# Patient Record
Sex: FEMALE | Race: WHITE | NOT HISPANIC OR LATINO | Employment: OTHER | URBAN - METROPOLITAN AREA
[De-identification: names, ages, dates, MRNs, and addresses within clinical notes are randomized per-mention and may not be internally consistent; named-entity substitution may affect disease eponyms.]

---

## 2017-01-03 ENCOUNTER — APPOINTMENT (OUTPATIENT)
Dept: PHYSICAL THERAPY | Age: 72
End: 2017-01-03
Payer: MEDICARE

## 2017-01-05 ENCOUNTER — APPOINTMENT (OUTPATIENT)
Dept: PHYSICAL THERAPY | Age: 72
End: 2017-01-05
Payer: MEDICARE

## 2017-01-06 ENCOUNTER — ALLSCRIPTS OFFICE VISIT (OUTPATIENT)
Dept: OTHER | Facility: OTHER | Age: 72
End: 2017-01-06

## 2017-01-06 DIAGNOSIS — M54.50 LOW BACK PAIN: ICD-10-CM

## 2017-01-09 ENCOUNTER — APPOINTMENT (OUTPATIENT)
Dept: PHYSICAL THERAPY | Age: 72
End: 2017-01-09
Payer: MEDICARE

## 2017-01-09 PROCEDURE — G8990 OTHER PT/OT CURRENT STATUS: HCPCS

## 2017-01-09 PROCEDURE — G8992 OTHER PT/OT  D/C STATUS: HCPCS

## 2017-01-09 PROCEDURE — 97164 PT RE-EVAL EST PLAN CARE: CPT

## 2017-01-09 PROCEDURE — G8991 OTHER PT/OT GOAL STATUS: HCPCS

## 2017-01-11 ENCOUNTER — GENERIC CONVERSION - ENCOUNTER (OUTPATIENT)
Dept: OTHER | Facility: OTHER | Age: 72
End: 2017-01-11

## 2017-03-21 ENCOUNTER — HOSPITAL ENCOUNTER (OUTPATIENT)
Dept: MRI IMAGING | Facility: HOSPITAL | Age: 72
Discharge: HOME | End: 2017-03-21
Attending: ANESTHESIOLOGY
Payer: MEDICARE

## 2017-03-21 DIAGNOSIS — M54.50 LOW BACK PAIN: ICD-10-CM

## 2017-03-21 PROCEDURE — 72148 MRI LUMBAR SPINE W/O DYE: CPT

## 2017-03-31 ENCOUNTER — GENERIC CONVERSION - ENCOUNTER (OUTPATIENT)
Dept: OTHER | Facility: OTHER | Age: 72
End: 2017-03-31

## 2017-03-31 DIAGNOSIS — D64.9 ANEMIA: ICD-10-CM

## 2017-03-31 DIAGNOSIS — E78.00 PURE HYPERCHOLESTEROLEMIA: ICD-10-CM

## 2017-03-31 DIAGNOSIS — M48.061 SPINAL STENOSIS OF LUMBAR REGION: ICD-10-CM

## 2017-03-31 DIAGNOSIS — I10 ESSENTIAL (PRIMARY) HYPERTENSION: ICD-10-CM

## 2017-04-10 ENCOUNTER — ALLSCRIPTS OFFICE VISIT (OUTPATIENT)
Dept: OTHER | Facility: OTHER | Age: 72
End: 2017-04-10

## 2017-06-16 ENCOUNTER — GENERIC CONVERSION - ENCOUNTER (OUTPATIENT)
Dept: OTHER | Facility: OTHER | Age: 72
End: 2017-06-16

## 2017-07-18 ENCOUNTER — ALLSCRIPTS OFFICE VISIT (OUTPATIENT)
Dept: OTHER | Facility: OTHER | Age: 72
End: 2017-07-18

## 2017-07-18 DIAGNOSIS — R00.1 BRADYCARDIA: ICD-10-CM

## 2017-07-18 DIAGNOSIS — I48.0 PAROXYSMAL ATRIAL FIBRILLATION (HCC): ICD-10-CM

## 2017-07-18 DIAGNOSIS — M51.36 OTHER INTERVERTEBRAL DISC DEGENERATION, LUMBAR REGION: ICD-10-CM

## 2017-08-07 ENCOUNTER — APPOINTMENT (OUTPATIENT)
Dept: PHYSICAL THERAPY | Age: 72
End: 2017-08-07
Payer: MEDICARE

## 2017-08-09 ENCOUNTER — APPOINTMENT (OUTPATIENT)
Dept: PHYSICAL THERAPY | Age: 72
End: 2017-08-09
Payer: MEDICARE

## 2017-08-09 PROCEDURE — G8991 OTHER PT/OT GOAL STATUS: HCPCS

## 2017-08-09 PROCEDURE — 97162 PT EVAL MOD COMPLEX 30 MIN: CPT

## 2017-08-09 PROCEDURE — G8990 OTHER PT/OT CURRENT STATUS: HCPCS

## 2017-08-10 ENCOUNTER — GENERIC CONVERSION - ENCOUNTER (OUTPATIENT)
Dept: OTHER | Facility: OTHER | Age: 72
End: 2017-08-10

## 2017-08-17 ENCOUNTER — APPOINTMENT (OUTPATIENT)
Dept: PHYSICAL THERAPY | Age: 72
End: 2017-08-17
Payer: MEDICARE

## 2017-08-17 PROCEDURE — 97140 MANUAL THERAPY 1/> REGIONS: CPT

## 2017-08-17 PROCEDURE — 97110 THERAPEUTIC EXERCISES: CPT

## 2017-08-22 ENCOUNTER — APPOINTMENT (OUTPATIENT)
Dept: PHYSICAL THERAPY | Age: 72
End: 2017-08-22
Payer: MEDICARE

## 2017-08-23 ENCOUNTER — APPOINTMENT (OUTPATIENT)
Dept: PHYSICAL THERAPY | Age: 72
End: 2017-08-23
Payer: MEDICARE

## 2017-08-23 PROCEDURE — 97110 THERAPEUTIC EXERCISES: CPT

## 2017-08-23 PROCEDURE — 97112 NEUROMUSCULAR REEDUCATION: CPT

## 2017-08-24 ENCOUNTER — APPOINTMENT (OUTPATIENT)
Dept: PHYSICAL THERAPY | Age: 72
End: 2017-08-24
Payer: MEDICARE

## 2017-08-25 ENCOUNTER — APPOINTMENT (OUTPATIENT)
Dept: PHYSICAL THERAPY | Age: 72
End: 2017-08-25
Payer: MEDICARE

## 2017-08-25 ENCOUNTER — APPOINTMENT (OUTPATIENT)
Dept: LAB | Age: 72
End: 2017-08-25
Payer: MEDICARE

## 2017-08-25 ENCOUNTER — TRANSCRIBE ORDERS (OUTPATIENT)
Dept: LAB | Age: 72
End: 2017-08-25

## 2017-08-25 DIAGNOSIS — I10 ESSENTIAL (PRIMARY) HYPERTENSION: ICD-10-CM

## 2017-08-25 DIAGNOSIS — E78.00 PURE HYPERCHOLESTEROLEMIA: ICD-10-CM

## 2017-08-25 DIAGNOSIS — D64.9 ANEMIA: ICD-10-CM

## 2017-08-25 DIAGNOSIS — R00.1 BRADYCARDIA: ICD-10-CM

## 2017-08-25 DIAGNOSIS — I48.0 PAROXYSMAL ATRIAL FIBRILLATION (HCC): ICD-10-CM

## 2017-08-25 LAB
ALBUMIN SERPL BCP-MCNC: 3.7 G/DL (ref 3.5–5)
ALP SERPL-CCNC: 70 U/L (ref 46–116)
ALT SERPL W P-5'-P-CCNC: 16 U/L (ref 12–78)
ANION GAP SERPL CALCULATED.3IONS-SCNC: 5 MMOL/L (ref 4–13)
AST SERPL W P-5'-P-CCNC: 15 U/L (ref 5–45)
BASOPHILS # BLD AUTO: 0.03 THOUSANDS/ΜL (ref 0–0.1)
BASOPHILS NFR BLD AUTO: 0 % (ref 0–1)
BILIRUB SERPL-MCNC: 0.5 MG/DL (ref 0.2–1)
BUN SERPL-MCNC: 25 MG/DL (ref 5–25)
CALCIUM SERPL-MCNC: 9.2 MG/DL (ref 8.3–10.1)
CHLORIDE SERPL-SCNC: 105 MMOL/L (ref 100–108)
CHOLEST SERPL-MCNC: 207 MG/DL (ref 50–200)
CO2 SERPL-SCNC: 29 MMOL/L (ref 21–32)
CREAT SERPL-MCNC: 0.87 MG/DL (ref 0.6–1.3)
EOSINOPHIL # BLD AUTO: 0.16 THOUSAND/ΜL (ref 0–0.61)
EOSINOPHIL NFR BLD AUTO: 2 % (ref 0–6)
ERYTHROCYTE [DISTWIDTH] IN BLOOD BY AUTOMATED COUNT: 13 % (ref 11.6–15.1)
GFR SERPL CREATININE-BSD FRML MDRD: 66 ML/MIN/1.73SQ M
GLUCOSE P FAST SERPL-MCNC: 100 MG/DL (ref 65–99)
HCT VFR BLD AUTO: 40.5 % (ref 34.8–46.1)
HDLC SERPL-MCNC: 59 MG/DL (ref 40–60)
HGB BLD-MCNC: 13.6 G/DL (ref 11.5–15.4)
INR PPP: 1.36 (ref 0.86–1.16)
LDLC SERPL CALC-MCNC: 129 MG/DL (ref 0–100)
LYMPHOCYTES # BLD AUTO: 2.13 THOUSANDS/ΜL (ref 0.6–4.47)
LYMPHOCYTES NFR BLD AUTO: 26 % (ref 14–44)
MCH RBC QN AUTO: 29.9 PG (ref 26.8–34.3)
MCHC RBC AUTO-ENTMCNC: 33.6 G/DL (ref 31.4–37.4)
MCV RBC AUTO: 89 FL (ref 82–98)
MONOCYTES # BLD AUTO: 0.67 THOUSAND/ΜL (ref 0.17–1.22)
MONOCYTES NFR BLD AUTO: 8 % (ref 4–12)
NEUTROPHILS # BLD AUTO: 5.07 THOUSANDS/ΜL (ref 1.85–7.62)
NEUTS SEG NFR BLD AUTO: 64 % (ref 43–75)
NRBC BLD AUTO-RTO: 0 /100 WBCS
PLATELET # BLD AUTO: 309 THOUSANDS/UL (ref 149–390)
PMV BLD AUTO: 9.4 FL (ref 8.9–12.7)
POTASSIUM SERPL-SCNC: 3.9 MMOL/L (ref 3.5–5.3)
PROT SERPL-MCNC: 7.4 G/DL (ref 6.4–8.2)
PROTHROMBIN TIME: 16.8 SECONDS (ref 12.1–14.4)
RBC # BLD AUTO: 4.55 MILLION/UL (ref 3.81–5.12)
SODIUM SERPL-SCNC: 139 MMOL/L (ref 136–145)
TRIGL SERPL-MCNC: 93 MG/DL
WBC # BLD AUTO: 8.11 THOUSAND/UL (ref 4.31–10.16)

## 2017-08-25 PROCEDURE — 97110 THERAPEUTIC EXERCISES: CPT

## 2017-08-25 PROCEDURE — 85610 PROTHROMBIN TIME: CPT

## 2017-08-25 PROCEDURE — 85025 COMPLETE CBC W/AUTO DIFF WBC: CPT

## 2017-08-25 PROCEDURE — 97112 NEUROMUSCULAR REEDUCATION: CPT

## 2017-08-25 PROCEDURE — 36415 COLL VENOUS BLD VENIPUNCTURE: CPT

## 2017-08-25 PROCEDURE — 80061 LIPID PANEL: CPT

## 2017-08-25 PROCEDURE — 80053 COMPREHEN METABOLIC PANEL: CPT

## 2017-08-26 ENCOUNTER — GENERIC CONVERSION - ENCOUNTER (OUTPATIENT)
Dept: OTHER | Facility: OTHER | Age: 72
End: 2017-08-26

## 2017-08-29 ENCOUNTER — APPOINTMENT (OUTPATIENT)
Dept: PHYSICAL THERAPY | Age: 72
End: 2017-08-29
Payer: MEDICARE

## 2017-08-30 ENCOUNTER — APPOINTMENT (OUTPATIENT)
Dept: PHYSICAL THERAPY | Age: 72
End: 2017-08-30
Payer: MEDICARE

## 2017-08-30 PROCEDURE — 97140 MANUAL THERAPY 1/> REGIONS: CPT

## 2017-08-30 PROCEDURE — 97112 NEUROMUSCULAR REEDUCATION: CPT

## 2017-08-30 PROCEDURE — 97110 THERAPEUTIC EXERCISES: CPT

## 2017-08-31 ENCOUNTER — ALLSCRIPTS OFFICE VISIT (OUTPATIENT)
Dept: OTHER | Facility: OTHER | Age: 72
End: 2017-08-31

## 2017-09-05 ENCOUNTER — APPOINTMENT (OUTPATIENT)
Dept: PHYSICAL THERAPY | Age: 72
End: 2017-09-05
Payer: MEDICARE

## 2017-09-05 PROCEDURE — 97110 THERAPEUTIC EXERCISES: CPT

## 2017-09-05 PROCEDURE — 97112 NEUROMUSCULAR REEDUCATION: CPT

## 2017-09-07 ENCOUNTER — APPOINTMENT (OUTPATIENT)
Dept: PHYSICAL THERAPY | Age: 72
End: 2017-09-07
Payer: MEDICARE

## 2017-09-07 PROCEDURE — 97110 THERAPEUTIC EXERCISES: CPT

## 2017-09-07 PROCEDURE — 97112 NEUROMUSCULAR REEDUCATION: CPT

## 2017-09-12 ENCOUNTER — APPOINTMENT (OUTPATIENT)
Dept: PHYSICAL THERAPY | Age: 72
End: 2017-09-12
Payer: MEDICARE

## 2017-09-12 PROCEDURE — 97112 NEUROMUSCULAR REEDUCATION: CPT

## 2017-09-12 PROCEDURE — 97110 THERAPEUTIC EXERCISES: CPT

## 2017-09-12 PROCEDURE — 97140 MANUAL THERAPY 1/> REGIONS: CPT

## 2017-09-14 ENCOUNTER — APPOINTMENT (OUTPATIENT)
Dept: PHYSICAL THERAPY | Age: 72
End: 2017-09-14
Payer: MEDICARE

## 2017-09-14 PROCEDURE — G8990 OTHER PT/OT CURRENT STATUS: HCPCS

## 2017-09-14 PROCEDURE — 97110 THERAPEUTIC EXERCISES: CPT

## 2017-09-14 PROCEDURE — G8991 OTHER PT/OT GOAL STATUS: HCPCS

## 2017-09-19 ENCOUNTER — APPOINTMENT (OUTPATIENT)
Dept: PHYSICAL THERAPY | Age: 72
End: 2017-09-19
Payer: MEDICARE

## 2017-09-21 ENCOUNTER — GENERIC CONVERSION - ENCOUNTER (OUTPATIENT)
Dept: OTHER | Facility: OTHER | Age: 72
End: 2017-09-21

## 2017-09-21 ENCOUNTER — APPOINTMENT (OUTPATIENT)
Dept: PHYSICAL THERAPY | Age: 72
End: 2017-09-21
Payer: MEDICARE

## 2017-09-22 ENCOUNTER — GENERIC CONVERSION - ENCOUNTER (OUTPATIENT)
Dept: OTHER | Facility: OTHER | Age: 72
End: 2017-09-22

## 2017-10-13 RX ORDER — VANCOMYCIN HYDROCHLORIDE 1 G/200ML
1000 INJECTION, SOLUTION INTRAVENOUS ONCE
Status: CANCELLED | OUTPATIENT
Start: 2017-10-13 | End: 2017-10-13

## 2017-10-15 ENCOUNTER — TELEPHONE (OUTPATIENT)
Dept: INPATIENT UNIT | Facility: HOSPITAL | Age: 72
End: 2017-10-15

## 2017-10-16 ENCOUNTER — APPOINTMENT (OUTPATIENT)
Dept: RADIOLOGY | Facility: HOSPITAL | Age: 72
End: 2017-10-16
Payer: MEDICARE

## 2017-10-16 ENCOUNTER — HOSPITAL ENCOUNTER (OUTPATIENT)
Dept: NON INVASIVE DIAGNOSTICS | Facility: HOSPITAL | Age: 72
Discharge: HOME | End: 2017-10-17
Attending: INTERNAL MEDICINE | Admitting: INTERNAL MEDICINE
Payer: MEDICARE

## 2017-10-16 ENCOUNTER — ANESTHESIA (OUTPATIENT)
Dept: SURGERY | Facility: HOSPITAL | Age: 72
End: 2017-10-16
Payer: MEDICARE

## 2017-10-16 ENCOUNTER — ANESTHESIA EVENT (OUTPATIENT)
Dept: SURGERY | Facility: HOSPITAL | Age: 72
End: 2017-10-16
Payer: MEDICARE

## 2017-10-16 DIAGNOSIS — R60.9 EDEMA: ICD-10-CM

## 2017-10-16 DIAGNOSIS — I49.5 TACHY-BRADY SYNDROME (HCC): ICD-10-CM

## 2017-10-16 DIAGNOSIS — R60.9 SWELLING: ICD-10-CM

## 2017-10-16 LAB
ANION GAP SERPL CALCULATED.3IONS-SCNC: 7 MMOL/L (ref 4–13)
ATRIAL RATE: 51 BPM
BASOPHILS # BLD AUTO: 0.03 THOUSANDS/ΜL (ref 0–0.1)
BASOPHILS NFR BLD AUTO: 0 % (ref 0–1)
BUN SERPL-MCNC: 26 MG/DL (ref 5–25)
CALCIUM SERPL-MCNC: 9.1 MG/DL (ref 8.3–10.1)
CHLORIDE SERPL-SCNC: 105 MMOL/L (ref 100–108)
CO2 SERPL-SCNC: 27 MMOL/L (ref 21–32)
CREAT SERPL-MCNC: 0.8 MG/DL (ref 0.6–1.3)
EOSINOPHIL # BLD AUTO: 0.2 THOUSAND/ΜL (ref 0–0.61)
EOSINOPHIL NFR BLD AUTO: 3 % (ref 0–6)
ERYTHROCYTE [DISTWIDTH] IN BLOOD BY AUTOMATED COUNT: 12.8 % (ref 11.6–15.1)
GFR SERPL CREATININE-BSD FRML MDRD: 73 ML/MIN/1.73SQ M
GLUCOSE P FAST SERPL-MCNC: 95 MG/DL (ref 65–99)
GLUCOSE SERPL-MCNC: 95 MG/DL (ref 65–140)
HCT VFR BLD AUTO: 43.2 % (ref 34.8–46.1)
HGB BLD-MCNC: 14.5 G/DL (ref 11.5–15.4)
LYMPHOCYTES # BLD AUTO: 1.97 THOUSANDS/ΜL (ref 0.6–4.47)
LYMPHOCYTES NFR BLD AUTO: 26 % (ref 14–44)
MAGNESIUM SERPL-MCNC: 2.6 MG/DL (ref 1.6–2.6)
MCH RBC QN AUTO: 30.3 PG (ref 26.8–34.3)
MCHC RBC AUTO-ENTMCNC: 33.6 G/DL (ref 31.4–37.4)
MCV RBC AUTO: 90 FL (ref 82–98)
MONOCYTES # BLD AUTO: 0.84 THOUSAND/ΜL (ref 0.17–1.22)
MONOCYTES NFR BLD AUTO: 11 % (ref 4–12)
NEUTROPHILS # BLD AUTO: 4.64 THOUSANDS/ΜL (ref 1.85–7.62)
NEUTS SEG NFR BLD AUTO: 60 % (ref 43–75)
NRBC BLD AUTO-RTO: 0 /100 WBCS
P AXIS: 85 DEGREES
PLATELET # BLD AUTO: 293 THOUSANDS/UL (ref 149–390)
PMV BLD AUTO: 9.3 FL (ref 8.9–12.7)
POTASSIUM SERPL-SCNC: 4 MMOL/L (ref 3.5–5.3)
PR INTERVAL: 190 MS
QRS AXIS: -23 DEGREES
QRSD INTERVAL: 114 MS
QT INTERVAL: 474 MS
QTC INTERVAL: 436 MS
RBC # BLD AUTO: 4.78 MILLION/UL (ref 3.81–5.12)
SODIUM SERPL-SCNC: 139 MMOL/L (ref 136–145)
T WAVE AXIS: 82 DEGREES
VENTRICULAR RATE: 51 BPM
WBC # BLD AUTO: 7.72 THOUSAND/UL (ref 4.31–10.16)

## 2017-10-16 PROCEDURE — 80048 BASIC METABOLIC PNL TOTAL CA: CPT | Performed by: PHYSICIAN ASSISTANT

## 2017-10-16 PROCEDURE — 93005 ELECTROCARDIOGRAM TRACING: CPT | Performed by: PHYSICIAN ASSISTANT

## 2017-10-16 PROCEDURE — C1898 LEAD, PMKR, OTHER THAN TRANS: HCPCS

## 2017-10-16 PROCEDURE — 71010 HB CHEST X-RAY 1 VIEW FRONTAL (PORTABLE): CPT

## 2017-10-16 PROCEDURE — 33208 INSRT HEART PM ATRIAL & VENT: CPT | Performed by: INTERNAL MEDICINE

## 2017-10-16 PROCEDURE — 83735 ASSAY OF MAGNESIUM: CPT | Performed by: PHYSICIAN ASSISTANT

## 2017-10-16 PROCEDURE — 85025 COMPLETE CBC W/AUTO DIFF WBC: CPT | Performed by: PHYSICIAN ASSISTANT

## 2017-10-16 PROCEDURE — C1785 PMKR, DUAL, RATE-RESP: HCPCS

## 2017-10-16 PROCEDURE — C1894 INTRO/SHEATH, NON-LASER: HCPCS | Performed by: INTERNAL MEDICINE

## 2017-10-16 RX ORDER — PROPOFOL 10 MG/ML
INJECTION, EMULSION INTRAVENOUS CONTINUOUS PRN
Status: DISCONTINUED | OUTPATIENT
Start: 2017-10-16 | End: 2017-10-16 | Stop reason: SURG

## 2017-10-16 RX ORDER — METOPROLOL TARTRATE 50 MG/1
50 TABLET, FILM COATED ORAL EVERY 12 HOURS SCHEDULED
COMMUNITY
End: 2018-02-15 | Stop reason: SDUPTHER

## 2017-10-16 RX ORDER — AMLODIPINE BESYLATE 10 MG/1
10 TABLET ORAL DAILY
COMMUNITY
End: 2018-02-15 | Stop reason: SDUPTHER

## 2017-10-16 RX ORDER — GENTAMICIN SULFATE 40 MG/ML
INJECTION, SOLUTION INTRAMUSCULAR; INTRAVENOUS CODE/TRAUMA/SEDATION MEDICATION
Status: COMPLETED | OUTPATIENT
Start: 2017-10-16 | End: 2017-10-16

## 2017-10-16 RX ORDER — ASCORBIC ACID 500 MG
1000 TABLET ORAL DAILY
Status: DISCONTINUED | OUTPATIENT
Start: 2017-10-16 | End: 2017-10-17 | Stop reason: HOSPADM

## 2017-10-16 RX ORDER — PROPOFOL 10 MG/ML
INJECTION, EMULSION INTRAVENOUS AS NEEDED
Status: DISCONTINUED | OUTPATIENT
Start: 2017-10-16 | End: 2017-10-16 | Stop reason: SURG

## 2017-10-16 RX ORDER — SODIUM CHLORIDE 9 MG/ML
INJECTION, SOLUTION INTRAVENOUS CONTINUOUS PRN
Status: DISCONTINUED | OUTPATIENT
Start: 2017-10-16 | End: 2017-10-16 | Stop reason: SURG

## 2017-10-16 RX ORDER — LIDOCAINE HYDROCHLORIDE 10 MG/ML
INJECTION, SOLUTION INFILTRATION; PERINEURAL AS NEEDED
Status: DISCONTINUED | OUTPATIENT
Start: 2017-10-16 | End: 2017-10-16 | Stop reason: SURG

## 2017-10-16 RX ORDER — ALPRAZOLAM 0.25 MG/1
0.25 TABLET ORAL
Status: DISCONTINUED | OUTPATIENT
Start: 2017-10-16 | End: 2017-10-17 | Stop reason: HOSPADM

## 2017-10-16 RX ORDER — AMLODIPINE BESYLATE 10 MG/1
10 TABLET ORAL DAILY
Status: DISCONTINUED | OUTPATIENT
Start: 2017-10-17 | End: 2017-10-17 | Stop reason: HOSPADM

## 2017-10-16 RX ORDER — ALBUTEROL SULFATE 90 UG/1
2 AEROSOL, METERED RESPIRATORY (INHALATION) EVERY 6 HOURS PRN
COMMUNITY
End: 2018-07-02 | Stop reason: SDUPTHER

## 2017-10-16 RX ORDER — ACETAMINOPHEN 325 MG/1
650 TABLET ORAL EVERY 4 HOURS PRN
Status: DISCONTINUED | OUTPATIENT
Start: 2017-10-16 | End: 2017-10-17 | Stop reason: HOSPADM

## 2017-10-16 RX ORDER — METOPROLOL TARTRATE 50 MG/1
50 TABLET, FILM COATED ORAL EVERY 12 HOURS SCHEDULED
Status: DISCONTINUED | OUTPATIENT
Start: 2017-10-16 | End: 2017-10-17 | Stop reason: HOSPADM

## 2017-10-16 RX ORDER — OXYCODONE HYDROCHLORIDE AND ACETAMINOPHEN 5; 325 MG/1; MG/1
1 TABLET ORAL EVERY 4 HOURS PRN
Status: DISCONTINUED | OUTPATIENT
Start: 2017-10-16 | End: 2017-10-17 | Stop reason: HOSPADM

## 2017-10-16 RX ORDER — LIDOCAINE HYDROCHLORIDE 10 MG/ML
INJECTION, SOLUTION INFILTRATION; PERINEURAL CODE/TRAUMA/SEDATION MEDICATION
Status: COMPLETED | OUTPATIENT
Start: 2017-10-16 | End: 2017-10-16

## 2017-10-16 RX ORDER — ALBUTEROL SULFATE 90 UG/1
2 AEROSOL, METERED RESPIRATORY (INHALATION) EVERY 6 HOURS PRN
Status: DISCONTINUED | OUTPATIENT
Start: 2017-10-16 | End: 2017-10-17 | Stop reason: HOSPADM

## 2017-10-16 RX ORDER — ALPRAZOLAM 0.25 MG/1
0.25 TABLET ORAL
COMMUNITY
End: 2018-02-15 | Stop reason: SDUPTHER

## 2017-10-16 RX ORDER — VANCOMYCIN HYDROCHLORIDE 1 G/200ML
1000 INJECTION, SOLUTION INTRAVENOUS ONCE
Status: COMPLETED | OUTPATIENT
Start: 2017-10-16 | End: 2017-10-16

## 2017-10-16 RX ORDER — MELATONIN
1000 DAILY
COMMUNITY
End: 2018-02-15 | Stop reason: SDUPTHER

## 2017-10-16 RX ADMIN — IOHEXOL 10 ML: 350 INJECTION, SOLUTION INTRAVENOUS at 13:45

## 2017-10-16 RX ADMIN — OXYCODONE HYDROCHLORIDE AND ACETAMINOPHEN 1 TABLET: 5; 325 TABLET ORAL at 23:31

## 2017-10-16 RX ADMIN — METOPROLOL TARTRATE 50 MG: 50 TABLET ORAL at 16:36

## 2017-10-16 RX ADMIN — SODIUM CHLORIDE: 0.9 INJECTION, SOLUTION INTRAVENOUS at 13:00

## 2017-10-16 RX ADMIN — PROPOFOL 50 MG: 10 INJECTION, EMULSION INTRAVENOUS at 13:21

## 2017-10-16 RX ADMIN — LIDOCAINE HYDROCHLORIDE 20 ML: 10 INJECTION, SOLUTION INFILTRATION; PERINEURAL at 13:46

## 2017-10-16 RX ADMIN — ACETAMINOPHEN 650 MG: 325 TABLET, FILM COATED ORAL at 21:08

## 2017-10-16 RX ADMIN — VANCOMYCIN HYDROCHLORIDE 1000 MG: 1 INJECTION, SOLUTION INTRAVENOUS at 12:55

## 2017-10-16 RX ADMIN — LIDOCAINE HYDROCHLORIDE 30 MG: 10 INJECTION, SOLUTION INFILTRATION; PERINEURAL at 13:21

## 2017-10-16 RX ADMIN — PROPOFOL 100 MCG/KG/MIN: 10 INJECTION, EMULSION INTRAVENOUS at 13:21

## 2017-10-16 RX ADMIN — GENTAMICIN SULFATE 80 MG: 40 INJECTION, SOLUTION INTRAMUSCULAR; INTRAVENOUS at 14:15

## 2017-10-16 RX ADMIN — METOPROLOL TARTRATE 50 MG: 50 TABLET ORAL at 21:08

## 2017-10-16 NOTE — ANESTHESIA PREPROCEDURE EVALUATION
Review of Systems/Medical History  Patient summary reviewed  Chart reviewed  History of anesthetic complications PONV    Cardiovascular  Hypertension , Dysrhythmias (PAF), ,   Comment: Tachy-smita syndrome,  Pulmonary  ,   Comment: Seasonal allergies     GI/Hepatic    GERD ,   Comment: Hx H  pylori     Negative  ROS        Endo/Other  Negative endo/other ROS      GYN  Negative gynecology ROS          Hematology  Negative hematology ROS      Musculoskeletal  Obesity (BMI 39) , Back pain (Fibromyalgia) , chronic back pain and lumbar pain, Osteoarthritis,        Neurology  Negative neurology ROS      Psychology   Negative psychology ROS            Physical Exam    Airway    Mallampati score: II  TM Distance: >3 FB       Dental   Comment: Poor lower dentition, upper dentures,     Cardiovascular  Rhythm: regular, Rate: normal,     Pulmonary  Breath sounds clear to auscultation,     Other Findings        Anesthesia Plan  ASA Score- 3       Anesthesia Type- IV sedation with anesthesia with ASA Monitors  Additional Monitors:   Airway Plan:           Induction- intravenous  Informed Consent- Anesthetic plan and risks discussed with patient  I personally reviewed this patient with the CRNA  Discussed and agreed on the Anesthesia Plan with the CRNA  Sophia Ma

## 2017-10-16 NOTE — DISCHARGE INSTRUCTIONS
Please refer to post pacemaker/ICD implantation discharge instructions and restrictions and your ICD booklet/temporary card  Keep incision dry for one week  Do not use lotions/powders/creams on incision  Remove outer bandage 48 hours after implantation  Leave underlying steri-strips in place, they will either fall off on their own or will be removed at 2 week follow up appointment  No overhead reaching/pushing/pulling/lifting greater than 5-10lbs with left arm for one month  Please call the office if you notice redness, swelling, bleeding, or drainage from incision or if you develop fevers  Pacemaker   WHAT YOU NEED TO KNOW:   A pacemaker is a small, battery-powered device that is implanted into your chest to help regulate your heart rate  DISCHARGE INSTRUCTIONS:   Medicines:   · Pain medicine: You may need medicine to take away or decrease pain  ¨ Learn how to take your medicine  Ask what medicine and how much you should take  Be sure you know how, when, and how often to take it  ¨ Do not wait until the pain is severe before you take your medicine  Tell caregivers if your pain does not decrease  ¨ Pain medicine can make you dizzy or sleepy  Prevent falls by calling someone when you get out of bed or if you need help  · Antibiotics: This medicine is given to fight or prevent an infection caused by bacteria  Always take your antibiotics exactly as ordered by your healthcare provider  Do not stop taking your medicine unless directed by your healthcare provider  Never save antibiotics or take leftover antibiotics that were given to you for another illness  · Take your medicine as directed  Contact your healthcare provider if you think your medicine is not helping or if you have side effects  Tell him or her if you are allergic to any medicine  Keep a list of the medicines, vitamins, and herbs you take  Include the amounts, and when and why you take them   Bring the list or the pill bottles to follow-up visits  Carry your medicine list with you in case of an emergency  Follow up with your cardiologist after your procedure: You may need a follow-up visit 7 to 10 days after you leave the hospital  Your cardiologist will check your wound and make sure that your pacemaker is working correctly  Follow the instructions to check your pacemaker: Your cardiologist or healthcare provider will check your pacemaker and the battery regularly, usually every 3 to 6 months  He may do this in his office  He may also use a computer to check your pacemaker over the telephone between visits  Pacemaker batteries usually last 5 to 8 years  The pacemaker unit will be replaced when the battery gets low  This is a simpler procedure than the original one to implant your pacemaker  Heart rate checks:   · You may need to use a heart monitor at home after your procedure  This device may be called an event monitor, Holter monitor, or mobile telemetry  Monitoring may be done for a period of time, such as a week, or at regular intervals until your heart rhythm is regular  · You may be taught how to check your pulse on your wrist or on your neck  This allows you to monitor how your pacemaker is working  A normal heart beats 50 to 70 times a minute when you are resting  Ask what your pulse should be (your pacemaker's set rate)  Wound care:  Keep your incisions clean and dry for 7 to 10 days after your procedure  Ask your healthcare provider how to care for your incisions and when you can shower or bathe  Activity:   · Arm movement and lifting:  Be careful using the arm on the side of your pacemaker  Do not move your arm for the first 24 hours after your procedure  Do not  lift your arm above your shoulder or lift more than 10 pounds for 6 weeks after your procedure  This helps the leads stay in place and helps your wound heal  Ask your healthcare provider when you can drive after your procedure       · Sports:  Ask your healthcare provider when it is okay to play tennis, golf, basketball, or any sport that requires you to lift your arms  Do not play full contact sports, such as football, that could damage your pacemaker  Ask your cardiologist or healthcare provider how much and what kinds of physical activity are safe for you  Living with a pacemaker:   · Tell all healthcare providers you have a pacemaker: This includes surgeons, radiologists, and medical technicians  You may want to wear a medical alert ID bracelet or necklace that states that you have a pacemaker  · Carry your pacemaker ID card: Make sure you receive a pacemaker ID card  Carry it with you at all times  It lists important information about your pacemaker  Show it to airport security if you travel  · Avoid electrical interference:  Avoid welding equipment, MRI machines, and other equipment with large magnets or electric fields  These things could interfere with how your pacemaker works  Use your cell phone on the ear opposite from your pacemaker  Do not carry your cell phone in your shirt pocket over your chest      · Do not touch the skin around your pacemaker: This can cause damage to the lead wires or move the pacemaker unit from where it should be  Contact your cardiologist or healthcare provider if:   · The area around your pacemaker is painful or tender after surgery  · The skin around your stitches is red, swollen, or has drainage  This may mean that you have an infection  · You have a fever  · You have chills, a cough, and feel weak or achy  These are also signs of infection  · Your feet or ankles are swollen  Seek care immediately if:   · Your bandage becomes soaked with blood  · Your stitches open up  · You feel your heart suddenly beating very slowly or quickly  · You become too weak or dizzy to stand, or you pass out  · Your arm or leg feels warm, tender, and painful  It may look swollen and red  You have chest pain that does not go away with rest or medicine  · You feel lightheaded, short of breath, and have chest pain  You cough up blood  © 2017 [employer] Information is for End User's use only and may not be sold, redistributed or otherwise used for commercial purposes  All illustrations and images included in CareNotes® are the copyrighted property of A D [employer] M [employer]  or Scotty Pendleton  The above information is an  only  It is not intended as medical advice for individual conditions or treatments  Talk to your doctor, nurse or pharmacist before following any medical regimen to see if it is safe and effective for you

## 2017-10-16 NOTE — ANESTHESIA POSTPROCEDURE EVALUATION
Post-Op Assessment Note      CV Status:  Stable    Mental Status:  Alert and awake    Hydration Status:  Euvolemic    PONV Controlled:  Controlled    Airway Patency:  Patent    Post Op Vitals Reviewed: Yes          Staff: CRNA       Comments: vss report rn          BP      Temp      Pulse    Resp      SpO2

## 2017-10-16 NOTE — H&P
Please see H&P below from 8/31/17  CC: here for ppm  HPI: She is doing the same  No changes    See update Med list in EPIC  Exam unchanges from previous'  Asssessment:  Tachy-smita  Symptomatic sick sinus syndrome  Plan  Dual chamber ppm      Consults        []Hide copied text  []Hover for attribution information  Assessment  1  Bradycardia (427 89) (R00 1)   2  Tachy-smita syndrome (427 81) (I49 5)     Plan  AF (paroxysmal atrial fibrillation), Health Maintenance, Fatigue    · EKG/ECG- POC; Status:Complete;   Done: 80SPW8693   Perform: In Office; Due:67Kqq8947; Last Updated Brigida Locke; 8/31/2017 2:13:55 PM;Ordered; For:AF (paroxysmal atrial fibrillation), Health Maintenance, Fatigue; Ordered By:Xavier Castellano;     Discussion/Summary     74 female  ) Tachy smita  HR 41bpm on EKG in July w  [facility], has sinus pauses, average HR 40s on holter 2015 when not on betablockers  She was put on metoprolol NOv 2016 after having post op afib post Knee replaement  Dr Garrett Malave added pradaxa last month  She is tired all the time  Normal EF, moderate Pulm HTN on TTE last year  syncope but she gets dizzy on occasion, she thought it was vertigo  ) HTN well controlled      ) Recommend dual chamber pacemaker for tachy smita  Risks include but not limited to bleeding, infection, device dislodgement, bleeding around the heart  WIll hold Pradaxa 2 days  metoprolol to 25mg bid until then  ) Get records of Afib from [facility]    Chief Complaint  Patient here today for Consult per Dr Salud Kennedy  Patient c/o occ Palpations, Fatigue and occ Headaches  Patient does not c/o Dizziness, Chest Pain, Swelling and SOB  EKG today  History of Present Illness  Cardiology HPI Free Text Note Form [facility]: 74 female  ) Tachy smita  HR 41bpm on EKG in July w  [facility], has sinus pauses, average HR 40s on holter 2015 when not on betablockers  She was put on metoprolol NOv 2016 after having post op afib post Knee replaement   Dr Garrett Malave added pradaxa last month  She is tired all the time  Normal EF, moderate Pulm HTN on TTE last year  syncope but she gets dizzy on occasion, she thought it was vertigo  ) HTN well controlled      point ROS per paper chart      Review of Systems     ROS reviewed  Active Problems  1  AF (paroxysmal atrial fibrillation) (427 31) (I48 0)   2  Anemia (285 9) (D64 9)   3  Bradycardia (427 89) (R00 1)   4  Chronic bilateral low back pain without sciatica (724 2,338 29) (M54 5,G89 29)   5  Chronic GERD (530 81) (K21 9)   6  Degenerative arthritis of knee, bilateral (715 96) (M17 0)   7  Degenerative lumbar spinal stenosis (724 02) (M48 06)   8  Dizziness (780 4) (R42)   9  Fatigue (780 79) (R53 83)   10  Fibromyalgia (729 1) (M79 7)   11  GERD (gastroesophageal reflux disease) (530 81) (K21 9)   12  Hypercholesterolemia (272 0) (E78 00)   13  Hypertension (401 9) (I10)   14  Low back pain (724 2) (M54 5)   15  Lumbar degenerative disc disease (722 52) (M51 36)   16  Mild carpal tunnel syndrome, right (354 0) (G56 01)   17   Overweight (278 02) (E66 3)     Past Medical History   · History of Abnormal ECG (794 31) (R94 31)   · History of Acute pharyngitis, unspecified etiology (462) (J02 9)   · History of Anxiety (300 00) (F41 9)   · History of Atrial premature complex (427 61) (I49 1)   · History of Cataract, bilateral (366 9) (H26 9)   · History of asthma (V12 69) (Z87 09)   · History of chest pain (V13 89) (H87 491)   · History of rheumatic fever (V12 09) (Z86 79)   · History of shortness of breath (V13 89) (I35 137)   · History of sick sinus syndrome (V12 59) (Z86 79)   · History of tuberculosis (V12 01) (Z86 11)   · History of urinary frequency (V13 09) (Z87 898)   · History of vertigo (V12 49) (Z87 898)   · History of Neck pain (723 1) (M54 2)   · History of Need for immunization against influenza (V04 81) (Z23)   · History of Need for vaccination with 13-polyvalent pneumococcal conjugate vaccine  (V03 82) (Z23)   · History of Palpitations (785 1) (R00 2)   · History of Premature ventricular contraction (427 69) (I49 3)   · History of Preoperative cardiovascular examination (V72 81) (Z01 810)   · History of Primary osteoarthritis of both knees (715 16) (M17 0)   · History of Urinary incontinence in female (788 30) (R32)   · History of UTI symptoms (788 99) (R39 9)     The active problems and past medical history were reviewed and updated today  Surgical History   · History of Appendectomy   · History of Hysterectomy   · History of Tonsillectomy   · History of Total Knee Replacement Right     The surgical history was reviewed and updated today  Family History  Mother    · Family history of Coronary Artery Disease (V17 49)   · Family history of Prior Myocardial Infarction  Father    · Family history of Prior Myocardial Infarction  Family History Reviewed: The family history was reviewed and updated today  Social History   · Being A Social Drinker   · Never A Smoker  The social history was reviewed and updated today  Current Meds   1  ALPRAZolam 0 25 MG Oral Tablet; TAKE 1 TABLET 3 TIMES DAILY AS NEEDED; Therapy: 46HZS4867 to (Evaluate:88Tyd6097); Last Rx:16Mar2017 Ordered   2  AmLODIPine Besylate 10 MG Oral Tablet; take 1 tablet by mouth daily as directed; Therapy: 57HTW6681 to (Evaluate:03Brj9978)  Requested for: 89ABT4253; Last   Rx:29Nov2016 Ordered   3  Ketorolac Tromethamine 0 4 % Ophthalmic Solution; INSTILL 1 DROP INTO LEFT EYE 4   TIMES DAILY Recorded   4  Latanoprost 0 005 % Ophthalmic Solution; Therapy: 22ZXT5709 to Recorded   5  Metoprolol Tartrate 50 MG Oral Tablet; TAKE  1  TABLET Every twelve hours; Therapy: 12YQA0468 to (QQTTZKLI:22EKN0333)  Requested for: 04CLZ6743; Last   Rx:26Jan2017 Ordered   6  Omeprazole 20 MG Oral Capsule Delayed Release; take 1 capsule daily; Therapy: 01LLS8313 to (Evaluate:15Nov2017) Recorded   7  Pradaxa 150 MG Oral Capsule;  Take 1 capsule twice daily; Last Rx:33Rfi4295 Ordered   8  Vitamin C 1000 MG Oral Tablet; TAKE 1 TABLET DAILY; Therapy: 12HIF4228 to Recorded   9  Vitamin D3 1000 UNIT Oral Tablet; take 1 tablet by mouth once daily; Therapy: 87PBY2903 to (Evaluate:79Aps0764) Recorded     The medication list was reviewed and updated today  Allergies  1  Cobalt   2  Penicillins   3  Sulfa Drugs   4  B-12 CAPS   5  Cortisone Acetate Powder   6  Lyrica CAPS  7  Adhesive Tape   8  Other     Vitals  Vital Signs   Heart Rate: 60, Apical  Systolic: 368, LUE, Sitting  Diastolic: 64, LUE, Sitting  Height: 5 ft 3 1 in  Weight: 232 lb 9 oz  BMI Calculated: 41 07  BSA Calculated: 2 06     Physical Exam     Constitutional - General appearance: No acute distress, well appearing and well nourished  Eyes - Conjunctiva and Sclera examination: Conjunctiva pink, sclera anicteric  Neck - Normal, no JVD   Pulmonary - Respiratory effort: No signs of respiratory distress  -Auscultation of lungs: Clear to auscultation  Cardiovascular - Auscultation of heart: Normal rate and rhythm, normal S1 and S2, no murmurs  -Pedal pulses: Normal, 2+ bilaterally  -Examination of extremities for edema and/or varicosities: Normal     Abdomen - Soft  Musculoskeletal - Gait and station: Normal gait  Skin - Skin: Normal without rashes  Skin is warm and well perfused  Neurologic - Speech normal  No focal deficits  Psychiatric - Orientation to person, place, and time: Normal       Results/Data  I personally reviewed the recording/images in the office today  My interpretation follows  Holter/Event Recorder: 2015 holter shows sinus bradycardia average in 40s  Low in 30s  Today Sinus rhytjm w PAC's sinus bradycardia 40-50bpm LAFB      Future Appointments     Date/Time Provider Specialty Site   09/27/2017 10:15 AM Eagle Villatoro MD Pain Management [facility]      End of Encounter Meds  1  Pradaxa 150 MG Oral Capsule; Take 1 capsule twice daily;  Last Rx:58Wff6286 Ordered  2  Omeprazole 20 MG Oral Capsule Delayed Release; take 1 capsule daily; Therapy: 68QHD1452 to (Evaluate:32Oax2967) Recorded  3  Vitamin C 1000 MG Oral Tablet; TAKE 1 TABLET DAILY; Therapy: 65CAC2275 to Recorded   4  Vitamin D3 1000 UNIT Oral Tablet; take 1 tablet by mouth once daily; Therapy: 52DAC2892 to (Evaluate:99Cbf8353) Recorded  5  AmLODIPine Besylate 10 MG Oral Tablet; take 1 tablet by mouth daily as directed; Therapy: 01UCG6288 to (Evaluate:03Jfj2614)  Requested for: 45VMS5003; Last   Rx:29Nov2016 Ordered   6  Metoprolol Tartrate 50 MG Oral Tablet; TAKE  1  TABLET Every twelve hours; Therapy: 39FOS1958 to (ZQKUVOOA:31PZO8905)  Requested for: 16LVB4342; Last   Rx:26Jan2017 Ordered  7  ALPRAZolam 0 25 MG Oral Tablet (Xanax); TAKE 1 TABLET 3 TIMES DAILY AS NEEDED; Therapy: 23NLJ8556 to (Evaluate:91Rty7017); Last Rx:16Mar2017 Ordered  8  Ketorolac Tromethamine 0 4 % Ophthalmic Solution; INSTILL 1 DROP INTO LEFT EYE 4   TIMES DAILY Recorded   9  Latanoprost 0 005 % Ophthalmic Solution;    Therapy: 88QNY9859 to Recorded     Signatures   Electronically signed by : JOSE ALFREDO Barrera ; Aug 31 2017  2:59PM EST                       (Author)            Electronically signed by Bethany Paris MD at 9/1/2017 12:54 AM

## 2017-10-17 ENCOUNTER — APPOINTMENT (OUTPATIENT)
Dept: RADIOLOGY | Facility: HOSPITAL | Age: 72
End: 2017-10-17
Attending: INTERNAL MEDICINE
Payer: MEDICARE

## 2017-10-17 ENCOUNTER — APPOINTMENT (OUTPATIENT)
Dept: NON INVASIVE DIAGNOSTICS | Facility: HOSPITAL | Age: 72
End: 2017-10-17
Payer: MEDICARE

## 2017-10-17 VITALS
RESPIRATION RATE: 18 BRPM | SYSTOLIC BLOOD PRESSURE: 172 MMHG | TEMPERATURE: 98.9 F | BODY MASS INDEX: 39.27 KG/M2 | HEIGHT: 64 IN | DIASTOLIC BLOOD PRESSURE: 72 MMHG | HEART RATE: 71 BPM | OXYGEN SATURATION: 97 % | WEIGHT: 230 LBS

## 2017-10-17 LAB
ANION GAP SERPL CALCULATED.3IONS-SCNC: 7 MMOL/L (ref 4–13)
BUN SERPL-MCNC: 15 MG/DL (ref 5–25)
CALCIUM SERPL-MCNC: 9.4 MG/DL (ref 8.3–10.1)
CHLORIDE SERPL-SCNC: 106 MMOL/L (ref 100–108)
CO2 SERPL-SCNC: 29 MMOL/L (ref 21–32)
CREAT SERPL-MCNC: 0.77 MG/DL (ref 0.6–1.3)
ERYTHROCYTE [DISTWIDTH] IN BLOOD BY AUTOMATED COUNT: 12.6 % (ref 11.6–15.1)
GFR SERPL CREATININE-BSD FRML MDRD: 76 ML/MIN/1.73SQ M
GLUCOSE SERPL-MCNC: 93 MG/DL (ref 65–140)
HCT VFR BLD AUTO: 42.4 % (ref 34.8–46.1)
HGB BLD-MCNC: 13.9 G/DL (ref 11.5–15.4)
MAGNESIUM SERPL-MCNC: 2.5 MG/DL (ref 1.6–2.6)
MCH RBC QN AUTO: 29.6 PG (ref 26.8–34.3)
MCHC RBC AUTO-ENTMCNC: 32.8 G/DL (ref 31.4–37.4)
MCV RBC AUTO: 90 FL (ref 82–98)
PLATELET # BLD AUTO: 254 THOUSANDS/UL (ref 149–390)
PMV BLD AUTO: 9.3 FL (ref 8.9–12.7)
POTASSIUM SERPL-SCNC: 4.5 MMOL/L (ref 3.5–5.3)
RBC # BLD AUTO: 4.7 MILLION/UL (ref 3.81–5.12)
SODIUM SERPL-SCNC: 142 MMOL/L (ref 136–145)
WBC # BLD AUTO: 8.7 THOUSAND/UL (ref 4.31–10.16)

## 2017-10-17 PROCEDURE — 93971 EXTREMITY STUDY: CPT

## 2017-10-17 PROCEDURE — 71020 HB CHEST X-RAY 2VW FRONTAL&LATL: CPT

## 2017-10-17 PROCEDURE — 83735 ASSAY OF MAGNESIUM: CPT | Performed by: PHYSICIAN ASSISTANT

## 2017-10-17 PROCEDURE — 80048 BASIC METABOLIC PNL TOTAL CA: CPT | Performed by: PHYSICIAN ASSISTANT

## 2017-10-17 PROCEDURE — 85027 COMPLETE CBC AUTOMATED: CPT | Performed by: PHYSICIAN ASSISTANT

## 2017-10-17 RX ADMIN — AMLODIPINE BESYLATE 10 MG: 10 TABLET ORAL at 09:12

## 2017-10-17 RX ADMIN — METOPROLOL TARTRATE 50 MG: 50 TABLET ORAL at 09:12

## 2017-10-17 RX ADMIN — OXYCODONE HYDROCHLORIDE AND ACETAMINOPHEN 1000 MG: 500 TABLET ORAL at 09:12

## 2017-10-17 NOTE — DISCHARGE SUMMARY
Patient with no acute issues overnight, denies chest pain, dyspnea, dizziness or lightheadedness  She admits to brief palpitations, however there were no episodes noted on telemetry  Incision is c/d/i without swelling, hematoma, redness, bleeding, drainage, or signs of infection  CXR shows appropriate device placement without pneumothorax  VSS, labs reviewed  Device interrogation shows appropriate device function, including lead sensing, threshold, and impedance  Physical exam shows patient in NAD, AAO x 3, RRR no murmurs, rubs, or gallops appreciated, lungs CTA b/l without wheezes, rhonchi or rales, LE with no significant clubbing, cyanosis, or edema b/l  She reports that over the past several months she has intermittent left lower extremity swelling, this does not occur in her right leg  She is concerned about this, and thus a lower extremity Doppler was ordered to rule out DVT  Initial reports show that this is negative, however official read is pending  Discharge instructions and restrictions reviewed in detail, follow up appointment arranged  Patient will continue on prior medical regimen, no changes were made  She was asked to hold her vitamin D for several weeks postprocedure  Patient is stable for discharge at this time with all questions answered

## 2017-10-17 NOTE — PLAN OF CARE
Problem: Potential for Falls  Goal: Patient will remain free of falls  INTERVENTIONS:  - Assess patient frequently for physical needs  -  Identify cognitive and physical deficits and behaviors that affect risk of falls    -  [place] fall precautions as indicated by assessment   - Educate patient/family on patient safety including physical limitations  - Instruct patient to call for assistance with activity based on assessment  - Modify environment to reduce risk of injury  - Consider OT/PT consult to assist with strengthening/mobility   Outcome: Progressing      Problem: PAIN - ADULT  Goal: Verbalizes/displays adequate comfort level or baseline comfort level  Interventions:  - Encourage patient to monitor pain and request assistance  - Assess pain using appropriate pain scale  - Administer analgesics based on type and severity of pain and evaluate response  - Implement non-pharmacological measures as appropriate and evaluate response  - Consider cultural and social influences on pain and pain management  - Notify physician/advanced practitioner if interventions unsuccessful or patient reports new pain  Outcome: Progressing      Problem: INFECTION - ADULT  Goal: Absence or prevention of progression during hospitalization  INTERVENTIONS:  - Assess and monitor for signs and symptoms of infection  - Monitor lab/diagnostic results  - Monitor all insertion sites, i e  indwelling lines, tubes, and drains  - Monitor endotracheal (as able) and nasal secretions for changes in amount and color  - [place] appropriate cooling/warming therapies per order  - Administer medications as ordered  - Instruct and encourage patient and family to use good hand hygiene technique  - Identify and instruct in appropriate isolation precautions for identified infection/condition  Outcome: Progressing      Problem: DISCHARGE PLANNING  Goal: Discharge to home or other facility with appropriate resources  INTERVENTIONS:  - Identify barriers to discharge w/patient and caregiver  - Arrange for needed discharge resources and transportation as appropriate  - Identify discharge learning needs (meds, wound care, etc )  - Arrange for interpretive services to assist at discharge as needed  - Refer to Case Management Department for coordinating discharge planning if the patient needs post-hospital services based on physician/advanced practitioner order or complex needs related to functional status, cognitive ability, or social support system  Outcome: Progressing      Problem: CARDIOVASCULAR - ADULT  Goal: Maintains optimal cardiac output and hemodynamic stability  INTERVENTIONS:  - Monitor I/O, vital signs and rhythm  - Monitor for S/S and trends of decreased cardiac output i e  bleeding, hypotension  - Administer and titrate ordered vasoactive medications to optimize hemodynamic stability  - Assess quality of pulses, skin color and temperature  - Assess for signs of decreased coronary artery perfusion - ex   Angina  - Instruct patient to report change in severity of symptoms  Outcome: Progressing

## 2017-10-17 NOTE — CASE MANAGEMENT
10/16/17 1444  Outpatient No Charge Bed Once     Transfer Service: Cardiology       Question Answer Comment   Admitting Physician [facility]    Bed request comments med surg tele            Cardiac Pacer Implant  Lower Limb Venous Duplex    /60   Pulse 66   Temp 97 9 °F (36 6 °C) (Oral)   Resp 18   Ht 5' 4" (1 626 m)   Wt 104 kg (230 lb)   SpO2 97%   BMI 39 48 kg/m²     Scheduled Meds:  amLODIPine 10 mg Oral Daily   vitamin C 1,000 mg Oral Daily   metoprolol tartrate 50 mg Oral Q12H [address]     Continuous Infusions:   PRN Meds:   acetaminophen    albuterol    ALPRAZolam    oxyCODONE-acetaminophen

## 2017-10-31 ENCOUNTER — ALLSCRIPTS OFFICE VISIT (OUTPATIENT)
Dept: OTHER | Facility: OTHER | Age: 72
End: 2017-10-31

## 2017-12-07 ENCOUNTER — GENERIC CONVERSION - ENCOUNTER (OUTPATIENT)
Dept: OTHER | Facility: OTHER | Age: 72
End: 2017-12-07

## 2017-12-17 ENCOUNTER — GENERIC CONVERSION - ENCOUNTER (OUTPATIENT)
Dept: OTHER | Facility: OTHER | Age: 72
End: 2017-12-17

## 2018-01-10 NOTE — RESULT NOTES
Verified Results  (1) PT WITH INR [id number] 11:13AM Marcellus Jackson Order Number: [id number]     Test Name Result Flag Reference   INR 1 36 H 0 86-1 16   PT 16 8 seconds H 12 1-14 4

## 2018-01-11 NOTE — MISCELLANEOUS
Message   Recorded as Task   Date: 03/31/2017 03:15 PM, Created By: Kaleigh Marvin   Task Name: Miscellaneous   Assigned To: [facility] clinical,Team   Regarding Patient: Riley Araya, Status: Active   Comment:    Kaleigh Marvin - 31 Mar 2017 3:15 PM     TASK CREATED  Patient called and does not want to get the injections  She would like to try physical therapy and a back brace if possible  Clay Adan - 31 Mar 2017 3:25 PM     TASK REPLIED TO: Previously Assigned To [facility] clinical,Team  script for PT in allscripts; no back brace as it weakens back muscles    f/u in 6 weeks   Dunia Fierro - 31 Mar 2017 3:55 PM     TASK EDITED  Pt advised of the same  Told pt I would mail her the PT RX along with listing of [facility] PT locations  Pt given 6 wk ov for 5/15 at 3pm w/ FQ  Active Problems    1  Abnormal ECG (794 31) (R94 31)   2  Acute pharyngitis, unspecified etiology (462) (J02 9)   3  Anemia (285 9) (D64 9)   4  Anxiety (300 00) (F41 9)   5  Asthma (493 90) (J45 909)   6  Chronic bilateral low back pain without sciatica (724 2,338 29) (M54 5,G89 29)   7  Chronic GERD (530 81) (K21 9)   8  Degenerative arthritis of knee, bilateral (715 96) (M17 0)   9  Degenerative lumbar spinal stenosis (724 02) (M48 06)   10  Dizziness (780 4) (R42)   11  Fatigue (780 79) (R53 83)   12  Fibromyalgia (729 1) (M79 7)   13  Hypercholesterolemia (272 0) (E78 00)   14  Hypertension (401 9) (I10)   15  Low back pain (724 2) (M54 5)   16  Lumbar degenerative disc disease (722 52) (M51 36)   17  Overweight (278 02) (E66 3)   18  UTI symptoms (788 99) (R39 9)    Current Meds   1  ALPRAZolam 0 25 MG Oral Tablet (Xanax); TAKE 1 TABLET 3 TIMES DAILY AS   NEEDED; Therapy: 08WMZ6322 to (Evaluate:00Cpp8308); Last Rx:16Mar2017 Ordered   2  AmLODIPine Besylate 10 MG Oral Tablet; take 1 tablet by mouth daily as directed;    Therapy: 60KKT0484 to (Evaluate:67Qek4151)  Requested for: 32MFQ5779; Last Rx:29Nov2016 Ordered   3  Benicar 20 MG Oral Tablet (Olmesartan Medoxomil); Take 1 tablet daily; Therapy: 36MED8890 to (Evaluate:04Jan2017)  Requested for: 98DEC6029; Last   Rx:08Jun2016 Ordered   4  Ciprofloxacin HCl - 500 MG Oral Tablet; Take 1 tablet twice daily; Therapy: 54OQQ3517 to (Evaluate:11Jan2017)  Requested for: 55QIL6193; Last   Rx:04Jan2017 Ordered   5  Colace 100 MG Oral Capsule; Take 1 capsule twice daily Recorded   6  Ketorolac Tromethamine 0 4 % Ophthalmic Solution; INSTILL 1 DROP INTO LEFT EYE   4 TIMES DAILY Recorded   7  Latanoprost 0 005 % Ophthalmic Solution; Therapy: 37EEO6603 to Recorded   8  Losartan Potassium 50 MG Oral Tablet; TAKE 1 TABLET DAILY; Therapy: (Recorded:17Jun2016) to Recorded   9  Meclizine HCl - 25 MG Oral Tablet; TAKE 1 TABLET Every 6 hours Recorded   10  Metoprolol Tartrate 50 MG Oral Tablet; TAKE  1  TABLET Every twelve hours; Therapy: 91WTN6844 to (SHGTHVYU:87NKM7429)  Requested for: 97RGZ0998; Last    Rx:26Jan2017 Ordered   11  TraMADol HCl - 50 MG Oral Tablet; TAKE 1 TABLET BY MOUTH FOUR TIMES DAILY AS    NEEDED FOR PAIN;    Therapy: 62GRB8132 to (Pamela Rush)  Requested for: 89MFV8899; Last    Rx:28Nov2016 Ordered   12  Zithromax Z-Paolo 250 MG Oral Tablet (Azithromycin); TAKE AS DIRECTED; Therapy: 98Yxb5568 to (Evaluate:22Faa0767)  Requested for: 68Xcw3791; Last    Rx:28Xcj4586 Ordered    Allergies    1  Cobalt   2  Penicillins   3  Sulfa Drugs   4  Cortisone Acetate Powder   5  Lyrica CAPS    6   Adhesive Tape    Signatures   Electronically signed by : Nu Stern, ; Mar 31 2017  3:55PM EST                       (Author)

## 2018-01-11 NOTE — MISCELLANEOUS
June 16, 2016      To whom it may concern,    Mrs Ashley Hussein is a patient under my medical care  She has advanced osteoarthritis of both knees and chronic degenerative disease of her spine that causes low back pain  She is unable to ambulate to her mail box to retrieve her mail  If her mail box can be placed next to her door it would solve this problem    Thank you for your consideration of this matter  Respectfully,        Reema SHARP  Electronically signed by:Juan SHARP    Jun 16 2016  7:21PM EST

## 2018-01-12 NOTE — MISCELLANEOUS
Message   Recorded as Task   Date: 09/21/2017 01:32 PM, Created By: Dorota Orellana   Task Name: Miscellaneous   Assigned To: Dorota Orellana   Regarding Patient: Nusrat Rubi, Status: Active   Comment:    Patti Yanez - 21 Sep 2017 1:32 PM     TASK CREATED  Caller: answering service, Other; Other  Hey Dr Jonathan Maldonado this patient called and cx her f/u on 9/27 with the answering service  She told them she will cb to r/s   can you take a look at the patients appt history and advise on if we should con't to schedule this patient    Thank you  Day Weller - 21 Sep 2017 3:32 PM     TASK REPLIED TO: Previously Assigned To Facundo YanezPatti - 22 Sep 2017 10:22 AM     TASK EDITED  Sorry do you mean yeah sure we can r/s? Patti Yanez - 22 Sep 2017 10:22 AM     TASK REASSIGNED: Previously Assigned To Minal Silverman - 22 Sep 2017 12:59 PM     TASK REPLIED TO: Previously Assigned To Jnoa Ron  yes        Active Problems    1  AF (paroxysmal atrial fibrillation) (427 31) (I48 0)   2  Anemia (285 9) (D64 9)   3  Bradycardia (427 89) (R00 1)   4  Chronic bilateral low back pain without sciatica (724 2,338 29) (M54 5,G89 29)   5  Chronic GERD (530 81) (K21 9)   6  Degenerative arthritis of knee, bilateral (715 96) (M17 0)   7  Degenerative lumbar spinal stenosis (724 02) (M48 06)   8  Dizziness (780 4) (R42)   9  Fatigue (780 79) (R53 83)   10  Fibromyalgia (729 1) (M79 7)   11  GERD (gastroesophageal reflux disease) (530 81) (K21 9)   12  Hypercholesterolemia (272 0) (E78 00)   13  Hypertension (401 9) (I10)   14  Low back pain (724 2) (M54 5)   15  Lumbar degenerative disc disease (722 52) (M51 36)   16  Mild carpal tunnel syndrome, right (354 0) (G56 01)   17  Overweight (278 02) (E66 3)   18  Tachy-smita syndrome (427 81) (I49 5)    Current Meds   1  ALPRAZolam 0 25 MG Oral Tablet (Xanax); TAKE 1 TABLET 3 TIMES DAILY AS   NEEDED;    Therapy: 20OIJ5404 to (Evaluate:06Bsv5065); Last Rx:16Mar2017 Ordered   2  AmLODIPine Besylate 10 MG Oral Tablet; take 1 tablet by mouth daily as directed; Therapy: 50GHR4070 to (Evaluate:45Lrm4602)  Requested for: 63EGO3641; Last   Rx:29Nov2016 Ordered   3  Ketorolac Tromethamine 0 4 % Ophthalmic Solution; INSTILL 1 DROP INTO LEFT EYE   4 TIMES DAILY Recorded   4  Latanoprost 0 005 % Ophthalmic Solution; Therapy: 58HBM8886 to Recorded   5  Metoprolol Tartrate 50 MG Oral Tablet; TAKE  1  TABLET Every twelve hours; Therapy: 03KYT6007 to (VEGTPLFI:68CXI5798)  Requested for: 89WGI5376; Last   Rx:26Jan2017 Ordered   6  Omeprazole 20 MG Oral Capsule Delayed Release; take 1 capsule daily; Therapy: 93OGS0484 to (Evaluate:15Nov2017) Recorded   7  Pradaxa 150 MG Oral Capsule; Take 1 capsule twice daily; Last Rx:09Oqn3727 Ordered   8  Vitamin C 1000 MG Oral Tablet; TAKE 1 TABLET DAILY; Therapy: 46MVH2511 to Recorded   9  Vitamin D3 1000 UNIT Oral Tablet; take 1 tablet by mouth once daily; Therapy: 74OSP6949 to (Evaluate:28Ufh0622) Recorded    Allergies    1  Cobalt   2  Penicillins   3  Sulfa Drugs   4  B-12 CAPS   5  Cortisone Acetate Powder   6  Lyrica CAPS    7  Adhesive Tape   8   Other    Signatures   Electronically signed by : Antonino Brody, ; Sep 22 2017  1:24PM EST                       (Author)

## 2018-01-13 VITALS
SYSTOLIC BLOOD PRESSURE: 150 MMHG | DIASTOLIC BLOOD PRESSURE: 60 MMHG | WEIGHT: 231 LBS | BODY MASS INDEX: 40.93 KG/M2 | HEIGHT: 63 IN | HEART RATE: 41 BPM

## 2018-01-13 VITALS
HEART RATE: 60 BPM | SYSTOLIC BLOOD PRESSURE: 126 MMHG | HEIGHT: 63 IN | BODY MASS INDEX: 41.21 KG/M2 | DIASTOLIC BLOOD PRESSURE: 64 MMHG | WEIGHT: 232.56 LBS

## 2018-01-14 VITALS
BODY MASS INDEX: 39.95 KG/M2 | HEIGHT: 63 IN | SYSTOLIC BLOOD PRESSURE: 134 MMHG | WEIGHT: 225.5 LBS | DIASTOLIC BLOOD PRESSURE: 72 MMHG | HEART RATE: 41 BPM | OXYGEN SATURATION: 98 % | TEMPERATURE: 97.1 F | RESPIRATION RATE: 16 BRPM

## 2018-01-15 VITALS
WEIGHT: 222 LBS | HEIGHT: 63 IN | BODY MASS INDEX: 39.34 KG/M2 | DIASTOLIC BLOOD PRESSURE: 68 MMHG | HEART RATE: 62 BPM | RESPIRATION RATE: 16 BRPM | SYSTOLIC BLOOD PRESSURE: 124 MMHG | TEMPERATURE: 97.6 F

## 2018-01-15 NOTE — MISCELLANEOUS
[address] [facility] called today to indicate that she is experiencing nocturnal leg cramps and starting her Benicar hydrochlorothiazide medication  We will medication to plain Benicar 20 mg daily  Plan  Hypertension    · Benicar HCT 20-12 5 MG Oral Tablet   · Benicar 20 MG Oral Tablet;  Take 1 tablet daily    Signatures   Electronically signed by : JOSE ALFREDO Stallworth ; Mar 18 2016 11:06AM EST                       (Author)

## 2018-01-17 NOTE — MISCELLANEOUS
Message   Recorded as Task   Date: 08/02/2017 10:35 AM, Created By: Stephenie Hodgkin   Task Name: Miscellaneous   Assigned To: Arnold Hernandez clinical,Team   Regarding Patient: Martin Nevarez, Status: In Progress   CommentJuan C Stover - 02 Aug 2017 10:35 AM     TASK CREATED  Pt called and stated she has an appt  on 8/28 as a follow up  Didn't start PT yet and needs a new script in order to start PT  Pt asks if she needs an MRI before getting her pacemaker at the end of August  Please call tomorrow at [phone]   Nimo  - 02 Aug 2017 1:35 PM     TASK EDITED  FQ to advise  thanks   Shawn Oro - 34 Aug 2017 3:53 PM     TASK REPLIED TO: Previously Assigned To Shawn Oro  PT ordered   she doesn't need a new MRI   she had one just in March   Vish Lowe - 03 Aug 2017 9:25 AM     TASK EDITED  [facility] on home # for pt to C/B, C/B # provided  Attempted to call cell #, line beeps a few times and then it disconnects  Dunia Fierro - 10 Aug 2017 9:03 AM     TASK EDITED  *FYI*  S/W pt and informed of the same  Pt said she had PT yest, they had gotten the order but the therapist found her HR was only 41 so they didn't do PT and they were going to contact her cardiologist, so she is only doing light exercises in her bed at home for now  Pt told new MRI not needed b/c she just had one in March  Pt asked to confirm when her next ov was, pt told 9/27 arriving at [facility] - 10 Aug 2017 9:06 AM     TASK REPLIED TO: Previously Assigned To Karli Whalen md - 10 Aug 2017 10:49 AM     TASK IN PROGRESS        Active Problems    1  AF (paroxysmal atrial fibrillation) (427 31) (I48 0)   2  Anemia (285 9) (D64 9)   3  Bradycardia (427 89) (R00 1)   4  Chronic bilateral low back pain without sciatica (724 2,338 29) (M54 5,G89 29)   5  Chronic GERD (530 81) (K21 9)   6  Degenerative arthritis of knee, bilateral (715 96) (M17 0)   7   Degenerative lumbar spinal stenosis (724 02) (M48 06)   8  Dizziness (780 4) (R42)   9  Fatigue (780 79) (R53 83)   10  Fibromyalgia (729 1) (M79 7)   11  GERD (gastroesophageal reflux disease) (530 81) (K21 9)   12  Hypercholesterolemia (272 0) (E78 00)   13  Hypertension (401 9) (I10)   14  Low back pain (724 2) (M54 5)   15  Lumbar degenerative disc disease (722 52) (M51 36)   16  Mild carpal tunnel syndrome, right (354 0) (G56 01)   17  Overweight (278 02) (E66 3)    Current Meds   1  ALPRAZolam 0 25 MG Oral Tablet (Xanax); TAKE 1 TABLET 3 TIMES DAILY AS   NEEDED; Therapy: 81HXF7725 to (Evaluate:98She1250); Last Rx:16Mar2017 Ordered   2  AmLODIPine Besylate 10 MG Oral Tablet; take 1 tablet by mouth daily as directed; Therapy: 46BPJ8723 to (Evaluate:81Pyd0173)  Requested for: 78RZP7799; Last   Rx:29Nov2016 Ordered   3  Ketorolac Tromethamine 0 4 % Ophthalmic Solution; INSTILL 1 DROP INTO LEFT EYE   4 TIMES DAILY Recorded   4  Latanoprost 0 005 % Ophthalmic Solution; Therapy: 35FED6551 to Recorded   5  Metoprolol Tartrate 50 MG Oral Tablet; TAKE  1  TABLET Every twelve hours; Therapy: 20LPP9037 to ([facility]39KIB4598)  Requested for: 02DEO5311; Last   Rx:26Jan2017 Ordered   6  Omeprazole 20 MG Oral Capsule Delayed Release; take 1 capsule daily; Therapy: 77XGA4644 to (Evaluate:81Jko8324) Recorded   7  Vitamin C 1000 MG Oral Tablet; TAKE 1 TABLET DAILY; Therapy: 39ABZ6023 to Recorded   8  Vitamin D3 1000 UNIT Oral Tablet; take 1 tablet by mouth once daily; Therapy: 01JUX0022 to (Evaluate:28Lwy7144) Recorded   9  Xarelto 20 MG Oral Tablet; Take 1 tablet daily; Therapy: 41EYU3252 to (Last Rx:76Xfy1716)  Requested for: 27SXK6955 Ordered    Allergies    1  Cobalt   2  Penicillins   3  Sulfa Drugs   4  B-12 CAPS   5  Cortisone Acetate Powder   6  Lyrica CAPS    7  Adhesive Tape   8  Other    Signatures   Electronically signed by :  Jolene Meléndez, ; Aug 10 2017 10:49AM EST                       (Author)

## 2018-01-17 NOTE — MISCELLANEOUS
Message   Recorded as Task   Date: 06/16/2017 01:24 PM, Created By: Jaylon Hennessy   Task Name: Miscellaneous   Assigned To: Jaylon Hennessy   Regarding Patient: Dionte Fuentes, Status: Active   CommentAlimelda Castellanos - 16 Jun 2017 1:24 PM     TASK CREATED  Patient called the answering service to cancel her appt  6/23/17 @ 3 PM with Dr Lane Neumann  The patient gave no reason for the cancellation  I called the patient and left a message on her voice mail to call the office to reschedule this appt  Active Problems    1  Anemia (285 9) (D64 9)   2  Chronic bilateral low back pain without sciatica (724 2,338 29) (M54 5,G89 29)   3  Chronic GERD (530 81) (K21 9)   4  Degenerative arthritis of knee, bilateral (715 96) (M17 0)   5  Degenerative lumbar spinal stenosis (724 02) (M48 06)   6  Dizziness (780 4) (R42)   7  Fatigue (780 79) (R53 83)   8  Fibromyalgia (729 1) (M79 7)   9  Hypercholesterolemia (272 0) (E78 00)   10  Hypertension (401 9) (I10)   11  Low back pain (724 2) (M54 5)   12  Lumbar degenerative disc disease (722 52) (M51 36)   13  Mild carpal tunnel syndrome, right (354 0) (G56 01)   14  Overweight (278 02) (E66 3)    Current Meds   1  ALPRAZolam 0 25 MG Oral Tablet (Xanax); TAKE 1 TABLET 3 TIMES DAILY AS   NEEDED; Therapy: 40KTO0053 to (Evaluate:26Ckx3644); Last Rx:16Mar2017 Ordered   2  AmLODIPine Besylate 10 MG Oral Tablet; take 1 tablet by mouth daily as directed; Therapy: 47ARI3528 to (Evaluate:17Nfs9905)  Requested for: 10MXG8038; Last   Rx:29Nov2016 Ordered   3  Benicar 20 MG Oral Tablet (Olmesartan Medoxomil); Take 1 tablet daily; Therapy: 26KJM5112 to (Evaluate:04Jan2017)  Requested for: 65UHO0286; Last   Rx:08Jun2016 Ordered   4  Colace 100 MG Oral Capsule; Take 1 capsule twice daily Recorded   5  Ketorolac Tromethamine 0 4 % Ophthalmic Solution; INSTILL 1 DROP INTO LEFT EYE   4 TIMES DAILY Recorded   6  Latanoprost 0 005 % Ophthalmic Solution; Therapy: 34TYZ8100 to Recorded   7  Meclizine HCl - 25 MG Oral Tablet; TAKE 1 TABLET Every 6 hours Recorded   8  Metoprolol Tartrate 50 MG Oral Tablet; TAKE  1  TABLET Every twelve hours; Therapy: 96UEZ1026 to (VWRQNRNH:55JJM8872)  Requested for: 58INU9979; Last   Rx:26Jan2017 Ordered   9  TraMADol HCl - 50 MG Oral Tablet; TAKE 1 TABLET BY MOUTH FOUR TIMES DAILY AS   NEEDED FOR PAIN;   Therapy: 22HNU9877 to (Evaluate:08Ktk4059)  Requested for: 93SFK2238; Last   Rx:28Nov2016 Ordered    Allergies    1  Cobalt   2  Penicillins   3  Sulfa Drugs   4  Cortisone Acetate Powder   5  Lyrica CAPS    6  Adhesive Tape    Signatures   Electronically signed by :  Momo Scott, ; Jun 16 2017  1:32PM EST                       (Author)

## 2018-01-18 NOTE — MISCELLANEOUS
Message   Recorded as Task   Date: 09/21/2017 01:32 PM, Created By: Nandini Sauceda   Task Name: Miscellaneous   Assigned To: Nandini Sauceda   Regarding Patient: Dick Schwartz, Status: Active   Comment:    Patti Yanez - 21 Sep 2017 1:32 PM     TASK CREATED  Caller: answering service, Other; Other  Hey Dr Marilee Bruce this patient called and cx her f/u on 9/27 with the answering service  She told them she will cb to r/s   can you take a look at the patients appt history and advise on if we should con't to schedule this patient    Thank you  Álvaro Vides - 21 Sep 2017 3:32 PM     TASK REPLIED TO: Previously Assigned To Patti Shea - 22 Sep 2017 10:22 AM     TASK EDITED  Sorry do you mean yeah sure we can r/s? Patti Yanez - 22 Sep 2017 10:22 AM     TASK REASSIGNED: Previously Assigned To Newton Murray - 22 Sep 2017 12:59 PM     TASK REPLIED TO: Previously Assigned To Jona Ron  yes        Active Problems    1  AF (paroxysmal atrial fibrillation) (427 31) (I48 0)   2  Anemia (285 9) (D64 9)   3  Bradycardia (427 89) (R00 1)   4  Chronic bilateral low back pain without sciatica (724 2,338 29) (M54 5,G89 29)   5  Chronic GERD (530 81) (K21 9)   6  Degenerative arthritis of knee, bilateral (715 96) (M17 0)   7  Degenerative lumbar spinal stenosis (724 02) (M48 06)   8  Dizziness (780 4) (R42)   9  Fatigue (780 79) (R53 83)   10  Fibromyalgia (729 1) (M79 7)   11  GERD (gastroesophageal reflux disease) (530 81) (K21 9)   12  Hypercholesterolemia (272 0) (E78 00)   13  Hypertension (401 9) (I10)   14  Low back pain (724 2) (M54 5)   15  Lumbar degenerative disc disease (722 52) (M51 36)   16  Mild carpal tunnel syndrome, right (354 0) (G56 01)   17  Overweight (278 02) (E66 3)   18  Tachy-smita syndrome (427 81) (I49 5)    Current Meds   1  ALPRAZolam 0 25 MG Oral Tablet (Xanax); TAKE 1 TABLET 3 TIMES DAILY AS   NEEDED;    Therapy: 64YVI7201 to (Evaluate:29Gyb8113); Last Rx:16Mar2017 Ordered   2  AmLODIPine Besylate 10 MG Oral Tablet; take 1 tablet by mouth daily as directed; Therapy: 44UFR5793 to (Evaluate:65Vwd1528)  Requested for: 53EIQ7118; Last   Rx:29Nov2016 Ordered   3  Ketorolac Tromethamine 0 4 % Ophthalmic Solution; INSTILL 1 DROP INTO LEFT EYE   4 TIMES DAILY Recorded   4  Latanoprost 0 005 % Ophthalmic Solution; Therapy: 26SZN9982 to Recorded   5  Metoprolol Tartrate 50 MG Oral Tablet; TAKE  1  TABLET Every twelve hours; Therapy: 37YBA8475 to (GKHWTVJB:51LAX0044)  Requested for: 56QVB5263; Last   Rx:26Jan2017 Ordered   6  Omeprazole 20 MG Oral Capsule Delayed Release; take 1 capsule daily; Therapy: 85KWZ0462 to (Evaluate:15Nov2017) Recorded   7  Pradaxa 150 MG Oral Capsule; Take 1 capsule twice daily; Last Rx:31Lgs0633 Ordered   8  Vitamin C 1000 MG Oral Tablet; TAKE 1 TABLET DAILY; Therapy: 04UVQ2334 to Recorded   9  Vitamin D3 1000 UNIT Oral Tablet; take 1 tablet by mouth once daily; Therapy: 29PLH1193 to (Evaluate:38Ozc2434) Recorded    Allergies    1  Cobalt   2  Penicillins   3  Sulfa Drugs   4  B-12 CAPS   5  Cortisone Acetate Powder   6  Lyrica CAPS    7  Adhesive Tape   8   Other    Signatures   Electronically signed by : Steven Slaughter, ; Sep 22 2017  1:24PM EST                       (Author)

## 2018-01-18 NOTE — RESULT NOTES
Message   Recorded as Task   Date: 01/06/2017 02:23 PM, Created By: System   Task Name: Financial Auth   Assigned To: Oneil Host   Regarding Patient: Jenise Shearer, Status: In Progress   Comment:    System - 06 Jan 2017 2:23 PM     MRI LUMBAR SPINE WO CONTRAST requires Financial Gerson Bass - 11 Jan 2017 12:01 PM     TASK EDITED  Insurances listed are [facility] primary with [employer] secondary  Left message for pt to call me back to confirm that she has M[employer] for her primary carrier - this will not need authorization for MRI  Await her call back     Margoth Greenberg - 11 Jan 2017 1:59 PM     TASK EDITED  M[employer] primary; no prior auth needed

## 2018-01-23 NOTE — MISCELLANEOUS
To whom it May concern,        This letter is in regard to a patient in my medical practice Luannesolomondada Wills (  1945 )  He has come to my attention that the patient is unable to ambulate from the front door of her  house to her mailbox due to her chronic low back pain  The cause of her low back pain is spinal canal stenosis this condition is not likely to improve but likely to worsen as the patient ages  I would request that the patient's male be delivered  to her front door by her   Thank you for your consideration of this matter  Sincerely,            Joslyn To MD  2017      Electronically signed by:Juan SHARP    Dec  7 2017  3:22PM EST

## 2018-02-14 RX ORDER — ALPRAZOLAM 0.25 MG/1
1 TABLET ORAL 3 TIMES DAILY PRN
COMMUNITY
Start: 2014-05-13 | End: 2018-06-26 | Stop reason: SDUPTHER

## 2018-02-14 RX ORDER — LATANOPROST 50 UG/ML
SOLUTION/ DROPS OPHTHALMIC
COMMUNITY
Start: 2014-11-03 | End: 2018-04-27

## 2018-02-14 RX ORDER — BIOTIN 1 MG
2 TABLET ORAL DAILY
COMMUNITY
Start: 2017-07-18 | End: 2020-01-10 | Stop reason: ALTCHOICE

## 2018-02-14 RX ORDER — MULTIVIT WITH MINERALS/LUTEIN
1 TABLET ORAL DAILY
COMMUNITY
Start: 2017-07-18 | End: 2021-12-07

## 2018-02-14 RX ORDER — DABIGATRAN ETEXILATE 150 MG/1
1 CAPSULE, COATED PELLETS ORAL 2 TIMES DAILY
COMMUNITY
End: 2018-04-27

## 2018-02-14 RX ORDER — KETOROLAC TROMETHAMINE 4 MG/ML
SOLUTION/ DROPS OPHTHALMIC 4 TIMES DAILY
COMMUNITY
End: 2018-04-27

## 2018-02-14 RX ORDER — CIPROFLOXACIN 500 MG/1
1 TABLET, FILM COATED ORAL 2 TIMES DAILY
COMMUNITY
Start: 2017-11-01 | End: 2018-02-14 | Stop reason: ALTCHOICE

## 2018-02-14 RX ORDER — AMLODIPINE BESYLATE 10 MG/1
1 TABLET ORAL DAILY
COMMUNITY
Start: 2016-11-07 | End: 2018-06-25 | Stop reason: SDUPTHER

## 2018-02-14 RX ORDER — METOPROLOL TARTRATE 50 MG/1
1 TABLET, FILM COATED ORAL EVERY 12 HOURS
COMMUNITY
Start: 2017-01-26 | End: 2018-04-19 | Stop reason: SDUPTHER

## 2018-02-14 RX ORDER — NICOTINE POLACRILEX 4 MG/1
1 GUM, CHEWING ORAL DAILY
COMMUNITY
Start: 2017-07-18 | End: 2018-04-27

## 2018-02-15 ENCOUNTER — OFFICE VISIT (OUTPATIENT)
Dept: CARDIOLOGY CLINIC | Facility: CLINIC | Age: 73
End: 2018-02-15
Payer: MEDICARE

## 2018-02-15 VITALS
DIASTOLIC BLOOD PRESSURE: 56 MMHG | WEIGHT: 246 LBS | SYSTOLIC BLOOD PRESSURE: 138 MMHG | HEART RATE: 62 BPM | BODY MASS INDEX: 42 KG/M2 | HEIGHT: 64 IN

## 2018-02-15 DIAGNOSIS — I48.0 PAROXYSMAL ATRIAL FIBRILLATION (HCC): Primary | ICD-10-CM

## 2018-02-15 DIAGNOSIS — I10 ESSENTIAL HYPERTENSION: ICD-10-CM

## 2018-02-15 DIAGNOSIS — T73.3XXD FATIGUE DUE TO EXCESSIVE EXERTION, SUBSEQUENT ENCOUNTER: ICD-10-CM

## 2018-02-15 DIAGNOSIS — I49.5 TACHY-BRADY SYNDROME (HCC): ICD-10-CM

## 2018-02-15 DIAGNOSIS — Z95.0 PACEMAKER: ICD-10-CM

## 2018-02-15 PROBLEM — T73.3XXA FATIGUE DUE TO EXCESSIVE EXERTION: Status: ACTIVE | Noted: 2018-02-15

## 2018-02-15 PROCEDURE — 99213 OFFICE O/P EST LOW 20 MIN: CPT | Performed by: INTERNAL MEDICINE

## 2018-02-15 NOTE — PROGRESS NOTES
Cardiology Follow Up    Matt Gomes  [birth year]  [id number]  [address] [facility]  [address] [place] [address] [address]    1  Paroxysmal atrial fibrillation (HCC)     2  Essential hypertension     3  Tachy-smita syndrome ([address] )     4  Fatigue due to excessive exertion, subsequent encounter     5  Pacemaker           Discussion/Summary: All of her assessed cardiac problems are stable  I explained to her that if her pacemaker checks show any AF, then she needs to be on [facility], [place], operated by [facility] for stroke prevention  I have reviewed her medications and made no changes  No cardiac testing is ordered  RTO 6 months  Interval History: She recently has a DDDR pacemaker placed and she does have more energy and less fatigue  She denies any dizziness  She get occasionally palpitations  Her BP is controlled  She stopped taking the Xarelto because of bruising  She has not had a pacemaker check done yet  Patient Active Problem List   Diagnosis    Cataract, left eye    Paroxysmal atrial fibrillation (HCC)    Tachy-smita syndrome ([address] )    Essential hypertension    Fatigue due to excessive exertion    Pacemaker     Past Medical History:   Diagnosis Date    Arthritis     knees    Cataract, left eye     Both eyes  Had surgery on left   Difficulty swallowing     Dizziness     Gastric reflux     Hypertension     Sore throat      Social History     Social History    Marital status:      Spouse name: N/A    Number of children: N/A    Years of education: N/A     Occupational History    Not on file       Social History Main Topics    Smoking status: Never Smoker    Smokeless tobacco: Never Used    Alcohol use No    Drug use: No    Sexual activity: No     Other Topics Concern    Not on file     Social History Narrative    No narrative on file      Family History   Problem Relation Age of Onset    Heart disease Father      Past Surgical History:   Procedure Laterality Date    HYSTERECTOMY      TX DILATE ESOPHAGUS N/A 4/6/2016    Procedure: DILATATION ESOPHAGEAL;  Surgeon: Michelle Haro MD;  Location: [facility]; Service: Gastroenterology    TX EGD TRANSORAL BIOPSY SINGLE/MULTIPLE N/A 4/6/2016    Procedure: ESOPHAGOGASTRODUODENOSCOPY (EGD); Surgeon: Michelle Haro MD;  Location: [facility]; Service: Gastroenterology     [address] CATARACT EXTRACAP,INSERT LENS Left 3/15/2016    Procedure: EXTRACTION EXTRACAPSULAR CATARACT PHACO INTRAOCULAR LENS (IOL); Surgeon: Ashely Narvaez MD;  [facility];  Service: Ophthalmology    TONSILLECTOMY         Current Outpatient Prescriptions:     ALPRAZolam (XANAX) 0 25 mg tablet, Take 1 tablet by mouth 3 (three) times a day as needed, Disp: , Rfl:     amLODIPine (NORVASC) 10 mg tablet, Take 1 tablet by mouth daily, Disp: , Rfl:     Ascorbic Acid (VITAMIN C) 1000 MG tablet, Take 1 tablet by mouth daily, Disp: , Rfl:     Cholecalciferol (VITAMIN D3) 1000 units CAPS, Take 1 tablet by mouth daily, Disp: , Rfl:     metoprolol tartrate (LOPRESSOR) 50 mg tablet, Take 1 tablet by mouth every 12 (twelve) hours, Disp: , Rfl:     albuterol (PROVENTIL HFA,VENTOLIN HFA) 90 mcg/act inhaler, Inhale 2 puffs every 6 (six) hours as needed for wheezing, Disp: , Rfl:     B Complex-C (B-COMPLEX WITH VITAMIN C) tablet, Take 1 tablet by mouth daily  , Disp: , Rfl:     dabigatran etexilate (PRADAXA) 150 mg capsu, Take 1 capsule by mouth 2 (two) times a day, Disp: , Rfl:     ketorolac (ACULAR) 0 4 % SOLN, Apply to eye 4 times daily, Disp: , Rfl:     latanoprost (XALATAN) 0 005 % ophthalmic solution, Apply to eye, Disp: , Rfl:     Omeprazole 20 MG TBEC, Take 1 capsule by mouth daily, Disp: , Rfl:   Allergies   Allergen Reactions    [place]      Unknown    Cortisone Acetate [Cortisone] Itching    Nickel      Skin discoloration    Penicillins Hives    Sulfa Antibiotics Itching       Labs:  No visits with results within 2 Month(s) from this visit     Latest known visit with results is:   Admission on 10/16/2017, Discharged on 10/17/2017   Component Date Value    Sodium 10/16/2017 139     Potassium 10/16/2017 4 0     Chloride 10/16/2017 105     CO2 10/16/2017 27     Anion Gap 10/16/2017 7     BUN 10/16/2017 26*    Creatinine 10/16/2017 0 80     Glucose 10/16/2017 95     Glucose, Fasting 10/16/2017 95     Calcium 10/16/2017 9 1     eGFR 10/16/2017 73     WBC 10/16/2017 7 72     RBC 10/16/2017 4 78     Hemoglobin 10/16/2017 14 5     Hematocrit 10/16/2017 43 2     MCV 10/16/2017 90     MCH 10/16/2017 30 3     MCHC 10/16/2017 33 6     RDW 10/16/2017 12 8     MPV 10/16/2017 9 3     Platelets 83/43/9260 293     nRBC 10/16/2017 0     Neutrophils Relative 10/16/2017 60     Lymphocytes Relative 10/16/2017 26     Monocytes Relative 10/16/2017 11     Eosinophils Relative 10/16/2017 3     Basophils Relative 10/16/2017 0     Neutrophils Absolute 10/16/2017 4 64     Lymphocytes Absolute 10/16/2017 1 97     Monocytes Absolute 10/16/2017 0 84     Eosinophils Absolute 10/16/2017 0 20     Basophils Absolute 10/16/2017 0 03     Magnesium 10/16/2017 2 6     Ventricular Rate 10/16/2017 51     Atrial Rate 10/16/2017 51     NC Interval 10/16/2017 190     QRSD Interval 10/16/2017 114     QT Interval 10/16/2017 474     QTC Interval 10/16/2017 436     P Axis 10/16/2017 85     QRS [place] 10/16/2017 -23     T Wave Axis 10/16/2017 82     WBC 10/17/2017 8 70     RBC 10/17/2017 4 70     Hemoglobin 10/17/2017 13 9     Hematocrit 10/17/2017 42 4     MCV 10/17/2017 90     MCH 10/17/2017 29 6     MCHC 10/17/2017 32 8     RDW 10/17/2017 12 6     Platelets 14/56/3515 254     MPV 10/17/2017 9 3     Sodium 10/17/2017 142     Potassium 10/17/2017 4 5     Chloride 10/17/2017 106     CO2 10/17/2017 29     Anion Gap 10/17/2017 7     BUN 10/17/2017 15     Creatinine 10/17/2017 0 77     Glucose 10/17/2017 93     Calcium 10/17/2017 9 4     eGFR 10/17/2017 76     Magnesium 10/17/2017 2 5      Imaging: No results found  Review of Systems:  Review of Systems   Constitutional: Negative for diaphoresis, fatigue, fever and unexpected weight change  HENT: Negative  Respiratory: Negative for cough, shortness of breath and wheezing  Cardiovascular: Positive for palpitations  Negative for chest pain and leg swelling  Gastrointestinal: Negative for abdominal pain, diarrhea and nausea  Musculoskeletal: Negative for gait problem and myalgias  Skin: Negative for rash  Neurological: Negative for dizziness and numbness  Psychiatric/Behavioral: Negative  Physical Exam:  Physical Exam   Constitutional: She is oriented to person, place, and time  She appears well-developed and well-nourished  HENT:   Head: Normocephalic and atraumatic  Eyes: Pupils are equal, round, and reactive to light  Neck: Normal range of motion  Neck supple  No JVD present  Cardiovascular: Regular rhythm, S1 normal, S2 normal and normal pulses  Pulses:       Carotid pulses are 2+ on the right side, and 2+ on the left side  Pulmonary/Chest: Effort normal and breath sounds normal  She has no wheezes  She has no rales  Abdominal: Soft  Bowel sounds are normal  There is no tenderness  Musculoskeletal: Normal range of motion  She exhibits no edema or tenderness  Neurological: She is alert and oriented to person, place, and time  She has normal reflexes  No cranial nerve deficit  Skin: Skin is warm  Psychiatric: She has a normal mood and affect

## 2018-03-07 NOTE — PROGRESS NOTES
"  Discussion/Summary  Normal device function      Results/Data  Cardiac Device In Clinic 31Oct2017 06:10PM Chet Corley     Test Name Result Flag Reference   MISCELLANEOUS COMMENT (Report)     WOUND CHECK: INCISION CLEAN AND DRY WITH EDGES APPROXIMATED; WOUND CARE AND RESTRICTIONS REVIEWED WITH PATIENT  DEVICE INTERROGATED IN THE [place] OFFICE: BATTERY VOLTAGE ADEQUATE  AP 95%  0 7%  ALL AVAILABLE LEAD PARAMETERS WITHIN NORMAL LIMITS  1 AT/AF, 7 HRS LONG  TREATED W/ATP  PT ON PRADAXA & METO TART  NO PROGRAMMING CHANGES MADE TO DEVICE PARAMETERS  NORMAL DEVICE FUNCTION  NC   Cardiac Electrophysiology Report      [id number]  PDF   DEVICE TYPE Pacemaker       Cardiac Electrophysiology Report [id number] 06:10PM Chet Corley     Test Name Result Flag Reference   Cardiac Electrophysiology Report      [id number]  pdf     Signatures   Electronically signed by : Krunal Bolden, ; Oct 31 2017  2:42PM EST                       (Author)    Electronically signed by : Merry Pedraza DO; Nov 5 2017  9:33PM EST                       (Author)    "

## 2018-03-20 ENCOUNTER — CLINICAL SUPPORT (OUTPATIENT)
Dept: CARDIOLOGY CLINIC | Facility: MEDICAL CENTER | Age: 73
End: 2018-03-20
Payer: MEDICARE

## 2018-03-20 DIAGNOSIS — Z95.0 PRESENCE OF PERMANENT CARDIAC PACEMAKER: ICD-10-CM

## 2018-03-20 DIAGNOSIS — I49.5 SSS (SICK SINUS SYNDROME) (HCC): Primary | ICD-10-CM

## 2018-03-20 PROCEDURE — 93280 PM DEVICE PROGR EVAL DUAL: CPT | Performed by: INTERNAL MEDICINE

## 2018-03-20 NOTE — PROGRESS NOTES
DC PACEMAKER INTERROGATED IN THE [place] OFFICE:  BATTERY VOLTAGE ADEQUATE (10 YR)   AP 90 6%  1 2%   ALL LEAD PARAMETERS WITHIN NORMAL LIMITS   37 AF EPISODES TREATED WITH 1,649 ATP SEQUENCES (2 EPISODES WERE PACE TERMINATED)   LONGEST EPISODE 19 HR   2 MONITORED AF EPISODES (0 9 HR/D, 3 7%) WITH LONGEST @ 33 SEC  Amita Last OTHER HIGH RATE EPISODES   NO PROGRAMMING CHANGES MADE TO DEVICE PARAMETERS   NORMAL DEVICE FUNCTION   RG       Current Outpatient Prescriptions:     albuterol (PROVENTIL HFA,VENTOLIN HFA) 90 mcg/act inhaler, Inhale 2 puffs every 6 (six) hours as needed for wheezing, Disp: , Rfl:     ALPRAZolam (XANAX) 0 25 mg tablet, Take 1 tablet by mouth 3 (three) times a day as needed, Disp: , Rfl:     amLODIPine (NORVASC) 10 mg tablet, Take 1 tablet by mouth daily, Disp: , Rfl:     Ascorbic Acid (VITAMIN C) 1000 MG tablet, Take 1 tablet by mouth daily, Disp: , Rfl:     B Complex-C (B-COMPLEX WITH VITAMIN C) tablet, Take 1 tablet by mouth daily  , Disp: , Rfl:     Cholecalciferol (VITAMIN D3) 1000 units CAPS, Take 1 tablet by mouth daily, Disp: , Rfl:     dabigatran etexilate (PRADAXA) 150 mg capsu, Take 1 capsule by mouth 2 (two) times a day, Disp: , Rfl:     ketorolac (ACULAR) 0 4 % SOLN, Apply to eye 4 times daily, Disp: , Rfl:     latanoprost (XALATAN) 0 005 % ophthalmic solution, Apply to eye, Disp: , Rfl:     metoprolol tartrate (LOPRESSOR) 50 mg tablet, Take 1 tablet by mouth every 12 (twelve) hours, Disp: , Rfl:     Omeprazole 20 MG TBEC, Take 1 capsule by mouth daily, Disp: , Rfl:

## 2018-03-28 ENCOUNTER — TELEPHONE (OUTPATIENT)
Dept: INTERNAL MEDICINE CLINIC | Facility: CLINIC | Age: 73
End: 2018-03-28

## 2018-04-19 ENCOUNTER — TELEPHONE (OUTPATIENT)
Dept: INTERNAL MEDICINE CLINIC | Facility: CLINIC | Age: 73
End: 2018-04-19

## 2018-04-19 DIAGNOSIS — I10 ESSENTIAL HYPERTENSION: Primary | ICD-10-CM

## 2018-04-19 NOTE — TELEPHONE ENCOUNTER
Patient is going to the dentist next week and she asked if you could give her an antibiotic for the appointment  She usually takes two tabs before she goes and 2-3 more after she comes back       Pharm: [facility] on [address]

## 2018-04-20 RX ORDER — METOPROLOL TARTRATE 50 MG/1
TABLET, FILM COATED ORAL
Qty: 180 TABLET | Refills: 1 | Status: SHIPPED | OUTPATIENT
Start: 2018-04-20 | End: 2018-08-03 | Stop reason: DRUGHIGH

## 2018-04-27 ENCOUNTER — OFFICE VISIT (OUTPATIENT)
Dept: PAIN MEDICINE | Facility: CLINIC | Age: 73
End: 2018-04-27
Payer: MEDICARE

## 2018-04-27 VITALS
HEIGHT: 65 IN | DIASTOLIC BLOOD PRESSURE: 72 MMHG | SYSTOLIC BLOOD PRESSURE: 138 MMHG | BODY MASS INDEX: 41.65 KG/M2 | TEMPERATURE: 98.8 F | WEIGHT: 250 LBS | HEART RATE: 70 BPM

## 2018-04-27 DIAGNOSIS — M51.26 LUMBAR DISC HERNIATION: ICD-10-CM

## 2018-04-27 DIAGNOSIS — M47.816 LUMBAR SPONDYLOSIS: ICD-10-CM

## 2018-04-27 DIAGNOSIS — M48.061 SPINAL STENOSIS OF LUMBAR REGION, UNSPECIFIED WHETHER NEUROGENIC CLAUDICATION PRESENT: Primary | ICD-10-CM

## 2018-04-27 PROBLEM — R00.1 BRADYCARDIA: Status: ACTIVE | Noted: 2017-07-18

## 2018-04-27 PROBLEM — R53.83 FATIGUE: Status: ACTIVE | Noted: 2018-02-15

## 2018-04-27 PROBLEM — G56.01 MILD CARPAL TUNNEL SYNDROME, RIGHT: Status: ACTIVE | Noted: 2017-04-10

## 2018-04-27 PROBLEM — K21.9 GERD (GASTROESOPHAGEAL REFLUX DISEASE): Status: ACTIVE | Noted: 2017-07-18

## 2018-04-27 PROBLEM — R39.9 UTI SYMPTOMS: Status: ACTIVE | Noted: 2017-01-04

## 2018-04-27 PROCEDURE — 99213 OFFICE O/P EST LOW 20 MIN: CPT | Performed by: NURSE PRACTITIONER

## 2018-05-03 ENCOUNTER — EVALUATION (OUTPATIENT)
Dept: PHYSICAL THERAPY | Age: 73
End: 2018-05-03
Payer: MEDICARE

## 2018-05-03 DIAGNOSIS — M47.816 LUMBAR SPONDYLOSIS: ICD-10-CM

## 2018-05-03 DIAGNOSIS — M51.26 DISPLACEMENT OF LUMBAR INTERVERTEBRAL DISC WITHOUT MYELOPATHY: ICD-10-CM

## 2018-05-03 DIAGNOSIS — M51.26 LUMBAR DISC HERNIATION: ICD-10-CM

## 2018-05-03 DIAGNOSIS — M48.061 SPINAL STENOSIS OF LUMBAR REGION, UNSPECIFIED WHETHER NEUROGENIC CLAUDICATION PRESENT: ICD-10-CM

## 2018-05-03 DIAGNOSIS — M48.061 SPINAL STENOSIS, LUMBAR REGION, WITHOUT NEUROGENIC CLAUDICATION: Primary | ICD-10-CM

## 2018-05-03 PROCEDURE — G8990 OTHER PT/OT CURRENT STATUS: HCPCS | Performed by: PHYSICAL THERAPIST

## 2018-05-03 PROCEDURE — G8991 OTHER PT/OT GOAL STATUS: HCPCS | Performed by: PHYSICAL THERAPIST

## 2018-05-03 PROCEDURE — 97163 PT EVAL HIGH COMPLEX 45 MIN: CPT | Performed by: PHYSICAL THERAPIST

## 2018-05-03 NOTE — PROGRESS NOTES
PT Evaluation     Today's date: 5/3/2018  Patient name: Laura Myers  :   MRN: [id number]  Referring provider: JERI Pradhan  Dx:   Encounter Diagnosis     ICD-10-CM    1  Spinal stenosis, lumbar region, without neurogenic claudication M48 061    2  Lumbar spondylosis M47 816 Ambulatory referral to Physical Therapy   3  Displacement of lumbar intervertebral disc without myelopathy M51 26    4  Spinal stenosis of lumbar region, unspecified whether neurogenic claudication present M48 061 Ambulatory referral to Physical Therapy   5  Lumbar disc herniation M51 26 Ambulatory referral to Physical Therapy                  Assessment  Functional limitations: Stairs (knee and back), standing more than 5min, uses RW due to LBP, Walking with RW 10min ( sitting is better and bending is "ok")  Assessment details: Laura Myers is a 74 y o  female present with:   Spinal stenosis, lumbar region, without neurogenic claudication  (primary encounter diagnosis)  Lumbar spondylosis  Displacement of lumbar intervertebral disc without myelopathy    Eric Diaz has the above listed impairments and will benefit from skilled PT to improve deficits to return to prior level of function  Understanding of Dx/Px/POC: good   Prognosis: good    Goals  STG: 3weeks   Independent with HEP Not met  Increase AROM by 50%Not met    LT weeks  Increase strength by 1 grade   Not met  Return to 75% of functional activity Not met      Plan  Patient would benefit from: skilled PT  Planned therapy interventions: therapeutic activities, therapeutic exercise, functional ROM exercises, flexibility, home exercise program, strengthening, patient education, neuromuscular re-education, motor coordination training, manual therapy and gait training  Other planned therapy interventions: balance training, endurance training, and gentle core training  Duration in visits: 12  Duration in weeks: 6  Treatment plan discussed with: patient  Plan details: Discussed evaluation with Pt  Pt tends to have a flexion bias and stiffness in B LE's  Pt 's goal is  to walk without a walker  Pt understands this will require strengthening and balance training  Plan to continue with evaluation at next visit due to HBP reading prior to additional testing to consdier 3MWT, TUG, 5XSTS, DGI , mCTSIB  Subjective Evaluation    History of Present Illness  Date of onset: 5/3/2017  Mechanism of injury: Pt reports that she was able to walk without her RW prior to this onset of LBP  Pt unable to obtain PT due to cardiac issues  Pt reports a pacemaker was implanted about 2018 by Dr Zoe Cid  Pt would like to continue with her PT for her LB  Discussed high BP reading taken during this evaluation  Pt said she usually takes her BP meds at about this time  Pt advised to go home and take the medication and return to PT next session for more gait and balance testing  Pain  Current pain ratin  At best pain ratin  At worst pain ratin  Relieving factors: rest    Patient Goals  Patient goals for therapy: independence with ADLs/IADLs  Patient goal: "walk without a walker"        Objective     Postural Observations  Seated posture: poor  Standing posture: poor    Additional Postural Observation Details  Pt  Sits slumped, stands forward flexed , and ambulates forward flexed with her RW for this  past year  Tenderness     Additional Tenderness Details  Central lumbar spine    Lumbar Screen  Lumbar range of motion within normal limits with the following exceptions:         Active Range of Motion     Lumbar   Flexion: 70 degrees   Extension: -20 degrees     Additional Active Range of Motion Details  Pt -20degrees ext in lumbar spine  (using goniometer)  SLR L 75 degrees (c/o pain popliteal area) R 80    Strength/Myotome Testing     Left Hip   Planes of Motion   Flexion: 4-    Right Hip   Planes of Motion   Flexion: 4-    Left Knee   Flexion: 5  Extension: 4+    Right Knee Flexion: 5  Extension: 4+    Left Ankle/Foot   Dorsiflexion: 5    Right Ankle/Foot   Dorsiflexion: 5    Additional Strength Details        Tests     Lumbar     Left   Negative crossed SLR and passive SLR  Right   Negative crossed SLR and passive SLR  Additional Tests Details  RMT in sitting -  no pain      Flowsheet Rows      Most Recent Value   PT/OT G-Codes   Current Score  41   Projected Score  52   FOTO information reviewed  Yes   Assessment Type  Evaluation   G code set  Other PT/OT Primary   Other PT Primary Current Status ()  CK   Other PT Primary Goal Status ()  CK          Precautions: Pacemaker; Paroxysmal A-fib; Tachy-smita syndrome; HTN; Anemia;  Dizziness; Fibromyalgia    Daily Treatment Diary     Manual                                                                                   Exercise Diary              Pball rollouts 1X10            HS stretch             Bridges             Abdom iso with PB             TrA with BFB             Bike                                                                                                                                                                                                       Modalities  5/3/18            BP Pre: 184/118   Post            HR (pacer) Pre: 71  Post            02 Pre: 96%  Post

## 2018-05-07 ENCOUNTER — OFFICE VISIT (OUTPATIENT)
Dept: PHYSICAL THERAPY | Age: 73
End: 2018-05-07
Payer: MEDICARE

## 2018-05-07 DIAGNOSIS — M51.26 DISPLACEMENT OF LUMBAR INTERVERTEBRAL DISC WITHOUT MYELOPATHY: ICD-10-CM

## 2018-05-07 DIAGNOSIS — M48.061 SPINAL STENOSIS, LUMBAR REGION, WITHOUT NEUROGENIC CLAUDICATION: Primary | ICD-10-CM

## 2018-05-07 DIAGNOSIS — M47.816 LUMBAR SPONDYLOSIS: ICD-10-CM

## 2018-05-07 DIAGNOSIS — M51.26 LUMBAR DISC HERNIATION: ICD-10-CM

## 2018-05-07 DIAGNOSIS — M48.061 SPINAL STENOSIS OF LUMBAR REGION, UNSPECIFIED WHETHER NEUROGENIC CLAUDICATION PRESENT: ICD-10-CM

## 2018-05-07 PROCEDURE — 97112 NEUROMUSCULAR REEDUCATION: CPT | Performed by: PHYSICAL THERAPIST

## 2018-05-07 PROCEDURE — 97110 THERAPEUTIC EXERCISES: CPT | Performed by: PHYSICAL THERAPIST

## 2018-05-07 NOTE — PROGRESS NOTES
PT Re-Evaluation     Today's date: 2018  Patient name: Leo Hutson  : [birth year]  MRN: [id number]  Referring provider: Robeson Farm, CRNP  Dx:   Encounter Diagnosis     ICD-10-CM    1  Spinal stenosis, lumbar region, without neurogenic claudication M48 061    2  Lumbar spondylosis M47 816    3  Displacement of lumbar intervertebral disc without myelopathy M51 26    4  Spinal stenosis of lumbar region, unspecified whether neurogenic claudication present M48 061    5  Lumbar disc herniation M51 26                   Assessment  Impairments: abnormal gait, activity intolerance, impaired balance, impaired physical strength and safety issue    Assessment details: Leo Hutson is a 74 y o  female present with:   Spinal stenosis, lumbar region, without neurogenic claudication  (primary encounter diagnosis)  Lumbar spondylosis  Displacement of lumbar intervertebral disc without myelopathy  Spinal stenosis of lumbar region, unspecified whether neurogenic claudication present  Lumbar disc herniation    Yetta Space has the above listed impairments and will benefit from skilled PT to improve deficits to return to prior level of function  Understanding of Dx/Px/POC: good   Prognosis: good    Goals  STG: 3weeks   Independent with HEP Not met  Increase 3MWT by 50%Not met    LT weeks  Decreased TUG and 5XSTS 50%   Not met  Return to 75% of functional activity Not met      Plan  Patient would benefit from: skilled PT  Planned therapy interventions: stretching, strengthening, therapeutic training, therapeutic exercise, therapeutic activities, flexibility, coordination, graded activity and home exercise program  Frequency: 2x week  Duration in visits: 12  Duration in weeks: 6  Plan details: Reviewed results of testing with patient  Pt is agreeable to continuing with strengthening for her abdominals, B LE's, endurance, and balance          Subjective Evaluation    History of Present Illness  Date of onset: 2017  Mechanism of injury:  Pt reports that she was able to walk without her RW prior to this onset of LBP  Pt unable to obtain PT due to cardiac issues  Pt reports a pacemaker was implanted about 2018 by Dr Alicia Lenz  Pt would like to continue with her PT for her LB and her balance  Pt's LB eval began on 5/3/18 and was ended early due to HBP  Pt has returned to PT to resume her testing and continue with PT  Pain  Current pain ratin  At best pain ratin  At worst pain ratin    Patient Goals  Patient goals for therapy: independence with ADLs/IADLs  Patient goal: Walk without her walker        Objective     Tests     Additional Tests Details  5XSTS - 35sec with B UE assist  TUG -  30sec with 9 sec to transfer stand to sit  3MWT - 212 ft with RW and SBGA ( BP monitered 128/79 pretest and 166/75 post test/ O2 98% pretest and 95% post test   FTEO: 30 sec sway no LOB  FTEC: 30sec sway no LOB  FTEO foam: 30 sec sway one LOB  FTEC foam: 30 sec say with constant LOB alternating sides  Precautions: Pacemaker; Paroxysmal A-fib; Tachy-smita syndrome; HTN; Anemia;  Dizziness; Fibromyalgia     Daily Treatment Diary      Manual                                                                                                                                                      Exercise Diary   5/3  5/7                   Pball rollouts 1X10                     HS stretch                       Bridges                       Abdom iso with PB                       TrA with BFB                       Bike                        LAQ    2X10                    Mini squats                                                                                                3MWT    212' CGX1                    Standing foam HR/TR                        Standing Foam hip abd/ ext                        Standing Foam FTEC                       Side stepping                         High knee stepping                                                                                                     Modalities  5/3/18  5/7                   BP Pre: 184/118   Post  Pre: 142/78  Post: 128/78                   HR (pacer) Pre: 71  Post  Pre: 62 Post 75                   02 Pre: 96%  Post  Pre 98% Post 97%                       Pt eval continued from last session   See above for details - 30min

## 2018-05-10 ENCOUNTER — OFFICE VISIT (OUTPATIENT)
Dept: PHYSICAL THERAPY | Age: 73
End: 2018-05-10
Payer: MEDICARE

## 2018-05-10 DIAGNOSIS — M51.26 DISPLACEMENT OF LUMBAR INTERVERTEBRAL DISC WITHOUT MYELOPATHY: ICD-10-CM

## 2018-05-10 DIAGNOSIS — M48.061 SPINAL STENOSIS, LUMBAR REGION, WITHOUT NEUROGENIC CLAUDICATION: Primary | ICD-10-CM

## 2018-05-10 DIAGNOSIS — M48.061 SPINAL STENOSIS OF LUMBAR REGION, UNSPECIFIED WHETHER NEUROGENIC CLAUDICATION PRESENT: ICD-10-CM

## 2018-05-10 DIAGNOSIS — M51.26 LUMBAR DISC HERNIATION: ICD-10-CM

## 2018-05-10 DIAGNOSIS — M47.816 LUMBAR SPONDYLOSIS: ICD-10-CM

## 2018-05-10 PROCEDURE — 97110 THERAPEUTIC EXERCISES: CPT | Performed by: PHYSICAL THERAPIST

## 2018-05-10 PROCEDURE — 97112 NEUROMUSCULAR REEDUCATION: CPT | Performed by: PHYSICAL THERAPIST

## 2018-05-10 PROCEDURE — 97116 GAIT TRAINING THERAPY: CPT | Performed by: PHYSICAL THERAPIST

## 2018-05-10 NOTE — PROGRESS NOTES
Daily Note     Today's date: 5/10/2018  Patient name: Stan Schwab  :   MRN: [id number]  Referring provider: JERI Arora  Dx:   Encounter Diagnosis     ICD-10-CM    1  Spinal stenosis, lumbar region, without neurogenic claudication M48 061    2  Lumbar spondylosis M47 816    3  Displacement of lumbar intervertebral disc without myelopathy M51 26    4  Spinal stenosis of lumbar region, unspecified whether neurogenic claudication present M48 061    5  Lumbar disc herniation M51 26                   Subjective: Pt feels the walker helps her back pain  Objective: See treatment diary below  Pt c/o R arm pain at the [id number] into 3min walk  Vitals recorded below  Pt given 5 min rest and BP increased to 158/84  All pressures documented were taken while pt was sitting  Assessment: Tolerated treatment fair  Patient would benefit from continued PT      Plan: Continue per plan of care  Continue to monitor cardiovascular signs and sx closely  Precautions: Pacemaker; Paroxysmal A-fib; Tachy-smita syndrome; HTN; Anemia;  Dizziness; Fibromyalgia     Daily Treatment Diary      Manual                                                                                                                                                      Exercise Diary   5/3  5/7  5/10                 Pball rollouts 1X10    2X10                 HS stretch      :10X10                 Bridges                       Abdom iso with PB                       TrA with BFB                       Bike                        LAQ    2X10  2X10                  Mini squats on foam     1X10                                                                                          3MWT    212' CGX1  RW CGX1  Hr 88  02 97%  /93 305ft                   Standing foam HR/TR      1x10                  Standing Foam hip abd/ ext      1X10                  Standing Foam FTEC      90sec Min AX1                 Side stepping       SPC at mirror CGX1 4X10ft                  High knee stepping                        Amb SPC      at mirror CGX1 4x10ft                                                                       Modalities  5/3/18  5/7  5/10                 BP Pre: 184/118   Post  Pre: 142/78  Post: 128/78  Pre: 161/71  Post:167/88                 HR (pacer) Pre: 71  Post  Pre: 62 Post 75  PRe: 71  Post: 77                 02 Pre: 96%  Post  Pre 98% Post 97% Pre: 98%  Post  97%

## 2018-05-14 ENCOUNTER — OFFICE VISIT (OUTPATIENT)
Dept: PHYSICAL THERAPY | Age: 73
End: 2018-05-14
Payer: MEDICARE

## 2018-05-14 DIAGNOSIS — M48.061 SPINAL STENOSIS OF LUMBAR REGION, UNSPECIFIED WHETHER NEUROGENIC CLAUDICATION PRESENT: ICD-10-CM

## 2018-05-14 DIAGNOSIS — M51.26 LUMBAR DISC HERNIATION: ICD-10-CM

## 2018-05-14 DIAGNOSIS — M47.816 LUMBAR SPONDYLOSIS: ICD-10-CM

## 2018-05-14 DIAGNOSIS — M51.26 DISPLACEMENT OF LUMBAR INTERVERTEBRAL DISC WITHOUT MYELOPATHY: ICD-10-CM

## 2018-05-14 DIAGNOSIS — M48.061 SPINAL STENOSIS, LUMBAR REGION, WITHOUT NEUROGENIC CLAUDICATION: Primary | ICD-10-CM

## 2018-05-14 PROCEDURE — 97140 MANUAL THERAPY 1/> REGIONS: CPT | Performed by: PHYSICAL THERAPIST

## 2018-05-14 PROCEDURE — 97530 THERAPEUTIC ACTIVITIES: CPT | Performed by: PHYSICAL THERAPIST

## 2018-05-14 NOTE — PROGRESS NOTES
Daily Note     Today's date: 2018  Patient name: Mariana Brown  :   MRN: [id number]  Referring provider: JERI Mehta  Dx:   Encounter Diagnosis     ICD-10-CM    1  Spinal stenosis, lumbar region, without neurogenic claudication M48 061    2  Lumbar spondylosis M47 816    3  Displacement of lumbar intervertebral disc without myelopathy M51 26    4  Spinal stenosis of lumbar region, unspecified whether neurogenic claudication present M48 061    5  Lumbar disc herniation M51 26                   Subjective: Pt reports increased soreness in her legs after PT  Objective: See treatment diary below  Pt BP was elevated at onset of PT  Attempted to take post BP but pt did not tolerate the pressure the BP cuff was rising to and no end recording in supine was obtained  Pt Te's performed in supine to elevated BP  TE's decreased due to high BP today  Assessment: Tolerated treatment fair  Inquired about recent weight gain  Pt reported she has gained 5 lbs in the past 3 weeks  Pt advised to call MD and discuss recent weight gain  Plan: Continue per plan of care  Continue to monitor cardiovascular signs and sx closely  Precautions: Pacemaker; Paroxysmal A-fib; Tachy-smita syndrome; HTN; Anemia;  Dizziness; Fibromyalgia     Daily Treatment Diary      Manual   18                      B HS stretch  6'                      B Piriformis stretch  6'                      B ITB stretch  6'                                                                           Exercise Diary   5/3  5/7  5/10  5/14               Pball rollouts 1X10    2X10                 HS stretch      :10X10                 Bridges                       Abdom iso with PB                       TrA with BFB                       Bike                        LAQ    2X10  2X10                  Mini squats on foam     1X10                                                                                          3MWT    212' CGX1  RW CGX1  Hr 88  02 97%  /93 305ft                   Standing foam HR/TR      1x10                  Standing Foam hip abd/ ext      1X10                  Standing Foam FTEC      90sec Min AX1                 Side stepping       SPC at mirror CGX1 4X10ft                  High knee stepping                        Amb SPC      at mirror CGX1 4x10ft                                                                       Modalities  5/3/18  5/7  5/10  5/14               BP Pre: 184/118   Post  Pre: 142/78  Post: 128/78  Pre: 161/71  Post:167/88  Pre: 185/80               HR (pacer) Pre: 71  Post  Pre: 62 Post 75  PRe: 71  Post: 77  Pre: 86 Post: 71               02 Pre: 96%  Post  Pre 98% Post 97% Pre: 98%  Post  97% Pre: 98%  Post: 98%           PT treated 1:1 from 4:20-5:00pm

## 2018-05-17 ENCOUNTER — OFFICE VISIT (OUTPATIENT)
Dept: PHYSICAL THERAPY | Age: 73
End: 2018-05-17
Payer: MEDICARE

## 2018-05-17 DIAGNOSIS — M51.26 LUMBAR DISC HERNIATION: ICD-10-CM

## 2018-05-17 DIAGNOSIS — M51.26 DISPLACEMENT OF LUMBAR INTERVERTEBRAL DISC WITHOUT MYELOPATHY: ICD-10-CM

## 2018-05-17 DIAGNOSIS — M48.061 SPINAL STENOSIS OF LUMBAR REGION, UNSPECIFIED WHETHER NEUROGENIC CLAUDICATION PRESENT: ICD-10-CM

## 2018-05-17 DIAGNOSIS — M48.061 SPINAL STENOSIS, LUMBAR REGION, WITHOUT NEUROGENIC CLAUDICATION: Primary | ICD-10-CM

## 2018-05-17 DIAGNOSIS — M47.816 LUMBAR SPONDYLOSIS: ICD-10-CM

## 2018-05-17 PROCEDURE — 97112 NEUROMUSCULAR REEDUCATION: CPT | Performed by: PHYSICAL THERAPIST

## 2018-05-17 PROCEDURE — 97110 THERAPEUTIC EXERCISES: CPT | Performed by: PHYSICAL THERAPIST

## 2018-05-17 NOTE — PROGRESS NOTES
Daily Note     Today's date: 2018  Patient name: Bin Smith  :   MRN: [id number]  Referring provider: JERI Minor  Dx:   Encounter Diagnosis     ICD-10-CM    1  Spinal stenosis, lumbar region, without neurogenic claudication M48 061    2  Lumbar spondylosis M47 816    3  Displacement of lumbar intervertebral disc without myelopathy M51 26    4  Spinal stenosis of lumbar region, unspecified whether neurogenic claudication present M48 061    5  Lumbar disc herniation M51 26                   Subjective: Pt reports increased pain up to 7/10  Pt feels this is due to the rain  Pt reports no pain in sitting and no pain in supine  All of her pain is felt in her LB during standing  Objective: See treatment diary below  Pt c/o R arm pain after 100ft of ambulation  BP recorded at  153/69   In sitting with a  HR 71 and  no SOB or diaphoresis  Assessment: Tolerated treatment fair  Feels most benefit from PPT  PT encouraged to perform this TE at home  Plan: Continue per plan of care  Continue to monitor cardiovascular signs and sx closely  Precautions: Pacemaker; Paroxysmal A-fib; Tachy-smita syndrome; HTN; Anemia;  Dizziness; Fibromyalgia     Daily Treatment Diary      Manual   18                      B HS stretch  6'                      B Piriformis stretch  6'                      B ITB stretch  6'                                                                           Exercise Diary   5/3  5/7  5/10  5/14  5/17             Pball rollouts 1X10    2X10   1X12             HS stretch      :10X10                 Bridges          glut squeezes 1X10:2             Abdom iso with PB                       TrA with BFB                       Bike                        LAQ    2X10  2X10    2X10              Mini squats on foam     1X10                  LTR         1X10X5:              PPT         1X10X2:                                      3MWT    212' CGX1  RW CGX1  Hr 88  02 97%  /93 305ft     RW SBG 100ft post 153/69              Standing foam HR/TR      1x10                  Standing Foam hip abd/ ext      1X10   1X10ea              Standing Foam FTEC      90sec Min AX1                 Side stepping       SPC at mirror CGX1 4X10ft   SBGX1 2X10ft              High knee stepping         1X10ea              Amb SPC      at mirror CGX1 4x10ft                                                                       Modalities  5/3/18  5/7  5/10  5/14  5/17             BP Pre: 184/118   Post  Pre: 142/78  Post: 128/78  Pre: 161/71  Post:167/88  Pre: 185/80  Pre: 120/80  Post: cuff NT             HR (pacer) Pre: 71  Post  Pre: 62 Post 75  PRe: 71  Post: 77  Pre: 86 Post: 71  Pre: 77 Post 73             02 Pre: 96%  Post  Pre 98% Post 97% Pre: 98%  Post  97% Pre: 98%  Post: 98% Pre:94% Post: 97%          PT treated 1:1 from 9:45-10:15am

## 2018-05-18 ENCOUNTER — APPOINTMENT (OUTPATIENT)
Dept: PHYSICAL THERAPY | Age: 73
End: 2018-05-18
Payer: MEDICARE

## 2018-05-21 ENCOUNTER — OFFICE VISIT (OUTPATIENT)
Dept: PHYSICAL THERAPY | Age: 73
End: 2018-05-21
Payer: MEDICARE

## 2018-05-21 DIAGNOSIS — M48.061 SPINAL STENOSIS OF LUMBAR REGION, UNSPECIFIED WHETHER NEUROGENIC CLAUDICATION PRESENT: ICD-10-CM

## 2018-05-21 DIAGNOSIS — M47.816 LUMBAR SPONDYLOSIS: Primary | ICD-10-CM

## 2018-05-21 DIAGNOSIS — M48.061 SPINAL STENOSIS, LUMBAR REGION, WITHOUT NEUROGENIC CLAUDICATION: ICD-10-CM

## 2018-05-21 DIAGNOSIS — M51.26 DISPLACEMENT OF LUMBAR INTERVERTEBRAL DISC WITHOUT MYELOPATHY: ICD-10-CM

## 2018-05-21 PROCEDURE — 97112 NEUROMUSCULAR REEDUCATION: CPT | Performed by: PHYSICAL THERAPIST

## 2018-05-21 PROCEDURE — 97110 THERAPEUTIC EXERCISES: CPT | Performed by: PHYSICAL THERAPIST

## 2018-05-21 PROCEDURE — 97140 MANUAL THERAPY 1/> REGIONS: CPT | Performed by: PHYSICAL THERAPIST

## 2018-05-21 NOTE — PROGRESS NOTES
Daily Note     Today's date: 2018  Patient name: Haris Sheriff  :   MRN: [id number]  Referring provider: JERI Contreras  Dx:   Encounter Diagnosis     ICD-10-CM    1  Lumbar spondylosis M47 816    2  Spinal stenosis, lumbar region, without neurogenic claudication M48 061    3  Displacement of lumbar intervertebral disc without myelopathy M51 26    4  Spinal stenosis of lumbar region, unspecified whether neurogenic claudication present M48 061                   Subjective: Pt reports increased pain getting out of car but no pain during PT  Objective: See treatment diary below  Progressed as per log  Assessment: Tolerated treatment     Plan: Continue per plan of care  Continue to monitor cardiovascular signs and sx closely  Precautions: Pacemaker; Paroxysmal A-fib; Tachy-smita syndrome; HTN; Anemia;  Dizziness; Fibromyalgia     Daily Treatment Diary      Manual   18                    B HS stretch  6' 5'                    B Piriformis stretch  6' 5'                    B ITB stretch  6'                                                                           Exercise Diary   5/3  5/7  5/10  5/14  5/17  5/21           Pball rollouts 1X10    2X10   1X12  2X10           HS stretch on stool      :10X10                Bridges          glut squeezes 1X10:2  1x10x5"           Abdom iso with PB            2x10X5"           TrA with BFB                       Bike                        LAQ    2X10  2X10    2X10  2X10            Mini squats on foam     1X10      nv            LTR         1X10X5:  1x10X5"            PPT         1X10X2:  1D18U5"                                    3MWT    212' CGX1  RW CGX1  Hr 88  02 97%  /93 305ft     RW SBG 100ft post 153/69              Standing foam HR/TR      1x10      1X10            Standing Foam hip abd/ ext      1X10   1X10ea  1x10            Standing Foam FTEC      90sec Min AX1                 Side stepping       SPC at mirror CGX1 4X10ft   SBGX1 2X10ft  4X10ft            High knee stepping         1X10ea  1x10            Amb SPC      at mirror CGX1 4x10ft                                                                       Modalities  5/3/18  5/7  5/10  5/14  5/17  5/21           BP Pre: 184/111   Post  Pre: 142/78  Post: 128/78  Pre: 161/71  Post:167/88  Pre: 185/80  Pre: 120/80  Post: cuff NT  Pre:148/83  Post:104/78           HR (pacer) Pre: 71  Post  Pre: 62 Post 75  PRe: 71  Post: 77  Pre: 86 Post: 71  Pre: 77 Post 73  Pre: 76 Post: 77           02 Pre: 96%  Post  Pre 98% Post 97% Pre: 98%  Post  97% Pre: 98%  Post: 98% Pre:94% Post: 97% Pre::97 97 Post         PT treated 1:1 from 2:00-2:40pm

## 2018-05-25 ENCOUNTER — OFFICE VISIT (OUTPATIENT)
Dept: PHYSICAL THERAPY | Age: 73
End: 2018-05-25
Payer: MEDICARE

## 2018-05-25 DIAGNOSIS — M48.061 SPINAL STENOSIS OF LUMBAR REGION, UNSPECIFIED WHETHER NEUROGENIC CLAUDICATION PRESENT: Primary | ICD-10-CM

## 2018-05-25 DIAGNOSIS — M47.816 LUMBAR SPONDYLOSIS: ICD-10-CM

## 2018-05-25 DIAGNOSIS — M48.061 SPINAL STENOSIS, LUMBAR REGION, WITHOUT NEUROGENIC CLAUDICATION: ICD-10-CM

## 2018-05-25 PROCEDURE — 97112 NEUROMUSCULAR REEDUCATION: CPT

## 2018-05-25 PROCEDURE — 97110 THERAPEUTIC EXERCISES: CPT

## 2018-05-25 PROCEDURE — 97140 MANUAL THERAPY 1/> REGIONS: CPT

## 2018-05-29 ENCOUNTER — OFFICE VISIT (OUTPATIENT)
Dept: PHYSICAL THERAPY | Age: 73
End: 2018-05-29
Payer: MEDICARE

## 2018-05-29 DIAGNOSIS — M51.26 LUMBAR DISC HERNIATION: ICD-10-CM

## 2018-05-29 DIAGNOSIS — M47.816 LUMBAR SPONDYLOSIS: ICD-10-CM

## 2018-05-29 DIAGNOSIS — M48.061 SPINAL STENOSIS, LUMBAR REGION, WITHOUT NEUROGENIC CLAUDICATION: Primary | ICD-10-CM

## 2018-05-29 DIAGNOSIS — M48.061 SPINAL STENOSIS OF LUMBAR REGION, UNSPECIFIED WHETHER NEUROGENIC CLAUDICATION PRESENT: ICD-10-CM

## 2018-05-29 DIAGNOSIS — M51.26 DISPLACEMENT OF LUMBAR INTERVERTEBRAL DISC WITHOUT MYELOPATHY: ICD-10-CM

## 2018-05-29 PROCEDURE — 97110 THERAPEUTIC EXERCISES: CPT | Performed by: PHYSICAL THERAPIST

## 2018-05-29 PROCEDURE — 97112 NEUROMUSCULAR REEDUCATION: CPT | Performed by: PHYSICAL THERAPIST

## 2018-05-29 PROCEDURE — 97140 MANUAL THERAPY 1/> REGIONS: CPT | Performed by: PHYSICAL THERAPIST

## 2018-05-29 NOTE — PROGRESS NOTES
Daily Note     Today's date: 2018  Patient name: Bin Smith  : [birth year]  MRN: [id number]  Referring provider: JERI Minor  Dx:   Encounter Diagnosis     ICD-10-CM    1  Spinal stenosis, lumbar region, without neurogenic claudication M48 061    2  Lumbar spondylosis M47 816    3  Spinal stenosis of lumbar region, unspecified whether neurogenic claudication present M48 061    4  Displacement of lumbar intervertebral disc without myelopathy M51 26    5  Lumbar disc herniation M51 26                   Subjective:   Pt feels LBP better today  Objective: See treatment diary below      Assessment:  Pt able to complete there ex program with frequent rests, vitals fairly stable throughout treatment  Plan: Continue per plan of care  Progress treatment as tolerated  Precautions: Pacemaker; Paroxysmal A-fib; Tachy-smita syndrome; HTN; Anemia;  Dizziness; Fibromyalgia     Daily Treatment Diary      Manual   18                B HS stretch  6' 5'  4 min  4min                B Piriformis stretch  6' 5'  4 min  R side only due to knee pain 2min                B ITB stretch  6'    2 min  4min                                                                     Exercise Diary   5/3  5/7  5/10  5/14  5/17  5/21  5/25/18  5/29       Pball rollouts 1X10    2X10   1X12  2X10  2x10  2X10       HS stretch on stool      :10X10                Bridges          glut squeezes 1X10:2  1x10x5"  1x10"  1X10       Abdom iso with PB            2x10X5"  2x10x5"  2X10X5"       TrA with BFB                       Bike                        LAQ    2X10  2X10    2X10  2X10  2x10  2X10        Mini squats on foam     1X10      nv            LTR         1X10X5:  1x10X5" 10x5"  10X5"        PPT         1X10X2:  3V68G7"  15x5"  15X5"                                3MWT    212' CGX1  RW CGX1  Hr 88  02 97%  /93 305ft     RW SBG 100ft post 153/69    RW CG of 1 100 ft  97% ;83;  144/78        Standing foam HR/TR      1x10      1X10  10x          Standing Foam hip abd/ ext      1X10   1X10ea  1x10  10xea  10Xea        Standing Foam FTEC      90sec Min AX1          90sec       Side stepping       SPC at mirror CGX1 4X10ft   SBGX1 2X10ft  4X10ft  4x10FT at bar  4X10ft        High knee stepping         1X10ea  1x10  10xea  10Xea        Amb SPC      at mirror CGX1 4x10ft          at mirror CGX1  4X10ft                                                             Modalities  5/3/18  5/7  5/10  5/14  5/17  5/21  5/25/18 5/29       BP Pre: 184/111   Post  Pre: 142/78  Post: 128/78  Pre: 161/71  Post:167/88  Pre: 185/80  Pre: 120/80  Post: cuff NT  Pre:148/83  Post:104/78  pre:  120/70  Post   Pre: 132/85  Post: Unable to obtain       HR (pacer) Pre: 71  Post  Pre: 62 Post 75  PRe: 71  Post: 77  Pre: 86 Post: 71  Pre: 77 Post 73  Pre: 76 Post: 77  pre:71  Post 77  Pre:74  Post 67       02 Pre: 96%  Post  Pre 98% Post 97% Pre: 98%  Post  97% Pre: 98%  Post: 98% Pre:94% Post: 97% Pre::97 97 Post Pre:96%  Post 97% Pre: 97 Post: 98     HP to finish       10 min

## 2018-06-01 ENCOUNTER — OFFICE VISIT (OUTPATIENT)
Dept: PHYSICAL THERAPY | Age: 73
End: 2018-06-01
Payer: MEDICARE

## 2018-06-01 DIAGNOSIS — M48.061 SPINAL STENOSIS, LUMBAR REGION, WITHOUT NEUROGENIC CLAUDICATION: Primary | ICD-10-CM

## 2018-06-01 DIAGNOSIS — M48.061 SPINAL STENOSIS OF LUMBAR REGION, UNSPECIFIED WHETHER NEUROGENIC CLAUDICATION PRESENT: ICD-10-CM

## 2018-06-01 DIAGNOSIS — M51.26 DISPLACEMENT OF LUMBAR INTERVERTEBRAL DISC WITHOUT MYELOPATHY: ICD-10-CM

## 2018-06-01 DIAGNOSIS — M47.816 LUMBAR SPONDYLOSIS: ICD-10-CM

## 2018-06-01 PROCEDURE — 97140 MANUAL THERAPY 1/> REGIONS: CPT

## 2018-06-01 PROCEDURE — 97112 NEUROMUSCULAR REEDUCATION: CPT

## 2018-06-01 PROCEDURE — 97110 THERAPEUTIC EXERCISES: CPT

## 2018-06-01 NOTE — PROGRESS NOTES
Daily Note     Today's date: 2018  Patient name: Laura Myers  :   MRN: [id number]  Referring provider: JERI Pradhan  Dx:   Encounter Diagnosis     ICD-10-CM    1  Spinal stenosis, lumbar region, without neurogenic claudication M48 061    2  Lumbar spondylosis M47 816    3  Spinal stenosis of lumbar region, unspecified whether neurogenic claudication present M48 061    4  Displacement of lumbar intervertebral disc without myelopathy M51 26                   Subjective:  Pt reports back pain limits her ambulation at times  Objective: See treatment diary below      Assessment:  Slight systolic elevation noted after there ex  Plan: Continue per plan of care  Precautions: Pacemaker; Paroxysmal A-fib; Tachy-smita syndrome; HTN; Anemia;  Dizziness; Fibromyalgia     Daily Treatment Diary      Manual   18              B HS stretch  6' 5'  4 min  4min  4 min              B Piriformis stretch  6' 5'  4 min  R side only due to knee pain 2min                B ITB stretch  6'    2 min  4min  4 min                                                                     Exercise Diary   5/3  5/7  5/10  5/14  5/17  5/21  5/25/18  5/29  6/1/18     Pball rollouts 1X10    2X10   1X12  2X10  2x10  2X10  2x10     HS stretch on stool      :10X10                Bridges          glut squeezes 1X10:2  1x10x5"  1x10"  1X10  10x     Abdom iso with PB            2x10X5"  2x10x5"  2X10X5"  20x5"     TrA with BFB                       Bike                        LAQ    2X10  2X10    2X10  2X10  2x10  2X10 2x10      Mini squats on foam     1X10      nv            LTR         1X10X5:  1x10X5" 10x5"  10X5" 10x5"      PPT         1X10X2:  1X10X5"  15x5"  15X5"  15x5"                              3MWT    212' CGX1  RW CGX1  Hr 88  02 97%  /93 305ft     RW SBG 100ft post 153/69    RW CG of 1 100 ft  97% ;83;  144/78   np      Standing foam HR/TR      1x10      1X10  10x   20x ea      Standing Foam hip abd/ ext      1X10   1X10ea  1x10  10xea  10Xea  2x10 ea      Standing Foam FTEC      90sec Min AX1          90sec  45 sec     Side stepping       SPC at mirror CGX1 4X10ft   SBGX1 2X10ft  4X10ft  4x10FT at bar  4X10ft  4x10ft      High knee stepping         1X10ea  1x10  10xea  10Xea  2x10      Amb SPC      at mirror CGX1 4x10ft          at mirror CGX1  4X10ft  6x10ft at mirror                                                           Modalities  5/3/18  5/7  5/10  5/14  5/17  5/21  5/25/18  5/29  6/1/18     BP Pre: 184/111   Post  Pre: 142/78  Post: 128/78  Pre: 161/71  Post:167/88  Pre: 185/80  Pre: 120/80  Post: cuff NT  Pre:148/83  Post:104/78  pre:  120/70  Post   Pre: 132/85  Post: Unable to obtain Pre:130/80  Post 150/80     HR (pacer) Pre: 71  Post  Pre: 62 Post 75  PRe: 71  Post: 77  Pre: 86 Post: 71  Pre: 77 Post 73  Pre: 76 Post: 77  pre:71  Post 77  Pre:74  Post 67  pre:73  Post 77     02 Pre: 96%  Post  Pre 98% Post 97% Pre: 98%  Post  97% Pre: 98%  Post: 98% Pre:94% Post: 97% Pre::97 97 Post Pre:96%  Post 97% Pre: 97 Post: 98 Pre:97%  Post 97%    HP to finish       10 min

## 2018-06-04 ENCOUNTER — OFFICE VISIT (OUTPATIENT)
Dept: PHYSICAL THERAPY | Age: 73
End: 2018-06-04
Payer: MEDICARE

## 2018-06-04 DIAGNOSIS — M48.061 SPINAL STENOSIS OF LUMBAR REGION, UNSPECIFIED WHETHER NEUROGENIC CLAUDICATION PRESENT: ICD-10-CM

## 2018-06-04 DIAGNOSIS — G89.29 CHRONIC BILATERAL LOW BACK PAIN WITHOUT SCIATICA: ICD-10-CM

## 2018-06-04 DIAGNOSIS — M48.061 SPINAL STENOSIS, LUMBAR REGION, WITHOUT NEUROGENIC CLAUDICATION: Primary | ICD-10-CM

## 2018-06-04 DIAGNOSIS — M54.50 CHRONIC BILATERAL LOW BACK PAIN WITHOUT SCIATICA: ICD-10-CM

## 2018-06-04 PROCEDURE — 97110 THERAPEUTIC EXERCISES: CPT | Performed by: PHYSICAL THERAPIST

## 2018-06-04 PROCEDURE — 97530 THERAPEUTIC ACTIVITIES: CPT | Performed by: PHYSICAL THERAPIST

## 2018-06-04 PROCEDURE — G8991 OTHER PT/OT GOAL STATUS: HCPCS | Performed by: PHYSICAL THERAPIST

## 2018-06-04 PROCEDURE — 97140 MANUAL THERAPY 1/> REGIONS: CPT | Performed by: PHYSICAL THERAPIST

## 2018-06-04 PROCEDURE — 97112 NEUROMUSCULAR REEDUCATION: CPT | Performed by: PHYSICAL THERAPIST

## 2018-06-04 PROCEDURE — G8990 OTHER PT/OT CURRENT STATUS: HCPCS | Performed by: PHYSICAL THERAPIST

## 2018-06-04 NOTE — PROGRESS NOTES
PT Evaluation / Re Evaluation    Today's date: 2018  Patient name: Hope Kitchen  :   MRN: [id number]  Referring provider: JERI Jacobo  Dx:   Encounter Diagnosis     ICD-10-CM    1  Spinal stenosis, lumbar region, without neurogenic claudication M48 061    2  Spinal stenosis of lumbar region, unspecified whether neurogenic claudication present M48 061                   Assessment  Functional limitations: Stairs (knee and back), standing more than 5min, uses RW due to LBP, Walking with RW 10min ( sitting is better and bending is "ok")  Assessment details: Hope Kitchen is a 74 y o  female present with:   Spinal stenosis, lumbar region, without neurogenic claudication  (primary encounter diagnosis)  Lumbar spondylosis  Displacement of lumbar intervertebral disc without myelopathy    On re-evaluation pt reports 25% improvement in stairs, and 5min increased tolerance for standing and walking  Pt objectively has improved low back flexion and extension AROM, improved strength, and improved balance  Pt would like to continue with PT focusing on her LB  Gilmar Alfaro has the above listed impairments and will benefit from skilled PT to improve deficits to return to prior level of function  Understanding of Dx/Px/POC: good   Prognosis: good    Goals  STG: 3weeks   Independent with HEP  met  Increase AROM by 50%Not met    LT weeks  Increase strength by 1 grade   Not met  Return to 75% of functional activity Partially met at 25%      Plan  Patient would benefit from: skilled PT  Planned therapy interventions: therapeutic activities, therapeutic exercise, functional ROM exercises, flexibility, home exercise program, strengthening, patient education, neuromuscular re-education, motor coordination training, manual therapy and gait training  Other planned therapy interventions: balance training, endurance training, and gentle core training  Duration in visits: 12  Duration in weeks: 6  Treatment plan discussed with: patient  Plan details: Pt requesting to continue with PT for LBP  Subjective Evaluation    History of Present Illness  Date of onset: 5/3/2017  Mechanism of injury: Pt reports that she was able to walk without her RW prior to this onset of LBP  Pt unable to obtain PT due to cardiac issues  Pt reports a pacemaker was implanted about 2018 by Dr Elmira Crowley  Pt would like to continue with her PT for her LB  Discussed high BP reading taken during this evaluation  Pt said she usually takes her BP meds at about this time  Pt advised to go home and take the medication and return to PT next session for more gait and balance testing  On Re-evaluation pt has had episodes of increased BP requiring adjustments to PT to stay within precautions  Pt reports 25% improvement in LBP  Pain  Current pain ratin  At best pain ratin  At worst pain ratin  Relieving factors: rest   On Re-evaluation:  Current pain Ratin  At Best pain ratin  At worst pain ratin      Patient Goals  Patient goals for therapy: independence with ADLs/IADLs  Patient goal: "walk without a walker"  On re-evaluation pt reports her goal is to stand 30min  Objective     Postural Observations  Seated posture: poor  Standing posture: poor    Additional Postural Observation Details  Pt  Sits slumped, stands forward flexed , and ambulates forward flexed with her RW for this  past year  Tenderness     Additional Tenderness Details  Central lumbar spine    Lumbar Screen  Lumbar range of motion within normal limits with the following exceptions:         Active Range of Motion     Lumbar     Re-Evaluation  Flexion: 70 degrees     80  Extension: -20 degrees   0      Strength/Myotome Testing     Left Hip                        On Re-Evaluation  Planes of Motion   Flexion: 4-  4-     Right Hip   Planes of Motion   Flexion: 4-  4    Left Knee   Flexion: 5  5  Extension: 4+  5    Right Knee   Flexion: 5  5  Extension: 4+  5    Left Ankle/Foot   Dorsiflexion: 5    Right Ankle/Foot   Dorsiflexion: 5    Additional Strength Details        Tests     Lumbar     Left   Negative crossed SLR and passive SLR  Right   Negative crossed SLR and passive SLR  Additional Tests Details  RMT in sitting -  no pain  Dec  But NB    Additional Tests Details  5XSTS - 35sec with B UE assist   On Re-eval 30sec without B UE assist  TUG -  30sec with 9 sec to transfer stand to sit on Re-eval 20 sec with 3 sec rise time  3MWT - 212 ft with RW and SBGA ( BP monitered 128/79 pretest and 166/75 post test/ O2 98% pretest and 95% post test not tested on Re-Evaluation due to decreased endurance  FTEO: 30 sec sway no LOB  On Re-eval  30sec  FTEC: 30sec sway no LOB O Re-eval 30sec  FTEO foam: 30 sec sway one LOB  On Re-Eval 30sec no LOB  FTEC foam: 30 sec say with constant LOB alternating sides  On Re-eval no LOB      Flowsheet Rows      Most Recent Value   PT/OT G-Codes   Current Score  51   Projected Score  52   FOTO information reviewed  Yes   Assessment Type  Re-evaluation   G code set  Other PT/OT Primary   Other PT Primary Current Status ()  CK   Other PT Primary Goal Status ()  CK                  Patient name: Iris Milner  :   MRN: [id number]  Referring provider: JERI Davenport  Dx:   Encounter Diagnosis     ICD-10-CM    1  Spinal stenosis, lumbar region, without neurogenic claudication M48 061    2  Spinal stenosis of lumbar region, unspecified whether neurogenic claudication present M48 061                   Subjective: Pt reports 25 improvement in LBP      Objective: See treatment diary below      Assessment: Tolerated treatment fair  Patient demonstrated fatigue post treatment      Plan: Continue per plan of care  Precautions: Pacemaker; Paroxysmal A-fib; Tachy-smita syndrome; HTN; Anemia;  Dizziness; Fibromyalgia     Daily Treatment Diary      Manual   18  6            B HS stretch  6' 5'  4 min  4min  4 min  4min            B Piriformis stretch  6' 5'  4 min  R side only due to knee pain 2min   2min            B ITB stretch  6'    2 min  4min  4 min    4min                                                                 Exercise Diary   5/3  5/7  5/10  5/14  5/17 5/21 5/25/18 5/29 6/1/18 6/4/18   Pball rollouts 1X10    2X10   1X12  2X10  2x10  2X10  2x10  2x10   HS stretch on stool      :10X10                Bridges          glut squeezes 1X10:2  1x10x5"  1x10"  1X10  10x  10X   Abdom iso with PB            2x10X5"  2x10x5"  2X10X5"  20x5"  20X5"   TrA with BFB                       Bike                        LAQ    2X10  2X10    2X10  2X10  2x10  2X10 2x10  2X10    Mini squats on foam  D/C   1X10      nv            LTR         1X10X5:  1x10X5" 10x5"  10X5" 10x5"  10X5"    PPT         1X10X2:  1X10X5"  15x5"  15X5"  15x5"  15X5"    Supine B Hip Abd with TB  NV                      3MWT  D/C  212' CGX1  RW CGX1  Hr 88  02 97%  /93 305ft     RW SBG 100ft post 153/69    RW CG of 1 100 ft  97% ;83;  144/78   np      Standing foam HR/TR  D/C    1x10      1X10  10x   20x ea  20Xea    Standing Foam hip abd/ ext      1X10   1X10ea  1x10  10xea  10Xea  2x10 ea  2x10-ea    Standing Foam FTEC  D/C    90sec Min AX1          90sec  45 sec  45sec   Side stepping       SPC at mirror CGX1 4X10ft   SBGX1 2X10ft  4X10ft  4x10FT at bar  4X10ft  4x10ft  4X10ft    High knee stepping  D/C       1X10ea  1x10  10xea  10Xea  2x10  2x10    Amb SPC  d/c    at mirror CGX1 4x10ft          at mirror CGX1  4X10ft  6x10ft at mirror  6X10ft                                                         Modalities  5/3/18  5/7  5/10  5/14 5/17 5/21 5/25/18 5/29 6/1/18 6/4/18   BP Pre: 184/111   Post  Pre: 142/78  Post: 128/78  Pre: 161/71  Post:167/88  Pre: 185/80  Pre: 120/80  Post: cuff NT  Pre:148/83  Post:104/78  pre:  120/70  Post   Pre: 132/85  Post: Unable to obtain Pre:130/80  Post 150/80  pdw660/80post   HR (pacer) Pre: 71  Post  Pre: 62 Post 75  PRe: 71  Post: 77  Pre: 86 Post: 71  Pre: 77 Post 73  Pre: 76 Post: 77  pre:71  Post 77  Pre:74  Post 67  pre:73  Post 77  pre 76   02 Pre: 96%  Post  Pre 98% Post 97% Pre: 98%  Post  97% Pre: 98%  Post: 98% Pre:94% Post: 97% Pre::97 97 Post Pre:96%  Post 97% Pre: 97 Post: 98 Pre:97%  Post 97% Pre 96%   HP to finish       10 min        Pt treated 12:00pm-1:15pm

## 2018-06-07 ENCOUNTER — OFFICE VISIT (OUTPATIENT)
Dept: PHYSICAL THERAPY | Age: 73
End: 2018-06-07
Payer: MEDICARE

## 2018-06-07 DIAGNOSIS — G89.29 CHRONIC BILATERAL LOW BACK PAIN WITHOUT SCIATICA: ICD-10-CM

## 2018-06-07 DIAGNOSIS — M48.061 SPINAL STENOSIS, LUMBAR REGION, WITHOUT NEUROGENIC CLAUDICATION: Primary | ICD-10-CM

## 2018-06-07 DIAGNOSIS — M54.50 CHRONIC BILATERAL LOW BACK PAIN WITHOUT SCIATICA: ICD-10-CM

## 2018-06-07 PROCEDURE — 97110 THERAPEUTIC EXERCISES: CPT | Performed by: PHYSICAL THERAPIST

## 2018-06-07 PROCEDURE — 97112 NEUROMUSCULAR REEDUCATION: CPT | Performed by: PHYSICAL THERAPIST

## 2018-06-07 NOTE — PROGRESS NOTES
Daily Note    Today's date: 2018  Patient name: Rocky Lanier  : [birth year]  MRN: [id number]  Referring provider: JERI Ash  Dx:   Encounter Diagnosis     ICD-10-CM    1  Spinal stenosis, lumbar region, without neurogenic claudication M48 061    2  Chronic bilateral low back pain without sciatica M54 5     G89 29                              Subjective: Pt reports increased pain prior to PT  Pt rated pain at 5/10 post treatment 3-410  Pt deferred stretching  Objective: See treatment diary below      Assessment: Tolerated treatment fair  Patient demonstrated fatigue post treatment      Plan: Continue per plan of care  Precautions: Pacemaker; Paroxysmal A-fib; Tachy-smita syndrome; HTN; Anemia; Dizziness; Fibromyalgia     Daily Treatment Diary      Manual   18          B HS stretch  6' 5'  4 min  4min  4 min  4min  def           B Piriformis stretch  6' 5'  4 min  R side only due to knee pain 2min   2min Def           B ITB stretch  6'    2 min  4min  4 min    4min  def                                                                 Exercise Diary     5/10  5/14  5/17  5/21  5/25/18  5/29  6/1/18  6/4/18   Pball rollouts 2X10    2X10   1X12  2X10  2x10  2X10  2x10  2x10   HS stretch on stool      :10X10                Bridges  2X10        glut squeezes 1X10:2  1x10x5"  1x10"  1X10  10x  10X   Abdom iso with PB  2X10          2x10X5"  2x10x5"  2X10X5"  20x5"  20X5"   TrA with BFB  :10X10                     Bike                        LAQ    2X10  2X10    2X10  2X10  2x10  2X10 2x10  2X10    Mini squats on foam  D/C   1X10      nv            LTR  2X10       1X10X5:  1x10X5" 10x5"  10X5" 10x5"  10X5"    TrA with Marches 2x10       1X10X2:  1X10X5"  15x5"  15X5"  15x5"  15X5"    Supine B Hip Abd with TB  2X10 RTB                      3MWT  D/C  212' CGX1  RW CGX1  Hr 88  02 97%  /93 305ft     RW SBG 100ft post 153/69    RW CG of 1 100 ft  97% ;83;  144/78   np      Standing foam HR/TR  D/C    1x10      1X10  10x   20x ea  20Xea    Standing Foam hip abd/ ext      1X10   1X10ea  1x10  10xea  10Xea  2x10 ea  2x10-ea    Standing Foam FTEC  D/C    90sec Min AX1          90sec  45 sec  45sec   Side stepping       SPC at mirror CGX1 4X10ft   SBGX1 2X10ft  4X10ft  4x10FT at bar  4X10ft  4x10ft  4X10ft    High knee stepping  D/C       1X10ea  1x10  10xea  10Xea  2x10  2x10    Amb SPC  d/c    at mirror CGX1 4x10ft          at mirror CGX1  4X10ft  6x10ft at mirror  6X10ft                                                         Modalities  6/7/18  5/7  5/10  5/14  5/17 5/21 5/25/18 5/29 6/1/18  6/4/18   BP Pre: 135/85   Post:155/78  Pre: 142/78  Post: 128/78  Pre: 161/71  Post:167/88  Pre: 185/80  Pre: 120/80  Post: cuff NT  Pre:148/83  Post:104/78  pre:  120/70  Post   Pre: 132/85  Post: Unable to obtain Pre:130/80  Post 150/80  ooa312/80post   HR (pacer) Pre: 73  Post:72  Pre: 62 Post 75  PRe: 71  Post: 77  Pre: 86 Post: 71  Pre: 77 Post 73  Pre: 76 Post: 77  pre:71  Post 77  Pre:74  Post 67  pre:73  Post 77  pre 76   02 Pre: 94%  Post:96%  Pre 98% Post 97% Pre: 98%  Post  97% Pre: 98%  Post: 98% Pre:94% Post: 97% Pre::97 97 Post Pre:96%  Post 97% Pre: 97 Post: 98 Pre:97%  Post 97% Pre 96%   HP to finish 10min      10 min        Pt treated 11:45-12:30pm

## 2018-06-11 ENCOUNTER — OFFICE VISIT (OUTPATIENT)
Dept: PHYSICAL THERAPY | Age: 73
End: 2018-06-11
Payer: MEDICARE

## 2018-06-11 DIAGNOSIS — M51.26 LUMBAR DISC HERNIATION: ICD-10-CM

## 2018-06-11 DIAGNOSIS — M51.26 DISPLACEMENT OF LUMBAR INTERVERTEBRAL DISC WITHOUT MYELOPATHY: ICD-10-CM

## 2018-06-11 DIAGNOSIS — M54.50 CHRONIC BILATERAL LOW BACK PAIN WITHOUT SCIATICA: ICD-10-CM

## 2018-06-11 DIAGNOSIS — M47.816 LUMBAR SPONDYLOSIS: ICD-10-CM

## 2018-06-11 DIAGNOSIS — G89.29 CHRONIC BILATERAL LOW BACK PAIN WITHOUT SCIATICA: ICD-10-CM

## 2018-06-11 DIAGNOSIS — M48.061 SPINAL STENOSIS, LUMBAR REGION, WITHOUT NEUROGENIC CLAUDICATION: Primary | ICD-10-CM

## 2018-06-11 PROCEDURE — 97530 THERAPEUTIC ACTIVITIES: CPT | Performed by: PHYSICAL THERAPIST

## 2018-06-11 PROCEDURE — 97110 THERAPEUTIC EXERCISES: CPT | Performed by: PHYSICAL THERAPIST

## 2018-06-11 NOTE — PROGRESS NOTES
Daily Note    Today's date: 2018  Patient name: Temo Peña  :   MRN: [id number]  Referring provider: JERI Lozada  Dx:   Encounter Diagnosis     ICD-10-CM    1  Spinal stenosis, lumbar region, without neurogenic claudication M48 061    2  Chronic bilateral low back pain without sciatica M54 5     G89 29    3  Lumbar disc herniation M51 26    4  Lumbar spondylosis M47 816    5  Displacement of lumbar intervertebral disc without myelopathy M51 26                              Subjective: Pt reports increased fatigue  Pt called her MD due to increased fatigue  Pt was given  an appointment next week Friday 6//15/18  Objective: See treatment diary below  Held on isometric exs due to SOB and slight elevation in HR  Assessment: Tolerated treatment fair  Patient demonstrated fatigue post treatment  After pt performed bridges HR was 100BPM irregularly Iregular  Pt given a rest and exs  Decreased and HR was within precautions throughout treatment  Plan: Continue per plan of care  Precautions: Pacemaker; Paroxysmal A-fib; Tachy-smita syndrome; HTN; Anemia; Dizziness; Fibromyalgia     Daily Treatment Diary      Manual   18          B HS stretch  6' 5'  4 min  4min  4 min  4min  def           B Piriformis stretch  6' 5'  4 min  R side only due to knee pain 2min   2min Def           B ITB stretch  6'    2 min  4min  4 min    4min  def                                                                 Exercise Diary   6/7  6/11  5/10  5/14  5/17  5/21  5/25/18  5/29  6/1/18  6/4/18   Pball rollouts 2X10  2x10  2X10   1X12  2X10  2x10  2X10  2x10  2x10   HS stretch on stool    :10X10  :10X10                Bridges  2X10  2X10      glut squeezes 1X10:2  1x10x5"  1x10"  1X10  10x  10X   Abdom iso with PB  2X10  held        2x10X5"  2x10x5"  2X10X5"  20x5"  20X5"   TrA with BFB  :10X10  PPT no hold 20X                   Bike                      LAQ     2X10    2X10  2X10  2x10  2X10 2x10  2X10    Mini squats on foam  D/C   1X10      nv            LTR  2X10  2X10     1X10X5:  1x10X5" 10x5"  10X5" 10x5"  10X5"    TrA with Marches 2x10 held     1X10X2:  1X10X5"  15x5"  15X5"  15x5"  15X5"    Supine B Hip Abd with TB  2X10 RTB  2X10 yTB                    3MWT  D/C  D/C  RW CGX1  Hr 88  02 97%  /93 305ft     RW SBG 100ft post 153/69    RW CG of 1 100 ft  97% ;83;  144/78   np      Standing foam HR/TR  D/C    1x10      1X10  10x   20x ea  20Xea    Standing Foam hip abd/ ext      1X10   1X10ea  1x10  10xea  10Xea  2x10 ea  2x10-ea    Standing Foam FTEC  D/C    90sec Min AX1          90sec  45 sec  45sec   Side stepping       SPC at mirror CGX1 4X10ft   SBGX1 2X10ft  4X10ft  4x10FT at bar  4X10ft  4x10ft  4X10ft    High knee stepping  D/C       1X10ea  1x10  10xea  10Xea  2x10  2x10    Amb SPC  d/c    at mirror CGX1 4x10ft          at mirror CGX1  4X10ft  6x10ft at mirror  6X10ft                                                         Modalities  6/7/18  6/11  5/10  5/14  5/17  5/21 5/25/18  5/29 6/1/18 6/4/18   BP Pre: 135/85   Post:155/78  Pre: 14/80  Post: 140/75  Pre: 161/71  Post:167/88  Pre: 185/80  Pre: 120/80  Post: cuff NT  Pre:148/83  Post:104/78  pre:  120/70  Post   Pre: 132/85  Post: Unable to obtain Pre:130/80  Post 150/80  nyl242/80post   HR (pacer) Pre: 73  Post:72  Pre:  Post 75  PRe: 71  Post: 77  Pre: 86 Post: 71  Pre: 77 Post 73  Pre: 76 Post: 77  pre:71  Post 77  Pre:74  Post 67  pre:73  Post 77  pre 76   02 Pre: 94%  Post:96%  Pre 98% Post 97% Pre: 98%  Post:76  97% Pre: 98%  Post: 98% Pre:94% Post: 97% Pre::97 97 Post Pre:96%  Post 97% Pre: 97 Post: 98 Pre:97%  Post 97% Pre 96%   HP to finish 10min      10 min        Pt treated 2:00-2:45pm

## 2018-06-13 ENCOUNTER — OFFICE VISIT (OUTPATIENT)
Dept: PHYSICAL THERAPY | Age: 73
End: 2018-06-13
Payer: MEDICARE

## 2018-06-13 DIAGNOSIS — M48.061 SPINAL STENOSIS, LUMBAR REGION, WITHOUT NEUROGENIC CLAUDICATION: Primary | ICD-10-CM

## 2018-06-13 DIAGNOSIS — M51.26 LUMBAR DISC HERNIATION: ICD-10-CM

## 2018-06-13 DIAGNOSIS — M51.26 DISPLACEMENT OF LUMBAR INTERVERTEBRAL DISC WITHOUT MYELOPATHY: ICD-10-CM

## 2018-06-13 DIAGNOSIS — M54.50 CHRONIC BILATERAL LOW BACK PAIN WITHOUT SCIATICA: ICD-10-CM

## 2018-06-13 DIAGNOSIS — G89.29 CHRONIC BILATERAL LOW BACK PAIN WITHOUT SCIATICA: ICD-10-CM

## 2018-06-13 DIAGNOSIS — M47.816 LUMBAR SPONDYLOSIS: ICD-10-CM

## 2018-06-13 PROCEDURE — 97140 MANUAL THERAPY 1/> REGIONS: CPT

## 2018-06-13 PROCEDURE — 97110 THERAPEUTIC EXERCISES: CPT

## 2018-06-13 NOTE — PROGRESS NOTES
Daily Note     Today's date: 2018  Patient name: Hope Kitchen  : [birth year]  MRN: [id number]  Referring provider: JERI Jacobo  Dx:   Encounter Diagnosis     ICD-10-CM    1  Spinal stenosis, lumbar region, without neurogenic claudication M48 061    2  Chronic bilateral low back pain without sciatica M54 5     G89 29    3  Lumbar disc herniation M51 26    4  Lumbar spondylosis M47 816    5  Displacement of lumbar intervertebral disc without myelopathy M51 26                   Subjective:  "Knees hurt when it rains"      Objective: See treatment diary below      Assessment:  Pt able to ephraim passive stretching today withou any increased sx    Plan: Continue per plan of care  Progress treatment as tolerated  Precautions: Pacemaker; Paroxysmal A-fib; Tachy-smita syndrome; HTN; Anemia; Dizziness; Fibromyalgia     Daily Treatment Diary      Manual   18  618      B HS stretch  6' 5'  4 min  4min  4 min  4min  def     3 min      B Piriformis stretch  6' 5'  4 min  R side only due to knee pain 2min   2min Def      2 min      B ITB stretch  6'    2 min  4min  4 min    4min  def     3 min                                                           Exercise Diary   18          Pball rollouts 2X10  2x10  2X10          HS stretch on stool    :10X10  :10X10          Bridges  2X10  2X10  2x10          Abdom iso with PB  2X10  held  2x10x5"          TrA with BFB  :10X10  PPT no hold 20X  PPT 2x10x5"          Bike                 LAQ     2X10           Mini squats on foam  D/C   1X10           LTR  2X10  2X10  20x5"           TrA with Marches 2x10 held             Supine B Hip Abd with TB  2X10 RTB  2X10 yTB  ytb 2x10                  3MWT  D/C  D/C  RW CGX1  Hr 88  02 97%  /93 305ft            Standing foam HR/TR  D/C    1x10           Standing Foam hip abd/ ext      1X10           Standing Foam FTEC  D/C    90sec Min AX1          Side stepping       SPC at mirror CGX1 8X10ft           High knee stepping  D/C               Amb SPC  d/c    at mirror CGX1 4x10ft                                                     Modalities  6/7/18 6/11 6/13/18          BP Pre: 135/85   Post:155/78  Pre: 14/80  Post: 140/75  Pre: 136/84  Post:132/80          HR (pacer) Pre: 73  Post:72  Pre:  Post 75  PRe: 78  Post: 76          02 Pre: 94%  Post:96%  Pre 98% Post 97% Pre: 96%  Post:96%          HP to finish 10min  def            1:1 treatment

## 2018-06-18 ENCOUNTER — OFFICE VISIT (OUTPATIENT)
Dept: PHYSICAL THERAPY | Age: 73
End: 2018-06-18
Payer: MEDICARE

## 2018-06-18 DIAGNOSIS — M48.061 SPINAL STENOSIS, LUMBAR REGION, WITHOUT NEUROGENIC CLAUDICATION: Primary | ICD-10-CM

## 2018-06-18 DIAGNOSIS — M47.816 LUMBAR SPONDYLOSIS: ICD-10-CM

## 2018-06-18 DIAGNOSIS — G89.29 CHRONIC BILATERAL LOW BACK PAIN WITHOUT SCIATICA: ICD-10-CM

## 2018-06-18 DIAGNOSIS — M51.26 LUMBAR DISC HERNIATION: ICD-10-CM

## 2018-06-18 DIAGNOSIS — M51.26 DISPLACEMENT OF LUMBAR INTERVERTEBRAL DISC WITHOUT MYELOPATHY: ICD-10-CM

## 2018-06-18 DIAGNOSIS — M54.50 CHRONIC BILATERAL LOW BACK PAIN WITHOUT SCIATICA: ICD-10-CM

## 2018-06-18 PROCEDURE — 97112 NEUROMUSCULAR REEDUCATION: CPT

## 2018-06-18 PROCEDURE — 97140 MANUAL THERAPY 1/> REGIONS: CPT

## 2018-06-18 PROCEDURE — 97110 THERAPEUTIC EXERCISES: CPT

## 2018-06-18 NOTE — PROGRESS NOTES
Daily Note     Today's date: 2018  Patient name: Lucas Turner  :   MRN: [id number]  Referring provider: JERI Shipman Sa  Dx:   Encounter Diagnosis     ICD-10-CM    1  Spinal stenosis, lumbar region, without neurogenic claudication M48 061    2  Chronic bilateral low back pain without sciatica M54 5     G89 29    3  Lumbar disc herniation M51 26    4  Lumbar spondylosis M47 816    5  Displacement of lumbar intervertebral disc without myelopathy M51 26        Start Time: 1200  Stop Time: 1255  Total time in clinic (min): 55 minutes    Subjective:  Pt without new reports      Objective: See treatment diary below      Assessment: Tolerated treatment well  Patient would benefit from continued PT      Plan: Continue per plan of care  Progress treatment as tolerated  Precautions: Pacemaker; Paroxysmal A-fib; Tachy-smita syndrome; HTN; Anemia; Dizziness; Fibromyalgia     Daily Treatment Diary      Manual   18    B HS stretch  6' 5'  4 min  4min  4 min  4min  def     3 min  3 min    B Piriformis stretch  6' 5'  4 min  R side only due to knee pain 2min   2min Def      2 min  2 min    B ITB stretch  6'    2 min  4min  4 min    4min  def     3 min  3 min                                                         Exercise Diary   18         Pball rollouts 2X10  2x10  2X10 20x         HS stretch on stool    :10X10  :10X10 10x10"         Bridges  2X10  2X10  2x10 2x10         Abdom iso with PB  2X10  held  2x10x5" 2x10x5"         TrA with BFB  :10X10  PPT no hold 20X  PPT 2x10x5" PPT 2x10x5"         Bike                 LAQ     2X10 2x10 ea          Mini squats on foam  D/C   1X10 10x          LTR  2X10  2X10  20x5" 20x5"          TrA with Marches 2x10 held             Supine B Hip Abd with TB  2X10 RTB  2X10 yTB  ytb 2x10  ytb 3x10                3MWT  D/C  D/C  RW CGX1  Hr 88  02 97%  /93 305ft  np  Standing foam HR/TR  D/C    1x10 2x10 ea          Standing Foam hip abd/ ext      1X10 2x10 no foam          Standing Foam FTEC  D/C    90sec Min AX1 1 min CG-Min A x1         Side stepping       SPC at mirror CGX1 8X10ft Pt defers due to back pain          High knee stepping  D/C               Amb SPC  d/c    at mirror CGX1 4x10ft  4x10ft                                                   Modalities  6/7/18 6/11 6/13/18 6/18/18         BP Pre: 135/85   Post:155/78  Pre: 14/80  Post: 140/75  Pre: 136/84  Post:132/80 Pre: 130/84         HR (pacer) Pre: 73  Post:72  Pre:  Post 75  PRe: 78  Post: 76 Pre: 76         02 Pre: 94%  Post:96%  Pre 98% Post 97% Pre: 96%  Post:96% Pre 96%         HP to finish 10min  def

## 2018-06-21 ENCOUNTER — TELEPHONE (OUTPATIENT)
Dept: NON INVASIVE DIAGNOSTICS | Facility: HOSPITAL | Age: 73
End: 2018-06-21

## 2018-06-21 ENCOUNTER — IN-CLINIC DEVICE VISIT (OUTPATIENT)
Dept: CARDIOLOGY CLINIC | Facility: CLINIC | Age: 73
End: 2018-06-21
Payer: MEDICARE

## 2018-06-21 ENCOUNTER — OFFICE VISIT (OUTPATIENT)
Dept: PHYSICAL THERAPY | Age: 73
End: 2018-06-21
Payer: MEDICARE

## 2018-06-21 DIAGNOSIS — I48.0 AF (PAROXYSMAL ATRIAL FIBRILLATION) (HCC): ICD-10-CM

## 2018-06-21 DIAGNOSIS — I49.5 SSS (SICK SINUS SYNDROME) (HCC): ICD-10-CM

## 2018-06-21 DIAGNOSIS — G89.29 CHRONIC BILATERAL LOW BACK PAIN WITHOUT SCIATICA: ICD-10-CM

## 2018-06-21 DIAGNOSIS — M54.50 CHRONIC BILATERAL LOW BACK PAIN WITHOUT SCIATICA: ICD-10-CM

## 2018-06-21 DIAGNOSIS — R00.1 BRADYCARDIA: ICD-10-CM

## 2018-06-21 DIAGNOSIS — M51.26 LUMBAR DISC HERNIATION: ICD-10-CM

## 2018-06-21 DIAGNOSIS — M48.061 SPINAL STENOSIS OF LUMBAR REGION, UNSPECIFIED WHETHER NEUROGENIC CLAUDICATION PRESENT: ICD-10-CM

## 2018-06-21 DIAGNOSIS — M48.061 SPINAL STENOSIS, LUMBAR REGION, WITHOUT NEUROGENIC CLAUDICATION: Primary | ICD-10-CM

## 2018-06-21 DIAGNOSIS — Z95.0 PACEMAKER: Primary | ICD-10-CM

## 2018-06-21 DIAGNOSIS — M47.816 LUMBAR SPONDYLOSIS: ICD-10-CM

## 2018-06-21 DIAGNOSIS — M51.26 DISPLACEMENT OF LUMBAR INTERVERTEBRAL DISC WITHOUT MYELOPATHY: ICD-10-CM

## 2018-06-21 PROCEDURE — G8992 OTHER PT/OT  D/C STATUS: HCPCS | Performed by: PHYSICAL THERAPIST

## 2018-06-21 PROCEDURE — G8991 OTHER PT/OT GOAL STATUS: HCPCS | Performed by: PHYSICAL THERAPIST

## 2018-06-21 PROCEDURE — 97110 THERAPEUTIC EXERCISES: CPT | Performed by: PHYSICAL MEDICINE & REHABILITATION

## 2018-06-21 PROCEDURE — 93294 REM INTERROG EVL PM/LDLS PM: CPT | Performed by: INTERNAL MEDICINE

## 2018-06-21 PROCEDURE — 93296 REM INTERROG EVL PM/IDS: CPT | Performed by: INTERNAL MEDICINE

## 2018-06-21 PROCEDURE — 97140 MANUAL THERAPY 1/> REGIONS: CPT | Performed by: PHYSICAL MEDICINE & REHABILITATION

## 2018-06-21 PROCEDURE — 97112 NEUROMUSCULAR REEDUCATION: CPT | Performed by: PHYSICAL MEDICINE & REHABILITATION

## 2018-06-21 NOTE — PROGRESS NOTES
Daily Note     Today's date: 2018  Patient name: Laura Myers  :   MRN: [id number]  Referring provider: JERI Pradhan  Dx:   Encounter Diagnosis     ICD-10-CM    1  Spinal stenosis, lumbar region, without neurogenic claudication M48 061    2  Chronic bilateral low back pain without sciatica M54 5     G89 29    3  Lumbar disc herniation M51 26    4  Lumbar spondylosis M47 816    5  Displacement of lumbar intervertebral disc without myelopathy M51 26    6  Spinal stenosis of lumbar region, unspecified whether neurogenic claudication present M48 061                   Subjective:  Patient presents with 6/10 back pain this session  Objective: See treatment diary below      Assessment: Tolerated treatment fair, objective fatigue with standing TE  Soft tissue restrictions present through B LEs  Patient would benefit from continued PT      Plan: Continue per plan of care  Progress treatment as tolerated  Precautions: Pacemaker; Paroxysmal A-fib; Tachy-smita syndrome; HTN; Anemia; Dizziness; Fibromyalgia     Daily Treatment Diary      Manual  18    B HS stretch LH 5'  4 min  4min  4 min  4min  def     3 min  3 min    B Piriformis stretch LH 5'  4 min  R side only due to knee pain 2min   2min Def      2 min  2 min    B ITB stretch  LH    2 min  4min  4 min    4min  def     3 min  3 min                                                         Exercise Diary   18        Pball rollouts 2X10  2x10  2X10 20x 20x        HS stretch on stool    :10X10  :10X10 10x10" 10x10"        Bridges  2X10  2X10  2x10 2x10 2x10        Abdom iso with PB  2X10  held  2x10x5" 2x10x5" 2x10, 5"        TrA with BFB  :10X10  PPT no hold 20X  PPT 2x10x5" PPT 2x10x5" PPT 2x10, 5"        Bike                 LAQ     2X10 2x10 ea 2x10 ea         Mini squats on foam  D/C   1X10 10x 10x         LTR  2X10  2X10  20x5" 20x5" 20x5"         TrA with Marches 2x10 held             Supine B Hip Abd with TB  2X10 RTB  2X10 yTB  ytb 2x10  ytb 3x10  ytb 3x10              3MWT  D/C  D/C  RW CGX1  Hr 88  02 97%  /93 305ft  np np         Standing foam HR/TR  D/C    1x10 2x10 ea 2x10 ea         Standing Foam hip abd/ ext      1X10 2x10 no foam np         Standing Foam FTEC  D/C    90sec Min AX1 1 min CG-Min A x1 1' CGA/  Nehal        Side stepping       SPC at mirror CGX1 8X10ft Pt defers due to back pain np         High knee stepping  D/C               Amb SPC  d/c    at mirror CGX1 4x10ft  4x10ft np                                                  Modalities  6/7/18  6/11 6/13/18 6/18/18 6/21/18        BP Pre: 135/85   Post:155/78  Pre: 14/80  Post: 140/75  Pre: 136/84  Post:132/80 Pre: 130/84 Pre: 138/84        HR (pacer) Pre: 73  Post:72  Pre:  Post 75  PRe: 78  Post: 76 Pre: 76 76 bpm        02 Pre: 94%  Post:96%  Pre 98% Post 97% Pre: 96%  Post:96% Pre 96% 96%        HP to finish 10min  def

## 2018-06-21 NOTE — TELEPHONE ENCOUNTER
Called patient regarding device interrogation showing frequent episodes of atrial fibrillation  Patient does have hx of HTN w/ a [id number] of 2 given her HTN and female sex  I left a message for her regarding this information with the call back number for the EP office at [facility]  Will patient's V rates on interrogations WNL  Will attempt to call patient tomorrow if I do not hear back from her today

## 2018-06-22 ENCOUNTER — OFFICE VISIT (OUTPATIENT)
Dept: PAIN MEDICINE | Facility: CLINIC | Age: 73
End: 2018-06-22
Payer: MEDICARE

## 2018-06-22 VITALS
HEART RATE: 78 BPM | RESPIRATION RATE: 20 BRPM | BODY MASS INDEX: 41.82 KG/M2 | DIASTOLIC BLOOD PRESSURE: 78 MMHG | SYSTOLIC BLOOD PRESSURE: 140 MMHG | TEMPERATURE: 98.2 F | WEIGHT: 251 LBS | HEIGHT: 65 IN

## 2018-06-22 DIAGNOSIS — G89.4 CHRONIC PAIN SYNDROME: Primary | ICD-10-CM

## 2018-06-22 DIAGNOSIS — M51.26 LUMBAR DISC HERNIATION: ICD-10-CM

## 2018-06-22 DIAGNOSIS — M47.816 LUMBAR SPONDYLOSIS: ICD-10-CM

## 2018-06-22 DIAGNOSIS — M48.061 SPINAL STENOSIS OF LUMBAR REGION, UNSPECIFIED WHETHER NEUROGENIC CLAUDICATION PRESENT: ICD-10-CM

## 2018-06-22 PROCEDURE — 99213 OFFICE O/P EST LOW 20 MIN: CPT | Performed by: NURSE PRACTITIONER

## 2018-06-22 NOTE — PROGRESS NOTES
Assessment:  1  Chronic pain syndrome    2  Spinal stenosis of lumbar region, unspecified whether neurogenic claudication present    3  Lumbar disc herniation    4  Lumbar spondylosis        Plan:  The patient is a 74 y o  female  with a history lumbar spinal stenosis, lumbar spondylosis, and lumbar disc herniations  The patient reports that she continues with ongoing low back pain that has worsened since last visit  The patient has attended physical therapy for the past 6 weeks, without relief of symptoms  I discussed with the patient about moving forward with a lumbar epidural steroid injection to help with her ongoing low back pain, which is most likely stemming from multiple levels of spinal stenosis within her lumbar spine  However, the patient reports that she is not interested in moving forward with any injections at this time, and she stated that she would like to proceed with surgical consultation  I discussed with the patient that neurosurgery may refer her back to our office for steroid  injections prior to considering lumbar spine surgery  The patient verbalized understanding  She was provided a referral to Neurosurgery for surgical evaluation per her request     I have also ordered the patient an updated MRI of her lumbar spine without contrast for further evaluation prior to seeing Neurosurgery  She was instructed that our office will call her with results of her lumbar MRI, once is completed  The patient verbalized understanding  I discussed with the patient multiple medication options to manage her ongoing low back pain  However, the patient reports that Advil, and Tylenol has been effective at relieving her pain, and she is not interested in any medications that cause dizziness and drowsiness due to she has a balance disorder  Therefore, the patient would not like to start any new medications at this time   I did discuss with the patient multiple non pharmaceutical interventions to manage her pain such as applying heat in 20 minutes increments, and using over-the-counter creams such as Biofreeze, and Lidocaine  The patient verbalized understanding  The patient will followup after seeing neurosurgery for surgical consultation, or sooner with the worsening of symptoms  History of Present Illness: The patient is a 74 y o  female  with a history lumbar spinal stenosis, lumbar spondylosis, and lumbar disc herniations  The patient was last seen in the office on 4/27/2018 where she was referred to physical therapy  The patient  presents for a follow up office visit in regards to chronic pain secondary to low back pain  The patient currently reports low back pain that is localized to her low back  She denies bilateral leg weakness  She reports ongoing urinary incontinence for last 2 years, and reports that she had underwent bladder lift surgery 20-30 years ago  She denies incontinence of stool  She describes her pain as dull aching intermittent pain that occurs mostly during the evening nighttime hours  The patient reports that her pain is symptoms have worsened since last visit  She contributes the worsening of her symptoms due to her back pain slowly worsening  She also contributes the worsening of her pain due to her bilateral knee pain which has worsened since her last office visit  Current pain medications includes:  Advil and Tylenol as needed  The patient reports that this regimen is providing 40% pain relief  The patient is reporting no side effects from this pain medication regimen  I have personally reviewed and/or updated the patient's past medical history, past surgical history, family history, social history, current medications, allergies, and vital signs today  Review of Systems:    Review of Systems   Respiratory: Positive for shortness of breath  Cardiovascular: Negative for chest pain     Gastrointestinal: Negative for constipation, diarrhea, nausea and vomiting  Musculoskeletal: Positive for joint swelling  Negative for arthralgias and myalgias  Skin: Negative for rash  Neurological: Positive for weakness  Negative for dizziness and seizures  All other systems reviewed and are negative  Past Medical History:   Diagnosis Date    Arthritis     knees    Cataract, left eye     Both eyes  Had surgery on left   Difficulty swallowing     Dizziness     Gastric reflux     Hypertension     Sore throat        Past Surgical History:   Procedure Laterality Date    HYSTERECTOMY      ID DILATE ESOPHAGUS N/A 4/6/2016    Procedure: DILATATION ESOPHAGEAL;  Surgeon: Larisa Dc MD;  Location: [facility]; Service: Gastroenterology    ID EGD TRANSORAL BIOPSY SINGLE/MULTIPLE N/A 4/6/2016    Procedure: ESOPHAGOGASTRODUODENOSCOPY (EGD); Surgeon: Larisa Dc MD;  Location: [facility]; Service: Gastroenterology     [facility] CATARACT EXTRACAP,INSERT LENS Left 3/15/2016    Procedure: EXTRACTION EXTRACAPSULAR CATARACT PHACO INTRAOCULAR LENS (IOL); Surgeon: Damian Villanueva MD;  Location:  MAIN OR;  Service: Ophthalmology    TONSILLECTOMY         Family History   Problem Relation Age of Onset    Heart disease Father        Social History     Occupational History    Not on file       Social History Main Topics    Smoking status: Never Smoker    Smokeless tobacco: Never Used    Alcohol use No    Drug use: No    Sexual activity: No         Current Outpatient Prescriptions:     albuterol (PROVENTIL HFA,VENTOLIN HFA) 90 mcg/act inhaler, Inhale 2 puffs every 6 (six) hours as needed for wheezing, Disp: , Rfl:     ALPRAZolam (XANAX) 0 25 mg tablet, Take 1 tablet by mouth 3 (three) times a day as needed, Disp: , Rfl:     amLODIPine (NORVASC) 10 mg tablet, Take 1 tablet by mouth daily, Disp: , Rfl:     Ascorbic Acid (VITAMIN C) 1000 MG tablet, Take 1 tablet by mouth daily, Disp: , Rfl:     B Complex-C (B-COMPLEX WITH VITAMIN C) tablet, Take 1 tablet by mouth daily  , Disp: , Rfl:     Cholecalciferol (VITAMIN D3) 1000 units CAPS, Take 1 tablet by mouth daily, Disp: , Rfl:     metoprolol tartrate (LOPRESSOR) 50 mg tablet, TAKE 1 TABLET BY MOUTH EVERY 12 HOURS, Disp: 180 tablet, Rfl: 1    Allergies   Allergen Reactions    Winston      Unknown    Nickel      Skin discoloration    Penicillins Hives    Sulfa Antibiotics Itching    Cortisone Acetate [Cortisone] Itching and Rash    Penicillin G Rash       Physical Exam:    /78   Pulse 78   Temp 98 2 °F (36 8 °C)   Resp 20   Ht 5' 5" (1 651 m)   Wt 114 kg (251 lb)   BMI 41 77 kg/m²     Constitutional:normal, well developed, well nourished, alert, in no distress and non-toxic and no overt pain behavior   and obese  Eyes:anicteric  HEENT:grossly intact  Neck:supple, symmetric, trachea midline and no masses   Pulmonary:even and unlabored  Cardiovascular:No edema or pitting edema present  Skin:Normal without rashes or lesions and well hydrated  Psychiatric:Mood and affect appropriate  Neurologic:Cranial Nerves II-XII grossly intact  Musculoskeletal:Ambulates with a walker     Lumbar Spine Exam    Appearance:  Normal lordosis  Palpation/Tenderness:  left lumbar paraspinal tenderness  right lumbar paraspinal tenderness  Sensory:  no sensory deficits noted  Range of Motion:  Flexion:  Minimally limited  without pain  Extension:  Minimally limited  without pain  Lateral Flexion - Left:  Minimally limited  without pain  Lateral Flexion - Right:  Minimally limited  without pain  Rotation - Left:  Minimally limited  without pain  Rotation - Right:  Minimally limited  without pain  Motor Strength:  Left hip flexion:  5/5  Right hip flexion:  5/5  Left knee extension:  5/5  Right knee extension:  5/5  Left foot dorsiflexion:  5/5  Left foot plantar flexion:  5/5  Right foot dorsiflexion:  5/5  Right foot plantar flexion:  5/5      Imaging  MRI lumbar spine with contrast    (Results Pending)   MRI LUMBAR SPINE WITHOUT CONTRAST     INDICATION:  Worsening low back pain  Unsteady gait      COMPARISON:  None      TECHNIQUE:  Sagittal T1, sagittal T2, sagittal inversion recovery, axial T1 and axial T2, coronal T2        IMAGE QUALITY:  Diagnostic     FINDINGS:     ALIGNMENT:  Smooth dextroscoliosis of the lumbar spine centered at L2  Slight anterior spondylolisthesis of L2 upon L3 and L4 upon L5, grade 1  There is no compression fracture  Endplate degenerative changes are noted throughout the lumbar endplates   as well as within the T12 inferior endplate  There are multiple Schmorl's nodes present extending into the endplates many of which demonstrate mild adjacent, localized marrow edema      MARROW SIGNAL:  Fatty type II endplate marrow degenerative change at L2-3  There is type I marrow edema within the L4-5 endplates  As described above there is mild edema adjacent to multiple Schmorl's nodes within the thoracic and lumbar endplates      DISTAL CORD AND CONUS:  Normal size and signal within the distal cord and conus  The conus ends at the L1 level      PARASPINAL SOFT TISSUES:  Paraspinal soft tissues are unremarkable      SACRUM:  Normal signal within the sacrum  No evidence of insufficiency or stress fracture      LOWER THORACIC DISC SPACES:  Minor lower thoracic degenerative change without disc herniation, canal stenosis or foraminal narrowing      LUMBAR DISC SPACES:          L1-L2:  Disc desiccation  Mild annular bulging diffusely  Facet degenerative change and ligamentum flavum thickening  Mild to moderate canal stenosis and bilateral foraminal narrowing      L2-L3:  Near obliteration of the disc space  Annular bulging  Endplate hypertrophic osteophyte formation most pronounced within the far lateral aspect of the endplates on the left  Moderate left greater than right facet arthropathy with ligamentum   flavum thickening    Moderate canal stenosis with distortion of the thecal sac, especially the left lateral aspect of the thecal sac  There is moderately severe left and mild right foraminal narrowing      L3-L4:  Mild diffuse annular bulging  Small broad-based left foraminal disc protrusion  Mild facet degenerative change and ligamentum flavum thickening  Mild to moderate canal stenosis  Moderate left and mild right foraminal narrowing      L4-L5:  Moderate diffuse annular bulging with small broad-based right foraminal and lateral disc protrusion  Moderate facet hypertrophic degenerative change and ligamentum flavum thickening  Moderate canal stenosis with distortion of the thecal sac  Mild left and moderate right foraminal narrowing      L5-S1:  Mild annular bulging  Moderate facet degenerative change  No disc herniation or canal stenosis  No foraminal narrowing      IMPRESSION:     Multilevel thoracolumbar spondylitic degenerative change with annular bulging, disc protrusions and endplate/facet hypertrophic degenerative change  Moderate canal stenosis most pronounced at L2-3, L3-4 and L4-5  Multilevel mild to moderate foraminal   narrowing most pronounced at L2-3 and L3-4 on the left as well as L4-5 on the right    Correlate for corresponding radiculopathy         Workstation performed: [id number]         Orders Placed This Encounter   Procedures    MRI lumbar spine with contrast    Ambulatory referral to Neurosurgery

## 2018-06-22 NOTE — PROGRESS NOTES
Results for orders placed or performed in visit on 06/21/18   Cardiac EP device report    Narrative    MDT DUAL CHAMBER PPM  CARELINK TRANSMISSION: BATTERY ADEQUATE (9 5 YRS)  AP 90%;  0 6%  ALL LEAD PARAMETERS WITHIN NORMAL LIMITS  16 AF EPISODES TREATED WITH ATP LASTING UP TO 12 HRS IN DURATION  6 7% PACED TERMINATED  26 AF MONITORED EPISODES  AF BURDEN 1 7%  SPOKE TO PT AND SHE STATES THAT SHE IS NOT TAKING XARELTO DUE TO BRUISING  TASKED LS  NORMAL DEVICE FUNCTION   PL/GV

## 2018-06-23 NOTE — TELEPHONE ENCOUNTER
Please call her and have her restart the Xarelto 20 mg daily and stop ASA  Then make OV to see me  Tell her that she is having AF and is at risk for a stroke  Do not overbook

## 2018-06-25 ENCOUNTER — TELEPHONE (OUTPATIENT)
Dept: CARDIOLOGY CLINIC | Facility: CLINIC | Age: 73
End: 2018-06-25

## 2018-06-25 DIAGNOSIS — I48.0 PAROXYSMAL ATRIAL FIBRILLATION (HCC): Primary | ICD-10-CM

## 2018-06-25 DIAGNOSIS — I10 ESSENTIAL HYPERTENSION: Primary | ICD-10-CM

## 2018-06-25 RX ORDER — AMLODIPINE BESYLATE 10 MG/1
TABLET ORAL
Qty: 90 TABLET | Refills: 1 | Status: SHIPPED | OUTPATIENT
Start: 2018-06-25 | End: 2019-01-03 | Stop reason: SDUPTHER

## 2018-06-25 NOTE — TELEPHONE ENCOUNTER
Per Dr Huong Silva : Please call her and have her restart the Xarelto 20 mg daily and stop ASA  Then make OV to see me  Tell her that she is having AF and is at risk for a stroke  Do not overbook

## 2018-06-25 NOTE — TELEPHONE ENCOUNTER
Appt 8/3/18  I spoke to [facility] about taking the Xarelto and Dr Vasiliy Ruiz sent it in to [facility]  It looks like Josie Slade, sent in Eliquis  I did ask the pharmacy not to fill the Eliquis and asked [facility] not to pick-up Eliquis  She said she has problems bleeding easily and will this affect her  I asked her to try the medication and call us if she does have a problem  Pharmacist Shweta called questioning allergies with Xarelto  Spoke to WOLFGANG Costa, he agreed to 15 mg Xarelto    Gave verbal

## 2018-06-26 ENCOUNTER — TRANSCRIBE ORDERS (OUTPATIENT)
Dept: ADMISSIONS | Facility: HOSPITAL | Age: 73
End: 2018-06-26

## 2018-06-26 DIAGNOSIS — F41.9 ANXIETY: Primary | ICD-10-CM

## 2018-06-26 RX ORDER — ALPRAZOLAM 0.25 MG/1
0.25 TABLET ORAL 3 TIMES DAILY PRN
Qty: 90 TABLET | Refills: 1 | Status: SHIPPED | OUTPATIENT
Start: 2018-06-26 | End: 2018-09-27 | Stop reason: SDUPTHER

## 2018-06-28 ENCOUNTER — TELEPHONE (OUTPATIENT)
Dept: INTERNAL MEDICINE CLINIC | Facility: CLINIC | Age: 73
End: 2018-06-28

## 2018-06-28 DIAGNOSIS — J02.9 PHARYNGITIS, UNSPECIFIED ETIOLOGY: Primary | ICD-10-CM

## 2018-06-28 RX ORDER — AZITHROMYCIN 250 MG/1
TABLET, FILM COATED ORAL
Qty: 6 TABLET | Refills: 0 | Status: SHIPPED | OUTPATIENT
Start: 2018-06-28 | End: 2021-03-10 | Stop reason: SDUPTHER

## 2018-06-28 NOTE — TELEPHONE ENCOUNTER
Patient has a strep throat unable to make it into the office for diagnosis would like an antibiotic prescribed will start her on a Zithromax 5 day pack to be taken as per the insert follow-up in the office necessary if failure to respond to this therapy

## 2018-06-28 NOTE — TELEPHONE ENCOUNTER
Patient has strep throat again and would like for you to call something in for her  She is having trouble swallowing and has white spots on her throat    She would like to know if you would please do this before 5pm today, thank you

## 2018-07-02 ENCOUNTER — OFFICE VISIT (OUTPATIENT)
Dept: INTERNAL MEDICINE CLINIC | Facility: CLINIC | Age: 73
End: 2018-07-02
Payer: MEDICARE

## 2018-07-02 VITALS
DIASTOLIC BLOOD PRESSURE: 86 MMHG | HEART RATE: 76 BPM | OXYGEN SATURATION: 96 % | WEIGHT: 233 LBS | SYSTOLIC BLOOD PRESSURE: 140 MMHG | BODY MASS INDEX: 38.82 KG/M2 | TEMPERATURE: 97.8 F | HEIGHT: 65 IN | RESPIRATION RATE: 22 BRPM

## 2018-07-02 DIAGNOSIS — J02.9 PHARYNGITIS, UNSPECIFIED ETIOLOGY: Primary | ICD-10-CM

## 2018-07-02 DIAGNOSIS — J45.20 MILD INTERMITTENT ASTHMA WITHOUT COMPLICATION: ICD-10-CM

## 2018-07-02 DIAGNOSIS — J02.9 ALLERGIC PHARYNGITIS: ICD-10-CM

## 2018-07-02 LAB — S PYO AG THROAT QL: NEGATIVE

## 2018-07-02 PROCEDURE — 87880 STREP A ASSAY W/OPTIC: CPT | Performed by: NURSE PRACTITIONER

## 2018-07-02 PROCEDURE — 99213 OFFICE O/P EST LOW 20 MIN: CPT | Performed by: NURSE PRACTITIONER

## 2018-07-02 RX ORDER — LORATADINE 10 MG/1
10 TABLET ORAL DAILY
Qty: 30 TABLET | Refills: 0 | Status: SHIPPED | OUTPATIENT
Start: 2018-07-02 | End: 2019-09-30 | Stop reason: ALTCHOICE

## 2018-07-02 RX ORDER — ALBUTEROL SULFATE 90 UG/1
2 AEROSOL, METERED RESPIRATORY (INHALATION) EVERY 6 HOURS PRN
Qty: 1 INHALER | Refills: 0 | Status: SHIPPED | OUTPATIENT
Start: 2018-07-02 | End: 2019-09-30 | Stop reason: ALTCHOICE

## 2018-07-02 NOTE — ASSESSMENT & PLAN NOTE
Rapid strep negative  Exam findings appear consistent with allergic etiology  Pt is prescribed loratadine QD and instructed to gargle with warm salt water  As she has an allergy to cortisone, I elected not to recommend flonase at present  I asked pt to call back if her symptoms do not improve with the above measures and we can advise with alternative treatment recommendations

## 2018-07-02 NOTE — PROGRESS NOTES
Assessment/Plan:    Allergic pharyngitis  Rapid strep negative  Exam findings appear consistent with allergic etiology  Pt is prescribed loratadine QD and instructed to gargle with warm salt water  As she has an allergy to cortisone, I elected not to recommend flonase at present  I asked pt to call back if her symptoms do not improve with the above measures and we can advise with alternative treatment recommendations  Diagnoses and all orders for this visit:    Pharyngitis, unspecified etiology  -     POCT rapid strepA    Mild intermittent asthma without complication  -     albuterol (PROVENTIL HFA,VENTOLIN HFA) 90 mcg/act inhaler; Inhale 2 puffs every 6 (six) hours as needed for wheezing    Allergic pharyngitis  -     loratadine (CLARITIN) 10 mg tablet; Take 1 tablet (10 mg total) by mouth daily          Subjective:      Patient ID: Jammie Wood is a 74 y o  female  Pt is a 74 y o  y/o female who is seen today for evaluation of a sore throat x 7-10 days  PMH includes rheumatic fever as a child, tonsillectomy as a young adult, and recent recurrences of strep pharyngitis, most recently in 2016  Pt does report fever, fatigue, headache, bilateral ear pain  She denies abdominal pain or nausea/vomiting, but she states her pills are getting caught in her throat  Her glands are tender  Sore Throat    Associated symptoms include congestion, coughing, ear pain and headaches  Pertinent negatives include no diarrhea, shortness of breath or vomiting  The following portions of the patient's history were reviewed and updated as appropriate: allergies, current medications, past family history, past medical history, past social history, past surgical history and problem list     Review of Systems   Constitutional: Positive for fatigue and fever  Negative for appetite change and chills  HENT: Positive for congestion, ear pain, postnasal drip and sore throat  Negative for sinus pressure  Respiratory: Positive for cough  Negative for chest tightness, shortness of breath and wheezing  Cardiovascular: Negative for chest pain, palpitations and leg swelling  Gastrointestinal: Negative for diarrhea, nausea and vomiting  Genitourinary: Negative for dysuria  Musculoskeletal: Positive for arthralgias (arthritis) and myalgias  Allergic/Immunologic: Positive for environmental allergies  Neurological: Positive for headaches  Negative for dizziness  Hematological: Positive for adenopathy  Psychiatric/Behavioral: Negative for sleep disturbance  Objective:      /86 (BP Location: Left arm, Patient Position: Sitting, Cuff Size: Large)   Pulse 76   Temp 97 8 °F (36 6 °C)   Resp 22   Ht 5' 5" (1 651 m)   Wt 106 kg (233 lb)   SpO2 96%   BMI 38 77 kg/m²          Physical Exam   Constitutional: She is oriented to person, place, and time  Vital signs are normal  She appears well-developed and well-nourished  She is cooperative  HENT:   Right Ear: Hearing, tympanic membrane, external ear and ear canal normal  Tympanic membrane is not bulging  No middle ear effusion  Left Ear: Hearing, tympanic membrane, external ear and ear canal normal  Tympanic membrane is not bulging  No middle ear effusion  Nose: Mucosal edema present  No rhinorrhea  Mouth/Throat: Mucous membranes are not dry  No oropharyngeal exudate, posterior oropharyngeal edema, posterior oropharyngeal erythema or tonsillar abscesses  Post nasal drip noted   Eyes: Conjunctivae are normal    Neck: No JVD present  Carotid bruit is not present  Cardiovascular: Normal rate, regular rhythm, normal heart sounds and intact distal pulses  No murmur heard  Pulmonary/Chest: Effort normal and breath sounds normal  No accessory muscle usage  No respiratory distress  She has no decreased breath sounds  She has no wheezes  She has no rhonchi  She has no rales  Abdominal: Soft  Normal appearance  There is no tenderness  Musculoskeletal: She exhibits no edema  Lymphadenopathy:        Head (right side): No submental, no submandibular, no tonsillar, no preauricular, no posterior auricular and no occipital adenopathy present  Head (left side): No submental, no submandibular, no tonsillar, no preauricular, no posterior auricular and no occipital adenopathy present  She has no cervical adenopathy  Neurological: She is alert and oriented to person, place, and time  Skin: Skin is warm, dry and intact  Psychiatric: She has a normal mood and affect  Her speech is normal and behavior is normal  Judgment and thought content normal  Cognition and memory are normal    Vitals reviewed

## 2018-07-05 ENCOUNTER — TELEPHONE (OUTPATIENT)
Dept: PAIN MEDICINE | Facility: MEDICAL CENTER | Age: 73
End: 2018-07-05

## 2018-07-05 NOTE — TELEPHONE ENCOUNTER
S/W patient patient states that she has increased pain in her lower back and spine and would like to be evaluated  Pt scheduled with [facility] on 7/6/18 for SOVS   Pt appreciative of call back

## 2018-07-05 NOTE — TELEPHONE ENCOUNTER
No she was only evaluated for lumbar spine so would have to come back in the office for re-eval of neck before ordering that MRI

## 2018-07-05 NOTE — TELEPHONE ENCOUNTER
Caller: patient  Call back number: [phone]    Patient is asking if the MRI can be changed to do the entire spine  She states she is having issues with her lower back and neck   Please advise

## 2018-07-06 ENCOUNTER — OFFICE VISIT (OUTPATIENT)
Dept: PAIN MEDICINE | Facility: CLINIC | Age: 73
End: 2018-07-06
Payer: MEDICARE

## 2018-07-06 VITALS
BODY MASS INDEX: 42.15 KG/M2 | DIASTOLIC BLOOD PRESSURE: 86 MMHG | HEART RATE: 86 BPM | RESPIRATION RATE: 20 BRPM | SYSTOLIC BLOOD PRESSURE: 142 MMHG | TEMPERATURE: 98.4 F | WEIGHT: 253 LBS | HEIGHT: 65 IN

## 2018-07-06 DIAGNOSIS — M54.12 CERVICAL RADICULOPATHY: ICD-10-CM

## 2018-07-06 DIAGNOSIS — M48.061 SPINAL STENOSIS OF LUMBAR REGION, UNSPECIFIED WHETHER NEUROGENIC CLAUDICATION PRESENT: ICD-10-CM

## 2018-07-06 DIAGNOSIS — M51.26 LUMBAR DISC HERNIATION: ICD-10-CM

## 2018-07-06 DIAGNOSIS — M47.816 LUMBAR SPONDYLOSIS: ICD-10-CM

## 2018-07-06 DIAGNOSIS — M54.2 NECK PAIN: Primary | ICD-10-CM

## 2018-07-06 PROCEDURE — 99214 OFFICE O/P EST MOD 30 MIN: CPT | Performed by: NURSE PRACTITIONER

## 2018-07-06 RX ORDER — RIVAROXABAN 15 MG/1
15 TABLET, FILM COATED ORAL DAILY
COMMUNITY
Start: 2018-06-25 | End: 2019-07-01 | Stop reason: SDUPTHER

## 2018-07-06 RX ORDER — CLINDAMYCIN HYDROCHLORIDE 300 MG/1
CAPSULE ORAL
COMMUNITY
Start: 2018-06-25 | End: 2018-11-13

## 2018-07-06 RX ORDER — VITAMIN E 268 MG
400 CAPSULE ORAL 2 TIMES DAILY
COMMUNITY
End: 2018-08-03 | Stop reason: ALTCHOICE

## 2018-07-06 NOTE — PROGRESS NOTES
Pt  C/o lower back pain and neck pain    Assessment:  1  Neck pain    2  Lumbar spondylosis    3  Lumbar disc herniation    4  Spinal stenosis of lumbar region, unspecified whether neurogenic claudication present    5  Cervical radiculopathy        Plan:  Christopher Munguia is a 74 y o  female with a history lumbar spinal stenosis, lumbar spondylosis, and lumbar disc herniations  The patient presents today pain that is multifactorial nature  Her exam and symptoms are consistent with cervical radiculopathy, which I suspect cervical spondylosis as the cause of her radicular symptoms  Therefore, I have ordered the patient a CT scan of her cervical spine for further evaluation  The patient was instructed that our office will call with results of this study once is completed  If Cervical CT does not show any clear reason for her bilateral arms symptoms, I may order bilateral upper extremity EMG is next office visit  The patient was ordered an MRI of her lumbar spine last office visit  However, the patient reports that she had a pacemaker inserted a couple months ago  Therefore at this time I have called central scheduling and requested that the MRI of her lumbar spine be canceled, and ordered the patient a CT scan of her lumbar spine without contrast  The patient was instructed that our office will call with results of her lumbar CT once is completed  The patient was provided a referral to Dr Kota Brennan for surgical consultation last office per her request   However, the patient will await the results of her cervical and lumbar CT scans prior to scheduling appointment with Dr Kota Brennan  The patient reports that she is currently not interested in moving forward with any additional epidural steroid injections or medications at this time  The patient will follow up after the completion of her cervical and lumbar CT scans, or sooner with worsening symptoms      My impressions and treatment recommendations were discussed in detail with the patient who verbalized understanding and had no further questions  Discharge instructions were provided  I personally saw and examined the patient and I agree with the above discussed plan of care  Orders Placed This Encounter   Procedures    CT lumbar spine without contrast     Standing Status:   Future     Standing Expiration Date:   7/6/2022     Scheduling Instructions: There is no prep for this study  Please bring your insurance cards, a form of photo ID and a list of your medications with you  Arrive 15 minutes prior to your appointment time to register  On the day of your test, please bring any prior CT or MRI studies of this area with you that were not performed at a [facility]  To schedule this appointment, please contact Central Scheduling at [phone]  Order Specific Question:   What is the patient's sedation requirement? Answer:   No Sedation    CT spine cervical wo contrast     Standing Status:   Future     Standing Expiration Date:   7/6/2022     Scheduling Instructions: There is no prep for this study  Please bring your insurance cards, a form of photo ID and a list of your medications with you  Arrive 15 minutes prior to your appointment time to register  On the day of your test, please bring any prior CT or MRI studies of this area with you that were not performed at a [facility]  To schedule this appointment, please contact Central Scheduling at [phone]  Order Specific Question:   What is the patient's sedation requirement?      Answer:   No Sedation     New Medications Ordered This Visit   Medications    XARELTO 15 MG tablet    clindamycin (CLEOCIN) 300 MG capsule    vitamin E, tocopherol, 400 units capsule     Sig: Take 400 Units by mouth 2 (two) times a day       History of Present Illness:  Tyler Flannery is a 74 y o  female with a history lumbar spinal stenosis, lumbar spondylosis, and lumbar disc herniations  The patient was last seen office on 6/22/2018 where the patient was provided a referral to Neurosurgery, per her request   The patient was also ordered an updated MRI of her lumbar spine  who presents for a follow up office visit in regards to Back Pain and Neck Pain  The patients current symptoms include neck pain and low back pain  The patient is here to be evaluated for her neck pain, which started 10 days ago she denies any predisposing injury or trauma  She reports left sided neck pain that makes a grinding noise when she moves her neck  She reports that her symptoms start in her neck and radiate into the upper aspect of her back  She reports mild left arm weakness,and sometimes needs to use her right arm to move her left arm  However, he weakness is intermittent in nature, and has been present for 2 years  She also reports intermittent bilateral hand numbness  She describes her pain as dull aching, pressure-like pain that is intermittent in nature that occurs mostly during the evening hours  She reports that her pain and symptoms have worsened since last visit  She currently rates her pain a 4/10 numeric pain scale  I have personally reviewed and/or updated the patient's past medical history, past surgical history, family history, social history, current medications, allergies, and vital signs today  Review of Systems   Respiratory: Negative for shortness of breath  Cardiovascular: Negative for chest pain  Gastrointestinal: Negative for constipation, diarrhea, nausea and vomiting  Musculoskeletal: Positive for joint swelling  Negative for arthralgias, gait problem and myalgias  Difficulty walking  Decreased ROM  Joint stiffness   Skin: Negative for rash  Neurological: Negative for dizziness, seizures and weakness  All other systems reviewed and are negative        Patient Active Problem List   Diagnosis    Cataract, left eye  AF (paroxysmal atrial fibrillation) (HCC)    Tachy-smita syndrome (HCC)    Hypertension    Fatigue    Pacemaker    Anemia    Bradycardia    Chronic bilateral low back pain without sciatica    Chronic GERD    Degenerative arthritis of knee, bilateral    Degenerative lumbar spinal stenosis    Dizziness    Fibromyalgia    GERD (gastroesophageal reflux disease)    Hypercholesterolemia    Low back pain    Lumbar degenerative disc disease    Mild carpal tunnel syndrome, right    Overweight    UTI symptoms    Allergic pharyngitis       Past Medical History:   Diagnosis Date    Arthritis     knees    Cataract, left eye     Both eyes  Had surgery on left   Difficulty swallowing     Dizziness     Gastric reflux     Hypertension     Sore throat        Past Surgical History:   Procedure Laterality Date    HYSTERECTOMY      CA DILATE ESOPHAGUS N/A 4/6/2016    Procedure: DILATATION ESOPHAGEAL;  Surgeon: Carmen Marin MD;  [facility]: [facility]; Service: Gastroenterology    CA EGD TRANSORAL BIOPSY SINGLE/MULTIPLE N/A 4/6/2016    Procedure: ESOPHAGOGASTRODUODENOSCOPY (EGD); Surgeon: Carmen Marin MD;  Location: [facility]; Service: Gastroenterology     [facility] CATARACT EXTRACAP,INSERT LENS Left 3/15/2016    Procedure: EXTRACTION EXTRACAPSULAR CATARACT PHACO INTRAOCULAR LENS (IOL); Surgeon: Rand Mccallum MD;  [facility];  Service: Ophthalmology    TONSILLECTOMY         Family History   Problem Relation Age of Onset    Heart disease Father        Social History     Occupational History    Not on file       Social History Main Topics    Smoking status: Never Smoker    Smokeless tobacco: Never Used    Alcohol use No    Drug use: No    Sexual activity: No       Current Outpatient Prescriptions on File Prior to Visit   Medication Sig    albuterol (PROVENTIL HFA,VENTOLIN HFA) 90 mcg/act inhaler Inhale 2 puffs every 6 (six) hours as needed for wheezing    ALPRAZolam (XANAX) 0 25 mg tablet Take 1 tablet (0 25 mg total) by mouth 3 (three) times a day as needed for anxiety    amLODIPine (NORVASC) 10 mg tablet TAKE 1 TABLET BY MOUTH DAILY AS DIRECTED    Ascorbic Acid (VITAMIN C) 1000 MG tablet Take 1 tablet by mouth daily    B Complex-C (B-COMPLEX WITH VITAMIN C) tablet Take 1 tablet by mouth daily   Cholecalciferol (VITAMIN D3) 1000 units CAPS Take 1 tablet by mouth daily    loratadine (CLARITIN) 10 mg tablet Take 1 tablet (10 mg total) by mouth daily    metoprolol tartrate (LOPRESSOR) 50 mg tablet TAKE 1 TABLET BY MOUTH EVERY 12 HOURS     No current facility-administered medications on file prior to visit  Allergies   Allergen Reactions    Z87 Folate [Folic Acid-Vit Z0-RCY Y89]     Slater      Unknown    Nickel      Skin discoloration    Penicillins Hives    Sulfa Antibiotics Itching    Cortisone Acetate [Cortisone] Itching and Rash    Penicillin G Rash       Physical Exam:    /86   Pulse 86   Temp 98 4 °F (36 9 °C)   Resp 20   Ht 5' 5" (1 651 m)   Wt 115 kg (253 lb)   BMI 42 10 kg/m²     Constitutional:normal, well developed, well nourished, alert, in no distress and non-toxic and no overt pain behavior   and obese  Eyes:anicteric  HEENT:grossly intact  Neck:supple, symmetric, trachea midline and no masses   Pulmonary:even and unlabored  Cardiovascular:No edema or pitting edema present  Skin:Normal without rashes or lesions and well hydrated  Psychiatric:Mood and affect appropriate  Neurologic:Cranial Nerves II-XII grossly intact  Musculoskeletal:Ambulates with walker    Cervical Spine Exam    Appearance:  Normal lordosis  Palpation/Tenderness:  left cervical paraspinal tenderness  right cervical paraspinal tenderness  Sensory:  no sensory deficits noted  Range of Motion:  Flexion:  Minimally limited  with pain  Extension:  Minimally limited  with pain  Lateral Flexion - Left:  Minimally limited  with pain  Lateral Flexion - Right:  Minimally limited  with pain  Rotation - Left:  Minimally limited  with pain  Rotation - Right:  Minimally limited  with pain  Motor Strength:  Left Arm Flexion  4/5  Left Arm Extension  4/5  Right Arm Flexion  5/5  Right Arm Extension  5/5  Left Wrist Flexion  5/5  Left Wrist Extension  5/5  Left Finger Abduction  5/5  Right Finger Abduction  5/5  Left    4/5  Right   5/5  Special Tests:  Left Spurlings:  negative  Right Spurlings  negative        Imaging  MRI LUMBAR SPINE WITHOUT CONTRAST     INDICATION:  Worsening low back pain  Unsteady gait      COMPARISON:  None      TECHNIQUE:  Sagittal T1, sagittal T2, sagittal inversion recovery, axial T1 and axial T2, coronal T2        IMAGE QUALITY:  Diagnostic     FINDINGS:     ALIGNMENT:  Smooth dextroscoliosis of the lumbar spine centered at L2  Slight anterior spondylolisthesis of L2 upon L3 and L4 upon L5, grade 1  There is no compression fracture  Endplate degenerative changes are noted throughout the lumbar endplates   as well as within the T12 inferior endplate  There are multiple Schmorl's nodes present extending into the endplates many of which demonstrate mild adjacent, localized marrow edema      MARROW SIGNAL:  Fatty type II endplate marrow degenerative change at L2-3  There is type I marrow edema within the L4-5 endplates  As described above there is mild edema adjacent to multiple Schmorl's nodes within the thoracic and lumbar endplates      DISTAL CORD AND CONUS:  Normal size and signal within the distal cord and conus  The conus ends at the L1 level      PARASPINAL SOFT TISSUES:  Paraspinal soft tissues are unremarkable      SACRUM:  Normal signal within the sacrum  No evidence of insufficiency or stress fracture      LOWER THORACIC DISC SPACES:  Minor lower thoracic degenerative change without disc herniation, canal stenosis or foraminal narrowing      LUMBAR DISC SPACES:          L1-L2:  Disc desiccation  Mild annular bulging diffusely    Facet degenerative change and ligamentum flavum thickening  Mild to moderate canal stenosis and bilateral foraminal narrowing      L2-L3:  Near obliteration of the disc space  Annular bulging  Endplate hypertrophic osteophyte formation most pronounced within the far lateral aspect of the endplates on the left  Moderate left greater than right facet arthropathy with ligamentum   flavum thickening  Moderate canal stenosis with distortion of the thecal sac, especially the left lateral aspect of the thecal sac  There is moderately severe left and mild right foraminal narrowing      L3-L4:  Mild diffuse annular bulging  Small broad-based left foraminal disc protrusion  Mild facet degenerative change and ligamentum flavum thickening  Mild to moderate canal stenosis  Moderate left and mild right foraminal narrowing      L4-L5:  Moderate diffuse annular bulging with small broad-based right foraminal and lateral disc protrusion  Moderate facet hypertrophic degenerative change and ligamentum flavum thickening  Moderate canal stenosis with distortion of the thecal sac  Mild left and moderate right foraminal narrowing      L5-S1:  Mild annular bulging  Moderate facet degenerative change  No disc herniation or canal stenosis  No foraminal narrowing      IMPRESSION:     Multilevel thoracolumbar spondylitic degenerative change with annular bulging, disc protrusions and endplate/facet hypertrophic degenerative change  Moderate canal stenosis most pronounced at L2-3, L3-4 and L4-5  Multilevel mild to moderate foraminal   narrowing most pronounced at L2-3 and L3-4 on the left as well as L4-5 on the right    Correlate for corresponding radiculopathy

## 2018-07-09 ENCOUNTER — TELEPHONE (OUTPATIENT)
Dept: RADIOLOGY | Facility: CLINIC | Age: 73
End: 2018-07-09

## 2018-07-09 NOTE — TELEPHONE ENCOUNTER
I S/W Concha Hashimoto in [place] Scheduling and cx'd MRI scheduled for 7/17/18 b/c pt has a pacemaker

## 2018-07-09 NOTE — TELEPHONE ENCOUNTER
Left vm on pt's home # for pt to c/b  C/B # and OH provided  **Pt needs to be informed that she has to call Central Scheduling to schedule the CT of cervical spine wo contrast and CT of lumbar spine wo contrast  Copies of both scripts were printed and can be mailed to the pt  **

## 2018-07-09 NOTE — TELEPHONE ENCOUNTER
----- Message from Eric Gamez sent at 7/6/2018  4:42 PM EDT -----  I called Central scheduling and talked to Ya  This patient has an MRI of her lumbar spine scheduled for 7/17/2018  However the patient has a pacemaker, and cannot undergo MRIs  Can you please make sure that this MRI is cancelled  I did place an order in epic for CT scans of the cervical and lumbar spine for this patient as well

## 2018-07-11 NOTE — TELEPHONE ENCOUNTER
2nd attempt to reach pt  Tried cell and got quick busy signal  I left vm on home # for pt to c/b  C/B # and [place] provided

## 2018-07-11 NOTE — TELEPHONE ENCOUNTER
Pt returned call and states that she already scheduled the CT scans for Saturday, 7/14  Confirmed that the appt is showing in EPIC  Pt can be reached at [phone] with any questions

## 2018-07-14 ENCOUNTER — HOSPITAL ENCOUNTER (OUTPATIENT)
Dept: CT IMAGING | Facility: HOSPITAL | Age: 73
Discharge: HOME | End: 2018-07-14
Payer: MEDICARE

## 2018-07-14 DIAGNOSIS — M47.816 LUMBAR SPONDYLOSIS: ICD-10-CM

## 2018-07-14 DIAGNOSIS — M54.2 NECK PAIN: ICD-10-CM

## 2018-07-14 DIAGNOSIS — M51.26 LUMBAR DISC HERNIATION: ICD-10-CM

## 2018-07-14 PROCEDURE — 72125 CT NECK SPINE W/O DYE: CPT

## 2018-07-14 PROCEDURE — 72131 CT LUMBAR SPINE W/O DYE: CPT

## 2018-07-17 ENCOUNTER — TELEPHONE (OUTPATIENT)
Dept: PAIN MEDICINE | Facility: CLINIC | Age: 73
End: 2018-07-17

## 2018-07-17 NOTE — TELEPHONE ENCOUNTER
----- Message from Eric Gamez sent at 7/17/2018 11:15 AM EDT -----  I called the patient with the results of her cervical and lumbar CT scans  I discuss with her about moving forward with a cervical epidural injection or lumbar epidural injection  However she stated that she would first like to discuss surgical options with Dr Geraldine Davis first  She reports that she will make an appointment with Dr Geraldine Davis this week  She was also encouraged to make a follow up appointment with her family doctor for small thyroid gland and right hypodense nodule on the right  She reports that she would also like a copy of the reports of her cervical and lumbar CT scans and wants to know if we can send a copy to her home  Can you please send a copy of the reports of her lumbar and cervical CT scans to her home

## 2018-07-20 ENCOUNTER — OFFICE VISIT (OUTPATIENT)
Dept: INTERNAL MEDICINE CLINIC | Facility: CLINIC | Age: 73
End: 2018-07-20
Payer: MEDICARE

## 2018-07-20 VITALS
SYSTOLIC BLOOD PRESSURE: 142 MMHG | BODY MASS INDEX: 42.78 KG/M2 | DIASTOLIC BLOOD PRESSURE: 78 MMHG | RESPIRATION RATE: 16 BRPM | WEIGHT: 256.8 LBS | HEART RATE: 77 BPM | OXYGEN SATURATION: 97 % | TEMPERATURE: 98.9 F | HEIGHT: 65 IN

## 2018-07-20 DIAGNOSIS — E04.1 THYROID NODULE: Primary | ICD-10-CM

## 2018-07-20 DIAGNOSIS — I48.0 AF (PAROXYSMAL ATRIAL FIBRILLATION) (HCC): ICD-10-CM

## 2018-07-20 DIAGNOSIS — J02.9 ALLERGIC PHARYNGITIS: ICD-10-CM

## 2018-07-20 DIAGNOSIS — I10 ESSENTIAL HYPERTENSION: ICD-10-CM

## 2018-07-20 DIAGNOSIS — L40.9 PSORIASIS: ICD-10-CM

## 2018-07-20 PROCEDURE — 99214 OFFICE O/P EST MOD 30 MIN: CPT | Performed by: INTERNAL MEDICINE

## 2018-07-20 RX ORDER — MOMETASONE FUROATE 1 MG/G
CREAM TOPICAL DAILY
Qty: 15 G | Refills: 1 | Status: SHIPPED | OUTPATIENT
Start: 2018-07-20 | End: 2019-09-30 | Stop reason: ALTCHOICE

## 2018-07-20 NOTE — ASSESSMENT & PLAN NOTE
Psoriasis of the left thigh recommend mometasone cream 0 1% applied daily expect 2-3 weeks for resolution of the rash

## 2018-07-20 NOTE — PROGRESS NOTES
Assessment/Plan:    Allergic pharyngitis  Allergic pharyngitis with postnasal drainage mucus appears to be clear the patient has no colored mucus in the nasal passages  Recommend normal saline gargle and spit twice a day use of antihistamine such as Claritin 10 mg daily  Thyroid nodule  Recent detection of small right side thyroid nodule on CT scan of the cervical spine  The nodule is not palpable on her examination today the patient has no prior history of thyroid disorder and no family history of thyroid disorder  I have ordered a ultrasound of the thyroid gland along with a free T4 and TSH value will review the results with the patient when they are completed  AF (paroxysmal atrial fibrillation) ([facility])  History of paroxysmal atrial fibrillation on beta-blocker therapy as well as anticoagulation for treatment for stroke risk reduction  The patient has a normal sinus rhythm on auscultation of the heart today  Hypertension  History of hypertension adequate control continue present medications  Psoriasis  Psoriasis of the left thigh recommend mometasone cream 0 1% applied daily expect 2-3 weeks for resolution of the rash  Diagnoses and all orders for this visit:    Thyroid nodule  -     US thyroid; Future  -     T4, free; Future  -     TSH, 3rd generation; Future    Psoriasis  -     mometasone (ELOCON) 0 1 % cream; Apply topically daily    Allergic pharyngitis    Essential hypertension    AF (paroxysmal atrial fibrillation) ([facility])        Subjective:      Patient ID: Hope Kitchen is a 74 y o  female  This 29-year-old female patient presents today for evaluation of a recently discovered right thyroid nodule  This was detected on a recent CT scan ordered by pain management physician for evaluation of cervical discomfort  The scan shows a small right side thyroid nodule  The patient has no history of thyroid disease and no family history of thyroid disease      She has been undergoing evaluation for chronic spinal pain both in the cervical as well as lumbar region  She will be seeing Neurosurgical services at [address] [place] [address] for evaluation of possible surgical intervention  Should surgery not be feasible to address her chronic pain and she will continue with pain management for epidural steroid injections  She will be moving into a senior living situation in the near future she is awaiting the availability of an apartment  She has on her left leg thigh region and area of dry irritated flaky skin which has associated pruritus with it  This has been present for some time  She also is experiencing persistent throat irritation she has chronic postnasal drainage  The following portions of the patient's history were reviewed and updated as appropriate: She  has a past medical history of Abnormal ECG; Anxiety; Arthritis; Asthma; Atrial premature complex; Cataract, bilateral; Cataract, left eye; Difficulty swallowing; Dizziness; Gastric reflux; Hypertension; Premature ventricular contraction; Primary osteoarthritis of both knees; Rheumatic fever; Sore throat; and Tuberculosis  She  has a past surgical history that includes Hysterectomy; Tonsillectomy; pr egd transoral biopsy single/multiple (N/A, 4/6/2016); pr dilate esophagus (N/A, 4/6/2016); pr remv cataract extracap,insert lens (Left, 3/15/2016); Appendectomy; and Replacement total knee (Right)  Her family history includes Coronary artery disease in her mother; Heart attack in her father and mother; Heart disease in her father  She  reports that she has never smoked  She has never used smokeless tobacco  She reports that she does not drink alcohol or use drugs       Review of Systems   HENT: Positive for sore throat  Musculoskeletal: Positive for arthralgias, back pain and gait problem  Skin: Positive for rash  All other systems reviewed and are negative          Objective:      /78   Pulse 77   Temp 98 9 °F (37 2 °C)   Resp 16  5' 5" (1 651 m)   Wt 116 kg (256 lb 12 8 oz)   SpO2 97%   BMI 42 73 kg/m²          Physical Exam   Constitutional: She is oriented to person, place, and time  Vital signs are normal  She appears well-developed and well-nourished  She is cooperative  No distress  HENT:   Right Ear: Hearing, tympanic membrane, external ear and ear canal normal    Left Ear: Hearing, tympanic membrane, external ear and ear canal normal    Nose: Nose normal  No mucosal edema  Mouth/Throat: Uvula is midline, oropharynx is clear and moist and mucous membranes are normal    No palpable thyroid nodules on examination no evidence of thyroid gland enlargement   Eyes: Conjunctivae and lids are normal  Pupils are equal, round, and reactive to light  Neck: No JVD present  Carotid bruit is not present  No thyromegaly present  Cardiovascular: Normal rate, regular rhythm, normal heart sounds and intact distal pulses  No murmur heard  Pulmonary/Chest: Effort normal and breath sounds normal  No respiratory distress  She has no wheezes  She has no rales  She exhibits no tenderness  Abdominal: Soft  Normal appearance and bowel sounds are normal    Musculoskeletal: Normal range of motion  She exhibits no edema  Lymphadenopathy:     She has no cervical adenopathy  Neurological: She is alert and oriented to person, place, and time  She has normal reflexes  Skin: Skin is warm, dry and intact  Rash noted  She is not diaphoretic  Psoriatic rash left thigh   Psychiatric: She has a normal mood and affect  Her speech is normal and behavior is normal  Judgment and thought content normal  Cognition and memory are normal    Vitals reviewed

## 2018-07-20 NOTE — ASSESSMENT & PLAN NOTE
Allergic pharyngitis with postnasal drainage mucus appears to be clear the patient has no colored mucus in the nasal passages  Recommend normal saline gargle and spit twice a day use of antihistamine such as Claritin 10 mg daily

## 2018-07-20 NOTE — ASSESSMENT & PLAN NOTE
Recent detection of small right side thyroid nodule on CT scan of the cervical spine  The nodule is not palpable on her examination today the patient has no prior history of thyroid disorder and no family history of thyroid disorder  I have ordered a ultrasound of the thyroid gland along with a free T4 and TSH value will review the results with the patient when they are completed

## 2018-07-20 NOTE — ASSESSMENT & PLAN NOTE
History of paroxysmal atrial fibrillation on beta-blocker therapy as well as anticoagulation for treatment for stroke risk reduction  The patient has a normal sinus rhythm on auscultation of the heart today

## 2018-07-26 ENCOUNTER — HOSPITAL ENCOUNTER (OUTPATIENT)
Dept: ULTRASOUND IMAGING | Facility: HOSPITAL | Age: 73
Discharge: HOME | End: 2018-07-26
Attending: INTERNAL MEDICINE
Payer: MEDICARE

## 2018-07-26 DIAGNOSIS — E04.1 THYROID NODULE: ICD-10-CM

## 2018-07-26 PROCEDURE — 76536 US EXAM OF HEAD AND NECK: CPT

## 2018-07-27 ENCOUNTER — APPOINTMENT (OUTPATIENT)
Dept: LAB | Age: 73
End: 2018-07-27
Payer: MEDICARE

## 2018-07-27 DIAGNOSIS — E04.1 THYROID NODULE: ICD-10-CM

## 2018-07-27 LAB
T4 FREE SERPL-MCNC: 1.07 NG/DL (ref 0.76–1.46)
TSH SERPL DL<=0.05 MIU/L-ACNC: 2.57 UIU/ML (ref 0.36–3.74)

## 2018-07-27 PROCEDURE — 84443 ASSAY THYROID STIM HORMONE: CPT

## 2018-07-27 PROCEDURE — 36415 COLL VENOUS BLD VENIPUNCTURE: CPT

## 2018-07-27 PROCEDURE — 84439 ASSAY OF FREE THYROXINE: CPT

## 2018-08-01 ENCOUNTER — TELEPHONE (OUTPATIENT)
Dept: PAIN MEDICINE | Facility: MEDICAL CENTER | Age: 73
End: 2018-08-01

## 2018-08-01 NOTE — TELEPHONE ENCOUNTER
Pt is calling stating that she had a CT scan and would like to see doctor Dana Rowley regarding the results and would like to know his recommendations on surgery  Ask pt if she would like to see NP since she has been seen by [facility] but pt is insisting to see the doctor  Dr Dana Rowley, you have an 15 min slot available 8/20 @ 10a, is it okay to schedule pt with you?

## 2018-08-03 ENCOUNTER — OFFICE VISIT (OUTPATIENT)
Dept: CARDIOLOGY CLINIC | Facility: CLINIC | Age: 73
End: 2018-08-03
Payer: MEDICARE

## 2018-08-03 VITALS
WEIGHT: 255.8 LBS | HEIGHT: 65 IN | DIASTOLIC BLOOD PRESSURE: 66 MMHG | SYSTOLIC BLOOD PRESSURE: 122 MMHG | HEART RATE: 75 BPM | BODY MASS INDEX: 42.62 KG/M2

## 2018-08-03 DIAGNOSIS — I48.0 AF (PAROXYSMAL ATRIAL FIBRILLATION) (HCC): Primary | ICD-10-CM

## 2018-08-03 DIAGNOSIS — I10 ESSENTIAL HYPERTENSION: ICD-10-CM

## 2018-08-03 DIAGNOSIS — Z95.0 PACEMAKER: ICD-10-CM

## 2018-08-03 DIAGNOSIS — I49.5 TACHY-BRADY SYNDROME (HCC): ICD-10-CM

## 2018-08-03 PROCEDURE — 93000 ELECTROCARDIOGRAM COMPLETE: CPT | Performed by: INTERNAL MEDICINE

## 2018-08-03 PROCEDURE — 99214 OFFICE O/P EST MOD 30 MIN: CPT | Performed by: INTERNAL MEDICINE

## 2018-08-03 RX ORDER — METOPROLOL TARTRATE 50 MG/1
50 TABLET, FILM COATED ORAL
Qty: 90 TABLET | Refills: 3 | Status: SHIPPED | OUTPATIENT
Start: 2018-08-03 | End: 2019-05-08 | Stop reason: SDUPTHER

## 2018-08-03 NOTE — PROGRESS NOTES
Cardiology Follow Up    Leo Harmonandrewmb  1945  [id number]  [address] [facility] [address] [facility]    1  AF (paroxysmal atrial fibrillation) (HCC)  POCT ECG    metoprolol tartrate (LOPRESSOR) 50 mg tablet   2  Essential hypertension     3  Tachy-smita syndrome ([address] )     4  Pacemaker           Discussion/Summary:  She will continue with Xarelto 15 mg daily  I will stop her Vit as she is having some bruising  I will also increase her Lopressor to 75 mg BID  No cardiac testing is ordered  RTO 9 months  Interval History: She is under increased stress  She denies CP or significant SOB  She is not very active  Her pacemaker is functioning normal but does show PAF up to several hours at a time  She is on Xarelto  She c/o occasional palpitations, mostly at night  Patient Active Problem List   Diagnosis    Cataract, left eye    AF (paroxysmal atrial fibrillation) (HCC)    Tachy-smita syndrome (HCC)    Hypertension    Fatigue    Pacemaker    Anemia    Bradycardia    Chronic bilateral low back pain without sciatica    Chronic GERD    Degenerative arthritis of knee, bilateral    Degenerative lumbar spinal stenosis    Dizziness    Fibromyalgia    GERD (gastroesophageal reflux disease)    Hypercholesterolemia    Low back pain    Lumbar degenerative disc disease    Mild carpal tunnel syndrome, right    Overweight    UTI symptoms    Allergic pharyngitis    Psoriasis    Thyroid nodule     Past Medical History:   Diagnosis Date    Abnormal ECG     Last Assessed 9/29/2016    Anxiety     Last Assessed 6/08/2016    Arthritis     knees    Asthma     Last Assessed 11/06/2013    Atrial premature complex     Cataract, bilateral     Last Assessed 7/14/2016    Cataract, left eye     Both eyes  Had surgery on left      Difficulty swallowing     Dizziness     Gastric reflux     Hypertension     Premature ventricular contraction     Primary osteoarthritis of both knees     Last Assessed 7/14/2016    Rheumatic fever     Sore throat     Tuberculosis      Social History     Social History    Marital status:      Spouse name: N/A    Number of children: N/A    Years of education: N/A     Occupational History    Not on file  Social History Main Topics    Smoking status: Never Smoker    Smokeless tobacco: Never Used    Alcohol use No      Comment: Per [employer]    Drug use: No    Sexual activity: No     Other Topics Concern    Not on file     Social History Narrative    No narrative on file      Family History   Problem Relation Age of Onset    Coronary artery disease Mother     Heart attack Mother         Prior    Heart disease Father     Heart attack Father         Prior     Past Surgical History:   Procedure Laterality Date    APPENDECTOMY      HYSTERECTOMY      PA DILATE ESOPHAGUS N/A 4/6/2016    Procedure: DILATATION ESOPHAGEAL;  Surgeon: Corey Jean Baptiste MD;  Location: [facility]; Service: Gastroenterology    PA EGD TRANSORAL BIOPSY SINGLE/MULTIPLE N/A 4/6/2016    Procedure: ESOPHAGOGASTRODUODENOSCOPY (EGD); Surgeon: Corey Jean Baptiste MD;  Location: [facility]; Service: Gastroenterology     [facility] CATARACT EXTRACAP,INSERT LENS Left 3/15/2016    Procedure: EXTRACTION EXTRACAPSULAR CATARACT PHACO INTRAOCULAR LENS (IOL);   Surgeon: Yessica Franks MD;  Location: [facility];  Service: Ophthalmology    REPLACEMENT TOTAL KNEE Right     TONSILLECTOMY         Current Outpatient Prescriptions:     ALPRAZolam (XANAX) 0 25 mg tablet, Take 1 tablet (0 25 mg total) by mouth 3 (three) times a day as needed for anxiety, Disp: 90 tablet, Rfl: 1    amLODIPine (NORVASC) 10 mg tablet, TAKE 1 TABLET BY MOUTH DAILY AS DIRECTED, Disp: 90 tablet, Rfl: 1    Ascorbic Acid (VITAMIN C) 1000 MG tablet, Take 1 tablet by mouth daily, Disp: , Rfl:     B Complex-C (B-COMPLEX WITH VITAMIN C) tablet, Take 1 tablet by mouth daily  , Disp: , Rfl:     Cholecalciferol (VITAMIN D3) 1000 units CAPS, Take 1 tablet by mouth daily, Disp: , Rfl:     clindamycin (CLEOCIN) 300 MG capsule, , Disp: , Rfl:     vitamin E, tocopherol, 400 units capsule, Take 400 Units by mouth 2 (two) times a day, Disp: , Rfl:     XARELTO 15 MG tablet, , Disp: , Rfl:     albuterol (PROVENTIL HFA,VENTOLIN HFA) 90 mcg/act inhaler, Inhale 2 puffs every 6 (six) hours as needed for wheezing, Disp: 1 Inhaler, Rfl: 0    loratadine (CLARITIN) 10 mg tablet, Take 1 tablet (10 mg total) by mouth daily, Disp: 30 tablet, Rfl: 0    metoprolol tartrate (LOPRESSOR) 50 mg tablet, Take 1 tablet (50 mg total) by mouth 3 (three) times a day, Disp: 90 tablet, Rfl: 3    mometasone (ELOCON) 0 1 % cream, Apply topically daily, Disp: 15 g, Rfl: 1  Allergies   Allergen Reactions    Y82 Folate [Folic Acid-Vit X5-JIB K18]     Cobalt      Unknown    Nickel      Skin discoloration    Penicillins Hives    Sulfa Antibiotics Itching    Cortisone Acetate [Cortisone] Itching and Rash    Penicillin G Rash     Vitals:    08/03/18 1434   BP: 122/66   BP Location: Right arm   Patient Position: Sitting   Cuff Size: Large   Pulse: 75   Weight: 116 kg (255 lb 12 8 oz)   Height: 5' 5" (1 651 m)     Weight (last 2 days)     Date/Time   Weight    08/03/18 1434  116 (255 8)             Blood pressure 122/66, pulse 75, height 5' 5" (1 651 m), weight 116 kg (255 lb 12 8 oz)  , Body mass index is 42 57 kg/m²  Labs:  Appointment on 07/27/2018   Component Date Value    Free T4 07/27/2018 1 07     TSH 3RD Ellamae Aguayo 07/27/2018 2 570    Office Visit on 07/02/2018   Component Date Value     RAPID STREP A 07/02/2018 Negative      Imaging: Ct Spine Cervical Wo Contrast    Result Date: 7/16/2018  Narrative: CT CERVICAL SPINE - WITHOUT CONTRAST INDICATION:   M54 2: Cervicalgia  COMPARISON: None   TECHNIQUE:  CT examination of the cervical spine was performed without intravenous contrast   Contiguous axial images were obtained  Sagittal and coronal reconstructions were performed  Radiation dose length product (DLP) for this visit:  528 mGy-cm   This examination, like all CT scans performed in the [facility], was performed utilizing techniques to minimize radiation dose exposure, including the use of iterative reconstruction and automated exposure control  IMAGE QUALITY:  Diagnostic  FINDINGS: ALIGNMENT:  Normal alignment of the cervical spine  No subluxation  VERTEBRAL BODIES:  Diffuse osteopenia  No fracture  No discrete lytic or blastic lesion  The facet joints at C2-3 are fused with partial fusion of the endplates  DEGENERATIVE CHANGES:  C3-4: Left facet hypertrophic degenerative change and subchondral cystic change, suggesting degenerative osteoarthritis  No disc herniation  No canal stenosis or discrete foraminal narrowing  C4-5 demonstrates loss of disc height with mild endplate degenerative change  No significant canal stenosis or foraminal narrowing  C5-6 demonstrates a broad-based central disc protrusion with mild canal stenosis but no foraminal narrowing  C6-7: Loss of disc height with mild endplate hypertrophic change and annular bulging  Mild canal stenosis without foraminal narrowing  PREVERTEBRAL AND PARASPINAL SOFT TISSUES:  Small thyroid gland, in particular on the left  Incidental note of a subcentimeter hypodense nodule on the right  THORACIC INLET:  Normal      Impression: Cervical spondylitic degenerative change most pronounced C4-5 through C6-7  Loss of disc height at C4-5 and C6-7 with mild endplate and uncinate joint structures change  At C5-6 there is a broad-based central disc protrusion with mild canal stenosis    Workstation performed: [id number]     Ct Lumbar Spine Without Contrast    Result Date: 7/16/2018  Narrative: CT LUMBAR SPINE INDICATION:   M47 816: Spondylosis without myelopathy or radiculopathy, lumbar region M51 26: Other intervertebral disc displacement, lumbar region  COMPARISON: MRI lumbar spine dated 3/21/2017 TECHNIQUE:  Contiguous axial images through the lumbar spine were obtained  Sagittal and coronal reconstructions were performed  Radiation dose length product (DLP) for this visit:  1187 mGy-cm   This examination, like all CT scans performed in the [facility], was performed utilizing techniques to minimize radiation dose exposure, including the use of iterative reconstruction and automated exposure control  IMAGE QUALITY:  Diagnostic  FINDINGS: ALIGNMENT:  Mild smooth dextroscoliosis of the upper lumbar spine and compensatory levoscoliosis of the lower lumbar spine  Grade 1 anterior spondylolisthesis L to 3, L3-4 and L4-5  No spondylolysis  VERTEBRAL BODIES:  Diffuse osteopenia of the bony structures  No fracture  No discrete lytic lesion other than endplate degenerative change and Schmorl's nodes mainly within the L4-5 endplates adjacent to the degenerated disc space  Similar finding noted within the T12 inferior endplate  DEGENERATIVE CHANGES: Lower Thoracic spine:  T12-L1 there is slight loss of disc height with moderate vacuum phenomenon  No discrete disc herniation, canal stenosis or foraminal narrowing  L1-2:  Vacuum phenomenon with moderate annular bulging  Slight facet arthropathy  Mild to moderate canal stenosis and mild foraminal narrowing  L2-3:  There is marked loss of disc height, especially to the left of midline  Annular bulging  Small broad-based left foraminal and lateral disc protrusion with associated endplate hypertrophic change  Mild canal stenosis  Mild left foraminal narrowing secondary to endplate osteophytes abutting the exiting  L3-4:  Vacuum phenomenon  Moderate broad-based left foraminal and lateral disc protrusion abutting the ventral aspect of the exiting nerve  Moderate left foraminal narrowing  Mild to moderate canal stenosis    No right foraminal narrowing or nerve impingement  L4-5:  Anterior spondylolisthesis  Loss of disc height with extensive diffuse annular bulging and broad-based right foraminal disc protrusion  Facet arthropathy with ligamentum flavum thickening  Moderate canal stenosis  There is no left foraminal narrowing  There is moderate right foraminal  L5-S1:  Normal disc height  No disc herniation  Moderate facet arthropathy without canal stenosis or foraminal narrowing  PARASPINAL SOFT TISSUES:  Mild vascular calcification of the aorta and iliac arteries  Impression: Mild scoliosis  Lumbar degenerative disc disease with annular bulging and broad-based disc herniations with endplate hypertrophic changes  Foraminal narrowing is more pronounced on the left at L2-3 and L3-4  Foraminal narrowing more pronounced on the right at L4-5  Mild to moderate canal stenosis as described above  Workstation performed: [id number]     Us Thyroid    Result Date: 7/27/2018  Narrative: THYROID ULTRASOUND INDICATION:    E04 1: Nontoxic single thyroid nodule  Incidentally found thyroid nodule on CT of the cervical spine on July 14, 2018  COMPARISON:  CT of the cervical spine without contrast on July 14, 2018  TECHNIQUE:   Ultrasound of the thyroid was performed with a high frequency linear transducer in transverse and sagittal planes including volumetric imaging sweeps as well as traditional still imaging technique  FINDINGS:  Normal homogeneous smooth echotexture  Right lobe:  4 2 x 1 8 x 1 1 cm  Left lobe:  3 2 x 0 9 x 1 0 cm  Isthmus:  0 3 cm  Nodule #1 Right mid gland thyroid nodule measuring 0 9 x 0 8 x 0 8 cm  Image number 24 COMPOSITION:  2 points, solid or almost completely solid   ECHOGENICITY:  1 point, hyperechoic or isoechoic  SHAPE:  0 points, wider-than-tall  MARGIN: 0 points, smooth  ECHOGENIC FOCI:  0 points, none or large comet-tail artifacts  TI-RADS Classification: TR 3 (3 points), Mildly suspicious  FNA if >2 5 cm  Follow if >1 5 cm  Nodule #2 Right upper pole thyroid nodule measuring 0 4 x 0 3 x 0 3 cm  Image number 32 COMPOSITION:  2 points, solid or almost completely solid   ECHOGENICITY:  2 points, hypoechoic  SHAPE:  0 points, wider-than-tall  MARGIN: 0 points, smooth ECHOGENIC FOCI:  0 points, none or large comet-tail artifacts  TI-RADS Classification: TR 4 (4-6 points), Moderately suspicious  FNA if > 1 5 cm  Follow if > 1cm  Impression: Two small thyroid nodules as described above  Neither nodule meets current ACR criteria for requiring biopsy or follow-up ultrasounds  Frandy Pierce PA-C, am acting as a scribe while in the presence of the attending physician  I, Dr Christin Loredo, personally reviewed and interpreted the study in this report as scribed in my presence  Reference: ACR Thyroid Imaging, Reporting and Data System (TI-RADS): White Paper of the [employer]  J AM Gloria Radiol 6737;[id number]  (additional recommendations based on American Thyroid Association 2015 guidelines ) Workstation performed: [id number]       Review of Systems:  Review of Systems   Constitutional: Negative for diaphoresis, fatigue, fever and unexpected weight change  HENT: Negative  Respiratory: Negative for cough, shortness of breath and wheezing  Cardiovascular: Positive for palpitations  Negative for chest pain and leg swelling  Gastrointestinal: Negative for abdominal pain, diarrhea and nausea  Musculoskeletal: Negative for gait problem and myalgias  Skin: Negative for rash  Neurological: Negative for dizziness and numbness  Psychiatric/Behavioral: Negative  Physical Exam:  Physical Exam   Constitutional: She is oriented to person, place, and time  She appears well-developed and well-nourished  HENT:   Head: Normocephalic and atraumatic  Eyes: Pupils are equal, round, and reactive to light  Neck: Normal range of motion  Neck supple  No JVD present     Cardiovascular: Regular rhythm, S1 normal, S2 normal and normal pulses  Pulses:       Carotid pulses are 2+ on the right side, and 2+ on the left side  Pulmonary/Chest: Effort normal and breath sounds normal  She has no wheezes  She has no rales  Abdominal: Soft  Bowel sounds are normal  There is no tenderness  Musculoskeletal: Normal range of motion  She exhibits no edema or tenderness  Neurological: She is alert and oriented to person, place, and time  She has normal reflexes  No cranial nerve deficit  Skin: Skin is warm  Psychiatric: She has a normal mood and affect

## 2018-08-20 ENCOUNTER — OFFICE VISIT (OUTPATIENT)
Dept: PAIN MEDICINE | Facility: CLINIC | Age: 73
End: 2018-08-20
Payer: MEDICARE

## 2018-08-20 VITALS
HEIGHT: 65 IN | RESPIRATION RATE: 16 BRPM | SYSTOLIC BLOOD PRESSURE: 126 MMHG | BODY MASS INDEX: 42.32 KG/M2 | WEIGHT: 254 LBS | DIASTOLIC BLOOD PRESSURE: 84 MMHG | HEART RATE: 80 BPM

## 2018-08-20 DIAGNOSIS — M17.12 PRIMARY OSTEOARTHRITIS OF LEFT KNEE: ICD-10-CM

## 2018-08-20 DIAGNOSIS — M48.061 DEGENERATIVE LUMBAR SPINAL STENOSIS: Primary | ICD-10-CM

## 2018-08-20 DIAGNOSIS — M54.16 LUMBAR RADICULOPATHY: ICD-10-CM

## 2018-08-20 PROCEDURE — 99213 OFFICE O/P EST LOW 20 MIN: CPT | Performed by: ANESTHESIOLOGY

## 2018-08-20 RX ORDER — GABAPENTIN 100 MG/1
CAPSULE ORAL
Qty: 60 CAPSULE | Refills: 0 | Status: SHIPPED | OUTPATIENT
Start: 2018-08-20 | End: 2019-09-30 | Stop reason: ALTCHOICE

## 2018-08-20 NOTE — PROGRESS NOTES
Assessment:  1  Degenerative lumbar spinal stenosis    2  Lumbar radiculopathy    3  Primary osteoarthritis of left knee        Plan:  The patient continues to experience lower back pain from her spinal stenosis that is leading to radicular symptoms  At this time, I will send her to physical therapy focusing on back and core strengthening which she will do 1 to 2 times a day for the next 6 weeks  In addition, I will send her to Dr Zee Baker for evaluation of her left knee osteoarthritis  My impressions and treatment recommendations were discussed in detail with the patient who verbalized understanding and had no further questions  Discharge instructions were provided  I personally saw and examined the patient and I agree with the above discussed plan of care  Orders Placed This Encounter   Procedures    Ambulatory referral to Physical Therapy     Standing Status:   Future     Standing Expiration Date:   2/20/2019     Referral Priority:   Routine     Referral Type:   Physical Therapy     Referral Reason:   Specialty Services Required     Requested Specialty:   Physical Therapy     Number of Visits Requested:   1     Expiration Date:   8/20/2019   Tyrel Winter Ambulatory referral to Orthopedic Surgery     Standing Status:   Future     Standing Expiration Date:   2/20/2019     Referral Priority:   Routine     Referral Type:   Consult - AMB     Referral Reason:   Specialty Services Required     Referred to Provider:   Loyda Corley DO     Requested Specialty:   Orthopedic Surgery     Number of Visits Requested:   1     Expiration Date:   8/20/2019     New Medications Ordered This Visit   Medications    gabapentin (NEURONTIN) 100 mg capsule     Sig: Take 1 tablet at bedtime  May increase to 2 tablets after 3 days     Dispense:  60 capsule     Refill:  0       History of Present Illness:  Pau Guzman is a 74 y o  female who presents for a follow up office visit in regards to Neck Pain and Back Pain    The patient was last seen in July at which time CT scan of the cervical and lumbar spine was ordered to further assess her ongoing neck and low back complaints  She returns with intermittent pain that is ongoing described to be dull-aching and throbbing in the neck and lower back         I have personally reviewed and/or updated the patient's past medical history, past surgical history, family history, social history, current medications, allergies, and vital signs today  Review of Systems   Respiratory: Positive for shortness of breath  Cardiovascular: Negative for chest pain  Gastrointestinal: Negative for constipation, diarrhea, nausea and vomiting  Musculoskeletal: Positive for gait problem  Negative for arthralgias, joint swelling and myalgias  Skin: Negative for rash  Neurological: Negative for dizziness, seizures and weakness  All other systems reviewed and are negative  Patient Active Problem List   Diagnosis    Cataract, left eye    AF (paroxysmal atrial fibrillation) (HCC)    Tachy-smita syndrome (HCC)    Hypertension    Fatigue    Pacemaker    Anemia    Bradycardia    Chronic bilateral low back pain without sciatica    Chronic GERD    Degenerative arthritis of knee, bilateral    Degenerative lumbar spinal stenosis    Dizziness    Fibromyalgia    GERD (gastroesophageal reflux disease)    Hypercholesterolemia    Low back pain    Lumbar degenerative disc disease    Mild carpal tunnel syndrome, right    Overweight    UTI symptoms    Allergic pharyngitis    Psoriasis    Thyroid nodule       Past Medical History:   Diagnosis Date    Abnormal ECG     Last Assessed 9/29/2016    Anxiety     Last Assessed 6/08/2016    Arthritis     knees    Asthma     Last Assessed 11/06/2013    Atrial premature complex     Cataract, bilateral     Last Assessed 7/14/2016    Cataract, left eye     Both eyes  Had surgery on left      Difficulty swallowing     Dizziness     Gastric reflux     Hypertension     Premature ventricular contraction     Primary osteoarthritis of both knees     Last Assessed 7/14/2016    Rheumatic fever     Sore throat     Tuberculosis        Past Surgical History:   Procedure Laterality Date    APPENDECTOMY      HYSTERECTOMY      NE DILATE ESOPHAGUS N/A 4/6/2016    Procedure: DILATATION ESOPHAGEAL;  Surgeon: aJsbir Kamara MD;  Location: [facility]; Service: Gastroenterology    NE EGD TRANSORAL BIOPSY SINGLE/MULTIPLE N/A 4/6/2016    Procedure: ESOPHAGOGASTRODUODENOSCOPY (EGD); Surgeon: Jasbir Kamara MD;  Location: [facility]; Service: Gastroenterology     [facility] CATARACT EXTRACAP,INSERT LENS Left 3/15/2016    Procedure: EXTRACTION EXTRACAPSULAR CATARACT PHACO INTRAOCULAR LENS (IOL); Surgeon: Nick Govea MD;  [facility];  Service: Ophthalmology    REPLACEMENT TOTAL KNEE Right     TONSILLECTOMY         Family History   Problem Relation Age of Onset    Coronary artery disease Mother     Heart attack Mother         Prior    Heart disease Father     Heart attack Father         Prior       Social History     Occupational History    Not on file  Social History Main Topics    Smoking status: Never Smoker    Smokeless tobacco: Never Used    Alcohol use No      Comment: Per [employer] Social    Drug use: No    Sexual activity: No       Current Outpatient Prescriptions on File Prior to Visit   Medication Sig    albuterol (PROVENTIL HFA,VENTOLIN HFA) 90 mcg/act inhaler Inhale 2 puffs every 6 (six) hours as needed for wheezing    ALPRAZolam (XANAX) 0 25 mg tablet Take 1 tablet (0 25 mg total) by mouth 3 (three) times a day as needed for anxiety    amLODIPine (NORVASC) 10 mg tablet TAKE 1 TABLET BY MOUTH DAILY AS DIRECTED    Ascorbic Acid (VITAMIN C) 1000 MG tablet Take 1 tablet by mouth daily    B Complex-C (B-COMPLEX WITH VITAMIN C) tablet Take 1 tablet by mouth daily      Cholecalciferol (VITAMIN D3) 1000 units CAPS Take 1 tablet by mouth daily    clindamycin (CLEOCIN) 300 MG capsule     metoprolol tartrate (LOPRESSOR) 50 mg tablet Take 1 tablet (50 mg total) by mouth 3 (three) times a day    mometasone (ELOCON) 0 1 % cream Apply topically daily    XARELTO 15 MG tablet     loratadine (CLARITIN) 10 mg tablet Take 1 tablet (10 mg total) by mouth daily (Patient not taking: Reported on 8/20/2018 )     No current facility-administered medications on file prior to visit  Allergies   Allergen Reactions    N97 Folate [Folic Acid-Vit D2-DZG U08]     Cobalt      Unknown    Nickel      Skin discoloration    Penicillins Hives    Sulfa Antibiotics Itching    Cortisone Acetate [Cortisone] Itching and Rash    Penicillin G Rash       Physical Exam:    /84   Pulse 80   Resp 16   Ht 5' 5" (1 651 m)   Wt 115 kg (254 lb)   BMI 42 27 kg/m²     Constitutional:normal, well developed, well nourished, alert, in no distress and non-toxic and no overt pain behavior  Eyes:anicteric  HEENT:grossly intact  Neck:supple, symmetric, trachea midline and no masses   Pulmonary:even and unlabored  Cardiovascular:No edema or pitting edema present  Skin:Normal without rashes or lesions and well hydrated  Psychiatric:Mood and affect appropriate  Neurologic:Cranial Nerves II-XII grossly intact  Musculoskeletal:normal     Lumbar Spine Exam: deferred  Cervical Spine Exam: defferred    Imaging  Cervical CT scan 7/14/18: C3-4: Left facet hypertrophic degenerative change and subchondral cystic change, suggesting degenerative osteoarthritis  No disc herniation  No canal stenosis or discrete foraminal narrowing      C4-5 demonstrates loss of disc height with mild endplate degenerative change  No significant canal stenosis or foraminal narrowing      C5-6 demonstrates a broad-based central disc protrusion with mild canal stenosis but no foraminal narrowing      C6-7: Loss of disc height with mild endplate hypertrophic change and annular bulging    Mild canal stenosis without foraminal narrowing      PREVERTEBRAL AND PARASPINAL SOFT TISSUES:  Small thyroid gland, in particular on the left  Incidental note of a subcentimeter hypodense nodule on the right      THORACIC INLET:  Normal      IMPRESSION:     Cervical spondylitic degenerative change most pronounced C4-5 through C6-7  Loss of disc height at C4-5 and C6-7 with mild endplate and uncinate joint structures change      At C5-6 there is a broad-based central disc protrusion with mild canal stenosis  CT LUMBAR SPINE (7/14/2018)     INDICATION:   M47 816: Spondylosis without myelopathy or radiculopathy, lumbar region  M51 26: Other intervertebral disc displacement, lumbar region      COMPARISON: MRI lumbar spine dated 3/21/2017     TECHNIQUE:  Contiguous axial images through the lumbar spine were obtained  Sagittal and coronal reconstructions were performed        Radiation dose length product (DLP) for this visit:  1187 mGy-cm   This examination, like all CT scans performed in the [facility], was performed utilizing techniques to minimize radiation dose exposure, including the use of iterative   reconstruction and automated exposure control        IMAGE QUALITY:  Diagnostic      FINDINGS:     ALIGNMENT:  Mild smooth dextroscoliosis of the upper lumbar spine and compensatory levoscoliosis of the lower lumbar spine  Grade 1 anterior spondylolisthesis L to 3, L3-4 and L4-5  No spondylolysis      VERTEBRAL BODIES:  Diffuse osteopenia of the bony structures  No fracture  No discrete lytic lesion other than endplate degenerative change and Schmorl's nodes mainly within the L4-5 endplates adjacent to the degenerated disc space  Similar finding   noted within the T12 inferior endplate      DEGENERATIVE CHANGES:     Lower Thoracic spine:  T12-L1 there is slight loss of disc height with moderate vacuum phenomenon    No discrete disc herniation, canal stenosis or foraminal narrowing      L1-2:  Vacuum phenomenon with moderate annular bulging  Slight facet arthropathy  Mild to moderate canal stenosis and mild foraminal narrowing      L2-3:  There is marked loss of disc height, especially to the left of midline  Annular bulging  Small broad-based left foraminal and lateral disc protrusion with associated endplate hypertrophic change  Mild canal stenosis  Mild left foraminal   narrowing secondary to endplate osteophytes abutting the exiting      L3-4:  Vacuum phenomenon  Moderate broad-based left foraminal and lateral disc protrusion abutting the ventral aspect of the exiting nerve  Moderate left foraminal narrowing  Mild to moderate canal stenosis  No right foraminal narrowing or nerve   impingement      L4-5:  Anterior spondylolisthesis  Loss of disc height with extensive diffuse annular bulging and broad-based right foraminal disc protrusion  Facet arthropathy with ligamentum flavum thickening  Moderate canal stenosis  There is no left foraminal   narrowing  There is moderate right foraminal      L5-S1:  Normal disc height  No disc herniation  Moderate facet arthropathy without canal stenosis or foraminal narrowing      PARASPINAL SOFT TISSUES:  Mild vascular calcification of the aorta and iliac arteries      IMPRESSION:     Mild scoliosis  Lumbar degenerative disc disease with annular bulging and broad-based disc herniations with endplate hypertrophic changes  Foraminal narrowing is more pronounced on the left at L2-3 and L3-4  Foraminal narrowing more pronounced on the   right at L4-5    Mild to moderate canal stenosis as described above

## 2018-08-24 ENCOUNTER — TELEPHONE (OUTPATIENT)
Dept: CARDIOLOGY CLINIC | Facility: CLINIC | Age: 73
End: 2018-08-24

## 2018-08-24 NOTE — TELEPHONE ENCOUNTER
Pt called stating she has edema in her Left foot, and only a little bit in her right foot  Not on a diuretic  Elizabethbinu Moses she has had the edema since pacemaker put in which was Oct, 2107  She was reading pacemaker information which said to call doctor if edema noticed, so she thought she should inform you  No weight change  Occasionally sob which she said is not new  Please advise    Pt cb: [phone]

## 2018-08-27 ENCOUNTER — EVALUATION (OUTPATIENT)
Dept: PHYSICAL THERAPY | Age: 73
End: 2018-08-27
Payer: MEDICARE

## 2018-08-27 DIAGNOSIS — M54.50 CHRONIC MIDLINE LOW BACK PAIN WITHOUT SCIATICA: ICD-10-CM

## 2018-08-27 DIAGNOSIS — M54.50 LUMBAR PAIN: Primary | ICD-10-CM

## 2018-08-27 DIAGNOSIS — G89.29 CHRONIC MIDLINE LOW BACK PAIN WITHOUT SCIATICA: ICD-10-CM

## 2018-08-27 PROCEDURE — 97110 THERAPEUTIC EXERCISES: CPT | Performed by: PHYSICAL THERAPIST

## 2018-08-27 PROCEDURE — G8991 OTHER PT/OT GOAL STATUS: HCPCS | Performed by: PHYSICAL THERAPIST

## 2018-08-27 PROCEDURE — G8990 OTHER PT/OT CURRENT STATUS: HCPCS | Performed by: PHYSICAL THERAPIST

## 2018-08-27 PROCEDURE — 97161 PT EVAL LOW COMPLEX 20 MIN: CPT | Performed by: PHYSICAL THERAPIST

## 2018-08-27 NOTE — PROGRESS NOTES
PT Evaluation     Today's date: 2018  Patient name: Stan Schwab  :   MRN: [id number]  Referring provider: Felicia Mcnamara MD  Dx:   Encounter Diagnosis     ICD-10-CM    1  Lumbar pain M54 5    2  Chronic midline low back pain without sciatica M54 5     G89 29        Start Time: 1245  Stop Time: 1345  Total time in clinic (min): 60 minutes    Assessment  Impairments: abnormal gait, abnormal or restricted ROM, abnormal movement, impaired balance, impaired physical strength and pain with function    Assessment details: Stan Schwab is a 74 y o  female who presents with signs and symptoms consistent of lumbar stenosis and DJD  Patient presents with pain, decreased strength and decreased ROM  Due to these impairments, Patient has difficulty performing a/iadls, recreational activities and engaging in social activities  Pt's has problems with functional transfers such performing sit to stand, bed mobility, and getting in and out of care  Pt decreased strength has lead to an inability to perform sit to stand without UE support  Patient would benefit from skilled physical therapy to address the impairments, improve their level of function, and to improve their overall quality of life  Understanding of Dx/Px/POC: fair   Prognosis: fair  Prognosis details: Pt has multiple bouts of chornic back pain    Goals  Short Term Goals: to be achieved by 4 weeks  1) Patient to be independent with basic HEP  2) Decrease pain to 3/10 at its worst   3) Increase lumbar spine ROM by 50% in all deficient planes  4) Increase LE strength by 1/2 MMT grade in all deficient planes  Long Term Goals: to be achieved by discharge  1) FOTO equal to or greater than 50   2) Patient to be independent with comprehensive HEP  3) Lumbar spine ROM WNL all planes to improve a/iadls  4) Increase LE strength to 5/5 MMT grade in all planes to improve a/iadls  5) Patient to report no sleep interruption secondary to pain    6) Increase standing to 10 min  Plan  Patient would benefit from: skilled physical therapy  Planned modality interventions: cryotherapy and TENS  Other planned modality interventions: moist heat   Planned therapy interventions: manual therapy, patient education, balance, flexibility, home exercise program, transfer training, therapeutic training, therapeutic exercise, strengthening, stretching and abdominal trunk stabilization  Frequency: Twice a week for 12 weeks  Treatment plan discussed with: patient  Plan details: Core strengthening and stabilization, manual techniques, AROM/PROM, transfer training, gait training, and modalities to manage symptoms  Subjective Evaluation    History of Present Illness  Date of onset: 2018  Mechanism of injury: Pt reports that she has a 2 month of low back pain  Pt reports pain in center of back and nothing radiating into legs  CT scan shows degenerative changes but no medical emergencies  Pt ambulates with rollator walker and reports difficulty with changes in position  Pt has had physical therapy in the past with positive results  Quality of life: good    Pain  Current pain ratin  At best pain ratin  At worst pain ratin  Quality: sharp  Relieving factors: relaxation  Aggravating factors: sitting, standing and stair climbing    Social Support  Stairs in house: yes   Lives in: [facility]      Diagnostic Tests  CT scan: normal  Treatments  Previous treatment: physical therapy  Patient Goals  Patient goals for therapy: increased strength, decreased pain, return to work and return to [employer] activities  Patient goal: Walking, standing up, changing positions        Objective     Special Questions  Negative for night pain, disturbed sleep, bladder dysfunction and saddle (S4) numbness    Tenderness     Additional Tenderness Details  Pt has pain over L5 and S1 spinous process       Neurological Testing     Sensation     Lumbar   Left   Intact: light touch    Right   Intact: light touch    Active Range of Motion     Additional Active Range of Motion Details  Flexion: 75%  Extension:25%  Lateral flexion R: 25%  Lateral flexion L: 25%    Strength/Myotome Testing     Left Hip   Planes of Motion   Flexion: 3+  Abduction: 3+  Adduction: 3+    Right Hip   Planes of Motion   Flexion: 3+  Abduction: 3+  Adduction: 3+    Left Knee   Extension: 4-    Right Knee   Extension: 4-    Left Ankle/Foot   Dorsiflexion: 4  Plantar flexion: 4    Right Ankle/Foot   Dorsiflexion: 4  Plantar flexion: 4    Tests       Thoracic   Negative slump  Lumbar   Negative slump  Left   Negative passive SLR  Right   Negative passive SLR  Left Pelvic Girdle/Sacrum   Negative: thigh thrust      Right Pelvic Girdle/Sacrum   Negative: thigh thrust      General Comments     Lumbar Comments  Pt has antalgic gait with decreased gait speed  Pt has difficulty transitioning from supine to standing         Flowsheet Rows      Most Recent Value   PT/OT G-Codes   Current Score  36   Projected Score  50   Assessment Type  Evaluation   G code set  Other PT/OT Primary   Other PT Primary Current Status ()  CL   Other PT Primary Goal Status ()  CK          Precautions: HTN, Afib, obesity, pacemaker     Daily Treatment Diary     Manual  8/27                                                                                 Exercise Diary  8/27            Bridges HEP            SLR  nv            LTR HEP            Forward trunk flexion  HEP            Clamshells             Reverse clamshells             Standing HT/TR             Mini squats             Standing hip flexion, abd                                                                                                                                                                Modalities

## 2018-08-28 ENCOUNTER — TELEPHONE (OUTPATIENT)
Dept: INTERNAL MEDICINE CLINIC | Facility: CLINIC | Age: 73
End: 2018-08-28

## 2018-08-28 DIAGNOSIS — R32 URINARY INCONTINENCE, UNSPECIFIED TYPE: Primary | ICD-10-CM

## 2018-08-28 NOTE — TELEPHONE ENCOUNTER
Called patient and told her about the referral to urology  She took Dr Tuan Aden number and will call herself for the appointment

## 2018-08-28 NOTE — TELEPHONE ENCOUNTER
Patient states she is having some incontinence issues and would like to know who you would recommend for a urologist

## 2018-08-29 ENCOUNTER — OFFICE VISIT (OUTPATIENT)
Dept: PHYSICAL THERAPY | Age: 73
End: 2018-08-29
Payer: MEDICARE

## 2018-08-29 DIAGNOSIS — M54.50 CHRONIC MIDLINE LOW BACK PAIN WITHOUT SCIATICA: ICD-10-CM

## 2018-08-29 DIAGNOSIS — M54.50 LUMBAR PAIN: Primary | ICD-10-CM

## 2018-08-29 DIAGNOSIS — G89.29 CHRONIC MIDLINE LOW BACK PAIN WITHOUT SCIATICA: ICD-10-CM

## 2018-08-29 PROCEDURE — 97112 NEUROMUSCULAR REEDUCATION: CPT | Performed by: PHYSICAL THERAPIST

## 2018-08-29 PROCEDURE — 97110 THERAPEUTIC EXERCISES: CPT | Performed by: PHYSICAL THERAPIST

## 2018-08-29 NOTE — PROGRESS NOTES
Daily Note     Today's date: 2018  Patient name: Zeynep Morales  :   MRN: [id number]  Referring provider: Ashley Castellanos MD  Dx:   Encounter Diagnosis     ICD-10-CM    1  Lumbar pain M54 5    2  Chronic midline low back pain without sciatica M54 5     G89 29        Start Time: 1100  Stop Time: 1145  Total time in clinic (min): 45 minutes    Subjective: Pt reports increased irritability this session      Objective: See treatment diary below      Assessment: Tolerated treatment well  Pt's POC was progressed to include more proximal strengthening and balance exercises to reduce risk of falling  Pt was able to perform exercises but required verbal cues to perform exercises without compensation  Pt's Patient demonstrated fatigue post treatment and would benefit from continued PT      Plan: Continue per plan of care       Precautions: HTN, Afib, obesity, pacemaker     Daily Treatment Diary     Manual                                                                                   Exercise Diary             Bridges HEP 2 x 5           SLR  nv 1 x 5b/l           LTR HEP 20 x 5"           Forward trunk flexion  HEP 10 x 5"           Clamshells  2 x15 ytb           Reverse clamshells  nv           Seated HT/TR  30x           Mini squats  nv           Standing hip flexion, abd  1 x10 b/l           Seated marches  2 x10           Biodex  LOS 3 x           Side steps   6 x                                                                                                                       Modalities

## 2018-08-31 ENCOUNTER — TELEPHONE (OUTPATIENT)
Dept: INTERNAL MEDICINE CLINIC | Facility: CLINIC | Age: 73
End: 2018-08-31

## 2018-08-31 DIAGNOSIS — Z13.88 HEAVY METAL POISON. SCREEN: Primary | ICD-10-CM

## 2018-08-31 NOTE — TELEPHONE ENCOUNTER
Pt asked for a blood test for arsenic of blood and urine  She noted that she has been having issues with urine and her daughter's dog is having life threatening issues with arsenic currently so they wanted make sure she gets tested too  Pt asked for the lab to be mailed to her address

## 2018-09-04 ENCOUNTER — TELEPHONE (OUTPATIENT)
Dept: PAIN MEDICINE | Facility: MEDICAL CENTER | Age: 73
End: 2018-09-04

## 2018-09-04 ENCOUNTER — OFFICE VISIT (OUTPATIENT)
Dept: PHYSICAL THERAPY | Age: 73
End: 2018-09-04
Payer: MEDICARE

## 2018-09-04 DIAGNOSIS — M54.50 LUMBAR PAIN: Primary | ICD-10-CM

## 2018-09-04 DIAGNOSIS — M54.50 CHRONIC MIDLINE LOW BACK PAIN WITHOUT SCIATICA: ICD-10-CM

## 2018-09-04 DIAGNOSIS — G89.29 CHRONIC MIDLINE LOW BACK PAIN WITHOUT SCIATICA: ICD-10-CM

## 2018-09-04 PROCEDURE — 97112 NEUROMUSCULAR REEDUCATION: CPT | Performed by: PHYSICAL THERAPIST

## 2018-09-04 PROCEDURE — 97110 THERAPEUTIC EXERCISES: CPT | Performed by: PHYSICAL THERAPIST

## 2018-09-04 NOTE — PROGRESS NOTES
Daily Note     Today's date: 2018  Patient name: Tania Butt  : [id number]  MRN: [id number]  Referring provider: Nagi Adams MD  Dx:   Encounter Diagnosis     ICD-10-CM    1  Lumbar pain M54 5    2  Chronic midline low back pain without sciatica M54 5     G89 29        Start Time: 1400  Stop Time: 1445  Total time in clinic (min): 45 minutes    Subjective: Pt reports improvements after resting the past three days  Objective: See treatment diary below      Assessment: Tolerated treatment well  Patient's POC was advanced to include more sets of therapeutic exercise to increase proximal muscle strength to increase tolerance to functional activity  Patient responded well but required increased recovery sessions to perform exercises without compensation  Patient demonstrated fatigue post treatment and would benefit from continued PT      Plan: Continue per plan of care        Precautions: HTN, Afib, obesity, pacemaker     Daily Treatment Diary     Manual                                                                                   Exercise Diary   9/4          Bridges HEP 2 x 5 2*5          SLR  nv 1 x 5b/l 2*5 b/l          LTR HEP 20 x 5" 20*5"          Forward trunk flexion  HEP 10 x 5" 10*5"          Clamshells  2 x15 ytb 2*15ytb          Adductor squeezes  nv 15*5"          Seated HT/TR  30x 30x          Mini squats  nv 15          Standing hip flexion, abd  1 x10 b/l 1*10b/l          Seated marches  2 x10 2*10 b/l          Biodex  LOS 3 x LOS 3x          Side steps   6 x 6 x                                                                                                                      Modalities                                                       45 min 1:1 with patient

## 2018-09-04 NOTE — TELEPHONE ENCOUNTER
Attempted to reach patient, LVMOM with c/b #, [place] provided  +++Follow up to see how the gabapentin is affecting her legs

## 2018-09-04 NOTE — TELEPHONE ENCOUNTER
FYI: patient called to inform Dr Virk Records that she stopped taking the Gabapentin  Stated she took it for about 4 days and had to stop because it was affecting her legs and she couldn't walk   Any questions c/b [phone]

## 2018-09-07 ENCOUNTER — APPOINTMENT (OUTPATIENT)
Dept: PHYSICAL THERAPY | Age: 73
End: 2018-09-07
Payer: MEDICARE

## 2018-09-10 ENCOUNTER — APPOINTMENT (OUTPATIENT)
Dept: PHYSICAL THERAPY | Age: 73
End: 2018-09-10
Payer: MEDICARE

## 2018-09-13 ENCOUNTER — OFFICE VISIT (OUTPATIENT)
Dept: PHYSICAL THERAPY | Age: 73
End: 2018-09-13
Payer: MEDICARE

## 2018-09-13 DIAGNOSIS — M54.50 CHRONIC MIDLINE LOW BACK PAIN WITHOUT SCIATICA: ICD-10-CM

## 2018-09-13 DIAGNOSIS — G89.29 CHRONIC MIDLINE LOW BACK PAIN WITHOUT SCIATICA: ICD-10-CM

## 2018-09-13 DIAGNOSIS — M54.50 LUMBAR PAIN: Primary | ICD-10-CM

## 2018-09-13 PROCEDURE — 97110 THERAPEUTIC EXERCISES: CPT | Performed by: PHYSICAL THERAPIST

## 2018-09-13 PROCEDURE — 97112 NEUROMUSCULAR REEDUCATION: CPT | Performed by: PHYSICAL THERAPIST

## 2018-09-13 NOTE — PROGRESS NOTES
Daily Note     Today's date: 2018  Patient name: Iris Milner  : [id number]  MRN: [id number]  Referring provider: Taniya Chowdhury MD  Dx:   Encounter Diagnosis     ICD-10-CM    1  Lumbar pain M54 5    2  Chronic midline low back pain without sciatica M54 5     G89 29        Start Time: 1400  Stop Time: 1445  Total time in clinic (min): 45 minutes    Subjective: Pt reports fatigue with community       Objective: See treatment diary below      Assessment: Tolerated treatment well  Pt's POC was progressed to include more reps of proximal strengthening interventions to Patient demonstrated fatigue post treatment      Plan: Continue per plan of care       Precautions: HTN, Afib, obesity, pacemaker     Daily Treatment Diary     Manual                                                                                   Exercise Diary           Bridges HEP 2 x 5 2*5 1*10         SLR  nv 1 x 5b/l 2*5 b/l 2*5b/l         LTR HEP 20 x 5" 20*5" 20*5"         Forward trunk flexion  HEP 10 x 5" 10*5" 10x5"         Clamshells  2 x15 ytb 2*15ytb 2 x20x3"         Adductor squeezes  nv 15*5" 10x10"         Seated HT/TR  30x 30x 30x         Mini squats  nv 15          Standing hip flexion, abd  1 x10 b/l 1*10b/l          Seated marches  2 x10 2*10 b/l 2 x10 b/l         Biodex  LOS 3 x LOS 3x          Side steps   6 x 6 x          LAQ    2 x10 b/l                                                                                                        Modalities                                                       45 min 1:1 with patient

## 2018-09-17 ENCOUNTER — OFFICE VISIT (OUTPATIENT)
Dept: UROLOGY | Facility: AMBULATORY SURGERY CENTER | Age: 73
End: 2018-09-17
Payer: MEDICARE

## 2018-09-17 VITALS — BODY MASS INDEX: 43.36 KG/M2 | HEIGHT: 64 IN | WEIGHT: 254 LBS

## 2018-09-17 DIAGNOSIS — R32 URINARY INCONTINENCE, UNSPECIFIED TYPE: ICD-10-CM

## 2018-09-17 DIAGNOSIS — R32 URINARY INCONTINENCE, UNSPECIFIED TYPE: Primary | ICD-10-CM

## 2018-09-17 LAB — POST-VOID RESIDUAL VOLUME, ML POC: 13 ML

## 2018-09-17 PROCEDURE — 51798 US URINE CAPACITY MEASURE: CPT | Performed by: UROLOGY

## 2018-09-17 PROCEDURE — 99214 OFFICE O/P EST MOD 30 MIN: CPT | Performed by: UROLOGY

## 2018-09-17 RX ORDER — OXYBUTYNIN CHLORIDE 10 MG/1
10 TABLET, EXTENDED RELEASE ORAL DAILY
Qty: 30 TABLET | Refills: 1 | Status: SHIPPED | OUTPATIENT
Start: 2018-09-17 | End: 2018-09-17 | Stop reason: SDUPTHER

## 2018-09-17 NOTE — PROGRESS NOTES
Assessment/Plan:    Urinary incontinence  Had a lengthy discussion with the patient regarding her treatment options for her urinary urgency  I will start her on oxybutynin  Side effects were discussed  She will follow up in 6 weeks to re-evaluate her symptoms on this new medication  We discussed that sometimes have to try more than 1 medication before we can get her symptoms under control  We also discussed tertiary treatment options in case medications do not work  Diagnoses and all orders for this visit:    Urinary incontinence, unspecified type  -     Ambulatory referral to Urology  -     POCT Measure PVR  -     oxybutynin (DITROPAN-XL) 10 MG 24 hr tablet; Take 1 tablet (10 mg total) by mouth daily          Total visit time was 60 minutes of which over 50% was spent on counseling  Subjective:     Patient ID: Haris Sheriff is a 74 y o  female    77-year-old female presents for evaluation of urinary incontinence  The patient states for the past couple months she is having worsening urgency and urge incontinence  She states that her bathroom is only 8-10 feet away from her bed but she leaks before she can get to the bathroom when she starts to get the urge to urinate  She is wearing at least 2 pads during the day and 3 at night  She is having nocturia 4-5 times nightly and daytime frequency  She denies any dysuria or gross hematuria  She has not tried any medications for this problem  She has no other complaints          The following portions of the patient's history were reviewed and updated as appropriate: allergies, current medications, past family history, past medical history, past social history, past surgical history and problem list     Review of Systems   Constitutional: Negative  HENT: Negative  Eyes: Negative  Respiratory: Negative  Cardiovascular: Negative  Gastrointestinal: Negative  Endocrine: Negative      Genitourinary:        As noted per HPI   Musculoskeletal: Negative  Skin: Negative  Allergic/Immunologic: Negative  Neurological: Negative  Hematological: Negative  Psychiatric/Behavioral: Negative  Urinary Incontinence Screening      Most Recent Value   Urinary Incontinence   Urinary Incontinence? Yes [Wears 1 pad during the day ]   Incomplete emptying? Yes   Urinary frequency? Yes   Urinary urgency? No   Urinary hesitancy? No   Dysuria (painful difficult urination)? No   Nocturia (waking up to use the bathroom)? Yes [3-4 times]   Straining (having to push to go)? No   Weak stream?  Yes   Intermittent stream?  No   Post void dribbling? No   Vaginal pressure? No   Vaginal dryness? No          Objective:    Physical Exam   Constitutional: She is oriented to person, place, and time  She appears well-developed and well-nourished  HENT:   Head: Normocephalic and atraumatic  Neck: Normal range of motion  Neck supple  Cardiovascular: Intact distal pulses  Pulmonary/Chest: Effort normal    Abdominal: Soft  Bowel sounds are normal  She exhibits no distension and no mass  There is no tenderness  There is no rebound and no guarding  Musculoskeletal: Normal range of motion  Neurological: She is alert and oriented to person, place, and time  Skin: Skin is warm and dry  Psychiatric: She has a normal mood and affect  Vitals reviewed          Results  No results found for: PSA  Lab Results   Component Value Date    CALCIUM 9 4 10/17/2017     10/17/2017    K 4 5 10/17/2017    CO2 29 10/17/2017     10/17/2017    BUN 15 10/17/2017    CREATININE 0 77 10/17/2017     Lab Results   Component Value Date    WBC 8 70 10/17/2017    HGB 13 9 10/17/2017    HCT 42 4 10/17/2017    MCV 90 10/17/2017     10/17/2017       Recent Results (from the past 1 hour(s))   POCT Measure PVR    Collection Time: 09/17/18  1:34 PM   Result Value Ref Range    POST-VOID RESIDUAL VOLUME, ML POC 13 mL   ]

## 2018-09-17 NOTE — ASSESSMENT & PLAN NOTE
Had a lengthy discussion with the patient regarding her treatment options for her urinary urgency  I will start her on oxybutynin  Side effects were discussed  She will follow up in 6 weeks to re-evaluate her symptoms on this new medication  We discussed that sometimes have to try more than 1 medication before we can get her symptoms under control  We also discussed tertiary treatment options in case medications do not work

## 2018-09-18 ENCOUNTER — APPOINTMENT (OUTPATIENT)
Dept: PHYSICAL THERAPY | Age: 73
End: 2018-09-18
Payer: MEDICARE

## 2018-09-18 NOTE — PROGRESS NOTES
Daily Note     Today's date: 2018  Patient name: Anny Crowley  :   MRN: [id number]  Referring provider: Kimberly Oviedo MD  Dx: No diagnosis found  Subjective: ***      Objective: See treatment diary below      Assessment: Tolerated treatment {Tolerated treatment :5722987761}   Patient {assessment:7720834788}      Plan: {PLAN:6140702484}      Precautions: HTN, Afib, obesity, pacemaker     Daily Treatment Diary     Manual                                                                                   Exercise Diary           Bridges HEP 2 x 5 2*5 1*10         SLR  nv 1 x 5b/l 2*5 b/l 2*5b/l         LTR HEP 20 x 5" 20*5" 20*5"         Forward trunk flexion  HEP 10 x 5" 10*5" 10x5"         Clamshells  2 x15 ytb 2*15ytb 2 x20x3"         Adductor squeezes  nv 15*5" 10x10"         Seated HT/TR  30x 30x 30x         Mini squats  nv 15          Standing hip flexion, abd  1 x10 b/l 1*10b/l          Seated marches  2 x10 2*10 b/l 2 x10 b/l         Biodex  LOS 3 x LOS 3x          Side steps   6 x 6 x          LAQ    2 x10 b/l                                                                                                        Modalities                                                       45 min 1:1 with patient

## 2018-09-19 ENCOUNTER — APPOINTMENT (OUTPATIENT)
Dept: LAB | Age: 73
End: 2018-09-19
Payer: MEDICARE

## 2018-09-19 ENCOUNTER — OFFICE VISIT (OUTPATIENT)
Dept: PHYSICAL THERAPY | Age: 73
End: 2018-09-19
Payer: MEDICARE

## 2018-09-19 ENCOUNTER — TRANSCRIBE ORDERS (OUTPATIENT)
Dept: LAB | Age: 73
End: 2018-09-19

## 2018-09-19 DIAGNOSIS — Z13.88 HEAVY METAL POISON. SCREEN: Primary | ICD-10-CM

## 2018-09-19 DIAGNOSIS — M54.50 CHRONIC MIDLINE LOW BACK PAIN WITHOUT SCIATICA: ICD-10-CM

## 2018-09-19 DIAGNOSIS — M54.50 LUMBAR PAIN: Primary | ICD-10-CM

## 2018-09-19 DIAGNOSIS — G89.29 CHRONIC MIDLINE LOW BACK PAIN WITHOUT SCIATICA: ICD-10-CM

## 2018-09-19 PROCEDURE — 83825 ASSAY OF MERCURY: CPT | Performed by: INTERNAL MEDICINE

## 2018-09-19 PROCEDURE — 97112 NEUROMUSCULAR REEDUCATION: CPT | Performed by: PHYSICAL THERAPIST

## 2018-09-19 PROCEDURE — 97110 THERAPEUTIC EXERCISES: CPT | Performed by: PHYSICAL THERAPIST

## 2018-09-19 PROCEDURE — 83655 ASSAY OF LEAD: CPT | Performed by: INTERNAL MEDICINE

## 2018-09-19 PROCEDURE — 82175 ASSAY OF ARSENIC: CPT | Performed by: INTERNAL MEDICINE

## 2018-09-19 PROCEDURE — 82570 ASSAY OF URINE CREATININE: CPT | Performed by: INTERNAL MEDICINE

## 2018-09-19 NOTE — PROGRESS NOTES
Daily Note     Today's date: 2018  Patient name: Patricia Martinez  :   MRN: [id number]  Referring provider: River Roman MD  Dx:   Encounter Diagnosis     ICD-10-CM    1  Lumbar pain M54 5    2  Chronic midline low back pain without sciatica M54 5     G89 29        Start Time: 1100  Stop Time: 1140  Total time in clinic (min): 40 minutes    Subjective: Pt reports improvements in LBP  Objective: See treatment diary below      Assessment: Tolerated treatment well  Pt's POC was progressed to include more reps of proximal muscle strengthening to improve functional mobility  Pt's improvements are demonstrated in FOTO score  Patient demonstrated fatigue post treatment and would benefit from continued PT      Plan: Continue per plan of care         Precautions: HTN, Afib, obesity, pacemaker     Daily Treatment Diary     Manual                                                                                   Exercise Diary   9*18        Bridges HEP 2 x 5 2*5 1*10 1*10        SLR  nv 1 x 5b/l 2*5 b/l 2*5b/l 2*5b/l        LTR HEP 20 x 5" 20*5" 20*5" 20*5"        Forward trunk flexion  HEP 10 x 5" 10*5" 10x5" 10*5"        Clamshells  2 x15 ytb 2*15ytb 2 x20x3" 2*20*3"        Adductor squeezes  nv 15*5" 10x10" 10x10"        Seated HT/TR  30x 30x 30x 30x        Mini squats  nv 15  20        Standing hip flexion, abd  1 x10 b/l 1*10b/l          Seated marches  2 x10 2*10 b/l 2 x10 b/l 2x10        Biodex  LOS 3 x LOS 3x LOS3x  Los x5        Side steps   6 x 6 x          LAQ    2 x10 b/l 2x10 b/l                                                                                                       Modalities                                                       45 min 1:1 with patient

## 2018-09-20 ENCOUNTER — REMOTE DEVICE CLINIC VISIT (OUTPATIENT)
Dept: CARDIOLOGY CLINIC | Facility: CLINIC | Age: 73
End: 2018-09-20
Payer: MEDICARE

## 2018-09-20 DIAGNOSIS — I48.0 PAROXYSMAL ATRIAL FIBRILLATION (HCC): Primary | ICD-10-CM

## 2018-09-20 DIAGNOSIS — I49.5 SSS (SICK SINUS SYNDROME) (HCC): ICD-10-CM

## 2018-09-20 DIAGNOSIS — Z95.0 PRESENCE OF CARDIAC PACEMAKER: ICD-10-CM

## 2018-09-20 PROCEDURE — 93294 REM INTERROG EVL PM/LDLS PM: CPT | Performed by: INTERNAL MEDICINE

## 2018-09-20 PROCEDURE — 93296 REM INTERROG EVL PM/IDS: CPT | Performed by: INTERNAL MEDICINE

## 2018-09-20 NOTE — PROGRESS NOTES
Results for orders placed or performed in visit on 09/20/18   Cardiac EP device report    Narrative    MDT DUAL CHAMBER PPM  CARELINK TRANSMISSION: BATTERY ADEQUATE (9 YRS)  AP: 89%  : 1 8%  ALL AVAILABLE LEAD PARAMETERS WITHIN NORMAL LIMITS  AF EPISODES TREATED WITH ATP  LASTING UP TO 15 HRS IN DURATION  20% PACED TERMINATED  11 AF MONITORED EPISODES  AF BURDEN 3 5%  NO OTHER SIGNIFICANT HIGH RATE EPISODES  PT TAKES METOPROLOL Ely Lopez  PACEMAKER FUNCTIONING APPROPRIATELY    [address] [place] [address]

## 2018-09-21 ENCOUNTER — OFFICE VISIT (OUTPATIENT)
Dept: PHYSICAL THERAPY | Age: 73
End: 2018-09-21
Payer: MEDICARE

## 2018-09-21 DIAGNOSIS — M54.50 LUMBAR PAIN: Primary | ICD-10-CM

## 2018-09-21 DIAGNOSIS — G89.29 CHRONIC MIDLINE LOW BACK PAIN WITHOUT SCIATICA: ICD-10-CM

## 2018-09-21 DIAGNOSIS — M54.50 CHRONIC MIDLINE LOW BACK PAIN WITHOUT SCIATICA: ICD-10-CM

## 2018-09-21 LAB
ARSENIC 24H UR-MCNC: NORMAL UG/L (ref 0–50)
CREAT UR-MCNC: 1 G/L (ref 0.3–3)
INORG ARSENIC UR-MCNC: NORMAL UG/L (ref 0–19)
LEAD 24H UR-MCNC: 1 UG/L (ref 0–49)
LEAD/CREAT UR: 1 UG/G CREAT (ref 0–49)
MERCURY 24H UR-MCNC: NORMAL UG/L (ref 0–19)

## 2018-09-21 PROCEDURE — 97110 THERAPEUTIC EXERCISES: CPT | Performed by: PHYSICAL THERAPIST

## 2018-09-21 PROCEDURE — 97112 NEUROMUSCULAR REEDUCATION: CPT | Performed by: PHYSICAL THERAPIST

## 2018-09-21 NOTE — PROGRESS NOTES
Daily Note     Today's date: 2018  Patient name: Herminio Villegas  :   MRN: [id number]  Referring provider: Francisco Ji MD  Dx:   Encounter Diagnosis     ICD-10-CM    1  Lumbar pain M54 5    2  Chronic midline low back pain without sciatica M54 5     G89 29        Start Time: 1145  Stop Time: 1230  Total time in clinic (min): 45 minutes    Subjective: Pt reports no new symptoms       Objective: See treatment diary below      Assessment: Tolerated treatment well  Patient's POC was adjusted to include more dynamic balance exercises to reduce risk of falls  Patient demonstrated fatigue post treatment and would benefit from continued PT      Plan: Continue per plan of care         Precautions: HTN, Afib, obesity, pacemaker     Daily Treatment Diary     Manual                                                                                   Exercise Diary   9 9*18        Bridges HEP 2 x 5 2*5 1*10 1*10 1*10       SLR  nv 1 x 5b/l 2*5 b/l 2*5b/l 2*5b/l 1*10 b/l       LTR HEP 20 x 5" 20*5" 20*5" 20*5" 10*5"       Forward trunk flexion  HEP 10 x 5" 10*5" 10x5" 10*5" 10*5"       Clamshells  2 x15 ytb 2*15ytb 2 x20x3" 2*20*3" 2*10*3"       Adductor squeezes  nv 15*5" 10x10" 10x10" 15*10"       Seated HT/TR  30x 30x 30x 30x 30x       Mini squats  nv 15  20 20x       Standing hip flexion, abd  1 x10 b/l 1*10b/l   1*10b/l       Seated marches  2 x10 2*10 b/l 2 x10 b/l 2x10 2*10       Biodex  LOS 3 x LOS 3x LOS3x  Los x5 LOSx3 + 3 maze       Side steps   6 x 6 x          LAQ    2 x10 b/l 2x10 b/l 2*10 b/l                                                                                                  1:1 with patient from [id number]    Modalities

## 2018-09-24 ENCOUNTER — OFFICE VISIT (OUTPATIENT)
Dept: PHYSICAL THERAPY | Age: 73
End: 2018-09-24
Payer: MEDICARE

## 2018-09-24 ENCOUNTER — APPOINTMENT (OUTPATIENT)
Dept: PHYSICAL THERAPY | Age: 73
End: 2018-09-24
Payer: MEDICARE

## 2018-09-24 DIAGNOSIS — M54.50 CHRONIC MIDLINE LOW BACK PAIN WITHOUT SCIATICA: ICD-10-CM

## 2018-09-24 DIAGNOSIS — G89.29 CHRONIC MIDLINE LOW BACK PAIN WITHOUT SCIATICA: ICD-10-CM

## 2018-09-24 DIAGNOSIS — M54.50 LUMBAR PAIN: Primary | ICD-10-CM

## 2018-09-24 PROCEDURE — 97110 THERAPEUTIC EXERCISES: CPT | Performed by: PHYSICAL THERAPIST

## 2018-09-24 PROCEDURE — G8991 OTHER PT/OT GOAL STATUS: HCPCS | Performed by: PHYSICAL THERAPIST

## 2018-09-24 PROCEDURE — G8990 OTHER PT/OT CURRENT STATUS: HCPCS | Performed by: PHYSICAL THERAPIST

## 2018-09-24 PROCEDURE — 97112 NEUROMUSCULAR REEDUCATION: CPT | Performed by: PHYSICAL THERAPIST

## 2018-09-24 NOTE — PROGRESS NOTES
Daily Note     Today's date: 2018  Patient name: Stan Schwab  : [birth year]  MRN: [id number]  Referring provider: Felicia Mcnamara MD  Dx:   Encounter Diagnosis     ICD-10-CM    1  Lumbar pain M54 5    2  Chronic midline low back pain without sciatica M54 5     G89 29                   Subjective: Pt reports no new symptoms      Objective: See treatment diary below      Assessment: Tolerated treatment well  Patient continues to have difficulty with community ambulation  Pt's POC was progressed to include more reps of proximal strengthening and gait training to increase tolerance to functional activity  Patient demonstrated fatigue post treatment and would benefit from continued PT      Plan: Continue per plan of care        Precautions: HTN, Afib, obesity, pacemaker     Daily exercise Log     Exercise Diary   9 9*18       Bridges HEP 2 x 5 2*5 1*10 1*10 1*10 3*5      SLR  nv 1 x 5b/l 2*5 b/l 2*5b/l 2*5b/l 1*10 b/l 1*10b/l      LTR HEP 20 x 5" 20*5" 20*5" 20*5" 10*5" 10*5"      Forward trunk flexion  HEP 10 x 5" 10*5" 10x5" 10*5" 10*5" 15*5"      Clamshells  2 x15 ytb 2*15ytb 2 x20x3" 2*20*3" 2*10*3" 2*10*5"      Adductor squeezes  nv 15*5" 10x10" 10x10" 15*10" 15*10"      Seated HT/TR  30x 30x 30x 30x 30x 30x      Mini squats  nv 15  20 20x 20x      Standing hip ext, abd  1 x10 b/l 1*10b/l   1*10b/l 1*15b/l      Seated marches  2 x10 2*10 b/l 2 x10 b/l 2x10 2*10 2*10      Biodex  LOS 3 x LOS 3x LOS3x  Los x5 LOSx3 + 3 maze LOS 3+3 maze      Side steps   6 x 6 x    6x      LAQ    2 x10 b/l 2x10 b/l 2*10 b/l 3*10 b/l      Gait training       4x One hand rail                                                                                    1:1 with patient from 200-240pm    Modalities

## 2018-09-25 RX ORDER — OXYBUTYNIN CHLORIDE 10 MG/1
TABLET, EXTENDED RELEASE ORAL
Qty: 90 TABLET | Refills: 3 | Status: SHIPPED | OUTPATIENT
Start: 2018-09-25 | End: 2018-11-14 | Stop reason: SDUPTHER

## 2018-09-27 ENCOUNTER — APPOINTMENT (OUTPATIENT)
Dept: PHYSICAL THERAPY | Age: 73
End: 2018-09-27
Payer: MEDICARE

## 2018-09-27 DIAGNOSIS — F41.9 ANXIETY: ICD-10-CM

## 2018-09-27 RX ORDER — ALPRAZOLAM 0.25 MG/1
TABLET ORAL
Qty: 90 TABLET | Refills: 1 | Status: SHIPPED | OUTPATIENT
Start: 2018-09-27 | End: 2019-01-17 | Stop reason: SDUPTHER

## 2018-09-28 ENCOUNTER — APPOINTMENT (OUTPATIENT)
Dept: PHYSICAL THERAPY | Age: 73
End: 2018-09-28
Payer: MEDICARE

## 2018-10-01 ENCOUNTER — APPOINTMENT (OUTPATIENT)
Dept: PHYSICAL THERAPY | Age: 73
End: 2018-10-01
Payer: MEDICARE

## 2018-10-02 ENCOUNTER — OFFICE VISIT (OUTPATIENT)
Dept: PHYSICAL THERAPY | Age: 73
End: 2018-10-02
Payer: MEDICARE

## 2018-10-02 DIAGNOSIS — G89.29 CHRONIC MIDLINE LOW BACK PAIN WITHOUT SCIATICA: ICD-10-CM

## 2018-10-02 DIAGNOSIS — M54.50 LUMBAR PAIN: Primary | ICD-10-CM

## 2018-10-02 DIAGNOSIS — M54.50 CHRONIC MIDLINE LOW BACK PAIN WITHOUT SCIATICA: ICD-10-CM

## 2018-10-02 PROCEDURE — 97110 THERAPEUTIC EXERCISES: CPT | Performed by: PHYSICAL THERAPIST

## 2018-10-02 PROCEDURE — 97112 NEUROMUSCULAR REEDUCATION: CPT | Performed by: PHYSICAL THERAPIST

## 2018-10-02 NOTE — PROGRESS NOTES
Daily Note     Today's date: 10/2/2018  Patient name: Jose J Lira  :   MRN: [id number]  Referring provider: Eriberto Hendricks MD  Dx:   Encounter Diagnosis     ICD-10-CM    1  Lumbar pain M54 5    2  Chronic midline low back pain without sciatica M54 5     G89 29        Start Time: 1025  Stop Time: 1105  Total time in clinic (min): 40 minutes    Subjective: Pt reports increased fatigue this session      Objective: See treatment diary below      Assessment: Tolerated treatment well  Pt's POC was advanced to include knee strengthening exercises to decrease patient's symptoms in LLE to increase tolerance to community ambulation  Patient demonstrated fatigue post treatment and would benefit from continued PT      Plan: Continue per plan of care       Precautions: HTN, Afib, obesity, pacemaker     Daily exercise Log     Exercise Diary   9*18  10     Bridges HEP 2 x 5 2*5 1*10 1*10 1*10 3*5 1*10 + 1*5     SLR  nv 1 x 5b/l 2*5 b/l 2*5b/l 2*5b/l 1*10 b/l 1*10b/l 1*10     LTR HEP 20 x 5" 20*5" 20*5" 20*5" 10*5" 10*5" 10*5"     Forward trunk flexion  HEP 10 x 5" 10*5" 10x5" 10*5" 10*5" 15*5" 15*5"     Clamshells  2 x15 ytb 2*15ytb 2 x20x3" 2*20*3" 2*10*3" 2*10*5" 2*10*5" gtb     Adductor squeezes  nv 15*5" 10x10" 10x10" 15*10" 15*10" 20*5"     Seated HT/TR  30x 30x 30x 30x 30x 30x 30x     Mini squats  nv 15  20 20x 20x 20x     Standing hip ext, abd  1 x10 b/l 1*10b/l   1*10b/l 1*15b/l 1*15 b/l     Seated marches  2 x10 2*10 b/l 2 x10 b/l 2x10 2*10 2*10 2*10     Biodex  LOS 3 x LOS 3x LOS3x  Los x5 LOSx3 + 3 maze LOS 3+3 maze LOS 3+3 maze     Side steps   6 x 6 x    6x      LAQ    2 x10 b/l 2x10 b/l 2*10 b/l 3*10 b/l 3*10 b/l     Gait training       4x One hand rail      Quad sets        20*5"                                                                          Modalities

## 2018-10-04 ENCOUNTER — OFFICE VISIT (OUTPATIENT)
Dept: PHYSICAL THERAPY | Age: 73
End: 2018-10-04
Payer: MEDICARE

## 2018-10-04 DIAGNOSIS — M54.50 CHRONIC MIDLINE LOW BACK PAIN WITHOUT SCIATICA: ICD-10-CM

## 2018-10-04 DIAGNOSIS — M54.50 LUMBAR PAIN: Primary | ICD-10-CM

## 2018-10-04 DIAGNOSIS — G89.29 CHRONIC MIDLINE LOW BACK PAIN WITHOUT SCIATICA: ICD-10-CM

## 2018-10-04 PROCEDURE — 97110 THERAPEUTIC EXERCISES: CPT

## 2018-10-04 PROCEDURE — 97112 NEUROMUSCULAR REEDUCATION: CPT

## 2018-10-04 NOTE — PROGRESS NOTES
Daily Note     Today's date: 10/4/2018  Patient name: Carline Castaneda  :   MRN: [id number]  Referring provider: Kinga King MD  Dx:   Encounter Diagnosis     ICD-10-CM    1  Lumbar pain M54 5    2  Chronic midline low back pain without sciatica M54 5     G89 29                   Subjective: pt reports relief fluctuates with weather and activity levels      Objective: See treatment diary below      Assessment: Tolerated treatment well  Patient demonstrated fatigue post treatment and would benefit from continued PT      Plan: Continue per plan of care  Progress treatment as tolerated        Precautions: HTN, Afib, obesity, pacemaker     Daily exercise Log     Exercise Diary   918 9/21 9/24 10/2 10/4/18    Bridges HEP 2 x 5 2*5 1*10 1*10 1*10 3*5 1*10 + 1*5 10x5"    SLR  nv 1 x 5b/l 2*5 b/l 2*5b/l 2*5b/l 1*10 b/l 1*10b/l 1*10 10x ea    LTR HEP 20 x 5" 20*5" 20*5" 20*5" 10*5" 10*5" 10*5" 10x5"    Forward trunk flexion  HEP 10 x 5" 10*5" 10x5" 10*5" 10*5" 15*5" 15*5" 15x5"    Clamshells  2 x15 ytb 2*15ytb 2 x20x3" 2*20*3" 2*10*3" 2*10*5" 2*10*5" gtb gtb 3x10x5"    Adductor squeezes  nv 15*5" 10x10" 10x10" 15*10" 15*10" 20*5" 30x5"    Seated HT/TR  30x 30x 30x 30x 30x 30x 30x 30x    Mini squats  nv 15  20 20x 20x 20x 2x10    Standing hip ext, abd  1 x10 b/l 1*10b/l   1*10b/l 1*15b/l 1*15 b/l 15x ea B    Seated marches  2 x10 2*10 b/l 2 x10 b/l 2x10 2*10 2*10 2*10 2x10B    Biodex  LOS 3 x LOS 3x LOS3x  Los x5 LOSx3 + 3 maze LOS 3+3 maze LOS 3+3 maze LOS/maze staticlevel 1/2 x3 ea    Side steps   6 x 6 x    6x      LAQ    2 x10 b/l 2x10 b/l 2*10 b/l 3*10 b/l 3*10 b/l 3x10 B    Gait training       4x One hand rail  4x with rail    Quad sets        20*5" 20x5" B                                                                         Modalities                                                           1:1 treatment [id number]

## 2018-10-08 ENCOUNTER — EVALUATION (OUTPATIENT)
Dept: PHYSICAL THERAPY | Age: 73
End: 2018-10-08
Payer: MEDICARE

## 2018-10-08 DIAGNOSIS — M54.50 CHRONIC MIDLINE LOW BACK PAIN WITHOUT SCIATICA: ICD-10-CM

## 2018-10-08 DIAGNOSIS — G89.29 CHRONIC MIDLINE LOW BACK PAIN WITHOUT SCIATICA: ICD-10-CM

## 2018-10-08 DIAGNOSIS — M54.50 LUMBAR PAIN: Primary | ICD-10-CM

## 2018-10-08 PROCEDURE — 97112 NEUROMUSCULAR REEDUCATION: CPT | Performed by: PHYSICAL THERAPIST

## 2018-10-08 PROCEDURE — 97110 THERAPEUTIC EXERCISES: CPT | Performed by: PHYSICAL THERAPIST

## 2018-10-08 PROCEDURE — G8991 OTHER PT/OT GOAL STATUS: HCPCS | Performed by: PHYSICAL THERAPIST

## 2018-10-08 PROCEDURE — G8990 OTHER PT/OT CURRENT STATUS: HCPCS | Performed by: PHYSICAL THERAPIST

## 2018-10-08 NOTE — PROGRESS NOTES
PT Re-Evaluation     Today's date: 10/8/2018  Patient name: Miguel Flores  :   MRN: [id number]  Referring provider: Asmita Goldstein MD  Dx:   Encounter Diagnosis     ICD-10-CM    1  Lumbar pain M54 5    2  Chronic midline low back pain without sciatica M54 5     G89 29        Start Time: 1430  Stop Time: 1520  Total time in clinic (min): 50 minutes    Assessment  Impairments: abnormal gait, abnormal or restricted ROM, abnormal movement, impaired balance, impaired physical strength and pain with function    Assessment details: Miguel Flores is a 74 y o  female who presents with signs and symptoms consistent of lumbar stenosis and DJD  Since initial evaluation patient presents with improvements in pain, strength and ROM  Due to these improvements, Patient has less difficulty performing a/iadls, recreational activities and engaging in social activities  Pt's has demonstrated improved ability to perfrom sit to stand transfers amd bed mobility  Pt still has difficulty with getting in and out of cars and ambulating community distances  Decreased strength has lead to an inability to perform sit to stand without UE support  Pt is at a higher risk for falls demonstrated by 5 STS and TUG (24 + 26 seconds respectively)  Patient would continue to benefit from skilled physical therapy to address the impairments, improve their level of function, and to improve their overall quality of life  Understanding of Dx/Px/POC: fair   Prognosis: fair  Prognosis details: Pt has multiple bouts of chornic back pain    Goals  Short Term Goals: to be achieved by 4 weeks  1) Patient to be independent with basic HEP  Partially met  2) Decrease pain to 3/10 at its worst  MET  3) Increase lumbar spine ROM by 50% in all deficient planes  Partially met  4) Increase LE strength by 1/2 MMT grade in all deficient planes  Partially met    Long Term Goals: to be achieved by discharge  1) FOTO equal to or greater than 50   Partially met  2) Patient to be independent with comprehensive HEP  Partially met  3) Lumbar spine ROM WNL all planes to improve a/iadls  Partially met  4) Increase LE strength to 5/5 MMT grade in all planes to improve a/iadls  Partially met  5) Patient to report no sleep interruption secondary to pain  Partially met  6) Increase standing to 10 min  MET    Plan  Patient would benefit from: skilled physical therapy  Planned modality interventions: cryotherapy and TENS  Other planned modality interventions: moist heat   Planned therapy interventions: manual therapy, patient education, balance, flexibility, home exercise program, transfer training, therapeutic training, therapeutic exercise, strengthening, stretching and abdominal trunk stabilization  Frequency: Twice a week for 12 weeks  Treatment plan discussed with: patient  Plan details: Core strengthening and stabilization, manual techniques, AROM/PROM, transfer training, gait training, and modalities to manage symptoms  Subjective Evaluation    History of Present Illness  Date of onset: 2018  Mechanism of injury: Pt reports that she has a 2 month of low back pain  Pt reports pain in center of back and nothing radiating into legs  CT scan shows degenerative changes but no medical emergencies  Pt ambulates with rollator walker and reports difficulty with changes in position  Pt has had physical therapy in the past with positive results  Pt reports feeling 25% better since IE  Pt reports improvements doing dishes and performing functional transfers  Pt still needs UE support to get in and out of chair which frustrates her     Quality of life: good    Pain  Current pain ratin  At best pain ratin  At worst pain ratin  Quality: sharp  Relieving factors: relaxation  Aggravating factors: sitting, standing and stair climbing    Social Support  Stairs in house: yes   [facility] in: [facility]      Diagnostic Tests  CT scan: normal  Treatments  Previous treatment: physical therapy  Patient Goals  Patient goals for therapy: increased strength, decreased pain, return to work and return to sport/leisure activities  Patient goal: Walking, standing up, changing positions        Objective     Special Questions  Negative for night pain, disturbed sleep, bladder dysfunction and saddle (S4) numbness    Tenderness     Additional Tenderness Details  Pt has pain over L5 and S1 spinous process  Neurological Testing     Sensation     Lumbar   Left   Intact: light touch    Right   Intact: light touch    Active Range of Motion     Additional Active Range of Motion Details  Flexion: 100%  Extension:50%  Lateral flexion R: 50%  Lateral flexion L: 50%    Strength/Myotome Testing     Left Hip   Planes of Motion   Flexion: 4-  Abduction: 4-  Adduction: 4-    Right Hip   Planes of Motion   Flexion: 4-  Abduction: 4-  Adduction: 4-    Left Knee   Flexion: 4  Extension: 4    Right Knee   Flexion: 4  Extension: 4    Left Ankle/Foot   Dorsiflexion: 4+  Plantar flexion: 4+    Right Ankle/Foot   Dorsiflexion: 4  Plantar flexion: 4    Tests       Thoracic   Negative slump  Lumbar   Negative slump  Left   Negative passive SLR  Right   Negative passive SLR  Left Pelvic Girdle/Sacrum   Negative: thigh thrust      Right Pelvic Girdle/Sacrum   Negative: thigh thrust      General Comments     Lumbar Comments  Pt has antalgic gait with decreased gait speed  Pt has difficulty transitioning from supine to standing          Precautions: HTN, Afib, obesity, pacemaker     Daily exercise Log     Exercise Diary  8/27 8/29 9/4 9/13 9*18 9/21 9/24 10/2 10/4/18 10/8/18   Bridges HEP 2 x 5 2*5 1*10 1*10 1*10 3*5 1*10 + 1*5 10x5"    SLR  nv 1 x 5b/l 2*5 b/l 2*5b/l 2*5b/l 1*10 b/l 1*10b/l 1*10 10x ea 3*5   LTR HEP 20 x 5" 20*5" 20*5" 20*5" 10*5" 10*5" 10*5" 10x5" 10*5"   Forward trunk flexion  HEP 10 x 5" 10*5" 10x5" 10*5" 10*5" 15*5" 15*5" 15x5" 15*5"   Clamshells  2 x15 ytb 2*15ytb 2 x20x3" 2*20*3" 2*10*3" 2*10*5" 2*10*5" gtb gtb 3x10x5" gtb 3*10*5"   Adductor squeezes  nv 15*5" 10x10" 10x10" 15*10" 15*10" 20*5" 30x5" 30*5"   Seated HT/TR  30x 30x 30x 30x 30x 30x 30x 30x 30x   Mini squats  nv 15  20 20x 20x 20x 2x10 2*10   Standing hip ext, abd  1 x10 b/l 1*10b/l   1*10b/l 1*15b/l 1*15 b/l 15x ea B    Seated marches  2 x10 2*10 b/l 2 x10 b/l 2x10 2*10 2*10 2*10 2x10B    Biodex  LOS 3 x LOS 3x LOS3x  Los x5 LOSx3 + 3 maze LOS 3+3 maze LOS 3+3 maze LOS/maze staticlevel 1/2 x3 ea LOS maze/ 2+2   Side steps   6 x 6 x    6x      LAQ    2 x10 b/l 2x10 b/l 2*10 b/l 3*10 b/l 3*10 b/l 3x10 B    Gait training       4x One hand rail  4x with rail    Quad sets        20*5" 20x5" B                                                                         Modalities

## 2018-10-12 ENCOUNTER — APPOINTMENT (OUTPATIENT)
Dept: PHYSICAL THERAPY | Age: 73
End: 2018-10-12
Payer: MEDICARE

## 2018-10-15 ENCOUNTER — OFFICE VISIT (OUTPATIENT)
Dept: PHYSICAL THERAPY | Age: 73
End: 2018-10-15
Payer: MEDICARE

## 2018-10-15 DIAGNOSIS — G89.29 CHRONIC MIDLINE LOW BACK PAIN WITHOUT SCIATICA: ICD-10-CM

## 2018-10-15 DIAGNOSIS — M54.50 LUMBAR PAIN: Primary | ICD-10-CM

## 2018-10-15 DIAGNOSIS — M54.50 CHRONIC MIDLINE LOW BACK PAIN WITHOUT SCIATICA: ICD-10-CM

## 2018-10-15 PROCEDURE — 97112 NEUROMUSCULAR REEDUCATION: CPT

## 2018-10-15 PROCEDURE — 97110 THERAPEUTIC EXERCISES: CPT

## 2018-10-15 NOTE — PROGRESS NOTES
Daily Note     Today's date: 10/15/2018  Patient name: Anny Crowley  :   MRN: [id number]  Referring provider: Kimberly Oviedo MD  Dx:   Encounter Diagnosis     ICD-10-CM    1  Lumbar pain M54 5    2  Chronic midline low back pain without sciatica M54 5     G89 29                   Subjective: pt reports feeling ok today,knees stiff,defers bike at this time    Objective: See treatment diary below      Assessment: Tolerated treatment well  Patient demonstrated fatigue post treatment      Plan: Continue per plan of care  Progress treatment as tolerated        Precautions: HTN, Afib, obesity, pacemaker     Daily exercise Log     Exercise Diary  10/15/18         10/8/18   Bridges 10x5"            SLR  nv         3*5   LTR 10x5"         10*5"   Forward trunk flexion  15x5"         15*5"   Clamshells          gtb 3*10*5"   Adductor squeezes 30x5"         30*5"   Seated HT/TR 30x         30x   Mini squats 2x10x3"         2*10   Standing hip ext, abd 10x ea            Seated marches 2x10            Biodex np         LOS maze/ 2+2   Side steps  6x at bar            LAQ 2x10x5"            Gait training             Quad sets 20x5"                                                                                 Modalities                                                       1:1 treatment 350pm-430pm VK

## 2018-10-17 ENCOUNTER — APPOINTMENT (OUTPATIENT)
Dept: PHYSICAL THERAPY | Age: 73
End: 2018-10-17
Payer: MEDICARE

## 2018-10-18 ENCOUNTER — APPOINTMENT (OUTPATIENT)
Dept: PHYSICAL THERAPY | Age: 73
End: 2018-10-18
Payer: MEDICARE

## 2018-10-22 ENCOUNTER — OFFICE VISIT (OUTPATIENT)
Dept: PHYSICAL THERAPY | Age: 73
End: 2018-10-22
Payer: MEDICARE

## 2018-10-22 DIAGNOSIS — G89.29 CHRONIC MIDLINE LOW BACK PAIN WITHOUT SCIATICA: ICD-10-CM

## 2018-10-22 DIAGNOSIS — M54.50 LUMBAR PAIN: Primary | ICD-10-CM

## 2018-10-22 DIAGNOSIS — M54.50 CHRONIC MIDLINE LOW BACK PAIN WITHOUT SCIATICA: ICD-10-CM

## 2018-10-22 PROCEDURE — 97110 THERAPEUTIC EXERCISES: CPT | Performed by: PHYSICAL THERAPIST

## 2018-10-22 PROCEDURE — 97112 NEUROMUSCULAR REEDUCATION: CPT | Performed by: PHYSICAL THERAPIST

## 2018-10-22 NOTE — PROGRESS NOTES
Daily Note     Today's date: 10/22/2018  Patient name: Vivek Brown  : [id number]  MRN: [id number]  Referring provider: Davion Allred MD  Dx:   Encounter Diagnosis     ICD-10-CM    1  Lumbar pain M54 5    2  Chronic midline low back pain without sciatica M54 5     G89 29        Start Time: 1530  Stop Time: 1615  Total time in clinic (min): 45 minutes    Subjective: Pt reports being tired after being sick for the       Objective: See treatment diary below      Assessment: Tolerated treatment well  Pt's POC was advanced to include more dynamic and static balance exercises to reduce risk of falls and increase tolerance to functional exercises  t  Patient demonstrated fatigue post treatment and would benefit from continued PT      Plan: Continue per plan of care       Precautions: HTN, Afib, obesity, pacemaker     Daily exercise Log     Exercise Diary  10/15/18 10/22        10/8/18   Bridges 10x5" 10*5"           SLR  nv 1*10        3*5   LTR 10x5" 10*5"        10*5"   Forward trunk flexion  15x5" 15*5"        15*5"   Clamshells  gtb 3*10*5"        gtb 3*10*5"   Adductor squeezes 30x5" 30*5"        30*5"   Seated HT/TR 30x 30*        30x   Mini squats 2x10x3" 2*10*3"        2*10   Standing hip ext, abd 10x ea 15 ea           Seated marches 2x10 2*10           Biodex np LOS/maze 3+2        LOS maze/ 2+2   Side steps  6x at bar 6* bar           LAQ 2x10x5" 2*10*5"           Gait training             Quad sets 20x5" 20*5"                                                                                Modalities

## 2018-10-24 ENCOUNTER — TELEPHONE (OUTPATIENT)
Dept: PAIN MEDICINE | Facility: MEDICAL CENTER | Age: 73
End: 2018-10-24

## 2018-10-24 NOTE — TELEPHONE ENCOUNTER
Pt called to see if Dr Dominique Lucero can order a back brace for her  Pt is currently in PT but states she cant really stand long without have back pain   Pt can be reached at [phone]

## 2018-10-25 ENCOUNTER — OFFICE VISIT (OUTPATIENT)
Dept: PHYSICAL THERAPY | Age: 73
End: 2018-10-25
Payer: MEDICARE

## 2018-10-25 DIAGNOSIS — M54.50 CHRONIC MIDLINE LOW BACK PAIN WITHOUT SCIATICA: ICD-10-CM

## 2018-10-25 DIAGNOSIS — G89.29 CHRONIC MIDLINE LOW BACK PAIN WITHOUT SCIATICA: ICD-10-CM

## 2018-10-25 DIAGNOSIS — M54.50 LUMBAR PAIN: Primary | ICD-10-CM

## 2018-10-25 PROCEDURE — 97112 NEUROMUSCULAR REEDUCATION: CPT | Performed by: PHYSICAL THERAPIST

## 2018-10-25 PROCEDURE — 97110 THERAPEUTIC EXERCISES: CPT | Performed by: PHYSICAL THERAPIST

## 2018-10-25 NOTE — PROGRESS NOTES
Daily Note     Today's date: 10/25/2018  Patient name: Vivek Brown  :   MRN: [id number]  Referring provider: Davion Allred MD  Dx:   Encounter Diagnosis     ICD-10-CM    1  Lumbar pain M54 5    2  Chronic midline low back pain without sciatica M54 5     G89 29        Start Time: 1115  Stop Time: 1200  Total time in clinic (min): 45 minutes    Subjective: Pt reports increased tolerance to functional activity  Objective: See treatment diary below      Assessment: Tolerated treatment well  Patient demonstrated fatigue post treatment and would benefit from continued PT      Plan: Continue per plan of care       Precautions: HTN, Afib, obesity, pacemaker     Daily exercise Log     Exercise Diary  10/15/18 10/22 10/25       10/8/18   Bridges 10x5" 10*5" 10*5"          SLR  nv 1*10 1*10       3*5   LTR 10x5" 10*5" 10*5"       10*5"   Forward trunk flexion  15x5" 15*5" 15*5"       15*5"   Clamshells  gtb 3*10*5"        gtb 3*10*5"   Adductor squeezes 30x5" 30*5" 30*5"       30*5"   Seated HT/TR 30x 30* 30*       30x   Mini squats 2x10x3" 2*10*3" 2*10*3"       2*10   Standing hip ext, abd 10x ea 15 ea           Seated marches 2x10 2*10 2*10          Biodex np LOS/maze 3+2 LOS/maze 3+3       LOS maze/ 2+2   Side steps  6x at bar 6* bar 6* bar          LAQ 2x10x5" 2*10*5" 2*10*5"          Gait training   80 feet           Quad sets 20x5" 20*5" 20*5"          Stair training   2*                                                                  Modalities                                                       1:1 with pt from :

## 2018-10-25 NOTE — TELEPHONE ENCOUNTER
She needs to come in for office visit  Medicare won't approve a back brace without an office visit documenting it's necessity   OK to schedule with emiliano

## 2018-10-26 ENCOUNTER — APPOINTMENT (OUTPATIENT)
Dept: PHYSICAL THERAPY | Age: 73
End: 2018-10-26
Payer: MEDICARE

## 2018-10-29 ENCOUNTER — OFFICE VISIT (OUTPATIENT)
Dept: PHYSICAL THERAPY | Age: 73
End: 2018-10-29
Payer: MEDICARE

## 2018-10-29 DIAGNOSIS — G89.29 CHRONIC MIDLINE LOW BACK PAIN WITHOUT SCIATICA: ICD-10-CM

## 2018-10-29 DIAGNOSIS — M54.50 CHRONIC MIDLINE LOW BACK PAIN WITHOUT SCIATICA: ICD-10-CM

## 2018-10-29 DIAGNOSIS — M54.50 LUMBAR PAIN: Primary | ICD-10-CM

## 2018-10-29 PROCEDURE — 97110 THERAPEUTIC EXERCISES: CPT

## 2018-10-29 PROCEDURE — 97112 NEUROMUSCULAR REEDUCATION: CPT

## 2018-10-29 NOTE — PROGRESS NOTES
Daily Note     Today's date: 10/29/2018  Patient name: Traci Dey  :   MRN: [id number]  Referring provider: Zaki Olivier MD  Dx:   Encounter Diagnosis     ICD-10-CM    1  Lumbar pain M54 5    2  Chronic midline low back pain without sciatica M54 5     G89 29                   Subjective:  Pt reports feeling tired today due to poor sleep due to pain, pt reports pacer set to 70    Objective: See treatment diary below  Vitals HR 80-90 /70 Sa O2 98% ( pre treatment)  HR 70 SaO2 98% (post treatment)      Assessment: Tolerated treatment fair  Patient demonstrated fatigue post treatment and would benefit from continued PT      Plan: Continue per plan of care  Progress treatment as tolerated          Precautions: HTN, Afib, obesity, pacemaker     Daily exercise Log     Exercise Diary  10/15/18 10/22 10/25 10/29         Bridges 10x5" 10*5" 10*5" 10x5"         SLR  nv 1*10 1*10 10x b/l         LTR 10x5" 10*5" 10*5" 10x5"         Forward trunk flexion  15x5" 15*5" 15*5" 15x5"         Clamshells  gtb 3*10*5"  gtb 3x10x5"         Adductor squeezes 30x5" 30*5" 30*5" 30x5"         Seated HT/TR 30x 30* 30* 30         Mini squats 2x10x3" 2*10*3" 2*10*3" 2x10x5"         Standing hip ext, abd 10x ea 15 ea  10x ea b/l         Seated marches 2x10 2*10 2*10 2x10         Biodex np LOS/maze 3+2 LOS/maze 3+3 np         Side steps  6x at bar 6* bar 6* bar 6x at bar         LAQ 2x10x5" 2*10*5" 2*10*5" 2x10x5" b/l         Gait training   80 feet           Quad sets 20x5" 20*5" 20*5" 20x5"         Stair training   2* np                                                                 Modalities                                                       1:1 treatment 3-315 -330 VK

## 2018-11-02 ENCOUNTER — APPOINTMENT (OUTPATIENT)
Dept: PHYSICAL THERAPY | Age: 73
End: 2018-11-02
Payer: MEDICARE

## 2018-11-05 ENCOUNTER — APPOINTMENT (OUTPATIENT)
Dept: PHYSICAL THERAPY | Age: 73
End: 2018-11-05
Payer: MEDICARE

## 2018-11-07 ENCOUNTER — OFFICE VISIT (OUTPATIENT)
Dept: PHYSICAL THERAPY | Age: 73
End: 2018-11-07
Payer: MEDICARE

## 2018-11-07 DIAGNOSIS — G89.29 CHRONIC MIDLINE LOW BACK PAIN WITHOUT SCIATICA: ICD-10-CM

## 2018-11-07 DIAGNOSIS — M54.50 LUMBAR PAIN: Primary | ICD-10-CM

## 2018-11-07 DIAGNOSIS — M54.50 CHRONIC MIDLINE LOW BACK PAIN WITHOUT SCIATICA: ICD-10-CM

## 2018-11-07 PROCEDURE — 97112 NEUROMUSCULAR REEDUCATION: CPT | Performed by: PHYSICAL THERAPIST

## 2018-11-07 PROCEDURE — 97110 THERAPEUTIC EXERCISES: CPT | Performed by: PHYSICAL THERAPIST

## 2018-11-07 NOTE — PROGRESS NOTES
Daily Note     Today's date: 2018  Patient name: Eleanor Sheffield  :   MRN: [id number]  Referring provider: Lewis Vora MD  Dx:   Encounter Diagnosis     ICD-10-CM    1  Lumbar pain M54 5    2  Chronic midline low back pain without sciatica M54 5     G89 29        Start Time: 1015  Stop Time: 1100  Total time in clinic (min): 45 minutes    Subjective: Pt reports improvements with symptoms      Objective: See treatment diary below      Assessment: Tolerated treatment well  Patient's POC was advanced to include streghtening in functional positions  Pt required UE support to perform interventions  Patient demonstrated fatigue post treatment and would benefit from continued PT      Plan: Continue per plan of care         Precautions: HTN, Afib, obesity, pacemaker     Daily exercise Log     Exercise Diary  10/15/18 10/22 10/25 10/29 11/7        Bridges 10x5" 10*5" 10*5" 10x5" 15*5"        SLR  nv 1*10 1*10 10x b/l 10* b/l        LTR 10x5" 10*5" 10*5" 10x5" 10*5"        Forward trunk flexion  15x5" 15*5" 15*5" 15x5" 10*10"        Clamshells  gtb 3*10*5"  gtb 3x10x5" gtb 3*10*5"        Adductor squeezes 30x5" 30*5" 30*5" 30x5" 30*5"        Seated HT/TR 30x 30* 30* 30         Mini squats 2x10x3" 2*10*3" 2*10*3" 2x10x5"         Standing hip ext, abd 10x ea 15 ea  10x ea b/l         Seated marches 2x10 2*10 2*10 2x10 2*10        Biodex np LOS/maze 3+2 LOS/maze 3+3 np LOS/maxe 3+3        Side steps  6x at bar 6* bar 6* bar 6x at bar 6* at bar        LAQ 2x10x5" 2*10*5" 2*10*5" 2x10x5" b/l 2*10        Gait training   80 feet           Quad sets 20x5" 20*5" 20*5" 20x5" 20*5"        Stair training   2* np nv        STS     2*5                                                   Modalities

## 2018-11-09 ENCOUNTER — OFFICE VISIT (OUTPATIENT)
Dept: PHYSICAL THERAPY | Age: 73
End: 2018-11-09
Payer: MEDICARE

## 2018-11-09 DIAGNOSIS — M54.50 LUMBAR PAIN: Primary | ICD-10-CM

## 2018-11-09 DIAGNOSIS — G89.29 CHRONIC MIDLINE LOW BACK PAIN WITHOUT SCIATICA: ICD-10-CM

## 2018-11-09 DIAGNOSIS — M54.50 CHRONIC MIDLINE LOW BACK PAIN WITHOUT SCIATICA: ICD-10-CM

## 2018-11-09 PROCEDURE — 97110 THERAPEUTIC EXERCISES: CPT

## 2018-11-09 PROCEDURE — 97112 NEUROMUSCULAR REEDUCATION: CPT

## 2018-11-09 NOTE — PROGRESS NOTES
Daily Note     Today's date: 2018  Patient name: Kizzie Severe  :   MRN: [id number]  Referring provider: Collin Mendoza MD  Dx:   Encounter Diagnosis     ICD-10-CM    1  Lumbar pain M54 5    2  Chronic midline low back pain without sciatica M54 5     G89 29                   Subjective:  Pt reports back pain today    Objective: See treatment diary below      Assessment: Tolerated treatment fair  Patient demonstrated fatigue post treatment and would benefit from continued PT   SaO2 97% HR 82    Plan: Continue per plan of care  Progress treatment as tolerated        Precautions: HTN, Afib, obesity, pacemaker     Daily exercise Log     Exercise Diary  10/15/18 10/22 10/25 10/29 11/7 11/9/18       Bridges 10x5" 10*5" 10*5" 10x5" 15*5" 15x5"       SLR  nv 1*10 1*10 10x b/l 10* b/l 2x10 B       LTR 10x5" 10*5" 10*5" 10x5" 10*5" 10x5"       Forward trunk flexion  15x5" 15*5" 15*5" 15x5" 10*10" 10x10"       Clamshells  gtb 3*10*5"  gtb 3x10x5" gtb 3*10*5" gtb 2x10x3"       Adductor squeezes 30x5" 30*5" 30*5" 30x5" 30*5" 15x5"       Seated HT/TR 30x 30* 30* 30  30x ea       Mini squats 2x10x3" 2*10*3" 2*10*3" 2x10x5"  10x       Standing hip ext, abd 10x ea 15 ea  10x ea b/l  10x ea b/l       Seated marches 2x10 2*10 2*10 2x10 2*10 2x10       Biodex np LOS/maze 3+2 LOS/maze 3+3 np LOS/maxe 3+3 LOS/Maze 3x ea        Side steps  6x at bar 6* bar 6* bar 6x at bar 6* at bar 6x at bar       LAQ 2x10x5" 2*10*5" 2*10*5" 2x10x5" b/l 2*10 2x10       Gait training   80 feet           Quad sets 20x5" 20*5" 20*5" 20x5" 20*5" 20x5"       Stair training   2* np nv np       STS     2*5 2x5                                                  Modalities

## 2018-11-12 ENCOUNTER — OFFICE VISIT (OUTPATIENT)
Dept: PHYSICAL THERAPY | Age: 73
End: 2018-11-12
Payer: MEDICARE

## 2018-11-12 DIAGNOSIS — M54.50 LUMBAR PAIN: Primary | ICD-10-CM

## 2018-11-12 DIAGNOSIS — G89.29 CHRONIC MIDLINE LOW BACK PAIN WITHOUT SCIATICA: ICD-10-CM

## 2018-11-12 DIAGNOSIS — M54.50 CHRONIC MIDLINE LOW BACK PAIN WITHOUT SCIATICA: ICD-10-CM

## 2018-11-12 PROCEDURE — 97112 NEUROMUSCULAR REEDUCATION: CPT

## 2018-11-12 PROCEDURE — 97110 THERAPEUTIC EXERCISES: CPT

## 2018-11-12 NOTE — PROGRESS NOTES
Daily Note     Today's date: 2018  Patient name: Jose J Lira  : 15[birth year]  MRN: [id number]  Referring provider: Eriberto Hendricks MD  Dx:   Encounter Diagnosis     ICD-10-CM    1  Lumbar pain M54 5    2  Chronic midline low back pain without sciatica M54 5     G89 29                   Subjective:  Pt reports seeing  tomorrow re: possible back brace fitting  Objective: See treatment diary below      Assessment: pt able to complete there ex with mod-max fatigue  Guarded mobility noted at times  Plan: Continue per plan of care  Progress treatment as tolerated          Precautions: HTN, Afib, obesity, pacemaker     Daily exercise Log     Exercise Diary  10/15/18 10/22 10/25 10/29 11/7 11/9/18 11/12/18      Bridges 10x5" 10*5" 10*5" 10x5" 15*5" 15x5" 10x5"      SLR  nv 1*10 1*10 10x b/l 10* b/l 2x10 B 2x10      LTR 10x5" 10*5" 10*5" 10x5" 10*5" 10x5" 10x5"      Forward trunk flexion  15x5" 15*5" 15*5" 15x5" 10*10" 10x10" 10x10"+      Clamshells  gtb 3*10*5"  gtb 3x10x5" gtb 3*10*5" gtb 2x10x3" gtb 2x10      Adductor squeezes 30x5" 30*5" 30*5" 30x5" 30*5" 15x5" 30x5"      Seated HT/TR 30x 30* 30* 30  30x ea 30xea      Mini squats 2x10x3" 2*10*3" 2*10*3" 2x10x5"  10x np      Standing hip ext, abd 10x ea 15 ea  10x ea b/l  10x ea b/l 10x ea b/l      Seated marches 2x10 2*10 2*10 2x10 2*10 2x10 2x10      Biodex np LOS/maze 3+2 LOS/maze 3+3 np LOS/maxe 3+3 LOS/Maze 3x ea  np      Side steps  6x at bar 6* bar 6* bar 6x at bar 6* at bar 6x at bar 6x at bar      LAQ 2x10x5" 2*10*5" 2*10*5" 2x10x5" b/l 2*10 2x10 2x10      Gait training   80 feet           Quad sets 20x5" 20*5" 20*5" 20x5" 20*5" 20x5" 20x5" b/l      Stair training   2* np nv np       STS     2*5 2x5 2x5                                                 Modalities

## 2018-11-13 ENCOUNTER — OFFICE VISIT (OUTPATIENT)
Dept: PAIN MEDICINE | Facility: CLINIC | Age: 73
End: 2018-11-13
Payer: MEDICARE

## 2018-11-13 ENCOUNTER — TELEPHONE (OUTPATIENT)
Dept: PAIN MEDICINE | Facility: CLINIC | Age: 73
End: 2018-11-13

## 2018-11-13 VITALS
HEIGHT: 64 IN | BODY MASS INDEX: 42.68 KG/M2 | WEIGHT: 250 LBS | DIASTOLIC BLOOD PRESSURE: 80 MMHG | SYSTOLIC BLOOD PRESSURE: 126 MMHG

## 2018-11-13 DIAGNOSIS — M48.061 SPINAL STENOSIS OF LUMBAR REGION, UNSPECIFIED WHETHER NEUROGENIC CLAUDICATION PRESENT: ICD-10-CM

## 2018-11-13 DIAGNOSIS — M54.16 LUMBAR RADICULOPATHY: ICD-10-CM

## 2018-11-13 DIAGNOSIS — M25.562 CHRONIC PAIN OF LEFT KNEE: ICD-10-CM

## 2018-11-13 DIAGNOSIS — G89.29 CHRONIC PAIN OF LEFT KNEE: ICD-10-CM

## 2018-11-13 DIAGNOSIS — M51.36 LUMBAR DEGENERATIVE DISC DISEASE: Primary | ICD-10-CM

## 2018-11-13 PROCEDURE — 99213 OFFICE O/P EST LOW 20 MIN: CPT | Performed by: NURSE PRACTITIONER

## 2018-11-13 NOTE — TELEPHONE ENCOUNTER
Can you please fax the order for lumbar back brace and demographics sheet to back brace company   I placed the order on your desk

## 2018-11-13 NOTE — PROGRESS NOTES
11/14/2018      Chief Complaint   Patient presents with    Urinary Incontinence     6 week f/u-  Oxybutynin start 9/17/18       Assessment and Plan    74 y o  female managed by Dr Real Dugan    1  Urinary incontinence  - continue oxybutynin ER 10mg   - FU 1 year       History of Present Illness  Cherie Gill is a 74 y o  female here for follow up evaluation of urinary urgency with incontinence  She was started on oxybutynin extended-release 10 mg and states that the medication is working very well  Her lower urinary tract symptoms are listed as below  She is currently very happy with her urinary pattern  She has no complaints today  Review of Systems   Constitutional: Negative for activity change, chills and fever  Gastrointestinal: Negative for abdominal distention and abdominal pain  Musculoskeletal: Negative for back pain and gait problem  Psychiatric/Behavioral: Negative for behavioral problems and confusion  Urinary Incontinence Screening      Most Recent Value   Urinary Incontinence   Urinary Incontinence? Yes ["somewhat"]   Incomplete emptying? Yes ["sometimes"]   Urinary frequency? No   Urinary urgency? Yes   Urinary hesitancy? Yes ["sometimes"]   Dysuria (painful difficult urination)? No   Nocturia (waking up to use the bathroom)? Yes [twice]   Straining (having to push to go)? No   Weak stream?  Yes   Intermittent stream?  No          Past Medical History  Past Medical History:   Diagnosis Date    Abnormal ECG     Last Assessed 9/29/2016    Anxiety     Last Assessed 6/08/2016    Arthritis     knees    Asthma     Last Assessed 11/06/2013    Atrial premature complex     Cataract, bilateral     Last Assessed 7/14/2016    Cataract, left eye     Both eyes  Had surgery on left      Difficulty swallowing     Dizziness     Gastric reflux     Hypertension     Premature ventricular contraction     Primary osteoarthritis of both knees     Last Assessed 7/14/2016    Rheumatic fever     Sore throat     Tuberculosis        Past Social History  Past Surgical History:   Procedure Laterality Date    APPENDECTOMY      HYSTERECTOMY      FL DILATE ESOPHAGUS N/A 4/6/2016    Procedure: DILATATION ESOPHAGEAL;  Surgeon: Karina Lazar MD;  Location: [facility]; Service: Gastroenterology    FL EGD TRANSORAL BIOPSY SINGLE/MULTIPLE N/A 4/6/2016    Procedure: ESOPHAGOGASTRODUODENOSCOPY (EGD); Surgeon: Karina Lazar MD;  Location: [facility]; Service: Gastroenterology     [facility] CATARACT EXTRACAP,INSERT LENS Left 3/15/2016    Procedure: EXTRACTION EXTRACAPSULAR CATARACT PHACO INTRAOCULAR LENS (IOL); Surgeon: Andrew Mcclain MD;  [facility]: [facility];  Service: Ophthalmology    REPLACEMENT TOTAL KNEE Right     TONSILLECTOMY       History   Smoking Status    Never Smoker   Smokeless Tobacco    Never Used       Past Family History  Family History   Problem Relation Age of Onset    Coronary artery disease Mother     Heart attack Mother         Prior    Heart disease Father     Heart attack Father         Prior       Past Social history  Social History     Social History    Marital status:      Spouse name: N/A    Number of children: N/A    Years of education: N/A     Occupational History    Not on file       Social History Main Topics    Smoking status: Never Smoker    Smokeless tobacco: Never Used    Alcohol use No      Comment: Per [employer]    Drug use: No    Sexual activity: No     Other Topics Concern    Not on file     Social History Narrative    No narrative on file       Current Medications  Current Outpatient Prescriptions   Medication Sig Dispense Refill    albuterol (PROVENTIL HFA,VENTOLIN HFA) 90 mcg/act inhaler Inhale 2 puffs every 6 (six) hours as needed for wheezing 1 Inhaler 0    ALPRAZolam (XANAX) 0 25 mg tablet TAKE 1 TABLET(0 25 MG) BY MOUTH THREE TIMES DAILY AS NEEDED FOR ANXIETY 90 tablet 1    amLODIPine (NORVASC) 10 mg tablet TAKE 1 TABLET BY MOUTH DAILY AS DIRECTED 90 tablet 1    Ascorbic Acid (VITAMIN C) 1000 MG tablet Take 1 tablet by mouth daily      B Complex-C (B-COMPLEX WITH VITAMIN C) tablet Take 1 tablet by mouth daily   Cholecalciferol (VITAMIN D3) 1000 units CAPS Take 1 tablet by mouth daily      gabapentin (NEURONTIN) 100 mg capsule Take 1 tablet at bedtime  May increase to 2 tablets after 3 days 60 capsule 0    loratadine (CLARITIN) 10 mg tablet Take 1 tablet (10 mg total) by mouth daily 30 tablet 0    metoprolol tartrate (LOPRESSOR) 50 mg tablet Take 1 tablet (50 mg total) by mouth 3 (three) times a day 90 tablet 3    oxybutynin (DITROPAN-XL) 10 MG 24 hr tablet Take 1 tablet (10 mg total) by mouth daily 90 tablet 3    XARELTO 15 MG tablet 15 mg daily        mometasone (ELOCON) 0 1 % cream Apply topically daily (Patient not taking: Reported on 11/14/2018 ) 15 g 1     No current facility-administered medications for this visit  Allergies  Allergies   Allergen Reactions    W49 Folate [Folic Acid-Vit P8-UTH M91]     [place]      Unknown    Nickel      Skin discoloration    Penicillins Hives    Sulfa Antibiotics Itching    Cortisone Acetate [Cortisone] Itching and Rash    Penicillin G Rash         The following portions of the patient's history were reviewed and updated as appropriate: allergies, current medications, past medical history, past social history, past surgical history and problem list       Vitals  Vitals:    11/14/18 0840   BP: 138/74   BP Location: Left arm   Patient Position: Sitting   Cuff Size: Large   Pulse: 66   Weight: 115 kg (253 lb)   Height: 5' 4" (1 626 m)       Physical Exam  Constitutional   General appearance: Patient is seated and in no acute distress, well appearing and well nourished  Head and Face   Head and face: Normal     Eyes   Conjunctiva and lids: No erythema, swelling or discharge      Ears, Nose, Mouth, and Throat   Hearing: Normal     Pulmonary   Respiratory effort: No increased work of breathing or signs of respiratory distress  Cardiovascular   Examination of extremities for edema and/or varicosities: Normal     Abdomen   Abdomen: Non-tender, no masses  Musculoskeletal   Gait and station: Normal     Skin   Skin and subcutaneous tissue: Warm, dry, and intact  No visible lesions or rashes  Psychiatric   Judgment and insight: Normal  Recent and remote memory:  Normal  Mood and affect: Normal      Results  No results found for this or any previous visit (from the past 1 hour(s))  ]  No results found for: PSA  Lab Results   Component Value Date    CALCIUM 9 4 10/17/2017    K 4 5 10/17/2017    CO2 29 10/17/2017     10/17/2017    BUN 15 10/17/2017    CREATININE 0 77 10/17/2017     Lab Results   Component Value Date    WBC 8 70 10/17/2017    HGB 13 9 10/17/2017    HCT 42 4 10/17/2017    MCV 90 10/17/2017     10/17/2017       Orders  No orders of the defined types were placed in this encounter

## 2018-11-13 NOTE — PROGRESS NOTES
Pt  complains of having back pain        Assessment:  1  Lumbar degenerative disc disease    2  Spinal stenosis of lumbar region, unspecified whether neurogenic claudication present    3  Lumbar radiculopathy    4  Chronic pain of left knee        Plan:  The patient is a 74 y o  female with a history of lumbar degenerative spinal stenosis, lumbar radiculopathy and primary arthritis of the left knee  The patient continues with ongoing low back pain, which is stable since last office visit  She reported that she has noticed increased strength after completing physical therapy  She reports 60% pain relief with the use of physical therapy  She reports that she not interested in any further interventions for her low back at this time  However, she is requesting a back brace to use while doing housework  Therefore, at this time I will sent in an order for a lumbar back brace that the patient can use while completing housework to help reduce pain by restricting mobility of the spine while being active  I instructed the patient that I do not want her using the back brace for long periods of time due to prolonged use of the back brace can cause her core to weaken over time  She verbalized understanding    The patient reported that she did not follow-up with Dr Racheal Cortes as ordered by Addi Saldana last office visit  She reports that her left knee pain is tolerable and she would not like to see orthopedics at this time  She was encouraged to continue with physical therapy and home exercise regimen as tolerated  She will follow up with our office on a as-needed basis, or sooner with worsening symptoms  History of Present Illness: The patient is a 74 y o  female with a history of lumbar degenerative spinal stenosis, lumbar radiculopathy and primary arthritis of the left knee  The patient was last seen in the office on 8/20/2018 where she was referred to physical therapy to focus on core strengthening exercises  She was also referred to Dr Pravin Lizarraga for evaluation of her left knee osteoarthritis  She  presents for a follow up office visit in regards to chronic pain secondary to low back pain  The patient currently reports low back pain that radiates up into her mid back  She describes her pain as sharp, throbbing, shooting, pins and needles pain that is intermittent nature  She reports that her pain is symptoms are worsened during the morning and nighttime hours  She reports that her pain is symptoms are unchanged since last office visit  She currently rates her pain 8/10 numeric pain scale  I have personally reviewed and/or updated the patient's past medical history, past surgical history, family history, social history, current medications, allergies, and vital signs today  Review of Systems:    Review of Systems   Respiratory: Negative for shortness of breath  Cardiovascular: Negative for chest pain  Gastrointestinal: Negative for constipation, diarrhea, nausea and vomiting  Musculoskeletal: Positive for gait problem (Difficulty walking, decreased range of motion)  Negative for arthralgias, joint swelling and myalgias  Joint stiffness   Skin: Negative for rash  Neurological: Negative for dizziness, seizures and weakness  All other systems reviewed and are negative  Past Medical History:   Diagnosis Date    Abnormal ECG     Last Assessed 9/29/2016    Anxiety     Last Assessed 6/08/2016    Arthritis     knees    Asthma     Last Assessed 11/06/2013    Atrial premature complex     Cataract, bilateral     Last Assessed 7/14/2016    Cataract, left eye     Both eyes  Had surgery on left      Difficulty swallowing     Dizziness     Gastric reflux     Hypertension     Premature ventricular contraction     Primary osteoarthritis of both knees     Last Assessed 7/14/2016    Rheumatic fever     Sore throat     Tuberculosis        Past Surgical History:   Procedure Laterality Date  APPENDECTOMY      HYSTERECTOMY      [place] DILATE ESOPHAGUS N/A 4/6/2016    Procedure: DILATATION ESOPHAGEAL;  Surgeon: Mariya White MD;  Location: [facility]; Service: Gastroenterology    [place] EGD TRANSORAL BIOPSY SINGLE/MULTIPLE N/A 4/6/2016    Procedure: ESOPHAGOGASTRODUODENOSCOPY (EGD); Surgeon: Mariya White MD;  Location: [facility]; Service: Gastroenterology     [address] CATARACT EXTRACAP,INSERT LENS Left 3/15/2016    Procedure: EXTRACTION EXTRACAPSULAR CATARACT PHACO INTRAOCULAR LENS (IOL); Surgeon: Carrol Sacks, MD;  [facility];  Service: Ophthalmology    REPLACEMENT TOTAL KNEE Right     TONSILLECTOMY         Family History   Problem Relation Age of Onset    Coronary artery disease Mother     Heart attack Mother         Prior    Heart disease Father     Heart attack Father         Prior       Social History     Occupational History    Not on file  Social History Main Topics    Smoking status: Never Smoker    Smokeless tobacco: Never Used    Alcohol use No      Comment: Per [employer] Social    Drug use: No    Sexual activity: No         Current Outpatient Prescriptions:     albuterol (PROVENTIL HFA,VENTOLIN HFA) 90 mcg/act inhaler, Inhale 2 puffs every 6 (six) hours as needed for wheezing, Disp: 1 Inhaler, Rfl: 0    ALPRAZolam (XANAX) 0 25 mg tablet, TAKE 1 TABLET(0 25 MG) BY MOUTH THREE TIMES DAILY AS NEEDED FOR ANXIETY, Disp: 90 tablet, Rfl: 1    amLODIPine (NORVASC) 10 mg tablet, TAKE 1 TABLET BY MOUTH DAILY AS DIRECTED, Disp: 90 tablet, Rfl: 1    Ascorbic Acid (VITAMIN C) 1000 MG tablet, Take 1 tablet by mouth daily, Disp: , Rfl:     B Complex-C (B-COMPLEX WITH VITAMIN C) tablet, Take 1 tablet by mouth daily  , Disp: , Rfl:     Cholecalciferol (VITAMIN D3) 1000 units CAPS, Take 1 tablet by mouth daily, Disp: , Rfl:     gabapentin (NEURONTIN) 100 mg capsule, Take 1 tablet at bedtime    May increase to 2 tablets after 3 days, Disp: 60 capsule, Rfl: 0    loratadine (CLARITIN) 10 mg tablet, Take 1 tablet (10 mg total) by mouth daily, Disp: 30 tablet, Rfl: 0    metoprolol tartrate (LOPRESSOR) 50 mg tablet, Take 1 tablet (50 mg total) by mouth 3 (three) times a day, Disp: 90 tablet, Rfl: 3    mometasone (ELOCON) 0 1 % cream, Apply topically daily, Disp: 15 g, Rfl: 1    oxybutynin (DITROPAN-XL) 10 MG 24 hr tablet, TAKE 1 TABLET(10 MG) BY MOUTH DAILY, Disp: 90 tablet, Rfl: 3    XARELTO 15 MG tablet, , Disp: , Rfl:     Allergies   Allergen Reactions    T14 Folate [Folic Acid-Vit M6-LDW Q84]     [place]      Unknown    Nickel      Skin discoloration    Penicillins Hives    Sulfa Antibiotics Itching    Cortisone Acetate [Cortisone] Itching and Rash    Penicillin G Rash       Physical Exam:    /80   Ht 5' 4" (1 626 m)   Wt 113 kg (250 lb)   BMI 42 91 kg/m²     Constitutional:normal, well developed, well nourished, alert, in no distress and non-toxic and no overt pain behavior   and obese  Eyes:anicteric  HEENT:grossly intact  Neck:supple, symmetric, trachea midline and no masses   Pulmonary:even and unlabored  Cardiovascular:No edema or pitting edema present  Skin:Normal without rashes or lesions and well hydrated  Psychiatric:Mood and affect appropriate  Neurologic:Cranial Nerves II-XII grossly intact  Musculoskeletal:Ambulates with a walker     Lumbar Spine Exam    Appearance:  Normal lordosis  Palpation/Tenderness:  left lumbar paraspinal tenderness  right lumbar paraspinal tenderness  Sensory:  no sensory deficits noted  Range of Motion:  Flexion:  Minimally limited  with pain  Extension:  Minimally limited  with pain  Lateral Flexion - Left:  Minimally limited  with pain  Lateral Flexion - Right:  Minimally limited  with pain  Rotation - Left:  Minimally limited  with pain  Rotation - Right:  Minimally limited  with pain  Motor Strength:  Left hip flexion:  5/5  Right hip flexion:  5/5  Left knee extension:  5/5  Right knee extension:  5/5  Left foot dorsiflexion:  5/5  Left foot plantar flexion:  5/5  Right foot dorsiflexion:  5/5  Right foot plantar flexion:  5/5      Imaging  No orders to display         No orders of the defined types were placed in this encounter

## 2018-11-14 ENCOUNTER — OFFICE VISIT (OUTPATIENT)
Dept: UROLOGY | Facility: AMBULATORY SURGERY CENTER | Age: 73
End: 2018-11-14
Payer: MEDICARE

## 2018-11-14 VITALS
HEART RATE: 66 BPM | HEIGHT: 64 IN | SYSTOLIC BLOOD PRESSURE: 138 MMHG | BODY MASS INDEX: 43.19 KG/M2 | WEIGHT: 253 LBS | DIASTOLIC BLOOD PRESSURE: 74 MMHG

## 2018-11-14 DIAGNOSIS — R32 URINARY INCONTINENCE, UNSPECIFIED TYPE: ICD-10-CM

## 2018-11-14 DIAGNOSIS — N39.41 URGE INCONTINENCE OF URINE: Primary | ICD-10-CM

## 2018-11-14 PROCEDURE — 99213 OFFICE O/P EST LOW 20 MIN: CPT | Performed by: PHYSICIAN ASSISTANT

## 2018-11-14 RX ORDER — OXYBUTYNIN CHLORIDE 10 MG/1
10 TABLET, EXTENDED RELEASE ORAL DAILY
Qty: 90 TABLET | Refills: 3 | Status: SHIPPED | OUTPATIENT
Start: 2018-11-14 | End: 2018-12-18 | Stop reason: SDUPTHER

## 2018-11-16 ENCOUNTER — APPOINTMENT (OUTPATIENT)
Dept: PHYSICAL THERAPY | Age: 73
End: 2018-11-16
Payer: MEDICARE

## 2018-11-19 ENCOUNTER — EVALUATION (OUTPATIENT)
Dept: PHYSICAL THERAPY | Age: 73
End: 2018-11-19
Payer: MEDICARE

## 2018-11-19 DIAGNOSIS — M54.50 LUMBAR PAIN: Primary | ICD-10-CM

## 2018-11-19 DIAGNOSIS — G89.29 CHRONIC MIDLINE LOW BACK PAIN WITHOUT SCIATICA: ICD-10-CM

## 2018-11-19 DIAGNOSIS — M54.50 CHRONIC MIDLINE LOW BACK PAIN WITHOUT SCIATICA: ICD-10-CM

## 2018-11-19 PROCEDURE — G8991 OTHER PT/OT GOAL STATUS: HCPCS | Performed by: PHYSICAL THERAPIST

## 2018-11-19 PROCEDURE — 97110 THERAPEUTIC EXERCISES: CPT | Performed by: PHYSICAL THERAPIST

## 2018-11-19 PROCEDURE — 97112 NEUROMUSCULAR REEDUCATION: CPT | Performed by: PHYSICAL THERAPIST

## 2018-11-19 PROCEDURE — G8990 OTHER PT/OT CURRENT STATUS: HCPCS | Performed by: PHYSICAL THERAPIST

## 2018-11-19 NOTE — PROGRESS NOTES
PT Re-Evaluation     Today's date: 2018  Patient name: Emy Correia  : [id number]  MRN: [id number]  Referring provider: Lelia Wood MD  Dx:   Encounter Diagnosis     ICD-10-CM    1  Lumbar pain M54 5    2  Chronic midline low back pain without sciatica M54 5     G89 29        Start Time: 1530  Stop Time: 1615  Total time in clinic (min): 45 minutes    Assessment  Impairments: abnormal gait, abnormal or restricted ROM, abnormal movement, impaired balance, impaired physical strength and pain with function    Assessment details: Emy Correia is a 74 y o  female who presents with signs and symptoms consistent of lumbar stenosis and DJD  Since initial evaluation patient presents with improvements in pain, strength and ROM  Due to these improvements, Patient has less difficulty performing a/iadls, recreational activities and engaging in social activities  Pt's has demonstrated improved bed mobility and balance during dynamic movements  Pt still has difficulty with getting in and out of cars and ambulating community distances  Decreased strength has lead to an inability to perform sit to stand without UE support  Pt has demonstrated improvements in  5 STS and TUG (23 + 22 seconds respectively)  Pt will be seen for 3 more visits and then be d/c with HEP  Understanding of Dx/Px/POC: fair   Prognosis: fair  Prognosis details: Pt has multiple bouts of chornic back pain    Goals  Short Term Goals: to be achieved by 4 weeks  1) Patient to be independent with basic HEP  Partially met  2) Decrease pain to 3/10 at its worst  MET  3) Increase lumbar spine ROM by 50% in all deficient planes  Partially met  4) Increase LE strength by 1/2 MMT grade in all deficient planes  Partially met    Long Term Goals: to be achieved by discharge  1) FOTO equal to or greater than 50  Partially met  2) Patient to be independent with comprehensive HEP  Partially met  3) Lumbar spine ROM WNL all planes to improve a/iadls   Partially met  4) Increase LE strength to 5/5 MMT grade in all planes to improve a/iadls  Partially met  5) Patient to report no sleep interruption secondary to pain  Partially met  6) Increase standing to 10 min  MET    Plan  Patient would benefit from: skilled physical therapy  Planned modality interventions: cryotherapy and TENS  Other planned modality interventions: moist heat   Planned therapy interventions: manual therapy, patient education, balance, flexibility, home exercise program, transfer training, therapeutic training, therapeutic exercise, strengthening, stretching and abdominal trunk stabilization  Frequency: Twice a week for three weeks  Treatment plan discussed with: patient        Subjective Evaluation    History of Present Illness  Date of onset: 2018  Mechanism of injury: Pt reports that she has a 2 month of low back pain  Pt reports pain in center of back and nothing radiating into legs  CT scan shows degenerative changes but no medical emergencies  Pt ambulates with rollator walker and reports difficulty with changes in position  Pt has had physical therapy in the past with positive results  Pt reports feeling 25% better since IE  Pt reports improvements doing dishes and performing functional transfers  Pt still needs UE support to get in and out of chair which frustrates her     Quality of life: good    Pain  Current pain ratin  At best pain ratin  At worst pain ratin  Quality: sharp  Relieving factors: relaxation  Aggravating factors: sitting, standing and stair climbing    Social Support  Stairs in house: yes   L[facility] in: [facility]      Diagnostic Tests  CT scan: normal  Treatments  Previous treatment: physical therapy  Patient Goals  Patient goals for therapy: increased strength, decreased pain, return to work and return to sport/leisure activities  Patient goal: Walking, standing up, changing positions        Objective     Special Questions  Negative for night pain, disturbed sleep, bladder dysfunction and saddle (S4) numbness    Tenderness     Additional Tenderness Details  Pt has pain over L5 and S1 spinous process  Neurological Testing     Sensation     Lumbar   Left   Intact: light touch    Right   Intact: light touch    Active Range of Motion     Additional Active Range of Motion Details  Flexion: 100%  Extension:50%  Lateral flexion R: 50%  Lateral flexion L: 50%    Strength/Myotome Testing     Left Hip   Planes of Motion   Flexion: 4  Abduction: 4  Adduction: 4    Right Hip   Planes of Motion   Flexion: 4  Abduction: 4  Adduction: 4    Left Knee   Flexion: 4  Extension: 4    Right Knee   Flexion: 4  Extension: 4    Left Ankle/Foot   Dorsiflexion: 4+  Plantar flexion: 4+    Right Ankle/Foot   Dorsiflexion: 4  Plantar flexion: 4    Tests       Thoracic   Negative slump  Lumbar   Negative slump  Left   Negative passive SLR  Right   Negative passive SLR  Left Pelvic Girdle/Sacrum   Negative: thigh thrust      Right Pelvic Girdle/Sacrum   Negative: thigh thrust      General Comments     Lumbar Comments  Pt has antalgic gait with decreased gait speed  Pt has decreased difficulty transitioning from supine to standing compared to last evaluation         Flowsheet Rows      Most Recent Value   PT/OT G-Codes   Current Score  42   Projected Score  50   Assessment Type  Re-evaluation   G code set  Other PT/OT Primary   Other PT Primary Current Status ()  CK   Other PT Primary Goal Status ()  CK       Precautions: HTN, Afib, obesity, pacemaker     Daily exercise Log     Exercise Diary  10/15/18 10/22 10/25 10/29 11/7 11/9/18 11/12/18 11/19     Bridges 10x5" 10*5" 10*5" 10x5" 15*5" 15x5" 10x5" 10*5"     SLR  nv 1*10 1*10 10x b/l 10* b/l 2x10 B 2x10 2*10     LTR 10x5" 10*5" 10*5" 10x5" 10*5" 10x5" 10x5"      Forward trunk flexion  15x5" 15*5" 15*5" 15x5" 10*10" 10x10" 10x10"+ 10*10"     Clamshells  gtb 3*10*5"  gtb 3x10x5" gtb 3*10*5" gtb 2x10x3" gtb 2x10 Adductor squeezes 30x5" 30*5" 30*5" 30x5" 30*5" 15x5" 30x5"      Seated HT/TR 30x 30* 30* 30  30x ea 30xea 30*     Mini squats 2x10x3" 2*10*3" 2*10*3" 2x10x5"  10x np      Standing hip ext, abd 10x ea 15 ea  10x ea b/l  10x ea b/l 10x ea b/l 10* ea b/l     Seated marches 2x10 2*10 2*10 2x10 2*10 2x10 2x10      Biodex np LOS/maze 3+2 LOS/maze 3+3 np LOS/maxe 3+3 LOS/Maze 3x ea  np LOS/Maze 3x ea      Side steps  6x at bar 6* bar 6* bar 6x at bar 6* at bar 6x at bar 6x at bar      LAQ 2x10x5" 2*10*5" 2*10*5" 2x10x5" b/l 2*10 2x10 2x10 2*10     Gait training   80 feet           Quad sets 20x5" 20*5" 20*5" 20x5" 20*5" 20x5" 20x5" b/l 20*5"     Stair training   2* np nv np       STS     2*5 2x5 2x5                                                 Modalities

## 2018-11-23 ENCOUNTER — OFFICE VISIT (OUTPATIENT)
Dept: PHYSICAL THERAPY | Age: 73
End: 2018-11-23
Payer: MEDICARE

## 2018-11-23 DIAGNOSIS — M54.50 CHRONIC MIDLINE LOW BACK PAIN WITHOUT SCIATICA: ICD-10-CM

## 2018-11-23 DIAGNOSIS — G89.29 CHRONIC MIDLINE LOW BACK PAIN WITHOUT SCIATICA: ICD-10-CM

## 2018-11-23 DIAGNOSIS — M54.50 LUMBAR PAIN: Primary | ICD-10-CM

## 2018-11-23 PROCEDURE — 97112 NEUROMUSCULAR REEDUCATION: CPT

## 2018-11-23 PROCEDURE — 97110 THERAPEUTIC EXERCISES: CPT

## 2018-11-23 NOTE — PROGRESS NOTES
Daily Note     Today's date: 2018  Patient name: Jessica Arias  : [birth year]  MRN: [id number]  Referring provider: Ana Harris MD  Dx:   Encounter Diagnosis     ICD-10-CM    1  Lumbar pain M54 5    2  Chronic midline low back pain without sciatica M54 5     G89 29                   Subjective: Patient reported having a bad day  Pt reported her R hand has been shaking out of no where while watching TV at night  Pt reported her MD CX her appointment  Pt educated to contact a MD and make an appointment as soon as possible  Objective: See treatment diary below      Assessment: Tolerated treatment well  Patient demonstrated fatigue post treatment, exhibited good technique with therapeutic exercises and would benefit from continued PT  Pt required multiple rest breaks within the treatment session  No increase in pain throughout the treatment   Plan: Continue per plan of care       Precautions: HTN, Afib, obesity, pacemaker     Daily exercise Log     Exercise Diary  10/15/18 10/22 10/25 10/29 11/7 11/9/18 11/12/18 11/19 11/23    Bridges 10x5" 10*5" 10*5" 10x5" 15*5" 15x5" 10x5" 10*5" 2x10 5" hold    SLR  nv 1*10 1*10 10x b/l 10* b/l 2x10 B 2x10 2*10 2x10     LTR 10x5" 10*5" 10*5" 10x5" 10*5" 10x5" 10x5"  5' x10    Forward trunk flexion  15x5" 15*5" 15*5" 15x5" 10*10" 10x10" 10x10"+ 10*10"     Clamshells  gtb 3*10*5"  gtb 3x10x5" gtb 3*10*5" gtb 2x10x3" gtb 2x10  GTB 2x10    Adductor squeezes 30x5" 30*5" 30*5" 30x5" 30*5" 15x5" 30x5"  5" x30    Seated HT/TR 30x 30* 30* 30  30x ea 30xea 30* 30x    Mini squats 2x10x3" 2*10*3" 2*10*3" 2x10x5"  10x np  10x    Standing hip ext, abd 10x ea 15 ea  10x ea b/l  10x ea b/l 10x ea b/l 10* ea b/l 2x10    Seated marches 2x10 2*10 2*10 2x10 2*10 2x10 2x10  2x10    Biodex np LOS/maze 3+2 LOS/maze 3+3 np LOS/maxe 3+3 LOS/Maze 3x ea  np LOS/Maze 3x ea  Maze 2 static 100% one L12 75%    Side steps  6x at bar 6* bar 6* bar 6x at bar 6* at bar 6x at bar 6x at bar  6x at bar      LAQ 2x10x5" 2*10*5" 2*10*5" 2x10x5" b/l 2*10 2x10 2x10 2*10 2x10    Gait training   80 feet           Quad sets 20x5" 20*5" 20*5" 20x5" 20*5" 20x5" 20x5" b/l 20*5"     Stair training   2* np nv np       STS     2*5 2x5 2x5  2x5                                               Modalities

## 2018-11-30 ENCOUNTER — APPOINTMENT (OUTPATIENT)
Dept: PHYSICAL THERAPY | Age: 73
End: 2018-11-30
Payer: MEDICARE

## 2018-12-18 ENCOUNTER — TELEPHONE (OUTPATIENT)
Dept: UROLOGY | Facility: MEDICAL CENTER | Age: 73
End: 2018-12-18

## 2018-12-18 DIAGNOSIS — R32 URINARY INCONTINENCE, UNSPECIFIED TYPE: ICD-10-CM

## 2018-12-18 RX ORDER — OXYBUTYNIN CHLORIDE 15 MG/1
15 TABLET, EXTENDED RELEASE ORAL DAILY
Qty: 30 TABLET | Refills: 11 | Status: SHIPPED | OUTPATIENT
Start: 2018-12-18 | End: 2020-01-10 | Stop reason: SDUPTHER

## 2018-12-18 NOTE — TELEPHONE ENCOUNTER
Spoke with patient and informed her new script was sent to [place] for increased dose  She will  tomorrow

## 2018-12-18 NOTE — TELEPHONE ENCOUNTER
Oxybutynin dosage increased to 15mg  Prescription sent to [facility]

## 2018-12-20 ENCOUNTER — REMOTE DEVICE CLINIC VISIT (OUTPATIENT)
Dept: CARDIOLOGY CLINIC | Facility: CLINIC | Age: 73
End: 2018-12-20
Payer: MEDICARE

## 2018-12-20 DIAGNOSIS — Z95.0 PRESENCE OF CARDIAC PACEMAKER: ICD-10-CM

## 2018-12-20 DIAGNOSIS — I49.5 SSS (SICK SINUS SYNDROME) (HCC): ICD-10-CM

## 2018-12-20 DIAGNOSIS — I48.0 PAROXYSMAL ATRIAL FIBRILLATION (HCC): Primary | ICD-10-CM

## 2018-12-20 PROCEDURE — 93294 REM INTERROG EVL PM/LDLS PM: CPT | Performed by: INTERNAL MEDICINE

## 2018-12-20 PROCEDURE — 93296 REM INTERROG EVL PM/IDS: CPT | Performed by: INTERNAL MEDICINE

## 2018-12-20 NOTE — PROGRESS NOTES
Results for orders placed or performed in visit on 12/20/18   Cardiac EP device report    Narrative    MDT DUAL CHAMBER PPM  CARELINK TRANSMISSION: BATTERY ADEQUATE (9 YRS)  AP: 88 2%  : 2 6%  ALL AVAILABLE LEAD PARAMETERS WITHIN NORMAL LIMITS  AF EPISODES TREATED WITH ATP  LASTING UP TO 24 HRS IN DURATION  5% PACED TERMINATED  AF BURDEN 3 2%  HX OF DAME  NO OTHER SIGNIFICANT HIGH RATE EPISODES  PT TAKES METOPROLOL [facility] [place]  PACEMAKER FUNCTIONING APPROPRIATELY    [address] [place] [address] Street

## 2019-01-03 DIAGNOSIS — I10 ESSENTIAL HYPERTENSION: ICD-10-CM

## 2019-01-03 RX ORDER — AMLODIPINE BESYLATE 10 MG/1
TABLET ORAL
Qty: 90 TABLET | Refills: 2 | Status: SHIPPED | OUTPATIENT
Start: 2019-01-03 | End: 2019-10-17 | Stop reason: SDUPTHER

## 2019-01-17 DIAGNOSIS — F41.9 ANXIETY: ICD-10-CM

## 2019-01-17 RX ORDER — ALPRAZOLAM 0.25 MG/1
TABLET ORAL
Qty: 90 TABLET | Refills: 0 | Status: SHIPPED | OUTPATIENT
Start: 2019-01-17 | End: 2019-03-05 | Stop reason: SDUPTHER

## 2019-01-28 ENCOUNTER — OFFICE VISIT (OUTPATIENT)
Dept: INTERNAL MEDICINE CLINIC | Facility: CLINIC | Age: 74
End: 2019-01-28
Payer: MEDICARE

## 2019-01-28 VITALS
BODY MASS INDEX: 43.36 KG/M2 | RESPIRATION RATE: 14 BRPM | WEIGHT: 254 LBS | HEART RATE: 79 BPM | OXYGEN SATURATION: 98 % | TEMPERATURE: 96.8 F | DIASTOLIC BLOOD PRESSURE: 78 MMHG | SYSTOLIC BLOOD PRESSURE: 140 MMHG | HEIGHT: 64 IN

## 2019-01-28 DIAGNOSIS — E66.3 OVERWEIGHT: ICD-10-CM

## 2019-01-28 DIAGNOSIS — M17.0 PRIMARY OSTEOARTHRITIS OF BOTH KNEES: ICD-10-CM

## 2019-01-28 DIAGNOSIS — I48.0 AF (PAROXYSMAL ATRIAL FIBRILLATION) (HCC): ICD-10-CM

## 2019-01-28 DIAGNOSIS — E78.5 HYPERLIPIDEMIA, UNSPECIFIED HYPERLIPIDEMIA TYPE: ICD-10-CM

## 2019-01-28 DIAGNOSIS — Z23 NEED FOR PROPHYLACTIC VACCINATION AGAINST STREPTOCOCCUS PNEUMONIAE (PNEUMOCOCCUS): ICD-10-CM

## 2019-01-28 DIAGNOSIS — I10 ESSENTIAL HYPERTENSION: ICD-10-CM

## 2019-01-28 DIAGNOSIS — Z12.11 COLON CANCER SCREENING: Primary | ICD-10-CM

## 2019-01-28 DIAGNOSIS — I48.0 PAROXYSMAL ATRIAL FIBRILLATION (HCC): ICD-10-CM

## 2019-01-28 DIAGNOSIS — Z23 ENCOUNTER FOR IMMUNIZATION: ICD-10-CM

## 2019-01-28 PROBLEM — R53.83 FATIGUE: Status: RESOLVED | Noted: 2018-02-15 | Resolved: 2019-01-28

## 2019-01-28 PROBLEM — R00.1 BRADYCARDIA: Status: RESOLVED | Noted: 2017-07-18 | Resolved: 2019-01-28

## 2019-01-28 PROCEDURE — G0009 ADMIN PNEUMOCOCCAL VACCINE: HCPCS | Performed by: INTERNAL MEDICINE

## 2019-01-28 PROCEDURE — 99214 OFFICE O/P EST MOD 30 MIN: CPT | Performed by: INTERNAL MEDICINE

## 2019-01-28 PROCEDURE — 90732 PPSV23 VACC 2 YRS+ SUBQ/IM: CPT | Performed by: INTERNAL MEDICINE

## 2019-01-28 RX ORDER — LISINOPRIL 5 MG/1
5 TABLET ORAL DAILY
Qty: 30 TABLET | Refills: 5 | Status: SHIPPED | OUTPATIENT
Start: 2019-01-28 | End: 2019-01-28 | Stop reason: SDUPTHER

## 2019-01-28 RX ORDER — LISINOPRIL 5 MG/1
TABLET ORAL
Qty: 90 TABLET | Refills: 1 | Status: SHIPPED | OUTPATIENT
Start: 2019-01-28 | End: 2019-02-27 | Stop reason: SDUPTHER

## 2019-01-28 NOTE — PROGRESS NOTES
Assessment/Plan:    AF (paroxysmal atrial fibrillation) (HCC)  History of paroxysmal atrial fibrillation  The patient presently is in a regular rhythm  She continues on Xarelto for stroke risk reduction under the direction of her cardiologist   She denies any chest pain palpitations or shortness of breath  Hypertension  Hypertension in this patient appears to be suboptimally controlled  Her blood pressure today is 140/78  We asked her to initiate lisinopril 5 mg daily in addition to her metoprolol and amlodipine  Will follow up with her in approximately 1 month to re-evaluate her blood pressure  Comprehensive metabolic profile and lipid profile have been requested  Degenerative arthritis of knee, bilateral  Degenerative arthritis of both knees  The patient walks with a walker no recent falls noted  Overweight  Chronic overweight condition BMI is 43 recommend continued efforts to reduce weight through calorie consumption reduction ability to exercise is limited due to her chronic back pain and arthritic knees  Diagnoses and all orders for this visit:    Colon cancer screening  -     Cancel: Ambulatory referral to Gastroenterology; Future    Encounter for immunization  -     Cancel: PREFERRED: influenza vaccine, [id number], high-dose, PF 0 5 mL, for patients 65 yr+ (FLUZONE HIGH-DOSE)    Essential hypertension  -     Comprehensive metabolic panel; Future  -     Discontinue: lisinopril (ZESTRIL) 5 mg tablet; Take 1 tablet (5 mg total) by mouth daily    Paroxysmal atrial fibrillation (HCC)  -     Comprehensive metabolic panel; Future    Hyperlipidemia, unspecified hyperlipidemia type  -     Lipid panel;  Future    Need for prophylactic vaccination against Streptococcus pneumoniae (pneumococcus)  -     PNEUMOCOCCAL POLYSACCHARIDE VACCINE 23-VALENT =>1YO SQ IM    AF (paroxysmal atrial fibrillation) (HCC)    Primary osteoarthritis of both knees    Overweight        Subjective:      Patient ID: Vivianacarmen Nesbitttimfrederick Gladys Hawkins is a 74 y o  female  This is a routine follow-up visit for this 13-year-old female patient with a history of atrial fibrillation hypertension overweight condition arthritis of the knees and chronic back pain  The following portions of the patient's history were reviewed and updated as appropriate:   She  has a past medical history of Abnormal ECG; Anxiety; Arthritis; Asthma; Atrial premature complex; Cataract, bilateral; Cataract, left eye; Difficulty swallowing; Dizziness; Gastric reflux; Hypertension; Premature ventricular contraction; Primary osteoarthritis of both knees; Rheumatic fever; Sore throat; and Tuberculosis  She   Patient Active Problem List    Diagnosis Date Noted    Urinary incontinence 09/17/2018    Psoriasis 07/20/2018    Thyroid nodule 07/20/2018    Allergic pharyngitis 07/02/2018    [facility]mal [facility]) 02/15/2018    Tachy-smita syndrome ([address] ) 02/15/2018    Hypertension 02/15/2018    Pacemaker 02/15/2018    GERD (gastroesophageal reflux disease) 07/18/2017    Mild carpal tunnel syndrome, right 04/10/2017    Degenerative lumbar spinal stenosis 03/31/2017    UTI symptoms 01/04/2017    Degenerative arthritis of knee, bilateral 09/29/2016    Low back pain 08/29/2016    Lumbar degenerative disc disease 08/29/2016    Chronic bilateral low back pain without sciatica 06/08/2016    Chronic GERD 05/06/2016    Overweight 02/12/2016    Fibromyalgia 11/06/2013    Hypercholesterolemia 11/06/2013     She  has a past surgical history that includes Hysterectomy; Tonsillectomy; pr egd transoral biopsy single/multiple (N/A, 4/6/2016); pr dilate esophagus (N/A, 4/6/2016); pr remv cataract extracap,insert lens (Left, 3/15/2016); Appendectomy; and Replacement total knee (Right)  Her family history includes Coronary artery disease in her mother; Heart attack in her father and mother; Heart disease in her father  She  reports that she has never smoked   She has never used smokeless tobacco  She reports that she does not drink alcohol or use drugs  Current Outpatient Prescriptions   Medication Sig Dispense Refill    albuterol (PROVENTIL HFA,VENTOLIN HFA) 90 mcg/act inhaler Inhale 2 puffs every 6 (six) hours as needed for wheezing 1 Inhaler 0    ALPRAZolam (XANAX) 0 25 mg tablet TAKE 1 TABLET(0 25 MG) BY MOUTH THREE TIMES DAILY AS NEEDED FOR ANXIETY 90 tablet 0    amLODIPine (NORVASC) 10 mg tablet TAKE 1 TABLET BY MOUTH DAILY AS DIRECTED 90 tablet 2    Ascorbic Acid (VITAMIN C) 1000 MG tablet Take 1 tablet by mouth daily      B Complex-C (B-COMPLEX WITH VITAMIN C) tablet Take 1 tablet by mouth daily   Cholecalciferol (VITAMIN D3) 1000 units CAPS Take 1 tablet by mouth daily      gabapentin (NEURONTIN) 100 mg capsule Take 1 tablet at bedtime  May increase to 2 tablets after 3 days 60 capsule 0    loratadine (CLARITIN) 10 mg tablet Take 1 tablet (10 mg total) by mouth daily 30 tablet 0    metoprolol tartrate (LOPRESSOR) 50 mg tablet Take 1 tablet (50 mg total) by mouth 3 (three) times a day 90 tablet 3    mometasone (ELOCON) 0 1 % cream Apply topically daily 15 g 1    oxybutynin (DITROPAN XL) 15 MG 24 hr tablet Take 1 tablet (15 mg total) by mouth daily 30 tablet 11    XARELTO 15 MG tablet 15 mg daily        lisinopril (ZESTRIL) 5 mg tablet TAKE 1 TABLET(5 MG) BY MOUTH DAILY 90 tablet 1     No current facility-administered medications for this visit       Review of Systems   Constitutional: Positive for fatigue  Musculoskeletal: Positive for arthralgias, gait problem and myalgias  All other systems reviewed and are negative  Objective:      /78   Pulse 79   Temp (!) 96 8 °F (36 °C)   Resp 14   Ht 5' 4" (1 626 m)   Wt 115 kg (254 lb)   SpO2 98%   BMI 43 60 kg/m²          Physical Exam   Constitutional: She is oriented to person, place, and time  Vital signs are normal  She appears well-developed and well-nourished   She is cooperative  HENT:   Head: Atraumatic  Right Ear: Hearing, tympanic membrane, external ear and ear canal normal    Left Ear: Hearing, tympanic membrane, external ear and ear canal normal    Nose: Nose normal  No mucosal edema  Mouth/Throat: Uvula is midline, oropharynx is clear and moist and mucous membranes are normal    Eyes: Pupils are equal, round, and reactive to light  Conjunctivae and lids are normal  Right eye exhibits no discharge  Left eye exhibits no discharge  Neck: No JVD present  Carotid bruit is not present  No thyromegaly present  Cardiovascular: Normal rate, regular rhythm, normal heart sounds and intact distal pulses  No murmur heard  Pulmonary/Chest: Effort normal and breath sounds normal  No respiratory distress  She has no wheezes  She has no rales  Abdominal: Soft  Normal appearance and bowel sounds are normal    Musculoskeletal: Normal range of motion  She exhibits no edema  Lymphadenopathy:     She has no cervical adenopathy  Neurological: She is alert and oriented to person, place, and time  She has normal reflexes  Skin: Skin is warm, dry and intact  No rash noted  No erythema  No pallor  Psychiatric: She has a normal mood and affect  Her speech is normal and behavior is normal  Judgment and thought content normal  Cognition and memory are normal    Vitals reviewed

## 2019-01-29 NOTE — ASSESSMENT & PLAN NOTE
History of paroxysmal atrial fibrillation  The patient presently is in a regular rhythm  She continues on Xarelto for stroke risk reduction under the direction of her cardiologist   She denies any chest pain palpitations or shortness of breath

## 2019-01-29 NOTE — ASSESSMENT & PLAN NOTE
Hypertension in this patient appears to be suboptimally controlled  Her blood pressure today is 140/78  We asked her to initiate lisinopril 5 mg daily in addition to her metoprolol and amlodipine  Will follow up with her in approximately 1 month to re-evaluate her blood pressure  Comprehensive metabolic profile and lipid profile have been requested

## 2019-01-29 NOTE — ASSESSMENT & PLAN NOTE
Chronic overweight condition BMI is 43 recommend continued efforts to reduce weight through calorie consumption reduction ability to exercise is limited due to her chronic back pain and arthritic knees

## 2019-02-27 ENCOUNTER — OFFICE VISIT (OUTPATIENT)
Dept: INTERNAL MEDICINE CLINIC | Facility: CLINIC | Age: 74
End: 2019-02-27
Payer: MEDICARE

## 2019-02-27 VITALS
SYSTOLIC BLOOD PRESSURE: 152 MMHG | HEART RATE: 85 BPM | OXYGEN SATURATION: 97 % | DIASTOLIC BLOOD PRESSURE: 82 MMHG | BODY MASS INDEX: 43.36 KG/M2 | HEIGHT: 64 IN | TEMPERATURE: 97.2 F | WEIGHT: 254 LBS

## 2019-02-27 DIAGNOSIS — R05.9 COUGH: ICD-10-CM

## 2019-02-27 DIAGNOSIS — G47.09 OTHER INSOMNIA: Primary | ICD-10-CM

## 2019-02-27 DIAGNOSIS — I10 ESSENTIAL HYPERTENSION: ICD-10-CM

## 2019-02-27 PROCEDURE — 99213 OFFICE O/P EST LOW 20 MIN: CPT | Performed by: INTERNAL MEDICINE

## 2019-02-27 RX ORDER — LISINOPRIL 10 MG/1
10 TABLET ORAL DAILY
Qty: 90 TABLET | Refills: 2 | Status: SHIPPED | OUTPATIENT
Start: 2019-02-27 | End: 2019-09-30

## 2019-02-27 NOTE — ASSESSMENT & PLAN NOTE
The patient notes that she has a persistent nonproductive cough  She does live in a home that has significant amount of mold and mildew which may be an irritant to her airways  On physical examination today we see no evidence of any infection process and her temperature is normal at 97 2  She will be moving out of her present residents to a different location that does not have mold and mildew issues  Will see if her symptoms improve after the move  The cough did start prior to the initiation of her lisinopril medication so it is unlikely that lisinopril is the cause of the cough

## 2019-02-27 NOTE — ASSESSMENT & PLAN NOTE
Insomnia symptoms with the patient falling asleep without much difficulty but then awakening and unable to return to sleep in the middle the night  She attributes this to an upcoming move and stress in her life  She indicates that she can't turn her mind off in the middle of the night  She does have alprazolam 0 25 mg and I have asked her to use the medication if she finds that she cannot return to sleep in the middle of the night  An alternative way to use the medication would be just to take it at bedtime to see if it helps her relax and sleep through the whole night

## 2019-02-27 NOTE — PROGRESS NOTES
Assessment/Plan:    Hypertension  Blood pressure remains elevated with a reading of 152/82 today  I have asked her to increase her lisinopril from 5 mg daily to 10 mg daily with a follow-up visit in 4 weeks to reassess her hypertension  Other insomnia  Insomnia symptoms with the patient falling asleep without much difficulty but then awakening and unable to return to sleep in the middle the night  She attributes this to an upcoming move and stress in her life  She indicates that she can't turn her mind off in the middle of the night  She does have alprazolam 0 25 mg and I have asked her to use the medication if she finds that she cannot return to sleep in the middle of the night  An alternative way to use the medication would be just to take it at bedtime to see if it helps her relax and sleep through the whole night  Cough  The patient notes that she has a persistent nonproductive cough  She does live in a home that has significant amount of mold and mildew which may be an irritant to her airways  On physical examination today we see no evidence of any infection process and her temperature is normal at 97 2  She will be moving out of her present residents to a different location that does not have mold and mildew issues  Will see if her symptoms improve after the move  The cough did start prior to the initiation of her lisinopril medication so it is unlikely that lisinopril is the cause of the cough  Diagnoses and all orders for this visit:    Other insomnia    Essential hypertension  -     lisinopril (ZESTRIL) 10 mg tablet; Take 1 tablet (10 mg total) by mouth daily        Subjective:      Patient ID: Carline Castaneda is a 74 y o  female  This 18-year-old female patient returns today for a follow-up visit pertaining to her uncontrolled hypertension  On her last visit with us 1 month ago she was noted to have an elevated systolic and diastolic blood pressure    She was started on 5 mg of lisinopril to complement her medications of metoprolol and amlodipine  She returns today with no symptoms attributed to hypertension nor any side effects of the lisinopril medication  She is experiencing difficulties with insomnia  She indicates that she will fall sleep wake up during the night and cannot return to sleep due to persistent thoughts and concerns and worries  She is planning a move in the near future  The following portions of the patient's history were reviewed and updated as appropriate:   She  has a past medical history of Abnormal ECG, Anxiety, Arthritis, Asthma, Atrial premature complex, Cataract, bilateral, Cataract, left eye, Difficulty swallowing, Dizziness, Gastric reflux, Hypertension, Premature ventricular contraction, Primary osteoarthritis of both knees, Rheumatic fever, Sore throat, and Tuberculosis  She   Patient Active Problem List    Diagnosis Date Noted    Other insomnia 02/27/2019    Cough 02/27/2019    Urinary incontinence 09/17/2018    Psoriasis 07/20/2018    Thyroid nodule 07/20/2018    Allergic pharyngitis 07/02/2018    [facility]) 02/15/2018    Tachy-smita syndrome ([address] ) 02/15/2018    Hypertension 02/15/2018    Pacemaker 02/15/2018    GERD (gastroesophageal reflux disease) 07/18/2017    Mild carpal tunnel syndrome, right 04/10/2017    Degenerative lumbar spinal stenosis 03/31/2017    UTI symptoms 01/04/2017    Degenerative arthritis of knee, bilateral 09/29/2016    Low back pain 08/29/2016    Lumbar degenerative disc disease 08/29/2016    Chronic bilateral low back pain without sciatica 06/08/2016    Chronic GERD 05/06/2016    Overweight 02/12/2016    Fibromyalgia 11/06/2013    Hypercholesterolemia 11/06/2013     She  has a past surgical history that includes Hysterectomy;  Tonsillectomy; pr egd transoral biopsy single/multiple (N/A, 4/6/2016); pr dilate esophagus (N/A, 4/6/2016); pr remv cataract extracap,insert lens (Left, 3/15/2016); Appendectomy; and Replacement total knee (Right)  Her family history includes Coronary artery disease in her mother; Heart attack in her father and mother; Heart disease in her father  She  reports that she has never smoked  She has never used smokeless tobacco  She reports that she does not drink alcohol or use drugs  Current Outpatient Medications   Medication Sig Dispense Refill    albuterol (PROVENTIL HFA,VENTOLIN HFA) 90 mcg/act inhaler Inhale 2 puffs every 6 (six) hours as needed for wheezing 1 Inhaler 0    ALPRAZolam (XANAX) 0 25 mg tablet TAKE 1 TABLET(0 25 MG) BY MOUTH THREE TIMES DAILY AS NEEDED FOR ANXIETY 90 tablet 0    amLODIPine (NORVASC) 10 mg tablet TAKE 1 TABLET BY MOUTH DAILY AS DIRECTED 90 tablet 2    Ascorbic Acid (VITAMIN C) 1000 MG tablet Take 1 tablet by mouth daily      B Complex-C (B-COMPLEX WITH VITAMIN C) tablet Take 1 tablet by mouth daily   Cholecalciferol (VITAMIN D3) 1000 units CAPS Take 1 tablet by mouth daily      gabapentin (NEURONTIN) 100 mg capsule Take 1 tablet at bedtime  May increase to 2 tablets after 3 days 60 capsule 0    lisinopril (ZESTRIL) 10 mg tablet Take 1 tablet (10 mg total) by mouth daily 90 tablet 2    loratadine (CLARITIN) 10 mg tablet Take 1 tablet (10 mg total) by mouth daily 30 tablet 0    metoprolol tartrate (LOPRESSOR) 50 mg tablet Take 1 tablet (50 mg total) by mouth 3 (three) times a day 90 tablet 3    mometasone (ELOCON) 0 1 % cream Apply topically daily 15 g 1    oxybutynin (DITROPAN XL) 15 MG 24 hr tablet Take 1 tablet (15 mg total) by mouth daily 30 tablet 11    XARELTO 15 MG tablet 15 mg daily         No current facility-administered medications for this visit       Review of Systems   Psychiatric/Behavioral: Positive for sleep disturbance  All other systems reviewed and are negative          Objective:      /82   Pulse 85   Temp (!) 97 2 °F (36 2 °C)   Ht 5' 4" (1 626 m)   Wt 115 kg (254 lb) SpO2 97%   BMI 43 60 kg/m²          Physical Exam   Constitutional: She is oriented to person, place, and time  Vital signs are normal  She appears well-developed and well-nourished  She is cooperative  HENT:   Right Ear: Hearing, tympanic membrane, external ear and ear canal normal    Left Ear: Hearing, tympanic membrane, external ear and ear canal normal    Nose: Nose normal  No mucosal edema  Mouth/Throat: Uvula is midline, oropharynx is clear and moist and mucous membranes are normal    Eyes: Pupils are equal, round, and reactive to light  Conjunctivae and lids are normal    Neck: No JVD present  Carotid bruit is not present  No thyromegaly present  Cardiovascular: Normal rate, regular rhythm, normal heart sounds and intact distal pulses  No murmur heard  Pulmonary/Chest: Effort normal and breath sounds normal  No stridor  No respiratory distress  She has no wheezes  She has no rales  She exhibits no tenderness  Abdominal: Soft  Normal appearance and bowel sounds are normal    Musculoskeletal: Normal range of motion  She exhibits no edema  Lymphadenopathy:     She has no cervical adenopathy  Neurological: She is alert and oriented to person, place, and time  She has normal reflexes  She displays normal reflexes  Skin: Skin is warm, dry and intact  Psychiatric: She has a normal mood and affect  Her speech is normal and behavior is normal  Judgment and thought content normal  Cognition and memory are normal    Vitals reviewed

## 2019-02-27 NOTE — ASSESSMENT & PLAN NOTE
Blood pressure remains elevated with a reading of 152/82 today  I have asked her to increase her lisinopril from 5 mg daily to 10 mg daily with a follow-up visit in 4 weeks to reassess her hypertension

## 2019-03-05 DIAGNOSIS — F41.9 ANXIETY: ICD-10-CM

## 2019-03-05 RX ORDER — ALPRAZOLAM 0.25 MG/1
TABLET ORAL
Qty: 90 TABLET | Refills: 0 | Status: SHIPPED | OUTPATIENT
Start: 2019-03-05 | End: 2019-05-08 | Stop reason: SDUPTHER

## 2019-03-27 ENCOUNTER — OFFICE VISIT (OUTPATIENT)
Dept: INTERNAL MEDICINE CLINIC | Facility: CLINIC | Age: 74
End: 2019-03-27
Payer: MEDICARE

## 2019-03-27 VITALS
WEIGHT: 253 LBS | HEIGHT: 64 IN | SYSTOLIC BLOOD PRESSURE: 126 MMHG | OXYGEN SATURATION: 97 % | TEMPERATURE: 97.6 F | HEART RATE: 67 BPM | BODY MASS INDEX: 43.19 KG/M2 | DIASTOLIC BLOOD PRESSURE: 70 MMHG

## 2019-03-27 DIAGNOSIS — R25.1 TREMOR: ICD-10-CM

## 2019-03-27 DIAGNOSIS — F41.9 ANXIETY: ICD-10-CM

## 2019-03-27 DIAGNOSIS — I10 ESSENTIAL HYPERTENSION: Primary | ICD-10-CM

## 2019-03-27 DIAGNOSIS — I48.0 AF (PAROXYSMAL ATRIAL FIBRILLATION) (HCC): ICD-10-CM

## 2019-03-27 PROCEDURE — 99214 OFFICE O/P EST MOD 30 MIN: CPT | Performed by: INTERNAL MEDICINE

## 2019-03-27 NOTE — ASSESSMENT & PLAN NOTE
History of hypertension improved control recommend continuation of lisinopril at 10 mg daily and metoprolol tartrate 50 mg 3 times a day as well as amlodipine at 10 mg daily  Follow-up assessment is requested in 4 months

## 2019-03-27 NOTE — PROGRESS NOTES
Assessment/Plan:    Hypertension  History of hypertension improved control recommend continuation of lisinopril at 10 mg daily and metoprolol tartrate 50 mg 3 times a day as well as amlodipine at 10 mg daily  Follow-up assessment is requested in 4 months  Tremor  Very mild tremor of the left hand noted on today's examination  Patient is unaware of any family history of tremor she does not have any other symptoms to suggest Parkinson's disease at this time  She is already on a beta-blocker therapy for management of her hypertension will continue to monitor this very mild tremor    Anxiety  Symptoms of anxiety secondary to stress related to her impending move  She will be moving into a senior citizen residential setting  She is normally rather private individual is not sure how she will feel in this community type setting  She does have and a prescription for alprazolam 0 25 she can take 1 tablet 3 times a day as needed for anxiety and stress  AF [facility]ox[facility]mal [facility]) [facility] )  Patient has a history of paroxysmal atrial fibrillation presently she is in an irregular rhythm with a good control of her heart rate  She continues on Xarelto 15 mg daily for stroke risk reduction  She has no chest pain or palpitations on today's visit  Diagnoses and all orders for this visit:    Essential hypertension    Anxiety        Subjective:      Patient ID: Edmond Degroot is a 74 y o  female  This is a follow-up visit for this 29-year-old female patient  On her last evaluation with us she was found to have an elevated blood pressure and we adjusted her medication with an increase in her lisinopril to 10 mg daily  She reports no side effects of this adjustment in her medication dose  Her blood pressure today appears to be much better with a reading of 126/70 and a pulse rate of 67 beats per minute  The patient does admit to being under increased levels of stress as she will be moving in several days  She denies any chest pains or palpitations  The following portions of the patient's history were reviewed and updated as appropriate:   She  has a past medical history of Abnormal ECG, Anxiety, Arthritis, Asthma, Atrial premature complex, Cataract, bilateral, Cataract, left eye, Difficulty swallowing, Dizziness, Gastric reflux, Hypertension, Premature ventricular contraction, Primary osteoarthritis of both knees, Rheumatic fever, Sore throat, and Tuberculosis  She   Patient Active Problem List    Diagnosis Date Noted    Anxiety 03/27/2019    Tremor 03/27/2019    Other insomnia 02/27/2019    Cough 02/27/2019    Urinary incontinence 09/17/2018    Psoriasis 07/20/2018    Thyroid nodule 07/20/2018    Allergic pharyngitis 07/02/2018    AF [facility]oxysmal atrial fibrillation) ([facility]) 02/15/2018    Tachy-smita syndrome ([address] ) 02/15/2018    Hypertension 02/15/2018    Pacemaker 02/15/2018    GERD (gastroesophageal reflux disease) 07/18/2017    Mild carpal tunnel syndrome, right 04/10/2017    Degenerative lumbar spinal stenosis 03/31/2017    UTI symptoms 01/04/2017    Degenerative arthritis of knee, bilateral 09/29/2016    Low back pain 08/29/2016    Lumbar degenerative disc disease 08/29/2016    Chronic bilateral low back pain without sciatica 06/08/2016    Chronic GERD 05/06/2016    Overweight 02/12/2016    Fibromyalgia 11/06/2013    Hypercholesterolemia 11/06/2013     She  has a past surgical history that includes Hysterectomy; Tonsillectomy; pr egd transoral biopsy single/multiple (N/A, 4/6/2016); pr dilate esophagus (N/A, 4/6/2016); pr remv cataract extracap,insert lens (Left, 3/15/2016); Appendectomy; and Replacement total knee (Right)  Her family history includes Coronary artery disease in her mother; Heart attack in her father and mother; Heart disease in her father  She  reports that she has never smoked   She has never used smokeless tobacco  She reports that she does not drink alcohol or use drugs   Current Outpatient Medications   Medication Sig Dispense Refill    albuterol (PROVENTIL HFA,VENTOLIN HFA) 90 mcg/act inhaler Inhale 2 puffs every 6 (six) hours as needed for wheezing 1 Inhaler 0    ALPRAZolam (XANAX) 0 25 mg tablet TAKE 1 TABLET(0 25 MG) BY MOUTH THREE TIMES DAILY AS NEEDED FOR ANXIETY 90 tablet 0    amLODIPine (NORVASC) 10 mg tablet TAKE 1 TABLET BY MOUTH DAILY AS DIRECTED 90 tablet 2    Ascorbic Acid (VITAMIN C) 1000 MG tablet Take 1 tablet by mouth daily      B Complex-C (B-COMPLEX WITH VITAMIN C) tablet Take 1 tablet by mouth daily   Cholecalciferol (VITAMIN D3) 1000 units CAPS Take 1 tablet by mouth daily      gabapentin (NEURONTIN) 100 mg capsule Take 1 tablet at bedtime  May increase to 2 tablets after 3 days 60 capsule 0    lisinopril (ZESTRIL) 10 mg tablet Take 1 tablet (10 mg total) by mouth daily 90 tablet 2    loratadine (CLARITIN) 10 mg tablet Take 1 tablet (10 mg total) by mouth daily 30 tablet 0    metoprolol tartrate (LOPRESSOR) 50 mg tablet Take 1 tablet (50 mg total) by mouth 3 (three) times a day 90 tablet 3    mometasone (ELOCON) 0 1 % cream Apply topically daily 15 g 1    oxybutynin (DITROPAN XL) 15 MG 24 hr tablet Take 1 tablet (15 mg total) by mouth daily 30 tablet 11    XARELTO 15 MG tablet 15 mg daily         No current facility-administered medications for this visit       Review of Systems   Musculoskeletal: Positive for arthralgias, back pain and myalgias  Neurological: Positive for weakness  Psychiatric/Behavioral: The patient is nervous/anxious  Objective:      /70 (BP Location: Left arm, Patient Position: Sitting, Cuff Size: Adult)   Pulse 67   Temp 97 6 °F (36 4 °C) (Tympanic)   Ht 5' 4" (1 626 m)   Wt 115 kg (253 lb)   SpO2 97%   BMI 43 43 kg/m²          Physical Exam   Constitutional: She is oriented to person, place, and time  Vital signs are normal  She appears well-developed and well-nourished   She is cooperative  HENT:   Right Ear: Hearing, tympanic membrane, external ear and ear canal normal    Left Ear: Hearing, tympanic membrane, external ear and ear canal normal    Nose: Nose normal  No mucosal edema  Mouth/Throat: Uvula is midline, oropharynx is clear and moist and mucous membranes are normal    Eyes: Pupils are equal, round, and reactive to light  Conjunctivae and lids are normal    Neck: No JVD present  Carotid bruit is not present  No thyromegaly present  Cardiovascular: Normal rate, normal heart sounds and intact distal pulses  No murmur heard  Irregularly irregular rhythm of the heart   Pulmonary/Chest: Effort normal and breath sounds normal  No respiratory distress  Abdominal: Soft  Normal appearance and bowel sounds are normal    Musculoskeletal: Normal range of motion  She exhibits no edema  Lymphadenopathy:     She has no cervical adenopathy  Neurological: She is alert and oriented to person, place, and time  She has normal reflexes  She displays normal reflexes  Skin: Skin is warm, dry and intact  Psychiatric: She has a normal mood and affect  Her speech is normal and behavior is normal  Judgment and thought content normal  Cognition and memory are normal    Vitals reviewed

## 2019-03-27 NOTE — ASSESSMENT & PLAN NOTE
Patient has a history of paroxysmal atrial fibrillation presently she is in an irregular rhythm with a good control of her heart rate  She continues on Xarelto 15 mg daily for stroke risk reduction  She has no chest pain or palpitations on today's visit

## 2019-03-27 NOTE — ASSESSMENT & PLAN NOTE
Very mild tremor of the left hand noted on today's examination  Patient is unaware of any family history of tremor she does not have any other symptoms to suggest Parkinson's disease at this time    She is already on a beta-blocker therapy for management of her hypertension will continue to monitor this very mild tremor

## 2019-03-27 NOTE — ASSESSMENT & PLAN NOTE
Symptoms of anxiety secondary to stress related to her impending move  She will be moving into a senior citizen residential setting  She is normally rather private individual is not sure how she will feel in this community type setting  She does have and a prescription for alprazolam 0 25 she can take 1 tablet 3 times a day as needed for anxiety and stress

## 2019-04-26 ENCOUNTER — TELEPHONE (OUTPATIENT)
Dept: INTERNAL MEDICINE CLINIC | Facility: CLINIC | Age: 74
End: 2019-04-26

## 2019-05-08 DIAGNOSIS — F41.9 ANXIETY: ICD-10-CM

## 2019-05-08 DIAGNOSIS — I48.0 AF (PAROXYSMAL ATRIAL FIBRILLATION) (HCC): ICD-10-CM

## 2019-05-08 RX ORDER — ALPRAZOLAM 0.25 MG/1
0.25 TABLET ORAL 3 TIMES DAILY PRN
Qty: 90 TABLET | Refills: 0 | Status: SHIPPED | OUTPATIENT
Start: 2019-05-08 | End: 2019-06-28 | Stop reason: SDUPTHER

## 2019-05-08 RX ORDER — METOPROLOL TARTRATE 50 MG/1
50 TABLET, FILM COATED ORAL
Qty: 90 TABLET | Refills: 3 | Status: SHIPPED | OUTPATIENT
Start: 2019-05-08 | End: 2019-11-06 | Stop reason: SDUPTHER

## 2019-06-28 DIAGNOSIS — F41.9 ANXIETY: ICD-10-CM

## 2019-06-28 RX ORDER — ALPRAZOLAM 0.25 MG/1
0.25 TABLET ORAL 3 TIMES DAILY PRN
Qty: 90 TABLET | Refills: 0 | Status: SHIPPED | OUTPATIENT
Start: 2019-06-28 | End: 2019-08-14 | Stop reason: SDUPTHER

## 2019-07-01 DIAGNOSIS — I48.91 ATRIAL FIBRILLATION, UNSPECIFIED TYPE (HCC): Primary | ICD-10-CM

## 2019-07-01 RX ORDER — RIVAROXABAN 15 MG/1
15 TABLET, FILM COATED ORAL DAILY
Qty: 90 TABLET | Refills: 3 | Status: SHIPPED | OUTPATIENT
Start: 2019-07-01 | End: 2020-06-15 | Stop reason: SDUPTHER

## 2019-07-09 ENCOUNTER — IN-CLINIC DEVICE VISIT (OUTPATIENT)
Dept: CARDIOLOGY CLINIC | Facility: CLINIC | Age: 74
End: 2019-07-09
Payer: MEDICARE

## 2019-07-09 DIAGNOSIS — Z95.0 PACEMAKER: ICD-10-CM

## 2019-07-09 DIAGNOSIS — I49.5 SSS (SICK SINUS SYNDROME) (HCC): Primary | ICD-10-CM

## 2019-07-09 DIAGNOSIS — I48.0 AF (PAROXYSMAL ATRIAL FIBRILLATION) (HCC): ICD-10-CM

## 2019-07-09 PROCEDURE — 93280 PM DEVICE PROGR EVAL DUAL: CPT | Performed by: INTERNAL MEDICINE

## 2019-07-09 NOTE — PROGRESS NOTES
Results for orders placed or performed in visit on 07/09/19   Cardiac EP device report    Narrative    MDT DUAL CHAMBER PPM  DEVICE INTERROGATED IN THE [place] OFFICE: BATTERY VOLTAGE ADEQUATE (8 5 yrs); AP - 88 5%  - 2%; ALL LEAD PARAMETERS WITHIN NORMAL LIMITS  ALL OTHER TESTING WITHIN NORMAL LIMITS  129  AF EPISODES TREATED WITH ATP  13 3% SUCCESSFULLY PACED TERMINATED  TOTAL AT/AF BURDEN 3 7%  HX: PAF & PT TAKES XARELTO, METOPROLOL TART  NO PROGRAMMING CHANGES MADE TO DEVICE PARAMETERS  NORMAL DEVICE FUNCTION     EB

## 2019-07-31 DIAGNOSIS — Z12.11 COLON CANCER SCREENING: Primary | ICD-10-CM

## 2019-08-02 ENCOUNTER — HOSPITAL ENCOUNTER (EMERGENCY)
Facility: HOSPITAL | Age: 74
Discharge: HOME | End: 2019-08-02
Attending: EMERGENCY MEDICINE | Admitting: EMERGENCY MEDICINE
Payer: MEDICARE

## 2019-08-02 ENCOUNTER — APPOINTMENT (EMERGENCY)
Dept: ULTRASOUND IMAGING | Facility: HOSPITAL | Age: 74
End: 2019-08-02
Payer: MEDICARE

## 2019-08-02 ENCOUNTER — APPOINTMENT (EMERGENCY)
Dept: RADIOLOGY | Facility: HOSPITAL | Age: 74
End: 2019-08-02
Payer: MEDICARE

## 2019-08-02 VITALS
OXYGEN SATURATION: 98 % | RESPIRATION RATE: 18 BRPM | DIASTOLIC BLOOD PRESSURE: 69 MMHG | SYSTOLIC BLOOD PRESSURE: 144 MMHG | BODY MASS INDEX: 42.8 KG/M2 | HEART RATE: 85 BPM | WEIGHT: 249.34 LBS | TEMPERATURE: 97.8 F

## 2019-08-02 DIAGNOSIS — M25.562 LEFT KNEE PAIN: Primary | ICD-10-CM

## 2019-08-02 DIAGNOSIS — M25.472 LEFT ANKLE SWELLING: ICD-10-CM

## 2019-08-02 PROCEDURE — 73590 X-RAY EXAM OF LOWER LEG: CPT

## 2019-08-02 PROCEDURE — 99284 EMERGENCY DEPT VISIT MOD MDM: CPT | Performed by: PHYSICIAN ASSISTANT

## 2019-08-02 PROCEDURE — 73610 X-RAY EXAM OF ANKLE: CPT

## 2019-08-02 PROCEDURE — 93971 EXTREMITY STUDY: CPT | Performed by: SURGERY

## 2019-08-02 PROCEDURE — 93971 EXTREMITY STUDY: CPT

## 2019-08-02 PROCEDURE — 99284 EMERGENCY DEPT VISIT MOD MDM: CPT

## 2019-08-02 RX ORDER — ACETAMINOPHEN 325 MG/1
975 TABLET ORAL ONCE
Status: COMPLETED | OUTPATIENT
Start: 2019-08-02 | End: 2019-08-02

## 2019-08-02 RX ORDER — ACETAMINOPHEN 325 MG/1
650 TABLET ORAL ONCE
Status: COMPLETED | OUTPATIENT
Start: 2019-08-02 | End: 2019-08-02

## 2019-08-02 RX ADMIN — ACETAMINOPHEN 975 MG: 325 TABLET ORAL at 10:51

## 2019-08-02 RX ADMIN — ACETAMINOPHEN 650 MG: 325 TABLET ORAL at 16:16

## 2019-08-02 NOTE — DISCHARGE INSTRUCTIONS

## 2019-08-02 NOTE — ED PROVIDER NOTES
History  Chief Complaint   Patient presents with    Knee Pain     pt c/o left posterior knee pain started suddenly around 2am last night  denies any recent falls/injury or activity that may have caused pain  Lorraine Yanez is a 74 y o  female with a past medical history of atrial fibrillation, asthma and anxiety who presents to the ED with complaints of severe anterior and posterior left knee pain x 1 day  Patient states she has occasional pain in her left knee but upon awakening today had sharp shooting pain to the point where she could no longer walk  Patient states she normally ambulates with a rolling walker  Patient states she has been noticing some swelling in her lower leg and ankle  Patient recalls that she previously did fracture her left foot  Denies fall, injury, numbness, tingling, weakness, recent immobilization, previous history of DVT or PE, chest pain, shortness of breath, fever, chills  No over-the-counter medications taken prior to arrival   Patient does have a history of a right knee replacement secondary to arthritis  History provided by:  Patient  Knee Pain   Location:  Knee  Time since incident:  1 day  Injury: no    Knee location:  L knee  Pain details:     Quality:  Throbbing    Severity:  Severe    Duration:  1 day  Worsened by:  Flexion  Associated symptoms: decreased ROM and swelling    Associated symptoms: no back pain, no fatigue, no fever, no itching, no muscle weakness, no neck pain, no numbness, no stiffness and no tingling        Prior to Admission Medications   Prescriptions Last Dose Informant Patient Reported? Taking? ALPRAZolam (XANAX) 0 25 mg tablet   No Yes   Sig: Take 1 tablet (0 25 mg total) by mouth 3 (three) times a day as needed for anxiety   Ascorbic Acid (VITAMIN C) 1000 MG tablet  Self Yes No   Sig: Take 1 tablet by mouth daily   B Complex-C (B-COMPLEX WITH VITAMIN C) tablet  Self Yes No   Sig: Take 1 tablet by mouth daily     Cholecalciferol (VITAMIN D3) 1000 units CAPS  Self Yes No   Sig: Take 1 tablet by mouth daily   XARELTO 15 MG tablet   No Yes   Sig: Take 1 tablet (15 mg total) by mouth daily   albuterol (PROVENTIL HFA,VENTOLIN HFA) 90 mcg/act inhaler  Self No No   Sig: Inhale 2 puffs every 6 (six) hours as needed for wheezing   amLODIPine (NORVASC) 10 mg tablet   No Yes   Sig: TAKE 1 TABLET BY MOUTH DAILY AS DIRECTED   gabapentin (NEURONTIN) 100 mg capsule  Self No No   Sig: Take 1 tablet at bedtime  May increase to 2 tablets after 3 days   lisinopril (ZESTRIL) 10 mg tablet   No Yes   Sig: Take 1 tablet (10 mg total) by mouth daily   Patient taking differently: Take 5 mg by mouth daily    loratadine (CLARITIN) 10 mg tablet  Self No No   Sig: Take 1 tablet (10 mg total) by mouth daily   metoprolol tartrate (LOPRESSOR) 50 mg tablet   No Yes   Sig: Take 1 tablet (50 mg total) by mouth 3 (three) times a day   mometasone (ELOCON) 0 1 % cream  Self No No   Sig: Apply topically daily   oxybutynin (DITROPAN XL) 15 MG 24 hr tablet   No Yes   Sig: Take 1 tablet (15 mg total) by mouth daily      Facility-Administered Medications: None       Past Medical History:   Diagnosis Date    Abnormal ECG     Last Assessed 9/29/2016    Anxiety     Last Assessed 6/08/2016    Arthritis     knees    Asthma     Last Assessed 11/06/2013    Atrial premature complex     Cataract, bilateral     Last Assessed 7/14/2016    Cataract, left eye     Both eyes  Had surgery on left   Difficulty swallowing     Dizziness     Gastric reflux     Hypertension     Premature ventricular contraction     Primary osteoarthritis of both knees     Last Assessed 7/14/2016    Rheumatic fever     Sore throat     Tuberculosis        Past Surgical History:   Procedure Laterality Date    APPENDECTOMY      CARDIAC PACEMAKER PLACEMENT      HYSTERECTOMY      IA DILATE ESOPHAGUS N/A 4/6/2016    Procedure: DILATATION ESOPHAGEAL;  Surgeon: Troy Buchanan MD;  [facility];   Service: Gastroenterology    IA EGD TRANSORAL BIOPSY SINGLE/MULTIPLE N/A 4/6/2016    Procedure: ESOPHAGOGASTRODUODENOSCOPY (EGD); Surgeon: Tarah Domingo MD;  Location: [facility]; Service: Gastroenterology     [address] CATARACT EXTRACAP,INSERT LENS Left 3/15/2016    Procedure: EXTRACTION EXTRACAPSULAR CATARACT PHACO INTRAOCULAR LENS (IOL); Surgeon: Fuad Agarwal MD;  [facility];  Service: Ophthalmology    REPLACEMENT TOTAL KNEE Right     REPLACEMENT TOTAL KNEE      right     TONSILLECTOMY         Family History   Problem Relation Age of Onset    Coronary artery disease Mother     Heart attack Mother         Prior    Heart disease Father     Heart attack Father         Prior     I have reviewed and agree with the history as documented  Social History     Tobacco Use    Smoking status: Never Smoker    Smokeless tobacco: Never Used   Substance Use Topics    Alcohol use: No     Comment: Per [employer]    Drug use: No        Review of Systems   Constitutional: Negative for appetite change, chills, fatigue, fever and unexpected weight change  HENT: Negative for congestion, drooling, ear pain, rhinorrhea, sore throat, trouble swallowing and voice change  Eyes: Negative for pain, discharge, redness and visual disturbance  Respiratory: Negative for cough, shortness of breath, wheezing and stridor  Cardiovascular: Negative for chest pain, palpitations and leg swelling  Gastrointestinal: Negative for abdominal pain, blood in stool, constipation, diarrhea, nausea and vomiting  Genitourinary: Negative for dysuria, flank pain, frequency, hematuria and urgency  Musculoskeletal: Positive for arthralgias  Negative for back pain, gait problem, joint swelling, neck pain, neck stiffness and stiffness  Skin: Negative for color change, itching and rash  Neurological: Negative for dizziness, seizures, light-headedness and headaches         Physical Exam  Physical Exam   Constitutional: She is oriented to person, place, and time  She appears well-developed and well-nourished  HENT:   Head: Normocephalic and atraumatic  Eyes: Pupils are equal, round, and reactive to light  Conjunctivae and EOM are normal    Cardiovascular: Normal rate, regular rhythm and intact distal pulses  Pulmonary/Chest: Effort normal and breath sounds normal    Musculoskeletal:        Left knee: She exhibits normal range of motion and no ecchymosis  Tenderness found  Lateral joint line tenderness noted  TTP of the posterior left knee  TTP of the lateral left knee  Limited flexion of the left knee due to pain  Full ROM with internal and external rotation  2= pitting edema of the dorsal aspect of the left foot and ankle  Mild TTP of the dorsal midfoot  Small areas of ecchymosis to the anterior left leg  Positive mohit's sign  TTP of the posterior calf  NVI  Neurological: She is alert and oriented to person, place, and time  She has normal strength and normal reflexes  No sensory deficit  GCS eye subscore is 4  GCS verbal subscore is 5  GCS motor subscore is 6  Skin: Skin is warm and dry  Capillary refill takes less than 2 seconds  Psychiatric: She has a normal mood and affect  Nursing note and vitals reviewed  Vital Signs  ED Triage Vitals [08/02/19 0932]   Temperature Pulse Respirations Blood Pressure SpO2   97 8 °F (36 6 °C) 85 18 144/69 98 %      Temp Source Heart Rate Source Patient Position - Orthostatic VS BP Location FiO2 (%)   Oral Monitor Sitting Right arm --      Pain Score       7           Vitals:    08/02/19 0932   BP: 144/69   Pulse: 85   Patient Position - Orthostatic VS: Sitting         Visual Acuity      ED Medications  Medications   acetaminophen (TYLENOL) tablet 975 mg (975 mg Oral Given 8/2/19 1051)       Diagnostic Studies  Results Reviewed     None                 XR tibia fibula 2 views LEFT   Final Result by Kaetrina Miranda MD (08/02 1114)      No acute osseous abnormality        Severe left knee tricompartmental osteoarthrosis  Mild left ankle soft tissue swelling  Workstation performed: [id number]         XR ankle 3+ views LEFT   Final Result by Gricelda Flores MD (08/02 1135)      Diffuse soft tissue swelling  No acute osseous abnormality evident  Workstation performed: [id number]         VAS lower limb venous duplex study, unilateral/limited    (Results Pending)              Procedures  Procedures       ED Course  ED Course as of Aug 02 1245   Fri Aug 02, 2019   1105 Informed by Julieta that [address] is negative for DVT  1129 Will attempt to ambulate patient  1223 Patient ambulated without difficulty with the walker  Will arrange for transportation home  26 Educated patient regarding diagnosis and management  Advised patient to follow up with PCP  Advised patient to RTER for persistent or worsening symptoms  MDM  Number of Diagnoses or Management Options  Left ankle swelling: new and does not require workup  Left knee pain: new and does not require workup  Diagnosis management comments: Differential diagnosis included but not limited to: Arthritis, fracture, dislocation, sprain, strain, contusion, ligament injury, tendon injury, meniscal injury    Venous duplex of the left lower extremity negative for DVT  XR Left Tibia Fibula significant for severe left knee tricompartmental osteoarthrosis  Mild left ankle soft tissue swelling  XR Left Ankle significant for Diffuse soft tissue swelling  No acute osseous abnormality evident  Patient tolerated ambulation with walker  Patient will be advised to follow up with outpatient orthopedics  I provided patient with strict RTER precautions  I advised patient follow-up with PCP in 24-48 hours  Patient verbalized understanding          Amount and/or Complexity of Data Reviewed  Tests in the radiology section of CPT®: ordered and reviewed  Review and summarize past medical records: yes    Risk of Complications, Morbidity, and/or Mortality  Presenting problems: moderate  Diagnostic procedures: moderate  Management options: moderate    Patient Progress  Patient progress: improved      Disposition  Final diagnoses:   Left knee pain   Left ankle swelling     Time reflects when diagnosis was documented in both MDM as applicable and the Disposition within this note     Time User Action Codes Description Comment    8/2/2019 12:25 PM Wandase Green Add [M25 562] Left knee pain     8/2/2019 12:25 PM Wandase Green Add [G66 452] Left ankle swelling       ED Disposition     ED Disposition Condition Date/Time Comment    Discharge Stable Fri Aug 2, 2019 12:25 PM Shin Maynard discharge to home/self care  Follow-up Information     Follow up With Specialties Details Why Contact Info Additional [address] Emergency Department Emergency Medicine Go to  If symptoms worsen [address]  [phone] [facility],  [address], [place], [place], [address]    Daly Blunt MD Internal Medicine Schedule an appointment as soon as possible for a visit   [address]  [address], [address] [facility]  [phone]       [facility] Orthopedic Surgery Schedule an appointment as soon as possible for a visit   [address] [facility] [address]  [address] [facility] [address], [facility] [address] [place], [place], [address]          Patient's Medications   Discharge Prescriptions    No medications on file     No discharge procedures on file      ED Provider  Electronically Signed by           Renay Spear PA-C  08/02/19 1629

## 2019-08-02 NOTE — ED NOTES
Patient out of bed with the use of walker, patient walked without any other assistance     Jaclyn Gonzalez  08/02/19 2823

## 2019-08-02 NOTE — ED NOTES
Patient is resting comfortably   US at [address] [place] [address], [address] [place] [address]  08/02/19 1108

## 2019-08-02 NOTE — ED NOTES
Meal tray ordered for patient  [facility] returned call  Will be able to  patient in about 15 minutes  Patient made aware, per patient will wait for her friend to come get her        Sharrie Nageotte, RN  08/02/19 0134

## 2019-08-12 ENCOUNTER — OFFICE VISIT (OUTPATIENT)
Dept: INTERNAL MEDICINE CLINIC | Facility: CLINIC | Age: 74
End: 2019-08-12
Payer: MEDICARE

## 2019-08-12 VITALS
OXYGEN SATURATION: 96 % | BODY MASS INDEX: 41.96 KG/M2 | TEMPERATURE: 98.3 F | HEART RATE: 98 BPM | DIASTOLIC BLOOD PRESSURE: 70 MMHG | RESPIRATION RATE: 16 BRPM | WEIGHT: 245.8 LBS | SYSTOLIC BLOOD PRESSURE: 138 MMHG | HEIGHT: 64 IN

## 2019-08-12 DIAGNOSIS — M54.50 CHRONIC MIDLINE LOW BACK PAIN WITHOUT SCIATICA: ICD-10-CM

## 2019-08-12 DIAGNOSIS — M48.061 DEGENERATIVE LUMBAR SPINAL STENOSIS: ICD-10-CM

## 2019-08-12 DIAGNOSIS — M81.0 AGE RELATED OSTEOPOROSIS, UNSPECIFIED PATHOLOGICAL FRACTURE PRESENCE: ICD-10-CM

## 2019-08-12 DIAGNOSIS — I48.0 AF (PAROXYSMAL ATRIAL FIBRILLATION) (HCC): ICD-10-CM

## 2019-08-12 DIAGNOSIS — G89.29 CHRONIC MIDLINE LOW BACK PAIN WITHOUT SCIATICA: ICD-10-CM

## 2019-08-12 DIAGNOSIS — F41.9 ANXIETY: ICD-10-CM

## 2019-08-12 DIAGNOSIS — E55.9 VITAMIN D DEFICIENCY: ICD-10-CM

## 2019-08-12 DIAGNOSIS — I10 ESSENTIAL HYPERTENSION: ICD-10-CM

## 2019-08-12 DIAGNOSIS — G89.29 CHRONIC PAIN OF LEFT KNEE: Primary | ICD-10-CM

## 2019-08-12 DIAGNOSIS — M25.562 CHRONIC PAIN OF LEFT KNEE: Primary | ICD-10-CM

## 2019-08-12 DIAGNOSIS — E78.00 HYPERCHOLESTEROLEMIA: ICD-10-CM

## 2019-08-12 PROBLEM — R05.9 COUGH: Status: RESOLVED | Noted: 2019-02-27 | Resolved: 2019-08-12

## 2019-08-12 PROBLEM — R39.9 UTI SYMPTOMS: Status: RESOLVED | Noted: 2017-01-04 | Resolved: 2019-08-12

## 2019-08-12 PROCEDURE — 99215 OFFICE O/P EST HI 40 MIN: CPT | Performed by: INTERNAL MEDICINE

## 2019-08-12 RX ORDER — PREDNISOLONE ACETATE 10 MG/ML
SUSPENSION/ DROPS OPHTHALMIC
COMMUNITY
End: 2019-09-30 | Stop reason: ALTCHOICE

## 2019-08-12 RX ORDER — OLMESARTAN MEDOXOMIL 20 MG/1
TABLET ORAL
COMMUNITY
End: 2020-01-10 | Stop reason: ALTCHOICE

## 2019-08-12 RX ORDER — TRAMADOL HYDROCHLORIDE 50 MG/1
TABLET ORAL
COMMUNITY
End: 2019-08-12 | Stop reason: SDUPTHER

## 2019-08-12 RX ORDER — OMEPRAZOLE 20 MG/1
CAPSULE, DELAYED RELEASE ORAL
COMMUNITY
End: 2019-09-30 | Stop reason: ALTCHOICE

## 2019-08-12 RX ORDER — OLMESARTAN MEDOXOMIL AND HYDROCHLOROTHIAZIDE 20/12.5 20; 12.5 MG/1; MG/1
TABLET ORAL
COMMUNITY
End: 2020-01-10 | Stop reason: ALTCHOICE

## 2019-08-12 RX ORDER — GATIFLOXACIN 5 MG/ML
SOLUTION/ DROPS OPHTHALMIC
COMMUNITY
End: 2019-09-30 | Stop reason: ALTCHOICE

## 2019-08-12 RX ORDER — NITROFURANTOIN 25; 75 MG/1; MG/1
CAPSULE ORAL
COMMUNITY
End: 2019-09-30 | Stop reason: ALTCHOICE

## 2019-08-12 RX ORDER — LOSARTAN POTASSIUM 50 MG/1
TABLET ORAL
COMMUNITY
End: 2019-09-30 | Stop reason: ALTCHOICE

## 2019-08-12 RX ORDER — OFLOXACIN 3 MG/ML
SOLUTION/ DROPS OPHTHALMIC
COMMUNITY
End: 2019-09-30 | Stop reason: ALTCHOICE

## 2019-08-12 RX ORDER — KETOROLAC TROMETHAMINE 5 MG/ML
SOLUTION OPHTHALMIC
COMMUNITY
End: 2019-09-30 | Stop reason: ALTCHOICE

## 2019-08-12 RX ORDER — KETOROLAC TROMETHAMINE 4 MG/ML
SOLUTION/ DROPS OPHTHALMIC
COMMUNITY
End: 2019-09-30 | Stop reason: ALTCHOICE

## 2019-08-12 RX ORDER — DOXYCYCLINE HYCLATE 100 MG/1
CAPSULE ORAL
COMMUNITY
Start: 2019-05-08 | End: 2019-09-30 | Stop reason: ALTCHOICE

## 2019-08-12 RX ORDER — DIFLUPREDNATE 0.5 MG/ML
EMULSION OPHTHALMIC
COMMUNITY
End: 2019-09-30 | Stop reason: ALTCHOICE

## 2019-08-12 RX ORDER — TRAMADOL HYDROCHLORIDE 50 MG/1
50 TABLET ORAL EVERY 8 HOURS PRN
Qty: 50 TABLET | Refills: 0 | Status: SHIPPED | OUTPATIENT
Start: 2019-08-12 | End: 2019-09-04 | Stop reason: SDUPTHER

## 2019-08-12 RX ORDER — LEVOFLOXACIN 250 MG/1
TABLET ORAL
COMMUNITY
End: 2019-09-30 | Stop reason: ALTCHOICE

## 2019-08-12 RX ORDER — MECLIZINE HYDROCHLORIDE 25 MG/1
TABLET ORAL
COMMUNITY
End: 2019-09-30 | Stop reason: ALTCHOICE

## 2019-08-12 NOTE — ASSESSMENT & PLAN NOTE
Chronic anxiety symptoms patient is presently on Xanax 0 25 mg p r n  Rosella Goldmann There is no indication of dependence or abuse of the medication recommend that she use this sparingly and only when she experiences significant anxiety symptoms

## 2019-08-12 NOTE — PROGRESS NOTES
Assessment/Plan:    Hypertension  The patient's hypertension was assessed during her visit today find the blood pressure to be in acceptable ranges specially in view of the fact that she is experiencing discomfort in both her low back and knee  She is encouraged to continue present therapy which includes amlodipine 10 mg daily, lisinopril 10 mg daily and metoprolol tartrate 50 mg 3 times a day  She has no apparent side effects of these medications  She denies any symptoms of uncontrolled hypertension or hypotension  AF (paroxysmal atrial fibrillation) (HCC)  History of paroxysmal atrial fibrillation is noted on today's examination she is noted to be in a regular rhythm  She denies any chest pains or palpitations  She continues on anticoagulation for stroke risk reduction her present doses Xarelto 15 mg daily  She has no abnormal bleeding symptoms from the Xarelto medication  Age related osteoporosis  Age-related osteoporosis we reviewed with the patient are recommendation for a DEXA scan to evaluate the density of her bones  We have also requested calcium and vitamin-D assay days  Will review the results of the studies when they are completed  Low back pain  Low back pain likely due to spinal stenosis and degenerative lumbar disease which have been previously documented  Refer her on to Orthopedics at [facility] for further evaluation and treatment of her back pain symptoms  Clearly her weight is a contributing factor to her pain  She ambulates with a rolling walker but should continue to strive to reduce her body weight  Hypercholesterolemia  History of hyperlipidemia was reviewed today the patient's most recent lipid profile was reviewed  She is due for updated studies and I provided her with a request for lipid profile  She is encouraged to continue to work on reducing body weight as well as maintain a low-cholesterol diet  Diet reviewed during today's visit      Degenerative lumbar spinal stenosis  Degenerative lumbar spinal stenosis with low back pain recommend follow-up with [facility] [address] for further evaluation of symptoms and treatment  Chronic pain of left knee  Chronic pain of the left knee reviewed with the patient today her x-rays which show advanced arthritis in the left knee  She will be seeing [address] later this week  I suspect most likely her weight is a major factor increasing her knee pain  She was given a prescription for tramadol for pain relief  In fact view of the fact that she is on Xarelto anticoagulation medication I would prefer not to give her any long-term nonsteroidal anti inflammatory medications  She may well need a total knee replacement in the future  Anxiety  Chronic anxiety symptoms patient is presently on Xanax 0 25 mg p r n  Thuy [place] There is no indication of dependence or abuse of the medication recommend that she use this sparingly and only when she experiences significant anxiety symptoms  Diagnoses and all orders for this visit:    Chronic pain of left knee  -     traMADol (ULTRAM) 50 mg tablet; Take 1 tablet (50 mg total) by mouth every 8 (eight) hours as needed for moderate pain    Age related osteoporosis, unspecified pathological fracture presence  -     DXA body comp analysis; Future    Essential hypertension  -     Comprehensive metabolic panel; Future    Hypercholesterolemia  -     Lipid panel; Future    Vitamin D deficiency  -     Vitamin D 25 hydroxy;  Future    Chronic midline low back pain without sciatica    Degenerative lumbar spinal stenosis    Anxiety    AF (paroxysmal atrial fibrillation) ([facility])    Other orders  -     Latanoprost 0 005 % EMUL; latanoprost 0 005 % eye drops  -     nitrofurantoin (MACROBID) 100 mg capsule; nitrofurantoin monohydrate/macrocrystals 100 mg capsule  -     omeprazole (PriLOSEC) 20 mg delayed release capsule; omeprazole 20 mg capsule,delayed release  -     Difluprednate (DUREZOL) 0 05 % EMUL; Durezol 0 05 % eye drops  -     doxycycline hyclate (VIBRAMYCIN) 100 mg capsule  -     gatifloxacin (ZYMAXID) 0 5 %; gatifloxacin 0 5 % eye drops  -     ketorolac (ACULAR) 0 4 % SOLN; ketorolac 0 4 % eye drops  -     ketorolac (ACULAR) 0 5 % ophthalmic solution; ketorolac 0 5 % eye drops  -     levofloxacin (LEVAQUIN) 250 mg tablet; levofloxacin 250 mg tablet  -     losartan (COZAAR) 50 mg tablet; losartan 50 mg tablet  -     meclizine (ANTIVERT) 25 mg tablet; meclizine 25 mg tablet  -     ofloxacin (OCUFLOX) 0 3 % ophthalmic solution; ofloxacin 0 3 % eye drops  -     olmesartan (BENICAR) 20 mg tablet; Benicar 20 mg tablet  -     olmesartan-hydrochlorothiazide (BENICAR HCT) 20-12 5 MG per tablet; Benicar HCT 20 mg-12 5 mg tablet  -     prednisoLONE acetate (PRED FORTE) 1 % ophthalmic suspension; prednisolone acetate 1 % eye drops,suspension  -     Discontinue: traMADol (ULTRAM) 50 mg tablet; tramadol 50 mg tablet        Subjective:      Patient ID: Tyler Flannery is a 74 y o  female  This 75-year-old female patient presents today for a routine visit  She has several concerns which we addressed during today's visit  Her 1st concern is approximately 3 months of left knee discomfort  She indicates that she has not able to obtain any relief for this pain from Tylenol  She did fall approximately 3-4 months ago which caused exacerbation of her pre-existing knee pain  We did review the x-rays of her knee which show advanced arthritic changes in the knee joint  She has an appointment coming up with Orthopedics at [facility] for further evaluation of this condition  In the meantime we did provide her with a limited prescription for tramadol to provide her with pain relief  She is cautioned to use the medication only when she absolutely needs it  Patient also has a history of chronic low back pain in the lumbar spine without radiation into the legs    She has documented history of lumbar stenosis as well as degenerative arthritis in her lumbar spine  She is also referred to [address] [place] [address] for further evaluation of this condition  The patient is due for a DEXA scan to evaluate for possible osteoporosis  She was provided with a request for this study today  The patient denies any cardiac symptoms such as chest pain palpitations or shortness of breath  The following portions of the patient's history were reviewed and updated as appropriate:   She  has a past medical history of Abnormal ECG, Anxiety, Arthritis, Asthma, Atrial premature complex, Cataract, bilateral, Cataract, left eye, Difficulty swallowing, Dizziness, Gastric reflux, Hypertension, Premature ventricular contraction, Primary osteoarthritis of both knees, Rheumatic fever, Sore throat, and Tuberculosis  She   Patient Active Problem List    Diagnosis Date Noted    Chronic pain of left knee 08/12/2019    Age related osteoporosis 08/12/2019    Anxiety 03/27/2019    Tremor 03/27/2019    Other insomnia 02/27/2019    Urinary incontinence 09/17/2018    Psoriasis 07/20/2018    Thyroid nodule 07/20/2018    Allergic pharyngitis 07/02/2018    AF (paroxysmal atrial fibrillation) ([facility] [address] ) 02/15/2018    Tachy-smita syndrome ([facility] [address] ) 02/15/2018    Hypertension 02/15/2018    Pacemaker 02/15/2018    GERD (gastroesophageal reflux disease) 07/18/2017    Mild carpal tunnel syndrome, right 04/10/2017    Degenerative lumbar spinal stenosis 03/31/2017    Degenerative arthritis of knee, bilateral 09/29/2016    Low back pain 08/29/2016    Lumbar degenerative disc disease 08/29/2016    Chronic bilateral low back pain without sciatica 06/08/2016    Chronic GERD 05/06/2016    Overweight 02/12/2016    Fibromyalgia 11/06/2013    Hypercholesterolemia 11/06/2013     She  has a past surgical history that includes Hysterectomy;  Tonsillectomy; pr egd transoral biopsy single/multiple (N/A, 4/6/2016); pr dilate esophagus (N/A, 4/6/2016); pr remv cataract extracap,insert lens (Left, 3/15/2016); Appendectomy; Replacement total knee (Right); Cardiac pacemaker placement; and Replacement total knee  Her family history includes Coronary artery disease in her mother; Heart attack in her father and mother; Heart disease in her father  She  reports that she has never smoked  She has never used smokeless tobacco  She reports that she does not drink alcohol or use drugs  Current Outpatient Medications   Medication Sig Dispense Refill    ALPRAZolam (XANAX) 0 25 mg tablet Take 1 tablet (0 25 mg total) by mouth 3 (three) times a day as needed for anxiety 90 tablet 0    amLODIPine (NORVASC) 10 mg tablet TAKE 1 TABLET BY MOUTH DAILY AS DIRECTED 90 tablet 2    Ascorbic Acid (VITAMIN C) 1000 MG tablet Take 1 tablet by mouth daily      B Complex-C (B-COMPLEX WITH VITAMIN C) tablet Take 1 tablet by mouth daily   Cholecalciferol (VITAMIN D3) 1000 units CAPS Take 1 tablet by mouth daily      doxycycline hyclate (VIBRAMYCIN) 100 mg capsule       lisinopril (ZESTRIL) 10 mg tablet Take 1 tablet (10 mg total) by mouth daily (Patient taking differently: Take 5 mg by mouth daily ) 90 tablet 2    metoprolol tartrate (LOPRESSOR) 50 mg tablet Take 1 tablet (50 mg total) by mouth 3 (three) times a day 90 tablet 3    oxybutynin (DITROPAN XL) 15 MG 24 hr tablet Take 1 tablet (15 mg total) by mouth daily 30 tablet 11    XARELTO 15 MG tablet Take 1 tablet (15 mg total) by mouth daily 90 tablet 3    albuterol (PROVENTIL HFA,VENTOLIN HFA) 90 mcg/act inhaler Inhale 2 puffs every 6 (six) hours as needed for wheezing (Patient not taking: Reported on 8/12/2019) 1 Inhaler 0    Difluprednate (DUREZOL) 0 05 % EMUL Durezol 0 05 % eye drops      gabapentin (NEURONTIN) 100 mg capsule Take 1 tablet at bedtime    May increase to 2 tablets after 3 days (Patient not taking: Reported on 8/12/2019) 60 capsule 0    gatifloxacin (ZYMAXID) 0 5 % gatifloxacin 0 5 % eye drops      ketorolac (ACULAR) 0 4 % SOLN ketorolac 0 4 % eye drops      ketorolac (ACULAR) 0 5 % ophthalmic solution ketorolac 0 5 % eye drops      Latanoprost 0 005 % EMUL latanoprost 0 005 % eye drops      levofloxacin (LEVAQUIN) 250 mg tablet levofloxacin 250 mg tablet      loratadine (CLARITIN) 10 mg tablet Take 1 tablet (10 mg total) by mouth daily (Patient not taking: Reported on 8/12/2019) 30 tablet 0    losartan (COZAAR) 50 mg tablet losartan 50 mg tablet      meclizine (ANTIVERT) 25 mg tablet meclizine 25 mg tablet      mometasone (ELOCON) 0 1 % cream Apply topically daily (Patient not taking: Reported on 8/12/2019) 15 g 1    nitrofurantoin (MACROBID) 100 mg capsule nitrofurantoin monohydrate/macrocrystals 100 mg capsule      ofloxacin (OCUFLOX) 0 3 % ophthalmic solution ofloxacin 0 3 % eye drops      olmesartan (BENICAR) 20 mg tablet Benicar 20 mg tablet      olmesartan-hydrochlorothiazide (BENICAR HCT) 20-12 5 MG per tablet Benicar HCT 20 mg-12 5 mg tablet      omeprazole (PriLOSEC) 20 mg delayed release capsule omeprazole 20 mg capsule,delayed release      prednisoLONE acetate (PRED FORTE) 1 % ophthalmic suspension prednisolone acetate 1 % eye drops,suspension      traMADol (ULTRAM) 50 mg tablet Take 1 tablet (50 mg total) by mouth every 8 (eight) hours as needed for moderate pain 50 tablet 0     No current facility-administered medications for this visit       Review of Systems   Musculoskeletal: Positive for arthralgias, back pain and gait problem  All other systems reviewed and are negative  Objective:      /70   Pulse 98   Temp 98 3 °F (36 8 °C)   Resp 16   Ht 5' 4" (1 626 m)   Wt 111 kg (245 lb 12 8 oz)   SpO2 96%   BMI 42 19 kg/m²          Physical Exam   Constitutional: She is oriented to person, place, and time  Vital signs are normal  She appears well-developed and well-nourished  She is cooperative     HENT:   Right Ear: Hearing, tympanic membrane, external ear and ear canal normal    Left Ear: Hearing, tympanic membrane, external ear and ear canal normal    Nose: Nose normal  No mucosal edema  Mouth/Throat: Uvula is midline, oropharynx is clear and moist and mucous membranes are normal    Eyes: Pupils are equal, round, and reactive to light  Conjunctivae and lids are normal    Neck: No JVD present  Carotid bruit is not present  No thyromegaly present  Cardiovascular: Normal rate, regular rhythm, normal heart sounds and intact distal pulses  No murmur heard  Pulmonary/Chest: Effort normal and breath sounds normal  No stridor  No respiratory distress  She has no wheezes  She has no rales  Abdominal: Soft  Normal appearance and bowel sounds are normal  She exhibits no distension  There is no tenderness  Musculoskeletal: Normal range of motion  She exhibits tenderness and deformity  She exhibits no edema  Tenderness over the lower lumbar spine region with bilateral lower back muscle spasm  Tenderness in swelling of the left knee joint   Lymphadenopathy:     She has no cervical adenopathy  Neurological: She is alert and oriented to person, place, and time  She has normal reflexes  She displays normal reflexes  No cranial nerve deficit  Coordination normal    Skin: Skin is warm, dry and intact  Psychiatric: She has a normal mood and affect  Her speech is normal and behavior is normal  Judgment and thought content normal  Cognition and memory are normal    Vitals reviewed  BMI Counseling: Body mass index is 42 19 kg/m²  Discussed the patient's BMI with her  The BMI is above average  BMI counseling and education was provided to the patient  Nutrition recommendations include reducing portion sizes, moderation in carbohydrate intake, increasing intake of lean protein, reducing intake of saturated fat and trans fat and reducing intake of cholesterol  Exercise recommendations include strength training exercises

## 2019-08-12 NOTE — ASSESSMENT & PLAN NOTE
Chronic pain of the left knee reviewed with the patient today her x-rays which show advanced arthritis in the left knee  She will be seeing [facility] later this week  I suspect most likely her weight is a major factor increasing her knee pain  She was given a prescription for tramadol for pain relief  In fact view of the fact that she is on Xarelto anticoagulation medication I would prefer not to give her any long-term nonsteroidal anti inflammatory medications  She may well need a total knee replacement in the future

## 2019-08-12 NOTE — ASSESSMENT & PLAN NOTE
Low back pain likely due to spinal stenosis and degenerative lumbar disease which have been previously documented  Refer her on to Orthopedics at [facility] for further evaluation and treatment of her back pain symptoms  Clearly her weight is a contributing factor to her pain  She ambulates with a rolling walker but should continue to strive to reduce her body weight

## 2019-08-12 NOTE — ASSESSMENT & PLAN NOTE
Age-related osteoporosis we reviewed with the patient are recommendation for a DEXA scan to evaluate the density of her bones  We have also requested calcium and vitamin-D assay days  Will review the results of the studies when they are completed

## 2019-08-12 NOTE — ASSESSMENT & PLAN NOTE
The patient's hypertension was assessed during her visit today find the blood pressure to be in acceptable ranges specially in view of the fact that she is experiencing discomfort in both her low back and knee  She is encouraged to continue present therapy which includes amlodipine 10 mg daily, lisinopril 10 mg daily and metoprolol tartrate 50 mg 3 times a day  She has no apparent side effects of these medications  She denies any symptoms of uncontrolled hypertension or hypotension

## 2019-08-12 NOTE — ASSESSMENT & PLAN NOTE
Degenerative lumbar spinal stenosis with low back pain recommend follow-up with [address] [place] [address] for further evaluation of symptoms and treatment

## 2019-08-12 NOTE — ASSESSMENT & PLAN NOTE
History of paroxysmal atrial fibrillation is noted on today's examination she is noted to be in a regular rhythm  She denies any chest pains or palpitations  She continues on anticoagulation for stroke risk reduction her present doses Xarelto 15 mg daily  She has no abnormal bleeding symptoms from the Xarelto medication

## 2019-08-12 NOTE — ASSESSMENT & PLAN NOTE
History of hyperlipidemia was reviewed today the patient's most recent lipid profile was reviewed  She is due for updated studies and I provided her with a request for lipid profile  She is encouraged to continue to work on reducing body weight as well as maintain a low-cholesterol diet  Diet reviewed during today's visit

## 2019-08-14 DIAGNOSIS — F41.9 ANXIETY: ICD-10-CM

## 2019-08-14 RX ORDER — ALPRAZOLAM 0.25 MG/1
TABLET ORAL
Qty: 90 TABLET | Refills: 0 | Status: SHIPPED | OUTPATIENT
Start: 2019-08-14 | End: 2019-10-02 | Stop reason: SDUPTHER

## 2019-08-16 ENCOUNTER — OFFICE VISIT (OUTPATIENT)
Dept: OBGYN CLINIC | Facility: CLINIC | Age: 74
End: 2019-08-16
Payer: MEDICARE

## 2019-08-16 ENCOUNTER — APPOINTMENT (OUTPATIENT)
Dept: RADIOLOGY | Facility: AMBULARY SURGERY CENTER | Age: 74
End: 2019-08-16
Payer: MEDICARE

## 2019-08-16 VITALS
WEIGHT: 248 LBS | DIASTOLIC BLOOD PRESSURE: 74 MMHG | HEART RATE: 73 BPM | SYSTOLIC BLOOD PRESSURE: 138 MMHG | BODY MASS INDEX: 42.34 KG/M2 | HEIGHT: 64 IN

## 2019-08-16 DIAGNOSIS — M17.12 PRIMARY OSTEOARTHRITIS OF LEFT KNEE: Primary | ICD-10-CM

## 2019-08-16 DIAGNOSIS — R60.0 BILATERAL LOWER EXTREMITY EDEMA: ICD-10-CM

## 2019-08-16 DIAGNOSIS — M25.562 LEFT KNEE PAIN, UNSPECIFIED CHRONICITY: ICD-10-CM

## 2019-08-16 PROCEDURE — 73562 X-RAY EXAM OF KNEE 3: CPT

## 2019-08-16 PROCEDURE — 99203 OFFICE O/P NEW LOW 30 MIN: CPT | Performed by: ORTHOPAEDIC SURGERY

## 2019-08-16 NOTE — PROGRESS NOTES
Assessment/Plan:  1  Primary osteoarthritis of left knee  Medial  Knee Brace   2  Left knee pain, unspecified chronicity  XR knee 3 vw left non injury    CANCELED: Medial  Knee Brace   3  Bilateral lower extremity edema  Ambulatory referral to Family Practice     Patient Active Problem List   Diagnosis    AF (paroxysmal atrial fibrillation) (HCC)    Tachy-smita syndrome (HCC)    Hypertension    Pacemaker    Chronic bilateral low back pain without sciatica    Chronic GERD    Degenerative arthritis of knee, bilateral    Degenerative lumbar spinal stenosis    Fibromyalgia    GERD (gastroesophageal reflux disease)    Hypercholesterolemia    Low back pain    Lumbar degenerative disc disease    Mild carpal tunnel syndrome, right    Overweight    Allergic pharyngitis    Psoriasis    Thyroid nodule    Urinary incontinence    Other insomnia    Anxiety    Tremor    Chronic pain of left knee    Age related osteoporosis       Discussion/Summary:    66 y o  female with left knee pain severe osteoarthritis  Will refer her back to her PCP for her peripheral edema  Discussed criteria for total knee arthroplasty including HbA1c under 7, BMI near or below 40, will need medical clearance and improvement of edema as well  Pt does have medicare so she would be buy and bill, she will think about coming back in for euflexxa injections, She declined this today and he is allergic to cortisone so we cannot administer those for her either  She will follow up if she would like the Euflexxa injections or if pain is worsening  She will continue with the tramadol she is ready prescribed  The patient was seen and examined by Dr Rachelle Verdugo and myself  The assessment and plan were formulated by Dr Rachelle Verdugo and I assisted in carrying it out  Subjective:   Patient ID: Staci Tobin is a 66 y o  female       HPI    Patient presents to the office complaining of left knee pain, had right TKA by Dr Taras Logan in [facility] over 3 years ago  Symptoms began many years ago, does have history of osteoarthritis  Pain is located anterior medial and posterior medial  Pain is described as annoying, intermittent  The pain does not radiate   The pain is 2/10 at worst, 0/10 at best  It is made worse by turning wrong on the knee  It is made better by laying down  So far the patient has tried tramadol which does help, no injections, PT with no relief after 2 weeks, no braces  The patient denies any numbness or tingling  The following portions of the patient's history were reviewed and updated as appropriate: allergies, current medications, past family history, past social history, past surgical history and problem list     Social History     Socioeconomic History    Marital status:       Spouse name: Not on file    Number of children: Not on file    Years of education: Not on file    Highest education level: Not on file   Occupational History    Not on file   Social Needs    Financial resource strain: Not on file    Food insecurity:     Worry: Not on file     Inability: Not on file    Transportation needs:     Medical: Not on file     Non-medical: Not on file   Tobacco Use    Smoking status: Never Smoker    Smokeless tobacco: Never Used   Substance and Sexual Activity    Alcohol use: No     Comment: Per [employer]    Drug use: No    Sexual activity: Never   Lifestyle    Physical activity:     Days per week: Not on file     Minutes per session: Not on file    Stress: Not on file   Relationships    Social connections:     Talks on phone: Not on file     Gets together: Not on file     Attends Yazidism service: Not on file     Active member of club or organization: Not on file     Attends meetings of clubs or organizations: Not on file     Relationship status: Not on file    Intimate partner violence:     Fear of current or ex partner: Not on file     Emotionally abused: Not on file     Physically abused: Not on file     Forced sexual activity: Not on file   Other Topics Concern    Not on file   Social History Narrative    Not on file     Past Medical History:   Diagnosis Date    Abnormal ECG     Last Assessed 9/29/2016    Anxiety     Last Assessed 6/08/2016    Arthritis     knees    Asthma     Last Assessed 11/06/2013    Atrial premature complex     Cataract, bilateral     Last Assessed 7/14/2016    Cataract, left eye     Both eyes  Had surgery on left   Difficulty swallowing     Dizziness     Gastric reflux     Hypertension     Premature ventricular contraction     Primary osteoarthritis of both knees     Last Assessed 7/14/2016    Rheumatic fever     Sore throat     Tuberculosis      Past Surgical History:   Procedure Laterality Date    APPENDECTOMY      CARDIAC PACEMAKER PLACEMENT      HYSTERECTOMY      TX DILATE ESOPHAGUS N/A 4/6/2016    Procedure: DILATATION ESOPHAGEAL;  Surgeon: Lacey Clark MD;  [facility]: [facility]; Service: Gastroenterology    TX EGD TRANSORAL BIOPSY SINGLE/MULTIPLE N/A 4/6/2016    Procedure: ESOPHAGOGASTRODUODENOSCOPY (EGD); Surgeon: Lacey Clark MD;  [facility]; Service: Gastroenterology     [facility] CATARACT EXTRACAP,INSERT LENS Left 3/15/2016    Procedure: EXTRACTION EXTRACAPSULAR CATARACT PHACO INTRAOCULAR LENS (IOL);   Surgeon: Nathalia Ibanez MD;  [facility]: [facility];  Service: Ophthalmology    REPLACEMENT TOTAL KNEE Right     REPLACEMENT TOTAL KNEE      right     TONSILLECTOMY       Allergies   Allergen Reactions    R86 Folate [Folic Acid-Vit Y4-WMZ X93]     Cobalt      Unknown    Lyrica [Pregabalin]     Nickel      Skin discoloration    Other      Black rubber    Penicillins Hives    Sulfa Antibiotics Itching    Cortisone Acetate [Cortisone] Itching and Rash    Penicillin G Rash     Current Outpatient Medications on File Prior to Visit   Medication Sig Dispense Refill    albuterol (PROVENTIL HFA,VENTOLIN HFA) 90 mcg/act inhaler Inhale 2 puffs every 6 (six) hours as needed for wheezing 1 Inhaler 0    ALPRAZolam (XANAX) 0 25 mg tablet TAKE 1 TABLET BY MOUTH BY MOUTH THREE TIMES DAILY AS NEEDED FOR ANXIETY 90 tablet 0    amLODIPine (NORVASC) 10 mg tablet TAKE 1 TABLET BY MOUTH DAILY AS DIRECTED 90 tablet 2    Ascorbic Acid (VITAMIN C) 1000 MG tablet Take 1 tablet by mouth daily      B Complex-C (B-COMPLEX WITH VITAMIN C) tablet Take 1 tablet by mouth daily   Cholecalciferol (VITAMIN D3) 1000 units CAPS Take 1 tablet by mouth daily      Difluprednate (DUREZOL) 0 05 % EMUL Durezol 0 05 % eye drops      doxycycline hyclate (VIBRAMYCIN) 100 mg capsule       gabapentin (NEURONTIN) 100 mg capsule Take 1 tablet at bedtime    May increase to 2 tablets after 3 days 60 capsule 0    gatifloxacin (ZYMAXID) 0 5 % gatifloxacin 0 5 % eye drops      ketorolac (ACULAR) 0 4 % SOLN ketorolac 0 4 % eye drops      ketorolac (ACULAR) 0 5 % ophthalmic solution ketorolac 0 5 % eye drops      Latanoprost 0 005 % EMUL latanoprost 0 005 % eye drops      levofloxacin (LEVAQUIN) 250 mg tablet levofloxacin 250 mg tablet      lisinopril (ZESTRIL) 10 mg tablet Take 1 tablet (10 mg total) by mouth daily (Patient taking differently: Take 5 mg by mouth daily ) 90 tablet 2    loratadine (CLARITIN) 10 mg tablet Take 1 tablet (10 mg total) by mouth daily 30 tablet 0    losartan (COZAAR) 50 mg tablet losartan 50 mg tablet      meclizine (ANTIVERT) 25 mg tablet meclizine 25 mg tablet      metoprolol tartrate (LOPRESSOR) 50 mg tablet Take 1 tablet (50 mg total) by mouth 3 (three) times a day 90 tablet 3    mometasone (ELOCON) 0 1 % cream Apply topically daily 15 g 1    nitrofurantoin (MACROBID) 100 mg capsule nitrofurantoin monohydrate/macrocrystals 100 mg capsule      ofloxacin (OCUFLOX) 0 3 % ophthalmic solution ofloxacin 0 3 % eye drops      olmesartan (BENICAR) 20 mg tablet Benicar 20 mg tablet      olmesartan-hydrochlorothiazide (BENICAR HCT) 20-12 5 MG per tablet Benicar HCT 20 mg-12 5 mg tablet      omeprazole (PriLOSEC) 20 mg delayed release capsule omeprazole 20 mg capsule,delayed release      oxybutynin (DITROPAN XL) 15 MG 24 hr tablet Take 1 tablet (15 mg total) by mouth daily 30 tablet 11    prednisoLONE acetate (PRED FORTE) 1 % ophthalmic suspension prednisolone acetate 1 % eye drops,suspension      traMADol (ULTRAM) 50 mg tablet Take 1 tablet (50 mg total) by mouth every 8 (eight) hours as needed for moderate pain 50 tablet 0    XARELTO 15 MG tablet Take 1 tablet (15 mg total) by mouth daily 90 tablet 3     No current facility-administered medications on file prior to visit  Review of Systems   Constitutional: Negative for chills, fever and unexpected weight change  HENT: Negative for hearing loss, nosebleeds and sore throat  Eyes: Negative for pain, redness and visual disturbance  Respiratory: Negative for cough, shortness of breath and wheezing  Cardiovascular: Negative for chest pain, palpitations and leg swelling  Gastrointestinal: Negative for abdominal pain, nausea and vomiting  Endocrine: Negative for polydipsia and polyuria  Genitourinary: Negative for dysuria and hematuria  Musculoskeletal:         As noted in HPI   Skin: Negative for rash and wound  Neurological: Negative for dizziness, numbness and headaches  Psychiatric/Behavioral: Negative for decreased concentration, dysphoric mood and suicidal ideas  The patient is not nervous/anxious  Objective:    Vitals:    08/16/19 1402   BP: 138/74   Pulse: 73       Physical Exam   Constitutional: She is oriented to person, place, and time  She appears well-developed and well-nourished  HENT:   Head: Normocephalic and atraumatic  Eyes: Conjunctivae are normal  No scleral icterus  Neck: Neck supple  Cardiovascular:   No discernible arrhthymias   Pulmonary/Chest: Effort normal  No stridor  No respiratory distress  Abdominal: Soft   She exhibits no distension  Musculoskeletal:        Left knee: She exhibits no effusion  Neurological: She is alert and oriented to person, place, and time  Skin: Skin is warm and dry  No erythema  Psychiatric: She has a normal mood and affect  Her behavior is normal        Left Knee Exam     Tenderness   The patient is experiencing tenderness in the medial joint line ( posteromedial joint line tenderness as well as anterior)  Range of Motion   Extension: normal   Flexion: abnormal     Tests   Benjie:  Medial - positive ( positive with pain along the palpable and audible clicking) Lateral - negative  Varus: negative Valgus: positive ( pain with valgus stress no laxity)    Other   Erythema: absent  Scars: absent  Sensation: normal  Pulse: present  Effusion: no effusion present    Comments:   Skin is intact there is no erythema or warmth or ecchymosis   Exam  Quality of bony structures is diminished by the patient's body habitus and generalized lower extremity edema, 3+ pitting edema bilaterally            I have personally reviewed pertinent films in PACS and my interpretation is XR left knee demonstrates no acute fracture, severe tricompartmental arthritis, significant soft tissue swelling  Procedures  No Procedures performed today    Portions of the record may have been created with voice recognition software  Occasional wrong word or "sound a like" substitutions may have occurred due to the inherent limitations of voice recognition software  Read the chart carefully and recognize, using context, where substitutions have occurred

## 2019-09-04 DIAGNOSIS — G89.29 CHRONIC PAIN OF LEFT KNEE: ICD-10-CM

## 2019-09-04 DIAGNOSIS — M25.562 CHRONIC PAIN OF LEFT KNEE: ICD-10-CM

## 2019-09-04 RX ORDER — TRAMADOL HYDROCHLORIDE 50 MG/1
50 TABLET ORAL EVERY 8 HOURS PRN
Qty: 50 TABLET | Refills: 0 | Status: SHIPPED | OUTPATIENT
Start: 2019-09-04 | End: 2019-09-30 | Stop reason: ALTCHOICE

## 2019-09-25 ENCOUNTER — APPOINTMENT (OUTPATIENT)
Dept: LAB | Facility: CLINIC | Age: 74
End: 2019-09-25
Payer: MEDICARE

## 2019-09-25 DIAGNOSIS — I10 ESSENTIAL HYPERTENSION: ICD-10-CM

## 2019-09-25 DIAGNOSIS — E55.9 VITAMIN D DEFICIENCY: ICD-10-CM

## 2019-09-25 DIAGNOSIS — E78.00 HYPERCHOLESTEROLEMIA: ICD-10-CM

## 2019-09-25 LAB
25(OH)D3 SERPL-MCNC: 26.8 NG/ML (ref 30–100)
ALBUMIN SERPL BCP-MCNC: 3.6 G/DL (ref 3.5–5)
ALP SERPL-CCNC: 80 U/L (ref 46–116)
ALT SERPL W P-5'-P-CCNC: 15 U/L (ref 12–78)
ANION GAP SERPL CALCULATED.3IONS-SCNC: 4 MMOL/L (ref 4–13)
AST SERPL W P-5'-P-CCNC: 12 U/L (ref 5–45)
BILIRUB SERPL-MCNC: 0.4 MG/DL (ref 0.2–1)
BUN SERPL-MCNC: 14 MG/DL (ref 5–25)
CALCIUM SERPL-MCNC: 9.5 MG/DL (ref 8.3–10.1)
CHLORIDE SERPL-SCNC: 105 MMOL/L (ref 100–108)
CHOLEST SERPL-MCNC: 215 MG/DL (ref 50–200)
CO2 SERPL-SCNC: 29 MMOL/L (ref 21–32)
CREAT SERPL-MCNC: 0.9 MG/DL (ref 0.6–1.3)
GFR SERPL CREATININE-BSD FRML MDRD: 62 ML/MIN/1.73SQ M
GLUCOSE P FAST SERPL-MCNC: 100 MG/DL (ref 65–99)
HDLC SERPL-MCNC: 52 MG/DL (ref 40–60)
LDLC SERPL CALC-MCNC: 139 MG/DL (ref 0–100)
NONHDLC SERPL-MCNC: 163 MG/DL
POTASSIUM SERPL-SCNC: 4.1 MMOL/L (ref 3.5–5.3)
PROT SERPL-MCNC: 7.5 G/DL (ref 6.4–8.2)
SODIUM SERPL-SCNC: 138 MMOL/L (ref 136–145)
TRIGL SERPL-MCNC: 118 MG/DL

## 2019-09-25 PROCEDURE — 82306 VITAMIN D 25 HYDROXY: CPT

## 2019-09-25 PROCEDURE — 80053 COMPREHEN METABOLIC PANEL: CPT

## 2019-09-25 PROCEDURE — 36415 COLL VENOUS BLD VENIPUNCTURE: CPT

## 2019-09-25 PROCEDURE — 80061 LIPID PANEL: CPT

## 2019-09-30 ENCOUNTER — OFFICE VISIT (OUTPATIENT)
Dept: INTERNAL MEDICINE CLINIC | Facility: CLINIC | Age: 74
End: 2019-09-30
Payer: MEDICARE

## 2019-09-30 VITALS
TEMPERATURE: 98.3 F | WEIGHT: 240.6 LBS | SYSTOLIC BLOOD PRESSURE: 130 MMHG | OXYGEN SATURATION: 98 % | HEART RATE: 84 BPM | RESPIRATION RATE: 16 BRPM | BODY MASS INDEX: 41.07 KG/M2 | HEIGHT: 64 IN | DIASTOLIC BLOOD PRESSURE: 80 MMHG

## 2019-09-30 DIAGNOSIS — Z12.11 COLON CANCER SCREENING: Primary | ICD-10-CM

## 2019-09-30 DIAGNOSIS — F41.9 ANXIETY: ICD-10-CM

## 2019-09-30 DIAGNOSIS — I48.0 AF (PAROXYSMAL ATRIAL FIBRILLATION) (HCC): ICD-10-CM

## 2019-09-30 DIAGNOSIS — G89.29 CHRONIC PAIN OF LEFT KNEE: ICD-10-CM

## 2019-09-30 DIAGNOSIS — R60.0 BILATERAL LOWER EXTREMITY EDEMA: ICD-10-CM

## 2019-09-30 DIAGNOSIS — M17.0 PRIMARY OSTEOARTHRITIS OF BOTH KNEES: ICD-10-CM

## 2019-09-30 DIAGNOSIS — I10 ESSENTIAL HYPERTENSION: ICD-10-CM

## 2019-09-30 DIAGNOSIS — M25.562 CHRONIC PAIN OF LEFT KNEE: ICD-10-CM

## 2019-09-30 PROCEDURE — 99214 OFFICE O/P EST MOD 30 MIN: CPT | Performed by: INTERNAL MEDICINE

## 2019-09-30 RX ORDER — LISINOPRIL 10 MG/1
5 TABLET ORAL DAILY
Qty: 30 TABLET | Refills: 6
Start: 2019-09-30 | End: 2020-03-03 | Stop reason: SDUPTHER

## 2019-09-30 RX ORDER — TRAMADOL HYDROCHLORIDE 50 MG/1
50 TABLET ORAL EVERY 6 HOURS PRN
Qty: 30 TABLET | Refills: 0 | Status: SHIPPED | OUTPATIENT
Start: 2019-09-30 | End: 2019-11-01 | Stop reason: SDUPTHER

## 2019-09-30 NOTE — PROGRESS NOTES
Assessment/Plan:    Hypertension  This patient's blood pressure was assessed during today's visit with a reading 130/80  I recommended the continuation of her amlodipine at 10 mg daily  In addition I have recommend the continuation of her lisinopril at 10 mg daily and metoprolol tartrate 50 mg twice a day  A follow-up assessment of her hypertension is requested in 4 months  AF (paroxysmal atrial fibrillation) (HCC)  On assessment today the patient has a regular rhythm of she does have a history of paroxysmal atrial fibrillation and continues on beta-blocker therapy for heart rate control as well as Xarelto for stroke risk reduction  She denies any chest pains palpitations shortness of breath I recommend continuation of present therapy with a follow-up assessment in 4 months  Degenerative arthritis of knee, bilateral  Degenerative arthritis of both knees with particular symptoms in the left knee at this time  Of recommended that she continue follow-up with Orthopedics at [facility]  It is quite possible that she may need total knee replacement in the future  She continues to ambulate with the assistance of a walker which helps stabilize her ambulation and remove some of the pressure on her knee  She was provided with a knee brace by the orthopedic consultant but she finds to be too bulky to use  She does use Ultram on an as-needed basis and a refill prescription was provided to the patient on this visit there is no indication of any abuse or dependence on this medication  Anxiety  History of chronic anxiety symptoms the patient has alprazolam 0 25 mg which can be used up to 3 times a day as needed for anxiety  She denies any side effects of the medication I see no evidence of any dependence or abuse of the medication at this time  Diagnoses and all orders for this visit:    Colon cancer screening  -     Cancel: Ambulatory referral to Gastroenterology;  Future    Bilateral lower extremity edema  -     Ambulatory referral to [facility]    Chronic pain of left knee  -     traMADol (ULTRAM) 50 mg tablet; Take 1 tablet (50 mg total) by mouth every 6 (six) hours as needed for moderate pain    Essential hypertension  -     lisinopril (ZESTRIL) 10 mg tablet; Take 0 5 tablets (5 mg total) by mouth daily    AF (paroxysmal atrial fibrillation) ([facility])    Primary osteoarthritis of both knees        Subjective:      Patient ID: Santana Ortega is a 66 y o  female  This is a follow-up visit for this 75-year-old female patient with a history of paroxysmal atrial fibrillation  She denies any chest pains or palpitations since her last visit  She continues on anticoagulation therapy for stroke risk reduction  She recently was seen by orthopedic services for evaluation of left knee pain  X-rays of the knee were reviewed with the patient showing significant advanced arthritis  She was provided with a brace for her knee but finds the brace to be bulky and uncomfortable  The possibility exists that as time goes on if the knee worsens she may need total knee replacement  The following portions of the patient's history were reviewed and updated as appropriate:   She  has a past medical history of Abnormal ECG, Anxiety, Arthritis, Asthma, Atrial premature complex, Cataract, bilateral, Cataract, left eye, Difficulty swallowing, Dizziness, Gastric reflux, Hypertension, Premature ventricular contraction, Primary osteoarthritis of both knees, Rheumatic fever, Sore throat, and Tuberculosis    She   Patient Active Problem List    Diagnosis Date Noted    Chronic pain of left knee 08/12/2019    Age related osteoporosis 08/12/2019    Anxiety 03/27/2019    Tremor 03/27/2019    Other insomnia 02/27/2019    Urinary incontinence 09/17/2018    Psoriasis 07/20/2018    Thyroid nodule 07/20/2018    Allergic pharyngitis 07/02/2018    AF (paroxysmal atrial fibrillation) (HCC) 02/15/2018    Tachy-smita syndrome ([facility] [address] ) 02/15/2018    Hypertension 02/15/2018    Pacemaker 02/15/2018    GERD (gastroesophageal reflux disease) 07/18/2017    Mild carpal tunnel syndrome, right 04/10/2017    Degenerative lumbar spinal stenosis 03/31/2017    Degenerative arthritis of knee, bilateral 09/29/2016    Low back pain 08/29/2016    Lumbar degenerative disc disease 08/29/2016    Chronic bilateral low back pain without sciatica 06/08/2016    Chronic GERD 05/06/2016    Overweight 02/12/2016    Fibromyalgia 11/06/2013    Hypercholesterolemia 11/06/2013     She  has a past surgical history that includes Hysterectomy; Tonsillectomy; pr egd transoral biopsy single/multiple (N/A, 4/6/2016); pr dilate esophagus (N/A, 4/6/2016); pr remv cataract extracap,insert lens (Left, 3/15/2016); Appendectomy; Replacement total knee (Right); Cardiac pacemaker placement; and Replacement total knee  Her family history includes Coronary artery disease in her mother; Heart attack in her father and mother; Heart disease in her father  She  reports that she has never smoked  She has never used smokeless tobacco  She reports that she does not drink alcohol or use drugs    Current Outpatient Medications   Medication Sig Dispense Refill    ALPRAZolam (XANAX) 0 25 mg tablet TAKE 1 TABLET BY MOUTH BY MOUTH THREE TIMES DAILY AS NEEDED FOR ANXIETY 90 tablet 0    amLODIPine (NORVASC) 10 mg tablet TAKE 1 TABLET BY MOUTH DAILY AS DIRECTED 90 tablet 2    Ascorbic Acid (VITAMIN C) 1000 MG tablet Take 1 tablet by mouth daily      Cholecalciferol (VITAMIN D3) 1000 units CAPS Take 2 tablets by mouth daily      lisinopril (ZESTRIL) 10 mg tablet Take 0 5 tablets (5 mg total) by mouth daily 30 tablet 6    metoprolol tartrate (LOPRESSOR) 50 mg tablet Take 1 tablet (50 mg total) by mouth 3 (three) times a day 90 tablet 3    oxybutynin (DITROPAN XL) 15 MG 24 hr tablet Take 1 tablet (15 mg total) by mouth daily 30 tablet 11    XARELTO 15 MG tablet Take 1 tablet (15 mg total) by mouth daily 90 tablet 3    B Complex-C (B-COMPLEX WITH VITAMIN C) tablet Take 1 tablet by mouth daily   olmesartan (BENICAR) 20 mg tablet Benicar 20 mg tablet      olmesartan-hydrochlorothiazide (BENICAR HCT) 20-12 5 MG per tablet Benicar HCT 20 mg-12 5 mg tablet      traMADol (ULTRAM) 50 mg tablet Take 1 tablet (50 mg total) by mouth every 6 (six) hours as needed for moderate pain 30 tablet 0     No current facility-administered medications for this visit       Review of Systems   Musculoskeletal: Positive for arthralgias and gait problem  All other systems reviewed and are negative  Objective:      /80   Pulse 84   Temp 98 3 °F (36 8 °C)   Resp 16   Ht 5' 4" (1 626 m)   Wt 109 kg (240 lb 9 6 oz)   SpO2 98%   BMI 41 30 kg/m²          Physical Exam   Constitutional: She is oriented to person, place, and time  Vital signs are normal  She appears well-developed and well-nourished  She is cooperative  HENT:   Right Ear: Hearing, tympanic membrane, external ear and ear canal normal    Left Ear: Hearing, tympanic membrane, external ear and ear canal normal    Nose: Nose normal  No mucosal edema  Mouth/Throat: Uvula is midline, oropharynx is clear and moist and mucous membranes are normal    Eyes: Pupils are equal, round, and reactive to light  Conjunctivae and lids are normal    Neck: No JVD present  Carotid bruit is not present  No thyromegaly present  Cardiovascular: Normal rate, regular rhythm and intact distal pulses  Murmur heard  Pulmonary/Chest: Effort normal and breath sounds normal  No respiratory distress  Abdominal: Soft  Normal appearance and bowel sounds are normal    Musculoskeletal: Normal range of motion  She exhibits no edema  Bilateral knee swelling and tenderness along with deformity   Lymphadenopathy:     She has no cervical adenopathy  Neurological: She is alert and oriented to person, place, and time  She has normal reflexes   She displays normal reflexes  Skin: Skin is warm, dry and intact  Psychiatric: She has a normal mood and affect  Her speech is normal and behavior is normal  Judgment and thought content normal  Cognition and memory are normal    Vitals reviewed

## 2019-09-30 NOTE — ASSESSMENT & PLAN NOTE
Degenerative arthritis of both knees with particular symptoms in the left knee at this time  Of recommended that she continue follow-up with Orthopedics at [facility]  It is quite possible that she may need total knee replacement in the future  She continues to ambulate with the assistance of a walker which helps stabilize her ambulation and remove some of the pressure on her knee  She was provided with a knee brace by the orthopedic consultant but she finds to be too bulky to use  She does use Ultram on an as-needed basis and a refill prescription was provided to the patient on this visit there is no indication of any abuse or dependence on this medication

## 2019-09-30 NOTE — ASSESSMENT & PLAN NOTE
This patient's blood pressure was assessed during today's visit with a reading 130/80  I recommended the continuation of her amlodipine at 10 mg daily  In addition I have recommend the continuation of her lisinopril at 10 mg daily and metoprolol tartrate 50 mg twice a day  A follow-up assessment of her hypertension is requested in 4 months

## 2019-09-30 NOTE — ASSESSMENT & PLAN NOTE
History of chronic anxiety symptoms the patient has alprazolam 0 25 mg which can be used up to 3 times a day as needed for anxiety  She denies any side effects of the medication I see no evidence of any dependence or abuse of the medication at this time

## 2019-09-30 NOTE — ASSESSMENT & PLAN NOTE
On assessment today the patient has a regular rhythm of she does have a history of paroxysmal atrial fibrillation and continues on beta-blocker therapy for heart rate control as well as Xarelto for stroke risk reduction  She denies any chest pains palpitations shortness of breath I recommend continuation of present therapy with a follow-up assessment in 4 months

## 2019-10-02 DIAGNOSIS — F41.9 ANXIETY: ICD-10-CM

## 2019-10-02 RX ORDER — ALPRAZOLAM 0.25 MG/1
0.25 TABLET ORAL 3 TIMES DAILY PRN
Qty: 90 TABLET | Refills: 0 | Status: SHIPPED | OUTPATIENT
Start: 2019-10-02 | End: 2019-10-07 | Stop reason: SDUPTHER

## 2019-10-03 DIAGNOSIS — F41.9 ANXIETY: ICD-10-CM

## 2019-10-04 RX ORDER — ALPRAZOLAM 0.25 MG/1
0.25 TABLET ORAL 3 TIMES DAILY PRN
Qty: 90 TABLET | Refills: 0 | OUTPATIENT
Start: 2019-10-04

## 2019-10-04 NOTE — TELEPHONE ENCOUNTER
Please let the patient know that a refill on this prescription was sent to the [facility] store of on October 2nd for 90 pills thank you

## 2019-10-07 DIAGNOSIS — F41.9 ANXIETY: ICD-10-CM

## 2019-10-07 RX ORDER — ALPRAZOLAM 0.25 MG/1
0.25 TABLET ORAL 3 TIMES DAILY PRN
Qty: 90 TABLET | Refills: 0 | Status: SHIPPED | OUTPATIENT
Start: 2019-10-07 | End: 2020-01-14 | Stop reason: SDUPTHER

## 2019-10-09 NOTE — TELEPHONE ENCOUNTER
Left voicemail for pt informing her that 80 qty of medication was sent to the [facility] on 10/2/19

## 2019-10-10 ENCOUNTER — REMOTE DEVICE CLINIC VISIT (OUTPATIENT)
Dept: CARDIOLOGY CLINIC | Facility: CLINIC | Age: 74
End: 2019-10-10
Payer: MEDICARE

## 2019-10-10 DIAGNOSIS — Z95.0 PRESENCE OF PERMANENT CARDIAC PACEMAKER: Primary | ICD-10-CM

## 2019-10-10 PROCEDURE — 93296 REM INTERROG EVL PM/IDS: CPT | Performed by: INTERNAL MEDICINE

## 2019-10-10 PROCEDURE — 93294 REM INTERROG EVL PM/LDLS PM: CPT | Performed by: INTERNAL MEDICINE

## 2019-10-10 NOTE — PROGRESS NOTES
Results for orders placed or performed in visit on 10/10/19   Cardiac EP device report    Narrative    MDT DUAL CHAMBER PPM  CARELINK TRANSMISSION: BATTERY VOLTAGE ADEQUATE (8 YRS); AP 91 6%  2 7%; ALL AVAILABLE LEAD PARAMETERS WITHIN NORMAL LIMITS  18 AF EPISODES TREATED WITH ATP  0% PACED TERMINATED  5 AT/AF EPISODES, MAX DURATION 75 SECS  TOTAL AF BURDEN 5 6%  HX: PAF & PT TAKES XARELTO, METOPROLOL TART  PACEMAKER FUNCTIONING APPROPRIATELY     EB

## 2019-10-17 DIAGNOSIS — I10 ESSENTIAL HYPERTENSION: ICD-10-CM

## 2019-10-17 RX ORDER — AMLODIPINE BESYLATE 10 MG/1
TABLET ORAL
Qty: 90 TABLET | Refills: 2 | Status: SHIPPED | OUTPATIENT
Start: 2019-10-17 | End: 2020-06-15

## 2019-11-01 DIAGNOSIS — G89.29 CHRONIC PAIN OF LEFT KNEE: ICD-10-CM

## 2019-11-01 DIAGNOSIS — M25.562 CHRONIC PAIN OF LEFT KNEE: ICD-10-CM

## 2019-11-01 RX ORDER — TRAMADOL HYDROCHLORIDE 50 MG/1
50 TABLET ORAL EVERY 6 HOURS PRN
Qty: 30 TABLET | Refills: 0 | Status: SHIPPED | OUTPATIENT
Start: 2019-11-01 | End: 2020-01-14 | Stop reason: SDUPTHER

## 2019-11-06 DIAGNOSIS — I48.0 AF (PAROXYSMAL ATRIAL FIBRILLATION) (HCC): ICD-10-CM

## 2019-11-06 RX ORDER — METOPROLOL TARTRATE 50 MG/1
TABLET, FILM COATED ORAL
Qty: 270 TABLET | Refills: 3 | Status: SHIPPED | OUTPATIENT
Start: 2019-11-06 | End: 2020-10-19

## 2019-11-06 RX ORDER — METOPROLOL TARTRATE 50 MG/1
50 TABLET, FILM COATED ORAL 3 TIMES DAILY
Qty: 90 TABLET | Refills: 3 | Status: SHIPPED | OUTPATIENT
Start: 2019-11-06 | End: 2019-11-06 | Stop reason: SDUPTHER

## 2019-12-04 DIAGNOSIS — F41.9 ANXIETY: ICD-10-CM

## 2019-12-04 RX ORDER — ALPRAZOLAM 0.25 MG/1
TABLET ORAL
Qty: 90 TABLET | Refills: 0 | Status: SHIPPED | OUTPATIENT
Start: 2019-12-04 | End: 2020-01-10 | Stop reason: SDUPTHER

## 2020-01-06 DIAGNOSIS — M25.562 CHRONIC PAIN OF LEFT KNEE: ICD-10-CM

## 2020-01-06 DIAGNOSIS — G89.29 CHRONIC PAIN OF LEFT KNEE: ICD-10-CM

## 2020-01-06 NOTE — TELEPHONE ENCOUNTER
Please call the patient let her know that she will have to stop by in sign a narcotic contract before we can refill the tramadol thank you

## 2020-01-07 RX ORDER — TRAMADOL HYDROCHLORIDE 50 MG/1
TABLET ORAL
Qty: 30 TABLET | Refills: 0 | OUTPATIENT
Start: 2020-01-07

## 2020-01-07 RX ORDER — TRAMADOL HYDROCHLORIDE 50 MG/1
50 TABLET ORAL EVERY 6 HOURS PRN
Qty: 30 TABLET | Refills: 0 | OUTPATIENT
Start: 2020-01-07

## 2020-01-08 ENCOUNTER — TELEPHONE (OUTPATIENT)
Dept: UROLOGY | Facility: MEDICAL CENTER | Age: 75
End: 2020-01-08

## 2020-01-08 NOTE — TELEPHONE ENCOUNTER
Patient of Dr Zafar Coyne seen in [facility]      [address] calling in regards to refill of oxybutynin (DITROPAN XL) 15 MG 24 hr table    They can be reached at [phone]

## 2020-01-09 NOTE — TELEPHONE ENCOUNTER
The patient was last seen on 11/14/18 by Shaila Long PA-C in the [place] location  She is overdue for her next office visit  This message will be forwarded to Call Center so they can schedule an appointment

## 2020-01-10 ENCOUNTER — OFFICE VISIT (OUTPATIENT)
Dept: UROLOGY | Facility: AMBULATORY SURGERY CENTER | Age: 75
End: 2020-01-10
Payer: MEDICARE

## 2020-01-10 VITALS
SYSTOLIC BLOOD PRESSURE: 130 MMHG | WEIGHT: 228 LBS | HEART RATE: 72 BPM | DIASTOLIC BLOOD PRESSURE: 74 MMHG | HEIGHT: 64 IN | BODY MASS INDEX: 38.93 KG/M2

## 2020-01-10 DIAGNOSIS — R32 URINARY INCONTINENCE, UNSPECIFIED TYPE: ICD-10-CM

## 2020-01-10 DIAGNOSIS — N39.41 URGE INCONTINENCE OF URINE: Primary | ICD-10-CM

## 2020-01-10 DIAGNOSIS — N39.3 URINARY, INCONTINENCE, STRESS FEMALE: ICD-10-CM

## 2020-01-10 PROCEDURE — 99214 OFFICE O/P EST MOD 30 MIN: CPT | Performed by: NURSE PRACTITIONER

## 2020-01-10 RX ORDER — OXYBUTYNIN CHLORIDE 15 MG/1
15 TABLET, EXTENDED RELEASE ORAL DAILY
Qty: 90 TABLET | Refills: 3 | Status: SHIPPED | OUTPATIENT
Start: 2020-01-10 | End: 2020-10-19

## 2020-01-10 NOTE — PROGRESS NOTES
Psychiatric/Behavioral: Negative  Past Medical History  Past Medical History:   Diagnosis Date    Abnormal ECG     Last Assessed 9/29/2016    Anxiety     Last Assessed 6/08/2016    Arthritis     knees    Asthma     Last Assessed 11/06/2013    Atrial premature complex     Cataract, bilateral     Last Assessed 7/14/2016    Cataract, left eye     Both eyes  Had surgery on left   Difficulty swallowing     Dizziness     Gastric reflux     Hypertension     Premature ventricular contraction     Primary osteoarthritis of both knees     Last Assessed 7/14/2016    Rheumatic fever     Sore throat     Tuberculosis        Past Social History  Past Surgical History:   Procedure Laterality Date    APPENDECTOMY      CARDIAC PACEMAKER PLACEMENT      HYSTERECTOMY      AL DILATE ESOPHAGUS N/A 4/6/2016    Procedure: DILATATION ESOPHAGEAL;  Surgeon: Janice Torrez MD;  [facility]: [facility]; Service: Gastroenterology    AL EGD TRANSORAL BIOPSY SINGLE/MULTIPLE N/A 4/6/2016    Procedure: ESOPHAGOGASTRODUODENOSCOPY (EGD); Surgeon: Janice Torrez MD;  [facility]: [facility]; Service: Gastroenterology     [facility] CATARACT EXTRACAP,INSERT LENS Left 3/15/2016    Procedure: EXTRACTION EXTRACAPSULAR CATARACT PHACO INTRAOCULAR LENS (IOL); Surgeon: Ivelisse Orantes MD;  [facility];  Service: Ophthalmology    REPLACEMENT TOTAL KNEE Right     REPLACEMENT TOTAL KNEE      right     TONSILLECTOMY         Past Family History  Family History   Problem Relation Age of Onset    Coronary artery disease Mother     Heart attack Mother         Prior    Heart disease Father     Heart attack Father         Prior       Past Social history  Social History     Socioeconomic History    Marital status:       Spouse name: Not on file    Number of children: Not on file    Years of education: Not on file    Highest education level: Not on file   Occupational History    Not on file   Social Needs    Financial resource strain: Not on file    Food insecurity:     Worry: Not on file     Inability: Not on file    Transportation needs:     Medical: Not on file     Non-medical: Not on file   Tobacco Use    Smoking status: Never Smoker    Smokeless tobacco: Never Used   Substance and Sexual Activity    Alcohol use: No     Comment: Per [employer]    Drug use: No    Sexual activity: Never   Lifestyle    Physical activity:     Days per week: Not on file     Minutes per session: Not on file    Stress: Not on file   Relationships    Social connections:     Talks on phone: Not on file     Gets together: Not on file     Attends Sikhism service: Not on file     Active member of club or organization: Not on file     Attends meetings of clubs or organizations: Not on file     Relationship status: Not on file    Intimate partner violence:     Fear of current or ex partner: Not on file     Emotionally abused: Not on file     Physically abused: Not on file     Forced sexual activity: Not on file   Other Topics Concern    Not on file   Social History Narrative    Not on file       Current Medications  Current Outpatient Medications   Medication Sig Dispense Refill    ALPRAZolam (XANAX) 0 25 mg tablet Take 1 tablet (0 25 mg total) by mouth 3 (three) times a day as needed for anxiety 90 tablet 0    ALPRAZolam (XANAX) 0 25 mg tablet TAKE 1 TABLET(0 25 MG) BY MOUTH THREE TIMES DAILY AS NEEDED FOR ANXIETY 90 tablet 0    amLODIPine (NORVASC) 10 mg tablet TAKE 1 TABLET BY MOUTH DAILY AS DIRECTED 90 tablet 2    Ascorbic Acid (VITAMIN C) 1000 MG tablet Take 1 tablet by mouth daily      B Complex-C (B-COMPLEX WITH VITAMIN C) tablet Take 1 tablet by mouth daily        Cholecalciferol (VITAMIN D3) 1000 units CAPS Take 2 tablets by mouth daily      lisinopril (ZESTRIL) 10 mg tablet Take 0 5 tablets (5 mg total) by mouth daily 30 tablet 6    metoprolol tartrate (LOPRESSOR) 50 mg tablet TAKE 1 TABLET(50 MG) BY MOUTH THREE TIMES DAILY 270 tablet 3    olmesartan (BENICAR) 20 mg tablet Benicar 20 mg tablet      olmesartan-hydrochlorothiazide (BENICAR HCT) 20-12 5 MG per tablet Benicar HCT 20 mg-12 5 mg tablet      oxybutynin (DITROPAN XL) 15 MG 24 hr tablet Take 1 tablet (15 mg total) by mouth daily 30 tablet 11    traMADol (ULTRAM) 50 mg tablet Take 1 tablet (50 mg total) by mouth every 6 (six) hours as needed for moderate pain 30 tablet 0    XARELTO 15 MG tablet Take 1 tablet (15 mg total) by mouth daily 90 tablet 3     No current facility-administered medications for this visit  Allergies  Allergies   Allergen Reactions    X90 Folate [Folic Acid-Vit T2-SVE T28]     Cobalt      Unknown    Lyrica [Pregabalin]     Nickel      Skin discoloration    Other      Black rubber    Penicillins Hives    Sulfa Antibiotics Itching    Cortisone Acetate [Cortisone] Itching and Rash    Penicillin G Rash       Past medical history, social history, family history, medications and allergies were reviewed  Vitals  There were no vitals filed for this visit  Physical Exam  Physical Exam   Constitutional: She is oriented to person, place, and time  She appears well-developed and well-nourished  HENT:   Head: Normocephalic  Eyes: Pupils are equal, round, and reactive to light  Neck: Normal range of motion  Cardiovascular: Normal rate and regular rhythm  Pulmonary/Chest: Effort normal    Abdominal: Soft  Normal appearance  There is no CVA tenderness  Genitourinary:   Genitourinary Comments: No suprapubic discomfort or distension    Musculoskeletal: Normal range of motion  Ambulates with walker       Neurological: She is alert and oriented to person, place, and time  Skin: Skin is warm and dry  Psychiatric: She has a normal mood and affect   Her behavior is normal  Judgment and thought content normal        Results    I have personally reviewed all pertinent lab results and reviewed with patient  Lab Results   Component Value Date    CALCIUM 9 5 09/25/2019    K 4 1 09/25/2019    CO2 29 09/25/2019     09/25/2019    BUN 14 09/25/2019    CREATININE 0 90 09/25/2019     Lab Results   Component Value Date    WBC 8 70 10/17/2017    HGB 13 9 10/17/2017    HCT 42 4 10/17/2017    MCV 90 10/17/2017     10/17/2017     No results found for this or any previous visit (from the past 1 hour(s))

## 2020-01-14 ENCOUNTER — OFFICE VISIT (OUTPATIENT)
Dept: INTERNAL MEDICINE CLINIC | Facility: CLINIC | Age: 75
End: 2020-01-14
Payer: MEDICARE

## 2020-01-14 VITALS
DIASTOLIC BLOOD PRESSURE: 74 MMHG | BODY MASS INDEX: 39.88 KG/M2 | OXYGEN SATURATION: 94 % | SYSTOLIC BLOOD PRESSURE: 132 MMHG | HEIGHT: 64 IN | HEART RATE: 84 BPM | TEMPERATURE: 97.3 F | WEIGHT: 233.6 LBS | RESPIRATION RATE: 16 BRPM

## 2020-01-14 DIAGNOSIS — H61.23 BILATERAL IMPACTED CERUMEN: Primary | ICD-10-CM

## 2020-01-14 DIAGNOSIS — I10 ESSENTIAL HYPERTENSION: ICD-10-CM

## 2020-01-14 DIAGNOSIS — F41.9 ANXIETY: ICD-10-CM

## 2020-01-14 DIAGNOSIS — M25.562 CHRONIC PAIN OF LEFT KNEE: ICD-10-CM

## 2020-01-14 DIAGNOSIS — Z95.0 PACEMAKER: ICD-10-CM

## 2020-01-14 DIAGNOSIS — E78.00 HYPERCHOLESTEROLEMIA: ICD-10-CM

## 2020-01-14 DIAGNOSIS — R25.1 TREMOR: ICD-10-CM

## 2020-01-14 DIAGNOSIS — G47.09 OTHER INSOMNIA: ICD-10-CM

## 2020-01-14 DIAGNOSIS — I48.0 AF (PAROXYSMAL ATRIAL FIBRILLATION) (HCC): ICD-10-CM

## 2020-01-14 DIAGNOSIS — G89.29 CHRONIC PAIN OF LEFT KNEE: ICD-10-CM

## 2020-01-14 PROBLEM — J02.9 ALLERGIC PHARYNGITIS: Status: RESOLVED | Noted: 2018-07-02 | Resolved: 2020-01-14

## 2020-01-14 PROCEDURE — 99215 OFFICE O/P EST HI 40 MIN: CPT | Performed by: INTERNAL MEDICINE

## 2020-01-14 RX ORDER — TRAMADOL HYDROCHLORIDE 50 MG/1
50 TABLET ORAL EVERY 6 HOURS PRN
Qty: 30 TABLET | Refills: 0 | Status: SHIPPED | OUTPATIENT
Start: 2020-01-14 | End: 2020-03-04

## 2020-01-14 RX ORDER — ALPRAZOLAM 0.25 MG/1
0.25 TABLET ORAL 3 TIMES DAILY PRN
Qty: 90 TABLET | Refills: 0 | Status: SHIPPED | OUTPATIENT
Start: 2020-01-14 | End: 2020-05-06

## 2020-01-15 NOTE — ASSESSMENT & PLAN NOTE
History of paroxysmal atrial fibrillation the patient denies any chest pains or palpitations she appears to be in a regular rhythm on today's evaluation  She continues on Xarelto 15 mg daily for stroke risk reduction she has no evidence of any abnormal bleeding on this medication  She also is on a beta-blocker therapy of metoprolol tartrate 20 50 mg twice a day for heart rate control

## 2020-01-15 NOTE — ASSESSMENT & PLAN NOTE
History of pacemaker with no symptoms of bradycardia she remains in a regular rhythm on today's evaluation    Regular follow-up visits with Cardiology are recommended strongly

## 2020-01-15 NOTE — ASSESSMENT & PLAN NOTE
History of hyperlipidemia in this patient recommend that she continue on a low-cholesterol diet  We have requested a lipid profile prior to her next visit she was provided with a request for this testing

## 2020-01-15 NOTE — ASSESSMENT & PLAN NOTE
Chronic anxiety symptoms the patient does have a prescription for alprazolam 0 25 mg Q 8 hours p r n  for anxiety symptoms recommend continued use of this medication for control of her anxiety  There is no evidence of any abuse or dependence of the medication at this time will re-evaluate in 4 months

## 2020-01-15 NOTE — ASSESSMENT & PLAN NOTE
Assessment of the patient's hypertension today indicates adequate control with a reading 132/74 recommend continuation of amlodipine at 10 mg daily and lisinopril at 10 mg daily along with metoprolol tartrate 50 mg twice a day  She has no apparent side effects of these medications nor any symptoms to indicate uncontrolled hypertension or hypotension follow-up assessment is recommended in 4 months

## 2020-01-15 NOTE — ASSESSMENT & PLAN NOTE
Chronic pain in the left knee  She continues on tramadol p r n  no indication of any abuse or dependence on the medication  We did review the x-rays of her left knee today which does show advanced arthritis  Recommend follow-up with Orthopedic surgery

## 2020-01-15 NOTE — ASSESSMENT & PLAN NOTE
The patient during today's visit to discuss her chronic insomnia symptoms  I would like to avoid any type of sleeping medication that could impair the patient when she gets up to go the bathroom or the next morning  I have recommended that she use either melatonin 5 mg at bedtime or Benadryl 25 mg at bedtime

## 2020-01-15 NOTE — ASSESSMENT & PLAN NOTE
Bilateral impacted cerumen on today's examination she also has been experiencing decreased hearing acuity  She is referred on to ENT services at [facility] for removal of this impacted cerumen and evaluation of her hearing acuity

## 2020-01-15 NOTE — PROGRESS NOTES
Assessment/Plan:    Hypertension  Assessment of the patient's hypertension today indicates adequate control with a reading 132/74 recommend continuation of amlodipine at 10 mg daily and lisinopril at 10 mg daily along with metoprolol tartrate 50 mg twice a day  She has no apparent side effects of these medications nor any symptoms to indicate uncontrolled hypertension or hypotension follow-up assessment is recommended in 4 months  AF (paroxysmal atrial fibrillation) (HCC)  History of paroxysmal atrial fibrillation the patient denies any chest pains or palpitations she appears to be in a regular rhythm on today's evaluation  She continues on Xarelto 15 mg daily for stroke risk reduction she has no evidence of any abnormal bleeding on this medication  She also is on a beta-blocker therapy of metoprolol tartrate 20 50 mg twice a day for heart rate control  Tremor  Occasional tremor in the hands  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 there is no evidence of any Parkinson's disease she is on a beta-blocker therapy do not think there is too much more we can do to eliminate the symptoms completely  Pacemaker  History of pacemaker with no symptoms of bradycardia she remains in a regular rhythm on today's evaluation  Regular follow-up visits with Cardiology are recommended strongly    Hypercholesterolemia  History of hyperlipidemia in this patient recommend that she continue on a low-cholesterol diet  We have requested a lipid profile prior to her next visit she was provided with a request for this testing  Chronic pain of left knee  Chronic pain in the left knee  She continues on tramadol p r n  no indication of any abuse or dependence on the medication  We did review the x-rays of her left knee today which does show advanced arthritis  Recommend follow-up with Orthopedic surgery      Anxiety  Chronic anxiety symptoms the patient does have a prescription for alprazolam 0 25 mg Q 8 hours p r n  for anxiety symptoms recommend continued use of this medication for control of her anxiety  There is no evidence of any abuse or dependence of the medication at this time will re-evaluate in 4 months  Bilateral impacted cerumen  Bilateral impacted cerumen on today's examination she also has been experiencing decreased hearing acuity  She is referred on to ENT services at [facility] for removal of this impacted cerumen and evaluation of her hearing acuity  Other insomnia  The patient during today's visit to discuss her chronic insomnia symptoms  I would like to avoid any type of sleeping medication that could impair the patient when she gets up to go the bathroom or the next morning  I have recommended that she use either melatonin 5 mg at bedtime or Benadryl 25 mg at bedtime  Diagnoses and all orders for this visit:    Bilateral impacted cerumen  -     Ambulatory Referral to Otolaryngology; Future    Chronic pain of left knee  -     traMADol (ULTRAM) 50 mg tablet; Take 1 tablet (50 mg total) by mouth every 6 (six) hours as needed for moderate pain    Anxiety  -     ALPRAZolam (XANAX) 0 25 mg tablet; Take 1 tablet (0 25 mg total) by mouth 3 (three) times a day as needed for anxiety    Essential hypertension  -     Comprehensive metabolic panel; Future    Hypercholesterolemia  -     Lipid panel; Future    AF (paroxysmal atrial fibrillation) ([facility] [address] )    Pacemaker        Subjective:      Patient ID: Lucille Oliver is a 66 y o  female  This 57-year-old female returns today for routine follow-up visit  She has decreased hearing acuity in both ears and we have found that both external auditory canals have significant cerumen accumulation and we requested the patient be seen at [facility] ENT services for removal of this impacted cerumen and a audiology test     The patient continues to have difficulty with insomnia type symptoms    We did prefer to avoid her sleeping medications in this 15-year-old female patient  I have recommended that she use either melatonin fiber 6 mg at bedtime or Benadryl 25 mg at bedtime  She has a history of heart disease and is status post pacemaker insertion she denies any chest pains palpitations or shortness of breath  The following portions of the patient's history were reviewed and updated as appropriate:   She  has a past medical history of Abnormal ECG, Anxiety, Arthritis, Asthma, Atrial premature complex, Cataract, bilateral, Cataract, left eye, Difficulty swallowing, Dizziness, Gastric reflux, Hypertension, Premature ventricular contraction, Primary osteoarthritis of both knees, Rheumatic fever, Sore throat, and Tuberculosis  She   Patient Active Problem List    Diagnosis Date Noted    Bilateral impacted cerumen 01/14/2020    Chronic pain of left knee 08/12/2019    Age related osteoporosis 08/12/2019    Anxiety 03/27/2019    Tremor 03/27/2019    Other insomnia 02/27/2019    Urinary incontinence 09/17/2018    Psoriasis 07/20/2018    Thyroid nodule 07/20/2018    [facility] 02/15/2018    [facility] 02/15/2018    Hypertension 02/15/2018    Pacemaker 02/15/2018    GERD (gastroesophageal reflux disease) 07/18/2017    Mild carpal tunnel syndrome, right 04/10/2017    Degenerative lumbar spinal stenosis 03/31/2017    Degenerative arthritis of knee, bilateral 09/29/2016    Low back pain 08/29/2016    Lumbar degenerative disc disease 08/29/2016    Chronic bilateral low back pain without sciatica 06/08/2016    Chronic GERD 05/06/2016    Overweight 02/12/2016    Fibromyalgia 11/06/2013    Hypercholesterolemia 11/06/2013     She  has a past surgical history that includes Hysterectomy; Tonsillectomy; pr egd transoral biopsy single/multiple (N/A, 4/6/2016); pr dilate esophagus (N/A, 4/6/2016); pr xcapsl ctrc rmvl insj io lens prosth w/o ecp (Left, 3/15/2016);  Appendectomy; Replacement total knee (Right); Cardiac pacemaker placement; and Replacement total knee  Her family history includes Coronary artery disease in her mother; Heart attack in her father and mother; Heart disease in her father  She  reports that she has never smoked  She has never used smokeless tobacco  She reports that she does not drink alcohol or use drugs  Current Outpatient Medications   Medication Sig Dispense Refill    ALPRAZolam (XANAX) 0 25 mg tablet Take 1 tablet (0 25 mg total) by mouth 3 (three) times a day as needed for anxiety 90 tablet 0    amLODIPine (NORVASC) 10 mg tablet TAKE 1 TABLET BY MOUTH DAILY AS DIRECTED 90 tablet 2    Ascorbic Acid (VITAMIN C) 1000 MG tablet Take 1 tablet by mouth daily      B Complex-C (B-COMPLEX WITH VITAMIN C) tablet Take 1 tablet by mouth daily   lisinopril (ZESTRIL) 10 mg tablet Take 0 5 tablets (5 mg total) by mouth daily 30 tablet 6    metoprolol tartrate (LOPRESSOR) 50 mg tablet TAKE 1 TABLET(50 MG) BY MOUTH THREE TIMES DAILY 270 tablet 3    Mirabegron ER 25 MG TB24 Take 25 mg by mouth daily 30 tablet 3    oxybutynin (DITROPAN XL) 15 MG 24 hr tablet Take 1 tablet (15 mg total) by mouth daily 90 tablet 3    traMADol (ULTRAM) 50 mg tablet Take 1 tablet (50 mg total) by mouth every 6 (six) hours as needed for moderate pain 30 tablet 0    XARELTO 15 MG tablet Take 1 tablet (15 mg total) by mouth daily 90 tablet 3     No current facility-administered medications for this visit       Review of Systems   HENT: Positive for hearing loss  Musculoskeletal: Positive for gait problem  Psychiatric/Behavioral: Positive for sleep disturbance  All other systems reviewed and are negative  Objective:      /74   Pulse 84   Temp (!) 97 3 °F (36 3 °C)   Resp 16   Ht 5' 4" (1 626 m)   Wt 106 kg (233 lb 9 6 oz)   SpO2 94%   BMI 40 10 kg/m²          Physical Exam   Constitutional: She is oriented to person, place, and time   Vital signs are normal  She appears well-developed and well-nourished  She is cooperative  HENT:   Right Ear: Hearing, tympanic membrane, external ear and ear canal normal    Left Ear: Hearing, tympanic membrane, external ear and ear canal normal    Nose: Nose normal  No mucosal edema  Mouth/Throat: Uvula is midline, oropharynx is clear and moist and mucous membranes are normal    Impacted cerumen in both external auditory canals   Eyes: Pupils are equal, round, and reactive to light  Conjunctivae and lids are normal    Neck: No JVD present  Carotid bruit is not present  No thyromegaly present  Cardiovascular: Normal rate, regular rhythm and intact distal pulses  Murmur heard  Pulmonary/Chest: Effort normal and breath sounds normal  No stridor  No respiratory distress  She has no wheezes  Abdominal: Soft  Normal appearance and bowel sounds are normal    Musculoskeletal: Normal range of motion  She exhibits no edema  Lymphadenopathy:     She has no cervical adenopathy  Neurological: She is alert and oriented to person, place, and time  She has normal reflexes  She displays normal reflexes  Skin: Skin is warm, dry and intact  Psychiatric: She has a normal mood and affect  Her speech is normal and behavior is normal  Judgment and thought content normal  Cognition and memory are normal    Vitals reviewed

## 2020-01-15 NOTE — ASSESSMENT & PLAN NOTE
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

## 2020-02-17 ENCOUNTER — APPOINTMENT (OUTPATIENT)
Dept: PHYSICAL THERAPY | Facility: REHABILITATION | Age: 75
End: 2020-02-17
Payer: MEDICARE

## 2020-02-24 ENCOUNTER — EVALUATION (OUTPATIENT)
Dept: PHYSICAL THERAPY | Facility: REHABILITATION | Age: 75
End: 2020-02-24
Payer: MEDICARE

## 2020-02-24 DIAGNOSIS — N39.41 URINARY INCONTINENCE, URGE: ICD-10-CM

## 2020-02-24 DIAGNOSIS — N39.3 STRESS INCONTINENCE: Primary | ICD-10-CM

## 2020-02-24 PROCEDURE — 97162 PT EVAL MOD COMPLEX 30 MIN: CPT | Performed by: PHYSICAL THERAPIST

## 2020-02-24 PROCEDURE — 97530 THERAPEUTIC ACTIVITIES: CPT | Performed by: PHYSICAL THERAPIST

## 2020-02-24 NOTE — PROGRESS NOTES
PT Evaluation     Today's date: 2020  Patient name: Ronda Field  : [id number]  MRN: [id number]  Referring provider: AQUILES Esquivel  Dx:   Encounter Diagnosis     ICD-10-CM    1  Stress incontinence N39 3    2  Urinary incontinence, urge N39 41        Start Time: 1200  Stop Time: 1300  Total time in clinic (min): 60 minutes    Assessment  Assessment details: The patient is a 66year old female with complaints or urinary urgency and urge incontinence which began about one year ago  She reports that her symptoms are the worst when she is traveling in the car as a  or a passenger  She is limited to 30 minutes of travel at this time  She reports moderate control of her bladder while at home  She does have a history of a hysterectomy with colposuspension 30 years ago  She deferred pelvic floor muscle examination today to next visit, but this was explained/described in detail for the patient  She does have impaired balance and gait and ambulates with rollater walker  She has difficulty with sit to stand due to her lower back  Education provided today in regards to pelvic floor muscle anatomy and function as well as course of treatment  Patient was provided with bladder diaries to complete and bring in with her to her next visit as well for further assessment  She would benefit from skilled pelvic floor therapy to help reduce/manage her symptoms, address her impairments, and maximize her overall function and improve her quality of life upon discharge  She will be given HEP throughout episode of care  Thank you for the referral    Impairments: abnormal gait, activity intolerance, impaired balance, impaired physical strength and lacks appropriate home exercise program  Understanding of Dx/Px/POC: good   Prognosis: good    Goals  ST  The patient will be able to isolate her pelvic floor muscles in 4 weeks  2  The patient will improve pelvic floor muscle strength by 1 grade in 8 to 10 weeks     3  The patient will eliminate just in case voiding in 8 to 10 weeks  LT  The patient will be able to improve tolerance to traveling by car to 45 minutes to an hour prior to interruptive urgency symptoms upon discharge  2  The patient will demonstrated improved control of her bladder to be able to make it to the toilet prior to any leakage of urine upon discharge  3  The patient will be independent with HEP upon discharge  Plan  Patient would benefit from: skilled PT  Planned modality interventions: biofeedback and ultrasound (Real Time Ultrasound)  Planned therapy interventions: manual therapy, neuromuscular re-education, patient education, strengthening, therapeutic exercise, therapeutic training, home exercise program, abdominal trunk stabilization, self care, postural training, therapeutic activities and stretching  Frequency: 1x week  Duration in visits: 8  Duration in weeks: 8  Plan of Care beginning date: 2020  Plan of Care expiration date: 2020  Treatment plan discussed with: patient        PT Pelvic Floor Subjective:   History of Present Illness: The patient notes that she can not tolerate being in the car for more than 30 minutes without having to stop to urinate  She has been experiencing symptoms of urinary urgency for about one year now  She was referred to Urology by her PCP and she saw Reny Doty  She was prescribed Myrbetriq to help her symptoms and also referred to pelvic floor therapy  She notes that the medication is helping her symptoms but has not cured her  She reports that the most stressful event for her is being in the car, either as a passenger or a   She notes that her control is better otherwise  She is scheduled to follow up with Urology in one month       Outside of car: control of bladder: 4-5/10  In the car: control of bladder: 0/10 - especially after 30 minutes  Social Support:     Lives in:  [address] [place] [address] with:  Alone    Relationship status:     Work status: retired    Life stress level: 8 (family stress)    History of Depression: yes    been through a lot - patient does take Xanax  Hand dominance:  Right  Diet and Exercise:    Co-morbidities:    Chronic Low back pain  Gait and balance dysfunction  OB/ gyn History    Gestational History:     Number of prior pregnancies: 7+    Number of term pregnancies: 5    Delivery Type: vaginal delivery      Number of vaginal deliveries: 5    Delivery Complications:  Oldest child born in [birth year] and youngest child is 36years old  Patient notes that she does remember an episiotomy and stitches after some of her deliveries - no complaints in regards to healing    Hysterectomy 30 years ago due to bleeding; ovaries preserved  Appendectomy and bladder lift at same time as hysterectomy        Menstrual History:      Menopausal: menopause  no hormone replacement therapy  Bladder Function:     Voiding Difficulties positive for: urgency and frequent urination (every 30 minutes in the car)      Voiding Difficulties negative for: hesitancy, straining and incomplete emptying      Voiding Difficulties comments:     Urinary frequency: 2-3 hours  Urinary leakage: urine leakage    Urinary leakage aggravated by: coughing, sneezing, walking to the bathroom (not as frequent), anxiety (being in the car) and unknown    Urinary leakage not aggravated by: bending, standing up (better since medication), hearing running water and seeing a toilet    Nocturia (episodes per night): 2 (only sleeps 4 hours at night)    Painful urination: No      Fluid Intake Type: Water and coffee    Intake (ounces):      Intake (ounces) comment: Decaf coffee (instant) - 1 cup in the morning  Water: 4-5 glasses per day (4 ounces) - patient likes cold water  Fruit juice only on occasion  Incontinence Management:     Pads/Diaper Use:  Day and night    Pads/Diapers Additional Comments: Poise #5 pad; patient carries spare pad when she is out in public; no issues with leakage overnight  Bowel Function:     Bowel Function comments:  No fecal urgency  No constipation  No Hard stools  No pain with defecation  No straining for BM's    Fruit helps patient to stay regular    Bowel frequency: every 2 days and every 3 days  Sexual Function:     Sexually Active:  Not sexually active  Pain:     No pain reported by patient  Location:  No pelvic or vaginal pain    Progression:  Improved (since starting the medication)  Diagnostic Tests:     None    Treatments:     Current treatment: medication    Upcoming doctor's appointment: yes  Patient Goals: Other patient goals: To be able to drive more than half an hour      Objective     Static Posture     Head  Forward  Shoulders  Rounded  Thoracic Spine  Hyperkyphosis  Ambulation   Weight-Bearing Status   Assistive device used: rollator walker    Observational Gait   Decreased walking speed and stride length  Base of support: increased    Comments   Sit to stand: independent but difficulty in getting from sit to stand; patient requires use of UEs's  Pelvic Floor Exam     General Perineum Exam:     General perineum exam comments: Pelvic floor verbal consent and written consent signed and in chart - will give consent form next visit - patient deferred pelvic floor muscle exam to next visit  Patient deferred second person in room: YES/NO    Education provided today: 15 min   Time Spent on Patient Education:  Pelvic floor anatomy and function  Physiology/relationship of abdominal canister and pelvis/pelvic organs/pelvic floor muscles  Bowel and Bladder anatomy and function  PT exam and course of treatment  Bladder and Bowel diary  Proper hydration  Avoiding "Just in case" voiding             Precautions: fall risk; CARDIAC; history of hysterectomy;    Anxiety  Daily Treatment Diary   [facility] HEP    Manual  2/24                                                   ayaka             Further evaluation                 Exercise Diary  2/24            PFMC: quick flicks 6"QAEQW/3"UGYH             PFMC: slow holds 5"work/10"rest             PFMC: elevators             TA ADIM             TA ADIM + PFMC             TA ADIM + PFMC + hip add isometrics             TA ADIM + PF[facility] + hip abd isometrics             TA + PFMC in quadruped             Seated PFMC             Pball seated [address]             PFMC with sit to stand             [address] with squat             PFMC with step ups                                                                                                            Modalities

## 2020-03-02 ENCOUNTER — OFFICE VISIT (OUTPATIENT)
Dept: PHYSICAL THERAPY | Facility: REHABILITATION | Age: 75
End: 2020-03-02
Payer: MEDICARE

## 2020-03-02 DIAGNOSIS — N39.41 URINARY INCONTINENCE, URGE: ICD-10-CM

## 2020-03-02 DIAGNOSIS — N39.3 STRESS INCONTINENCE: Primary | ICD-10-CM

## 2020-03-02 PROCEDURE — 97140 MANUAL THERAPY 1/> REGIONS: CPT | Performed by: PHYSICAL THERAPIST

## 2020-03-02 PROCEDURE — 97530 THERAPEUTIC ACTIVITIES: CPT | Performed by: PHYSICAL THERAPIST

## 2020-03-02 NOTE — PROGRESS NOTES
Daily Note     Today's date: 3/2/2020  Patient name: Angel Engel  :   MRN: [id number]  Referring provider: AQUILES Cortes  Dx:   Encounter Diagnosis     ICD-10-CM    1  Stress incontinence N39 3    2  Urinary incontinence, urge N39 41        Start Time: 1200  Stop Time: 1255  Total time in clinic (min): 55 minutes    Subjective: The patient notes that she was with her family over the last week and did not complete any of her bladder diaries  She was traveling back and forth but she had no issues with urgency as she was within 30 minutes of driving  She only had one episode of urge while driving to [place] and she had to stop at a gas station to empty  She notes that she had no leakage of urine with this  She was encouraged to perform her bladder diaries at a later visit  Objective: See treatment diary below    Objective   Pelvic Floor Exam     General Perineum Exam:   Positive for no pelvic organ prolapse at rest and perianal erythema     Negative for descent    General perineum exam comments: Pelvic floor verbal consent and written consent signed and in chart   Patient deferred second person in room: YES/NO    Education provided today:   Time Spent on Patient Education: 15 min  Pelvic floor anatomy and function  Physiology/relationship of abdominal canister and pelvis/pelvic organs/pelvic floor muscles  Bladder anatomy and function  Importance of mobility and balance/safety in regards to continence  Pelvic floor exam and course of treatment  Bladder and Bowel diary  Treatment next visit with biofeedback    Visual Inspection of Perineum:   Excursion of perineal body in cephalad direction with contraction of pelvic floor muscles (PFM): unable  Involuntary contraction with coughing: no  Cotton swab test: non-tender  Sensation: intact    Pelvic Organ Prolapse   Position: hook-lying  At rest: none  With bearing down: none  Comments: palpable slight anterior descent with cue to cough during examination    Pelvic Floor Muscle Exam     Breathing pattern with contraction: apical  Pelvic floor muscle relaxation is complete  PERFECT Score   Power right: 2/5  Power left: 2/5  Endurance (seconds to max): 2          Assessment: Tolerated treatment well  Patient did provide verbal and written consent for pelvic floor muscle exam  Part of visit spent counseling patient in regards to why this is important as well as discussing results  Patient has good awareness of her pelvic floor muscles with cue to contract  No visible lift at perineum due to weakness  No pain with palpation, but just some general discomfort noted  Patient notes that she has not seen a GYN in at least 5 years  PT recommended going for an examination since it has been some time  Some erythema noted in vulva, possibly due to incontinence and utilization of pads  Plan: Continue per plan of care  Precautions: fall risk; CARDIAC; history of hysterectomy;    Anxiety  Daily Treatment Diary   [employer] HEP    Manual  2/24 3/2                                                  Bowel and bladder diary review             education  15 min           Further evaluation  30 min               Exercise Diary  2/24 3/2           PFMC: quick flicks 3"LVAKC/6"IQMJ             PFMC: slow holds 5"work/10"rest             PFMC: elevators             TA ADIM             TA ADIM + PFMC             TA ADIM + PFMC + hip add isometrics             TA ADIM + PFMC + hip abd isometrics             TA + PFMC in quadruped             Seated PFMC             Pball seated [address]             PFMC with sit to stand             [address] with squat             PFMC with step ups                                                                                                            Modalities

## 2020-03-03 DIAGNOSIS — I10 ESSENTIAL HYPERTENSION: ICD-10-CM

## 2020-03-03 RX ORDER — LISINOPRIL 10 MG/1
5 TABLET ORAL DAILY
Qty: 90 TABLET | Refills: 2
Start: 2020-03-03 | End: 2020-03-06 | Stop reason: SDUPTHER

## 2020-03-04 DIAGNOSIS — M25.562 CHRONIC PAIN OF LEFT KNEE: ICD-10-CM

## 2020-03-04 DIAGNOSIS — G89.29 CHRONIC PAIN OF LEFT KNEE: ICD-10-CM

## 2020-03-04 RX ORDER — TRAMADOL HYDROCHLORIDE 50 MG/1
TABLET ORAL
Qty: 30 TABLET | Refills: 0 | Status: SHIPPED | OUTPATIENT
Start: 2020-03-04 | End: 2020-07-29

## 2020-03-06 DIAGNOSIS — I10 ESSENTIAL HYPERTENSION: ICD-10-CM

## 2020-03-06 RX ORDER — LISINOPRIL 10 MG/1
5 TABLET ORAL DAILY
Qty: 90 TABLET | Refills: 3
Start: 2020-03-06 | End: 2020-04-03 | Stop reason: SDUPTHER

## 2020-03-09 ENCOUNTER — OFFICE VISIT (OUTPATIENT)
Dept: PHYSICAL THERAPY | Facility: REHABILITATION | Age: 75
End: 2020-03-09
Payer: MEDICARE

## 2020-03-09 DIAGNOSIS — N39.41 URINARY INCONTINENCE, URGE: ICD-10-CM

## 2020-03-09 DIAGNOSIS — N39.3 STRESS INCONTINENCE: Primary | ICD-10-CM

## 2020-03-09 PROCEDURE — 97530 THERAPEUTIC ACTIVITIES: CPT | Performed by: PHYSICAL THERAPIST

## 2020-03-09 PROCEDURE — 90912 BFB TRAINING 1ST 15 MIN: CPT | Performed by: PHYSICAL THERAPIST

## 2020-03-09 NOTE — PROGRESS NOTES
Daily Note     Today's date: 3/9/2020  Patient name: James Martel  : [id number]  MRN: [id number]  Referring provider: AQUILES Lacy  Dx:   Encounter Diagnosis     ICD-10-CM    1  Stress incontinence N39 3    2  Urinary incontinence, urge N39 41        Start Time: 1200  Stop Time: 1250  Total time in clinic (min): 50 minutes    Subjective: The patient states she continues to experience a weak urine stream which she describes as slow and then it stops and then she sits and waits and she is able to empty a little bit more  She does not feel that she is retaining urine  She has been experiencing this for the past few months now  She has been doing well with urgency with the exception of being in the car  Verbal review of bladder habits as patient forgot her bladder diary papers today  Objective: See treatment diary below      Assessment: Tolerated treatment well  Patient would benefit from continued PT  Educated patient in regards to pelvic floor muscles relationship to bladder and how contractions can help to suppress bladder urgency  She did demonstrate limited muscle recruitment on Biofeedback today (2 0 - 3 0 mV max)  Resting rate was within normal limits  She fatigued quickly and then had minimal to no active muscle engagement  She did find Biofeedback to be helpful  She has been able to successfully bring in her bladder diaries for review  She was given HEP with quick flicks and slow hold contractions today to perform two times per day in supine to help build awareness, proper form and start to improve recruitment/strength  Plan: Continue per plan of care  Precautions: fall risk; CARDIAC; history of hysterectomy;    Anxiety  Daily Treatment Diary   [employer] HEP    Manual  2/24 3/2 3/9                                                 Bowel and bladder diary review             Education and counseling  15 min 25 min          Further evaluation  30 min               Exercise Diary   3/2 3/9          PFMC: quick flicks 6"BHJKG/4"QNYN   2x10          PFMC: slow holds 5"work/10"rest   2x8          PFMC: elevators             TA ADIM             TA ADIM + PFMC             TA ADIM + PFMC + hip add isometrics             TA ADIM + PFMC + hip abd isometrics             TA + PFMC in quadruped             Seated PFMC             Pball seated PFMC             PFMC with sit to stand             [address] with squat             PFMC with step ups                                                                                                            Modalities

## 2020-03-11 ENCOUNTER — REMOTE DEVICE CLINIC VISIT (OUTPATIENT)
Dept: CARDIOLOGY CLINIC | Facility: CLINIC | Age: 75
End: 2020-03-11
Payer: MEDICARE

## 2020-03-11 DIAGNOSIS — Z95.0 PRESENCE OF PERMANENT CARDIAC PACEMAKER: Primary | ICD-10-CM

## 2020-03-11 PROCEDURE — 93296 REM INTERROG EVL PM/IDS: CPT | Performed by: INTERNAL MEDICINE

## 2020-03-11 PROCEDURE — 93294 REM INTERROG EVL PM/LDLS PM: CPT | Performed by: INTERNAL MEDICINE

## 2020-03-13 NOTE — PROGRESS NOTES
Results for orders placed or performed in visit on 03/11/20   Cardiac EP device report    Narrative    MDT DUAL CHAMBER PPM/ ACTIVE SYSTEM IS MRI CONDITIONAL  CARELINK TRANSMISSION: BATTERY VOLTAGE ADEQUATE (8 YRS) AP 92 %  3%  ALL AVAILABLE LEAD PARAMETERS WITHIN NORMAL LIMITS  48 ATP TREATED AT/AF EPISODES DETECTED  1 VHR EPISODE DETECTED 6 BEATS @ 340ms  PATIENT IS ON XARELTO AND METOPROLOL  NORMAL DEVICE FUNCTION  ---EDUARDO

## 2020-03-16 ENCOUNTER — APPOINTMENT (OUTPATIENT)
Dept: PHYSICAL THERAPY | Facility: REHABILITATION | Age: 75
End: 2020-03-16
Payer: MEDICARE

## 2020-03-24 ENCOUNTER — APPOINTMENT (OUTPATIENT)
Dept: PHYSICAL THERAPY | Facility: REHABILITATION | Age: 75
End: 2020-03-24
Payer: MEDICARE

## 2020-04-03 DIAGNOSIS — I10 ESSENTIAL HYPERTENSION: ICD-10-CM

## 2020-04-03 RX ORDER — LISINOPRIL 10 MG/1
5 TABLET ORAL DAILY
Qty: 90 TABLET | Refills: 3 | Status: SHIPPED | OUTPATIENT
Start: 2020-04-03 | End: 2021-06-21

## 2020-04-06 ENCOUNTER — TELEPHONE (OUTPATIENT)
Dept: UROLOGY | Facility: AMBULATORY SURGERY CENTER | Age: 75
End: 2020-04-06

## 2020-04-10 ENCOUNTER — TELEPHONE (OUTPATIENT)
Dept: UROLOGY | Facility: AMBULATORY SURGERY CENTER | Age: 75
End: 2020-04-10

## 2020-04-13 NOTE — PROGRESS NOTES
PT Discharge    Today's date: 2020  Patient name: Bre Harman  : [id number]  MRN: [id number]  Referring provider: AQUILES Jara  Dx:   Encounter Diagnosis     ICD-10-CM    1  Stress incontinence N39 3    2  Urinary incontinence, urge N39 41        Start Time: 1200  Stop Time: 1250  Total time in clinic (min): 50 minutes    Assessment/Plan: The patient called to self discharge due to COVID-19  She was treated for a total of 3 visits including her IE on  and her last visit on 3/9       Pelvic Floor Subjective     Objective

## 2020-04-28 PROBLEM — A04.8 HELICOBACTER PYLORI INFECTION: Status: ACTIVE | Noted: 2020-04-28

## 2020-04-28 PROBLEM — R49.0 DYSPHONIA: Status: ACTIVE | Noted: 2020-04-28

## 2020-04-28 PROBLEM — J30.9 MILD ALLERGIC RHINITIS: Status: ACTIVE | Noted: 2020-04-28

## 2020-04-28 PROBLEM — E55.9 VITAMIN D INSUFFICIENCY: Status: ACTIVE | Noted: 2020-04-28

## 2020-04-28 PROBLEM — R13.14 PHARYNGOESOPHAGEAL DYSPHAGIA: Status: ACTIVE | Noted: 2020-04-28

## 2020-04-28 PROBLEM — Z86.19 HX OF LYME DISEASE: Status: ACTIVE | Noted: 2020-04-28

## 2020-05-05 DIAGNOSIS — F41.9 ANXIETY: ICD-10-CM

## 2020-05-06 RX ORDER — ALPRAZOLAM 0.25 MG/1
TABLET ORAL
Qty: 90 TABLET | Refills: 0 | Status: SHIPPED | OUTPATIENT
Start: 2020-05-06 | End: 2020-06-30

## 2020-05-21 DIAGNOSIS — N39.41 URGE INCONTINENCE OF URINE: ICD-10-CM

## 2020-05-21 RX ORDER — MIRABEGRON 25 MG/1
TABLET, FILM COATED, EXTENDED RELEASE ORAL
Qty: 30 TABLET | Refills: 3 | Status: SHIPPED | OUTPATIENT
Start: 2020-05-21 | End: 2020-09-20 | Stop reason: SDUPTHER

## 2020-06-13 DIAGNOSIS — I10 ESSENTIAL HYPERTENSION: ICD-10-CM

## 2020-06-15 DIAGNOSIS — I48.91 ATRIAL FIBRILLATION, UNSPECIFIED TYPE (HCC): ICD-10-CM

## 2020-06-15 RX ORDER — AMLODIPINE BESYLATE 10 MG/1
TABLET ORAL
Qty: 90 TABLET | Refills: 2 | Status: SHIPPED | OUTPATIENT
Start: 2020-06-15 | End: 2021-03-21

## 2020-06-16 RX ORDER — RIVAROXABAN 15 MG/1
15 TABLET, FILM COATED ORAL DAILY
Qty: 90 TABLET | Refills: 4 | Status: SHIPPED | OUTPATIENT
Start: 2020-06-16 | End: 2021-07-16 | Stop reason: SDUPTHER

## 2020-06-30 DIAGNOSIS — F41.9 ANXIETY: ICD-10-CM

## 2020-06-30 RX ORDER — ALPRAZOLAM 0.25 MG/1
TABLET ORAL
Qty: 90 TABLET | Refills: 0 | Status: SHIPPED | OUTPATIENT
Start: 2020-06-30 | End: 2020-08-04

## 2020-07-16 ENCOUNTER — IN-CLINIC DEVICE VISIT (OUTPATIENT)
Dept: CARDIOLOGY CLINIC | Facility: CLINIC | Age: 75
End: 2020-07-16
Payer: MEDICARE

## 2020-07-16 DIAGNOSIS — Z95.0 PRESENCE OF PERMANENT CARDIAC PACEMAKER: Primary | ICD-10-CM

## 2020-07-16 PROCEDURE — 93280 PM DEVICE PROGR EVAL DUAL: CPT | Performed by: INTERNAL MEDICINE

## 2020-07-16 NOTE — PROGRESS NOTES
Results for orders placed or performed in visit on 07/16/20   Cardiac EP device report    Narrative    MDT DUAL CHAMBER PPM/ ACTIVE SYSTEM IS MRI CONDITIONAL  DEVICE INTERROGATED IN THE [facility] OFFICE:BATTERY VOLTAGE ADEQUATE  (7 YRS) AP 94%  3%  ALL LEAD PARAMETERS WITHIN NORMAL LIMITS  TREATED AT/AF EPISODES DETECTED  27 AT/AF EPISODES (4 9% OF TIME)  1 VHR EPISODE DETECTED 6 BEATS @ 194 BPM  NO PROGRAMMING CHANGES MADE TO DEVICE PARAMETERS  NORMAL DEVICE FUNCTION  ---EDUARDO

## 2020-07-29 DIAGNOSIS — G89.29 CHRONIC PAIN OF LEFT KNEE: ICD-10-CM

## 2020-07-29 DIAGNOSIS — M25.562 CHRONIC PAIN OF LEFT KNEE: ICD-10-CM

## 2020-07-29 RX ORDER — TRAMADOL HYDROCHLORIDE 50 MG/1
TABLET ORAL
Qty: 30 TABLET | Refills: 0 | Status: SHIPPED | OUTPATIENT
Start: 2020-07-29 | End: 2020-10-07

## 2020-08-03 DIAGNOSIS — F41.9 ANXIETY: ICD-10-CM

## 2020-08-04 RX ORDER — ALPRAZOLAM 0.25 MG/1
TABLET ORAL
Qty: 90 TABLET | Refills: 0 | Status: SHIPPED | OUTPATIENT
Start: 2020-08-04 | End: 2020-10-07

## 2020-09-19 DIAGNOSIS — N39.41 URGE INCONTINENCE OF URINE: ICD-10-CM

## 2020-09-20 RX ORDER — MIRABEGRON 25 MG/1
TABLET, FILM COATED, EXTENDED RELEASE ORAL
Qty: 30 TABLET | Refills: 3 | Status: SHIPPED | OUTPATIENT
Start: 2020-09-20 | End: 2021-01-20

## 2020-10-07 DIAGNOSIS — M25.562 CHRONIC PAIN OF LEFT KNEE: ICD-10-CM

## 2020-10-07 DIAGNOSIS — G89.29 CHRONIC PAIN OF LEFT KNEE: ICD-10-CM

## 2020-10-07 DIAGNOSIS — F41.9 ANXIETY: ICD-10-CM

## 2020-10-07 RX ORDER — TRAMADOL HYDROCHLORIDE 50 MG/1
TABLET ORAL
Qty: 30 TABLET | Refills: 0 | Status: SHIPPED | OUTPATIENT
Start: 2020-10-07 | End: 2020-10-23

## 2020-10-07 RX ORDER — ALPRAZOLAM 0.25 MG/1
TABLET ORAL
Qty: 90 TABLET | Refills: 0 | Status: SHIPPED | OUTPATIENT
Start: 2020-10-07 | End: 2020-11-20

## 2020-10-19 DIAGNOSIS — R32 URINARY INCONTINENCE, UNSPECIFIED TYPE: ICD-10-CM

## 2020-10-19 DIAGNOSIS — I48.0 AF (PAROXYSMAL ATRIAL FIBRILLATION) (HCC): ICD-10-CM

## 2020-10-19 RX ORDER — METOPROLOL TARTRATE 50 MG/1
TABLET, FILM COATED ORAL
Qty: 270 TABLET | Refills: 3 | Status: ON HOLD | OUTPATIENT
Start: 2020-10-19 | End: 2021-08-12 | Stop reason: SDUPTHER

## 2020-10-19 RX ORDER — OXYBUTYNIN CHLORIDE 15 MG/1
TABLET, EXTENDED RELEASE ORAL
Qty: 90 TABLET | Refills: 3 | Status: SHIPPED | OUTPATIENT
Start: 2020-10-19 | End: 2021-08-12 | Stop reason: HOSPADM

## 2020-10-21 ENCOUNTER — REMOTE DEVICE CLINIC VISIT (OUTPATIENT)
Dept: CARDIOLOGY CLINIC | Facility: CLINIC | Age: 75
End: 2020-10-21
Payer: MEDICARE

## 2020-10-21 DIAGNOSIS — Z95.0 PRESENCE OF PERMANENT CARDIAC PACEMAKER: Primary | ICD-10-CM

## 2020-10-21 PROCEDURE — 93294 REM INTERROG EVL PM/LDLS PM: CPT | Performed by: INTERNAL MEDICINE

## 2020-10-21 PROCEDURE — 93296 REM INTERROG EVL PM/IDS: CPT | Performed by: INTERNAL MEDICINE

## 2020-10-23 DIAGNOSIS — M25.562 CHRONIC PAIN OF LEFT KNEE: ICD-10-CM

## 2020-10-23 DIAGNOSIS — G89.29 CHRONIC PAIN OF LEFT KNEE: ICD-10-CM

## 2020-10-23 RX ORDER — TRAMADOL HYDROCHLORIDE 50 MG/1
TABLET ORAL
Qty: 30 TABLET | Refills: 0 | Status: SHIPPED | OUTPATIENT
Start: 2020-10-23 | End: 2021-01-25

## 2020-11-05 ENCOUNTER — APPOINTMENT (OUTPATIENT)
Dept: LAB | Facility: CLINIC | Age: 75
End: 2020-11-05
Payer: MEDICARE

## 2020-11-05 DIAGNOSIS — E78.00 HYPERCHOLESTEROLEMIA: ICD-10-CM

## 2020-11-05 LAB
CHOLEST SERPL-MCNC: 223 MG/DL (ref 50–200)
HDLC SERPL-MCNC: 57 MG/DL
LDLC SERPL CALC-MCNC: 150 MG/DL (ref 0–100)
NONHDLC SERPL-MCNC: 166 MG/DL
TRIGL SERPL-MCNC: 80 MG/DL

## 2020-11-05 PROCEDURE — 80061 LIPID PANEL: CPT

## 2020-11-05 PROCEDURE — 36415 COLL VENOUS BLD VENIPUNCTURE: CPT

## 2020-11-10 ENCOUNTER — OFFICE VISIT (OUTPATIENT)
Dept: INTERNAL MEDICINE CLINIC | Facility: CLINIC | Age: 75
End: 2020-11-10
Payer: MEDICARE

## 2020-11-10 VITALS
TEMPERATURE: 97.9 F | DIASTOLIC BLOOD PRESSURE: 72 MMHG | WEIGHT: 235 LBS | RESPIRATION RATE: 18 BRPM | OXYGEN SATURATION: 98 % | BODY MASS INDEX: 40.12 KG/M2 | HEART RATE: 90 BPM | HEIGHT: 64 IN | SYSTOLIC BLOOD PRESSURE: 130 MMHG

## 2020-11-10 DIAGNOSIS — M17.0 PRIMARY OSTEOARTHRITIS OF BOTH KNEES: ICD-10-CM

## 2020-11-10 DIAGNOSIS — E78.5 HYPERLIPIDEMIA, UNSPECIFIED HYPERLIPIDEMIA TYPE: Primary | ICD-10-CM

## 2020-11-10 DIAGNOSIS — I48.0 AF (PAROXYSMAL ATRIAL FIBRILLATION) (HCC): ICD-10-CM

## 2020-11-10 DIAGNOSIS — R25.1 TREMOR: ICD-10-CM

## 2020-11-10 DIAGNOSIS — I10 ESSENTIAL HYPERTENSION: ICD-10-CM

## 2020-11-10 DIAGNOSIS — I49.5 TACHY-BRADY SYNDROME (HCC): ICD-10-CM

## 2020-11-10 DIAGNOSIS — E78.5 HYPERLIPIDEMIA, UNSPECIFIED HYPERLIPIDEMIA TYPE: ICD-10-CM

## 2020-11-10 PROBLEM — H61.23 BILATERAL IMPACTED CERUMEN: Status: RESOLVED | Noted: 2020-01-14 | Resolved: 2020-11-10

## 2020-11-10 PROBLEM — Z86.19 HX OF LYME DISEASE: Status: RESOLVED | Noted: 2020-04-28 | Resolved: 2020-11-10

## 2020-11-10 PROBLEM — A04.8 HELICOBACTER PYLORI INFECTION: Status: RESOLVED | Noted: 2020-04-28 | Resolved: 2020-11-10

## 2020-11-10 PROCEDURE — 99215 OFFICE O/P EST HI 40 MIN: CPT | Performed by: INTERNAL MEDICINE

## 2020-11-10 RX ORDER — NITROFURANTOIN 25; 75 MG/1; MG/1
CAPSULE ORAL
COMMUNITY
End: 2021-03-10 | Stop reason: ALTCHOICE

## 2020-11-10 RX ORDER — EZETIMIBE 10 MG/1
TABLET ORAL
Qty: 90 TABLET | Refills: 2 | Status: SHIPPED | OUTPATIENT
Start: 2020-11-10 | End: 2021-07-20

## 2020-11-10 RX ORDER — EZETIMIBE 10 MG/1
10 TABLET ORAL DAILY
Qty: 30 TABLET | Refills: 6 | Status: SHIPPED | OUTPATIENT
Start: 2020-11-10 | End: 2020-11-10

## 2020-11-20 DIAGNOSIS — F41.9 ANXIETY: ICD-10-CM

## 2020-11-20 RX ORDER — ALPRAZOLAM 0.25 MG/1
TABLET ORAL
Qty: 90 TABLET | Refills: 0 | Status: SHIPPED | OUTPATIENT
Start: 2020-11-20 | End: 2020-12-03 | Stop reason: SDUPTHER

## 2020-12-03 DIAGNOSIS — F41.9 ANXIETY: ICD-10-CM

## 2020-12-03 RX ORDER — ALPRAZOLAM 0.25 MG/1
0.25 TABLET ORAL 3 TIMES DAILY PRN
Qty: 90 TABLET | Refills: 0 | Status: SHIPPED | OUTPATIENT
Start: 2020-12-03 | End: 2020-12-04 | Stop reason: CLARIF

## 2020-12-04 DIAGNOSIS — F41.9 ANXIETY: ICD-10-CM

## 2020-12-04 RX ORDER — ALPRAZOLAM 0.25 MG/1
0.25 TABLET ORAL 3 TIMES DAILY PRN
Qty: 90 TABLET | Refills: 0 | Status: SHIPPED | OUTPATIENT
Start: 2020-12-04 | End: 2021-02-02

## 2020-12-04 RX ORDER — ALPRAZOLAM 0.25 MG/1
0.25 TABLET ORAL 3 TIMES DAILY PRN
Qty: 90 TABLET | Refills: 0 | OUTPATIENT
Start: 2020-12-04

## 2020-12-23 DIAGNOSIS — J45.20 MILD INTERMITTENT ASTHMA WITHOUT COMPLICATION: ICD-10-CM

## 2020-12-23 RX ORDER — ALBUTEROL SULFATE 90 UG/1
2 AEROSOL, METERED RESPIRATORY (INHALATION) EVERY 6 HOURS PRN
Qty: 1 INHALER | Refills: 0 | Status: SHIPPED | OUTPATIENT
Start: 2020-12-23 | End: 2020-12-23

## 2020-12-23 RX ORDER — ALBUTEROL SULFATE 90 UG/1
AEROSOL, METERED RESPIRATORY (INHALATION)
Qty: 3 INHALER | Refills: 0 | Status: SHIPPED | OUTPATIENT
Start: 2020-12-23

## 2021-01-20 ENCOUNTER — REMOTE DEVICE CLINIC VISIT (OUTPATIENT)
Dept: CARDIOLOGY CLINIC | Facility: CLINIC | Age: 76
End: 2021-01-20
Payer: MEDICARE

## 2021-01-20 DIAGNOSIS — Z95.0 CARDIAC PACEMAKER IN SITU: Primary | ICD-10-CM

## 2021-01-20 DIAGNOSIS — N39.41 URGE INCONTINENCE OF URINE: ICD-10-CM

## 2021-01-20 PROCEDURE — 93294 REM INTERROG EVL PM/LDLS PM: CPT | Performed by: INTERNAL MEDICINE

## 2021-01-20 PROCEDURE — 93296 REM INTERROG EVL PM/IDS: CPT | Performed by: INTERNAL MEDICINE

## 2021-01-20 RX ORDER — MIRABEGRON 25 MG/1
TABLET, FILM COATED, EXTENDED RELEASE ORAL
Qty: 30 TABLET | Refills: 3 | Status: SHIPPED | OUTPATIENT
Start: 2021-01-20 | End: 2021-02-19

## 2021-01-25 DIAGNOSIS — G89.29 CHRONIC PAIN OF LEFT KNEE: ICD-10-CM

## 2021-01-25 DIAGNOSIS — M25.562 CHRONIC PAIN OF LEFT KNEE: ICD-10-CM

## 2021-01-25 RX ORDER — TRAMADOL HYDROCHLORIDE 50 MG/1
TABLET ORAL
Qty: 30 TABLET | Refills: 0 | Status: SHIPPED | OUTPATIENT
Start: 2021-01-25 | End: 2021-03-18

## 2021-02-02 DIAGNOSIS — F41.9 ANXIETY: ICD-10-CM

## 2021-02-02 RX ORDER — ALPRAZOLAM 0.25 MG/1
TABLET ORAL
Qty: 90 TABLET | Refills: 0 | Status: SHIPPED | OUTPATIENT
Start: 2021-02-02 | End: 2021-03-31

## 2021-02-19 DIAGNOSIS — N39.41 URGE INCONTINENCE OF URINE: ICD-10-CM

## 2021-02-19 RX ORDER — MIRABEGRON 25 MG/1
TABLET, FILM COATED, EXTENDED RELEASE ORAL
Qty: 30 TABLET | Refills: 3 | Status: SHIPPED | OUTPATIENT
Start: 2021-02-19 | End: 2021-07-12

## 2021-03-10 ENCOUNTER — TELEMEDICINE (OUTPATIENT)
Dept: INTERNAL MEDICINE CLINIC | Facility: CLINIC | Age: 76
End: 2021-03-10
Payer: MEDICARE

## 2021-03-10 VITALS — WEIGHT: 235 LBS | BODY MASS INDEX: 40.12 KG/M2 | HEIGHT: 64 IN

## 2021-03-10 DIAGNOSIS — J02.9 PHARYNGITIS, UNSPECIFIED ETIOLOGY: ICD-10-CM

## 2021-03-10 PROCEDURE — 99213 OFFICE O/P EST LOW 20 MIN: CPT | Performed by: INTERNAL MEDICINE

## 2021-03-10 RX ORDER — AZITHROMYCIN 250 MG/1
TABLET, FILM COATED ORAL
Qty: 6 TABLET | Refills: 0 | Status: SHIPPED | OUTPATIENT
Start: 2021-03-10 | End: 2021-03-14

## 2021-03-10 NOTE — PROGRESS NOTES
Virtual Brief Visit    Assessment/Plan:    Problem List Items Addressed This Visit        Digestive    Pharyngitis      This 77-year-old female patient presents today for an acute evaluation of sore throat symptoms  On review of her symptoms and a review of symptoms consistent with COVID I do not believe she has a COVID infection at this time  She has not been out of her apartment for over the past week  She has no exposure to COVID that she is aware of   Given her symptoms I believe that she most likely has a bacterial infection and we will start her on Zithromax 5 day Dosepak  In addition I have encouraged her to get extra fluids in extra rest over the next several days to ensure recovery from her infection  If she has a fever or temperature elevation she can certainly utilize Tylenol 650-1000 mg Q 8 hours p r n   I have instructed her to call us if her condition worsens or fails to respond to this treatment  Relevant Medications    azithromycin (ZITHROMAX) 250 mg tablet                Reason for visit is   Chief Complaint   Patient presents with    Virtual Brief Visit        Encounter provider Nakita Payne MD    Provider located at [place] [address]    Recent Visits  No visits were found meeting these conditions  Showing recent visits within past 7 days and meeting all other requirements     Today's Visits  Date Type Provider Dept   03/10/21 Telemedicine Nakita Payne MD [facility] Internal Med   Showing today's visits and meeting all other requirements     Future Appointments  No visits were found meeting these conditions  Showing future appointments within next 150 days and meeting all other requirements        After connecting through telephone, the patient was identified by name and date of birth   Ela Dupont was informed that this is a telemedicine visit and that the visit is being conducted through telephone  My office door was closed  No one else was in the room  She acknowledged consent and understanding of privacy and security of the platform  The patient has agreed to participate and understands she can discontinue the visit at any time  It was my intent to perform this visit via video technology but the patient was not able to do a video connection so the visit was completed via audio telephone only  Patient is aware this is a billable service  Subjective    Jacklyn Alvarado is a 66 y o  female  Who presents for a virtual telephonic visit  This 68-year-old female patient presents today for a virtual acute visit  She presents with several days of sore throat and an exudate that she can see in the mirror on the back of her throat on both sides  She indicates that there is a yellowish pus the appearance to the back of her throat  She does not have a thermometer that work she believes that she is running a fever  She has had a mild headache for several days  She does not seem to have any nausea vomiting or diarrhea symptoms and indicates that she has normal sense of smell and taste  She does not have any abnormal arthralgias or myalgias symptoms  She has had no shortness of breath cough or wheezing  She indicates that she has not been out of her apartment in 1 week and has had no exposure to anybody that she is aware of with COVI D  Any time she leaves her apartment she is sure to have a mask on  Past Medical History:   Diagnosis Date    Abnormal ECG     Last Assessed 9/29/2016    Anxiety     Last Assessed 6/08/2016    Arthritis     knees    Asthma     Last Assessed 11/06/2013    Atrial premature complex     Cataract, bilateral     Last Assessed 7/14/2016    Cataract, left eye     Both eyes  Had surgery on left      Difficulty swallowing     Dizziness     Fibromyalgia     Gastric reflux     Hypertension     Lyme disease     Premature ventricular contraction     Primary osteoarthritis of both knees     Last Assessed 7/14/2016    Rheumatic fever     Sore throat     Tuberculosis        Past Surgical History:   Procedure Laterality Date    APPENDECTOMY      CARDIAC PACEMAKER PLACEMENT  2019    HYSTERECTOMY      WY DILATE ESOPHAGUS N/A 4/6/2016    Procedure: DILATATION ESOPHAGEAL;  Surgeon: Lashanda Mosley MD;  Location: [facility]; Service: Gastroenterology    WY EGD TRANSORAL BIOPSY SINGLE/MULTIPLE N/A 4/6/2016    Procedure: ESOPHAGOGASTRODUODENOSCOPY (EGD); Surgeon: Lashanda Mosley MD;  [facility]: [facility]; Service: Gastroenterology    WY XCAPSL CTRC RMVL INSJ IO LENS PROSTH W/O ECP Left 3/15/2016    Procedure: EXTRACTION EXTRACAPSULAR CATARACT PHACO INTRAOCULAR LENS (IOL); Surgeon: Srinivas Mesa MD;  [facility]: [facility];  Service: Ophthalmology    REPLACEMENT TOTAL KNEE Right     REPLACEMENT TOTAL KNEE      right     TONSILLECTOMY         Current Outpatient Medications   Medication Sig Dispense Refill    albuterol (PROVENTIL HFA,VENTOLIN HFA) 90 mcg/act inhaler INHALE 2 PUFFS BY MOUTH EVERY 6 HOURS AS NEEDED FOR WHEEZING 3 Inhaler 0    ALPRAZolam (XANAX) 0 25 mg tablet TAKE 1 TABLET(0 25 MG) BY MOUTH THREE TIMES DAILY AS NEEDED FOR ANXIETY OR SLEEP 90 tablet 0    amLODIPine (NORVASC) 10 mg tablet TAKE 1 TABLET BY MOUTH DAILY AS DIRECTED 90 tablet 2    Ascorbic Acid (VITAMIN C) 1000 MG tablet Take 1 tablet by mouth daily      B Complex-C (B-COMPLEX WITH VITAMIN C) tablet Take 1 tablet by mouth daily        ezetimibe (ZETIA) 10 mg tablet TAKE 1 TABLET(10 MG) BY MOUTH DAILY 90 tablet 2    lisinopril (ZESTRIL) 10 mg tablet Take 0 5 tablets (5 mg total) by mouth daily 90 tablet 3    metoprolol tartrate (LOPRESSOR) 50 mg tablet TAKE 1 TABLET(50 MG) BY MOUTH THREE TIMES DAILY 270 tablet 3    Myrbetriq 25 MG TB24 TAKE 1 TABLET BY MOUTH DAILY 30 tablet 3    oxybutynin (DITROPAN XL) 15 MG 24 hr tablet TAKE 1 TABLET(15 MG) BY MOUTH DAILY 90 tablet 3    traMADol (ULTRAM) 50 mg tablet TAKE 1 TABLET(50 MG) BY MOUTH EVERY 6 HOURS AS NEEDED FOR MODERATE PAIN 30 tablet 0    XARELTO 15 MG tablet Take 1 tablet (15 mg total) by mouth daily 90 tablet 4    azithromycin (ZITHROMAX) 250 mg tablet Take 2 tablets today then 1 tablet daily x 4 days 6 tablet 0     No current facility-administered medications for this visit  Allergies   Allergen Reactions    A61 Folate [Folic Acid-Vit E5-FPU D93]     [place]      Unknown    Lyrica [Pregabalin]     Nickel      Skin discoloration    Other      Black rubber    Penicillins Hives    Sulfa Antibiotics Itching    Cortisone Acetate [Cortisone] Itching and Rash    Penicillin G Rash       Review of Systems   Constitutional: Positive for fever  HENT: Positive for sore throat  Neurological: Positive for headaches  All other systems reviewed and are negative  Vitals:    03/10/21 1220   Weight: 107 kg (235 lb)   Height: 5' 4" (1 626 m)         I spent 15 minutes with patient today in which greater than 50% of the time was spent in counseling/coordination of care regarding   Sore throat headache and fever along with review of symptoms for COVID-19    VIRTUAL VISIT [address] acknowledges that she has consented to an online visit or consultation  She understands that the online visit is based solely on information provided by her, and that, in the absence of a face-to-face physical evaluation by the physician, the diagnosis she receives is both limited and provisional in terms of accuracy and completeness  This is not intended to replace a full medical face-to-face evaluation by the physician  Edwin Lobo understands and accepts these terms

## 2021-03-10 NOTE — ASSESSMENT & PLAN NOTE
This 47-year-old female patient presents today for an acute evaluation of sore throat symptoms  On review of her symptoms and a review of symptoms consistent with COVID I do not believe she has a COVID infection at this time  She has not been out of her apartment for over the past week  She has no exposure to COVID that she is aware of   Given her symptoms I believe that she most likely has a bacterial infection and we will start her on Zithromax 5 day Dosepak  In addition I have encouraged her to get extra fluids in extra rest over the next several days to ensure recovery from her infection  If she has a fever or temperature elevation she can certainly utilize Tylenol 650-1000 mg Q 8 hours p r n   I have instructed her to call us if her condition worsens or fails to respond to this treatment

## 2021-03-18 DIAGNOSIS — G89.29 CHRONIC PAIN OF LEFT KNEE: ICD-10-CM

## 2021-03-18 DIAGNOSIS — M25.562 CHRONIC PAIN OF LEFT KNEE: ICD-10-CM

## 2021-03-18 RX ORDER — TRAMADOL HYDROCHLORIDE 50 MG/1
TABLET ORAL
Qty: 30 TABLET | Refills: 0 | Status: SHIPPED | OUTPATIENT
Start: 2021-03-18 | End: 2021-04-01

## 2021-03-21 DIAGNOSIS — I10 ESSENTIAL HYPERTENSION: ICD-10-CM

## 2021-03-21 RX ORDER — AMLODIPINE BESYLATE 10 MG/1
TABLET ORAL
Qty: 90 TABLET | Refills: 2 | Status: SHIPPED | OUTPATIENT
Start: 2021-03-21 | End: 2021-08-12 | Stop reason: HOSPADM

## 2021-03-31 DIAGNOSIS — F41.9 ANXIETY: ICD-10-CM

## 2021-03-31 RX ORDER — ALPRAZOLAM 0.25 MG/1
TABLET ORAL
Qty: 90 TABLET | Refills: 0 | Status: SHIPPED | OUTPATIENT
Start: 2021-03-31 | End: 2021-05-20

## 2021-04-01 DIAGNOSIS — G89.29 CHRONIC PAIN OF LEFT KNEE: ICD-10-CM

## 2021-04-01 DIAGNOSIS — M25.562 CHRONIC PAIN OF LEFT KNEE: ICD-10-CM

## 2021-04-01 RX ORDER — TRAMADOL HYDROCHLORIDE 50 MG/1
TABLET ORAL
Qty: 30 TABLET | Refills: 0 | Status: SHIPPED | OUTPATIENT
Start: 2021-04-01 | End: 2021-06-23

## 2021-05-18 ENCOUNTER — OFFICE VISIT (OUTPATIENT)
Dept: INTERNAL MEDICINE CLINIC | Facility: CLINIC | Age: 76
End: 2021-05-18
Payer: MEDICARE

## 2021-05-18 VITALS
WEIGHT: 252.2 LBS | SYSTOLIC BLOOD PRESSURE: 124 MMHG | HEIGHT: 64 IN | TEMPERATURE: 98.3 F | HEART RATE: 81 BPM | OXYGEN SATURATION: 97 % | BODY MASS INDEX: 43.06 KG/M2 | DIASTOLIC BLOOD PRESSURE: 70 MMHG

## 2021-05-18 DIAGNOSIS — I48.0 AF (PAROXYSMAL ATRIAL FIBRILLATION) (HCC): ICD-10-CM

## 2021-05-18 DIAGNOSIS — I49.5 TACHY-BRADY SYNDROME (HCC): ICD-10-CM

## 2021-05-18 DIAGNOSIS — Z13.29 SCREENING FOR HYPOTHYROIDISM: ICD-10-CM

## 2021-05-18 DIAGNOSIS — E78.00 HYPERCHOLESTEROLEMIA: ICD-10-CM

## 2021-05-18 DIAGNOSIS — R53.83 OTHER FATIGUE: ICD-10-CM

## 2021-05-18 DIAGNOSIS — M48.061 DEGENERATIVE LUMBAR SPINAL STENOSIS: ICD-10-CM

## 2021-05-18 DIAGNOSIS — I10 ESSENTIAL HYPERTENSION: Primary | ICD-10-CM

## 2021-05-18 DIAGNOSIS — Z13.0 SCREENING FOR DEFICIENCY ANEMIA: ICD-10-CM

## 2021-05-18 DIAGNOSIS — E78.5 HYPERLIPIDEMIA, UNSPECIFIED HYPERLIPIDEMIA TYPE: ICD-10-CM

## 2021-05-18 PROBLEM — M19.90 ARTHRITIS: Status: ACTIVE | Noted: 2021-05-18

## 2021-05-18 PROBLEM — J30.9 MILD ALLERGIC RHINITIS: Status: RESOLVED | Noted: 2020-04-28 | Resolved: 2021-05-18

## 2021-05-18 PROBLEM — R13.14 PHARYNGOESOPHAGEAL DYSPHAGIA: Status: RESOLVED | Noted: 2020-04-28 | Resolved: 2021-05-18

## 2021-05-18 PROBLEM — J02.9 PHARYNGITIS: Status: RESOLVED | Noted: 2021-03-10 | Resolved: 2021-05-18

## 2021-05-18 PROCEDURE — 99215 OFFICE O/P EST HI 40 MIN: CPT | Performed by: INTERNAL MEDICINE

## 2021-05-18 NOTE — ASSESSMENT & PLAN NOTE
This patient has a diagnosis of degenerative lumbar spinal stenosis causing difficulty with ambulation and is recommended that she continue to ambulate at all times with a walker

## 2021-05-18 NOTE — ASSESSMENT & PLAN NOTE
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

## 2021-05-18 NOTE — ASSESSMENT & PLAN NOTE
Assessment of the patient's hypertension on today's visit indicates good blood pressure control with a rate of 124/70    I recommend the continuation of her amlodipine at 10 mg daily lisinopril at 5 mg 1/2 tablet daily and metoprolol tartrate 50 mg 3 times a day she reports no signs or symptoms of uncontrolled hypertension or hypotension a follow-up assessment in 4 months is requested

## 2021-05-18 NOTE — ASSESSMENT & PLAN NOTE
Today I have assessed the patient's heart rhythm find it to be in a regular rhythm she denies any palpitations  She does have a history of paroxysmal atrial fibrillation remains on Xarelto 15 mg daily for stroke risk reduction  She also continues on metoprolol tartrate 50 mg 3 times a day for heart rate control  I recommend continued follow-up with cardiology services on a regular basis

## 2021-05-18 NOTE — PROGRESS NOTES
Assessment/Plan:    AF (paroxysmal atrial fibrillation) ([facility] )    Today I have assessed the patient's heart rhythm find it to be in a regular rhythm she denies any palpitations  She does have a history of paroxysmal atrial fibrillation remains on Xarelto 15 mg daily for stroke risk reduction  She also continues on metoprolol tartrate 50 mg 3 times a day for heart rate control  I recommend continued follow-up with cardiology services on a regular basis  Hypertension    Assessment of the patient's hypertension on today's visit indicates good blood pressure control with a rate of 124/70  I recommend the continuation of her amlodipine at 10 mg daily lisinopril at 5 mg 1/2 tablet daily and metoprolol tartrate 50 mg 3 times a day she reports no signs or symptoms of uncontrolled hypertension or hypotension a follow-up assessment in 4 months is requested    Tachy-smita syndrome ([facility] )   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 to guard against bradycardia and has ongoing beta-blocker therapy with metoprolol tartrate 50 mg 3 times a day to guard against tachycardia  She reports no symptoms of bradycardia or tachycardia and I have reviewed the patient's most recent device checks through Cardiology  Continue that present therapy    Degenerative lumbar spinal stenosis   This patient has a diagnosis of degenerative lumbar spinal stenosis causing difficulty with ambulation and is recommended that she continue to ambulate at all times with a walker  Other fatigue    Patient reports symptoms of fatigue on today's visit  Of we have requested metabolic testing to  Evaluate for possible causes  I have requested thyroid screening along with comprehensive metabolic profile and CBC testing  It is quite possible that a lot of her fatigue comes from her poor sleep habits she reports sleeping only approximately 4 hours per night    Use of melatonin at 5 mg daily has been recommended to induced better sleep habits  Hypercholesterolemia    A lipid profile has been requested on today's visit to evaluate the patient's current lipid status  She is currently on 10 mg of Zetia daily for lipid control recommend continued low-cholesterol diet       Diagnoses and all orders for this visit:    Essential hypertension    Screening for deficiency anemia  -     CBC and differential; Future    Other fatigue  -     Comprehensive metabolic panel; Future    Screening for hypothyroidism  -     TSH, 3rd generation with Free T4 reflex; Future    Hypercholesterolemia  -     Lipid panel; Future    AF (paroxysmal atrial fibrillation) (HCC)    Hyperlipidemia, unspecified hyperlipidemia type    Degenerative lumbar spinal stenosis        Subjective:      Patient ID: Anthony Romero is a 66 y o  female  This 47-year-old female patient presents today for a follow-up visit  She has a history of hypertension, overweight condition, arthritis, low back pain secondary to spinal stenosis, paroxysmal atrial fibrillation, and fibromyalgia  Her concerns today are regarding her ongoing arthritic content planes of pain in her hands and feet back and hips as well as knees  She carries more weight than she should with a body mass index of 50 70 and this certainly is a factor adding to a lot of her aches and pains  She is currently using a combination of Tylenol for mild to moderate pain and tramadol for severe pain to deal with her arthritis ache conditions  The patient also expresses fatigue symptoms but admits to only sleeping approximately 4 hours at night  She is restless at night and has a hard time falling asleep and staying asleep  Patient also has been experiencing occasional headaches which seem to radiate from the neck up over the top of the head suggestive of a muscular tension type of headache  The headaches do not have any nausea vomiting or visual changes associated with them        The following portions of the patient's history were reviewed and updated as appropriate:   She  has a past medical history of Abnormal ECG, Anxiety, Arthritis, Asthma, Atrial premature complex, Cataract, bilateral, Cataract, left eye, Difficulty swallowing, Dizziness, Fibromyalgia, Gastric reflux, Hypertension, Lyme disease, Premature ventricular contraction, Primary osteoarthritis of both knees, Rheumatic fever, Sore throat, and Tuberculosis  She   Patient Active Problem List    Diagnosis Date Noted    Arthritis 05/18/2021    Other fatigue 05/18/2021    Hyperlipidemia 11/10/2020    Dysphonia 04/28/2020    Vitamin D insufficiency 04/28/2020    Chronic pain of left knee 08/12/2019    Age related osteoporosis 08/12/2019    Anxiety 03/27/2019    Tremor 03/27/2019    Other insomnia 02/27/2019    Urinary incontinence 09/17/2018    Psoriasis 07/20/2018    Thyroid nodule 07/20/2018    AF (paroxysmal atrial fibrillation) ([facility] ) 02/15/2018    Tachy-smita syndrome ([facility] ) 02/15/2018    Hypertension 02/15/2018    Pacemaker 02/15/2018    GERD (gastroesophageal reflux disease) 07/18/2017    Mild carpal tunnel syndrome, right 04/10/2017    Degenerative lumbar spinal stenosis 03/31/2017    Degenerative arthritis of knee, bilateral 09/29/2016    Low back pain 08/29/2016    Lumbar degenerative disc disease 08/29/2016    Chronic bilateral low back pain without sciatica 06/08/2016    Chronic GERD 05/06/2016    Overweight 02/12/2016    Fibromyalgia 11/06/2013    Hypercholesterolemia 11/06/2013     She  has a past surgical history that includes Hysterectomy; Tonsillectomy; pr egd transoral biopsy single/multiple (N/A, 4/6/2016); pr dilate esophagus (N/A, 4/6/2016); pr xcapsl ctrc rmvl insj io lens prosth w/o ecp (Left, 3/15/2016); Appendectomy; Replacement total knee (Right); Replacement total knee; and Cardiac pacemaker placement (2019)    Her family history includes Coronary artery disease in her mother; Heart attack in her father and mother; Heart disease in her father  She  reports that she has never smoked  She has never used smokeless tobacco  She reports that she does not drink alcohol or use drugs  Current Outpatient Medications   Medication Sig Dispense Refill    albuterol (PROVENTIL HFA,VENTOLIN HFA) 90 mcg/act inhaler INHALE 2 PUFFS BY MOUTH EVERY 6 HOURS AS NEEDED FOR WHEEZING 3 Inhaler 0    ALPRAZolam (XANAX) 0 25 mg tablet TAKE 1 TABLET(0 25 MG) BY MOUTH THREE TIMES DAILY AS NEEDED FOR ANXIETY OR SLEEP 90 tablet 0    amLODIPine (NORVASC) 10 mg tablet TAKE 1 TABLET BY MOUTH DAILY AS DIRECTED 90 tablet 2    Ascorbic Acid (VITAMIN C) 1000 MG tablet Take 1 tablet by mouth daily      B Complex-C (B-COMPLEX WITH VITAMIN C) tablet Take 1 tablet by mouth daily   ezetimibe (ZETIA) 10 mg tablet TAKE 1 TABLET(10 MG) BY MOUTH DAILY 90 tablet 2    lisinopril (ZESTRIL) 10 mg tablet Take 0 5 tablets (5 mg total) by mouth daily 90 tablet 3    metoprolol tartrate (LOPRESSOR) 50 mg tablet TAKE 1 TABLET(50 MG) BY MOUTH THREE TIMES DAILY 270 tablet 3    Myrbetriq 25 MG TB24 TAKE 1 TABLET BY MOUTH DAILY 30 tablet 3    traMADol (ULTRAM) 50 mg tablet TAKE 1 TABLET(50 MG) BY MOUTH EVERY 6 HOURS AS NEEDED FOR MODERATE PAIN 30 tablet 0    XARELTO 15 MG tablet Take 1 tablet (15 mg total) by mouth daily 90 tablet 4    oxybutynin (DITROPAN XL) 15 MG 24 hr tablet TAKE 1 TABLET(15 MG) BY MOUTH DAILY (Patient not taking: Reported on 5/18/2021) 90 tablet 3     No current facility-administered medications for this visit       Review of Systems   Constitutional: Positive for fatigue  Musculoskeletal: Positive for arthralgias, gait problem and myalgias  Neurological: Positive for headaches  Psychiatric/Behavioral: Positive for sleep disturbance  All other systems reviewed and are negative          Objective:      /70   Pulse 81   Temp 98 3 °F (36 8 °C) (Skin)   Ht 5' 4" (1 626 m)   Wt 114 kg (252 lb 3 2 oz)   SpO2 97%   BMI 43 29 kg/m²          Physical Exam  Vitals signs reviewed  Constitutional:       General: She is not in acute distress  Appearance: Normal appearance  She is well-developed  HENT:      Head: Normocephalic  Right Ear: Hearing and external ear normal       Left Ear: Hearing and external ear normal       Nose: Nose normal  No mucosal edema  Mouth/Throat:      Pharynx: Uvula midline  Eyes:      General: Lids are normal       Conjunctiva/sclera: Conjunctivae normal       Pupils: Pupils are equal, round, and reactive to light  Neck:      Thyroid: No thyromegaly  Vascular: No carotid bruit or JVD  Cardiovascular:      Rate and Rhythm: Normal rate and regular rhythm  Heart sounds: Normal heart sounds  No murmur  Pulmonary:      Effort: Pulmonary effort is normal  No respiratory distress  Breath sounds: Normal breath sounds  No wheezing, rhonchi or rales  Abdominal:      General: Bowel sounds are normal       Palpations: Abdomen is soft  Musculoskeletal: Normal range of motion  Right lower leg: No edema  Left lower leg: No edema  Comments: Arthritic changes of the hands and feet   Lymphadenopathy:      Cervical: No cervical adenopathy  Skin:     General: Skin is warm and dry  Coloration: Skin is not jaundiced or pale  Neurological:      Mental Status: She is alert and oriented to person, place, and time  Mental status is at baseline  Deep Tendon Reflexes: Reflexes are normal and symmetric  Reflexes normal    Psychiatric:         Mood and Affect: Mood normal          Speech: Speech normal          Behavior: Behavior normal  Behavior is cooperative  Thought Content: Thought content normal          Judgment: Judgment normal        BMI Counseling: Body mass index is 43 29 kg/m²   The BMI is above normal  Nutrition recommendations include reducing portion sizes, decreasing overall calorie intake, reducing fast food intake, consuming healthier snacks, moderation in carbohydrate intake and reducing intake of saturated fat and trans fat

## 2021-05-18 NOTE — ASSESSMENT & PLAN NOTE
Patient reports symptoms of fatigue on today's visit  Of we have requested metabolic testing to  Evaluate for possible causes  I have requested thyroid screening along with comprehensive metabolic profile and CBC testing  It is quite possible that a lot of her fatigue comes from her poor sleep habits she reports sleeping only approximately 4 hours per night  Use of melatonin at 5 mg daily has been recommended to induced better sleep habits

## 2021-05-18 NOTE — ASSESSMENT & PLAN NOTE
A lipid profile has been requested on today's visit to evaluate the patient's current lipid status    She is currently on 10 mg of Zetia daily for lipid control recommend continued low-cholesterol diet

## 2021-05-20 DIAGNOSIS — F41.9 ANXIETY: ICD-10-CM

## 2021-05-20 RX ORDER — ALPRAZOLAM 0.25 MG/1
TABLET ORAL
Qty: 90 TABLET | Refills: 0 | Status: SHIPPED | OUTPATIENT
Start: 2021-05-20 | End: 2021-07-11

## 2021-05-27 ENCOUNTER — TRANSCRIBE ORDERS (OUTPATIENT)
Dept: LAB | Facility: CLINIC | Age: 76
End: 2021-05-27

## 2021-05-27 ENCOUNTER — APPOINTMENT (OUTPATIENT)
Dept: LAB | Facility: CLINIC | Age: 76
End: 2021-05-27
Payer: MEDICARE

## 2021-05-27 DIAGNOSIS — Z13.29 SCREENING FOR HYPOTHYROIDISM: ICD-10-CM

## 2021-05-27 DIAGNOSIS — E78.00 HYPERCHOLESTEROLEMIA: ICD-10-CM

## 2021-05-27 DIAGNOSIS — Z13.0 SCREENING FOR DEFICIENCY ANEMIA: ICD-10-CM

## 2021-05-27 DIAGNOSIS — R53.83 OTHER FATIGUE: ICD-10-CM

## 2021-05-27 LAB
ALBUMIN SERPL BCP-MCNC: 4 G/DL (ref 3.5–5)
ALP SERPL-CCNC: 78 U/L (ref 46–116)
ALT SERPL W P-5'-P-CCNC: 19 U/L (ref 12–78)
ANION GAP SERPL CALCULATED.3IONS-SCNC: 4 MMOL/L (ref 4–13)
AST SERPL W P-5'-P-CCNC: 16 U/L (ref 5–45)
BASOPHILS # BLD AUTO: 0.06 THOUSANDS/ΜL (ref 0–0.1)
BASOPHILS NFR BLD AUTO: 1 % (ref 0–1)
BILIRUB SERPL-MCNC: 0.56 MG/DL (ref 0.2–1)
BUN SERPL-MCNC: 19 MG/DL (ref 5–25)
CALCIUM SERPL-MCNC: 9.9 MG/DL (ref 8.3–10.1)
CHLORIDE SERPL-SCNC: 105 MMOL/L (ref 100–108)
CHOLEST SERPL-MCNC: 229 MG/DL (ref 50–200)
CO2 SERPL-SCNC: 29 MMOL/L (ref 21–32)
CREAT SERPL-MCNC: 0.79 MG/DL (ref 0.6–1.3)
EOSINOPHIL # BLD AUTO: 0.24 THOUSAND/ΜL (ref 0–0.61)
EOSINOPHIL NFR BLD AUTO: 3 % (ref 0–6)
ERYTHROCYTE [DISTWIDTH] IN BLOOD BY AUTOMATED COUNT: 12.5 % (ref 11.6–15.1)
GFR SERPL CREATININE-BSD FRML MDRD: 72 ML/MIN/1.73SQ M
GLUCOSE P FAST SERPL-MCNC: 100 MG/DL (ref 65–99)
HCT VFR BLD AUTO: 43.9 % (ref 34.8–46.1)
HDLC SERPL-MCNC: 67 MG/DL
HGB BLD-MCNC: 13.9 G/DL (ref 11.5–15.4)
IMM GRANULOCYTES # BLD AUTO: 0.03 THOUSAND/UL (ref 0–0.2)
IMM GRANULOCYTES NFR BLD AUTO: 0 % (ref 0–2)
LDLC SERPL CALC-MCNC: 145 MG/DL (ref 0–100)
LYMPHOCYTES # BLD AUTO: 2.12 THOUSANDS/ΜL (ref 0.6–4.47)
LYMPHOCYTES NFR BLD AUTO: 29 % (ref 14–44)
MCH RBC QN AUTO: 29.2 PG (ref 26.8–34.3)
MCHC RBC AUTO-ENTMCNC: 31.7 G/DL (ref 31.4–37.4)
MCV RBC AUTO: 92 FL (ref 82–98)
MONOCYTES # BLD AUTO: 0.67 THOUSAND/ΜL (ref 0.17–1.22)
MONOCYTES NFR BLD AUTO: 9 % (ref 4–12)
NEUTROPHILS # BLD AUTO: 4.19 THOUSANDS/ΜL (ref 1.85–7.62)
NEUTS SEG NFR BLD AUTO: 58 % (ref 43–75)
NONHDLC SERPL-MCNC: 162 MG/DL
NRBC BLD AUTO-RTO: 0 /100 WBCS
PLATELET # BLD AUTO: 339 THOUSANDS/UL (ref 149–390)
PMV BLD AUTO: 9.1 FL (ref 8.9–12.7)
POTASSIUM SERPL-SCNC: 4.5 MMOL/L (ref 3.5–5.3)
PROT SERPL-MCNC: 8 G/DL (ref 6.4–8.2)
RBC # BLD AUTO: 4.76 MILLION/UL (ref 3.81–5.12)
SODIUM SERPL-SCNC: 138 MMOL/L (ref 136–145)
TRIGL SERPL-MCNC: 86 MG/DL
TSH SERPL DL<=0.05 MIU/L-ACNC: 2.02 UIU/ML (ref 0.36–3.74)
WBC # BLD AUTO: 7.31 THOUSAND/UL (ref 4.31–10.16)

## 2021-05-27 PROCEDURE — 36415 COLL VENOUS BLD VENIPUNCTURE: CPT

## 2021-05-27 PROCEDURE — 84443 ASSAY THYROID STIM HORMONE: CPT

## 2021-05-27 PROCEDURE — 80061 LIPID PANEL: CPT

## 2021-05-27 PROCEDURE — 85025 COMPLETE CBC W/AUTO DIFF WBC: CPT

## 2021-05-27 PROCEDURE — 80053 COMPREHEN METABOLIC PANEL: CPT

## 2021-06-23 DIAGNOSIS — M25.562 CHRONIC PAIN OF LEFT KNEE: ICD-10-CM

## 2021-06-23 DIAGNOSIS — G89.29 CHRONIC PAIN OF LEFT KNEE: ICD-10-CM

## 2021-06-23 RX ORDER — TRAMADOL HYDROCHLORIDE 50 MG/1
TABLET ORAL
Qty: 30 TABLET | Refills: 0 | Status: SHIPPED | OUTPATIENT
Start: 2021-06-23 | End: 2021-08-12 | Stop reason: HOSPADM

## 2021-07-02 ENCOUNTER — TELEPHONE (OUTPATIENT)
Dept: CARDIOLOGY CLINIC | Facility: CLINIC | Age: 76
End: 2021-07-02

## 2021-07-02 NOTE — TELEPHONE ENCOUNTER
P/C from pt  She had a pacemaker placed about 4 years ago  She is concerned about the difference in the size of her breasts  Her left breast is larger than the right breast, and her left hand is shakey  Reviewed with [employer]  Please have pt send a transmission to eval device  If there is nothing significant, pt should see her PCP  Pt notified  She will send after the July 4th holiday

## 2021-07-06 ENCOUNTER — REMOTE DEVICE CLINIC VISIT (OUTPATIENT)
Dept: CARDIOLOGY CLINIC | Facility: CLINIC | Age: 76
End: 2021-07-06
Payer: MEDICARE

## 2021-07-06 DIAGNOSIS — Z95.0 PACEMAKER: Primary | ICD-10-CM

## 2021-07-06 PROCEDURE — 93294 REM INTERROG EVL PM/LDLS PM: CPT | Performed by: INTERNAL MEDICINE

## 2021-07-06 PROCEDURE — 93296 REM INTERROG EVL PM/IDS: CPT | Performed by: INTERNAL MEDICINE

## 2021-07-06 NOTE — PROGRESS NOTES
Results for orders placed or performed in visit on 07/06/21   Cardiac EP device report    Narrative    MDT DUAL CHAMBER PPM (AAIR-DDDR MODE)/ ACTIVE SYSTEM IS MRI CONDITIONAL  CARELINK TRANSMISSION: BATTERY ADEQUATE (5-6 5 YRS)  AP 89%;  1%  ALL LEAD PARAMETERS WITHIN NORMAL LIMITS  AF 2% (LONGEST: 26 SECONDS); 11 1% AF EPISODES SUCCESSFULLY TREATED WITH rATP THERAPY (3 OUT OF 27)  NO VHR EPISODES  PT  TAKES METOPROLOL KRISTINE  & Amina Mcintyre  NORMAL DEVICE FUNCTION   PL

## 2021-07-07 NOTE — TELEPHONE ENCOUNTER
Device has checked out normal     Please ask the patient if the pacemaker has moved from the incision side down to her breast   If so then please schedule an office visit for evaluation      Thanks

## 2021-07-09 DIAGNOSIS — F41.9 ANXIETY: ICD-10-CM

## 2021-07-11 RX ORDER — ALPRAZOLAM 0.25 MG/1
TABLET ORAL
Qty: 90 TABLET | Refills: 0 | Status: ON HOLD | OUTPATIENT
Start: 2021-07-11 | End: 2021-08-12 | Stop reason: SDUPTHER

## 2021-07-12 DIAGNOSIS — N39.41 URGE INCONTINENCE OF URINE: ICD-10-CM

## 2021-07-12 RX ORDER — MIRABEGRON 25 MG/1
TABLET, FILM COATED, EXTENDED RELEASE ORAL
Qty: 30 TABLET | Refills: 3 | Status: SHIPPED | OUTPATIENT
Start: 2021-07-12 | End: 2021-12-07 | Stop reason: SDUPTHER

## 2021-07-16 DIAGNOSIS — I48.91 ATRIAL FIBRILLATION, UNSPECIFIED TYPE (HCC): ICD-10-CM

## 2021-07-19 RX ORDER — RIVAROXABAN 15 MG/1
15 TABLET, FILM COATED ORAL DAILY
Qty: 28 TABLET | Refills: 0 | Status: SHIPPED | COMMUNITY
Start: 2021-07-19 | End: 2021-07-21 | Stop reason: SDUPTHER

## 2021-07-20 DIAGNOSIS — E78.5 HYPERLIPIDEMIA, UNSPECIFIED HYPERLIPIDEMIA TYPE: ICD-10-CM

## 2021-07-20 RX ORDER — EZETIMIBE 10 MG/1
TABLET ORAL
Qty: 90 TABLET | Refills: 2 | Status: SHIPPED | OUTPATIENT
Start: 2021-07-20 | End: 2021-11-24 | Stop reason: SDUPTHER

## 2021-07-21 DIAGNOSIS — I48.91 ATRIAL FIBRILLATION, UNSPECIFIED TYPE (HCC): ICD-10-CM

## 2021-07-22 RX ORDER — RIVAROXABAN 15 MG/1
15 TABLET, FILM COATED ORAL DAILY
Qty: 90 TABLET | Refills: 4 | OUTPATIENT
Start: 2021-07-22 | End: 2021-09-16

## 2021-07-30 ENCOUNTER — TELEPHONE (OUTPATIENT)
Dept: CARDIOLOGY CLINIC | Facility: CLINIC | Age: 76
End: 2021-07-30

## 2021-07-30 NOTE — TELEPHONE ENCOUNTER
Ara Traylor said she couldn't get to the office for the samples  She has an appt with Lester Grijalva 8/10/21 and will get them then if we can hold them that long  Tole her okay

## 2021-08-02 ENCOUNTER — APPOINTMENT (EMERGENCY)
Dept: RADIOLOGY | Facility: HOSPITAL | Age: 76
End: 2021-08-02
Payer: MEDICARE

## 2021-08-02 ENCOUNTER — HOSPITAL ENCOUNTER (INPATIENT)
Facility: HOSPITAL | Age: 76
LOS: 10 days | Discharge: NURSING, REHAB OR LONG-TERM CARE | DRG: 480 | End: 2021-08-12
Attending: INTERNAL MEDICINE | Admitting: INTERNAL MEDICINE
Payer: MEDICARE

## 2021-08-02 ENCOUNTER — APPOINTMENT (EMERGENCY)
Dept: CT IMAGING | Facility: HOSPITAL | Age: 76
End: 2021-08-02
Payer: MEDICARE

## 2021-08-02 ENCOUNTER — HOSPITAL ENCOUNTER (EMERGENCY)
Facility: HOSPITAL | Age: 76
End: 2021-08-02
Attending: EMERGENCY MEDICINE
Payer: MEDICARE

## 2021-08-02 VITALS
TEMPERATURE: 97.8 F | RESPIRATION RATE: 16 BRPM | HEART RATE: 78 BPM | SYSTOLIC BLOOD PRESSURE: 122 MMHG | HEIGHT: 64 IN | OXYGEN SATURATION: 97 % | BODY MASS INDEX: 49.51 KG/M2 | WEIGHT: 290 LBS | DIASTOLIC BLOOD PRESSURE: 54 MMHG

## 2021-08-02 DIAGNOSIS — Z95.0 PACEMAKER: ICD-10-CM

## 2021-08-02 DIAGNOSIS — Z98.890 STATUS POST SURGERY: ICD-10-CM

## 2021-08-02 DIAGNOSIS — F41.9 ANXIETY: ICD-10-CM

## 2021-08-02 DIAGNOSIS — S72.409A CLOSED FRACTURE OF DISTAL END OF FEMUR, UNSPECIFIED FRACTURE MORPHOLOGY, INITIAL ENCOUNTER (HCC): Primary | ICD-10-CM

## 2021-08-02 DIAGNOSIS — I48.0 AF (PAROXYSMAL ATRIAL FIBRILLATION) (HCC): ICD-10-CM

## 2021-08-02 DIAGNOSIS — S72.462A CLOSED DISPLACED SUPRACONDYLAR FRACTURE OF DISTAL END OF LEFT FEMUR WITH INTRACONDYLAR EXTENSION, INITIAL ENCOUNTER (HCC): Primary | ICD-10-CM

## 2021-08-02 DIAGNOSIS — S72.90XA FEMUR FRACTURE (HCC): ICD-10-CM

## 2021-08-02 DIAGNOSIS — I48.91 ATRIAL FIBRILLATION, UNSPECIFIED TYPE (HCC): ICD-10-CM

## 2021-08-02 PROBLEM — D72.829 LEUKOCYTOSIS: Status: ACTIVE | Noted: 2021-08-02

## 2021-08-02 LAB
ABO GROUP BLD: NORMAL
ABO GROUP BLD: NORMAL
ALBUMIN SERPL BCP-MCNC: 4 G/DL (ref 3.4–4.8)
ALP SERPL-CCNC: 71.2 U/L (ref 35–140)
ALT SERPL W P-5'-P-CCNC: 9 U/L (ref 5–54)
ANION GAP SERPL CALCULATED.3IONS-SCNC: 7 MMOL/L (ref 4–13)
APTT PPP: 34 SECONDS (ref 23–31)
AST SERPL W P-5'-P-CCNC: 15 U/L (ref 15–41)
ATRIAL RATE: 115 BPM
BASOPHILS # BLD AUTO: 0.05 THOUSANDS/ΜL (ref 0–0.1)
BASOPHILS NFR BLD AUTO: 0 % (ref 0–1)
BILIRUB SERPL-MCNC: 0.41 MG/DL (ref 0.3–1.2)
BLD GP AB SCN SERPL QL: NEGATIVE
BUN SERPL-MCNC: 17 MG/DL (ref 6–20)
CALCIUM SERPL-MCNC: 9.3 MG/DL (ref 8.4–10.2)
CHLORIDE SERPL-SCNC: 100 MMOL/L (ref 96–108)
CO2 SERPL-SCNC: 28 MMOL/L (ref 22–33)
CREAT SERPL-MCNC: 0.9 MG/DL (ref 0.4–1.1)
EOSINOPHIL # BLD AUTO: 0.35 THOUSAND/ΜL (ref 0–0.61)
EOSINOPHIL NFR BLD AUTO: 3 % (ref 0–6)
ERYTHROCYTE [DISTWIDTH] IN BLOOD BY AUTOMATED COUNT: 12.5 % (ref 11.6–15.1)
GFR SERPL CREATININE-BSD FRML MDRD: 62 ML/MIN/1.73SQ M
GLUCOSE SERPL-MCNC: 149 MG/DL (ref 65–140)
HCT VFR BLD AUTO: 39.8 % (ref 34.8–46.1)
HGB BLD-MCNC: 12.9 G/DL (ref 11.5–15.4)
IMM GRANULOCYTES # BLD AUTO: 0.1 THOUSAND/UL (ref 0–0.2)
IMM GRANULOCYTES NFR BLD AUTO: 1 % (ref 0–2)
INR PPP: 1.07 (ref 0.9–1.1)
LYMPHOCYTES # BLD AUTO: 1.92 THOUSANDS/ΜL (ref 0.6–4.47)
LYMPHOCYTES NFR BLD AUTO: 15 % (ref 14–44)
MCH RBC QN AUTO: 29.3 PG (ref 26.8–34.3)
MCHC RBC AUTO-ENTMCNC: 32.4 G/DL (ref 31.4–37.4)
MCV RBC AUTO: 90 FL (ref 82–98)
MONOCYTES # BLD AUTO: 1.03 THOUSAND/ΜL (ref 0.17–1.22)
MONOCYTES NFR BLD AUTO: 8 % (ref 4–12)
NEUTROPHILS # BLD AUTO: 9.67 THOUSANDS/ΜL (ref 1.85–7.62)
NEUTS SEG NFR BLD AUTO: 73 % (ref 43–75)
P AXIS: 63 DEGREES
PLATELET # BLD AUTO: 322 THOUSANDS/UL (ref 149–390)
PMV BLD AUTO: 8.8 FL (ref 8.9–12.7)
POTASSIUM SERPL-SCNC: 4.6 MMOL/L (ref 3.5–5)
PR INTERVAL: 68 MS
PROT SERPL-MCNC: 7.1 G/DL (ref 6.4–8.3)
PROTHROMBIN TIME: 12 SECONDS (ref 9.5–12.1)
QRS AXIS: -33 DEGREES
QRSD INTERVAL: 126 MS
QT INTERVAL: 406 MS
QTC INTERVAL: 486 MS
RBC # BLD AUTO: 4.41 MILLION/UL (ref 3.81–5.12)
RH BLD: POSITIVE
RH BLD: POSITIVE
SARS-COV-2 RNA RESP QL NAA+PROBE: NEGATIVE
SODIUM SERPL-SCNC: 135 MMOL/L (ref 133–145)
SPECIMEN EXPIRATION DATE: NORMAL
T WAVE AXIS: 101 DEGREES
TROPONIN I SERPL-MCNC: <0.03 NG/ML (ref 0–0.07)
VENTRICULAR RATE: 86 BPM
WBC # BLD AUTO: 13.12 THOUSAND/UL (ref 4.31–10.16)

## 2021-08-02 PROCEDURE — 73700 CT LOWER EXTREMITY W/O DYE: CPT

## 2021-08-02 PROCEDURE — 73552 X-RAY EXAM OF FEMUR 2/>: CPT

## 2021-08-02 PROCEDURE — 70450 CT HEAD/BRAIN W/O DYE: CPT

## 2021-08-02 PROCEDURE — 72170 X-RAY EXAM OF PELVIS: CPT

## 2021-08-02 PROCEDURE — 86923 COMPATIBILITY TEST ELECTRIC: CPT

## 2021-08-02 PROCEDURE — 72125 CT NECK SPINE W/O DYE: CPT

## 2021-08-02 PROCEDURE — 99285 EMERGENCY DEPT VISIT HI MDM: CPT | Performed by: EMERGENCY MEDICINE

## 2021-08-02 PROCEDURE — 93010 ELECTROCARDIOGRAM REPORT: CPT | Performed by: INTERNAL MEDICINE

## 2021-08-02 PROCEDURE — 80053 COMPREHEN METABOLIC PANEL: CPT | Performed by: EMERGENCY MEDICINE

## 2021-08-02 PROCEDURE — 93005 ELECTROCARDIOGRAM TRACING: CPT

## 2021-08-02 PROCEDURE — 84484 ASSAY OF TROPONIN QUANT: CPT | Performed by: EMERGENCY MEDICINE

## 2021-08-02 PROCEDURE — 85025 COMPLETE CBC W/AUTO DIFF WBC: CPT | Performed by: EMERGENCY MEDICINE

## 2021-08-02 PROCEDURE — 73560 X-RAY EXAM OF KNEE 1 OR 2: CPT

## 2021-08-02 PROCEDURE — 87635 SARS-COV-2 COVID-19 AMP PRB: CPT | Performed by: EMERGENCY MEDICINE

## 2021-08-02 PROCEDURE — 73590 X-RAY EXAM OF LOWER LEG: CPT

## 2021-08-02 PROCEDURE — 96376 TX/PRO/DX INJ SAME DRUG ADON: CPT

## 2021-08-02 PROCEDURE — 99223 1ST HOSP IP/OBS HIGH 75: CPT | Performed by: INTERNAL MEDICINE

## 2021-08-02 PROCEDURE — 99285 EMERGENCY DEPT VISIT HI MDM: CPT

## 2021-08-02 PROCEDURE — 96374 THER/PROPH/DIAG INJ IV PUSH: CPT

## 2021-08-02 PROCEDURE — 86901 BLOOD TYPING SEROLOGIC RH(D): CPT | Performed by: EMERGENCY MEDICINE

## 2021-08-02 PROCEDURE — 36415 COLL VENOUS BLD VENIPUNCTURE: CPT | Performed by: EMERGENCY MEDICINE

## 2021-08-02 PROCEDURE — 86850 RBC ANTIBODY SCREEN: CPT | Performed by: EMERGENCY MEDICINE

## 2021-08-02 PROCEDURE — 85610 PROTHROMBIN TIME: CPT | Performed by: EMERGENCY MEDICINE

## 2021-08-02 PROCEDURE — 85730 THROMBOPLASTIN TIME PARTIAL: CPT | Performed by: EMERGENCY MEDICINE

## 2021-08-02 PROCEDURE — 86900 BLOOD TYPING SEROLOGIC ABO: CPT | Performed by: EMERGENCY MEDICINE

## 2021-08-02 PROCEDURE — 73564 X-RAY EXAM KNEE 4 OR MORE: CPT

## 2021-08-02 RX ORDER — ONDANSETRON 2 MG/ML
4 INJECTION INTRAMUSCULAR; INTRAVENOUS EVERY 4 HOURS PRN
Status: DISCONTINUED | OUTPATIENT
Start: 2021-08-02 | End: 2021-08-12 | Stop reason: HOSPADM

## 2021-08-02 RX ORDER — EZETIMIBE 10 MG/1
10 TABLET ORAL DAILY
Status: DISCONTINUED | OUTPATIENT
Start: 2021-08-03 | End: 2021-08-12 | Stop reason: HOSPADM

## 2021-08-02 RX ORDER — METOPROLOL TARTRATE 50 MG/1
50 TABLET, FILM COATED ORAL EVERY 12 HOURS SCHEDULED
Status: DISCONTINUED | OUTPATIENT
Start: 2021-08-02 | End: 2021-08-12 | Stop reason: HOSPADM

## 2021-08-02 RX ORDER — ACETAMINOPHEN 325 MG/1
975 TABLET ORAL EVERY 8 HOURS SCHEDULED
Status: DISCONTINUED | OUTPATIENT
Start: 2021-08-02 | End: 2021-08-12 | Stop reason: HOSPADM

## 2021-08-02 RX ORDER — ALBUTEROL SULFATE 90 UG/1
2 AEROSOL, METERED RESPIRATORY (INHALATION) EVERY 6 HOURS PRN
Status: DISCONTINUED | OUTPATIENT
Start: 2021-08-02 | End: 2021-08-07

## 2021-08-02 RX ORDER — DEXTROSE AND SODIUM CHLORIDE 5; .9 G/100ML; G/100ML
50 INJECTION, SOLUTION INTRAVENOUS CONTINUOUS
Status: DISCONTINUED | OUTPATIENT
Start: 2021-08-02 | End: 2021-08-02

## 2021-08-02 RX ORDER — LISINOPRIL 5 MG/1
5 TABLET ORAL DAILY
Status: DISCONTINUED | OUTPATIENT
Start: 2021-08-03 | End: 2021-08-03

## 2021-08-02 RX ORDER — HYDROMORPHONE HCL IN WATER/PF 6 MG/30 ML
0.2 PATIENT CONTROLLED ANALGESIA SYRINGE INTRAVENOUS EVERY 6 HOURS PRN
Status: DISCONTINUED | OUTPATIENT
Start: 2021-08-02 | End: 2021-08-03

## 2021-08-02 RX ORDER — DOCUSATE SODIUM 100 MG/1
100 CAPSULE, LIQUID FILLED ORAL 2 TIMES DAILY
Status: DISCONTINUED | OUTPATIENT
Start: 2021-08-03 | End: 2021-08-12 | Stop reason: HOSPADM

## 2021-08-02 RX ORDER — OXYCODONE HYDROCHLORIDE 5 MG/1
5 TABLET ORAL EVERY 4 HOURS PRN
Status: DISCONTINUED | OUTPATIENT
Start: 2021-08-02 | End: 2021-08-12 | Stop reason: HOSPADM

## 2021-08-02 RX ORDER — OXYCODONE HYDROCHLORIDE 5 MG/1
2.5 TABLET ORAL EVERY 4 HOURS PRN
Status: DISCONTINUED | OUTPATIENT
Start: 2021-08-02 | End: 2021-08-12 | Stop reason: HOSPADM

## 2021-08-02 RX ORDER — AMLODIPINE BESYLATE 10 MG/1
10 TABLET ORAL DAILY
Status: DISCONTINUED | OUTPATIENT
Start: 2021-08-03 | End: 2021-08-03

## 2021-08-02 RX ORDER — SODIUM CHLORIDE, SODIUM LACTATE, POTASSIUM CHLORIDE, CALCIUM CHLORIDE 600; 310; 30; 20 MG/100ML; MG/100ML; MG/100ML; MG/100ML
75 INJECTION, SOLUTION INTRAVENOUS CONTINUOUS
Status: DISCONTINUED | OUTPATIENT
Start: 2021-08-03 | End: 2021-08-03

## 2021-08-02 RX ORDER — ALPRAZOLAM 0.25 MG/1
0.25 TABLET ORAL 2 TIMES DAILY PRN
Status: DISCONTINUED | OUTPATIENT
Start: 2021-08-02 | End: 2021-08-12 | Stop reason: HOSPADM

## 2021-08-02 RX ORDER — MORPHINE SULFATE 4 MG/ML
4 INJECTION, SOLUTION INTRAMUSCULAR; INTRAVENOUS ONCE
Status: COMPLETED | OUTPATIENT
Start: 2021-08-02 | End: 2021-08-02

## 2021-08-02 RX ORDER — OXYCODONE HYDROCHLORIDE 5 MG/1
2.5 TABLET ORAL EVERY 4 HOURS PRN
Status: DISCONTINUED | OUTPATIENT
Start: 2021-08-02 | End: 2021-08-02

## 2021-08-02 RX ORDER — SENNOSIDES 8.6 MG
1 TABLET ORAL DAILY
Status: DISCONTINUED | OUTPATIENT
Start: 2021-08-03 | End: 2021-08-12 | Stop reason: HOSPADM

## 2021-08-02 RX ADMIN — MORPHINE SULFATE 4 MG: 4 INJECTION INTRAVENOUS at 22:19

## 2021-08-02 RX ADMIN — MORPHINE SULFATE 4 MG: 4 INJECTION INTRAVENOUS at 15:52

## 2021-08-02 RX ADMIN — MORPHINE SULFATE 4 MG: 4 INJECTION INTRAVENOUS at 17:35

## 2021-08-02 RX ADMIN — ONDANSETRON 4 MG: 2 INJECTION INTRAMUSCULAR; INTRAVENOUS at 23:58

## 2021-08-02 NOTE — EMTALA/ACUTE CARE TRANSFER
[facility] EMERGENCY DEPARTMENT  [address] [place] [facility] [address]  Dept: [phone]      EMTALA TRANSFER CONSENT    NAME Hina Bagley [birth year]                              MRN [id number]    I have been informed of my rights regarding examination, treatment, and transfer   by Dr Paresh Che,     Benefits: Specialized equipment and/or services available at the receiving facility (Include comment)________________________    Risks: Potential for delay in receiving treatment, Potential deterioration of medical condition, Loss of IV, Increased discomfort during transfer, Possible worsening of condition or death during transfer      Transfer Request   I acknowledge that my medical condition has been evaluated and explained to me by the emergency department physician or other qualified medical person and/or my attending physician who has recommended and offered to me further medical examination and treatment  I understand the Hospital's obligation with respect to the treatment and stabilization of my emergency medical condition  I nevertheless request to be transferred  I release the Hospital, the doctor, and any other persons caring for me from all responsibility or liability for any injury or ill effects that may result from my transfer and agree to accept all responsibility for the consequences of my choice to transfer, rather than receive stabilizing treatment at the Hospital  I understand that because the transfer is my request, my insurance may not provide reimbursement for the services  The Hospital will assist and direct me and my family in how to make arrangements for transfer, but the hospital is not liable for any fees charged by the transport service    In spite of this understanding, I refuse to consent to further medical examination and treatment which has been offered to me, and request transfer to  [address] Name, [facility] & State : [place]  I authorize the performance of emergency medical procedures and treatments upon me in both transit and upon arrival at the receiving facility  Additionally, I authorize the release of any and all medical records to the receiving facility and request they be transported with me, if possible  I authorize the performance of emergency medical procedures and treatments upon me in both transit and upon arrival at the receiving facility  Additionally, I authorize the release of any and all medical records to the receiving facility and request they be transported with me, if possible  I understand that the safest mode of transportation during a medical emergency is an ambulance and that the Hospital advocates the use of this mode of transport  Risks of traveling to the receiving facility by car, including absence of medical control, life sustaining equipment, such as oxygen, and medical personnel has been explained to me and I fully understand them  (NIKOLE CORRECT BOX BELOW)  [  ]  I consent to the stated transfer and to be transported by ambulance/helicopter  [  ]  I consent to the stated transfer, but refuse transportation by ambulance and accept full responsibility for my transportation by car  I understand the risks of non-ambulance transfers and I exonerate the Hospital and its staff from any deterioration in my condition that results from this refusal     X___________________________________________    DATE  21  TIME________  Signature of patient or legally responsible individual signing on patient behalf           RELATIONSHIP TO PATIENT_________________________          [address] 1945                              N [id number]    A medical screening exam was performed on the above named patient    Based on the examination:    Condition Necessitating Transfer The encounter diagnosis was Closed fracture of distal end of femur, unspecified fracture morphology, initial encounter ([facility] )  Patient Condition: The patient has been stabilized such that within reasonable medical probability, no material deterioration of the patient condition or the condition of the unborn child(maddie) is likely to result from the transfer    Reason for Transfer: Level of Care needed not available at this facility    Transfer Requirements: Facility [employer] available and qualified personnel available for treatment as acknowledged by [facility]  · Agreed to accept transfer and to provide appropriate medical treatment as acknowledged by       Arsenio Cifuentes  · Appropriate medical records of the examination and treatment of the patient are provided at the time of transfer   [address] _______  · Transfer will be performed by qualified personnel from    and appropriate transfer equipment as required, including the use of necessary and appropriate life support measures      Provider Certification: I have examined the patient and explained the following risks and benefits of being transferred/refusing transfer to the patient/family:  General risk, such as traffic hazards, adverse weather conditions, rough terrain or turbulence, possible failure of equipment (including vehicle or aircraft), or consequences of actions of persons outside the control of the transport personnel, Unanticipated needs of medical equipment and personnel during transport, Risk of worsening condition, The possibility of a transport vehicle being unavailable      Based on these reasonable risks and benefits to the patient and/or the unborn child(maddie), and based upon the information available at the time of the patients examination, I certify that the medical benefits reasonably to be expected from the provision of appropriate medical treatments at another medical facility outweigh the increasing risks, if any, to the individuals medical condition, and in the case of labor to the unborn child, from effecting the transfer      X____________________________________________ DATE 08/02/21        TIME_______      ORIGINAL - SEND TO MEDICAL RECORDS   COPY - SEND WITH PATIENT DURING TRANSFER

## 2021-08-02 NOTE — ED PROVIDER NOTES
History  Chief Complaint   Patient presents with    Fall     mechanical fall injury left knee, c/o pain left knee and behind left knee  denies LOC, denies hitting head, pt on xarelto     This is a 75-year-old female presents to the emergency department complaining of left knee pain after fall  The patient states she tripped over a rub while walking  She denies loss of consciousness  She denies hitting her head  She denies head or neck pain  She denies chest pain or trouble breathing  She denies abdominal pain  She denies back pain  She complains of pain to the left knee  She states it hurts worse with movement  She states the pain is sharp and severe  Nothing makes it better  The patient denies radiation  Prior to Admission Medications   Prescriptions Last Dose Informant Patient Reported? Taking? ALPRAZolam (XANAX) 0 25 mg tablet   No No   Sig: TAKE 1 TABLET(0 25 MG) BY MOUTH THREE TIMES DAILY AS NEEDED FOR ANXIETY OR SLEEP   Ascorbic Acid (VITAMIN C) 1000 MG tablet  Self Yes No   Sig: Take 1 tablet by mouth daily   B Complex-C (B-COMPLEX WITH VITAMIN C) tablet  Self Yes No   Sig: Take 1 tablet by mouth daily     Myrbetriq 25 MG TB24   No No   Sig: TAKE 1 TABLET BY MOUTH DAILY   Xarelto 15 MG tablet   No No   Sig: Take 1 tablet (15 mg total) by mouth daily   albuterol (PROVENTIL HFA,VENTOLIN HFA) 90 mcg/act inhaler   No No   Sig: INHALE 2 PUFFS BY MOUTH EVERY 6 HOURS AS NEEDED FOR WHEEZING   amLODIPine (NORVASC) 10 mg tablet   No No   Sig: TAKE 1 TABLET BY MOUTH DAILY AS DIRECTED   ezetimibe (ZETIA) 10 mg tablet   No No   Sig: TAKE 1 TABLET(10 MG) BY MOUTH DAILY   lisinopril (ZESTRIL) 10 mg tablet   No No   Sig: TAKE 1/2 TABLET BY MOUTH DAILY   metoprolol tartrate (LOPRESSOR) 50 mg tablet   No No   Sig: TAKE 1 TABLET(50 MG) BY MOUTH THREE TIMES DAILY   oxybutynin (DITROPAN XL) 15 MG 24 hr tablet   No No   Sig: TAKE 1 TABLET(15 MG) BY MOUTH DAILY   Patient not taking: Reported on 5/18/2021 traMADol (ULTRAM) 50 mg tablet   No No   Sig: TAKE 1 TABLET(50 MG) BY MOUTH EVERY 6 HOURS AS NEEDED FOR MODERATE PAIN      Facility-Administered Medications: None       Past Medical History:   Diagnosis Date    Abnormal ECG     Last Assessed 9/29/2016    Anxiety     Last Assessed 6/08/2016    Arthritis     knees    Asthma     Last Assessed 11/06/2013    Atrial premature complex     Cataract, bilateral     Last Assessed 7/14/2016    Cataract, left eye     Both eyes  Had surgery on left   Difficulty swallowing     Dizziness     Fibromyalgia     Gastric reflux     Hypertension     Lyme disease     Premature ventricular contraction     Primary osteoarthritis of both knees     Last Assessed 7/14/2016    Rheumatic fever     Sore throat     Tuberculosis        Past Surgical History:   Procedure Laterality Date    APPENDECTOMY      CARDIAC PACEMAKER PLACEMENT  2019    HYSTERECTOMY      NV DILATE ESOPHAGUS N/A 4/6/2016    Procedure: DILATATION ESOPHAGEAL;  Surgeon: Payal Simon MD;  Location: [facility]; Service: Gastroenterology    NV EGD TRANSORAL BIOPSY SINGLE/MULTIPLE N/A 4/6/2016    Procedure: ESOPHAGOGASTRODUODENOSCOPY (EGD); Surgeon: Payal Simon MD;  [facility]; Service: Gastroenterology    NV XCAPSL CTRC RMVL INSJ IO LENS PROSTH W/O ECP Left 3/15/2016    Procedure: EXTRACTION EXTRACAPSULAR CATARACT PHACO INTRAOCULAR LENS (IOL); Surgeon: Petra Nelson MD;  [facility];  Service: Ophthalmology    REPLACEMENT TOTAL KNEE Right     REPLACEMENT TOTAL KNEE      right     TONSILLECTOMY         Family History   Problem Relation Age of Onset    Coronary artery disease Mother     Heart attack Mother         Prior    Heart disease Father     Heart attack Father         Prior     I have reviewed and agree with the history as documented      E-Cigarette/Vaping     E-Cigarette/Vaping Substances     Social History     Tobacco Use    Smoking status: Never Smoker    Smokeless tobacco: Never Used   Substance Use Topics    Alcohol use: No     Comment: Per [employer] Social    Drug use: No       Review of Systems   All other systems reviewed and are negative        Physical Exam  Physical Exam  Constitutional:  Vital signs reviewed, patient appears nontoxic, appears uncomfortable  Eyes: Pupils equal round reactive to light and accommodation, extraocular muscles intact  HEENT: trachea midline, no JVD, moist mucous membranes  Respiratory: lung sounds clear throughout, no rhonchi, no rales  Cardiovascular: regular rate rhythm, no murmurs or rubs  Abdomen: soft, nontender, nondistended, no rebound or guarding  Back: no CVA tenderness, normal inspection  Extremities:  Tenderness to the right knee on the anterior aspect, tenderness to the left knee on the anterior aspect, tenderness to the left hip, tenderness to left femur, tenderness to left tib-fib, no ankle tenderness, pulse motor and sensation are intact distal to the injuries  Neuro: awake, alert, oriented, no focal weakness  Skin: warm, dry, intact, no rashes noted    Vital Signs  ED Triage Vitals [08/02/21 1434]   Temperature Pulse Respirations Blood Pressure SpO2   98 4 °F (36 9 °C) 83 18 164/74 98 %      Temp Source Heart Rate Source Patient Position - Orthostatic VS BP Location FiO2 (%)   Oral Monitor Lying Left arm --      Pain Score       8           Vitals:    08/02/21 1604 08/02/21 1738 08/02/21 2100 08/02/21 2212   BP: 136/65 143/59 134/54 122/54   Pulse: 67 75 70 78   Patient Position - Orthostatic VS: Lying Lying Lying Lying         Visual Acuity      ED Medications  Medications   morphine (PF) 4 mg/mL injection 4 mg (4 mg Intravenous Given 8/2/21 1552)   morphine (PF) 4 mg/mL injection 4 mg (4 mg Intravenous Given 8/2/21 1735)   morphine (PF) 4 mg/mL injection 4 mg (4 mg Intravenous Given 8/2/21 2219)       Diagnostic Studies  Results Reviewed     Procedure Component Value Units Date/Time    Novel Coronavirus [facility] - 2 Hour Stat [[id number]]  (Normal) Collected: 08/02/21 1547    Lab Status: Final result Specimen: Nares from Nasopharyngeal Swab Updated: 08/02/21 1645     SARS-CoV-2 Negative    Narrative: The specimen collection materials, transport medium, and/or testing methodology utilized in the production of these test results have been proven to be reliable in a limited validation with an abbreviated program under the Emergency Utilization Authorization provided by the FDA  Testing reported as "Presumptive positive" will be confirmed with secondary testing to ensure result accuracy  Clinical caution and judgement should be used with the interpretation of these results with consideration of the clinical impression and other laboratory testing  Testing reported as "Positive" or "Negative" has been proven to be accurate according to standard laboratory validation requirements  All testing is performed with control materials showing appropriate reactivity at standard intervals        Comprehensive metabolic panel [[id number]]  (Abnormal) Collected: 08/02/21 1547    Lab Status: Final result Specimen: Blood from Arm, Right Updated: 08/02/21 1614     Sodium 135 mmol/L      Potassium 4 6 mmol/L      Chloride 100 mmol/L      CO2 28 mmol/L      ANION GAP 7 mmol/L      BUN 17 mg/dL      Creatinine 0 90 mg/dL      Glucose 149 mg/dL      Calcium 9 3 mg/dL      AST 15 U/L      ALT 9 U/L      Alkaline Phosphatase 71 2 U/L      Total Protein 7 1 g/dL      Albumin 4 0 g/dL      Total Bilirubin 0 41 mg/dL      eGFR 62 ml/min/1 73sq m     Narrative:      [place] guidelines for Chronic Kidney Disease (CKD):     Stage 1 with normal or high GFR (GFR > 90 mL/min/1 73 square meters)    Stage 2 Mild CKD (GFR = 60-89 mL/min/1 73 square meters)    Stage 3A Moderate CKD (GFR = 45-59 mL/min/1 73 square meters)    Stage 3B Moderate CKD (GFR = 30-44 mL/min/1 73 square meters)    Stage 4 Severe CKD (GFR = 15-29 mL/min/1 73 square meters)    Stage 5 End Stage CKD (GFR <15 mL/min/1 73 square meters)  Note: GFR calculation is accurate only with a steady state creatinine    Troponin I [[id number]]  (Normal) Collected: 08/02/21 1547    Lab Status: Final result Specimen: Blood from Arm, Right Updated: 08/02/21 1611     Troponin I <0 03 ng/mL     Protime-INR [[id number]]  (Normal) Collected: 08/02/21 1547    Lab Status: Final result Specimen: Blood from Arm, Right Updated: 08/02/21 1608     Protime 12 0 seconds      INR 1 07    Narrative:      INR Reference Ranges:  No Anticoagulant, Normal:           0 9-1 1  Standard Dose, Oral Anticoagulant:  2 0-3 0  High Dose, Oral Anticoagulant:      2 5-3 5    APTT [[id number]]  (Abnormal) Collected: 08/02/21 1547    Lab Status: Final result Specimen: Blood from Arm, Right Updated: 08/02/21 1608     PTT 34 seconds     CBC and differential [[id number]]  (Abnormal) Collected: 08/02/21 1547    Lab Status: Final result Specimen: Blood from Arm, Right Updated: 08/02/21 1554     WBC 13 12 Thousand/uL      RBC 4 41 Million/uL      Hemoglobin 12 9 g/dL      Hematocrit 39 8 %      MCV 90 fL      MCH 29 3 pg      MCHC 32 4 g/dL      RDW 12 5 %      MPV 8 8 fL      Platelets 705 Thousands/uL      Neutrophils Relative 73 %      Immat GRANS % 1 %      Lymphocytes Relative 15 %      Monocytes Relative 8 %      Eosinophils Relative 3 %      Basophils Relative 0 %      Neutrophils Absolute 9 67 Thousands/µL      Immature Grans Absolute 0 10 Thousand/uL      Lymphocytes Absolute 1 92 Thousands/µL      Monocytes Absolute 1 03 Thousand/µL      Eosinophils Absolute 0 35 Thousand/µL      Basophils Absolute 0 05 Thousands/µL     Rapid drug screen, urine [[id number]]     Lab Status: No result Specimen: Urine                  CT lower extremity wo contrast left   Final Result by Essie Dill MD (08/02 2018)      1  Comminuted and displaced distal femoral metadiaphyseal fracture as described  2   Moderate to large hemarthrosis  3   Mild intramuscular and soft tissue hematoma in the distal thigh  4   Severe tricompartmental osteoarthritis with intra-articular loose bodies  Workstation performed: [id number]         CT head without contrast   Final Result by Cecy Christianson DO (08/02 1657)      No intracranial hemorrhage or acute large vessel territorial infarct  Moderately severe ventriculomegaly, slightly disproportionate to degree of atrophy  Correlate for normal pressure hydrocephalus  Workstation performed: [id number]         CT cervical spine without contrast   Final Result by Cecy Christianson DO (08/02 1708)      No cervical spine fracture or traumatic malalignment  Workstation performed: [id number]         XR femur 2 views LEFT   ED Interpretation by Glenroy Ding DO (08/02 1700)   Distal femur fracture        XR tibia fibula 2 views LEFT   ED Interpretation by Glenroy Ding DO (08/02 1700)   Distal femur fracture        XR pelvis ap only 1 or 2 vw   ED Interpretation by Glenroy Ding DO (08/02 1630)   No acute fracture      XR knee 1 or 2 vw left   ED Interpretation by Glenroy Ding DO (08/02 1659)   Distal femur fracture        XR knee 4+ views Right injury   ED Interpretation by Glenroy Ding DO (08/02 1631)   No acute fracture                 Procedures  ECG 12 Lead Documentation Only    Date/Time: 8/2/2021 10:29 PM  Performed by: Glenroy Ding DO  Authorized by: Glenroy Ding DO     ECG reviewed by me, the ED Provider: yes    Patient location:  ED  Comments:      Sinus rhythm, rate of 86, normal IL, normal QTC, no STEMI, frequent [facility]             ED Course  ED Course as of Aug 02 2236   Mon Aug 02, 2021   1631 The patient had x-rays of her left knee that was interpreted by me that showed a distal femur  Patient had x-ray of her right knee that was interpreted by me that showed no acute fracture    The patient had an x-ray of her left hip that was interpreted by me that showed no acute fracture  Given the fall with fracture and I found that the patient was on Eliquis I did order a CT scan of her head and C-spine  The patient will be worked up for preoperative clearance  The patient will likely be transferred for a tibial plateau fracture  1736 I discussed the patient with Dr Kelechi Salmon from Ortho  He recommends [facility] for transfer  I then discussed the patient with Dr Tima Meneses from [facility]  Given the patient's comorbidity, he recommends admission to medicine  1802 Pt accepted by Dr Amy Hernandez  MDM  Number of Diagnoses or Management Options  Diagnosis management comments: This is a 68-year-old female who presents the emergency department after a fall  I considered fracture, strain, sprain, contusion  These and other diagnoses were considered  Amount and/or Complexity of Data Reviewed  Tests in the radiology section of CPT®: ordered and reviewed  Decide to obtain previous medical records or to obtain history from someone other than the patient: yes  Obtain history from someone other than the patient: yes (EMS)  Review and summarize past medical records: yes  Discuss the patient with other providers: yes (EMS)        Disposition  Final diagnoses:   Closed fracture of distal end of femur, unspecified fracture morphology, initial encounter ([facility] [address] )     Time reflects when diagnosis was documented in both MDM as applicable and the Disposition within this note     Time User Action Codes Description Comment    8/2/2021  6:05 PM Julia Savage Add [[id number]] Closed fracture of distal end of femur, unspecified fracture morphology, initial encounter [facility])       ED Disposition     ED Disposition Condition Date/Time Comment    Transfer to Another Facility-In Network  Mon Aug 2, 2021  6:04 PM Zeynep Morales should be transferred out to [facility] BERNARD NEELY Documentation Most Recent Value   Patient Condition  The patient has been stabilized such that within reasonable medical probability, no material deterioration of the patient condition or the condition of the unborn child(maddie) is likely to result from the transfer   Reason for Transfer  Level of Care needed not available at this facility   Benefits of Transfer  Specialized equipment and/or services available at the receiving facility (Include comment)________________________   Risks of Transfer  Potential for delay in receiving treatment, Potential deterioration of medical condition, Loss of IV, Increased discomfort during transfer, Possible worsening of condition or death during transfer   Accepting Physician  [address] Name, [facility] (Name & Tel number)  PAC   Transported by (Company and Unit #)  [place]   Sending MD  [facility]   Provider Certification  General risk, such as traffic hazards, adverse weather conditions, rough terrain or turbulence, possible failure of equipment (including vehicle or aircraft), or consequences of actions of persons outside the control of the transport personnel, Unanticipated needs of medical equipment and personnel during transport, Risk of worsening condition, The possibility of a transport vehicle being unavailable      RN Documentation      Most [address] Name, [address] [facility]   Bed Assignment  P6 [id number]     (Name & Tel number)  PAC   Transport Mode  Ambulance   Transported by (Company and Unit #)  [facility]      Follow-up Information    None         Discharge Medication List as of 8/2/2021 10:23 PM      CONTINUE these medications which have NOT CHANGED    Details   albuterol (PROVENTIL HFA,VENTOLIN HFA) 90 mcg/act inhaler INHALE 2 PUFFS BY MOUTH EVERY 6 HOURS AS NEEDED FOR WHEEZING, Normal      ALPRAZolam (XANAX) 0 25 mg tablet TAKE 1 TABLET(0 25 MG) BY MOUTH THREE TIMES DAILY AS NEEDED FOR ANXIETY OR SLEEP, Normal      amLODIPine (NORVASC) 10 mg tablet TAKE 1 TABLET BY MOUTH DAILY AS DIRECTED, Normal      Ascorbic Acid (VITAMIN C) 1000 MG tablet Take 1 tablet by mouth daily, Starting Tue 7/18/2017, Historical Med      B Complex-C (B-COMPLEX WITH VITAMIN C) tablet Take 1 tablet by mouth daily  , Historical Med      ezetimibe (ZETIA) 10 mg tablet TAKE 1 TABLET(10 MG) BY MOUTH DAILY, Normal      lisinopril (ZESTRIL) 10 mg tablet TAKE 1/2 TABLET BY MOUTH DAILY, Normal      metoprolol tartrate (LOPRESSOR) 50 mg tablet TAKE 1 TABLET(50 MG) BY MOUTH THREE TIMES DAILY, Normal      Myrbetriq 25 MG TB24 TAKE 1 TABLET BY MOUTH DAILY, Normal      oxybutynin (DITROPAN XL) 15 MG 24 hr tablet TAKE 1 TABLET(15 MG) BY MOUTH DAILY, Normal      traMADol (ULTRAM) 50 mg tablet TAKE 1 TABLET(50 MG) BY MOUTH EVERY 6 HOURS AS NEEDED FOR MODERATE PAIN, Normal      Xarelto 15 MG tablet Take 1 tablet (15 mg total) by mouth daily, Starting Thu 7/22/2021, Phone In           No discharge procedures on file      PDMP Review       Value Time User    PDMP Reviewed  Yes 7/11/2021 11:07 AM Greer Meyer MD          ED Provider  Electronically Signed by           Jessica Hendricks DO  08/02/21 6386

## 2021-08-03 ENCOUNTER — APPOINTMENT (INPATIENT)
Dept: RADIOLOGY | Facility: HOSPITAL | Age: 76
DRG: 480 | End: 2021-08-03
Payer: MEDICARE

## 2021-08-03 LAB
ABO GROUP BLD: NORMAL
ALBUMIN SERPL BCP-MCNC: 3.1 G/DL (ref 3.5–5)
ALP SERPL-CCNC: 73 U/L (ref 46–116)
ALT SERPL W P-5'-P-CCNC: 14 U/L (ref 12–78)
ANION GAP SERPL CALCULATED.3IONS-SCNC: 6 MMOL/L (ref 4–13)
AST SERPL W P-5'-P-CCNC: 14 U/L (ref 5–45)
BASOPHILS # BLD AUTO: 0.04 THOUSANDS/ΜL (ref 0–0.1)
BASOPHILS NFR BLD AUTO: 0 % (ref 0–1)
BILIRUB SERPL-MCNC: 0.43 MG/DL (ref 0.2–1)
BLD GP AB SCN SERPL QL: NEGATIVE
BUN SERPL-MCNC: 23 MG/DL (ref 5–25)
CALCIUM ALBUM COR SERPL-MCNC: 9.6 MG/DL (ref 8.3–10.1)
CALCIUM SERPL-MCNC: 8.9 MG/DL (ref 8.3–10.1)
CHLORIDE SERPL-SCNC: 104 MMOL/L (ref 100–108)
CO2 SERPL-SCNC: 25 MMOL/L (ref 21–32)
CREAT SERPL-MCNC: 1.2 MG/DL (ref 0.6–1.3)
EOSINOPHIL # BLD AUTO: 0 THOUSAND/ΜL (ref 0–0.61)
EOSINOPHIL NFR BLD AUTO: 0 % (ref 0–6)
ERYTHROCYTE [DISTWIDTH] IN BLOOD BY AUTOMATED COUNT: 12.7 % (ref 11.6–15.1)
EST. AVERAGE GLUCOSE BLD GHB EST-MCNC: 114 MG/DL
GFR SERPL CREATININE-BSD FRML MDRD: 44 ML/MIN/1.73SQ M
GLUCOSE SERPL-MCNC: 143 MG/DL (ref 65–140)
HBA1C MFR BLD: 5.6 %
HCT VFR BLD AUTO: 34.6 % (ref 34.8–46.1)
HGB BLD-MCNC: 11.1 G/DL (ref 11.5–15.4)
IMM GRANULOCYTES # BLD AUTO: 0.12 THOUSAND/UL (ref 0–0.2)
IMM GRANULOCYTES NFR BLD AUTO: 1 % (ref 0–2)
LYMPHOCYTES # BLD AUTO: 1.14 THOUSANDS/ΜL (ref 0.6–4.47)
LYMPHOCYTES NFR BLD AUTO: 8 % (ref 14–44)
MAGNESIUM SERPL-MCNC: 2.2 MG/DL (ref 1.6–2.6)
MCH RBC QN AUTO: 29.7 PG (ref 26.8–34.3)
MCHC RBC AUTO-ENTMCNC: 32.1 G/DL (ref 31.4–37.4)
MCV RBC AUTO: 93 FL (ref 82–98)
MONOCYTES # BLD AUTO: 1.24 THOUSAND/ΜL (ref 0.17–1.22)
MONOCYTES NFR BLD AUTO: 8 % (ref 4–12)
NEUTROPHILS # BLD AUTO: 12.2 THOUSANDS/ΜL (ref 1.85–7.62)
NEUTS SEG NFR BLD AUTO: 83 % (ref 43–75)
NRBC BLD AUTO-RTO: 0 /100 WBCS
PLATELET # BLD AUTO: 276 THOUSANDS/UL (ref 149–390)
PMV BLD AUTO: 8.8 FL (ref 8.9–12.7)
POTASSIUM SERPL-SCNC: 5.2 MMOL/L (ref 3.5–5.3)
PROT SERPL-MCNC: 7.1 G/DL (ref 6.4–8.2)
RBC # BLD AUTO: 3.74 MILLION/UL (ref 3.81–5.12)
RH BLD: POSITIVE
SODIUM SERPL-SCNC: 135 MMOL/L (ref 136–145)
SPECIMEN EXPIRATION DATE: NORMAL
WBC # BLD AUTO: 14.74 THOUSAND/UL (ref 4.31–10.16)

## 2021-08-03 PROCEDURE — 83036 HEMOGLOBIN GLYCOSYLATED A1C: CPT | Performed by: ORTHOPAEDIC SURGERY

## 2021-08-03 PROCEDURE — 99223 1ST HOSP IP/OBS HIGH 75: CPT | Performed by: ORTHOPAEDIC SURGERY

## 2021-08-03 PROCEDURE — 86850 RBC ANTIBODY SCREEN: CPT | Performed by: ORTHOPAEDIC SURGERY

## 2021-08-03 PROCEDURE — 80053 COMPREHEN METABOLIC PANEL: CPT | Performed by: INTERNAL MEDICINE

## 2021-08-03 PROCEDURE — 85025 COMPLETE CBC W/AUTO DIFF WBC: CPT | Performed by: INTERNAL MEDICINE

## 2021-08-03 PROCEDURE — 83735 ASSAY OF MAGNESIUM: CPT | Performed by: INTERNAL MEDICINE

## 2021-08-03 PROCEDURE — 71045 X-RAY EXAM CHEST 1 VIEW: CPT

## 2021-08-03 PROCEDURE — 86901 BLOOD TYPING SEROLOGIC RH(D): CPT | Performed by: ORTHOPAEDIC SURGERY

## 2021-08-03 PROCEDURE — 99232 SBSQ HOSP IP/OBS MODERATE 35: CPT | Performed by: PHYSICIAN ASSISTANT

## 2021-08-03 PROCEDURE — 86900 BLOOD TYPING SEROLOGIC ABO: CPT | Performed by: ORTHOPAEDIC SURGERY

## 2021-08-03 RX ORDER — AMLODIPINE BESYLATE 10 MG/1
10 TABLET ORAL DAILY
Status: DISCONTINUED | OUTPATIENT
Start: 2021-08-04 | End: 2021-08-09

## 2021-08-03 RX ORDER — HYDROMORPHONE HCL IN WATER/PF 6 MG/30 ML
0.2 PATIENT CONTROLLED ANALGESIA SYRINGE INTRAVENOUS EVERY 6 HOURS PRN
Status: DISCONTINUED | OUTPATIENT
Start: 2021-08-03 | End: 2021-08-05

## 2021-08-03 RX ORDER — DIPHENHYDRAMINE HCL 25 MG
12.5 TABLET ORAL EVERY 6 HOURS PRN
Status: DISCONTINUED | OUTPATIENT
Start: 2021-08-03 | End: 2021-08-12 | Stop reason: HOSPADM

## 2021-08-03 RX ORDER — DIPHENHYDRAMINE HYDROCHLORIDE, ZINC ACETATE 2; .1 G/100G; G/100G
CREAM TOPICAL 3 TIMES DAILY PRN
Status: DISCONTINUED | OUTPATIENT
Start: 2021-08-03 | End: 2021-08-12 | Stop reason: HOSPADM

## 2021-08-03 RX ORDER — SODIUM CHLORIDE, SODIUM LACTATE, POTASSIUM CHLORIDE, CALCIUM CHLORIDE 600; 310; 30; 20 MG/100ML; MG/100ML; MG/100ML; MG/100ML
75 INJECTION, SOLUTION INTRAVENOUS CONTINUOUS
Status: DISCONTINUED | OUTPATIENT
Start: 2021-08-05 | End: 2021-08-04

## 2021-08-03 RX ADMIN — SODIUM CHLORIDE, SODIUM LACTATE, POTASSIUM CHLORIDE, AND CALCIUM CHLORIDE 75 ML/HR: .6; .31; .03; .02 INJECTION, SOLUTION INTRAVENOUS at 00:12

## 2021-08-03 RX ADMIN — OXYCODONE HYDROCHLORIDE 5 MG: 5 TABLET ORAL at 22:02

## 2021-08-03 RX ADMIN — OXYCODONE HYDROCHLORIDE 5 MG: 5 TABLET ORAL at 04:48

## 2021-08-03 RX ADMIN — ACETAMINOPHEN 975 MG: 325 TABLET, FILM COATED ORAL at 14:09

## 2021-08-03 RX ADMIN — EZETIMIBE 10 MG: 10 TABLET ORAL at 10:22

## 2021-08-03 RX ADMIN — METOPROLOL TARTRATE 50 MG: 50 TABLET, FILM COATED ORAL at 21:43

## 2021-08-03 RX ADMIN — ACETAMINOPHEN 975 MG: 325 TABLET, FILM COATED ORAL at 21:43

## 2021-08-03 RX ADMIN — ACETAMINOPHEN 975 MG: 325 TABLET, FILM COATED ORAL at 04:49

## 2021-08-03 RX ADMIN — AMLODIPINE BESYLATE 10 MG: 10 TABLET ORAL at 10:23

## 2021-08-03 RX ADMIN — METOPROLOL TARTRATE 50 MG: 50 TABLET, FILM COATED ORAL at 10:22

## 2021-08-03 RX ADMIN — SENNOSIDES 8.6 MG: 8.6 TABLET ORAL at 10:22

## 2021-08-03 RX ADMIN — ENOXAPARIN SODIUM 40 MG: 40 INJECTION SUBCUTANEOUS at 18:14

## 2021-08-03 RX ADMIN — OXYCODONE HYDROCHLORIDE 5 MG: 5 TABLET ORAL at 10:23

## 2021-08-03 RX ADMIN — DOCUSATE SODIUM 100 MG: 100 CAPSULE ORAL at 18:14

## 2021-08-03 RX ADMIN — DOCUSATE SODIUM 100 MG: 100 CAPSULE ORAL at 10:22

## 2021-08-03 NOTE — PHYSICAL THERAPY NOTE
Physical Therapy Cancellation Note      PT orders received and chart reviewed  Pt pending ORIF of L distal femur fx  Will defer PT eval at this time and continue to follow and evaluate as appropriate post op      Zulema Grumbling, PT, DPT

## 2021-08-03 NOTE — OCCUPATIONAL THERAPY NOTE
Occupational Therapy         Patient Name: Mike Warner  [id number] Date: 8/3/2021     08/03/21 0800   OT Last Visit   OT Visit Date 08/03/21   Note Type   Note type Evaluation   Cancel Reasons Patient to operating room     Pt pending OR - will defer and address OT needs post op    Patricia Jerry OT

## 2021-08-03 NOTE — CONSULTS
Orthopedics   Tania Butt 66 y o  female MRN: [id number]  Unit/Bed#: [facility] 659-51      Chief Complaint:   left knee pain    HPI:   66 y  o female status post a fall complaining of left knee pain and inability to bear weight  Pain is made worse with motion or contact to the area  She reports had pain before in her knee due to osteoarthritis  She ambulates with a rollator  She reports hx of R TKA done in [facility] 4-5 years ago by Dr James Vasquez  Takes Xarelto for hx of atrial fibrillation and pacemaker, last reported dose Sunday evening  Review Of Systems:   · Skin: Normal  · Neuro: See HPI  · Musculoskeletal: See HPI  · 14 point review of systems negative except as stated above     Past Medical History:   Past Medical History:   Diagnosis Date    Abnormal ECG     Last Assessed 9/29/2016    Anxiety     Last Assessed 6/08/2016    Arthritis     knees    Asthma     Last Assessed 11/06/2013    Atrial premature complex     Cataract, bilateral     Last Assessed 7/14/2016    Cataract, left eye     Both eyes  Had surgery on left   Difficulty swallowing     Dizziness     Fibromyalgia     Gastric reflux     Hypertension     Lyme disease     Premature ventricular contraction     Primary osteoarthritis of both knees     Last Assessed 7/14/2016    Rheumatic fever     Sore throat     Tuberculosis     Tuberculosis 1945       Past Surgical History:   Past Surgical History:   Procedure Laterality Date    APPENDECTOMY      CARDIAC PACEMAKER PLACEMENT  2019    HYSTERECTOMY      NM DILATE ESOPHAGUS N/A 4/6/2016    Procedure: DILATATION ESOPHAGEAL;  Surgeon: Naheed Kuo MD;  [facility]; Service: Gastroenterology    NM EGD TRANSORAL BIOPSY SINGLE/MULTIPLE N/A 4/6/2016    Procedure: ESOPHAGOGASTRODUODENOSCOPY (EGD); Surgeon: Naheed Kuo MD;  [facility]: [facility];   Service: Gastroenterology    NM XCAPSL CTRC RMVL INSJ IO LENS PROSTH W/O ECP Left 3/15/2016    Procedure: EXTRACTION EXTRACAPSULAR CATARACT PHACO INTRAOCULAR LENS (IOL); Surgeon: Leif Perez MD;  [facility]: [facility];  Service: Ophthalmology    REPLACEMENT TOTAL KNEE Right     REPLACEMENT TOTAL KNEE      right     TONSILLECTOMY         Family History:  Family history reviewed and non-contributory  Family History   Problem Relation Age of Onset    Coronary artery disease Mother     Heart attack Mother         Prior    Heart disease Father     Heart attack Father         Prior       Social History:  Social History     Socioeconomic History    Marital status:      Spouse name: Not on file    Number of children: Not on file    Years of education: Not on file    Highest education level: Not on file   Occupational History    Not on file   Tobacco Use    Smoking status: Never Smoker    Smokeless tobacco: Never Used   Substance and Sexual Activity    Alcohol use: No     Comment: Per [employer] Social    Drug use: No    Sexual activity: Never   Other Topics Concern    Not on file   Social History Narrative    Not on file     Social Determinants of Health     Financial Resource Strain:     Difficulty of Paying Living Expenses:    Food Insecurity:     Worried About Running Out of Food in the Last Year:     [address] in the Last Year:    Transportation Needs:     Lack of Transportation (Medical):  Lack of Transportation (Non-Medical):    Physical Activity:     Days of Exercise per Week:     Minutes of Exercise per Session:    Stress:     Feeling of Stress :    Social Connections:     Frequency of Communication with Friends and Family:     Frequency of Social Gatherings with Friends and Family:     Attends Synagogue Services:     Active Member of Clubs or Organizations:     Attends Club or Organization Meetings:     Marital Status:    Intimate Partner Violence:     Fear of Current or Ex-Partner:     Emotionally Abused:     Physically Abused:     Sexually Abused: Allergies:    Allergies   Allergen Reactions    S41 Folate [Folic Acid-Vit E9-HSV G67 - Food Allergy]     Cobalt      Unknown    Lyrica [Pregabalin]     Nickel      Skin discoloration    Other      Black rubber    Penicillins Hives    Sulfa Antibiotics Itching    Cortisone Acetate [Cortisone] Itching and Rash    Penicillin G Rash           Labs:  0   Lab Value Date/Time    HCT 39 8 08/02/2021 1547    HCT 43 9 05/27/2021 1402    HCT 42 4 10/17/2017 0449    HGB 12 9 08/02/2021 1547    HGB 13 9 05/27/2021 1402    HGB 13 9 10/17/2017 0449    INR 1 07 08/02/2021 1547    WBC 13 12 (H) 08/02/2021 1547    WBC 7 31 05/27/2021 1402    WBC 8 70 10/17/2017 0449       Meds:    Current Facility-Administered Medications:     acetaminophen (TYLENOL) tablet 975 mg, 975 mg, Oral, Q8H [facility], Juan Daniel Rayo, DO    albuterol (PROVENTIL HFA,VENTOLIN HFA) inhaler 2 puff, 2 puff, Inhalation, Q6H PRN, Hetul Rayo, DO    ALPRAZolam (XANAX) tablet 0 25 mg, 0 25 mg, Oral, BID PRN, Hetul Rayo, DO    amLODIPine (NORVASC) tablet 10 mg, 10 mg, Oral, Daily, Hetul Rayo, DO    docusate sodium (COLACE) capsule 100 mg, 100 mg, Oral, BID, Hetul Rayo, DO    ezetimibe (ZETIA) tablet 10 mg, 10 mg, Oral, Daily, Hetul Rayo, DO    HYDROmorphone HCl (DILAUDID) injection 0 2 mg, 0 2 mg, Intravenous, Q6H PRN, Hetul Rayo, DO    lactated ringers infusion, 75 mL/hr, Intravenous, Continuous, Gagandeep Merino MD, Last Rate: 75 mL/hr at 08/03/21 0012, 75 mL/hr at 08/03/21 0012    lisinopril (ZESTRIL) tablet 5 mg, 5 mg, Oral, Daily, Hetul Rayo, DO    metoprolol tartrate (LOPRESSOR) tablet 50 mg, 50 mg, Oral, Q12H [facility], Hetul Rayo, DO    ondansetron (ZOFRAN) injection 4 mg, 4 mg, Intravenous, Q4H PRN, Juan Daniel Rayo, DO, 4 mg at 08/02/21 8826    oxyCODONE (ROXICODONE) IR tablet 2 5 mg, 2 5 mg, Oral, Q4H PRN, Hetul Rayo, DO    oxyCODONE (ROXICODONE) IR tablet 5 mg, 5 mg, Oral, Q4H PRN, Juan Daniel Rayo, DO    senna (SENOKOT) tablet 8 6 mg, 1 tablet, Oral, Daily, Hetul Rayo, DO    Blood Culture:   No results found for: BLOODCX    Wound Culture:   No results found for: WOUNDCULT    Ins and Outs:  No intake/output data recorded  Physical Exam:   /62 (BP Location: Left arm)   Pulse 69   Temp 97 7 °F (36 5 °C) (Oral)   SpO2 92%   Gen: Alert and oriented to person, place, time  HEENT: EOMI, eyes clear, moist mucus membranes, hearing intact  Respiratory: Bilateral chest rise  No audible wheezing found  Cardiovascular: Regular Rate and Rhythm  Abdomen: soft nontender/nondistended  Musculoskeletal: left lower extremity  · Skin intact  · Tender to palpation over entire knee and distal femur  · Moderate effusion  · Painful knee range of motion  · Unable to tolerate varus/valgus stress due to pain  · Sensation intact to dp/sp/tib/ludy/saph distributions  · Positive ankle dorsi/plantar flexion, EHL/FHL    Radiology:   I personally reviewed the films  X-rays left knee shows comminuted displaced distalfemur fracture with associated severe DJD  CT scan shows comminuted distal femur fracture with severe knee DJD    _*_*_*_*_*_*_*_*_*_*_*_*_*_*_*_*_*_*_*_*_*_*_*_*_*_*_*_*_*_*_*_*_*_*_*_*_*_*_*_*_*    Assessment:  66 y  o female status post fall with left distal femur fracture and advanced osteoarthritis  She will likely benefit from operative fixation of this injury  Consent signed for surgery, risks include, but are not limited to: bleeding, clots, stiffness, nonunion, malunion, infection, malrotation, painful hardware, death  Plan:   · Non weight bearing left lower extremity in knee immobilizer  · To OR for ORIF of left distal femur fracture pending OR availability  · Analgesics for pain  · Informed consent obtained  · Pre op labs  · NPO  · Medicine team for clearance to OR  · BMI: 49 7, morbidly obese  Recommend behavior modifications, nutrition and physical activity      Val Martel MD

## 2021-08-03 NOTE — PLAN OF CARE
Problem: MOBILITY - ADULT  Goal: Maintain or return to baseline ADL function  Description: INTERVENTIONS:  -  Assess patient's ability to carry out ADLs; assess patient's baseline for ADL function and identify physical deficits which impact ability to perform ADLs (bathing, care of mouth/teeth, toileting, grooming, dressing, etc )  - Assess/evaluate cause of self-care deficits   - Assess range of motion  - Assess patient's mobility; develop plan if impaired  - Assess patient's need for assistive devices and provide as appropriate  - Encourage maximum independence but intervene and supervise when necessary  - Involve family in performance of ADLs  - Assess for home care needs following discharge   - Consider OT consult to assist with ADL evaluation and planning for discharge  - Provide patient education as appropriate  Outcome: Progressing  Goal: Maintains/Returns to pre admission functional level  Description: INTERVENTIONS:  - Perform BMAT or MOVE assessment daily    - Set and communicate daily mobility goal to care team and patient/family/caregiver  - Collaborate with rehabilitation services on mobility goals if consulted  - Perform Range of Motion 3 times a day  - Reposition patient every 2 hours    - Ambulate patient 3 times a day  - Out of bed to chair 3 times a day   - Out of bed for meals 3 times a day  - Out of bed for toileting  - Record patient progress and toleration of activity level   Outcome: Progressing     Problem: PAIN - ADULT  Goal: Verbalizes/displays adequate comfort level or baseline comfort level  Description: Interventions:  - Encourage patient to monitor pain and request assistance  - Assess pain using appropriate pain scale  - Administer analgesics based on type and severity of pain and evaluate response  - Implement non-pharmacological measures as appropriate and evaluate response  - Consider cultural and social influences on pain and pain management  - Notify physician/advanced practitioner if interventions unsuccessful or patient reports new pain  Outcome: Progressing     Problem: INFECTION - ADULT  Goal: Absence or prevention of progression during hospitalization  Description: INTERVENTIONS:  - Assess and monitor for signs and symptoms of infection  - Monitor lab/diagnostic results  - Monitor all insertion sites, i e  indwelling lines, tubes, and drains  - [place] appropriate cooling/warming therapies per order  - Administer medications as ordered  - Instruct and encourage patient and family to use good hand hygiene technique  - Identify and instruct in appropriate isolation precautions for identified infection/condition  Outcome: Progressing  Goal: Absence of fever/infection during neutropenic period  Description: INTERVENTIONS:  - Monitor WBC    Outcome: Progressing     Problem: SAFETY ADULT  Goal: Maintain or return to baseline ADL function  Description: INTERVENTIONS:  -  Assess patient's ability to carry out ADLs; assess patient's baseline for ADL function and identify physical deficits which impact ability to perform ADLs (bathing, care of mouth/teeth, toileting, grooming, dressing, etc )  - Assess/evaluate cause of self-care deficits   - Assess range of motion  - Assess patient's mobility; develop plan if impaired  - Assess patient's need for assistive devices and provide as appropriate  - Encourage maximum independence but intervene and supervise when necessary  - Involve family in performance of ADLs  - Assess for home care needs following discharge   - Consider OT consult to assist with ADL evaluation and planning for discharge  - Provide patient education as appropriate  Outcome: Progressing  Goal: Maintains/Returns to pre admission functional level  Description: INTERVENTIONS:  - Perform BMAT or MOVE assessment daily    - Set and communicate daily mobility goal to care team and patient/family/caregiver     - Collaborate with rehabilitation services on mobility goals if consulted  - Perform Range of Motion 3 times a day  - Reposition patient every 2 hours    - Ambulate patient 3 times a day  - Out of bed to chair 3 times a day   - Out of bed for meals 3 times a day  - Out of bed for toileting  - Record patient progress and toleration of activity level   Outcome: Progressing  Goal: Patient will remain free of falls  Description: INTERVENTIONS:  - Educate patient/family on patient safety including physical limitations  - Instruct patient to call for assistance with activity   - Consult OT/PT to assist with strengthening/mobility   - Keep Call bell within reach  - Keep bed low and locked with side rails adjusted as appropriate  - Keep care items and personal belongings within reach  - Initiate and maintain comfort rounds  - Make Fall Risk Sign visible to staff  - Offer Toileting every 2 Hours, in advance of need  - Initiate/Maintain alarms  - Apply yellow socks and bracelet for high fall risk patients  - Consider moving patient to room near nurses station  Outcome: Progressing     Problem: DISCHARGE PLANNING  Goal: Discharge to home or other facility with appropriate resources  Description: INTERVENTIONS:  - Identify barriers to discharge w/patient and caregiver  - Arrange for needed discharge resources and transportation as appropriate  - Identify discharge learning needs (meds, wound care, etc )  - Arrange for interpretive services to assist at discharge as needed  - Refer to Case Management Department for coordinating discharge planning if the patient needs post-hospital services based on physician/advanced practitioner order or complex needs related to functional status, cognitive ability, or social support system  Outcome: Progressing     Problem: Knowledge Deficit  Goal: Patient/family/caregiver demonstrates understanding of disease process, treatment plan, medications, and discharge instructions  Description: Complete learning assessment and assess knowledge base   Interventions:  - Provide teaching at level of understanding  - Provide teaching via preferred learning methods  Outcome: Progressing     Problem: MUSCULOSKELETAL - ADULT  Goal: Maintain or return mobility to safest level of function  Description: INTERVENTIONS:  - Assess patient's ability to carry out ADLs; assess patient's baseline for ADL function and identify physical deficits which impact ability to perform ADLs (bathing, care of mouth/teeth, toileting, grooming, dressing, etc )  - Assess/evaluate cause of self-care deficits   - Assess range of motion  - Assess patient's mobility  - Assess patient's need for assistive devices and provide as appropriate  - Encourage maximum independence but intervene and supervise when necessary  - Involve family in performance of ADLs  - Assess for home care needs following discharge   - Consider OT consult to assist with ADL evaluation and planning for discharge  - Provide patient education as appropriate  Outcome: Progressing  Goal: Maintain proper alignment of affected body part  Description: INTERVENTIONS:  - Support, maintain and protect limb and body alignment  - Provide patient/ family with appropriate education  Outcome: Progressing     Problem: Nutrition/Hydration-ADULT  Goal: Nutrient/Hydration intake appropriate for improving, restoring or maintaining nutritional needs  Description: Monitor and assess patient's nutrition/hydration status for malnutrition  Collaborate with interdisciplinary team and initiate plan and interventions as ordered  Monitor patient's weight and dietary intake as ordered or per policy  Utilize nutrition screening tool and intervene as necessary  Determine patient's food preferences and provide high-protein, high-caloric foods as appropriate       INTERVENTIONS:  - Monitor oral intake, urinary output, labs, and treatment plans  - Assess nutrition and hydration status and recommend course of action  - Evaluate amount of meals eaten  - Assist patient with eating if necessary   - Allow adequate time for meals  - Recommend/ encourage appropriate diets, oral nutritional supplements, and vitamin/mineral supplements  - Order, calculate, and assess calorie counts as needed  - Assess need for intravenous fluids  - Provide specific nutrition/hydration education as appropriate  - Include patient/family/caregiver in decisions related to nutrition  Outcome: Progressing

## 2021-08-03 NOTE — PROGRESS NOTES
[facility]  Progress Note - Tania Butt [birth year], 66 y o  female MRN: [id number]  Unit/Bed#: [facility] 347-51 Encounter: [id number]  Primary Care Provider: Lg Rios MD   Date and time admitted to hospital: 8/2/2021 10:51 PM    * Femur fracture [facility])  Assessment & Plan  Patient presenting with femur fracture  Tentative plans for OR tomorrow  Continue with supportive care and pain control  Gentle hydration  NPO after midnight  Orthopedic surgery consult appreciated  Acceptable risk for OR  Benefit of surgery outweighs risk  Leukocytosis  Assessment & Plan  Likely stress reaction from fracture    Atrial fibrillation [facility])  Assessment & Plan  Metoprolol therapy  Monitor heart rate  Patient with history pacemaker placement  Hold Xarelto      Hypertension  Assessment & Plan  Continue with blood pressure meds  Metoprolol and Norvasc  Hold lisinopril perioperatively    Pacemaker  Assessment & Plan  Patient with history tachy-smita syndrome with pacer placement  On beta-blockade  Hyperlipidemia  Assessment & Plan  Zetia          VTE Pharmacologic Prophylaxis:   on hold for procedure    Patient Centered Rounds: I performed bedside rounds with nursing staff today  Discussions with Specialists or Other Care Team Provider: case management, orthopedic surgery    Education and Discussions with Family / Patient: Attempted to update  (daughter) via phone  Left voicemail  Time Spent for Care: 30 minutes  More than 50% of total time spent on counseling and coordination of care as described above  Current Length of Stay: 1 day(s)  Current Patient Status: Inpatient   Certification Statement: The patient will continue to require additional inpatient hospital stay due to surgery  Discharge Plan: Anticipate discharge in 48-72 hrs to discharge location to be determined pending rehab evaluations      Code Status: Level 1 - Full Code    Subjective:   C/O pain in her leg    Objective:     Vitals:   Temp (24hrs), Av 2 °F (36 8 °C), Min:97 7 °F (36 5 °C), Max:98 6 °F (37 °C)    Temp:  [97 7 °F (36 5 °C)-98 6 °F (37 °C)] 98 6 °F (37 °C)  HR:  [65-83] 74  Resp:  [16-18] 18  BP: (121-164)/(54-74) 138/64  SpO2:  [92 %-98 %] 94 %  There is no height or weight on file to calculate BMI  Input and Output Summary (last 24 hours): Intake/Output Summary (Last 24 hours) at 8/3/2021 1146  Last data filed at 8/3/2021 0601  Gross per 24 hour   Intake 436 25 ml   Output 200 ml   Net 236 25 ml       Physical Exam:   Physical Exam  Constitutional:       Appearance: Normal appearance  Cardiovascular:      Rate and Rhythm: Normal rate and regular rhythm  Heart sounds: No murmur heard  Pulmonary:      Effort: Pulmonary effort is normal       Breath sounds: Normal breath sounds  Abdominal:      General: Bowel sounds are normal  There is no distension  Palpations: Abdomen is soft  Tenderness: There is no abdominal tenderness  Skin:     General: Skin is warm and dry  Neurological:      General: No focal deficit present  Mental Status: She is alert and oriented to person, place, and time     Psychiatric:         Mood and Affect: Mood normal          Additional Data:     Labs:  Results from last 7 days   Lab Units 21  0442   WBC Thousand/uL 14 74*   HEMOGLOBIN g/dL 11 1*   HEMATOCRIT % 34 6*   PLATELETS Thousands/uL 276   NEUTROS PCT % 83*   LYMPHS PCT % 8*   MONOS PCT % 8   EOS PCT % 0     Results from last 7 days   Lab Units 21  0442   SODIUM mmol/L 135*   POTASSIUM mmol/L 5 2   CHLORIDE mmol/L 104   CO2 mmol/L 25   BUN mg/dL 23   CREATININE mg/dL 1 20   ANION GAP mmol/L 6   CALCIUM mg/dL 8 9   ALBUMIN g/dL 3 1*   TOTAL BILIRUBIN mg/dL 0 43   ALK PHOS U/L 73   ALT U/L 14   AST U/L 14   GLUCOSE RANDOM mg/dL 143*     Results from last 7 days   Lab Units 21  1547   INR  1 07         Results from last 7 days   Lab Units 21  0442 HEMOGLOBIN A1C % 5 6           Lines/Drains:  Invasive Devices     Peripheral Intravenous Line            Peripheral IV 08/02/21 Right Antecubital <1 day          Drain            External Urinary Catheter <1 day                      Imaging: Reviewed radiology reports from this admission including: CT lower extremity    Recent Cultures (last 7 days):         Last 24 Hours Medication List:   Current Facility-Administered Medications   Medication Dose Route Frequency Provider Last Rate    acetaminophen  975 mg Oral Q8H [address] Hetul Rayo, DO      albuterol  2 puff Inhalation Q6H PRN Hetul Rayo, DO      ALPRAZolam  0 25 mg Oral BID PRN Hetul Rayo, DO      amLODIPine  10 mg Oral Daily Hetul Rayo, DO      docusate sodium  100 mg Oral BID Hetul Rayo, DO      ezetimibe  10 mg Oral Daily Hetul Rayo, DO      HYDROmorphone  0 2 mg Intravenous Q6H PRN Hetul Rayo, DO      lactated ringers  75 mL/hr Intravenous Continuous Muna Esquivel MD 75 mL/hr (08/03/21 0012)    metoprolol tartrate  50 mg Oral Q12H [address] Hetul Rayo, DO      ondansetron  4 mg Intravenous Q4H PRN Hetul Rayo, DO      oxyCODONE  2 5 mg Oral Q4H PRN Hetul Rayo, DO      oxyCODONE  5 mg Oral Q4H PRN Hetul Rayo, DO      senna  1 tablet Oral Daily Hetul Rayo, DO          Today, Patient Was Seen By: Octaviano Doyle PA-C    **Please Note: This note may have been constructed using a voice recognition system  **

## 2021-08-03 NOTE — ASSESSMENT & PLAN NOTE
Patient presenting with femur fracture  Tentative plans for OR tomorrow  Continue with supportive care and pain control  Gentle hydration  NPO after midnight  Orthopedic surgery aware of patient  Acceptable risk for OR  Benefit of surgery outweighs risk

## 2021-08-03 NOTE — CASE MANAGEMENT
Met with patient, discussed CM role, CM program  LOS 1d  Bundle no  Unplanned readmit risk color green  30 day readmit no  Lives in high rise apartment building alone, no steps to enter, elevator access  IADLS, ambulation, uses rollator or cane to ambulate  Apartment is handicapped accessible, has a commode  Retired, was a , still drives  Denies [facility] in past, has had OP PT unknown through agency, was in [facility] rehab in [facility], unaware of the name of facility  Denies MH illness, D or A abuse  PCP [address]  Primary contact rupert [facility] has LW and POA, [place] is rupert [place]  Transport and dispo TBD  Patient agreeable to blanket referral placed to SNF in [place] area in anticipation of STR stay post DC    CM reviewed d/c planning process including the following: identifying help at home, patient preference for d/c planning needs, Discharge Lounge, [facility] Meds to Bed program, availability of treatment team to discuss questions or concerns patient and/or family may have regarding understanding medications and recognizing signs and symptoms once discharged  CM also encouraged patient to follow up with all recommended appointments after discharge  Patient advised of importance for patient and family to participate in managing patients medical well being

## 2021-08-03 NOTE — ASSESSMENT & PLAN NOTE
Patient presenting with femur fracture  Tentative plans for OR tomorrow  Continue with supportive care and pain control  Gentle hydration  NPO after midnight  Orthopedic surgery consult appreciated  Acceptable risk for OR  Benefit of surgery outweighs risk

## 2021-08-03 NOTE — H&P
[address] 1945, 66 y o  female MRN: [id number]  Unit/Bed#: [facility] 411-52 Encounter: [id number]  Primary Care Provider: Sariah Zimmerman MD   Date and time admitted to hospital: No admission date for patient encounter  * Femur fracture [facility])  Assessment & Plan  Patient presenting with femur fracture  Tentative plans for OR tomorrow  Continue with supportive care and pain control  Gentle hydration  NPO after midnight  Orthopedic surgery aware of patient  Acceptable risk for OR  Benefit of surgery outweighs risk  Leukocytosis  Assessment & Plan  Likely stress reaction from fracture    Hyperlipidemia  Assessment & Plan  Zetia    Pacemaker  Assessment & Plan  Patient with history tachy-smita syndrome with pacer placement  On beta-blockade  Hypertension  Assessment & Plan  Continue with blood pressure meds  Metoprolol lisinopril and Norvasc    Atrial fibrillation (HCC)  Assessment & Plan  Metoprolol therapy  Monitor heart rate  Patient with history pacemaker placement  Hold Xarelto  NPO for surgery    VTE Prophylaxis: Hold pending surgery  / sequential compression device   Code Status:  Full code  POLST: There is no POLST form on file for this patient (pre-hospital)      Anticipated Length of Stay:  Patient will be admitted on an Inpatient basis with an anticipated length of stay of  greater than 2 midnights  Justification for Hospital Stay:  Needs further evaluation    Total Time for Visit, including Counseling / Coordination of Care: 45 minutes  Greater than 50% of this total time spent on direct patient counseling and coordination of care  Chief Complaint:   Fever fracture  Transfer for orthopedic surgery assessment      History of Present Illness:    Lorraine Yanez is a 66 y o  female who presents with past medical history of atrial fibrillation, hypertension, and hyperlipidemia who presents the hospital with a reported fall with a on to her knee with subsequent distal femur fracture  She presents to the ED with left-sided knee pain after the fall  The patient reportedly had a mechanical fall when she tripped over a rug while walking  She did not lose consciousness and denies hitting her head  She denies any other associated pain other than her left-sided lower extremity pain  After further evaluation she was found to have a distal left-sided femur fracture for which Dr You Becker with orthopedic surgeon had asked for transfer for possible surgical intervention in the morning  Review of Systems:    Review of Systems   Constitutional: Negative for fatigue and fever  Respiratory: Negative for shortness of breath, wheezing and stridor  Neurological: Negative for dizziness, speech difficulty and light-headedness  Hematological: Negative for adenopathy  All other systems reviewed and are negative  Past Medical and Surgical History:     Past Medical History:   Diagnosis Date    Abnormal ECG     Last Assessed 9/29/2016    Anxiety     Last Assessed 6/08/2016    Arthritis     knees    Asthma     Last Assessed 11/06/2013    Atrial premature complex     Cataract, bilateral     Last Assessed 7/14/2016    Cataract, left eye     Both eyes  Had surgery on left   Difficulty swallowing     Dizziness     Fibromyalgia     Gastric reflux     Hypertension     Lyme disease     Premature ventricular contraction     Primary osteoarthritis of both knees     Last Assessed 7/14/2016    Rheumatic fever     Sore throat     Tuberculosis        Past Surgical History:   Procedure Laterality Date    APPENDECTOMY      CARDIAC PACEMAKER PLACEMENT  2019    HYSTERECTOMY      IA DILATE ESOPHAGUS N/A 4/6/2016    Procedure: DILATATION ESOPHAGEAL;  Surgeon: Jaron Mon MD;  Location: [facility]; Service: Gastroenterology    IA EGD TRANSORAL BIOPSY SINGLE/MULTIPLE N/A 4/6/2016    Procedure: ESOPHAGOGASTRODUODENOSCOPY (EGD);   Surgeon: Flower Arnold MD;  [facility]: [facility]; Service: Gastroenterology    AR XCAPSL CTRC RMVL INSJ IO LENS PROSTH W/O ECP Left 3/15/2016    Procedure: EXTRACTION EXTRACAPSULAR CATARACT PHACO INTRAOCULAR LENS (IOL); Surgeon: Gaylon Rubinstein, MD;  Location: [facility];  Service: Ophthalmology    REPLACEMENT TOTAL KNEE Right     REPLACEMENT TOTAL KNEE      right     TONSILLECTOMY         Meds/Allergies:    Prior to Admission medications    Medication Sig Start Date End Date Taking? Authorizing Provider   albuterol (PROVENTIL HFA,VENTOLIN HFA) 90 mcg/act inhaler INHALE 2 PUFFS BY MOUTH EVERY 6 HOURS AS NEEDED FOR WHEEZING 12/23/20   Edilma Salguero MD   ALPRAZolam Nick Linger) 0 25 mg tablet TAKE 1 TABLET(0 25 MG) BY MOUTH THREE TIMES DAILY AS NEEDED FOR ANXIETY OR SLEEP 7/11/21   Edilma Salguero MD   amLODIPine (NORVASC) 10 mg tablet TAKE 1 TABLET BY MOUTH DAILY AS DIRECTED 3/21/21   Edilma Salguero MD   Ascorbic Acid (VITAMIN C) 1000 MG tablet Take 1 tablet by mouth daily 7/18/17   Historical Provider, MD   B Complex-C (B-COMPLEX WITH VITAMIN C) tablet Take 1 tablet by mouth daily      Historical Provider, MD   ezetimibe (ZETIA) 10 mg tablet TAKE 1 TABLET(10 MG) BY MOUTH DAILY 7/20/21   Edilma Salguero MD   lisinopril (ZESTRIL) 10 mg tablet TAKE 1/2 TABLET BY MOUTH DAILY 6/21/21   Edilma Salguero MD   metoprolol tartrate (LOPRESSOR) 50 mg tablet TAKE 1 TABLET(50 MG) BY MOUTH THREE TIMES DAILY 10/19/20   Edilma Salguero MD   Myrbetriq 25 MG TB24 TAKE 1 TABLET BY MOUTH DAILY 7/12/21   JERI Rogers   oxybutynin (DITROPAN XL) 15 MG 24 hr tablet TAKE 1 TABLET(15 MG) BY MOUTH DAILY  Patient not taking: Reported on 5/18/2021 10/19/20   Rogene Market JERI Live   traMADol (ULTRAM) 50 mg tablet TAKE 1 TABLET(50 MG) BY MOUTH EVERY 6 HOURS AS NEEDED FOR MODERATE PAIN 6/23/21   Edilma Salguero MD   Xarelto 15 MG tablet Take 1 tablet (15 mg total) by mouth daily 7/22/21   Ulises Domínguez Dee Tucker MD     I have reviewed home medications with patient personally  Allergies: Allergies   Allergen Reactions    J75 Folate [Folic Acid-Vit N4-GDQ U50 - Food Allergy]     Cobalt      Unknown    Lyrica [Pregabalin]     Nickel      Skin discoloration    Other      Black rubber    Penicillins Hives    Sulfa Antibiotics Itching    Cortisone Acetate [Cortisone] Itching and Rash    Penicillin G Rash       Social History:     Marital Status:    Occupation:  Retired  Patient Pre-hospital Living Situation:  Home  Patient Pre-hospital Level of Mobility:  Ambulatory  Patient Pre-hospital Diet Restrictions:  Denies  Substance Use History:   Social History     Substance and Sexual Activity   Alcohol Use No    Comment: Per [employer] Social     Social History     Tobacco Use   Smoking Status Never Smoker   Smokeless Tobacco Never Used     Social History     Substance and Sexual Activity   Drug Use No       Family History:    Family History   Problem Relation Age of Onset    Coronary artery disease Mother     Heart attack Mother         Prior    Heart disease Father     Heart attack Father         Prior       Physical Exam:     Vitals:        Physical Exam  HENT:      Head: Normocephalic  Cardiovascular:      Rate and Rhythm: Normal rate and regular rhythm  Pulmonary:      Effort: No respiratory distress  Breath sounds: No wheezing  Abdominal:      General: Abdomen is flat  Palpations: Abdomen is soft  Musculoskeletal:         General: No swelling or tenderness  Skin:     General: Skin is warm and dry  Neurological:      Mental Status: She is alert and oriented to person, place, and time  Psychiatric:         Mood and Affect: Mood normal          Behavior: Behavior normal              Additional Data:     Lab Results: I have personally reviewed pertinent reports        Results from last 7 days   Lab Units 08/02/21  1547   WBC Thousand/uL 13 12*   HEMOGLOBIN g/dL 12 9   HEMATOCRIT % 39 8   PLATELETS Thousands/uL 322   NEUTROS PCT % 73   LYMPHS PCT % 15   MONOS PCT % 8   EOS PCT % 3     Results from last 7 days   Lab Units 08/02/21  1547   SODIUM mmol/L 135   POTASSIUM mmol/L 4 6   CHLORIDE mmol/L 100   CO2 mmol/L 28   BUN mg/dL 17   CREATININE mg/dL 0 90   ANION GAP mmol/L 7   CALCIUM mg/dL 9 3   ALBUMIN g/dL 4 0   TOTAL BILIRUBIN mg/dL 0 41   ALK PHOS U/L 71 2   ALT U/L 9   AST U/L 15   GLUCOSE RANDOM mg/dL 149*     Results from last 7 days   Lab Units 08/02/21  1547   INR  1 07                   Imaging: I have personally reviewed pertinent reports  No orders to display         ** Please Note: This note has been constructed using a voice recognition system   **

## 2021-08-04 PROBLEM — N17.9 AKI (ACUTE KIDNEY INJURY) (HCC): Status: ACTIVE | Noted: 2021-08-04

## 2021-08-04 PROBLEM — D62 ACUTE BLOOD LOSS ANEMIA: Status: ACTIVE | Noted: 2021-08-04

## 2021-08-04 LAB
ABO GROUP BLD: NORMAL
ANION GAP SERPL CALCULATED.3IONS-SCNC: 5 MMOL/L (ref 4–13)
APTT PPP: 39 SECONDS (ref 23–37)
BLD GP AB SCN SERPL QL: NEGATIVE
BUN SERPL-MCNC: 30 MG/DL (ref 5–25)
CALCIUM SERPL-MCNC: 8.4 MG/DL (ref 8.3–10.1)
CHLORIDE SERPL-SCNC: 103 MMOL/L (ref 100–108)
CO2 SERPL-SCNC: 27 MMOL/L (ref 21–32)
CREAT SERPL-MCNC: 1.31 MG/DL (ref 0.6–1.3)
ERYTHROCYTE [DISTWIDTH] IN BLOOD BY AUTOMATED COUNT: 12.8 % (ref 11.6–15.1)
GFR SERPL CREATININE-BSD FRML MDRD: 39 ML/MIN/1.73SQ M
GLUCOSE SERPL-MCNC: 119 MG/DL (ref 65–140)
HCT VFR BLD AUTO: 29.7 % (ref 34.8–46.1)
HGB BLD-MCNC: 9.5 G/DL (ref 11.5–15.4)
INR PPP: 1.34 (ref 0.84–1.19)
MCH RBC QN AUTO: 29.9 PG (ref 26.8–34.3)
MCHC RBC AUTO-ENTMCNC: 32 G/DL (ref 31.4–37.4)
MCV RBC AUTO: 93 FL (ref 82–98)
PLATELET # BLD AUTO: 224 THOUSANDS/UL (ref 149–390)
PMV BLD AUTO: 9.5 FL (ref 8.9–12.7)
POTASSIUM SERPL-SCNC: 4.5 MMOL/L (ref 3.5–5.3)
PROTHROMBIN TIME: 16.6 SECONDS (ref 11.6–14.5)
RBC # BLD AUTO: 3.18 MILLION/UL (ref 3.81–5.12)
RH BLD: POSITIVE
SODIUM SERPL-SCNC: 135 MMOL/L (ref 136–145)
SPECIMEN EXPIRATION DATE: NORMAL
WBC # BLD AUTO: 12.58 THOUSAND/UL (ref 4.31–10.16)

## 2021-08-04 PROCEDURE — NC001 PR NO CHARGE: Performed by: ORTHOPAEDIC SURGERY

## 2021-08-04 PROCEDURE — 86923 COMPATIBILITY TEST ELECTRIC: CPT

## 2021-08-04 PROCEDURE — 86901 BLOOD TYPING SEROLOGIC RH(D): CPT | Performed by: PHYSICIAN ASSISTANT

## 2021-08-04 PROCEDURE — 99232 SBSQ HOSP IP/OBS MODERATE 35: CPT | Performed by: PHYSICIAN ASSISTANT

## 2021-08-04 PROCEDURE — 85027 COMPLETE CBC AUTOMATED: CPT | Performed by: PHYSICIAN ASSISTANT

## 2021-08-04 PROCEDURE — 86900 BLOOD TYPING SEROLOGIC ABO: CPT | Performed by: PHYSICIAN ASSISTANT

## 2021-08-04 PROCEDURE — 85610 PROTHROMBIN TIME: CPT | Performed by: PHYSICIAN ASSISTANT

## 2021-08-04 PROCEDURE — 80048 BASIC METABOLIC PNL TOTAL CA: CPT | Performed by: PHYSICIAN ASSISTANT

## 2021-08-04 PROCEDURE — 86850 RBC ANTIBODY SCREEN: CPT | Performed by: PHYSICIAN ASSISTANT

## 2021-08-04 PROCEDURE — 85730 THROMBOPLASTIN TIME PARTIAL: CPT | Performed by: PHYSICIAN ASSISTANT

## 2021-08-04 RX ORDER — SODIUM CHLORIDE, SODIUM LACTATE, POTASSIUM CHLORIDE, CALCIUM CHLORIDE 600; 310; 30; 20 MG/100ML; MG/100ML; MG/100ML; MG/100ML
100 INJECTION, SOLUTION INTRAVENOUS CONTINUOUS
Status: DISCONTINUED | OUTPATIENT
Start: 2021-08-05 | End: 2021-08-06

## 2021-08-04 RX ADMIN — ACETAMINOPHEN 975 MG: 325 TABLET, FILM COATED ORAL at 21:06

## 2021-08-04 RX ADMIN — OXYCODONE HYDROCHLORIDE 5 MG: 5 TABLET ORAL at 08:45

## 2021-08-04 RX ADMIN — SENNOSIDES 8.6 MG: 8.6 TABLET ORAL at 08:45

## 2021-08-04 RX ADMIN — OXYCODONE HYDROCHLORIDE 5 MG: 5 TABLET ORAL at 04:57

## 2021-08-04 RX ADMIN — OXYCODONE HYDROCHLORIDE 5 MG: 5 TABLET ORAL at 21:40

## 2021-08-04 RX ADMIN — METOPROLOL TARTRATE 50 MG: 50 TABLET, FILM COATED ORAL at 20:57

## 2021-08-04 RX ADMIN — ACETAMINOPHEN 975 MG: 325 TABLET, FILM COATED ORAL at 04:57

## 2021-08-04 RX ADMIN — DOCUSATE SODIUM 100 MG: 100 CAPSULE ORAL at 08:45

## 2021-08-04 RX ADMIN — OXYCODONE HYDROCHLORIDE 5 MG: 5 TABLET ORAL at 16:18

## 2021-08-04 RX ADMIN — EZETIMIBE 10 MG: 10 TABLET ORAL at 08:45

## 2021-08-04 RX ADMIN — ACETAMINOPHEN 975 MG: 325 TABLET, FILM COATED ORAL at 13:00

## 2021-08-04 RX ADMIN — ENOXAPARIN SODIUM 60 MG: 60 INJECTION SUBCUTANEOUS at 13:20

## 2021-08-04 RX ADMIN — ENOXAPARIN SODIUM 60 MG: 60 INJECTION SUBCUTANEOUS at 21:06

## 2021-08-04 RX ADMIN — METOPROLOL TARTRATE 50 MG: 50 TABLET, FILM COATED ORAL at 08:45

## 2021-08-04 NOTE — ASSESSMENT & PLAN NOTE
· Secondary to volume depletion, anemia, ACEI, femur fracture  · Increase IVF to 100ml/hr  · ACEI on hold  · Hold parameters in place for BP meds  · Avoid nephrotoxic meds  · monitor

## 2021-08-04 NOTE — PROGRESS NOTES
[address] [facility]  Progress Note - Goldy Shell [birth year], 66 y o  female MRN: [id number]  Unit/Bed#: [facility] 774-43 Encounter: [id number]  Primary Care Provider: Cindy Nova MD   Date and time admitted to hospital: 8/2/2021 10:51 PM    * Femur fracture [facility])  Assessment & Plan  Patient presenting with femur fracture  Tentative plans for OR 8/5  Continue with supportive care and pain control  Gentle hydration  NPO after midnight  Orthopedic surgery consult appreciated  Acceptable risk for OR  Benefit of surgery outweighs risk  MICHAEL (acute kidney injury) ([facility] [address] )  Assessment & Plan  · Secondary to volume depletion, anemia, ACEI, femur fracture  · Increase IVF to 100ml/hr  · ACEI on hold  · Hold parameters in place for BP meds  · Avoid nephrotoxic meds  · monitor    Acute blood loss anemia  Assessment & Plan  · Secondary to femur fracture  · Monitor    Atrial fibrillation (HCC)  Assessment & Plan  Metoprolol therapy  Monitor heart rate  Patient with history pacemaker placement  Hold Xarelto for surgery      Hypertension  Assessment & Plan  Continue with blood pressure meds  Metoprolol and Norvasc  Hold lisinopril perioperatively    Pacemaker  Assessment & Plan  Patient with history tachy-smita syndrome with pacer placement  On beta-blockade  Hyperlipidemia  Assessment & Plan  Zetia    Leukocytosis  Assessment & Plan  Likely stress reaction from fracture          VTE Pharmacologic Prophylaxis:   High Risk (Score >/= 5) - Pharmacological DVT Prophylaxis Ordered: enoxaparin (Lovenox)  Sequential Compression Devices Ordered  Patient Centered Rounds: I performed bedside rounds with nursing staff today  Discussions with Specialists or Other Care Team Provider: case management    Education and Discussions with Family / Patient: Attempted to update  (daughter) via phone  Left voicemail  Time Spent for Care: 30 minutes   More than 50% of total time spent on counseling and coordination of care as described above  Current Length of Stay: 2 day(s)  Current Patient Status: Inpatient   Certification Statement: The patient will continue to require additional inpatient hospital stay due to pending surgery  Discharge Plan: Anticipate discharge in >72 hrs to rehab facility  Code Status: Level 1 - Full Code    Subjective:   Pain controlled  Offers no other complaints  Objective:     Vitals:   Temp (24hrs), Av 6 °F (37 °C), Min:98 2 °F (36 8 °C), Max:99 1 °F (37 3 °C)    Temp:  [98 2 °F (36 8 °C)-99 1 °F (37 3 °C)] 98 6 °F (37 °C)  HR:  [78-93] 93  Resp:  [16-18] 18  BP: (116-128)/(57-64) 116/57  SpO2:  [92 %-94 %] 94 %  There is no height or weight on file to calculate BMI  Input and Output Summary (last 24 hours): Intake/Output Summary (Last 24 hours) at 2021 1137  Last data filed at 2021 0650  Gross per 24 hour   Intake 826 25 ml   Output 550 ml   Net 276 25 ml       Physical Exam:   Physical Exam  Constitutional:       Appearance: Normal appearance  Cardiovascular:      Rate and Rhythm: Normal rate and regular rhythm  Heart sounds: No murmur heard  Pulmonary:      Effort: Pulmonary effort is normal       Breath sounds: Normal breath sounds  Abdominal:      General: Bowel sounds are normal  There is no distension  Palpations: Abdomen is soft  Tenderness: There is no abdominal tenderness  Skin:     General: Skin is warm and dry  Neurological:      General: No focal deficit present  Mental Status: She is alert and oriented to person, place, and time     Psychiatric:         Mood and Affect: Mood normal           Additional Data:     Labs:  Results from last 7 days   Lab Units 21  0444 21  0442   WBC Thousand/uL 12 58* 14 74*   HEMOGLOBIN g/dL 9 5* 11 1*   HEMATOCRIT % 29 7* 34 6*   PLATELETS Thousands/uL 224 276   NEUTROS PCT %  --  83*   LYMPHS PCT %  --  8*   MONOS PCT %  --  8   EOS PCT %  --  0 Results from last 7 days   Lab Units 08/04/21  0444 08/03/21  0442   SODIUM mmol/L 135* 135*   POTASSIUM mmol/L 4 5 5 2   CHLORIDE mmol/L 103 104   CO2 mmol/L 27 25   BUN mg/dL 30* 23   CREATININE mg/dL 1 31* 1 20   ANION GAP mmol/L 5 6   CALCIUM mg/dL 8 4 8 9   ALBUMIN g/dL  --  3 1*   TOTAL BILIRUBIN mg/dL  --  0 43   ALK PHOS U/L  --  73   ALT U/L  --  14   AST U/L  --  14   GLUCOSE RANDOM mg/dL 119 143*     Results from last 7 days   Lab Units 08/04/21  0926   INR  1 34*         Results from last 7 days   Lab Units 08/03/21  0442   HEMOGLOBIN A1C % 5 6           Lines/Drains:  Invasive Devices     Peripheral Intravenous Line            Peripheral IV 08/02/21 Right Antecubital 1 day          Drain            External Urinary Catheter 1 day                      Imaging: No pertinent imaging reviewed      Recent Cultures (last 7 days):         Last 24 Hours Medication List:   Current Facility-Administered Medications   Medication Dose Route Frequency Provider Last Rate    acetaminophen  975 mg Oral Q8H [address] Hetul Rayo, DO      albuterol  2 puff Inhalation Q6H PRN Hetul Rayo, DO      ALPRAZolam  0 25 mg Oral BID PRN Hetul Rayo, DO      amLODIPine  10 mg Oral Daily Octaviano Doyle PA-C      diphenhydrAMINE  12 5 mg Oral Q6H PRN Myrtice ShapePATIENCE      diphenhydrAMINE-zinc acetate   Topical TID PRN Myrtice ShapePATIENCE      docusate sodium  100 mg Oral BID Hetul Rayo, DO      enoxaparin  40 mg Subcutaneous Q24H [address] Ema Hewitt PA-C      ezetimibe  10 mg Oral Daily Hetul Rayo, DO      HYDROmorphone  0 2 mg Intravenous Q6H PRN Hetul Rayo, DO      [START ON 8/5/2021] lactated ringers  100 mL/hr Intravenous Continuous Octaviano Doyle PA-C      metoprolol tartrate  50 mg Oral Q12H [address] Hetul Rayo, DO      ondansetron  4 mg Intravenous Q4H PRN Hetul Rayo, DO      oxyCODONE  2 5 mg Oral Q4H PRN Hetul Rayo, DO      oxyCODONE  5 mg Oral Q4H PRN Hetul Rayo, DO      senna  1 tablet Oral Daily Johnnie Mejia,           Today, Patient Was Seen By: Nathan Park PA-C    **Please Note: This note may have been constructed using a voice recognition system  **

## 2021-08-04 NOTE — ASSESSMENT & PLAN NOTE
Metoprolol therapy  Monitor heart rate  Patient with history pacemaker placement    Hold Xarelto for surgery

## 2021-08-04 NOTE — ASSESSMENT & PLAN NOTE
Patient presenting with femur fracture  Tentative plans for OR 8/5  Continue with supportive care and pain control  Gentle hydration  NPO after midnight  Orthopedic surgery consult appreciated  Acceptable risk for OR  Benefit of surgery outweighs risk

## 2021-08-04 NOTE — OCCUPATIONAL THERAPY NOTE
OCCUPATIONAL THERAPY SCREEN    PT PLANNED FOR ORIF OF L DISTAL FEMUR FX TOMORROW  PLEASE RECONSULT POST-OP FOR APPROPRIATE OT EVAL  THANK YOU        Juany Farley, MOT, OTR/L

## 2021-08-04 NOTE — PHYSICAL THERAPY NOTE
Physical Therapy Cancellation Note        PT orders received and chart reviewed  Pt pending OR tomorrow with ortho for ORIF L distal femur fx  Will defer PT eval at this time and continue to follow and evaluate as appropriate post op      Luna Watts, PT, DPT

## 2021-08-04 NOTE — QUICK NOTE
Orthopedics PreOp Note  Hope Kitchen 66 y o  female MRN: [id number]  Unit/Bed#: [facility] 608-01      Dx:  left distal femur fracture  Procedure: operative fixation    Hgb: 9 5  Plt: 224  Wbc: 12 6    Na: 135  K: 4 5  Cl: 103  Co2: 27  BUN: 30  Cr: 1 3  Gluc: 119    PTT:16 6  INR:1 3  PT:39    Other Labs: n/a    CXR:No acute pulmonary disease  EKG: Sinus rhythm, Supraventricular bigeminy, Short PA interval, Left bundle branch block    Abx:  Ancef on hold  Type and Screen/Cross: 2u  NPO:mn  Consent: yes  POA Name/ Phone:  Elda Lima [phone]  Clearance: Acceptable risk for OR  Benefit of surgery outweighs risk    Anticoagulation: lovenox to end tonight

## 2021-08-04 NOTE — PLAN OF CARE
Problem: MOBILITY - ADULT  Goal: Maintain or return to baseline ADL function  Description: INTERVENTIONS:  -  Assess patient's ability to carry out ADLs; assess patient's baseline for ADL function and identify physical deficits which impact ability to perform ADLs (bathing, care of mouth/teeth, toileting, grooming, dressing, etc )  - Assess/evaluate cause of self-care deficits   - Assess range of motion  - Assess patient's mobility; develop plan if impaired  - Assess patient's need for assistive devices and provide as appropriate  - Encourage maximum independence but intervene and supervise when necessary  - Involve family in performance of ADLs  - Assess for home care needs following discharge   - Consider OT consult to assist with ADL evaluation and planning for discharge  - Provide patient education as appropriate  Outcome: Progressing  Goal: Maintains/Returns to pre admission functional level  Description: INTERVENTIONS:  - Perform BMAT or MOVE assessment daily    - Set and communicate daily mobility goal to care team and patient/family/caregiver  - Collaborate with rehabilitation services on mobility goals if consulted  - Perform Range of Motion 3 times a day  - Reposition patient every 2 hours    - Ambulate patient 3 times a day  - Out of bed to chair 3 times a day   - Out of bed for meals 3 times a day  - Out of bed for toileting  - Record patient progress and toleration of activity level   Outcome: Progressing     Problem: PAIN - ADULT  Goal: Verbalizes/displays adequate comfort level or baseline comfort level  Description: Interventions:  - Encourage patient to monitor pain and request assistance  - Assess pain using appropriate pain scale  - Administer analgesics based on type and severity of pain and evaluate response  - Implement non-pharmacological measures as appropriate and evaluate response  - Consider cultural and social influences on pain and pain management  - Notify physician/advanced practitioner if interventions unsuccessful or patient reports new pain  Outcome: Progressing     Problem: INFECTION - ADULT  Goal: Absence or prevention of progression during hospitalization  Description: INTERVENTIONS:  - Assess and monitor for signs and symptoms of infection  - Monitor lab/diagnostic results  - Monitor all insertion sites, i e  indwelling lines, tubes, and drains  - [place] appropriate cooling/warming therapies per order  - Administer medications as ordered  - Instruct and encourage patient and family to use good hand hygiene technique  - Identify and instruct in appropriate isolation precautions for identified infection/condition  Outcome: Progressing     Problem: SAFETY ADULT  Goal: Maintain or return to baseline ADL function  Description: INTERVENTIONS:  -  Assess patient's ability to carry out ADLs; assess patient's baseline for ADL function and identify physical deficits which impact ability to perform ADLs (bathing, care of mouth/teeth, toileting, grooming, dressing, etc )  - Assess/evaluate cause of self-care deficits   - Assess range of motion  - Assess patient's mobility; develop plan if impaired  - Assess patient's need for assistive devices and provide as appropriate  - Encourage maximum independence but intervene and supervise when necessary  - Involve family in performance of ADLs  - Assess for home care needs following discharge   - Consider OT consult to assist with ADL evaluation and planning for discharge  - Provide patient education as appropriate  Outcome: Progressing  Goal: Maintains/Returns to pre admission functional level  Description: INTERVENTIONS:  - Perform BMAT or MOVE assessment daily    - Set and communicate daily mobility goal to care team and patient/family/caregiver  - Collaborate with rehabilitation services on mobility goals if consulted  - Perform Range of Motion 3 times a day  - Reposition patient every 2 hours    - Ambulate patient 3 times a day  - Out of bed to chair 3 times a day   - Out of bed for meals 3 times a day  - Out of bed for toileting  - Record patient progress and toleration of activity level   Outcome: Progressing  Goal: Patient will remain free of falls  Description: INTERVENTIONS:  - Educate patient/family on patient safety including physical limitations  - Instruct patient to call for assistance with activity   - Consult OT/PT to assist with strengthening/mobility   - Keep Call bell within reach  - Keep bed low and locked with side rails adjusted as appropriate  - Keep care items and personal belongings within reach  - Initiate and maintain comfort rounds  - Make Fall Risk Sign visible to staff  - Offer Toileting every 2 Hours, in advance of need  - Initiate/Maintain alarms  - Apply yellow socks and bracelet for high fall risk patients  - Consider moving patient to room near nurses station  Outcome: Progressing     Problem: DISCHARGE PLANNING  Goal: Discharge to home or other facility with appropriate resources  Description: INTERVENTIONS:  - Identify barriers to discharge w/patient and caregiver  - Arrange for needed discharge resources and transportation as appropriate  - Identify discharge learning needs (meds, wound care, etc )  - Arrange for interpretive services to assist at discharge as needed  - Refer to Case Management Department for coordinating discharge planning if the patient needs post-hospital services based on physician/advanced practitioner order or complex needs related to functional status, cognitive ability, or social support system  Outcome: Progressing     Problem: Knowledge Deficit  Goal: Patient/family/caregiver demonstrates understanding of disease process, treatment plan, medications, and discharge instructions  Description: Complete learning assessment and assess knowledge base    Interventions:  - Provide teaching at level of understanding  - Provide teaching via preferred learning methods  Outcome: Progressing Problem: MUSCULOSKELETAL - ADULT  Goal: Maintain or return mobility to safest level of function  Description: INTERVENTIONS:  - Assess patient's ability to carry out ADLs; assess patient's baseline for ADL function and identify physical deficits which impact ability to perform ADLs (bathing, care of mouth/teeth, toileting, grooming, dressing, etc )  - Assess/evaluate cause of self-care deficits   - Assess range of motion  - Assess patient's mobility  - Assess patient's need for assistive devices and provide as appropriate  - Encourage maximum independence but intervene and supervise when necessary  - Involve family in performance of ADLs  - Assess for home care needs following discharge   - Consider OT consult to assist with ADL evaluation and planning for discharge  - Provide patient education as appropriate  Outcome: Progressing  Goal: Maintain proper alignment of affected body part  Description: INTERVENTIONS:  - Support, maintain and protect limb and body alignment  - Provide patient/ family with appropriate education  Outcome: Progressing     Problem: Nutrition/Hydration-ADULT  Goal: Nutrient/Hydration intake appropriate for improving, restoring or maintaining nutritional needs  Description: Monitor and assess patient's nutrition/hydration status for malnutrition  Collaborate with interdisciplinary team and initiate plan and interventions as ordered  Monitor patient's weight and dietary intake as ordered or per policy  Utilize nutrition screening tool and intervene as necessary  Determine patient's food preferences and provide high-protein, high-caloric foods as appropriate       INTERVENTIONS:  - Monitor oral intake, urinary output, labs, and treatment plans  - Assess nutrition and hydration status and recommend course of action  - Evaluate amount of meals eaten  - Assist patient with eating if necessary   - Allow adequate time for meals  - Recommend/ encourage appropriate diets, oral nutritional supplements, and vitamin/mineral supplements  - Order, calculate, and assess calorie counts as needed  - Assess need for intravenous fluids  - Provide specific nutrition/hydration education as appropriate  - Include patient/family/caregiver in decisions related to nutrition  Outcome: Progressing     Problem: Prexisting or High Potential for Compromised Skin Integrity  Goal: Skin integrity is maintained or improved  Description: INTERVENTIONS:  - Identify patients at risk for skin breakdown  - Assess and monitor skin integrity  - Assess and monitor nutrition and hydration status  - Monitor labs   - Assess for incontinence   - Turn and reposition patient  - Assist with mobility/ambulation  - Relieve pressure over bony prominences  - Avoid friction and shearing  - Provide appropriate hygiene as needed including keeping skin clean and dry  - Evaluate need for skin moisturizer/barrier cream  - Collaborate with interdisciplinary team   - Patient/family teaching  - Consider wound care consult   Outcome: Progressing     Problem: Potential for Falls  Goal: Patient will remain free of falls  Description: INTERVENTIONS:  - Educate patient/family on patient safety including physical limitations  - Instruct patient to call for assistance with activity   - Consult OT/PT to assist with strengthening/mobility   - Keep Call bell within reach  - Keep bed low and locked with side rails adjusted as appropriate  - Keep care items and personal belongings within reach  - Initiate and maintain comfort rounds  - Make Fall Risk Sign visible to staff  - Offer Toileting every 2 Hours, in advance of need  - Initiate/Maintain alarms  - Apply yellow socks and bracelet for high fall risk patients  - Consider moving patient to room near nurses station  Outcome: Progressing

## 2021-08-04 NOTE — PROGRESS NOTES
Progress Note - 2003 [facility]  66 y o  female MRN: [id number]  Unit/Bed#: [facility] 608-01      Subjective:    66 y  o female No acute events, no complaints  Pt doing well  Still with left knee pain with attempted movement of lower leg    Denies fevers chills, CP, SOB    Labs:  0   Lab Value Date/Time    HCT 29 7 (L) 08/04/2021 0444    HCT 34 6 (L) 08/03/2021 0442    HCT 39 8 08/02/2021 1547    HGB 9 5 (L) 08/04/2021 0444    HGB 11 1 (L) 08/03/2021 0442    HGB 12 9 08/02/2021 1547    INR 1 07 08/02/2021 1547    WBC 12 58 (H) 08/04/2021 0444    WBC 14 74 (H) 08/03/2021 0442    WBC 13 12 (H) 08/02/2021 1547       Meds:    Current Facility-Administered Medications:     acetaminophen (TYLENOL) tablet 975 mg, 975 mg, Oral, Q8H [address], Hetul Rayo, DO, 975 mg at 08/04/21 0457    albuterol (PROVENTIL HFA,VENTOLIN HFA) inhaler 2 puff, 2 puff, Inhalation, Q6H PRN, Hetul Rayo, DO    ALPRAZolam (XANAX) tablet 0 25 mg, 0 25 mg, Oral, BID PRN, Hetul Rayo, DO    amLODIPine (NORVASC) tablet 10 mg, 10 mg, Oral, Daily, Ema Hewitt PA-C    diphenhydrAMINE (BENADRYL) tablet 12 5 mg, 12 5 mg, Oral, Q6H PRN, Nafisa Aldana PA-C    diphenhydrAMINE-zinc acetate (BENADRYL) 2-0 1 % cream, , Topical, TID PRN, Nafisa Aldana PA-C    docusate sodium (COLACE) capsule 100 mg, 100 mg, Oral, BID, Hetul Rayo, DO, 100 mg at 08/03/21 1814    enoxaparin (LOVENOX) subcutaneous injection 40 mg, 40 mg, Subcutaneous, Q24H [address], Ema Hewitt PA-C, 40 mg at 08/03/21 1814    ezetimibe (ZETIA) tablet 10 mg, 10 mg, Oral, Daily, Juan Daniel Rayo DO, 10 mg at 08/03/21 1022    HYDROmorphone HCl (DILAUDID) injection 0 2 mg, 0 2 mg, Intravenous, Q6H PRN, Juan Daniel Rayo DO    [START ON 8/5/2021] lactated ringers infusion, 75 mL/hr, Intravenous, Continuous, Ahsanny Vivian Jacobs MD    metoprolol tartrate (LOPRESSOR) tablet 50 mg, 50 mg, Oral, Q12H [address], Hettony Rayo DO, 50 mg at 08/03/21 9133    ondansetron (ZOFRAN) injection 4 mg, 4 mg, Intravenous, Q4H PRN, Hetul Rayo, DO, 4 mg at 08/02/21 0788    oxyCODONE (ROXICODONE) IR tablet 2 5 mg, 2 5 mg, Oral, Q4H PRN, Hetul Rayo, DO    oxyCODONE (ROXICODONE) IR tablet 5 mg, 5 mg, Oral, Q4H PRN, Hetul Rayo, DO, 5 mg at 08/04/21 0457    senna (SENOKOT) tablet 8 6 mg, 1 tablet, Oral, Daily, Hetul Rayo, DO, 8 6 mg at 08/03/21 1022    Blood Culture:   No results found for: BLOODCX    Wound Culture:   No results found for: WOUNDCULT    Ins and Outs:  I/O last 24 hours: In: 1262 5 [P O :220; I V :1042 5]  Out: 550 [Urine:550]          Physical:  Vitals:    08/03/21 2224   BP: 119/59   Pulse: 90   Resp: 18   Temp: 98 2 °F (36 8 °C)   SpO2: 92%     Musculoskeletal: left Lower Extremity  · Skin warm dry intact  · Knee immobilizer in place  · TTP left knee  · SILT s/s/sp/dp/t  +fhl/ehl, +ankle dorsi/plantar flexion  2+ DP pulse    Assessment:    66 y  o female left distal femur fracture, plan for OR tomorrow for ORIF     Plan:  · NWB LLE in [place]  · Greater than 2 gram drop which qualifies for diagnosis of acute blood loss anemia, will monitor and administer IVF/prbc as indicated   · PT/OT  · Pain control  · DVT ppx - Lovenox, hold in AM tomorrow for OR  · Dispo: Ortho will follow    Bairon Amos MD

## 2021-08-05 ENCOUNTER — ANESTHESIA (INPATIENT)
Dept: PERIOP | Facility: HOSPITAL | Age: 76
DRG: 480 | End: 2021-08-05
Payer: MEDICARE

## 2021-08-05 ENCOUNTER — ANESTHESIA EVENT (INPATIENT)
Dept: PERIOP | Facility: HOSPITAL | Age: 76
DRG: 480 | End: 2021-08-05
Payer: MEDICARE

## 2021-08-05 ENCOUNTER — APPOINTMENT (INPATIENT)
Dept: RADIOLOGY | Facility: HOSPITAL | Age: 76
DRG: 480 | End: 2021-08-05
Payer: MEDICARE

## 2021-08-05 LAB
ANION GAP SERPL CALCULATED.3IONS-SCNC: 7 MMOL/L (ref 4–13)
BASE EXCESS BLDA CALC-SCNC: -3 MMOL/L (ref -2–3)
BASOPHILS # BLD AUTO: 0.06 THOUSANDS/ΜL (ref 0–0.1)
BASOPHILS NFR BLD AUTO: 1 % (ref 0–1)
BUN SERPL-MCNC: 24 MG/DL (ref 5–25)
CA-I BLD-SCNC: 1.14 MMOL/L (ref 1.12–1.32)
CALCIUM SERPL-MCNC: 8.6 MG/DL (ref 8.3–10.1)
CHLORIDE SERPL-SCNC: 101 MMOL/L (ref 100–108)
CO2 SERPL-SCNC: 24 MMOL/L (ref 21–32)
CREAT SERPL-MCNC: 0.92 MG/DL (ref 0.6–1.3)
EOSINOPHIL # BLD AUTO: 0.04 THOUSAND/ΜL (ref 0–0.61)
EOSINOPHIL NFR BLD AUTO: 0 % (ref 0–6)
ERYTHROCYTE [DISTWIDTH] IN BLOOD BY AUTOMATED COUNT: 12.9 % (ref 11.6–15.1)
ERYTHROCYTE [DISTWIDTH] IN BLOOD BY AUTOMATED COUNT: 13.2 % (ref 11.6–15.1)
GFR SERPL CREATININE-BSD FRML MDRD: 60 ML/MIN/1.73SQ M
GLUCOSE SERPL-MCNC: 124 MG/DL (ref 65–140)
GLUCOSE SERPL-MCNC: 127 MG/DL (ref 65–140)
HCO3 BLDA-SCNC: 22.7 MMOL/L (ref 24–30)
HCT VFR BLD AUTO: 28.3 % (ref 34.8–46.1)
HCT VFR BLD AUTO: 32.2 % (ref 34.8–46.1)
HCT VFR BLD CALC: 21 % (ref 34.8–46.1)
HGB BLD-MCNC: 10.2 G/DL (ref 11.5–15.4)
HGB BLD-MCNC: 9 G/DL (ref 11.5–15.4)
HGB BLDA-MCNC: 7.1 G/DL (ref 11.5–15.4)
IMM GRANULOCYTES # BLD AUTO: 0.28 THOUSAND/UL (ref 0–0.2)
IMM GRANULOCYTES NFR BLD AUTO: 2 % (ref 0–2)
LYMPHOCYTES # BLD AUTO: 0.95 THOUSANDS/ΜL (ref 0.6–4.47)
LYMPHOCYTES NFR BLD AUTO: 7 % (ref 14–44)
MCH RBC QN AUTO: 29.5 PG (ref 26.8–34.3)
MCH RBC QN AUTO: 29.7 PG (ref 26.8–34.3)
MCHC RBC AUTO-ENTMCNC: 31.7 G/DL (ref 31.4–37.4)
MCHC RBC AUTO-ENTMCNC: 31.8 G/DL (ref 31.4–37.4)
MCV RBC AUTO: 93 FL (ref 82–98)
MCV RBC AUTO: 94 FL (ref 82–98)
MONOCYTES # BLD AUTO: 1.23 THOUSAND/ΜL (ref 0.17–1.22)
MONOCYTES NFR BLD AUTO: 9 % (ref 4–12)
NEUTROPHILS # BLD AUTO: 10.68 THOUSANDS/ΜL (ref 1.85–7.62)
NEUTS SEG NFR BLD AUTO: 81 % (ref 43–75)
NRBC BLD AUTO-RTO: 0 /100 WBCS
PCO2 BLD: 24 MMOL/L (ref 21–32)
PCO2 BLD: 40.6 MM HG (ref 42–50)
PH BLD: 7.36 [PH] (ref 7.3–7.4)
PLATELET # BLD AUTO: 169 THOUSANDS/UL (ref 149–390)
PLATELET # BLD AUTO: 223 THOUSANDS/UL (ref 149–390)
PMV BLD AUTO: 9.1 FL (ref 8.9–12.7)
PMV BLD AUTO: 9.5 FL (ref 8.9–12.7)
PO2 BLD: 38 MM HG (ref 35–45)
POTASSIUM BLD-SCNC: 3.9 MMOL/L (ref 3.5–5.3)
POTASSIUM SERPL-SCNC: 4.1 MMOL/L (ref 3.5–5.3)
RBC # BLD AUTO: 3.05 MILLION/UL (ref 3.81–5.12)
RBC # BLD AUTO: 3.43 MILLION/UL (ref 3.81–5.12)
SAO2 % BLD FROM PO2: 69 % (ref 60–85)
SODIUM BLD-SCNC: 135 MMOL/L (ref 136–145)
SODIUM SERPL-SCNC: 132 MMOL/L (ref 136–145)
SPECIMEN SOURCE: ABNORMAL
WBC # BLD AUTO: 13.24 THOUSAND/UL (ref 4.31–10.16)
WBC # BLD AUTO: 13.65 THOUSAND/UL (ref 4.31–10.16)

## 2021-08-05 PROCEDURE — 80048 BASIC METABOLIC PNL TOTAL CA: CPT | Performed by: PHYSICIAN ASSISTANT

## 2021-08-05 PROCEDURE — 73552 X-RAY EXAM OF FEMUR 2/>: CPT

## 2021-08-05 PROCEDURE — 99222 1ST HOSP IP/OBS MODERATE 55: CPT | Performed by: INTERNAL MEDICINE

## 2021-08-05 PROCEDURE — P9016 RBC LEUKOCYTES REDUCED: HCPCS

## 2021-08-05 PROCEDURE — 85027 COMPLETE CBC AUTOMATED: CPT | Performed by: PHYSICIAN ASSISTANT

## 2021-08-05 PROCEDURE — C1713 ANCHOR/SCREW BN/BN,TIS/BN: HCPCS | Performed by: ORTHOPAEDIC SURGERY

## 2021-08-05 PROCEDURE — 27513 TREATMENT OF THIGH FRACTURE: CPT | Performed by: ORTHOPAEDIC SURGERY

## 2021-08-05 PROCEDURE — C1769 GUIDE WIRE: HCPCS | Performed by: ORTHOPAEDIC SURGERY

## 2021-08-05 PROCEDURE — 82330 ASSAY OF CALCIUM: CPT

## 2021-08-05 PROCEDURE — 82947 ASSAY GLUCOSE BLOOD QUANT: CPT

## 2021-08-05 PROCEDURE — 85014 HEMATOCRIT: CPT

## 2021-08-05 PROCEDURE — 82803 BLOOD GASES ANY COMBINATION: CPT

## 2021-08-05 PROCEDURE — 30233N1 TRANSFUSION OF NONAUTOLOGOUS RED BLOOD CELLS INTO PERIPHERAL VEIN, PERCUTANEOUS APPROACH: ICD-10-PCS | Performed by: ANESTHESIOLOGY

## 2021-08-05 PROCEDURE — 84295 ASSAY OF SERUM SODIUM: CPT

## 2021-08-05 PROCEDURE — 93005 ELECTROCARDIOGRAM TRACING: CPT

## 2021-08-05 PROCEDURE — 84132 ASSAY OF SERUM POTASSIUM: CPT

## 2021-08-05 PROCEDURE — 99232 SBSQ HOSP IP/OBS MODERATE 35: CPT | Performed by: NURSE PRACTITIONER

## 2021-08-05 PROCEDURE — 0QSC06Z REPOSITION LEFT LOWER FEMUR WITH INTRAMEDULLARY INTERNAL FIXATION DEVICE, OPEN APPROACH: ICD-10-PCS | Performed by: ORTHOPAEDIC SURGERY

## 2021-08-05 PROCEDURE — 85025 COMPLETE CBC W/AUTO DIFF WBC: CPT | Performed by: PHYSICIAN ASSISTANT

## 2021-08-05 PROCEDURE — NC001 PR NO CHARGE: Performed by: ORTHOPAEDIC SURGERY

## 2021-08-05 DEVICE — LOCKING SCREW, FULLY THREADED: Type: IMPLANTABLE DEVICE | Site: FEMUR | Status: FUNCTIONAL

## 2021-08-05 DEVICE — SUPRACONDYLAR NAIL
Type: IMPLANTABLE DEVICE | Site: FEMUR | Status: FUNCTIONAL
Brand: T2

## 2021-08-05 DEVICE — END CAP, SCN
Type: IMPLANTABLE DEVICE | Site: FEMUR | Status: FUNCTIONAL
Brand: T2

## 2021-08-05 RX ORDER — MIDAZOLAM HYDROCHLORIDE 2 MG/2ML
INJECTION, SOLUTION INTRAMUSCULAR; INTRAVENOUS AS NEEDED
Status: DISCONTINUED | OUTPATIENT
Start: 2021-08-05 | End: 2021-08-05

## 2021-08-05 RX ORDER — ONDANSETRON 2 MG/ML
INJECTION INTRAMUSCULAR; INTRAVENOUS AS NEEDED
Status: DISCONTINUED | OUTPATIENT
Start: 2021-08-05 | End: 2021-08-05

## 2021-08-05 RX ORDER — DOCUSATE SODIUM 100 MG/1
100 CAPSULE, LIQUID FILLED ORAL 2 TIMES DAILY
Qty: 10 CAPSULE | Refills: 0 | Status: SHIPPED | OUTPATIENT
Start: 2021-08-05 | End: 2021-08-12 | Stop reason: HOSPADM

## 2021-08-05 RX ORDER — OXYCODONE HYDROCHLORIDE 5 MG/1
TABLET ORAL
Qty: 30 TABLET | Refills: 0 | Status: SHIPPED | OUTPATIENT
Start: 2021-08-05 | End: 2021-08-12 | Stop reason: HOSPADM

## 2021-08-05 RX ORDER — CEFAZOLIN SODIUM 2 G/50ML
2000 SOLUTION INTRAVENOUS
Status: COMPLETED | OUTPATIENT
Start: 2021-08-05 | End: 2021-08-05

## 2021-08-05 RX ORDER — DIPHENHYDRAMINE HYDROCHLORIDE 50 MG/ML
12.5 INJECTION INTRAMUSCULAR; INTRAVENOUS ONCE AS NEEDED
Status: DISCONTINUED | OUTPATIENT
Start: 2021-08-05 | End: 2021-08-05 | Stop reason: HOSPADM

## 2021-08-05 RX ORDER — ALBUMIN, HUMAN INJ 5% 5 %
SOLUTION INTRAVENOUS CONTINUOUS PRN
Status: DISCONTINUED | OUTPATIENT
Start: 2021-08-05 | End: 2021-08-05

## 2021-08-05 RX ORDER — ROCURONIUM BROMIDE 10 MG/ML
INJECTION, SOLUTION INTRAVENOUS AS NEEDED
Status: DISCONTINUED | OUTPATIENT
Start: 2021-08-05 | End: 2021-08-05

## 2021-08-05 RX ORDER — MAGNESIUM HYDROXIDE 1200 MG/15ML
LIQUID ORAL AS NEEDED
Status: DISCONTINUED | OUTPATIENT
Start: 2021-08-05 | End: 2021-08-05 | Stop reason: HOSPADM

## 2021-08-05 RX ORDER — KETAMINE HYDROCHLORIDE 50 MG/ML
INJECTION, SOLUTION, CONCENTRATE INTRAMUSCULAR; INTRAVENOUS AS NEEDED
Status: DISCONTINUED | OUTPATIENT
Start: 2021-08-05 | End: 2021-08-05

## 2021-08-05 RX ORDER — CEFAZOLIN SODIUM 2 G/50ML
2000 SOLUTION INTRAVENOUS EVERY 8 HOURS
Status: COMPLETED | OUTPATIENT
Start: 2021-08-05 | End: 2021-08-06

## 2021-08-05 RX ORDER — PROPOFOL 10 MG/ML
INJECTION, EMULSION INTRAVENOUS AS NEEDED
Status: DISCONTINUED | OUTPATIENT
Start: 2021-08-05 | End: 2021-08-05

## 2021-08-05 RX ORDER — SUCCINYLCHOLINE/SOD CL,ISO/PF 100 MG/5ML
SYRINGE (ML) INTRAVENOUS AS NEEDED
Status: DISCONTINUED | OUTPATIENT
Start: 2021-08-05 | End: 2021-08-05

## 2021-08-05 RX ORDER — LIDOCAINE HYDROCHLORIDE 10 MG/ML
INJECTION, SOLUTION EPIDURAL; INFILTRATION; INTRACAUDAL; PERINEURAL AS NEEDED
Status: DISCONTINUED | OUTPATIENT
Start: 2021-08-05 | End: 2021-08-05

## 2021-08-05 RX ORDER — METOPROLOL TARTRATE 5 MG/5ML
5 INJECTION INTRAVENOUS ONCE
Status: COMPLETED | OUTPATIENT
Start: 2021-08-05 | End: 2021-08-05

## 2021-08-05 RX ORDER — METOPROLOL TARTRATE 5 MG/5ML
2.5 INJECTION INTRAVENOUS EVERY 6 HOURS PRN
Status: DISCONTINUED | OUTPATIENT
Start: 2021-08-05 | End: 2021-08-12 | Stop reason: HOSPADM

## 2021-08-05 RX ORDER — FENTANYL CITRATE/PF 50 MCG/ML
50 SYRINGE (ML) INJECTION
Status: DISCONTINUED | OUTPATIENT
Start: 2021-08-05 | End: 2021-08-05 | Stop reason: HOSPADM

## 2021-08-05 RX ORDER — PROPOFOL 10 MG/ML
INJECTION, EMULSION INTRAVENOUS CONTINUOUS PRN
Status: DISCONTINUED | OUTPATIENT
Start: 2021-08-05 | End: 2021-08-05

## 2021-08-05 RX ORDER — METOCLOPRAMIDE HYDROCHLORIDE 5 MG/ML
10 INJECTION INTRAMUSCULAR; INTRAVENOUS ONCE AS NEEDED
Status: DISCONTINUED | OUTPATIENT
Start: 2021-08-05 | End: 2021-08-05 | Stop reason: HOSPADM

## 2021-08-05 RX ORDER — MEPERIDINE HYDROCHLORIDE 25 MG/ML
12.5 INJECTION INTRAMUSCULAR; INTRAVENOUS; SUBCUTANEOUS
Status: DISCONTINUED | OUTPATIENT
Start: 2021-08-05 | End: 2021-08-05 | Stop reason: HOSPADM

## 2021-08-05 RX ORDER — HYDROMORPHONE HCL IN WATER/PF 6 MG/30 ML
0.2 PATIENT CONTROLLED ANALGESIA SYRINGE INTRAVENOUS
Status: DISCONTINUED | OUTPATIENT
Start: 2021-08-05 | End: 2021-08-05 | Stop reason: HOSPADM

## 2021-08-05 RX ORDER — SENNOSIDES 8.6 MG
650 CAPSULE ORAL EVERY 8 HOURS PRN
Qty: 30 TABLET | Refills: 0 | Status: SHIPPED | OUTPATIENT
Start: 2021-08-05 | End: 2021-08-12 | Stop reason: HOSPADM

## 2021-08-05 RX ORDER — SODIUM CHLORIDE 9 MG/ML
INJECTION, SOLUTION INTRAVENOUS CONTINUOUS PRN
Status: DISCONTINUED | OUTPATIENT
Start: 2021-08-05 | End: 2021-08-05

## 2021-08-05 RX ORDER — HYDROMORPHONE HCL IN WATER/PF 6 MG/30 ML
0.2 PATIENT CONTROLLED ANALGESIA SYRINGE INTRAVENOUS EVERY 4 HOURS PRN
Status: DISCONTINUED | OUTPATIENT
Start: 2021-08-05 | End: 2021-08-09

## 2021-08-05 RX ORDER — GLYCOPYRROLATE 0.2 MG/ML
INJECTION INTRAMUSCULAR; INTRAVENOUS AS NEEDED
Status: DISCONTINUED | OUTPATIENT
Start: 2021-08-05 | End: 2021-08-05

## 2021-08-05 RX ORDER — FENTANYL CITRATE 50 UG/ML
INJECTION, SOLUTION INTRAMUSCULAR; INTRAVENOUS AS NEEDED
Status: DISCONTINUED | OUTPATIENT
Start: 2021-08-05 | End: 2021-08-05

## 2021-08-05 RX ORDER — NEOSTIGMINE METHYLSULFATE 1 MG/ML
INJECTION INTRAVENOUS AS NEEDED
Status: DISCONTINUED | OUTPATIENT
Start: 2021-08-05 | End: 2021-08-05

## 2021-08-05 RX ORDER — DEXAMETHASONE SODIUM PHOSPHATE 10 MG/ML
INJECTION, SOLUTION INTRAMUSCULAR; INTRAVENOUS AS NEEDED
Status: DISCONTINUED | OUTPATIENT
Start: 2021-08-05 | End: 2021-08-05

## 2021-08-05 RX ADMIN — ONDANSETRON 4 MG: 2 INJECTION INTRAMUSCULAR; INTRAVENOUS at 10:01

## 2021-08-05 RX ADMIN — GLYCOPYRROLATE 0.4 MG: 0.2 INJECTION, SOLUTION INTRAMUSCULAR; INTRAVENOUS at 10:00

## 2021-08-05 RX ADMIN — METOPROLOL TARTRATE 50 MG: 50 TABLET, FILM COATED ORAL at 20:15

## 2021-08-05 RX ADMIN — FENTANYL CITRATE 25 MCG: 50 INJECTION INTRAMUSCULAR; INTRAVENOUS at 08:15

## 2021-08-05 RX ADMIN — ROCURONIUM BROMIDE 20 MG: 50 INJECTION, SOLUTION INTRAVENOUS at 08:22

## 2021-08-05 RX ADMIN — SODIUM CHLORIDE, SODIUM LACTATE, POTASSIUM CHLORIDE, AND CALCIUM CHLORIDE 100 ML/HR: .6; .31; .03; .02 INJECTION, SOLUTION INTRAVENOUS at 00:08

## 2021-08-05 RX ADMIN — ROCURONIUM BROMIDE 50 MG: 50 INJECTION, SOLUTION INTRAVENOUS at 07:33

## 2021-08-05 RX ADMIN — OXYCODONE HYDROCHLORIDE 5 MG: 5 TABLET ORAL at 04:09

## 2021-08-05 RX ADMIN — NEOSTIGMINE METHYLSULFATE 1 MG: 1 INJECTION INTRAVENOUS at 09:57

## 2021-08-05 RX ADMIN — ACETAMINOPHEN 975 MG: 325 TABLET, FILM COATED ORAL at 22:27

## 2021-08-05 RX ADMIN — NEOSTIGMINE METHYLSULFATE 1 MG: 1 INJECTION INTRAVENOUS at 09:53

## 2021-08-05 RX ADMIN — CEFAZOLIN SODIUM 3000 MG: 2 SOLUTION INTRAVENOUS at 07:30

## 2021-08-05 RX ADMIN — ROCURONIUM BROMIDE 20 MG: 50 INJECTION, SOLUTION INTRAVENOUS at 08:44

## 2021-08-05 RX ADMIN — OXYCODONE HYDROCHLORIDE 2.5 MG: 5 TABLET ORAL at 22:27

## 2021-08-05 RX ADMIN — MIDAZOLAM 2 MG: 1 INJECTION INTRAMUSCULAR; INTRAVENOUS at 07:35

## 2021-08-05 RX ADMIN — PHENYLEPHRINE HYDROCHLORIDE 20 MCG/MIN: 10 INJECTION INTRAVENOUS at 07:45

## 2021-08-05 RX ADMIN — ONDANSETRON 4 MG: 2 INJECTION INTRAMUSCULAR; INTRAVENOUS at 08:24

## 2021-08-05 RX ADMIN — FENTANYL CITRATE 50 MCG: 50 INJECTION INTRAMUSCULAR; INTRAVENOUS at 09:18

## 2021-08-05 RX ADMIN — DEXAMETHASONE SODIUM PHOSPHATE 10 MG: 10 INJECTION, SOLUTION INTRAMUSCULAR; INTRAVENOUS at 08:24

## 2021-08-05 RX ADMIN — SODIUM CHLORIDE, SODIUM LACTATE, POTASSIUM CHLORIDE, AND CALCIUM CHLORIDE 100 ML/HR: .6; .31; .03; .02 INJECTION, SOLUTION INTRAVENOUS at 14:45

## 2021-08-05 RX ADMIN — PROPOFOL 100 MCG/KG/MIN: 10 INJECTION, EMULSION INTRAVENOUS at 07:35

## 2021-08-05 RX ADMIN — GLYCOPYRROLATE 0.2 MG: 0.2 INJECTION, SOLUTION INTRAMUSCULAR; INTRAVENOUS at 09:53

## 2021-08-05 RX ADMIN — CEFAZOLIN SODIUM 2000 MG: 2 SOLUTION INTRAVENOUS at 16:42

## 2021-08-05 RX ADMIN — HYDROMORPHONE HYDROCHLORIDE 0.2 MG: 0.2 INJECTION, SOLUTION INTRAMUSCULAR; INTRAVENOUS; SUBCUTANEOUS at 20:13

## 2021-08-05 RX ADMIN — ACETAMINOPHEN 975 MG: 325 TABLET, FILM COATED ORAL at 13:01

## 2021-08-05 RX ADMIN — FENTANYL CITRATE 25 MCG: 50 INJECTION INTRAMUSCULAR; INTRAVENOUS at 07:32

## 2021-08-05 RX ADMIN — ACETAMINOPHEN 975 MG: 325 TABLET, FILM COATED ORAL at 04:47

## 2021-08-05 RX ADMIN — Medication 100 MG: at 07:33

## 2021-08-05 RX ADMIN — METOROPROLOL TARTRATE 5 MG: 5 INJECTION, SOLUTION INTRAVENOUS at 20:33

## 2021-08-05 RX ADMIN — HYDROMORPHONE HYDROCHLORIDE 0.2 MG: 0.2 INJECTION, SOLUTION INTRAMUSCULAR; INTRAVENOUS; SUBCUTANEOUS at 14:44

## 2021-08-05 RX ADMIN — KETAMINE HYDROCHLORIDE 20 MG: 50 INJECTION, SOLUTION INTRAMUSCULAR; INTRAVENOUS at 08:24

## 2021-08-05 RX ADMIN — LIDOCAINE HYDROCHLORIDE 100 MG: 10 INJECTION, SOLUTION EPIDURAL; INFILTRATION; INTRACAUDAL; PERINEURAL at 07:33

## 2021-08-05 RX ADMIN — KETAMINE HYDROCHLORIDE 30 MG: 50 INJECTION, SOLUTION INTRAMUSCULAR; INTRAVENOUS at 09:52

## 2021-08-05 RX ADMIN — PROPOFOL 150 MG: 10 INJECTION, EMULSION INTRAVENOUS at 07:33

## 2021-08-05 RX ADMIN — ALBUMIN (HUMAN): 12.5 INJECTION, SOLUTION INTRAVENOUS at 08:30

## 2021-08-05 RX ADMIN — NEOSTIGMINE METHYLSULFATE 2 MG: 1 INJECTION INTRAVENOUS at 10:00

## 2021-08-05 RX ADMIN — SODIUM CHLORIDE: 0.9 INJECTION, SOLUTION INTRAVENOUS at 07:40

## 2021-08-05 RX ADMIN — OXYCODONE HYDROCHLORIDE 5 MG: 5 TABLET ORAL at 12:36

## 2021-08-05 RX ADMIN — HYDROMORPHONE HYDROCHLORIDE 0.2 MG: 0.2 INJECTION, SOLUTION INTRAMUSCULAR; INTRAVENOUS; SUBCUTANEOUS at 04:45

## 2021-08-05 RX ADMIN — DOCUSATE SODIUM 100 MG: 100 CAPSULE ORAL at 17:30

## 2021-08-05 RX ADMIN — OXYCODONE HYDROCHLORIDE 5 MG: 5 TABLET ORAL at 16:45

## 2021-08-05 RX ADMIN — SODIUM CHLORIDE: 0.9 INJECTION, SOLUTION INTRAVENOUS at 09:10

## 2021-08-05 RX ADMIN — GLYCOPYRROLATE 0.2 MG: 0.2 INJECTION, SOLUTION INTRAMUSCULAR; INTRAVENOUS at 09:57

## 2021-08-05 NOTE — CONSULTS
Consultation - [address] [place] [address] Blvd 66 y o  female MRN: [id number]  [facility] Encounter: [id number]      Assessment/Plan     Ambulatory dysfunction with fall   -reported mechanical 8/2/21  -(-) headache or loss of consciousness  -injuries as outlined below  -one additional fall in past year  -requires use of rollator walker at baseline   -remains high risk future falls due to having had fall, age, deconditioning, and polypharmacy  -continue good body mechanics and assist with transfers  -keep personal items and call bell close to prevent reaching  -encourage appropriate footwear and adequate lighting at all times when out of bed  -PT/OT consult pending, anticipate need for STR on d/c   -consider home fall risk assessment and personal fall alert system  -following recovery from acute injuries may benefit from long-term fall risk reduction strategies programs including Silver sneakers or Matter of Balance    Left distal femur fracture  -s/p fall as noted above  -s/p ORIF left distal femur with insertion retrograde nail earlier today  -continue acute pain control- recommend geriatric pain protocol   -continue tx underlying osteoporosis and vit D deficiency  -continue tx acute blood loss anemia  -will need chemical DVT ppx once cleared from surgical perspective, consider resumption of home Xarelto (afib) when appropriate   -PT and OT as above    Acute pain due to trauma  -recommend holding home Tramadol and covering with Geriatric pain protocol during hospitalization:  Tylenol 975mg Q8H scheduled  Roxicodone 2 5mg Q4H PRN moderate pain  Roxicodone 5mg Q4H PRN severe pain  Dilaudid 0 2mg Q4H PRN  -consider adjuncts such as Gabapentin 100mg HS and lidocaine patch topically  -encourage addition of non-pharmacologic pain treatment including ice and frequent repositioning  -recommend  bowel regimen to prevent and treat constipation due to increased risk with acute pain and opiate pain medications    Acute blood loss anemia  -evidenced by >2g drop in Hb from 12 9 to 9 0  -continue blood transfusion and fluid resuscitation as indicated    Osteoporosis   -evidenced by fragility fracture of left femur sustained in fall from standing height   -DXA previously ordered as o/p however no completed studies available for review, consider DXA 4-6 following recovery from acute injuries, consider discussion of antiresorptive therapy initiation at that time  -recommend checking Vit D level to ensure adequate stores and consider supplementation for any need unable to be met by diet alone    Vitamin D deficiency   -Vit D 26 8 (2019), recommend rechecking to ensure adequate stores  -recommendations as above    Atrial fibrillation   -home regimen includes rate control with metoprolol tartrate and anticoagulation with Xarelto (xarelto on hold periop)  -follows with Dr Tracy Miller (Cardiology)  -tachycardic to 120s at time of evaluation likely related to pain, anemia, and did not receive her morning or midday Metoprolol yet at time of eval  -recommend resumption of home Metoprolol dosing (50mg TID) with hold parameters     Anxiety   -maintained on Alprazolam 0 25 mg TID PRN chronically  -given symptoms well controlled on home regimen would continue home regimen, consider hold parameters for sedation while hospitalized to reduce risk of over-sedation with decompensation with anesthesia and opioid medications and treatment of acute femur fracture-do not abruptly discontinue chronic benzodiazepines given risk of withdrawal symptoms  -continue close monitoring of potential side effects benzodiazepine use in mature adult population including increased risk confusion and falls   -consider patient referral to counseling/suppot group as part of comprehensive multi-modal treatment plan  -continue close o/p f/u with PCP for ongoing management     Urinary incontinence   -maintained on Oxybutynin and Mirabegron as o/p  -given risk of antimuscarinic and anticholinergic side effects continue to re-evaluate risk vs benefit with these agents  -encourage non-pharmacologic interventions such as timed toileting Q2H to reduce risk leaking  -avoid bladder irritating agents such as caffeine  -consider limiting fluid intake within two hours of bed to reduce risk overnight awakenings to void     Cognitive screening  -patient fully oriented, reports independence with ADLs and IADLs  -no prior cognitive testing records for review  -[address] [place] [address] 8/2/21 reports no intracranial hemorrhage or acute large vessel territorial infarct, moderate severe ventriculomegaly out of proportion to degree of atrophy reported, on personal view at least moderate chronic microangiopathic changes were appreciated  -TSH 2 02, consider checking B12  -encourage patient to remain physically, socially, and cognitively active and engaged to maintain cognitive acuity  -consider comprehensive geriatric assessment outpatient to establish baseline and support patient through healthy aging process as well as identify and mobilize community based resources as indicated and appropriate    Impaired Vision  -recommend use of corrective lenses at all appropriate times  -encourage adequate lighting and encourage use of assistance with ambulation  -keep personal belongings close to person to avoid reaching  -encourage appropriate footwear at all times    Impaired mastication  -Requires use of dentures, encouraged use at all appropriate times   -ensure meal consistency appropriate for abilities  -continue aspiration precautions    Deconditioning/debility/frailty  -Clinical frailty scale stage 5, mildly frail   -multifactorial including age, atrial fibrillation, hypertension obesity, and multiple additional chronic medical conditions now with acute femur fracture superimposed  -continue treatment acute metabolic derangements and traumatic injuries  -encourage well balanced nutrition  -ensure that anxiety/mood symptoms are well controlled as may impact response to therapies and overall sense of well being   -continue psychosocial supports    Delirium precautions  -Patient is high risk of delirium due to fall, traumatic injury, surgical procedure requiring anesthesia, acute pain,and hospitalization   -Initiate delirium precautions  -maintain normal sleep/wake cycle  -minimize overnight interruptions, group overnight vitals/labs/nursing checks as possible  -dim lights, close blinds and turn off tv to minimize stimulation and encourage sleep environment in evenings  -ensure that pain is well controlled -geriatric pain protocol  -monitor for fecal and urinary retention which may precipitate delirium-hankins catheter in place post op  -encourage early mobilization and ambulation once cleared by orthopedics to do so  -provide frequent reorientation and redirection as indicated and appropriate  -consider family and friends at the bedside to help help calm patient if anxious  -minimize use of medications which may precipitate or worsen delirium such as anticholinergics, and benadryl, do not abruptly dc chronic benzodiazepines   -encourage hydration and nutrition   -redirect unwanted behaviors as first line    Home medication review  Personally confirmed with [address] [phone]:     Alprazolam 0 25 mg TID PRN (PDMP checked, last filled 7/11/21)  Tramadol 50mg Q6H PRN (PDMP checked, last filled 6/23/21)  Amlodipine 10 mg daily  Ezitamibe 10 mg daily  Lisinopril 5 mg daily  Metoprolol tartrate 50 mg TID  Mirabegron 25mg daily  Oxybutynin 15 mg daily  Rivaroxaban 15 mg daily  Albuterol 2 puffs Q6H PRN    Care coordination: rounded at bedside with Emma Cota (RN)    History of Present Illness   Physician Requesting Consult: Cierra Ramos MD  Reason for Consult / Principal Problem: Fall  Hx and PE limited by: N/A  Additional history obtained from: Chart review and patient evaluation    HPI: Herminio devlin a 66y o  year old female with paroxysmal atrial fibrillation, hypertension, tachy-smita syndrome SP pacer implantation,, hyperlipidemia, age related osteoporosis, chronic GERD, chronic bilateral lower back pain without sciatica, dysphonia, degenerative lumbar spinal stenosis, urinary incontinence, psoriasis, tremor and urinary incontinence who is admitted to the medical service with ambulatory dysfunction and fall, she is being seen in consultation by Geriatrics for high risk developing delirium during hospitalization  She is seen and examined at the bedside, she explains that she had a fall in the main lobby of her apartment building on 8/2/21 in which she believes her sneaker got caught on the carpet causing her to trip and fall  She denies head strike or loss of consciousness, she developed immediate severe pain in her left leg and inability to bear weight  She initially presented to [facility] and transferred [facility] for Orthopedic evaluation  She underwent surgical fixation earlier today and has returned to the medical floor just shortly prior to my evaluation  She reports severe pain in her left leg, denies additional complaints, reports nausea before the procedure which has since resolved  Prior to admission she was residing home alone and reports independence with all ADLs and iADLs  She requires use of rollator walker for ambulation at baseline and reports one additional fall in the past year  She uses dentures and glasses not use hearing aids  She does not report cognitive or memory concerns at this time  Inpatient consult to Gerontology  Consult performed by: Charisma Powell DO  Consult ordered by: Gage Ontiveros PA-C        Review of Systems   Constitutional: Negative for chills and fever  HENT: Positive for dental problem (uses dentures)  Negative for hearing loss  Eyes: Visual disturbance: wears glasses  Respiratory: Negative for cough, chest tightness and shortness of breath  Cardiovascular: Negative for chest pain  Gastrointestinal: Negative for abdominal pain, nausea (prior to surgery now resolved) and vomiting  Genitourinary:        No urinary complaints, hankins in place post op   Musculoskeletal: Positive for arthralgias (left hip/leg pain) and gait problem  Skin: Negative  Allergic/Immunologic: Negative  Neurological: Negative for dizziness  Hematological: Bruises/bleeds easily  Psychiatric/Behavioral: Negative for sleep disturbance  The patient is nervous/anxious  All other systems reviewed and are negative  Historical Information   Past Medical History:   Diagnosis Date    Abnormal ECG     Last Assessed 9/29/2016    Anxiety     Last Assessed 6/08/2016    Arthritis     knees    Asthma     Last Assessed 11/06/2013    Atrial premature complex     Cataract, bilateral     Last Assessed 7/14/2016    Cataract, left eye     Both eyes  Had surgery on left   Difficulty swallowing     Dizziness     Fibromyalgia     Gastric reflux     Hypertension     Lyme disease     Premature ventricular contraction     Primary osteoarthritis of both knees     Last Assessed 7/14/2016    Rheumatic fever     Sore throat     Tuberculosis     Tuberculosis 1945     Past Surgical History:   Procedure Laterality Date    APPENDECTOMY      CARDIAC PACEMAKER PLACEMENT  2019    HYSTERECTOMY      WV DILATE ESOPHAGUS N/A 4/6/2016    Procedure: DILATATION ESOPHAGEAL;  Surgeon: Lacey Clark MD;  [facility]: [facility]; Service: Gastroenterology    [place] EGD TRANSORAL BIOPSY SINGLE/MULTIPLE N/A 4/6/2016    Procedure: ESOPHAGOGASTRODUODENOSCOPY (EGD); Surgeon: Lacey Clark MD;  [facility]; Service: Gastroenterology    WV XCAPSL CTRC RMVL INSJ IO LENS PROSTH W/O ECP Left 3/15/2016    Procedure: EXTRACTION EXTRACAPSULAR CATARACT PHACO INTRAOCULAR LENS (IOL);   Surgeon: Nathalia Ibanez MD;  [facility];  Service: Ophthalmology    REPLACEMENT TOTAL KNEE Right  REPLACEMENT TOTAL KNEE      right     TONSILLECTOMY       Social History   Social History     Substance and Sexual Activity   Alcohol Use No    Comment: Per Allsript Social     Social History     Substance and Sexual Activity   Drug Use No     Social History     Tobacco Use   Smoking Status Never Smoker   Smokeless Tobacco Never Used     Family History:   Family History   Problem Relation Age of Onset    Coronary artery disease Mother     Heart attack Mother         Prior    Heart disease Father     Heart attack Father         Prior     Meds/Allergies   all current active meds have been reviewed    Allergies   Allergen Reactions    L37 Folate [Folic Acid-Vit R2-VAM R42 - Food Allergy]     Cobalt      Unknown    Lyrica [Pregabalin]     Nickel      Skin discoloration    Other      Black rubber    Penicillins Hives    Sulfa Antibiotics Itching    Cortisone Acetate [Cortisone] Itching and Rash    Penicillin G Rash     Objective     Intake/Output Summary (Last 24 hours) at 8/5/2021 1219  Last data filed at 8/5/2021 1045  Gross per 24 hour   Intake 3710 ml   Output 2100 ml   Net 1610 ml     Invasive Devices     Peripheral Intravenous Line            Peripheral IV 08/05/21 Left Wrist <1 day    Peripheral IV 08/05/21 Right Wrist <1 day          Drain            External Urinary Catheter 2 days    Urethral Catheter Latex 16 Fr  <1 day              Physical Exam  Vitals and nursing note reviewed  Constitutional:       Appearance: Normal appearance  She is obese  Comments: Appears uncomfortable   HENT:      Head: Normocephalic and atraumatic  Nose: Nose normal       Comments: O2 NC in place     Mouth/Throat:      Mouth: Mucous membranes are dry  Eyes:      General: No scleral icterus  Right eye: No discharge  Left eye: No discharge  Comments: Pupils equal and round    Neck:      Comments: Phonation normal   Cardiovascular:      Rate and Rhythm: Tachycardia present        Heart sounds: Murmur heard  Pulmonary:      Effort: No respiratory distress  Breath sounds: No wheezing  Abdominal:      Comments: Obese, soft, non-tender   Musculoskeletal:         General: Tenderness (left lateral hip/leg with repositioning or repositioning ice packs in area ) and signs of injury (LLE ace bandages in place) present  Cervical back: Neck supple  Skin:     General: Skin is dry  Coloration: Skin is pale  Neurological:      Mental Status: She is alert        Comments: Awake and alert, answers ques appropriately, speech clear and fluent   Psychiatric:      Comments: Appears uncomfortable and mildly anxious/in pain       Lab Results:   I have personally reviewed pertinent lab results including the following:  -sodium 130, potassium 4 1, chloride I, CO2 24, BUN 24, creatinine 2 2, glucose 94, calcium 8 6, GFR 60  -hemoglobin 10 2, hematocrit 32 2, RBC 13 24, platelets 485  -TSH 5 78  -vitamin-D 26 8    I have personally reviewed the following imaging study reports in PACS:    CT head without contrast 8/2/21  CT lower extremity without contrast left    Therapies:   PT:  Pending  OT:  Pending    VTE Prophylaxis: Reason for no pharmacologic prophylaxis surgical procedure    Code Status: Level 1 - Full Code  Advance Directive and Living Will:      Power of :    POLST:      Family and Social Support: Daughter     Goals of Care: Pain control

## 2021-08-05 NOTE — PLAN OF CARE
Problem: MOBILITY - ADULT  Goal: Maintain or return to baseline ADL function  Description: INTERVENTIONS:  -  Assess patient's ability to carry out ADLs; assess patient's baseline for ADL function and identify physical deficits which impact ability to perform ADLs (bathing, care of mouth/teeth, toileting, grooming, dressing, etc )  - Assess/evaluate cause of self-care deficits   - Assess range of motion  - Assess patient's mobility; develop plan if impaired  - Assess patient's need for assistive devices and provide as appropriate  - Encourage maximum independence but intervene and supervise when necessary  - Involve family in performance of ADLs  - Assess for home care needs following discharge   - Consider OT consult to assist with ADL evaluation and planning for discharge  - Provide patient education as appropriate  Outcome: Progressing  Goal: Maintains/Returns to pre admission functional level  Description: INTERVENTIONS:  - Perform BMAT or MOVE assessment daily    - Set and communicate daily mobility goal to care team and patient/family/caregiver  - Collaborate with rehabilitation services on mobility goals if consulted  - Perform Range of Motion 3 times a day  - Reposition patient every 2 hours    - Ambulate patient 3 times a day  - Out of bed to chair 3 times a day   - Out of bed for meals 3 times a day  - Out of bed for toileting  - Record patient progress and toleration of activity level   Outcome: Progressing     Problem: PAIN - ADULT  Goal: Verbalizes/displays adequate comfort level or baseline comfort level  Description: Interventions:  - Encourage patient to monitor pain and request assistance  - Assess pain using appropriate pain scale  - Administer analgesics based on type and severity of pain and evaluate response  - Implement non-pharmacological measures as appropriate and evaluate response  - Consider cultural and social influences on pain and pain management  - Notify physician/advanced practitioner if interventions unsuccessful or patient reports new pain  Outcome: Progressing     Problem: INFECTION - ADULT  Goal: Absence or prevention of progression during hospitalization  Description: INTERVENTIONS:  - Assess and monitor for signs and symptoms of infection  - Monitor lab/diagnostic results  - Monitor all insertion sites, i e  indwelling lines, tubes, and drains  - [place] appropriate cooling/warming therapies per order  - Administer medications as ordered  - Instruct and encourage patient and family to use good hand hygiene technique  - Identify and instruct in appropriate isolation precautions for identified infection/condition  Outcome: Progressing

## 2021-08-05 NOTE — PROGRESS NOTES
Progress Note - [address]  66 y o  female MRN: [id number]  [facility]      Subjective:    66 y  o female  No acute events, no complaints  Pt doing well  Pain controlled   Denies fevers chills, CP, SOB    Labs:  0   Lab Value Date/Time    HCT 28 3 (L) 08/05/2021 0453    HCT 29 7 (L) 08/04/2021 0444    HCT 34 6 (L) 08/03/2021 0442    HGB 9 0 (L) 08/05/2021 0453    HGB 9 5 (L) 08/04/2021 0444    HGB 11 1 (L) 08/03/2021 0442    INR 1 34 (H) 08/04/2021 0926    WBC 13 65 (H) 08/05/2021 0453    WBC 12 58 (H) 08/04/2021 0444    WBC 14 74 (H) 08/03/2021 0442       Meds:    Current Facility-Administered Medications:     acetaminophen (TYLENOL) tablet 975 mg, 975 mg, Oral, Q8H [address], Hetul Rayo, DO, 975 mg at 08/05/21 0447    albuterol (PROVENTIL HFA,VENTOLIN HFA) inhaler 2 puff, 2 puff, Inhalation, Q6H PRN, Hetul Rayo, DO    ALPRAZolam (XANAX) tablet 0 25 mg, 0 25 mg, Oral, BID PRN, Hetul Rayo, DO    amLODIPine (NORVASC) tablet 10 mg, 10 mg, Oral, Daily, Ema Hewitt PA-C    diphenhydrAMINE (BENADRYL) tablet 12 5 mg, 12 5 mg, Oral, Q6H PRN, Anabela Darby PA-C    diphenhydrAMINE-zinc acetate (BENADRYL) 2-0 1 % cream, , Topical, TID PRN, Anabela Darby PA-C    docusate sodium (COLACE) capsule 100 mg, 100 mg, Oral, BID, Hetul Rayo, DO, 100 mg at 08/04/21 0845    ezetimibe (ZETIA) tablet 10 mg, 10 mg, Oral, Daily, Hetul Rayo, DO, 10 mg at 08/04/21 0845    HYDROmorphone HCl (DILAUDID) injection 0 2 mg, 0 2 mg, Intravenous, Q6H PRN, Hetul Rayo, DO, 0 2 mg at 08/05/21 0445    lactated ringers infusion, 100 mL/hr, Intravenous, Continuous, Ema Hewitt PA-C, Last Rate: 100 mL/hr at 08/05/21 0008, 100 mL/hr at 08/05/21 0008    metoprolol tartrate (LOPRESSOR) tablet 50 mg, 50 mg, Oral, Q12H [address], Hetul Rayo, DO, 50 mg at 08/04/21 2057    ondansetron (ZOFRAN) injection 4 mg, 4 mg, Intravenous, Q4H PRN, Hetul Rayo, DO, 4 mg at 08/02/21 6355    oxyCODONE (ROXICODONE) IR tablet 2 5 mg, 2 5 mg, Oral, Q4H PRN, Hetul Rayo, DO    oxyCODONE (ROXICODONE) IR tablet 5 mg, 5 mg, Oral, Q4H PRN, Hetul Rayo, DO, 5 mg at 08/05/21 0409    senna (SENOKOT) tablet 8 6 mg, 1 tablet, Oral, Daily, Hetul Rayo, DO, 8 6 mg at 08/04/21 0845    Blood Culture:   No results found for: BLOODCX    Wound Culture:   No results found for: WOUNDCULT    Ins and Outs:  I/O last 24 hours: In: 600 [P O :600]  Out: 1300 [Urine:1300]          Physical:  Vitals:    08/04/21 2217   BP: 115/62   Pulse: 93   Resp: 16   Temp: 99 5 °F (37 5 °C)   SpO2: 92%     Musculoskeletal: left Lower Extremity   Awake alert no acute distress  · Skin warm dry, brisk cap refill to toes  · Left knee immobilizer in place  · TTP left superior knee  · SILT s/s/sp/dp/t  +fhl/ehl, +ankle dorsi/plantar flexion  2+ DP pulse    Assessment:    66 y  o female left distal femur fracture plan for ORIF left distal femur fracture today  Plan:  · NWB LLE  · NPO  · Hold morning lovenox for OR  · Greater than 2 gram drop which qualifies for diagnosis of acute blood loss anemia, will monitor and administer IVF/prbc as indicated   · PT/OT  · Pain control  · DVT ppx  · Dispo:  To OR today    Pricilla Bruce MD

## 2021-08-05 NOTE — PHYSICAL THERAPY NOTE
Physical Therapy Cancellation Note        PT orders received and chart reviewed  Pt pending OR today with ortho for ORIF L distal femur fx  Will defer PT eval at this time and continue to follow and evaluate as appropriate post op      Christine Carrasco, PT, DPT

## 2021-08-05 NOTE — ASSESSMENT & PLAN NOTE
· Likely stress reaction from fracture  · Slight down trend   · Continue to monitor post op at this time   · Afebrile

## 2021-08-05 NOTE — ANESTHESIA PREPROCEDURE EVALUATION
Procedure:  OPEN REDUCTION W/ INTERNAL FIXATION (ORIF) DISTAL FEMUR; INSERTION RETROGRADE NAIL (Left Leg Upper)    Relevant Problems   CARDIO   (+) Atrial fibrillation (HCC)   (+) Hypercholesterolemia   (+) Hyperlipidemia   (+) Hypertension   (+) Tachy-smita syndrome (HCC)      GI/HEPATIC   (+) Chronic GERD   (+) GERD (gastroesophageal reflux disease)      /RENAL   (+) MICHAEL (acute kidney injury) (HCC)      HEMATOLOGY   (+) Acute blood loss anemia      MUSCULOSKELETAL   (+) Arthritis   (+) Chronic bilateral low back pain without sciatica   (+) Degenerative arthritis of knee, bilateral   (+) Fibromyalgia   (+) Low back pain   (+) Lumbar degenerative disc disease      NEURO/PSYCH   (+) Anxiety   (+) Fibromyalgia        Physical Exam    Airway    Mallampati score: I  TM Distance: >3 FB  Neck ROM: full     Dental   upper dentures,     Cardiovascular      Pulmonary      Other Findings        Anesthesia Plan  ASA Score- 3     Anesthesia Type- general with ASA Monitors  Additional Monitors:   Airway Plan: ETT  Comment: I, Dr Darlene Mendoza, the attending physician, have personally seen and evaluated the patient prior to anesthetic care  I have reviewed the pre-anesthetic record, and other medical records if appropriate to the anesthetic care  If a CRNA is involved in the case, I have reviewed the CRNA assessment, if present, and agree  The patient is in a suitable condition to proceed with my formulated anesthetic plan          Plan Factors-Exercise tolerance (METS): <4 METS  Chart reviewed  Existing labs reviewed  Patient summary reviewed  Patient is not a current smoker  Patient not instructed to abstain from smoking on day of procedure  Patient did not smoke on day of surgery  Induction- intravenous  Postoperative Plan- Plan for postoperative opioid use  Planned trial extubation    Informed Consent- Anesthetic plan and risks discussed with patient    I personally reviewed this patient with the CRNA  Discussed and agreed on the Anesthesia Plan with the PERNELL Coronado

## 2021-08-05 NOTE — ANESTHESIA POSTPROCEDURE EVALUATION
Post-Op Assessment Note    CV Status:  Stable  Pain Score: 0    Pain management: adequate     Mental Status:  Alert and awake   Hydration Status:  Euvolemic and stable   PONV Controlled:  None   Airway Patency:  Patent      Post Op Vitals Reviewed: Yes      Staff: CRNA         No complications documented      /66 (08/05/21 1021)    Temp      Pulse 95 (08/05/21 1021)   Resp 21 (08/05/21 1021)    SpO2 95 % (08/05/21 1021)

## 2021-08-05 NOTE — DISCHARGE INSTRUCTIONS
Discharge Instructions - [address] 66 y o  female MRN: [id number]  Unit/Bed#: PACU 06    Weight Bearing Status:                                           Nonweightbearing left lower extremity  Range of motion of left knee as tolerated    DVT prophylaxis  Continue Xarelto    Pain:  Continue analgesics as directed    Dressing Instructions:   Please keep operative dressings clean, dry and intact until follow up     Appt Instructions: If you do not have your appointment, please call the clinic at [phone]   Otherwise followup as scheduled     Contact the office sooner if you experience any increased numbness/tingling in the extremities

## 2021-08-05 NOTE — OP NOTE
OPERATIVE REPORT  PATIENT NAME: Shashi Tran    :    MRN: [id number]  Pt Location: [facility] OR ROOM 15    SURGERY DATE: 2021    Surgeon(s) and Role:     * Esau Curling, MD - Primary     * Francisco Lozada PA-C - Melony Gagnon MD - Assisting    Preop Diagnosis:  Closed displaced supracondylar fracture of distal end of left femur with intracondylar extension, initial encounter ([facility] ) Geronimodustinmarlon Albarran    Post-Op Diagnosis Codes:     * Closed displaced supracondylar fracture of distal end of left femur with intracondylar extension, initial encounter ([facility] ) [[id number]]    Procedure(s) (LRB):  OPEN REDUCTION W/ INTERNAL FIXATION (ORIF) DISTAL FEMUR; INSERTION RETROGRADE NAIL (Left)    Implants:  [place] Retrograde Nail 10x360 mm    Specimen(s):  * No specimens in log *    Estimated Blood Loss:   500 mL    Drains:  Urethral Catheter Latex 16 Fr  (Active)   Site Assessment Clean;Skin intact 21 1119   Collection Container Standard drainage bag 21 1119   Securement Method Securing device (Describe) 21 1119   Output (mL) 100 mL 21 1045   Number of days: 0       External Urinary Catheter (Active)   Collection Container Canister and suction tubing (For Female) 21 0012   Suction Pressure (mmHg) 100 mmHg 21 0012   Interventions Suction canister changed;Suction tubing changed;Device changed;Removed and skin assessed 21 0012   Output (mL) 600 mL 21 0300   Number of days: 3       Anesthesia Type:   General    Operative Indications:  65 yo female status post fall with a left distal femur fracture  After the risks and benefits of operative versus non-operative treatment options were discussed with the patient, the patient was indicated for Left Retrograde Femoral IMN to achieve stable fixation and allow for early mobilization   The risks of surgery include but are not limited to bleeding, infection, nonunion, malunion, pain, scarring, loss of function, damage to surrounding structures, and repeat surgery  Operative Findings:  Comminuted Left Distal Femur Fracture with Shortening    Complications:   None    Procedure and Technique:  The patient was brought back to the operating room and placed on the operating room table  He was placed under anesthesia by the general anesthesia team  Antibiotics were given at this time  All bony prominences were padded and the extremity was prepped and draped in the normal sterile fashion  A time out was conducted to confirm the correct patient, surgical site, and operative procedure  An anterior midline incision was made distal to the patella in line with the femoral canal  Soft tissue dissection was carried down through the paratenon and patellar tendon  The knee was flexed and a guide wire was used to find the starting point in the intercondylar notch at the anterior aspect of [place]um[place]'s Line and in line with the femoral canal under fluoroscopy  A lateral based incision was then made at the level of the fracture  Soft tissue dissection was carried down through the IT band which was incised in line with the canal  The fracture was then reduced with traction and manipulation  An opening reamer was then used and the guide pin was switched out for a ball tipped guide wire  This was advanced proximally to the level of the lesser trochanter  The canal was then reamed under fluoroscopy in a stepwise fashion, making sure the fracture was reduced at all times  A Deyvi 10x360 mm retrograde femoral nail was then placed, making sure to end the nail proximally at the level of the lesser trochanter  Using the aiming arm, 4 distal interlocking screws were placed under fluoroscopy  Using perfect [place] techniques, 2 interlocking screws were placed proximally from anterior to posterior  Final fluoroscopic images revealed satisfactory position of the hardware and alignment of the fracture   Wounds were irrigated and closed using #1 Vicryl, 2-0 Vicryl, and staples  The incisions were dressed with acticoat, gauze, ABD, webril, and ace wrap from the foot to the thigh  Post-operatively, the patient will be made non weight bearing to the left lower extremity in a soft dressing  She will receive Ancef or surgical antibiotic prophylaxis and restart her Xarelto for DVT prophylaxis  Dr Gabino Moon was present for the entire case      Patient Disposition:  PACU     SIGNATURE: Indy Dale MD  DATE: August 5, 2021  TIME: 1:16 PM

## 2021-08-05 NOTE — ASSESSMENT & PLAN NOTE
· Patient presenting with femur fracture  · Sp OR procedure today full report not in the system , but expected procedure was ORIF left distal femur fracture   · Continue with supportive care and pain control  · Gentle hydration  · Orthopedic surgery appreciated

## 2021-08-05 NOTE — PROGRESS NOTES
[facility]  Progress Note - Guerreroina Casey 1945, 66 y o  female MRN: [id number]  [facility] Encounter: [id number]  Primary Care Provider: Nelia Powell MD   Date and time admitted to hospital: 8/2/2021 10:51 PM    * Femur fracture [facility])  Assessment & Plan  · Patient presenting with femur fracture  · Sp OR procedure today full report not in the system , but expected procedure was ORIF left distal femur fracture   · Continue with supportive care and pain control  · Gentle hydration  · Orthopedic surgery appreciated     MICHAEL (acute kidney injury) ([facility] )  Assessment & Plan  · Secondary to volume depletion, anemia, ACEI, femur fracture  · Increase IVF to 100ml/hr  · ACEI on hold  · Hold parameters in place for BP meds  · Avoid nephrotoxic meds  · monitor    Acute blood loss anemia  Assessment & Plan  · Secondary to femur fracture  · Monitor    Leukocytosis  Assessment & Plan  · Likely stress reaction from fracture  · Slight down trend   · Continue to monitor post op at this time   · Afebrile     Hyperlipidemia  Assessment & Plan  · Zetia    Pacemaker  Assessment & Plan  · Patient with history tachy-smita syndrome with pacer placement  On beta-blockade  Hypertension  Assessment & Plan  · Continue with blood pressure meds  · Metoprolol and Norvasc  · Hold lisinopril perioperatively    Atrial fibrillation (HCC)  Assessment & Plan  · Metoprolol therapy  ·  Monitor heart rate  Patient with history pacemaker placement  · Hold Xarelto now Day # 0 post op resume when ok with orthopedics           VTE Pharmacologic Prophylaxis: VTE Score: 10 High Risk (Score >/= 5) - Pharmacological DVT Prophylaxis Ordered: enoxaparin (Lovenox)  Sequential Compression Devices Ordered  Patient Centered Rounds: I performed bedside rounds with nursing staff today    Discussions with Specialists or Other Care Team Provider: nursing     Education and Discussions with Family / Patient: Updated  (daughter) via phone  left message on answering machine     Time Spent for Care: 30 minutes  More than 50% of total time spent on counseling and coordination of care as described above  Current Length of Stay: 3 day(s)  Current Patient Status: Inpatient   Certification Statement: The patient will continue to require additional inpatient hospital stay due to ongoing need for post op management   Discharge Plan: Anticipate discharge in 48-72 hrs to likely rehab pending pt ot consult     Code Status: Level 1 - Full Code    Subjective:   Patient has just returned postoperatively feeling weak and having significant amount of pain in her left upper leg  Leg has Ace wrap from groin to toes toes cool to touch but patient has positive sensation and good cap refill overall patient appears pale mildly tachycardic due to pain most likely repositioned covered up with blankets and he turned up in her room discussed with nursing they will bring her pain medication now    Objective:     Vitals:   Temp (24hrs), Av 1 °F (37 3 °C), Min:98 4 °F (36 9 °C), Max:99 5 °F (37 5 °C)    Temp:  [98 4 °F (36 9 °C)-99 5 °F (37 5 °C)] 99 5 °F (37 5 °C)  HR:  [] 122  Resp:  [14-21] 16  BP: (107-133)/(51-93) 126/51  SpO2:  [92 %-97 %] 96 %  There is no height or weight on file to calculate BMI  Input and Output Summary (last 24 hours): Intake/Output Summary (Last 24 hours) at 2021 1235  Last data filed at 2021 1045  Gross per 24 hour   Intake 3710 ml   Output 2100 ml   Net 1610 ml       Physical Exam:   Physical Exam  Constitutional:       General: She is not in acute distress  Appearance: She is obese  She is not ill-appearing, toxic-appearing or diaphoretic  HENT:      Head: Atraumatic  Mouth/Throat:      Pharynx: Oropharynx is clear  Eyes:      Conjunctiva/sclera: Conjunctivae normal    Cardiovascular:      Rate and Rhythm: Tachycardia present  Heart sounds:  No murmur heard  No friction rub  No gallop  Pulmonary:      Effort: No respiratory distress  Breath sounds: No stridor  No wheezing, rhonchi or rales  Chest:      Chest wall: No tenderness  Abdominal:      General: There is no distension  Palpations: There is no mass  Tenderness: There is no abdominal tenderness  There is no right CVA tenderness, left CVA tenderness, guarding or rebound  Hernia: No hernia is present  Musculoskeletal:         General: No swelling, tenderness (upper thigh left (ace wrap from groin to toes) ), deformity or signs of injury  Right lower leg: No edema  Left lower leg: No edema  Skin:     Coloration: Skin is pale  Skin is not jaundiced  Findings: No bruising, erythema, lesion or rash  Neurological:      Mental Status: She is alert and oriented to person, place, and time     Psychiatric:      Comments: Uncomfortable so groaning           Additional Data:     Labs:  Results from last 7 days   Lab Units 08/05/21  1038   WBC Thousand/uL 13 24*   HEMOGLOBIN g/dL 10 2*   HEMATOCRIT % 32 2*   PLATELETS Thousands/uL 169   NEUTROS PCT % 81*   LYMPHS PCT % 7*   MONOS PCT % 9   EOS PCT % 0     Results from last 7 days   Lab Units 08/05/21  0453 08/03/21  0442   SODIUM mmol/L 132* 135*   POTASSIUM mmol/L 4 1 5 2   CHLORIDE mmol/L 101 104   CO2 mmol/L 24 25   BUN mg/dL 24 23   CREATININE mg/dL 0 92 1 20   ANION GAP mmol/L 7 6   CALCIUM mg/dL 8 6 8 9   ALBUMIN g/dL  --  3 1*   TOTAL BILIRUBIN mg/dL  --  0 43   ALK PHOS U/L  --  73   ALT U/L  --  14   AST U/L  --  14   GLUCOSE RANDOM mg/dL 124 143*     Results from last 7 days   Lab Units 08/04/21  0926   INR  1 34*         Results from last 7 days   Lab Units 08/03/21  0442   HEMOGLOBIN A1C % 5 6           Lines/Drains:  Invasive Devices     Peripheral Intravenous Line            Peripheral IV 08/05/21 Left Wrist <1 day    Peripheral IV 08/05/21 Right Wrist <1 day          Drain            External Urinary Catheter 2 days    Urethral Catheter Latex 16 Fr  <1 day              Urinary Catheter:  Goal for removal: Plan to remove POD#1               Imaging: Reviewed radiology reports from this admission including: xray(s)    Recent Cultures (last 7 days):         Last 24 Hours Medication List:   Current Facility-Administered Medications   Medication Dose Route Frequency Provider Last Rate    acetaminophen  975 mg Oral [facility] Wolm Dad, PA-C      albuterol  2 puff Inhalation Q6H PRN Wolm Dad, PA-C      ALPRAZolam  0 25 mg Oral BID PRN Wolm Dad, PA-C      amLODIPine  10 mg Oral Daily Wolm Dad, PA-C      cefazolin  2,000 mg Intravenous Q8H Wolm Dad, PA-C      diphenhydrAMINE  12 5 mg Oral Q6H PRN Wolm Dad, PA-C      diphenhydrAMINE-zinc acetate   Topical TID PRN Wolm Dad, PA-C      docusate sodium  100 mg Oral BID Wolm Dad, PA-C      [START ON 8/6/2021] enoxaparin  60 mg Subcutaneous Q12H [address] Wolm Dad, PA-C      ezetimibe  10 mg Oral Daily Wolm Dad, PA-C      HYDROmorphone  0 2 mg Intravenous Q6H PRN Wolm Dad, PA-C      lactated ringers  100 mL/hr Intravenous Continuous Wolm Dad, PA-C 100 mL/hr (08/05/21 1019)    metoprolol tartrate  50 mg Oral Q12H [address] Wolm Dad, PA-C      ondansetron  4 mg Intravenous Q4H PRN Wolm Dad, PA-C      oxyCODONE  2 5 mg Oral Q4H PRN Wolm Dad, PA-C      oxyCODONE  5 mg Oral Q4H PRN Wolm Dad, PA-C      senna  1 tablet Oral Daily Wolm Dad, PA-C          Today, Patient Was Seen By: JERI Mast    **Please Note: This note may have been constructed using a voice recognition system  **

## 2021-08-05 NOTE — ASSESSMENT & PLAN NOTE
· Continue with blood pressure meds      · Metoprolol and Norvasc  · Hold lisinopril perioperatively

## 2021-08-05 NOTE — ASSESSMENT & PLAN NOTE
· Metoprolol therapy  ·  Monitor heart rate  Patient with history pacemaker placement    · Hold Xarelto now Day # 0 post op resume when ok with orthopedics

## 2021-08-06 LAB
ABO GROUP BLD BPU: NORMAL
ABO GROUP BLD BPU: NORMAL
ANION GAP SERPL CALCULATED.3IONS-SCNC: 5 MMOL/L (ref 4–13)
ATRIAL RATE: 147 BPM
BASOPHILS # BLD AUTO: 0.03 THOUSANDS/ΜL (ref 0–0.1)
BASOPHILS NFR BLD AUTO: 0 % (ref 0–1)
BPU ID: NORMAL
BPU ID: NORMAL
BUN SERPL-MCNC: 18 MG/DL (ref 5–25)
CALCIUM SERPL-MCNC: 8.2 MG/DL (ref 8.3–10.1)
CHLORIDE SERPL-SCNC: 106 MMOL/L (ref 100–108)
CO2 SERPL-SCNC: 24 MMOL/L (ref 21–32)
CREAT SERPL-MCNC: 0.83 MG/DL (ref 0.6–1.3)
CROSSMATCH: NORMAL
CROSSMATCH: NORMAL
EOSINOPHIL # BLD AUTO: 0 THOUSAND/ΜL (ref 0–0.61)
EOSINOPHIL NFR BLD AUTO: 0 % (ref 0–6)
ERYTHROCYTE [DISTWIDTH] IN BLOOD BY AUTOMATED COUNT: 13.5 % (ref 11.6–15.1)
GFR SERPL CREATININE-BSD FRML MDRD: 68 ML/MIN/1.73SQ M
GLUCOSE SERPL-MCNC: 141 MG/DL (ref 65–140)
HCT VFR BLD AUTO: 24.3 % (ref 34.8–46.1)
HGB BLD-MCNC: 7.8 G/DL (ref 11.5–15.4)
IMM GRANULOCYTES # BLD AUTO: 0.38 THOUSAND/UL (ref 0–0.2)
IMM GRANULOCYTES NFR BLD AUTO: 2 % (ref 0–2)
LYMPHOCYTES # BLD AUTO: 1.02 THOUSANDS/ΜL (ref 0.6–4.47)
LYMPHOCYTES NFR BLD AUTO: 6 % (ref 14–44)
MCH RBC QN AUTO: 29.9 PG (ref 26.8–34.3)
MCHC RBC AUTO-ENTMCNC: 32.1 G/DL (ref 31.4–37.4)
MCV RBC AUTO: 93 FL (ref 82–98)
MONOCYTES # BLD AUTO: 1.54 THOUSAND/ΜL (ref 0.17–1.22)
MONOCYTES NFR BLD AUTO: 9 % (ref 4–12)
NEUTROPHILS # BLD AUTO: 13.64 THOUSANDS/ΜL (ref 1.85–7.62)
NEUTS SEG NFR BLD AUTO: 83 % (ref 43–75)
NRBC BLD AUTO-RTO: 0 /100 WBCS
PLATELET # BLD AUTO: 206 THOUSANDS/UL (ref 149–390)
PMV BLD AUTO: 9.7 FL (ref 8.9–12.7)
POTASSIUM SERPL-SCNC: 4.5 MMOL/L (ref 3.5–5.3)
QRS AXIS: -45 DEGREES
QRSD INTERVAL: 114 MS
QT INTERVAL: 316 MS
QTC INTERVAL: 500 MS
RBC # BLD AUTO: 2.61 MILLION/UL (ref 3.81–5.12)
SODIUM SERPL-SCNC: 135 MMOL/L (ref 136–145)
T WAVE AXIS: 152 DEGREES
UNIT DISPENSE STATUS: NORMAL
UNIT DISPENSE STATUS: NORMAL
UNIT PRODUCT CODE: NORMAL
UNIT PRODUCT CODE: NORMAL
UNIT PRODUCT VOLUME: 350 ML
UNIT PRODUCT VOLUME: 350 ML
UNIT RH: NORMAL
UNIT RH: NORMAL
VENTRICULAR RATE: 151 BPM
WBC # BLD AUTO: 16.61 THOUSAND/UL (ref 4.31–10.16)

## 2021-08-06 PROCEDURE — 80048 BASIC METABOLIC PNL TOTAL CA: CPT | Performed by: PHYSICIAN ASSISTANT

## 2021-08-06 PROCEDURE — 97163 PT EVAL HIGH COMPLEX 45 MIN: CPT

## 2021-08-06 PROCEDURE — 97167 OT EVAL HIGH COMPLEX 60 MIN: CPT

## 2021-08-06 PROCEDURE — 93010 ELECTROCARDIOGRAM REPORT: CPT | Performed by: INTERNAL MEDICINE

## 2021-08-06 PROCEDURE — 85025 COMPLETE CBC W/AUTO DIFF WBC: CPT | Performed by: NURSE PRACTITIONER

## 2021-08-06 PROCEDURE — 99232 SBSQ HOSP IP/OBS MODERATE 35: CPT | Performed by: NURSE PRACTITIONER

## 2021-08-06 PROCEDURE — NC001 PR NO CHARGE: Performed by: ORTHOPAEDIC SURGERY

## 2021-08-06 RX ADMIN — METOPROLOL TARTRATE 50 MG: 50 TABLET, FILM COATED ORAL at 21:26

## 2021-08-06 RX ADMIN — METOROPROLOL TARTRATE 2.5 MG: 5 INJECTION, SOLUTION INTRAVENOUS at 19:33

## 2021-08-06 RX ADMIN — AMLODIPINE BESYLATE 10 MG: 10 TABLET ORAL at 09:06

## 2021-08-06 RX ADMIN — ACETAMINOPHEN 975 MG: 325 TABLET, FILM COATED ORAL at 21:24

## 2021-08-06 RX ADMIN — OXYCODONE HYDROCHLORIDE 5 MG: 5 TABLET ORAL at 21:24

## 2021-08-06 RX ADMIN — OXYCODONE HYDROCHLORIDE 5 MG: 5 TABLET ORAL at 11:50

## 2021-08-06 RX ADMIN — DOCUSATE SODIUM 100 MG: 100 CAPSULE ORAL at 09:06

## 2021-08-06 RX ADMIN — SENNOSIDES 8.6 MG: 8.6 TABLET ORAL at 09:06

## 2021-08-06 RX ADMIN — OXYCODONE HYDROCHLORIDE 5 MG: 5 TABLET ORAL at 05:20

## 2021-08-06 RX ADMIN — ENOXAPARIN SODIUM 60 MG: 60 INJECTION SUBCUTANEOUS at 09:06

## 2021-08-06 RX ADMIN — ACETAMINOPHEN 975 MG: 325 TABLET, FILM COATED ORAL at 13:28

## 2021-08-06 RX ADMIN — DOCUSATE SODIUM 100 MG: 100 CAPSULE ORAL at 17:17

## 2021-08-06 RX ADMIN — METOPROLOL TARTRATE 50 MG: 50 TABLET, FILM COATED ORAL at 09:06

## 2021-08-06 RX ADMIN — SODIUM CHLORIDE, SODIUM LACTATE, POTASSIUM CHLORIDE, AND CALCIUM CHLORIDE 100 ML/HR: .6; .31; .03; .02 INJECTION, SOLUTION INTRAVENOUS at 11:40

## 2021-08-06 RX ADMIN — SODIUM CHLORIDE, SODIUM LACTATE, POTASSIUM CHLORIDE, AND CALCIUM CHLORIDE 100 ML/HR: .6; .31; .03; .02 INJECTION, SOLUTION INTRAVENOUS at 00:19

## 2021-08-06 RX ADMIN — EZETIMIBE 10 MG: 10 TABLET ORAL at 09:06

## 2021-08-06 RX ADMIN — CEFAZOLIN SODIUM 2000 MG: 2 SOLUTION INTRAVENOUS at 00:27

## 2021-08-06 RX ADMIN — ACETAMINOPHEN 975 MG: 325 TABLET, FILM COATED ORAL at 05:21

## 2021-08-06 RX ADMIN — ENOXAPARIN SODIUM 60 MG: 60 INJECTION SUBCUTANEOUS at 21:27

## 2021-08-06 RX ADMIN — HYDROMORPHONE HYDROCHLORIDE 0.2 MG: 0.2 INJECTION, SOLUTION INTRAMUSCULAR; INTRAVENOUS; SUBCUTANEOUS at 09:13

## 2021-08-06 RX ADMIN — OXYCODONE HYDROCHLORIDE 5 MG: 5 TABLET ORAL at 16:29

## 2021-08-06 NOTE — OCCUPATIONAL THERAPY NOTE
Occupational Therapy Evaluation     Patient Name: Staci Tobin  LRTXR'H Date: 8/6/2021  Problem List  Principal Problem:    Femur fracture ([facility] [facility] )  Active Problems:    Atrial fibrillation ([facility] )    Hypertension    Pacemaker    Hyperlipidemia    Leukocytosis    Acute blood loss anemia    MICHAEL (acute kidney injury) [facility])    Past Medical History  Past Medical History:   Diagnosis Date    Abnormal ECG     Last Assessed 9/29/2016    Anxiety     Last Assessed 6/08/2016    Arthritis     knees    Asthma     Last Assessed 11/06/2013    Atrial premature complex     Cataract, bilateral     Last Assessed 7/14/2016    Cataract, left eye     Both eyes  Had surgery on left   Difficulty swallowing     Dizziness     Fibromyalgia     Gastric reflux     Hypertension     Lyme disease     Premature ventricular contraction     Primary osteoarthritis of both knees     Last Assessed 7/14/2016    Rheumatic fever     Sore throat     Tuberculosis     Tuberculosis 1945     Past Surgical History  Past Surgical History:   Procedure Laterality Date    APPENDECTOMY      CARDIAC PACEMAKER PLACEMENT  2019    HYSTERECTOMY      SD DILATE ESOPHAGUS N/A 4/6/2016    Procedure: DILATATION ESOPHAGEAL;  Surgeon: Jasbir Kamara MD;  Location: [facility] GI [facility]; Service: Gastroenterology    SD EGD TRANSORAL BIOPSY SINGLE/MULTIPLE N/A 4/6/2016    Procedure: ESOPHAGOGASTRODUODENOSCOPY (EGD); Surgeon: Jasbir Kamara MD;  Location: [facility]; Service: Gastroenterology    SD XCAPSL CTRC RMVL INSJ IO LENS PROSTH W/O ECP Left 3/15/2016    Procedure: EXTRACTION EXTRACAPSULAR CATARACT PHACO INTRAOCULAR LENS (IOL);   Surgeon: Nick Govea MD;  Location: [facility] MAIN OR;  Service: Ophthalmology    REPLACEMENT TOTAL KNEE Right     REPLACEMENT TOTAL KNEE      right     TONSILLECTOMY             08/06/21 0905   OT Last Visit   OT Visit Date 08/06/21   Note Type   Note type Evaluation   Restrictions/Precautions   Weight Bearing Precautions Per Order Yes   LLE Weight Bearing Per Order NWB   Other Precautions WBS; Fall Risk;Pain;Multiple lines;O2   Pain Assessment   Pain Assessment Tool 0-10   Pain Score 6   Pain Location/Orientation Orientation: Left; Location: Leg   Patient's Stated Pain Goal No pain   Hospital Pain Intervention(s) Repositioned; Ambulation/increased activity; Emotional support;Elevated   Home Living   Type of Home Apartment   Home Layout One level;Elevator  (0 TANNER)   Bathroom Shower/Tub Walk-in shower   Bathroom Toilet Raised   Bathroom Equipment Grab bars in shower;Grab bars around toilet;Commode; Shower chair   Home Equipment Walker;Cane  (ROLLATOR)   Additional Comments + USE OF RW AT BASELINE    Prior Function   Level of [place] Independent with ADLs and functional mobility   Lives With Alone   Receives Help From   (LIMITED)   ADL Assistance Independent   IADLs Independent   Falls in the last 6 months 1 to 4  (4- [facility] )   Vocational Retired   Lifestyle   Autonomy  [facility] ADLS/IADLS/DRIVING PTA    Reciprocal Relationships LIVES ALONE  LIMITED AVAILABLE SUPPORT  PT REPORTS ONLY SEEING HER FAMILY ~2X/YEAR    Service to Others RETIRED; WORKED FOR HER HUSBANDS COMPANY   Intrinsic Gratification ENJOYS BEING INDEPENDENT   Psychosocial   Psychosocial (WDL) WDL   ADL   Eating Assistance 5  Supervision/Setup   Grooming Assistance 5  Supervision/Setup   UB Bathing Assistance 4  Minimal Assistance   LB Bathing Assistance 2  Maximal Assistance   UB Dressing Assistance 4  Minimal Assistance   LB Dressing Assistance 2  Maximal [address]  2  Maximal Assistance   Functional Assistance 2  Maximal Assistance   Bed Mobility   Supine to Sit 3  Moderate assistance   Additional items Assist x 2; Increased time required;Verbal cues;LE management   Sit to Supine Unable to assess  (PT LEFT OOB WITH ALL NEEDS IN REACH)   Transfers   Sit to Stand 3  Moderate assistance   Additional items Assist x 2; Increased time required;Verbal cues   Stand to Sit 3  Moderate assistance   Additional items Assist x 2; Increased time required;Verbal cues   Stand pivot 2  Maximal assistance   Additional items Assist x 2; Increased time required;Verbal cues   Balance   Static Sitting Fair -   Static Standing Poor   Dynamic Standing Poor -   Activity Tolerance   Activity Tolerance Patient limited by pain   Medical Staff Made Aware PT SEEN FOR CO-SESSION WITH SKILLED PHYSICAL THERAPIST 2' CLINICALLY UNSTABLE PRESENTATION, TRAUMATIC INJURIES, NEW PRECAUTIONS/LIMITATIONS, LIMITED ACTIVITY TOLERANCE AND PRESENT IMPAIRMENTS WHICH ARE A REGRESSION FROM THE PT'S BASELINE AND IMPACTING OVERALL OCCUPATIONAL PERFORMANCE  Nurse Made Aware APPROPRIATE TO SEE    RUE Assessment   RUE Assessment WFL   LUE Assessment   LUE Assessment WFL   Cognition   Overall Cognitive Status WFL   Arousal/Participation Alert; Cooperative   Attention Within functional limits   Orientation Level Oriented X4   Memory Within functional limits   Following Commands Follows all commands and directions without difficulty   Comments PT IS PLEASANT, COOPERATIVE AND MOTIVATED TO PARTICIPATE  G CARRY OVER OF LEARNED PRECAUTIONS   Assessment   Limitation Decreased ADL status; Decreased Safe judgement during ADL;Decreased cognition;Decreased endurance;Decreased self-care trans;Decreased high-level ADLs   Prognosis Good   Assessment 65 YO Female SEEN FOR INITIAL [facility] RES[facility]ING IN L DISTAL FEMUR FX  PT IS S/P ORIF DISTAL FEMUR; INSERTION OF RETROGRADE NAIL ON 8/6/21  PT CURRENTLY HAS THE FOLLOWING RESTRICTIONS;LLE NWB STATUS    PROBLEMS LIST INCLUDES Abnormal ECG, Anxiety, Arthritis, Asthma, Atrial premature complex, Cataract, bilateral, Cataract, left eye, Difficulty swallowing, Dizziness, Fibromyalgia, Gastric reflux, Hypertension, Lyme disease, Premature ventricular contraction, Primary osteoarthritis of both knees, Rheumatic fever, Sore throat, and Tuberculosis  PT IS FROM AN APT ALONE WHERE SHE REPORTS BEING INDEPENDENT WITH ADLS/IADLS/DRIVING PTA  PT CURRENTLY REQUIRES OVERALL MIN A UB ADLS, MAX A WITH LB ADLS, MOD A X2 WITH SIT<->STAND TRANSFERS AND MAX A X2 WITH SPT  PT WITH RESTING HR IN 90'S, DURING ACTIVITY INCREASED -150S- RN AWARE  PT IS LIMITED 2' PAIN, FATIGUE, IMPAIRED BALANCE, FALL RISK , WB RESTRICTIONS, ORTHOPEDIC RESTRICTIONS, DIZZINESS WITH CHANGE OF POSITIONING , SOB, LIMITED FAMILY/FRIEND SUPPORT  and OVERALL LIMITED ACTIVITY TOLERANCE  PT EDUCATED ON CARRY OVER OF WB STATUS, DEEP BREATHING TECHNIQUES T/O ACTIVITY, SLOWING OF PACE, ENERGY CONSERVATION TECHNIQUES FOR CARRY OVER UPON D/C and CONTINUE PARTICIPATION IN SELF-CARE/MOBILITY WITH STAFF WHILE IN THE HOSPITAL   The patient's raw score on the AM-PAC Daily Activity inpatient short form is 17, standardized score is 37 26, less than 39 4  Patients at this level are likely to benefit from discharge to post-acute rehabilitation services  Please refer to the recommendation of the Occupational Therapist for safe discharge planning  FROM AN OCCUPATIONAL THERAPY PERSPECTIVE, PT WOULD BENEFIT FROM ADDITIONAL OT SERVICES IN AN INPT REHAB SETTING UPON D/C  WILL CONT TO FOLLOW TO ADDRESS THE BELOW DESCRIBED GOALS  Goals   Patient Goals TO GET BETTER   LTG Time Frame 10-14   Long Term Goal #1 SEE BELOW    Plan   Treatment Interventions ADL retraining;Functional transfer training; Endurance training;Cognitive reorientation;Patient/family training;Equipment evaluation/education; Compensatory technique education; Energy conservation; Activityengagement   Goal Expiration Date 08/20/21   OT Frequency 3-5x/wk   Recommendation   OT Discharge Recommendation Post acute rehabilitation services   OT - OK to Discharge Yes   AM-PAC Daily Activity Inpatient   Lower Body Dressing 2   Bathing 2   Toileting 2   Upper Body Dressing 3   Grooming 4   Eating 4   Daily Activity Raw Score 17   Daily Activity Standardized Score (Calc for Raw Score >=11) 37 26   AM-PAC Applied Cognition Inpatient   Following a Speech/Presentation 4   Understanding Ordinary Conversation 4   Taking Medications 4   Remembering Where Things Are Placed or Put Away 4   Remembering List of 4-5 Errands 4   Taking Care of Complicated Tasks 4   Applied Cognition Raw Score 24   Applied Cognition Standardized Score 62 21   Modified Palmer Scale   Modified [place] Scale 4       OCCUPATIONAL THERAPY GOALS TO BE MET WITHIN 14 DAYS:    -Pt will increase bed mobility to MOD I to participate in functional activities with G tolerance and balance  -Pt will improve functional mobility and transfers to MOD I on/off all surfaces w/ G balance/safety including toileting w/ G carry over of learned WBS  -Pt will increase independence in all ADLS to MOD I with G balance sitting upright in chair   -Pt will improve activity tolerance to G for 30 min txment sessions w/ G carry over of learned energy conservation techniques   -Pt will improve independence in lt homemaking activities to MOD I without requiring cues for safety   -Pt will demonstrate G carryover of learned WBS, safety techniques and proper body mechanics in functional and leisure activities with use of DME      Documentation completed by Kd Mallory [facility], OTR/L  [facility] OF THE [facility] Certified ID# [id number]

## 2021-08-06 NOTE — PLAN OF CARE
Problem: PAIN - ADULT  Goal: Verbalizes/displays adequate comfort level or baseline comfort level  Description: Interventions:  - Encourage patient to monitor pain and request assistance  - Assess pain using appropriate pain scale  - Administer analgesics based on type and severity of pain and evaluate response  - Implement non-pharmacological measures as appropriate and evaluate response  - Consider cultural and social influences on pain and pain management  - Notify physician/advanced practitioner if interventions unsuccessful or patient reports new pain  Outcome: Progressing     Problem: INFECTION - ADULT  Goal: Absence or prevention of progression during hospitalization  Description: INTERVENTIONS:  - Assess and monitor for signs and symptoms of infection  - Monitor lab/diagnostic results  - Monitor all insertion sites, i e  indwelling lines, tubes, and drains  - [place] appropriate cooling/warming therapies per order  - Administer medications as ordered  - Instruct and encourage patient and family to use good hand hygiene technique  - Identify and instruct in appropriate isolation precautions for identified infection/condition  Outcome: Progressing

## 2021-08-06 NOTE — ASSESSMENT & PLAN NOTE
· Secondary to femur fracture  · With down trend today 7 8/16 61 will repeat labs in am   · Possible need for transfusion

## 2021-08-06 NOTE — PROGRESS NOTES
Subjective: No acute events overnight  No acute distress  Resting comfortably in bed    Objective:  A 10 point ROS was performed; negative except as noted above  Lab Results   Component Value Date/Time    WBC 13 24 (H) 08/05/2021 10:38 AM    HGB 10 2 (L) 08/05/2021 10:38 AM       Vitals:    08/06/21 0257   BP: 127/60   Pulse: 89   Resp: 14   Temp: 99 °F (37 2 °C)   SpO2: 92%     Left lower extremity  Dressing C/D/I  Lower leg soft and compressible  Motor intact to EHL/FHL/TA/GS  Sensation intact to light touch to dp/sp/tib/ludy/saph distributions  Toes warm and well perfused with brisk capillary refill    Assessment: 66 y o  female POD 1 Left Retrograde Femoral IMN      Plan:  NWB LLE   Pain control  DVT ppx: Enoxaparin (Lovenox)-per primary team  PT/OT  Will continue to assess for acute blood loss anemia  Dispo: Ortho will follow

## 2021-08-06 NOTE — PROGRESS NOTES
[address] [facility]  Progress Note - Hill Nelson 1945, 66 y o  female MRN: [id number]  Unit/Bed#: [facility] 608-01 Encounter: [id number]  Primary Care Provider: Kanika Aragon MD   Date and time admitted to hospital: 8/2/2021 10:51 PM    * Femur fracture [facility])  Assessment & Plan  · Patient presenting with femur fracture  · Spday # 1  OPEN REDUCTION W/ INTERNAL FIXATION (ORIF) DISTAL FEMUR; INSERTION RETROGRADE NAIL (Left)  · Continue with supportive care and pain control  · Gentle hydration discontinued   · Orthopedic surgery appreciated   · -resumed xarelto post lovenox     MICHAEL (acute kidney injury) ([facility] [address] )  Assessment & Plan  · Secondary to volume depletion, anemia, ACEI, femur fracture  · Increase IVF to 100ml/hr will discontinue at this time   · ACEI on hold  · Hold parameters in place for BP meds  · Avoid nephrotoxic meds  · monitor    Acute blood loss anemia  Assessment & Plan  · Secondary to femur fracture  · With down trend today 7 8/16 61 will repeat labs in am   · Possible need for transfusion    Leukocytosis  Assessment & Plan  · possible stress reaction from fracture  · Increased today will trend pt remains afebrile   · Continue to monitor post op at this time       Hyperlipidemia  Assessment & Plan  · Zetia    Pacemaker  Assessment & Plan  · Patient with history tachy-smita syndrome with pacer placement  On beta-blockade  Hypertension  Assessment & Plan  · Continue with blood pressure meds  · Metoprolol and Norvasc  · Hold lisinopril perioperatively    Atrial fibrillation (HCC)  Assessment & Plan  · Metoprolol therapy  ·  Monitor heart rate  Patient with history pacemaker placement    · Resume Xarelto now Day # 1 per ortho was receiving prophylactic Lovenox will transition to xarelto in am   · Slightly tachycardic will continue telem   · Prn bb iv         VTE Pharmacologic Prophylaxis: VTE Score: 10 High Risk (Score >/= 5) - Pharmacological DVT Prophylaxis Ordered: rivaroxaban (Xarelto)  Sequential Compression Devices Ordered  Patient Centered Rounds: I performed bedside rounds with nursing staff today  Discussions with Specialists or Other Care Team Provider: nursing and ortho    Education and Discussions with Family / Patient: Attempted to update  (daughter) via phone  Unable to contact  Time Spent for Care: 45 minutes  More than 50% of total time spent on counseling and coordination of care as described above  Current Length of Stay: 4 day(s)  Current Patient Status: Inpatient   Certification Statement: The patient will continue to require additional inpatient hospital stay due to post op day # 1   Discharge Plan: Anticipate discharge in 48 hrs to rehab facility  Code Status: Level 1 - Full Code    Subjective:   Pt looking a lot better than yesterday  She is more interactive more alert and awake  Denied arrived while in the room set her up she is about to eat dinner she denies any nausea vomiting no bowel movement  She does report intermittent pain she feels like the pain medication helps her for the 1st part or couple hours after administration but then starts to wear off  Otherwise her pain is much better than day prior  Objective:     Vitals:   Temp (24hrs), Av 9 °F (37 2 °C), Min:98 3 °F (36 8 °C), Max:99 6 °F (37 6 °C)    Temp:  [98 3 °F (36 8 °C)-99 6 °F (37 6 °C)] 98 8 °F (37 1 °C)  HR:  [] 83  Resp:  [14-18] 18  BP: (112-143)/(48-77) 128/49  SpO2:  [92 %-97 %] 97 %  There is no height or weight on file to calculate BMI  Input and Output Summary (last 24 hours): Intake/Output Summary (Last 24 hours) at 2021 1813  Last data filed at 2021 1333  Gross per 24 hour   Intake 1783 33 ml   Output 1150 ml   Net 633 33 ml       Physical Exam:   Physical Exam  Constitutional:       General: She is not in acute distress  Appearance: She is obese  She is not toxic-appearing or diaphoretic     HENT:      Head: Normocephalic and atraumatic  Eyes:      General:         Left eye: No discharge  Conjunctiva/sclera: Conjunctivae normal    Cardiovascular:      Rate and Rhythm: Normal rate  Heart sounds: No murmur heard  No friction rub  No gallop  Pulmonary:      Effort: No respiratory distress  Breath sounds: No stridor  No wheezing, rhonchi or rales  Chest:      Chest wall: No tenderness  Abdominal:      General: There is no distension  Palpations: There is no mass  Tenderness: There is no abdominal tenderness  There is no right CVA tenderness, left CVA tenderness, guarding or rebound  Hernia: No hernia is present  Musculoskeletal:         General: No swelling, tenderness, deformity or signs of injury  Right lower leg: No edema  Left lower leg: No edema  Comments: Ace wrap from groin to toes   Toes warm to touch  Skin:     Coloration: Skin is not jaundiced or pale  Findings: No bruising, erythema, lesion or rash  Neurological:      Mental Status: She is oriented to person, place, and time  Psychiatric:         Behavior: Behavior normal           Additional Data:     Labs:  Results from last 7 days   Lab Units 08/06/21  0624   WBC Thousand/uL 16 61*   HEMOGLOBIN g/dL 7 8*   HEMATOCRIT % 24 3*   PLATELETS Thousands/uL 206   NEUTROS PCT % 83*   LYMPHS PCT % 6*   MONOS PCT % 9   EOS PCT % 0     Results from last 7 days   Lab Units 08/06/21  0624 08/04/21  0444 08/03/21  0442   SODIUM mmol/L 135*  --  135*   POTASSIUM mmol/L 4 5  --  5 2   CHLORIDE mmol/L 106  --  104   CO2 mmol/L 24  --  25   CO2, I-STAT   --    < >  --    BUN mg/dL 18  --  23   CREATININE mg/dL 0 83  --  1 20   ANION GAP mmol/L 5  --  6   CALCIUM mg/dL 8 2*  --  8 9   ALBUMIN g/dL  --   --  3 1*   TOTAL BILIRUBIN mg/dL  --   --  0 43   ALK PHOS U/L  --   --  73   ALT U/L  --   --  14   AST U/L  --   --  14   GLUCOSE RANDOM mg/dL 141*  --  143*    < > = values in this interval not displayed  Results from last 7 days   Lab Units 08/04/21  0926   INR  1 34*         Results from last 7 days   Lab Units 08/03/21  0442   HEMOGLOBIN A1C % 5 6           Lines/Drains:  Invasive Devices     Peripheral Intravenous Line            Peripheral IV 08/05/21 Left Wrist 1 day    Peripheral IV 08/05/21 Right Wrist 1 day          Drain            Urethral Catheter Latex 16 Fr  1 day              Urinary Catheter:  Goal for removal: per ortho due to pts dressing up to groin            Telemetry:  Telemetry Orders (From admission, onward)             48 Hour Telemetry Monitoring  Continuous x 48 hours     Question:  Reason for 48 Hour Telemetry  Answer:  Arrhythmias Requiring Medical Therapy (eg  SVT, Vtach/fib, Bradycardia, Uncontrolled A-fib)                 Telemetry Reviewed: Normal Sinus Rhythm and Atrial fibrillation   HR averaging 100 but on review 120 will continue   Indication for Continued Telemetry Use: Arrthymias requiring medical therapy           Imaging: Reviewed radiology reports from this admission including: chest xray    Recent Cultures (last 7 days):         Last 24 Hours Medication List:   Current Facility-Administered Medications   Medication Dose Route Frequency Provider Last Rate    acetaminophen  975 mg Oral [facility] Jordan Chery PA-C      albuterol  2 puff Inhalation Q6H PRN Jordan Chery PA-C      ALPRAZolam  0 25 mg Oral BID PRN Jordan Chery PA-C      amLODIPine  10 mg Oral Daily Jordan Chery PA-C      diphenhydrAMINE  12 5 mg Oral Q6H PRN Jordan Chery PA-C      diphenhydrAMINE-zinc acetate   Topical TID PRN Jordan Chery PA-C      docusate sodium  100 mg Oral BID Jordan Chery PA-C      ezetimibe  10 mg Oral Daily Jordan Chery PA-C      HYDROmorphone  0 2 mg Intravenous Q4H PRN Jhonatan Sandoval MD      metoprolol  2 5 mg Intravenous Q6H PRN Dante Quinones PA-C      metoprolol tartrate  50 mg Oral Q12H [address] Jordan Chery PA-C      ondansetron  4 mg Intravenous Q4H PRN Louise Ramos PA-C      oxyCODONE  2 5 mg Oral Q4H PRN Louise Ramos PA-C      oxyCODONE  5 mg Oral Q4H PRN Louise Ramos PA-C      [START ON 8/7/2021] rivaroxaban  15 mg Oral Daily With Breakfast JERI Alfred      senna  1 tablet Oral Daily Louise Ramos PA-C          Today, Patient Was Seen By: JERI Alfred    **Please Note: This note may have been constructed using a voice recognition system  **

## 2021-08-06 NOTE — ASSESSMENT & PLAN NOTE
· Patient presenting with femur fracture  · Spday # 1  OPEN REDUCTION W/ INTERNAL FIXATION (ORIF) DISTAL FEMUR; INSERTION RETROGRADE NAIL (Left)  · Continue with supportive care and pain control  · Gentle hydration discontinued   · Orthopedic surgery appreciated   · -resumed xarelto post lovenox

## 2021-08-06 NOTE — ASSESSMENT & PLAN NOTE
· possible stress reaction from fracture  · Increased today will trend pt remains afebrile   · Continue to monitor post op at this time

## 2021-08-06 NOTE — ASSESSMENT & PLAN NOTE
· Secondary to volume depletion, anemia, ACEI, femur fracture  · Increase IVF to 100ml/hr will discontinue at this time   · ACEI on hold  · Hold parameters in place for BP meds  · Avoid nephrotoxic meds  · monitor

## 2021-08-06 NOTE — PLAN OF CARE
Problem: OCCUPATIONAL THERAPY ADULT  Goal: Performs self-care activities at highest level of function for planned discharge setting  See evaluation for individualized goals  Description: Treatment Interventions: ADL retraining, Functional transfer training, Endurance training, Cognitive reorientation, Patient/family training, Equipment evaluation/education, Compensatory technique education, Energy conservation, Activityengagement          See flowsheet documentation for full assessment, interventions and recommendations  Note: Limitation: Decreased ADL status, Decreased Safe judgement during ADL, Decreased cognition, Decreased endurance, Decreased self-care trans, Decreased high-level ADLs  Prognosis: Good  Assessment: 67 YO Female SEEN FOR INITIAL OCCUPATIONAL THERAPY EVALUATION FOLLOWING TXF FROM  [facility] S/P FALL RESULTING IN L DISTAL FEMUR FX  PT IS S/P ORIF DISTAL FEMUR; INSERTION OF RETROGRADE NAIL ON 8/6/21  PT CURRENTLY HAS THE FOLLOWING RESTRICTIONS;LLE NWB STATUS   PROBLEMS LIST INCLUDES Abnormal ECG, Anxiety, Arthritis, Asthma, Atrial premature complex, Cataract, bilateral, Cataract, left eye, Difficulty swallowing, Dizziness, Fibromyalgia, Gastric reflux, Hypertension, Lyme disease, Premature ventricular contraction, Primary osteoarthritis of both knees, Rheumatic fever, Sore throat, and Tuberculosis  PT IS FROM AN APT ALONE WHERE SHE REPORTS BEING INDEPENDENT WITH ADLS/IADLS/DRIVING PTA  PT CURRENTLY REQUIRES OVERALL MIN A UB ADLS, MAX A WITH LB ADLS, MOD A X2 WITH SIT<->STAND TRANSFERS AND MAX A X2 WITH SPT  PT WITH RESTING HR IN 90'S, DURING ACTIVITY INCREASED -150S- RN AWARE  PT IS LIMITED 2' PAIN, FATIGUE, IMPAIRED BALANCE, FALL RISK , WB RESTRICTIONS, ORTHOPEDIC RESTRICTIONS, DIZZINESS WITH CHANGE OF POSITIONING , SOB, LIMITED FAMILY/FRIEND SUPPORT  and OVERALL LIMITED ACTIVITY TOLERANCE   PT EDUCATED ON CARRY OVER OF WB STATUS, DEEP BREATHING TECHNIQUES T/O ACTIVITY, SLOWING OF PACE, ENERGY CONSERVATION TECHNIQUES FOR CARRY OVER UPON D/C and CONTINUE PARTICIPATION IN SELF-CARE/MOBILITY WITH STAFF WHILE IN [facility]   The patient's raw score on the AM-PAC Daily Activity inpatient short form is 17, standardized score is 37 26, less than 39 4  Patients at this level are likely to benefit from discharge to post-acute rehabilitation services  Please refer to the recommendation of the Occupational Therapist for safe discharge planning  FROM AN OCCUPATIONAL THERAPY PERSPECTIVE, PT WOULD BENEFIT FROM ADDITIONAL OT SERVICES IN AN INPT REHAB SETTING UPON D/C  WILL CONT TO FOLLOW TO ADDRESS THE BELOW DESCRIBED GOALS  OT Discharge Recommendation: Post acute rehabilitation services  OT - OK to Discharge:  Yes

## 2021-08-06 NOTE — ASSESSMENT & PLAN NOTE
· Metoprolol therapy  ·  Monitor heart rate  Patient with history pacemaker placement    · Resume Xarelto now Day # 1 per ortho was receiving prophylactic Lovenox will transition to xarelto in am   · Slightly tachycardic will continue telem   · Prn bb iv

## 2021-08-06 NOTE — PLAN OF CARE
Problem: PHYSICAL THERAPY ADULT  Goal: Performs mobility at highest level of function for planned discharge setting  See evaluation for individualized goals  Description: Treatment/Interventions: Functional transfer training, LE strengthening/ROM, Therapeutic exercise, Endurance training, Patient/family training, Equipment eval/education, Bed mobility, Continued evaluation, Spoke to nursing, OT  Equipment Recommended:  (continue to assess )       See flowsheet documentation for full assessment, interventions and recommendations  Note: Prognosis: Good  Problem List: Decreased strength, Decreased range of motion, Decreased endurance, Impaired balance, Decreased mobility, Pain, Orthopedic restrictions  Assessment: Pt is 66 y o  female seen for PT evaluation [facility]  Two pt identifiers were used to confirm  Pt presented s/p fall  Patient originally presented at [facility] and was transferred to [address] [place] [address] for further medical management/ evaluation  Pt was admitted with a primary dx of:  Left distal femur fracture and other active problems including MICHAEL, acute blood loss anemia, leukocytosis, HLD, hx of pacer, HTN, AFib  Patient underwent OPEN REDUCTION W/ INTERNAL FIXATION (ORIF) DISTAL FEMUR; INSERTION RETROGRADE NAIL (Left) which was performed on 08/06/2021  PT now consulted for assessment of mobility and d/c needs  Pt with Up as tolerated orders  Pts current co morbidities affecting treatment include: Anxiety, arthritis, asthma, bilateral cataracts, fibromyalgia, HTN, Lyme disease, premature ventricular contraction, to regular assist, and personal factors including living alone   Pts current clinical presentation is Unstable/ Unpredictable (high complexity) due to Ongoing medical management for primary dx, Increased reliance on more restrictive AD compared to baseline, Decreased activity tolerance compared to baseline, Fall risk, Increased assistance needed from caregiver at current time, Current WBS, Continuous pulse oximetry monitoring , s/p surgical intervention    Upon evaluation, pt currently is requiring Mod Ax2 for bed mobility; Mod Ax2 for sit <-> stand transfers and max A x 2 for stand pivot transfers  Pt presents at PT eval functioning below baseline and currently w/ overall mobility deficits 2* to: BLE weakness, decreased ROM, impaired balance, decreased endurance, pain, decreased activity tolerance compared to baseline, fall risk, orthopedic restrictions  Pt currently at a fall risk 2* to impairments listed above  Based on the aforementioned PT evaluation, pt will continue to benefit from skilled Acute PT interventions to address stated impairments; to maximize functional mobility; for ongoing pt/ family training; and DME needs  At conclusion of PT session pt returned back in chair with phone and call bell within reach  Pt denies any further questions at this time  PT is currently recommending Rehab  PT will continue to follow during hospital stay  Barriers to Discharge: Inaccessible home environment, Decreased caregiver support        PT Discharge Recommendation: Post acute rehabilitation services     PT - OK to Discharge: Yes (to rehab when medically cleared )    See flowsheet documentation for full assessment

## 2021-08-06 NOTE — PHYSICAL THERAPY NOTE
PHYSICAL THERAPY EVALUATION  NAME:  Ada Cho  DATE: 08/06/21    AGE:   66 y o  Mrn:   [id number]  ADMIT DX:  Femur fracture ([address] ) Randy Cash    Past Medical History:   Diagnosis Date    Abnormal ECG     Last Assessed 9/29/2016    Anxiety     Last Assessed 6/08/2016    Arthritis     knees    Asthma     Last Assessed 11/06/2013    Atrial premature complex     Cataract, bilateral     Last Assessed 7/14/2016    Cataract, left eye     Both eyes  Had surgery on left   Difficulty swallowing     Dizziness     Fibromyalgia     Gastric reflux     Hypertension     Lyme disease     Premature ventricular contraction     Primary osteoarthritis of both knees     Last Assessed 7/14/2016    Rheumatic fever     Sore throat     Tuberculosis     Tuberculosis 1945       Past Surgical History:   Procedure Laterality Date    APPENDECTOMY      CARDIAC PACEMAKER PLACEMENT  2019    HYSTERECTOMY      AK DILATE ESOPHAGUS N/A 4/6/2016    Procedure: DILATATION ESOPHAGEAL;  Surgeon: Jacquelyn Siddiqui MD;  [facility]; Service: Gastroenterology    AK EGD TRANSORAL BIOPSY SINGLE/MULTIPLE N/A 4/6/2016    Procedure: ESOPHAGOGASTRODUODENOSCOPY (EGD); Surgeon: Jacquelyn Siddiqui MD;  [facility]; Service: Gastroenterology    AK XCAPSL CTRC RMVL INSJ IO LENS PROSTH W/O ECP Left 3/15/2016    Procedure: EXTRACTION EXTRACAPSULAR CATARACT PHACO INTRAOCULAR LENS (IOL); Surgeon: Franki Lopez MD;  [facility];  Service: Ophthalmology    REPLACEMENT TOTAL KNEE Right     REPLACEMENT TOTAL KNEE      right     TONSILLECTOMY         Length Of Stay: 4    PHYSICAL THERAPY EVALUATION:        08/06/21 0906   Note Type   Note type Evaluation   Pain Assessment   Pain Assessment Tool 0-10   Pain Score 6   Pain Location/Orientation Orientation: Left; Location: Hip   Pain Onset/Description Onset: Ongoing;Frequency: Constant/Continuous; Descriptor: Aching   Effect of Pain on Daily Activities Increased pain with activity   Patient's Stated Pain Goal No pain   Hospital Pain Intervention(s) Ambulation/increased activity;Repositioned   Home Living   Type of [address] One level;Elevator  (0 TANNER )   Home Equipment Walker;Cane   Additional Comments Pt reports living alone and states she has limited social support  Patient states she sees her family approximately 2 times a year    Prior Function   Level of [place] Independent with ADLs and functional mobility   Lives With Alone   Receives Help From [employer]  in the last 6 months 1 to 4   Comments Patient reports use of a rolling walker for ambulation prior to admission   Restrictions/Precautions   Weight Bearing Precautions Per Order Yes   LLE Weight Bearing Per Order NWB   Other Precautions Chair Alarm; Bed Alarm;Multiple lines; Fall Risk;Pain;O2;WBS   General   Family/Caregiver Present No   Cognition   Overall Cognitive Status WFL   Arousal/Participation Alert   Orientation Level Oriented X4   Memory Within functional limits   Following Commands Follows one step commands without difficulty   RUE Assessment   RUE Assessment WFL   LUE Assessment   LUE Assessment WFL   RLE Assessment   RLE Assessment WFL   Strength RLE   RLE Overall Strength 4-/5   LLE Assessment   LLE Assessment X  (AROM limited throughout 2* to pain )   Strength LLE   LLE Overall Strength 2+/5  (limited by pain )   Bed Mobility   Supine to Sit 3  Moderate assistance   Additional items Assist x 2; Increased time required;Verbal cues   Transfers   Sit to Stand 3  Moderate assistance   Additional items Assist x 2; Increased time required;Verbal cues   Stand to Sit 3  Moderate assistance   Additional items Assist x 2; Increased time required;Verbal cues   Stand pivot 2  Maximal assistance   Additional items Assist x 2; Increased time required;Verbal cues   Additional Comments VC and TC needed for hand placement and safety   b/l HHA utilized for transfers Ambulation/Elevation   Gait pattern Not appropriate  (NA, Decreased standing tolerance at current time)   Balance   Static Sitting Fair -   Dynamic Sitting Poor +   Static Standing Poor   Dynamic Standing Poor -   Endurance Deficit   Endurance Deficit Yes   Endurance Deficit Description fatigue, pain    Activity Tolerance   Activity Tolerance Patient limited by fatigue;Patient limited by pain   Medical Staff Made Aware Jose Mse, OT; OT present for co evaluation due to pts unstable presentation, new physical limitations/ restrictions, being s/p sx intervention, and decreased activity tolerance which all limit pts overall physical performance    Nurse Made Aware Patient appropriate to be seen and mobilized per nursing   Assessment   Prognosis Good   Problem List Decreased strength;Decreased range of motion;Decreased endurance; Impaired balance;Decreased mobility;Pain;Orthopedic restrictions   Assessment Pt is 66 y o  female seen for PT evaluation s/p admit to [facility] on 8/2/2021  Two pt identifiers were used to confirm  Pt presented s/p fall  Patient originally presented at [facility] and was transferred to [facility] for further medical management/ evaluation  Pt was admitted with a primary dx of:  Left distal femur fracture and other active problems including MICHAEL, acute blood loss anemia, leukocytosis, HLD, hx of pacer, HTN, AFib  Patient underwent OPEN REDUCTION W/ INTERNAL FIXATION (ORIF) DISTAL FEMUR; INSERTION RETROGRADE NAIL (Left) which was performed on 08/06/2021  PT now consulted for assessment of mobility and d/c needs  Pt with Up as tolerated orders  Pts current co morbidities affecting treatment include: Anxiety, arthritis, asthma, bilateral cataracts, fibromyalgia, HTN, Lyme disease, premature ventricular contraction, to regular assist, and personal factors including living alone   Pts current clinical presentation is Unstable/ Unpredictable (high complexity) due to Ongoing medical management for primary dx, Increased reliance on more restrictive AD compared to baseline, Decreased activity tolerance compared to baseline, Fall risk, Increased assistance needed from caregiver at current time, Current WBS, Continuous pulse oximetry monitoring , s/p surgical intervention    Upon evaluation, pt currently is requiring Mod Ax2 for bed mobility; Mod Ax2 for sit <-> stand transfers and max A x 2 for stand pivot transfers  Pt presents at PT eval functioning below baseline and currently w/ overall mobility deficits 2* to: BLE weakness, decreased ROM, impaired balance, decreased endurance, pain, decreased activity tolerance compared to baseline, fall risk, orthopedic restrictions  Pt currently at a fall risk 2* to impairments listed above  Based on the aforementioned PT evaluation, pt will continue to benefit from skilled Acute PT interventions to address stated impairments; to maximize functional mobility; for ongoing pt/ family training; and DME needs  At conclusion of PT session pt returned back in chair with phone and call bell within reach  Pt denies any further questions at this time  PT is currently recommending Rehab  PT will continue to follow during hospital stay  Barriers to Discharge Inaccessible home environment;Decreased caregiver support   Goals   Patient Goals " to get better"   STG Expiration Date 08/16/21   Short Term Goal #1 In 10 days pt will complete: 1) Bed mobility skills with min Ax1 while maintaining NWB to LLE to increase safety and independence as well as decrease caregiver burden  2) Functional sit <-> stand and stand pivot transfers with min Ax1 while maintaining NWB to LLE to promote increased independence, safety, and QOL  3) Improve balance grades by 1/2 grade to increase safety with all mobility and decrease fall risk  4) Improve BLE strength by 1/2 grade to help increase overall functional mobility and decrease fall risk      Plan   Treatment/Interventions Functional transfer training;LE strengthening/ROM; Therapeutic exercise; Endurance training;Patient/family training;Equipment eval/education; Bed mobility;Continued evaluation;Spoke to nursing;OT   PT Frequency Other (Comment)  (3-6x a week )   Recommendation   PT Discharge Recommendation Post acute rehabilitation services   Equipment Recommended   (continue to assess )   PT - OK to Discharge Yes  (to rehab when medically cleared )   AM-PAC Basic Mobility Inpatient   Turning in Bed Without Bedrails 2   Lying on Back to Sitting on Edge of Flat Bed 1   Moving Bed to Chair 1   Standing Up From Chair 1   Walk in Room 1   Climb 3-5 Stairs 1   Basic Mobility Inpatient Raw Score 7   Turning Head Towards Sound 4   Follow Simple Instructions 4   Low Function Basic Mobility Raw Score 15   Low Function Basic Mobility Standardized Score 23 9   Modified [place] Scale   Modified Palmer Scale 4   Barthel Index   Feeding 10   Bathing 0   Grooming Score 0   Dressing Score 0   Bladder Score 0   Bowels Score 10   Toilet Use Score 5   Transfers (Bed/Chair) Score 5   Mobility (Level Surface) Score 0   Stairs Score 0   Barthel Index Score 30   Portions of the documentation may have been created using voice recognition software  Occasional wrong word or sound alike substitutions may have occurred due to the inherent limitations of the voice recognition software  Read the chart carefully and recognize, using context, where substitutions have occurred      Evelyn Randle, PT, DPT

## 2021-08-06 NOTE — PHYSICAL THERAPY NOTE
PHYSICAL THERAPY EVALUATION  NAME:  Stan Schwab  DATE: 08/06/21    AGE:   66 y o  Mrn:   [id number]  ADMIT DX:  Femur fracture ([address] ) Ernestine Brown    Past Medical History:   Diagnosis Date    Abnormal ECG     Last Assessed 9/29/2016    Anxiety     Last Assessed 6/08/2016    Arthritis     knees    Asthma     Last Assessed 11/06/2013    Atrial premature complex     Cataract, bilateral     Last Assessed 7/14/2016    Cataract, left eye     Both eyes  Had surgery on left   Difficulty swallowing     Dizziness     Fibromyalgia     Gastric reflux     Hypertension     Lyme disease     Premature ventricular contraction     Primary osteoarthritis of both knees     Last Assessed 7/14/2016    Rheumatic fever     Sore throat     Tuberculosis     Tuberculosis 1945       Past Surgical History:   Procedure Laterality Date    APPENDECTOMY      CARDIAC PACEMAKER PLACEMENT  2019    HYSTERECTOMY      MT DILATE ESOPHAGUS N/A 4/6/2016    Procedure: DILATATION ESOPHAGEAL;  Surgeon: Don Pulido MD;  [facility]; Service: Gastroenterology    MT EGD TRANSORAL BIOPSY SINGLE/MULTIPLE N/A 4/6/2016    Procedure: ESOPHAGOGASTRODUODENOSCOPY (EGD); Surgeon: Don Pulido MD;  [facility]; Service: Gastroenterology    MT XCAPSL CTRC RMVL INSJ IO LENS PROSTH W/O ECP Left 3/15/2016    Procedure: EXTRACTION EXTRACAPSULAR CATARACT PHACO INTRAOCULAR LENS (IOL); Surgeon: Carlito Nelson MD;  [facility];  Service: Ophthalmology    REPLACEMENT TOTAL KNEE Right     REPLACEMENT TOTAL KNEE      right     TONSILLECTOMY         Length Of Stay: 4    PHYSICAL THERAPY EVALUATION:        08/06/21 0906   Note Type   Note type Evaluation   Pain Assessment   Pain Assessment Tool 0-10   Pain Score 6   Pain Location/Orientation Orientation: Left; Location: Hip   Pain Onset/Description Onset: Ongoing;Frequency: Constant/Continuous; Descriptor: Aching   Effect of Pain on Daily Activities Increased pain with activity   Patient's Stated Pain Goal No pain   Hospital Pain Intervention(s) Ambulation/increased activity;Repositioned   Home Living   Type of [address] One level;Elevator  (0 TANNER )   Home Equipment Walker;Cane   Additional Comments Pt reports living alone and states she has limited social support  Patient states she sees her family approximately 2 times a year    Prior Function   Level of [place] Independent with ADLs and functional mobility   Lives With Alone   Receives Help From [employer]  in the last 6 months 1 to 4   Comments Patient reports use of a rolling walker for ambulation prior to admission   Restrictions/Precautions   Weight Bearing Precautions Per Order Yes   LLE Weight Bearing Per Order NWB   Other Precautions Chair Alarm; Bed Alarm;Multiple lines; Fall Risk;Pain;O2;WBS   General   Additional Pertinent History  Prior to mobility pts HR was 90s- 105 bpm, during and post mobility pts HR was noted to be 140s-150s bpm  Yair Wilson RN aware   Family/Caregiver Present No   Cognition   Overall Cognitive Status WFL   Arousal/Participation Alert   Orientation Level Oriented X4   Memory Within functional limits   Following Commands Follows one step commands without difficulty   RUE Assessment   RUE Assessment WFL   LUE Assessment   LUE Assessment WFL   RLE Assessment   RLE Assessment WFL   Strength RLE   RLE Overall Strength 4-/5   LLE Assessment   LLE Assessment X  (AROM limited throughout 2* to pain )   Strength LLE   LLE Overall Strength 2+/5  (limited by pain )   Bed Mobility   Supine to Sit 3  Moderate assistance   Additional items Assist x 2; Increased time required;Verbal cues   Transfers   Sit to Stand 3  Moderate assistance   Additional items Assist x 2; Increased time required;Verbal cues   Stand to Sit 3  Moderate assistance   Additional items Assist x 2; Increased time required;Verbal cues   Stand pivot 2  Maximal assistance   Additional items Assist x 2; Increased time required;Verbal cues   Additional Comments VC and TC needed for hand placement and safety  b/l HHA utilized for transfers    Ambulation/Elevation   Gait pattern Not appropriate  (NA, Decreased standing tolerance at current time)   Balance   Static Sitting Fair -   Dynamic Sitting Poor +   Static Standing Poor   Dynamic Standing Poor -   Endurance Deficit   Endurance Deficit Yes   Endurance Deficit Description fatigue, pain    Activity Tolerance   Activity Tolerance Patient limited by fatigue;Patient limited by pain   Medical Staff Made Aware Chise, OT; OT present for co evaluation due to pts unstable presentation, new physical limitations/ restrictions, being s/p sx intervention, and decreased activity tolerance which all limit pts overall physical performance    Nurse Made Aware Patient appropriate to be seen and mobilized per nursing   Assessment   Prognosis Good   Problem List Decreased strength;Decreased range of motion;Decreased endurance; Impaired balance;Decreased mobility;Pain;Orthopedic restrictions   Assessment Pt is 66 y o  female seen for PT evaluation s/p admit to [facility] on 8/2/2021  Two pt identifiers were used to confirm  Pt presented s/p fall  Patient originally presented at [facility] and was transferred to [facility] for further medical management/ evaluation  Pt was admitted with a primary dx of:  Left distal femur fracture and other active problems including MICHAEL, acute blood loss anemia, leukocytosis, HLD, hx of pacer, HTN, AFib  Patient underwent OPEN REDUCTION W/ INTERNAL FIXATION (ORIF) DISTAL FEMUR; INSERTION RETROGRADE NAIL (Left) which was performed on 08/06/2021  PT now consulted for assessment of mobility and d/c needs  Pt with Up as tolerated orders  Pts current co morbidities affecting treatment include:   Anxiety, arthritis, asthma, bilateral cataracts, fibromyalgia, HTN, Lyme disease, premature ventricular contraction, to regular assist, and personal factors including living alone  Pts current clinical presentation is Unstable/ Unpredictable (high complexity) due to Ongoing medical management for primary dx, Increased reliance on more restrictive AD compared to baseline, Decreased activity tolerance compared to baseline, Fall risk, Increased assistance needed from caregiver at current time, Current WBS, Continuous pulse oximetry monitoring , s/p surgical intervention    Upon evaluation, pt currently is requiring Mod Ax2 for bed mobility; Mod Ax2 for sit <-> stand transfers and max A x 2 for stand pivot transfers  Pt presents at PT eval functioning below baseline and currently w/ overall mobility deficits 2* to: BLE weakness, decreased ROM, impaired balance, decreased endurance, pain, decreased activity tolerance compared to baseline, fall risk, orthopedic restrictions  Pt currently at a fall risk 2* to impairments listed above  Based on the aforementioned PT evaluation, pt will continue to benefit from skilled Acute PT interventions to address stated impairments; to maximize functional mobility; for ongoing pt/ family training; and DME needs  At conclusion of PT session pt returned back in chair with phone and call bell within reach  Pt denies any further questions at this time  PT is currently recommending Rehab  PT will continue to follow during hospital stay  Barriers to Discharge Inaccessible home environment;Decreased caregiver support   Goals   Patient Goals " to get better"   STG Expiration Date 08/16/21   Short Term Goal #1 In 10 days pt will complete: 1) Bed mobility skills with min Ax1 while maintaining NWB to LLE to increase safety and independence as well as decrease caregiver burden  2) Functional sit <-> stand and stand pivot transfers with min Ax1 while maintaining NWB to LLE to promote increased independence, safety, and QOL  3) Improve balance grades by 1/2 grade to increase safety with all mobility and decrease fall risk    4) Improve BLE strength by 1/2 grade to help increase overall functional mobility and decrease fall risk  Plan   Treatment/Interventions Functional transfer training;LE strengthening/ROM; Therapeutic exercise; Endurance training;Patient/family training;Equipment eval/education; Bed mobility;Continued evaluation;Spoke to nursing;OT   PT Frequency Other (Comment)  (3-6x a week )   Recommendation   PT Discharge Recommendation Post acute rehabilitation services   Equipment Recommended   (continue to assess )   PT - OK to Discharge Yes  (to rehab when medically cleared )   AM-PAC Basic Mobility Inpatient   Turning in Bed Without Bedrails 2   Lying on Back to Sitting on Edge of Flat Bed 1   Moving Bed to Chair 1   Standing Up From Chair 1   Walk in Room 1   Climb 3-5 Stairs 1   Basic Mobility Inpatient Raw Score 7   Turning Head Towards Sound 4   Follow Simple Instructions 4   Low Function Basic Mobility Raw Score 15   Low Function Basic Mobility Standardized Score 23 9   Modified Kittson Scale   Modified Kittson Scale 4   Barthel Index   Feeding 10   Bathing 0   Grooming Score 0   Dressing Score 0   Bladder Score 0   Bowels Score 10   Toilet Use Score 5   Transfers (Bed/Chair) Score 5   Mobility (Level Surface) Score 0   Stairs Score 0   Barthel Index Score 30   Portions of the documentation may have been created using voice recognition software  Occasional wrong word or sound alike substitutions may have occurred due to the inherent limitations of the voice recognition software  Read the chart carefully and recognize, using context, where substitutions have occurred      Jana Garrido, PT, DPT

## 2021-08-07 ENCOUNTER — APPOINTMENT (INPATIENT)
Dept: RADIOLOGY | Facility: HOSPITAL | Age: 76
DRG: 480 | End: 2021-08-07
Payer: MEDICARE

## 2021-08-07 LAB
ANION GAP SERPL CALCULATED.3IONS-SCNC: 5 MMOL/L (ref 4–13)
ANION GAP SERPL CALCULATED.3IONS-SCNC: 5 MMOL/L (ref 4–13)
ATRIAL RATE: 153 BPM
BASOPHILS # BLD MANUAL: 0 THOUSAND/UL (ref 0–0.1)
BASOPHILS NFR MAR MANUAL: 0 % (ref 0–1)
BUN SERPL-MCNC: 27 MG/DL (ref 5–25)
BUN SERPL-MCNC: 27 MG/DL (ref 5–25)
CALCIUM SERPL-MCNC: 8.4 MG/DL (ref 8.3–10.1)
CALCIUM SERPL-MCNC: 8.7 MG/DL (ref 8.3–10.1)
CHLORIDE SERPL-SCNC: 104 MMOL/L (ref 100–108)
CHLORIDE SERPL-SCNC: 107 MMOL/L (ref 100–108)
CO2 SERPL-SCNC: 26 MMOL/L (ref 21–32)
CO2 SERPL-SCNC: 28 MMOL/L (ref 21–32)
CREAT SERPL-MCNC: 0.82 MG/DL (ref 0.6–1.3)
CREAT SERPL-MCNC: 0.98 MG/DL (ref 0.6–1.3)
EOSINOPHIL # BLD MANUAL: 0 THOUSAND/UL (ref 0–0.4)
EOSINOPHIL NFR BLD MANUAL: 0 % (ref 0–6)
ERYTHROCYTE [DISTWIDTH] IN BLOOD BY AUTOMATED COUNT: 13.8 % (ref 11.6–15.1)
ERYTHROCYTE [DISTWIDTH] IN BLOOD BY AUTOMATED COUNT: 15.8 % (ref 11.6–15.1)
GFR SERPL CREATININE-BSD FRML MDRD: 56 ML/MIN/1.73SQ M
GFR SERPL CREATININE-BSD FRML MDRD: 69 ML/MIN/1.73SQ M
GLUCOSE SERPL-MCNC: 134 MG/DL (ref 65–140)
GLUCOSE SERPL-MCNC: 161 MG/DL (ref 65–140)
HCT VFR BLD AUTO: 22.3 % (ref 34.8–46.1)
HCT VFR BLD AUTO: 27.5 % (ref 34.8–46.1)
HGB BLD-MCNC: 7.2 G/DL (ref 11.5–15.4)
HGB BLD-MCNC: 8.7 G/DL (ref 11.5–15.4)
HYPERCHROMIA BLD QL SMEAR: PRESENT
LYMPHOCYTES # BLD AUTO: 1.47 THOUSAND/UL (ref 0.6–4.47)
LYMPHOCYTES # BLD AUTO: 7 % (ref 14–44)
MCH RBC QN AUTO: 29.5 PG (ref 26.8–34.3)
MCH RBC QN AUTO: 30.3 PG (ref 26.8–34.3)
MCHC RBC AUTO-ENTMCNC: 31.6 G/DL (ref 31.4–37.4)
MCHC RBC AUTO-ENTMCNC: 32.3 G/DL (ref 31.4–37.4)
MCV RBC AUTO: 93 FL (ref 82–98)
MCV RBC AUTO: 94 FL (ref 82–98)
MONOCYTES # BLD AUTO: 1.05 THOUSAND/UL (ref 0–1.22)
MONOCYTES NFR BLD: 5 % (ref 4–12)
MYELOCYTES NFR BLD MANUAL: 1 % (ref 0–1)
NEUTROPHILS # BLD MANUAL: 18.21 THOUSAND/UL (ref 1.85–7.62)
NEUTS SEG NFR BLD AUTO: 87 % (ref 43–75)
NRBC BLD AUTO-RTO: 1 /100 WBCS
NRBC BLD AUTO-RTO: 3 /100 WBCS
PLATELET # BLD AUTO: 256 THOUSANDS/UL (ref 149–390)
PLATELET # BLD AUTO: 290 THOUSANDS/UL (ref 149–390)
PLATELET BLD QL SMEAR: ADEQUATE
PMV BLD AUTO: 9.4 FL (ref 8.9–12.7)
PMV BLD AUTO: 9.8 FL (ref 8.9–12.7)
POTASSIUM SERPL-SCNC: 4.9 MMOL/L (ref 3.5–5.3)
POTASSIUM SERPL-SCNC: 5.1 MMOL/L (ref 3.5–5.3)
QRS AXIS: -15 DEGREES
QRSD INTERVAL: 118 MS
QT INTERVAL: 302 MS
QTC INTERVAL: 442 MS
RBC # BLD AUTO: 2.38 MILLION/UL (ref 3.81–5.12)
RBC # BLD AUTO: 2.95 MILLION/UL (ref 3.81–5.12)
RBC MORPH BLD: PRESENT
SODIUM SERPL-SCNC: 135 MMOL/L (ref 136–145)
SODIUM SERPL-SCNC: 140 MMOL/L (ref 136–145)
T WAVE AXIS: 155 DEGREES
VENTRICULAR RATE: 129 BPM
WBC # BLD AUTO: 20.93 THOUSAND/UL (ref 4.31–10.16)
WBC # BLD AUTO: 22.12 THOUSAND/UL (ref 4.31–10.16)

## 2021-08-07 PROCEDURE — 93010 ELECTROCARDIOGRAM REPORT: CPT | Performed by: INTERNAL MEDICINE

## 2021-08-07 PROCEDURE — 99024 POSTOP FOLLOW-UP VISIT: CPT | Performed by: ORTHOPAEDIC SURGERY

## 2021-08-07 PROCEDURE — 71045 X-RAY EXAM CHEST 1 VIEW: CPT

## 2021-08-07 PROCEDURE — 93005 ELECTROCARDIOGRAM TRACING: CPT

## 2021-08-07 PROCEDURE — 99232 SBSQ HOSP IP/OBS MODERATE 35: CPT | Performed by: NURSE PRACTITIONER

## 2021-08-07 PROCEDURE — 30233N1 TRANSFUSION OF NONAUTOLOGOUS RED BLOOD CELLS INTO PERIPHERAL VEIN, PERCUTANEOUS APPROACH: ICD-10-PCS | Performed by: INTERNAL MEDICINE

## 2021-08-07 PROCEDURE — P9016 RBC LEUKOCYTES REDUCED: HCPCS

## 2021-08-07 PROCEDURE — 85027 COMPLETE CBC AUTOMATED: CPT | Performed by: NURSE PRACTITIONER

## 2021-08-07 PROCEDURE — 85007 BL SMEAR W/DIFF WBC COUNT: CPT | Performed by: NURSE PRACTITIONER

## 2021-08-07 PROCEDURE — 80048 BASIC METABOLIC PNL TOTAL CA: CPT | Performed by: NURSE PRACTITIONER

## 2021-08-07 PROCEDURE — 80048 BASIC METABOLIC PNL TOTAL CA: CPT | Performed by: PHYSICIAN ASSISTANT

## 2021-08-07 RX ORDER — METOPROLOL TARTRATE 5 MG/5ML
5 INJECTION INTRAVENOUS ONCE
Status: COMPLETED | OUTPATIENT
Start: 2021-08-07 | End: 2021-08-07

## 2021-08-07 RX ORDER — FUROSEMIDE 10 MG/ML
20 INJECTION INTRAMUSCULAR; INTRAVENOUS ONCE
Status: COMPLETED | OUTPATIENT
Start: 2021-08-07 | End: 2021-08-07

## 2021-08-07 RX ORDER — ALBUTEROL SULFATE 90 UG/1
2 AEROSOL, METERED RESPIRATORY (INHALATION) EVERY 4 HOURS PRN
Status: DISCONTINUED | OUTPATIENT
Start: 2021-08-07 | End: 2021-08-12 | Stop reason: HOSPADM

## 2021-08-07 RX ORDER — LEVALBUTEROL 1.25 MG/.5ML
1.25 SOLUTION, CONCENTRATE RESPIRATORY (INHALATION) ONCE
Status: DISCONTINUED | OUTPATIENT
Start: 2021-08-07 | End: 2021-08-08

## 2021-08-07 RX ADMIN — SENNOSIDES 8.6 MG: 8.6 TABLET ORAL at 08:11

## 2021-08-07 RX ADMIN — RIVAROXABAN 15 MG: 15 TABLET, FILM COATED ORAL at 08:12

## 2021-08-07 RX ADMIN — ALBUTEROL SULFATE 2 PUFF: 90 AEROSOL, METERED RESPIRATORY (INHALATION) at 15:26

## 2021-08-07 RX ADMIN — ACETAMINOPHEN 975 MG: 325 TABLET, FILM COATED ORAL at 14:24

## 2021-08-07 RX ADMIN — EZETIMIBE 10 MG: 10 TABLET ORAL at 08:12

## 2021-08-07 RX ADMIN — ACETAMINOPHEN 975 MG: 325 TABLET, FILM COATED ORAL at 21:35

## 2021-08-07 RX ADMIN — METOPROLOL TARTRATE 5 MG: 1 INJECTION, SOLUTION INTRAVENOUS at 19:24

## 2021-08-07 RX ADMIN — ALBUTEROL SULFATE 2 PUFF: 90 AEROSOL, METERED RESPIRATORY (INHALATION) at 21:36

## 2021-08-07 RX ADMIN — OXYCODONE HYDROCHLORIDE 5 MG: 5 TABLET ORAL at 10:39

## 2021-08-07 RX ADMIN — DOCUSATE SODIUM 100 MG: 100 CAPSULE ORAL at 08:12

## 2021-08-07 RX ADMIN — FUROSEMIDE 20 MG: 10 INJECTION, SOLUTION INTRAVENOUS at 19:26

## 2021-08-07 RX ADMIN — OXYCODONE HYDROCHLORIDE 5 MG: 5 TABLET ORAL at 21:35

## 2021-08-07 RX ADMIN — DOCUSATE SODIUM 100 MG: 100 CAPSULE ORAL at 17:25

## 2021-08-07 RX ADMIN — FUROSEMIDE 20 MG: 10 INJECTION, SOLUTION INTRAVENOUS at 17:25

## 2021-08-07 RX ADMIN — ACETAMINOPHEN 975 MG: 325 TABLET, FILM COATED ORAL at 06:05

## 2021-08-07 RX ADMIN — METOPROLOL TARTRATE 50 MG: 50 TABLET, FILM COATED ORAL at 21:35

## 2021-08-07 RX ADMIN — ALBUTEROL SULFATE 2 PUFF: 90 AEROSOL, METERED RESPIRATORY (INHALATION) at 11:13

## 2021-08-07 RX ADMIN — METOPROLOL TARTRATE 50 MG: 50 TABLET, FILM COATED ORAL at 08:12

## 2021-08-07 NOTE — ASSESSMENT & PLAN NOTE
· Secondary to femur fracture  · With down trend today 7 2/22 3 will repeat labs post one unit prbc   · Transfuse now monitor fluid volume pt a little tachypneic this am   · Slight wheeze will transfuse with low dose diuretic post transfusion   · chk labs at 3pm

## 2021-08-07 NOTE — QUICK NOTE
QUICK NOTE - Deterioration Index  Laura Myers 66 y o  female MRN: [id number]  Unit/Bed#: [facility] 603-87 Encounter: [id number]    Date Paged: [phone]  Time Paged: [phone]  Room #: 667  Arrival Time: 1800  Deterioration index score at time of page: 61 8  %  TT sent to Dr Meena Mendoza from primary team  Need to escalate level of care: no     PROBLEMS resulting in high DI score: Factors Contributing to Score   Factor Value   20% Age 66   18% Pulse 138   18% Supplemental oxygen Nasal cannula   9% Respiratory rate 20   6% Pulse oximetry 89 %   6% Cardiac rhythm Atrial fibrillation   6% WBC count 20 93 Thousand/uL    Factor Value   6% Potassium 4 9 mmol/L   5% Hematocrit 22 3 %   2% Sodium 135 mmol/L   2% Systolic 688   2% BUN 27 mg/dL   <1% Blood pH 7 355   <1% Temperature 98 8 °F (37 1 °C)         Factors Not Contributing to Score               PLAN:     Pt off monitor upon my arrival   Pt with diffuse expiratory wheezing throughout and use of accessory muscles   O2 Sats >93%   Pulse check irregular with    Tele applied, Afib with -135   EKG shows afib with rates ranging from 129   /64   Metoprolol 5mg IV x 1 dose, Stat CXR, Xopenex neb, lasix 20 IV x 1 dose    Please contact critical care via [employer] with any questions or concerns       Vitals:   Vitals:    21 1116 21 1150 21 1337 21 1505   BP: 130/59 130/62  128/66   BP Location: Left arm      Pulse: 90 85  (!) 110   Resp:  20  20   Temp: 98 9 °F (37 2 °C) 98 5 °F (36 9 °C)  98 8 °F (37 1 °C)   TempSrc: Oral Oral  Oral   SpO2: 92%  93% 92%       Respiratory:  SpO2: SpO2: 92 %, SpO2 Activity: SpO2 Activity: At Rest, SpO2 Device: O2 Device: Nasal cannula  Nasal Cannula O2 Flow Rate (L/min): 1 L/min    Temperature: Temp (24hrs), Av 5 °F (36 9 °C), Min:98 1 °F (36 7 °C), Max:98 9 °F (37 2 °C)  Current: Temperature: 98 8 °F (37 1 °C)    Labs:   Results from last 7 days   Lab Units 21  0522 21  1012 08/05/21  1038 08/04/21  0444 08/03/21  0442   WBC Thousand/uL 20 93* 16 61* 13 24*  --  14 74*   HEMOGLOBIN g/dL 7 2* 7 8* 10 2*  --  11 1*   I STAT HEMOGLOBIN   --   --   --    < >  --    HEMATOCRIT % 22 3* 24 3* 32 2*  --  34 6*   HEMATOCRIT, ISTAT   --   --   --    < >  --    PLATELETS Thousands/uL 256 206 169  --  276   NEUTROS PCT %  --  83* 81*  --  83*   MONOS PCT %  --  9 9  --  8   MONO PCT % 5  --   --   --   --     < > = values in this interval not displayed       Results from last 7 days   Lab Units 08/07/21  0522 08/06/21  3431 08/05/21  0901 08/05/21  0453 08/03/21  0442 08/02/21  1547   SODIUM mmol/L 135* 135*  --  132* 135* 135   POTASSIUM mmol/L 4 9 4 5  --  4 1 5 2 4 6   CHLORIDE mmol/L 104 106  --  101 104 100   CO2 mmol/L 26 24  --  24 25 28   CO2, I-STAT mmol/L  --   --  24  --   --   --    BUN mg/dL 27* 18  --  24 23 17   CREATININE mg/dL 0 82 0 83  --  0 92 1 20 0 90   CALCIUM mg/dL 8 4 8 2*  --  8 6 8 9 9 3   ALK PHOS U/L  --   --   --   --  73 71 2   ALT U/L  --   --   --   --  14 9   AST U/L  --   --   --   --  14 15   GLUCOSE, ISTAT mg/dl  --   --  127  --   --   --      Results from last 7 days   Lab Units 08/03/21  0442   MAGNESIUM mg/dL 2 2         Results from last 7 days   Lab Units 08/02/21  1547   TROPONIN I ng/mL <0 03           Code Status: Level 1 - Full Code

## 2021-08-07 NOTE — ASSESSMENT & PLAN NOTE
· Patient presenting with femur fracture  · Spday # 2  OPEN REDUCTION W/ INTERNAL FIXATION (ORIF) DISTAL FEMUR; INSERTION RETROGRADE NAIL (Left)  · Continue with supportive care and pain control  · Gentle hydration discontinued  Yesterday  · Orthopedic surgery appreciated   · -resumed xarelto post lovenox 8/7   · Transfusing one unit prbc now

## 2021-08-07 NOTE — ASSESSMENT & PLAN NOTE
· Metoprolol therapy  ·  Monitor heart rate  Patient with history pacemaker placement    · Resume Xarelto now Day # 2 per ortho was receiving prophylactic Lovenox will transition to xarelto in am   · Slightly tachycardic will continue telem   · - order one time low dose lasix iv   · Pt anemic will transfuse one unit now  · Pt slightly tachpneic slight wheeze - pt reports she is asthmatic - order respiratory protocol   · Prn bb iv yes

## 2021-08-07 NOTE — ASSESSMENT & PLAN NOTE
· Secondary to volume depletion, anemia, ACEI, femur fracture  · Will administer low dose diuretic post  blood transfusion   · ACEI on hold  · Hold parameters in place for BP meds  · Avoid nephrotoxic meds  · monitor

## 2021-08-07 NOTE — PROGRESS NOTES
[address] [facility]  Progress Note - Lucas Poor 1945, 66 y o  female MRN: [id number]  Unit/Bed#: [facility] 608-01 Encounter: [id number]  Primary Care Provider: Becky Mitchell MD   Date and time admitted to hospital: 8/2/2021 10:51 PM    * Femur fracture [facility])  Assessment & Plan  · Patient presenting with femur fracture  · Spday # 2  OPEN REDUCTION W/ INTERNAL FIXATION (ORIF) DISTAL FEMUR; INSERTION RETROGRADE NAIL (Left)  · Continue with supportive care and pain control  · Gentle hydration discontinued  Yesterday  · Orthopedic surgery appreciated   · -resumed xarelto post lovenox 8/7   · Transfusing one unit prbc now     MICHAEL (acute kidney injury) ([facility] )  Assessment & Plan  · Secondary to volume depletion, anemia, ACEI, femur fracture  · Will administer low dose diuretic post  blood transfusion   · ACEI on hold  · Hold parameters in place for BP meds  · Avoid nephrotoxic meds  · monitor    Acute blood loss anemia  Assessment & Plan  · Secondary to femur fracture  · With down trend today 7 2/22 3 will repeat labs post one unit prbc   · Transfuse now monitor fluid volume pt a little tachypneic this am   · Slight wheeze will transfuse with low dose diuretic post transfusion   · chk labs at 3pm       Leukocytosis  Assessment & Plan  · possible stress reaction from fracture  · Increased today will trend pt remains afebrile , continues to trend up   · Afebrile using incentive , Still has hankins cath will discontinue after tt with ortho   · Will transfuse with one unit prbc monitor   · chk labs a few hours after blood transfusion   · Also note elevation somewhat chronic although not this high   · Continue to monitor post op at this time       Hyperlipidemia  Assessment & Plan  · Zetia    Pacemaker  Assessment & Plan  · Patient with history tachy-smita syndrome with pacer placement  On beta-blockade  Hypertension  Assessment & Plan  · Continue with blood pressure meds  · Metoprolol and Norvasc  · Hold lisinopril perioperatively    Atrial fibrillation (HCC)  Assessment & Plan  · Metoprolol therapy  ·  Monitor heart rate  Patient with history pacemaker placement  · Resume Xarelto now Day # 2 per ortho was receiving prophylactic Lovenox will transition to xarelto in am   · Slightly tachycardic will continue telem   · - order one time low dose lasix iv   · Pt anemic will transfuse one unit now  · Pt slightly tachpneic slight wheeze - pt reports she is asthmatic - order respiratory protocol   · Prn bb iv           VTE Pharmacologic Prophylaxis: VTE Score: 10 High Risk (Score >/= 5) - Pharmacological DVT Prophylaxis Ordered: rivaroxaban (Xarelto)  Sequential Compression Devices Ordered  Patient Centered Rounds: I performed bedside rounds with nursing staff today  Discussions with Specialists or Other Care Team Provider: nursing     Education and Discussions with Family / Patient: Attempted to update  (nephew) via phone  Unable to contact  called nephews number is company that placed me on hold - did not leave message will talk with pt again tomorrow     Time Spent for Care: 45 minutes  More than 50% of total time spent on counseling and coordination of care as described above  Current Length of Stay: 5 day(s)  Current Patient Status: Inpatient   Certification Statement: The patient will continue to require additional inpatient hospital stay due to ongoing post op managmnt anemic needs transfusion   Discharge Plan: Anticipate discharge in 48-72 hrs to rehab facility  Code Status: Level 1 - Full Code    Subjective:   Patient is lying in bed has 3 L of oxygen on  She reports after discussions that she does indeed feel a little short of breath she appears to be slightly tachypneic from day prior  She has slight anterior wheeze no posterior crackles noted  Left lower extremity with clean dry dressings no visible evidence of drainage    Patient is denies any nausea or vomiting she is eating well  Still has Duong catheter in place  Does feel like her heart is pounding however heart rate at this time is 98  Had patient sign a blood consent for transfusion discussed plan of care patient does wish that I call her nephew over her daughter  She does report that her daughter is not living close and rather her nephew, who she reports is her power of   Objective:     Vitals:   Temp (24hrs), Av 5 °F (36 9 °C), Min:98 1 °F (36 7 °C), Max:98 9 °F (37 2 °C)    Temp:  [98 1 °F (36 7 °C)-98 9 °F (37 2 °C)] 98 8 °F (37 1 °C)  HR:  [] 110  Resp:  [17-21] 20  BP: (113-148)/(50-72) 128/66  SpO2:  [92 %-95 %] 92 %  There is no height or weight on file to calculate BMI  Input and Output Summary (last 24 hours): Intake/Output Summary (Last 24 hours) at 2021 1639  Last data filed at 2021 1505  Gross per 24 hour   Intake 590 ml   Output 1950 ml   Net -1360 ml       Physical Exam:   Physical Exam  Constitutional:       General: She is not in acute distress  Appearance: She is not ill-appearing, toxic-appearing or diaphoretic  HENT:      Head: Normocephalic  Mouth/Throat:      Pharynx: Oropharynx is clear  Eyes:      General:         Right eye: No discharge  Left eye: No discharge  Conjunctiva/sclera: Conjunctivae normal    Cardiovascular:      Rate and Rhythm: Normal rate  Heart sounds: No murmur heard  No friction rub  No gallop  Comments: Heart rate 98  Pulmonary:      Effort: No respiratory distress  Breath sounds: No stridor  Wheezing present  No rhonchi or rales  Comments: Slightly tachypneic, anterior wheeze  Chest:      Chest wall: No tenderness  Abdominal:      General: There is no distension  Palpations: There is no mass  Tenderness: There is no abdominal tenderness  There is no right CVA tenderness, left CVA tenderness, guarding or rebound  Hernia: No hernia is present  Musculoskeletal:         General: Tenderness (Left lower extremity with dressing Ace wrap from the toes to upper groin) and signs of injury (Left leg) present  No swelling or deformity  Right lower leg: No edema  Left lower leg: No edema  Skin:     Coloration: Skin is pale  Skin is not jaundiced  Findings: No bruising, erythema, lesion or rash  Neurological:      Mental Status: She is alert and oriented to person, place, and time  Psychiatric:         Behavior: Behavior normal           Additional Data:     Labs:  Results from last 7 days   Lab Units 08/07/21  0522 08/06/21  0624   WBC Thousand/uL 20 93* 16 61*   HEMOGLOBIN g/dL 7 2* 7 8*   HEMATOCRIT % 22 3* 24 3*   PLATELETS Thousands/uL 256 206   NEUTROS PCT %  --  83*   LYMPHS PCT %  --  6*   LYMPHO PCT % 7*  --    MONOS PCT %  --  9   MONO PCT % 5  --    EOS PCT % 0 0     Results from last 7 days   Lab Units 08/07/21  0522 08/04/21  0444 08/03/21  0442   SODIUM mmol/L 135*  --  135*   POTASSIUM mmol/L 4 9  --  5 2   CHLORIDE mmol/L 104  --  104   CO2 mmol/L 26  --  25   CO2, I-STAT   --    < >  --    BUN mg/dL 27*  --  23   CREATININE mg/dL 0 82  --  1 20   ANION GAP mmol/L 5  --  6   CALCIUM mg/dL 8 4  --  8 9   ALBUMIN g/dL  --   --  3 1*   TOTAL BILIRUBIN mg/dL  --   --  0 43   ALK PHOS U/L  --   --  73   ALT U/L  --   --  14   AST U/L  --   --  14   GLUCOSE RANDOM mg/dL 134  --  143*    < > = values in this interval not displayed  Results from last 7 days   Lab Units 08/04/21  0926   INR  1 34*         Results from last 7 days   Lab Units 08/03/21  0442   HEMOGLOBIN A1C % 5 6           Lines/Drains:  Invasive Devices     Peripheral Intravenous Line            Peripheral IV 08/05/21 Left Wrist 2 days    Peripheral IV 08/05/21 Right Wrist 2 days          Drain            Urethral Catheter Latex 16 Fr  2 days              Urinary Catheter:  Goal for removal: Orthopedics requesting nursing not removed due to dressing up to groin area  Patient is morbidly obese           Telemetry:  Telemetry Orders (From admission, onward)             48 Hour Telemetry Monitoring  Continuous x 48 hours     Question:  Reason for 48 Hour Telemetry  Answer:  Arrhythmias Requiring Medical Therapy (eg  SVT, Vtach/fib, Bradycardia, Uncontrolled A-fib)                 Telemetry Reviewed: Atrial fibrillation  HR averaging 90's  Indication for Continued Telemetry Use: Arrthymias requiring medical therapy           Imaging: No pertinent imaging reviewed  Recent Cultures (last 7 days):         Last 24 Hours Medication List:   Current Facility-Administered Medications   Medication Dose Route Frequency Provider Last Rate    acetaminophen  975 mg Oral Q8H [facility] PA-C      albuterol  2 puff Inhalation Q4H PRN Clayton Servin MD      ALPRAZolam  0 25 mg Oral BID PRN [facility], PA-C      amLODIPine  10 mg Oral Daily [facility], PA-C      diphenhydrAMINE  12 5 mg Oral Q6H PRN [facility], PA-C      diphenhydrAMINE-zinc acetate   Topical TID PRN [facility], PA-C      docusate sodium  100 mg Oral BID [facility], PA-C      ezetimibe  10 mg Oral Daily [facility]      furosemide  20 mg Intravenous Once JERI Sales      HYDROmorphone  0 2 mg Intravenous Q4H PRN Benjamin López MD      metoprolol  2 5 mg Intravenous Q6H PRN Barbara Carbajal, PA-DANDRE      metoprolol tartrate  50 mg Oral Q12H [facility], PA-DANDRE      ondansetron  4 mg Intravenous Q4H PRN [facility], PA-DANDRE      oxyCODONE  2 5 mg Oral Q4H PRN [facility]      oxyCODONE  5 mg Oral Q4H PRN [facility]      rivaroxaban  15 mg Oral Daily With Breakfast JERI Sales      senna  1 tablet Oral Daily Lorraine Ratfrancia, PATIENCE          Today, Patient Was Seen By: JERI Sales    **Please Note: This note may have been constructed using a voice recognition system  **

## 2021-08-07 NOTE — PROGRESS NOTES
Subjective: No acute events overnight  No acute distress  Resting comfortably in bed    Objective:  A 10 point ROS was performed; negative except as noted above       Lab Results   Component Value Date/Time    WBC 20 93 (H) 08/07/2021 05:22 AM    HGB 7 2 (L) 08/07/2021 05:22 AM       Vitals:    08/07/21 0241   BP: 124/60   Pulse: 102   Resp: 19   Temp: 98 4 °F (36 9 °C)   SpO2: 94%     Left lower extremity  Dressing C/D/I  Leg soft and depressible  Motor intact to EHL/FHL/TA/GS  Sensation intact to light touch to dp/sp/tib/ludy/saph distributions  Toes warm and well perfused with brisk capillary refill    Assessment: 66 y o  female post op day #2 from Left Retrograde femoral IM nail     Plan:  NWB LLE  Pain control  DVT ppx: Sequential compression device (Venodyne)  and Rivaroxaban (Xarelto)  PT/OT  Patient noted to have acute blood loss anemia due to a drop in Hbg of > 2 0g from preop levels, will monitor vital signs and resuscitate with IV fluids as needed; Hemoglobin noted to be 7 2 this morning in a patient with cardiac history and requiring nasal cannula; will recommend 1 unit of PRBCs  Dispo: Ortho will follow    Vincent Stein MD

## 2021-08-07 NOTE — RESPIRATORY THERAPY NOTE
RT Protocol Note  Charity Muller 66 y o  female MRN: [id number]  Unit/Bed#: [facility] 608-01 Encounter: [id number]    Assessment    Principal Problem:    Femur fracture ([facility] )  Active Problems:    [facility] ([facility] )    Hypertension    Pacemaker    Hyperlipidemia    Leukocytosis    Acute blood loss anemia    MICHAEL (acute kidney injury) ([facility] )      Home Pulmonary Medications:  Albuterol MDI 2PUFFS Q4PRN  Home Devices/Therapy: (P) Other (Comment)    Past Medical History:   Diagnosis Date    Abnormal ECG     Last Assessed 9/29/2016    Anxiety     Last Assessed 6/08/2016    Arthritis     knees    Asthma     Last Assessed 11/06/2013    Atrial premature complex     Cataract, bilateral     Last Assessed 7/14/2016    Cataract, left eye     Both eyes  Had surgery on left   Difficulty swallowing     Dizziness     Fibromyalgia     Gastric reflux     Hypertension     Lyme disease     Premature ventricular contraction     Primary osteoarthritis of both knees     Last Assessed 7/14/2016    Rheumatic fever     Sore throat     Tuberculosis     Tuberculosis 1945     Social History     Socioeconomic History    Marital status:      Spouse name: None    Number of children: None    Years of education: None    Highest education level: None   Occupational History    None   Tobacco Use    Smoking status: Never Smoker    Smokeless tobacco: Never Used   Substance and Sexual Activity    Alcohol use: No     Comment: Per [employer]    Drug use: No    Sexual activity: Never   Other Topics Concern    None   Social History Narrative    None     Social Determinants of Health     Financial Resource Strain:     Difficulty of Paying Living Expenses:    Food Insecurity:     Worried About Running Out of Food in the Last Year:     Ran Out of Food in the Last Year:    Transportation Needs:     Lack of Transportation (Medical):      Lack of Transportation (Non-Medical):    Physical Activity:     Days of Exercise per Week:     Minutes of Exercise per Session:    Stress:     Feeling of Stress :    Social Connections:     Frequency of Communication with Friends and Family:     Frequency of Social Gatherings with Friends and Family:     Attends Buddhist Services:     Active Member of Clubs or Organizations:     Attends Club or Organization Meetings:     Marital Status:    Intimate Partner Violence:     Fear of Current or Ex-Partner:     Emotionally Abused:     Physically Abused:     Sexually Abused:        Subjective         Objective    Physical Exam:   Assessment Type: (P) Assess only  General Appearance: (P) Alert, Awake  Respiratory Pattern: (P) Normal  Chest Assessment: (P) Chest expansion symmetrical  Bilateral Breath Sounds: (P) Clear, Diminished  O2 Device: (P) NC 1l/m    Vitals:  Blood pressure 113/50, pulse 103, temperature 98 4 °F (36 9 °C), temperature source Oral, resp  rate 20, SpO2 93 %  Imaging and other studies: I have personally reviewed pertinent reports  O2 Device: (P) NC 1l/m     Plan    Respiratory Plan: (P) Home Bronchodilator Patient pathway        Resp Comments: (P) Pt  admitted for femur fx  Pt has hx Asthma, uses Albuterol MDI prn  Will order same

## 2021-08-07 NOTE — ASSESSMENT & PLAN NOTE
· possible stress reaction from fracture  · Increased today will trend pt remains afebrile , continues to trend up   · Afebrile using incentive , Still has hankins cath will discontinue after tt with ortho   · Will transfuse with one unit prbc monitor   · chk labs a few hours after blood transfusion   · Also note elevation somewhat chronic although not this high   · Continue to monitor post op at this time

## 2021-08-07 NOTE — PLAN OF CARE
Problem: MOBILITY - ADULT  Goal: Maintain or return to baseline ADL function  Description: INTERVENTIONS:  -  Assess patient's ability to carry out ADLs; assess patient's baseline for ADL function and identify physical deficits which impact ability to perform ADLs (bathing, care of mouth/teeth, toileting, grooming, dressing, etc )  - Assess/evaluate cause of self-care deficits   - Assess range of motion  - Assess patient's mobility; develop plan if impaired  - Assess patient's need for assistive devices and provide as appropriate  - Encourage maximum independence but intervene and supervise when necessary  - Involve family in performance of ADLs  - Assess for home care needs following discharge   - Consider OT consult to assist with ADL evaluation and planning for discharge  - Provide patient education as appropriate  Outcome: Progressing  Goal: Maintains/Returns to pre admission functional level  Description: INTERVENTIONS:  - Perform BMAT or MOVE assessment daily    - Set and communicate daily mobility goal to care team and patient/family/caregiver  - Collaborate with rehabilitation services on mobility goals if consulted  - Perform Range of Motion 3 times a day  - Reposition patient every 2 hours    - Ambulate patient 3 times a day  - Out of bed to chair 3 times a day   - Out of bed for meals 3 times a day  - Out of bed for toileting  - Record patient progress and toleration of activity level   Outcome: Progressing     Problem: PAIN - ADULT  Goal: Verbalizes/displays adequate comfort level or baseline comfort level  Description: Interventions:  - Encourage patient to monitor pain and request assistance  - Assess pain using appropriate pain scale  - Administer analgesics based on type and severity of pain and evaluate response  - Implement non-pharmacological measures as appropriate and evaluate response  - Consider cultural and social influences on pain and pain management  - Notify physician/advanced practitioner if interventions unsuccessful or patient reports new pain  Outcome: Progressing     Problem: INFECTION - ADULT  Goal: Absence or prevention of progression during hospitalization  Description: INTERVENTIONS:  - Assess and monitor for signs and symptoms of infection  - Monitor lab/diagnostic results  - Monitor all insertion sites, i e  indwelling lines, tubes, and drains  - [place] appropriate cooling/warming therapies per order  - Administer medications as ordered  - Instruct and encourage patient and family to use good hand hygiene technique  - Identify and instruct in appropriate isolation precautions for identified infection/condition  Outcome: Progressing     Problem: SAFETY ADULT  Goal: Maintain or return to baseline ADL function  Description: INTERVENTIONS:  -  Assess patient's ability to carry out ADLs; assess patient's baseline for ADL function and identify physical deficits which impact ability to perform ADLs (bathing, care of mouth/teeth, toileting, grooming, dressing, etc )  - Assess/evaluate cause of self-care deficits   - Assess range of motion  - Assess patient's mobility; develop plan if impaired  - Assess patient's need for assistive devices and provide as appropriate  - Encourage maximum independence but intervene and supervise when necessary  - Involve family in performance of ADLs  - Assess for home care needs following discharge   - Consider OT consult to assist with ADL evaluation and planning for discharge  - Provide patient education as appropriate  Outcome: Progressing  Goal: Maintains/Returns to pre admission functional level  Description: INTERVENTIONS:  - Perform BMAT or MOVE assessment daily    - Set and communicate daily mobility goal to care team and patient/family/caregiver  - Collaborate with rehabilitation services on mobility goals if consulted  - Perform Range of Motion 3 times a day  - Reposition patient every 2 hours    - Ambulate patient 3 times a day  - Out of bed to chair 3 times a day   - Out of bed for meals 3 times a day  - Out of bed for toileting  - Record patient progress and toleration of activity level   Outcome: Progressing  Goal: Patient will remain free of falls  Description: INTERVENTIONS:  - Educate patient/family on patient safety including physical limitations  - Instruct patient to call for assistance with activity   - Consult OT/PT to assist with strengthening/mobility   - Keep Call bell within reach  - Keep bed low and locked with side rails adjusted as appropriate  - Keep care items and personal belongings within reach  - Initiate and maintain comfort rounds  - Make Fall Risk Sign visible to staff  - Offer Toileting every 2 Hours, in advance of need  - Initiate/Maintain alarms  - Apply yellow socks and bracelet for high fall risk patients  - Consider moving patient to room near nurses station  Outcome: Progressing     Problem: DISCHARGE PLANNING  Goal: Discharge to home or other facility with appropriate resources  Description: INTERVENTIONS:  - Identify barriers to discharge w/patient and caregiver  - Arrange for needed discharge resources and transportation as appropriate  - Identify discharge learning needs (meds, wound care, etc )  - Arrange for interpretive services to assist at discharge as needed  - Refer to Case Management Department for coordinating discharge planning if the patient needs post-hospital services based on physician/advanced practitioner order or complex needs related to functional status, cognitive ability, or social support system  Outcome: Progressing     Problem: Knowledge Deficit  Goal: Patient/family/caregiver demonstrates understanding of disease process, treatment plan, medications, and discharge instructions  Description: Complete learning assessment and assess knowledge base    Interventions:  - Provide teaching at level of understanding  - Provide teaching via preferred learning methods  Outcome: Progressing Problem: MUSCULOSKELETAL - ADULT  Goal: Maintain or return mobility to safest level of function  Description: INTERVENTIONS:  - Assess patient's ability to carry out ADLs; assess patient's baseline for ADL function and identify physical deficits which impact ability to perform ADLs (bathing, care of mouth/teeth, toileting, grooming, dressing, etc )  - Assess/evaluate cause of self-care deficits   - Assess range of motion  - Assess patient's mobility  - Assess patient's need for assistive devices and provide as appropriate  - Encourage maximum independence but intervene and supervise when necessary  - Involve family in performance of ADLs  - Assess for home care needs following discharge   - Consider OT consult to assist with ADL evaluation and planning for discharge  - Provide patient education as appropriate  Outcome: Progressing  Goal: Maintain proper alignment of affected body part  Description: INTERVENTIONS:  - Support, maintain and protect limb and body alignment  - Provide patient/ family with appropriate education  Outcome: Progressing     Problem: Nutrition/Hydration-ADULT  Goal: Nutrient/Hydration intake appropriate for improving, restoring or maintaining nutritional needs  Description: Monitor and assess patient's nutrition/hydration status for malnutrition  Collaborate with interdisciplinary team and initiate plan and interventions as ordered  Monitor patient's weight and dietary intake as ordered or per policy  Utilize nutrition screening tool and intervene as necessary  Determine patient's food preferences and provide high-protein, high-caloric foods as appropriate       INTERVENTIONS:  - Monitor oral intake, urinary output, labs, and treatment plans  - Assess nutrition and hydration status and recommend course of action  - Evaluate amount of meals eaten  - Assist patient with eating if necessary   - Allow adequate time for meals  - Recommend/ encourage appropriate diets, oral nutritional supplements, and vitamin/mineral supplements  - Order, calculate, and assess calorie counts as needed  - Assess need for intravenous fluids  - Provide specific nutrition/hydration education as appropriate  - Include patient/family/caregiver in decisions related to nutrition  Outcome: Progressing     Problem: Prexisting or High Potential for Compromised Skin Integrity  Goal: Skin integrity is maintained or improved  Description: INTERVENTIONS:  - Identify patients at risk for skin breakdown  - Assess and monitor skin integrity  - Assess and monitor nutrition and hydration status  - Monitor labs   - Assess for incontinence   - Turn and reposition patient  - Assist with mobility/ambulation  - Relieve pressure over bony prominences  - Avoid friction and shearing  - Provide appropriate hygiene as needed including keeping skin clean and dry  - Evaluate need for skin moisturizer/barrier cream  - Collaborate with interdisciplinary team   - Patient/family teaching  - Consider wound care consult   Outcome: Progressing     Problem: Potential for Falls  Goal: Patient will remain free of falls  Description: INTERVENTIONS:  - Educate patient/family on patient safety including physical limitations  - Instruct patient to call for assistance with activity   - Consult OT/PT to assist with strengthening/mobility   - Keep Call bell within reach  - Keep bed low and locked with side rails adjusted as appropriate  - Keep care items and personal belongings within reach  - Initiate and maintain comfort rounds  - Make Fall Risk Sign visible to staff  - Offer Toileting every 2 Hours, in advance of need  - Initiate/Maintain alarms  - Apply yellow socks and bracelet for high fall risk patients  - Consider moving patient to room near nurses station  Outcome: Progressing

## 2021-08-07 NOTE — PLAN OF CARE
Problem: MOBILITY - ADULT  Goal: Maintain or return to baseline ADL function  Description: INTERVENTIONS:  -  Assess patient's ability to carry out ADLs; assess patient's baseline for ADL function and identify physical deficits which impact ability to perform ADLs (bathing, care of mouth/teeth, toileting, grooming, dressing, etc )  - Assess/evaluate cause of self-care deficits   - Assess range of motion  - Assess patient's mobility; develop plan if impaired  - Assess patient's need for assistive devices and provide as appropriate  - Encourage maximum independence but intervene and supervise when necessary  - Involve family in performance of ADLs  - Assess for home care needs following discharge   - Consider OT consult to assist with ADL evaluation and planning for discharge  - Provide patient education as appropriate  Outcome: Progressing  Goal: Maintains/Returns to pre admission functional level  Description: INTERVENTIONS:  - Perform BMAT or MOVE assessment daily    - Set and communicate daily mobility goal to care team and patient/family/caregiver  - Collaborate with rehabilitation services on mobility goals if consulted  - Perform Range of Motion 3 times a day  - Reposition patient every 2 hours    - Ambulate patient 3 times a day  - Out of bed to chair 3 times a day   - Out of bed for meals 3 times a day  - Out of bed for toileting  - Record patient progress and toleration of activity level   Outcome: Progressing     Problem: PAIN - ADULT  Goal: Verbalizes/displays adequate comfort level or baseline comfort level  Description: Interventions:  - Encourage patient to monitor pain and request assistance  - Assess pain using appropriate pain scale  - Administer analgesics based on type and severity of pain and evaluate response  - Implement non-pharmacological measures as appropriate and evaluate response  - Consider cultural and social influences on pain and pain management  - Notify physician/advanced practitioner if interventions unsuccessful or patient reports new pain  Outcome: Progressing     Problem: INFECTION - ADULT  Goal: Absence or prevention of progression during hospitalization  Description: INTERVENTIONS:  - Assess and monitor for signs and symptoms of infection  - Monitor lab/diagnostic results  - Monitor all insertion sites, i e  indwelling lines, tubes, and drains  - [place] appropriate cooling/warming therapies per order  - Administer medications as ordered  - Instruct and encourage patient and family to use good hand hygiene technique  - Identify and instruct in appropriate isolation precautions for identified infection/condition  Outcome: Progressing     Problem: SAFETY ADULT  Goal: Maintain or return to baseline ADL function  Description: INTERVENTIONS:  -  Assess patient's ability to carry out ADLs; assess patient's baseline for ADL function and identify physical deficits which impact ability to perform ADLs (bathing, care of mouth/teeth, toileting, grooming, dressing, etc )  - Assess/evaluate cause of self-care deficits   - Assess range of motion  - Assess patient's mobility; develop plan if impaired  - Assess patient's need for assistive devices and provide as appropriate  - Encourage maximum independence but intervene and supervise when necessary  - Involve family in performance of ADLs  - Assess for home care needs following discharge   - Consider OT consult to assist with ADL evaluation and planning for discharge  - Provide patient education as appropriate  Outcome: Progressing  Goal: Maintains/Returns to pre admission functional level  Description: INTERVENTIONS:  - Perform BMAT or MOVE assessment daily    - Set and communicate daily mobility goal to care team and patient/family/caregiver  - Collaborate with rehabilitation services on mobility goals if consulted  - Perform Range of Motion 3 times a day  - Reposition patient every 2 hours    - Ambulate patient 3 times a day  - Out of bed to chair 3 times a day   - Out of bed for meals 3 times a day  - Out of bed for toileting  - Record patient progress and toleration of activity level   Outcome: Progressing  Goal: Patient will remain free of falls  Description: INTERVENTIONS:  - Educate patient/family on patient safety including physical limitations  - Instruct patient to call for assistance with activity   - Consult OT/PT to assist with strengthening/mobility   - Keep Call bell within reach  - Keep bed low and locked with side rails adjusted as appropriate  - Keep care items and personal belongings within reach  - Initiate and maintain comfort rounds  - Make Fall Risk Sign visible to staff  - Offer Toileting every 2 Hours, in advance of need  - Initiate/Maintain alarms  - Apply yellow socks and bracelet for high fall risk patients  - Consider moving patient to room near nurses station  Outcome: Progressing     Problem: DISCHARGE PLANNING  Goal: Discharge to home or other facility with appropriate resources  Description: INTERVENTIONS:  - Identify barriers to discharge w/patient and caregiver  - Arrange for needed discharge resources and transportation as appropriate  - Identify discharge learning needs (meds, wound care, etc )  - Arrange for interpretive services to assist at discharge as needed  - Refer to Case Management Department for coordinating discharge planning if the patient needs post-hospital services based on physician/advanced practitioner order or complex needs related to functional status, cognitive ability, or social support system  Outcome: Progressing     Problem: Knowledge Deficit  Goal: Patient/family/caregiver demonstrates understanding of disease process, treatment plan, medications, and discharge instructions  Description: Complete learning assessment and assess knowledge base    Interventions:  - Provide teaching at level of understanding  - Provide teaching via preferred learning methods  Outcome: Progressing Problem: MUSCULOSKELETAL - ADULT  Goal: Maintain or return mobility to safest level of function  Description: INTERVENTIONS:  - Assess patient's ability to carry out ADLs; assess patient's baseline for ADL function and identify physical deficits which impact ability to perform ADLs (bathing, care of mouth/teeth, toileting, grooming, dressing, etc )  - Assess/evaluate cause of self-care deficits   - Assess range of motion  - Assess patient's mobility  - Assess patient's need for assistive devices and provide as appropriate  - Encourage maximum independence but intervene and supervise when necessary  - Involve family in performance of ADLs  - Assess for home care needs following discharge   - Consider OT consult to assist with ADL evaluation and planning for discharge  - Provide patient education as appropriate  Outcome: Progressing  Goal: Maintain proper alignment of affected body part  Description: INTERVENTIONS:  - Support, maintain and protect limb and body alignment  - Provide patient/ family with appropriate education  Outcome: Progressing     Problem: Nutrition/Hydration-ADULT  Goal: Nutrient/Hydration intake appropriate for improving, restoring or maintaining nutritional needs  Description: Monitor and assess patient's nutrition/hydration status for malnutrition  Collaborate with interdisciplinary team and initiate plan and interventions as ordered  Monitor patient's weight and dietary intake as ordered or per policy  Utilize nutrition screening tool and intervene as necessary  Determine patient's food preferences and provide high-protein, high-caloric foods as appropriate       INTERVENTIONS:  - Monitor oral intake, urinary output, labs, and treatment plans  - Assess nutrition and hydration status and recommend course of action  - Evaluate amount of meals eaten  - Assist patient with eating if necessary   - Allow adequate time for meals  - Recommend/ encourage appropriate diets, oral nutritional supplements, and vitamin/mineral supplements  - Order, calculate, and assess calorie counts as needed  - Assess need for intravenous fluids  - Provide specific nutrition/hydration education as appropriate  - Include patient/family/caregiver in decisions related to nutrition  Outcome: Progressing     Problem: Potential for Falls  Goal: Patient will remain free of falls  Description: INTERVENTIONS:  - Educate patient/family on patient safety including physical limitations  - Instruct patient to call for assistance with activity   - Consult OT/PT to assist with strengthening/mobility   - Keep Call bell within reach  - Keep bed low and locked with side rails adjusted as appropriate  - Keep care items and personal belongings within reach  - Initiate and maintain comfort rounds  - Make Fall Risk Sign visible to staff  - Offer Toileting every 2 Hours, in advance of need  - Initiate/Maintain alarms  - Apply yellow socks and bracelet for high fall risk patients  - Consider moving patient to room near nurses station  Outcome: Progressing

## 2021-08-08 LAB
ABO GROUP BLD BPU: NORMAL
ANION GAP SERPL CALCULATED.3IONS-SCNC: 3 MMOL/L (ref 4–13)
BPU ID: NORMAL
BUN SERPL-MCNC: 27 MG/DL (ref 5–25)
CALCIUM SERPL-MCNC: 8.6 MG/DL (ref 8.3–10.1)
CHLORIDE SERPL-SCNC: 104 MMOL/L (ref 100–108)
CO2 SERPL-SCNC: 29 MMOL/L (ref 21–32)
CREAT SERPL-MCNC: 0.79 MG/DL (ref 0.6–1.3)
CROSSMATCH: NORMAL
ERYTHROCYTE [DISTWIDTH] IN BLOOD BY AUTOMATED COUNT: 15.9 % (ref 11.6–15.1)
GFR SERPL CREATININE-BSD FRML MDRD: 72 ML/MIN/1.73SQ M
GLUCOSE SERPL-MCNC: 119 MG/DL (ref 65–140)
HCT VFR BLD AUTO: 25.6 % (ref 34.8–46.1)
HGB BLD-MCNC: 8.2 G/DL (ref 11.5–15.4)
MCH RBC QN AUTO: 29.7 PG (ref 26.8–34.3)
MCHC RBC AUTO-ENTMCNC: 32 G/DL (ref 31.4–37.4)
MCV RBC AUTO: 93 FL (ref 82–98)
NRBC BLD AUTO-RTO: 4 /100 WBCS
PLATELET # BLD AUTO: 282 THOUSANDS/UL (ref 149–390)
PMV BLD AUTO: 9.4 FL (ref 8.9–12.7)
POTASSIUM SERPL-SCNC: 3.8 MMOL/L (ref 3.5–5.3)
RBC # BLD AUTO: 2.76 MILLION/UL (ref 3.81–5.12)
SODIUM SERPL-SCNC: 136 MMOL/L (ref 136–145)
UNIT DISPENSE STATUS: NORMAL
UNIT PRODUCT CODE: NORMAL
UNIT PRODUCT VOLUME: 350 ML
UNIT RH: NORMAL
WBC # BLD AUTO: 20.16 THOUSAND/UL (ref 4.31–10.16)

## 2021-08-08 PROCEDURE — 80048 BASIC METABOLIC PNL TOTAL CA: CPT | Performed by: PHYSICIAN ASSISTANT

## 2021-08-08 PROCEDURE — 99232 SBSQ HOSP IP/OBS MODERATE 35: CPT | Performed by: NURSE PRACTITIONER

## 2021-08-08 PROCEDURE — 85027 COMPLETE CBC AUTOMATED: CPT | Performed by: NURSE PRACTITIONER

## 2021-08-08 PROCEDURE — NC001 PR NO CHARGE: Performed by: ORTHOPAEDIC SURGERY

## 2021-08-08 RX ORDER — LISINOPRIL 10 MG/1
10 TABLET ORAL DAILY
Status: DISCONTINUED | OUTPATIENT
Start: 2021-08-08 | End: 2021-08-09

## 2021-08-08 RX ADMIN — DOCUSATE SODIUM 100 MG: 100 CAPSULE ORAL at 17:00

## 2021-08-08 RX ADMIN — DOCUSATE SODIUM 100 MG: 100 CAPSULE ORAL at 08:08

## 2021-08-08 RX ADMIN — SENNOSIDES 8.6 MG: 8.6 TABLET ORAL at 08:08

## 2021-08-08 RX ADMIN — LISINOPRIL 10 MG: 10 TABLET ORAL at 16:49

## 2021-08-08 RX ADMIN — EZETIMIBE 10 MG: 10 TABLET ORAL at 08:08

## 2021-08-08 RX ADMIN — OXYCODONE HYDROCHLORIDE 5 MG: 5 TABLET ORAL at 05:09

## 2021-08-08 RX ADMIN — AMLODIPINE BESYLATE 10 MG: 10 TABLET ORAL at 08:09

## 2021-08-08 RX ADMIN — ACETAMINOPHEN 975 MG: 325 TABLET, FILM COATED ORAL at 05:09

## 2021-08-08 RX ADMIN — ACETAMINOPHEN 975 MG: 325 TABLET, FILM COATED ORAL at 21:12

## 2021-08-08 RX ADMIN — ACETAMINOPHEN 975 MG: 325 TABLET, FILM COATED ORAL at 13:07

## 2021-08-08 RX ADMIN — OXYCODONE HYDROCHLORIDE 5 MG: 5 TABLET ORAL at 18:20

## 2021-08-08 RX ADMIN — RIVAROXABAN 15 MG: 15 TABLET, FILM COATED ORAL at 08:09

## 2021-08-08 RX ADMIN — METOPROLOL TARTRATE 50 MG: 50 TABLET, FILM COATED ORAL at 08:09

## 2021-08-08 NOTE — ASSESSMENT & PLAN NOTE
· Continue with blood pressure meds      · Metoprolol and Norvasc  · Will resume pts lisinopril now monitor sbp and hr

## 2021-08-08 NOTE — ASSESSMENT & PLAN NOTE
· Secondary to volume depletion, anemia, ACEI, femur fracture  · sp low dose diuretic post  blood transfusion   · ACEI on hold  · Hold parameters in place for BP meds  · Avoid nephrotoxic meds  · monitor

## 2021-08-08 NOTE — PROGRESS NOTES
[facility]  Progress Note - Bereket Sorensen 1945, 66 y o  female MRN: [id number]  [facility] Encounter: [id number]  Primary Care Provider: Rebel Vivar MD   Date and time admitted to hospital: 8/2/2021 10:51 PM    * Femur fracture [facility])  Assessment & Plan  · Patient presenting with femur fracture  · Spday # 3  OPEN REDUCTION W/ INTERNAL FIXATION (ORIF) DISTAL FEMUR; INSERTION RETROGRADE NAIL (Left)  · Continue with supportive care and pain control  · Gentle hydration discontinued  Previously   · Orthopedic surgery appreciated   · -resumed xarelto post lovenox 8/7   · Transfused one PRBC   · Repeat blood work in am if trending down would transfuse another unit     MICHAEL (acute kidney injury) ([facility] [address] )  Assessment & Plan  · Secondary to volume depletion, anemia, ACEI, femur fracture  · sp low dose diuretic post  blood transfusion   · ACEI on hold  · Hold parameters in place for BP meds  · Avoid nephrotoxic meds  · monitor    Acute blood loss anemia  Assessment & Plan  · Secondary to femur fracture  · Pt has received 1 x 1 unit prbc on 8/7  · With hh at 8 2 may require another unit tomorrow chk labs in am   · Chest xray with no signs of volume overload         Leukocytosis  Assessment & Plan  · possible stress reaction from fracture  · Slight decrease today will trend pt remains afebrile , continues to trend up   · Afebrile using incentive , hankins cath removed 8/7 after discussion with ortho post prior   · S/p one unit prbc   · chk labs  · Also note elevation somewhat chronic although not this high   · Continue to monitor post op at this time       Pacemaker  Assessment & Plan  · Patient with history tachy-smita syndrome with pacer placement  On beta-blockade  Hypertension  Assessment & Plan  · Continue with blood pressure meds      · Metoprolol and Norvasc  · Will resume pts lisinopril now monitor sbp and hr     Atrial fibrillation (HCC)  Assessment & Plan  · Metoprolol therapy  ·  Monitor heart rate  Patient with history pacemaker placement  · Resume Xarelto   · Slightly tachycardic will continue telem   · - order one time low dose lasix iv   · - overnight  - received another dose of lasix and bb iv   · Continue to monitor anemia   · Pt slightly tachpneic slight wheeze - pt reports she is asthmatic - order respiratory protocol   · Chest xray with no signs of overload   · Prn bb iv           VTE Pharmacologic Prophylaxis: VTE Score: 10 High Risk (Score >/= 5) - Pharmacological DVT Prophylaxis Ordered: rivaroxaban (Xarelto)  Sequential Compression Devices Ordered  Patient Centered Rounds: I performed bedside rounds with nursing staff today  Discussions with Specialists or Other Care Team Provider: nursing     Education and Discussions with Family / Patient: Patient declined call to   states she is feuding with her family   Informed me of a story about selling her house and her kids were really upset as pt was selling their inherintence  Time Spent for Care: 45 minutes  More than 50% of total time spent on counseling and coordination of care as described above  Current Length of Stay: 6 day(s)  Current Patient Status: Inpatient   Certification Statement: The patient will continue to require additional inpatient hospital stay due to ongoing need for treatment and post op managment   Discharge Plan: Anticipate discharge in 24-48 hrs to rehab facility  Code Status: Level 1 - Full Code    Subjective:   Pt feels well is on oxygen nc at this time breathing better than day prior  No n/v/d she reports she is eating better  she has pain but better controlled and when still no pain   Duong has been removed  And pt has purewick in place     Objective:     Vitals:   Temp (24hrs), Av 1 °F (36 7 °C), Min:97 5 °F (36 4 °C), Max:98 5 °F (36 9 °C)    Temp:  [97 5 °F (36 4 °C)-98 5 °F (36 9 °C)] 98 5 °F (36 9 °C)  HR:  [] 117  Resp: [16-24] 16  BP: (121-130)/(55-65) 126/55  SpO2:  [90 %-95 %] 95 %  There is no height or weight on file to calculate BMI  Input and Output Summary (last 24 hours): Intake/Output Summary (Last 24 hours) at 8/8/2021 1619  Last data filed at 8/8/2021 0900  Gross per 24 hour   Intake 540 ml   Output 3500 ml   Net -2960 ml       Physical Exam:   Physical Exam  Constitutional:       General: She is not in acute distress  Appearance: She is obese  She is not ill-appearing or toxic-appearing  HENT:      Head: Normocephalic and atraumatic  Mouth/Throat:      Pharynx: Oropharynx is clear  Eyes:      General:         Right eye: No discharge  Left eye: No discharge  Conjunctiva/sclera: Conjunctivae normal    Cardiovascular:      Rate and Rhythm: Normal rate  Heart sounds: Murmur heard  No friction rub  No gallop  Comments: occ tachycardic   Pulmonary:      Effort: No respiratory distress  Breath sounds: No stridor  No wheezing, rhonchi or rales  Chest:      Chest wall: No tenderness  Abdominal:      General: There is no distension  Palpations: There is no mass  Tenderness: There is no abdominal tenderness  There is no right CVA tenderness, left CVA tenderness, guarding or rebound  Hernia: No hernia is present  Musculoskeletal:         General: Tenderness (left leg wrapped in ace from toes to groin) present  No swelling, deformity or signs of injury  Right lower leg: No edema  Skin:     Coloration: Skin is not jaundiced or pale  Findings: No bruising, erythema, lesion or rash  Neurological:      Mental Status: She is alert and oriented to person, place, and time     Psychiatric:         Behavior: Behavior normal           Additional Data:     Labs:  Results from last 7 days   Lab Units 08/08/21  0940 08/07/21  0522 08/06/21  0624   WBC Thousand/uL 20 16* 20 93* 16 61*   HEMOGLOBIN g/dL 8 2* 7 2* 7 8*   HEMATOCRIT % 25 6* 22 3* 24 3*   PLATELETS Thousands/uL 282 256 206   NEUTROS PCT %  --   --  83*   LYMPHS PCT %  --   --  6*   LYMPHO PCT %  --  7*  --    MONOS PCT %  --   --  9   MONO PCT %  --  5  --    EOS PCT %  --  0 0     Results from last 7 days   Lab Units 08/08/21  0443 08/04/21  0444 08/03/21  0442   SODIUM mmol/L 136  --  135*   POTASSIUM mmol/L 3 8  --  5 2   CHLORIDE mmol/L 104  --  104   CO2 mmol/L 29  --  25   CO2, I-STAT   --    < >  --    BUN mg/dL 27*  --  23   CREATININE mg/dL 0 79  --  1 20   ANION GAP mmol/L 3*  --  6   CALCIUM mg/dL 8 6  --  8 9   ALBUMIN g/dL  --   --  3 1*   TOTAL BILIRUBIN mg/dL  --   --  0 43   ALK PHOS U/L  --   --  73   ALT U/L  --   --  14   AST U/L  --   --  14   GLUCOSE RANDOM mg/dL 119  --  143*    < > = values in this interval not displayed  Results from last 7 days   Lab Units 08/04/21  0926   INR  1 34*         Results from last 7 days   Lab Units 08/03/21  0442   HEMOGLOBIN A1C % 5 6           Lines/Drains:  Invasive Devices     Peripheral Intravenous Line            Peripheral IV 08/05/21 Right Wrist 3 days          Drain            External Urinary Catheter <1 day                  Telemetry:  Telemetry Orders (From admission, onward)             48 Hour Telemetry Monitoring  Continuous x 48 hours     Question:  Reason for 48 Hour Telemetry  Answer:  Arrhythmias Requiring Medical Therapy (eg  SVT, Vtach/fib, Bradycardia, Uncontrolled A-fib)                 Telemetry Reviewed: Atrial fibrillation  HR averaging 90  Indication for Continued Telemetry Use: No indication for continued use  Will discontinue              Imaging: Reviewed radiology reports from this admission including: chest xray    Recent Cultures (last 7 days):         Last 24 Hours Medication List:   Current Facility-Administered Medications   Medication Dose Route Frequency Provider Last Rate    acetaminophen  975 mg Oral [facility] Jordan Chery PA-C      albuterol  2 puff Inhalation Q4H PRN Mike Lutz MD      ALPRAZolam 0 25 mg Oral BID PRN Jovany Corolla, PA-C      amLODIPine  10 mg Oral Daily Jovany Corolla, PA-C      diphenhydrAMINE  12 5 mg Oral Q6H PRN Jovany Corolla, PA-C      diphenhydrAMINE-zinc acetate   Topical TID PRN Jovany Corolla, PA-C      docusate sodium  100 mg Oral BID Jovany Corolla, PA-C      ezetimibe  10 mg Oral Daily Jovany Corolla, PA-C      HYDROmorphone  0 2 mg Intravenous Q4H PRN Rosalinda Dinero MD      lisinopril  10 mg Oral Daily JERI Barnes      metoprolol  2 5 mg Intravenous Q6H PRN Rico Mandril, PA-C      metoprolol tartrate  50 mg Oral Q12H [address] Jovany Corolla, PA-DANDRE      ondansetron  4 mg Intravenous Q4H PRN Jovany Corolla, PA-C      oxyCODONE  2 5 mg Oral Q4H PRN Jovany Corolla, PA-C      oxyCODONE  5 mg Oral Q4H PRN Jovany Corolla, PA-DANDRE      rivaroxaban  15 mg Oral Daily With Breakfast JERI Barnes      senna  1 tablet Oral Daily Jovany Garcia, PATIENCE          Today, Patient Was Seen By: JERI Barnes    **Please Note: This note may have been constructed using a voice recognition system  **

## 2021-08-08 NOTE — ASSESSMENT & PLAN NOTE
· Patient presenting with femur fracture  · Spday # 3  OPEN REDUCTION W/ INTERNAL FIXATION (ORIF) DISTAL FEMUR; INSERTION RETROGRADE NAIL (Left)  · Continue with supportive care and pain control  · Gentle hydration discontinued  Previously   · Orthopedic surgery appreciated   · -resumed xarelto post lovenox 8/7   · Transfused one PRBC   · Repeat blood work in am if trending down would transfuse another unit

## 2021-08-08 NOTE — CASE MANAGEMENT
Pt has accepting facilities  Per notes pt  will continue to require additional inpatient hospital stay due to ongoing post op managmnt anemic needs transfusion  Anticipate discharge in 48-72 hrs  CM notified accepting facilities of [place]

## 2021-08-08 NOTE — ASSESSMENT & PLAN NOTE
· possible stress reaction from fracture  · Slight decrease today will trend pt remains afebrile , continues to trend up   · Afebrile using incentive , hankins cath removed 8/7 after discussion with ortho post prior   · S/p one unit prbc   · chk labs  · Also note elevation somewhat chronic although not this high   · Continue to monitor post op at this time

## 2021-08-08 NOTE — ASSESSMENT & PLAN NOTE
· Secondary to femur fracture  · Pt has received 1 x 1 unit prbc on 8/7  · With hh at 8 2 may require another unit tomorrow chk labs in am   · Chest xray with no signs of volume overload

## 2021-08-08 NOTE — PROGRESS NOTES
Progress Note - [address] [facility]  66 y o  female MRN: [id number]  Unit/Bed#: [facility] 608-01      Subjective:    66 y  o female POD 3 L fem rIMN  Doing well, pain controlled       Labs:  0   Lab Value Date/Time    HCT 27 5 (L) 08/07/2021 1935    HCT 22 3 (L) 08/07/2021 0522    HCT 24 3 (L) 08/06/2021 0624    HGB 8 7 (L) 08/07/2021 1935    HGB 7 2 (L) 08/07/2021 0522    HGB 7 8 (L) 08/06/2021 0624    INR 1 34 (H) 08/04/2021 0926    WBC 22 12 (H) 08/07/2021 1935    WBC 20 93 (H) 08/07/2021 0522    WBC 16 61 (H) 08/06/2021 0624       Meds:    Current Facility-Administered Medications:     acetaminophen (TYLENOL) tablet 975 mg, 975 mg, Oral, Q8H [address], Morro Ferro PA-C, 975 mg at 08/08/21 0509    albuterol (PROVENTIL HFA,VENTOLIN HFA) inhaler 2 puff, 2 puff, Inhalation, Q4H PRN, Manasa Mack MD, 2 puff at 08/07/21 2136    ALPRAZolam (XANAX) tablet 0 25 mg, 0 25 mg, Oral, BID PRN, Morro Ferro PA-C    amLODIPine (NORVASC) tablet 10 mg, 10 mg, Oral, Daily, Morro Ferro PA-C, 10 mg at 08/06/21 0906    diphenhydrAMINE (BENADRYL) tablet 12 5 mg, 12 5 mg, Oral, Q6H PRN, Morro Ferro PA-C    diphenhydrAMINE-zinc acetate (BENADRYL) 2-0 1 % cream, , Topical, TID PRN, Morro Ferro PA-DANDRE    docusate sodium (COLACE) capsule 100 mg, 100 mg, Oral, BID, Morro Ferro, PA-C, 100 mg at 08/07/21 1725    ezetimibe (ZETIA) tablet 10 mg, 10 mg, Oral, Daily, Morro Ferro PA-C, 10 mg at 08/07/21 1115    HYDROmorphone HCl (DILAUDID) injection 0 2 mg, 0 2 mg, Intravenous, Q4H PRN, Omar Miller MD, 0 2 mg at 08/06/21 0913    levalbuterol (Mellody Paddy) inhalation solution 1 25 mg, 1 25 mg, Nebulization, Once, JERI Barraza    metoprolol (LOPRESSOR) injection 2 5 mg, 2 5 mg, Intravenous, Q6H PRN, Barby Aldana PA-C, 2 5 mg at 08/06/21 1933    metoprolol tartrate (LOPRESSOR) tablet 50 mg, 50 mg, Oral, Q12H [address], Morro Ferro PA-C, 50 mg at 08/07/21 2135    ondansetron (Nwe Mediate) injection 4 mg, 4 mg, Intravenous, Q4H PRN, Wolm Dad, PA-C, 4 mg at 08/02/21 2358    oxyCODONE (ROXICODONE) IR tablet 2 5 mg, 2 5 mg, Oral, Q4H PRN, Wolm Dad, PA-C, 2 5 mg at 08/05/21 2227    oxyCODONE (ROXICODONE) IR tablet 5 mg, 5 mg, Oral, Q4H PRN, Wolm Dad, PA-C, 5 mg at 08/08/21 0149    rivaroxaban (XARELTO) tablet 15 mg, 15 mg, Oral, Daily With Breakfast, JERI Mast, 15 mg at 08/07/21 8210    senna (SENOKOT) tablet 8 6 mg, 1 tablet, Oral, Daily, Wolm Dad, PA-C, 8 6 mg at 08/07/21 1236    Blood Culture:   No results found for: BLOODCX    Wound Culture:   No results found for: WOUNDCULT    Ins and Outs:  I/O last 24 hours: In: 1130 [P O :780; Blood:350]  Out: 5200 [Urine:5200]          Physical:  Vitals:    08/07/21 2226   BP: 121/63   Pulse: (!) 106   Resp: 20   Temp: 98 4 °F (36 9 °C)   SpO2: 90%     Musculoskeletal: left Lower Extremity  · Dressing in place without significant drainage  · Non ttp  · Sensation grossly intact to foot/toes  · Motor intact EHL/FHL, ankle DF/PF  · Toes warm and well perfused    Assessment:    66 y  o female POD 3 L rIMN  Doing well        Plan:  · NWB LLE  · Greater than 2 gram drop which qualifies for diagnosis of acute blood loss anemia, requiring transfusion, will monitor and administer IVF/prbc as indicated  · PT/OT  · Pain control  · DVT PPX: continue home xarelto  · Dispo: Ortho will follow    Michela Haq MD

## 2021-08-08 NOTE — ASSESSMENT & PLAN NOTE
· Metoprolol therapy  ·  Monitor heart rate  Patient with history pacemaker placement    · Resume Xarelto   · Slightly tachycardic will continue telem   · - order one time low dose lasix iv   · - overnight 8/7 -8/8 received another dose of lasix and bb iv   · Continue to monitor anemia   · Pt slightly tachpneic slight wheeze - pt reports she is asthmatic - order respiratory protocol   · Chest xray with no signs of overload   · Prn bb iv

## 2021-08-09 LAB
ANION GAP SERPL CALCULATED.3IONS-SCNC: 5 MMOL/L (ref 4–13)
ATRIAL RATE: 113 BPM
ATRIAL RATE: 136 BPM
BUN SERPL-MCNC: 27 MG/DL (ref 5–25)
CALCIUM SERPL-MCNC: 8.7 MG/DL (ref 8.3–10.1)
CHLORIDE SERPL-SCNC: 105 MMOL/L (ref 100–108)
CO2 SERPL-SCNC: 27 MMOL/L (ref 21–32)
CREAT SERPL-MCNC: 0.77 MG/DL (ref 0.6–1.3)
ERYTHROCYTE [DISTWIDTH] IN BLOOD BY AUTOMATED COUNT: 15.7 % (ref 11.6–15.1)
GFR SERPL CREATININE-BSD FRML MDRD: 75 ML/MIN/1.73SQ M
GLUCOSE SERPL-MCNC: 124 MG/DL (ref 65–140)
HCT VFR BLD AUTO: 26 % (ref 34.8–46.1)
HGB BLD-MCNC: 8.2 G/DL (ref 11.5–15.4)
MCH RBC QN AUTO: 29.7 PG (ref 26.8–34.3)
MCHC RBC AUTO-ENTMCNC: 31.5 G/DL (ref 31.4–37.4)
MCV RBC AUTO: 94 FL (ref 82–98)
NRBC BLD AUTO-RTO: 5 /100 WBCS
PLATELET # BLD AUTO: 298 THOUSANDS/UL (ref 149–390)
PMV BLD AUTO: 9.3 FL (ref 8.9–12.7)
POTASSIUM SERPL-SCNC: 3.7 MMOL/L (ref 3.5–5.3)
QRS AXIS: -18 DEGREES
QRS AXIS: -19 DEGREES
QRSD INTERVAL: 116 MS
QRSD INTERVAL: 122 MS
QT INTERVAL: 336 MS
QT INTERVAL: 338 MS
QTC INTERVAL: 495 MS
QTC INTERVAL: 501 MS
RBC # BLD AUTO: 2.76 MILLION/UL (ref 3.81–5.12)
SODIUM SERPL-SCNC: 137 MMOL/L (ref 136–145)
T WAVE AXIS: 148 DEGREES
T WAVE AXIS: 155 DEGREES
VENTRICULAR RATE: 129 BPM
VENTRICULAR RATE: 134 BPM
WBC # BLD AUTO: 16.24 THOUSAND/UL (ref 4.31–10.16)

## 2021-08-09 PROCEDURE — 80048 BASIC METABOLIC PNL TOTAL CA: CPT | Performed by: NURSE PRACTITIONER

## 2021-08-09 PROCEDURE — 99232 SBSQ HOSP IP/OBS MODERATE 35: CPT | Performed by: NURSE PRACTITIONER

## 2021-08-09 PROCEDURE — 93010 ELECTROCARDIOGRAM REPORT: CPT | Performed by: INTERNAL MEDICINE

## 2021-08-09 PROCEDURE — 93005 ELECTROCARDIOGRAM TRACING: CPT

## 2021-08-09 PROCEDURE — 85027 COMPLETE CBC AUTOMATED: CPT | Performed by: NURSE PRACTITIONER

## 2021-08-09 PROCEDURE — 99222 1ST HOSP IP/OBS MODERATE 55: CPT | Performed by: INTERNAL MEDICINE

## 2021-08-09 PROCEDURE — NC001 PR NO CHARGE: Performed by: ORTHOPAEDIC SURGERY

## 2021-08-09 RX ORDER — BISACODYL 10 MG
10 SUPPOSITORY, RECTAL RECTAL DAILY PRN
Status: DISCONTINUED | OUTPATIENT
Start: 2021-08-09 | End: 2021-08-12 | Stop reason: HOSPADM

## 2021-08-09 RX ORDER — DIGOXIN 0.25 MG/ML
125 INJECTION INTRAMUSCULAR; INTRAVENOUS ONCE
Status: COMPLETED | OUTPATIENT
Start: 2021-08-09 | End: 2021-08-09

## 2021-08-09 RX ORDER — DILTIAZEM HYDROCHLORIDE 5 MG/ML
10 INJECTION INTRAVENOUS ONCE
Status: COMPLETED | OUTPATIENT
Start: 2021-08-09 | End: 2021-08-09

## 2021-08-09 RX ORDER — FUROSEMIDE 10 MG/ML
20 INJECTION INTRAMUSCULAR; INTRAVENOUS ONCE
Status: COMPLETED | OUTPATIENT
Start: 2021-08-09 | End: 2021-08-09

## 2021-08-09 RX ADMIN — ACETAMINOPHEN 975 MG: 325 TABLET, FILM COATED ORAL at 04:41

## 2021-08-09 RX ADMIN — DIGOXIN 125 MCG: 0.25 INJECTION INTRAMUSCULAR; INTRAVENOUS at 13:41

## 2021-08-09 RX ADMIN — ACETAMINOPHEN 975 MG: 325 TABLET, FILM COATED ORAL at 22:11

## 2021-08-09 RX ADMIN — RIVAROXABAN 15 MG: 15 TABLET, FILM COATED ORAL at 09:14

## 2021-08-09 RX ADMIN — DILTIAZEM HYDROCHLORIDE 5 MG/HR: 5 INJECTION INTRAVENOUS at 17:14

## 2021-08-09 RX ADMIN — METOPROLOL TARTRATE 50 MG: 50 TABLET, FILM COATED ORAL at 09:13

## 2021-08-09 RX ADMIN — METOROPROLOL TARTRATE 2.5 MG: 5 INJECTION, SOLUTION INTRAVENOUS at 00:53

## 2021-08-09 RX ADMIN — FUROSEMIDE 20 MG: 10 INJECTION, SOLUTION INTRAMUSCULAR; INTRAVENOUS at 14:00

## 2021-08-09 RX ADMIN — EZETIMIBE 10 MG: 10 TABLET ORAL at 09:13

## 2021-08-09 RX ADMIN — METOPROLOL TARTRATE 50 MG: 50 TABLET, FILM COATED ORAL at 22:42

## 2021-08-09 RX ADMIN — DOCUSATE SODIUM 100 MG: 100 CAPSULE ORAL at 09:13

## 2021-08-09 RX ADMIN — DILTIAZEM HYDROCHLORIDE 10 MG: 5 INJECTION INTRAVENOUS at 15:34

## 2021-08-09 RX ADMIN — ALBUTEROL SULFATE 2 PUFF: 90 AEROSOL, METERED RESPIRATORY (INHALATION) at 12:05

## 2021-08-09 RX ADMIN — LISINOPRIL 10 MG: 10 TABLET ORAL at 09:13

## 2021-08-09 RX ADMIN — AMLODIPINE BESYLATE 10 MG: 10 TABLET ORAL at 09:13

## 2021-08-09 RX ADMIN — ACETAMINOPHEN 975 MG: 325 TABLET, FILM COATED ORAL at 13:41

## 2021-08-09 RX ADMIN — BISACODYL 10 MG: 10 SUPPOSITORY RECTAL at 04:37

## 2021-08-09 RX ADMIN — SENNOSIDES 8.6 MG: 8.6 TABLET ORAL at 09:13

## 2021-08-09 NOTE — ASSESSMENT & PLAN NOTE
· Continue metoprolol for rate control and Xarelto for anticoagulation  · HR elevated in the 140's, EKG shows afib with RVR  Will give OT dose IV digoxin and one dose IV lasix      · Monitor via marti

## 2021-08-09 NOTE — ASSESSMENT & PLAN NOTE
· Mechanical fall prior to admission resulting in left femur fracture  · Status post left retrograde femoral IMN by orthopedic surgery 8/5  · Continue Xarelto for DVT PPX which she is on chronically for afib  · Nonweightbearing left lower extremity until cleared by ortho at 2 week f/u  · Pain control with ATC APAP, PRN Oxycodone  D/C IV Dilaudid  · PT/OT recommending rehab

## 2021-08-09 NOTE — PLAN OF CARE
Problem: MOBILITY - ADULT  Goal: Maintain or return to baseline ADL function  Description: INTERVENTIONS:  -  Assess patient's ability to carry out ADLs; assess patient's baseline for ADL function and identify physical deficits which impact ability to perform ADLs (bathing, care of mouth/teeth, toileting, grooming, dressing, etc )  - Assess/evaluate cause of self-care deficits   - Assess range of motion  - Assess patient's mobility; develop plan if impaired  - Assess patient's need for assistive devices and provide as appropriate  - Encourage maximum independence but intervene and supervise when necessary  - Involve family in performance of ADLs  - Assess for home care needs following discharge   - Consider OT consult to assist with ADL evaluation and planning for discharge  - Provide patient education as appropriate  Outcome: Progressing  Goal: Maintains/Returns to pre admission functional level  Description: INTERVENTIONS:  - Perform BMAT or MOVE assessment daily    - Set and communicate daily mobility goal to care team and patient/family/caregiver  - Collaborate with rehabilitation services on mobility goals if consulted  - Perform Range of Motion 3 times a day  - Reposition patient every 2 hours    - Ambulate patient 3 times a day  - Out of bed to chair 3 times a day   - Out of bed for meals 3 times a day  - Out of bed for toileting  - Record patient progress and toleration of activity level   Outcome: Progressing     Problem: PAIN - ADULT  Goal: Verbalizes/displays adequate comfort level or baseline comfort level  Description: Interventions:  - Encourage patient to monitor pain and request assistance  - Assess pain using appropriate pain scale  - Administer analgesics based on type and severity of pain and evaluate response  - Implement non-pharmacological measures as appropriate and evaluate response  - Consider cultural and social influences on pain and pain management  - Notify physician/advanced practitioner if interventions unsuccessful or patient reports new pain  Outcome: Progressing     Problem: INFECTION - ADULT  Goal: Absence or prevention of progression during hospitalization  Description: INTERVENTIONS:  - Assess and monitor for signs and symptoms of infection  - Monitor lab/diagnostic results  - Monitor all insertion sites, i e  indwelling lines, tubes, and drains  - [place] appropriate cooling/warming therapies per order  - Administer medications as ordered  - Instruct and encourage patient and family to use good hand hygiene technique  - Identify and instruct in appropriate isolation precautions for identified infection/condition  Outcome: Progressing     Problem: SAFETY ADULT  Goal: Maintain or return to baseline ADL function  Description: INTERVENTIONS:  -  Assess patient's ability to carry out ADLs; assess patient's baseline for ADL function and identify physical deficits which impact ability to perform ADLs (bathing, care of mouth/teeth, toileting, grooming, dressing, etc )  - Assess/evaluate cause of self-care deficits   - Assess range of motion  - Assess patient's mobility; develop plan if impaired  - Assess patient's need for assistive devices and provide as appropriate  - Encourage maximum independence but intervene and supervise when necessary  - Involve family in performance of ADLs  - Assess for home care needs following discharge   - Consider OT consult to assist with ADL evaluation and planning for discharge  - Provide patient education as appropriate  Outcome: Progressing  Goal: Maintains/Returns to pre admission functional level  Description: INTERVENTIONS:  - Perform BMAT or MOVE assessment daily    - Set and communicate daily mobility goal to care team and patient/family/caregiver  - Collaborate with rehabilitation services on mobility goals if consulted  - Perform Range of Motion 3 times a day  - Reposition patient every 2 hours    - Ambulate patient 3 times a day  - Out of bed to chair 3 times a day   - Out of bed for meals 3 times a day  - Out of bed for toileting  - Record patient progress and toleration of activity level   Outcome: Progressing  Goal: Patient will remain free of falls  Description: INTERVENTIONS:  - Educate patient/family on patient safety including physical limitations  - Instruct patient to call for assistance with activity   - Consult OT/PT to assist with strengthening/mobility   - Keep Call bell within reach  - Keep bed low and locked with side rails adjusted as appropriate  - Keep care items and personal belongings within reach  - Initiate and maintain comfort rounds  - Make Fall Risk Sign visible to staff  - Offer Toileting every 2 Hours, in advance of need  - Initiate/Maintain alarms  - Apply yellow socks and bracelet for high fall risk patients  - Consider moving patient to room near nurses station  Outcome: Progressing     Problem: DISCHARGE PLANNING  Goal: Discharge to home or other facility with appropriate resources  Description: INTERVENTIONS:  - Identify barriers to discharge w/patient and caregiver  - Arrange for needed discharge resources and transportation as appropriate  - Identify discharge learning needs (meds, wound care, etc )  - Arrange for interpretive services to assist at discharge as needed  - Refer to Case Management Department for coordinating discharge planning if the patient needs post-hospital services based on physician/advanced practitioner order or complex needs related to functional status, cognitive ability, or social support system  Outcome: Progressing     Problem: Knowledge Deficit  Goal: Patient/family/caregiver demonstrates understanding of disease process, treatment plan, medications, and discharge instructions  Description: Complete learning assessment and assess knowledge base    Interventions:  - Provide teaching at level of understanding  - Provide teaching via preferred learning methods  Outcome: Progressing Problem: MUSCULOSKELETAL - ADULT  Goal: Maintain or return mobility to safest level of function  Description: INTERVENTIONS:  - Assess patient's ability to carry out ADLs; assess patient's baseline for ADL function and identify physical deficits which impact ability to perform ADLs (bathing, care of mouth/teeth, toileting, grooming, dressing, etc )  - Assess/evaluate cause of self-care deficits   - Assess range of motion  - Assess patient's mobility  - Assess patient's need for assistive devices and provide as appropriate  - Encourage maximum independence but intervene and supervise when necessary  - Involve family in performance of ADLs  - Assess for home care needs following discharge   - Consider OT consult to assist with ADL evaluation and planning for discharge  - Provide patient education as appropriate  Outcome: Progressing  Goal: Maintain proper alignment of affected body part  Description: INTERVENTIONS:  - Support, maintain and protect limb and body alignment  - Provide patient/ family with appropriate education  Outcome: Progressing     Problem: Nutrition/Hydration-ADULT  Goal: Nutrient/Hydration intake appropriate for improving, restoring or maintaining nutritional needs  Description: Monitor and assess patient's nutrition/hydration status for malnutrition  Collaborate with interdisciplinary team and initiate plan and interventions as ordered  Monitor patient's weight and dietary intake as ordered or per policy  Utilize nutrition screening tool and intervene as necessary  Determine patient's food preferences and provide high-protein, high-caloric foods as appropriate       INTERVENTIONS:  - Monitor oral intake, urinary output, labs, and treatment plans  - Assess nutrition and hydration status and recommend course of action  - Evaluate amount of meals eaten  - Assist patient with eating if necessary   - Allow adequate time for meals  - Recommend/ encourage appropriate diets, oral nutritional supplements, and vitamin/mineral supplements  - Order, calculate, and assess calorie counts as needed  - Assess need for intravenous fluids  - Provide specific nutrition/hydration education as appropriate  - Include patient/family/caregiver in decisions related to nutrition  Outcome: Progressing     Problem: Prexisting or High Potential for Compromised Skin Integrity  Goal: Skin integrity is maintained or improved  Description: INTERVENTIONS:  - Identify patients at risk for skin breakdown  - Assess and monitor skin integrity  - Assess and monitor nutrition and hydration status  - Monitor labs   - Assess for incontinence   - Turn and reposition patient  - Assist with mobility/ambulation  - Relieve pressure over bony prominences  - Avoid friction and shearing  - Provide appropriate hygiene as needed including keeping skin clean and dry  - Evaluate need for skin moisturizer/barrier cream  - Collaborate with interdisciplinary team   - Patient/family teaching  - Consider wound care consult   Outcome: Progressing     Problem: Potential for Fal  Goal: Patient will remain free of falls  Description: INTERVENTIONS:  - Educate patient/family on patient safety including physical limitations  - Instruct patient to call for assistance with activity   - Consult OT/PT to assist with strengthening/mobility   - Keep Call bell within reach  - Keep bed low and locked with side rails adjusted as appropriate  - Keep care items and personal belongings within reach  - Initiate and maintain comfort rounds  - Make Fall Risk Sign visible to staff  - Offer Toileting every 2 Hours, in advance of need  - Initiate/Maintain alarms  - Apply yellow socks and bracelet for high fall risk patients  - Consider moving patient to room near nurses station  Outcome: Progressing

## 2021-08-09 NOTE — PROGRESS NOTES
Subjective: No acute events overnight  No acute distress  Resting comfortably  Objective:  A 10 point ROS was performed; negative except as noted above  Lab Results   Component Value Date/Time    WBC 16 24 (H) 08/09/2021 04:33 AM    HGB 8 2 (L) 08/09/2021 04:33 AM       Vitals:    08/09/21 0055   BP: 125/62   Pulse: (!) 160   Resp:    Temp:    SpO2: 94%     Left lower extremity  Dressing C/D/I  Lower leg soft and compressible with minimal pain around thigh  Motor intact to EHL/FHL/TA/GS  Sensation intact to light touch to dp/sp/tib/ludy/saph distributions  Toes warm and well perfused with brisk capillary refill    Assessment: 66 y o  female POD 4 Left Retrograde Femoral IMN      Plan:  NWB LLE   Pain control  DVT ppx: -per primary team  PT/OT  Patient noted to have acute blood loss anemia due to a drop in Hbg of > 2 0g from preop levels, will monitor vital signs and resuscitate with IV fluids as needed  Dispo: Ortho will follow

## 2021-08-09 NOTE — ASSESSMENT & PLAN NOTE
· Secondary to volume depletion, anemia, ACEI, femur fracture, now improved  · Continue to hold ACE-inhibitor, can likely resume on discharge  · Avoid nephrotoxins and hypotension  · BMP in a m

## 2021-08-09 NOTE — WOUND OSTOMY CARE
Consult Note - Wound   Jason Jones 66 y o  female MRN: [id number]  Unit/Bed#: [facility] [place] 539-85 Encounter: [id number]      History and Present Illness: Patient is seen for wound care assessment today   The patient is a 66year old female that is a recent femur fracture related to a fall   Patient is a Min - Mod A for rolling in the bed  Patient is guarded with the left leg fracture   Incontinent of urine and the purewick in place but not affective for management   Viry care provided with soap and water and dried well applied barrier cream            Assessment Findings:   1  Right buttocks - area of bruising noted   Area is brown in color and scattered dispersed in appearance   No firmness or induration noted on the area and located on the fleshy part of the right buttocks   2  Bilateral heels dry and intact   3  Left leg ace wrap and cast padding wet on the posterior aspect related to urine   RN aware and to notify ortho and for dressing changes to the incisional area   4  Inner buttocks  cleft area 2 linear areas measured together as partial thickness moisture related - applied calazime     Reviewed assessment and plan of care with the patient and RN         Skin care plans:  1-Calazime to sacrum, buttocks TID and PRN  2-Hydraguard to bilateral heel BID and PRN  3-Elevate heels to offload pressure  4-Ehob cushion when out of bed  5-Turn/repoisiton q2h or when medically stable for pressure re-distribution on skin  6-Moisturize skin daily with skin nourishing cream          Vitals: Blood pressure 121/71, pulse (!) 140, temperature 98 °F (36 7 °C), resp  rate 20, SpO2 95 %  ,There is no height or weight on file to calculate BMI  Wound 21 Leg Left (Active)   Wound Description SILVINA 21 08   Viry-wound Assessment SILVINA 21 08   Drainage Amount Small 21   Drainage Description Serosanguineous 21   Dressing Dry dressing 21 08   Dressing Status Dry; Intact; Old drainage 08/09/21 0800       Wound 08/08/21 Other (Comment) Buttocks Right (Active)   Wound Image   08/09/21 1124   Wound Description Clean;Dry; Intact 08/09/21 1124   Pressure Injury Stage Ecchymosis  08/09/21 0800   Viry-wound Assessment Clean;Dry; Intact 08/09/21 1124   Wound Length (cm) 6 cm 08/09/21 1124   Wound Width (cm) 2 cm 08/09/21 1124   Wound Depth (cm) 0 cm 08/09/21 1124   Wound Surface Area (cm^2) 12 cm^2 08/09/21 1124   Wound Volume (cm^3) 0 cm^3 08/09/21 1124   Calculated Wound Volume (cm^3) 0 cm^3 08/09/21 1124   Drainage Amount None 08/09/21 1124   Treatments Cleansed;Site care 08/09/21 1124   Dressing Protective barrier 08/09/21 1124   Dressing Changed Changed 08/09/21 1124   Patient Tolerance Tolerated well 08/09/21 1124   Dressing Status Clean;Dry; Intact 08/09/21 0800       Wound 08/09/21 MASD Sacrum Mid (Active)   Wound Image   08/09/21 1123   Wound Description Clean;Fragile;[place] 08/09/21 1123   Viry-wound Assessment Clean;Dry; Intact 08/09/21 1123   Wound Length (cm) 2 cm 08/09/21 1123   Wound Width (cm) 0 3 cm 08/09/21 1123   Wound Depth (cm) 0 1 cm 08/09/21 1123   Wound Surface Area (cm^2) 0 6 cm^2 08/09/21 1123   Wound Volume (cm^3) 0 06 cm^3 08/09/21 1123   Calculated Wound Volume (cm^3) 0 06 cm^3 08/09/21 1123   Drainage Amount Scant 08/09/21 1123   Drainage Description Serosanguineous 08/09/21 1123   Non-staged Wound Description Partial thickness 08/09/21 1123   Treatments Cleansed;Site care 08/09/21 1123   Dressing Protective barrier 08/09/21 1123   Dressing Changed Changed 08/09/21 1123   Patient Tolerance Tolerated well 08/09/21 1123     Wound care  will follow weekly call or tiger text  With questions or concerns     Konrad Negron RN BSN CWOCN

## 2021-08-09 NOTE — ASSESSMENT & PLAN NOTE
· Blood pressure acceptable, soft at times  Continue Norvasc, metoprolol  Will hold lisinopril for now    · Monitor

## 2021-08-09 NOTE — CONSULTS
Consultation - Electrophysiology  Charity Muller 66 y o  female MRN: [id number]  Unit/Bed#: [facility] 564-36 Encounter: [id number]      Inpatient consult to Electrophysiology  Consult performed by: Kimber Yao PA-C  Consult ordered by: JERI Dimas          History of Present Illness   Physician Requesting Consult: Parviz Maxwell MD  Reason for Consult / Principal Problem: afib    Assessment/Plan   Assessment:  1  Atrial fibrillation with RVR  - anticoagulated with low dose Xarelto / [id number] score of 4 (age, sex, HTN)  - no prior echo on file  - rate control: metoprolol tartrate 50mg twice daily   - antiarrhythmic therapy: none  - paroxysmal in nature with last device interrogation 07/2021 showing a 2% afib burden  - episodes of RVR in the post op setting  2  [employer] dual-chamber pacemaker in Situ  - implanted on 10/16/2017 by Dr Tawnya Sicard due to sick sinus syndrome/tachy-smita syndrome  3  Femur fracture  - presented with mechanical fall resulting in open reduction with internal fixation of distal femur and insertion of retrograde nail on 08/05  4  Acute blood loss anemia  - in the setting of ortho surgery, having received 1 unit of PRBC on 08/07  5  Essential hypertension  - maintained on amlodipine, lisinopril, and metoprolol tartrate as an outpatient  6  Hyperlipidemia  - maintained on Zetia as an outpatient  7  Obesity with BMI of 49    Plan:  1  Patient has atrial fibrillation with RVR likely in the setting of her recent orthopedic surgery  From her last interrogation a month ago, it does not appear that she has a persistent AFib player  Therefore, agree with starting Dilt drip as she is symptomatic  Will discontinue her other antihypertensive medications for further up blood pressure room  Will keep her on current dose of metoprolol tartrate 50 mg twice daily and adjust as need be tomorrow      As she is symptomatic, we did discuss that if she continues to run rapid can attempt cardioversion tomorrow so long as there no further needs for surgeries this admission as anticoagulation cannot be stopped for 1 month after undergoing CV  It does appear that patient has just been restarted on Xarelto 3 days ago  Therefore, if this is the case would likely need TANISHA the time of cardioversion  Will be re-evaluated in the morning for the need for cardioversion but kept NPO after midnight  Will get echo tomorrow along with formal device interrogation  HPI: Tyler Flannery is a 66y o  year old female with paroxysmal atrial fibrillation anticoagulated with low-dose Xarelto, tachy-smita syndrome status post pacemaker, femur fracture and acute blood loss anemia, essential hypertension, hyperlipidemia, and obesity with BMI of 49  Patient presented on 8/2 with a fall that resulted in a femur fracture  She underwent orthopedic surgery on 8/5 and tolerated the procedure well  In the post operative setting, she did have acute blood loss anemia that did require 1 unit of packed red blood cells on 08/07  Electrophysiology was asked to see the patient for atrial fibrillation with RVR  It appears that she began with RVR couple days ago and was kept on her home regimen of metoprolol tartrate 50 mg twice daily  Today when her heart rate reached up into the 140s, she was given 1 dose of IV Lopressor at 0053hrs, 1 dose of 125 mcg of digoxin IV at 1341, and 1 dose of diltiazem 10 mg IV at 1534  Not much can be seen from rhythm standpoint given the fact that telemetry was started 2 hours ago  Patient does not feel well, she reports that this feeling of overall not feeling well comes and goes in waves  She has been bed bound for the most part given her recent surgery  In terms of her prior cardiac history, she became known to electrophysiology in 2017 when heart monitor showed sinus pauses along with bradycardia  She did also have postop AFib after knee replacement in 2016   Therefore, she underwent dual-chamber pacemaker implantation and has since followed with Dr Vasiliy Ruiz and is maintained on metoprolol as rate control  EKG:       Historical Information   Past Medical History:   Diagnosis Date    Abnormal ECG     Last Assessed 9/29/2016    Anxiety     Last Assessed 6/08/2016    Arthritis     knees    Asthma     Last Assessed 11/06/2013    Atrial premature complex     Cataract, bilateral     Last Assessed 7/14/2016    Cataract, left eye     Both eyes  Had surgery on left   Difficulty swallowing     Dizziness     Fibromyalgia     Gastric reflux     Hypertension     Lyme disease     Premature ventricular contraction     Primary osteoarthritis of both knees     Last Assessed 7/14/2016    Rheumatic fever     Sore throat     Tuberculosis     Tuberculosis 1947     Past Surgical History:   Procedure Laterality Date    APPENDECTOMY      CARDIAC PACEMAKER PLACEMENT  2019    HYSTERECTOMY      ORIF FEMUR FRACTURE Left 8/5/2021    Procedure: OPEN REDUCTION W/ INTERNAL FIXATION (ORIF) DISTAL FEMUR; INSERTION RETROGRADE NAIL;  Surgeon: Taco Dorman MD;  [facility]: [facility];  Service: Orthopedics    CA DILATE ESOPHAGUS N/A 4/6/2016    Procedure: DILATATION ESOPHAGEAL;  Surgeon: Carmen Marin MD;  Location: [facility]; Service: Gastroenterology    CA EGD TRANSORAL BIOPSY SINGLE/MULTIPLE N/A 4/6/2016    Procedure: ESOPHAGOGASTRODUODENOSCOPY (EGD); Surgeon: Carmen Marin MD;  Location: [facility]; Service: Gastroenterology    CA XCAPSL CTRC RMVL INSJ IO LENS PROSTH W/O ECP Left 3/15/2016    Procedure: EXTRACTION EXTRACAPSULAR CATARACT PHACO INTRAOCULAR LENS (IOL);   Surgeon: Rand Mccallum MD;  [facility];  Service: Ophthalmology    REPLACEMENT TOTAL KNEE Right     REPLACEMENT TOTAL KNEE      right     TONSILLECTOMY       Social History     Substance and Sexual Activity   Alcohol Use No    Comment: Per [employer] Social     Social History     Substance and Sexual Activity   Drug Use No Social History     Tobacco Use   Smoking Status Never Smoker   Smokeless Tobacco Never Used     Family History:   Family History   Problem Relation Age of Onset    Coronary artery disease Mother     Heart attack Mother         Prior    Heart disease Father     Heart attack Father         Prior       Meds/Allergies   Hospital Medications:   Current Facility-Administered Medications   Medication Dose Route Frequency    acetaminophen (TYLENOL) tablet 975 mg  975 mg Oral Q8H [address]    albuterol (PROVENTIL HFA,VENTOLIN HFA) inhaler 2 puff  2 puff Inhalation Q4H PRN    ALPRAZolam (XANAX) tablet 0 25 mg  0 25 mg Oral BID PRN    amLODIPine (NORVASC) tablet 10 mg  10 mg Oral Daily    bisacodyl (DULCOLAX) rectal suppository 10 mg  10 mg Rectal Daily PRN    diltiazem (CARDIZEM) injection 10 mg  10 mg Intravenous Once    diphenhydrAMINE (BENADRYL) tablet 12 5 mg  12 5 mg Oral Q6H PRN    diphenhydrAMINE-zinc acetate (BENADRYL) 2-0 1 % cream   Topical TID PRN    docusate sodium (COLACE) capsule 100 mg  100 mg Oral BID    ezetimibe (ZETIA) tablet 10 mg  10 mg Oral Daily    metoprolol (LOPRESSOR) injection 2 5 mg  2 5 mg Intravenous Q6H PRN    metoprolol tartrate (LOPRESSOR) tablet 50 mg  50 mg Oral Q12H MERLINE    ondansetron (ZOFRAN) injection 4 mg  4 mg Intravenous Q4H PRN    oxyCODONE (ROXICODONE) IR tablet 2 5 mg  2 5 mg Oral Q4H PRN    oxyCODONE (ROXICODONE) IR tablet 5 mg  5 mg Oral Q4H PRN    rivaroxaban (XARELTO) tablet 15 mg  15 mg Oral Daily With Breakfast    senna (SENOKOT) tablet 8 6 mg  1 tablet Oral Daily     Home Medications:   Medications Prior to Admission   Medication    albuterol (PROVENTIL HFA,VENTOLIN HFA) 90 mcg/act inhaler    ALPRAZolam (XANAX) 0 25 mg tablet    amLODIPine (NORVASC) 10 mg tablet    Ascorbic Acid (VITAMIN C) 1000 MG tablet    B Complex-C (B-COMPLEX WITH VITAMIN C) tablet    ezetimibe (ZETIA) 10 mg tablet    lisinopril (ZESTRIL) 10 mg tablet    metoprolol tartrate (LOPRESSOR) 50 mg tablet    Myrbetriq 25 MG TB24    oxybutynin (DITROPAN XL) 15 MG 24 hr tablet    traMADol (ULTRAM) 50 mg tablet    Xarelto 15 MG tablet       Allergies   Allergen Reactions    A22 Folate [Folic Acid-Vit F9-ZJL A50 - Food Allergy]     Cobalt      Unknown    Lyrica [Pregabalin]     Nickel      Skin discoloration    Other      Black rubber    Penicillins Hives    Sulfa Antibiotics Itching    Cortisone Acetate [Cortisone] Itching and Rash    Penicillin G Rash       Objective   Vitals: Blood pressure 121/71, pulse (!) 140, temperature 98 °F (36 7 °C), resp  rate 20, SpO2 95 %  Orthostatic Blood Pressures      Most Recent Value   Blood Pressure  121/71 filed at 08/09/2021 1440   Patient Position - Orthostatic VS  Lying filed at 08/07/2021 1116            Intake/Output Summary (Last 24 hours) at 8/9/2021 1538  Last data filed at 8/9/2021 5096  Gross per 24 hour   Intake 480 ml   Output 200 ml   Net 280 ml       Invasive Devices     Peripheral Intravenous Line            Peripheral IV 08/05/21 Right Wrist 4 days    Peripheral IV 08/09/21 Left Antecubital <1 day          Drain            External Urinary Catheter <1 day                Review of Systems:  ROS  ROS as noted above, otherwise 12 point review of systems was performed and is negative  Physical Exam:   Physical Exam  Vitals and nursing note reviewed  Constitutional:       General: She is not in acute distress  Appearance: She is well-developed  She is obese  She is not diaphoretic  HENT:      Head: Normocephalic and atraumatic  Eyes:      Pupils: Pupils are equal, round, and reactive to light  Neck:      Vascular: No JVD  Cardiovascular:      Rate and Rhythm: Tachycardia present  Rhythm irregular  Heart sounds: No murmur heard  No friction rub  No gallop  Pulmonary:      Effort: Pulmonary effort is normal  No respiratory distress  Breath sounds: No wheezing        Comments: Decreased breath sounds bilaterally   Abdominal:      Palpations: Abdomen is soft  Musculoskeletal:         General: Normal range of motion  Cervical back: Normal range of motion  Skin:     General: Skin is warm and dry  Comments: Left lower extremity wrapped   Neurological:      Mental Status: She is alert and oriented to person, place, and time  Lab Results: I have personally reviewed pertinent lab results  Results from last 7 days   Lab Units 08/09/21  0433 08/08/21  0940 08/07/21  1935   WBC Thousand/uL 16 24* 20 16* 22 12*   HEMOGLOBIN g/dL 8 2* 8 2* 8 7*   HEMATOCRIT % 26 0* 25 6* 27 5*   PLATELETS Thousands/uL 298 282 290     Results from last 7 days   Lab Units 08/09/21  0433 08/08/21  0443 08/07/21  1935 08/05/21  0901   POTASSIUM mmol/L 3 7 3 8 5 1  --    CHLORIDE mmol/L 105 104 107  --    CO2 mmol/L 27 29 28  --    CO2, I-STAT mmol/L  --   --   --  24   BUN mg/dL 27* 27* 27*  --    CREATININE mg/dL 0 77 0 79 0 98  --    GLUCOSE, ISTAT mg/dl  --   --   --  127   CALCIUM mg/dL 8 7 8 6 8 7  --      Results from last 7 days   Lab Units 08/04/21  0926 08/02/21  1547   INR  1 34* 1 07   PTT seconds 39* 34*     Results from last 7 days   Lab Units 08/03/21  0442   MAGNESIUM mg/dL 2 2       Imaging: I have personally reviewed pertinent reports  ECHO: No results found for this or any previous visit  Cardiac testing:   ECHO:   No results found for this or any previous visit  No results found for this or any previous visit  CATH:  No results found for this or any previous visit  STRESS TEST:  No results found for this or any previous visit        VTE Prophylaxis: Xarelto

## 2021-08-09 NOTE — ASSESSMENT & PLAN NOTE
· In the setting of surgery  Received a total of 1 unit PRBC on 08/07  Hemoglobin stable today at 8 2   · CBC in a m

## 2021-08-09 NOTE — DISCHARGE INSTR - OTHER ORDERS
Skin care plans:  1-Calazime to sacrum, buttocks TID and PRN  2-Hydraguard to bilateral heel BID and PRN  3-Elevate heels to offload pressure  4-Ehob cushion when out of bed  5-Turn/repoisiton q2h or when medically stable for pressure re-distribution on skin    6-Moisturize skin daily with skin nourishing cream

## 2021-08-09 NOTE — PROGRESS NOTES
[facility]  Progress Note - Santana Ortega [birth year], 66 y o  female MRN: [id number]  Unit/Bed#: [facility] 949-36 Encounter: [id number]  Primary Care Provider: Garrett Song MD   Date and time admitted to hospital: 8/2/2021 10:51 PM    * Femur fracture [facility])  Assessment & Plan  · Mechanical fall prior to admission resulting in left femur fracture  · Status post left retrograde femoral IMN by orthopedic surgery 8/5  · Continue Xarelto for DVT PPX which she is on chronically for afib  · Nonweightbearing left lower extremity until cleared by ortho at 2 week f/u  · Pain control with ATC APAP, PRN Oxycodone  D/C IV Dilaudid  · PT/OT recommending rehab  Acute blood loss anemia  Assessment & Plan  · In the setting of surgery  Received a total of 1 unit PRBC on 08/07  Hemoglobin stable today at 8 2   · CBC in a m  MICHAEL (acute kidney injury) ([facility] [address] )  Assessment & Plan  · Secondary to volume depletion, anemia, ACEI, femur fracture, now improved  · Continue to hold ACE-inhibitor, can likely resume on discharge  · Avoid nephrotoxins and hypotension  · BMP in a m  Atrial fibrillation (HCC)  Assessment & Plan  · Continue metoprolol for rate control and Xarelto for anticoagulation  · HR elevated in the 140's, EKG shows afib with RVR  Will give OT dose IV digoxin and one dose IV lasix  · Monitor via marti     Hypertension  Assessment & Plan  · Blood pressure acceptable, soft at times  Continue Norvasc, metoprolol  Will hold lisinopril for now  · Monitor    Leukocytosis  Assessment & Plan  · Likely reactive in the setting of fracture requiring surgery  · Remains afebrile  · Trend      Hyperlipidemia  Assessment & Plan  · Zetia    Pacemaker  Assessment & Plan  · Patient with history tachy-smita syndrome with pacer placement  On beta-blockade        VTE Pharmacologic Prophylaxis: VTE Score: 10 Moderate Risk (Score 3-4) - Pharmacological DVT Prophylaxis Ordered: rivaroxaban (Xarelto)  Patient Centered Rounds: I performed bedside rounds with nursing staff today  Discussions with Specialists or Other Care Team Provider: nursing, case management, ortho     Education and Discussions with Family / Patient: Patient declined call to   States is having trouble with her family at this time  Time Spent for Care: 30 minutes  More than 50% of total time spent on counseling and coordination of care as described above  Current Length of Stay: 7 day(s)  Current Patient Status: Inpatient   Certification Statement: The patient will continue to require additional inpatient hospital stay due to pending improvement in HR, stable hgb and discharge to rehab  Discharge Plan: Anticipate discharge in 24-48 hrs to rehab facility  Code Status: Level 1 - Full Code    Subjective:   Patient reports some very mild shortness of breath today, worse with moving or talking  Otherwise, no complaints  Objective:     Vitals:   Temp (24hrs), Av 9 °F (37 2 °C), Min:98 4 °F (36 9 °C), Max:99 4 °F (37 4 °C)    Temp:  [98 4 °F (36 9 °C)-99 4 °F (37 4 °C)] 98 4 °F (36 9 °C)  HR:  [] 144  Resp:  [16-20] 20  BP: ()/(55-81) 115/68  SpO2:  [94 %-96 %] 95 %  There is no height or weight on file to calculate BMI  Input and Output Summary (last 24 hours): Intake/Output Summary (Last 24 hours) at 2021 1152  Last data filed at 2021 0912  Gross per 24 hour   Intake 720 ml   Output 350 ml   Net 370 ml       Physical Exam:   Physical Exam  Vitals and nursing note reviewed  Constitutional:       Appearance: She is obese  Cardiovascular:      Rate and Rhythm: Tachycardia present  Rhythm irregular  Pulmonary:      Breath sounds: Decreased breath sounds present  Abdominal:      Tenderness: There is no abdominal tenderness  Musculoskeletal:         General: Swelling present  Skin:     General: Skin is warm     Neurological:      Mental Status: She is alert and oriented to person, place, and time  Mental status is at baseline  Psychiatric:         Mood and Affect: Mood normal           Additional Data:     Labs:  Results from last 7 days   Lab Units 08/09/21  0433 08/07/21  0522 08/06/21  0624   WBC Thousand/uL 16 24* 20 93* 16 61*   HEMOGLOBIN g/dL 8 2* 7 2* 7 8*   HEMATOCRIT % 26 0* 22 3* 24 3*   PLATELETS Thousands/uL 298 256 206   NEUTROS PCT %  --   --  83*   LYMPHS PCT %  --   --  6*   LYMPHO PCT %  --  7*  --    MONOS PCT %  --   --  9   MONO PCT %  --  5  --    EOS PCT %  --  0 0     Results from last 7 days   Lab Units 08/09/21  0433 08/04/21  0444 08/03/21  0442   SODIUM mmol/L 137  --  135*   POTASSIUM mmol/L 3 7  --  5 2   CHLORIDE mmol/L 105  --  104   CO2 mmol/L 27  --  25   CO2, I-STAT   --    < >  --    BUN mg/dL 27*  --  23   CREATININE mg/dL 0 77  --  1 20   ANION GAP mmol/L 5  --  6   CALCIUM mg/dL 8 7  --  8 9   ALBUMIN g/dL  --   --  3 1*   TOTAL BILIRUBIN mg/dL  --   --  0 43   ALK PHOS U/L  --   --  73   ALT U/L  --   --  14   AST U/L  --   --  14   GLUCOSE RANDOM mg/dL 124  --  143*    < > = values in this interval not displayed       Results from last 7 days   Lab Units 08/04/21  0926   INR  1 34*         Results from last 7 days   Lab Units 08/03/21  0442   HEMOGLOBIN A1C % 5 6           Lines/Drains:  Invasive Devices     Peripheral Intravenous Line            Peripheral IV 08/05/21 Right Wrist 4 days    Peripheral IV 08/09/21 Left Antecubital <1 day          Drain            External Urinary Catheter <1 day                      Imaging: Reviewed radiology reports from this admission including: chest xray    Recent Cultures (last 7 days):         Last 24 Hours Medication List:   Current Facility-Administered Medications   Medication Dose Route Frequency Provider Last Rate    acetaminophen  975 mg Oral Q8H [address] Jovany Garcia PA-C      albuterol  2 puff Inhalation Q4H PRN Dmitry Cano MD      ALPRAZolam  0 25 mg Oral BID PRN Jarvis Muñoz PA-C      amLODIPine  10 mg Oral Daily Kerrie Chand      bisacodyl  10 mg Rectal Daily PRN Wendy Segal PA-C      digoxin  125 mcg Intravenous Once JERI Hurtado      diphenhydrAMINE  12 5 mg Oral Q6H PRN Jarvis Muñoz PA-C      diphenhydrAMINE-zinc acetate   Topical TID PRN Jarvis Muñoz PA-C      docusate sodium  100 mg Oral BID Jarvis Muñoz PA-C      ezetimibe  10 mg Oral Daily Jarvis Muñoz PA-C      furosemide  20 mg Intravenous Once JERI Hurtado      HYDROmorphone  0 2 mg Intravenous Q4H PRN Alena Mckeon MD      metoprolol  2 5 mg Intravenous Q6H PRN Luh Zaragoza PA-C      metoprolol tartrate  50 mg Oral Q12H [address] Jarvis Muñoz PA-C      ondansetron  4 mg Intravenous Q4H PRN Jarvis Muñoz PA-C      oxyCODONE  2 5 mg Oral Q4H PRN Jarvis Muñoz PA-C      oxyCODONE  5 mg Oral Q4H PRN Jarvis Muñoz PA-C      rivaroxaban  15 mg Oral Daily With Breakfast JERI Stevens      senna  1 tablet Oral Daily Jarvis Muñoz PA-C          Today, Patient Was Seen By: JERI Hurtado    **Please Note: This note may have been constructed using a voice recognition system  **

## 2021-08-09 NOTE — RESPIRATORY THERAPY NOTE
08/09/21 0700   Respiratory Protocol   Respiratory Plan Discontinue Protocol   Respiratory Assessment   Assessment Type Assess only   General Appearance Awake; Alert   Respiratory Pattern Normal   Chest Assessment Chest expansion symmetrical   Bilateral Breath Sounds Clear   Resp Comments Pt has orders for MDI bronchodilator  I will d/c resp protocol     Additional Assessments   Pulse (!) 144   SpO2 94 %

## 2021-08-10 ENCOUNTER — APPOINTMENT (INPATIENT)
Dept: NON INVASIVE DIAGNOSTICS | Facility: HOSPITAL | Age: 76
DRG: 480 | End: 2021-08-10
Payer: MEDICARE

## 2021-08-10 ENCOUNTER — APPOINTMENT (INPATIENT)
Dept: RADIOLOGY | Facility: HOSPITAL | Age: 76
DRG: 480 | End: 2021-08-10
Payer: MEDICARE

## 2021-08-10 LAB
ANION GAP SERPL CALCULATED.3IONS-SCNC: 3 MMOL/L (ref 4–13)
ATRIAL RATE: 258 BPM
ATRIAL RATE: 70 BPM
ATRIAL RATE: 71 BPM
BUN SERPL-MCNC: 24 MG/DL (ref 5–25)
CALCIUM SERPL-MCNC: 8.6 MG/DL (ref 8.3–10.1)
CHLORIDE SERPL-SCNC: 105 MMOL/L (ref 100–108)
CO2 SERPL-SCNC: 29 MMOL/L (ref 21–32)
CREAT SERPL-MCNC: 0.71 MG/DL (ref 0.6–1.3)
ERYTHROCYTE [DISTWIDTH] IN BLOOD BY AUTOMATED COUNT: 15.9 % (ref 11.6–15.1)
GFR SERPL CREATININE-BSD FRML MDRD: 82 ML/MIN/1.73SQ M
GLUCOSE SERPL-MCNC: 127 MG/DL (ref 65–140)
GLUCOSE SERPL-MCNC: 127 MG/DL (ref 65–140)
HCT VFR BLD AUTO: 26 % (ref 34.8–46.1)
HGB BLD-MCNC: 8.1 G/DL (ref 11.5–15.4)
MAGNESIUM SERPL-MCNC: 2.6 MG/DL (ref 1.6–2.6)
MCH RBC QN AUTO: 29.9 PG (ref 26.8–34.3)
MCHC RBC AUTO-ENTMCNC: 31.2 G/DL (ref 31.4–37.4)
MCV RBC AUTO: 96 FL (ref 82–98)
NRBC BLD AUTO-RTO: 0 /100 WBCS
P AXIS: 128 DEGREES
PLATELET # BLD AUTO: 327 THOUSANDS/UL (ref 149–390)
PMV BLD AUTO: 9.2 FL (ref 8.9–12.7)
POTASSIUM SERPL-SCNC: 3.9 MMOL/L (ref 3.5–5.3)
PR INTERVAL: 136 MS
QRS AXIS: -78 DEGREES
QRS AXIS: 0 DEGREES
QRS AXIS: 0 DEGREES
QRSD INTERVAL: 18 MS
QRSD INTERVAL: 186 MS
QRSD INTERVAL: 20 MS
QT INTERVAL: 180 MS
QT INTERVAL: 192 MS
QT INTERVAL: 498 MS
QTC INTERVAL: 246 MS
QTC INTERVAL: 247 MS
QTC INTERVAL: 612 MS
RBC # BLD AUTO: 2.71 MILLION/UL (ref 3.81–5.12)
SODIUM SERPL-SCNC: 137 MMOL/L (ref 136–145)
T WAVE AXIS: -88 DEGREES
T WAVE AXIS: 270 DEGREES
T WAVE AXIS: 87 DEGREES
TSH SERPL DL<=0.05 MIU/L-ACNC: 1.35 UIU/ML (ref 0.36–3.74)
VENTRICULAR RATE: 100 BPM
VENTRICULAR RATE: 113 BPM
VENTRICULAR RATE: 91 BPM
WBC # BLD AUTO: 20.99 THOUSAND/UL (ref 4.31–10.16)

## 2021-08-10 PROCEDURE — 97530 THERAPEUTIC ACTIVITIES: CPT

## 2021-08-10 PROCEDURE — 93306 TTE W/DOPPLER COMPLETE: CPT | Performed by: INTERNAL MEDICINE

## 2021-08-10 PROCEDURE — 99232 SBSQ HOSP IP/OBS MODERATE 35: CPT | Performed by: NURSE PRACTITIONER

## 2021-08-10 PROCEDURE — 97535 SELF CARE MNGMENT TRAINING: CPT

## 2021-08-10 PROCEDURE — 83735 ASSAY OF MAGNESIUM: CPT | Performed by: NURSE PRACTITIONER

## 2021-08-10 PROCEDURE — 99232 SBSQ HOSP IP/OBS MODERATE 35: CPT | Performed by: INTERNAL MEDICINE

## 2021-08-10 PROCEDURE — 85027 COMPLETE CBC AUTOMATED: CPT | Performed by: NURSE PRACTITIONER

## 2021-08-10 PROCEDURE — 71045 X-RAY EXAM CHEST 1 VIEW: CPT

## 2021-08-10 PROCEDURE — 84443 ASSAY THYROID STIM HORMONE: CPT | Performed by: STUDENT IN AN ORGANIZED HEALTH CARE EDUCATION/TRAINING PROGRAM

## 2021-08-10 PROCEDURE — 93306 TTE W/DOPPLER COMPLETE: CPT

## 2021-08-10 PROCEDURE — 93010 ELECTROCARDIOGRAM REPORT: CPT | Performed by: INTERNAL MEDICINE

## 2021-08-10 PROCEDURE — 80048 BASIC METABOLIC PNL TOTAL CA: CPT | Performed by: NURSE PRACTITIONER

## 2021-08-10 PROCEDURE — 82948 REAGENT STRIP/BLOOD GLUCOSE: CPT

## 2021-08-10 PROCEDURE — 93005 ELECTROCARDIOGRAM TRACING: CPT

## 2021-08-10 RX ADMIN — SENNOSIDES 8.6 MG: 8.6 TABLET ORAL at 08:13

## 2021-08-10 RX ADMIN — ACETAMINOPHEN 975 MG: 325 TABLET, FILM COATED ORAL at 13:07

## 2021-08-10 RX ADMIN — EZETIMIBE 10 MG: 10 TABLET ORAL at 08:13

## 2021-08-10 RX ADMIN — METOPROLOL TARTRATE 50 MG: 50 TABLET, FILM COATED ORAL at 08:15

## 2021-08-10 RX ADMIN — ACETAMINOPHEN 975 MG: 325 TABLET, FILM COATED ORAL at 20:58

## 2021-08-10 RX ADMIN — DOCUSATE SODIUM 100 MG: 100 CAPSULE ORAL at 08:13

## 2021-08-10 RX ADMIN — METOPROLOL TARTRATE 50 MG: 50 TABLET, FILM COATED ORAL at 20:58

## 2021-08-10 RX ADMIN — RIVAROXABAN 15 MG: 15 TABLET, FILM COATED ORAL at 08:15

## 2021-08-10 RX ADMIN — ACETAMINOPHEN 975 MG: 325 TABLET, FILM COATED ORAL at 05:08

## 2021-08-10 RX ADMIN — OXYCODONE HYDROCHLORIDE 5 MG: 5 TABLET ORAL at 22:13

## 2021-08-10 RX ADMIN — ONDANSETRON 4 MG: 2 INJECTION INTRAMUSCULAR; INTRAVENOUS at 00:40

## 2021-08-10 RX ADMIN — OXYCODONE HYDROCHLORIDE 5 MG: 5 TABLET ORAL at 17:26

## 2021-08-10 RX ADMIN — DILTIAZEM HYDROCHLORIDE 10 MG/HR: 5 INJECTION INTRAVENOUS at 02:06

## 2021-08-10 NOTE — OCCUPATIONAL THERAPY NOTE
Occupational Therapy Treatment Note:       08/10/21 1011   OT Last Visit   OT Visit Date 08/10/21   Note Type   Note Type Treatment   Pain Assessment   Pain Assessment Tool FLACC   Pain Rating: FLACC (Rest) - Face 0   Pain Rating: FLACC (Rest) - Legs 0   Pain Rating: FLACC (Rest) - Activity 0   Pain Rating: FLACC (Rest) - Cry 0   Pain Rating: FLACC (Rest) - Consolability 0   Score: FLACC (Rest) 0   Pain Rating: FLACC (Activity) - Face 1   Pain Rating: FLACC (Activity) - Legs 1   Pain Rating: FLACC (Activity) - Activity 1   Pain Rating: FLACC (Activity) - Cry 1   Pain Rating: FLACC (Activity) - Consolability 0   Score: FLACC (Activity) 4   ADL   Where Assessed Chair   Grooming Assistance 5  Supervision/Setup   UB Bathing Assistance 4  Minimal Assistance   LB Bathing Assistance 2  Maximal Assistance   LB Bathing Comments   (supine after rolling r and l )   UB Dressing Assistance 4  Minimal Assistance   LB Dressing Assistance 2  Maximal Assistance   Toileting Assistance  1  Total Assistance   Bed Mobility   Rolling R 2  Maximal assistance   Rolling L 2  Maximal assistance   Supine to Sit 3  Moderate assistance   Additional items Assist x 2   Transfers   Sit pivot 3  Moderate assistance   Additional items Assist x 2   Toilet transfer   (bed to drop arm recliner)   Cognition   Overall Cognitive Status WFL   Activity Tolerance   Activity Tolerance Patient tolerated treatment well   Assessment   Assessment pt participated in am ot session and was seen focusing on bed level bahting for wil area and lb, bed mobility, ub adls in chair, sit pivot transfer  pt was motivated and cooperative  she was able to participate needing moderate asst for sit pivots  pt with pain in ace wrapped l leg   pt was min asst ub adls secondary to numerous skin folds and lb secondary to  fatigue / pain   Plan   Treatment Interventions ADL retraining;Functional transfer training; Endurance training;Patient/family training;Equipment evaluation/education; Activityengagement   Goal Expiration Date 08/20/21   OT Treatment Day 1   OT Frequency 3-5x/wk   Recommendation   OT Discharge Recommendation Post acute rehabilitation services   OT - OK to Discharge Yes   AM-PAC Daily Activity Inpatient   Lower Body Dressing 2   Bathing 2   Toileting 2   Upper Body Dressing 2   Grooming 4   Eating 4   Daily Activity Raw Score 16   Daily Activity Standardized Score (Calc for Raw Score >=11) 35 96   AM-PAC Applied Cognition Inpatient   Following a Speech/Presentation 4   Understanding Ordinary Conversation 4   Taking Medications 3   Remembering Where Things Are Placed or Put Away 4   Remembering List of 4-5 Errands 4   Taking Care of Complicated Tasks 3   Applied Cognition Raw Score 22   Applied Cognition Standardized Score 47 83   April A Storm, SHELBY

## 2021-08-10 NOTE — CASE MANAGEMENT
Ottoniel Montgomery informed CM via TT if CM can request PT/OT  For pt  CM asked PT/OT to see her tomorrow- PT saw pt today  CM informed Marsha about this       CM to follow up with [facility]

## 2021-08-10 NOTE — PROGRESS NOTES
[address] [facility]  Progress Note - Sagar Chow [birth year], 66 y o  female MRN: [id number]  Unit/Bed#: [facility] 041-72 Encounter: [id number]  Primary Care Provider: Anita Martin MD   Date and time admitted to hospital: 8/2/2021 10:51 PM    * Femur fracture [facility])  Assessment & Plan  · Mechanical fall prior to admission resulting in left femur fracture  · Status post left retrograde femoral IMN by orthopedic surgery 8/5  · Continue Xarelto for DVT PPX which she is on chronically for afib  · Nonweightbearing left lower extremity until cleared by ortho at 2 week f/u  · Pain control with ATC APAP, PRN Oxycodone  · PT/OT recommending rehab  CM following  Acute blood loss anemia  Assessment & Plan  · In the setting of surgery  Received a total of 1 unit PRBC on 08/07  Hemoglobin stable today at 8 1   · CBC PRN        MICHAEL (acute kidney injury) ([facility] [address] )  Assessment & Plan  · Secondary to volume depletion, anemia, ACEI, femur fracture, now improved  · Continue to hold ACE-inhibitor, can likely resume on discharge  · Avoid nephrotoxins and hypotension  · BMP in a m  Atrial fibrillation ([facility] [address] )  Assessment & Plan  · With RVR started on cardizem gtt 8/9, now back in NSR  EP following, no need for cardioversion as patient back in NSR  · Titrate off Cardizem gtt  · Continue BB and Xarelto   · Echo pending  · Telemetry     Hypertension  Assessment & Plan  · Blood pressure acceptable, soft at times  Continue Norvasc, metoprolol  Will hold lisinopril for now  · Monitor    Leukocytosis  Assessment & Plan  · Likely reactive in the setting of fracture requiring surgery  · Remains afebrile  · Trend      Hyperlipidemia  Assessment & Plan  · Zetia    Pacemaker  Assessment & Plan  · Patient with history tachy-smita syndrome with pacer placement  · Interrogation today         VTE Pharmacologic Prophylaxis: VTE Score: 10 Moderate Risk (Score 3-4) - Pharmacological DVT Prophylaxis Ordered: rivaroxaban (Xarelto)  Patient Centered Rounds: I performed bedside rounds with nursing staff today  Discussions with Specialists or Other Care Team Provider: nursing, case management, EP    Education and Discussions with Family / Patient: Patient declined call to   Time Spent for Care: 30 minutes  More than 50% of total time spent on counseling and coordination of care as described above  Current Length of Stay: 8 day(s)  Current Patient Status: Inpatient   Certification Statement: The patient will continue to require additional inpatient hospital stay due to echo, titrating off Cardizem drip, EP recommendations, discharge planning to rehab  Discharge Plan: Anticipate discharge tomorrow to rehab facility  if cleared by EP and heart rate remained stable  Code Status: Level 1 - Full Code    Subjective:   Patient reports that breathing is better today  Overall feels better  Complaining of some hip pain, requesting pain medication  Objective:     Vitals:   Temp (24hrs), Av 3 °F (37 4 °C), Min:97 4 °F (36 3 °C), Max:100 3 °F (37 9 °C)    Temp:  [97 4 °F (36 3 °C)-100 3 °F (37 9 °C)] 98 1 °F (36 7 °C)  HR:  [] 66  Resp:  [16-20] 19  BP: ()/(39-98) 111/45  SpO2:  [90 %-99 %] 97 %  There is no height or weight on file to calculate BMI  Input and Output Summary (last 24 hours): Intake/Output Summary (Last 24 hours) at 8/10/2021 7687  Last data filed at 8/10/2021 0600  Gross per 24 hour   Intake 384 46 ml   Output 650 ml   Net -265 54 ml       Physical Exam:   Physical Exam  Vitals and nursing note reviewed  Constitutional:       General: She is not in acute distress  Appearance: She is obese  Cardiovascular:      Rate and Rhythm: Normal rate and regular rhythm  Pulmonary:      Breath sounds: Normal breath sounds  Abdominal:      Tenderness: There is no abdominal tenderness  Musculoskeletal:         General: Swelling (LLE) present     Skin: General: Skin is warm  Neurological:      Mental Status: She is alert and oriented to person, place, and time  Mental status is at baseline  Psychiatric:         Mood and Affect: Mood normal           Additional Data:     Labs:  Results from last 7 days   Lab Units 08/10/21  0435 08/07/21  0522 08/06/21  0624   WBC Thousand/uL 20 99* 20 93* 16 61*   HEMOGLOBIN g/dL 8 1* 7 2* 7 8*   HEMATOCRIT % 26 0* 22 3* 24 3*   PLATELETS Thousands/uL 327 256 206   NEUTROS PCT %  --   --  83*   LYMPHS PCT %  --   --  6*   LYMPHO PCT %  --  7*  --    MONOS PCT %  --   --  9   MONO PCT %  --  5  --    EOS PCT %  --  0 0     Results from last 7 days   Lab Units 08/10/21  0435   SODIUM mmol/L 137   POTASSIUM mmol/L 3 9   CHLORIDE mmol/L 105   CO2 mmol/L 29   BUN mg/dL 24   CREATININE mg/dL 0 71   ANION GAP mmol/L 3*   CALCIUM mg/dL 8 6   GLUCOSE RANDOM mg/dL 127     Results from last 7 days   Lab Units 08/04/21  0926   INR  1 34*                   Lines/Drains:  Invasive Devices     Peripheral Intravenous Line            Peripheral IV 08/09/21 Left Antecubital 1 day          Drain            External Urinary Catheter <1 day                  Telemetry:  Telemetry Orders (From admission, onward)             48 Hour Telemetry Monitoring  Continuous x 48 hours     Question:  Reason for 48 Hour Telemetry  Answer:  Arrhythmias Requiring Medical Therapy (eg  SVT, Vtach/fib, Bradycardia, Uncontrolled A-fib)                 Telemetry Reviewed: Normal Sinus Rhythm  Indication for Continued Telemetry Use: Arrthymias requiring medical therapy           Imaging: No pertinent imaging reviewed      Recent Cultures (last 7 days):         Last 24 Hours Medication List:   Current Facility-Administered Medications   Medication Dose Route Frequency Provider Last Rate    acetaminophen  975 mg Oral [facility] Jordan Chery PA-C      albuterol  2 puff Inhalation Q4H PRN Mike Lutz MD      ALPRAZolam  0 25 mg Oral BID PRN Jordan Chery PATIENCE      bisacodyl  10 mg Rectal Daily PRN Bev Douglas, PATIENCE      diltiazem  1-15 mg/hr Intravenous Titrated Maribell JERI Becker 2 5 mg/hr (08/10/21 0911)    diphenhydrAMINE  12 5 mg Oral Q6H PRN Prabha Locker, PATIENCE      diphenhydrAMINE-zinc acetate   Topical TID PRN Prabha Locker, PATIENCE      docusate sodium  100 mg Oral BID Prabha Bowen, PATIENCE      ezetimibe  10 mg Oral Daily Prabha Lockslade, PATIENCE      metoprolol  2 5 mg Intravenous Q6H PRN [facility] [place], PATIENCE      metoprolol tartrate  50 mg Oral Q12H [facility] Prabha Jessi, PATIENCE      ondansetron  4 mg Intravenous Q4H PRN Prabha Locker, PATIENCE      oxyCODONE  2 5 mg Oral Q4H PRN Prabha Locker, PATIENCE      oxyCODONE  5 mg Oral Q4H PRN Prabha Lockslade, PATIENCE      rivaroxaban  15 mg Oral Daily With Breakfast Sandior JERI Thacker      senna  1 tablet Oral Daily Prabha Bowen, PATIENCE          Today, Patient Was Seen By: JERI Kennedy    **Please Note: This note may have been constructed using a voice recognition system  **

## 2021-08-10 NOTE — PROGRESS NOTES
Progress Note - Geriatric Medicine   Goldy Shell 66 y o  female MRN: [id number]  [facility] Encounter: [id number]      Assessment/Plan:    Ambulatory dysfunction with fall  -reportedly mechanical fall 8/2/21  -injuries as outlined below  -given increased risk future falls due to age, hx fall and multiple additional chronic conditions ongoing fall risk reduction strategies and precautions encouraged  -continue good body mechanics, assist with tx,minimze of use sedating medications and those with anticholinergic properties  -PT/OT following, anticipate STR on dc    Left distal femur fracture  -s/p ORIF 8/5/21  -acute pain remains well controlled, continue multimodal regimen  -hemoglobin appears to be remaining stable around 8  -PT and OT following, anticipate STR on discharge    Acute pain due to trauma  -continue multimodal acute pain regimen, infrequent opiate use over past 24H  -continue bowel regimen to reduce risk constipation    Acute blood loss anemia  -evidence by more than 2 g drop hemoglobin requiring transfusion 1 unit red cells earlier in admission  -continue fluid resuscitation and transfuse as indicated    Atrial fibrillation  -home Xarelto resumed  -requires Cardizem GTT for short duration yesterday, has since returned to normal sinus rhythm and appears to be controlled on oral regimen  -cardiology and EP following    Anxiety  -maintained on alprazolam 0 25 mg TID PRN chronically as outpatient, reduced to BID PRN during hospitalization to minimize risk of over-sedation with opiate pain medications required for postop analgesia  -symptoms appear to be well controlled, encourage close outpatient follow-up with PCP for ongoing dose titration and medication management  -continue psychosocial supports    Cognitive screening  -encourage patient remain physically, socially, and cognitively active engaged to maintain cognitive acuity  -encourage use of sensory assist devices such as corrective lenses appropriate times    High risk of developing delirium  -due to age, traumatic injury, acute pain, hospitalization, surgical procedure, anesthesia   -home alprazolam dose temporally reduced during hospitalization to reduce risk over-sedation, continue to evaluate use of oxybutynin as outpatient and consider tapering to off if no longer providing symptom relief given increased risk of falls and confusion in mature adult population    Frailty in geriatric patient  -continue treatment acute metabolic derangements as occur  -continue optimization chronic medical conditions  -encourage well-balanced nutrition    Care coordination:  Rounded with Irma(RN)    Subjective:     Patient seen examined at bedside where she is lying resting, she is tired this morning and wants something to drink but is currently NPO as she has been in AFib and Cardiology was consider TANISHA/cardioversion if she remains in AFib  Her heart rate and blood pressures are markedly improved this morning  She reports right lower extremity pain overnight which is chronic and unchanged from baseline, she reports mild left lower extremity pain is well which he relates to her recent surgical procedure  Nursing reports no acute events overnight  Review of Systems   Constitutional: Positive for fatigue  Negative for appetite change (Hungry for breakfast-currently NPO for possible procedure), chills and fever  HENT: Negative  Eyes: Negative  Respiratory: Positive for shortness of breath (With conversation or exertion)  Cardiovascular: Negative for chest pain  Gastrointestinal: Negative for abdominal pain, diarrhea, nausea and vomiting  Endocrine: Negative  Genitourinary: Negative  Musculoskeletal: Positive for arthralgias (Bilateral lower extremities, right more than left) and gait problem  Skin: Negative  Allergic/Immunologic: Negative  Neurological: Negative for dizziness and light-headedness     Hematological: Bruises/bleeds easily  Psychiatric/Behavioral: Positive for sleep disturbance (Due to frequent monitoring)  The patient is nervous/anxious  All other systems reviewed and are negative  Objective:     Vitals: Blood pressure (!) 110/49, pulse 70, temperature 98 1 °F (36 7 °C), resp  rate 19, SpO2 97 %  ,There is no height or weight on file to calculate BMI  Intake/Output Summary (Last 24 hours) at 8/10/2021 0717  Last data filed at 8/10/2021 0206  Gross per 24 hour   Intake 468 42 ml   Output 650 ml   Net -181 58 ml       Current Medications: Reviewed    Physical Exam:   Physical Exam  Vitals and nursing note reviewed  Constitutional:       Appearance: She is obese  Comments: Elderly female appears no acute distress lying in bed resting   HENT:      Head: Normocephalic and atraumatic  Nose:      Comments: O2 via NC     Mouth/Throat:      Mouth: Mucous membranes are dry  Comments: Dentition is poor with visible tooth decay, membranes dry  Eyes:      General: No scleral icterus  Right eye: No discharge  Left eye: No discharge  Conjunctiva/sclera: Conjunctivae normal    Neck:      Comments: Phonation normal  Cardiovascular:      Rate and Rhythm: Normal rate  Pulmonary:      Effort: No respiratory distress  Comments: Conversational dyspnea, shallow respirations  Abdominal:      Comments: Obese, soft, nontender, bowel sounds present   Musculoskeletal:      Comments: Left lower extremity in Ace bandaging, bilateral lower extremity edema   Skin:     General: Skin is dry  Neurological:      Mental Status: She is alert        Comments: Sleeping but awakens to voice, answers questions appropriately, difficult maintaining wakeful state and frequently falls asleep mid sentence   Psychiatric:      Comments: Cooperative        Invasive Devices     Peripheral Intravenous Line            Peripheral IV 08/09/21 Left Antecubital 1 day          Drain            External Urinary Catheter <1 day              Lab, Imaging and other studies: I have personally reviewed pertinent reports

## 2021-08-10 NOTE — PHYSICAL THERAPY NOTE
Physical Therapy Progress Note     08/10/21 1010   PT Last Visit   PT Visit Date 08/10/21   Note Type   Note Type Treatment   Pain Assessment   Pain Assessment Tool FLACC   Pain Rating: FLACC (Rest) - Face 0   Pain Rating: FLACC (Rest) - Legs 0   Pain Rating: FLACC (Rest) - Activity 0   Pain Rating: FLACC (Rest) - Cry 1   Pain Rating: FLACC (Rest) - Consolability 0   Score: FLACC (Rest) 1   Pain Rating: FLACC (Activity) - Face 2   Pain Rating: FLACC (Activity) - Legs 1   Pain Rating: FLACC (Activity) - Activity 1   Pain Rating: FLACC (Activity) - Cry 1   Pain Rating: FLACC (Activity) - Consolability 0   Score: FLACC (Activity) 5   Restrictions/Precautions   LLE Weight Bearing Per Order NWB   Other Precautions Chair Alarm; Bed Alarm;Pain; Fall Risk;Telemetry;O2   Subjective   Subjective Pt encountered supine in bed, pleasant rex greeable to treatment  Reports controlled pain at rest, with pain increasing with activity  Pt reports dyspnea with activity, but no other complaints  Bed Mobility   Supine to Sit 3  Moderate assistance   Additional items Assist x 2   Transfers   Sit pivot 3  Moderate assistance   Additional items Assist x 2   Balance   Static Sitting Fair   Dynamic Sitting Poor +   Endurance Deficit   Endurance Deficit Yes   Endurance Deficit Description pain, dyspnea on RA throughout session with SpO2 >92% with activity   Activity Tolerance   Activity Tolerance Patient tolerated treatment well;Patient limited by fatigue;Patient limited by pain   Nurse Made Aware yes   Assessment   Prognosis Good   Problem List Decreased strength;Decreased range of motion;Decreased endurance; Impaired balance;Decreased mobility;Pain;Orthopedic restrictions   Assessment Pt continues to require Ax2 for all tasks due to generalized weakness, fatigue, pain & LLE orthopaedic restrictions  Pt able to participate in all transfer activities, but required seated rests to recover after each due to dyspnea    Standing deferred due to the same  Pt able to maintain seated balance while sitting without incident, but did require increased assist when pivoting to bedside recliner to maintain anterior weight shift to keep hips on seated surfaces  Also required LLE management throughout to limit abiilty to utilize it during [place]  Once seated in recliner, pt required assist to completely scoot back into seat  Discussed POC and goal to improve strength, endurance & trial standing to reduce effort required to perform these tasks & progress to use of BSC & w/c consistantly during acute phase of recovery  Pt verbalized understanding & offers no further questions or concerns at this time  Pt will require rehab at discharge due to limitations in mobiilty & lack of social support upon return home  Barriers to Discharge Inaccessible home environment;Decreased caregiver support   Goals   Patient Goals to get stronger   STG Expiration Date 08/16/21   PT Treatment Day 1   Plan   Treatment/Interventions Functional transfer training;LE strengthening/ROM; Therapeutic exercise; Endurance training;Patient/family training;Equipment eval/education; Bed mobility   Progress Progressing toward goals   PT Frequency Other (Comment)  (3-6x/week)   Recommendation   PT Discharge Recommendation Post acute rehabilitation services   Equipment Recommended   (TBD)   AM-PAC Basic Mobility Inpatient   Turning in Bed Without Bedrails 2   Lying on Back to Sitting on Edge of Flat Bed 1   Moving Bed to Chair 1   Standing Up From Chair 1   Walk in Room 1   Climb 3-5 Stairs 1   Basic Mobility Inpatient Raw Score 7   Turning Head Towards Sound 4   Follow Simple Instructions 4   Low Function Basic Mobility Raw Score 15   Low Function Basic Mobility Standardized Score 23 9   The patient's AM-PAC Basic Mobility Inpatient Short Form Low Function Raw Score 15 , Standardized Score is 23 9   A standardized score less 42 9 suggests the patient may benefit from discharge to post-acute rehab services  Please also refer to the recommendation of the Physical Therapist for safe discharge planning        Jase Hightower PTA

## 2021-08-10 NOTE — ASSESSMENT & PLAN NOTE
· With RVR started on cardizem gtt 8/9, now back in NSR  EP following, no need for cardioversion as patient back in NSR     · Titrate off Cardizem gtt  · Continue BB and Xarelto   · Echo pending  · Telemetry

## 2021-08-10 NOTE — PLAN OF CARE
Problem: PHYSICAL THERAPY ADULT  Goal: Performs mobility at highest level of function for planned discharge setting  See evaluation for individualized goals  Description: Treatment/Interventions: Functional transfer training, LE strengthening/ROM, Therapeutic exercise, Endurance training, Patient/family training, Equipment eval/education, Bed mobility, Continued evaluation, Spoke to nursing, OT  Equipment Recommended:  (continue to assess )       See flowsheet documentation for full assessment, interventions and recommendations  Outcome: Progressing  Note: Prognosis: Good  Problem List: Decreased strength, Decreased range of motion, Decreased endurance, Impaired balance, Decreased mobility, Pain, Orthopedic restrictions  Assessment: Pt continues to require Ax2 for all tasks due to generalized weakness, fatigue, pain & LLE orthopaedic restrictions  Pt able to participate in all transfer activities, but required seated rests to recover after each due to dyspnea  Standing deferred due to the same  Pt able to maintain seated balance while sitting without incident, but did require increased assist when pivoting to bedside recliner to maintain anterior weight shift to keep hips on seated surfaces  Also required LLE management throughout to limit abiilty to utilize it during [place]  Once seated in recliner, pt required assist to completely scoot back into seat  Discussed POC and goal to improve strength, endurance & trial standing to reduce effort required to perform these tasks & progress to use of BSC & w/c consistantly during acute phase of recovery  Pt verbalized understanding & offers no further questions or concerns at this time  Pt will require rehab at discharge due to limitations in mobiilty & lack of social support upon return home    Barriers to Discharge: Inaccessible home environment, Decreased caregiver support        PT Discharge Recommendation: Post acute rehabilitation services     PT - OK to Discharge: Yes (to rehab when medically cleared )    See flowsheet documentation for full assessment

## 2021-08-10 NOTE — PLAN OF CARE
Problem: OCCUPATIONAL THERAPY ADULT  Goal: Performs self-care activities at highest level of function for planned discharge setting  See evaluation for individualized goals  Description: Treatment Interventions: ADL retraining, Functional transfer training, Endurance training, Cognitive reorientation, Patient/family training, Equipment evaluation/education, Compensatory technique education, Energy conservation, Activityengagement          See flowsheet documentation for full assessment, interventions and recommendations  Outcome: Progressing  Note: Limitation: Decreased ADL status, Decreased Safe judgement during ADL, Decreased cognition, Decreased endurance, Decreased self-care trans, Decreased high-level ADLs  Prognosis: Good  Assessment: pt participated in am ot session and was seen focusing on bed level bahting for wil area and lb, bed mobility, ub adls in chair, sit pivot transfer  pt was motivated and cooperative  she was able to participate needing moderate asst for sit pivots  pt with pain in ace wrapped l leg   pt was min asst ub adls secondary to numerous skin folds and lb secondary to  fatigue / pain     OT Discharge Recommendation: Post acute rehabilitation services  OT - OK to Discharge:  Yes  April A AFSANEH Lares

## 2021-08-10 NOTE — PROGRESS NOTES
Progress Note - Electrophysiology-Cardiology (EP)   Temo Peña 66 y o  female MRN: [id number]  Unit/Bed#: [facility] 608-01 Encounter: [id number]    Assessment:  1  Paroxysmal Atrial Fibrillation with RVR   -Likely postsurgical following orthopedic surgery for femur  fracture on 8/5 and anemia    -TSH within normal limits   -No evidence of hypoxemia or active lung disease on imaging   -Interrogation revealed generally low AF burden (2%)    -Patient demonstrated improved rate control with low-dose         diltiazem infusion, currently in sinus rhythm with PACs  2  Sick Sinus Syndrome s/p dual chamber pacemaker ([employer])    3  Femur Fracture s/p surgery    4  Acute blood loss anemia s/p 1 unit pRBC transfusion     5  Acute on Chronic Diastolic heart failure     6  Leukocytosis, likely reactive    7  Class III Obesity    8  Hypertension    9  Hyperlipidemia    TTE from today showing preserved LV and RV systolic function with normal ventricular sizes, mildly dilated LA, and evidence of mildly elevated LV filling pressures with mildly elevated pulmonary artery pressures  Plan:  No indication for cardioversion as patient has spontaneously reverted to sinus rhythm  Discontinue IV diltiazem, and continue home dose of PO metoprolol tartrate  Add Metoprolol IR 25mg PRN BID for RVR  Continue long term anticoagulation with Rivaroxaban due to elevated stroke risk with [id number] score of 4  Full dose Rivaroxaban 20mg QD would be preferred owing to normal renal function  Will obtain repeat CXR to reassess for need for diuresis in light of echo findings  Would likely benefit from one dose of Furosemide IV 20mg        Subjective/Objective   Chief Complaint: Afib    Subjective: Temo Peña is a 66y o  year old female with paroxysmal atrial fibrillation on anticoagulation, sick sinus syndrome status post pacemaker, femur fracture and acute blood loss anemia, essential hypertension, hyperlipidemia, and obesity with BMI of 49       Patient presented on 8/2 with a fall that resulted in a femur fracture  She underwent orthopedic surgery on 8/5 and tolerated the procedure well  In the post operative setting, she did have acute blood loss anemia that did require 1 unit of packed red blood cells on 08/07      Electrophysiology was asked to see the patient for atrial fibrillation with RVR which began to worsen post-operatively despite continuing her home regimen of metoprolol tartrate 50 mg twice daily  A diltiazem infusion was started with improved rate control, and she subsequently reverted to sinus rhythm  She reports symptoms of dyspnea at rest, but is overall improved symptomatically today  Objective:     Vitals: BP (!) 111/45   Pulse 66   Temp 98 1 °F (36 7 °C)   Resp 19   SpO2 97%   There were no vitals filed for this visit  Orthostatic Blood Pressures      Most Recent Value   Blood Pressure  (!) 111/45 filed at 08/10/2021 0845   Patient Position - Orthostatic VS  Lying filed at 08/09/2021 1855            Intake/Output Summary (Last 24 hours) at 8/10/2021 0949  Last data filed at 8/10/2021 0600  Gross per 24 hour   Intake 384 46 ml   Output 650 ml   Net -265 54 ml       Invasive Devices     Peripheral Intravenous Line            Peripheral IV 08/09/21 Left Antecubital 1 day          Drain            External Urinary Catheter <1 day              Physical Exam:  Physical Exam  Vitals and nursing note reviewed  Constitutional:       General: She is not in acute distress  Appearance: She is well-developed  She is obese  She is not diaphoretic  HENT:      Head: Normocephalic and atraumatic  Eyes:      Pupils: Pupils are equal, round, and reactive to light  Neck:      Vascular: No JVD  Cardiovascular:      Rate and Rhythm: Tachycardia present  Rhythm irregular  Heart sounds: No murmur heard  No friction rub  No gallop  Pulmonary:      Effort: Pulmonary effort is normal  No respiratory distress  Breath sounds: No wheezing  Comments: Decreased breath sounds bilaterally   Abdominal:      Palpations: Abdomen is soft  Musculoskeletal:         General: Normal range of motion  Cervical back: Normal range of motion  Skin:     General: Skin is warm and dry  Comments: Left lower extremity wrapped   Neurological:      Mental Status: She is alert and oriented to person, place, and time          Lab Results: I have personally reviewed pertinent lab results  Imaging: I have personally reviewed pertinent reports      Telemetry: Sinus Rhythm with PACs (rate 50-70)

## 2021-08-10 NOTE — CASE MANAGEMENT
Indy from [employer] called CM and asked when DC will be, and when pt will be off cardizem drip  NANDINI stated that she will speak with NP about this  Maria Teresa Paulson stated that they prefer pt's to be off the drip for 24 hours, but will speak to a nurse to make sure  Maria Teresa Paulson stated that she will give CM a call back

## 2021-08-10 NOTE — ASSESSMENT & PLAN NOTE
· In the setting of surgery  Received a total of 1 unit PRBC on 08/07    Hemoglobin stable today at 8 1   · CBC PRN

## 2021-08-10 NOTE — ASSESSMENT & PLAN NOTE
· Mechanical fall prior to admission resulting in left femur fracture  · Status post left retrograde femoral IMN by orthopedic surgery 8/5  · Continue Xarelto for DVT PPX which she is on chronically for afib  · Nonweightbearing left lower extremity until cleared by ortho at 2 week f/u  · Pain control with ATC APAP, PRN Oxycodone  · PT/OT recommending rehab  CM following

## 2021-08-10 NOTE — CASE MANAGEMENT
CM met pt at bedside, and informed her on [facility]  She stated that she does not want to go to any rehab in a nursing facility  She has previous experience where they asked personal financial questions, and she does not want to give anyone information on her finances  CM stated that facilities ask these questions in case pt has to stay longer and her insurance will cover the first 20 days 100%  Pt stated that she was told she will be receiving PT for 7 weeks  Pt wants to go to a place that only provides rehab, nothing else  NANDINI suggested [facility]- pt was in agreement for a referral to be sent there       CM sent referral via [facility]

## 2021-08-11 LAB
ANISOCYTOSIS BLD QL SMEAR: PRESENT
BASOPHILS # BLD MANUAL: 0 THOUSAND/UL (ref 0–0.1)
BASOPHILS NFR MAR MANUAL: 0 % (ref 0–1)
EOSINOPHIL # BLD MANUAL: 1.11 THOUSAND/UL (ref 0–0.4)
EOSINOPHIL NFR BLD MANUAL: 7 % (ref 0–6)
ERYTHROCYTE [DISTWIDTH] IN BLOOD BY AUTOMATED COUNT: 15.5 % (ref 11.6–15.1)
HCT VFR BLD AUTO: 25 % (ref 34.8–46.1)
HGB BLD-MCNC: 7.6 G/DL (ref 11.5–15.4)
LYMPHOCYTES # BLD AUTO: 1.27 THOUSAND/UL (ref 0.6–4.47)
LYMPHOCYTES # BLD AUTO: 8 % (ref 14–44)
MCH RBC QN AUTO: 29.7 PG (ref 26.8–34.3)
MCHC RBC AUTO-ENTMCNC: 30.4 G/DL (ref 31.4–37.4)
MCV RBC AUTO: 98 FL (ref 82–98)
MONOCYTES # BLD AUTO: 0.64 THOUSAND/UL (ref 0–1.22)
MONOCYTES NFR BLD: 4 % (ref 4–12)
NEUTROPHILS # BLD MANUAL: 12.86 THOUSAND/UL (ref 1.85–7.62)
NEUTS SEG NFR BLD AUTO: 81 % (ref 43–75)
NRBC BLD AUTO-RTO: 0 /100 WBCS
PLATELET # BLD AUTO: 370 THOUSANDS/UL (ref 149–390)
PLATELET BLD QL SMEAR: ADEQUATE
PMV BLD AUTO: 8.9 FL (ref 8.9–12.7)
POLYCHROMASIA BLD QL SMEAR: PRESENT
RBC # BLD AUTO: 2.56 MILLION/UL (ref 3.81–5.12)
RBC MORPH BLD: PRESENT
WBC # BLD AUTO: 15.88 THOUSAND/UL (ref 4.31–10.16)

## 2021-08-11 PROCEDURE — 99232 SBSQ HOSP IP/OBS MODERATE 35: CPT | Performed by: INTERNAL MEDICINE

## 2021-08-11 PROCEDURE — 85007 BL SMEAR W/DIFF WBC COUNT: CPT | Performed by: NURSE PRACTITIONER

## 2021-08-11 PROCEDURE — 99232 SBSQ HOSP IP/OBS MODERATE 35: CPT | Performed by: NURSE PRACTITIONER

## 2021-08-11 PROCEDURE — 85027 COMPLETE CBC AUTOMATED: CPT | Performed by: NURSE PRACTITIONER

## 2021-08-11 RX ADMIN — ACETAMINOPHEN 975 MG: 325 TABLET, FILM COATED ORAL at 13:02

## 2021-08-11 RX ADMIN — RIVAROXABAN 15 MG: 15 TABLET, FILM COATED ORAL at 08:07

## 2021-08-11 RX ADMIN — SENNOSIDES 8.6 MG: 8.6 TABLET ORAL at 08:06

## 2021-08-11 RX ADMIN — DOCUSATE SODIUM 100 MG: 100 CAPSULE ORAL at 18:26

## 2021-08-11 RX ADMIN — EZETIMIBE 10 MG: 10 TABLET ORAL at 08:06

## 2021-08-11 RX ADMIN — ACETAMINOPHEN 975 MG: 325 TABLET, FILM COATED ORAL at 05:22

## 2021-08-11 RX ADMIN — METOROPROLOL TARTRATE 2.5 MG: 5 INJECTION, SOLUTION INTRAVENOUS at 23:47

## 2021-08-11 RX ADMIN — DOCUSATE SODIUM 100 MG: 100 CAPSULE ORAL at 08:06

## 2021-08-11 RX ADMIN — METOPROLOL TARTRATE 50 MG: 50 TABLET, FILM COATED ORAL at 08:06

## 2021-08-11 RX ADMIN — OXYCODONE HYDROCHLORIDE 5 MG: 5 TABLET ORAL at 23:47

## 2021-08-11 RX ADMIN — METOPROLOL TARTRATE 50 MG: 50 TABLET, FILM COATED ORAL at 20:08

## 2021-08-11 RX ADMIN — ACETAMINOPHEN 975 MG: 325 TABLET, FILM COATED ORAL at 22:18

## 2021-08-11 NOTE — ASSESSMENT & PLAN NOTE
· Likely reactive in the setting of fracture requiring surgery  Trending down     · Remains afebrile

## 2021-08-11 NOTE — ASSESSMENT & PLAN NOTE
· Patient with history tachy-smita syndrome with pacer placement  · Interrogation done, low AF burden

## 2021-08-11 NOTE — ASSESSMENT & PLAN NOTE
· With RVR started on cardizem gtt 8/9, now back in NSR  Gtt stopped 8/10  EP signed off  Recommending additional Metoprolol BID PRN for elevated HR  EP following, no need for cardioversion as patient back in NSR     · Continue BB and Xarelto   · D/C telemetry

## 2021-08-11 NOTE — PROGRESS NOTES
Progress Note - Geriatric Medicine   Goldy Shell 66 y o  female MRN: [id number]  Unit[facility]Bed[facility] 608-01 Encounter: [id number]      Assessment/Plan:    Ambulatory dysfunction with fall  -reportedly mechanical fall PTA  -left distal femur fracture identified on admission imaging  -remains high risk recurrent falls due to age, deconditioning, history of falls and polypharmacy  -continue to encourage good body mechanics assist with transfers  -maintain environment free of fall hazards  -PT and OT following, anticipate STR on discharge    Left distal femur fracture  -s/p ORIF 8/5/21  -acute pain is well controlled with current multimodal geriatric pain protocol  -continue treatment vitamin-D deficiency and osteoporosis to reduce risk osteoporotic fractures in future  -Ortho following  -PT and OT    Atrial fibrillation  -maintained on metoprolol for rate control as outpatient however required Cardizem GTT earlier in admission for AFib with RVR now resolved  -Cardiology following, recommend resumption of home regimen and outpatient [id number] for DC to STR from Cardiology perspective  -maintained on Xarelto for anticoagulation    Anxiety  -maintained on alprazolam 0 25 mg TID PRN as outpatient-currently reduced to BID to reduce risk over-sedation with concurrent use of opiate pain medications for acute postoperative pain  -continue multimodal comprehensive treatment plan and close outpatient follow-up with PCP    High risk of developing delirium  -continue to ensure that acute pain is well controlled  -maintain normal circadian rhythm  -monitor for fecal and urinary retention  -avoid medications with anticholinergic properties including Benadryl and increased risk confusion and falls in mature adult population    Frailty in geriatric patient  -due to limited physiologic and metabolic reserves patient may be less tolerant to even mild metabolic derangements and thus monitoring and prompt correction to derangements is encouraged    Care coordination:  Rounded with Drew Snellen (RN)    Subjective:     Patient seen examined bedside where she sitting resting comfortably watching television, reports pain is well controlled, she is slightly tired but denies additional acute complaints at this time  Review of Systems   Constitutional: Positive for appetite change (Poor appetite)  Negative for fatigue  HENT: Negative  Eyes: Negative  Respiratory: Negative  Cardiovascular: Negative  Gastrointestinal: Negative  Endocrine: Negative  Genitourinary: Negative  Musculoskeletal: Positive for gait problem  Skin: Negative  Allergic/Immunologic: Negative  Neurological: Positive for numbness ( Patchy in areas of left foot, improving)  Negative for dizziness and light-headedness  Hematological: Negative  Psychiatric/Behavioral: Negative for sleep disturbance  All other systems reviewed and are negative  Objective:     Vitals: Blood pressure 120/50, pulse 70, temperature 98 3 °F (36 8 °C), resp  rate 16, SpO2 96 %  ,There is no height or weight on file to calculate BMI  Intake/Output Summary (Last 24 hours) at 8/11/2021 1052  Last data filed at 8/11/2021 0803  Gross per 24 hour   Intake 720 ml   Output 650 ml   Net 70 ml     Current Medications: Reviewed    Physical Exam:   Physical Exam  Vitals and nursing note reviewed  Constitutional:       General: She is not in acute distress  Appearance: She is obese  Comments: Elderly female lying in bed resting, no acute distress   HENT:      Head: Normocephalic and atraumatic  Nose: Nose normal       Mouth/Throat:      Mouth: Mucous membranes are dry  Comments: Dentition is poor  Eyes:      General: Lids are normal          Right eye: No discharge  Left eye: No discharge  Conjunctiva/sclera: Conjunctivae normal    Neck:      Trachea: Phonation normal    Cardiovascular:      Rate and Rhythm: Normal rate        Heart sounds: Murmur heard      Pulmonary:      Comments: Shallow respirations, slightly coarse with no rales or rhonchi  Abdominal:      Comments: Obese, soft, nontender   Musculoskeletal:      Comments: Left lower extremity Ace wrap in place, ice packs along lateral aspect leg   Skin:     General: Skin is warm and dry  Coloration: Skin is pale  Neurological:      Mental Status: She is alert  Mental status is at baseline  Comments: Awake and alert   Psychiatric:      Comments: Pleasant and cooperative        Invasive Devices     Peripheral Intravenous Line            Peripheral IV 08/09/21 Left Antecubital 2 days          Drain            External Urinary Catheter 1 day              Lab, Imaging and other studies: I have personally reviewed pertinent reports

## 2021-08-11 NOTE — PROGRESS NOTES
[facility]  Progress Note - Santana Ortega [birth year], 66 y o  female MRN: [id number]  Unit/Bed#: [facility] 045-13 Encounter: [id number]  Primary Care Provider: Garrett Song MD   Date and time admitted to hospital: 8/2/2021 10:51 PM    * Femur fracture [facility])  Assessment & Plan  · Mechanical fall prior to admission resulting in left femur fracture  · Status post left retrograde femoral IMN by orthopedic surgery 8/5  · Continue Xarelto for DVT PPX which she is on chronically for afib  · Nonweightbearing left lower extremity until cleared by ortho at 2 week f/u  · Pain control with ATC APAP, PRN Oxycodone  · PT/OT recommending rehab  CM following  Acute blood loss anemia  Assessment & Plan  · In the setting of surgery  Received a total of 1 unit PRBC on 08/07  Hemoglobin 7 6 today  · CBC in AM        MICHAEL (acute kidney injury) ([address] )  Assessment & Plan  · Secondary to volume depletion, anemia, ACEI, femur fracture, now improved  · Continue to hold ACE-inhibitor, can likely resume on discharge  · Avoid nephrotoxins and hypotension  · BMP in a m  Atrial fibrillation ([address] )  Assessment & Plan  · With RVR started on cardizem gtt 8/9, now back in NSR  Gtt stopped 8/10  EP signed off  Recommending additional Metoprolol BID PRN for elevated HR  EP following, no need for cardioversion as patient back in NSR  · Continue BB and Xarelto   · D/C telemetry     Hypertension  Assessment & Plan  · Blood pressure acceptable  Continue metoprolol  ACEI on hold  Norvasc d/c   · Monitor    Leukocytosis  Assessment & Plan  · Likely reactive in the setting of fracture requiring surgery  Trending down  · Remains afebrile      Hyperlipidemia  Assessment & Plan  · Zetia    Pacemaker  Assessment & Plan  · Patient with history tachy-smita syndrome with pacer placement  · Interrogation done, low AF burden         VTE Pharmacologic Prophylaxis: VTE Score: 10 Moderate Risk (Score 3-4) - Pharmacological DVT Prophylaxis Ordered: enoxaparin (Lovenox)  Patient Centered Rounds: I performed bedside rounds with nursing staff today  Discussions with Specialists or Other Care Team Provider: nursing, case management     Education and Discussions with Family / Patient: Patient declined call to   Time Spent for Care: 30 minutes  More than 50% of total time spent on counseling and coordination of care as described above  Current Length of Stay: 9 day(s)  Current Patient Status: Inpatient   Certification Statement: The patient will continue to require additional inpatient hospital stay due to Pending rehab  Discharge Plan: Anticipate discharge tomorrow to rehab facility  Code Status: Level 1 - Full Code    Subjective:   Patient offers no acute complaints  No chest pain, palpitations or shortness of breath  Pain well controlled  Objective:     Vitals:   Temp (24hrs), Av 4 °F (36 9 °C), Min:97 8 °F (36 6 °C), Max:98 9 °F (37 2 °C)    Temp:  [97 8 °F (36 6 °C)-98 9 °F (37 2 °C)] 98 6 °F (37 °C)  HR:  [66-77] 69  Resp:  [16-18] 16  BP: (111-123)/(49-65) 123/49  SpO2:  [92 %-100 %] 92 %  There is no height or weight on file to calculate BMI  Input and Output Summary (last 24 hours): Intake/Output Summary (Last 24 hours) at 2021 1330  Last data filed at 2021 0803  Gross per 24 hour   Intake 720 ml   Output 650 ml   Net 70 ml       Physical Exam:   Physical Exam  Vitals and nursing note reviewed  Constitutional:       General: She is not in acute distress  Appearance: She is obese  Cardiovascular:      Rate and Rhythm: Normal rate and regular rhythm  Pulmonary:      Breath sounds: Normal breath sounds  Abdominal:      Tenderness: There is no abdominal tenderness  Musculoskeletal:         General: No swelling  Skin:     General: Skin is warm  Neurological:      Mental Status: She is alert and oriented to person, place, and time   Mental status is at baseline  Psychiatric:         Mood and Affect: Mood normal           Additional Data:     Labs:  Results from last 7 days   Lab Units 08/11/21  0947 08/06/21  0624   WBC Thousand/uL 15 88* 16 61*   HEMOGLOBIN g/dL 7 6* 7 8*   HEMATOCRIT % 25 0* 24 3*   PLATELETS Thousands/uL 370 206   NEUTROS PCT %  --  83*   LYMPHS PCT %  --  6*   LYMPHO PCT % 8*  --    MONOS PCT %  --  9   MONO PCT % 4  --    EOS PCT % 7* 0     Results from last 7 days   Lab Units 08/10/21  0435   SODIUM mmol/L 137   POTASSIUM mmol/L 3 9   CHLORIDE mmol/L 105   CO2 mmol/L 29   BUN mg/dL 24   CREATININE mg/dL 0 71   ANION GAP mmol/L 3*   CALCIUM mg/dL 8 6   GLUCOSE RANDOM mg/dL 127         Results from last 7 days   Lab Units 08/10/21  1140   POC GLUCOSE mg/dl 127               Lines/Drains:  Invasive Devices     Peripheral Intravenous Line            Peripheral IV 08/09/21 Left Antecubital 2 days          Drain            External Urinary Catheter 1 day                      Imaging: No pertinent imaging reviewed      Recent Cultures (last 7 days):         Last 24 Hours Medication List:   Current Facility-Administered Medications   Medication Dose Route Frequency Provider Last Rate    acetaminophen  975 mg Oral [facility] Jael Araya PA-C      albuterol  2 puff Inhalation Q4H PRN Darren Shay MD      ALPRAZolam  0 25 mg Oral BID PRN Jael Araya PA-C      bisacodyl  10 mg Rectal Daily PRN Richie Fuentes PA-C      diphenhydrAMINE  12 5 mg Oral Q6H PRN Jael Araya PA-C      diphenhydrAMINE-zinc acetate   Topical TID PRN Jael Araya PA-C      docusate sodium  100 mg Oral BID Jael Araya PA-C      ezetimibe  10 mg Oral Daily Jael Araya PA-C      metoprolol  2 5 mg Intravenous Q6H PRN Hussain Villar PA-C      metoprolol tartrate  50 mg Oral Q12H [facility] Jael Araya PA-C      ondansetron  4 mg Intravenous Q4H PRN Jael Araya PA-C      oxyCODONE  2 5 mg Oral Q4H PRN John Miguel Dionte Hernandez PA-C      oxyCODONE  5 mg Oral Q4H PRN Jyotsna Langford PA-C      rivaroxaban  15 mg Oral Daily With Breakfast JERI Mast      senna  1 tablet Oral Daily Jyotsna Langford PA-C          Today, Patient Was Seen By: JERI Theodore    **Please Note: This note may have been constructed using a voice recognition system  **

## 2021-08-11 NOTE — PLAN OF CARE
Problem: PAIN - ADULT  Goal: Verbalizes/displays adequate comfort level or baseline comfort level  Description: Interventions:  - Encourage patient to monitor pain and request assistance  - Assess pain using appropriate pain scale  - Administer analgesics based on type and severity of pain and evaluate response  - Implement non-pharmacological measures as appropriate and evaluate response  - Consider cultural and social influences on pain and pain management  - Notify physician/advanced practitioner if interventions unsuccessful or patient reports new pain  Outcome: Progressing     Problem: INFECTION - ADULT  Goal: Absence or prevention of progression during hospitalization  Description: INTERVENTIONS:  - Assess and monitor for signs and symptoms of infection  - Monitor lab/diagnostic results  - Monitor all insertion sites, i e  indwelling lines, tubes, and drains  - [place] appropriate cooling/warming therapies per order  - Administer medications as ordered  - Instruct and encourage patient and family to use good hand hygiene technique  - Identify and instruct in appropriate isolation precautions for identified infection/condition  Outcome: Progressing     Problem: DISCHARGE PLANNING  Goal: Discharge to home or other facility with appropriate resources  Description: INTERVENTIONS:  - Identify barriers to discharge w/patient and caregiver  - Arrange for needed discharge resources and transportation as appropriate  - Identify discharge learning needs (meds, wound care, etc )  - Arrange for interpretive services to assist at discharge as needed  - Refer to Case Management Department for coordinating discharge planning if the patient needs post-hospital services based on physician/advanced practitioner order or complex needs related to functional status, cognitive ability, or social support system  Outcome: Progressing     Problem: Knowledge Deficit  Goal: Patient/family/caregiver demonstrates understanding of disease process, treatment plan, medications, and discharge instructions  Description: Complete learning assessment and assess knowledge base  Interventions:  - Provide teaching at level of understanding  - Provide teaching via preferred learning methods  Outcome: Progressing     Problem: MUSCULOSKELETAL - ADULT  Goal: Maintain or return mobility to safest level of function  Description: INTERVENTIONS:  - Assess patient's ability to carry out ADLs; assess patient's baseline for ADL function and identify physical deficits which impact ability to perform ADLs (bathing, care of mouth/teeth, toileting, grooming, dressing, etc )  - Assess/evaluate cause of self-care deficits   - Assess range of motion  - Assess patient's mobility  - Assess patient's need for assistive devices and provide as appropriate  - Encourage maximum independence but intervene and supervise when necessary  - Involve family in performance of ADLs  - Assess for home care needs following discharge   - Consider OT consult to assist with ADL evaluation and planning for discharge  - Provide patient education as appropriate  Outcome: Progressing  Goal: Maintain proper alignment of affected body part  Description: INTERVENTIONS:  - Support, maintain and protect limb and body alignment  - Provide patient/ family with appropriate education  Outcome: Progressing     Problem: Nutrition/Hydration-ADULT  Goal: Nutrient/Hydration intake appropriate for improving, restoring or maintaining nutritional needs  Description: Monitor and assess patient's nutrition/hydration status for malnutrition  Collaborate with interdisciplinary team and initiate plan and interventions as ordered  Monitor patient's weight and dietary intake as ordered or per policy  Utilize nutrition screening tool and intervene as necessary  Determine patient's food preferences and provide high-protein, high-caloric foods as appropriate       INTERVENTIONS:  - Monitor oral intake, urinary output, labs, and treatment plans  - Assess nutrition and hydration status and recommend course of action  - Evaluate amount of meals eaten  - Assist patient with eating if necessary   - Allow adequate time for meals  - Recommend/ encourage appropriate diets, oral nutritional supplements, and vitamin/mineral supplements  - Order, calculate, and assess calorie counts as needed  - Assess need for intravenous fluids  - Provide specific nutrition/hydration education as appropriate  - Include patient/family/caregiver in decisions related to nutrition  Outcome: Progressing     Problem: Prexisting or High Potential for Compromised Skin Integrity  Goal: Skin integrity is maintained or improved  Description: INTERVENTIONS:  - Identify patients at risk for skin breakdown  - Assess and monitor skin integrity  - Assess and monitor nutrition and hydration status  - Monitor labs   - Assess for incontinence   - Turn and reposition patient  - Assist with mobility/ambulation  - Relieve pressure over bony prominences  - Avoid friction and shearing  - Provide appropriate hygiene as needed including keeping skin clean and dry  - Evaluate need for skin moisturizer/barrier cream  - Collaborate with interdisciplinary team   - Patient/family teaching  - Consider wound care consult   Outcome: Progressing

## 2021-08-12 ENCOUNTER — HOSPITAL ENCOUNTER (INPATIENT)
Facility: HOSPITAL | Age: 76
LOS: 27 days | Discharge: NURSING, REHAB OR LONG-TERM CARE | End: 2021-09-08
Attending: INTERNAL MEDICINE | Admitting: INTERNAL MEDICINE
Payer: MEDICARE

## 2021-08-12 ENCOUNTER — TELEPHONE (OUTPATIENT)
Dept: OBGYN CLINIC | Facility: HOSPITAL | Age: 76
End: 2021-08-12

## 2021-08-12 VITALS
RESPIRATION RATE: 18 BRPM | SYSTOLIC BLOOD PRESSURE: 114 MMHG | TEMPERATURE: 98.1 F | DIASTOLIC BLOOD PRESSURE: 67 MMHG | HEART RATE: 69 BPM | OXYGEN SATURATION: 93 %

## 2021-08-12 PROBLEM — R26.2 AMBULATORY DYSFUNCTION: Status: ACTIVE | Noted: 2021-08-12

## 2021-08-12 PROBLEM — D62 ACUTE BLOOD LOSS ANEMIA: Status: RESOLVED | Noted: 2021-08-04 | Resolved: 2021-08-12

## 2021-08-12 PROBLEM — N17.9 AKI (ACUTE KIDNEY INJURY) (HCC): Status: RESOLVED | Noted: 2021-08-04 | Resolved: 2021-08-12

## 2021-08-12 LAB
ERYTHROCYTE [DISTWIDTH] IN BLOOD BY AUTOMATED COUNT: 15.3 % (ref 11.6–15.1)
HCT VFR BLD AUTO: 27.5 % (ref 34.8–46.1)
HGB BLD-MCNC: 8.4 G/DL (ref 11.5–15.4)
MCH RBC QN AUTO: 29.7 PG (ref 26.8–34.3)
MCHC RBC AUTO-ENTMCNC: 30.5 G/DL (ref 31.4–37.4)
MCV RBC AUTO: 97 FL (ref 82–98)
NRBC BLD AUTO-RTO: 0 /100 WBCS
PLATELET # BLD AUTO: 443 THOUSANDS/UL (ref 149–390)
PMV BLD AUTO: 8.8 FL (ref 8.9–12.7)
RBC # BLD AUTO: 2.83 MILLION/UL (ref 3.81–5.12)
WBC # BLD AUTO: 13.27 THOUSAND/UL (ref 4.31–10.16)

## 2021-08-12 PROCEDURE — 99239 HOSP IP/OBS DSCHRG MGMT >30: CPT | Performed by: NURSE PRACTITIONER

## 2021-08-12 PROCEDURE — 85027 COMPLETE CBC AUTOMATED: CPT | Performed by: NURSE PRACTITIONER

## 2021-08-12 RX ORDER — OXYCODONE HYDROCHLORIDE 5 MG/1
TABLET ORAL
Qty: 15 TABLET | Refills: 0 | Status: SHIPPED | OUTPATIENT
Start: 2021-08-12 | End: 2021-12-07

## 2021-08-12 RX ORDER — ALPRAZOLAM 0.25 MG/1
0.25 TABLET ORAL 2 TIMES DAILY PRN
Qty: 10 TABLET | Refills: 0 | Status: SHIPPED | OUTPATIENT
Start: 2021-08-12 | End: 2021-12-07

## 2021-08-12 RX ORDER — METOPROLOL TARTRATE 50 MG/1
TABLET, FILM COATED ORAL
Qty: 270 TABLET | Refills: 0 | Status: ON HOLD
Start: 2021-08-12 | End: 2021-10-01 | Stop reason: SDUPTHER

## 2021-08-12 RX ORDER — SENNOSIDES 8.6 MG
8.6 TABLET ORAL DAILY
Refills: 0
Start: 2021-08-12 | End: 2021-12-07 | Stop reason: ALTCHOICE

## 2021-08-12 RX ORDER — POLYETHYLENE GLYCOL 3350 17 G/17G
17 POWDER, FOR SOLUTION ORAL DAILY
Refills: 0
Start: 2021-08-12 | End: 2021-12-07 | Stop reason: ALTCHOICE

## 2021-08-12 RX ORDER — ACETAMINOPHEN 325 MG/1
975 TABLET ORAL EVERY 8 HOURS SCHEDULED
Refills: 0
Start: 2021-08-12 | End: 2021-12-07 | Stop reason: ALTCHOICE

## 2021-08-12 RX ADMIN — ACETAMINOPHEN 975 MG: 325 TABLET, FILM COATED ORAL at 05:45

## 2021-08-12 RX ADMIN — DIPHENHYDRAMINE HCL 12.5 MG: 25 TABLET, COATED ORAL at 11:43

## 2021-08-12 RX ADMIN — RIVAROXABAN 15 MG: 15 TABLET, FILM COATED ORAL at 09:01

## 2021-08-12 RX ADMIN — ACETAMINOPHEN 975 MG: 325 TABLET, FILM COATED ORAL at 12:51

## 2021-08-12 RX ADMIN — OXYCODONE HYDROCHLORIDE 5 MG: 5 TABLET ORAL at 05:45

## 2021-08-12 RX ADMIN — EZETIMIBE 10 MG: 10 TABLET ORAL at 09:01

## 2021-08-12 RX ADMIN — OXYCODONE HYDROCHLORIDE 5 MG: 5 TABLET ORAL at 11:43

## 2021-08-12 RX ADMIN — METOPROLOL TARTRATE 50 MG: 50 TABLET, FILM COATED ORAL at 09:01

## 2021-08-12 NOTE — H&P
[address] [facility] 1945, 66 y o  female MRN: [id number]  Primary Care Provider: Lg Rios MD   Date and time admitted to hospital: 8/12/21  Assessment and Plan:  * Femur fracture [facility])  Assessment & Plan  · Mechanical fall prior to admission resulting in left femur fracture  · Status post left retrograde femoral IMN by orthopedic surgery 8/5  · Continue Xarelto for DVT PPX which she is on chronically for afib  · Nonweightbearing left lower extremity until cleared by ortho at 2 week f/u  · Pain control with ATC APAP, PRN Oxycodone  · ContinuePT/OT     Ambulatory dysfunction  Assessment & Plan  Presented with fall with left femur fracture s/p IMN on 8/5  Contine with PT/OT  With NWB Left LE as tolerated    Leukocytosis  Assessment & Plan  · Likely reactive in the setting of fracture requiring surgery  Trending down  · Remains afebrile  · Repeat labs in one week      Hyperlipidemia  Assessment & Plan  · Zetia    Pacemaker  Assessment & Plan  · Patient with history tachy-smita syndrome with pacer placement  · Interrogation done, low AF burden  Hypertension  Assessment & Plan  · Blood pressure acceptable  Continue metoprolol  Atrial fibrillation ([facility] [address] )  Assessment & Plan  · Had Afib with  RVR post operatively and was started on cardizem gtt 8/9, now back in NSR  · Continue  Metoprolol BID PRN for elevated HR   Continue BB and Xarelto   ·       VTE Pharmacologic Prophylaxis: VTE Score: 10 Xarelto  Code Status: Level 1 - Full Code   Discussion with family: Patient declined call to        Anticipated Length of Stay: Patient will be admitted on an inpatient basis with an anticipated length of stay of greater than 2 midnights secondary to Ongoing PT/OT      Total Time for Visit, including Counseling / Coordination of Care: 30 minutes Greater than 50% of this total time spent on direct patient counseling and coordination of care      Chief Complaint: S/P Left Femoral IMN     History of Present Illness:  Hawa Galloway a 66 y  o  female patient with past medical history of paroxysmal atrial fibrillation on Xarelto, sick sinus syndrome status post pacemaker, diastolic heart failure, obesity, hypertension, hyperlipidemia who originally presented to the hospital on 8/2/2021 due to mechanical fall resulting in left femur fracture   Patient was seen by Orthopedic surgery and underwent left femoral IMN 8/5   Postoperative course was complicated acute blood loss anemia requiring blood transfusion and  atrial fibrillation with RVR and required Cardizem drip and EP consult  Patient was transfered to [facility] for acute rehab     Review of Systems:  Review of Systems     Past Medical and Surgical History:   Medical History        Past Medical History:   Diagnosis Date    Abnormal ECG       Last Assessed 9/29/2016    Anxiety       Last Assessed 6/08/2016    Arthritis       knees    Asthma       Last Assessed 11/06/2013    Atrial premature complex      Cataract, bilateral       Last Assessed 7/14/2016    Cataract, left eye       Both eyes  Had surgery on left      Difficulty swallowing      Dizziness      Fibromyalgia      Gastric reflux      Hypertension      Lyme disease      Premature ventricular contraction      Primary osteoarthritis of both knees       Last Assessed 7/14/2016    Rheumatic fever      Sore throat      Tuberculosis      Tuberculosis 1947            Surgical History         Past Surgical History:   Procedure Laterality Date    APPENDECTOMY        CARDIAC PACEMAKER PLACEMENT   2019    HYSTERECTOMY        ORIF FEMUR FRACTURE Left 8/5/2021     Procedure: OPEN REDUCTION W/ INTERNAL FIXATION (ORIF) DISTAL FEMUR; INSERTION RETROGRADE NAIL;  Surgeon: Olvin Fernando MD;  [facility];  Service: Orthopedics    MT DILATE ESOPHAGUS N/A 4/6/2016     Procedure: DILATATION ESOPHAGEAL;  Surgeon: Anibal Nicole MD;  Location: [facility]; Service: Gastroenterology    ND EGD TRANSORAL BIOPSY SINGLE/MULTIPLE N/A 4/6/2016     Procedure: ESOPHAGOGASTRODUODENOSCOPY (EGD); Surgeon: Anibal Nicole MD;  Location: [facility]; Service: Gastroenterology    ND XCAPSL CTRC RMVL INSJ IO LENS PROSTH W/O ECP Left 3/15/2016     Procedure: EXTRACTION EXTRACAPSULAR CATARACT PHACO INTRAOCULAR LENS (IOL); Surgeon: Lawanda Buchanan MD;  Location[facility];  Service: Ophthalmology    REPLACEMENT TOTAL KNEE Right      REPLACEMENT TOTAL KNEE         right     TONSILLECTOMY                Meds/Allergies:  Meds reviewed  And updated in facility chart     Allergies: Allergies   Allergen Reactions    Z68 Folate [Folic Acid-Vit O5-YGO M93 - Food Allergy]      Cobalt         Unknown    Lyrica [Pregabalin]      Nickel         Skin discoloration    Other         Black rubber    Penicillins Hives    Sulfa Antibiotics Itching    Cortisone Acetate [Cortisone] Itching and Rash    Penicillin G Rash         Social History:  Marital Status:    Substance Use History:   Social History           Substance and Sexual Activity   Alcohol Use No     Comment: Per [employer]      Social History          Tobacco Use   Smoking Status Never Smoker   Smokeless Tobacco Never Used      Social History          Substance and Sexual Activity   Drug Use No         Family History:        Family History   Problem Relation Age of Onset    Coronary artery disease Mother      Heart attack Mother           Prior    Heart disease Father      Heart attack Father           Prior         Physical Exam:      Vitals:   Blood Pressure: 114/67 (08/12/21 1033)  Pulse: 69 (08/12/21 1033)  Temperature: 98 1 °F (36 7 °C) (08/12/21 0707)  Temp Source: Axillary (08/10/21 0222)  Respirations: 18 (08/12/21 1033)  SpO2: 93 % (08/12/21 1033)     Physical Exam Exam:   Physical Exam  Vitals and nursing note reviewed  Constitutional:       Appearance: She is obese  Cardiovascular:      Rate and Rhythm: Normal rate and regular rhythm  Pulmonary:      Breath sounds: Normal breath sounds  Abdominal:      Tenderness: There is no abdominal tenderness  Musculoskeletal:         General: No swelling  Skin:     General: Skin is warm  Comments: left hip/thigh dressing CDI   Neurological:      Mental Status: She is alert and oriented to person, place, and time  Mental status is at baseline  Psychiatric:         Mood and Affect: Mood normal     Additional Data:      Lab Results:         Results from last 7 days   Lab Units 08/12/21  0507 08/11/21  0947 08/06/21  0624   WBC Thousand/uL 13 27* 15 88* 16 61*   HEMOGLOBIN g/dL 8 4* 7 6* 7 8*   HEMATOCRIT % 27 5* 25 0* 24 3*   PLATELETS Thousands/uL 443* 370 206   NEUTROS PCT %  --   --  83*   LYMPHS PCT %  --   --  6*   LYMPHO PCT %  --  8*  --    MONOS PCT %  --   --  9   MONO PCT %  --  4  --    EOS PCT %  --  7* 0           Results from last 7 days   Lab Units 08/10/21  0435   SODIUM mmol/L 137   POTASSIUM mmol/L 3 9   CHLORIDE mmol/L 105   CO2 mmol/L 29   BUN mg/dL 24   CREATININE mg/dL 0 71   ANION GAP mmol/L 3*   CALCIUM mg/dL 8 6   GLUCOSE RANDOM mg/dL 127               Results from last 7 days   Lab Units 08/10/21  1140   POC GLUCOSE mg/dl 127                 Imaging: No pertinent imaging reviewed  XR chest portable   Final Result by Mirella Cox MD (08/10 1159)       No acute cardiopulmonary disease                        Workstation performed: [id number]           XR chest portable   Final Result by Sukhdev Johns MD (08/08 1444)       No acute cardiopulmonary disease                        Workstation performed: [id number]           XR femur 2 vw left   Final Result by Alicia Dumont MD (08/05 2905)       Fluoroscopic guidance provided for procedure guidance    Please refer to the separate procedure notes for additional details            Workstation performed: [id number]           XR chest portable   Final Result by Ronel Medina MD (08/04 0848)       No acute pulmonary disease                       Workstation performed: [id number]                 EKG and Other Studies Reviewed on Admission:   · EKG: No EKG obtained      ** Please Note: This note has been constructed using a voice recognition system   **

## 2021-08-12 NOTE — ASSESSMENT & PLAN NOTE
· Had Afib with  RVR post operatively and was started on cardizem gtt 8/9, now back in NSR  · Continue  Metoprolol BID PRN for elevated HR   Continue BB and Xarelto   ·

## 2021-08-12 NOTE — ASSESSMENT & PLAN NOTE
· Likely reactive in the setting of fracture requiring surgery  Trending down     · Remains afebrile  · Repeat labs in one week

## 2021-08-12 NOTE — CASE MANAGEMENT
[facility] able to accept today 8/12    TC to [facility], able to do BLS transport today at 1pm    Medical necessity completed and copy sent to MR    Patient in agreement with DCP, requesting TC to daughter with information, who stated that she will notify the remainder of the family, who will also be sure that patient's nephew Carlton oLng will be made aware    IMM reviewed with patient, patient agrees with discharge determination

## 2021-08-12 NOTE — ASSESSMENT & PLAN NOTE
Presented with fall with left femur fracture s/p IMN on 8/5  Contine with PT/OT  With NWB Left LE as tolerated

## 2021-08-12 NOTE — PLAN OF CARE
Problem: MOBILITY - ADULT  Goal: Maintain or return to baseline ADL function  Description: INTERVENTIONS:  -  Assess patient's ability to carry out ADLs; assess patient's baseline for ADL function and identify physical deficits which impact ability to perform ADLs (bathing, care of mouth/teeth, toileting, grooming, dressing, etc )  - Assess/evaluate cause of self-care deficits   - Assess range of motion  - Assess patient's mobility; develop plan if impaired  - Assess patient's need for assistive devices and provide as appropriate  - Encourage maximum independence but intervene and supervise when necessary  - Involve family in performance of ADLs  - Assess for home care needs following discharge   - Consider OT consult to assist with ADL evaluation and planning for discharge  - Provide patient education as appropriate  Outcome: Progressing  Goal: Maintains/Returns to pre admission functional level  Description: INTERVENTIONS:  - Perform BMAT or MOVE assessment daily    - Set and communicate daily mobility goal to care team and patient/family/caregiver  - Collaborate with rehabilitation services on mobility goals if consulted  - Perform Range of Motion 3 times a day  - Reposition patient every 2 hours    - Ambulate patient 3 times a day  - Out of bed to chair 3 times a day   - Out of bed for meals 3 times a day  - Out of bed for toileting  - Record patient progress and toleration of activity level   Outcome: Progressing     Problem: PAIN - ADULT  Goal: Verbalizes/displays adequate comfort level or baseline comfort level  Description: Interventions:  - Encourage patient to monitor pain and request assistance  - Assess pain using appropriate pain scale  - Administer analgesics based on type and severity of pain and evaluate response  - Implement non-pharmacological measures as appropriate and evaluate response  - Consider cultural and social influences on pain and pain management  - Notify physician/advanced practitioner if interventions unsuccessful or patient reports new pain  Outcome: Progressing     Problem: INFECTION - ADULT  Goal: Absence or prevention of progression during hospitalization  Description: INTERVENTIONS:  - Assess and monitor for signs and symptoms of infection  - Monitor lab/diagnostic results  - Monitor all insertion sites, i e  indwelling lines, tubes, and drains  - Casa appropriate cooling/warming therapies per order  - Administer medications as ordered  - Instruct and encourage patient and family to use good hand hygiene technique  - Identify and instruct in appropriate isolation precautions for identified infection/condition  Outcome: Progressing     Problem: SAFETY ADULT  Goal: Maintain or return to baseline ADL function  Description: INTERVENTIONS:  -  Assess patient's ability to carry out ADLs; assess patient's baseline for ADL function and identify physical deficits which impact ability to perform ADLs (bathing, care of mouth/teeth, toileting, grooming, dressing, etc )  - Assess/evaluate cause of self-care deficits   - Assess range of motion  - Assess patient's mobility; develop plan if impaired  - Assess patient's need for assistive devices and provide as appropriate  - Encourage maximum independence but intervene and supervise when necessary  - Involve family in performance of ADLs  - Assess for home care needs following discharge   - Consider OT consult to assist with ADL evaluation and planning for discharge  - Provide patient education as appropriate  Outcome: Progressing  Goal: Maintains/Returns to pre admission functional level  Description: INTERVENTIONS:  - Perform BMAT or MOVE assessment daily    - Set and communicate daily mobility goal to care team and patient/family/caregiver  - Collaborate with rehabilitation services on mobility goals if consulted  - Perform Range of Motion 3 times a day  - Reposition patient every 2 hours    - Ambulate patient 3 times a day  - Out of bed to chair 3 times a day   - Out of bed for meals 3 times a day  - Out of bed for toileting  - Record patient progress and toleration of activity level   Outcome: Progressing  Goal: Patient will remain free of falls  Description: INTERVENTIONS:  - Educate patient/family on patient safety including physical limitations  - Instruct patient to call for assistance with activity   - Consult OT/PT to assist with strengthening/mobility   - Keep Call bell within reach  - Keep bed low and locked with side rails adjusted as appropriate  - Keep care items and personal belongings within reach  - Initiate and maintain comfort rounds  - Make Fall Risk Sign visible to staff  - Offer Toileting every 2 Hours, in advance of need  - Initiate/Maintain alarms  - Apply yellow socks and bracelet for high fall risk patients  - Consider moving patient to room near nurses station  Outcome: Progressing     Problem: DISCHARGE PLANNING  Goal: Discharge to home or other facility with appropriate resources  Description: INTERVENTIONS:  - Identify barriers to discharge w/patient and caregiver  - Arrange for needed discharge resources and transportation as appropriate  - Identify discharge learning needs (meds, wound care, etc )  - Arrange for interpretive services to assist at discharge as needed  - Refer to Case Management Department for coordinating discharge planning if the patient needs post-hospital services based on physician/advanced practitioner order or complex needs related to functional status, cognitive ability, or social support system  Outcome: Progressing     Problem: Knowledge Deficit  Goal: Patient/family/caregiver demonstrates understanding of disease process, treatment plan, medications, and discharge instructions  Description: Complete learning assessment and assess knowledge base    Interventions:  - Provide teaching at level of understanding  - Provide teaching via preferred learning methods  Outcome: Progressing Problem: MUSCULOSKELETAL - ADULT  Goal: Maintain or return mobility to safest level of function  Description: INTERVENTIONS:  - Assess patient's ability to carry out ADLs; assess patient's baseline for ADL function and identify physical deficits which impact ability to perform ADLs (bathing, care of mouth/teeth, toileting, grooming, dressing, etc )  - Assess/evaluate cause of self-care deficits   - Assess range of motion  - Assess patient's mobility  - Assess patient's need for assistive devices and provide as appropriate  - Encourage maximum independence but intervene and supervise when necessary  - Involve family in performance of ADLs  - Assess for home care needs following discharge   - Consider OT consult to assist with ADL evaluation and planning for discharge  - Provide patient education as appropriate  Outcome: Progressing  Goal: Maintain proper alignment of affected body part  Description: INTERVENTIONS:  - Support, maintain and protect limb and body alignment  - Provide patient/ family with appropriate education  Outcome: Progressing     Problem: Nutrition/Hydration-ADULT  Goal: Nutrient/Hydration intake appropriate for improving, restoring or maintaining nutritional needs  Description: Monitor and assess patient's nutrition/hydration status for malnutrition  Collaborate with interdisciplinary team and initiate plan and interventions as ordered  Monitor patient's weight and dietary intake as ordered or per policy  Utilize nutrition screening tool and intervene as necessary  Determine patient's food preferences and provide high-protein, high-caloric foods as appropriate       INTERVENTIONS:  - Monitor oral intake, urinary output, labs, and treatment plans  - Assess nutrition and hydration status and recommend course of action  - Evaluate amount of meals eaten  - Assist patient with eating if necessary   - Allow adequate time for meals  - Recommend/ encourage appropriate diets, oral nutritional supplements, and vitamin/mineral supplements  - Order, calculate, and assess calorie counts as needed  - Assess need for intravenous fluids  - Provide specific nutrition/hydration education as appropriate  - Include patient/family/caregiver in decisions related to nutrition  Outcome: Progressing     Problem: Prexisting or High Potential for Compromised Skin Integrity  Goal: Skin integrity is maintained or improved  Description: INTERVENTIONS:  - Identify patients at risk for skin breakdown  - Assess and monitor skin integrity  - Assess and monitor nutrition and hydration status  - Monitor labs   - Assess for incontinence   - Turn and reposition patient  - Assist with mobility/ambulation  - Relieve pressure over bony prominences  - Avoid friction and shearing  - Provide appropriate hygiene as needed including keeping skin clean and dry  - Evaluate need for skin moisturizer/barrier cream  - Collaborate with interdisciplinary team   - Patient/family teaching  - Consider wound care consult   Outcome: Progressing     Problem: Potential for Falls  Goal: Patient will remain free of falls  Description: INTERVENTIONS:  - Educate patient/family on patient safety including physical limitations  - Instruct patient to call for assistance with activity   - Consult OT/PT to assist with strengthening/mobility   - Keep Call bell within reach  - Keep bed low and locked with side rails adjusted as appropriate  - Keep care items and personal belongings within reach  - Initiate and maintain comfort rounds  - Make Fall Risk Sign visible to staff  - Offer Toileting every 2 Hours, in advance of need  - Initiate/Maintain alarms  - Apply yellow socks and bracelet for high fall risk patients  - Consider moving patient to room near nurses station  Outcome: Progressing

## 2021-08-12 NOTE — TRANSPORTATION MEDICAL NECESSITY
Section I - General Information    Name of Patient: Christopher Munguia                 :     Medicare #: [id number]  Transport Date: 21 (PCS is valid for round trips on this date and for all repetitive trips in the 60-day range as noted below )  Origin: [address] [employer] [employer] [address]                                                         Destination:  [facility] transitional care facility [place], [place]  Is the pt's stay covered under Medicare Part A (PPS/DRG)   []     Closest appropriate facility? If no, why is transport to more distant facility required? Yes  If hospice pt, is this transport related to pt's terminal illness? NA       Section II - Medical Necessity Questionnaire  Ambulance transportation is medically necessary only if other means of transport are contraindicated or would be potentially harmful to the patient  To meet this requirement, the patient must either be "bed confined" or suffer from a condition such that transport by means other than ambulance is contraindicated by the patient's condition  The following questions must be answered by the medical professional signing below for this form to be valid:    1)  Describe the MEDICAL CONDITION (physical and/or mental) of this patient AT [address] [place] [address] that requires the patient to be transported in an ambulance and why transport by other means is contraindicated by the patient's condition: s/p femur fracture repair, fall and safety risk, must keep legs elevated, moderate/severe pain on movement    2) Is the patient "bed confined" as defined below? No  To be "be confined" the patient must satisfy all three of the following conditions: (1) unable to get up from bed without Assistance; AND (2) unable to ambulate; AND (3) unable to sit in a chair or wheelchair  3) Can this patient safely be transported by car or wheelchair van (i e , seated during transport without a medical attendant or monitoring)? No    4) In addition to completing questions 1-3 above, please check any of the following conditions that apply*:   *Note: supporting documentation for any boxes checked must be maintained in the patient's medical records  If hosp-hosp transfer, describe services needed at 2nd facility not available at 1st facility? Moderate/severe pain on movement   Unable to tolerate seated position for time needed to transport   Other(specify) fall and safety risk      Section III - Signature of Physician or Healthcare Professional  I certify that the above information is true and correct based on my evaluation of this patient, and represent that the patient requires transport by ambulance and that other forms of transport are contraindicated  I understand that this information will be used by the Centers for Medicare and Medicaid Services (CMS) to support the determination of medical necessity for ambulance services, and I represent that I have personal knowledge of the patient's condition at time of transport  []  If this box is checked, I also certify that the patient is physically or mentally incapable of signing the ambulance service's claim and that the institution with which I am affiliated has furnished care, services, or assistance to the patient  My signature below is made on behalf of the patient pursuant to 42 CFR §424 36(b)(4)   In accordance with 42 CFR §424 37, the specific reason(s) that the patient is physically or mentally incapable of signing the claim form is as follows:       Signature of Physician* or Healthcare Professional______________________________________________________________  Signature Date 08/12/21 (For scheduled repetitive transports, this form is not valid for transports performed more than 60 days after this date)    Printed Name & Credentials of Physician or Healthcare Professional (MD, DO, RN, etc )______Mar Zamora RN__________________________  *Form must be signed by patient's attending physician for scheduled, repetitive transports   For non-repetitive, unscheduled ambulance transports, if unable to obtain the signature of the attending physician, any of the following may sign (choose appropriate option below)  [] Physician Assistant []  Clinical Nurse Specialist [x]  Registered Nurse  []  Nurse Practitioner  [x] Discharge Planner

## 2021-08-12 NOTE — ASSESSMENT & PLAN NOTE
· Mechanical fall prior to admission resulting in left femur fracture  · Status post left retrograde femoral IMN by orthopedic surgery 8/5  · Continue Xarelto for DVT PPX which she is on chronically for afib  · Nonweightbearing left lower extremity until cleared by ortho at 2 week f/u  · Pain control with ATC APAP, PRN Oxycodone     · ContinuePT/OT

## 2021-08-12 NOTE — TELEPHONE ENCOUNTER
José Luis Ayala from [address] [place] [address] Unit called to schedule patient's 2 week po for left femur fx  Dr Sherwood available in September  Email sent to [facility] for force on appointment

## 2021-08-12 NOTE — DISCHARGE SUMMARY
Discharge Summary - [place] [address] Internal Medicine    Patient Information: Bereket Sorensen 66 y o  female MRN: [id number]  Unit/Bed#: [facility] 447-34 Encounter: [id number]    Discharging Physician / Practitioner: JERI Gonzales  PCP: Rebel Vivar MD  Admission Date: 8/2/2021  Discharge Date: 08/12/21    Reason for Admission:  Mechanical fall resulting in left femur fracture status post left retrograde femoral IMN 8/5/2021    Discharge Diagnoses:     Principal Problem:    Femur fracture ([facility] [address] )  Active Problems:    Atrial fibrillation ([facility] [address] )    Hypertension    Leukocytosis    Hyperlipidemia    Pacemaker  Resolved Problems:    Acute blood loss anemia    MICHAEL (acute kidney injury) [facility])      Consultations During Hospital Stay:  · Orthopedic surgery  · Electrophysiology  · Geriatrics  · PT/OT  · Case management    Procedures Performed:     · Left retrograde femoral IMN by Orthopedic surgery 08/05/2021  · Blood transfusion x3    Significant Findings / Test Results:     · X-ray tibia-fibula negative  · X-ray left ankle Diffuse soft tissue swelling  No acute osseous abnormality evident  · Lower extremity venous duplex negative  · X-ray left knee degenerative changes  · X-ray right knee negative  · X-ray left knee Comminuted, displaced, and angulated fracture of the distal femoral metaphysis  · X-ray pelvis negative  · X-ray tibia-fibula negative  · X-ray left femur negative  · CT C-spine without contrast negative  · CT head without contrast No intracranial hemorrhage or acute large vessel territorial infarct  Moderately severe ventriculomegaly, slightly disproportionate to degree of atrophy  Correlate for normal pressure hydrocephalus  · CT left lower extremity Comminuted and displaced distal femoral metadiaphyseal fracture as described  Moderate to large hemarthrosis  Mild intramuscular and soft tissue hematoma in the distal thigh  Severe tricompartmental osteoarthritis with intra-articular loose bodies    · CXR negative  · Echo EF 60%  No regional wall motion abnormalities  Grade 2 diastolic dysfunction  Right atrium mildly dilated  Mitral valve mild annular calcification, mild regurgitation  Aortic valve mild regurgitation  Tricuspid valve mild regurgitation, pulmonary artery systolic pressure mildly increased at 42 mmHg  · Hemoglobin on day of discharge 8 4    Incidental Findings:   · None     Test Results Pending at Discharge (will require follow up): · None     Outpatient Tests Requested:  · Outpatient follow-up with PCP  · Outpatient follow-up with Orthopedic surgery in 2 weeks, Dr Rosalio Johnson  · Outpatient f/u with cardiology     Complications:  None     Hospital Course:     Susy Cleaning is a 66 y o  female patient with past medical history of paroxysmal atrial fibrillation on Xarelto, sick sinus syndrome status post pacemaker, diastolic heart failure, obesity, hypertension, hyperlipidemia who originally presented to the hospital on 8/2/2021 due to mechanical fall resulting in left femur fracture  Patient was seen by Orthopedic surgery and underwent left femoral IMN 8/5  Postoperative course was complicated acute blood loss anemia requiring blood transfusion and  atrial fibrillation with RVR and required Cardizem drip and EP consult  Patient converted back to normal sinus rhythm spontaneously  Patient is now stable for discharge to rehab facility  Should follow-up with Orthopedic surgery in 2 weeks  Additionally should see PCP and Cardiology as an outpatient  Condition at Discharge: stable     Discharge Day Visit / Exam:     Subjective:  Patient offers no acute complaints  Pain well controlled  No chest pain, shortness of breath or palpitations      Vitals: Blood Pressure: 123/59 (08/12/21 0858)  Pulse: 64 (08/12/21 0858)  Temperature: 98 1 °F (36 7 °C) (08/12/21 0707)  Temp Source: Axillary (08/10/21 0222)  Respirations: 18 (08/12/21 0707)  SpO2: 96 % (08/12/21 0858)     Exam:   Physical Exam  Vitals and nursing note reviewed  Constitutional:       Appearance: She is obese  Cardiovascular:      Rate and Rhythm: Normal rate and regular rhythm  Pulmonary:      Breath sounds: Normal breath sounds  Abdominal:      Tenderness: There is no abdominal tenderness  Musculoskeletal:         General: No swelling  Skin:     General: Skin is warm  Comments: left hip/thigh dressing CDI   Neurological:      Mental Status: She is alert and oriented to person, place, and time  Mental status is at baseline  Psychiatric:         Mood and Affect: Mood normal          Discussion with Family:  Patient    Discharge instructions/Information to patient and family:   See after visit summary for information provided to patient and family  Provisions for Follow-Up Care:  See after visit summary for information related to follow-up care and any pertinent home health orders  Disposition:     Other [facility] to [facility]:   · Not Applicable to this Patient - Not Applicable to this Patient    Planned Readmission: no     Discharge Statement:  I spent 40 minutes discharging the patient  This time was spent on the day of discharge  I had direct contact with the patient on the day of discharge  Greater than 50% of the total time was spent examining patient, answering all patient questions, arranging and discussing plan of care with patient as well as directly providing post-discharge instructions  Additional time then spent on discharge activities  Discharge Medications:  See after visit summary for reconciled discharge medications provided to patient and family        ** Please Note: This note has been constructed using a voice recognition system **

## 2021-08-16 NOTE — WOUND OSTOMY CARE
Progress Note - Wound    Imtiaz 64 y o  female MRN: [id number]  Unit/Bed#: -01 Encounter: [id number]      History of Present Illness 65 yo female seen for weekly wound assessment  Pt had a recent femur fracture due to a fall  Pt seated out of bed in recliner  She is wearing a brief for bladder management  Assessment & Findings;  1) Bilateral heels intact  2) Right buttocks pink blanchable  No induration or macetation  calazime applied  3) Sacrum  cleft, 0 1x0 1 red bloody as pictured, area cleansed and calazime applied  Area is decreased in size since 21    Skin care plans:  1-Calazime to sacrum, buttocks TID and PRN  2-Hydraguard to bilateral heel BID and PRN  3-Elevate heels to offload pressure  4-Ehob cushion when out of bed  5-Turn/repoisiton q2h or when medically stable for pressure re-distribution on skin  6-Moisturize skin daily with skin nourishing cream                  Wound 21 Knee Left (Active)   Wound Description SILVINA 21 0800   Viry-wound Assessment SILVINA 21 0800   Drainage Amount Moderate 21 0515   Dressing Dry dressing 21 0800   Dressing Status Intact; Old drainage 21 0800       Wound 21 Leg Left (Active)   Wound Description SILVINA 21 0800   Viry-wound Assessment Clean;Dry; Intact 21 0800   Drainage Amount SILVINA 08/10/21 0430   Drainage Description Serosanguineous 21 0800   Dressing Dry dressing;Gauze 21 0800   Dressing Status Clean;Dry; Intact 21 0800       Wound 21 Other (Comment) Buttocks Right (Active)   Wound Image   21 1353   Wound Description Intact;Fragile;Pink 21 1353   Pressure Injury Stage DTPI 21 0800   Viry-wound Assessment Dry; Intact; Pink 21 1353   Wound Length (cm) 6 cm 21 1124   Wound Width (cm) 2 cm 21 1124   Wound Depth (cm) 0 cm 21 1124   Wound Surface Area (cm^2) 12 cm^2 21 1124   Wound Volume (cm^3) 0 cm^3 21 1124   Calculated Wound Volume (cm^3) 0 cm^3 08/09/21 1124   Drainage Amount None 08/16/21 1353   Treatments Cleansed;Site care 08/10/21 2130   Dressing Open to air 08/16/21 1353   Dressing Changed Changed 08/09/21 1124   Patient Tolerance Tolerated well 08/09/21 1124   Dressing Status Clean;Dry; Intact 08/09/21 0800       Wound 08/09/21 MASD Sacrum Mid (Active)   Wound Image   08/16/21 1351   Wound Description Clean;Dry; Intact; Beefy red 08/16/21 1351   Viry-wound Assessment Clean;Dry; Intact;Fragile;Pink 08/16/21 1351   Wound Length (cm) 2 cm 08/09/21 1123   Wound Width (cm) 0 3 cm 08/16/21 1351   Wound Depth (cm) 0 1 cm 08/16/21 1351   Wound Surface Area (cm^2) 0 6 cm^2 08/09/21 1123   Wound Volume (cm^3) 0 06 cm^3 08/09/21 1123   Calculated Wound Volume (cm^3) 0 06 cm^3 08/09/21 1123   Drainage Amount None 08/16/21 1351   Drainage Description Serosanguineous 08/09/21 1123   Non-staged Wound Description Partial thickness 08/09/21 1123   Treatments Cleansed;Site care 08/16/21 1351   Dressing Open to air 08/16/21 1351   Dressing Changed Changed 08/09/21 1123   Patient Tolerance Tolerated well 08/16/21 1351                 Call or tigertext with any questions  Wound Care will continue to follow    Harjinder Earl RN

## 2021-08-18 ENCOUNTER — TELEPHONE (OUTPATIENT)
Dept: OBGYN CLINIC | Facility: HOSPITAL | Age: 76
End: 2021-08-18

## 2021-08-18 NOTE — TELEPHONE ENCOUNTER
Dr Tray Durand nor his team rounds or sees patients at [address] [place] [address]  I cannot guarantee availability of another rounding provider being available on 8/23 to see this patient at [address] [place] [address]  Recommend keeping appt in [facility]  Appointment line should read 'requires isabelle lift' so we can get from radiology department for her visit

## 2021-08-18 NOTE — TELEPHONE ENCOUNTER
PO Dr Brenda Sanders;  Sx 08 05  RE: [facility]  CB# [id number] [facility] is non ambulatory and is being handled via [facility]abelle lift    Jyothi Ha asked if Dr Brenda Sanders prefers patient to be seen on the floor of [facility] opposed to being transported to PO appt with akash lora on 08 23

## 2021-08-21 LAB — GLUCOSE SERPL-MCNC: 109 MG/DL (ref 65–140)

## 2021-08-21 PROCEDURE — 82948 REAGENT STRIP/BLOOD GLUCOSE: CPT

## 2021-08-22 ENCOUNTER — APPOINTMENT (OUTPATIENT)
Dept: RADIOLOGY | Facility: HOSPITAL | Age: 76
End: 2021-08-22
Payer: MEDICARE

## 2021-08-22 PROCEDURE — 73552 X-RAY EXAM OF FEMUR 2/>: CPT

## 2021-08-23 ENCOUNTER — OFFICE VISIT (OUTPATIENT)
Dept: OBGYN CLINIC | Facility: HOSPITAL | Age: 76
End: 2021-08-23

## 2021-08-23 DIAGNOSIS — S72.462A CLOSED DISPLACED SUPRACONDYLAR FRACTURE OF DISTAL END OF LEFT FEMUR WITH INTRACONDYLAR EXTENSION, INITIAL ENCOUNTER (HCC): ICD-10-CM

## 2021-08-23 DIAGNOSIS — S72.8X9A: Primary | ICD-10-CM

## 2021-08-23 PROCEDURE — 1124F ACP DISCUSS-NO DSCNMKR DOCD: CPT | Performed by: PHYSICIAN ASSISTANT

## 2021-08-23 PROCEDURE — 99024 POSTOP FOLLOW-UP VISIT: CPT | Performed by: PHYSICIAN ASSISTANT

## 2021-08-23 NOTE — PROGRESS NOTES
Assessment:   S/P Open Reduction W/ Internal Fixation (orif) Distal Femur; Insertion Retrograde Nail - Left on 8/5/2021 by Dr Alena Rausch:   Heena Canales out  Steri-strips placed    Follow Up:  4  week(s) with Dr Yin Rolling    To Do Next Visit:        CHIEF COMPLAINT:  Chief Complaint   Patient presents with    Left Thigh - Post-op         SUBJECTIVE:  Herminio Villegas is a 64 y o  female who presents for follow up after Open Reduction W/ Internal Fixation (orif) Distal Femur; Insertion Retrograde Nail - Left on 8/5/2021  Today patient has Pain  Mild  Intermittant  Dull  PHYSICAL EXAMINATION:  Vital signs: There were no vitals taken for this visit  General: well developed and well nourished, alert, oriented times 3 and appears comfortable  Psychiatric: Normal    MUSCULOSKELETAL EXAMINATION:  Incision: Clean, dry, intact  Mild erythema of the medial knee incision    No erythema otherwise  Range of Motion: As expected  Neurovascular status: Neuro intact  Done today: Sutures out and Steri strips applied      STUDIES REVIEWED:  Xrays done at [address] [place] [address] show the hardware in place as expected      PROCEDURES PERFORMED:  Procedures  No Procedures performed today

## 2021-08-24 ENCOUNTER — PATIENT OUTREACH (OUTPATIENT)
Dept: CASE MANAGEMENT | Facility: HOSPITAL | Age: 76
End: 2021-08-24

## 2021-08-24 NOTE — WOUND OSTOMY CARE
Progress Note - Wound   Wilhemina Casey 64 y o  female MRN: [id number]  Unit/Bed#: -01 Encounter: [id number]        Assessment & Findings  Pt seen for wound assessment  Pt sacrum and buttocks are healed  Sacrum without redness, right buttocks dry flaky skin  Bilateral heels intact   Wound care will sign off            Wound 08/05/21 Knee Left (Active)   Wound Description SILVINA 08/12/21 0800   Viry-wound Assessment SILVINA 08/12/21 0800   Drainage Amount Moderate 08/11/21 0515   Dressing Dry dressing 08/12/21 0800   Dressing Status Intact; Old drainage 08/12/21 0800       Wound 08/05/21 Leg Left (Active)   Wound Description SILVINA 08/12/21 0800   Viry-wound Assessment Clean;Dry; Intact 08/12/21 0800   Drainage Amount SILVINA 08/10/21 0430   Drainage Description Serosanguineous 08/09/21 0800   Dressing Dry dressing;Gauze 08/12/21 0800   Dressing Status Clean;Dry; Intact 08/12/21 0800       Wound 08/08/21 Other (Comment) Buttocks Right (Active)   Wound Image   08/24/21 1033   Wound Description Dry; Intact;Fragile;Pink 08/24/21 1033   Pressure Injury Stage DTPI 08/09/21 0800   Viry-wound Assessment Clean;Dry; Intact;Fragile;Scaly 08/24/21 1033   Wound Length (cm) 6 cm 08/09/21 1124   Wound Width (cm) 2 cm 08/09/21 1124   Wound Depth (cm) 0 cm 08/09/21 1124   Wound Surface Area (cm^2) 12 cm^2 08/09/21 1124   Wound Volume (cm^3) 0 cm^3 08/09/21 1124   Calculated Wound Volume (cm^3) 0 cm^3 08/09/21 1124   Drainage Amount None 08/16/21 1353   Treatments Cleansed;Site care 08/10/21 2130   Dressing Moisture barrier 08/24/21 1033   Dressing Changed Changed 08/09/21 1124   Patient Tolerance Tolerated well 08/09/21 1124   Dressing Status Clean;Dry; Intact 08/09/21 0800       Wound 08/09/21 MASD Sacrum Mid (Active)   Wound Image   08/24/21 1032   Wound Description Clean;Dry; Intact 08/24/21 1032   Viry-wound Assessment Clean;Dry; Intact 08/24/21 1032   Wound Length (cm) 2 cm 08/09/21 1123   Wound Width (cm) 0 3 cm 08/16/21 1351   Wound Depth (cm) 0 1 cm 08/16/21 1351   Wound Surface Area (cm^2) 0 6 cm^2 08/09/21 1123   Wound Volume (cm^3) 0 06 cm^3 08/09/21 1123   Calculated Wound Volume (cm^3) 0 06 cm^3 08/09/21 1123   Drainage Amount None 08/16/21 1351   Drainage Description Serosanguineous 08/09/21 1123   Non-staged Wound Description Partial thickness 08/09/21 1123   Treatments Cleansed;Site care 08/16/21 1351   Dressing Open to air 08/24/21 1032   Dressing Changed Changed 08/09/21 1123   Patient Tolerance Tolerated well 08/16/21 1351         Wound Care will sign off  Call or Tigertext with any questions    Delores Guthrie RN

## 2021-08-26 ENCOUNTER — PATIENT OUTREACH (OUTPATIENT)
Dept: CASE MANAGEMENT | Facility: HOSPITAL | Age: 76
End: 2021-08-26

## 2021-09-02 ENCOUNTER — PATIENT OUTREACH (OUTPATIENT)
Dept: CASE MANAGEMENT | Facility: HOSPITAL | Age: 76
End: 2021-09-02

## 2021-09-06 NOTE — PROGRESS NOTES
[address]    Progress note      NAME: Haris Sheriff  AGE: 64 y o  SEX: female [id number]    DATE OF ENCOUNTER: 9/6/2021    Assessment and Plan     # Physical deconditioning  Cont PT/OT    # Femur fx s/p L retrograde femoral IMN, 8/5  DVT prophylaxis with xarelto (chronically on this for hx of afib)  Cont prn pain analgesics  Post op f/u was on 8/23; staples removed and steri-strips placed  No mention of weight bearing status on appt  Cont prn pain analgesics    # Chronic afib  Rate controlled  Cont metoprolol  + PPM secondary to SSS  A/C with xarelto    # HTN - stable    # Dizziness - possibly due to physical deconditiong  BP and h/h stable  If recurs will obtain orthostatics    Chief Complaint   LLE pain consistent with prior since post op relieved with pain analgesics  Reports of mild dizziness in am but now resolved  Denies any other cardiac symtoms    History of Present Illness     HPI  63 y/o F med hx of chronic afib, htn, s/p L femoral IMN here for continued rehabilitation  Review of Systems     Review of Systems   Constitutional: Negative  HENT: Negative  Eyes: Negative  Respiratory: Negative  Cardiovascular: Negative  Gastrointestinal: Negative  Endocrine: Negative  Genitourinary: Negative  Musculoskeletal:        LLE pain   Skin: Negative  Allergic/Immunologic: Negative  Neurological: Negative  Hematological: Negative  Psychiatric/Behavioral: Negative          Active Problem List     Patient Active Problem List   Diagnosis    Atrial fibrillation ([address] )    Tachy-smita syndrome (HCC)    Hypertension    Pacemaker    Chronic bilateral low back pain without sciatica    Chronic GERD    Degenerative arthritis of knee, bilateral    Degenerative lumbar spinal stenosis    Fibromyalgia    GERD (gastroesophageal reflux disease)    Hypercholesterolemia    Low back pain    Lumbar degenerative disc disease    Mild carpal tunnel syndrome, right    Overweight    Psoriasis    Thyroid nodule    Urinary incontinence    Other insomnia    Anxiety    Tremor    Chronic pain of left knee    Age related osteoporosis    Dysphonia    Vitamin D insufficiency    Hyperlipidemia    Arthritis    Other fatigue    Femur fracture (HCC)    Leukocytosis    Ambulatory dysfunction       Objective       Physical Exam  Constitutional:       Appearance: She is obese  Cardiovascular:      Rate and Rhythm: Normal rate and regular rhythm  Pulses: Normal pulses  Heart sounds: Normal heart sounds  No murmur heard  No gallop  Pulmonary:      Effort: Pulmonary effort is normal  No respiratory distress  Breath sounds: Normal breath sounds  No wheezing or rales  Abdominal:      General: Abdomen is flat  Bowel sounds are normal  There is no distension  Palpations: Abdomen is soft  Tenderness: There is no guarding  Musculoskeletal:      Cervical back: Normal range of motion and neck supple  Right lower leg: No edema  Left lower leg: No edema  Skin:     General: Skin is warm and dry  Neurological:      General: No focal deficit present  Mental Status: She is alert  Mental status is at baseline  Cranial Nerves: No cranial nerve deficit  Motor: No weakness           Pertinent Laboratory/Diagnostic Studies:  CBC:   Lab Results   Component Value Date/Time    WBC 6 10 09/06/2021 04:40 AM    RBC 3 85 (L) 09/06/2021 04:40 AM    HGB 11 3 (L) 09/06/2021 04:40 AM    HCT 35 9 (L) 09/06/2021 04:40 AM    MCV 93 09/06/2021 04:40 AM    MCH 29 4 09/06/2021 04:40 AM    MCHC 31 5 09/06/2021 04:40 AM    RDW 17 4 (H) 09/06/2021 04:40 AM    MPV 7 0 (L) 09/06/2021 04:40 AM     09/06/2021 04:40 AM    NRBC 0 08/12/2021 05:07 AM    NEUTOPHILPCT 48 09/06/2021 04:40 AM    LYMPHOPCT 32 09/06/2021 04:40 AM    MONOPCT 12 (H) 09/06/2021 04:40 AM    EOSPCT 7 (H) 09/06/2021 04:40 AM    BASOPCT 1 09/06/2021 04:40 AM NEUTROABS 2 90 2021 04:40 AM    LYMPHSABS 2 00 2021 04:40 AM    MONOSABS 0 70 2021 04:40 AM    EOSABS 0 40 2021 04:40 AM         Current Medications   Medications reviewed and updated in facility chart      Name: Shin Maynard  :   MRN: [id number]  DOS: 2021

## 2021-09-08 PROCEDURE — 99316 NF DSCHRG MGMT 30 MIN+: CPT | Performed by: STUDENT IN AN ORGANIZED HEALTH CARE EDUCATION/TRAINING PROGRAM

## 2021-09-08 NOTE — TRANSPORTATION MEDICAL NECESSITY
Section I - General Information    Name of Patient: Charity Muller                 : [birth year]    Medicare #: [id number]  Transport Date: 21 (PCS is valid for round trips on this date and for all repetitive trips in the 60-day range as noted below )  Origin: [address]: Taryn Manzanares  Is the pt's stay covered under Medicare Part A (PPS/DRG)   []     Closest appropriate facility? If no, why is transport to more distant facility required? Yes  If hospice pt, is this transport related to pt's terminal illness? NA       Section II - Medical Necessity Questionnaire  Ambulance transportation is medically necessary only if other means of transport are contraindicated or would be potentially harmful to the patient  To meet this requirement, the patient must either be "bed confined" or suffer from a condition such that transport by means other than ambulance is contraindicated by the patient's condition  The following questions must be answered by the medical professional signing below for this form to be valid:    1)  Describe the MEDICAL CONDITION (physical and/or mental) of this patient AT [address] [place] [address] that requires the patient to be transported in an ambulance and why transport by other means is contraindicated by the patient's condition: femur fracture, ambulatoyr dysfunction, lumbar degenerative disc disease, hyperlipidemia,leukocytosis, left knee pain  2) Is the patient "bed confined" as defined below? Yes  To be "be confined" the patient must satisfy all three of the following conditions: (1) unable to get up from bed without Assistance; AND (2) unable to ambulate; AND (3) unable to sit in a chair or wheelchair  3) Can this patient safely be transported by car or wheelchair van (i e , seated during transport without a medical attendant or monitoring)? No    4) In addition to completing questions 1-3 above, please check any of the following conditions that apply*:   *Note: supporting documentation for any boxes checked must be maintained in the patient's medical records  If hosp-hosp transfer, describe services needed at 2nd facility not available at 1st facility? Moderate/severe pain on movement   Unable to tolerate seated position for time needed to transport   Other(specify) femur fx, non weight bearing, pain control, high fall risk, assist of 2  Section III - Signature of Physician or Healthcare Professional  I certify that the above information is true and correct based on my evaluation of this patient, and represent that the patient requires transport by ambulance and that other forms of transport are contraindicated  I understand that this information will be used by the Centers for Medicare and Medicaid Services (CMS) to support the determination of medical necessity for ambulance services, and I represent that I have personal knowledge of the patient's condition at time of transport  []  If this box is checked, I also certify that the patient is physically or mentally incapable of signing the ambulance service's claim and that the institution with which I am affiliated has furnished care, services, or assistance to the patient  My signature below is made on behalf of the patient pursuant to 42 CFR §424 36(b)(4)   In accordance with 42 CFR §424 37, the specific reason(s) that the patient is physically or mentally incapable of signing the claim form is as follows:       Signature of Physician* or Healthcare Professional______________________________________________________________  Signature Date 09/08/21 (For scheduled repetitive transports, this form is not valid for transports performed more than 60 days after this date)    Printed Name & Credentials of Physician or Healthcare Professional (MD, , RN, etc )________________________________  *Form must be signed by patient's attending physician for scheduled, repetitive transports   For non-repetitive, unscheduled ambulance transports, if unable to obtain the signature of the attending physician, any of the following may sign (choose appropriate option below)  [] Physician Assistant []  Clinical Nurse Specialist []  Registered Nurse  []  Nurse Practitioner  [x] Discharge Planner

## 2021-09-08 NOTE — DISCHARGE SUMMARY
[facility]  [address], [address] [facility]    DISCHARGE SUMMARY      Reason for admission: Patient was admitted from 8/12/2021 for rehabilitation after hospitalization for left femur fracture  Admission Diagnoses: Femur Fracture  Additional Problems: Ambulatory Dysfuction  Discharge Diagnoses: Femur Fracture    Course of stay: Patient was admitted to [facility] for rehabilitation due to Femur Fracture  Significant events during the stay include None  The patient participated in PT/OT and will be discharged to [facility]  New medications added during rehab stay: none  Medication adjustments made during rehab stay: none  Discontinued medications during rehab stay: non3  Discharge Medications: See discharge medication list which was reviewed and signed  Status at time of discharge: Stable    Subjective: Send and examined at bedside, no acute events overnight       Objective:  Vitals reviewed

## 2021-09-09 ENCOUNTER — PATIENT OUTREACH (OUTPATIENT)
Dept: CASE MANAGEMENT | Facility: HOSPITAL | Age: 76
End: 2021-09-09

## 2021-09-15 ENCOUNTER — PATIENT OUTREACH (OUTPATIENT)
Dept: CASE MANAGEMENT | Facility: HOSPITAL | Age: 76
End: 2021-09-15

## 2021-09-20 ENCOUNTER — APPOINTMENT (INPATIENT)
Dept: RADIOLOGY | Facility: HOSPITAL | Age: 76
DRG: 291 | End: 2021-09-20
Payer: MEDICARE

## 2021-09-20 ENCOUNTER — APPOINTMENT (EMERGENCY)
Dept: RADIOLOGY | Facility: HOSPITAL | Age: 76
DRG: 291 | End: 2021-09-20
Payer: MEDICARE

## 2021-09-20 ENCOUNTER — HOSPITAL ENCOUNTER (INPATIENT)
Facility: HOSPITAL | Age: 76
LOS: 11 days | DRG: 291 | End: 2021-10-01
Attending: EMERGENCY MEDICINE | Admitting: STUDENT IN AN ORGANIZED HEALTH CARE EDUCATION/TRAINING PROGRAM
Payer: MEDICARE

## 2021-09-20 ENCOUNTER — APPOINTMENT (EMERGENCY)
Dept: CT IMAGING | Facility: HOSPITAL | Age: 76
DRG: 291 | End: 2021-09-20
Payer: MEDICARE

## 2021-09-20 DIAGNOSIS — R06.02 SOB (SHORTNESS OF BREATH): Primary | ICD-10-CM

## 2021-09-20 DIAGNOSIS — Z00.8 PODIATRY VISIT, ROUTINE: ICD-10-CM

## 2021-09-20 DIAGNOSIS — J90 PLEURAL EFFUSION: ICD-10-CM

## 2021-09-20 DIAGNOSIS — I48.91 ATRIAL FIBRILLATION WITH RVR (HCC): ICD-10-CM

## 2021-09-20 DIAGNOSIS — S72.352A CLOSED DISPLACED COMMINUTED FRACTURE OF SHAFT OF LEFT FEMUR, INITIAL ENCOUNTER (HCC): ICD-10-CM

## 2021-09-20 DIAGNOSIS — J96.01 ACUTE RESPIRATORY FAILURE WITH HYPOXIA (HCC): ICD-10-CM

## 2021-09-20 DIAGNOSIS — I48.91 ATRIAL FIBRILLATION, UNSPECIFIED TYPE (HCC): ICD-10-CM

## 2021-09-20 DIAGNOSIS — I48.0 AF (PAROXYSMAL ATRIAL FIBRILLATION) (HCC): ICD-10-CM

## 2021-09-20 PROBLEM — I10 HYPERTENSION: Status: RESOLVED | Noted: 2018-02-15 | Resolved: 2021-09-20

## 2021-09-20 PROBLEM — I50.32 CHRONIC DIASTOLIC HEART FAILURE (HCC): Status: ACTIVE | Noted: 2021-09-20

## 2021-09-20 PROBLEM — I49.5 SICK SINUS SYNDROME (HCC): Status: ACTIVE | Noted: 2021-09-20

## 2021-09-20 LAB
ALBUMIN SERPL BCP-MCNC: 3.5 G/DL (ref 3.5–5)
ALP SERPL-CCNC: 100 U/L (ref 46–116)
ALT SERPL W P-5'-P-CCNC: 48 U/L (ref 12–78)
ANION GAP SERPL CALCULATED.3IONS-SCNC: 10 MMOL/L (ref 4–13)
APTT PPP: 48 SECONDS (ref 23–37)
AST SERPL W P-5'-P-CCNC: 37 U/L (ref 5–45)
ATRIAL RATE: 140 BPM
ATRIAL RATE: 146 BPM
ATRIAL RATE: 154 BPM
BASOPHILS # BLD AUTO: 0.06 THOUSANDS/ΜL (ref 0–0.1)
BASOPHILS NFR BLD AUTO: 1 % (ref 0–1)
BILIRUB SERPL-MCNC: 0.41 MG/DL (ref 0.2–1)
BUN SERPL-MCNC: 22 MG/DL (ref 5–25)
CALCIUM SERPL-MCNC: 9.4 MG/DL (ref 8.3–10.1)
CHLORIDE SERPL-SCNC: 102 MMOL/L (ref 100–108)
CO2 SERPL-SCNC: 28 MMOL/L (ref 21–32)
CREAT SERPL-MCNC: 0.87 MG/DL (ref 0.6–1.3)
D DIMER PPP FEU-MCNC: 2.22 UG/ML FEU
EOSINOPHIL # BLD AUTO: 0.27 THOUSAND/ΜL (ref 0–0.61)
EOSINOPHIL NFR BLD AUTO: 2 % (ref 0–6)
ERYTHROCYTE [DISTWIDTH] IN BLOOD BY AUTOMATED COUNT: 15 % (ref 11.6–15.1)
GFR SERPL CREATININE-BSD FRML MDRD: 64 ML/MIN/1.73SQ M
GLUCOSE SERPL-MCNC: 127 MG/DL (ref 65–140)
HCT VFR BLD AUTO: 42.4 % (ref 34.8–46.1)
HGB BLD-MCNC: 12.7 G/DL (ref 11.5–15.4)
IMM GRANULOCYTES # BLD AUTO: 0.07 THOUSAND/UL (ref 0–0.2)
IMM GRANULOCYTES NFR BLD AUTO: 1 % (ref 0–2)
INR PPP: 1.9 (ref 0.84–1.19)
LACTATE SERPL-SCNC: 1 MMOL/L (ref 0.5–2)
LYMPHOCYTES # BLD AUTO: 2.37 THOUSANDS/ΜL (ref 0.6–4.47)
LYMPHOCYTES NFR BLD AUTO: 19 % (ref 14–44)
MCH RBC QN AUTO: 29.3 PG (ref 26.8–34.3)
MCHC RBC AUTO-ENTMCNC: 30 G/DL (ref 31.4–37.4)
MCV RBC AUTO: 98 FL (ref 82–98)
MONOCYTES # BLD AUTO: 1.36 THOUSAND/ΜL (ref 0.17–1.22)
MONOCYTES NFR BLD AUTO: 11 % (ref 4–12)
NEUTROPHILS # BLD AUTO: 8.1 THOUSANDS/ΜL (ref 1.85–7.62)
NEUTS SEG NFR BLD AUTO: 66 % (ref 43–75)
NRBC BLD AUTO-RTO: 0 /100 WBCS
NT-PROBNP SERPL-MCNC: 2905 PG/ML
P AXIS: -2 DEGREES
P AXIS: 191 DEGREES
P AXIS: 37 DEGREES
PLATELET # BLD AUTO: 327 THOUSANDS/UL (ref 149–390)
PMV BLD AUTO: 9.5 FL (ref 8.9–12.7)
POTASSIUM SERPL-SCNC: 4.8 MMOL/L (ref 3.5–5.3)
PR INTERVAL: 104 MS
PR INTERVAL: 120 MS
PR INTERVAL: 238 MS
PROCALCITONIN SERPL-MCNC: <0.05 NG/ML
PROT SERPL-MCNC: 7.1 G/DL (ref 6.4–8.2)
PROTHROMBIN TIME: 21.1 SECONDS (ref 11.6–14.5)
QRS AXIS: 7 DEGREES
QRS AXIS: 83 DEGREES
QRS AXIS: 90 DEGREES
QRSD INTERVAL: 112 MS
QRSD INTERVAL: 114 MS
QRSD INTERVAL: 117 MS
QT INTERVAL: 284 MS
QT INTERVAL: 294 MS
QT INTERVAL: 329 MS
QTC INTERVAL: 442 MS
QTC INTERVAL: 447 MS
QTC INTERVAL: 503 MS
RBC # BLD AUTO: 4.33 MILLION/UL (ref 3.81–5.12)
SARS-COV-2 RNA RESP QL NAA+PROBE: NEGATIVE
SODIUM SERPL-SCNC: 140 MMOL/L (ref 136–145)
T WAVE AXIS: -77 DEGREES
T WAVE AXIS: 250 DEGREES
T WAVE AXIS: 254 DEGREES
TROPONIN I SERPL-MCNC: <0.02 NG/ML
TROPONIN I SERPL-MCNC: <0.02 NG/ML
VENTRICULAR RATE: 139 BPM
VENTRICULAR RATE: 140 BPM
VENTRICULAR RATE: 146 BPM
WBC # BLD AUTO: 12.23 THOUSAND/UL (ref 4.31–10.16)

## 2021-09-20 PROCEDURE — 85730 THROMBOPLASTIN TIME PARTIAL: CPT | Performed by: EMERGENCY MEDICINE

## 2021-09-20 PROCEDURE — 94760 N-INVAS EAR/PLS OXIMETRY 1: CPT

## 2021-09-20 PROCEDURE — 87040 BLOOD CULTURE FOR BACTERIA: CPT | Performed by: EMERGENCY MEDICINE

## 2021-09-20 PROCEDURE — 99291 CRITICAL CARE FIRST HOUR: CPT

## 2021-09-20 PROCEDURE — 93010 ELECTROCARDIOGRAM REPORT: CPT | Performed by: INTERNAL MEDICINE

## 2021-09-20 PROCEDURE — 85610 PROTHROMBIN TIME: CPT | Performed by: EMERGENCY MEDICINE

## 2021-09-20 PROCEDURE — 99291 CRITICAL CARE FIRST HOUR: CPT | Performed by: INTERNAL MEDICINE

## 2021-09-20 PROCEDURE — 99292 CRITICAL CARE ADDL 30 MIN: CPT | Performed by: EMERGENCY MEDICINE

## 2021-09-20 PROCEDURE — 83880 ASSAY OF NATRIURETIC PEPTIDE: CPT | Performed by: EMERGENCY MEDICINE

## 2021-09-20 PROCEDURE — 94644 CONT INHLJ TX 1ST HOUR: CPT

## 2021-09-20 PROCEDURE — 36415 COLL VENOUS BLD VENIPUNCTURE: CPT | Performed by: EMERGENCY MEDICINE

## 2021-09-20 PROCEDURE — 80053 COMPREHEN METABOLIC PANEL: CPT | Performed by: EMERGENCY MEDICINE

## 2021-09-20 PROCEDURE — 94002 VENT MGMT INPAT INIT DAY: CPT

## 2021-09-20 PROCEDURE — 85025 COMPLETE CBC W/AUTO DIFF WBC: CPT | Performed by: EMERGENCY MEDICINE

## 2021-09-20 PROCEDURE — 71275 CT ANGIOGRAPHY CHEST: CPT

## 2021-09-20 PROCEDURE — 96375 TX/PRO/DX INJ NEW DRUG ADDON: CPT

## 2021-09-20 PROCEDURE — 93005 ELECTROCARDIOGRAM TRACING: CPT

## 2021-09-20 PROCEDURE — U0005 INFEC AGEN DETEC AMPLI PROBE: HCPCS | Performed by: EMERGENCY MEDICINE

## 2021-09-20 PROCEDURE — G1004 CDSM NDSC: HCPCS

## 2021-09-20 PROCEDURE — 71045 X-RAY EXAM CHEST 1 VIEW: CPT

## 2021-09-20 PROCEDURE — 84145 PROCALCITONIN (PCT): CPT | Performed by: EMERGENCY MEDICINE

## 2021-09-20 PROCEDURE — 96376 TX/PRO/DX INJ SAME DRUG ADON: CPT

## 2021-09-20 PROCEDURE — U0003 INFECTIOUS AGENT DETECTION BY NUCLEIC ACID (DNA OR RNA); SEVERE ACUTE RESPIRATORY SYNDROME CORONAVIRUS 2 (SARS-COV-2) (CORONAVIRUS DISEASE [COVID-19]), AMPLIFIED PROBE TECHNIQUE, MAKING USE OF HIGH THROUGHPUT TECHNOLOGIES AS DESCRIBED BY CMS-2020-01-R: HCPCS | Performed by: EMERGENCY MEDICINE

## 2021-09-20 PROCEDURE — 73560 X-RAY EXAM OF KNEE 1 OR 2: CPT

## 2021-09-20 PROCEDURE — 85379 FIBRIN DEGRADATION QUANT: CPT | Performed by: EMERGENCY MEDICINE

## 2021-09-20 PROCEDURE — 83605 ASSAY OF LACTIC ACID: CPT | Performed by: EMERGENCY MEDICINE

## 2021-09-20 PROCEDURE — 84484 ASSAY OF TROPONIN QUANT: CPT | Performed by: EMERGENCY MEDICINE

## 2021-09-20 PROCEDURE — 96365 THER/PROPH/DIAG IV INF INIT: CPT

## 2021-09-20 PROCEDURE — 99291 CRITICAL CARE FIRST HOUR: CPT | Performed by: EMERGENCY MEDICINE

## 2021-09-20 RX ORDER — AMLODIPINE BESYLATE 10 MG/1
10 TABLET ORAL DAILY
Status: DISCONTINUED | OUTPATIENT
Start: 2021-09-21 | End: 2021-09-22

## 2021-09-20 RX ORDER — METHYLPREDNISOLONE SODIUM SUCCINATE 125 MG/2ML
125 INJECTION, POWDER, LYOPHILIZED, FOR SOLUTION INTRAMUSCULAR; INTRAVENOUS ONCE
Status: COMPLETED | OUTPATIENT
Start: 2021-09-20 | End: 2021-09-20

## 2021-09-20 RX ORDER — ALBUTEROL SULFATE 2.5 MG/3ML
1 SOLUTION RESPIRATORY (INHALATION) ONCE
Status: COMPLETED | OUTPATIENT
Start: 2021-09-20 | End: 2021-09-20

## 2021-09-20 RX ORDER — METOPROLOL TARTRATE 5 MG/5ML
5 INJECTION INTRAVENOUS ONCE
Status: COMPLETED | OUTPATIENT
Start: 2021-09-20 | End: 2021-09-20

## 2021-09-20 RX ORDER — ALPRAZOLAM 0.25 MG/1
0.25 TABLET ORAL 2 TIMES DAILY PRN
Status: DISCONTINUED | OUTPATIENT
Start: 2021-09-20 | End: 2021-10-01 | Stop reason: HOSPADM

## 2021-09-20 RX ORDER — FUROSEMIDE 10 MG/ML
40 INJECTION INTRAMUSCULAR; INTRAVENOUS ONCE
Status: COMPLETED | OUTPATIENT
Start: 2021-09-20 | End: 2021-09-20

## 2021-09-20 RX ORDER — SENNOSIDES 8.6 MG
8.6 TABLET ORAL DAILY
Status: DISCONTINUED | OUTPATIENT
Start: 2021-09-20 | End: 2021-10-01 | Stop reason: HOSPADM

## 2021-09-20 RX ORDER — ALBUTEROL SULFATE 90 UG/1
2 AEROSOL, METERED RESPIRATORY (INHALATION) EVERY 4 HOURS PRN
Status: DISCONTINUED | OUTPATIENT
Start: 2021-09-20 | End: 2021-10-01 | Stop reason: HOSPADM

## 2021-09-20 RX ORDER — SODIUM CHLORIDE FOR INHALATION 0.9 %
3 VIAL, NEBULIZER (ML) INHALATION ONCE
Status: COMPLETED | OUTPATIENT
Start: 2021-09-20 | End: 2021-09-20

## 2021-09-20 RX ORDER — OXYCODONE HYDROCHLORIDE 5 MG/1
2.5 TABLET ORAL EVERY 4 HOURS PRN
Status: DISCONTINUED | OUTPATIENT
Start: 2021-09-20 | End: 2021-10-01 | Stop reason: HOSPADM

## 2021-09-20 RX ORDER — EZETIMIBE 10 MG/1
10 TABLET ORAL DAILY
Status: DISCONTINUED | OUTPATIENT
Start: 2021-09-20 | End: 2021-10-01 | Stop reason: HOSPADM

## 2021-09-20 RX ORDER — OXYBUTYNIN CHLORIDE 5 MG/1
5 TABLET, EXTENDED RELEASE ORAL DAILY
Status: DISCONTINUED | OUTPATIENT
Start: 2021-09-20 | End: 2021-10-01 | Stop reason: HOSPADM

## 2021-09-20 RX ORDER — LISINOPRIL 5 MG/1
5 TABLET ORAL DAILY
Status: DISCONTINUED | OUTPATIENT
Start: 2021-09-21 | End: 2021-09-28

## 2021-09-20 RX ORDER — MAGNESIUM SULFATE HEPTAHYDRATE 40 MG/ML
2 INJECTION, SOLUTION INTRAVENOUS ONCE
Status: COMPLETED | OUTPATIENT
Start: 2021-09-20 | End: 2021-09-20

## 2021-09-20 RX ORDER — DILTIAZEM HYDROCHLORIDE 5 MG/ML
15 INJECTION INTRAVENOUS ONCE
Status: DISCONTINUED | OUTPATIENT
Start: 2021-09-20 | End: 2021-09-20

## 2021-09-20 RX ORDER — LEVOFLOXACIN 5 MG/ML
750 INJECTION, SOLUTION INTRAVENOUS ONCE
Status: COMPLETED | OUTPATIENT
Start: 2021-09-20 | End: 2021-09-20

## 2021-09-20 RX ORDER — METOPROLOL TARTRATE 50 MG/1
50 TABLET, FILM COATED ORAL EVERY 12 HOURS SCHEDULED
Status: DISCONTINUED | OUTPATIENT
Start: 2021-09-20 | End: 2021-09-20

## 2021-09-20 RX ADMIN — IOHEXOL 100 ML: 350 INJECTION, SOLUTION INTRAVENOUS at 04:39

## 2021-09-20 RX ADMIN — ALBUTEROL SULFATE 10 MG: 2.5 SOLUTION RESPIRATORY (INHALATION) at 05:40

## 2021-09-20 RX ADMIN — ALBUTEROL SULFATE 10 MG: 2.5 SOLUTION RESPIRATORY (INHALATION) at 03:15

## 2021-09-20 RX ADMIN — FUROSEMIDE 40 MG: 10 INJECTION, SOLUTION INTRAVENOUS at 05:56

## 2021-09-20 RX ADMIN — OXYBUTYNIN CHLORIDE 5 MG: 5 TABLET, EXTENDED RELEASE ORAL at 08:44

## 2021-09-20 RX ADMIN — EZETIMIBE 10 MG: 10 TABLET ORAL at 10:00

## 2021-09-20 RX ADMIN — METOPROLOL TARTRATE 75 MG: 50 TABLET, FILM COATED ORAL at 21:54

## 2021-09-20 RX ADMIN — FUROSEMIDE 40 MG: 10 INJECTION, SOLUTION INTRAVENOUS at 14:30

## 2021-09-20 RX ADMIN — LEVOFLOXACIN 750 MG: 5 INJECTION, SOLUTION INTRAVENOUS at 06:44

## 2021-09-20 RX ADMIN — OXYCODONE HYDROCHLORIDE 2.5 MG: 5 TABLET ORAL at 21:55

## 2021-09-20 RX ADMIN — METOPROLOL TARTRATE 50 MG: 50 TABLET, FILM COATED ORAL at 08:45

## 2021-09-20 RX ADMIN — IPRATROPIUM BROMIDE 1 MG: 0.5 SOLUTION RESPIRATORY (INHALATION) at 03:15

## 2021-09-20 RX ADMIN — IPRATROPIUM BROMIDE 1 MG: 0.5 SOLUTION RESPIRATORY (INHALATION) at 05:40

## 2021-09-20 RX ADMIN — METHYLPREDNISOLONE SODIUM SUCCINATE 125 MG: 125 INJECTION, POWDER, FOR SOLUTION INTRAMUSCULAR; INTRAVENOUS at 03:15

## 2021-09-20 RX ADMIN — ALPRAZOLAM 0.25 MG: 0.25 TABLET ORAL at 23:53

## 2021-09-20 RX ADMIN — METOPROLOL TARTRATE 25 MG: 25 TABLET, FILM COATED ORAL at 12:16

## 2021-09-20 RX ADMIN — MAGNESIUM SULFATE HEPTAHYDRATE 2 G: 40 INJECTION, SOLUTION INTRAVENOUS at 06:05

## 2021-09-20 RX ADMIN — METOROPROLOL TARTRATE 5 MG: 5 INJECTION, SOLUTION INTRAVENOUS at 03:37

## 2021-09-20 RX ADMIN — RIVAROXABAN 15 MG: 15 TABLET, FILM COATED ORAL at 08:46

## 2021-09-20 RX ADMIN — METOROPROLOL TARTRATE 5 MG: 5 INJECTION, SOLUTION INTRAVENOUS at 06:44

## 2021-09-20 RX ADMIN — ISODIUM CHLORIDE 3 ML: 0.03 SOLUTION RESPIRATORY (INHALATION) at 05:40

## 2021-09-20 RX ADMIN — ISODIUM CHLORIDE 3 ML: 0.03 SOLUTION RESPIRATORY (INHALATION) at 03:15

## 2021-09-21 ENCOUNTER — APPOINTMENT (INPATIENT)
Dept: RADIOLOGY | Facility: HOSPITAL | Age: 76
DRG: 291 | End: 2021-09-21
Payer: MEDICARE

## 2021-09-21 PROBLEM — J45.901 ACUTE ASTHMA EXACERBATION: Status: ACTIVE | Noted: 2021-09-21

## 2021-09-21 LAB
ANION GAP SERPL CALCULATED.3IONS-SCNC: 9 MMOL/L (ref 4–13)
BUN SERPL-MCNC: 26 MG/DL (ref 5–25)
CALCIUM SERPL-MCNC: 9 MG/DL (ref 8.3–10.1)
CHLORIDE SERPL-SCNC: 103 MMOL/L (ref 100–108)
CO2 SERPL-SCNC: 27 MMOL/L (ref 21–32)
CREAT SERPL-MCNC: 1.02 MG/DL (ref 0.6–1.3)
ERYTHROCYTE [DISTWIDTH] IN BLOOD BY AUTOMATED COUNT: 14.8 % (ref 11.6–15.1)
GFR SERPL CREATININE-BSD FRML MDRD: 53 ML/MIN/1.73SQ M
GLUCOSE SERPL-MCNC: 137 MG/DL (ref 65–140)
HCT VFR BLD AUTO: 38 % (ref 34.8–46.1)
HGB BLD-MCNC: 11.9 G/DL (ref 11.5–15.4)
MCH RBC QN AUTO: 30.1 PG (ref 26.8–34.3)
MCHC RBC AUTO-ENTMCNC: 31.3 G/DL (ref 31.4–37.4)
MCV RBC AUTO: 96 FL (ref 82–98)
PLATELET # BLD AUTO: 316 THOUSANDS/UL (ref 149–390)
PMV BLD AUTO: 9.5 FL (ref 8.9–12.7)
POTASSIUM SERPL-SCNC: 4.3 MMOL/L (ref 3.5–5.3)
PROCALCITONIN SERPL-MCNC: <0.05 NG/ML
RBC # BLD AUTO: 3.96 MILLION/UL (ref 3.81–5.12)
SODIUM SERPL-SCNC: 139 MMOL/L (ref 136–145)
WBC # BLD AUTO: 14.03 THOUSAND/UL (ref 4.31–10.16)

## 2021-09-21 PROCEDURE — 99024 POSTOP FOLLOW-UP VISIT: CPT | Performed by: ORTHOPAEDIC SURGERY

## 2021-09-21 PROCEDURE — 99232 SBSQ HOSP IP/OBS MODERATE 35: CPT | Performed by: STUDENT IN AN ORGANIZED HEALTH CARE EDUCATION/TRAINING PROGRAM

## 2021-09-21 PROCEDURE — 97167 OT EVAL HIGH COMPLEX 60 MIN: CPT

## 2021-09-21 PROCEDURE — 97163 PT EVAL HIGH COMPLEX 45 MIN: CPT | Performed by: PHYSICAL THERAPIST

## 2021-09-21 PROCEDURE — 85027 COMPLETE CBC AUTOMATED: CPT | Performed by: NURSE PRACTITIONER

## 2021-09-21 PROCEDURE — 84145 PROCALCITONIN (PCT): CPT | Performed by: EMERGENCY MEDICINE

## 2021-09-21 PROCEDURE — 99223 1ST HOSP IP/OBS HIGH 75: CPT | Performed by: INTERNAL MEDICINE

## 2021-09-21 PROCEDURE — 73552 X-RAY EXAM OF FEMUR 2/>: CPT

## 2021-09-21 PROCEDURE — 80048 BASIC METABOLIC PNL TOTAL CA: CPT | Performed by: NURSE PRACTITIONER

## 2021-09-21 RX ORDER — METOPROLOL TARTRATE 50 MG/1
50 TABLET, FILM COATED ORAL EVERY 8 HOURS
Status: DISCONTINUED | OUTPATIENT
Start: 2021-09-21 | End: 2021-09-21

## 2021-09-21 RX ORDER — PREDNISONE 20 MG/1
40 TABLET ORAL DAILY
Status: DISCONTINUED | OUTPATIENT
Start: 2021-09-21 | End: 2021-09-24

## 2021-09-21 RX ORDER — METOPROLOL TARTRATE 50 MG/1
50 TABLET, FILM COATED ORAL EVERY 6 HOURS
Status: DISCONTINUED | OUTPATIENT
Start: 2021-09-21 | End: 2021-09-22

## 2021-09-21 RX ORDER — LEVALBUTEROL INHALATION SOLUTION 0.63 MG/3ML
0.63 SOLUTION RESPIRATORY (INHALATION)
Status: DISCONTINUED | OUTPATIENT
Start: 2021-09-21 | End: 2021-09-22

## 2021-09-21 RX ORDER — METOPROLOL TARTRATE 5 MG/5ML
5 INJECTION INTRAVENOUS EVERY 6 HOURS PRN
Status: DISCONTINUED | OUTPATIENT
Start: 2021-09-21 | End: 2021-10-01 | Stop reason: HOSPADM

## 2021-09-21 RX ADMIN — RIVAROXABAN 15 MG: 15 TABLET, FILM COATED ORAL at 08:36

## 2021-09-21 RX ADMIN — LISINOPRIL 5 MG: 5 TABLET ORAL at 08:36

## 2021-09-21 RX ADMIN — SENNOSIDES 8.6 MG: 8.6 TABLET ORAL at 08:35

## 2021-09-21 RX ADMIN — ALBUTEROL SULFATE 2 PUFF: 90 AEROSOL, METERED RESPIRATORY (INHALATION) at 18:27

## 2021-09-21 RX ADMIN — METOPROLOL TARTRATE 50 MG: 50 TABLET, FILM COATED ORAL at 16:04

## 2021-09-21 RX ADMIN — OXYCODONE HYDROCHLORIDE 2.5 MG: 5 TABLET ORAL at 06:06

## 2021-09-21 RX ADMIN — PREDNISONE 40 MG: 20 TABLET ORAL at 18:35

## 2021-09-21 RX ADMIN — OXYBUTYNIN CHLORIDE 5 MG: 5 TABLET, EXTENDED RELEASE ORAL at 08:36

## 2021-09-21 RX ADMIN — METOPROLOL TARTRATE 75 MG: 50 TABLET, FILM COATED ORAL at 08:36

## 2021-09-21 RX ADMIN — AMLODIPINE BESYLATE 10 MG: 10 TABLET ORAL at 08:35

## 2021-09-21 RX ADMIN — EZETIMIBE 10 MG: 10 TABLET ORAL at 08:39

## 2021-09-22 ENCOUNTER — PATIENT OUTREACH (OUTPATIENT)
Dept: CASE MANAGEMENT | Facility: HOSPITAL | Age: 76
End: 2021-09-22

## 2021-09-22 ENCOUNTER — APPOINTMENT (INPATIENT)
Dept: RADIOLOGY | Facility: HOSPITAL | Age: 76
DRG: 291 | End: 2021-09-22
Payer: MEDICARE

## 2021-09-22 PROBLEM — I50.33 ACUTE ON CHRONIC DIASTOLIC HEART FAILURE (HCC): Status: ACTIVE | Noted: 2021-09-20

## 2021-09-22 LAB
ANION GAP SERPL CALCULATED.3IONS-SCNC: 8 MMOL/L (ref 4–13)
BASOPHILS # BLD AUTO: 0.01 THOUSANDS/ΜL (ref 0–0.1)
BASOPHILS NFR BLD AUTO: 0 % (ref 0–1)
BUN SERPL-MCNC: 33 MG/DL (ref 5–25)
CALCIUM SERPL-MCNC: 9.2 MG/DL (ref 8.3–10.1)
CHLORIDE SERPL-SCNC: 105 MMOL/L (ref 100–108)
CO2 SERPL-SCNC: 29 MMOL/L (ref 21–32)
CREAT SERPL-MCNC: 0.94 MG/DL (ref 0.6–1.3)
EOSINOPHIL # BLD AUTO: 0.01 THOUSAND/ΜL (ref 0–0.61)
EOSINOPHIL NFR BLD AUTO: 0 % (ref 0–6)
ERYTHROCYTE [DISTWIDTH] IN BLOOD BY AUTOMATED COUNT: 14.8 % (ref 11.6–15.1)
GFR SERPL CREATININE-BSD FRML MDRD: 58 ML/MIN/1.73SQ M
GLUCOSE SERPL-MCNC: 134 MG/DL (ref 65–140)
HCT VFR BLD AUTO: 38.1 % (ref 34.8–46.1)
HGB BLD-MCNC: 11.5 G/DL (ref 11.5–15.4)
IMM GRANULOCYTES # BLD AUTO: 0.1 THOUSAND/UL (ref 0–0.2)
IMM GRANULOCYTES NFR BLD AUTO: 1 % (ref 0–2)
LYMPHOCYTES # BLD AUTO: 1.06 THOUSANDS/ΜL (ref 0.6–4.47)
LYMPHOCYTES NFR BLD AUTO: 9 % (ref 14–44)
MAGNESIUM SERPL-MCNC: 2.7 MG/DL (ref 1.6–2.6)
MCH RBC QN AUTO: 29.3 PG (ref 26.8–34.3)
MCHC RBC AUTO-ENTMCNC: 30.2 G/DL (ref 31.4–37.4)
MCV RBC AUTO: 97 FL (ref 82–98)
MONOCYTES # BLD AUTO: 0.37 THOUSAND/ΜL (ref 0.17–1.22)
MONOCYTES NFR BLD AUTO: 3 % (ref 4–12)
NEUTROPHILS # BLD AUTO: 10.82 THOUSANDS/ΜL (ref 1.85–7.62)
NEUTS SEG NFR BLD AUTO: 87 % (ref 43–75)
NRBC BLD AUTO-RTO: 0 /100 WBCS
PLATELET # BLD AUTO: 325 THOUSANDS/UL (ref 149–390)
PMV BLD AUTO: 9.6 FL (ref 8.9–12.7)
POTASSIUM SERPL-SCNC: 5 MMOL/L (ref 3.5–5.3)
PROCALCITONIN SERPL-MCNC: <0.05 NG/ML
RBC # BLD AUTO: 3.92 MILLION/UL (ref 3.81–5.12)
SARS-COV-2 RNA RESP QL NAA+PROBE: NEGATIVE
SODIUM SERPL-SCNC: 142 MMOL/L (ref 136–145)
WBC # BLD AUTO: 12.37 THOUSAND/UL (ref 4.31–10.16)

## 2021-09-22 PROCEDURE — 99232 SBSQ HOSP IP/OBS MODERATE 35: CPT | Performed by: INTERNAL MEDICINE

## 2021-09-22 PROCEDURE — 99232 SBSQ HOSP IP/OBS MODERATE 35: CPT | Performed by: PHYSICIAN ASSISTANT

## 2021-09-22 PROCEDURE — 85025 COMPLETE CBC W/AUTO DIFF WBC: CPT | Performed by: PHYSICIAN ASSISTANT

## 2021-09-22 PROCEDURE — 94760 N-INVAS EAR/PLS OXIMETRY 1: CPT

## 2021-09-22 PROCEDURE — 94640 AIRWAY INHALATION TREATMENT: CPT

## 2021-09-22 PROCEDURE — U0005 INFEC AGEN DETEC AMPLI PROBE: HCPCS | Performed by: PHYSICIAN ASSISTANT

## 2021-09-22 PROCEDURE — U0003 INFECTIOUS AGENT DETECTION BY NUCLEIC ACID (DNA OR RNA); SEVERE ACUTE RESPIRATORY SYNDROME CORONAVIRUS 2 (SARS-COV-2) (CORONAVIRUS DISEASE [COVID-19]), AMPLIFIED PROBE TECHNIQUE, MAKING USE OF HIGH THROUGHPUT TECHNOLOGIES AS DESCRIBED BY CMS-2020-01-R: HCPCS | Performed by: PHYSICIAN ASSISTANT

## 2021-09-22 PROCEDURE — 71045 X-RAY EXAM CHEST 1 VIEW: CPT

## 2021-09-22 PROCEDURE — 84145 PROCALCITONIN (PCT): CPT | Performed by: PHYSICIAN ASSISTANT

## 2021-09-22 PROCEDURE — 83735 ASSAY OF MAGNESIUM: CPT | Performed by: PHYSICIAN ASSISTANT

## 2021-09-22 PROCEDURE — 80048 BASIC METABOLIC PNL TOTAL CA: CPT | Performed by: PHYSICIAN ASSISTANT

## 2021-09-22 RX ORDER — LEVALBUTEROL 1.25 MG/.5ML
1.25 SOLUTION, CONCENTRATE RESPIRATORY (INHALATION)
Status: DISCONTINUED | OUTPATIENT
Start: 2021-09-22 | End: 2021-09-24

## 2021-09-22 RX ORDER — METOPROLOL TARTRATE 100 MG/1
100 TABLET ORAL EVERY 12 HOURS SCHEDULED
Status: DISCONTINUED | OUTPATIENT
Start: 2021-09-23 | End: 2021-09-23

## 2021-09-22 RX ORDER — METOPROLOL TARTRATE 100 MG/1
100 TABLET ORAL EVERY 12 HOURS SCHEDULED
Status: DISCONTINUED | OUTPATIENT
Start: 2021-09-23 | End: 2021-09-22

## 2021-09-22 RX ORDER — FUROSEMIDE 20 MG/1
20 TABLET ORAL DAILY
Status: DISCONTINUED | OUTPATIENT
Start: 2021-09-23 | End: 2021-09-26

## 2021-09-22 RX ADMIN — AMLODIPINE BESYLATE 10 MG: 10 TABLET ORAL at 09:56

## 2021-09-22 RX ADMIN — SENNOSIDES 8.6 MG: 8.6 TABLET ORAL at 09:56

## 2021-09-22 RX ADMIN — IPRATROPIUM BROMIDE 0.5 MG: 0.5 SOLUTION RESPIRATORY (INHALATION) at 13:14

## 2021-09-22 RX ADMIN — LEVALBUTEROL HYDROCHLORIDE 1.25 MG: 1.25 SOLUTION, CONCENTRATE RESPIRATORY (INHALATION) at 19:30

## 2021-09-22 RX ADMIN — DILTIAZEM HYDROCHLORIDE 30 MG: 30 TABLET, FILM COATED ORAL at 20:43

## 2021-09-22 RX ADMIN — LISINOPRIL 5 MG: 5 TABLET ORAL at 09:56

## 2021-09-22 RX ADMIN — METOPROLOL TARTRATE 50 MG: 50 TABLET, FILM COATED ORAL at 17:10

## 2021-09-22 RX ADMIN — LEVALBUTEROL HYDROCHLORIDE 1.25 MG: 1.25 SOLUTION, CONCENTRATE RESPIRATORY (INHALATION) at 13:15

## 2021-09-22 RX ADMIN — EZETIMIBE 10 MG: 10 TABLET ORAL at 09:56

## 2021-09-22 RX ADMIN — LEVALBUTEROL HYDROCHLORIDE 0.63 MG: 0.63 SOLUTION RESPIRATORY (INHALATION) at 08:07

## 2021-09-22 RX ADMIN — OXYBUTYNIN CHLORIDE 5 MG: 5 TABLET, EXTENDED RELEASE ORAL at 09:56

## 2021-09-22 RX ADMIN — METOPROLOL TARTRATE 50 MG: 50 TABLET, FILM COATED ORAL at 12:00

## 2021-09-22 RX ADMIN — OXYCODONE HYDROCHLORIDE 2.5 MG: 5 TABLET ORAL at 00:24

## 2021-09-22 RX ADMIN — METOPROLOL TARTRATE 50 MG: 50 TABLET, FILM COATED ORAL at 06:06

## 2021-09-22 RX ADMIN — PREDNISONE 40 MG: 20 TABLET ORAL at 09:56

## 2021-09-22 RX ADMIN — RIVAROXABAN 15 MG: 15 TABLET, FILM COATED ORAL at 09:56

## 2021-09-22 RX ADMIN — METOPROLOL TARTRATE 50 MG: 50 TABLET, FILM COATED ORAL at 00:21

## 2021-09-22 RX ADMIN — OXYCODONE HYDROCHLORIDE 2.5 MG: 5 TABLET ORAL at 22:37

## 2021-09-22 RX ADMIN — IPRATROPIUM BROMIDE 0.5 MG: 0.5 SOLUTION RESPIRATORY (INHALATION) at 19:30

## 2021-09-22 RX ADMIN — IPRATROPIUM BROMIDE 0.5 MG: 0.5 SOLUTION RESPIRATORY (INHALATION) at 08:06

## 2021-09-23 ENCOUNTER — ANESTHESIA EVENT (INPATIENT)
Dept: NON INVASIVE DIAGNOSTICS | Facility: HOSPITAL | Age: 76
DRG: 291 | End: 2021-09-23
Payer: MEDICARE

## 2021-09-23 ENCOUNTER — APPOINTMENT (INPATIENT)
Dept: NON INVASIVE DIAGNOSTICS | Facility: HOSPITAL | Age: 76
DRG: 291 | End: 2021-09-23
Attending: INTERNAL MEDICINE
Payer: MEDICARE

## 2021-09-23 PROBLEM — E66.01 MORBID OBESITY WITH BODY MASS INDEX (BMI) OF 40.0 TO 49.9 (HCC): Chronic | Status: ACTIVE | Noted: 2021-09-23

## 2021-09-23 LAB
ANION GAP SERPL CALCULATED.3IONS-SCNC: 11 MMOL/L (ref 4–13)
BUN SERPL-MCNC: 31 MG/DL (ref 5–25)
CALCIUM SERPL-MCNC: 9.3 MG/DL (ref 8.3–10.1)
CHLORIDE SERPL-SCNC: 102 MMOL/L (ref 100–108)
CO2 SERPL-SCNC: 26 MMOL/L (ref 21–32)
CREAT SERPL-MCNC: 0.96 MG/DL (ref 0.6–1.3)
GFR SERPL CREATININE-BSD FRML MDRD: 57 ML/MIN/1.73SQ M
GLUCOSE SERPL-MCNC: 117 MG/DL (ref 65–140)
GLUCOSE SERPL-MCNC: 131 MG/DL (ref 65–140)
POTASSIUM SERPL-SCNC: 4.9 MMOL/L (ref 3.5–5.3)
PROCALCITONIN SERPL-MCNC: <0.05 NG/ML
SODIUM SERPL-SCNC: 139 MMOL/L (ref 136–145)

## 2021-09-23 PROCEDURE — 94640 AIRWAY INHALATION TREATMENT: CPT

## 2021-09-23 PROCEDURE — 84145 PROCALCITONIN (PCT): CPT | Performed by: PHYSICIAN ASSISTANT

## 2021-09-23 PROCEDURE — NC001 PR NO CHARGE: Performed by: INTERNAL MEDICINE

## 2021-09-23 PROCEDURE — 93312 ECHO TRANSESOPHAGEAL: CPT

## 2021-09-23 PROCEDURE — 93325 DOPPLER ECHO COLOR FLOW MAPG: CPT | Performed by: INTERNAL MEDICINE

## 2021-09-23 PROCEDURE — 93320 DOPPLER ECHO COMPLETE: CPT | Performed by: INTERNAL MEDICINE

## 2021-09-23 PROCEDURE — 80048 BASIC METABOLIC PNL TOTAL CA: CPT | Performed by: PHYSICIAN ASSISTANT

## 2021-09-23 PROCEDURE — 82948 REAGENT STRIP/BLOOD GLUCOSE: CPT

## 2021-09-23 PROCEDURE — B246ZZ4 ULTRASONOGRAPHY OF RIGHT AND LEFT HEART, TRANSESOPHAGEAL: ICD-10-PCS | Performed by: INTERNAL MEDICINE

## 2021-09-23 PROCEDURE — 99222 1ST HOSP IP/OBS MODERATE 55: CPT | Performed by: PHYSICAL MEDICINE & REHABILITATION

## 2021-09-23 PROCEDURE — 93312 ECHO TRANSESOPHAGEAL: CPT | Performed by: INTERNAL MEDICINE

## 2021-09-23 PROCEDURE — 93005 ELECTROCARDIOGRAM TRACING: CPT

## 2021-09-23 PROCEDURE — 99232 SBSQ HOSP IP/OBS MODERATE 35: CPT | Performed by: PHYSICIAN ASSISTANT

## 2021-09-23 PROCEDURE — 94760 N-INVAS EAR/PLS OXIMETRY 1: CPT

## 2021-09-23 RX ORDER — FENTANYL CITRATE/PF 50 MCG/ML
25 SYRINGE (ML) INJECTION
Status: DISCONTINUED | OUTPATIENT
Start: 2021-09-23 | End: 2021-09-23 | Stop reason: HOSPADM

## 2021-09-23 RX ORDER — PROPOFOL 10 MG/ML
INJECTION, EMULSION INTRAVENOUS CONTINUOUS PRN
Status: DISCONTINUED | OUTPATIENT
Start: 2021-09-23 | End: 2021-09-23

## 2021-09-23 RX ORDER — LABETALOL 20 MG/4 ML (5 MG/ML) INTRAVENOUS SYRINGE
AS NEEDED
Status: DISCONTINUED | OUTPATIENT
Start: 2021-09-23 | End: 2021-09-23

## 2021-09-23 RX ORDER — PROPOFOL 10 MG/ML
INJECTION, EMULSION INTRAVENOUS AS NEEDED
Status: DISCONTINUED | OUTPATIENT
Start: 2021-09-23 | End: 2021-09-23

## 2021-09-23 RX ORDER — ONDANSETRON 2 MG/ML
INJECTION INTRAMUSCULAR; INTRAVENOUS AS NEEDED
Status: DISCONTINUED | OUTPATIENT
Start: 2021-09-23 | End: 2021-09-23

## 2021-09-23 RX ORDER — SODIUM CHLORIDE 9 MG/ML
INJECTION, SOLUTION INTRAVENOUS CONTINUOUS PRN
Status: DISCONTINUED | OUTPATIENT
Start: 2021-09-23 | End: 2021-09-23

## 2021-09-23 RX ORDER — METOPROLOL TARTRATE 5 MG/5ML
INJECTION INTRAVENOUS AS NEEDED
Status: DISCONTINUED | OUTPATIENT
Start: 2021-09-23 | End: 2021-09-23

## 2021-09-23 RX ORDER — DILTIAZEM HYDROCHLORIDE 5 MG/ML
10 INJECTION INTRAVENOUS ONCE
Status: COMPLETED | OUTPATIENT
Start: 2021-09-23 | End: 2021-09-24

## 2021-09-23 RX ORDER — ONDANSETRON 2 MG/ML
4 INJECTION INTRAMUSCULAR; INTRAVENOUS ONCE AS NEEDED
Status: DISCONTINUED | OUTPATIENT
Start: 2021-09-23 | End: 2021-09-23 | Stop reason: HOSPADM

## 2021-09-23 RX ADMIN — IPRATROPIUM BROMIDE 0.5 MG: 0.5 SOLUTION RESPIRATORY (INHALATION) at 20:52

## 2021-09-23 RX ADMIN — LEVALBUTEROL HYDROCHLORIDE 1.25 MG: 1.25 SOLUTION, CONCENTRATE RESPIRATORY (INHALATION) at 20:52

## 2021-09-23 RX ADMIN — RIVAROXABAN 20 MG: 20 TABLET, FILM COATED ORAL at 08:08

## 2021-09-23 RX ADMIN — SENNOSIDES 8.6 MG: 8.6 TABLET ORAL at 08:08

## 2021-09-23 RX ADMIN — DILTIAZEM HYDROCHLORIDE 30 MG: 30 TABLET, FILM COATED ORAL at 00:07

## 2021-09-23 RX ADMIN — OXYBUTYNIN CHLORIDE 5 MG: 5 TABLET, EXTENDED RELEASE ORAL at 08:08

## 2021-09-23 RX ADMIN — ALBUTEROL SULFATE 2 PUFF: 90 AEROSOL, METERED RESPIRATORY (INHALATION) at 04:00

## 2021-09-23 RX ADMIN — LEVALBUTEROL HYDROCHLORIDE 1.25 MG: 1.25 SOLUTION, CONCENTRATE RESPIRATORY (INHALATION) at 07:30

## 2021-09-23 RX ADMIN — PROPOFOL 130 MCG/KG/MIN: 10 INJECTION, EMULSION INTRAVENOUS at 12:09

## 2021-09-23 RX ADMIN — APIXABAN 5 MG: 5 TABLET, FILM COATED ORAL at 17:19

## 2021-09-23 RX ADMIN — FUROSEMIDE 20 MG: 20 TABLET ORAL at 08:09

## 2021-09-23 RX ADMIN — LABETALOL 20 MG/4 ML (5 MG/ML) INTRAVENOUS SYRINGE 10 MG: at 12:25

## 2021-09-23 RX ADMIN — IPRATROPIUM BROMIDE 0.5 MG: 0.5 SOLUTION RESPIRATORY (INHALATION) at 07:33

## 2021-09-23 RX ADMIN — METOROPROLOL TARTRATE 5 MG: 5 INJECTION, SOLUTION INTRAVENOUS at 23:10

## 2021-09-23 RX ADMIN — PROPOFOL 50 MG: 10 INJECTION, EMULSION INTRAVENOUS at 12:09

## 2021-09-23 RX ADMIN — ONDANSETRON 4 MG: 2 INJECTION INTRAMUSCULAR; INTRAVENOUS at 11:56

## 2021-09-23 RX ADMIN — OXYCODONE HYDROCHLORIDE 2.5 MG: 5 TABLET ORAL at 17:18

## 2021-09-23 RX ADMIN — EZETIMIBE 10 MG: 10 TABLET ORAL at 08:08

## 2021-09-23 RX ADMIN — SODIUM CHLORIDE: 0.9 INJECTION, SOLUTION INTRAVENOUS at 11:54

## 2021-09-23 RX ADMIN — METOROPROLOL TARTRATE 3 MG: 5 INJECTION, SOLUTION INTRAVENOUS at 12:28

## 2021-09-23 RX ADMIN — LISINOPRIL 5 MG: 5 TABLET ORAL at 08:09

## 2021-09-23 RX ADMIN — DILTIAZEM HYDROCHLORIDE 30 MG: 30 TABLET, FILM COATED ORAL at 06:25

## 2021-09-23 RX ADMIN — PREDNISONE 40 MG: 20 TABLET ORAL at 08:08

## 2021-09-23 NOTE — ANESTHESIA PREPROCEDURE EVALUATION
Procedure:  TANISHA    Relevant Problems   CARDIO   (+) Atrial fibrillation with RVR (HCC)   (+) Hypercholesterolemia   (+) Hyperlipidemia   (+) Sick sinus syndrome (HCC)   (+) Tachy-smita syndrome (HCC)      GI/HEPATIC   (+) Chronic GERD   (+) GERD (gastroesophageal reflux disease)      MUSCULOSKELETAL   (+) Arthritis   (+) Chronic bilateral low back pain without sciatica   (+) Degenerative arthritis of knee, bilateral   (+) Fibromyalgia   (+) Low back pain   (+) Lumbar degenerative disc disease      NEURO/PSYCH   (+) Anxiety   (+) Fibromyalgia      PULMONARY   (+) Acute respiratory failure with hypoxia (HCC)        Physical Exam    Airway    Mallampati score: III  TM Distance: >3 FB  Neck ROM: full     Dental   No notable dental hx     Cardiovascular  Rhythm: irregular, Rate: abnormal, Cardiovascular exam normal    Pulmonary  Pulmonary exam normal Breath sounds clear to auscultation,     Other Findings        Anesthesia Plan  ASA Score- 4     Anesthesia Type- general and IV sedation with anesthesia with ASA Monitors  Additional Monitors:   Airway Plan:           Plan Factors-    Chart reviewed  EKG reviewed  Imaging results reviewed  Existing labs reviewed  Patient summary reviewed  Induction-     Postoperative Plan-     Informed Consent- Anesthetic plan and risks discussed with patient  I personally reviewed this patient with the CRNA  Discussed and agreed on the Anesthesia Plan with the CRNA  Guido Foster

## 2021-09-24 LAB
ANION GAP SERPL CALCULATED.3IONS-SCNC: 9 MMOL/L (ref 4–13)
ATRIAL RATE: 76 BPM
BUN SERPL-MCNC: 21 MG/DL (ref 5–25)
CALCIUM SERPL-MCNC: 9.2 MG/DL (ref 8.3–10.1)
CHLORIDE SERPL-SCNC: 104 MMOL/L (ref 100–108)
CO2 SERPL-SCNC: 29 MMOL/L (ref 21–32)
CREAT SERPL-MCNC: 0.88 MG/DL (ref 0.6–1.3)
ERYTHROCYTE [DISTWIDTH] IN BLOOD BY AUTOMATED COUNT: 15 % (ref 11.6–15.1)
GFR SERPL CREATININE-BSD FRML MDRD: 63 ML/MIN/1.73SQ M
GLUCOSE SERPL-MCNC: 95 MG/DL (ref 65–140)
HCT VFR BLD AUTO: 39.2 % (ref 34.8–46.1)
HGB BLD-MCNC: 12 G/DL (ref 11.5–15.4)
MCH RBC QN AUTO: 29.6 PG (ref 26.8–34.3)
MCHC RBC AUTO-ENTMCNC: 30.6 G/DL (ref 31.4–37.4)
MCV RBC AUTO: 97 FL (ref 82–98)
PLATELET # BLD AUTO: 333 THOUSANDS/UL (ref 149–390)
PMV BLD AUTO: 9.3 FL (ref 8.9–12.7)
POTASSIUM SERPL-SCNC: 4.5 MMOL/L (ref 3.5–5.3)
QRS AXIS: -42 DEGREES
QRSD INTERVAL: 120 MS
QT INTERVAL: 340 MS
QTC INTERVAL: 502 MS
RBC # BLD AUTO: 4.05 MILLION/UL (ref 3.81–5.12)
SODIUM SERPL-SCNC: 142 MMOL/L (ref 136–145)
T WAVE AXIS: -26 DEGREES
VENTRICULAR RATE: 131 BPM
WBC # BLD AUTO: 11.23 THOUSAND/UL (ref 4.31–10.16)

## 2021-09-24 PROCEDURE — 94640 AIRWAY INHALATION TREATMENT: CPT

## 2021-09-24 PROCEDURE — 97530 THERAPEUTIC ACTIVITIES: CPT

## 2021-09-24 PROCEDURE — 97535 SELF CARE MNGMENT TRAINING: CPT

## 2021-09-24 PROCEDURE — 97110 THERAPEUTIC EXERCISES: CPT

## 2021-09-24 PROCEDURE — 80048 BASIC METABOLIC PNL TOTAL CA: CPT | Performed by: PHYSICIAN ASSISTANT

## 2021-09-24 PROCEDURE — 94760 N-INVAS EAR/PLS OXIMETRY 1: CPT

## 2021-09-24 PROCEDURE — 99232 SBSQ HOSP IP/OBS MODERATE 35: CPT | Performed by: STUDENT IN AN ORGANIZED HEALTH CARE EDUCATION/TRAINING PROGRAM

## 2021-09-24 PROCEDURE — 99232 SBSQ HOSP IP/OBS MODERATE 35: CPT | Performed by: INTERNAL MEDICINE

## 2021-09-24 PROCEDURE — 0HBRXZZ EXCISION OF TOE NAIL, EXTERNAL APPROACH: ICD-10-PCS | Performed by: INTERNAL MEDICINE

## 2021-09-24 PROCEDURE — 85027 COMPLETE CBC AUTOMATED: CPT | Performed by: PHYSICIAN ASSISTANT

## 2021-09-24 PROCEDURE — 93010 ELECTROCARDIOGRAM REPORT: CPT | Performed by: INTERNAL MEDICINE

## 2021-09-24 RX ORDER — METOPROLOL TARTRATE 100 MG/1
100 TABLET ORAL EVERY 12 HOURS SCHEDULED
Status: DISCONTINUED | OUTPATIENT
Start: 2021-09-24 | End: 2021-10-01 | Stop reason: HOSPADM

## 2021-09-24 RX ORDER — BENZONATATE 100 MG/1
100 CAPSULE ORAL 3 TIMES DAILY PRN
Status: DISCONTINUED | OUTPATIENT
Start: 2021-09-24 | End: 2021-10-01 | Stop reason: HOSPADM

## 2021-09-24 RX ADMIN — METOROPROLOL TARTRATE 5 MG: 5 INJECTION, SOLUTION INTRAVENOUS at 05:12

## 2021-09-24 RX ADMIN — DILTIAZEM HYDROCHLORIDE 10 MG: 5 INJECTION INTRAVENOUS at 00:02

## 2021-09-24 RX ADMIN — SENNOSIDES 8.6 MG: 8.6 TABLET ORAL at 08:17

## 2021-09-24 RX ADMIN — PREDNISONE 40 MG: 20 TABLET ORAL at 08:17

## 2021-09-24 RX ADMIN — EZETIMIBE 10 MG: 10 TABLET ORAL at 08:17

## 2021-09-24 RX ADMIN — LEVALBUTEROL HYDROCHLORIDE 1.25 MG: 1.25 SOLUTION, CONCENTRATE RESPIRATORY (INHALATION) at 07:41

## 2021-09-24 RX ADMIN — LISINOPRIL 5 MG: 5 TABLET ORAL at 08:17

## 2021-09-24 RX ADMIN — DILTIAZEM HYDROCHLORIDE 30 MG: 30 TABLET, FILM COATED ORAL at 11:51

## 2021-09-24 RX ADMIN — APIXABAN 5 MG: 5 TABLET, FILM COATED ORAL at 08:17

## 2021-09-24 RX ADMIN — APIXABAN 5 MG: 5 TABLET, FILM COATED ORAL at 17:58

## 2021-09-24 RX ADMIN — METOPROLOL TARTRATE 100 MG: 100 TABLET, FILM COATED ORAL at 11:51

## 2021-09-24 RX ADMIN — OXYBUTYNIN CHLORIDE 5 MG: 5 TABLET, EXTENDED RELEASE ORAL at 08:17

## 2021-09-24 RX ADMIN — FUROSEMIDE 20 MG: 20 TABLET ORAL at 08:17

## 2021-09-24 RX ADMIN — DILTIAZEM HYDROCHLORIDE 30 MG: 30 TABLET, FILM COATED ORAL at 17:58

## 2021-09-24 RX ADMIN — IPRATROPIUM BROMIDE 0.5 MG: 0.5 SOLUTION RESPIRATORY (INHALATION) at 07:42

## 2021-09-25 LAB
ANION GAP SERPL CALCULATED.3IONS-SCNC: 9 MMOL/L (ref 4–13)
BACTERIA BLD CULT: NORMAL
BACTERIA BLD CULT: NORMAL
BUN SERPL-MCNC: 21 MG/DL (ref 5–25)
CALCIUM SERPL-MCNC: 8.8 MG/DL (ref 8.3–10.1)
CHLORIDE SERPL-SCNC: 104 MMOL/L (ref 100–108)
CO2 SERPL-SCNC: 29 MMOL/L (ref 21–32)
CREAT SERPL-MCNC: 0.83 MG/DL (ref 0.6–1.3)
GFR SERPL CREATININE-BSD FRML MDRD: 68 ML/MIN/1.73SQ M
GLUCOSE SERPL-MCNC: 100 MG/DL (ref 65–140)
MAGNESIUM SERPL-MCNC: 2.5 MG/DL (ref 1.6–2.6)
POTASSIUM SERPL-SCNC: 4.1 MMOL/L (ref 3.5–5.3)
SODIUM SERPL-SCNC: 142 MMOL/L (ref 136–145)

## 2021-09-25 PROCEDURE — 99232 SBSQ HOSP IP/OBS MODERATE 35: CPT | Performed by: STUDENT IN AN ORGANIZED HEALTH CARE EDUCATION/TRAINING PROGRAM

## 2021-09-25 PROCEDURE — 99232 SBSQ HOSP IP/OBS MODERATE 35: CPT | Performed by: INTERNAL MEDICINE

## 2021-09-25 PROCEDURE — 83735 ASSAY OF MAGNESIUM: CPT | Performed by: STUDENT IN AN ORGANIZED HEALTH CARE EDUCATION/TRAINING PROGRAM

## 2021-09-25 PROCEDURE — 80048 BASIC METABOLIC PNL TOTAL CA: CPT | Performed by: STUDENT IN AN ORGANIZED HEALTH CARE EDUCATION/TRAINING PROGRAM

## 2021-09-25 RX ORDER — DILTIAZEM HYDROCHLORIDE 120 MG/1
120 CAPSULE, COATED, EXTENDED RELEASE ORAL DAILY
Status: DISCONTINUED | OUTPATIENT
Start: 2021-09-25 | End: 2021-10-01 | Stop reason: HOSPADM

## 2021-09-25 RX ORDER — ACETAMINOPHEN 325 MG/1
650 TABLET ORAL EVERY 6 HOURS PRN
Status: DISCONTINUED | OUTPATIENT
Start: 2021-09-25 | End: 2021-10-01 | Stop reason: HOSPADM

## 2021-09-25 RX ORDER — FUROSEMIDE 10 MG/ML
20 INJECTION INTRAMUSCULAR; INTRAVENOUS ONCE
Status: COMPLETED | OUTPATIENT
Start: 2021-09-25 | End: 2021-09-25

## 2021-09-25 RX ADMIN — BENZONATATE 100 MG: 100 CAPSULE ORAL at 09:23

## 2021-09-25 RX ADMIN — BENZONATATE 100 MG: 100 CAPSULE ORAL at 00:12

## 2021-09-25 RX ADMIN — APIXABAN 5 MG: 5 TABLET, FILM COATED ORAL at 09:17

## 2021-09-25 RX ADMIN — OXYCODONE HYDROCHLORIDE 2.5 MG: 5 TABLET ORAL at 00:08

## 2021-09-25 RX ADMIN — LISINOPRIL 5 MG: 5 TABLET ORAL at 09:17

## 2021-09-25 RX ADMIN — DILTIAZEM HYDROCHLORIDE 120 MG: 120 CAPSULE, COATED, EXTENDED RELEASE ORAL at 12:33

## 2021-09-25 RX ADMIN — FUROSEMIDE 20 MG: 10 INJECTION, SOLUTION INTRAVENOUS at 12:33

## 2021-09-25 RX ADMIN — METOPROLOL TARTRATE 100 MG: 100 TABLET, FILM COATED ORAL at 20:15

## 2021-09-25 RX ADMIN — SENNOSIDES 8.6 MG: 8.6 TABLET ORAL at 09:17

## 2021-09-25 RX ADMIN — EZETIMIBE 10 MG: 10 TABLET ORAL at 09:17

## 2021-09-25 RX ADMIN — APIXABAN 5 MG: 5 TABLET, FILM COATED ORAL at 17:29

## 2021-09-25 RX ADMIN — DILTIAZEM HYDROCHLORIDE 30 MG: 30 TABLET, FILM COATED ORAL at 06:24

## 2021-09-25 RX ADMIN — DILTIAZEM HYDROCHLORIDE 30 MG: 30 TABLET, FILM COATED ORAL at 00:04

## 2021-09-25 RX ADMIN — ACETAMINOPHEN 650 MG: 325 TABLET, FILM COATED ORAL at 21:53

## 2021-09-25 RX ADMIN — METOPROLOL TARTRATE 100 MG: 100 TABLET, FILM COATED ORAL at 09:17

## 2021-09-25 RX ADMIN — FUROSEMIDE 20 MG: 20 TABLET ORAL at 09:17

## 2021-09-25 RX ADMIN — OXYBUTYNIN CHLORIDE 5 MG: 5 TABLET, EXTENDED RELEASE ORAL at 09:17

## 2021-09-26 ENCOUNTER — APPOINTMENT (INPATIENT)
Dept: RADIOLOGY | Facility: HOSPITAL | Age: 76
DRG: 291 | End: 2021-09-26
Payer: MEDICARE

## 2021-09-26 LAB — NT-PROBNP SERPL-MCNC: 1534 PG/ML

## 2021-09-26 PROCEDURE — 83880 ASSAY OF NATRIURETIC PEPTIDE: CPT | Performed by: INTERNAL MEDICINE

## 2021-09-26 PROCEDURE — 99232 SBSQ HOSP IP/OBS MODERATE 35: CPT | Performed by: INTERNAL MEDICINE

## 2021-09-26 PROCEDURE — 71045 X-RAY EXAM CHEST 1 VIEW: CPT

## 2021-09-26 PROCEDURE — 99232 SBSQ HOSP IP/OBS MODERATE 35: CPT | Performed by: STUDENT IN AN ORGANIZED HEALTH CARE EDUCATION/TRAINING PROGRAM

## 2021-09-26 RX ORDER — FUROSEMIDE 10 MG/ML
40 INJECTION INTRAMUSCULAR; INTRAVENOUS
Status: DISCONTINUED | OUTPATIENT
Start: 2021-09-26 | End: 2021-09-26

## 2021-09-26 RX ORDER — FUROSEMIDE 10 MG/ML
20 INJECTION INTRAMUSCULAR; INTRAVENOUS
Status: DISCONTINUED | OUTPATIENT
Start: 2021-09-26 | End: 2021-09-27

## 2021-09-26 RX ADMIN — APIXABAN 5 MG: 5 TABLET, FILM COATED ORAL at 17:00

## 2021-09-26 RX ADMIN — FUROSEMIDE 20 MG: 20 TABLET ORAL at 08:52

## 2021-09-26 RX ADMIN — OXYBUTYNIN CHLORIDE 5 MG: 5 TABLET, EXTENDED RELEASE ORAL at 08:51

## 2021-09-26 RX ADMIN — APIXABAN 5 MG: 5 TABLET, FILM COATED ORAL at 08:51

## 2021-09-26 RX ADMIN — DILTIAZEM HYDROCHLORIDE 120 MG: 120 CAPSULE, COATED, EXTENDED RELEASE ORAL at 08:52

## 2021-09-26 RX ADMIN — FUROSEMIDE 20 MG: 10 INJECTION, SOLUTION INTRAVENOUS at 17:00

## 2021-09-26 RX ADMIN — METOPROLOL TARTRATE 100 MG: 100 TABLET, FILM COATED ORAL at 08:51

## 2021-09-26 RX ADMIN — SENNOSIDES 8.6 MG: 8.6 TABLET ORAL at 08:51

## 2021-09-26 RX ADMIN — METOPROLOL TARTRATE 100 MG: 100 TABLET, FILM COATED ORAL at 21:31

## 2021-09-26 RX ADMIN — EZETIMIBE 10 MG: 10 TABLET ORAL at 08:51

## 2021-09-26 RX ADMIN — LISINOPRIL 5 MG: 5 TABLET ORAL at 08:51

## 2021-09-27 LAB
ANION GAP SERPL CALCULATED.3IONS-SCNC: 8 MMOL/L (ref 4–13)
BASOPHILS # BLD MANUAL: 0 THOUSAND/UL (ref 0–0.1)
BASOPHILS NFR MAR MANUAL: 0 % (ref 0–1)
BUN SERPL-MCNC: 22 MG/DL (ref 5–25)
CALCIUM SERPL-MCNC: 9.3 MG/DL (ref 8.3–10.1)
CHLORIDE SERPL-SCNC: 100 MMOL/L (ref 100–108)
CO2 SERPL-SCNC: 32 MMOL/L (ref 21–32)
CREAT SERPL-MCNC: 0.99 MG/DL (ref 0.6–1.3)
CRP SERPL QL: 23.6 MG/L
EOSINOPHIL # BLD MANUAL: 0.5 THOUSAND/UL (ref 0–0.4)
EOSINOPHIL NFR BLD MANUAL: 4 % (ref 0–6)
ERYTHROCYTE [DISTWIDTH] IN BLOOD BY AUTOMATED COUNT: 14.8 % (ref 11.6–15.1)
GFR SERPL CREATININE-BSD FRML MDRD: 55 ML/MIN/1.73SQ M
GLUCOSE SERPL-MCNC: 98 MG/DL (ref 65–140)
HCT VFR BLD AUTO: 42.3 % (ref 34.8–46.1)
HGB BLD-MCNC: 13 G/DL (ref 11.5–15.4)
LYMPHOCYTES # BLD AUTO: 2.85 THOUSAND/UL (ref 0.6–4.47)
LYMPHOCYTES # BLD AUTO: 23 % (ref 14–44)
MAGNESIUM SERPL-MCNC: 2.5 MG/DL (ref 1.6–2.6)
MCH RBC QN AUTO: 29.3 PG (ref 26.8–34.3)
MCHC RBC AUTO-ENTMCNC: 30.7 G/DL (ref 31.4–37.4)
MCV RBC AUTO: 95 FL (ref 82–98)
MONOCYTES # BLD AUTO: 1.61 THOUSAND/UL (ref 0–1.22)
MONOCYTES NFR BLD: 13 % (ref 4–12)
MYELOCYTES NFR BLD MANUAL: 1 % (ref 0–1)
NEUTROPHILS # BLD MANUAL: 7.3 THOUSAND/UL (ref 1.85–7.62)
NEUTS BAND NFR BLD MANUAL: 1 % (ref 0–8)
NEUTS SEG NFR BLD AUTO: 58 % (ref 43–75)
PLATELET # BLD AUTO: 318 THOUSANDS/UL (ref 149–390)
PLATELET BLD QL SMEAR: ADEQUATE
PMV BLD AUTO: 9.2 FL (ref 8.9–12.7)
POTASSIUM SERPL-SCNC: 4.1 MMOL/L (ref 3.5–5.3)
RBC # BLD AUTO: 4.44 MILLION/UL (ref 3.81–5.12)
RBC MORPH BLD: NORMAL
SARS-COV-2 RNA RESP QL NAA+PROBE: POSITIVE
SODIUM SERPL-SCNC: 140 MMOL/L (ref 136–145)
WBC # BLD AUTO: 12.38 THOUSAND/UL (ref 4.31–10.16)

## 2021-09-27 PROCEDURE — 85027 COMPLETE CBC AUTOMATED: CPT | Performed by: STUDENT IN AN ORGANIZED HEALTH CARE EDUCATION/TRAINING PROGRAM

## 2021-09-27 PROCEDURE — 80048 BASIC METABOLIC PNL TOTAL CA: CPT | Performed by: STUDENT IN AN ORGANIZED HEALTH CARE EDUCATION/TRAINING PROGRAM

## 2021-09-27 PROCEDURE — 85007 BL SMEAR W/DIFF WBC COUNT: CPT | Performed by: STUDENT IN AN ORGANIZED HEALTH CARE EDUCATION/TRAINING PROGRAM

## 2021-09-27 PROCEDURE — 99232 SBSQ HOSP IP/OBS MODERATE 35: CPT | Performed by: INTERNAL MEDICINE

## 2021-09-27 PROCEDURE — U0005 INFEC AGEN DETEC AMPLI PROBE: HCPCS | Performed by: STUDENT IN AN ORGANIZED HEALTH CARE EDUCATION/TRAINING PROGRAM

## 2021-09-27 PROCEDURE — 83735 ASSAY OF MAGNESIUM: CPT | Performed by: STUDENT IN AN ORGANIZED HEALTH CARE EDUCATION/TRAINING PROGRAM

## 2021-09-27 PROCEDURE — 86140 C-REACTIVE PROTEIN: CPT | Performed by: STUDENT IN AN ORGANIZED HEALTH CARE EDUCATION/TRAINING PROGRAM

## 2021-09-27 PROCEDURE — U0003 INFECTIOUS AGENT DETECTION BY NUCLEIC ACID (DNA OR RNA); SEVERE ACUTE RESPIRATORY SYNDROME CORONAVIRUS 2 (SARS-COV-2) (CORONAVIRUS DISEASE [COVID-19]), AMPLIFIED PROBE TECHNIQUE, MAKING USE OF HIGH THROUGHPUT TECHNOLOGIES AS DESCRIBED BY CMS-2020-01-R: HCPCS | Performed by: STUDENT IN AN ORGANIZED HEALTH CARE EDUCATION/TRAINING PROGRAM

## 2021-09-27 RX ORDER — GUAIFENESIN 600 MG
600 TABLET, EXTENDED RELEASE 12 HR ORAL EVERY 12 HOURS SCHEDULED
Status: DISCONTINUED | OUTPATIENT
Start: 2021-09-27 | End: 2021-10-01 | Stop reason: HOSPADM

## 2021-09-27 RX ORDER — FUROSEMIDE 10 MG/ML
20 INJECTION INTRAMUSCULAR; INTRAVENOUS
Status: COMPLETED | OUTPATIENT
Start: 2021-09-27 | End: 2021-09-27

## 2021-09-27 RX ORDER — FUROSEMIDE 20 MG/1
20 TABLET ORAL DAILY
Status: DISCONTINUED | OUTPATIENT
Start: 2021-09-28 | End: 2021-09-28

## 2021-09-27 RX ADMIN — METOPROLOL TARTRATE 100 MG: 100 TABLET, FILM COATED ORAL at 23:04

## 2021-09-27 RX ADMIN — GUAIFENESIN 600 MG: 600 TABLET ORAL at 11:57

## 2021-09-27 RX ADMIN — METOPROLOL TARTRATE 100 MG: 100 TABLET, FILM COATED ORAL at 10:32

## 2021-09-27 RX ADMIN — OXYBUTYNIN CHLORIDE 5 MG: 5 TABLET, EXTENDED RELEASE ORAL at 10:32

## 2021-09-27 RX ADMIN — APIXABAN 5 MG: 5 TABLET, FILM COATED ORAL at 17:22

## 2021-09-27 RX ADMIN — FUROSEMIDE 20 MG: 10 INJECTION, SOLUTION INTRAVENOUS at 10:32

## 2021-09-27 RX ADMIN — OXYCODONE HYDROCHLORIDE 2.5 MG: 5 TABLET ORAL at 10:53

## 2021-09-27 RX ADMIN — DILTIAZEM HYDROCHLORIDE 120 MG: 120 CAPSULE, COATED, EXTENDED RELEASE ORAL at 10:32

## 2021-09-27 RX ADMIN — GUAIFENESIN 600 MG: 600 TABLET ORAL at 23:04

## 2021-09-27 RX ADMIN — FUROSEMIDE 20 MG: 10 INJECTION, SOLUTION INTRAVENOUS at 16:46

## 2021-09-27 RX ADMIN — APIXABAN 5 MG: 5 TABLET, FILM COATED ORAL at 10:32

## 2021-09-27 RX ADMIN — SENNOSIDES 8.6 MG: 8.6 TABLET ORAL at 10:32

## 2021-09-27 RX ADMIN — EZETIMIBE 10 MG: 10 TABLET ORAL at 10:32

## 2021-09-27 RX ADMIN — LISINOPRIL 5 MG: 5 TABLET ORAL at 10:32

## 2021-09-28 PROBLEM — U07.1 PNEUMONIA DUE TO COVID-19 VIRUS: Status: ACTIVE | Noted: 2021-09-28

## 2021-09-28 PROBLEM — J12.82 PNEUMONIA DUE TO COVID-19 VIRUS: Status: ACTIVE | Noted: 2021-09-28

## 2021-09-28 LAB
ANION GAP SERPL CALCULATED.3IONS-SCNC: 9 MMOL/L (ref 4–13)
BUN SERPL-MCNC: 32 MG/DL (ref 5–25)
CALCIUM SERPL-MCNC: 8.8 MG/DL (ref 8.3–10.1)
CHLORIDE SERPL-SCNC: 97 MMOL/L (ref 100–108)
CK SERPL-CCNC: 17 U/L (ref 26–192)
CO2 SERPL-SCNC: 29 MMOL/L (ref 21–32)
CREAT SERPL-MCNC: 1.48 MG/DL (ref 0.6–1.3)
CRP SERPL QL: 78.1 MG/L
GFR SERPL CREATININE-BSD FRML MDRD: 34 ML/MIN/1.73SQ M
GLUCOSE SERPL-MCNC: 113 MG/DL (ref 65–140)
POTASSIUM SERPL-SCNC: 4 MMOL/L (ref 3.5–5.3)
SODIUM SERPL-SCNC: 135 MMOL/L (ref 136–145)

## 2021-09-28 PROCEDURE — 86140 C-REACTIVE PROTEIN: CPT | Performed by: INTERNAL MEDICINE

## 2021-09-28 PROCEDURE — 82550 ASSAY OF CK (CPK): CPT | Performed by: INTERNAL MEDICINE

## 2021-09-28 PROCEDURE — 99232 SBSQ HOSP IP/OBS MODERATE 35: CPT | Performed by: INTERNAL MEDICINE

## 2021-09-28 PROCEDURE — 80048 BASIC METABOLIC PNL TOTAL CA: CPT | Performed by: INTERNAL MEDICINE

## 2021-09-28 RX ADMIN — GUAIFENESIN 600 MG: 600 TABLET ORAL at 08:47

## 2021-09-28 RX ADMIN — SODIUM CHLORIDE 500 ML: 0.9 INJECTION, SOLUTION INTRAVENOUS at 17:29

## 2021-09-28 RX ADMIN — DICLOFENAC SODIUM 2 G: 10 GEL TOPICAL at 11:47

## 2021-09-28 RX ADMIN — DICLOFENAC SODIUM 2 G: 10 GEL TOPICAL at 23:51

## 2021-09-28 RX ADMIN — OXYCODONE HYDROCHLORIDE 2.5 MG: 5 TABLET ORAL at 15:37

## 2021-09-28 RX ADMIN — OXYCODONE HYDROCHLORIDE 2.5 MG: 5 TABLET ORAL at 06:07

## 2021-09-28 RX ADMIN — APIXABAN 5 MG: 5 TABLET, FILM COATED ORAL at 08:47

## 2021-09-28 RX ADMIN — GUAIFENESIN 600 MG: 600 TABLET ORAL at 23:51

## 2021-09-28 RX ADMIN — OXYBUTYNIN CHLORIDE 5 MG: 5 TABLET, EXTENDED RELEASE ORAL at 08:46

## 2021-09-28 RX ADMIN — EZETIMIBE 10 MG: 10 TABLET ORAL at 08:46

## 2021-09-28 RX ADMIN — APIXABAN 5 MG: 5 TABLET, FILM COATED ORAL at 17:31

## 2021-09-28 RX ADMIN — DICLOFENAC SODIUM 2 G: 10 GEL TOPICAL at 17:29

## 2021-09-28 RX ADMIN — METOPROLOL TARTRATE 100 MG: 100 TABLET, FILM COATED ORAL at 08:46

## 2021-09-28 RX ADMIN — SENNOSIDES 8.6 MG: 8.6 TABLET ORAL at 08:46

## 2021-09-28 RX ADMIN — METOPROLOL TARTRATE 100 MG: 100 TABLET, FILM COATED ORAL at 23:51

## 2021-09-28 RX ADMIN — DILTIAZEM HYDROCHLORIDE 120 MG: 120 CAPSULE, COATED, EXTENDED RELEASE ORAL at 08:46

## 2021-09-28 RX ADMIN — ACETAMINOPHEN 650 MG: 325 TABLET, FILM COATED ORAL at 11:49

## 2021-09-28 RX ADMIN — OXYCODONE HYDROCHLORIDE 2.5 MG: 5 TABLET ORAL at 00:05

## 2021-09-28 RX ADMIN — DICLOFENAC SODIUM 2 G: 10 GEL TOPICAL at 08:47

## 2021-09-29 LAB
ANION GAP SERPL CALCULATED.3IONS-SCNC: 6 MMOL/L (ref 4–13)
BUN SERPL-MCNC: 27 MG/DL (ref 5–25)
CALCIUM SERPL-MCNC: 8.7 MG/DL (ref 8.3–10.1)
CHLORIDE SERPL-SCNC: 98 MMOL/L (ref 100–108)
CO2 SERPL-SCNC: 30 MMOL/L (ref 21–32)
CREAT SERPL-MCNC: 1.09 MG/DL (ref 0.6–1.3)
GFR SERPL CREATININE-BSD FRML MDRD: 49 ML/MIN/1.73SQ M
GLUCOSE SERPL-MCNC: 107 MG/DL (ref 65–140)
POTASSIUM SERPL-SCNC: 3.7 MMOL/L (ref 3.5–5.3)
SODIUM SERPL-SCNC: 134 MMOL/L (ref 136–145)

## 2021-09-29 PROCEDURE — 97535 SELF CARE MNGMENT TRAINING: CPT

## 2021-09-29 PROCEDURE — 80048 BASIC METABOLIC PNL TOTAL CA: CPT | Performed by: INTERNAL MEDICINE

## 2021-09-29 PROCEDURE — 99232 SBSQ HOSP IP/OBS MODERATE 35: CPT | Performed by: INTERNAL MEDICINE

## 2021-09-29 RX ORDER — FUROSEMIDE 20 MG/1
20 TABLET ORAL DAILY
Status: DISCONTINUED | OUTPATIENT
Start: 2021-09-30 | End: 2021-10-01 | Stop reason: HOSPADM

## 2021-09-29 RX ORDER — MUSCLE RUB CREAM 100; 150 MG/G; MG/G
CREAM TOPICAL 4 TIMES DAILY PRN
Status: DISCONTINUED | OUTPATIENT
Start: 2021-09-29 | End: 2021-10-01 | Stop reason: HOSPADM

## 2021-09-29 RX ADMIN — APIXABAN 5 MG: 5 TABLET, FILM COATED ORAL at 18:07

## 2021-09-29 RX ADMIN — EZETIMIBE 10 MG: 10 TABLET ORAL at 08:13

## 2021-09-29 RX ADMIN — OXYBUTYNIN CHLORIDE 5 MG: 5 TABLET, EXTENDED RELEASE ORAL at 08:13

## 2021-09-29 RX ADMIN — ACETAMINOPHEN 650 MG: 325 TABLET, FILM COATED ORAL at 22:23

## 2021-09-29 RX ADMIN — SENNOSIDES 8.6 MG: 8.6 TABLET ORAL at 08:13

## 2021-09-29 RX ADMIN — DICLOFENAC SODIUM 2 G: 10 GEL TOPICAL at 18:07

## 2021-09-29 RX ADMIN — METOPROLOL TARTRATE 100 MG: 100 TABLET, FILM COATED ORAL at 22:32

## 2021-09-29 RX ADMIN — DICLOFENAC SODIUM 2 G: 10 GEL TOPICAL at 08:14

## 2021-09-29 RX ADMIN — DICLOFENAC SODIUM 2 G: 10 GEL TOPICAL at 13:01

## 2021-09-29 RX ADMIN — GUAIFENESIN 600 MG: 600 TABLET ORAL at 22:00

## 2021-09-29 RX ADMIN — APIXABAN 5 MG: 5 TABLET, FILM COATED ORAL at 08:13

## 2021-09-29 RX ADMIN — DICLOFENAC SODIUM 2 G: 10 GEL TOPICAL at 22:23

## 2021-09-29 RX ADMIN — OXYCODONE HYDROCHLORIDE 2.5 MG: 5 TABLET ORAL at 02:20

## 2021-09-29 RX ADMIN — GUAIFENESIN 600 MG: 600 TABLET ORAL at 08:13

## 2021-09-30 LAB
ANION GAP SERPL CALCULATED.3IONS-SCNC: 9 MMOL/L (ref 4–13)
BUN SERPL-MCNC: 14 MG/DL (ref 5–25)
CALCIUM SERPL-MCNC: 9.1 MG/DL (ref 8.3–10.1)
CHLORIDE SERPL-SCNC: 99 MMOL/L (ref 100–108)
CO2 SERPL-SCNC: 29 MMOL/L (ref 21–32)
CREAT SERPL-MCNC: 0.91 MG/DL (ref 0.6–1.3)
GFR SERPL CREATININE-BSD FRML MDRD: 61 ML/MIN/1.73SQ M
GLUCOSE SERPL-MCNC: 115 MG/DL (ref 65–140)
POTASSIUM SERPL-SCNC: 3.7 MMOL/L (ref 3.5–5.3)
SODIUM SERPL-SCNC: 137 MMOL/L (ref 136–145)

## 2021-09-30 PROCEDURE — 97530 THERAPEUTIC ACTIVITIES: CPT

## 2021-09-30 PROCEDURE — 99232 SBSQ HOSP IP/OBS MODERATE 35: CPT | Performed by: INTERNAL MEDICINE

## 2021-09-30 PROCEDURE — 97110 THERAPEUTIC EXERCISES: CPT

## 2021-09-30 PROCEDURE — NC001 PR NO CHARGE: Performed by: PHYSICIAN ASSISTANT

## 2021-09-30 PROCEDURE — 80048 BASIC METABOLIC PNL TOTAL CA: CPT | Performed by: INTERNAL MEDICINE

## 2021-09-30 PROCEDURE — 97535 SELF CARE MNGMENT TRAINING: CPT

## 2021-09-30 RX ADMIN — OXYBUTYNIN CHLORIDE 5 MG: 5 TABLET, EXTENDED RELEASE ORAL at 08:07

## 2021-09-30 RX ADMIN — METOPROLOL TARTRATE 100 MG: 100 TABLET, FILM COATED ORAL at 08:07

## 2021-09-30 RX ADMIN — GUAIFENESIN 600 MG: 600 TABLET ORAL at 08:07

## 2021-09-30 RX ADMIN — DICLOFENAC SODIUM 2 G: 10 GEL TOPICAL at 22:15

## 2021-09-30 RX ADMIN — DICLOFENAC SODIUM 2 G: 10 GEL TOPICAL at 17:46

## 2021-09-30 RX ADMIN — GUAIFENESIN 600 MG: 600 TABLET ORAL at 22:14

## 2021-09-30 RX ADMIN — ACETAMINOPHEN 650 MG: 325 TABLET, FILM COATED ORAL at 22:14

## 2021-09-30 RX ADMIN — APIXABAN 5 MG: 5 TABLET, FILM COATED ORAL at 08:08

## 2021-09-30 RX ADMIN — DICLOFENAC SODIUM 2 G: 10 GEL TOPICAL at 13:26

## 2021-09-30 RX ADMIN — FUROSEMIDE 20 MG: 20 TABLET ORAL at 08:07

## 2021-09-30 RX ADMIN — DICLOFENAC SODIUM 2 G: 10 GEL TOPICAL at 08:07

## 2021-09-30 RX ADMIN — SENNOSIDES 8.6 MG: 8.6 TABLET ORAL at 08:07

## 2021-09-30 RX ADMIN — METOPROLOL TARTRATE 100 MG: 100 TABLET, FILM COATED ORAL at 22:13

## 2021-09-30 RX ADMIN — EZETIMIBE 10 MG: 10 TABLET ORAL at 08:07

## 2021-09-30 RX ADMIN — APIXABAN 5 MG: 5 TABLET, FILM COATED ORAL at 17:46

## 2021-09-30 RX ADMIN — DILTIAZEM HYDROCHLORIDE 120 MG: 120 CAPSULE, COATED, EXTENDED RELEASE ORAL at 08:07

## 2021-10-01 VITALS
HEART RATE: 69 BPM | WEIGHT: 236.77 LBS | RESPIRATION RATE: 16 BRPM | SYSTOLIC BLOOD PRESSURE: 127 MMHG | OXYGEN SATURATION: 96 % | HEIGHT: 64 IN | BODY MASS INDEX: 40.42 KG/M2 | TEMPERATURE: 97.8 F | DIASTOLIC BLOOD PRESSURE: 63 MMHG

## 2021-10-01 PROCEDURE — 99239 HOSP IP/OBS DSCHRG MGMT >30: CPT | Performed by: INTERNAL MEDICINE

## 2021-10-01 RX ORDER — FUROSEMIDE 20 MG/1
20 TABLET ORAL DAILY
Qty: 30 TABLET | Refills: 0
Start: 2021-10-02 | End: 2021-11-15 | Stop reason: SDUPTHER

## 2021-10-01 RX ORDER — METOPROLOL TARTRATE 100 MG/1
100 TABLET ORAL EVERY 12 HOURS SCHEDULED
Qty: 90 TABLET
Start: 2021-10-01 | End: 2021-11-24 | Stop reason: SDUPTHER

## 2021-10-01 RX ORDER — DILTIAZEM HYDROCHLORIDE 120 MG/1
120 CAPSULE, COATED, EXTENDED RELEASE ORAL DAILY
Qty: 30 CAPSULE | Refills: 0
Start: 2021-10-02 | End: 2021-11-24 | Stop reason: ALTCHOICE

## 2021-10-01 RX ADMIN — OXYCODONE HYDROCHLORIDE 2.5 MG: 5 TABLET ORAL at 09:06

## 2021-10-01 RX ADMIN — OXYBUTYNIN CHLORIDE 5 MG: 5 TABLET, EXTENDED RELEASE ORAL at 08:59

## 2021-10-01 RX ADMIN — DICLOFENAC SODIUM 2 G: 10 GEL TOPICAL at 09:08

## 2021-10-01 RX ADMIN — DICLOFENAC SODIUM 2 G: 10 GEL TOPICAL at 12:58

## 2021-10-01 RX ADMIN — EZETIMIBE 10 MG: 10 TABLET ORAL at 08:58

## 2021-10-01 RX ADMIN — GUAIFENESIN 600 MG: 600 TABLET ORAL at 08:58

## 2021-10-01 RX ADMIN — FUROSEMIDE 20 MG: 20 TABLET ORAL at 08:58

## 2021-10-01 RX ADMIN — APIXABAN 5 MG: 5 TABLET, FILM COATED ORAL at 08:59

## 2021-10-01 RX ADMIN — METOPROLOL TARTRATE 100 MG: 100 TABLET, FILM COATED ORAL at 09:04

## 2021-10-01 RX ADMIN — DILTIAZEM HYDROCHLORIDE 120 MG: 120 CAPSULE, COATED, EXTENDED RELEASE ORAL at 08:59

## 2021-10-01 RX ADMIN — SENNOSIDES 8.6 MG: 8.6 TABLET ORAL at 08:58

## 2021-10-05 ENCOUNTER — TELEPHONE (OUTPATIENT)
Dept: OBGYN CLINIC | Facility: HOSPITAL | Age: 76
End: 2021-10-05

## 2021-10-13 ENCOUNTER — TELEPHONE (OUTPATIENT)
Dept: INTERNAL MEDICINE CLINIC | Facility: CLINIC | Age: 76
End: 2021-10-13

## 2021-10-14 ENCOUNTER — PATIENT OUTREACH (OUTPATIENT)
Dept: CASE MANAGEMENT | Facility: OTHER | Age: 76
End: 2021-10-14

## 2021-10-20 ENCOUNTER — PATIENT OUTREACH (OUTPATIENT)
Dept: CASE MANAGEMENT | Facility: OTHER | Age: 76
End: 2021-10-20

## 2021-10-21 ENCOUNTER — PATIENT OUTREACH (OUTPATIENT)
Dept: CASE MANAGEMENT | Facility: OTHER | Age: 76
End: 2021-10-21

## 2021-10-22 ENCOUNTER — TELEPHONE (OUTPATIENT)
Dept: OBGYN CLINIC | Facility: HOSPITAL | Age: 76
End: 2021-10-22

## 2021-10-25 ENCOUNTER — TELEPHONE (OUTPATIENT)
Dept: OBGYN CLINIC | Facility: HOSPITAL | Age: 76
End: 2021-10-25

## 2021-10-26 ENCOUNTER — OFFICE VISIT (OUTPATIENT)
Dept: OBGYN CLINIC | Facility: HOSPITAL | Age: 76
End: 2021-10-26

## 2021-10-26 ENCOUNTER — TELEPHONE (OUTPATIENT)
Dept: PAIN MEDICINE | Facility: CLINIC | Age: 76
End: 2021-10-26

## 2021-10-26 ENCOUNTER — HOSPITAL ENCOUNTER (OUTPATIENT)
Dept: RADIOLOGY | Facility: HOSPITAL | Age: 76
Discharge: HOME | End: 2021-10-26
Attending: ORTHOPAEDIC SURGERY
Payer: COMMERCIAL

## 2021-10-26 VITALS — HEART RATE: 70 BPM | SYSTOLIC BLOOD PRESSURE: 121 MMHG | DIASTOLIC BLOOD PRESSURE: 66 MMHG

## 2021-10-26 DIAGNOSIS — M17.12 PRIMARY OSTEOARTHRITIS OF LEFT KNEE: ICD-10-CM

## 2021-10-26 DIAGNOSIS — Z98.890 STATUS POST SURGERY: Primary | ICD-10-CM

## 2021-10-26 DIAGNOSIS — Z98.890 STATUS POST SURGERY: ICD-10-CM

## 2021-10-26 PROCEDURE — 73552 X-RAY EXAM OF FEMUR 2/>: CPT

## 2021-10-26 PROCEDURE — 99024 POSTOP FOLLOW-UP VISIT: CPT | Performed by: ORTHOPAEDIC SURGERY

## 2021-10-26 RX ORDER — LISINOPRIL 2.5 MG/1
2.5 TABLET ORAL DAILY
COMMUNITY
End: 2021-11-24 | Stop reason: SDUPTHER

## 2021-10-26 RX ORDER — OXYBUTYNIN CHLORIDE 5 MG/1
5 TABLET, EXTENDED RELEASE ORAL DAILY
COMMUNITY
End: 2021-12-07 | Stop reason: SDUPTHER

## 2021-10-26 RX ORDER — PHENOL 1.4 %
AEROSOL, SPRAY (ML) MUCOUS MEMBRANE
COMMUNITY

## 2021-10-27 ENCOUNTER — PATIENT OUTREACH (OUTPATIENT)
Dept: CASE MANAGEMENT | Facility: HOSPITAL | Age: 76
End: 2021-10-27

## 2021-10-28 ENCOUNTER — TELEPHONE (OUTPATIENT)
Dept: PAIN MEDICINE | Facility: CLINIC | Age: 76
End: 2021-10-28

## 2021-11-02 ENCOUNTER — PATIENT OUTREACH (OUTPATIENT)
Dept: FAMILY MEDICINE CLINIC | Facility: HOSPITAL | Age: 76
End: 2021-11-02

## 2021-11-09 ENCOUNTER — PATIENT OUTREACH (OUTPATIENT)
Dept: CASE MANAGEMENT | Facility: HOSPITAL | Age: 76
End: 2021-11-09

## 2021-11-15 ENCOUNTER — TELEPHONE (OUTPATIENT)
Dept: CARDIOLOGY CLINIC | Facility: CLINIC | Age: 76
End: 2021-11-15

## 2021-11-15 ENCOUNTER — PATIENT OUTREACH (OUTPATIENT)
Dept: CASE MANAGEMENT | Facility: HOSPITAL | Age: 76
End: 2021-11-15

## 2021-11-15 DIAGNOSIS — R06.02 SOB (SHORTNESS OF BREATH): ICD-10-CM

## 2021-11-15 DIAGNOSIS — Z71.89 COMPLEX CARE COORDINATION: Primary | ICD-10-CM

## 2021-11-15 DIAGNOSIS — N17.9 AKI (ACUTE KIDNEY INJURY) (HCC): ICD-10-CM

## 2021-11-15 RX ORDER — FUROSEMIDE 20 MG/1
20 TABLET ORAL DAILY
Qty: 14 TABLET | Refills: 0 | Status: SHIPPED | OUTPATIENT
Start: 2021-11-15 | End: 2021-11-24 | Stop reason: ALTCHOICE

## 2021-11-16 ENCOUNTER — PATIENT OUTREACH (OUTPATIENT)
Dept: CASE MANAGEMENT | Facility: HOSPITAL | Age: 76
End: 2021-11-16

## 2021-11-16 ENCOUNTER — EPISODE CHANGES (OUTPATIENT)
Dept: CASE MANAGEMENT | Facility: HOSPITAL | Age: 76
End: 2021-11-16

## 2021-11-16 DIAGNOSIS — I50.32 CHRONIC DIASTOLIC HEART FAILURE (HCC): Primary | ICD-10-CM

## 2021-11-19 ENCOUNTER — TELEPHONE (OUTPATIENT)
Dept: INTERNAL MEDICINE CLINIC | Facility: CLINIC | Age: 76
End: 2021-11-19

## 2021-11-21 PROBLEM — I48.91 ATRIAL FIBRILLATION WITH RVR (HCC): Status: RESOLVED | Noted: 2018-02-15 | Resolved: 2021-11-21

## 2021-11-21 PROBLEM — I50.33 ACUTE ON CHRONIC DIASTOLIC HEART FAILURE (HCC): Status: RESOLVED | Noted: 2021-09-20 | Resolved: 2021-11-21

## 2021-11-23 ENCOUNTER — PROCEDURE VISIT (OUTPATIENT)
Dept: OBGYN CLINIC | Facility: HOSPITAL | Age: 76
End: 2021-11-23
Payer: MEDICARE

## 2021-11-23 ENCOUNTER — PATIENT OUTREACH (OUTPATIENT)
Dept: CASE MANAGEMENT | Facility: HOSPITAL | Age: 76
End: 2021-11-23

## 2021-11-23 VITALS
SYSTOLIC BLOOD PRESSURE: 112 MMHG | BODY MASS INDEX: 40.27 KG/M2 | WEIGHT: 235.89 LBS | HEART RATE: 73 BPM | DIASTOLIC BLOOD PRESSURE: 66 MMHG | HEIGHT: 64 IN

## 2021-11-23 DIAGNOSIS — M17.12 PRIMARY OSTEOARTHRITIS OF LEFT KNEE: Primary | ICD-10-CM

## 2021-11-23 PROCEDURE — 20610 DRAIN/INJ JOINT/BURSA W/O US: CPT | Performed by: PHYSICIAN ASSISTANT

## 2021-11-23 RX ORDER — LIDOCAINE HYDROCHLORIDE 10 MG/ML
2 INJECTION, SOLUTION INFILTRATION; PERINEURAL
Status: COMPLETED | OUTPATIENT
Start: 2021-11-23 | End: 2021-11-23

## 2021-11-23 RX ADMIN — LIDOCAINE HYDROCHLORIDE 2 ML: 10 INJECTION, SOLUTION INFILTRATION; PERINEURAL at 11:21

## 2021-11-24 ENCOUNTER — OFFICE VISIT (OUTPATIENT)
Dept: CARDIOLOGY CLINIC | Facility: CLINIC | Age: 76
End: 2021-11-24
Payer: MEDICARE

## 2021-11-24 VITALS
WEIGHT: 236.6 LBS | BODY MASS INDEX: 40.39 KG/M2 | OXYGEN SATURATION: 99 % | DIASTOLIC BLOOD PRESSURE: 58 MMHG | HEART RATE: 76 BPM | HEIGHT: 64 IN | SYSTOLIC BLOOD PRESSURE: 132 MMHG

## 2021-11-24 DIAGNOSIS — E78.2 MIXED HYPERLIPIDEMIA: ICD-10-CM

## 2021-11-24 DIAGNOSIS — Z09 HOSPITAL DISCHARGE FOLLOW-UP: Primary | ICD-10-CM

## 2021-11-24 DIAGNOSIS — I49.5 SICK SINUS SYNDROME (HCC): ICD-10-CM

## 2021-11-24 DIAGNOSIS — R26.2 AMBULATORY DYSFUNCTION: ICD-10-CM

## 2021-11-24 DIAGNOSIS — I10 ESSENTIAL HYPERTENSION: ICD-10-CM

## 2021-11-24 DIAGNOSIS — E78.5 HYPERLIPIDEMIA, UNSPECIFIED HYPERLIPIDEMIA TYPE: ICD-10-CM

## 2021-11-24 DIAGNOSIS — E66.01 MORBID OBESITY WITH BODY MASS INDEX (BMI) OF 40.0 TO 49.9 (HCC): Chronic | ICD-10-CM

## 2021-11-24 DIAGNOSIS — I48.91 ATRIAL FIBRILLATION, UNSPECIFIED TYPE (HCC): ICD-10-CM

## 2021-11-24 DIAGNOSIS — I48.0 AF (PAROXYSMAL ATRIAL FIBRILLATION) (HCC): ICD-10-CM

## 2021-11-24 DIAGNOSIS — I48.91 ATRIAL FIBRILLATION WITH RVR (HCC): ICD-10-CM

## 2021-11-24 DIAGNOSIS — I50.32 CHRONIC HEART FAILURE WITH PRESERVED EJECTION FRACTION (HCC): ICD-10-CM

## 2021-11-24 PROCEDURE — 99215 OFFICE O/P EST HI 40 MIN: CPT | Performed by: NURSE PRACTITIONER

## 2021-11-24 RX ORDER — EZETIMIBE 10 MG/1
10 TABLET ORAL DAILY
Qty: 90 TABLET | Refills: 1 | Status: SHIPPED | OUTPATIENT
Start: 2021-11-24 | End: 2022-02-28

## 2021-11-24 RX ORDER — LISINOPRIL 2.5 MG/1
2.5 TABLET ORAL DAILY
Qty: 90 TABLET | Refills: 1 | Status: SHIPPED | OUTPATIENT
Start: 2021-11-24 | End: 2022-06-08 | Stop reason: SDUPTHER

## 2021-11-24 RX ORDER — FUROSEMIDE 40 MG/1
40 TABLET ORAL DAILY
Qty: 90 TABLET | Refills: 1 | Status: SHIPPED | OUTPATIENT
Start: 2021-11-24 | End: 2022-05-20 | Stop reason: SDUPTHER

## 2021-11-24 RX ORDER — METOPROLOL TARTRATE 100 MG/1
100 TABLET ORAL EVERY 12 HOURS SCHEDULED
Qty: 90 TABLET
Start: 2021-11-24 | End: 2022-01-11

## 2021-11-26 ENCOUNTER — TELEPHONE (OUTPATIENT)
Dept: INTERNAL MEDICINE CLINIC | Facility: CLINIC | Age: 76
End: 2021-11-26

## 2021-11-29 ENCOUNTER — TELEPHONE (OUTPATIENT)
Dept: OBGYN CLINIC | Facility: HOSPITAL | Age: 76
End: 2021-11-29

## 2021-12-01 ENCOUNTER — REMOTE DEVICE CLINIC VISIT (OUTPATIENT)
Dept: CARDIOLOGY CLINIC | Facility: CLINIC | Age: 76
End: 2021-12-01
Payer: MEDICARE

## 2021-12-01 ENCOUNTER — PATIENT OUTREACH (OUTPATIENT)
Dept: CASE MANAGEMENT | Facility: HOSPITAL | Age: 76
End: 2021-12-01

## 2021-12-01 DIAGNOSIS — Z95.0 PRESENCE OF PERMANENT CARDIAC PACEMAKER: Primary | ICD-10-CM

## 2021-12-01 PROCEDURE — 93294 REM INTERROG EVL PM/LDLS PM: CPT | Performed by: INTERNAL MEDICINE

## 2021-12-01 PROCEDURE — 93296 REM INTERROG EVL PM/IDS: CPT | Performed by: INTERNAL MEDICINE

## 2021-12-06 ENCOUNTER — TELEPHONE (OUTPATIENT)
Dept: INTERNAL MEDICINE CLINIC | Facility: CLINIC | Age: 76
End: 2021-12-06

## 2021-12-06 ENCOUNTER — TELEPHONE (OUTPATIENT)
Dept: CARDIOLOGY CLINIC | Facility: CLINIC | Age: 76
End: 2021-12-06

## 2021-12-06 ENCOUNTER — HOSPITAL ENCOUNTER (OUTPATIENT)
Dept: RADIOLOGY | Facility: CLINIC | Age: 76
Discharge: HOME | End: 2021-12-06
Attending: PHYSICAL MEDICINE & REHABILITATION | Admitting: PHYSICAL MEDICINE & REHABILITATION
Payer: MEDICARE

## 2021-12-06 VITALS
RESPIRATION RATE: 20 BRPM | TEMPERATURE: 96.5 F | HEART RATE: 77 BPM | SYSTOLIC BLOOD PRESSURE: 132 MMHG | DIASTOLIC BLOOD PRESSURE: 70 MMHG | OXYGEN SATURATION: 96 %

## 2021-12-06 DIAGNOSIS — M25.562 LEFT KNEE PAIN: ICD-10-CM

## 2021-12-06 DIAGNOSIS — I48.91 ATRIAL FIBRILLATION, UNSPECIFIED TYPE (HCC): ICD-10-CM

## 2021-12-06 PROCEDURE — 64454 NJX AA&/STRD GNCLR NRV BRNCH: CPT | Performed by: PHYSICAL MEDICINE & REHABILITATION

## 2021-12-06 RX ORDER — BUPIVACAINE HCL/PF 2.5 MG/ML
10 VIAL (ML) INJECTION ONCE
Status: COMPLETED | OUTPATIENT
Start: 2021-12-06 | End: 2021-12-06

## 2021-12-06 RX ORDER — 0.9 % SODIUM CHLORIDE 0.9 %
10 VIAL (ML) INJECTION ONCE
Status: COMPLETED | OUTPATIENT
Start: 2021-12-06 | End: 2021-12-06

## 2021-12-06 RX ADMIN — Medication 1 ML: at 08:50

## 2021-12-06 RX ADMIN — BUPIVACAINE HYDROCHLORIDE 3 ML: 2.5 INJECTION, SOLUTION EPIDURAL; INFILTRATION; INTRACAUDAL at 08:52

## 2021-12-06 RX ADMIN — SODIUM CHLORIDE 1 ML: 9 INJECTION, SOLUTION INTRAMUSCULAR; INTRAVENOUS; SUBCUTANEOUS at 08:50

## 2021-12-07 ENCOUNTER — OFFICE VISIT (OUTPATIENT)
Dept: INTERNAL MEDICINE CLINIC | Facility: CLINIC | Age: 76
End: 2021-12-07
Payer: MEDICARE

## 2021-12-07 VITALS
DIASTOLIC BLOOD PRESSURE: 58 MMHG | TEMPERATURE: 96.9 F | SYSTOLIC BLOOD PRESSURE: 132 MMHG | HEART RATE: 77 BPM | BODY MASS INDEX: 39.91 KG/M2 | WEIGHT: 233.8 LBS | HEIGHT: 64 IN | OXYGEN SATURATION: 99 %

## 2021-12-07 DIAGNOSIS — M1A.9XX1 CHRONIC GOUT WITH TOPHUS, UNSPECIFIED CAUSE, UNSPECIFIED SITE: ICD-10-CM

## 2021-12-07 DIAGNOSIS — M19.90 ARTHRITIS: ICD-10-CM

## 2021-12-07 DIAGNOSIS — R32 URINARY INCONTINENCE, UNSPECIFIED TYPE: ICD-10-CM

## 2021-12-07 DIAGNOSIS — S72.352A CLOSED DISPLACED COMMINUTED FRACTURE OF SHAFT OF LEFT FEMUR, INITIAL ENCOUNTER (HCC): ICD-10-CM

## 2021-12-07 DIAGNOSIS — Z11.59 NEED FOR HEPATITIS C SCREENING TEST: ICD-10-CM

## 2021-12-07 DIAGNOSIS — F32.1 CURRENT MODERATE EPISODE OF MAJOR DEPRESSIVE DISORDER, UNSPECIFIED WHETHER RECURRENT (HCC): ICD-10-CM

## 2021-12-07 DIAGNOSIS — I10 ESSENTIAL HYPERTENSION: Primary | ICD-10-CM

## 2021-12-07 DIAGNOSIS — N39.41 URGE INCONTINENCE OF URINE: ICD-10-CM

## 2021-12-07 PROBLEM — J12.82 PNEUMONIA DUE TO COVID-19 VIRUS: Status: RESOLVED | Noted: 2021-09-28 | Resolved: 2021-12-07

## 2021-12-07 PROBLEM — J96.01 ACUTE RESPIRATORY FAILURE WITH HYPOXIA (HCC): Status: RESOLVED | Noted: 2021-09-20 | Resolved: 2021-12-07

## 2021-12-07 PROBLEM — U07.1 PNEUMONIA DUE TO COVID-19 VIRUS: Status: RESOLVED | Noted: 2021-09-28 | Resolved: 2021-12-07

## 2021-12-07 PROCEDURE — 99215 OFFICE O/P EST HI 40 MIN: CPT | Performed by: INTERNAL MEDICINE

## 2021-12-07 RX ORDER — OXYBUTYNIN CHLORIDE 5 MG/1
5 TABLET, EXTENDED RELEASE ORAL DAILY
Qty: 90 TABLET | Refills: 3 | Status: SHIPPED | OUTPATIENT
Start: 2021-12-07

## 2021-12-07 RX ORDER — ALLOPURINOL 100 MG/1
TABLET ORAL
COMMUNITY
Start: 2021-11-11 | End: 2021-12-07 | Stop reason: SDUPTHER

## 2021-12-07 RX ORDER — MIRABEGRON 25 MG/1
25 TABLET, FILM COATED, EXTENDED RELEASE ORAL DAILY
Qty: 90 TABLET | Refills: 3 | Status: SHIPPED | OUTPATIENT
Start: 2021-12-07

## 2021-12-07 RX ORDER — MELOXICAM 15 MG/1
15 TABLET ORAL DAILY
Qty: 30 TABLET | Refills: 1 | Status: SHIPPED | OUTPATIENT
Start: 2021-12-07 | End: 2021-12-08

## 2021-12-07 RX ORDER — ALLOPURINOL 100 MG/1
100 TABLET ORAL DAILY
Qty: 90 TABLET | Refills: 3 | Status: SHIPPED | OUTPATIENT
Start: 2021-12-07

## 2021-12-07 RX ORDER — DULOXETIN HYDROCHLORIDE 20 MG/1
20 CAPSULE, DELAYED RELEASE ORAL DAILY
Qty: 30 CAPSULE | Refills: 3 | Status: SHIPPED | OUTPATIENT
Start: 2021-12-07 | End: 2022-01-11 | Stop reason: SDUPTHER

## 2021-12-08 ENCOUNTER — TELEPHONE (OUTPATIENT)
Dept: OTHER | Facility: OTHER | Age: 76
End: 2021-12-08

## 2021-12-08 RX ORDER — MELOXICAM 15 MG/1
TABLET ORAL
Qty: 90 TABLET | Refills: 3 | Status: SHIPPED | OUTPATIENT
Start: 2021-12-08

## 2021-12-09 ENCOUNTER — TELEPHONE (OUTPATIENT)
Dept: PAIN MEDICINE | Facility: CLINIC | Age: 76
End: 2021-12-09

## 2021-12-16 ENCOUNTER — PATIENT OUTREACH (OUTPATIENT)
Dept: CASE MANAGEMENT | Facility: HOSPITAL | Age: 76
End: 2021-12-16

## 2021-12-20 ENCOUNTER — IN-CLINIC DEVICE VISIT (OUTPATIENT)
Dept: CARDIOLOGY CLINIC | Facility: CLINIC | Age: 76
End: 2021-12-20

## 2021-12-20 ENCOUNTER — OFFICE VISIT (OUTPATIENT)
Dept: CARDIOLOGY CLINIC | Facility: CLINIC | Age: 76
End: 2021-12-20
Payer: MEDICARE

## 2021-12-20 VITALS
OXYGEN SATURATION: 96 % | HEART RATE: 73 BPM | WEIGHT: 233 LBS | BODY MASS INDEX: 39.78 KG/M2 | SYSTOLIC BLOOD PRESSURE: 114 MMHG | DIASTOLIC BLOOD PRESSURE: 62 MMHG | HEIGHT: 64 IN

## 2021-12-20 DIAGNOSIS — I48.92 ATRIAL FLUTTER, UNSPECIFIED TYPE (HCC): ICD-10-CM

## 2021-12-20 DIAGNOSIS — Z95.0 PACEMAKER: ICD-10-CM

## 2021-12-20 DIAGNOSIS — I10 ESSENTIAL HYPERTENSION: ICD-10-CM

## 2021-12-20 DIAGNOSIS — I50.32 CHRONIC DIASTOLIC HF (HEART FAILURE) (HCC): Primary | ICD-10-CM

## 2021-12-20 DIAGNOSIS — Z95.0 PRESENCE OF PERMANENT CARDIAC PACEMAKER: Primary | ICD-10-CM

## 2021-12-20 DIAGNOSIS — E78.2 MIXED HYPERLIPIDEMIA: ICD-10-CM

## 2021-12-20 PROCEDURE — 93280 PM DEVICE PROGR EVAL DUAL: CPT | Performed by: INTERNAL MEDICINE

## 2021-12-20 PROCEDURE — 99214 OFFICE O/P EST MOD 30 MIN: CPT | Performed by: NURSE PRACTITIONER

## 2021-12-20 PROCEDURE — 93000 ELECTROCARDIOGRAM COMPLETE: CPT | Performed by: NURSE PRACTITIONER

## 2021-12-20 RX ORDER — OXYCODONE AND ACETAMINOPHEN 2.5; 325 MG/1; MG/1
1 TABLET ORAL EVERY 4 HOURS PRN
COMMUNITY
End: 2022-06-01

## 2021-12-23 ENCOUNTER — PATIENT OUTREACH (OUTPATIENT)
Dept: CASE MANAGEMENT | Facility: HOSPITAL | Age: 76
End: 2021-12-23

## 2021-12-28 DIAGNOSIS — I48.0 AF (PAROXYSMAL ATRIAL FIBRILLATION) (HCC): ICD-10-CM

## 2021-12-28 RX ORDER — METOPROLOL TARTRATE 50 MG/1
TABLET, FILM COATED ORAL
Qty: 270 TABLET | Refills: 3 | Status: SHIPPED | OUTPATIENT
Start: 2021-12-28 | End: 2022-06-01

## 2022-01-03 DIAGNOSIS — I48.0 AF (PAROXYSMAL ATRIAL FIBRILLATION) (HCC): ICD-10-CM

## 2022-01-03 DIAGNOSIS — I48.91 ATRIAL FIBRILLATION, UNSPECIFIED TYPE (HCC): ICD-10-CM

## 2022-01-05 ENCOUNTER — HOSPITAL ENCOUNTER (OUTPATIENT)
Dept: RADIOLOGY | Facility: CLINIC | Age: 77
Discharge: HOME | End: 2022-01-05
Attending: PHYSICAL MEDICINE & REHABILITATION | Admitting: PHYSICAL MEDICINE & REHABILITATION
Payer: MEDICARE

## 2022-01-05 ENCOUNTER — TELEPHONE (OUTPATIENT)
Dept: RADIOLOGY | Facility: CLINIC | Age: 77
End: 2022-01-05

## 2022-01-05 VITALS
RESPIRATION RATE: 20 BRPM | SYSTOLIC BLOOD PRESSURE: 144 MMHG | TEMPERATURE: 98.7 F | OXYGEN SATURATION: 94 % | HEART RATE: 72 BPM | DIASTOLIC BLOOD PRESSURE: 82 MMHG

## 2022-01-05 DIAGNOSIS — M25.562 LEFT KNEE PAIN: ICD-10-CM

## 2022-01-05 PROCEDURE — 64624 DSTRJ NULYT AGT GNCLR NRV: CPT | Performed by: PHYSICAL MEDICINE & REHABILITATION

## 2022-01-05 RX ORDER — METHYLPREDNISOLONE ACETATE 40 MG/ML
40 INJECTION, SUSPENSION INTRA-ARTICULAR; INTRALESIONAL; INTRAMUSCULAR; PARENTERAL; SOFT TISSUE ONCE
Status: DISCONTINUED | OUTPATIENT
Start: 2022-01-05 | End: 2022-01-05

## 2022-01-05 RX ORDER — BUPIVACAINE HCL/PF 2.5 MG/ML
5 VIAL (ML) INJECTION ONCE
Status: COMPLETED | OUTPATIENT
Start: 2022-01-05 | End: 2022-01-05

## 2022-01-05 RX ORDER — 0.9 % SODIUM CHLORIDE 0.9 %
10 VIAL (ML) INJECTION ONCE
Status: COMPLETED | OUTPATIENT
Start: 2022-01-05 | End: 2022-01-05

## 2022-01-05 RX ADMIN — SODIUM CHLORIDE 3 ML: 9 INJECTION, SOLUTION INTRAMUSCULAR; INTRAVENOUS; SUBCUTANEOUS at 11:25

## 2022-01-05 RX ADMIN — Medication 8 ML: at 11:25

## 2022-01-05 RX ADMIN — BUPIVACAINE HYDROCHLORIDE 3 ML: 2.5 INJECTION, SOLUTION EPIDURAL; INFILTRATION; INTRACAUDAL at 11:39

## 2022-01-05 NOTE — H&P
History of Present Illness: The patient is a 64 y o  female who presents with complaints of left knee pain    Patient Active Problem List   Diagnosis    Tachy-smita syndrome ([facility] [address] )    Essential hypertension    Pacemaker    Chronic bilateral low back pain without sciatica    Chronic GERD    Degenerative lumbar spinal stenosis    Fibromyalgia    GERD (gastroesophageal reflux disease)    Low back pain    Lumbar degenerative disc disease    Mild carpal tunnel syndrome, right    Overweight    Psoriasis    Thyroid nodule    Urinary incontinence    Other insomnia    Anxiety    Tremor    Chronic pain of left knee    Age related osteoporosis    Dysphonia    Vitamin D insufficiency    Hyperlipidemia    Arthritis    Other fatigue    [facility] fracture ([facility])    Leukocytosis    MICHAEL (acute kidney injury) ([facility] [address] )    Ambulatory dysfunction    Sick sinus syndrome ([facility])    Reactive airway disease with acute exacerbation    Morbid obesity with body mass index (BMI) of 40 0 to 49 9 ([facility])    Chronic gout with tophus    Current moderate episode of major depressive disorder ([facility] [address] )       Past Medical History:   Diagnosis Date    Abnormal ECG     Last Assessed 9/29/2016    Anxiety     Last Assessed 6/08/2016    Arthritis     knees    Asthma     Last Assessed 11/06/2013    Atrial premature complex     Cataract, bilateral     Last Assessed 7/14/2016    Cataract, left eye     Both eyes  Had surgery on left      Difficulty swallowing     Dizziness     Fibromyalgia     Gastric reflux     Hypertension     Lyme disease     Premature ventricular contraction     Primary osteoarthritis of both knees     Last Assessed 7/14/2016    Rheumatic fever     Sore throat     Tuberculosis     Tuberculosis 1945       Past Surgical History:   Procedure Laterality Date    APPENDECTOMY      CARDIAC PACEMAKER PLACEMENT  2019    HYSTERECTOMY      ORIF FEMUR FRACTURE Left 8/5/2021    Procedure: OPEN REDUCTION W/ INTERNAL FIXATION (ORIF) DISTAL FEMUR; INSERTION RETROGRADE NAIL;  Surgeon: Shar Allen MD;  [facility];  Service: Orthopedics    UT DILATE ESOPHAGUS N/A 4/6/2016    Procedure: DILATATION ESOPHAGEAL;  Surgeon: Marylou Torrez MD;  [facility]; Service: Gastroenterology    UT EGD TRANSORAL BIOPSY SINGLE/MULTIPLE N/A 4/6/2016    Procedure: ESOPHAGOGASTRODUODENOSCOPY (EGD); Surgeon: Marylou Torrez MD;  [facility]; Service: Gastroenterology    UT XCAPSL CTRC RMVL INSJ IO LENS PROSTH W/O ECP Left 3/15/2016    Procedure: EXTRACTION EXTRACAPSULAR CATARACT PHACO INTRAOCULAR LENS (IOL);   Surgeon: Renetta Ovalle MD;  [facility];  Service: Ophthalmology    REPLACEMENT TOTAL KNEE Right     REPLACEMENT TOTAL KNEE      right     TONSILLECTOMY           Current Outpatient Medications:     albuterol (PROVENTIL HFA,VENTOLIN HFA) 90 mcg/act inhaler, INHALE 2 PUFFS BY MOUTH EVERY 6 HOURS AS NEEDED FOR WHEEZING, Disp: 3 Inhaler, Rfl: 0    allopurinol (ZYLOPRIM) 100 mg tablet, Take 1 tablet (100 mg total) by mouth daily, Disp: 90 tablet, Rfl: 3    apixaban (Eliquis) 5 mg, Take 1 tablet (5 mg total) by mouth 2 (two) times a day, Disp: 180 tablet, Rfl: 1    diltiazem (CARDIZEM CD) 120 mg 24 hr capsule, Take 1 capsule (120 mg total) by mouth daily, Disp: 30 capsule, Rfl: 3    DULoxetine (Cymbalta) 20 mg capsule, Take 1 capsule (20 mg total) by mouth daily, Disp: 30 capsule, Rfl: 3    ezetimibe (ZETIA) 10 mg tablet, Take 1 tablet (10 mg total) by mouth daily, Disp: 90 tablet, Rfl: 1    furosemide (LASIX) 40 mg tablet, Take 1 tablet (40 mg total) by mouth daily, Disp: 90 tablet, Rfl: 1    lisinopril (ZESTRIL) 2 5 mg tablet, Take 1 tablet (2 5 mg total) by mouth daily, Disp: 90 tablet, Rfl: 1    Melatonin 10 MG TABS, Take by mouth, Disp: , Rfl:     meloxicam (MOBIC) 15 mg tablet, TAKE 1 TABLET(15 MG) BY MOUTH DAILY, Disp: 90 tablet, Rfl: 3    metoprolol tartrate (LOPRESSOR) 100 mg tablet, Take 1 tablet (100 mg total) by mouth every 12 (twelve) hours, Disp: 90 tablet, Rfl:     metoprolol tartrate (LOPRESSOR) 50 mg tablet, TAKE 1 TABLET(50 MG) BY MOUTH THREE TIMES DAILY, Disp: 270 tablet, Rfl: 3    Mirabegron ER (Myrbetriq) 25 MG TB24, Take 25 mg by mouth daily, Disp: 90 tablet, Rfl: 3    oxybutynin (DITROPAN-XL) 5 mg 24 hr tablet, Take 1 tablet (5 mg total) by mouth daily, Disp: 90 tablet, Rfl: 3    oxyCODONE-acetaminophen (PERCOCET) 2 5-325 MG per tablet, Take 1 tablet by mouth every 4 (four) hours as needed for moderate pain, Disp: , Rfl:     Current Facility-Administered Medications:     bupivacaine (PF) (MARCAINE) 0 25 % injection 5 mL, 5 mL, Perineural, Once, [facility], DO    lidocaine (PF) (XYLOCAINE-MPF) 2 % injection 10 mL, 10 mL, Perineural, Once, [facility], DO    methylPREDNISolone acetate (DEPO-MEDROL) injection 40 mg, 40 mg, Perineural, Once, [facility], DO    sodium chloride (PF) 0 9 % injection 10 mL, 10 mL, Infiltration, Once, [facility], DO    Allergies   Allergen Reactions    B30 Folate [Folic Acid-Vit Q1-KOE J71 - Food Allergy]     Cobalt      Unknown    Lyrica [Pregabalin]     Nickel      Skin discoloration    Other      Black rubber    Penicillins Hives    Sulfa Antibiotics Itching    Cortisone Acetate [Cortisone] Itching and Rash    Penicillin G Rash       Physical Exam: There were no vitals filed for this visit  General: Awake, Alert, Oriented x 3, Mood and affect appropriate  Respiratory: Respirations even and unlabored  Cardiovascular: Peripheral pulses intact; no edema  Musculoskeletal Exam:  Tenderness to palpation left medial and lateral joint line knee    ASA Score: 2         Assessment:   1   Left knee pain        Plan: Left Genicular RFA

## 2022-01-05 NOTE — DISCHARGE INSTR - LAB
Medial Branch Radiofrequency Ablation     WHAT YOU NEED TO KNOW:   Medial branch radiofrequency ablation (RFA) is a procedure used to treat facet joint pain in your neck, mid back, or lower back  Facet joints are found at the back of each vertebra  A needle electrode is used to send electrical currents to the nerves in your facet joint  The electrical currents create heat that damages the nerve so it cannot send pain signals  ACTIVITY  Do not drive or operate machinery today  No strenuous activity today - bending, lifting, etc   You may shower today, but do not sit in a tub of water  Resume normal activities tomorrow as tolerated  CARE OF THE INJECTION SITE  If you have soreness or pain, apply ice to the area today (20 minutes on/20 minutes off)  Starting tomorrow, you may use warm, moist heat or ice if needed  Notify the [facility] if you have any of the following: redness, drainage, swelling, or fever above 100°F     SPECIAL INSTRUCTIONS  Our office will call you tomorrow for a progress report and make an appointment for a follow up visit in 4 weeks  If you feel a sunburn-like sensation in the area of your procedure, call our office  MEDICATIONS  Continue to take all routine medications  Our office may have instructed you to hold some medications  As no general anesthesia was used in today's procedure, you should not experience any side effects related to anesthesia  If you have a problem specifically related to your procedure, please call our office at [phone]  Problems not related to your procedure should be directed to your primary care physician

## 2022-01-06 ENCOUNTER — DOCUMENTATION (OUTPATIENT)
Dept: SOCIAL WORK | Facility: HOSPITAL | Age: 77
End: 2022-01-06

## 2022-01-06 NOTE — TELEPHONE ENCOUNTER
Spoke to pt regarding status post RFA on 1/6  Pt stated that she is feeling good, pt denies s/s of infection, denies sunburn like sensation, pt  sated to please call her next week for 4 week follow up OV

## 2022-01-06 NOTE — TELEPHONE ENCOUNTER
Bethel Wallace from [facility] called in to regard to the pt  Please be advised thank you    Bethel Wallace  can be reached @ [phone]

## 2022-01-07 NOTE — PROGRESS NOTES
Spoke with pt to schedule home physical therapy start of care visit for Friday 01/07/2022  Pt reports "They told me that I shouldn't start home physical therapy until 3 weeks after getting the shots I got from the doctors office"  Pt reports receiving x3 injections shots at her recent MD appointment  Can you please clarify above information given to therapist by pt? If the pt needs to wait to receive home physical therapy in 3 weeks, a new consult and home therapy order will need to be placed to [facility] once the pt is appropriate to participate      Thank you,  Isabell Gilmore DPT

## 2022-01-11 ENCOUNTER — TELEMEDICINE (OUTPATIENT)
Dept: INTERNAL MEDICINE CLINIC | Facility: CLINIC | Age: 77
End: 2022-01-11
Payer: MEDICARE

## 2022-01-11 ENCOUNTER — DOCUMENTATION (OUTPATIENT)
Dept: SOCIAL WORK | Facility: HOSPITAL | Age: 77
End: 2022-01-11

## 2022-01-11 DIAGNOSIS — M19.90 ARTHRITIS: ICD-10-CM

## 2022-01-11 DIAGNOSIS — F32.1 CURRENT MODERATE EPISODE OF MAJOR DEPRESSIVE DISORDER, UNSPECIFIED WHETHER RECURRENT (HCC): ICD-10-CM

## 2022-01-11 DIAGNOSIS — M1A.9XX1 CHRONIC GOUT WITH TOPHUS, UNSPECIFIED CAUSE, UNSPECIFIED SITE: ICD-10-CM

## 2022-01-11 DIAGNOSIS — I10 ESSENTIAL HYPERTENSION: Primary | ICD-10-CM

## 2022-01-11 PROCEDURE — 99214 OFFICE O/P EST MOD 30 MIN: CPT | Performed by: INTERNAL MEDICINE

## 2022-01-11 RX ORDER — DULOXETINE 40 MG/1
40 CAPSULE, DELAYED RELEASE ORAL DAILY
Qty: 30 CAPSULE | Refills: 4 | Status: SHIPPED | OUTPATIENT
Start: 2022-01-11 | End: 2022-02-07

## 2022-01-11 RX ORDER — DULOXETINE 40 MG/1
20 CAPSULE, DELAYED RELEASE ORAL DAILY
Qty: 30 CAPSULE | Refills: 4 | Status: SHIPPED | OUTPATIENT
Start: 2022-01-11 | End: 2022-01-11

## 2022-01-11 NOTE — PROGRESS NOTES
New home physical therapy start of care date to be 01/12/22 per pt request  Pt agreeable to a home PT visit on 01 12 22    Thank you,  Berta Zavala DPT

## 2022-01-11 NOTE — ASSESSMENT & PLAN NOTE
No breakthrough gout attacks are noted since her last visit recommend continuation of 100 mg of allopurinol daily and avoidance of foods likely to trigger gout attacks list of medications reviewed with the patient

## 2022-01-11 NOTE — ASSESSMENT & PLAN NOTE
Blood pressure evaluation today indicates a reading of 144/82 recommend the continuation of her current dosing of lisinopril 2 5 mg daily along with metoprolol tartrate at 50 mg 3 times a day follow-up assessment in 4 weeks is requested

## 2022-01-11 NOTE — ASSESSMENT & PLAN NOTE
Depression reviewed with the patient today of we have decided to increase her Cymbalta from 20 mg a day to 40 mg daily  No apparent side effects of the 20 mg noted but patient continues with depression symptoms  We have scheduled left follow-up visit in 1 month

## 2022-01-11 NOTE — ASSESSMENT & PLAN NOTE
The patient does report improvement in her arthritis symptoms with meloxicam 15 mg daily no stomach irritation or abnormal bleeding are noted

## 2022-01-11 NOTE — PROGRESS NOTES
Virtual Regular Visit    Verification of patient location:    Patient is located in the following state in which I hold an active license [place]      Assessment/Plan:    Problem List Items Addressed This Visit        Cardiovascular and Mediastinum    Essential hypertension     Blood pressure evaluation today indicates a reading of 144/82 recommend the continuation of her current dosing of lisinopril 2 5 mg daily along with metoprolol tartrate at 50 mg 3 times a day follow-up assessment in 4 weeks is requested            Musculoskeletal and Integument    Arthritis - Primary     The patient does report improvement in her arthritis symptoms with meloxicam 15 mg daily no stomach irritation or abnormal bleeding are noted  Chronic gout with tophus     No breakthrough gout attacks are noted since her last visit recommend continuation of 100 mg of allopurinol daily and avoidance of foods likely to trigger gout attacks list of medications reviewed with the patient            Other    Current moderate episode of major depressive disorder ([facility] [address] )     Depression reviewed with the patient today of we have decided to increase her Cymbalta from 20 mg a day to 40 mg daily  No apparent side effects of the 20 mg noted but patient continues with depression symptoms  We have scheduled left follow-up visit in 1 month  Relevant Medications    DULoxetine HCl 40 MG CPEP               Reason for visit is   Chief Complaint   Patient presents with    Virtual Regular Visit        Encounter provider Yesi Rivera MD    Provider located at [address] [address]      Recent Visits  No visits were found meeting these conditions    Showing recent visits within past 7 days and meeting all other requirements  Today's Visits  Date Type Provider Dept   01/11/22 Telemedicine Yesi Rivera MD [facility] Internal Med   Showing today's visits and meeting all other requirements  Future Appointments  No visits were found meeting these conditions  Showing future appointments within next 150 days and meeting all other requirements       The patient was identified by name and date of birth  Elise Red was informed that this is a telemedicine visit and that the visit is being conducted through Telephone  My office door was closed  No one else was in the room  She acknowledged consent and understanding of privacy and security of the video platform  The patient has agreed to participate and understands they can discontinue the visit at any time  Patient is aware this is a billable service  Subjective  Elise Red is a 64 y o  female presents by virtual examination for follow-up evaluation   This 47-year-old female patient presents today by virtual follow-up visit  She is now proximally 4 weeks since our last visit with her  At the time of her last visit we addressed several issues that were active at that time  First issue pertaining to arthritic pain of patient was placed on meloxicam medication and she reports significant improvement with reduction of pain  She has no apparent side effects of the medication denying any abnormal bruising or bleeding and also denying any stomach irritation  Patient also was noted to have some depression symptoms and was started on Cymbalta 20 mg daily while there are no adverse effects noted by the patient from this medication she is not sure that it has truly improved her emotional outlook  She remains somewhat low be motivated with mild depression symptoms  Patient continues on allopurinol for gout prevention she reports no recent gouty episodes         Past Medical History:   Diagnosis Date    Abnormal ECG     Last Assessed 9/29/2016    Anxiety     Last Assessed 6/08/2016    Arthritis     knees    Asthma     Last Assessed 11/06/2013    Atrial premature complex     Cataract, bilateral     Last Assessed 7/14/2016    Cataract, left eye     Both eyes  Had surgery on left   Difficulty swallowing     Dizziness     Fibromyalgia     Gastric reflux     Hypertension     Lyme disease     Premature ventricular contraction     Primary osteoarthritis of both knees     Last Assessed 7/14/2016    Rheumatic fever     Sore throat     Tuberculosis     Tuberculosis 1945       Past Surgical History:   Procedure Laterality Date    APPENDECTOMY      CARDIAC PACEMAKER PLACEMENT  2019    HYSTERECTOMY      ORIF FEMUR FRACTURE Left 8/5/2021    Procedure: OPEN REDUCTION W/ INTERNAL FIXATION (ORIF) DISTAL FEMUR; INSERTION RETROGRADE NAIL;  Surgeon: Kady Tellez MD;  [facility];  Service: Orthopedics    MO DILATE ESOPHAGUS N/A 4/6/2016    Procedure: DILATATION ESOPHAGEAL;  Surgeon: Marco Vinson MD;  [facility]; Service: Gastroenterology    MO EGD TRANSORAL BIOPSY SINGLE/MULTIPLE N/A 4/6/2016    Procedure: ESOPHAGOGASTRODUODENOSCOPY (EGD); Surgeon: Marco Vinson MD;  [facility]; Service: Gastroenterology    MO XCAPSL CTRC RMVL INSJ IO LENS PROSTH W/O ECP Left 3/15/2016    Procedure: EXTRACTION EXTRACAPSULAR CATARACT PHACO INTRAOCULAR LENS (IOL);   Surgeon: Ricky Mercado MD;  [facility];  Service: Ophthalmology    REPLACEMENT TOTAL KNEE Right     REPLACEMENT TOTAL KNEE      right     TONSILLECTOMY         Current Outpatient Medications   Medication Sig Dispense Refill    albuterol (PROVENTIL HFA,VENTOLIN HFA) 90 mcg/act inhaler INHALE 2 PUFFS BY MOUTH EVERY 6 HOURS AS NEEDED FOR WHEEZING 3 Inhaler 0    allopurinol (ZYLOPRIM) 100 mg tablet Take 1 tablet (100 mg total) by mouth daily 90 tablet 3    apixaban (Eliquis) 5 mg Take 1 tablet (5 mg total) by mouth 2 (two) times a day 180 tablet 1    diltiazem (CARDIZEM CD) 120 mg 24 hr capsule Take 1 capsule (120 mg total) by mouth daily 30 capsule 3    DULoxetine HCl 40 MG CPEP Take 1 capsule (40 mg total) by mouth daily 30 capsule 4    ezetimibe (ZETIA) 10 mg tablet Take 1 tablet (10 mg total) by mouth daily 90 tablet 1    furosemide (LASIX) 40 mg tablet Take 1 tablet (40 mg total) by mouth daily 90 tablet 1    lisinopril (ZESTRIL) 2 5 mg tablet Take 1 tablet (2 5 mg total) by mouth daily 90 tablet 1    Melatonin 10 MG TABS Take by mouth      meloxicam (MOBIC) 15 mg tablet TAKE 1 TABLET(15 MG) BY MOUTH DAILY 90 tablet 3    metoprolol tartrate (LOPRESSOR) 50 mg tablet TAKE 1 TABLET(50 MG) BY MOUTH THREE TIMES DAILY 270 tablet 3    Mirabegron ER (Myrbetriq) 25 MG TB24 Take 25 mg by mouth daily 90 tablet 3    oxybutynin (DITROPAN-XL) 5 mg 24 hr tablet Take 1 tablet (5 mg total) by mouth daily 90 tablet 3    oxyCODONE-acetaminophen (PERCOCET) 2 5-325 MG per tablet Take 1 tablet by mouth every 4 (four) hours as needed for moderate pain       No current facility-administered medications for this visit  Allergies   Allergen Reactions    I64 Folate [Folic Acid-Vit E4-IYB N06 - Food Allergy]     Cobalt      Unknown    Lyrica [Pregabalin]     Nickel      Skin discoloration    Other      Black rubber    Penicillins Hives    Sulfa Antibiotics Itching    Cortisone Acetate [Cortisone] Itching and Rash    Penicillin G Rash       Review of Systems   Musculoskeletal: Positive for arthralgias and myalgias  Psychiatric/Behavioral: Positive for dysphoric mood and sleep disturbance  All other systems reviewed and are negative  Video Exam    There were no vitals filed for this visit  Physical Exam     I spent 25 minutes with patient today in which greater than 50% of the time was spent in counseling/coordination of care regarding Arthritic symptoms, depression, gout, bladder control,    VIRTUAL VISIT [facility] verbally agrees to participate in [employer]   Pt is aware that [employer] could be limited without vital signs or the ability to perform a full hands-on physical exam  Isi Buchanan understands she or the provider may request at any time to terminate the video visit and request the patient to seek care or treatment in person

## 2022-01-12 ENCOUNTER — TELEPHONE (OUTPATIENT)
Dept: INTERNAL MEDICINE CLINIC | Facility: CLINIC | Age: 77
End: 2022-01-12

## 2022-01-12 ENCOUNTER — DOCUMENTATION (OUTPATIENT)
Dept: SOCIAL WORK | Facility: HOSPITAL | Age: 77
End: 2022-01-12

## 2022-01-12 NOTE — PROGRESS NOTES
Notification of Assess not [address] [facility]  [facility] has assessed your patient for Home Health services and has determined the patient is not eligible for service due to the following    Pt answers front door of apartment in   Pt is wc bound and reports not ambulating for the past 6 to 12 months  Pt is independent with bed mobility  independent with transfers to and from various surfaces  able to independently cook and prepare a meal while sitting in wc  Pt able to perform sit to stand transfers with A of kitchen countertop in order to get on body weight scale independently  Pt able to get off of body scale and perform stand pivot and stand to sit transfers independently back to   Pt able to incependently mobilize manual wc in apartment and to exit the apartment building  Pts family member Madisyn Canales provides A to pt for laundry  grocery shopping and transportation  Madisyn Canales comes to visit pt 2xs per week and provides A and care for 6 to 8 hours each day  Pt is able to independently exit the apartment building and get in and out of [employer] car when going to appointments  Pt recently received an injection in the L knee by Dr Laura Garcia and reports quote my left knee is feeling better  They tell me it should last for about a year and it takes about 4 weeks to kick in  Im already feeling relief and its been about a week or so end quote  Pt has been performing BLE home exercise program daily that was given to pt from PT previous episode of care December 2021  Pt currently has all appropriate DME in the home at this time  Pt has no concerns and or questions about current medication regimen  Pt has all current medications in the home  Pt currently has no skilled home PT services needed at this time  Recommend [facility] to A pt with home care at this time and pt is agreeable  DC pt from skilled home PT      Inessa Roberts, PT

## 2022-01-12 NOTE — TELEPHONE ENCOUNTER
[facility] will finish the therapy they need an Order for PT/OT for knee   Please fax order to [phone] [phone]

## 2022-01-13 NOTE — PROGRESS NOTES
S/w [facility] # [phone] who advised they will need the referral faxed for PT and a face sheet including demographics and insurance information to Fax # [phone]  The next step is they will check with pt's insurance company and verify eligibility and coverage  Nina Bach will either contact the Pt or [facility] after they determine coverage  Referral faxed to Fax #[phone]      Clerical please provide demographic and insurance information- Thank you

## 2022-02-07 ENCOUNTER — TELEPHONE (OUTPATIENT)
Dept: INTERNAL MEDICINE CLINIC | Facility: CLINIC | Age: 77
End: 2022-02-07

## 2022-02-07 NOTE — TELEPHONE ENCOUNTER
The patient's medication has been changed in her orders reflect a 30 milligram dose recommended instead of 40 as the insurance will no longer pay for 40 milligram dosing of duloxetine

## 2022-02-07 NOTE — TELEPHONE ENCOUNTER
Pt called  She got notice that her insurance will no longer pay for the 40 mg of duloxetine  They will pay for 20,30 or 60  Other alternatives are     Citalopram  Bruprorion, mirtazepine and desvenlafaxine    Which ever you choose, please send to [facility] and [address]

## 2022-02-11 ENCOUNTER — TELEMEDICINE (OUTPATIENT)
Dept: INTERNAL MEDICINE CLINIC | Facility: CLINIC | Age: 77
End: 2022-02-11
Payer: MEDICARE

## 2022-02-11 VITALS
HEIGHT: 64 IN | WEIGHT: 233 LBS | SYSTOLIC BLOOD PRESSURE: 126 MMHG | BODY MASS INDEX: 39.78 KG/M2 | DIASTOLIC BLOOD PRESSURE: 72 MMHG

## 2022-02-11 DIAGNOSIS — S72.352A CLOSED DISPLACED COMMINUTED FRACTURE OF SHAFT OF LEFT FEMUR, INITIAL ENCOUNTER (HCC): ICD-10-CM

## 2022-02-11 DIAGNOSIS — F32.1 CURRENT MODERATE EPISODE OF MAJOR DEPRESSIVE DISORDER, UNSPECIFIED WHETHER RECURRENT (HCC): ICD-10-CM

## 2022-02-11 DIAGNOSIS — I10 ESSENTIAL HYPERTENSION: Primary | ICD-10-CM

## 2022-02-11 PROCEDURE — 99214 OFFICE O/P EST MOD 30 MIN: CPT | Performed by: INTERNAL MEDICINE

## 2022-02-11 NOTE — ASSESSMENT & PLAN NOTE
The patient reports feeling better on the increased dose of Cymbalta  She was originally on 20 mg of Cymbalta daily we wanted to increase the dose to 40 mg unfortunately her insurance will not cover that strength medication  We have compromised with a dose of 30 mg of Cymbalta daily which seems to be effective  Patient reports less depression type symptoms more positive outlook and more energy and concentration abilities  Plan is to continue current therapy

## 2022-02-11 NOTE — ASSESSMENT & PLAN NOTE
During today's visit with the patient we reviewed how the found distal femoral fracture that she sustained in her left leg has an affect on the potential for left total knee replacement  I have recommended that she discuss the potential for knee replacement surgery with her orthopedic surgeon at the time of her next visit  She has advanced osteoarthritis of the left knee  She has undergone steroid injection as well as viscous therapy without significant improvement in her pain  Currently she is taking meloxicam 15 mg daily to try to control her discomfort  I would prefer to avoid any narcotic type medications due to increased sedation and increased risk of falls

## 2022-02-11 NOTE — PROGRESS NOTES
Virtual Regular Visit    Verification of patient location:    Patient is located in the following state in which I hold an active license [place]      Assessment/Plan:    Problem List Items Addressed This Visit        Cardiovascular and Mediastinum    Essential hypertension - Primary     Review of the patient's blood pressure indicates a reading of 126/72 a significant improvement over her last visit which was 144/82  I have recommended that she continue her current blood pressure management medications which include lisinopril 2 5 mg daily metoprolol tartrate 50 mg 3 times a day and diltiazem 120 mg daily            Musculoskeletal and Integument    Femur fracture ([facility] [address] )     During today's visit with the patient we reviewed how the found distal femoral fracture that she sustained in her left leg has an affect on the potential for left total knee replacement  I have recommended that she discuss the potential for knee replacement surgery with her orthopedic surgeon at the time of her next visit  She has advanced osteoarthritis of the left knee  She has undergone steroid injection as well as viscous therapy without significant improvement in her pain  Currently she is taking meloxicam 15 mg daily to try to control her discomfort  I would prefer to avoid any narcotic type medications due to increased sedation and increased risk of falls  Other    Current moderate episode of major depressive disorder ([facility] [address] )     The patient reports feeling better on the increased dose of Cymbalta  She was originally on 20 mg of Cymbalta daily we wanted to increase the dose to 40 mg unfortunately her insurance will not cover that strength medication  We have compromised with a dose of 30 mg of Cymbalta daily which seems to be effective  Patient reports less depression type symptoms more positive outlook and more energy and concentration abilities  Plan is to continue current therapy                      Reason for visit is Chief Complaint   Patient presents with    Virtual Regular Visit        Encounter provider Mason Jackson MD    Provider located at [address] [id number]      Recent Visits  Date Type Provider Dept   02/07/22 Telephone [facility] Internal Med   Showing recent visits within past 7 days and meeting all other requirements  Today's Visits  Date Type Provider Dept   02/11/22 Telemedicine Mason Jackson MD [facility] Internal Med   Showing today's visits and meeting all other requirements  Future Appointments  No visits were found meeting these conditions  Showing future appointments within next 150 days and meeting all other requirements       The patient was identified by name and date of birth  Jessica Arias was informed that this is a telemedicine visit and that the visit is being conducted through Telephone  My office door was closed  No one else was in the room  She acknowledged consent and understanding of privacy and security of the video platform  The patient has agreed to participate and understands they can discontinue the visit at any time  Patient is aware this is a billable service  Subjective  Jessica Arias is a 64 y o  female presents today by virtual visit due to issues with transportation and mobility   This 49-year-old female patient presents today by a virtual visit  Of our visit today is to follow-up her hypertension along with depression symptoms  We also have reviewed with the patient today her ongoing issues with left knee pain  She has previously suffered a distal femoral fracture of the left leg requiring a andreina and several screws to stabilize the fracture  Unfortunately the knee on the left side has advanced arthritis  Patient has significant daily pain  She has tried steroid injections without significant benefit         Past Medical History:   Diagnosis Date    Abnormal ECG     Last Assessed 9/29/2016    Anxiety     Last Assessed 6/08/2016    Arthritis     knees    Asthma     Last Assessed 11/06/2013    Atrial premature complex     Cataract, bilateral     Last Assessed 7/14/2016    Cataract, left eye     Both eyes  Had surgery on left   Difficulty swallowing     Dizziness     Fibromyalgia     Gastric reflux     Hypertension     Lyme disease     Premature ventricular contraction     Primary osteoarthritis of both knees     Last Assessed 7/14/2016    Rheumatic fever     Sore throat     Tuberculosis     Tuberculosis 1947       Past Surgical History:   Procedure Laterality Date    APPENDECTOMY      CARDIAC PACEMAKER PLACEMENT  2019    HYSTERECTOMY      ORIF FEMUR FRACTURE Left 8/5/2021    Procedure: OPEN REDUCTION W/ INTERNAL FIXATION (ORIF) DISTAL FEMUR; INSERTION RETROGRADE NAIL;  Surgeon: Shar Allen MD;  [facility];  Service: Orthopedics    MN DILATE ESOPHAGUS N/A 4/6/2016    Procedure: DILATATION ESOPHAGEAL;  Surgeon: Marylou Torrez MD;  [facility]; Service: Gastroenterology    MN EGD TRANSORAL BIOPSY SINGLE/MULTIPLE N/A 4/6/2016    Procedure: ESOPHAGOGASTRODUODENOSCOPY (EGD); Surgeon: Marylou Torrez MD;  [facility]; Service: Gastroenterology    MN XCAPSL CTRC RMVL INSJ IO LENS PROSTH W/O ECP Left 3/15/2016    Procedure: EXTRACTION EXTRACAPSULAR CATARACT PHACO INTRAOCULAR LENS (IOL);   Surgeon: Renetta Ovalle MD;  [facility];  Service: Ophthalmology    REPLACEMENT TOTAL KNEE Right     REPLACEMENT TOTAL KNEE      right     TONSILLECTOMY         Current Outpatient Medications   Medication Sig Dispense Refill    albuterol (PROVENTIL HFA,VENTOLIN HFA) 90 mcg/act inhaler INHALE 2 PUFFS BY MOUTH EVERY 6 HOURS AS NEEDED FOR WHEEZING 3 Inhaler 0    allopurinol (ZYLOPRIM) 100 mg tablet Take 1 tablet (100 mg total) by mouth daily 90 tablet 3    apixaban (Eliquis) 5 mg Take 1 tablet (5 mg total) by mouth 2 (two) times a day 180 tablet 1    diltiazem (CARDIZEM CD) 120 mg 24 hr capsule Take 1 capsule (120 mg total) by mouth daily 30 capsule 3    DULoxetine (Cymbalta) 30 mg delayed release capsule Take 1 capsule (30 mg total) by mouth daily 30 capsule 5    ezetimibe (ZETIA) 10 mg tablet Take 1 tablet (10 mg total) by mouth daily 90 tablet 1    furosemide (LASIX) 40 mg tablet Take 1 tablet (40 mg total) by mouth daily 90 tablet 1    lisinopril (ZESTRIL) 2 5 mg tablet Take 1 tablet (2 5 mg total) by mouth daily 90 tablet 1    Melatonin 10 MG TABS Take by mouth      meloxicam (MOBIC) 15 mg tablet TAKE 1 TABLET(15 MG) BY MOUTH DAILY 90 tablet 3    metoprolol tartrate (LOPRESSOR) 50 mg tablet TAKE 1 TABLET(50 MG) BY MOUTH THREE TIMES DAILY 270 tablet 3    Mirabegron ER (Myrbetriq) 25 MG TB24 Take 25 mg by mouth daily 90 tablet 3    oxybutynin (DITROPAN-XL) 5 mg 24 hr tablet Take 1 tablet (5 mg total) by mouth daily 90 tablet 3    oxyCODONE-acetaminophen (PERCOCET) 2 5-325 MG per tablet Take 1 tablet by mouth every 4 (four) hours as needed for moderate pain       No current facility-administered medications for this visit  Allergies   Allergen Reactions    D25 Folate [Folic Acid-Vit T9-YQE Q88 - Food Allergy]     Cobalt      Unknown    Lyrica [Pregabalin]     Nickel      Skin discoloration    Other      Black rubber    Penicillins Hives    Sulfa Antibiotics Itching    Cortisone Acetate [Cortisone] Itching and Rash    Penicillin G Rash       Review of Systems   Musculoskeletal: Positive for arthralgias, gait problem and myalgias  Psychiatric/Behavioral: Positive for dysphoric mood  All other systems reviewed and are negative        Video Exam    Vitals:    02/11/22 1012   BP: 126/72   Weight: 106 kg (233 lb)   Height: 5' 4" (1 626 m)       Physical Exam  Constitutional:       Comments: Patient's virtual visit today shows no distress in her voice visit was telephonic          I spent 30 minutes with patient today in which greater than 50% of the time was spent in counseling/coordination of care regarding Depression, hypertension, left knee pain    VIRTUAL VISIT [facility] verbally agrees to participate in [employer]  Pt is aware that [employer] could be limited without vital signs or the ability to perform a full hands-on physical Syliva Vu understands she or the provider may request at any time to terminate the video visit and request the patient to seek care or treatment in person

## 2022-02-11 NOTE — ASSESSMENT & PLAN NOTE
Review of the patient's blood pressure indicates a reading of 126/72 a significant improvement over her last visit which was 144/82    I have recommended that she continue her current blood pressure management medications which include lisinopril 2 5 mg daily metoprolol tartrate 50 mg 3 times a day and diltiazem 120 mg daily

## 2022-02-21 ENCOUNTER — OFFICE VISIT (OUTPATIENT)
Dept: CARDIOLOGY CLINIC | Facility: CLINIC | Age: 77
End: 2022-02-21
Payer: MEDICARE

## 2022-02-21 VITALS
HEIGHT: 64 IN | HEART RATE: 71 BPM | SYSTOLIC BLOOD PRESSURE: 134 MMHG | BODY MASS INDEX: 40.46 KG/M2 | DIASTOLIC BLOOD PRESSURE: 70 MMHG | OXYGEN SATURATION: 97 % | WEIGHT: 237 LBS

## 2022-02-21 DIAGNOSIS — I48.0 PAROXYSMAL ATRIAL FIBRILLATION (HCC): ICD-10-CM

## 2022-02-21 DIAGNOSIS — I49.5 SICK SINUS SYNDROME (HCC): Primary | ICD-10-CM

## 2022-02-21 DIAGNOSIS — I10 ESSENTIAL HYPERTENSION: ICD-10-CM

## 2022-02-21 DIAGNOSIS — Z95.0 PACEMAKER: ICD-10-CM

## 2022-02-21 DIAGNOSIS — I49.5 TACHY-BRADY SYNDROME (HCC): ICD-10-CM

## 2022-02-21 PROBLEM — N17.9 AKI (ACUTE KIDNEY INJURY) (HCC): Status: RESOLVED | Noted: 2021-08-04 | Resolved: 2022-02-21

## 2022-02-21 PROCEDURE — 99214 OFFICE O/P EST MOD 30 MIN: CPT | Performed by: INTERNAL MEDICINE

## 2022-02-21 NOTE — PROGRESS NOTES
Cardiology Follow Up    Dawn Laguna  1945  [id number]  [facility] [email or web address]  [place] [address]  [phone]    1  Sick sinus syndrome ([address] )     2  Essential hypertension     3  Tachy-smita syndrome ([address] )     4  Pacemaker     5  Paroxysmal atrial fibrillation (HCC)           Discussion/Summary: All of her assessed cardiac problems are stable  I have reviewed her medications and made no changes  No cardiac testing is ordered  RTO 2 months  She is thinking about getting TKR  Interval History: She is doing OK and keeping her weight around 233 - 237 lbs at home  She is on Lasix 40 mg daily  She remains predominantly in SR  She is on Eliquis  ECHO 8/2021 - EF 60%, PASP 42 mmHg  Creatinine 0 91    She notes occasional palpitations  Her DDDR pacemaker is functioning normally      Patient Active Problem List   Diagnosis    Tachy-smita syndrome ([address] )    Essential hypertension    Pacemaker    Chronic bilateral low back pain without sciatica    Chronic GERD    Degenerative lumbar spinal stenosis    Fibromyalgia    GERD (gastroesophageal reflux disease)    Low back pain    Lumbar degenerative disc disease    Mild carpal tunnel syndrome, right    Overweight    Psoriasis    Thyroid nodule    Urinary incontinence    Other insomnia    Anxiety    Tremor    Chronic pain of left knee    Age related osteoporosis    Dysphonia    Vitamin D insufficiency    Hyperlipidemia    Arthritis    Other fatigue    Femur fracture (HCC)    Leukocytosis    Ambulatory dysfunction    Sick sinus syndrome (HCC)    Reactive airway disease with acute exacerbation    Morbid obesity with body mass index (BMI) of 40 0 to 49 9 (HCC)    Chronic gout with tophus    Current moderate episode of major depressive disorder (HCC)    Paroxysmal atrial fibrillation (HCC)     Past Medical History:   Diagnosis Date    Abnormal ECG     Last Assessed 9/29/2016    Anxiety     Last Assessed 6/08/2016    Arthritis     knees    Asthma     Last Assessed 11/06/2013    Atrial premature complex     Cataract, bilateral     Last Assessed 7/14/2016    Cataract, left eye     Both eyes  Had surgery on left   Difficulty swallowing     Dizziness     Fibromyalgia     Gastric reflux     Hypertension     Lyme disease     Premature ventricular contraction     Primary osteoarthritis of both knees     Last Assessed 7/14/2016    Rheumatic fever     Sore throat     Tuberculosis     Tuberculosis 1945     Social History     Socioeconomic History    Marital status:       Spouse name: Not on file    Number of children: Not on file    Years of education: Not on file    Highest education level: Not on file   Occupational History    Not on file   Tobacco Use    Smoking status: Never Smoker    Smokeless tobacco: Never Used   Vaping Use    Vaping Use: Never used   Substance and Sexual Activity    Alcohol use: Not Currently     Comment: Per [employer]    Drug use: No    Sexual activity: Never   Other Topics Concern    Not on file   Social History Narrative    Not on file     Social Determinants of Health     Financial Resource Strain: Not on file   Food Insecurity: Not on file   Transportation Needs: Not on file   Physical Activity: Not on file   Stress: Not on file   Social Connections: Not on file   Intimate Partner Violence: Not on file   Housing Stability: Not on file      Family History   Problem Relation Age of Onset    Coronary artery disease Mother     Heart attack Mother         Prior    Heart disease Father     Heart attack Father         Prior     Past Surgical History:   Procedure Laterality Date    APPENDECTOMY      CARDIAC PACEMAKER PLACEMENT  2019    HYSTERECTOMY      ORIF FEMUR FRACTURE Left 8/5/2021    Procedure: OPEN REDUCTION W/ INTERNAL FIXATION (ORIF) DISTAL FEMUR; INSERTION RETROGRADE NAIL;  Surgeon: Abdulkadir Croft MD;  [facility];  Service: Orthopedics    IN DILATE ESOPHAGUS N/A 4/6/2016    Procedure: DILATATION ESOPHAGEAL;  Surgeon: Dana Blunt MD;  [facility]; Service: Gastroenterology    IN EGD TRANSORAL BIOPSY SINGLE/MULTIPLE N/A 4/6/2016    Procedure: ESOPHAGOGASTRODUODENOSCOPY (EGD); Surgeon: Dana Blunt MD;  [facility]; Service: Gastroenterology    IN XCAPSL CTRC RMVL INSJ IO LENS PROSTH W/O ECP Left 3/15/2016    Procedure: EXTRACTION EXTRACAPSULAR CATARACT PHACO INTRAOCULAR LENS (IOL);   Surgeon: Barby Mccrary MD;  [facility];  Service: Ophthalmology    REPLACEMENT TOTAL KNEE Right     REPLACEMENT TOTAL KNEE      right     TONSILLECTOMY         Current Outpatient Medications:     albuterol (PROVENTIL HFA,VENTOLIN HFA) 90 mcg/act inhaler, INHALE 2 PUFFS BY MOUTH EVERY 6 HOURS AS NEEDED FOR WHEEZING, Disp: 3 Inhaler, Rfl: 0    allopurinol (ZYLOPRIM) 100 mg tablet, Take 1 tablet (100 mg total) by mouth daily, Disp: 90 tablet, Rfl: 3    apixaban (Eliquis) 5 mg, Take 1 tablet (5 mg total) by mouth 2 (two) times a day, Disp: 180 tablet, Rfl: 1    diltiazem (CARDIZEM CD) 120 mg 24 hr capsule, Take 1 capsule (120 mg total) by mouth daily, Disp: 30 capsule, Rfl: 3    DULoxetine (Cymbalta) 30 mg delayed release capsule, Take 1 capsule (30 mg total) by mouth daily, Disp: 30 capsule, Rfl: 5    ezetimibe (ZETIA) 10 mg tablet, Take 1 tablet (10 mg total) by mouth daily, Disp: 90 tablet, Rfl: 1    furosemide (LASIX) 40 mg tablet, Take 1 tablet (40 mg total) by mouth daily, Disp: 90 tablet, Rfl: 1    lisinopril (ZESTRIL) 2 5 mg tablet, Take 1 tablet (2 5 mg total) by mouth daily, Disp: 90 tablet, Rfl: 1    Melatonin 10 MG TABS, Take by mouth, Disp: , Rfl:     meloxicam (MOBIC) 15 mg tablet, TAKE 1 TABLET(15 MG) BY MOUTH DAILY, Disp: 90 tablet, Rfl: 3    metoprolol tartrate (LOPRESSOR) 50 mg tablet, TAKE 1 TABLET(50 MG) BY MOUTH THREE TIMES DAILY (Patient taking differently: 100 mg 2 (two) times a day  ), Disp: 270 tablet, Rfl: 3    Mirabegron ER (Myrbetriq) 25 MG TB24, Take 25 mg by mouth daily, Disp: 90 tablet, Rfl: 3    oxybutynin (DITROPAN-XL) 5 mg 24 hr tablet, Take 1 tablet (5 mg total) by mouth daily, Disp: 90 tablet, Rfl: 3    oxyCODONE-acetaminophen (PERCOCET) 2 5-325 MG per tablet, Take 1 tablet by mouth every 4 (four) hours as needed for moderate pain (Patient not taking: Reported on 2/21/2022 ), Disp: , Rfl:   Allergies   Allergen Reactions    P84 Folate [Folic Acid-Vit M3-FMZ Y92 - Food Allergy]     Cobalt      Unknown    Lyrica [Pregabalin]     Nickel      Skin discoloration    Other      Black rubber    Penicillins Hives    Sulfa Antibiotics Itching    Cortisone Acetate [Cortisone] Itching and Rash    Penicillin G Rash     Vitals:    02/21/22 1346   BP: 134/70   Pulse: 71   SpO2: 97%   Weight: 108 kg (237 lb)   Height: 5' 4" (1 626 m)     Weight (last 2 days)     Date/Time Weight    02/21/22 1346 108 (237)         Blood pressure 134/70, pulse 71, height 5' 4" (1 626 m), weight 108 kg (237 lb), SpO2 97 %  , Body mass index is 40 68 kg/m²  Labs:  No results displayed because visit has over 200 results        Admission on 08/12/2021, Discharged on 09/08/2021   Component Date Value    POC Glucose 08/21/2021 109    Lab Requisition on 09/07/2021   Component Date Value    SARS-CoV-2 09/07/2021 Negative    Lab Requisition on 09/06/2021   Component Date Value    Sodium 09/06/2021 140     Potassium 09/06/2021 4 2     Chloride 09/06/2021 103     CO2 09/06/2021 30     ANION GAP 09/06/2021 7     BUN 09/06/2021 18     Creatinine 09/06/2021 0 73     Glucose 09/06/2021 93     Calcium 09/06/2021 9 7     eGFR 09/06/2021 79     WBC 09/06/2021 6 10     RBC 09/06/2021 3 85*    Hemoglobin 09/06/2021 11 3*    Hematocrit 09/06/2021 35 9*    MCV 09/06/2021 93     MCH 09/06/2021 29 4     MCHC 09/06/2021 31 5     RDW 09/06/2021 17 4*    MPV 09/06/2021 7 0*    Platelets 91/19/9126 332     Neutrophils Relative 09/06/2021 48     Lymphocytes Relative 09/06/2021 32     Monocytes Relative 09/06/2021 12*    Eosinophils Relative 09/06/2021 7*    Basophils Relative 09/06/2021 1     Neutrophils Absolute 09/06/2021 2 90     Lymphocytes Absolute 09/06/2021 2 00     Monocytes Absolute 09/06/2021 0 70     Eosinophils Absolute 09/06/2021 0 40     Basophils Absolute 09/06/2021 0 10    Lab Requisition on 08/31/2021   Component Date Value    SARS-CoV-2 08/31/2021 Negative    Lab Requisition on 08/26/2021   Component Date Value    SARS-CoV-2 08/26/2021 Negative      Imaging: No results found  Review of Systems:  Review of Systems   Constitutional: Negative for diaphoresis, fatigue, fever and unexpected weight change  HENT: Negative  Respiratory: Negative for cough, shortness of breath and wheezing  Cardiovascular: Negative for chest pain, palpitations and leg swelling  Gastrointestinal: Negative for abdominal pain, diarrhea and nausea  Musculoskeletal: Negative for gait problem and myalgias  Skin: Negative for rash  Neurological: Negative for dizziness and numbness  Psychiatric/Behavioral: Negative  Physical Exam:  Physical Exam  Constitutional:       Appearance: She is well-developed  HENT:      Head: Normocephalic and atraumatic  Eyes:      Pupils: Pupils are equal, round, and reactive to light  Neck:      Vascular: No JVD  Cardiovascular:      Rate and Rhythm: Regular rhythm  Pulses: Normal pulses  Carotid pulses are 2+ on the right side and 2+ on the left side  Heart sounds: S1 normal and S2 normal    Pulmonary:      Effort: Pulmonary effort is normal       Breath sounds: Normal breath sounds  No wheezing or rales  Abdominal:      General: Bowel sounds are normal       Palpations: Abdomen is soft  Tenderness: There is no abdominal tenderness     Musculoskeletal: General: No tenderness  Normal range of motion  Cervical back: Normal range of motion and neck supple  Skin:     General: Skin is warm  Neurological:      Mental Status: She is alert and oriented to person, place, and time  Cranial Nerves: No cranial nerve deficit  Deep Tendon Reflexes: Reflexes are normal and symmetric

## 2022-02-23 NOTE — PROGRESS NOTES
Please let pt know:    Blood counts are normal with no evidence of anemia.  Her basic metabolic panel including potassium, glucose and kidney function is normal.  Magnesium level is normal.     But d dimer is positive so will do the ultrasound.   Entered as stat.  Tomorrow should be ok.    Results for orders placed or performed in visit on 01/20/21   Cardiac EP device report    Narrative    MDT DUAL CHAMBER PPM/ ACTIVE SYSTEM IS MRI CONDITIONAL  CARELINK TRANSMISSION: BATTERY VOLTAGE ADEQUATE (7 YRS)  AP-95%, -0 4%  ALL AVAILABLE LEAD PARAMETERS WITHIN NORMAL LIMITS  17 AF EPISODES TREATED W/ REACTIVE ATP MAX DURATION 5 HRS- 23 5% PACE TERMINATED  2 MONITORED AF EPISODES MAX DURATION 57 SEC  AF BURDEN-1%  PT ON XARELTO & METOPROLOL  NORMAL DEVICE FUNCTION   GV

## 2022-02-28 ENCOUNTER — TELEPHONE (OUTPATIENT)
Dept: CARDIOLOGY CLINIC | Facility: CLINIC | Age: 77
End: 2022-02-28

## 2022-02-28 DIAGNOSIS — E78.5 HYPERLIPIDEMIA, UNSPECIFIED HYPERLIPIDEMIA TYPE: ICD-10-CM

## 2022-02-28 RX ORDER — EZETIMIBE 10 MG/1
10 TABLET ORAL DAILY
Qty: 90 TABLET | Refills: 1 | Status: SHIPPED | OUTPATIENT
Start: 2022-02-28

## 2022-03-15 ENCOUNTER — TELEPHONE (OUTPATIENT)
Dept: INTERNAL MEDICINE CLINIC | Facility: CLINIC | Age: 77
End: 2022-03-15

## 2022-03-15 NOTE — TELEPHONE ENCOUNTER
Patient called   She states that her left hand shakes once in a while  She wanted to know if you think it is from her pacemaker  ? She would like to talk to you about what you think?

## 2022-03-16 NOTE — TELEPHONE ENCOUNTER
Call returned to the patient she actually has a mild tremor in both hands  Suspect this may be early Parkinson's disease she does have a family member that had Parkinson's disease    No evidence of any gait issues at this time will evaluate her in the office next time she comes in for regular visit may ultimately need a neurology consultation

## 2022-03-29 ENCOUNTER — HOSPITAL ENCOUNTER (OUTPATIENT)
Dept: RADIOLOGY | Facility: HOSPITAL | Age: 77
Discharge: HOME | End: 2022-03-29
Attending: ORTHOPAEDIC SURGERY
Payer: MEDICARE

## 2022-03-29 ENCOUNTER — OFFICE VISIT (OUTPATIENT)
Dept: OBGYN CLINIC | Facility: CLINIC | Age: 77
End: 2022-03-29
Payer: MEDICARE

## 2022-03-29 VITALS — WEIGHT: 232.8 LBS | HEIGHT: 64 IN | BODY MASS INDEX: 39.75 KG/M2

## 2022-03-29 DIAGNOSIS — Z98.890 STATUS POST SURGERY: ICD-10-CM

## 2022-03-29 DIAGNOSIS — S72.92XK CLOSED FRACTURE OF LEFT FEMUR WITH NONUNION, UNSPECIFIED FRACTURE MORPHOLOGY, UNSPECIFIED PORTION OF FEMUR, SUBSEQUENT ENCOUNTER: Primary | ICD-10-CM

## 2022-03-29 DIAGNOSIS — I48.91 ATRIAL FIBRILLATION, UNSPECIFIED TYPE (HCC): ICD-10-CM

## 2022-03-29 PROCEDURE — 73562 X-RAY EXAM OF KNEE 3: CPT

## 2022-03-29 PROCEDURE — 99213 OFFICE O/P EST LOW 20 MIN: CPT | Performed by: ORTHOPAEDIC SURGERY

## 2022-03-29 RX ORDER — DILTIAZEM HYDROCHLORIDE 120 MG/1
120 CAPSULE, COATED, EXTENDED RELEASE ORAL DAILY
Qty: 90 CAPSULE | Refills: 3 | Status: SHIPPED | OUTPATIENT
Start: 2022-03-29

## 2022-03-29 NOTE — PATIENT INSTRUCTIONS
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

## 2022-03-29 NOTE — PROGRESS NOTES
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, unspecified fracture morphology, unspecified portion of femur, subsequent encounter  CT lower extremity wo contrast left       Plan:      Patient has non union left distal femur fracture and severe osteoarthritis who was to be scheduled for total knee arthroplasty prior to her distal femur fracture   WBAT L LE   Will be ordering CT Left femur to evaluate non union fracture given there is minimal callus formation   Will hold off on Left knee aspiration at this time   Patient will need to contact her dentist for possible tooth extraction or further dental work   Will contact patient next week for an update on process from her dentist prior to scheduling a left distal femoral replacement          The above stated was discussed in layman's terms and the patient expressed understanding  All questions were answered to the patient's satisfaction  Subjective:   Steve Marquez is a 64 y o  female who presents today follow up for left knee pain due to osteoarthritis and 7 months s/p leftfemur fracture with intramedullary nail fixation, DOS 8/5/21  She is present room today with her niece who takes care of her  She had a left knee genicular nerve block on 01/05/2021 and had complete relief for  five weeks  and the pain is gradually returning  She also had a left knee Synvisc One injection on 11/23/21 with 2 days relief  She is currently in physical therapy and is tolerating the sessions and is walking during PT  She states otherwise she is the wheelchair  She is taking Meloxicam 15  mg as needed for pain per her PCP         Review of systems negative unless otherwise specified in HPI    Past Medical History:   Diagnosis Date    Abnormal ECG     Last Assessed 9/29/2016    Anxiety     Last Assessed 6/08/2016    Arthritis     knees    Asthma     Last Assessed 11/06/2013    Atrial premature complex     Cataract, bilateral     Last Assessed 7/14/2016    Cataract, left eye     Both eyes  Had surgery on left   Difficulty swallowing     Dizziness     Fibromyalgia     Gastric reflux     Hypertension     Lyme disease     Premature ventricular contraction     Primary osteoarthritis of both knees     Last Assessed 7/14/2016    Rheumatic fever     Sore throat     Tuberculosis     Tuberculosis 1945       Past Surgical History:   Procedure Laterality Date    APPENDECTOMY      CARDIAC PACEMAKER PLACEMENT  2019    HYSTERECTOMY      ORIF FEMUR FRACTURE Left 8/5/2021    Procedure: OPEN REDUCTION W/ INTERNAL FIXATION (ORIF) DISTAL FEMUR; INSERTION RETROGRADE NAIL;  Surgeon: Abelardo Garg MD;  [facility];  Service: Orthopedics    MD DILATE ESOPHAGUS N/A 4/6/2016    Procedure: DILATATION ESOPHAGEAL;  Surgeon: Jenn Clinton MD;  [facility]; Service: Gastroenterology    MD EGD TRANSORAL BIOPSY SINGLE/MULTIPLE N/A 4/6/2016    Procedure: ESOPHAGOGASTRODUODENOSCOPY (EGD); Surgeon: Jenn Clinton MD;  [facility]; Service: Gastroenterology    MD XCAPSL CTRC RMVL INSJ IO LENS PROSTH W/O ECP Left 3/15/2016    Procedure: EXTRACTION EXTRACAPSULAR CATARACT PHACO INTRAOCULAR LENS (IOL);   Surgeon: Linzie Opitz, MD;  [facility];  Service: Ophthalmology    REPLACEMENT TOTAL KNEE Right     REPLACEMENT TOTAL KNEE      right     TONSILLECTOMY         Family History   Problem Relation Age of Onset    Coronary artery disease Mother     Heart attack Mother         Prior    Heart disease Father     Heart attack Father         Prior       Social History     Occupational History    Not on file   Tobacco Use    Smoking status: Never Smoker    Smokeless tobacco: Never Used   Vaping Use    Vaping Use: Never used   Substance and Sexual Activity    Alcohol use: Not Currently     Comment: Per [employer] Social    Drug use: No    Sexual activity: Never         Current Outpatient Medications:     albuterol (PROVENTIL HFA,VENTOLIN HFA) 90 mcg/act inhaler, INHALE 2 PUFFS BY MOUTH EVERY 6 HOURS AS NEEDED FOR WHEEZING, Disp: 3 Inhaler, Rfl: 0    allopurinol (ZYLOPRIM) 100 mg tablet, Take 1 tablet (100 mg total) by mouth daily, Disp: 90 tablet, Rfl: 3    apixaban (Eliquis) 5 mg, Take 1 tablet (5 mg total) by mouth 2 (two) times a day, Disp: 180 tablet, Rfl: 1    diltiazem (CARDIZEM CD) 120 mg 24 hr capsule, Take 1 capsule (120 mg total) by mouth daily, Disp: 90 capsule, Rfl: 3    DULoxetine (Cymbalta) 30 mg delayed release capsule, Take 1 capsule (30 mg total) by mouth daily, Disp: 30 capsule, Rfl: 5    ezetimibe (ZETIA) 10 mg tablet, Take 1 tablet (10 mg total) by mouth daily, Disp: 90 tablet, Rfl: 1    furosemide (LASIX) 40 mg tablet, Take 1 tablet (40 mg total) by mouth daily, Disp: 90 tablet, Rfl: 1    lisinopril (ZESTRIL) 2 5 mg tablet, Take 1 tablet (2 5 mg total) by mouth daily, Disp: 90 tablet, Rfl: 1    Melatonin 10 MG TABS, Take by mouth, Disp: , Rfl:     meloxicam (MOBIC) 15 mg tablet, TAKE 1 TABLET(15 MG) BY MOUTH DAILY, Disp: 90 tablet, Rfl: 3    metoprolol tartrate (LOPRESSOR) 50 mg tablet, TAKE 1 TABLET(50 MG) BY MOUTH THREE TIMES DAILY (Patient taking differently: 100 mg 2 (two) times a day  ), Disp: 270 tablet, Rfl: 3    Mirabegron ER (Myrbetriq) 25 MG TB24, Take 25 mg by mouth daily, Disp: 90 tablet, Rfl: 3    oxybutynin (DITROPAN-XL) 5 mg 24 hr tablet, Take 1 tablet (5 mg total) by mouth daily, Disp: 90 tablet, Rfl: 3    oxyCODONE-acetaminophen (PERCOCET) 2 5-325 MG per tablet, Take 1 tablet by mouth every 4 (four) hours as needed for moderate pain (Patient not taking: Reported on 2/21/2022 ), Disp: , Rfl:     Allergies   Allergen Reactions    M64 Folate [Folic Acid-Vit E2-XZC V09 - Food Allergy]     Cobalt      Unknown    Lyrica [Pregabalin]     Nickel      Skin discoloration    Other      Black rubber    Penicillins Hives    Sulfa Antibiotics Itching    Cortisone Acetate [Cortisone] Itching and Rash    Penicillin G Rash            Vitals:       Objective:            Physical Exam  Physical Exam:      General Appearance:    Alert, cooperative, no distress, appears stated age   Head:    Normocephalic, without obvious abnormality, atraumatic   Eyes:    conjunctiva/corneas clear, both eyes         Nose:   Nares normal, septum midline, no drainage    Throat:   Lips normal; teeth and gums normal   Neck:    symmetrical, trachea midline, ;     thyroid:  no enlargement/   Back:     Symmetric, no curvature, ROM normal   Lungs:   No audible wheezing or labored breathing   Chest Wall:    No tenderness or deformity    Heart:    Regular rate and rhythm                         Pulses:   2+ and symmetric all extremities   Skin:   Skin color, texture, turgor normal, no rashes or lesions   Neurologic:   normal strength, sensation and reflexes     throughout                       Ortho Exam   Left knee  No abrasions or open wounds   Incision sites well healed   Patient is stable to coronal sagittal plane stress  Tenderness to palpation over the distal thigh region   No effusion   Medial and lateral joint line tenderness  Neurovascularly Intact Distally     Diagnostics, reviewed and taken today if performed as documented:    None performed      The attending physician has personally reviewed the pertinent films in PACS and interpretation is as follows:  Valgus alignment of the knee; no callus formation noted; implants in place Without signs of for the migration of screws or impairment of the intramedullary nail    Procedures, if performed today:    Procedures    None performed      Scribe Attestation    I,:  Scooby Carrasco am acting as a scribe while in the presence of the attending physician :       I,:  Shar Allen MD personally performed the services described in this documentation    as scribed in my presence : Portions of the record may have been created with voice recognition software  Occasional wrong word or "sound a like" substitutions may have occurred due to the inherent limitations of voice recognition software  Read the chart carefully and recognize, using context, where substitutions have occurred

## 2022-04-04 ENCOUNTER — HOSPITAL ENCOUNTER (OUTPATIENT)
Dept: CT IMAGING | Facility: HOSPITAL | Age: 77
Discharge: HOME | End: 2022-04-04
Attending: ORTHOPAEDIC SURGERY
Payer: MEDICARE

## 2022-04-04 DIAGNOSIS — Z98.890 STATUS POST SURGERY: ICD-10-CM

## 2022-04-04 DIAGNOSIS — S72.92XK CLOSED FRACTURE OF LEFT FEMUR WITH NONUNION, UNSPECIFIED FRACTURE MORPHOLOGY, UNSPECIFIED PORTION OF FEMUR, SUBSEQUENT ENCOUNTER: ICD-10-CM

## 2022-04-04 PROCEDURE — 73700 CT LOWER EXTREMITY W/O DYE: CPT

## 2022-04-06 ENCOUNTER — TELEPHONE (OUTPATIENT)
Dept: OBGYN CLINIC | Facility: OTHER | Age: 77
End: 2022-04-06

## 2022-04-06 NOTE — TELEPHONE ENCOUNTER
Netta's calling back stating the fax # provided before is the correct fax #  Please try faxing it again  If there's any way we can email the blank release form please let her know      [facility]#[phone]

## 2022-04-06 NOTE — TELEPHONE ENCOUNTER
Are you able to try to fax again? If it is unable to go through I can try again tomorrow  Thank you!

## 2022-04-06 NOTE — TELEPHONE ENCOUNTER
Charles Salvador , care giver called to see if we can provide a blank Medical Clearance Form for the dentist to make sure there is no infection in her mouth before they do they knee replacement in order to do xrays of her mouth      Can we please fax the form to    Fax # [phone]  Attn : Carmen Mcknight # [phone]

## 2022-04-06 NOTE — TELEPHONE ENCOUNTER
Called [phone] to get fax number; unable to get ahold of anyone but left a voicemail  Also called and LVM for Yunior Carcamo to attempt to get an updated fax number  Thank you!

## 2022-04-07 NOTE — TELEPHONE ENCOUNTER
Pb Lance is calling back, if the fax can be attempt one more time to fax,    If not, please mail to patients home

## 2022-04-20 ENCOUNTER — OFFICE VISIT (OUTPATIENT)
Dept: OBGYN CLINIC | Facility: CLINIC | Age: 77
End: 2022-04-20
Payer: MEDICARE

## 2022-04-20 VITALS
DIASTOLIC BLOOD PRESSURE: 80 MMHG | BODY MASS INDEX: 39.9 KG/M2 | HEIGHT: 64 IN | SYSTOLIC BLOOD PRESSURE: 126 MMHG | WEIGHT: 233.69 LBS

## 2022-04-20 DIAGNOSIS — Z01.818 PREOP TESTING: ICD-10-CM

## 2022-04-20 DIAGNOSIS — M17.12 PRIMARY OSTEOARTHRITIS OF LEFT KNEE: ICD-10-CM

## 2022-04-20 DIAGNOSIS — Z96.9 RETAINED ORTHOPEDIC HARDWARE: ICD-10-CM

## 2022-04-20 DIAGNOSIS — S72.92XK CLOSED FRACTURE OF LEFT FEMUR WITH NONUNION, UNSPECIFIED FRACTURE MORPHOLOGY, UNSPECIFIED PORTION OF FEMUR, SUBSEQUENT ENCOUNTER: Primary | ICD-10-CM

## 2022-04-20 DIAGNOSIS — S72.92XK CLOSED FRACTURE OF LEFT FEMUR WITH NONUNION, UNSPECIFIED FRACTURE MORPHOLOGY, UNSPECIFIED PORTION OF FEMUR, SUBSEQUENT ENCOUNTER: ICD-10-CM

## 2022-04-20 PROCEDURE — 99214 OFFICE O/P EST MOD 30 MIN: CPT | Performed by: ORTHOPAEDIC SURGERY

## 2022-04-20 RX ORDER — FOLIC ACID 1 MG/1
TABLET ORAL
Qty: 90 TABLET | Refills: 0 | Status: SHIPPED | OUTPATIENT
Start: 2022-04-20

## 2022-04-20 RX ORDER — FERROUS SULFATE TAB EC 324 MG (65 MG FE EQUIVALENT) 324 (65 FE) MG
324 TABLET DELAYED RESPONSE ORAL
Qty: 60 TABLET | Refills: 1 | Status: SHIPPED | OUTPATIENT
Start: 2022-04-20 | End: 2022-08-09 | Stop reason: SDUPTHER

## 2022-04-20 RX ORDER — CHLORHEXIDINE GLUCONATE 0.12 MG/ML
15 RINSE ORAL ONCE
OUTPATIENT
Start: 2022-04-20 | End: 2022-04-20

## 2022-04-20 RX ORDER — OXYCODONE HYDROCHLORIDE 5 MG/1
5 TABLET ORAL EVERY 4 HOURS PRN
Qty: 30 TABLET | Refills: 0 | Status: SHIPPED | OUTPATIENT
Start: 2022-04-20 | End: 2022-05-26 | Stop reason: SDUPTHER

## 2022-04-20 RX ORDER — ASCORBIC ACID 500 MG
500 TABLET ORAL 2 TIMES DAILY
Qty: 60 TABLET | Refills: 1 | Status: SHIPPED | OUTPATIENT
Start: 2022-04-20 | End: 2022-08-09 | Stop reason: SDUPTHER

## 2022-04-20 RX ORDER — ACETAMINOPHEN 325 MG/1
975 TABLET ORAL ONCE
OUTPATIENT
Start: 2022-04-20 | End: 2022-04-20

## 2022-04-20 RX ORDER — FOLIC ACID 1 MG/1
1 TABLET ORAL DAILY
Qty: 30 TABLET | Refills: 1 | Status: SHIPPED | OUTPATIENT
Start: 2022-04-20 | End: 2022-04-20

## 2022-04-20 RX ORDER — NALOXONE HYDROCHLORIDE 4 MG/.1ML
SPRAY NASAL
Qty: 1 EACH | Refills: 1 | Status: SHIPPED | OUTPATIENT
Start: 2022-04-20

## 2022-04-20 RX ORDER — SODIUM CHLORIDE 9 MG/ML
125 INJECTION, SOLUTION INTRAVENOUS CONTINUOUS
OUTPATIENT
Start: 2022-04-20

## 2022-04-20 RX ORDER — CHLORHEXIDINE GLUCONATE 4 G/100ML
SOLUTION TOPICAL DAILY PRN
OUTPATIENT
Start: 2022-04-20

## 2022-04-20 NOTE — PROGRESS NOTES
Orthopedics          Aidee Escalona 64 y o  female MRN: [id number]      Chief Complaint:   left knee pain    HPI:   64 y  o female complaining of left knee pain  Patient presents office today status post 8 months left distal femur fracture retrograde intramedullary nail fixation with nonunion  Patient presents today for follow-up from a CT scan left lower extremity  Patient was advised to have dental work however this is not able to be scheduled at this time  Patient localizes pain to her left knee pain is worse with activity mildly relieved with rest   Patient has also been noted to have left knee arthritis  Patient has been treated with injections with only minimal pain relief  Patient is transported with a wheelchair and a walker  Review Of Systems:   · Skin: Normal  · Neuro: See HPI  · Musculoskeletal: See HPI  · All other systems reviewed and are negative    Past Medical History:   Past Medical History:   Diagnosis Date    Abnormal ECG     Last Assessed 9/29/2016    Anxiety     Last Assessed 6/08/2016    Arthritis     knees    Asthma     Last Assessed 11/06/2013    Atrial premature complex     Cataract, bilateral     Last Assessed 7/14/2016    Cataract, left eye     Both eyes  Had surgery on left      Difficulty swallowing     Dizziness     Fibromyalgia     Gastric reflux     Hypertension     Lyme disease     Premature ventricular contraction     Primary osteoarthritis of both knees     Last Assessed 7/14/2016    Rheumatic fever     Sore throat     Tuberculosis     Tuberculosis 1945       Past Surgical History:   Past Surgical History:   Procedure Laterality Date    APPENDECTOMY      CARDIAC PACEMAKER PLACEMENT  2019    HYSTERECTOMY      ORIF FEMUR FRACTURE Left 8/5/2021    Procedure: OPEN REDUCTION W/ INTERNAL FIXATION (ORIF) DISTAL FEMUR; INSERTION RETROGRADE NAIL;  Surgeon: Yue Leone MD;  [facility];  Service: Orthopedics    [place] DILATE ESOPHAGUS N/A 4/6/2016    Procedure: DILATATION ESOPHAGEAL;  Surgeon: Noé Flores MD;  [facility]; Service: Gastroenterology    TX EGD TRANSORAL BIOPSY SINGLE/MULTIPLE N/A 4/6/2016    Procedure: ESOPHAGOGASTRODUODENOSCOPY (EGD); Surgeon: Noé Flores MD;  [facility]; Service: Gastroenterology    TX XCAPSL CTRC RMVL INSJ IO LENS PROSTH W/O ECP Left 3/15/2016    Procedure: EXTRACTION EXTRACAPSULAR CATARACT PHACO INTRAOCULAR LENS (IOL); Surgeon: Remi Crocker MD;  Location:  [facility];  Service: Ophthalmology    REPLACEMENT TOTAL KNEE Right     REPLACEMENT TOTAL KNEE      right     TONSILLECTOMY         Family History:  Family history reviewed and non-contributory  Family History   Problem Relation Age of Onset    Coronary artery disease Mother     Heart attack Mother         Prior    Heart disease Father     Heart attack Father         Prior         Social History:  Social History     Socioeconomic History    Marital status:      Spouse name: None    Number of children: None    Years of education: None    Highest education level: None   Occupational History    None   Tobacco Use    Smoking status: Never Smoker    Smokeless tobacco: Never Used   Vaping Use    Vaping Use: Never used   Substance and Sexual Activity    Alcohol use: Not Currently     Comment: Per [employer] Social    Drug use: No    Sexual activity: Never   Other Topics Concern    None   Social History Narrative    None     Social Determinants of Health     Financial Resource Strain: Not on file   Food Insecurity: Not on file   Transportation Needs: Not on file   Physical Activity: Not on file   Stress: Not on file   Social Connections: Not on file   Intimate Partner Violence: Not on file   Housing Stability: Not on file       Allergies:    Allergies   Allergen Reactions    R82 Folate [Folic Acid-Vit L1-WHB Q88 - Food Allergy]     Cobalt      Unknown    Lyrica [Pregabalin]     Nickel      Skin discoloration    Other      Black rubber    Penicillins Hives    Sulfa Antibiotics Itching    Cortisone Acetate [Cortisone] Itching and Rash    Penicillin G Rash       Labs:  0   Lab Value Date/Time    HCT 42 3 09/27/2021 0455    HCT 39 2 09/24/2021 0446    HCT 38 1 09/22/2021 0506    HGB 13 0 09/27/2021 0455    HGB 12 0 09/24/2021 0446    HGB 11 5 09/22/2021 0506    INR 1 90 (H) 09/20/2021 0305    WBC 12 38 (H) 09/27/2021 0455    WBC 11 23 (H) 09/24/2021 0446    WBC 12 37 (H) 09/22/2021 0506    CRP 78 1 (H) 09/28/2021 0554       Meds:    Current Outpatient Medications:     albuterol (PROVENTIL HFA,VENTOLIN HFA) 90 mcg/act inhaler, INHALE 2 PUFFS BY MOUTH EVERY 6 HOURS AS NEEDED FOR WHEEZING, Disp: 3 Inhaler, Rfl: 0    allopurinol (ZYLOPRIM) 100 mg tablet, Take 1 tablet (100 mg total) by mouth daily, Disp: 90 tablet, Rfl: 3    apixaban (Eliquis) 5 mg, Take 1 tablet (5 mg total) by mouth 2 (two) times a day, Disp: 180 tablet, Rfl: 1    diltiazem (CARDIZEM CD) 120 mg 24 hr capsule, Take 1 capsule (120 mg total) by mouth daily, Disp: 90 capsule, Rfl: 3    DULoxetine (Cymbalta) 30 mg delayed release capsule, Take 1 capsule (30 mg total) by mouth daily, Disp: 30 capsule, Rfl: 5    ezetimibe (ZETIA) 10 mg tablet, Take 1 tablet (10 mg total) by mouth daily, Disp: 90 tablet, Rfl: 1    furosemide (LASIX) 40 mg tablet, Take 1 tablet (40 mg total) by mouth daily, Disp: 90 tablet, Rfl: 1    lisinopril (ZESTRIL) 2 5 mg tablet, Take 1 tablet (2 5 mg total) by mouth daily, Disp: 90 tablet, Rfl: 1    Melatonin 10 MG TABS, Take by mouth, Disp: , Rfl:     meloxicam (MOBIC) 15 mg tablet, TAKE 1 TABLET(15 MG) BY MOUTH DAILY, Disp: 90 tablet, Rfl: 3    metoprolol tartrate (LOPRESSOR) 50 mg tablet, TAKE 1 TABLET(50 MG) BY MOUTH THREE TIMES DAILY (Patient taking differently: 100 mg 2 (two) times a day  ), Disp: 270 tablet, Rfl: 3    Mirabegron ER (Myrbetriq) 25 MG TB24, Take 25 mg by mouth daily, Disp: 90 tablet, Rfl: 3    oxybutynin (DITROPAN-XL) 5 mg 24 hr tablet, Take 1 tablet (5 mg total) by mouth daily, Disp: 90 tablet, Rfl: 3    oxyCODONE-acetaminophen (PERCOCET) 2 5-325 MG per tablet, Take 1 tablet by mouth every 4 (four) hours as needed for moderate pain  , Disp: , Rfl:       Physical Exam:   Vitals:    04/20/22 1454   BP: 126/80       General Appearance:    Alert, cooperative, no distress, appears stated age   Head:    Normocephalic, without obvious abnormality, atraumatic   Eyes:    conjunctiva/corneas clear, both eyes        Nose:   Nares normal, septum midline, no drainage    Throat:   Lips normal; teeth and gums normal   Neck:    symmetrical, trachea midline, ;     thyroid:  no enlargement/   Back:     Symmetric, no curvature, ROM normal   Lungs:   No audible wheezing or labored breathing   Chest Wall:    No tenderness or deformity    Heart:    Regular rate and rhythm               Pulses:   2+ and symmetric all extremities   Skin:   Skin color, texture, turgor normal, no rashes or lesions   Neurologic:   normal strength, sensation and reflexes     throughout       Musculoskeletal: left lower extremity  Examination patient's left knee well-healed anterior scar noted active knee extension and passive extension 5° off full extension and flexion to 95° pain palpation medial lateral joint pain palpation distal femur medially and laterally no erythema no effusion active ankle dorsi plantar flexion sensation intact distal pulses present left lower extremity  Radiology:   I personally reviewed the films  CT scan of the left knee shows no evidence of bony bridging of the distal femur fracture distal screws noted to be backing out no evidence of cut-out of the intramedullary nail evidence of a distal femur nonunion noted on CT scan  _*_*_*_*_*_*_*_*_*_*_*_*_*_*_*_*_*_*_*_*_*_*_*_*_*_*_*_*_*_*_*_*_*_*_*_*_*_*_*_*_*    Assessment:  64 y  o female with left knee pain due to left distal femur nonunion and left knee arthritis    Plan:   · Weight bearing as tolerated  left lower extremity  · Patient is a candidate for left femur hardware removal and left distal femoral replacement surgical intervention  · Operative and non operative intervention reviewed with patient  Patient wishes to pursue operative intervention including removal of left femur hardware and distal femoral replacement left knee  Risks and benefits of the procedure reviewed with the patient in great length  Risks include, but are not limited to, infection, bleeding, DVT/PE, gait abnormality, Limp, chronic pain, stiffness, dislocation, instability, fracture, failure of hardware, neurovascular injury, compartment syndrome, decreased range of motion, loss of limb, leg length discrepancy and failure to achieve the desired results  Patient is aware of the risks and is willing to proceed  Appropriate medical clearance and laboratory workup will be ordered upon completion of office visit today  We'll followup with the patient in the postoperative period  Patient is currently on Eliquis due medical issues  Will follow-up with the patient 1 week after her dental work is complete for aspiration left knee  · We will contact patient's internal medicine service so that she can receive the appropriate pain meds for her pathology

## 2022-04-29 PROCEDURE — 1123F ACP DISCUSS/DSCN MKR DOCD: CPT | Performed by: INTERNAL MEDICINE

## 2022-05-03 ENCOUNTER — TELEPHONE (OUTPATIENT)
Dept: OBGYN CLINIC | Facility: CLINIC | Age: 77
End: 2022-05-03

## 2022-05-03 NOTE — TELEPHONE ENCOUNTER
Pt's niece called asking to schedule a pre-op visit with Dr Rupa Desai to have pt's leg examined prior to surgery as requested  We also discussed the pre op blood work that needs to be done as well as her pre op clearance appt  I stated that I will contact cardiology to have a paper faxed to them for clearance as pt was recently there  She agreed and verbalized understanding

## 2022-05-11 ENCOUNTER — ANESTHESIA EVENT (OUTPATIENT)
Dept: PERIOP | Facility: HOSPITAL | Age: 77
DRG: 481 | End: 2022-05-11
Payer: MEDICARE

## 2022-05-17 ENCOUNTER — APPOINTMENT (OUTPATIENT)
Dept: LAB | Facility: HOSPITAL | Age: 77
End: 2022-05-17
Payer: MEDICARE

## 2022-05-17 ENCOUNTER — TELEPHONE (OUTPATIENT)
Dept: OBGYN CLINIC | Facility: HOSPITAL | Age: 77
End: 2022-05-17

## 2022-05-17 DIAGNOSIS — M17.12 PRIMARY OSTEOARTHRITIS OF LEFT KNEE: ICD-10-CM

## 2022-05-17 DIAGNOSIS — S72.92XK CLOSED FRACTURE OF LEFT FEMUR WITH NONUNION, UNSPECIFIED FRACTURE MORPHOLOGY, UNSPECIFIED PORTION OF FEMUR, SUBSEQUENT ENCOUNTER: ICD-10-CM

## 2022-05-17 DIAGNOSIS — Z01.818 OTHER SPECIFIED PRE-OPERATIVE EXAMINATION: ICD-10-CM

## 2022-05-17 DIAGNOSIS — Z96.9 RETAINED ORTHOPEDIC HARDWARE: ICD-10-CM

## 2022-05-17 DIAGNOSIS — Z01.818 PREOP TESTING: ICD-10-CM

## 2022-05-17 LAB
ABO GROUP BLD: NORMAL
ALBUMIN SERPL BCP-MCNC: 4.3 G/DL (ref 3.5–5)
ALP SERPL-CCNC: 64 U/L (ref 34–104)
ALT SERPL W P-5'-P-CCNC: 12 U/L (ref 7–52)
ANION GAP SERPL CALCULATED.3IONS-SCNC: 7 MMOL/L (ref 4–13)
APTT PPP: 39 SECONDS (ref 23–37)
AST SERPL W P-5'-P-CCNC: 18 U/L (ref 13–39)
BASOPHILS # BLD AUTO: 0.04 THOUSANDS/ΜL (ref 0–0.1)
BASOPHILS NFR BLD AUTO: 1 % (ref 0–1)
BILIRUB SERPL-MCNC: 0.41 MG/DL (ref 0.2–1)
BLD GP AB SCN SERPL QL: NEGATIVE
BUN SERPL-MCNC: 30 MG/DL (ref 5–25)
CALCIUM SERPL-MCNC: 9.9 MG/DL (ref 8.4–10.2)
CHLORIDE SERPL-SCNC: 102 MMOL/L (ref 96–108)
CO2 SERPL-SCNC: 30 MMOL/L (ref 21–32)
CREAT SERPL-MCNC: 1 MG/DL (ref 0.6–1.3)
CRP SERPL QL: 5.2 MG/L
EOSINOPHIL # BLD AUTO: 0.43 THOUSAND/ΜL (ref 0–0.61)
EOSINOPHIL NFR BLD AUTO: 6 % (ref 0–6)
ERYTHROCYTE [DISTWIDTH] IN BLOOD BY AUTOMATED COUNT: 13.1 % (ref 11.6–15.1)
FERRITIN SERPL-MCNC: 148 NG/ML (ref 8–388)
GFR SERPL CREATININE-BSD FRML MDRD: 54 ML/MIN/1.73SQ M
GLUCOSE SERPL-MCNC: 90 MG/DL (ref 65–140)
HCT VFR BLD AUTO: 39.5 % (ref 34.8–46.1)
HGB BLD-MCNC: 12.9 G/DL (ref 11.5–15.4)
IMM GRANULOCYTES # BLD AUTO: 0.03 THOUSAND/UL (ref 0–0.2)
IMM GRANULOCYTES NFR BLD AUTO: 0 % (ref 0–2)
INR PPP: 1.36 (ref 0.84–1.19)
IRON SATN MFR SERPL: 24 % (ref 15–50)
IRON SERPL-MCNC: 67 UG/DL (ref 50–170)
LYMPHOCYTES # BLD AUTO: 2.03 THOUSANDS/ΜL (ref 0.6–4.47)
LYMPHOCYTES NFR BLD AUTO: 28 % (ref 14–44)
MCH RBC QN AUTO: 30.8 PG (ref 26.8–34.3)
MCHC RBC AUTO-ENTMCNC: 32.7 G/DL (ref 31.4–37.4)
MCV RBC AUTO: 94 FL (ref 82–98)
MONOCYTES # BLD AUTO: 0.82 THOUSAND/ΜL (ref 0.17–1.22)
MONOCYTES NFR BLD AUTO: 11 % (ref 4–12)
NEUTROPHILS # BLD AUTO: 3.95 THOUSANDS/ΜL (ref 1.85–7.62)
NEUTS SEG NFR BLD AUTO: 54 % (ref 43–75)
NRBC BLD AUTO-RTO: 0 /100 WBCS
PLATELET # BLD AUTO: 267 THOUSANDS/UL (ref 149–390)
PMV BLD AUTO: 8.6 FL (ref 8.9–12.7)
POTASSIUM SERPL-SCNC: 5 MMOL/L (ref 3.5–5.3)
PROT SERPL-MCNC: 7.4 G/DL (ref 6.4–8.4)
PROTHROMBIN TIME: 16.6 SECONDS (ref 11.6–14.5)
RBC # BLD AUTO: 4.19 MILLION/UL (ref 3.81–5.12)
RETICS # AUTO: NORMAL 10*3/UL (ref 14097–95744)
RETICS # CALC: 1.85 % (ref 0.37–1.87)
RH BLD: POSITIVE
SODIUM SERPL-SCNC: 139 MMOL/L (ref 135–147)
SPECIMEN EXPIRATION DATE: NORMAL
TIBC SERPL-MCNC: 284 UG/DL (ref 250–450)
WBC # BLD AUTO: 7.3 THOUSAND/UL (ref 4.31–10.16)

## 2022-05-17 PROCEDURE — 80053 COMPREHEN METABOLIC PANEL: CPT

## 2022-05-17 PROCEDURE — 85025 COMPLETE CBC W/AUTO DIFF WBC: CPT

## 2022-05-17 PROCEDURE — 85730 THROMBOPLASTIN TIME PARTIAL: CPT

## 2022-05-17 PROCEDURE — 83036 HEMOGLOBIN GLYCOSYLATED A1C: CPT

## 2022-05-17 PROCEDURE — 85610 PROTHROMBIN TIME: CPT

## 2022-05-17 PROCEDURE — 86901 BLOOD TYPING SEROLOGIC RH(D): CPT

## 2022-05-17 PROCEDURE — 86140 C-REACTIVE PROTEIN: CPT

## 2022-05-17 PROCEDURE — 83550 IRON BINDING TEST: CPT

## 2022-05-17 PROCEDURE — 86900 BLOOD TYPING SEROLOGIC ABO: CPT

## 2022-05-17 PROCEDURE — 83540 ASSAY OF IRON: CPT

## 2022-05-17 PROCEDURE — 36415 COLL VENOUS BLD VENIPUNCTURE: CPT

## 2022-05-17 PROCEDURE — 82728 ASSAY OF FERRITIN: CPT

## 2022-05-17 PROCEDURE — 85045 AUTOMATED RETICULOCYTE COUNT: CPT

## 2022-05-17 PROCEDURE — 86850 RBC ANTIBODY SCREEN: CPT

## 2022-05-18 ENCOUNTER — REMOTE DEVICE CLINIC VISIT (OUTPATIENT)
Dept: CARDIOLOGY CLINIC | Facility: CLINIC | Age: 77
End: 2022-05-18
Payer: MEDICARE

## 2022-05-18 DIAGNOSIS — Z95.0 CARDIAC PACEMAKER IN SITU: Primary | ICD-10-CM

## 2022-05-18 LAB
EST. AVERAGE GLUCOSE BLD GHB EST-MCNC: 111 MG/DL
HBA1C MFR BLD: 5.5 %

## 2022-05-18 PROCEDURE — 93294 REM INTERROG EVL PM/LDLS PM: CPT | Performed by: INTERNAL MEDICINE

## 2022-05-18 PROCEDURE — 93296 REM INTERROG EVL PM/IDS: CPT | Performed by: INTERNAL MEDICINE

## 2022-05-18 NOTE — PROGRESS NOTES
Results for orders placed or performed in visit on 05/18/22   Cardiac EP device report    Narrative    MDT DUAL CHAMBER PPM (AAIR-DDDR MODE)/ ACTIVE SYSTEM IS MRI CONDITIONAL  CARELINK TRANSMISSION: BATTERY STATUS "5 YRS " AP 97%  2%  ALL AVAILABLE LEAD PARAMETERS WITHIN NORMAL LIMITS  4 AT/AF NOTED; 3% BURDEN; rATP NOTED  PT ON ELIQUIS  NORMAL DEVICE FUNCTION   NC

## 2022-05-20 DIAGNOSIS — I50.32 CHRONIC HEART FAILURE WITH PRESERVED EJECTION FRACTION (HCC): ICD-10-CM

## 2022-05-20 RX ORDER — FUROSEMIDE 40 MG/1
40 TABLET ORAL DAILY
Qty: 90 TABLET | Refills: 1 | Status: SHIPPED | OUTPATIENT
Start: 2022-05-20 | End: 2022-05-23 | Stop reason: SDUPTHER

## 2022-05-23 DIAGNOSIS — I50.32 CHRONIC HEART FAILURE WITH PRESERVED EJECTION FRACTION (HCC): ICD-10-CM

## 2022-05-23 RX ORDER — FUROSEMIDE 40 MG/1
40 TABLET ORAL DAILY
Qty: 90 TABLET | Refills: 1 | Status: SHIPPED | OUTPATIENT
Start: 2022-05-23

## 2022-05-23 NOTE — PRE-PROCEDURE INSTRUCTIONS
Pre-Surgery Instructions:   Medication Instructions    albuterol (PROVENTIL HFA,VENTOLIN HFA) 90 mcg/act inhaler Uses PRN- OK to take day of surgery    allopurinol (ZYLOPRIM) 100 mg tablet Take day of surgery  with sip of water     apixaban (Eliquis) 5 mg Pt needs instructions - msg sent to [facility] for Eliquis instructions    ascorbic acid (VITAMIN C) 500 MG tablet Hold day of surgery   BIOTIN PO instructed pts caregiver Rainer Bonner to HOLD one week prior to surgery     Cholecalciferol (VITAMIN D3 PO) instructed pts caregiver Rainer Bonner to [facility] one week prior to surgery    Cyanocobalamin (VITAMIN B-12 PO) instructed pts caregiver Rainer Bonner to [facility] one week prior to surgery    diltiazem (CARDIZEM CD) 120 mg 24 hr capsule Take morning of surgery with sip of water     DULoxetine (Cymbalta) 30 mg delayed release capsule Take day of surgery  with sip of water    ezetimibe (ZETIA) 10 mg tablet Take day of surgery  with sip of water     ferrous sulfate 324 (65 Fe) mg Hold day of surgery   folic acid (FOLVITE) 1 mg tablet Hold day of surgery   furosemide (LASIX) 40 mg tablet Hold day of surgery   lisinopril (ZESTRIL) 2 5 mg tablet Hold day of surgery   Melatonin 10 MG TABS instructed pts caregiver Rainer Bonner to [facility] one week prior to surgery    meloxicam (MOBIC) 15 mg tablet Instructed pts caregiver Ranier Bonner to hold 3 days prior to surgery     metoprolol tartrate (LOPRESSOR) 50 mg tablet Take day of surgery  with sip of water     Mirabegron ER (Myrbetriq) 25 MG TB24 Take night before surgery    Multiple Vitamin (MULTIVITAMIN ADULT PO) instructed pts caregiver Rainer Bonner to [facility] one week prior to surgery    naloxone (NARCAN) 4 mg/0 1 mL nasal spray Pts caregiver states has Narcan has never needed to use it    oxybutynin (DITROPAN-XL) 5 mg 24 hr tablet Take night before surgery    oxyCODONE (Roxicodone) 5 immediate release tablet Take day of surgery  with sip of water if needed    See Geriatric Assessment below      Cognitive Assessment:    CAM:    TUG <15 sec:   Falls (last 6 months): YES -8/1/2021   Hand  score:  -Attempt 1:  -Attempt 2:  -Attempt 3:   Eran Total Score: 20    PHQ- 9 Depression Scale: 2   Nutrition Assessment Score: 14   METS: 5 65   Health goals:  -What are your overall health goals? (quit smoking, wt  loss, rest, decrease stress)  "I want to get back to using her car and driving- able to shop etc      -What brings you strength? (family, friends, Denominational, health)  Family, friends and events   -What activities are important to you? (exercise, reading, travel, wo  Pt reports swimming , go to [employer] to read, getting together with friends  Pt is fully vaccinated for COVID x2  My Surgical Experience    The following information was developed to assist you to prepare for your operation  What do I need to do before coming to the hospital?   Arrange for a responsible person to drive you to and from the hospital    Arrange care for your children at home  Children are not allowed in the recovery areas of the hospital   Plan to wear clothing that is easy to put on and take off  If you are having shoulder surgery, wear a shirt that buttons or zippers in the front  Bathing  o Shower the evening before and the morning of your surgery with an antibacterial soap  Please refer to the Pre Op Showering Instructions for Surgery Patients Sheet   o Remove nail polish and all body piercing jewelry  o Do not shave any body part for at least 24 hours before surgery-this includes face, arms, legs and upper body  Food  o Nothing to eat or drink after midnight the night before your surgery   This includes candy and chewing gum  o Exception: If your surgery is after 12:00pm (noon), you may have clear liquids such as 7-Up®, ginger ale, apple or cranberry juice, Jell-O®, water, or clear broth until 8:00 am  o Do not drink milk or juice with pulp on the morning before surgery  o Do not drink alcohol 24 hours before surgery  Medicine  o Follow instructions you received from your surgeon about which medicines you may take on the day of surgery  o If instructed to take medicine on the morning of surgery, take pills with just a small sip of water  Call your prescribing doctor for specific infroamtion on what to do if you take insulin    What should I bring to the hospital?    Bring:  Birdie Horatio or a walker, if you have them, for foot or knee surgery   A list of the daily medicines, vitamins, minerals, herbals and nutritional supplements you take  Include the dosages of medicines and the time you take them each day   Glasses, dentures or hearing aids   Minimal clothing; you will be wearing hospital sleepwear   Photo ID; required to verify your identity   If you have a Living Will or Power of , bring a copy of the documents   If you have an ostomy, bring an extra pouch and any supplies you use    Do not bring   Medicines or inhalers   Money, valuables or jewelry    What other information should I know about the day of surgery?  Notify your surgeons if you develop a cold, sore throat, cough, fever, rash or any other illness   Report to the Ambulatory Surgical/Same Day Surgery Unit   You will be instructed to stop at Registration only if you have not been pre-registered   Inform your  fi they do not stay that they will be asked by the staff to leave a phone number where they can be reached   Be available to be reached before surgery  In the event the operating room schedule changes, you may be asked to come in earlier or later than expected    *It is important to tell your doctor and others involved in your health care if you are taking or have been taking any non-prescription drugs, vitamins, minerals, herbals or other nutritional supplements   Any of these may interact with some food or medicines and cause a reaction

## 2022-05-25 ENCOUNTER — TELEPHONE (OUTPATIENT)
Dept: OBGYN CLINIC | Facility: HOSPITAL | Age: 77
End: 2022-05-25

## 2022-05-25 NOTE — TELEPHONE ENCOUNTER
Mary Beth Tanomaddie contacted ([phone]) She was very upset, stating they need to pull 7 teeth and they do not have a provider to do so  She is frustrated  I educated the need for dental work prior to procedure due to risk of infection  I also made her aware of our recommendations for no dental work 12 weeks postop after our surgery  She is requesting a surgery date in [place]  Is aware I will send to the surgery coordinator to get in touch with her to schedule  She is aware we will also need information on the provider that will do the dental work as we will get dental clearance after work is done   She states "We will cross that bridge later "

## 2022-05-25 NOTE — TELEPHONE ENCOUNTER
Dr Edwin Huang, please advise on dental work, as appears patient is also a fx patient not true total joint? Should dental work be completed preop?

## 2022-05-25 NOTE — TELEPHONE ENCOUNTER
Patient's caregiver states she is scheduled for knee replace sx on 6/13  Patient also has teeth to be pulled which may be a lengthy process  Caregiver states the dentist wants to know if it is OK to spread the teeth removal out over a period of time after the knee replacement

## 2022-05-26 DIAGNOSIS — S72.92XK CLOSED FRACTURE OF LEFT FEMUR WITH NONUNION, UNSPECIFIED FRACTURE MORPHOLOGY, UNSPECIFIED PORTION OF FEMUR, SUBSEQUENT ENCOUNTER: ICD-10-CM

## 2022-05-26 RX ORDER — OXYCODONE HYDROCHLORIDE 5 MG/1
5 TABLET ORAL EVERY 8 HOURS PRN
Qty: 30 TABLET | Refills: 0 | Status: SHIPPED | OUTPATIENT
Start: 2022-05-26 | End: 2022-06-27 | Stop reason: SDUPTHER

## 2022-05-26 NOTE — TELEPHONE ENCOUNTER
Juvenal Jaime,  She is now tentatively scheduled for 8/22/22 pending the timing of her dental work      Thanks,  Ella Power

## 2022-06-01 ENCOUNTER — OFFICE VISIT (OUTPATIENT)
Dept: INTERNAL MEDICINE CLINIC | Facility: CLINIC | Age: 77
End: 2022-06-01
Payer: MEDICARE

## 2022-06-01 VITALS
RESPIRATION RATE: 16 BRPM | TEMPERATURE: 98 F | HEIGHT: 64 IN | HEART RATE: 77 BPM | DIASTOLIC BLOOD PRESSURE: 70 MMHG | OXYGEN SATURATION: 97 % | BODY MASS INDEX: 39.61 KG/M2 | SYSTOLIC BLOOD PRESSURE: 124 MMHG | WEIGHT: 232 LBS

## 2022-06-01 DIAGNOSIS — G89.29 CHRONIC PAIN OF LEFT KNEE: ICD-10-CM

## 2022-06-01 DIAGNOSIS — I48.0 PAROXYSMAL ATRIAL FIBRILLATION (HCC): ICD-10-CM

## 2022-06-01 DIAGNOSIS — S72.432G CLOSED BICONDYLAR FRACTURE OF LEFT FEMUR WITH DELAYED HEALING: ICD-10-CM

## 2022-06-01 DIAGNOSIS — M25.562 CHRONIC PAIN OF LEFT KNEE: ICD-10-CM

## 2022-06-01 DIAGNOSIS — S72.422G CLOSED BICONDYLAR FRACTURE OF LEFT FEMUR WITH DELAYED HEALING: ICD-10-CM

## 2022-06-01 DIAGNOSIS — I48.0 AF (PAROXYSMAL ATRIAL FIBRILLATION) (HCC): ICD-10-CM

## 2022-06-01 DIAGNOSIS — I10 ESSENTIAL HYPERTENSION: ICD-10-CM

## 2022-06-01 DIAGNOSIS — R26.2 AMBULATORY DYSFUNCTION: ICD-10-CM

## 2022-06-01 DIAGNOSIS — K08.9 POOR DENTITION: ICD-10-CM

## 2022-06-01 DIAGNOSIS — Z01.818 PREOPERATIVE EXAMINATION: Primary | ICD-10-CM

## 2022-06-01 PROBLEM — S72.90XA FEMUR FRACTURE (HCC): Status: RESOLVED | Noted: 2021-08-02 | Resolved: 2022-06-01

## 2022-06-01 PROBLEM — S72.90XA FEMUR FRACTURE (HCC): Status: ACTIVE | Noted: 2022-06-01

## 2022-06-01 PROBLEM — S72.90XA FEMUR FRACTURE (HCC): Status: RESOLVED | Noted: 2022-06-01 | Resolved: 2022-06-01

## 2022-06-01 PROCEDURE — 99215 OFFICE O/P EST HI 40 MIN: CPT | Performed by: INTERNAL MEDICINE

## 2022-06-01 RX ORDER — METOPROLOL TARTRATE 50 MG/1
TABLET, FILM COATED ORAL
Qty: 270 TABLET | Refills: 3
Start: 2022-06-01

## 2022-06-01 NOTE — ASSESSMENT & PLAN NOTE
History of fracture of the distal left femur with advanced arthritis of the left knee joint  Surgical replacement of the knee joint is planned for later this year  Impaired of that she have the dental work to remove the decaying condition of 7 teeth on the lower jaw

## 2022-06-01 NOTE — PROGRESS NOTES
Assessment/Plan:    Poor dentition  The patient obviously has very poor dentition in the lower jaw  Seven teeth in the front of the jaw are all in various stages of decay and decline  To the teeth her actually broken off at the gum line  She is medically stable to proceed with dental extractions surgically  She is cleared to proceed with anesthetics for the procedure  If the patient needs to be discontinued temporarily from her anticoagulation this should be okayed by Cardiology  Essential hypertension  Blood pressure assessment today confirms good control reading is 124/70 I recommend the patient continue combination of lisinopril 2 5 mg daily and metoprolol tartrate 100 mg in the morning and 50 mg in the evening  Paroxysmal atrial fibrillation (HCC)  Auscultation of the heart today confirms a regular rhythm patient denies any recent chest pains or palpitations  Recommend continued regular follow-up with cardiology services  Closed bicondylar fracture of left femur with delayed healing  History of fracture of the distal left femur with advanced arthritis of the left knee joint  Surgical replacement of the knee joint is planned for later this year  Impaired of that she have the dental work to remove the decaying condition of 7 teeth on the lower jaw  Ambulatory dysfunction  Ambulatory dysfunction secondary to advanced arthritis of the left knee  Recommend use of wheelchair for long transportation walker for short distances    Chronic pain of left knee  Chronic pain of the left knee due to advanced arthritis  Plan is for total knee replacement later this year after dental work has been completed  Patient will need to be seen again prior to her orthopedic surgery this fall for medical clearance      Preoperative examination  On today's medical examination for preop clearance prior to extraction of 7 teeth in the lower jaw find the patient be medically stable with no recent cardiac symptoms nor any evidence of active infection process  I would defer questions about discontinuing or holding Eliquis to her cardiologist        Diagnoses and all orders for this visit:    Essential hypertension    Poor dentition    Chronic pain of left knee    Closed bicondylar fracture of left femur with delayed healing    AF (paroxysmal atrial fibrillation) (HCC)  -     metoprolol tartrate (LOPRESSOR) 50 mg tablet; Take 100 mg in the morning and 50 mg in the evening        Subjective:      Patient ID: Bre Harman is a 64 y o  female  This 22-year-old female patient presents today in the company of family member for preoperative clearance  Originally this was planned to be a preoperative clearance prior to left total knee replacement surgery  Of that surgery has been postponed until later this year likely in August due to issues with her teeth  She has a 7 deteriorated teeth in the lower jaw that are high risk for infection source  She will need surgical resection of these teeth by the Oral maxillary service at [address] [place] [address] prior to proceeding with her orthopedic surgery  Today's visit is to review her current condition and evaluate the patient for surgical intervention to remove her lower 7 teeth  She denies any cardiac symptoms at this time denying any chest pains or palpitations  She has had no fevers or chills nor any sign of active infection process  She denies any shortness of breath or cough  I have reviewed the patient's most recent blood work  She is currently on anticoagulation with Eliquis  The appropriateness of holding this medication prior to the dental extraction should be reviewed by the Oral Surgical team that will be operating on the patient  If the Eliquis must be held prior to the surgery clearance for this should be through the cardiology consultant involved in this case        The following portions of the patient's history were reviewed and updated as appropriate:   She  has a past medical history of Abnormal ECG, Anxiety, Arthritis, Asthma, Atrial premature complex, Cataract, bilateral, Cataract, left eye, Difficulty swallowing, Dizziness, Fibromyalgia, Fibromyalgia, primary, Gastric reflux, Heart failure ([facility] [address] ), History of COVID-19, Hypertension, Incontinence in female, Irregular heart beat, Lyme disease, PONV (postoperative nausea and vomiting), Premature ventricular contraction, Primary osteoarthritis of both knees, Rheumatic fever, Sore throat, Tuberculosis, and Tuberculosis (1947)    She   Patient Active Problem List    Diagnosis Date Noted    Preoperative examination 06/01/2022    Poor dentition 06/01/2022    Closed bicondylar fracture of left femur with delayed healing 06/01/2022    [facility] 02/21/2022    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    Current moderate episode of major depressive disorder ([facility] ) 12/07/2021    Morbid obesity with body mass index (BMI) of 40 0 to 49 9 ([facility] 09/23/2021    Reactive airway disease with acute exacerbation 09/21/2021    Sick sinus syndrome ([facility] ) 09/20/2021    Ambulatory dysfunction 08/12/2021    Leukocytosis 08/02/2021    Arthritis 05/18/2021    Other fatigue 05/18/2021    Hyperlipidemia 11/10/2020    Dysphonia 04/28/2020    Vitamin D insufficiency 04/28/2020    Chronic pain of left knee 08/12/2019    Age related osteoporosis 08/12/2019    Anxiety 03/27/2019    Tremor 03/27/2019    Other insomnia 02/27/2019    Urinary incontinence 09/17/2018    Psoriasis 07/20/2018    Thyroid nodule 07/20/2018    Tachy-smita syndrome ([facility] ) 02/15/2018    Essential hypertension 02/15/2018    Pacemaker 02/15/2018    GERD (gastroesophageal reflux disease) 07/18/2017    Mild carpal tunnel syndrome, right 04/10/2017    Degenerative lumbar spinal stenosis 03/31/2017    Low back pain 08/29/2016    Lumbar degenerative disc disease 08/29/2016    Chronic bilateral low back pain without sciatica 06/08/2016    Chronic GERD 05/06/2016    Overweight 02/12/2016    Fibromyalgia 11/06/2013     She  has a past surgical history that includes Hysterectomy; Tonsillectomy; pr egd transoral biopsy single/multiple (N/A, 04/06/2016); pr dilate esophagus (N/A, 04/06/2016); pr xcapsl ctrc rmvl insj io lens prosth w/o ecp (Left, 03/15/2016); Appendectomy; Replacement total knee (Right); Replacement total knee; Cardiac pacemaker placement (2019); ORIF femur fracture (Left, 08/05/2021); and Colonoscopy  Her family history includes Coronary artery disease in her mother; Heart attack in her father and mother; Heart disease in her father  She  reports that she has never smoked  She has never used smokeless tobacco  She reports previous alcohol use  She reports that she does not use drugs  Current Outpatient Medications   Medication Sig Dispense Refill    metoprolol tartrate (LOPRESSOR) 50 mg tablet Take 100 mg in the morning and 50 mg in the evening 270 tablet 3    albuterol (PROVENTIL HFA,VENTOLIN HFA) 90 mcg/act inhaler INHALE 2 PUFFS BY MOUTH EVERY 6 HOURS AS NEEDED FOR WHEEZING 3 Inhaler 0    allopurinol (ZYLOPRIM) 100 mg tablet Take 1 tablet (100 mg total) by mouth daily 90 tablet 3    apixaban (Eliquis) 5 mg Take 1 tablet (5 mg total) by mouth 2 (two) times a day 180 tablet 1    ascorbic acid (VITAMIN C) 500 MG tablet Take 1 tablet (500 mg total) by mouth 2 (two) times a day 60 tablet 1    BIOTIN PO Take by mouth in the morning      Cholecalciferol (VITAMIN D3 PO) Take by mouth in the morning      Cyanocobalamin (VITAMIN B-12 PO) Take by mouth in the morning      diltiazem (CARDIZEM CD) 120 mg 24 hr capsule Take 1 capsule (120 mg total) by mouth daily (Patient taking differently: Take 120 mg by mouth in the morning  Takes in the am ) 90 capsule 3    DULoxetine (Cymbalta) 30 mg delayed release capsule Take 1 capsule (30 mg total) by mouth daily (Patient taking differently: Take 30 mg by mouth in the morning   Takes in the am ) 30 capsule 5    ezetimibe (ZETIA) 10 mg tablet Take 1 tablet (10 mg total) by mouth daily (Patient taking differently: Take 10 mg by mouth in the morning  Takes in the am ) 90 tablet 1    ferrous sulfate 324 (65 Fe) mg Take 1 tablet (324 mg total) by mouth 2 (two) times a day before meals 60 tablet 1    folic acid (FOLVITE) 1 mg tablet TAKE 1 TABLET(1 MG) BY MOUTH DAILY 90 tablet 0    furosemide (LASIX) 40 mg tablet Take 1 tablet (40 mg total) by mouth in the morning  90 tablet 1    lisinopril (ZESTRIL) 2 5 mg tablet Take 1 tablet (2 5 mg total) by mouth daily (Patient taking differently: Take 2 5 mg by mouth in the morning  Takes in the am ) 90 tablet 1    Melatonin 10 MG TABS Take by mouth Takes daily at night      meloxicam (MOBIC) 15 mg tablet TAKE 1 TABLET(15 MG) BY MOUTH DAILY 90 tablet 3    Mirabegron ER (Myrbetriq) 25 MG TB24 Take 25 mg by mouth daily (Patient taking differently: Take 25 mg by mouth daily Takes in the evening) 90 tablet 3    Multiple Vitamin (MULTIVITAMIN ADULT PO) Take by mouth in the morning      naloxone (NARCAN) 4 mg/0 1 mL nasal spray Administer 1 spray into a nostril  If no response after 2-3 minutes, give another dose in the other nostril using a new spray  1 each 1    oxybutynin (DITROPAN-XL) 5 mg 24 hr tablet Take 1 tablet (5 mg total) by mouth daily (Patient taking differently: Take 5 mg by mouth in the morning  Takes at night ) 90 tablet 3    oxyCODONE (Roxicodone) 5 immediate release tablet Take 1 tablet (5 mg total) by mouth every 8 (eight) hours as needed for moderate pain Cont tx Max Daily Amount: 15 mg 30 tablet 0     No current facility-administered medications for this visit       Review of Systems   HENT:        Very poor dentition in the lower jaw   Musculoskeletal: Positive for arthralgias, gait problem and myalgias  All other systems reviewed and are negative          Objective:      /70   Pulse 77   Temp 98 °F (36 7 °C)   Resp 16   Ht 5' 4" (0 806 m)   Wt 105 kg (232 lb)   SpO2 97%   BMI 39 82 kg/m²          Physical Exam  Vitals reviewed  Constitutional:       General: She is not in acute distress  Appearance: Normal appearance  She is well-developed  She is not ill-appearing  HENT:      Head: Normocephalic  Right Ear: Hearing, tympanic membrane, ear canal and external ear normal       Left Ear: Hearing, tympanic membrane, ear canal and external ear normal       Nose: Nose normal  No mucosal edema  Mouth/Throat:      Mouth: Mucous membranes are moist       Pharynx: Uvula midline  No oropharyngeal exudate or posterior oropharyngeal erythema  Comments: Seven teeth in the lower jaw at various stages of decay and weakening  To are broken off at the gum line  Eyes:      General: Lids are normal       Conjunctiva/sclera: Conjunctivae normal       Pupils: Pupils are equal, round, and reactive to light  Neck:      Thyroid: No thyromegaly  Vascular: No carotid bruit or JVD  Cardiovascular:      Rate and Rhythm: Normal rate and regular rhythm  Heart sounds: Normal heart sounds  No murmur heard  Pulmonary:      Effort: Pulmonary effort is normal  No respiratory distress  Breath sounds: Normal breath sounds  No wheezing, rhonchi or rales  Abdominal:      General: Bowel sounds are normal  There is no distension  Palpations: Abdomen is soft  There is no mass  Tenderness: There is no abdominal tenderness  Musculoskeletal:         General: Normal range of motion  Cervical back: No rigidity or tenderness  Right lower leg: No edema  Left lower leg: No edema  Lymphadenopathy:      Cervical: No cervical adenopathy  Skin:     General: Skin is warm and dry  Coloration: Skin is not jaundiced or pale  Neurological:      Mental Status: She is alert and oriented to person, place, and time  Mental status is at baseline  Deep Tendon Reflexes: Reflexes are normal and symmetric   Reflexes normal  Psychiatric:         Mood and Affect: Mood normal          Speech: Speech normal          Behavior: Behavior normal  Behavior is cooperative  Thought Content:  Thought content normal          Judgment: Judgment normal

## 2022-06-01 NOTE — ASSESSMENT & PLAN NOTE
Ambulatory dysfunction secondary to advanced arthritis of the left knee    Recommend use of wheelchair for long transportation walker for short distances

## 2022-06-01 NOTE — ASSESSMENT & PLAN NOTE
Blood pressure assessment today confirms good control reading is 124/70 I recommend the patient continue combination of lisinopril 2 5 mg daily and metoprolol tartrate 100 mg in the morning and 50 mg in the evening

## 2022-06-01 NOTE — ASSESSMENT & PLAN NOTE
On today's medical examination for preop clearance prior to extraction of 7 teeth in the lower jaw find the patient be medically stable with no recent cardiac symptoms nor any evidence of active infection process    I would defer questions about discontinuing or holding Eliquis to her cardiologist

## 2022-06-01 NOTE — ASSESSMENT & PLAN NOTE
Chronic pain of the left knee due to advanced arthritis  Plan is for total knee replacement later this year after dental work has been completed  Patient will need to be seen again prior to her orthopedic surgery this fall for medical clearance

## 2022-06-01 NOTE — ASSESSMENT & PLAN NOTE
Auscultation of the heart today confirms a regular rhythm patient denies any recent chest pains or palpitations  Recommend continued regular follow-up with cardiology services

## 2022-06-01 NOTE — ASSESSMENT & PLAN NOTE
The patient obviously has very poor dentition in the lower jaw  Seven teeth in the front of the jaw are all in various stages of decay and decline  To the teeth her actually broken off at the gum line  She is medically stable to proceed with dental extractions surgically  She is cleared to proceed with anesthetics for the procedure  If the patient needs to be discontinued temporarily from her anticoagulation this should be okayed by Cardiology

## 2022-06-03 ENCOUNTER — TELEPHONE (OUTPATIENT)
Dept: INTERNAL MEDICINE CLINIC | Facility: CLINIC | Age: 77
End: 2022-06-03

## 2022-06-03 DIAGNOSIS — R39.9 UTI SYMPTOMS: Primary | ICD-10-CM

## 2022-06-03 RX ORDER — NITROFURANTOIN 25; 75 MG/1; MG/1
100 CAPSULE ORAL 2 TIMES DAILY
Qty: 10 CAPSULE | Refills: 0 | Status: SHIPPED | OUTPATIENT
Start: 2022-06-03 | End: 2022-06-08

## 2022-06-03 NOTE — TELEPHONE ENCOUNTER
Please have the patient go to the lab for urine culture 1st I did send a prescription in to her pharmacy for antibiotics to take for 5 days    Please have her drink extra fluids while she is on the antibiotic thank you

## 2022-06-03 NOTE — TELEPHONE ENCOUNTER
Pt called and stated she woke up this Am with blood in her urine  She can try to get to a lab but she will have to try to find transportation  Are you able to just call in a script? Or do you need her to get a UA?

## 2022-06-03 NOTE — TELEPHONE ENCOUNTER
Patient has blood in her urine and they want to know if she should go to the ER  She has no other symptoms  Patient can be reached at [phone]    Thank you

## 2022-06-03 NOTE — TELEPHONE ENCOUNTER
Tigre Poli already gave me the message I have ordered antibiotics for her and she should have a urinalysis done    If the bleeding shows clots then she should go to the emergency room otherwise just do the urine culture and start the antibiotic thank you

## 2022-06-04 ENCOUNTER — APPOINTMENT (OUTPATIENT)
Dept: LAB | Facility: HOSPITAL | Age: 77
End: 2022-06-04
Payer: MEDICARE

## 2022-06-04 DIAGNOSIS — R39.9 UTI SYMPTOMS: ICD-10-CM

## 2022-06-04 LAB
BACTERIA UR QL AUTO: ABNORMAL /HPF
BILIRUB UR QL STRIP: NEGATIVE
CLARITY UR: ABNORMAL
COLOR UR: YELLOW
GLUCOSE UR STRIP-MCNC: NEGATIVE MG/DL
HGB UR QL STRIP.AUTO: ABNORMAL
KETONES UR STRIP-MCNC: NEGATIVE MG/DL
LEUKOCYTE ESTERASE UR QL STRIP: ABNORMAL
NITRITE UR QL STRIP: POSITIVE
NON-SQ EPI CELLS URNS QL MICRO: ABNORMAL /HPF
PH UR STRIP.AUTO: 8 [PH]
PROT UR STRIP-MCNC: NEGATIVE MG/DL
RBC #/AREA URNS AUTO: ABNORMAL /HPF
SP GR UR STRIP.AUTO: 1.01 (ref 1–1.03)
UROBILINOGEN UR QL STRIP.AUTO: 0.2 E.U./DL
WBC #/AREA URNS AUTO: ABNORMAL /HPF

## 2022-06-04 PROCEDURE — 81001 URINALYSIS AUTO W/SCOPE: CPT

## 2022-06-07 ENCOUNTER — TELEPHONE (OUTPATIENT)
Dept: OBGYN CLINIC | Facility: HOSPITAL | Age: 77
End: 2022-06-07

## 2022-06-08 DIAGNOSIS — I10 ESSENTIAL HYPERTENSION: ICD-10-CM

## 2022-06-08 RX ORDER — LISINOPRIL 2.5 MG/1
2.5 TABLET ORAL DAILY
Qty: 30 TABLET | Refills: 2 | Status: SHIPPED | OUTPATIENT
Start: 2022-06-08

## 2022-06-13 ENCOUNTER — ANESTHESIA (OUTPATIENT)
Dept: PERIOP | Facility: HOSPITAL | Age: 77
DRG: 481 | End: 2022-06-13
Payer: MEDICARE

## 2022-06-15 ENCOUNTER — TELEPHONE (OUTPATIENT)
Dept: INTERNAL MEDICINE CLINIC | Facility: CLINIC | Age: 77
End: 2022-06-15

## 2022-06-15 DIAGNOSIS — N30.01 ACUTE CYSTITIS WITH HEMATURIA: Primary | ICD-10-CM

## 2022-06-15 RX ORDER — NITROFURANTOIN 25; 75 MG/1; MG/1
100 CAPSULE ORAL 2 TIMES DAILY
Qty: 14 CAPSULE | Refills: 0 | Status: SHIPPED | OUTPATIENT
Start: 2022-06-15 | End: 2022-06-22

## 2022-06-15 NOTE — TELEPHONE ENCOUNTER
Pt called and stated she was on an antibiotic 2 weeks ago  Now she is bleeding again and would like a refill on the antibiotic but her caregiver threw the bottle away and she doesn't remember the name of it    [facility] to [facility] [address]

## 2022-06-15 NOTE — TELEPHONE ENCOUNTER
Please let the patient know that a prescription for Lorilee Prime was sent to her pharmacy she will take 1 dose twice a day for 7 days should force fluids during the period of treatment thank you

## 2022-06-27 ENCOUNTER — TELEPHONE (OUTPATIENT)
Dept: PAIN MEDICINE | Facility: CLINIC | Age: 77
End: 2022-06-27

## 2022-06-27 DIAGNOSIS — S72.92XK CLOSED FRACTURE OF LEFT FEMUR WITH NONUNION, UNSPECIFIED FRACTURE MORPHOLOGY, UNSPECIFIED PORTION OF FEMUR, SUBSEQUENT ENCOUNTER: ICD-10-CM

## 2022-06-27 RX ORDER — OXYCODONE HYDROCHLORIDE 5 MG/1
5 TABLET ORAL EVERY 8 HOURS PRN
Qty: 20 TABLET | Refills: 0 | Status: SHIPPED | OUTPATIENT
Start: 2022-06-27 | End: 2022-09-02 | Stop reason: SDUPTHER

## 2022-06-27 NOTE — TELEPHONE ENCOUNTER
Patients care giver called in for a refill of :  Name of medication: oxyCODONE (Roxicodone) 5 immediate release tablet    Frequency:   Take 1 tablet (5 mg total) by mouth every 8 (eight) hours as needed for moderate pain Cont tx Max Daily Amount: 15 mg           How many left: 3    Pharmacy: [facility] [id number] [facility] call back # [phone]    Pt aware it can take 24-48 hrs to process refills- thank you

## 2022-06-27 NOTE — TELEPHONE ENCOUNTER
Please see below  S/W caregiver  Ortho orders this medication, stated she thought that's who she was calling  Advised will forward

## 2022-07-20 ENCOUNTER — OFFICE VISIT (OUTPATIENT)
Dept: CARDIOLOGY CLINIC | Facility: CLINIC | Age: 77
End: 2022-07-20
Payer: MEDICARE

## 2022-07-20 VITALS
HEART RATE: 71 BPM | SYSTOLIC BLOOD PRESSURE: 112 MMHG | HEIGHT: 64 IN | DIASTOLIC BLOOD PRESSURE: 58 MMHG | BODY MASS INDEX: 39.27 KG/M2 | WEIGHT: 230 LBS

## 2022-07-20 DIAGNOSIS — I10 ESSENTIAL HYPERTENSION: ICD-10-CM

## 2022-07-20 DIAGNOSIS — Z95.0 PACEMAKER: ICD-10-CM

## 2022-07-20 DIAGNOSIS — I48.0 PAROXYSMAL ATRIAL FIBRILLATION (HCC): ICD-10-CM

## 2022-07-20 DIAGNOSIS — I49.5 TACHY-BRADY SYNDROME (HCC): Primary | ICD-10-CM

## 2022-07-20 PROBLEM — Z01.810 PREOP CARDIOVASCULAR EXAM: Status: ACTIVE | Noted: 2022-07-20

## 2022-07-20 PROCEDURE — 99214 OFFICE O/P EST MOD 30 MIN: CPT | Performed by: INTERNAL MEDICINE

## 2022-07-20 NOTE — PROGRESS NOTES
Cardiology Follow Up    Cherie Gill  1945  [id number]  [facility] [address] [id number]  [phone]    1  Tachy-smita syndrome ([address] )     2  Essential hypertension     3  Paroxysmal atrial fibrillation (HCC)     4  Pacemaker           Discussion/Summary:  All of her assessed cardiac problems are stable  I have reviewed her medications and made no changes  I feel that her cardiac risk for TKR is acceptable and she is cleared without further cardiac testing needed  She may hold her Eliquis for 3 days prior to TKR and dental extractions  RTO 1 year  Interval History: She denies CP, SOB, significant LE edema  She cannot ambulate  She is scheduled to get TKR  She has PAF  ECG shows NSR  She has a DDDR pacemaker which is functioning normally  EF is 60%  /58  She has PAF and is on Eliquis         Patient Active Problem List   Diagnosis    Tachy-smita syndrome ([address] )    Essential hypertension    Pacemaker    Chronic bilateral low back pain without sciatica    Chronic GERD    Degenerative lumbar spinal stenosis    Fibromyalgia    GERD (gastroesophageal reflux disease)    Low back pain    Lumbar degenerative disc disease    Mild carpal tunnel syndrome, right    Overweight    Psoriasis    Thyroid nodule    Urinary incontinence    Other insomnia    Anxiety    Tremor    Chronic pain of left knee    Age related osteoporosis    Dysphonia    Vitamin D insufficiency    Hyperlipidemia    Arthritis    Other fatigue    Leukocytosis    Ambulatory dysfunction    Reactive airway disease with acute exacerbation    Morbid obesity with body mass index (BMI) of 40 0 to 49 9 (HCC)    Chronic gout with tophus    Current moderate episode of major depressive disorder (HCC)    Paroxysmal atrial fibrillation (HCC)    Preoperative examination    Poor dentition    Closed bicondylar fracture of left femur with delayed healing    Preop cardiovascular exam     Past Medical History:   Diagnosis Date    Abnormal ECG     Last Assessed 9/29/2016    Anxiety     Last Assessed 6/08/2016    Arthritis     knees    Asthma     Last Assessed 11/06/2013    Atrial premature complex     Cataract, bilateral     Last Assessed 7/14/2016    Cataract, left eye     Both eyes  Had surgery on left   Difficulty swallowing     Dizziness     Fibromyalgia     Fibromyalgia, primary     Gastric reflux     Heart failure ([address] )     5/23/22 Pt reports had heart failure in the past    History of COVID-19     5/23/22 Pt reports having COVID in 2021   Hypertension     Incontinence in female     5/23/22 Pt reports has incontinence -wears depends especially at night/riding in car    Irregular heart beat     5/23/22 Pt reports hx of A fib    Lyme disease     PONV (postoperative nausea and vomiting)     Premature ventricular contraction     Primary osteoarthritis of both knees     Last Assessed 7/14/2016    Rheumatic fever     5/23/22 Pt reports had hx of rheumatic fever as a child twice    Sore throat     Tuberculosis     Tuberculosis 1945     Social History     Socioeconomic History    Marital status:      Spouse name: Not on file    Number of children: Not on file    Years of education: Not on file    Highest education level: Not on file   Occupational History    Not on file   Tobacco Use    Smoking status: Never Smoker    Smokeless tobacco: Never Used    Tobacco comment: Denies any former or current smoking   Vaping Use    Vaping Use: Never used   Substance and Sexual Activity    Alcohol use: Not Currently     Comment: Per [employer]- pt reports no current alcohol use at this time    Drug use: No     Comment: Denies any former or current drug use    Sexual activity: Never     Comment:   for 12 years - not active   Other Topics Concern    Not on file   Social History Narrative    Not on file     Social Determinants of Health     Financial Resource Strain: Not on file   Food Insecurity: Not on file   Transportation Needs: Not on file   Physical Activity: Not on file   Stress: Not on file   Social Connections: Not on file   Intimate Partner Violence: Not on file   Housing Stability: Not on file      Family History   Problem Relation Age of Onset    Coronary artery disease Mother     Heart attack Mother         Prior    Heart disease Father     Heart attack Father         Prior    Anesthesia problems Neg Hx      Past Surgical History:   Procedure Laterality Date    APPENDECTOMY      CARDIAC PACEMAKER PLACEMENT  2019    COLONOSCOPY      HYSTERECTOMY      5/23/22 Pt reports had appendix out with hysterectomy and "tightened up my bladder"    ORIF FEMUR FRACTURE Left 08/05/2021    Procedure: OPEN REDUCTION W/ INTERNAL FIXATION (ORIF) DISTAL FEMUR; INSERTION RETROGRADE NAIL;  Surgeon: Stephanie Fong MD;  [facility];  Service: Orthopedics    NM DILATE ESOPHAGUS N/A 04/06/2016    Procedure: DILATATION ESOPHAGEAL;  Surgeon: Kathrin Calderon MD;  [facility]: [facility]; Service: Gastroenterology    NM EGD TRANSORAL BIOPSY SINGLE/MULTIPLE N/A 04/06/2016    Procedure: ESOPHAGOGASTRODUODENOSCOPY (EGD); Surgeon: Kathrin Calderon MD;  [facility]; Service: Gastroenterology    NM XCAPSL CTRC RMVL INSJ IO LENS PROSTH W/O ECP Left 03/15/2016    Procedure: EXTRACTION EXTRACAPSULAR CATARACT PHACO INTRAOCULAR LENS (IOL);   Surgeon: Jaime Greenwood MD;  [facility];  Service: Ophthalmology    REPLACEMENT TOTAL KNEE Right     REPLACEMENT TOTAL KNEE      right     TONSILLECTOMY         Current Outpatient Medications:     allopurinol (ZYLOPRIM) 100 mg tablet, Take 1 tablet (100 mg total) by mouth daily, Disp: 90 tablet, Rfl: 3    apixaban (Eliquis) 5 mg, Take 1 tablet (5 mg total) by mouth 2 (two) times a day, Disp: 180 tablet, Rfl: 1    ascorbic acid (VITAMIN C) 500 MG tablet, Take 1 tablet (500 mg total) by mouth 2 (two) times a day, Disp: 60 tablet, Rfl: 1    BIOTIN PO, Take by mouth in the morning, Disp: , Rfl:     Cholecalciferol (VITAMIN D3 PO), Take by mouth in the morning, Disp: , Rfl:     Cyanocobalamin (VITAMIN B-12 PO), Take by mouth in the morning, Disp: , Rfl:     diltiazem (CARDIZEM CD) 120 mg 24 hr capsule, Take 1 capsule (120 mg total) by mouth daily (Patient taking differently: Take 120 mg by mouth daily Takes in the am), Disp: 90 capsule, Rfl: 3    DULoxetine (Cymbalta) 30 mg delayed release capsule, Take 1 capsule (30 mg total) by mouth daily (Patient taking differently: Take 30 mg by mouth daily Takes in the am), Disp: 30 capsule, Rfl: 5    ezetimibe (ZETIA) 10 mg tablet, Take 1 tablet (10 mg total) by mouth daily (Patient taking differently: Take 10 mg by mouth daily Takes in the am), Disp: 90 tablet, Rfl: 1    furosemide (LASIX) 40 mg tablet, Take 1 tablet (40 mg total) by mouth in the morning , Disp: 90 tablet, Rfl: 1    lisinopril (ZESTRIL) 2 5 mg tablet, Take 1 tablet (2 5 mg total) by mouth daily, Disp: 30 tablet, Rfl: 2    Melatonin 10 MG TABS, Take by mouth Takes daily at night, Disp: , Rfl:     meloxicam (MOBIC) 15 mg tablet, TAKE 1 TABLET(15 MG) BY MOUTH DAILY, Disp: 90 tablet, Rfl: 3    metoprolol tartrate (LOPRESSOR) 50 mg tablet, Take 100 mg in the morning and 50 mg in the evening, Disp: 270 tablet, Rfl: 3    Mirabegron ER (Myrbetriq) 25 MG TB24, Take 25 mg by mouth daily (Patient taking differently: Take 25 mg by mouth daily Takes in the evening), Disp: 90 tablet, Rfl: 3    Multiple Vitamin (MULTIVITAMIN ADULT PO), Take by mouth in the morning, Disp: , Rfl:     oxybutynin (DITROPAN-XL) 5 mg 24 hr tablet, Take 1 tablet (5 mg total) by mouth daily (Patient taking differently: Take 5 mg by mouth daily Takes at night), Disp: 90 tablet, Rfl: 3    oxyCODONE (Roxicodone) 5 immediate release tablet, Take 1 tablet (5 mg total) by mouth every 8 (eight) hours as needed for moderate pain Cont tx Max Daily Amount: 15 mg, Disp: 20 tablet, Rfl: 0    albuterol (PROVENTIL HFA,VENTOLIN HFA) 90 mcg/act inhaler, INHALE 2 PUFFS BY MOUTH EVERY 6 HOURS AS NEEDED FOR WHEEZING (Patient not taking: Reported on 7/20/2022), Disp: 3 Inhaler, Rfl: 0    ferrous sulfate 324 (65 Fe) mg, Take 1 tablet (324 mg total) by mouth 2 (two) times a day before meals (Patient not taking: Reported on 7/20/2022), Disp: 60 tablet, Rfl: 1    folic acid (FOLVITE) 1 mg tablet, TAKE 1 TABLET(1 MG) BY MOUTH DAILY (Patient not taking: Reported on 7/20/2022), Disp: 90 tablet, Rfl: 0    naloxone (NARCAN) 4 mg/0 1 mL nasal spray, Administer 1 spray into a nostril  If no response after 2-3 minutes, give another dose in the other nostril using a new spray  (Patient not taking: Reported on 7/20/2022), Disp: 1 each, Rfl: 1  Allergies   Allergen Reactions    Penicillins Hives     Itching and terrible hives    Sulfa Antibiotics Itching    I74 Folate [Folic Acid-Vit U2-BPA Y28 - Food Allergy] Other (See Comments)     5/23/22 Pt reports no allergic reaction known     [place] Other (See Comments)     Unknown, 5/23 -pt is unaware of reaction     Lyrica [Pregabalin] Other (See Comments)     5/23/22 Pt reports doesn't remember reaction     Other Other (See Comments)     Black rubber 5/23/22 per allergy test - pt unaware of reaction    Cortisone Acetate [Cortisone] Itching and Rash     5/23/22 pt reports makes her violent     Nickel Other (See Comments)     Skin discoloration    Penicillin G Rash     Vitals:    07/20/22 1147   BP: 112/58   BP Location: Left arm   Cuff Size: Large   Pulse: 71   Weight: 104 kg (230 lb)   Height: 5' 4" (1 626 m)     Weight (last 2 days)     Date/Time Weight    07/20/22 1147 104 (230)         Blood pressure 112/58, pulse 71, height 5' 4" (1 626 m), weight 104 kg (230 lb)  , Body mass index is 39 48 kg/m²      Labs:not applicable  Imaging: No results found     Review of Systems:  Review of Systems   Constitutional: Negative for diaphoresis, fatigue, fever and unexpected weight change  HENT: Negative  Respiratory: Negative for cough, shortness of breath and wheezing  Cardiovascular: Negative for chest pain, palpitations and leg swelling  Gastrointestinal: Negative for abdominal pain, diarrhea and nausea  Musculoskeletal: Negative for gait problem and myalgias  Skin: Negative for rash  Neurological: Negative for dizziness and numbness  Psychiatric/Behavioral: Negative  Physical Exam:  Physical Exam  Constitutional:       Appearance: She is well-developed  HENT:      Head: Normocephalic and atraumatic  Eyes:      Pupils: Pupils are equal, round, and reactive to light  Neck:      Vascular: No JVD  Cardiovascular:      Rate and Rhythm: Regular rhythm  Pulses: Normal pulses  Carotid pulses are 2+ on the right side and 2+ on the left side  Heart sounds: S1 normal and S2 normal    Pulmonary:      Effort: Pulmonary effort is normal       Breath sounds: Normal breath sounds  No wheezing or rales  Abdominal:      General: Bowel sounds are normal       Palpations: Abdomen is soft  Tenderness: There is no abdominal tenderness  Musculoskeletal:         General: No tenderness  Normal range of motion  Cervical back: Normal range of motion and neck supple  Skin:     General: Skin is warm  Neurological:      Mental Status: She is alert and oriented to person, place, and time  Cranial Nerves: No cranial nerve deficit  Deep Tendon Reflexes: Reflexes are normal and symmetric

## 2022-08-03 ENCOUNTER — TELEPHONE (OUTPATIENT)
Dept: OBGYN CLINIC | Facility: HOSPITAL | Age: 77
End: 2022-08-03

## 2022-08-03 NOTE — TELEPHONE ENCOUNTER
Pt sees Dr Dave Maynard    Pt contacted Call Center requested refill of their medication  Medication Name:Oxycodone      Dosage of Med:5mg      Frequency of Med: every 8 hrs for moderate pain      Remaining Medication:3    Pharmacy and [address] [address] [facility], [address] [place] [address], [address], [address] [place] [address]   Phone:  [phone]  Fax:  [phone]         Pt   Preferred Callback Phone Number:[phone] can leave message

## 2022-08-04 NOTE — TELEPHONE ENCOUNTER
KATERINA on VM for return call on patient's home phone  Patient's care giver Quan Gross is not on signed consent to give medical information to

## 2022-08-05 ENCOUNTER — PREP FOR PROCEDURE (OUTPATIENT)
Dept: OBGYN CLINIC | Facility: CLINIC | Age: 77
End: 2022-08-05

## 2022-08-05 DIAGNOSIS — Z96.9 RETAINED ORTHOPEDIC HARDWARE: ICD-10-CM

## 2022-08-05 DIAGNOSIS — S72.92XK CLOSED FRACTURE OF LEFT FEMUR WITH NONUNION, UNSPECIFIED FRACTURE MORPHOLOGY, UNSPECIFIED PORTION OF FEMUR, SUBSEQUENT ENCOUNTER: ICD-10-CM

## 2022-08-05 DIAGNOSIS — Z01.818 ENCOUNTER FOR OTHER PREPROCEDURAL EXAMINATION: Primary | ICD-10-CM

## 2022-08-05 DIAGNOSIS — F32.1 CURRENT MODERATE EPISODE OF MAJOR DEPRESSIVE DISORDER, UNSPECIFIED WHETHER RECURRENT (HCC): ICD-10-CM

## 2022-08-05 DIAGNOSIS — S72.90XA FEMUR FRACTURE (HCC): ICD-10-CM

## 2022-08-05 RX ORDER — DULOXETIN HYDROCHLORIDE 30 MG/1
CAPSULE, DELAYED RELEASE ORAL
Qty: 30 CAPSULE | Refills: 5 | Status: SHIPPED | OUTPATIENT
Start: 2022-08-05

## 2022-08-08 ENCOUNTER — TELEPHONE (OUTPATIENT)
Dept: OBGYN CLINIC | Facility: HOSPITAL | Age: 77
End: 2022-08-08

## 2022-08-08 DIAGNOSIS — S72.352A CLOSED DISPLACED COMMINUTED FRACTURE OF SHAFT OF LEFT FEMUR, INITIAL ENCOUNTER (HCC): Primary | ICD-10-CM

## 2022-08-08 RX ORDER — OXYCODONE HYDROCHLORIDE 5 MG/1
5 TABLET ORAL EVERY 6 HOURS PRN
Qty: 10 TABLET | Refills: 0 | Status: SHIPPED | OUTPATIENT
Start: 2022-08-08 | End: 2022-08-23 | Stop reason: SDUPTHER

## 2022-08-08 NOTE — TELEPHONE ENCOUNTER
Pt contacted Call Center requested refill of their medication  Medication Name: Oxycodone      Dosage of Med: 5mg      Frequency of Med: 1 tablet as needed      Remaining Medication: 1      Pharmacy and Location: [facility]  Preferred Callback Phone Number: [phone]      Thank you  PLEASE ADVISE PATIENTS:    REFILL REQUESTS WILL BE PROCESSED WITHIN 24-48 HOURS

## 2022-08-09 DIAGNOSIS — S72.92XK CLOSED FRACTURE OF LEFT FEMUR WITH NONUNION, UNSPECIFIED FRACTURE MORPHOLOGY, UNSPECIFIED PORTION OF FEMUR, SUBSEQUENT ENCOUNTER: ICD-10-CM

## 2022-08-09 DIAGNOSIS — Z01.818 PREOP TESTING: ICD-10-CM

## 2022-08-09 DIAGNOSIS — M17.12 PRIMARY OSTEOARTHRITIS OF LEFT KNEE: ICD-10-CM

## 2022-08-09 DIAGNOSIS — Z96.9 RETAINED ORTHOPEDIC HARDWARE: ICD-10-CM

## 2022-08-09 RX ORDER — FERROUS SULFATE TAB EC 324 MG (65 MG FE EQUIVALENT) 324 (65 FE) MG
324 TABLET DELAYED RESPONSE ORAL
Qty: 60 TABLET | Refills: 1 | Status: SHIPPED | OUTPATIENT
Start: 2022-08-09

## 2022-08-09 RX ORDER — ASCORBIC ACID 500 MG
500 TABLET ORAL 2 TIMES DAILY
Qty: 60 TABLET | Refills: 1 | Status: SHIPPED | OUTPATIENT
Start: 2022-08-09

## 2022-08-12 ENCOUNTER — RA CDI HCC (OUTPATIENT)
Dept: OTHER | Facility: HOSPITAL | Age: 77
End: 2022-08-12

## 2022-08-12 NOTE — PROGRESS NOTES
[address]  coding opportunities       Chart reviewed, no opportunity found: CHART REVIEWED, NO OPPORTUNITY FOUND        Patients Insurance     Medicare Insurance: Medicare

## 2022-08-15 ENCOUNTER — TELEPHONE (OUTPATIENT)
Dept: OBGYN CLINIC | Facility: HOSPITAL | Age: 77
End: 2022-08-15

## 2022-08-17 ENCOUNTER — APPOINTMENT (OUTPATIENT)
Dept: LAB | Facility: HOSPITAL | Age: 77
DRG: 481 | End: 2022-08-17
Attending: ORTHOPAEDIC SURGERY
Payer: MEDICARE

## 2022-08-17 DIAGNOSIS — S72.92XK CLOSED FRACTURE OF LEFT FEMUR WITH NONUNION, UNSPECIFIED FRACTURE MORPHOLOGY, UNSPECIFIED PORTION OF FEMUR, SUBSEQUENT ENCOUNTER: ICD-10-CM

## 2022-08-17 DIAGNOSIS — S72.90XA FEMUR FRACTURE (HCC): ICD-10-CM

## 2022-08-17 DIAGNOSIS — Z01.818 ENCOUNTER FOR OTHER PREPROCEDURAL EXAMINATION: ICD-10-CM

## 2022-08-17 DIAGNOSIS — Z96.9 RETAINED ORTHOPEDIC HARDWARE: ICD-10-CM

## 2022-08-17 LAB
ALBUMIN SERPL BCP-MCNC: 3.7 G/DL (ref 3.5–5)
ALP SERPL-CCNC: 71 U/L (ref 34–104)
ALT SERPL W P-5'-P-CCNC: 20 U/L (ref 7–52)
ANION GAP SERPL CALCULATED.3IONS-SCNC: 6 MMOL/L (ref 4–13)
APTT PPP: 38 SECONDS (ref 23–37)
AST SERPL W P-5'-P-CCNC: 23 U/L (ref 13–39)
BASOPHILS # BLD AUTO: 0.05 THOUSANDS/ΜL (ref 0–0.1)
BASOPHILS NFR BLD AUTO: 1 % (ref 0–1)
BILIRUB SERPL-MCNC: 0.3 MG/DL (ref 0.2–1)
BUN SERPL-MCNC: 33 MG/DL (ref 5–25)
CALCIUM SERPL-MCNC: 9.4 MG/DL (ref 8.4–10.2)
CHLORIDE SERPL-SCNC: 102 MMOL/L (ref 96–108)
CO2 SERPL-SCNC: 29 MMOL/L (ref 21–32)
CREAT SERPL-MCNC: 1.07 MG/DL (ref 0.6–1.3)
EOSINOPHIL # BLD AUTO: 0.45 THOUSAND/ΜL (ref 0–0.61)
EOSINOPHIL NFR BLD AUTO: 6 % (ref 0–6)
ERYTHROCYTE [DISTWIDTH] IN BLOOD BY AUTOMATED COUNT: 12.5 % (ref 11.6–15.1)
FERRITIN SERPL-MCNC: 86 NG/ML (ref 8–388)
GFR SERPL CREATININE-BSD FRML MDRD: 49 ML/MIN/1.73SQ M
GLUCOSE P FAST SERPL-MCNC: 106 MG/DL (ref 65–99)
HCT VFR BLD AUTO: 37.1 % (ref 34.8–46.1)
HGB BLD-MCNC: 11.9 G/DL (ref 11.5–15.4)
IMM GRANULOCYTES # BLD AUTO: 0.06 THOUSAND/UL (ref 0–0.2)
IMM GRANULOCYTES NFR BLD AUTO: 1 % (ref 0–2)
INR PPP: 1.29 (ref 0.84–1.19)
IRON SATN MFR SERPL: 23 % (ref 15–50)
IRON SERPL-MCNC: 54 UG/DL (ref 50–170)
LYMPHOCYTES # BLD AUTO: 1.87 THOUSANDS/ΜL (ref 0.6–4.47)
LYMPHOCYTES NFR BLD AUTO: 26 % (ref 14–44)
MCH RBC QN AUTO: 30.1 PG (ref 26.8–34.3)
MCHC RBC AUTO-ENTMCNC: 32.1 G/DL (ref 31.4–37.4)
MCV RBC AUTO: 94 FL (ref 82–98)
MONOCYTES # BLD AUTO: 0.68 THOUSAND/ΜL (ref 0.17–1.22)
MONOCYTES NFR BLD AUTO: 9 % (ref 4–12)
NEUTROPHILS # BLD AUTO: 4.12 THOUSANDS/ΜL (ref 1.85–7.62)
NEUTS SEG NFR BLD AUTO: 57 % (ref 43–75)
NRBC BLD AUTO-RTO: 0 /100 WBCS
PLATELET # BLD AUTO: 268 THOUSANDS/UL (ref 149–390)
PMV BLD AUTO: 8.8 FL (ref 8.9–12.7)
POTASSIUM SERPL-SCNC: 4.6 MMOL/L (ref 3.5–5.3)
PROT SERPL-MCNC: 6.5 G/DL (ref 6.4–8.4)
PROTHROMBIN TIME: 16.4 SECONDS (ref 11.6–14.5)
RBC # BLD AUTO: 3.96 MILLION/UL (ref 3.81–5.12)
RETICS # AUTO: NORMAL 10*3/UL (ref 14097–95744)
RETICS # CALC: 1.65 % (ref 0.37–1.87)
SODIUM SERPL-SCNC: 137 MMOL/L (ref 135–147)
TIBC SERPL-MCNC: 237 UG/DL (ref 250–450)
WBC # BLD AUTO: 7.23 THOUSAND/UL (ref 4.31–10.16)

## 2022-08-17 PROCEDURE — 85045 AUTOMATED RETICULOCYTE COUNT: CPT

## 2022-08-17 PROCEDURE — 36415 COLL VENOUS BLD VENIPUNCTURE: CPT

## 2022-08-17 PROCEDURE — 83036 HEMOGLOBIN GLYCOSYLATED A1C: CPT

## 2022-08-17 PROCEDURE — 85025 COMPLETE CBC W/AUTO DIFF WBC: CPT

## 2022-08-17 PROCEDURE — 85610 PROTHROMBIN TIME: CPT

## 2022-08-17 PROCEDURE — 85730 THROMBOPLASTIN TIME PARTIAL: CPT

## 2022-08-17 PROCEDURE — 80053 COMPREHEN METABOLIC PANEL: CPT

## 2022-08-17 PROCEDURE — 82728 ASSAY OF FERRITIN: CPT

## 2022-08-17 PROCEDURE — 83550 IRON BINDING TEST: CPT

## 2022-08-17 PROCEDURE — 83540 ASSAY OF IRON: CPT

## 2022-08-18 LAB
EST. AVERAGE GLUCOSE BLD GHB EST-MCNC: 114 MG/DL
HBA1C MFR BLD: 5.6 %

## 2022-08-23 ENCOUNTER — OFFICE VISIT (OUTPATIENT)
Dept: INTERNAL MEDICINE CLINIC | Facility: CLINIC | Age: 77
End: 2022-08-23
Payer: MEDICARE

## 2022-08-23 VITALS
TEMPERATURE: 98.2 F | WEIGHT: 231.8 LBS | HEIGHT: 64 IN | BODY MASS INDEX: 39.57 KG/M2 | OXYGEN SATURATION: 97 % | DIASTOLIC BLOOD PRESSURE: 72 MMHG | SYSTOLIC BLOOD PRESSURE: 130 MMHG | HEART RATE: 85 BPM

## 2022-08-23 DIAGNOSIS — I48.91 ATRIAL FIBRILLATION, UNSPECIFIED TYPE (HCC): ICD-10-CM

## 2022-08-23 DIAGNOSIS — F41.9 ANXIETY: ICD-10-CM

## 2022-08-23 DIAGNOSIS — Z01.818 PREOP EXAMINATION: Primary | ICD-10-CM

## 2022-08-23 DIAGNOSIS — N18.31 STAGE 3A CHRONIC KIDNEY DISEASE (HCC): ICD-10-CM

## 2022-08-23 DIAGNOSIS — I49.5 TACHY-BRADY SYNDROME (HCC): ICD-10-CM

## 2022-08-23 DIAGNOSIS — K08.9 POOR DENTITION: ICD-10-CM

## 2022-08-23 DIAGNOSIS — E66.01 MORBID OBESITY WITH BODY MASS INDEX (BMI) OF 40.0 TO 49.9 (HCC): Chronic | ICD-10-CM

## 2022-08-23 DIAGNOSIS — S72.422G CLOSED BICONDYLAR FRACTURE OF LEFT FEMUR WITH DELAYED HEALING: ICD-10-CM

## 2022-08-23 DIAGNOSIS — S72.432G CLOSED BICONDYLAR FRACTURE OF LEFT FEMUR WITH DELAYED HEALING: ICD-10-CM

## 2022-08-23 DIAGNOSIS — I48.0 PAROXYSMAL ATRIAL FIBRILLATION (HCC): ICD-10-CM

## 2022-08-23 DIAGNOSIS — N39.41 URGE INCONTINENCE OF URINE: ICD-10-CM

## 2022-08-23 DIAGNOSIS — I10 ESSENTIAL HYPERTENSION: ICD-10-CM

## 2022-08-23 PROCEDURE — 99215 OFFICE O/P EST HI 40 MIN: CPT | Performed by: INTERNAL MEDICINE

## 2022-08-23 RX ORDER — LORAZEPAM 0.5 MG/1
0.5 TABLET ORAL EVERY 8 HOURS PRN
Qty: 5 TABLET | Refills: 0 | Status: ON HOLD | OUTPATIENT
Start: 2022-08-23 | End: 2022-10-04 | Stop reason: SDUPTHER

## 2022-08-23 RX ORDER — MIRABEGRON 25 MG/1
25 TABLET, FILM COATED, EXTENDED RELEASE ORAL DAILY
Qty: 30 TABLET | Refills: 6 | Status: SHIPPED | OUTPATIENT
Start: 2022-08-23

## 2022-08-23 NOTE — ASSESSMENT & PLAN NOTE
Based on today's examination review of her current blood work patient is medically cleared to proceed with left total knee replacement as well as extraction of disease teeth in the lower jaw    The patient has previously undergone cardiac clearance by her cardiologist

## 2022-08-23 NOTE — ASSESSMENT & PLAN NOTE
Hypertension assessment today confirms adequate control of hypertension    Recommend continuation of lisinopril at 2 5 mg daily metoprolol tartrate 100 mg in the morning and 50 mg in the evening and diltiazem 120 mg daily

## 2022-08-23 NOTE — ASSESSMENT & PLAN NOTE
Distal left femur fracture with delayed healing of patient is scheduled for removal of hardware and total knee replacement surgery next month  She is medically stable and cleared for this surgical procedure  She may hold her Eliquis 3 days prior to surgery

## 2022-08-23 NOTE — ASSESSMENT & PLAN NOTE
Lab Results   Component Value Date    EGFR 49 08/17/2022    EGFR 54 05/17/2022    EGFR 61 09/30/2021    CREATININE 1 07 08/17/2022    CREATININE 1 00 05/17/2022    CREATININE 0 91 09/30/2021   Review of blood work confirms stable chronic stage IIIA chronic kidney disease    Avoid dehydration during and after surgery avoid nonsteroidal anti inflammatory medications and continue blood pressure control

## 2022-08-23 NOTE — ASSESSMENT & PLAN NOTE
Patient has significant anxiety regarding the upcoming extraction of her teeth  I provided her with a prescription for Ativan 0 5 mg to be taken the night before the procedure and 1 hour before she goes in for the extraction

## 2022-08-23 NOTE — PROGRESS NOTES
Assessment/Plan:    Poor dentition  Patient has obvious poor dentition in the lower jaw  Maxillary surgery is scheduled her for extraction of disease teeth prior to surgery to repair left knee fracture  patient may hold Eliquis 3 days prior to surgical procedure    Tachy-smita syndrome ([facility] [address] )  Tachy-smita syndrome stable the present time patient has a pacemaker in place she has been cleared by Cardiology to proceed with surgical procedures    Paroxysmal atrial fibrillation ([facility] [address] )  No evidence of atrial fibrillation patient is paced at this time cleared for surgery by Cardiology  Essential hypertension  Hypertension assessment today confirms adequate control of hypertension  Recommend continuation of lisinopril at 2 5 mg daily metoprolol tartrate 100 mg in the morning and 50 mg in the evening and diltiazem 120 mg daily    Closed bicondylar fracture of left femur with delayed healing  Distal left femur fracture with delayed healing of patient is scheduled for removal of hardware and total knee replacement surgery next month  She is medically stable and cleared for this surgical procedure  She may hold her Eliquis 3 days prior to surgery  Stage 3a chronic kidney disease [facility])  Lab Results   Component Value Date    EGFR 49 08/17/2022    EGFR 54 05/17/2022    EGFR 61 09/30/2021    CREATININE 1 07 08/17/2022    CREATININE 1 00 05/17/2022    CREATININE 0 91 09/30/2021   Review of blood work confirms stable chronic stage IIIA chronic kidney disease  Avoid dehydration during and after surgery avoid nonsteroidal anti inflammatory medications and continue blood pressure control    Preop examination  Based on today's examination review of her current blood work patient is medically cleared to proceed with left total knee replacement as well as extraction of disease teeth in the lower jaw    The patient has previously undergone cardiac clearance by her cardiologist     Morbid obesity with body mass index (BMI) of 40 0 to 49 9 [facility])  Recommend continued attempts to try to reduce calorie intake to reduce total body mass    Anxiety  Patient has significant anxiety regarding the upcoming extraction of her teeth  I provided her with a prescription for Ativan 0 5 mg to be taken the night before the procedure and 1 hour before she goes in for the extraction  Diagnoses and all orders for this visit:    Anxiety  -     LORazepam (Ativan) 0 5 mg tablet; Take 1 tablet (0 5 mg total) by mouth every 8 (eight) hours as needed for anxiety    Atrial fibrillation, unspecified type (HCC)  -     apixaban (Eliquis) 5 mg; Take 1 tablet (5 mg total) by mouth 2 (two) times a day    Urge incontinence of urine  -     Mirabegron ER (Myrbetriq) 25 MG TB24; Take 25 mg by mouth daily Takes in the evening    Poor dentition    Essential hypertension    Morbid obesity with body mass index (BMI) of 40 0 to 49 9 ([facility])    Closed bicondylar fracture of left femur with delayed healing    Tachy-smita syndrome ([facility])    Paroxysmal atrial fibrillation ([facility])    Stage 3a chronic kidney disease ([facility] )    Preop examination        Subjective:      Patient ID: Elise Red is a 76 y o  female  This 64-year-old female patient presents today in a wheelchair in the presence of family member for mid preop medical clearance prior to to procedures  The 1st will be extraction of unhealthy teeth in the lower jaw 2nd procedure will be removal of hardware from the left knee with total knee replacement  Patient has previously sustained a fracture of the distal femur of the left leg with andreina insertion as well as of screws  The fracture area has never healed completely  She has ongoing instability of the knee and discomfort  Patient denies any recent cardiac symptoms of chest pain palpitations or shortness of breath  She does have a pacemaker in place for tachy-smita syndrome    She has been evaluated by her cardiologist Dr Migdalia Lizama who has indicated that she is cleared for surgery from a cardiac perspective  Patient has had no recent infections symptoms  I have reviewed the patient's preoperative blood work  The following portions of the patient's history were reviewed and updated as appropriate:   She  has a past medical history of Abnormal ECG, Anxiety, Arthritis, Asthma, Atrial premature complex, Cataract, bilateral, Cataract, left eye, Difficulty swallowing, Dizziness, Fibromyalgia, Fibromyalgia, primary, Gastric reflux, Heart failure ([address] ), History of COVID-19, Hypertension, Incontinence in female, Irregular heart beat, Lyme disease, PONV (postoperative nausea and vomiting), Premature ventricular contraction, Primary osteoarthritis of both knees, Rheumatic fever, Sore throat, Tuberculosis, and Tuberculosis (1947)    She   Patient Active Problem List    Diagnosis Date Noted    Stage 3a chronic kidney disease ([facility] [address] ) 08/23/2022    Preop examination 08/23/2022    Preop cardiovascular exam 07/20/2022    Poor dentition 06/01/2022    Closed bicondylar fracture of left femur with delayed healing 06/01/2022    Paroxysmal atrial fibrillation ([facility] [address] ) 02/21/2022    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    Current moderate episode of major depressive disorder ([address] ) 12/07/2021    Morbid obesity with body mass index (BMI) of 40 0 to 49 9 ([address] ) 09/23/2021    Reactive airway disease with acute exacerbation 09/21/2021    Ambulatory dysfunction 08/12/2021    Leukocytosis 08/02/2021    Arthritis 05/18/2021    Other fatigue 05/18/2021    Hyperlipidemia 11/10/2020    Dysphonia 04/28/2020    Vitamin D insufficiency 04/28/2020    Chronic pain of left knee 08/12/2019    Age related osteoporosis 08/12/2019    Anxiety 03/27/2019    Tremor 03/27/2019    Other insomnia 02/27/2019    Urinary incontinence 09/17/2018    Psoriasis 07/20/2018    Thyroid nodule 07/20/2018    Tachy-smita syndrome ([address] ) 02/15/2018    Essential hypertension 02/15/2018    Pacemaker 02/15/2018    GERD (gastroesophageal reflux disease) 07/18/2017    Mild carpal tunnel syndrome, right 04/10/2017    Degenerative lumbar spinal stenosis 03/31/2017    Low back pain 08/29/2016    Lumbar degenerative disc disease 08/29/2016    Chronic bilateral low back pain without sciatica 06/08/2016    Chronic GERD 05/06/2016    Overweight 02/12/2016    Fibromyalgia 11/06/2013     She  has a past surgical history that includes Hysterectomy; Tonsillectomy; pr egd transoral biopsy single/multiple (N/A, 04/06/2016); pr dilate esophagus (N/A, 04/06/2016); pr xcapsl ctrc rmvl insj io lens prosth w/o ecp (Left, 03/15/2016); Appendectomy; Replacement total knee (Right); Replacement total knee; Cardiac pacemaker placement (2019); ORIF femur fracture (Left, 08/05/2021); and Colonoscopy  Her family history includes Coronary artery disease in her mother; Heart attack in her father and mother; Heart disease in her father  She  reports that she has never smoked  She has never used smokeless tobacco  She reports previous alcohol use  She reports that she does not use drugs    Current Outpatient Medications   Medication Sig Dispense Refill    allopurinol (ZYLOPRIM) 100 mg tablet Take 1 tablet (100 mg total) by mouth daily 90 tablet 3    apixaban (Eliquis) 5 mg Take 1 tablet (5 mg total) by mouth 2 (two) times a day 60 tablet 6    ascorbic acid (VITAMIN C) 500 MG tablet Take 1 tablet (500 mg total) by mouth 2 (two) times a day 60 tablet 1    BIOTIN PO Take by mouth in the morning      Cholecalciferol (VITAMIN D3 PO) Take by mouth in the morning      Cyanocobalamin (VITAMIN B-12 PO) Take by mouth in the morning      diltiazem (CARDIZEM CD) 120 mg 24 hr capsule Take 1 capsule (120 mg total) by mouth daily (Patient taking differently: Take 120 mg by mouth daily Takes in the am) 90 capsule 3    DULoxetine (CYMBALTA) 30 mg delayed release capsule TAKE 1 CAPSULE(30 MG) BY MOUTH DAILY 30 capsule 5    furosemide (LASIX) 40 mg tablet Take 1 tablet (40 mg total) by mouth in the morning  90 tablet 1    lisinopril (ZESTRIL) 2 5 mg tablet Take 1 tablet (2 5 mg total) by mouth daily 30 tablet 2    LORazepam (Ativan) 0 5 mg tablet Take 1 tablet (0 5 mg total) by mouth every 8 (eight) hours as needed for anxiety 5 tablet 0    Melatonin 10 MG TABS Take by mouth Takes daily at night      meloxicam (MOBIC) 15 mg tablet TAKE 1 TABLET(15 MG) BY MOUTH DAILY 90 tablet 3    metoprolol tartrate (LOPRESSOR) 50 mg tablet Take 100 mg in the morning and 50 mg in the evening 270 tablet 3    Mirabegron ER (Myrbetriq) 25 MG TB24 Take 25 mg by mouth daily Takes in the evening 30 tablet 6    Multiple Vitamin (MULTIVITAMIN ADULT PO) Take by mouth in the morning      oxybutynin (DITROPAN-XL) 5 mg 24 hr tablet Take 1 tablet (5 mg total) by mouth daily (Patient taking differently: Take 5 mg by mouth daily Takes at night) 90 tablet 3    oxyCODONE (Roxicodone) 5 immediate release tablet Take 1 tablet (5 mg total) by mouth every 8 (eight) hours as needed for moderate pain Cont tx Max Daily Amount: 15 mg 20 tablet 0    albuterol (PROVENTIL HFA,VENTOLIN HFA) 90 mcg/act inhaler INHALE 2 PUFFS BY MOUTH EVERY 6 HOURS AS NEEDED FOR WHEEZING (Patient not taking: No sig reported) 3 Inhaler 0    ezetimibe (ZETIA) 10 mg tablet Take 1 tablet (10 mg total) by mouth daily (Patient not taking: Reported on 8/23/2022) 90 tablet 1    ferrous sulfate 324 (65 Fe) mg Take 1 tablet (324 mg total) by mouth 2 (two) times a day before meals (Patient not taking: Reported on 8/23/2022) 60 tablet 1    folic acid (FOLVITE) 1 mg tablet TAKE 1 TABLET(1 MG) BY MOUTH DAILY (Patient not taking: No sig reported) 90 tablet 0    naloxone (NARCAN) 4 mg/0 1 mL nasal spray Administer 1 spray into a nostril  If no response after 2-3 minutes, give another dose in the other nostril using a new spray   (Patient not taking: No sig reported) 1 each 1     No current facility-administered medications for this visit       Review of Systems   HENT:        Poor dentition in the lower jaw   Musculoskeletal: Positive for arthralgias, joint swelling and myalgias  Psychiatric/Behavioral: The patient is nervous/anxious  All other systems reviewed and are negative  Objective:      /72   Pulse 85   Temp 98 2 °F (36 8 °C)   Ht 5' 4" (1 626 m)   Wt 105 kg (231 lb 12 8 oz)   SpO2 97%   BMI 39 79 kg/m²          Physical Exam  Vitals reviewed  Constitutional:       General: She is not in acute distress  Appearance: Normal appearance  She is well-developed  She is not ill-appearing  HENT:      Head: Normocephalic  Right Ear: Hearing, tympanic membrane, ear canal and external ear normal       Left Ear: Hearing, tympanic membrane, ear canal and external ear normal       Nose: Nose normal  No mucosal edema  Mouth/Throat:      Mouth: Mucous membranes are moist       Pharynx: Oropharynx is clear  Uvula midline  Eyes:      General: Lids are normal          Right eye: No discharge  Left eye: No discharge  Extraocular Movements: Extraocular movements intact  Conjunctiva/sclera: Conjunctivae normal       Pupils: Pupils are equal, round, and reactive to light  Neck:      Thyroid: No thyromegaly  Vascular: No carotid bruit or JVD  Cardiovascular:      Rate and Rhythm: Normal rate and regular rhythm  Heart sounds: Normal heart sounds  No murmur heard  Pulmonary:      Effort: Pulmonary effort is normal  No respiratory distress  Breath sounds: Normal breath sounds  No wheezing, rhonchi or rales  Abdominal:      General: Bowel sounds are normal  There is no distension  Palpations: Abdomen is soft  Tenderness: There is no abdominal tenderness  Musculoskeletal:         General: Normal range of motion  Cervical back: Normal range of motion and neck supple  No rigidity or tenderness  Lymphadenopathy:      Cervical: No cervical adenopathy     Skin: General: Skin is warm and dry  Coloration: Skin is not jaundiced or pale  Neurological:      General: No focal deficit present  Mental Status: She is alert and oriented to person, place, and time  Mental status is at baseline  Deep Tendon Reflexes: Reflexes are normal and symmetric  Reflexes normal    Psychiatric:         Mood and Affect: Mood normal          Speech: Speech normal          Behavior: Behavior normal  Behavior is cooperative  Thought Content:  Thought content normal          Judgment: Judgment normal

## 2022-08-23 NOTE — H&P (VIEW-ONLY)
Assessment/Plan:    Poor dentition  Patient has obvious poor dentition in the lower jaw  Maxillary surgery is scheduled her for extraction of disease teeth prior to surgery to repair left knee fracture  patient may hold Eliquis 3 days prior to surgical procedure    Tachy-smita syndrome ([address] )  Tachy-smita syndrome stable the present time patient has a pacemaker in place she has been cleared by Cardiology to proceed with surgical procedures    Paroxysmal atrial fibrillation ([address] )  No evidence of atrial fibrillation patient is paced at this time cleared for surgery by Cardiology  Essential hypertension  Hypertension assessment today confirms adequate control of hypertension  Recommend continuation of lisinopril at 2 5 mg daily metoprolol tartrate 100 mg in the morning and 50 mg in the evening and diltiazem 120 mg daily    Closed bicondylar fracture of left femur with delayed healing  Distal left femur fracture with delayed healing of patient is scheduled for removal of hardware and total knee replacement surgery next month  She is medically stable and cleared for this surgical procedure  She may hold her Eliquis 3 days prior to surgery  Stage 3a chronic kidney disease [facility])  Lab Results   Component Value Date    EGFR 49 08/17/2022    EGFR 54 05/17/2022    EGFR 61 09/30/2021    CREATININE 1 07 08/17/2022    CREATININE 1 00 05/17/2022    CREATININE 0 91 09/30/2021   Review of blood work confirms stable chronic stage IIIA chronic kidney disease  Avoid dehydration during and after surgery avoid nonsteroidal anti inflammatory medications and continue blood pressure control    Preop examination  Based on today's examination review of her current blood work patient is medically cleared to proceed with left total knee replacement as well as extraction of disease teeth in the lower jaw    The patient has previously undergone cardiac clearance by her cardiologist     Morbid obesity with body mass index (BMI) of 40 0 to 49 9 [facility])  Recommend continued attempts to try to reduce calorie intake to reduce total body mass    Anxiety  Patient has significant anxiety regarding the upcoming extraction of her teeth  I provided her with a prescription for Ativan 0 5 mg to be taken the night before the procedure and 1 hour before she goes in for the extraction  Diagnoses and all orders for this visit:    Anxiety  -     LORazepam (Ativan) 0 5 mg tablet; Take 1 tablet (0 5 mg total) by mouth every 8 (eight) hours as needed for anxiety    Atrial fibrillation, unspecified type (HCC)  -     apixaban (Eliquis) 5 mg; Take 1 tablet (5 mg total) by mouth 2 (two) times a day    Urge incontinence of urine  -     Mirabegron ER (Myrbetriq) 25 MG TB24; Take 25 mg by mouth daily Takes in the evening    Poor dentition    Essential hypertension    Morbid obesity with body mass index (BMI) of 40 0 to 49 9 ([facility])    Closed bicondylar fracture of left femur with delayed healing    Tachy-smita syndrome (HCC)    Paroxysmal atrial fibrillation ([facility])    Stage 3a chronic kidney disease ([facility] )    Preop examination        Subjective:      Patient ID: Matt Gomes is a 76 y o  female  This 69-year-old female patient presents today in a wheelchair in the presence of family member for mid preop medical clearance prior to to procedures  The 1st will be extraction of unhealthy teeth in the lower jaw 2nd procedure will be removal of hardware from the left knee with total knee replacement  Patient has previously sustained a fracture of the distal femur of the left leg with andreina insertion as well as of screws  The fracture area has never healed completely  She has ongoing instability of the knee and discomfort  Patient denies any recent cardiac symptoms of chest pain palpitations or shortness of breath  She does have a pacemaker in place for tachy-smita syndrome    She has been evaluated by her cardiologist Dr Cash Nguyen who has indicated that she is cleared for surgery from a cardiac perspective  Patient has had no recent infections symptoms  I have reviewed the patient's preoperative blood work  The following portions of the patient's history were reviewed and updated as appropriate:   She  has a past medical history of Abnormal ECG, Anxiety, Arthritis, Asthma, Atrial premature complex, Cataract, bilateral, Cataract, left eye, Difficulty swallowing, Dizziness, Fibromyalgia, Fibromyalgia, primary, Gastric reflux, Heart failure ([address] ), History of COVID-19, Hypertension, Incontinence in female, Irregular heart beat, Lyme disease, PONV (postoperative nausea and vomiting), Premature ventricular contraction, Primary osteoarthritis of both knees, Rheumatic fever, Sore throat, Tuberculosis, and Tuberculosis (1947)    She   Patient Active Problem List    Diagnosis Date Noted    Stage 3a chronic kidney disease ([facility] ) 08/23/2022    Preop examination 08/23/2022    Preop cardiovascular exam 07/20/2022    Poor dentition 06/01/2022    Closed bicondylar fracture of left femur with delayed healing 06/01/2022    Paroxysmal atrial fibrillation ([facility] ) 02/21/2022    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    Current moderate episode of major depressive disorder ([address] ) 12/07/2021    Morbid obesity with body mass index (BMI) of 40 0 to 49 9 ([address] ) 09/23/2021    Reactive airway disease with acute exacerbation 09/21/2021    Ambulatory dysfunction 08/12/2021    Leukocytosis 08/02/2021    Arthritis 05/18/2021    Other fatigue 05/18/2021    Hyperlipidemia 11/10/2020    Dysphonia 04/28/2020    Vitamin D insufficiency 04/28/2020    Chronic pain of left knee 08/12/2019    Age related osteoporosis 08/12/2019    Anxiety 03/27/2019    Tremor 03/27/2019    Other insomnia 02/27/2019    Urinary incontinence 09/17/2018    Psoriasis 07/20/2018    Thyroid nodule 07/20/2018    Tachy-smita syndrome ([address] ) 02/15/2018    Essential hypertension 02/15/2018    Pacemaker 02/15/2018    GERD (gastroesophageal reflux disease) 07/18/2017    Mild carpal tunnel syndrome, right 04/10/2017    Degenerative lumbar spinal stenosis 03/31/2017    Low back pain 08/29/2016    Lumbar degenerative disc disease 08/29/2016    Chronic bilateral low back pain without sciatica 06/08/2016    Chronic GERD 05/06/2016    Overweight 02/12/2016    Fibromyalgia 11/06/2013     She  has a past surgical history that includes Hysterectomy; Tonsillectomy; pr egd transoral biopsy single/multiple (N/A, 04/06/2016); pr dilate esophagus (N/A, 04/06/2016); pr xcapsl ctrc rmvl insj io lens prosth w/o ecp (Left, 03/15/2016); Appendectomy; Replacement total knee (Right); Replacement total knee; Cardiac pacemaker placement (2019); ORIF femur fracture (Left, 08/05/2021); and Colonoscopy  Her family history includes Coronary artery disease in her mother; Heart attack in her father and mother; Heart disease in her father  She  reports that she has never smoked  She has never used smokeless tobacco  She reports previous alcohol use  She reports that she does not use drugs    Current Outpatient Medications   Medication Sig Dispense Refill    allopurinol (ZYLOPRIM) 100 mg tablet Take 1 tablet (100 mg total) by mouth daily 90 tablet 3    apixaban (Eliquis) 5 mg Take 1 tablet (5 mg total) by mouth 2 (two) times a day 60 tablet 6    ascorbic acid (VITAMIN C) 500 MG tablet Take 1 tablet (500 mg total) by mouth 2 (two) times a day 60 tablet 1    BIOTIN PO Take by mouth in the morning      Cholecalciferol (VITAMIN D3 PO) Take by mouth in the morning      Cyanocobalamin (VITAMIN B-12 PO) Take by mouth in the morning      diltiazem (CARDIZEM CD) 120 mg 24 hr capsule Take 1 capsule (120 mg total) by mouth daily (Patient taking differently: Take 120 mg by mouth daily Takes in the am) 90 capsule 3    DULoxetine (CYMBALTA) 30 mg delayed release capsule TAKE 1 CAPSULE(30 MG) BY MOUTH DAILY 30 capsule 5    furosemide (LASIX) 40 mg tablet Take 1 tablet (40 mg total) by mouth in the morning  90 tablet 1    lisinopril (ZESTRIL) 2 5 mg tablet Take 1 tablet (2 5 mg total) by mouth daily 30 tablet 2    LORazepam (Ativan) 0 5 mg tablet Take 1 tablet (0 5 mg total) by mouth every 8 (eight) hours as needed for anxiety 5 tablet 0    Melatonin 10 MG TABS Take by mouth Takes daily at night      meloxicam (MOBIC) 15 mg tablet TAKE 1 TABLET(15 MG) BY MOUTH DAILY 90 tablet 3    metoprolol tartrate (LOPRESSOR) 50 mg tablet Take 100 mg in the morning and 50 mg in the evening 270 tablet 3    Mirabegron ER (Myrbetriq) 25 MG TB24 Take 25 mg by mouth daily Takes in the evening 30 tablet 6    Multiple Vitamin (MULTIVITAMIN ADULT PO) Take by mouth in the morning      oxybutynin (DITROPAN-XL) 5 mg 24 hr tablet Take 1 tablet (5 mg total) by mouth daily (Patient taking differently: Take 5 mg by mouth daily Takes at night) 90 tablet 3    oxyCODONE (Roxicodone) 5 immediate release tablet Take 1 tablet (5 mg total) by mouth every 8 (eight) hours as needed for moderate pain Cont tx Max Daily Amount: 15 mg 20 tablet 0    albuterol (PROVENTIL HFA,VENTOLIN HFA) 90 mcg/act inhaler INHALE 2 PUFFS BY MOUTH EVERY 6 HOURS AS NEEDED FOR WHEEZING (Patient not taking: No sig reported) 3 Inhaler 0    ezetimibe (ZETIA) 10 mg tablet Take 1 tablet (10 mg total) by mouth daily (Patient not taking: Reported on 8/23/2022) 90 tablet 1    ferrous sulfate 324 (65 Fe) mg Take 1 tablet (324 mg total) by mouth 2 (two) times a day before meals (Patient not taking: Reported on 8/23/2022) 60 tablet 1    folic acid (FOLVITE) 1 mg tablet TAKE 1 TABLET(1 MG) BY MOUTH DAILY (Patient not taking: No sig reported) 90 tablet 0    naloxone (NARCAN) 4 mg/0 1 mL nasal spray Administer 1 spray into a nostril  If no response after 2-3 minutes, give another dose in the other nostril using a new spray   (Patient not taking: No sig reported) 1 each 1     No current facility-administered medications for this visit       Review of Systems   HENT:        Poor dentition in the lower jaw   Musculoskeletal: Positive for arthralgias, joint swelling and myalgias  Psychiatric/Behavioral: The patient is nervous/anxious  All other systems reviewed and are negative  Objective:      /72   Pulse 85   Temp 98 2 °F (36 8 °C)   Ht 5' 4" (1 626 m)   Wt 105 kg (231 lb 12 8 oz)   SpO2 97%   BMI 39 79 kg/m²          Physical Exam  Vitals reviewed  Constitutional:       General: She is not in acute distress  Appearance: Normal appearance  She is well-developed  She is not ill-appearing  HENT:      Head: Normocephalic  Right Ear: Hearing, tympanic membrane, ear canal and external ear normal       Left Ear: Hearing, tympanic membrane, ear canal and external ear normal       Nose: Nose normal  No mucosal edema  Mouth/Throat:      Mouth: Mucous membranes are moist       Pharynx: Oropharynx is clear  Uvula midline  Eyes:      General: Lids are normal          Right eye: No discharge  Left eye: No discharge  Extraocular Movements: Extraocular movements intact  Conjunctiva/sclera: Conjunctivae normal       Pupils: Pupils are equal, round, and reactive to light  Neck:      Thyroid: No thyromegaly  Vascular: No carotid bruit or JVD  Cardiovascular:      Rate and Rhythm: Normal rate and regular rhythm  Heart sounds: Normal heart sounds  No murmur heard  Pulmonary:      Effort: Pulmonary effort is normal  No respiratory distress  Breath sounds: Normal breath sounds  No wheezing, rhonchi or rales  Abdominal:      General: Bowel sounds are normal  There is no distension  Palpations: Abdomen is soft  Tenderness: There is no abdominal tenderness  Musculoskeletal:         General: Normal range of motion  Cervical back: Normal range of motion and neck supple  No rigidity or tenderness  Lymphadenopathy:      Cervical: No cervical adenopathy     Skin: General: Skin is warm and dry  Coloration: Skin is not jaundiced or pale  Neurological:      General: No focal deficit present  Mental Status: She is alert and oriented to person, place, and time  Mental status is at baseline  Deep Tendon Reflexes: Reflexes are normal and symmetric  Reflexes normal    Psychiatric:         Mood and Affect: Mood normal          Speech: Speech normal          Behavior: Behavior normal  Behavior is cooperative  Thought Content:  Thought content normal          Judgment: Judgment normal

## 2022-08-23 NOTE — ASSESSMENT & PLAN NOTE
Tachy-smita syndrome stable the present time patient has a pacemaker in place she has been cleared by Cardiology to proceed with surgical procedures

## 2022-08-23 NOTE — ASSESSMENT & PLAN NOTE
Patient has obvious poor dentition in the lower jaw  Maxillary surgery is scheduled her for extraction of disease teeth prior to surgery to repair left knee fracture     patient may hold Eliquis 3 days prior to surgical procedure

## 2022-08-30 NOTE — TELEPHONE ENCOUNTER
Preoperative Elective Admission Assessment- spoke to caregiver     Medicare- [facility]     Living Situation:  Resides in elderly apartment that is wheel chair accessible  Who does pt live with: lives alone  What kind of home: apartment  How do they enter the home: front  How many levels in home: 1 level home  # of steps to enter home: 0 to enter  ----Current caregiver : 2 days for 4 hours and 1 day for 8 hours  Where is Bathroom: Walk in shower   First Floor Setup:   Is there a bathroom: Yes  Where would pt sleep: bed     DME: Frame walker, Rollator, BSC and WC       Patient's Current Level of Function: Non-ambulatory/Wheelchair bound- "sore" per caregiver, but able to stand and function at home alone when caregiver is not there  Post-op Caregiver: Caregiver - Netta   Caregiver Name and phone number for Inpatient discharge needs: Madisyn Canales- [phone], patients niece  Currently receiving [facility]/aides/community supports: No     Post-op Transport: relative  To/from hospital: relative  To/from PT 2-3x/week: relative  Uses community transport now: No     Outpatient Physical Therapy Site:  Site: [facility]- will send to PT coordinator  pre and post-op appts scheduled? No     Medication Management: with assistance and pillbox  Preferred Pharmacy for Post-op Medications: [facility]   Blood Management Vitamin Regimen: Not folic acid, but taking the others  Post-op anticoagulant: Eliquis patient, PCP managed     DC Plan: Pt plans to be discharged home  Caregiver educated that our goal, if at all possible, is to appropriately discharge patient based off their post-op function while striving to maintain maximal independence  If possible, the goal is to discharge patient to home and for them to attend outpatient physical therapy  Barriers to DC identified preoperatively: caregiver support- Madisyn Canales can increase hours and support based on needs       BMI: 39 79    Cares[employer]: declined enrollment    Patient Education:  Pt educated on post-op pain, early mobilization (POD0), Average inpt LOS, OP PT goal   Patient educated that our goal is to appropriately discharge patient based off their post-op function while striving to maintain maximal independence  The goal is to discharge patient to home and for them to attend outpatient physical therapy

## 2022-08-31 ENCOUNTER — TELEPHONE (OUTPATIENT)
Dept: OBGYN CLINIC | Facility: HOSPITAL | Age: 77
End: 2022-08-31

## 2022-08-31 NOTE — TELEPHONE ENCOUNTER
Pt contacted Call Center requested refill of their medication  Medication Name:  oxyCODONE     Dosage of Med:    5 immediate  Frequency of Med:  Take 1 tablet (5 mg total) by mouth every 8 (eight) hours as needed for moderate pain Cont tx Max Daily Amount: 15 mg    Remaining Medication:  2 tablets    Pharmacy and Location:  [place] [address] #[id number] - [address], [address]     Pt  Preferred Callback Phone Number:  [phone]  Care giver is asking if they can pick them up on Friday 09/01 before the holiday weekend  Thank you  PLEASE ADVISE PATIENTS:    REFILL REQUESTS WILL BE PROCESSED WITHIN 24-48 HOURS

## 2022-09-02 DIAGNOSIS — S72.92XK CLOSED FRACTURE OF LEFT FEMUR WITH NONUNION, UNSPECIFIED FRACTURE MORPHOLOGY, UNSPECIFIED PORTION OF FEMUR, SUBSEQUENT ENCOUNTER: ICD-10-CM

## 2022-09-02 RX ORDER — OXYCODONE HYDROCHLORIDE 5 MG/1
5 TABLET ORAL EVERY 8 HOURS PRN
Qty: 10 TABLET | Refills: 0 | Status: SHIPPED | OUTPATIENT
Start: 2022-09-02 | End: 2022-09-16

## 2022-09-02 NOTE — PRE-PROCEDURE INSTRUCTIONS
Pre-Surgery Instructions:   Medication Instructions    albuterol (PROVENTIL HFA,VENTOLIN HFA) 90 mcg/act inhaler Uses PRN- OK to take day of surgery    allopurinol (ZYLOPRIM) 100 mg tablet Take day of surgery   ascorbic acid (VITAMIN C) 500 MG tablet Hold day of surgery   BIOTIN PO Stop taking 7 days prior to surgery   Cholecalciferol (VITAMIN D3 PO) Stop taking 7 days prior to surgery   Cyanocobalamin (VITAMIN B-12 PO) Stop taking 7 days prior to surgery   diltiazem (CARDIZEM CD) 120 mg 24 hr capsule Take day of surgery   DULoxetine (CYMBALTA) 30 mg delayed release capsule Take day of surgery   ezetimibe (ZETIA) 10 mg tablet Take day of surgery   ferrous sulfate 324 (65 Fe) mg Hold day of surgery   FIBER PO Hold day of surgery   furosemide (LASIX) 40 mg tablet Hold day of surgery   lisinopril (ZESTRIL) 2 5 mg tablet Hold day of surgery   LORazepam (Ativan) 0 5 mg tablet Uses PRN- OK to take day of surgery    Melatonin 10 MG TABS Take night before surgery    metoprolol tartrate (LOPRESSOR) 50 mg tablet Take day of surgery   Mirabegron ER (Myrbetriq) 25 MG TB24 Take night before surgery    Multiple Vitamin (MULTIVITAMIN ADULT PO) Hold day of surgery      naloxone (NARCAN) 4 mg/0 1 mL nasal spray Instructions provided by MD    oxybutynin (DITROPAN-XL) 5 mg 24 hr tablet Take night before surgery    oxyCODONE (Roxicodone) 5 immediate release tablet Uses PRN- OK to take day of surgery    [DISCONTINUED] apixaban (Eliquis) 5 mg Instructions provided by MD Kira Hatch [DISCONTINUED] ascorbic acid (VITAMIN C) 500 MG tablet     [DISCONTINUED] DULoxetine (Cymbalta) 30 mg delayed release capsule     [DISCONTINUED] ferrous sulfate 324 (65 Fe) mg     [DISCONTINUED] lisinopril (ZESTRIL) 2 5 mg tablet     [DISCONTINUED] metoprolol tartrate (LOPRESSOR) 50 mg tablet     [DISCONTINUED] Mirabegron ER (Myrbetriq) 25 MG TB24     [DISCONTINUED] oxyCODONE (Roxicodone) 5 immediate release tablet You will receive a phone call from hospital for arrival time  Please call surgeons office if any changes in your condition  Wear easy on/off clothing; consider type of surgery;  Valuables, jewelry, piercing's please keep at home  **COVID-19  education/surgical guidelines  Updated covid    Visitation policy  Please: No contact lenses or eye make up, artificial eyelashes    Please secure transportation     Follow pre surgery showering or cleaning instructions as  Reviewed by nurse or surgeons office  Questions answered and concerns addressed    Ortho class completed   See Nurse Casey Pan note 8/15  Patient has walker, raised toilet seat  Lives: has caregiver, WC/walker   IS discussed  PT evaluation completed or in place  Attire addressed regarding particular surgery  Encouraged healthy lifestyle, instructed to notify surgeon of any changes in health pre-op  Answered questions and addresses concerns  Call to be received before surgery with arrival time  See Geriatric Assessment below        Cognitive Assessment:    CAM: NA   TUG <15 sec: unable to ambulate   Falls (last 6 months): none   Hand  score: NA  -Attempt 1: NA  -Attempt 2: NA  -Attempt 3: NA   Eran Total Score: 16   PHQ- 9 Depression Scale: 3   Nutrition Assessment Score: 12   METS: 3-non weight bearing currently   Health goals:  -What are your overall health goals? (quit smoking, wt  loss, rest, decrease stress)  Get back to getting in car, get back to life  -What brings you strength? (family, friends, [employer], health)  family  -What activities are important to you? (exercise, reading, travel, work)  reading

## 2022-09-06 ENCOUNTER — APPOINTMENT (OUTPATIENT)
Dept: LAB | Facility: HOSPITAL | Age: 77
End: 2022-09-06
Payer: MEDICARE

## 2022-09-06 ENCOUNTER — OFFICE VISIT (OUTPATIENT)
Dept: OBGYN CLINIC | Facility: CLINIC | Age: 77
End: 2022-09-06
Payer: MEDICARE

## 2022-09-06 VITALS
BODY MASS INDEX: 39.44 KG/M2 | HEIGHT: 64 IN | WEIGHT: 231 LBS | HEART RATE: 82 BPM | SYSTOLIC BLOOD PRESSURE: 108 MMHG | DIASTOLIC BLOOD PRESSURE: 82 MMHG

## 2022-09-06 DIAGNOSIS — S72.92XK CLOSED FRACTURE OF LEFT FEMUR WITH NONUNION, UNSPECIFIED FRACTURE MORPHOLOGY, UNSPECIFIED PORTION OF FEMUR, SUBSEQUENT ENCOUNTER: Primary | ICD-10-CM

## 2022-09-06 PROCEDURE — 99213 OFFICE O/P EST LOW 20 MIN: CPT | Performed by: PHYSICIAN ASSISTANT

## 2022-09-06 PROCEDURE — 20610 DRAIN/INJ JOINT/BURSA W/O US: CPT | Performed by: PHYSICIAN ASSISTANT

## 2022-09-06 RX ORDER — LIDOCAINE HYDROCHLORIDE 10 MG/ML
2 INJECTION, SOLUTION INFILTRATION; PERINEURAL
Status: COMPLETED | OUTPATIENT
Start: 2022-09-06 | End: 2022-09-06

## 2022-09-06 RX ADMIN — LIDOCAINE HYDROCHLORIDE 2 ML: 10 INJECTION, SOLUTION INFILTRATION; PERINEURAL at 11:35

## 2022-09-06 NOTE — TELEPHONE ENCOUNTER
Spoke to Under[employer]- caregiver  As PT notified that they would not schedule outpatient PT    Pt is currently in office with [facility] requesting [address]-- I educated patient on the many benefits of outpt PT(Including maintaining independence, additional resources at outpt site, better outcomes etc  )  Also educated on how home PT vs  outpt PT is determined (while inpt)

## 2022-09-06 NOTE — PROGRESS NOTES
Assessment:  No diagnosis found  Plan:             The above stated was discussed in layman's terms and the patient expressed understanding  All questions were answered to the patient's satisfaction  Subjective:   Jose J Lira is a 76 y o  female who presents today follow up for left knee pain  Review of systems negative unless otherwise specified in HPI    Past Medical History:   Diagnosis Date    Abnormal ECG     Last Assessed 9/29/2016    Anxiety     Last Assessed 6/08/2016    Arthritis     knees    Asthma     Last Assessed 11/06/2013    Atrial premature complex     Cataract, bilateral     Last Assessed 7/14/2016    Cataract, left eye     Both eyes  Had surgery on left   COVID-19     Difficulty swallowing     Dizziness     Fibromyalgia     Fibromyalgia, primary     Gastric reflux     Heart failure ([facility] [address] )     5/23/22 Pt reports had heart failure in the past    History of COVID-19     5/23/22 Pt reports having COVID in 2021      History of transfusion     no reaction    [place] (hard of hearing)     Hyperlipidemia     Hypertension     Incontinence in female     5/23/22 Pt reports has incontinence -wears depends especially at night/riding in car    Irregular heart beat     5/23/22 Pt reports hx of A fib    Lyme disease     PONV (postoperative nausea and vomiting)     Premature ventricular contraction     Primary osteoarthritis of both knees     Last Assessed 7/14/2016    Rheumatic fever     5/23/22 Pt reports had hx of rheumatic fever as a child twice    Sore throat     Tuberculosis 1945       Past Surgical History:   Procedure Laterality Date    APPENDECTOMY      CARDIAC PACEMAKER PLACEMENT  2019    COLONOSCOPY      DENTAL SURGERY      extractions    HYSTERECTOMY      5/23/22 Pt reports had appendix out with hysterectomy and "tightened up my bladder"    ORIF FEMUR FRACTURE Left 08/05/2021    Procedure: OPEN REDUCTION W/ INTERNAL FIXATION (ORIF) DISTAL FEMUR; INSERTION RETROGRADE NAIL;  Surgeon: Teri Álvarez MD;  [facility];  Service: Orthopedics    HI DILATE ESOPHAGUS N/A 04/06/2016    Procedure: DILATATION ESOPHAGEAL;  Surgeon: Ashwini Way MD;  [facility]; Service: Gastroenterology    HI EGD TRANSORAL BIOPSY SINGLE/MULTIPLE N/A 04/06/2016    Procedure: ESOPHAGOGASTRODUODENOSCOPY (EGD); Surgeon: Ashwini Way MD;  [facility]; Service: Gastroenterology    HI XCAPSL CTRC RMVL INSJ IO LENS PROSTH W/O ECP Left 03/15/2016    Procedure: EXTRACTION EXTRACAPSULAR CATARACT PHACO INTRAOCULAR LENS (IOL); Surgeon: Raz Coronado MD;  [facility];  Service: Ophthalmology    REPLACEMENT TOTAL KNEE Right     REPLACEMENT TOTAL KNEE      right     TONSILLECTOMY         Family History   Problem Relation Age of Onset    Coronary artery disease Mother     Heart attack Mother         Prior    Heart disease Father     Heart attack Father         Prior    Anesthesia problems Neg Hx        Social History     Occupational History    Not on file   Tobacco Use    Smoking status: Never Smoker    Smokeless tobacco: Never Used    Tobacco comment: Denies any former or current smoking   Vaping Use    Vaping Use: Never used   Substance and Sexual Activity    Alcohol use: Not Currently     Comment: Per [employer]- pt reports no current alcohol use at this time    Drug use: No     Comment: Denies any former or current drug use    Sexual activity: Never     Comment:   for 12 years - not active         Current Outpatient Medications:     albuterol (PROVENTIL HFA,VENTOLIN HFA) 90 mcg/act inhaler, INHALE 2 PUFFS BY MOUTH EVERY 6 HOURS AS NEEDED FOR WHEEZING (Patient not taking: No sig reported), Disp: 3 Inhaler, Rfl: 0    allopurinol (ZYLOPRIM) 100 mg tablet, Take 1 tablet (100 mg total) by mouth daily, Disp: 90 tablet, Rfl: 3    apixaban (Eliquis) 5 mg, Take 1 tablet (5 mg total) by mouth 2 (two) times a day, Disp: 60 tablet, Rfl: 6    ascorbic acid (VITAMIN C) 500 MG tablet, Take 1 tablet (500 mg total) by mouth 2 (two) times a day, Disp: 60 tablet, Rfl: 1    BIOTIN PO, Take by mouth in the morning, Disp: , Rfl:     Cholecalciferol (VITAMIN D3 PO), Take by mouth in the morning, Disp: , Rfl:     Cyanocobalamin (VITAMIN B-12 PO), Take by mouth in the morning, Disp: , Rfl:     diltiazem (CARDIZEM CD) 120 mg 24 hr capsule, Take 1 capsule (120 mg total) by mouth daily (Patient taking differently: Take 120 mg by mouth daily Takes in the am), Disp: 90 capsule, Rfl: 3    DULoxetine (CYMBALTA) 30 mg delayed release capsule, TAKE 1 CAPSULE(30 MG) BY MOUTH DAILY, Disp: 30 capsule, Rfl: 5    ezetimibe (ZETIA) 10 mg tablet, Take 1 tablet (10 mg total) by mouth daily, Disp: 90 tablet, Rfl: 1    ferrous sulfate 324 (65 Fe) mg, Take 1 tablet (324 mg total) by mouth 2 (two) times a day before meals, Disp: 60 tablet, Rfl: 1    FIBER PO, Take 1 tablet by mouth daily, Disp: , Rfl:     folic acid (FOLVITE) 1 mg tablet, TAKE 1 TABLET(1 MG) BY MOUTH DAILY (Patient not taking: No sig reported), Disp: 90 tablet, Rfl: 0    furosemide (LASIX) 40 mg tablet, Take 1 tablet (40 mg total) by mouth in the morning , Disp: 90 tablet, Rfl: 1    lisinopril (ZESTRIL) 2 5 mg tablet, Take 1 tablet (2 5 mg total) by mouth daily, Disp: 30 tablet, Rfl: 2    LORazepam (Ativan) 0 5 mg tablet, Take 1 tablet (0 5 mg total) by mouth every 8 (eight) hours as needed for anxiety, Disp: 5 tablet, Rfl: 0    Melatonin 10 MG TABS, Take by mouth Takes daily at night, Disp: , Rfl:     meloxicam (MOBIC) 15 mg tablet, TAKE 1 TABLET(15 MG) BY MOUTH DAILY, Disp: 90 tablet, Rfl: 3    metoprolol tartrate (LOPRESSOR) 50 mg tablet, Take 100 mg in the morning and 50 mg in the evening, Disp: 270 tablet, Rfl: 3    Mirabegron ER (Myrbetriq) 25 MG TB24, Take 25 mg by mouth daily Takes in the evening, Disp: 30 tablet, Rfl: 6    Multiple Vitamin (MULTIVITAMIN ADULT PO), Take by mouth in the morning, Disp: , Rfl:     naloxone (NARCAN) 4 mg/0 1 mL nasal spray, Administer 1 spray into a nostril  If no response after 2-3 minutes, give another dose in the other nostril using a new spray   (Patient not taking: No sig reported), Disp: 1 each, Rfl: 1    oxybutynin (DITROPAN-XL) 5 mg 24 hr tablet, Take 1 tablet (5 mg total) by mouth daily (Patient taking differently: Take 5 mg by mouth daily Takes at night), Disp: 90 tablet, Rfl: 3    oxyCODONE (Roxicodone) 5 immediate release tablet, Take 1 tablet (5 mg total) by mouth every 8 (eight) hours as needed for moderate pain Cont tx Max Daily Amount: 15 mg, Disp: 10 tablet, Rfl: 0    Allergies   Allergen Reactions    Penicillins Hives     Itching and terrible hives    Sulfa Antibiotics Itching    E60 Folate [Folic Acid-Vit H1-VHE G91 - Food Allergy] Other (See Comments)     5/23/22 Pt reports no allergic reaction known     [place] Other (See Comments)     Unknown, 5/23 -pt is unaware of reaction     Lyrica [Pregabalin] Other (See Comments)     5/23/22 Pt reports doesn't remember reaction     Other Other (See Comments)     Black rubber 5/23/22 per allergy test - pt unaware of reaction    Cortisone Acetate [Cortisone] Itching and Rash     5/23/22 pt reports makes her violent     [place] Other (See Comments)     Skin discoloration            Vitals:    09/06/22 1019   BP: 108/82   Pulse: 82       Objective:            Physical Exam  Physical Exam:      General Appearance:    Alert, cooperative, no distress, appears stated age   Head:    Normocephalic, without obvious abnormality, atraumatic   Eyes:    conjunctiva/corneas clear, both eyes         Nose:   Nares normal, septum midline, no drainage    Throat:   Lips normal; teeth and gums normal   Neck:    symmetrical, trachea midline, ;     thyroid:  no enlargement/   Back:     Symmetric, no curvature, ROM normal   Lungs:   No audible wheezing or labored breathing   Chest Wall:    No tenderness or deformity Heart:    Regular rate and rhythm                         Pulses:   2+ and symmetric all extremities   Skin:   Skin color, texture, turgor normal, no rashes or lesions   Neurologic:   normal strength, sensation and reflexes     throughout                       Ortho Exam      Neurovascularly Intact Distally     Diagnostics, reviewed and taken today if performed as documented:    None performed      The attending physician has personally reviewed the pertinent films in PACS and interpretation is as follows:      Procedures, if performed today:    Procedures    None performed      Scribe Attestation    I,:   am acting as a scribe while in the presence of the attending physician :       I,:   personally performed the services described in this documentation    as scribed in my presence :             Portions of the record may have been created with voice recognition software  Occasional wrong word or "sound a like" substitutions may have occurred due to the inherent limitations of voice recognition software  Read the chart carefully and recognize, using context, where substitutions have occurred

## 2022-09-06 NOTE — PROGRESS NOTES
Orthopedics          Radha Severe 76 y o  female MRN: [id number]      Chief Complaint:   left knee pain    HPI:   76 y  o female complaining of left knee pain  Patient has been diagnosed with a left knee distal femur nonunion  His has a history of retrograde nailing in the left femur several months ago  Patient is scheduled for distal femoral replacement of the left lower extremity  Patient presents today for an aspiration of her left knee for preoperative planning  Patient has significant issues with ambulatory status due to her left knee pa and instability localized the pain in her left knee denies changes numbness tingling left lower extremity  Review Of Systems:   · Skin: Normal  · Neuro: See HPI  · Musculoskeletal: See HPI  · All other systems reviewed and are negative    Past Medical History:   Past Medical History:   Diagnosis Date    Abnormal ECG     Last Assessed 9/29/2016    Anxiety     Last Assessed 6/08/2016    Arthritis     knees    Asthma     Last Assessed 11/06/2013    Atrial premature complex     Cataract, bilateral     Last Assessed 7/14/2016    Cataract, left eye     Both eyes  Had surgery on left   COVID-19     Difficulty swallowing     Dizziness     Fibromyalgia     Fibromyalgia, primary     Gastric reflux     Heart failure ([facility] [address] )     5/23/22 Pt reports had heart failure in the past    History of COVID-19     5/23/22 Pt reports having COVID in 2021      History of transfusion     no reaction    [place] (hard of hearing)     Hyperlipidemia     Hypertension     Incontinence in female     5/23/22 Pt reports has incontinence -wears depends especially at night/riding in car    Irregular heart beat     5/23/22 Pt reports hx of A fib    Lyme disease     PONV (postoperative nausea and vomiting)     Premature ventricular contraction     Primary osteoarthritis of both knees     Last Assessed 7/14/2016    Rheumatic fever     5/23/22 Pt reports had hx of rheumatic fever as a child twice    Sore throat     Tuberculosis 1945       Past Surgical History:   Past Surgical History:   Procedure Laterality Date    APPENDECTOMY      CARDIAC PACEMAKER PLACEMENT  2019    COLONOSCOPY      DENTAL SURGERY      extractions    HYSTERECTOMY      5/23/22 Pt reports had appendix out with hysterectomy and "tightened up my bladder"    ORIF FEMUR FRACTURE Left 08/05/2021    Procedure: OPEN REDUCTION W/ INTERNAL FIXATION (ORIF) DISTAL FEMUR; INSERTION RETROGRADE NAIL;  Surgeon: Alena Hayden MD;  [facility];  Service: Orthopedics    MT DILATE ESOPHAGUS N/A 04/06/2016    Procedure: DILATATION ESOPHAGEAL;  Surgeon: Deanna Tabor MD;  [facility]; Service: Gastroenterology    MT EGD TRANSORAL BIOPSY SINGLE/MULTIPLE N/A 04/06/2016    Procedure: ESOPHAGOGASTRODUODENOSCOPY (EGD); Surgeon: Deanna Tabor MD;  [facility]; Service: Gastroenterology    MT XCAPSL CTRC RMVL INSJ IO LENS PROSTH W/O ECP Left 03/15/2016    Procedure: EXTRACTION EXTRACAPSULAR CATARACT PHACO INTRAOCULAR LENS (IOL); Surgeon: Mahamed Jarrell MD;  [facility];  Service: Ophthalmology    REPLACEMENT TOTAL KNEE Right     REPLACEMENT TOTAL KNEE      right     TONSILLECTOMY         Family History:  Family history reviewed and non-contributory  Family History   Problem Relation Age of Onset    Coronary artery disease Mother     Heart attack Mother         Prior    Heart disease Father     Heart attack Father         Prior    Anesthesia problems Neg Hx          Social History:  Social History     Socioeconomic History    Marital status:       Spouse name: None    Number of children: None    Years of education: None    Highest education level: None   Occupational History    None   Tobacco Use    Smoking status: Never Smoker    Smokeless tobacco: Never Used    Tobacco comment: Denies any former or current smoking   Vaping Use    Vaping Use: Never used   Substance and Sexual Activity    Alcohol use: Not Currently     Comment: Per [employer]- pt reports no current alcohol use at this time    Drug use: No     Comment: Denies any former or current drug use    Sexual activity: Never     Comment:  for 12 years - not active   Other Topics Concern    None   Social History Narrative    None     Social Determinants of Health     Financial Resource Strain: Not on file   Food Insecurity: Not on file   Transportation Needs: Not on file   Physical Activity: Not on file   Stress: Not on file   Social Connections: Not on file   Intimate Partner Violence: Not on file   Housing Stability: Not on file       Allergies:    Allergies   Allergen Reactions    Penicillins Hives     Itching and terrible hives    Sulfa Antibiotics Itching    E87 Folate [Folic Acid-Vit F8-VVU Q14 - Food Allergy] Other (See Comments)     5/23/22 Pt reports no allergic reaction known     [place] Other (See Comments)     Unknown, 5/23 -pt is unaware of reaction     Lyrica [Pregabalin] Other (See Comments)     5/23/22 Pt reports doesn't remember reaction     Other Other (See Comments)     Black rubber 5/23/22 per allergy test - pt unaware of reaction    Cortisone Acetate [Cortisone] Itching and Rash     5/23/22 pt reports makes her violent     [place] Other (See Comments)     Skin discoloration       Labs:  0   Lab Value Date/Time    HCT 37 1 08/17/2022 1135    HCT 39 5 05/17/2022 1139    HCT 42 3 09/27/2021 0455    HGB 11 9 08/17/2022 1135    HGB 12 9 05/17/2022 1139    HGB 13 0 09/27/2021 0455    INR 1 29 (H) 08/17/2022 1135    WBC 7 23 08/17/2022 1135    WBC 7 30 05/17/2022 1139    WBC 12 38 (H) 09/27/2021 0455    CRP 5 2 (H) 05/17/2022 1139       Meds:    Current Outpatient Medications:     albuterol (PROVENTIL HFA,VENTOLIN HFA) 90 mcg/act inhaler, INHALE 2 PUFFS BY MOUTH EVERY 6 HOURS AS NEEDED FOR WHEEZING (Patient not taking: No sig reported), Disp: 3 Inhaler, Rfl: 0    allopurinol (ZYLOPRIM) 100 mg tablet, Take 1 tablet (100 mg total) by mouth daily, Disp: 90 tablet, Rfl: 3    apixaban (Eliquis) 5 mg, Take 1 tablet (5 mg total) by mouth 2 (two) times a day, Disp: 60 tablet, Rfl: 6    ascorbic acid (VITAMIN C) 500 MG tablet, Take 1 tablet (500 mg total) by mouth 2 (two) times a day, Disp: 60 tablet, Rfl: 1    BIOTIN PO, Take by mouth in the morning, Disp: , Rfl:     Cholecalciferol (VITAMIN D3 PO), Take by mouth in the morning, Disp: , Rfl:     Cyanocobalamin (VITAMIN B-12 PO), Take by mouth in the morning, Disp: , Rfl:     diltiazem (CARDIZEM CD) 120 mg 24 hr capsule, Take 1 capsule (120 mg total) by mouth daily (Patient taking differently: Take 120 mg by mouth daily Takes in the am), Disp: 90 capsule, Rfl: 3    DULoxetine (CYMBALTA) 30 mg delayed release capsule, TAKE 1 CAPSULE(30 MG) BY MOUTH DAILY, Disp: 30 capsule, Rfl: 5    ezetimibe (ZETIA) 10 mg tablet, Take 1 tablet (10 mg total) by mouth daily, Disp: 90 tablet, Rfl: 1    ferrous sulfate 324 (65 Fe) mg, Take 1 tablet (324 mg total) by mouth 2 (two) times a day before meals, Disp: 60 tablet, Rfl: 1    FIBER PO, Take 1 tablet by mouth daily, Disp: , Rfl:     folic acid (FOLVITE) 1 mg tablet, TAKE 1 TABLET(1 MG) BY MOUTH DAILY (Patient not taking: No sig reported), Disp: 90 tablet, Rfl: 0    furosemide (LASIX) 40 mg tablet, Take 1 tablet (40 mg total) by mouth in the morning , Disp: 90 tablet, Rfl: 1    lisinopril (ZESTRIL) 2 5 mg tablet, Take 1 tablet (2 5 mg total) by mouth daily, Disp: 30 tablet, Rfl: 2    LORazepam (Ativan) 0 5 mg tablet, Take 1 tablet (0 5 mg total) by mouth every 8 (eight) hours as needed for anxiety, Disp: 5 tablet, Rfl: 0    Melatonin 10 MG TABS, Take by mouth Takes daily at night, Disp: , Rfl:     meloxicam (MOBIC) 15 mg tablet, TAKE 1 TABLET(15 MG) BY MOUTH DAILY, Disp: 90 tablet, Rfl: 3    metoprolol tartrate (LOPRESSOR) 50 mg tablet, Take 100 mg in the morning and 50 mg in the evening, Disp: 270 tablet, Rfl: 3    Mirabegron ER (Myrbetriq) 25 MG TB24, Take 25 mg by mouth daily Takes in the evening, Disp: 30 tablet, Rfl: 6    Multiple Vitamin (MULTIVITAMIN ADULT PO), Take by mouth in the morning, Disp: , Rfl:     naloxone (NARCAN) 4 mg/0 1 mL nasal spray, Administer 1 spray into a nostril  If no response after 2-3 minutes, give another dose in the other nostril using a new spray   (Patient not taking: No sig reported), Disp: 1 each, Rfl: 1    oxybutynin (DITROPAN-XL) 5 mg 24 hr tablet, Take 1 tablet (5 mg total) by mouth daily (Patient taking differently: Take 5 mg by mouth daily Takes at night), Disp: 90 tablet, Rfl: 3    oxyCODONE (Roxicodone) 5 immediate release tablet, Take 1 tablet (5 mg total) by mouth every 8 (eight) hours as needed for moderate pain Cont tx Max Daily Amount: 15 mg, Disp: 10 tablet, Rfl: 0      Physical Exam:   Vitals:    09/06/22 1019   BP: 108/82   Pulse: 82       General Appearance:    Alert, cooperative, no distress, appears stated age   Head:    Normocephalic, without obvious abnormality, atraumatic   Eyes:    conjunctiva/corneas clear, both eyes        Nose:   Nares normal, septum midline, no drainage    Throat:   Lips normal; teeth and gums normal   Neck:    symmetrical, trachea midline, ;     thyroid:  no enlargement/   Back:     Symmetric, no curvature, ROM normal   Lungs:   No audible wheezing or labored breathing   Chest Wall:    No tenderness or deformity    Heart:    Regular rate and rhythm               Pulses:   2+ and symmetric all extremities   Skin:   Skin color, texture, turgor normal, no rashes or lesions   Neurologic:   normal strength, sensation and reflexes     throughout       Musculoskeletal: left lower extremity  Examination patient's left lower extremity no effusion no erythema 20° off full extension and flexion 80° minimal movement with varus valgus stress mild pain quadriceps hamstring strength mildly limited due to pain active ankle dorsi plantar flexion well-healed anterior incisions distal pulses present  Large joint arthrocentesis: L knee  Universal Protocol:  Consent given by: patient  Patient understanding: patient states understanding of the procedure being performed  Patient identity confirmed: verbally with patient    Supporting Documentation  Indications: pain   Procedure Details  Location: knee - L knee  Needle size: 22 G  Approach: lateral  Medications administered: 2 mL lidocaine 1 %    Aspirate amount: 1 mL  Aspirate: bloody    Patient tolerance: patient tolerated the procedure well with no immediate complications          _*_*_*_*_*_*_*_*_*_*_*_*_*_*_*_*_*_*_*_*_*_*_*_*_*_*_*_*_*_*_*_*_*_*_*_*_*_*_*_*_*    Assessment:  76 y  o female with left distal femur nonunion status post intramedullary nail fixation scheduled for left distal femoral replacement    Plan:   · Weight bearing as tolerated  left lower extremity  · Case discussed with Dr Brandy Burleson   · Left knee aspirated as noted above in sent for cultures white cell count  · Awaiting final dental clearance which should be obtained later this week  · Follow up as scheduled postoperatively      Emmanuel Terry PA-C

## 2022-09-07 ENCOUNTER — TELEPHONE (OUTPATIENT)
Dept: LAB | Facility: HOSPITAL | Age: 77
End: 2022-09-07

## 2022-09-12 ENCOUNTER — APPOINTMENT (OUTPATIENT)
Dept: RADIOLOGY | Facility: HOSPITAL | Age: 77
DRG: 481 | End: 2022-09-12
Payer: MEDICARE

## 2022-09-12 ENCOUNTER — HOSPITAL ENCOUNTER (INPATIENT)
Facility: HOSPITAL | Age: 77
LOS: 3 days | Discharge: NURSING, REHAB OR LONG-TERM CARE | DRG: 481 | End: 2022-09-16
Attending: ORTHOPAEDIC SURGERY | Admitting: ORTHOPAEDIC SURGERY
Payer: MEDICARE

## 2022-09-12 DIAGNOSIS — N18.31 STAGE 3A CHRONIC KIDNEY DISEASE (HCC): Primary | ICD-10-CM

## 2022-09-12 DIAGNOSIS — S72.422G CLOSED BICONDYLAR FRACTURE OF LEFT FEMUR WITH DELAYED HEALING: ICD-10-CM

## 2022-09-12 DIAGNOSIS — Z96.652 STATUS POST TOTAL LEFT KNEE REPLACEMENT: ICD-10-CM

## 2022-09-12 DIAGNOSIS — I48.91 ATRIAL FIBRILLATION, UNSPECIFIED TYPE (HCC): ICD-10-CM

## 2022-09-12 DIAGNOSIS — S72.432G CLOSED BICONDYLAR FRACTURE OF LEFT FEMUR WITH DELAYED HEALING: ICD-10-CM

## 2022-09-12 LAB
ABO GROUP BLD: NORMAL
BLD GP AB SCN SERPL QL: NEGATIVE
GLUCOSE SERPL-MCNC: 107 MG/DL (ref 65–140)
RH BLD: POSITIVE
SPECIMEN EXPIRATION DATE: NORMAL

## 2022-09-12 PROCEDURE — C1776 JOINT DEVICE (IMPLANTABLE): HCPCS | Performed by: ORTHOPAEDIC SURGERY

## 2022-09-12 PROCEDURE — 82948 REAGENT STRIP/BLOOD GLUCOSE: CPT

## 2022-09-12 PROCEDURE — 86850 RBC ANTIBODY SCREEN: CPT | Performed by: NURSE ANESTHETIST, CERTIFIED REGISTERED

## 2022-09-12 PROCEDURE — 0QHF04Z INSERTION OF INTERNAL FIXATION DEVICE INTO LEFT PATELLA, OPEN APPROACH: ICD-10-PCS | Performed by: ORTHOPAEDIC SURGERY

## 2022-09-12 PROCEDURE — 86923 COMPATIBILITY TEST ELECTRIC: CPT

## 2022-09-12 PROCEDURE — 0QRC0JZ REPLACEMENT OF LEFT LOWER FEMUR WITH SYNTHETIC SUBSTITUTE, OPEN APPROACH: ICD-10-PCS | Performed by: ORTHOPAEDIC SURGERY

## 2022-09-12 PROCEDURE — C1713 ANCHOR/SCREW BN/BN,TIS/BN: HCPCS | Performed by: ORTHOPAEDIC SURGERY

## 2022-09-12 PROCEDURE — C9290 INJ, BUPIVACAINE LIPOSOME: HCPCS | Performed by: STUDENT IN AN ORGANIZED HEALTH CARE EDUCATION/TRAINING PROGRAM

## 2022-09-12 PROCEDURE — 20680 REMOVAL OF IMPLANT DEEP: CPT | Performed by: ORTHOPAEDIC SURGERY

## 2022-09-12 PROCEDURE — 86901 BLOOD TYPING SEROLOGIC RH(D): CPT | Performed by: NURSE ANESTHETIST, CERTIFIED REGISTERED

## 2022-09-12 PROCEDURE — 27447 TOTAL KNEE ARTHROPLASTY: CPT | Performed by: ORTHOPAEDIC SURGERY

## 2022-09-12 PROCEDURE — 73560 X-RAY EXAM OF KNEE 1 OR 2: CPT

## 2022-09-12 PROCEDURE — 86900 BLOOD TYPING SEROLOGIC ABO: CPT | Performed by: NURSE ANESTHETIST, CERTIFIED REGISTERED

## 2022-09-12 PROCEDURE — 0QPC04Z REMOVAL OF INTERNAL FIXATION DEVICE FROM LEFT LOWER FEMUR, OPEN APPROACH: ICD-10-PCS | Performed by: ORTHOPAEDIC SURGERY

## 2022-09-12 DEVICE — SMARTSET GHV GENTAMICIN HIGH VISCOSITY BONE CEMENT 40G
Type: IMPLANTABLE DEVICE | Site: PATELLA | Status: FUNCTIONAL
Brand: SMARTSET

## 2022-09-12 DEVICE — LPS DISTAL FEMORAL COMPONENT X-SMALL LEFT
Type: IMPLANTABLE DEVICE | Site: KNEE | Status: FUNCTIONAL
Brand: LPS

## 2022-09-12 DEVICE — ATTUNE KNEE SYSTEM REVISION TIBIAL SLEEVE POROCOAT FULLY COATED 37MM
Type: IMPLANTABLE DEVICE | Site: KNEE | Status: FUNCTIONAL
Brand: ATTUNE

## 2022-09-12 DEVICE — LPS STEM - STRAIGHT 11MM X 100MM CEMENTED
Type: IMPLANTABLE DEVICE | Site: KNEE | Status: FUNCTIONAL
Brand: LPS

## 2022-09-12 DEVICE — ATTUNE KNEE SYSTEM REVISION LPS INSERT AOX 18MM X-SMALL
Type: IMPLANTABLE DEVICE | Site: KNEE | Status: FUNCTIONAL
Brand: ATTUNE LPS

## 2022-09-12 DEVICE — ATTUNE KNEE SYSTEM REVISION PRESSFIT STEM 16X60MM
Type: IMPLANTABLE DEVICE | Site: KNEE | Status: FUNCTIONAL
Brand: ATTUNE

## 2022-09-12 DEVICE — ATTUNE KNEE SYSTEM REVISION ROTATING PLATFORM TIBIAL BASE CEMENTED SIZE 6
Type: IMPLANTABLE DEVICE | Site: KNEE | Status: FUNCTIONAL
Brand: ATTUNE

## 2022-09-12 DEVICE — 1.7MM COCR CABLE WITH TI CRIMP 750MM-STERILE: Type: IMPLANTABLE DEVICE | Site: FEMUR | Status: FUNCTIONAL

## 2022-09-12 DEVICE — PREP IM ENCHANCED TOTAL HIP BONE                                    PREPARATION KIT
Type: IMPLANTABLE DEVICE | Site: KNEE | Status: FUNCTIONAL
Brand: PREP-IM

## 2022-09-12 RX ORDER — EZETIMIBE 10 MG/1
10 TABLET ORAL DAILY
Status: DISCONTINUED | OUTPATIENT
Start: 2022-09-13 | End: 2022-09-16 | Stop reason: HOSPADM

## 2022-09-12 RX ORDER — ONDANSETRON 2 MG/ML
INJECTION INTRAMUSCULAR; INTRAVENOUS AS NEEDED
Status: DISCONTINUED | OUTPATIENT
Start: 2022-09-12 | End: 2022-09-12

## 2022-09-12 RX ORDER — ACETAMINOPHEN 325 MG/1
975 TABLET ORAL ONCE
Status: COMPLETED | OUTPATIENT
Start: 2022-09-12 | End: 2022-09-12

## 2022-09-12 RX ORDER — CEFAZOLIN SODIUM 2 G/50ML
2000 SOLUTION INTRAVENOUS EVERY 8 HOURS
Status: CANCELLED | OUTPATIENT
Start: 2022-09-12 | End: 2022-09-13

## 2022-09-12 RX ORDER — LIDOCAINE HYDROCHLORIDE 20 MG/ML
INJECTION, SOLUTION EPIDURAL; INFILTRATION; INTRACAUDAL; PERINEURAL AS NEEDED
Status: DISCONTINUED | OUTPATIENT
Start: 2022-09-12 | End: 2022-09-12

## 2022-09-12 RX ORDER — SENNOSIDES 8.6 MG
650 CAPSULE ORAL EVERY 8 HOURS PRN
Qty: 30 TABLET | Refills: 0 | Status: SHIPPED | OUTPATIENT
Start: 2022-09-12 | End: 2022-10-04

## 2022-09-12 RX ORDER — FERROUS SULFATE 325(65) MG
325 TABLET ORAL 2 TIMES DAILY WITH MEALS
Status: DISCONTINUED | OUTPATIENT
Start: 2022-09-12 | End: 2022-09-16 | Stop reason: HOSPADM

## 2022-09-12 RX ORDER — OXYBUTYNIN CHLORIDE 5 MG/1
5 TABLET, EXTENDED RELEASE ORAL DAILY
Status: CANCELLED | OUTPATIENT
Start: 2022-09-12

## 2022-09-12 RX ORDER — LORAZEPAM 0.5 MG/1
0.5 TABLET ORAL EVERY 8 HOURS PRN
Status: DISCONTINUED | OUTPATIENT
Start: 2022-09-12 | End: 2022-09-16 | Stop reason: HOSPADM

## 2022-09-12 RX ORDER — FUROSEMIDE 40 MG/1
40 TABLET ORAL DAILY
Status: DISCONTINUED | OUTPATIENT
Start: 2022-09-13 | End: 2022-09-16 | Stop reason: HOSPADM

## 2022-09-12 RX ORDER — OXYCODONE HYDROCHLORIDE 5 MG/1
10 TABLET ORAL EVERY 4 HOURS PRN
Status: CANCELLED | OUTPATIENT
Start: 2022-09-12

## 2022-09-12 RX ORDER — ACETAMINOPHEN 325 MG/1
650 TABLET ORAL EVERY 6 HOURS PRN
Status: DISCONTINUED | OUTPATIENT
Start: 2022-09-12 | End: 2022-09-16 | Stop reason: HOSPADM

## 2022-09-12 RX ORDER — FENTANYL CITRATE/PF 50 MCG/ML
25 SYRINGE (ML) INJECTION
Status: DISCONTINUED | OUTPATIENT
Start: 2022-09-12 | End: 2022-09-12 | Stop reason: HOSPADM

## 2022-09-12 RX ORDER — SODIUM CHLORIDE, SODIUM LACTATE, POTASSIUM CHLORIDE, CALCIUM CHLORIDE 600; 310; 30; 20 MG/100ML; MG/100ML; MG/100ML; MG/100ML
INJECTION, SOLUTION INTRAVENOUS CONTINUOUS PRN
Status: DISCONTINUED | OUTPATIENT
Start: 2022-09-12 | End: 2022-09-12

## 2022-09-12 RX ORDER — VANCOMYCIN HYDROCHLORIDE 1 G/200ML
1000 INJECTION, SOLUTION INTRAVENOUS ONCE
Status: COMPLETED | OUTPATIENT
Start: 2022-09-12 | End: 2022-09-12

## 2022-09-12 RX ORDER — MAGNESIUM HYDROXIDE 1200 MG/15ML
LIQUID ORAL AS NEEDED
Status: DISCONTINUED | OUTPATIENT
Start: 2022-09-12 | End: 2022-09-12 | Stop reason: HOSPADM

## 2022-09-12 RX ORDER — SODIUM CHLORIDE, SODIUM LACTATE, POTASSIUM CHLORIDE, CALCIUM CHLORIDE 600; 310; 30; 20 MG/100ML; MG/100ML; MG/100ML; MG/100ML
75 INJECTION, SOLUTION INTRAVENOUS CONTINUOUS
Status: DISCONTINUED | OUTPATIENT
Start: 2022-09-12 | End: 2022-09-13

## 2022-09-12 RX ORDER — HYDROMORPHONE HCL/PF 1 MG/ML
0.5 SYRINGE (ML) INJECTION
Status: DISCONTINUED | OUTPATIENT
Start: 2022-09-12 | End: 2022-09-12 | Stop reason: HOSPADM

## 2022-09-12 RX ORDER — BUPIVACAINE HYDROCHLORIDE AND EPINEPHRINE 5; 5 MG/ML; UG/ML
INJECTION, SOLUTION PERINEURAL AS NEEDED
Status: DISCONTINUED | OUTPATIENT
Start: 2022-09-12 | End: 2022-09-12 | Stop reason: HOSPADM

## 2022-09-12 RX ORDER — DILTIAZEM HYDROCHLORIDE 120 MG/1
120 CAPSULE, COATED, EXTENDED RELEASE ORAL DAILY
Status: DISCONTINUED | OUTPATIENT
Start: 2022-09-13 | End: 2022-09-16 | Stop reason: HOSPADM

## 2022-09-12 RX ORDER — ENOXAPARIN SODIUM 100 MG/ML
40 INJECTION SUBCUTANEOUS DAILY
Status: DISCONTINUED | OUTPATIENT
Start: 2022-09-14 | End: 2022-09-13 | Stop reason: SDUPTHER

## 2022-09-12 RX ORDER — OXYCODONE HYDROCHLORIDE 5 MG/1
5 TABLET ORAL EVERY 4 HOURS PRN
Status: CANCELLED | OUTPATIENT
Start: 2022-09-12

## 2022-09-12 RX ORDER — METOPROLOL TARTRATE 100 MG/1
100 TABLET ORAL DAILY
Status: DISCONTINUED | OUTPATIENT
Start: 2022-09-13 | End: 2022-09-15

## 2022-09-12 RX ORDER — FUROSEMIDE 40 MG/1
40 TABLET ORAL DAILY
Status: CANCELLED | OUTPATIENT
Start: 2022-09-12

## 2022-09-12 RX ORDER — TRANEXAMIC ACID 100 MG/ML
INJECTION, SOLUTION INTRAVENOUS AS NEEDED
Status: DISCONTINUED | OUTPATIENT
Start: 2022-09-12 | End: 2022-09-12 | Stop reason: HOSPADM

## 2022-09-12 RX ORDER — ALLOPURINOL 100 MG/1
100 TABLET ORAL DAILY
Status: CANCELLED | OUTPATIENT
Start: 2022-09-12

## 2022-09-12 RX ORDER — OXYCODONE HYDROCHLORIDE 10 MG/1
10 TABLET ORAL EVERY 4 HOURS PRN
Status: DISCONTINUED | OUTPATIENT
Start: 2022-09-12 | End: 2022-09-16 | Stop reason: HOSPADM

## 2022-09-12 RX ORDER — CHLORHEXIDINE GLUCONATE 4 G/100ML
SOLUTION TOPICAL DAILY PRN
Status: DISCONTINUED | OUTPATIENT
Start: 2022-09-12 | End: 2022-09-12 | Stop reason: HOSPADM

## 2022-09-12 RX ORDER — METOPROLOL TARTRATE 50 MG/1
50 TABLET, FILM COATED ORAL EVERY 12 HOURS SCHEDULED
Status: DISCONTINUED | OUTPATIENT
Start: 2022-09-12 | End: 2022-09-12

## 2022-09-12 RX ORDER — SODIUM CHLORIDE, SODIUM LACTATE, POTASSIUM CHLORIDE, CALCIUM CHLORIDE 600; 310; 30; 20 MG/100ML; MG/100ML; MG/100ML; MG/100ML
20 INJECTION, SOLUTION INTRAVENOUS CONTINUOUS
Status: DISCONTINUED | OUTPATIENT
Start: 2022-09-12 | End: 2022-09-12

## 2022-09-12 RX ORDER — ENOXAPARIN SODIUM 100 MG/ML
40 INJECTION SUBCUTANEOUS DAILY
Status: CANCELLED | OUTPATIENT
Start: 2022-09-12

## 2022-09-12 RX ORDER — ROCURONIUM BROMIDE 10 MG/ML
INJECTION, SOLUTION INTRAVENOUS AS NEEDED
Status: DISCONTINUED | OUTPATIENT
Start: 2022-09-12 | End: 2022-09-12

## 2022-09-12 RX ORDER — LISINOPRIL 2.5 MG/1
2.5 TABLET ORAL DAILY
Status: DISCONTINUED | OUTPATIENT
Start: 2022-09-14 | End: 2022-09-16 | Stop reason: HOSPADM

## 2022-09-12 RX ORDER — HYDROMORPHONE HCL/PF 1 MG/ML
0.5 SYRINGE (ML) INJECTION EVERY 2 HOUR PRN
Status: CANCELLED | OUTPATIENT
Start: 2022-09-12 | End: 2022-09-14

## 2022-09-12 RX ORDER — VANCOMYCIN HYDROCHLORIDE 1 G/200ML
1000 INJECTION, SOLUTION INTRAVENOUS EVERY 12 HOURS
Status: COMPLETED | OUTPATIENT
Start: 2022-09-12 | End: 2022-09-13

## 2022-09-12 RX ORDER — SODIUM CHLORIDE 9 MG/ML
INJECTION, SOLUTION INTRAVENOUS CONTINUOUS PRN
Status: DISCONTINUED | OUTPATIENT
Start: 2022-09-12 | End: 2022-09-12

## 2022-09-12 RX ORDER — LISINOPRIL 2.5 MG/1
2.5 TABLET ORAL DAILY
Status: CANCELLED | OUTPATIENT
Start: 2022-09-12

## 2022-09-12 RX ORDER — METOPROLOL TARTRATE 50 MG/1
50 TABLET, FILM COATED ORAL
Status: DISCONTINUED | OUTPATIENT
Start: 2022-09-12 | End: 2022-09-15

## 2022-09-12 RX ORDER — DOCUSATE SODIUM 100 MG/1
100 CAPSULE, LIQUID FILLED ORAL 2 TIMES DAILY
Status: DISCONTINUED | OUTPATIENT
Start: 2022-09-12 | End: 2022-09-16 | Stop reason: HOSPADM

## 2022-09-12 RX ORDER — HYDROMORPHONE HCL/PF 1 MG/ML
SYRINGE (ML) INJECTION AS NEEDED
Status: DISCONTINUED | OUTPATIENT
Start: 2022-09-12 | End: 2022-09-12

## 2022-09-12 RX ORDER — ENOXAPARIN SODIUM 100 MG/ML
40 INJECTION SUBCUTANEOUS ONCE
Status: COMPLETED | OUTPATIENT
Start: 2022-09-13 | End: 2022-09-13

## 2022-09-12 RX ORDER — ALBUTEROL SULFATE 90 UG/1
2 AEROSOL, METERED RESPIRATORY (INHALATION) EVERY 6 HOURS PRN
Status: DISCONTINUED | OUTPATIENT
Start: 2022-09-12 | End: 2022-09-16 | Stop reason: HOSPADM

## 2022-09-12 RX ORDER — PROPOFOL 10 MG/ML
INJECTION, EMULSION INTRAVENOUS AS NEEDED
Status: DISCONTINUED | OUTPATIENT
Start: 2022-09-12 | End: 2022-09-12

## 2022-09-12 RX ORDER — CEFAZOLIN SODIUM 2 G/50ML
2000 SOLUTION INTRAVENOUS ONCE
Status: DISCONTINUED | OUTPATIENT
Start: 2022-09-12 | End: 2022-09-12 | Stop reason: HOSPADM

## 2022-09-12 RX ORDER — KETOROLAC TROMETHAMINE 30 MG/ML
INJECTION, SOLUTION INTRAMUSCULAR; INTRAVENOUS AS NEEDED
Status: DISCONTINUED | OUTPATIENT
Start: 2022-09-12 | End: 2022-09-12 | Stop reason: HOSPADM

## 2022-09-12 RX ORDER — BUPIVACAINE HYDROCHLORIDE 5 MG/ML
INJECTION, SOLUTION PERINEURAL
Status: COMPLETED | OUTPATIENT
Start: 2022-09-12 | End: 2022-09-12

## 2022-09-12 RX ORDER — LABETALOL HYDROCHLORIDE 5 MG/ML
INJECTION, SOLUTION INTRAVENOUS AS NEEDED
Status: DISCONTINUED | OUTPATIENT
Start: 2022-09-12 | End: 2022-09-12

## 2022-09-12 RX ORDER — SENNOSIDES 8.6 MG
1 TABLET ORAL DAILY
Status: CANCELLED | OUTPATIENT
Start: 2022-09-12

## 2022-09-12 RX ORDER — HYDROMORPHONE HCL/PF 1 MG/ML
0.5 SYRINGE (ML) INJECTION EVERY 2 HOUR PRN
Status: DISPENSED | OUTPATIENT
Start: 2022-09-12 | End: 2022-09-14

## 2022-09-12 RX ORDER — CHLORHEXIDINE GLUCONATE 0.12 MG/ML
15 RINSE ORAL ONCE
Status: COMPLETED | OUTPATIENT
Start: 2022-09-12 | End: 2022-09-12

## 2022-09-12 RX ORDER — OXYCODONE HYDROCHLORIDE 5 MG/1
5 TABLET ORAL EVERY 4 HOURS PRN
Status: DISCONTINUED | OUTPATIENT
Start: 2022-09-12 | End: 2022-09-16 | Stop reason: HOSPADM

## 2022-09-12 RX ORDER — SENNOSIDES 8.6 MG
1 TABLET ORAL DAILY
Status: DISCONTINUED | OUTPATIENT
Start: 2022-09-13 | End: 2022-09-16 | Stop reason: HOSPADM

## 2022-09-12 RX ORDER — DOCUSATE SODIUM 100 MG/1
100 CAPSULE, LIQUID FILLED ORAL 2 TIMES DAILY
Qty: 10 CAPSULE | Refills: 0 | Status: SHIPPED | OUTPATIENT
Start: 2022-09-12 | End: 2022-10-04

## 2022-09-12 RX ORDER — ASCORBIC ACID 500 MG
500 TABLET ORAL 2 TIMES DAILY
Status: DISCONTINUED | OUTPATIENT
Start: 2022-09-12 | End: 2022-09-16 | Stop reason: HOSPADM

## 2022-09-12 RX ORDER — SODIUM CHLORIDE 9 MG/ML
125 INJECTION, SOLUTION INTRAVENOUS CONTINUOUS
Status: DISCONTINUED | OUTPATIENT
Start: 2022-09-12 | End: 2022-09-12

## 2022-09-12 RX ORDER — DILTIAZEM HYDROCHLORIDE 120 MG/1
120 CAPSULE, COATED, EXTENDED RELEASE ORAL DAILY
Status: CANCELLED | OUTPATIENT
Start: 2022-09-12

## 2022-09-12 RX ORDER — FENTANYL CITRATE 50 UG/ML
INJECTION, SOLUTION INTRAMUSCULAR; INTRAVENOUS AS NEEDED
Status: DISCONTINUED | OUTPATIENT
Start: 2022-09-12 | End: 2022-09-12

## 2022-09-12 RX ORDER — ACETAMINOPHEN 325 MG/1
975 TABLET ORAL EVERY 8 HOURS
Status: CANCELLED | OUTPATIENT
Start: 2022-09-12

## 2022-09-12 RX ORDER — CALCIUM CARBONATE 200(500)MG
1000 TABLET,CHEWABLE ORAL DAILY PRN
Status: CANCELLED | OUTPATIENT
Start: 2022-09-12

## 2022-09-12 RX ORDER — LANOLIN ALCOHOL/MO/W.PET/CERES
9 CREAM (GRAM) TOPICAL
Status: DISCONTINUED | OUTPATIENT
Start: 2022-09-12 | End: 2022-09-16 | Stop reason: HOSPADM

## 2022-09-12 RX ORDER — ALBUMIN, HUMAN INJ 5% 5 %
SOLUTION INTRAVENOUS CONTINUOUS PRN
Status: DISCONTINUED | OUTPATIENT
Start: 2022-09-12 | End: 2022-09-12

## 2022-09-12 RX ORDER — OXYBUTYNIN CHLORIDE 5 MG/1
5 TABLET, EXTENDED RELEASE ORAL DAILY
Status: DISCONTINUED | OUTPATIENT
Start: 2022-09-13 | End: 2022-09-16 | Stop reason: HOSPADM

## 2022-09-12 RX ORDER — DULOXETIN HYDROCHLORIDE 30 MG/1
30 CAPSULE, DELAYED RELEASE ORAL DAILY
Status: DISCONTINUED | OUTPATIENT
Start: 2022-09-13 | End: 2022-09-16 | Stop reason: HOSPADM

## 2022-09-12 RX ORDER — DOCUSATE SODIUM 100 MG/1
100 CAPSULE, LIQUID FILLED ORAL 2 TIMES DAILY
Status: CANCELLED | OUTPATIENT
Start: 2022-09-12

## 2022-09-12 RX ADMIN — ACETAMINOPHEN 975 MG: 325 TABLET, FILM COATED ORAL at 08:48

## 2022-09-12 RX ADMIN — SODIUM CHLORIDE: 9 INJECTION, SOLUTION INTRAVENOUS at 12:41

## 2022-09-12 RX ADMIN — CHLORHEXIDINE GLUCONATE 15 ML: 1.2 RINSE ORAL at 08:48

## 2022-09-12 RX ADMIN — VANCOMYCIN HYDROCHLORIDE 1000 MG: 1 INJECTION, SOLUTION INTRAVENOUS at 23:43

## 2022-09-12 RX ADMIN — HYDROMORPHONE HYDROCHLORIDE 0.5 MG: 1 INJECTION, SOLUTION INTRAMUSCULAR; INTRAVENOUS; SUBCUTANEOUS at 14:15

## 2022-09-12 RX ADMIN — PHENYLEPHRINE HYDROCHLORIDE 50 MCG/MIN: 10 INJECTION INTRAVENOUS at 11:54

## 2022-09-12 RX ADMIN — SODIUM CHLORIDE, SODIUM LACTATE, POTASSIUM CHLORIDE, AND CALCIUM CHLORIDE 75 ML/HR: .6; .31; .03; .02 INJECTION, SOLUTION INTRAVENOUS at 18:01

## 2022-09-12 RX ADMIN — TRANEXAMIC ACID 1000 MG: 1 INJECTION, SOLUTION INTRAVENOUS at 11:02

## 2022-09-12 RX ADMIN — ROCURONIUM BROMIDE 10 MG: 10 INJECTION, SOLUTION INTRAVENOUS at 13:30

## 2022-09-12 RX ADMIN — ONDANSETRON 4 MG: 2 INJECTION INTRAMUSCULAR; INTRAVENOUS at 11:05

## 2022-09-12 RX ADMIN — METOPROLOL TARTRATE 50 MG: 50 TABLET, FILM COATED ORAL at 20:15

## 2022-09-12 RX ADMIN — ROCURONIUM BROMIDE 20 MG: 10 INJECTION, SOLUTION INTRAVENOUS at 12:00

## 2022-09-12 RX ADMIN — DOCUSATE SODIUM 100 MG: 100 CAPSULE, LIQUID FILLED ORAL at 17:21

## 2022-09-12 RX ADMIN — SODIUM CHLORIDE, SODIUM LACTATE, POTASSIUM CHLORIDE, AND CALCIUM CHLORIDE: .6; .31; .03; .02 INJECTION, SOLUTION INTRAVENOUS at 11:02

## 2022-09-12 RX ADMIN — PROPOFOL 150 MG: 10 INJECTION, EMULSION INTRAVENOUS at 11:05

## 2022-09-12 RX ADMIN — LABETALOL HYDROCHLORIDE 5 MG: 5 INJECTION, SOLUTION INTRAVENOUS at 11:47

## 2022-09-12 RX ADMIN — VANCOMYCIN HYDROCHLORIDE 1000 MG: 1 INJECTION, SOLUTION INTRAVENOUS at 11:11

## 2022-09-12 RX ADMIN — OXYCODONE HYDROCHLORIDE AND ACETAMINOPHEN 500 MG: 500 TABLET ORAL at 17:21

## 2022-09-12 RX ADMIN — SODIUM CHLORIDE, SODIUM LACTATE, POTASSIUM CHLORIDE, AND CALCIUM CHLORIDE: .6; .31; .03; .02 INJECTION, SOLUTION INTRAVENOUS at 12:08

## 2022-09-12 RX ADMIN — LIDOCAINE HYDROCHLORIDE 100 MG: 20 INJECTION, SOLUTION EPIDURAL; INFILTRATION; INTRACAUDAL at 11:05

## 2022-09-12 RX ADMIN — FENTANYL CITRATE 50 MCG: 50 INJECTION INTRAMUSCULAR; INTRAVENOUS at 11:41

## 2022-09-12 RX ADMIN — FENTANYL CITRATE 100 MCG: 50 INJECTION INTRAMUSCULAR; INTRAVENOUS at 11:05

## 2022-09-12 RX ADMIN — BUPIVACAINE HYDROCHLORIDE 15 ML: 5 INJECTION, SOLUTION PERINEURAL at 10:55

## 2022-09-12 RX ADMIN — ROCURONIUM BROMIDE 20 MG: 10 INJECTION, SOLUTION INTRAVENOUS at 12:43

## 2022-09-12 RX ADMIN — FERROUS SULFATE TAB 325 MG (65 MG ELEMENTAL FE) 325 MG: 325 (65 FE) TAB at 17:21

## 2022-09-12 RX ADMIN — ROCURONIUM BROMIDE 50 MG: 10 INJECTION, SOLUTION INTRAVENOUS at 11:05

## 2022-09-12 RX ADMIN — SODIUM CHLORIDE, SODIUM LACTATE, POTASSIUM CHLORIDE, AND CALCIUM CHLORIDE: .6; .31; .03; .02 INJECTION, SOLUTION INTRAVENOUS at 13:56

## 2022-09-12 RX ADMIN — ALBUMIN (HUMAN): 12.5 INJECTION, SOLUTION INTRAVENOUS at 12:13

## 2022-09-12 NOTE — ANESTHESIA PREPROCEDURE EVALUATION
Procedure:  REMOVAL NAIL IM  FEMUR (Left Leg Upper)  ARTHROPLASTY KNEE TOTAL (Left Knee)    Relevant Problems   CARDIO   (+) Essential hypertension   (+) Hyperlipidemia   (+) Pacemaker   (+) Paroxysmal atrial fibrillation (HCC)   (+) Tachy-smita syndrome (HCC)      GI/HEPATIC   (+) Chronic GERD   (+) GERD (gastroesophageal reflux disease)      /RENAL   (+) Stage 3a chronic kidney disease (HCC)      MUSCULOSKELETAL   (+) Arthritis   (+) Chronic bilateral low back pain without sciatica   (+) Chronic gout with tophus   (+) Fibromyalgia   (+) Low back pain   (+) Lumbar degenerative disc disease      NEURO/PSYCH   (+) Anxiety   (+) Chronic bilateral low back pain without sciatica   (+) Current moderate episode of major depressive disorder (HCC)   (+) Fibromyalgia      Respiratory   (+) Reactive airway disease with acute exacerbation      Endocrine   (+) Thyroid nodule      Nervous and Auditory   (+) Mild carpal tunnel syndrome, right      Musculoskeletal and Integument   (+) Age related osteoporosis   (+) Closed bicondylar fracture of left femur with delayed healing   (+) Psoriasis      Other   (+) Ambulatory dysfunction   (+) Chronic pain of left knee   (+) Degenerative lumbar spinal stenosis   (+) Dysphonia   (+) Leukocytosis   (+) Morbid obesity with body mass index (BMI) of 40 0 to 49 9 (HCC)   (+) Other insomnia   (+) Overweight   (+) Tremor   (+) Urinary incontinence      Lab Results   Component Value Date    SODIUM 137 08/17/2022    K 4 6 08/17/2022     08/17/2022    CO2 29 08/17/2022    AGAP 6 08/17/2022    BUN 33 (H) 08/17/2022    CREATININE 1 07 08/17/2022    GLUC 90 05/17/2022    GLUF 106 (H) 08/17/2022    CALCIUM 9 4 08/17/2022    AST 23 08/17/2022    ALT 20 08/17/2022    ALKPHOS 71 08/17/2022    TP 6 5 08/17/2022    TBILI 0 30 08/17/2022    EGFR 49 08/17/2022     Lab Results   Component Value Date    WBC 7 23 08/17/2022    HGB 11 9 08/17/2022    HCT 37 1 08/17/2022    MCV 94 08/17/2022     08/17/2022              Anesthesia Plan  ASA Score- 3     Anesthesia Type- general with ASA Monitors  Additional Monitors:   Airway Plan: ETT  Plan Factors-Exercise tolerance (METS): >4 METS  Chart reviewed  EKG reviewed  Imaging results reviewed  Existing labs reviewed  Patient summary reviewed  Induction- intravenous      Postoperative Plan-     Informed Consent-

## 2022-09-12 NOTE — PLAN OF CARE
Problem: Prexisting or High Potential for Compromised Skin Integrity  Goal: Skin integrity is maintained or improved  Description: INTERVENTIONS:  - Identify patients at risk for skin breakdown  - Assess and monitor skin integrity  - Assess and monitor nutrition and hydration status  - Monitor labs   - Assess for incontinence   - Turn and reposition patient  - Assist with mobility/ambulation  - Relieve pressure over bony prominences  - Avoid friction and shearing  - Provide appropriate hygiene as needed including keeping skin clean and dry  - Evaluate need for skin moisturizer/barrier cream  - Collaborate with interdisciplinary team   - Patient/family teaching  - Consider wound care consult   Outcome: Progressing     Problem: MOBILITY - ADULT  Goal: Maintain or return to baseline ADL function  Description: INTERVENTIONS:  -  Assess patient's ability to carry out ADLs; assess patient's baseline for ADL function and identify physical deficits which impact ability to perform ADLs (bathing, care of mouth/teeth, toileting, grooming, dressing, etc )  - Assess/evaluate cause of self-care deficits   - Assess range of motion  - Assess patient's mobility; develop plan if impaired  - Assess patient's need for assistive devices and provide as appropriate  - Encourage maximum independence but intervene and supervise when necessary  - Involve family in performance of ADLs  - Assess for home care needs following discharge   - Consider OT consult to assist with ADL evaluation and planning for discharge  - Provide patient education as appropriate  Outcome: Progressing  Goal: Maintains/Returns to pre admission functional level  Description: INTERVENTIONS:  - Perform BMAT or MOVE assessment daily    - Set and communicate daily mobility goal to care team and patient/family/caregiver  - Collaborate with rehabilitation services on mobility goals if consulted  - Reposition patient every 2 hours    - Ambulate patient 2 times a day  - Out of bed for meals 3 times a day  - Out of bed for toileting  - Record patient progress and toleration of activity level   Outcome: Progressing     Problem: Potential for Falls  Goal: Patient will remain free of falls  Description: INTERVENTIONS:  - Educate patient/family on patient safety including physical limitations  - Instruct patient to call for assistance with activity   - Consult OT/PT to assist with strengthening/mobility   - Keep Call bell within reach  - Keep bed low and locked with side rails adjusted as appropriate  - Keep care items and personal belongings within reach  - Initiate and maintain comfort rounds  - Make Fall Risk Sign visible to staff  - Initiate/Maintain bed/chair alarm  - Obtain necessary fall risk management equipment  - Apply yellow socks and bracelet for high fall risk patients  - Consider moving patient to room near nurses station  Outcome: Progressing

## 2022-09-12 NOTE — INTERVAL H&P NOTE
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  Patient states that although she has  worn cheap metal as jewelry in the past, she has not had any reaction to it such as itching or anaphylaxis  She is also tested positive for Vitamin B12 which she states she continues to take and just stopped recently for pre-op purposes  I did speak to patient at length that there are no titanium options for distal femoral replacement this time    We did discuss the pros and cons of possible nickel allergy in the setting of a total knee replacement and she understands the pros and cons and would like to continue with surgical intervention  We will follow up postoperatively

## 2022-09-12 NOTE — DISCHARGE INSTRUCTIONS
Discharge Instructions - [address] 76 y o  female MRN: [id number]  Unit/Bed#: AN OR MAIN    Weight Bearing Status:                                           Weight Bearing as tolerated to the left lower extremity  DVT prophylaxis:  Continue on your eliquis  Pain:  Start oxycodone 5 mg every 6 hrs after last dose taken after surgery for 1 day  Transition to oxycodone 5 mg every 6 hours as needed    Showering Instructions:   Do not shower until 5 days from date of surgery  Do not soak your incision(Tubs, Jacuzzi's, pools, ext)    Dressing Instructions: May remove dressing 5 days from date of surgery or may leave dressing in place until follow up office visit 2 weeks from surgery date  Keep dressing/incision clean, dry and intact until follow up appointment  Do not apply any creams or ointments/lotions to your incisions including antibiotic ointment, peroxide    Driving Instructions:  No driving until cleared by Orthopaedic Surgery  PT/OT:  Continue PT/OT on outpatient basis as directed  Continue motion and strengthening exercises while at home    Appt Instructions: If you do not have your appointment, please call the clinic at [phone]  Otherwise followup as scheduled    Contact the office sooner if you experience any increased numbness/tingling in the extremities

## 2022-09-12 NOTE — ANESTHESIA PROCEDURE NOTES
Peripheral Block    Patient location during procedure: holding area  Start time: 9/12/2022 10:55 AM  Reason for block: at surgeon's request and post-op pain management  Staffing  Performed: Anesthesiologist   Anesthesiologist: Emani Juarez MD  Preanesthetic Checklist  Completed: patient identified, IV checked, site marked, risks and benefits discussed, surgical consent, monitors and equipment checked, pre-op evaluation and timeout performed  Peripheral Block  Patient position: supine  Prep: ChloraPrep  Patient monitoring: continuous pulse ox, frequent blood pressure checks, heart rate and cardiac monitor  Block type: adductor canal block  Laterality: left  Injection technique: single-shot  Procedures: ultrasound guided, Ultrasound guidance required for the procedure to increase accuracy and safety of medication placement and decrease risk of complications  bupivacaine (MARCAINE) 0 5 % - Perineural   15 mL - 9/12/2022 10:55:00 AM (mixed with 10 cc of 1 3% liposomal bupivacaine)  Needle  Test dose: negative  Assessment  Injection assessment: incremental injection and local visualized surrounding nerve on ultrasound  Paresthesia pain: none  Heart rate change: no  Slow fractionated injection: yes  Post-procedure:  site cleaned  patient tolerated the procedure well with no immediate complications

## 2022-09-12 NOTE — OP NOTE
Op Note- [address]   MRN: [id number]      Unit/Bed#: AN OR MAIN      PreOp Dx: Left distal femoral nonunion     Postop Dx:  Same as preoperative diagnosis    Procedure:  Left distal femoral replacement; removal of deep implants left femur    Surgeon: Gemma Mcardle    Assistants: Mary Gutierres MD, Brent Turcios PA-C    Fluids:     EBL:     Drains:  None    Specimens:  None    Complications:  None    Condition:  Stable and transferred to postanesthesia care unit    Implant:  Depuy:  LPS distal femoral component (X-small); LPS stem straight 11 x 100 mm cemented; attune revision tibial base plate rotating platform size 6; attune knee revision tibial sleeve porous coated 37 mm; revision Press-Fit stem 16 mm x 60 mm; poly 25mm    75-year-old female presents at this time for treatment of her left distal femoral nonunion  Patient is status post retrograde nailing of left supracondylar femur fracture which went on to a nonunion  Patient with history of right total knee arthroplasty for which she had titanium implant  Patient has a history of testing for Kobel and nickel  Patient denies any reaction as stated in my preop note to any of the cheap metals that could be worn for jewelry  Patient has also tested for other things for which she has been taken and I had a reaction  I did explain to patient that implants being used to have bow and nickel  I did explain pros and cons of utilization of these implants and she is aware  This includes but not limited to continued pain, loosening, reaction to implant  Also did suggest possible fixation of the nonunion, but patient was scheduled for total knee arthroplasty prior to having this injury  Patient does understand the pros and cons and consented for treatment of her pathology with the distal femoral replacement  Of note, patient with leg-length discrepancy with the left being shorter than the right    The patient was told of all the pros and cons of operative and nonoperative intervention  Some of the complications of operative intervention include infection, bleeding, scarring, nerve injury, vascular injury, leg-length discrepancy, continued pain, decreased range of motion, DVT, PE death, loss of limb, need for further surgery, not obtain an results  The patient wished  Patient did consent for operative intervention for this pathology  Patient was brought to the operating room  Patient was anesthetized as anesthesia team  Patient was prepped and draped in normal sterile fashion  After this was done, we did conduct a time out to make sure correct  Patient was in the room, prepped marked and draped  Implants were in the room, Rep  For the implants were present, DVT prophylaxis and antibiotics were dressed  Midline incision was made to the knee  This was extended more proximal than our usual total knee incisions  After going through skin, fatty tissue, flaps were created medially and laterally  We were able to at this time make a medial parapatellar incision  We able to identify our plateaus medially and laterally at this time, we able to seen the distal-most end of the nail after utilized a rongeur to remove the bone which had overgrown this region  We were able to at this time removed the set screw as well as the 4 distal locking screws  Once this was done, we made an incision proximally where the proximal locking screws were  After going through skin, fatty tissue, we able to use a hemostat in order to get down to bone to identify the to proximal locking screws  Screws were removed and then the nail was removed  Once this was done, we did identify the nonunion site and a cut was made proximal to that so that we had a clean bed of distal femoral bone  With the aid of electrocautery, we did release all the soft tissues around the distal femur and the distal femur was then removed    At this time, we prepared our tibia making sure that we made our appropriate tibial cuts use appropriate reamers in order to make sure that we were centered in the tibia  Once this was done, we did slightly externally rotate our tibial base plate and we were unable to lock in our rotations her peripherally  Attention was now focused on the femur   was utilized to widen the canal   We able to debride some of the fibrous tissue which had likely grown around the femoral nail  Once we did complete our reaming, we used a planer to flatten our distal femoral cut  A trial stemmed femoral component was then placed and a trial poly was also placed at this time  We did gain length on the knee while at the same time checking for the position of the patella in relation to femoral condyle as well as checking for rotation and tracking of our patella on our trochlea of our femur  Once confirming the appropriateness of all of these, we marked the tibial tray and the femoral component so that we can ambulate the rotation that we obtained during the trialing     Once this was done, we removed all of our components  Patient's patella was noted to be thin therefore the decision was made to not resurface the patella  We did denervated the patella by utilizing electrocautery on all portions of the patella  Once this was done, copious irrigation was used at the operative site  We able to place a cement restrictor into the femoral shaft  Once all components had been appropriately assembled in the back table, we placed our true tibial and femoral components making sure that we had the appropriate rotation (external rotation)  Once the cement had hardened as we did use antibiotic cement, we trialed our poly liner to make sure that we liked the tracking as well as our patellar height and the length of the limb  Once these were all confirmed, we did place our true polyethylene liner and a bow was placed to connect the femur towards polyethylene liner making this a hinged construct  Patient was placed through range of motion and noted to be tracking appropriately  I did do a lateral release to reinforce appropriate tracking of the patella on the trochlea  Irrigation was used at the operative site once more  Vicryl sutures were used to reapproximate our parapatellar incision  We did track the knee once more and was noted to be tracking appropriately  Vicryl sutures were used to reapproximate the fatty and subcu layers this was reinforced with staples  We did place a drain prior to the closure of our parapatellar incision which exited the lateral aspect of the distal thigh  We also did reapproximate the proximal most incision the allowed us remove the proximal locking screws  These were reapproximated with Vicryl sutures and subsequently reinforced with staples  Wounds were clean and dried, Acticoat, 4 x 4, ABD, Webril, and Ace bandage was placed from the foot to the proximal thigh  We did also use a Tegaderm to aided in sealing all wound proximally  Patient was then subsequently undraped, awakened as per anesthesia team, noted to be stable condition, transferred to postanesthesia care unit  ,

## 2022-09-12 NOTE — ANESTHESIA POSTPROCEDURE EVALUATION
Post-Op Assessment Note    CV Status:  Stable    Pain management: adequate     Mental Status:  Arousable and sleepy   Hydration Status:  Euvolemic   PONV Controlled:  Controlled   Airway Patency:  Patent  Airway: intubated      Post Op Vitals Reviewed: Yes      Staff: CRNA         No complications documented      BP   177/72   Temp   97 4   Pulse  73   Resp   18   SpO2   100

## 2022-09-12 NOTE — ANESTHESIA PROCEDURE NOTES
Peripheral Block    Patient location during procedure: holding area  Start time: 9/12/2022 10:55 AM  Reason for block: at surgeon's request and post-op pain management  Staffing  Performed: Anesthesiologist   Anesthesiologist: Seth Cardozo MD  Preanesthetic Checklist  Completed: patient identified, IV checked, site marked, risks and benefits discussed, surgical consent, monitors and equipment checked, pre-op evaluation and timeout performed  Peripheral Block  Patient position: supine  Prep: ChloraPrep  Patient monitoring: continuous pulse ox and frequent blood pressure checks  Block type: ipack block  Laterality: left  Injection technique: single-shot  Procedures: ultrasound guided, Ultrasound guidance required for the procedure to increase accuracy and safety of medication placement and decrease risk of complications  bupivacaine (MARCAINE) 0 5 % - Perineural   15 mL - 9/12/2022 10:55:00 AM (mixed with 10 cc of 1 33% liposomal bupivacaine)  Needle  Test dose: negative  Assessment  Injection assessment: incremental injection and local visualized surrounding nerve on ultrasound  Paresthesia pain: none  Heart rate change: no  Slow fractionated injection: yes  patient tolerated the procedure well with no immediate complications

## 2022-09-13 ENCOUNTER — TELEPHONE (OUTPATIENT)
Dept: OBGYN CLINIC | Facility: HOSPITAL | Age: 77
End: 2022-09-13

## 2022-09-13 ENCOUNTER — EPISODE CHANGES (OUTPATIENT)
Dept: CASE MANAGEMENT | Facility: OTHER | Age: 77
End: 2022-09-13

## 2022-09-13 LAB
ANION GAP SERPL CALCULATED.3IONS-SCNC: 6 MMOL/L (ref 4–13)
BUN SERPL-MCNC: 29 MG/DL (ref 5–25)
CALCIUM SERPL-MCNC: 8.5 MG/DL (ref 8.4–10.2)
CHLORIDE SERPL-SCNC: 101 MMOL/L (ref 96–108)
CO2 SERPL-SCNC: 27 MMOL/L (ref 21–32)
CREAT SERPL-MCNC: 1.07 MG/DL (ref 0.6–1.3)
ERYTHROCYTE [DISTWIDTH] IN BLOOD BY AUTOMATED COUNT: 13.2 % (ref 11.6–15.1)
GFR SERPL CREATININE-BSD FRML MDRD: 49 ML/MIN/1.73SQ M
GLUCOSE SERPL-MCNC: 167 MG/DL (ref 65–140)
HCT VFR BLD AUTO: 27.7 % (ref 34.8–46.1)
HGB BLD-MCNC: 8.7 G/DL (ref 11.5–15.4)
MCH RBC QN AUTO: 29.9 PG (ref 26.8–34.3)
MCHC RBC AUTO-ENTMCNC: 31.4 G/DL (ref 31.4–37.4)
MCV RBC AUTO: 95 FL (ref 82–98)
PLATELET # BLD AUTO: 232 THOUSANDS/UL (ref 149–390)
PMV BLD AUTO: 8.8 FL (ref 8.9–12.7)
POTASSIUM SERPL-SCNC: 4.5 MMOL/L (ref 3.5–5.3)
RBC # BLD AUTO: 2.91 MILLION/UL (ref 3.81–5.12)
SODIUM SERPL-SCNC: 134 MMOL/L (ref 135–147)
WBC # BLD AUTO: 20.93 THOUSAND/UL (ref 4.31–10.16)

## 2022-09-13 PROCEDURE — 80048 BASIC METABOLIC PNL TOTAL CA: CPT | Performed by: PHYSICIAN ASSISTANT

## 2022-09-13 PROCEDURE — 85027 COMPLETE CBC AUTOMATED: CPT | Performed by: PHYSICIAN ASSISTANT

## 2022-09-13 PROCEDURE — 99024 POSTOP FOLLOW-UP VISIT: CPT | Performed by: PHYSICIAN ASSISTANT

## 2022-09-13 PROCEDURE — 99222 1ST HOSP IP/OBS MODERATE 55: CPT | Performed by: INTERNAL MEDICINE

## 2022-09-13 PROCEDURE — 97163 PT EVAL HIGH COMPLEX 45 MIN: CPT

## 2022-09-13 PROCEDURE — 97116 GAIT TRAINING THERAPY: CPT

## 2022-09-13 PROCEDURE — 97167 OT EVAL HIGH COMPLEX 60 MIN: CPT

## 2022-09-13 RX ORDER — ENOXAPARIN SODIUM 100 MG/ML
40 INJECTION SUBCUTANEOUS
Status: DISCONTINUED | OUTPATIENT
Start: 2022-09-14 | End: 2022-09-16 | Stop reason: HOSPADM

## 2022-09-13 RX ADMIN — DOCUSATE SODIUM 100 MG: 100 CAPSULE, LIQUID FILLED ORAL at 08:11

## 2022-09-13 RX ADMIN — ENOXAPARIN SODIUM 40 MG: 40 INJECTION SUBCUTANEOUS at 01:47

## 2022-09-13 RX ADMIN — OXYCODONE HYDROCHLORIDE AND ACETAMINOPHEN 500 MG: 500 TABLET ORAL at 17:41

## 2022-09-13 RX ADMIN — DULOXETINE HYDROCHLORIDE 30 MG: 30 CAPSULE, DELAYED RELEASE ORAL at 08:11

## 2022-09-13 RX ADMIN — FUROSEMIDE 40 MG: 40 TABLET ORAL at 08:11

## 2022-09-13 RX ADMIN — OXYCODONE HYDROCHLORIDE 5 MG: 5 TABLET ORAL at 08:35

## 2022-09-13 RX ADMIN — FERROUS SULFATE TAB 325 MG (65 MG ELEMENTAL FE) 325 MG: 325 (65 FE) TAB at 17:41

## 2022-09-13 RX ADMIN — DILTIAZEM HYDROCHLORIDE 120 MG: 120 CAPSULE, COATED, EXTENDED RELEASE ORAL at 08:12

## 2022-09-13 RX ADMIN — OXYCODONE HYDROCHLORIDE 5 MG: 5 TABLET ORAL at 01:51

## 2022-09-13 RX ADMIN — OXYCODONE HYDROCHLORIDE AND ACETAMINOPHEN 500 MG: 500 TABLET ORAL at 08:11

## 2022-09-13 RX ADMIN — OXYBUTYNIN CHLORIDE 5 MG: 5 TABLET, EXTENDED RELEASE ORAL at 08:11

## 2022-09-13 RX ADMIN — FERROUS SULFATE TAB 325 MG (65 MG ELEMENTAL FE) 325 MG: 325 (65 FE) TAB at 08:11

## 2022-09-13 RX ADMIN — EZETIMIBE 10 MG: 10 TABLET ORAL at 08:11

## 2022-09-13 RX ADMIN — SODIUM CHLORIDE, SODIUM LACTATE, POTASSIUM CHLORIDE, AND CALCIUM CHLORIDE 75 ML/HR: .6; .31; .03; .02 INJECTION, SOLUTION INTRAVENOUS at 06:51

## 2022-09-13 RX ADMIN — SENNOSIDES 8.6 MG: 8.6 TABLET, FILM COATED ORAL at 08:11

## 2022-09-13 RX ADMIN — DOCUSATE SODIUM 100 MG: 100 CAPSULE, LIQUID FILLED ORAL at 17:40

## 2022-09-13 RX ADMIN — METOPROLOL TARTRATE 100 MG: 100 TABLET, FILM COATED ORAL at 08:11

## 2022-09-13 NOTE — PLAN OF CARE
Problem: Prexisting or High Potential for Compromised Skin Integrity  Goal: Skin integrity is maintained or improved  Description: INTERVENTIONS:  - Identify patients at risk for skin breakdown  - Assess and monitor skin integrity  - Assess and monitor nutrition and hydration status  - Monitor labs   - Assess for incontinence   - Turn and reposition patient  - Assist with mobility/ambulation  - Relieve pressure over bony prominences  - Avoid friction and shearing  - Provide appropriate hygiene as needed including keeping skin clean and dry  - Evaluate need for skin moisturizer/barrier cream  - Collaborate with interdisciplinary team   - Patient/family teaching  - Consider wound care consult   Outcome: Progressing     Problem: MOBILITY - ADULT  Goal: Maintain or return to baseline ADL function  Description: INTERVENTIONS:  -  Assess patient's ability to carry out ADLs; assess patient's baseline for ADL function and identify physical deficits which impact ability to perform ADLs (bathing, care of mouth/teeth, toileting, grooming, dressing, etc )  - Assess/evaluate cause of self-care deficits   - Assess range of motion  - Assess patient's mobility; develop plan if impaired  - Assess patient's need for assistive devices and provide as appropriate  - Encourage maximum independence but intervene and supervise when necessary  - Involve family in performance of ADLs  - Assess for home care needs following discharge   - Consider OT consult to assist with ADL evaluation and planning for discharge  - Provide patient education as appropriate  Outcome: Progressing  Goal: Maintains/Returns to pre admission functional level  Description: INTERVENTIONS:  - Perform BMAT or MOVE assessment daily    - Set and communicate daily mobility goal to care team and patient/family/caregiver     - Collaborate with rehabilitation services on mobility goals if consulted  - Perform Range of Motion   - Reposition patient   - Dangle patient   - Stand patient   - Ambulate patient   - Out of bed to chair   - Out of bed for meals  - Out of bed for toileting  - Record patient progress and toleration of activity level   Outcome: Progressing     Problem: Potential for Falls  Goal: Patient will remain free of falls  Description: INTERVENTIONS:  - Educate patient/family on patient safety including physical limitations  - Instruct patient to call for assistance with activity   - Consult OT/PT to assist with strengthening/mobility   - Keep Call bell within reach  - Keep bed low and locked with side rails adjusted as appropriate  - Keep care items and personal belongings within reach  - Initiate and maintain comfort rounds  - Make Fall Risk Sign visible to staff  - Offer Toileting   - Initiate/Maintain alarm  - Obtain necessary fall risk management equipment  - Apply yellow socks and bracelet for high fall risk patients  - Consider moving patient to room near nurses station  Outcome: Progressing

## 2022-09-13 NOTE — PHYSICAL THERAPY NOTE
PHYSICAL THERAPY EVALUATION  NAME:  Josefina Sanchez  DATE: 09/13/22    AGE:   76 y o   Mrn:   [id number]  Principal problem: Active Problems:    * No active hospital problems  *      Vitals:    09/12/22 1934 09/12/22 2003 09/12/22 2342 09/13/22 0811   BP: (!) 103/48 128/64 124/81 114/55   BP Location:   Right arm    Pulse: 72 70 74 105   Resp: 18  18    Temp:       TempSrc:       SpO2: 95% 91% 95%    Weight:       Height:           Length Of Stay: 0  Performed at least 2 patient identifiers during session: Name and Birthday  PHYSICAL THERAPY EVALUATION :    09/13/22 0857   PT Last Visit   PT Visit Date 09/13/22   Note Type   Note type Evaluation   Pain Assessment   Pain Assessment Tool 0-10   Pain Score No Pain  (At rest, some pain with activity)   Pain Location/Orientation Orientation: Left; Location: Knee   Effect of Pain on Daily Activities Limits speed and independence of mobility, limits AROM   Patient's Stated Pain Goal No pain   Hospital Pain Intervention(s) Medication (See MAR); Repositioned;Cold applied; Ambulation/increased activity  (Electricool)   Restrictions/Precautions   Weight Bearing Precautions Per Order Yes   RLE Weight Bearing Per Order WBAT   LLE Weight Bearing Per Order WBAT   Other Precautions Limb alert;Multiple lines; Fall Risk;WBS;Pain   Home Living   Type of Home Apartment   Home Layout One level   Home Equipment Walker;Cane;Wheelchair-manual   Additional Comments Niece provided virtual home assessment paperwork to be filed in media  Patient has wheelchair, walker, single-point cane, quad cane, Rollator walker, motorized recliner  Patient was primarily wheelchair mobile in performing independent transfers  Nay Collier has about 70 ft to ambulate further distances the apartment    Has adjustable hospital bed   Prior Function   Level of [place] Independent with ADLs and functional mobility   Lives With Alone   Receives Help From Family;Home health  (Niece stays with her p r n , has daily visits 4-6 hours depending on need )   ADL Assistance Independent   IADLs Needs assistance   Falls in the last 6 months 0   General   Additional Pertinent History POD1  Left distal femoral replacement; removal of deep implants left femur  Orders for activity POD#0 released On POD1   Family/Caregiver Present Yes  (niece)   Cognition   Overall Cognitive Status WFL   Arousal/Participation Alert   Orientation Level Oriented to person;Oriented to place;Oriented to time   Memory Decreased recall of precautions; Unable to assess   Following Commands Follows one step commands with increased time or repetition  (Attributes to [facility])   Subjective   Subjective Patient pleasant and cooperative  Agreeable to participate  Reports some dizziness with upright change of position   RUE Assessment   RUE Assessment   (Shoulder flexion tested to 90)   LUE Assessment   LUE Assessment WFL   Strength RLE   R Hip Flexion 4+/5   R Knee Extension 4+/5   R Ankle Dorsiflexion 4+/5   LLE Overall AROM   L Knee Flexion Limited   L Knee Extension WFL   L Ankle Dorsiflexion Nadia fell   Strength LLE   L Hip Flexion   (Tested to 3)   L Knee Extension   (Tested to 3)   L Ankle Dorsiflexion 4/5   Light Touch   RLE Light Touch Grossly intact   LLE Light Touch Grossly intact   Bed Mobility   Supine to Sit 5  Supervision   Additional items Assist x 1; Increased time required;Verbal cues;HOB elevated; Bedrails  (Left egress)   Transfers   Sit to Stand 4  Minimal assistance  (Successful with elevated height of bed)   Additional items Assist x 2; Increased time required;Verbal cues;Armrests  (Three trials total prior to success)   Stand to Sit 4  Minimal assistance   Additional items Assist x 1; Increased time required;Verbal cues;Armrests   Ambulation/Elevation   Gait pattern Decreased L stance; Excessively slow; Step to   Gait Assistance 4  Minimal assist  (Facilitation at LLE to minimize)   Additional items Assist x 2;Verbal cues  (Instruction for technique) Assistive Device Rolling walker   Distance 0 on 1st attempt  Performed advanced retreat LLE only due to fear and retreat, shortness of breath, "weakness"   Stair Management Assistance Not tested   Balance   Static Sitting Good   Static Standing Poor +  (Standing tolerance 45 seconds with BUE support of walker)   Ambulatory Poor   Endurance Deficit   Endurance Deficit Yes   Endurance Deficit Description Needs therapeutic rest between mobility trials   Activity Tolerance   Activity Tolerance Patient limited by fatigue;Patient limited by pain   Medical Staff Edis coordination with Jenna from [address] to [facility]  RN   Assessment   Prognosis Fair   Problem List Decreased strength;Decreased range of motion;Decreased endurance; Impaired balance;Decreased mobility;Obesity;Pain;Orthopedic restrictions   Assessment Pt is a 76 y o  female seen for PT evaluation s/p admit [facility] for Left distal femoral replacement; 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  Orders for mobility postop day 0 placed on postop day 1  Order placed for PT  Prior to admission: Pt lived in apartment with no steps to enter and was primarily wheelchair mobile, performing independent transfers to and from surfaces  Patient also did short distance ambulation with a walker and HHPT  Upon evaluation: Pt requires only supervision for bed mobility, and assist of 1-2 transfers, was only able to perform pre gait activities on initial attempt of ambulation  Pt's clinical presentation is currently unstable/unpredictable given the functional mobility deficits above, especially (but not limited to) weakness, decreased ROM and pain, coupled with fall risks including obesity,impaired balance, and combined with medical complications of hypotension, low SpO2 values, fear/retreat and Complaints of lightheadedness with upright change of position     Recommend continue mobility training with rolling walker for short distances but primarily focus on transfers and wheelchair training to promote independence to return back to 1 floor home environment      Barriers to Discharge Decreased caregiver support; Inaccessible home environment   Goals   Patient Goals To go home, eventually to drive   STG Expiration Date 09/23/22   Short Term Goal #1 Goals: Pt will: Perform bed mobility tasks with modified I to prepare for transfers and reposition in bed using hospital bed controls as patient has a hospital bed home  Perform transfers with supervision to decrease risk for falls and improve ease of transfers  Perform ambulation with rolling walker for up to 50 ft with supervision to improve gait quality and promote proper use of assistive device  Propel wheelchair for 50 ft as alternative to ambulation to perform independent mobility within her home environment  Perform at least 2 HEP w/o physical A to demonstrate compliance w/therex and improve ease of transfers  PT Treatment Day 1   Plan   Treatment/Interventions Functional transfer training;LE strengthening/ROM; Therapeutic exercise; Endurance training;Patient/family training;Equipment eval/education; Bed mobility;Gait training;Cognitive reorientation;Spoke to nursing;OT   PT Frequency 4-6x/wk   Recommendation   PT Discharge Recommendation Home with home health rehabilitation  (As long as patient's niece will provide support upon discharge+ HHPT)   Equipment Recommended Wheelchair  (As primary means for mobility, walker as secondary)   AM-PAC Basic Mobility Inpatient   Turning in Bed Without Bedrails 3   Lying on Back to Sitting on Edge of Flat Bed 2   Moving Bed to Chair 1   Standing Up From Chair 2   Walk in Room 1   Climb 3-5 Stairs 1   Basic Mobility Inpatient Raw Score 10   Turning Head Towards Sound 4   Follow Simple Instructions 3   Low Function Basic Mobility Raw Score 17   Low Function Basic Mobility Standardized Score 27 46   Highest Level Of Mobility   -[facility] Goal 4: Move to chair/commode   -HLM Achieved 4: Move to chair/commode   Additional Treatment Session   Start Time 0419   End Time 9636   Treatment Assessment Patient was more successful to get to target surface using BUE support of walker and closer target surface distances--patient did report having easier time to mobilize with facilitation to prompt left knee extension during weight-bearing activities  Skilled PT recommended to progress patient towards treatment goals  Equipment Use Rolling walker short   Additional Treatment Day 1   Exercises   TKR   (Awaiting clarification regarding TKA TherEx)   Neuro re-ed Transfers Min assist of 1-2 and verbal instruction for hand placement, completion of approach  Ambulation with rolling walker and Min assist of 2 including facilitation at left leg to minimize buckling--distance of 2 ft bed to chair taking more than reasonable time  Additional Education of patient and caregiver regarding awaiting further information regarding TherEx from ortho physician extender   End of Consult   Patient Position at End of Consult All needs within reach; Bedside chair   Pt requires PT /OT co-treat and co-eval due to required skilled interventions of at least 2 clinicians for care delivery, medical complexity, limited activity tolerance, and fear/retreat, c/o lightheadedness  PT and OT goals addressed separately  (Please find full objective findings from PT assessment regarding body systems outlined above)  Pt to benefit from continued skilled PT tx while in hospital and upon DC to address deficits as defined above and maximize level of functional mobility  Co-morbidities affecting pt's physical performance at time of assessment include: Anxiety, Arthritis, Atrial premature complex, COVID-19, Heart failure,[place] (hard of hearing), Replacement total knee (Right); Replacement total knee; Cardiac pacemaker placement (2019); ORIF femur fracture (Left, 08/05/2021)  Personal factors affecting pt at time of IE include: advanced age, past experience, behavioral pattern, inability to perform IADLs, inability to ambulate household distances and inability to navigate community distances  The patient's [facility] Basic Mobility Inpatient Short Form Raw Score is 10  A Raw Score of less than 16 suggests the patient may benefit from discharge to post-acute rehabilitation services, which DOES NOT coincide with CURRENT above PT recommendations  However please refer to therapist recommendation for discharge planning given other factors that may influence destination as patient niece plans to assist her p r n  Upon discharge, and patient primarily only needs to perform modified I transfers and wheelchair mobility prior to discharge  Adapted from [employer] [facility] 6-Clicks Basic Mobility and Daily Activity Scores With Discharge Destination  Physical Therapy, 2021;101:1-9  DOI: [id number]    Portions of the record may have been created with voice recognition software  Occasional wrong word or "sound a like" substitutions may have occurred due to the inherent limitations of voice recognition software    Read the chart carefully and recognize, using context, where substitutions have occurred

## 2022-09-13 NOTE — CONSULTS
2           Consultation - Nephrology   Yolanda Samuel 76 y o  female MRN: [id number]  Unit/Bed#: S -01 Encounter: [id number]      Assessment/Plan     Assessment / Plan:  1  Renal    Patient's baseline normal renal function she has age-appropriate renal insufficiency by EGFR  Postoperatively her creatinine is 1 and stable there is no active renal issue she says she is urinating okay  At this point she has been placed back on maintenance diuretic I am going to discontinue IV fluids but there is no active renal problem  Discontinue IV fluids  We will sign off please call if we can be of further assistance  Monitor on maintenance diuretics    2  Orthopedic    Patient was admitted and underwent hardware removal of left knee in the setting of left distal femoral nonunion  Postop care per Orthopedic surgery  History of Present Illness   Physician Requesting Consult: Orlin Castro MD  Reason for Consult / Principal Problem:  Renal insufficiency  Hx and PE limited by:   HPI: Yolanda Samuel is a 76y o  year old female who was status post left knee surgery  She was being admitted for hardware removal the left knee with left TKA in the setting of treatment for left distal femoral nonunion  Were asked to see year for her renal insufficiency  She was seen postoperatively  History obtained from chart review and the patient  Consults    Review of Systems   Constitutional: Negative for chills, diaphoresis, fatigue and fever  HENT: Negative  Eyes: Negative  Respiratory: Negative  Negative for cough, chest tightness, shortness of breath and wheezing  Cardiovascular: Positive for leg swelling  Negative for chest pain and palpitations  Gastrointestinal: Negative for abdominal distention, abdominal pain, diarrhea, nausea and vomiting  Endocrine: Negative  Genitourinary: Negative  Musculoskeletal:        Left leg pain  Skin: Negative  Negative for rash     Neurological: Negative for dizziness, syncope, light-headedness and headaches  Psychiatric/Behavioral: Negative for agitation, behavioral problems, confusion and decreased concentration  Historical Information   Patient Active Problem List   Diagnosis    Tachy-smita syndrome ([facility] [address] )    Essential hypertension    Pacemaker    Chronic bilateral low back pain without sciatica    Chronic GERD    Degenerative lumbar spinal stenosis    Fibromyalgia    GERD (gastroesophageal reflux disease)    Low back pain    Lumbar degenerative disc disease    Mild carpal tunnel syndrome, right    Overweight    Psoriasis    Thyroid nodule    Urinary incontinence    Other insomnia    Anxiety    Tremor    Chronic pain of left knee    Age related osteoporosis    Dysphonia    Vitamin D insufficiency    Hyperlipidemia    Arthritis    Other fatigue    Leukocytosis    Ambulatory dysfunction    Reactive airway disease with acute exacerbation    Morbid obesity with body mass index (BMI) of 40 0 to 49 9 (HCC)    Chronic gout with tophus    Current moderate episode of major depressive disorder (HCC)    [facility]ox[facility] fibrillation ([facility])    Poor dentition    Closed bicondylar fracture of left femur with delayed healing    Preop cardiovascular exam    Stage 3a chronic kidney disease ([facility] [address] )    Preop examination     Past Medical History:   Diagnosis Date    Abnormal ECG     Last Assessed 9/29/2016    Anxiety     Last Assessed 6/08/2016    Arthritis     knees    Asthma     Last Assessed 11/06/2013    Atrial premature complex     Cataract, bilateral     Last Assessed 7/14/2016    Cataract, left eye     Both eyes  Had surgery on left   COVID-19     Difficulty swallowing     Dizziness     Fibromyalgia     Fibromyalgia, primary     Gastric reflux     Heart failure ([facility] [address] )     5/23/22 Pt reports had heart failure in the past    History of COVID-19     5/23/22 Pt reports having COVID in 2021      History of transfusion     no reaction    [place] (hard of hearing)     Hyperlipidemia     Hypertension     Incontinence in female     5/23/22 Pt reports has incontinence -wears depends especially at night/riding in car    Irregular heart beat     5/23/22 Pt reports hx of A fib    Lyme disease     PONV (postoperative nausea and vomiting)     Premature ventricular contraction     Primary osteoarthritis of both knees     Last Assessed 7/14/2016    Rheumatic fever     5/23/22 Pt reports had hx of rheumatic fever as a child twice    Sore throat     Tuberculosis 1945     Past Surgical History:   Procedure Laterality Date    APPENDECTOMY      CARDIAC PACEMAKER PLACEMENT  2019    COLONOSCOPY      DENTAL SURGERY      extractions    HYSTERECTOMY      5/23/22 Pt reports had appendix out with hysterectomy and "tightened up my bladder"    ORIF FEMUR FRACTURE Left 08/05/2021    Procedure: OPEN REDUCTION W/ INTERNAL FIXATION (ORIF) DISTAL FEMUR; INSERTION RETROGRADE NAIL;  Surgeon: Obey Booker MD;  [facility];  Service: Orthopedics    [place] DILATE ESOPHAGUS N/A 04/06/2016    Procedure: DILATATION ESOPHAGEAL;  Surgeon: Nino Rea MD;  [facility]: [facility]; Service: Gastroenterology    [place] EGD TRANSORAL BIOPSY SINGLE/MULTIPLE N/A 04/06/2016    Procedure: ESOPHAGOGASTRODUODENOSCOPY (EGD); Surgeon: Nino Rea MD;  [facility]; Service: Gastroenterology    [place] REMOVAL DEEP IMPLANT Left 9/12/2022    Procedure: REMOVAL NAIL IM  FEMUR;  Surgeon: Obey Booker MD;  [facility];  Service: Orthopedics    [place] TOTAL KNEE ARTHROPLASTY Left 9/12/2022    Procedure: ARTHROPLASTY KNEE TOTAL;  Surgeon: Obey Booker MD;  [facility];  Service: Orthopedics    [place] XCAPSL CTRC RMVL INSJ IO LENS PROSTH W/O ECP Left 03/15/2016    Procedure: EXTRACTION EXTRACAPSULAR CATARACT PHACO INTRAOCULAR LENS (IOL);   Surgeon: Skylar Young MD;  [facility];  Service: Ophthalmology    REPLACEMENT TOTAL KNEE Right     REPLACEMENT TOTAL KNEE      right     TONSILLECTOMY       Social History   Social History     Substance and Sexual Activity   Alcohol Use Not Currently    Comment: Per [employer]- pt reports no current alcohol use at this time     Social History     Substance and Sexual Activity   Drug Use No    Comment: Denies any former or current drug use     Social History     Tobacco Use   Smoking Status Never Smoker   Smokeless Tobacco Never Used   Tobacco Comment    Denies any former or current smoking     Family History   Problem Relation Age of Onset    Coronary artery disease Mother     Heart attack Mother         Prior    Heart disease Father     Heart attack Father         Prior    Anesthesia problems Neg Hx        Meds/Allergies   current meds:   Current Facility-Administered Medications   Medication Dose Route Frequency    acetaminophen (TYLENOL) tablet 650 mg  650 mg Oral Q6H PRN    albuterol (PROVENTIL HFA,VENTOLIN HFA) inhaler 2 puff  2 puff Inhalation Q6H PRN    ascorbic acid (VITAMIN C) tablet 500 mg  500 mg Oral BID    diltiazem (CARDIZEM CD) 24 hr capsule 120 mg  120 mg Oral Daily    docusate sodium (COLACE) capsule 100 mg  100 mg Oral BID    DULoxetine (CYMBALTA) delayed release capsule 30 mg  30 mg Oral Daily    [START ON 9/14/2022] enoxaparin (LOVENOX) subcutaneous injection 40 mg  40 mg Subcutaneous Q24H MERLINE    ezetimibe (ZETIA) tablet 10 mg  10 mg Oral Daily    ferrous sulfate tablet 325 mg  325 mg Oral BID With Meals    furosemide (LASIX) tablet 40 mg  40 mg Oral Daily    HYDROmorphone (DILAUDID) injection 0 5 mg  0 5 mg Intravenous Q2H PRN    lactated ringers bolus 1,000 mL  1,000 mL Intravenous Once PRN    And    lactated ringers bolus 1,000 mL  1,000 mL Intravenous Once PRN    lactated ringers infusion  75 mL/hr Intravenous Continuous    [START ON 9/14/2022] lisinopril (ZESTRIL) tablet 2 5 mg  2 5 mg Oral Daily    LORazepam (ATIVAN) tablet 0 5 mg  0 5 mg Oral Q8H PRN    melatonin tablet 9 mg  9 mg Oral HS PRN    metoprolol tartrate (LOPRESSOR) tablet 100 mg  100 mg Oral Daily    metoprolol tartrate (LOPRESSOR) tablet 50 mg  50 mg Oral HS    oxybutynin (DITROPAN-XL) 24 hr tablet 5 mg  5 mg Oral Daily    oxyCODONE (ROXICODONE) immediate release tablet 10 mg  10 mg Oral Q4H PRN    oxyCODONE (ROXICODONE) IR tablet 5 mg  5 mg Oral Q4H PRN    senna (SENOKOT) tablet 8 6 mg  1 tablet Oral Daily    sodium chloride 0 9 % bolus 1,000 mL  1,000 mL Intravenous Once PRN    And    sodium chloride 0 9 % bolus 1,000 mL  1,000 mL Intravenous Once PRN     Allergies   Allergen Reactions    Penicillins Hives     Itching and terrible hives    Sulfa Antibiotics Itching    M61 Folate [Folic Acid-Vit E5-YYY R68 - Food Allergy] Other (See Comments)     5/23/22 Pt reports no allergic reaction known     [place] Other (See Comments)     Unknown, 5/23 -pt is unaware of reaction     Lyrica [Pregabalin] Other (See Comments)     5/23/22 Pt reports doesn't remember reaction     Other Other (See Comments)     Black rubber 5/23/22 per allergy test - pt unaware of reaction    Cortisone Acetate [Cortisone] Itching and Rash     5/23/22 pt reports makes her violent     [place] Other (See Comments)     Skin discoloration       Objective     Intake/Output Summary (Last 24 hours) at 9/13/2022 1132  Last data filed at 9/13/2022 1111  Gross per 24 hour   Intake 4232 5 ml   Output 1375 ml   Net 2857 5 ml     Body mass index is 38 62 kg/m²  Invasive Devices:        PHYSICAL EXAM:  /54 (BP Location: Left arm)   Pulse 104   Temp 98 6 °F (37 °C) (Oral)   Resp 18   Ht 5' 4" (1 626 m)   Wt 102 kg (225 lb)   SpO2 96%   BMI 38 62 kg/m²     Physical Exam  Constitutional:       General: She is not in acute distress  Appearance: She is not toxic-appearing or diaphoretic  HENT:      Head: Normocephalic and atraumatic        Nose: Nose normal       Mouth/Throat:      Mouth: Mucous membranes are moist    Eyes:      General: No scleral icterus  Extraocular Movements: Extraocular movements intact  Cardiovascular:      Rate and Rhythm: Normal rate and regular rhythm  Heart sounds: No friction rub  No gallop  Comments: Positive edema  Pulmonary:      Effort: Pulmonary effort is normal  No respiratory distress  Breath sounds: No wheezing, rhonchi or rales  Abdominal:      General: Bowel sounds are normal  There is no distension  Palpations: Abdomen is soft  Tenderness: There is no abdominal tenderness  There is no rebound  Musculoskeletal:      Cervical back: Normal range of motion and neck supple  Neurological:      Mental Status: She is alert and oriented to person, place, and time  Psychiatric:         Mood and Affect: Mood normal          Behavior: Behavior normal          Thought Content:  Thought content normal          Judgment: Judgment normal            Current Weight: Weight - Scale: 102 kg (225 lb)  First Weight: Weight - Scale: 102 kg (225 lb)    Lab Results:    Results from last 7 days   Lab Units 09/13/22  0522   WBC Thousand/uL 20 93*   HEMOGLOBIN g/dL 8 7*   HEMATOCRIT % 27 7*   PLATELETS Thousands/uL 232     Results from last 7 days   Lab Units 09/13/22  0750   POTASSIUM mmol/L 4 5   CHLORIDE mmol/L 101   CO2 mmol/L 27   BUN mg/dL 29*   CREATININE mg/dL 1 07   CALCIUM mg/dL 8 5     Results from last 7 days   Lab Units 09/13/22  0750   POTASSIUM mmol/L 4 5   CHLORIDE mmol/L 101   CO2 mmol/L 27   BUN mg/dL 29*   CREATININE mg/dL 1 07   CALCIUM mg/dL 8 5

## 2022-09-13 NOTE — OCCUPATIONAL THERAPY NOTE
Occupational Therapy Evaluation     Patient Name: Anthony JOSE Date: 9/13/2022  Problem List  Principal Problem:    Chronic pain of left knee  Active Problems:    Renal insufficiency    Past Medical History  Past Medical History:   Diagnosis Date    Abnormal ECG     Last Assessed 9/29/2016    Anxiety     Last Assessed 6/08/2016    Arthritis     knees    Asthma     Last Assessed 11/06/2013    Atrial premature complex     Cataract, bilateral     Last Assessed 7/14/2016    Cataract, left eye     Both eyes  Had surgery on left  COVID-19     Difficulty swallowing     Dizziness     Fibromyalgia     Fibromyalgia, primary     Gastric reflux     Heart failure ([address] )     5/23/22 Pt reports had heart failure in the past    History of COVID-19     5/23/22 Pt reports having [employer] in 2021      History of transfusion     no reaction    [place] (hard of hearing)     Hyperlipidemia     Hypertension     Incontinence in female     5/23/22 Pt reports has incontinence -wears depends especially at night/riding in car    Irregular heart beat     5/23/22 Pt reports hx of A fib    Lyme disease     PONV (postoperative nausea and vomiting)     Premature ventricular contraction     Primary osteoarthritis of both knees     Last Assessed 7/14/2016    Rheumatic fever     5/23/22 Pt reports had hx of rheumatic fever as a child twice    Sore throat     Tuberculosis 1945     Past Surgical History  Past Surgical History:   Procedure Laterality Date    APPENDECTOMY      CARDIAC PACEMAKER PLACEMENT  2019    COLONOSCOPY      DENTAL SURGERY      extractions    HYSTERECTOMY      5/23/22 Pt reports had appendix out with hysterectomy and "tightened up my bladder"    ORIF FEMUR FRACTURE Left 08/05/2021    Procedure: OPEN REDUCTION W/ INTERNAL FIXATION (ORIF) DISTAL FEMUR; INSERTION RETROGRADE NAIL;  Surgeon: Jamar Muñiz MD;  [facility];  Service: Orthopedics    MN DILATE ESOPHAGUS N/A 04/06/2016    Procedure: DILATATION ESOPHAGEAL;  Surgeon: Ashwini Way MD;  Location: [facility]; Service: Gastroenterology    CA EGD TRANSORAL BIOPSY SINGLE/MULTIPLE N/A 04/06/2016    Procedure: ESOPHAGOGASTRODUODENOSCOPY (EGD); Surgeon: Ashwini Way MD;  [facility]: [facility]; Service: Gastroenterology    CA REMOVAL DEEP IMPLANT Left 9/12/2022    Procedure: REMOVAL NAIL IM  FEMUR;  Surgeon: Teri Álvarez MD;  [facility];  Service: Orthopedics    CA TOTAL KNEE ARTHROPLASTY Left 9/12/2022    Procedure: ARTHROPLASTY KNEE TOTAL;  Surgeon: Teri Álvarez MD;  [facility];  Service: Orthopedics    CA XCAPSL CTRC RMVL INSJ IO LENS PROSTH W/O ECP Left 03/15/2016    Procedure: EXTRACTION EXTRACAPSULAR CATARACT PHACO INTRAOCULAR LENS (IOL); Surgeon: Raz Coronado MD;  [facility];  Service: Ophthalmology    REPLACEMENT TOTAL KNEE Right     REPLACEMENT TOTAL KNEE      right     TONSILLECTOMY           09/13/22 0858   OT Last Visit   OT Visit Date 09/13/22   Note Type   Note type Evaluation   Additional Comments Pt received supine in bed on this date reporting no pain + is agreeable to OT session @ this time  Nsg staff verbally cleared pt for OT evaluation  @ exit, pt remains in bedside chair c all lines intance, all needs met, call bell in hand + nsg aware of location/disposition  Restrictions/Precautions   Weight Bearing Precautions Per Order Yes   RLE Weight Bearing Per Order WBAT   LLE Weight Bearing Per Order WBAT   Other Precautions Limb alert;Multiple lines; Fall Risk;WBS;Pain  ((LLE))   Pain Assessment   Pain Assessment Tool 0-10   Pain Score No Pain   Pain Location/Orientation Orientation: Left; Location: Knee   Pain Onset/Description Frequency: Constant/Continuous   Patient's Stated Pain Goal No pain   Hospital Pain Intervention(s) Medication (See MAR); Cold applied;Repositioned; Emotional support;Elevated  ([place])   Home Living   Type of [facility] One level   Home Equipment Walker;Cane;Wheelchair-manual   Additional Comments Niece provided virtual home assessment paperwork to be filed in media  Patient has wheelchair, walker, single-point cane, quad cane, Rollator walker, motorized recliner  Patient was primarily wheelchair mobile in performing independent transfers  Rosalee Hooker has about 70 ft to ambulate further distances the apartment  Has adjustable hospital bed  Prior Function   Level of [place] Independent with ADLs and functional mobility   Lives With Alone   Receives Help From Family;Home health  (Niece stays with her p r n , has daily visits 4-6 hours depending on need )   ADL Assistance Independent   IADLs Needs assistance   Falls in the last 6 months 0   Vocational Retired   Lifestyle   Autonomy Pt lives in apartment alone + @ baseline, performs ADLs without A, IADLs with A + fxl mobility without A  Performing w/c mobility c I transfers  (-)   Reciprocal Relationships Niece   Service to Others Family relations   Psychosocial   Psychosocial (WDL) WDL   ADL   Eating Assistance 5  Supervision/Setup   Grooming Assistance 5  Supervision/Setup   UB Bathing Assistance 4  Minimal Assistance   LB Bathing Assistance 3  Moderate Assistance   UB Dressing Assistance 5  Supervision/Setup   LB Dressing Assistance 2  Maximal [address]  2  Maximal Assistance   Bed Mobility   Supine to Sit 5  Supervision   Additional items Assist x 1; Increased time required;Verbal cues;HOB elevated   Additional Comments DNT sit-sup; pt transitioned from EOB-bedside chair  Transfers   Sit to Stand 4  Minimal assistance   Additional items Assist x 2; Increased time required;Verbal cues  (3 attempts c independent readjustments to improve success of elevation from bed)   Stand to Sit 4  Minimal assistance   Additional items Assist x 1; Increased time required;Verbal cues;Armrests   Balance   Static Sitting Good   Dynamic Sitting Fair +   Static Standing Poor +   Ambulatory Poor Activity Tolerance   Activity Tolerance Patient limited by fatigue;Patient limited by pain   Medical Staff Made Aware PT/OT co-eval on this date d/t medical complexity, ambulatory dysfunction c high fall risk, various impeding lines + requirement for skilled interdisciplinary analysis of appropriate d/c recommendations  PT/OT POC/goals I'ly determined per respective discipline  Annalise Rose)   [facility] staff made aware of current fxn, recommendations for d/c planning, fall risk + pt's location upon exit  Jing Herrera)   RUE Assessment   RUE Assessment   (Shoulder flexion tested to 90)   LUE Assessment   LUE Assessment WFL   Hand Function   Gross Motor Coordination Functional   Fine Motor Coordination Functional   Sensation   Light Touch No apparent deficits   Vision - Complex Assessment   Ocular Range of Motion WFL   Cognition   Overall Cognitive Status WFL   Arousal/Participation Alert; Responsive; Cooperative   Attention Within functional limits   Orientation Level Oriented to person;Oriented to place;Oriented to time   Memory Decreased recall of precautions; Unable to assess   Following Commands Follows one step commands with increased time or repetition  (Attributes to [facility])   Assessment   Limitation Decreased ADL status; Decreased UE strength;Decreased Safe judgement during ADL;Decreased high-level ADLs; Decreased self-care trans;Decreased endurance   Prognosis Fair   Assessment Patient is a 76 y o  female seen for OT evaluation s/p admit to [address] on 9/12/2022 w/<principal problem not specified> + commorbidities/PMHx (as listed in medical record) affecting patient's functional performance c ADL tasks at time of assessment  OT orders placed for evaluation and treatment to assess pt's ADLs, cognitive status + performance during functional tasks in order to formulate appropriate d/c recommendations       Therapist performed at least two patient identifiers during session including name and wristband  Personal factors affecting patient at time of initial evaluation include: inaccessible home environment, limited home support, inability to perform IADLs, inability to perform ADLs and inability to ambulate household distances  Pt's clinical presentation is currently evolving given new onset deficits that effect pt's occupational performance and ability to safely return to OF including decrease activity tolerance, decrease standing balance, decrease sitting balance, decrease performance during ADL tasks, decrease BUE ROM, decrease UB MS, decrease generalized strength and decrease performance during functional transfers combined with medical complications of hypotension, abnormal renal lab values, abnormal CBC, abnormal sodium values and need for input for mobility technique/safety  Patient to benefit from continued Occupational Therapy treatment while in the hospital to address aforementioned deficits and maximize level of functional independence with ADLs and functional mobility  Pt currently demonstrates WFL ability to collaboratively engage in d/c planning  From OT standpoint, recommendation at time of d/c would be Short Term Rehab  Plan   Treatment Interventions ADL retraining;Functional transfer training;UE strengthening/ROM; Endurance training;Patient/family training; Compensatory technique education; Energy conservation; Activityengagement   Goal Expiration Date 09/23/22   OT Treatment Day 0   OT Frequency 3-5x/wk   Recommendation   OT Discharge Recommendation Post acute rehabilitation services   AM-PAC Daily Activity Inpatient   Lower Body Dressing 2   Bathing 2   Toileting 2   Upper Body Dressing 3   Grooming 3   Eating 3   Daily Activity Raw Score 15   Daily Activity Standardized Score (Calc for Raw Score >=11) 34 69   AM-PAC Applied Cognition Inpatient   Following a Speech/Presentation 4   Understanding Ordinary Conversation 4   Taking Medications 4   Remembering Where Things Are Placed or Put Away 4   Remembering List of 4-5 Errands 3   Taking Care of Complicated Tasks 3   Applied Cognition Raw Score 22   Applied Cognition Standardized Score 47 83   Barthel Index   Feeding 5   Bathing 0   Grooming Score 0   Dressing Score 5   Bladder Score 5   Bowels Score 5   Toilet Use Score 5   Transfers (Bed/Chair) Score 5   Mobility (Level Surface) Score 0   Stairs Score 0   Barthel Index Score 30   The patient's raw score on the -PAC Daily Activity inpatient short form is 15, standardized score is 34 69, less than 39 4  Patients at this level are likely to benefit from DC to post-acute rehabilitation services  Please refer to the recommendation of the Occupational Therapist for safe DC planning  GOALS:    *ADL transfers with (I) for inc'd independence with ADLs/purposeful tasks    *UB ADL with (I) for inc'd independence with self cares    *LB ADL with (I) using AE prn for inc'd independence with self cares    *Toileting with (I) for clothing management and hygiene for return to PLOF with personal care    *Increase stand tolerance x 5  m for inc'd tolerance with standing purposeful tasks    *Participate in 10m UE therex to increase overall stamina/activity tolerance for purposeful tasks    *Bed mobility- (I) for inc'd independence to manage own comfort and initiate EOB & OOB purposeful tasks    *Patient will verbalize 3 safety awareness/ principles to prevent falls in the home setting  *Patient will verbalize and demonstrate use of energy conservation/deep breathing techniques and work simplification skills during functional activities with no verbal cues  *Patient will increase OOB/sitting tolerance to 2-4 hours per day to increase participation in self-care and leisure tasks with no s/s of exertion  *Patient will engage in ongoing cognitive assessment to assist with safe discharge planning/recommendations       *Pt will verbalize and demonstrate understanding of post-op movement precautions 618% (if applicable) in tx sessions for increased safety and functional mobility        *Pt will demonstrate use of long handled AE (if appropriate) during 100% of tx sessions for increased ADL safety and independence following D/C     Kirit Robin, OTR/L

## 2022-09-13 NOTE — NURSING NOTE
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

## 2022-09-13 NOTE — PROGRESS NOTES
Peripheral Nerve Block Follow-up Note - Acute Pain Service    Alejandra Garcia 76 y o  female MRN: [id number]  Unit/Bed#: S -01 Encounter: [id number]      Assessment:   Active Problems:    * No active hospital problems  *    Alejandra Garcia is a 76y o  year old female with PMHx of tachy-smita syndrome with pacemaker, PAF without evidence of atrial fibrillation upon admission, HTN, CKD3a, anxiety, and morbid obesity who presented for hardware removal of left knee with left TKA in the setting of treatment for left distal femoral nonunion  Patient is s/p left knee HWR and left TKA (09/12) and had received an adductor canal single shot block (bupi 0 5% 15cc + Exparel 1 3% 10cc) and an IPACK single shot block (bupi 0 5% 15cc + Exparel 1 3% 10cc)  This morning the patient is doing very well, reporting that she does not have any pain at this time  She was able to transfer from bed to chair with assistance but denies any limitation due to pain  Her daughter was at the bedside this morning as well  She denies any perioral numbness/tingling, tinnitis, or lightheadedness at this time  She denies any pain at the sites of the nerve blocks  Plan:   - Left adductor and IPACK block is functioning appropriately with expected sensory deficits  - continue oxycodone 5/10mg PO q4h PRN for moderate/severe pain  - continue Dilaudid 0 5mg q2h PRN for breakthrough pain    - Multimodal analgesia with:  - Tylenol 650 mg PO q6hrs standing  - Cymbalta 30mg daily    APS will sign off at this time  Thank you for the consult  All opioids and other analgesics to be written at discretion of primary team  Please contact Acute Pain Service - [facility] via [place] from [phone] with additional questions or concerns  See [place] or [place] for additional contacts and after hours information      Pain History  Current pain location(s): left knee  Pain Scale:   0/10  24 hour history: see assessment  Opioid requirement previous 24 hours: oxycodone 5mg x2    Meds/Allergies   all current active meds have been reviewed    Allergies   Allergen Reactions    Penicillins Hives     Itching and terrible hives    Sulfa Antibiotics Itching    T73 Folate [Folic Acid-Vit B0-QED N94 - Food Allergy] Other (See Comments)     5/23/22 Pt reports no allergic reaction known     [place] Other (See Comments)     Unknown, 5/23 -pt is unaware of reaction     Lyrica [Pregabalin] Other (See Comments)     5/23/22 Pt reports doesn't remember reaction     Other Other (See Comments)     Black rubber 5/23/22 per allergy test - pt unaware of reaction    Cortisone Acetate [Cortisone] Itching and Rash     5/23/22 pt reports makes her violent     [place] Other (See Comments)     Skin discoloration       Objective     Temp:  [97 °F (36 1 °C)-98 4 °F (36 9 °C)] 98 4 °F (36 9 °C)  HR:  [68-74] 74  Resp:  [11-18] 18  BP: (103-159)/(48-81) 124/81    Physical Exam  Vitals and nursing note reviewed  Constitutional:       General: She is not in acute distress  Appearance: Normal appearance  She is obese  HENT:      Head: Normocephalic and atraumatic  Nose: No congestion  Mouth/Throat:      Mouth: Mucous membranes are moist    Eyes:      Conjunctiva/sclera: Conjunctivae normal    Cardiovascular:      Rate and Rhythm: Normal rate and regular rhythm  Pulmonary:      Effort: Pulmonary effort is normal       Breath sounds: Normal breath sounds  Abdominal:      General: Abdomen is flat  Palpations: Abdomen is soft  Musculoskeletal:         General: No tenderness  Comments: LLE bandaged, drain in place, unable to identify block insertion site but no obvious erythema or swelling appreciated   Skin:     General: Skin is warm  Neurological:      General: No focal deficit present  Mental Status: She is alert and oriented to person, place, and time     Psychiatric:         Mood and Affect: Mood normal            Lab Results:          Invalid input(s): LABALBU    Imaging Studies: I have personally reviewed pertinent reports  EKG, Pathology, and Other Studies: I have personally reviewed pertinent reports  Counseling / Coordination of Care  Total floor / unit time spent today 20 minutes  Greater than 50% of total time was spent with the patient and / or family counseling and / or coordination of care  Please note that the APS provides consultative services regarding pain management only  With the exception of ketamine, peripheral nerve catheters, and epidural infusions (and except when indicated), final decisions regarding starting or changing doses of analgesic medications are at the discretion of the consulting service  Off hours consultation and/or medication management is generally not available      Yael Glover MD  Acute Pain Service

## 2022-09-13 NOTE — PLAN OF CARE
Problem: OCCUPATIONAL THERAPY ADULT  Goal: Performs self-care activities at highest level of function for planned discharge setting  See evaluation for individualized goals  Description: Treatment Interventions: ADL retraining, Functional transfer training, UE strengthening/ROM, Endurance training, Patient/family training, Compensatory technique education, Energy conservation, Activityengagement          See flowsheet documentation for full assessment, interventions and recommendations  Note: Limitation: Decreased ADL status, Decreased UE strength, Decreased Safe judgement during ADL, Decreased high-level ADLs, Decreased self-care trans, Decreased endurance  Prognosis: Fair  Assessment: Patient is a 76 y o  female seen for OT evaluation s/p admit to [address] [place] [address],[address] on 9/12/2022 w/<principal problem not specified> + commorbidities/PMHx (as listed in medical record) affecting patient's functional performance c ADL tasks at time of assessment  OT orders placed for evaluation and treatment to assess pt's ADLs, cognitive status + performance during functional tasks in order to formulate appropriate d/c recommendations  Therapist performed at least two patient identifiers during session including name and wristband  Personal factors affecting patient at time of initial evaluation include: inaccessible home environment, limited home support, inability to perform IADLs, inability to perform ADLs and inability to ambulate household distances     Pt's clinical presentation is currently evolving given new onset deficits that effect pt's occupational performance and ability to safely return to OF including decrease activity tolerance, decrease standing balance, decrease sitting balance, decrease performance during ADL tasks, decrease BUE ROM, decrease UB MS, decrease generalized strength and decrease performance during functional transfers combined with medical complications of hypotension, abnormal renal lab values, abnormal CBC, abnormal sodium values and need for input for mobility technique/safety  Patient to benefit from continued Occupational Therapy treatment while in the hospital to address aforementioned deficits and maximize level of functional independence with ADLs and functional mobility  Pt currently demonstrates WFL ability to collaboratively engage in d/c planning  From OT standpoint, recommendation at time of d/c would be Short Term Rehab       OT Discharge Recommendation: Post acute rehabilitation services

## 2022-09-13 NOTE — Clinical Note
DNT sit-sup; pt transitioned from EOB-bedside chair  Pt performed short distance ADL related fxl mobility in room c *** A + RW simulating toileting distances  No overt LOB demonstrated + pt continues to deny pain/ SOB/ dizziness during transitional movements  *** safety awareness noted while navigating t/o room  Continues c prescribed O2 via n/c t/o session  Transitions to bedside recliner for remainder of session  PT/OT co-eval on this date d/t medical complexity, ambulatory dysfunction c high fall risk, various impeding lines + requirement for skilled interdisciplinary analysis of appropriate d/c recommendations  PT/OT POC/goals I'ly determined per respective discipline  (***)    Medical staff made aware of current fxn, recommendations for d/c planning, fall risk + pt's location upon exit  (***)    None reported  No acute changes reported since hospitalization  Patient is a 76 y o  female seen for OT evaluation s/p admit to {RV location:15078} on 9/12/2022 w/<principal problem not specified> + commorbidities/PMHx (as listed in medical record) affecting patient's functional performance c ADL tasks at time of assessment  OT orders placed for evaluation and treatment to assess pt's ADLs, cognitive status + performance during functional tasks in order to formulate appropriate d/c recommendations  Therapist performed at least two patient identifiers during session including name and wristband  Personal factors affecting patient at time of initial evaluation include: {NCPERSONALFACTORS:93690}  Pt's clinical presentation is currently {MSLstable:57068} given new onset deficits that effect pt's occupational performance and ability to safely return to OF including {NCOTdeficits:84055} combined with medical complications of {JQUOVEXDBYVMLRFAPTIYFR:58768}        Patient to benefit from continued Occupational Therapy treatment while in the hospital to address aforementioned deficits and maximize level of functional independence with ADLs and functional mobility  Pt currently *** demonstrates WFL ability to collaboratively engage in d/c planning *** requires A to facilitate appropriate d/c plan  From OT standpoint, recommendation at time of d/c would be {RV recommendation:09623}  @ this time, pt presents @ baseline fxn including I c ADLs/fxl mobility  D/t aforementioned factors, no further skilled occupational therapy services warranted

## 2022-09-13 NOTE — PLAN OF CARE
Problem: PAIN - ADULT  Goal: Verbalizes/displays adequate comfort level or baseline comfort level  Description: Interventions:  - Encourage patient to monitor pain and request assistance  - Assess pain using appropriate pain scale  - Administer analgesics based on type and severity of pain and evaluate response  - Implement non-pharmacological measures as appropriate and evaluate response  - Consider cultural and social influences on pain and pain management  - Notify physician/advanced practitioner if interventions unsuccessful or patient reports new pain  Outcome: Progressing     Problem: INFECTION - ADULT  Goal: Absence or prevention of progression during hospitalization  Description: INTERVENTIONS:  - Assess and monitor for signs and symptoms of infection  - Monitor lab/diagnostic results  - Monitor all insertion sites, i e  indwelling lines, tubes, and drains  - [place] appropriate cooling/warming therapies per order  - Administer medications as ordered  - Instruct and encourage patient and family to use good hand hygiene technique  - Identify and instruct in appropriate isolation precautions for identified infection/condition  Outcome: Progressing      Problem: Potential for Falls  Goal: Patient will remain free of falls  Description: INTERVENTIONS:  - Educate patient/family on patient safety including physical limitations  - Instruct patient to call for assistance with activity   - Consult OT/PT to assist with strengthening/mobility   - Keep Call bell within reach  - Keep bed low and locked with side rails adjusted as appropriate  - Keep care items and personal belongings within reach  - Initiate and maintain comfort rounds  - Make Fall Risk Sign visible to staff  - Offer Toileting every 2-4 Hours, in advance of need  - Initiate/Maintain bed alarm  - Obtain necessary fall risk management equipment: alarms  - Apply yellow socks and bracelet for high fall risk patients  - Consider moving patient to room near nurses station  Outcome: Progressing      Problem: MOBILITY - ADULT  Goal: Maintain or return to baseline ADL function  Description: INTERVENTIONS:  -  Assess patient's ability to carry out ADLs; assess patient's baseline for ADL function and identify physical deficits which impact ability to perform ADLs (bathing, care of mouth/teeth, toileting, grooming, dressing, etc )  - Assess/evaluate cause of self-care deficits   - Assess range of motion  - Assess patient's mobility; develop plan if impaired  - Assess patient's need for assistive devices and provide as appropriate  - Encourage maximum independence but intervene and supervise when necessary  - Involve family in performance of ADLs  - Assess for home care needs following discharge   - Consider OT consult to assist with ADL evaluation and planning for discharge  - Provide patient education as appropriate  Outcome: Progressing

## 2022-09-13 NOTE — PROGRESS NOTES
Progress Note - [address] Doctors  76 y o  female MRN: [id number]  Unit/Bed#: S -01      Subjective:    76 y  o female Seen and examined at bedside  She was ambulating with nursing and her dressings have come loose from her knee  Her drain has remained intact  Per PT, she is not quite ready for discharge, asking for at least 1 more day  Feels her pain is well controlled  No complaints  No SOB, CP, fevers/chills       Labs:  0   Lab Value Date/Time    HCT 27 7 (L) 09/13/2022 0522    HCT 37 1 08/17/2022 1135    HCT 39 5 05/17/2022 1139    HGB 8 7 (L) 09/13/2022 0522    HGB 11 9 08/17/2022 1135    HGB 12 9 05/17/2022 1139    INR 1 29 (H) 08/17/2022 1135    WBC 20 93 (H) 09/13/2022 0522    WBC 7 23 08/17/2022 1135    WBC 7 30 05/17/2022 1139    CRP 5 2 (H) 05/17/2022 1139       Meds:    Current Facility-Administered Medications:     acetaminophen (TYLENOL) tablet 650 mg, 650 mg, Oral, Q6H PRN, Nadeem Cho PA-C    albuterol (PROVENTIL HFA,VENTOLIN HFA) inhaler 2 puff, 2 puff, Inhalation, Q6H PRN, Nadeem Cho PA-C    ascorbic acid (VITAMIN C) tablet 500 mg, 500 mg, Oral, BID, Nadeem Cho PA-C, 500 mg at 09/13/22 0460    diltiazem (CARDIZEM CD) 24 hr capsule 120 mg, 120 mg, Oral, Daily, Nadeem Cho PA-C, 120 mg at 09/13/22 8764    docusate sodium (COLACE) capsule 100 mg, 100 mg, Oral, BID, Nadeem Cho PA-C, 100 mg at 09/13/22 5910    DULoxetine (CYMBALTA) delayed release capsule 30 mg, 30 mg, Oral, Daily, Nadeem Cho PA-C, 30 mg at 09/13/22 0811    [START ON 9/14/2022] enoxaparin (LOVENOX) subcutaneous injection 40 mg, 40 mg, Subcutaneous, Q24H [address], Nadeem Coh PA-C    ezetimibe (ZETIA) tablet 10 mg, 10 mg, Oral, Daily, Nadeem Cho PA-C, 10 mg at 09/13/22 2914    ferrous sulfate tablet 325 mg, 325 mg, Oral, BID With Meals, Nadeem Cho PA-C, 325 mg at 09/13/22 0811    furosemide (LASIX) tablet 40 mg, 40 mg, Oral, Daily, Nadeem Cho PA-C, 40 mg at 09/13/22 0811    HYDROmorphone (DILAUDID) injection 0 5 mg, 0 5 mg, Intravenous, Q2H PRN, Eric Elder PA-C    [START ON 9/14/2022] lisinopril (ZESTRIL) tablet 2 5 mg, 2 5 mg, Oral, Daily, Eric Elder PA-C    LORazepam (ATIVAN) tablet 0 5 mg, 0 5 mg, Oral, Q8H PRN, Eric Elder PA-C    melatonin tablet 9 mg, 9 mg, Oral, HS PRN, Eric Elder PA-C    metoprolol tartrate (LOPRESSOR) tablet 100 mg, 100 mg, Oral, Daily, HELENA Choi-DANDRE, 100 mg at 09/13/22 3271    metoprolol tartrate (LOPRESSOR) tablet 50 mg, 50 mg, Oral, HS, Melissa HilariotalHELENA ortiz-C, 50 mg at 09/12/22 2015    oxybutynin (DITROPAN-XL) 24 hr tablet 5 mg, 5 mg, Oral, Daily, Eric Elder PA-C, 5 mg at 09/13/22 4793    oxyCODONE (ROXICODONE) immediate release tablet 10 mg, 10 mg, Oral, Q4H PRN, Eric Elder PA-C    oxyCODONE (ROXICODONE) IR tablet 5 mg, 5 mg, Oral, Q4H PRN, Eric Elder PA-C, 5 mg at 09/13/22 0835    senna (SENOKOT) tablet 8 6 mg, 1 tablet, Oral, Daily, Eric Elder PA-C, 8 6 mg at 09/13/22 7512    Blood Culture:   Lab Results   Component Value Date    BLOODCX No Growth After 5 Days  09/20/2021       Wound Culture:   No results found for: WOUNDCULT    Ins and Outs:  I/O last 24 hours: In: 4232 5 [P O :120; I V :3862 5; IV Piggyback:250]  Out: 1600 [Urine:475; Drains:225; Blood:900]          Physical:  Vitals:    09/13/22 1125   BP: 107/54   Pulse: 104   Resp: 18   Temp: 98 6 °F (37 °C)   SpO2: 96%     Musculoskeletal: left Lower Extremity  · Dressings have come loose  · Drain in place  Per nursing 150cc out overnight, 75cc out this AM  Drain removed at bedside without issue  Staple line intact without drainage  · Sterile dressings replaced, ace wraps placed  · Ice machine replaced  Patient tolerated without issue  · SILT s/s/sp/dp/t  +fhl/ehl, +ankle dorsi/plantar flexion  2+ DP pulse    Assessment:    76 y  o female POD 1 left distal femoral replacement, removal of deep implants left femur  Doing well  Plan:  · WBAT to the left lower extremity  · Hgb 8 7 today  · Patient with leukocytosis on labs today  She is afebrile, non toxic appearing  Suspect likely inflammatory in nature  Will monitor closely/trend  Should remain elevated, will move forward with workup and consider hospital medicine consultation  · PT/OT, discussed with PT, not yet cleared for discharge  · Pain control  · DVT ppx: lovenox while inpatient, eliquis upon discharge  · Appreciate nephrology input/consultation  · Dispo: Ortho will follow   · Will need outpatient follow up with Dr Jermaine Casiano PA-C

## 2022-09-13 NOTE — TELEPHONE ENCOUNTER
Pt currently inpatient following MLJ surgery  Per Ayde Simon, patients caregiver, she feels patient would be best at Rehab postoperatively  We educated, again, as we did preoperatively on goals for safe discharge, maximizing patients independence  We reference PT notes from this AM during call, and potential recommendation for DC with Darrick Simon again educated on how DC status is determined while inpatient based on postoperative function and clearance from PT, surgeon and medical team      We discussed another PT session this afternoon would occur, and that may shed more light on DC status  TT sent to CC of care management at this time, Emmy Jarrett with update

## 2022-09-13 NOTE — PLAN OF CARE
Problem: PHYSICAL THERAPY ADULT  Goal: Performs mobility at highest level of function for planned discharge setting  See evaluation for individualized goals  Description: Treatment/Interventions: Functional transfer training, LE strengthening/ROM, Therapeutic exercise, Endurance training, Patient/family training, Equipment eval/education, Bed mobility, Gait training, Cognitive reorientation, Spoke to nursing, OT  Equipment Recommended: Wheelchair (As primary means for mobility, walker as secondary)       See flowsheet documentation for full assessment, interventions and recommendations  Note: Prognosis: Fair  Problem List: Decreased strength, Decreased range of motion, Decreased endurance, Impaired balance, Decreased mobility, Obesity, Pain, Orthopedic restrictions  Assessment: Pt is a 76 y o  female seen for PT evaluation s/p admit to [facility] on 9/12/2022 for Left distal femoral replacement; 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  Orders for mobility postop day 0 placed on postop day 1  Order placed for PT  Prior to admission: Pt lived in apartment with no steps to enter and was primarily wheelchair mobile, performing independent transfers to and from surfaces  Patient also did short distance ambulation with a walker and HHPT  Upon evaluation: Pt requires only supervision for bed mobility, and assist of 1-2 transfers, was only able to perform pre gait activities on initial attempt of ambulation  Pt's clinical presentation is currently unstable/unpredictable given the functional mobility deficits above, especially (but not limited to) weakness, decreased ROM and pain, coupled with fall risks including obesity,impaired balance, and combined with medical complications of hypotension, low SpO2 values, fear/retreat and Complaints of lightheadedness with upright change of position     Recommend continue mobility training with rolling walker for short distances but primarily focus on transfers and wheelchair training to promote independence to return back to 1 floor home environment     Barriers to Discharge: Decreased caregiver support, Inaccessible home environment     PT Discharge Recommendation: Home with home health rehabilitation (As long as patient's niece will provide support upon discharge)    See flowsheet documentation for full assessment

## 2022-09-14 ENCOUNTER — APPOINTMENT (OUTPATIENT)
Dept: RADIOLOGY | Facility: HOSPITAL | Age: 77
DRG: 481 | End: 2022-09-14
Payer: MEDICARE

## 2022-09-14 LAB
ANION GAP SERPL CALCULATED.3IONS-SCNC: 6 MMOL/L (ref 4–13)
ARTERIAL PATENCY WRIST A: YES
BASE EXCESS BLDA CALC-SCNC: 1.1 MMOL/L
BODY TEMPERATURE: 98.6 DEGREES FEHRENHEIT
BUN SERPL-MCNC: 24 MG/DL (ref 5–25)
CALCIUM SERPL-MCNC: 8.4 MG/DL (ref 8.4–10.2)
CARDIAC TROPONIN I PNL SERPL HS: 14 NG/L (ref 8–18)
CHLORIDE SERPL-SCNC: 100 MMOL/L (ref 96–108)
CO2 SERPL-SCNC: 27 MMOL/L (ref 21–32)
CREAT SERPL-MCNC: 1.07 MG/DL (ref 0.6–1.3)
ERYTHROCYTE [DISTWIDTH] IN BLOOD BY AUTOMATED COUNT: 13.5 % (ref 11.6–15.1)
GFR SERPL CREATININE-BSD FRML MDRD: 49 ML/MIN/1.73SQ M
GLUCOSE SERPL-MCNC: 132 MG/DL (ref 65–140)
HCO3 BLDA-SCNC: 24.7 MMOL/L (ref 22–28)
HCT VFR BLD AUTO: 24.2 % (ref 34.8–46.1)
HGB BLD-MCNC: 7.8 G/DL (ref 11.5–15.4)
MCH RBC QN AUTO: 30.6 PG (ref 26.8–34.3)
MCHC RBC AUTO-ENTMCNC: 32.2 G/DL (ref 31.4–37.4)
MCV RBC AUTO: 95 FL (ref 82–98)
NASAL CANNULA: 2
O2 CT BLDA-SCNC: 11.4 ML/DL (ref 16–23)
OXYHGB MFR BLDA: 98.2 % (ref 94–97)
PCO2 BLDA: 34.5 MM HG (ref 36–44)
PH BLDA: 7.47 [PH] (ref 7.35–7.45)
PLATELET # BLD AUTO: 229 THOUSANDS/UL (ref 149–390)
PMV BLD AUTO: 9 FL (ref 8.9–12.7)
PO2 BLDA: 143 MM HG (ref 75–129)
POTASSIUM SERPL-SCNC: 4 MMOL/L (ref 3.5–5.3)
RBC # BLD AUTO: 2.55 MILLION/UL (ref 3.81–5.12)
SODIUM SERPL-SCNC: 133 MMOL/L (ref 135–147)
SPECIMEN SOURCE: ABNORMAL
WBC # BLD AUTO: 17.52 THOUSAND/UL (ref 4.31–10.16)

## 2022-09-14 PROCEDURE — 99024 POSTOP FOLLOW-UP VISIT: CPT | Performed by: STUDENT IN AN ORGANIZED HEALTH CARE EDUCATION/TRAINING PROGRAM

## 2022-09-14 PROCEDURE — 82805 BLOOD GASES W/O2 SATURATION: CPT | Performed by: PHYSICIAN ASSISTANT

## 2022-09-14 PROCEDURE — 99222 1ST HOSP IP/OBS MODERATE 55: CPT | Performed by: INTERNAL MEDICINE

## 2022-09-14 PROCEDURE — 84484 ASSAY OF TROPONIN QUANT: CPT | Performed by: PHYSICIAN ASSISTANT

## 2022-09-14 PROCEDURE — 71045 X-RAY EXAM CHEST 1 VIEW: CPT

## 2022-09-14 PROCEDURE — 97110 THERAPEUTIC EXERCISES: CPT

## 2022-09-14 PROCEDURE — 80048 BASIC METABOLIC PNL TOTAL CA: CPT | Performed by: PHYSICIAN ASSISTANT

## 2022-09-14 PROCEDURE — 36600 WITHDRAWAL OF ARTERIAL BLOOD: CPT

## 2022-09-14 PROCEDURE — 97530 THERAPEUTIC ACTIVITIES: CPT

## 2022-09-14 PROCEDURE — 85027 COMPLETE CBC AUTOMATED: CPT | Performed by: PHYSICIAN ASSISTANT

## 2022-09-14 PROCEDURE — 97535 SELF CARE MNGMENT TRAINING: CPT

## 2022-09-14 RX ADMIN — FERROUS SULFATE TAB 325 MG (65 MG ELEMENTAL FE) 325 MG: 325 (65 FE) TAB at 17:30

## 2022-09-14 RX ADMIN — DULOXETINE HYDROCHLORIDE 30 MG: 30 CAPSULE, DELAYED RELEASE ORAL at 09:27

## 2022-09-14 RX ADMIN — OXYBUTYNIN CHLORIDE 5 MG: 5 TABLET, EXTENDED RELEASE ORAL at 09:27

## 2022-09-14 RX ADMIN — DILTIAZEM HYDROCHLORIDE 120 MG: 120 CAPSULE, COATED, EXTENDED RELEASE ORAL at 09:27

## 2022-09-14 RX ADMIN — SENNOSIDES 8.6 MG: 8.6 TABLET, FILM COATED ORAL at 09:25

## 2022-09-14 RX ADMIN — METOPROLOL TARTRATE 100 MG: 100 TABLET, FILM COATED ORAL at 09:27

## 2022-09-14 RX ADMIN — HYDROMORPHONE HYDROCHLORIDE 0.5 MG: 1 INJECTION, SOLUTION INTRAMUSCULAR; INTRAVENOUS; SUBCUTANEOUS at 10:12

## 2022-09-14 RX ADMIN — SODIUM CHLORIDE, SODIUM LACTATE, POTASSIUM CHLORIDE, AND CALCIUM CHLORIDE 500 ML: .6; .31; .03; .02 INJECTION, SOLUTION INTRAVENOUS at 21:27

## 2022-09-14 RX ADMIN — OXYCODONE HYDROCHLORIDE 5 MG: 5 TABLET ORAL at 01:32

## 2022-09-14 RX ADMIN — FUROSEMIDE 40 MG: 40 TABLET ORAL at 09:26

## 2022-09-14 RX ADMIN — OXYCODONE HYDROCHLORIDE AND ACETAMINOPHEN 500 MG: 500 TABLET ORAL at 17:30

## 2022-09-14 RX ADMIN — ENOXAPARIN SODIUM 40 MG: 40 INJECTION SUBCUTANEOUS at 01:32

## 2022-09-14 RX ADMIN — OXYCODONE HYDROCHLORIDE 5 MG: 5 TABLET ORAL at 07:45

## 2022-09-14 RX ADMIN — DOCUSATE SODIUM 100 MG: 100 CAPSULE, LIQUID FILLED ORAL at 09:27

## 2022-09-14 RX ADMIN — LISINOPRIL 2.5 MG: 2.5 TABLET ORAL at 09:26

## 2022-09-14 RX ADMIN — DOCUSATE SODIUM 100 MG: 100 CAPSULE, LIQUID FILLED ORAL at 17:30

## 2022-09-14 RX ADMIN — EZETIMIBE 10 MG: 10 TABLET ORAL at 09:27

## 2022-09-14 RX ADMIN — OXYCODONE HYDROCHLORIDE AND ACETAMINOPHEN 500 MG: 500 TABLET ORAL at 09:26

## 2022-09-14 RX ADMIN — FERROUS SULFATE TAB 325 MG (65 MG ELEMENTAL FE) 325 MG: 325 (65 FE) TAB at 07:38

## 2022-09-14 NOTE — CASE MANAGEMENT
Case Management Discharge Planning Note    Patient name Sejal Simon  Location S /S -01 MRN [id number]  : 1945 Date 2022       Current Admission Date: 2022  Current Admission Diagnosis:Chronic pain of left knee   Patient Active Problem List    Diagnosis Date Noted    Renal insufficiency     Stage 3a chronic kidney disease ([facility] ) 2022    Preop examination 2022    Preop cardiovascular exam 2022    Poor dentition 2022    Closed bicondylar fracture of left femur with delayed healing 2022    Paroxysmal atrial fibrillation ([facility] ) 2022    Chronic gout with tophus 2021    Current moderate episode of major depressive disorder ([facility] ) 2021    Morbid obesity with body mass index (BMI) of 40 0 to 49 9 ([facility] ) 2021    Reactive airway disease with acute exacerbation 2021    Ambulatory dysfunction 2021    Leukocytosis 2021    Arthritis 2021    Other fatigue 2021    Hyperlipidemia 11/10/2020    Dysphonia 2020    Vitamin D insufficiency 2020    Chronic pain of left knee 2019    Age related osteoporosis 2019    Anxiety 2019    Tremor 2019    Other insomnia 2019    Urinary incontinence 2018    Psoriasis 2018    Thyroid nodule 2018    Tachy-smita syndrome ([facility] ) 02/15/2018    Essential hypertension 02/15/2018    Pacemaker 02/15/2018    GERD (gastroesophageal reflux disease) 2017    Mild carpal tunnel syndrome, right 04/10/2017    Degenerative lumbar spinal stenosis 2017    Low back pain 2016    Lumbar degenerative disc disease 2016    Chronic bilateral low back pain without sciatica 2016    Chronic GERD 2016    Overweight 2016    Fibromyalgia 2013      LOS (days): 1  Geometric Mean LOS (GMLOS) (days): 4 30  Days to GMLOS:3 3     OBJECTIVE:  Risk of Unplanned Readmission Score: 10 47 Current admission status: Inpatient   Preferred Pharmacy:   [facility] [address] [facility], [address] [place] [address] [address] [facility] [address]  Phone: [phone] Fax: [phone] [facility], [facility] [address] [address] [address]  [address] [facility]  [address]  [facility]  [address]  Phone: [phone] Fax: [phone]    Primary Care Provider: Hossein Matute MD    Primary Insurance: MEDICARE  Secondary Insurance: [facility]    DISCHARGE DETAILS:     Per pt's caregiver, pt has not received the COVID vaccines  CM informed [facility] about this and they stated they would not have a bed available tomorrow however they have a DC in the zone Friday  They would be able to accept pt Friday  Pt is not yet stable and cm requested [facility] hold the bed for Friday for pt for rehab  This is pt and family's first choice

## 2022-09-14 NOTE — PLAN OF CARE
Problem: Prexisting or High Potential for Compromised Skin Integrity  Goal: Skin integrity is maintained or improved  Description: INTERVENTIONS:  - Identify patients at risk for skin breakdown  - Assess and monitor skin integrity  - Assess and monitor nutrition and hydration status  - Monitor labs   - Assess for incontinence   - Turn and reposition patient  - Assist with mobility/ambulation  - Relieve pressure over bony prominences  - Avoid friction and shearing  - Provide appropriate hygiene as needed including keeping skin clean and dry  - Evaluate need for skin moisturizer/barrier cream  - Collaborate with interdisciplinary team   - Patient/family teaching  - Consider wound care consult   Outcome: Progressing     Problem: MOBILITY - ADULT  Goal: Maintain or return to baseline ADL function  Description: INTERVENTIONS:  -  Assess patient's ability to carry out ADLs; assess patient's baseline for ADL function and identify physical deficits which impact ability to perform ADLs (bathing, care of mouth/teeth, toileting, grooming, dressing, etc )  - Assess/evaluate cause of self-care deficits   - Assess range of motion  - Assess patient's mobility; develop plan if impaired  - Assess patient's need for assistive devices and provide as appropriate  - Encourage maximum independence but intervene and supervise when necessary  - Involve family in performance of ADLs  - Assess for home care needs following discharge   - Consider OT consult to assist with ADL evaluation and planning for discharge  - Provide patient education as appropriate  Outcome: Progressing  Goal: Maintains/Returns to pre admission functional level  Description: INTERVENTIONS:  - Perform BMAT or MOVE assessment daily    - Set and communicate daily mobility goal to care team and patient/family/caregiver  - Collaborate with rehabilitation services on mobility goals if consulted  - Perform Range of Motion 3 times a day    - Reposition patient every 2 hours   - Dangle patient 3 times a day  - Stand patient 3 times a day  - Ambulate patient 3 times a day  - Out of bed to chair 3 times a day   - Out of bed for meals 3 times a day  - Out of bed for toileting  - Record patient progress and toleration of activity level   Outcome: Progressing     Problem: Potential for Falls  Goal: Patient will remain free of falls  Description: INTERVENTIONS:  - Educate patient/family on patient safety including physical limitations  - Instruct patient to call for assistance with activity   - Consult OT/PT to assist with strengthening/mobility   - Keep Call bell within reach  - Keep bed low and locked with side rails adjusted as appropriate  - Keep care items and personal belongings within reach  - Initiate and maintain comfort rounds  - Make Fall Risk Sign visible to staff  - Offer Toileting every 2 Hours, in advance of need  - Initiate/Maintain bed alarm  - Obtain necessary fall risk management equipment  - Apply yellow socks and bracelet for high fall risk patients  - Consider moving patient to room near nurses station  Outcome: Progressing     Problem: PAIN - ADULT  Goal: Verbalizes/displays adequate comfort level or baseline comfort level  Description: Interventions:  - Encourage patient to monitor pain and request assistance  - Assess pain using appropriate pain scale  - Administer analgesics based on type and severity of pain and evaluate response  - Implement non-pharmacological measures as appropriate and evaluate response  - Consider cultural and social influences on pain and pain management  - Notify physician/advanced practitioner if interventions unsuccessful or patient reports new pain  Outcome: Progressing     Problem: INFECTION - ADULT  Goal: Absence or prevention of progression during hospitalization  Description: INTERVENTIONS:  - Assess and monitor for signs and symptoms of infection  - Monitor lab/diagnostic results  - Monitor all insertion sites, i e  indwelling lines, tubes, and drains  - Monitor endotracheal if appropriate and nasal secretions for changes in amount and color  - [place] appropriate cooling/warming therapies per order  - Administer medications as ordered  - Instruct and encourage patient and family to use good hand hygiene technique  - Identify and instruct in appropriate isolation precautions for identified infection/condition  Outcome: Progressing

## 2022-09-14 NOTE — PROGRESS NOTES
Progress Note - [address]  76 y o  female MRN: [id number]  Unit/Bed#: S -01      Subjective:    76 y  o female Seen and examined at bedside  She has had trouble with physical therapy today secondary to pain control problems  She had dilaudid and was "snowed"  She is having 7/10 pain in the knee  She notes she is also having intermittent SOB  Unclear if this is different from baseline  Her caregiver at bedside feels that she is also wheezing  No fever, chills  Urinating well       Labs:  0   Lab Value Date/Time    HCT 24 2 (L) 09/14/2022 0954    HCT 27 7 (L) 09/13/2022 0522    HCT 37 1 08/17/2022 1135    HGB 7 8 (L) 09/14/2022 0954    HGB 8 7 (L) 09/13/2022 0522    HGB 11 9 08/17/2022 1135    INR 1 29 (H) 08/17/2022 1135    WBC 17 52 (H) 09/14/2022 0954    WBC 20 93 (H) 09/13/2022 0522    WBC 7 23 08/17/2022 1135    CRP 5 2 (H) 05/17/2022 1139       Meds:    Current Facility-Administered Medications:     acetaminophen (TYLENOL) tablet 650 mg, 650 mg, Oral, Q6H PRN, John Paul Morales PA-C    albuterol (PROVENTIL HFA,VENTOLIN HFA) inhaler 2 puff, 2 puff, Inhalation, Q6H PRN, John Paul Morales PA-C    ascorbic acid (VITAMIN C) tablet 500 mg, 500 mg, Oral, BID, John Paul Morales PA-C, 500 mg at 09/14/22 2942    diltiazem (CARDIZEM CD) 24 hr capsule 120 mg, 120 mg, Oral, Daily, HELENA Ibrahim-DANDRE, 120 mg at 09/14/22 1154    docusate sodium (COLACE) capsule 100 mg, 100 mg, Oral, BID, HELENA Ibrahim-DANDRE, 100 mg at 09/14/22 9410    DULoxetine (CYMBALTA) delayed release capsule 30 mg, 30 mg, Oral, Daily, HELENA Ibrahim-DANDRE, 30 mg at 09/14/22 9952    enoxaparin (LOVENOX) subcutaneous injection 40 mg, 40 mg, Subcutaneous, Q24H [address], John Paul Morales PA-C, 40 mg at 09/14/22 0132    ezetimibe (ZETIA) tablet 10 mg, 10 mg, Oral, Daily, John Paul Morales PA-C, 10 mg at 09/14/22 5222    ferrous sulfate tablet 325 mg, 325 mg, Oral, BID With Meals, John Paul Morales PA-C, 325 mg at 09/14/22 8168    furosemide (LASIX) tablet 40 mg, 40 mg, Oral, Daily, Kristine Tyson PA-C, 40 mg at 09/14/22 0926    HYDROmorphone (DILAUDID) injection 0 5 mg, 0 5 mg, Intravenous, Q2H PRN, [facility] HELENA Tyson-C, 0 5 mg at 09/14/22 1012    lisinopril (ZESTRIL) tablet 2 5 mg, 2 5 mg, Oral, Daily, HELENA Dillard-C, 2 5 mg at 09/14/22 0926    LORazepam (ATIVAN) tablet 0 5 mg, 0 5 mg, Oral, Q8H PRN, [facility] HELENA Tyson-DANDRE    melatonin tablet 9 mg, 9 mg, Oral, HS PRN, [facility] HELENA TysonDANDRE    metoprolol tartrate (LOPRESSOR) tablet 100 mg, 100 mg, Oral, Daily, Melissa Selby Rettaliatmarlon PA-C, 100 mg at 09/14/22 0609    metoprolol tartrate (LOPRESSOR) tablet 50 mg, 50 mg, Oral, HS, Melissa Selby Rettaliatmarlon, PA-C, 50 mg at 09/12/22 2015    oxybutynin (DITROPAN-XL) 24 hr tablet 5 mg, 5 mg, Oral, Daily, [facility] HELENA Tyson-C, 5 mg at 09/14/22 5794    oxyCODONE (ROXICODONE) immediate release tablet 10 mg, 10 mg, Oral, Q4H PRN, [facility] Jah PA-C    oxyCODONE (ROXICODONE) IR tablet 5 mg, 5 mg, Oral, Q4H PRN, [facility], PA-C, 5 mg at 09/14/22 0745    senna (SENOKOT) tablet 8 6 mg, 1 tablet, Oral, Daily, [facility], PA-C, 8 6 mg at 09/14/22 9174    Blood Culture:   Lab Results   Component Value Date    BLOODCX No Growth After 5 Days  09/20/2021       Wound Culture:   No results found for: WOUNDCULT    Ins and Outs:  I/O last 24 hours: In: 520 [P O :120; I V :400]  Out: 175 [Urine:100; Drains:75]          Physical:  Vitals:    09/14/22 1128   BP: 124/53   Pulse: (!) 114   Resp: 21   Temp: 99 2 °F (37 3 °C)   SpO2: 96%     Musculoskeletal: left Lower Extremity  · Dressings taken down, her incision is C/D/I with staples in place  Drain site is C/D/I    · Dressings replaced with dry ABDs and ace wraps  · SILT s/s/sp/dp/t  +fhl/ehl, +ankle dorsi/plantar flexion  2+ DP pulse    She is 91-92% RA  Unlabored breathing  No audible wheezing  Assessment:    76 y  o female POD 2 left distal femoral replacement, removal of deep implants left femur  From surgical perspective is doing well  However, she is somewhat SOB on exam today, tachycardic at times  Plan:  · WBAT to the left lower extremity  · Hgb 7 8 today  · Patient with leukocytosis, improved  Suspect acute phase reactant  · Patient evaluated and seen with orthopedic attg  Given tachycardia, SOB, low O2 sats, will order chest XRAY, EKG, ABG  EKG reviewed, with Afib with RVR with incomplete left bundle branch block  Patient also with prior underlying cardiac history (tachy/smita syndrome and Afib)  Will consult cardiology for assistance  · PT/OT, discussed with PT, not yet cleared for discharge  Now recommending rehab  Will consult case management for assistance  · Pain control  · DVT ppx: lovenox while inpatient, eliquis upon discharge  · Appreciate nephrology input/consultation  · Dispo: Ortho will follow   · Will need outpatient follow up with Dr Tomasa Sever Sheena Evetta Hint, PA-C

## 2022-09-14 NOTE — PLAN OF CARE
Problem: PHYSICAL THERAPY ADULT  Goal: Performs mobility at highest level of function for planned discharge setting  See evaluation for individualized goals  Description: Treatment/Interventions: Functional transfer training, LE strengthening/ROM, Therapeutic exercise, Endurance training, Patient/family training, Equipment eval/education, Bed mobility, Gait training, Cognitive reorientation, Spoke to nursing, OT  Equipment Recommended: Wheelchair (As primary means for mobility, walker as secondary)       See flowsheet documentation for full assessment, interventions and recommendations  Outcome: Not Progressing  Note: Prognosis: Fair  Problem List: Decreased strength, Decreased range of motion, Decreased endurance, Impaired balance, Decreased mobility, Obesity, Decreased coordination, Decreased cognition, Impaired judgement, Decreased safety awareness, Orthopedic restrictions (Gait deviation)  Assessment: Patient did not make mobility progress since initial evaluation  She requires substantially more physical assistance for bed mobility and was unable to perform standing activities despite multiple UE supports including bed, railing, walker, quick move  Patient did have pain medication prior to mobility portion of session, and alyse did report that patient did express some fear/retreat after lower extremity buckling with nursing staff yesterday  Skilled PT recommended to progress patient towards treatment goals  Based on lack of progress and clarified/updated information that patient will not have home support in the evenings, discharge recommendations changed to post acute rehab  Goals are still applicable  Would recommend upcoming sessions to progress with trials of sit to stand activities with either rolling walker or quick move with multiple staff assistance p r n , TherEx as above, eventual gait training with rolling walker/gait belt  Would also recommend wheelchair mobility    Alyse did state that she verbalized good understanding of written home exercise program provided  Barriers to Discharge: Decreased caregiver support, Inaccessible home environment     PT Discharge Recommendation: (S) Post acute rehabilitation services (Change from initial evaluation due to requiring more physical assistance and updated/clarification about available home support)    See flowsheet documentation for full assessment

## 2022-09-14 NOTE — PLAN OF CARE
Problem: OCCUPATIONAL THERAPY ADULT  Goal: Performs self-care activities at highest level of function for planned discharge setting  See evaluation for individualized goals  Description: Treatment Interventions: ADL retraining, Functional transfer training, UE strengthening/ROM, Endurance training, Patient/family training, Compensatory technique education, Energy conservation, Activityengagement          See flowsheet documentation for full assessment, interventions and recommendations  9/14/2022 1200 by Radha Angulo OT  Outcome: Progressing  Note: Limitation: Decreased ADL status, Decreased UE strength, Decreased Safe judgement during ADL, Decreased high-level ADLs, Decreased self-care trans, Decreased endurance  Prognosis: Fair  Assessment: Pt seen at bedside for OT tx session focused on ADL retraining  Session significantly limited due to pt with change in mental status  Niarden present and notes pt seeming "snowed" today  Despite the latter, pt awake, responsive to therapists commands with inc'd time needed to process information  Pt with change in stats with regard to transfer skills as well- needing max (A)x1 for sup>sit and unable to advance beyond sitting EOB despite multiple attempts  Eder noted during session that pt without assistance available at home in the evenings and pt usually uses the [facility] every night  Pt MUST be (I) with bed mobility and ability to transfer to/from [facility] safely in order to return home  Given the latter, recommend pt d/c to rehab following hospital course  Pt would continue to benefit from skilled OT services during hospital course to address deficits and goals listed in evaluation  Care team aware of pt's change in status on this date compared to previous session   Thank you     OT Discharge Recommendation: Post acute rehabilitation services       9/14/2022 1159 by Radha Angulo OT  Note: Limitation: Decreased ADL status, Decreased UE strength, Decreased Safe judgement during ADL, Decreased high-level ADLs, Decreased self-care trans, Decreased endurance  Prognosis: Fair  Assessment: Pt seen at bedside for OT tx session focused on ADL retraining  Session significantly limited due to pt with change in mental status  Eder present and notes pt seeming "snowed" today  Despite the latter, pt awake, responsive to therapists commands with inc'd time needed to process information  Pt with change in stats with regard to transfer skills as well- needing max (A)x1 for sup>sit and unable to advance beyond sitting EOB despite multiple attempts  Eder noted during session that pt without assistance available at home in the evenings and pt usually uses the [facility] every night  Pt MUST be (I) with bed mobility and ability to transfer to/from [facility] safely in order to return home  Given the latter, recommend pt d/c to rehab following hospital course  Pt would continue to benefit from skilled OT services during hospital course to address deficits and goals listed in evaluation  Care team aware of pt's change in status on this date compared to previous session   Thank you     OT Discharge Recommendation: Post acute rehabilitation services

## 2022-09-14 NOTE — CONSULTS
Consultation - Cardiology Team [id number] [facility] 76 y o  female MRN: [id number]  Unit/Bed#: S -80 Encounter: [id number]  09/14/22  1:36 PM    Assessment/ Plan:  1  Tachy-smita syndrome  - s/p MDT DC PPM  - last device interrogation (5/18/22) - 4 AT/AF episodes noted, normal device function  - echo (8/10/21) - LVEF 60%, no RWMA, grade 2 DD, mild MR, mild AI, mild TR w/ PASP 42 mmHg  - not on telemetry this admission - now on the monitor; NSR/ST w/ PVCs, HR 80-low 100s, intermittently V-paced  - patient has not received her home lopressor since 9/12 - please administer if BP is appropriate and tachycardia should resolve    2  PAF  - EKG this morning (reviewed with nursing, not yet in system) - Afib/flutter vs  atrial tachycardia, ; will review closely once uploaded  - continue outpatient rate control regimen of Cardizem  mg po daily, lopressor 50 mg po HS  - continue anticoagulation with Eliquis 5 mg po daily  - administer home beta blocker - no further recommendations    3  Hypertension  - last documented /53; stable  - outpatient antihypertensive regimen - lisinopril 2 5mg po daily, lasix 40 mg po daily, lopressor, cardizem; continue    4  Hyperlipidemia  - lipid panel (5/27/22) - / triglycerides 86/ HDL 67/   - continue home Zetia 10 mg po daily    5  Left distal femoral replacement   - POD #2  - management per ortho    History of Present Illness   Physician Requesting Consult: Javan Leos MD    Reason for Consult / Principal Problem: Atrial fibrillation/TBS    HPI: Carline Castaneda is a 76y o  year old female with PMH of afib on Eliquis, TBS s/p PPM, hypertension, and hyperlipidemia who presents on 9/12/22 for elective removal of hardware from a prior left femur fracture  She is now POD #2 from left distal femoral replacement and removal of deep implants of the left femur  This morning, patient was noted to be SOB with low O2 sats   An EKG was obtained which demonstrated atrial fibrillation/flutter vs  atach with   CXR demonstrated clear lungs  Cardiology was consulted for further assistance given arrhythmia  Upon examination, patient is resting comfortably in bed  Unfortunately, she is dowsing off as she received dilaudid a few hours ago, so a detailed history cannot be obtained  She does, however, deny any chest pain or shortness of breath  Does endorse some palpitations this morning, but otherwise offers no complaints  She follows with Dr Migdalia Lizama outpatient  Inpatient consult to Cardiology  Consult performed by: JERI Del Toro  Consult ordered by: Jing Greene PA-C      EKG: afib/flutter vs  atrial tachycardia,     Review of Systems   Constitutional: Negative  HENT: Negative  Eyes: Negative  Respiratory: Negative for shortness of breath  Cardiovascular: Positive for palpitations  Negative for chest pain  Gastrointestinal: Negative  Endocrine: Negative  Genitourinary: Negative  Musculoskeletal: Negative  Skin: Negative  Allergic/Immunologic: Negative  Neurological: Negative  Hematological: Negative  Psychiatric/Behavioral: Negative  Historical Information   Past Medical History:   Diagnosis Date    Abnormal ECG     Last Assessed 9/29/2016    Anxiety     Last Assessed 6/08/2016    Arthritis     knees    Asthma     Last Assessed 11/06/2013    Atrial premature complex     Cataract, bilateral     Last Assessed 7/14/2016    Cataract, left eye     Both eyes  Had surgery on left   COVID-19     Difficulty swallowing     Dizziness     Fibromyalgia     Fibromyalgia, primary     Gastric reflux     Heart failure ([facility] [address] )     5/23/22 Pt reports had heart failure in the past    History of COVID-19     5/23/22 Pt reports having COVID in 2021      History of transfusion     no reaction    [place] (hard of hearing)     Hyperlipidemia     Hypertension     Incontinence in female     5/23/22 Pt reports has incontinence -wears depends especially at night/riding in car    Irregular heart beat     5/23/22 Pt reports hx of A fib    Lyme disease     PONV (postoperative nausea and vomiting)     Premature ventricular contraction     Primary osteoarthritis of both knees     Last Assessed 7/14/2016    Rheumatic fever     5/23/22 Pt reports had hx of rheumatic fever as a child twice    Sore throat     Tuberculosis 1947     Past Surgical History:   Procedure Laterality Date    APPENDECTOMY      CARDIAC PACEMAKER PLACEMENT  2019    COLONOSCOPY      DENTAL SURGERY      extractions    HYSTERECTOMY      5/23/22 Pt reports had appendix out with hysterectomy and "tightened up my bladder"    ORIF FEMUR FRACTURE Left 08/05/2021    Procedure: OPEN REDUCTION W/ INTERNAL FIXATION (ORIF) DISTAL FEMUR; INSERTION RETROGRADE NAIL;  Surgeon: Brock Hollis MD;  [facility];  Service: Orthopedics    CT DILATE ESOPHAGUS N/A 04/06/2016    Procedure: DILATATION ESOPHAGEAL;  Surgeon: Jesús Fuentes MD;  [facility]; Service: Gastroenterology    CT EGD TRANSORAL BIOPSY SINGLE/MULTIPLE N/A 04/06/2016    Procedure: ESOPHAGOGASTRODUODENOSCOPY (EGD); Surgeon: Jesús Fuentes MD;  [facility]; Service: Gastroenterology    CT REMOVAL DEEP IMPLANT Left 9/12/2022    Procedure: REMOVAL NAIL IM  FEMUR;  Surgeon: Brock Hollis MD;  [facility];  Service: Orthopedics    CT TOTAL KNEE ARTHROPLASTY Left 9/12/2022    Procedure: ARTHROPLASTY KNEE TOTAL;  Surgeon: Brock Hollis MD;  [facility];  Service: Orthopedics    CT XCAPSL CTRC RMVL INSJ IO LENS PROSTH W/O ECP Left 03/15/2016    Procedure: EXTRACTION EXTRACAPSULAR CATARACT PHACO INTRAOCULAR LENS (IOL);   Surgeon: Lv Ray MD;  [facility];  Service: Ophthalmology    REPLACEMENT TOTAL KNEE Right     REPLACEMENT TOTAL KNEE      right     TONSILLECTOMY       Social History     Substance and Sexual Activity Alcohol Use Not Currently    Comment: Per [employer]- pt reports no current alcohol use at this time     Social History     Substance and Sexual Activity   Drug Use No    Comment: Denies any former or current drug use     Social History     Tobacco Use   Smoking Status Never Smoker   Smokeless Tobacco Never Used   Tobacco Comment    Denies any former or current smoking     Family History:   Family History   Problem Relation Age of Onset    Coronary artery disease Mother     Heart attack Mother         Prior    Heart disease Father     Heart attack Father         Prior    Anesthesia problems Neg Hx      Meds/Allergies   all current active meds have been reviewed  Allergies   Allergen Reactions    Penicillins Hives     Itching and terrible hives    Sulfa Antibiotics Itching    R74 Folate [Folic Acid-Vit C9-QKZ B27 - Food Allergy] Other (See Comments)     5/23/22 Pt reports no allergic reaction known     Sharon Other (See Comments)     Unknown, 5/23 -pt is unaware of reaction     Lyrica [Pregabalin] Other (See Comments)     5/23/22 Pt reports doesn't remember reaction     Other Other (See Comments)     Black rubber 5/23/22 per allergy test - pt unaware of reaction    Cortisone Acetate [Cortisone] Itching and Rash     5/23/22 pt reports makes her violent     Nickel Other (See Comments)     Skin discoloration     Objective   Vitals: Blood pressure 124/53, pulse (!) 114, temperature 99 2 °F (37 3 °C), resp  rate 21, height 5' 4" (1 626 m), weight 102 kg (225 lb), SpO2 96 %  , Body mass index is 38 62 kg/m² ,   Orthostatic Blood Pressures    Flowsheet Row Most Recent Value   Blood Pressure 124/53 filed at 09/14/2022 1128   Patient Position - Orthostatic VS Lying filed at 09/14/2022 7374        Systolic (91JPA), KRT:212 , Min:110 , DCU:057     Diastolic (72QNA), GBR:03, Min:40, Max:56      Intake/Output Summary (Last 24 hours) at 9/14/2022 1336  Last data filed at 9/14/2022 1245  Gross per 24 hour Intake --   Output 700 ml   Net -700 ml     Invasive Devices  Report    Peripheral Intravenous Line  Duration           Peripheral IV 09/12/22 Right Forearm 2 days                Physical Exam  Constitutional:       Appearance: She is obese  Comments: drowsy   HENT:      Head: Normocephalic and atraumatic  Nose: Nose normal       Mouth/Throat:      Mouth: Mucous membranes are moist       Pharynx: Oropharynx is clear  Eyes:      Pupils: Pupils are equal, round, and reactive to light  Cardiovascular:      Rate and Rhythm: Normal rate and regular rhythm  Pulses: Normal pulses  Heart sounds: Murmur heard  Pulmonary:      Effort: Pulmonary effort is normal       Breath sounds: Normal breath sounds  No wheezing or rhonchi  Abdominal:      General: Bowel sounds are normal       Palpations: Abdomen is soft  Musculoskeletal:         General: Normal range of motion  Cervical back: Normal range of motion  Right lower leg: No edema  Left lower leg: No edema  Skin:     General: Skin is warm and dry  Capillary Refill: Capillary refill takes less than 2 seconds  Neurological:      General: No focal deficit present  Mental Status: Mental status is at baseline     Psychiatric:         Mood and Affect: Mood normal          Behavior: Behavior normal           Lab Results:   Troponins:     CBC with diff:   Results from last 7 days   Lab Units 09/14/22  0954 09/13/22  0522   WBC Thousand/uL 17 52* 20 93*   HEMOGLOBIN g/dL 7 8* 8 7*   HEMATOCRIT % 24 2* 27 7*   MCV fL 95 95   PLATELETS Thousands/uL 229 232   MCH pg 30 6 29 9   MCHC g/dL 32 2 31 4   RDW % 13 5 13 2   MPV fL 9 0 8 8*     CMP:   Results from last 7 days   Lab Units 09/14/22  0954 09/13/22  0750   POTASSIUM mmol/L 4 0 4 5   CHLORIDE mmol/L 100 101   CO2 mmol/L 27 27   BUN mg/dL 24 29*   CREATININE mg/dL 1 07 1 07   CALCIUM mg/dL 8 4 8 5   EGFR ml/min/1 73sq m 49 49

## 2022-09-14 NOTE — PHYSICAL THERAPY NOTE
PHYSICAL THERAPY TREATMENT NOTE  NAME:  Emy Correia  DATE: 09/14/22    Length Of Stay: 1  Performed at least 2 patient identifiers during session: Name and Birthday    TREATMENT:    09/14/22 1109   PT Last Visit   PT Visit Date 09/14/22   Note Type   Note Type Treatment   Pain Assessment   Pain Assessment Tool 0-10   Pain Location/Orientation Location: Knee;Orientation: Left   Effect of Pain on Daily Activities Limits speed and independence of mobility   Patient's Stated Pain Goal No pain   Hospital Pain Intervention(s) Repositioned;Medication (See MAR)  (Patient medicated prior to session by nursing, electricool at left knee)   Restrictions/Precautions   Weight Bearing Precautions Per Order Yes   RLE Weight Bearing Per Order WBAT   LLE Weight Bearing Per Order WBAT   Other Precautions Bed Alarm; Chair Alarm;Pain; Fall Risk;Multiple lines   General   Chart Reviewed Yes   Additional Pertinent History Did receive a tiger text from Orthopedics yesterday stating that patient did not have any TherEx restrictions   Response to Previous Treatment Patient reporting fatigue but able to participate  Family/Caregiver Present Yes  (Needs)   Cognition   Overall Cognitive Status [facility]   Arousal/Participation Alert   Attention Difficulty attending to directions   Memory Decreased recall of precautions;Decreased recall of recent events;Decreased short term memory   Following Commands Follows one step commands inconsistently   Subjective   Subjective On 1st attempt, patient with multiple staff including orthopedic PAC--care coordination to perform session later  Jose Lazcano Upon 2nd attempt, patient lethargic with flat affect, limited command following  Needs substantial encouragement to participate  Eder did also clarify during session that she did not plan to provide assistance to patient upon discharge in the evening, only during the day    Eder also stated that patient was a little bit more hesitant since performing mobility with her yesterday and later with nursing when her left knee buckled  Bed Mobility   Supine to Sit Unable to assess  (As patient with OT prior to entering room)   Sit to Supine 1  Dependent   Additional items Assist x 2; Increased time required;Verbal cues; Bedrails   Additional Comments Dependent Assistance of 2-3 for repositioning towards the head of the bed (niece assisting with lower extremity support)   Transfers   Sit to Stand (S)  Unable to assess  (Patient was unable to perform buttock clearing at edge of bed with either a walker support, bed support, nor with quick move  Attempted 5 trials )   Ambulation/Elevation   Gait pattern Not appropriate  (Due to inability to stand despite assist of 2 staff)   Balance   Static Sitting Fair -  (Sitting tolerance >10 minutes)   Endurance Deficit   Endurance Deficit Yes   Endurance Deficit Description Limited activity tolerance  Self reported fatigue  Patient with occasional wheezing which RN does know about  Activity Tolerance   Activity Tolerance Patient limited by pain; Patient limited by fatigue   Medical Staff Edis coordination with Melissa from Orthopedics, as well as Brendan Mann from OT, Kelly Moralez from case management   Nurse Edis coordination with Thomas Blas RN throughout the morning, also spoke to Santa GIBBS regarding patient's change in status   Exercises   [employer] reps; Left;AROM;AAROM  (Facilitation with tapping, 2 sets)   Heelslides Supine;10 reps;AAROM; Left;PROM   Knee AROM Short Arc Quad Sitting;10 reps;AAROM; Left  (Facilitation at quad)   Ankle Pumps Supine;10 reps; Left;AAROM;AROM   Neuro re-ed Written HEP provided to patient and patient's niece to perform TherEx including ankle pumps, quad sets, and short arc quads  Assessment   Prognosis Fair   Problem List Decreased strength;Decreased range of motion;Decreased endurance; Impaired balance;Decreased mobility;Obesity; Decreased coordination;Decreased cognition; Impaired judgement;Decreased safety awareness;Orthopedic restrictions  (Gait deviation)   Assessment Patient did not make mobility progress since initial evaluation  She requires substantially more physical assistance for bed mobility and was unable to perform standing activities despite multiple UE supports including bed, railing, walker, quick move  Patient did have pain medication prior to mobility portion of session, and alyse did report that patient did express some fear/retreat after lower extremity buckling with nursing staff yesterday  Skilled PT recommended to progress patient towards treatment goals  Based on lack of progress and clarified/updated information that patient will not have home support in the evenings, discharge recommendations changed to post acute rehab  Goals are still applicable  Would recommend upcoming sessions to progress with trials of sit to stand activities with either rolling walker or quick move with multiple staff assistance p r n , TherEx as above, eventual gait training with rolling walker/gait belt  Would also recommend wheelchair mobility  Alyse did state that she verbalized good understanding of written home exercise program provided  Barriers to Discharge Decreased caregiver support; Inaccessible home environment   Goals   Patient Goals none stated   STG Expiration Date 09/23/22   Short Term Goal #1 Goals: Pt will: Perform bed mobility tasks with modified I to prepare for transfers and reposition in bed using hospital bed controls as patient has a hospital bed home  Perform transfers with supervision to decrease risk for falls and improve ease of transfers  Perform ambulation with rolling walker for up to 50 ft with supervision to improve gait quality and promote proper use of assistive device  Propel wheelchair for 50 ft as alternative to ambulation to perform independent mobility within her home environment   Perform at least 2 HEP w/o physical A to demonstrate compliance w/therex and improve ease of transfers   PT Treatment Day 2   Plan   Treatment/Interventions Functional transfer training;LE strengthening/ROM; Therapeutic exercise;Cognitive reorientation;Equipment eval/education;Gait training;Bed mobility; Patient/family training; Endurance training;Spoke to nursing;Spoke to case management;Spoke to advanced practitioner;OT  (Wheelchair mobility)   Progress No functional improvements   PT Frequency 4-6x/wk   Recommendation   PT Discharge Recommendation (S)  Post acute rehabilitation services  (Change from initial evaluation due to requiring more physical assistance and updated/clarification about available home support)   Equipment Recommended Walker; Wheelchair   [facility] Basic Mobility Inpatient   Turning in Bed Without Bedrails 2   Lying on Back to Sitting on Edge of Flat Bed 2   Moving Bed to Chair 1   Standing Up From Chair 1   Walk in Room 1   Climb 3-5 Stairs 1   Basic Mobility Inpatient Raw Score 8   Turning Head Towards Sound 3   Follow Simple Instructions 2   Low Function Basic Mobility Raw Score 13   Low Function Basic Mobility Standardized Score 20 14   Highest Level Of Mobility   -HL Goal 3: Sit at edge of bed   JH-HLM Achieved 3: Sit at edge of bed   Education   Education Provided Assistive device;Home exercise program;Mobility training   Patient Reinforcement needed; Explanation/teachback used   End of Consult   Patient Position at End of Consult All needs within reach;Bed/Chair alarm activated;Supine   Pt requires PT /OT co-treat for portions of session due to required skilled interventions of at least 2 clinicians for care delivery, medical complexity, limited activity tolerance, and cognitive-behavioral impairments including but not limited to lethargy  PT and OT goals addressed separately  The patient's AM-[facility] Basic Mobility Inpatient Short Form Raw Score is 8    A Raw Score of less than 16 suggests the patient may benefit from discharge to post-acute rehabilitation services, which NOW DOES coincide with CURRENT above PT recommendations as patient did not mobilize as well as during initial evaluation and (after update/clarification) will not have home support in the evening and will be home alone if she were to discharge home  However please refer to therapist recommendation for discharge planning given other factors that may influence destination  Adapted from [employer] [facility] 6-Clicks Basic Mobility and Daily Activity Scores With Discharge Destination  Physical Therapy, 2021;101:1-9   DOI: [email or web address]    Lianne Centeno PT, DPT

## 2022-09-14 NOTE — CASE MANAGEMENT
Case Management Discharge Planning Note    Patient name Zulema TITUS /S -01 MRN [id number]  : 1945 Date 2022       Current Admission Date: 2022  Current Admission Diagnosis:Chronic pain of left knee   Patient Active Problem List    Diagnosis Date Noted    Renal insufficiency     Stage 3a chronic kidney disease ([facility] ) 2022    Preop examination 2022    Preop cardiovascular exam 2022    Poor dentition 2022    Closed bicondylar fracture of left femur with delayed healing 2022    Paroxysmal atrial fibrillation ([facility] ) 2022    Chronic gout with tophus 2021    Current moderate episode of major depressive disorder ([facility] [facility] ) 2021    Morbid obesity with body mass index (BMI) of 40 0 to 49 9 ([facility] ) 2021    Reactive airway disease with acute exacerbation 2021    Ambulatory dysfunction 2021    Leukocytosis 2021    Arthritis 2021    Other fatigue 2021    Hyperlipidemia 11/10/2020    Dysphonia 2020    Vitamin D insufficiency 2020    Chronic pain of left knee 2019    Age related osteoporosis 2019    Anxiety 2019    Tremor 2019    Other insomnia 2019    Urinary incontinence 2018    Psoriasis 2018    Thyroid nodule 2018    Tachy-smita syndrome ([facility] ) 02/15/2018    Essential hypertension 02/15/2018    Pacemaker 02/15/2018    GERD (gastroesophageal reflux disease) 2017    Mild carpal tunnel syndrome, right 04/10/2017    Degenerative lumbar spinal stenosis 2017    Low back pain 2016    Lumbar degenerative disc disease 2016    Chronic bilateral low back pain without sciatica 2016    Chronic GERD 2016    Overweight 2016    Fibromyalgia 2013      LOS (days): 1  Geometric Mean LOS (GMLOS) (days): 4 30  Days to GMLOS:3 4     OBJECTIVE:  Risk of Unplanned Readmission Score: 10 47 Current admission status: Inpatient   Preferred Pharmacy:   [facility] [address] [facility] [address]  Phone: [phone] Fax: [phone] [address], [address]  [address] [address]   [address]  [address]  [address] [place] [address]  Phone: [phone] Fax: [phone]    Primary Care Provider: Diogenes Johnston MD    Primary Insurance: MEDICARE  Secondary Insurance: AARP    DISCHARGE DETAILS:    Discharge planning discussed with[de-identified] care giver  Freedom of Choice: Yes  Comments - Freedom of Choice: CM reviewed with the pt's caregiver the recommendations of the care team for rehab as pt has deteriorated since her therapy sessions yesterday  Pt's caregiver is in agreement with this as she is not able to be with the pt overnight  She states she was present when pt did therapy this morning and is aware pt is confused at this time as this is why she wanted to speak with CM regarding DCP  Pt is not yet stable as Cardiology has been consulted  CM contacted family/caregiver?: Yes  Were Treatment Team discharge recommendations reviewed with patient/caregiver?: Yes  Did patient/caregiver verbalize understanding of patient care needs?: Yes  Were patient/caregiver advised of the risks associated with not following Treatment Team discharge recommendations?: Yes    Contacts  Patient Contacts: caregiver  Relationship to Patient[de-identified] Friend  Contact Method: Phone  Reason/Outcome: Continuity of Care, Emergency Contact, Discharge Planning, Referral    [facility]         Is the patient interested in [facility] at discharge?: No    DME Referral Provided  Referral made for DME?: No    Other Referral/Resources/Interventions Provided:  Interventions: Short Term Rehab  Referral Comments: Pt's caregiver would like for pt to go to [facility] as this facility is the closest for the family to visit  [facility] been made to [facility] stating they are the pt and family's first choice  CM reviewed with the caregiver the need to make blanket referrals as well and she provided consent and requested this be done in the event [facility] is not able to accept the pt  Referrals have been made           Treatment Team Recommendation: Short Term Rehab  Discharge Destination Plan[de-identified] Short Term Rehab

## 2022-09-14 NOTE — NURSING NOTE
Patient stated she has 'minimal" shortness of breath at this time  Manny recorded 93% o2 saturation

## 2022-09-14 NOTE — OCCUPATIONAL THERAPY NOTE
Occupational Therapy Treatment Note      Cayla Montoya    9/14/2022    Principal Problem:    Chronic pain of left knee  Active Problems:    Renal insufficiency      Past Medical History:   Diagnosis Date    Abnormal ECG     Last Assessed 9/29/2016    Anxiety     Last Assessed 6/08/2016    Arthritis     knees    Asthma     Last Assessed 11/06/2013    Atrial premature complex     Cataract, bilateral     Last Assessed 7/14/2016    Cataract, left eye     Both eyes  Had surgery on left  COVID-19     Difficulty swallowing     Dizziness     Fibromyalgia     Fibromyalgia, primary     Gastric reflux     Heart failure ([address] )     5/23/22 Pt reports had heart failure in the past    History of COVID-19     5/23/22 Pt reports having [place] in 2021  History of transfusion     no reaction    [place] (hard of hearing)     Hyperlipidemia     Hypertension     Incontinence in female     5/23/22 Pt reports has incontinence -wears depends especially at night/riding in car    Irregular heart beat     5/23/22 Pt reports hx of A fib    Lyme disease     PONV (postoperative nausea and vomiting)     Premature ventricular contraction     Primary osteoarthritis of both knees     Last Assessed 7/14/2016    Rheumatic fever     5/23/22 Pt reports had hx of rheumatic fever as a child twice    Sore throat     Tuberculosis 1945       Past Surgical History:   Procedure Laterality Date    APPENDECTOMY      CARDIAC PACEMAKER PLACEMENT  2019    COLONOSCOPY      DENTAL SURGERY      extractions    HYSTERECTOMY      5/23/22 Pt reports had appendix out with hysterectomy and "tightened up my bladder"    ORIF FEMUR FRACTURE Left 08/05/2021    Procedure: OPEN REDUCTION W/ INTERNAL FIXATION (ORIF) DISTAL FEMUR; INSERTION RETROGRADE NAIL;  Surgeon: Vish Price MD;  [facility];  Service: Orthopedics    HI DILATE ESOPHAGUS N/A 04/06/2016    Procedure: DILATATION ESOPHAGEAL;  Surgeon: Romario Ellington MD;  [facility]: [facility];   Service: Gastroenterology    GA EGD TRANSORAL BIOPSY SINGLE/MULTIPLE N/A 04/06/2016    Procedure: ESOPHAGOGASTRODUODENOSCOPY (EGD); Surgeon: Nancy Reyes MD;  [facility]; Service: Gastroenterology    GA REMOVAL DEEP IMPLANT Left 9/12/2022    Procedure: REMOVAL NAIL IM  FEMUR;  Surgeon: Sharon Goff MD;  [facility];  Service: Orthopedics    GA TOTAL KNEE ARTHROPLASTY Left 9/12/2022    Procedure: ARTHROPLASTY KNEE TOTAL;  Surgeon: Sharon Goff MD;  [facility];  Service: Orthopedics    GA XCAPSL CTRC RMVL INSJ IO LENS PROSTH W/O ECP Left 03/15/2016    Procedure: EXTRACTION EXTRACAPSULAR CATARACT PHACO INTRAOCULAR LENS (IOL); Surgeon: Devang Desai MD;  [facility];  Service: Ophthalmology    REPLACEMENT TOTAL KNEE Right     REPLACEMENT TOTAL KNEE      right     TONSILLECTOMY          09/14/22 1023   OT Last Visit   OT Visit Date 09/14/22   Note Type   Note Type Treatment   Restrictions/Precautions   Weight Bearing Precautions Per Order Yes   RLE Weight Bearing Per Order WBAT   LLE Weight Bearing Per Order WBAT   Other Precautions Limb alert;Multiple lines; Fall Risk;WBS;Pain   General   Response to Previous Treatment Patient with no complaints from previous session   Family/Caregiver Present Niece present t/o session  Pain Assessment   Pain Assessment Tool FLACC   Pain Rating: FLACC (Rest) - Face 0   Pain Rating: FLACC (Rest) - Legs 0   Pain Rating: FLACC (Rest) - Activity 0   Pain Rating: FLACC (Rest) - Cry 0   Pain Rating: FLACC (Rest) - Consolability 0   Score: FLACC (Rest) 0   Pain Rating: FLACC (Activity) - Face 0   Pain Rating: FLACC (Activity) - Legs 0   Pain Rating: FLACC (Activity) - Activity 0   Pain Rating: FLACC (Activity) - Cry 0   Pain Rating: FLACC (Activity) - Consolability 0   Score: FLACC (Activity) 0   ADL   Grooming Assistance 5  Supervision/Setup   Grooming Deficit Supervision/safety; Increased time to complete;Verbal cueing;Teeth care   Grooming Comments oral care completed seated EOB  Cues for pt to maintain upright positioning as pt occasionable with posterior lean and min (A) to recover  Bed Mobility   Supine to Sit 2  Maximal assistance   Additional items Assist x 1;Verbal cues;LE management; Increased time required;HOB elevated   Sit to Supine 1  Dependent   Additional items Assist x 2;LE management;Verbal cues   Additional Comments Pt requiring assist with RLE as well as with trunk  SIGNIFICANT inc'd time needed to reach EOB  Transfers   Sit to Stand Unable to assess  (attempted x2 trials and pt unable to assist OT & PT with stand )   Stand to Sit Unable to assess   Cognition   Overall Cognitive Status Impaired   Arousal/Participation Cooperative   Attention Within functional limits   Orientation Level Oriented to place;Oriented to person   Memory Decreased recall of precautions   Following Commands Follows one step commands with increased time or repetition   Comments Pt able to verify ID by stating name and   Pt appearing more "wifty" on this date  RN aware   Activity Tolerance   Activity Tolerance Other (Comment)  (Pt able to maintain sitting EOB >10m, however unable to actively engage in ADL transfer training due to ?cognition  Noted pt with (+)wheezing  RN reports chest XR ordered  HR elevated 110-120s with bed mobility but stabilized once seated EOB- RN aware )   Assessment   Assessment Pt seen at bedside for OT tx session focused on ADL retraining  Session significantly limited due to pt with change in mental status  Ni[place] present and notes pt seeming "snowed" today  Despite the latter, pt awake, responsive to therapists commands with inc'd time needed to process information  Pt with change in stats with regard to transfer skills as well- needing max (A)x1 for sup>sit and unable to advance beyond sitting EOB despite multiple attempts   Eder noted during session that pt without assistance available at home in the evenings and pt usually uses the [facility] every night  Pt MUST be (I) with bed mobility and ability to transfer to/from [facility] safely in order to return home  Given the latter, recommend pt d/c to rehab following hospital course  Pt would continue to benefit from skilled OT services during hospital course to address deficits and goals listed in evaluation  Care team aware of pt's change in status on this date compared to previous session  Thank you   Plan   Treatment Interventions ADL retraining;Functional transfer training;UE strengthening/ROM; Endurance training;Patient/family training; Compensatory technique education; Energy conservation; Activityengagement   Goal Expiration Date 09/23/22   OT Treatment Day 1   OT Frequency 3-5x/wk   Recommendation   OT Discharge Recommendation Post acute rehabilitation services   AM-PAC Daily Activity Inpatient   Lower Body Dressing 1   Bathing 2   Toileting 1   Upper Body Dressing 3   Grooming 3   Eating 3   Daily Activity Raw Score 13   Daily Activity Standardized Score (Calc for Raw Score >=11) 32 03     Paco Lou, OT

## 2022-09-14 NOTE — CASE MANAGEMENT
Case Management Assessment    Patient name Essence Anguiano S /S -01 MRN [id number]  : 1945 Date 2022       Current Admission Date: 2022  Current Admission Diagnosis:Chronic pain of left knee   Patient Active Problem List    Diagnosis Date Noted    Renal insufficiency     Stage 3a chronic kidney disease ([facility] [address] ) 2022    Preop examination 2022    Preop cardiovascular exam 2022    Poor dentition 2022    Closed bicondylar fracture of left femur with delayed healing 2022    Paroxysmal atrial fibrillation ([facility] [facility] ) 2022    Chronic gout with tophus 2021    Current moderate episode of major depressive disorder ([facility] [facility] ) 2021    Morbid obesity with body mass index (BMI) of 40 0 to 49 9 ([facility] [facility] ) 2021    Reactive airway disease with acute exacerbation 2021    Ambulatory dysfunction 2021    Leukocytosis 2021    Arthritis 2021    Other fatigue 2021    Hyperlipidemia 11/10/2020    Dysphonia 2020    Vitamin D insufficiency 2020    Chronic pain of left knee 2019    Age related osteoporosis 2019    Anxiety 2019    Tremor 2019    Other insomnia 2019    Urinary incontinence 2018    Psoriasis 2018    Thyroid nodule 2018    Tachy-smita syndrome ([facility] ) 02/15/2018    Essential hypertension 02/15/2018    Pacemaker 02/15/2018    GERD (gastroesophageal reflux disease) 2017    Mild carpal tunnel syndrome, right 04/10/2017    Degenerative lumbar spinal stenosis 2017    Low back pain 2016    Lumbar degenerative disc disease 2016    Chronic bilateral low back pain without sciatica 2016    Chronic GERD 2016    Overweight 2016    Fibromyalgia 2013      LOS (days): 1  Geometric Mean LOS (GMLOS) (days): 4 30  Days to GMLOS:3 3     OBJECTIVE:    Risk of Unplanned Readmission Score: 10 47 Current admission status: Inpatient       Preferred Pharmacy:   [facility] [address] [facility] [address] [place] [address] [address] [place] [address]  Phone: [phone] Fax: [phone]    [address], [address]  [address] [address]  [address] [facility]  [address]  [facility] [address]  Phone: [phone] Fax: [phone]    Primary Care Provider: Quita Mistry MD    Primary Insurance: MEDICARE  Secondary Insurance: AARP    ASSESSMENT:  Michael Weber Proxies    There are no active Health Care Proxies on file  Readmission Root Cause  30 Day Readmission: No    Patient Information  Admitted from[de-identified] Home  Mental Status: Confused  During Assessment patient was accompanied by: Not accompanied during assessment  Assessment information provided by[de-identified] Friend  Primary Caregiver: Self  Support Systems: Family members, Tory Azeri of Residence: [address] [place] [address] do you live in?: [place] entry access options  Select all that apply : No steps to enter home  Type of Current Residence: Apartment  Floor Level: 1  Upon entering residence, is there a bedroom on the main floor (no further steps)?: Yes  Upon entering residence, is there a bathroom on the main floor (no further steps)?: Yes  In the last 12 months, was there a time when you were not able to pay the mortgage or rent on time?: No  In the last 12 months, was there a time when you did not have a steady place to sleep or slept in a shelter (including now)?: No  Living Arrangements: Lives Alone  Is patient a ?: No    Activities of Daily Living Prior to Admission  Functional Status: Assistance  Completes ADLs independently?: Yes  Ambulates independently?: No  Level of ambulatory dependence: Assistance  Does patient use assisted devices?: Yes  Assisted Devices (DME) used:  Wheelchair  Does patient currently own DME?: Yes  What DME does the patient currently own?: Shower Chair, Walker, Wheelchair  Does patient have a history of Outpatient Therapy (PT/OT)?: Yes  Does the patient have a history of Short-Term Rehab?: No  Does patient have a history of HHC?: Yes  Does patient currently have [facility]?: No         Patient Information Continued  Income Source: SSI/SSD  Does patient have prescription coverage?: Yes  Within the past 12 months, you worried that your food would run out before you got the money to buy more : Never true  Within the past 12 months, the food you bought just didn't last and you didn't have money to get more : Never true  Does patient receive dialysis treatments?: No  Does patient have a history of substance abuse?: No  Does patient have a history of Mental Health Diagnosis?: No         Means of Transportation  Means of Transport to Appts[de-identified] Friends  In the past 12 months, has lack of transportation kept you from medical appointments or from getting medications?: No  In the past 12 months, has lack of transportation kept you from meetings, work, or from getting things needed for daily living?: No

## 2022-09-15 LAB
ANION GAP SERPL CALCULATED.3IONS-SCNC: 4 MMOL/L (ref 4–13)
BUN SERPL-MCNC: 28 MG/DL (ref 5–25)
CALCIUM SERPL-MCNC: 8.3 MG/DL (ref 8.4–10.2)
CHLORIDE SERPL-SCNC: 106 MMOL/L (ref 96–108)
CO2 SERPL-SCNC: 28 MMOL/L (ref 21–32)
CREAT SERPL-MCNC: 1.18 MG/DL (ref 0.6–1.3)
ERYTHROCYTE [DISTWIDTH] IN BLOOD BY AUTOMATED COUNT: 13.7 % (ref 11.6–15.1)
GFR SERPL CREATININE-BSD FRML MDRD: 43 ML/MIN/1.73SQ M
GLUCOSE SERPL-MCNC: 121 MG/DL (ref 65–140)
HCT VFR BLD AUTO: 21.6 % (ref 34.8–46.1)
HGB BLD-MCNC: 6.7 G/DL (ref 11.5–15.4)
MCH RBC QN AUTO: 29.9 PG (ref 26.8–34.3)
MCHC RBC AUTO-ENTMCNC: 31 G/DL (ref 31.4–37.4)
MCV RBC AUTO: 96 FL (ref 82–98)
PLATELET # BLD AUTO: 201 THOUSANDS/UL (ref 149–390)
PMV BLD AUTO: 9.1 FL (ref 8.9–12.7)
POTASSIUM SERPL-SCNC: 4.6 MMOL/L (ref 3.5–5.3)
RBC # BLD AUTO: 2.24 MILLION/UL (ref 3.81–5.12)
SODIUM SERPL-SCNC: 138 MMOL/L (ref 135–147)
WBC # BLD AUTO: 14.44 THOUSAND/UL (ref 4.31–10.16)

## 2022-09-15 PROCEDURE — P9016 RBC LEUKOCYTES REDUCED: HCPCS

## 2022-09-15 PROCEDURE — 85027 COMPLETE CBC AUTOMATED: CPT | Performed by: PHYSICIAN ASSISTANT

## 2022-09-15 PROCEDURE — 80048 BASIC METABOLIC PNL TOTAL CA: CPT | Performed by: PHYSICIAN ASSISTANT

## 2022-09-15 PROCEDURE — 85018 HEMOGLOBIN: CPT | Performed by: ORTHOPAEDIC SURGERY

## 2022-09-15 PROCEDURE — 85014 HEMATOCRIT: CPT | Performed by: ORTHOPAEDIC SURGERY

## 2022-09-15 PROCEDURE — 99232 SBSQ HOSP IP/OBS MODERATE 35: CPT | Performed by: INTERNAL MEDICINE

## 2022-09-15 PROCEDURE — 99024 POSTOP FOLLOW-UP VISIT: CPT | Performed by: PHYSICIAN ASSISTANT

## 2022-09-15 PROCEDURE — 97110 THERAPEUTIC EXERCISES: CPT

## 2022-09-15 PROCEDURE — 97530 THERAPEUTIC ACTIVITIES: CPT

## 2022-09-15 PROCEDURE — 30233N1 TRANSFUSION OF NONAUTOLOGOUS RED BLOOD CELLS INTO PERIPHERAL VEIN, PERCUTANEOUS APPROACH: ICD-10-PCS | Performed by: ORTHOPAEDIC SURGERY

## 2022-09-15 RX ORDER — METOPROLOL TARTRATE 50 MG/1
50 TABLET, FILM COATED ORAL
Status: DISCONTINUED | OUTPATIENT
Start: 2022-09-15 | End: 2022-09-16 | Stop reason: HOSPADM

## 2022-09-15 RX ORDER — METOPROLOL TARTRATE 100 MG/1
100 TABLET ORAL DAILY
Status: DISCONTINUED | OUTPATIENT
Start: 2022-09-16 | End: 2022-09-15

## 2022-09-15 RX ORDER — METOPROLOL TARTRATE 100 MG/1
100 TABLET ORAL DAILY
Status: DISCONTINUED | OUTPATIENT
Start: 2022-09-15 | End: 2022-09-16 | Stop reason: HOSPADM

## 2022-09-15 RX ADMIN — DOCUSATE SODIUM 100 MG: 100 CAPSULE, LIQUID FILLED ORAL at 08:35

## 2022-09-15 RX ADMIN — DULOXETINE HYDROCHLORIDE 30 MG: 30 CAPSULE, DELAYED RELEASE ORAL at 08:31

## 2022-09-15 RX ADMIN — DILTIAZEM HYDROCHLORIDE 120 MG: 120 CAPSULE, COATED, EXTENDED RELEASE ORAL at 11:55

## 2022-09-15 RX ADMIN — OXYCODONE HYDROCHLORIDE AND ACETAMINOPHEN 500 MG: 500 TABLET ORAL at 08:31

## 2022-09-15 RX ADMIN — DOCUSATE SODIUM 100 MG: 100 CAPSULE, LIQUID FILLED ORAL at 18:02

## 2022-09-15 RX ADMIN — EZETIMIBE 10 MG: 10 TABLET ORAL at 08:35

## 2022-09-15 RX ADMIN — FUROSEMIDE 40 MG: 40 TABLET ORAL at 11:54

## 2022-09-15 RX ADMIN — METOPROLOL TARTRATE 100 MG: 100 TABLET, FILM COATED ORAL at 14:45

## 2022-09-15 RX ADMIN — ENOXAPARIN SODIUM 40 MG: 40 INJECTION SUBCUTANEOUS at 02:06

## 2022-09-15 RX ADMIN — FERROUS SULFATE TAB 325 MG (65 MG ELEMENTAL FE) 325 MG: 325 (65 FE) TAB at 08:31

## 2022-09-15 RX ADMIN — ACETAMINOPHEN 650 MG: 325 TABLET ORAL at 01:16

## 2022-09-15 RX ADMIN — METOPROLOL TARTRATE 50 MG: 50 TABLET, FILM COATED ORAL at 21:44

## 2022-09-15 RX ADMIN — SENNOSIDES 8.6 MG: 8.6 TABLET, FILM COATED ORAL at 08:30

## 2022-09-15 RX ADMIN — Medication 9 MG: at 01:16

## 2022-09-15 RX ADMIN — SODIUM CHLORIDE, SODIUM LACTATE, POTASSIUM CHLORIDE, AND CALCIUM CHLORIDE 500 ML: .6; .31; .03; .02 INJECTION, SOLUTION INTRAVENOUS at 04:40

## 2022-09-15 RX ADMIN — OXYBUTYNIN CHLORIDE 5 MG: 5 TABLET, EXTENDED RELEASE ORAL at 08:31

## 2022-09-15 RX ADMIN — OXYCODONE HYDROCHLORIDE AND ACETAMINOPHEN 500 MG: 500 TABLET ORAL at 18:02

## 2022-09-15 RX ADMIN — FERROUS SULFATE TAB 325 MG (65 MG ELEMENTAL FE) 325 MG: 325 (65 FE) TAB at 18:02

## 2022-09-15 NOTE — PHYSICAL THERAPY NOTE
PHYSICAL THERAPY NOTE    Patient Name: Afshin Adkins  RSFII'Z Date: 9/15/2022       09/15/22 0906   PT Last Visit   PT Visit Date 09/15/22   Note Type   Note Type Treatment   Pain Assessment   Pain Assessment Tool 0-10   Pain Score 3   Pain Location/Orientation Orientation: Left; Location: Leg  (also R heel, red, nursing aware)   Restrictions/Precautions   Weight Bearing Precautions Per Order Yes   RLE Weight Bearing Per Order WBAT   LLE Weight Bearing Per Order WBAT   Other Precautions Chair Alarm; Bed Alarm;Pain; Fall Risk;Multiple lines   General   Family/Caregiver Present No   Subjective   Subjective Pt supine in bed and is agreeable to participate in therapy session  Pt identifers obtained from name &   Bed Mobility   Supine to Sit 2  Maximal assistance   Additional items Assist x 1;HOB elevated; Bedrails; Increased time required;Verbal cues;LE management   Sit to Supine 2  Maximal assistance   Additional items Assist x 1;HOB elevated; Increased time required;Verbal cues;LE management   Additional Comments Pt supine in bed post session, pt able to assist for repositioning to [facility] with headboard   Transfers   Sit to Stand 3  Moderate assistance   Additional items Assist x 1; Increased time required;Verbal cues   Stand to Sit 3  Moderate assistance   Additional items Assist x 1; Increased time required;Verbal cues;Armrests   Additional Comments Standing tolerance limited to less than 20 seconds and unable to stand fully erect   Ambulation/Elevation   Gait pattern Not appropriate   Balance   Static Sitting Fair -   Dynamic Sitting Fair +   Static Standing Poor +   Endurance Deficit   Endurance Deficit Yes   Endurance Deficit Description limited activity tolerance, tachycardia at times   Activity Tolerance   Activity Tolerance Patient limited by pain; Other (Comment); Patient limited by fatigue  (tachycardia at times)   Medical Staff Made Aware Spoke to [place], ortho PA   Nurse Made Aware Spoke to REBECCA Tsai   PixelTalents Supine;10 reps;AROM; Left   Heelslides Supine;10 reps;AAROM; Left   Knee AROM Short Arc Quad Supine;10 reps;AAROM; Left   Ankle Pumps Supine;15 reps;Bilateral   Assessment   Prognosis Fair   Problem List Decreased strength;Decreased range of motion; Impaired balance;Decreased endurance;Decreased mobility; Decreased coordination;Decreased cognition; Impaired judgement;Decreased safety awareness;Orthopedic restrictions;Pain   Assessment Patient presents with increased alertness and agreeable to participate in therapy session  Patient demonstrates progression from yesterdays session however does continue to require significant assistance  Bed change required secondary to urinary incident  Bed mobility with max ax1, increased time and instruction for technique  Pt tolerated sitting EOB x 20 minutes with close supervision, denies lightheaded/dizziness  Multiple sit<>stand transfers from EOB with mod ax1 and limited standing tolerance to approx 20 seconds with forward flexed posture  Pt returned supine in bed and demonstrated ability to assist with repositioning HOB with using R LE for push off and pulling on headboard  Pt participated in supine LE exercise program with AAROM/AROM and input for form and pace  Pt reports R heel pain, site viewed with slight reddness, nursing notified and heels elevated in bed  Pt with tachycardia at times limiting further participation  Continue to focus on bed mobility and transfer progression as appropriate and able  Barriers to Discharge Decreased caregiver support; Inaccessible home environment   Goals   Patient Goals none stated   STG Expiration Date 09/23/22   PT Treatment Day 3   Plan   Treatment/Interventions Functional transfer training;LE strengthening/ROM; Therapeutic exercise; Endurance training;Cognitive reorientation;Patient/family training;Equipment eval/education; Bed mobility;Spoke to nursing   Progress Slow progress, decreased activity tolerance   PT Frequency 4-6x/wk   Recommendation   PT Discharge Recommendation Post acute rehabilitation services   Equipment Recommended Walker; Wheelchair   AM-PAC Basic Mobility Inpatient   Turning in Bed Without Bedrails 2   Lying on Back to Sitting on Edge of Flat Bed 2   Moving Bed to Chair 1   Standing Up From Chair 1   Walk in Room 1   Climb 3-5 Stairs 1   Basic Mobility Inpatient Raw Score 8   Turning Head Towards Sound 3   Follow Simple Instructions 3   Low Function Basic Mobility Raw Score 14   Low Function Basic Mobility Standardized Score 22 01   Highest Level Of Mobility   -HL Goal 3: Sit at edge of bed   -HLM Achieved 3: Sit at edge of bed   End of Consult   Patient Position at End of Consult Supine;Bed/Chair alarm activated; All needs within reach       The patient's AM-PAC Basic Mobility Inpatient Short Form Raw Score is 8  A Raw score of less than or equal to 16 suggests the patient may benefit from discharge to post-acute rehabilitation services  Please also refer to the recommendation of the Physical Therapist for safe discharge planning        Ethan Osborne, PTA

## 2022-09-15 NOTE — OCCUPATIONAL THERAPY NOTE
Occupational Therapy Cancel Note     Patient Name: Benito Evans  BQLIS'A Date: 9/15/2022  Problem List  Principal Problem:    Chronic pain of left knee  Active Problems:    Renal insufficiency       09/15/22 0914   OT Last Visit   OT Visit Date 09/15/22  (Thursday)   Note Type   Note Type Cancelled Session   Cancel Reasons Other  (tachycardia and going to be reciving blood)      Chart review completed and attempted to see pt for OT tx session  Care coordination w/ PTA, Joseph Peralta and spoke w/ CM  Pt w/ increased HR during bed mobility and will be receiving a unit of blood  Will cancel OT and continue to follow as appropriate and schedule allows following pend improved medical status       Annemarie Jones, OTR/L

## 2022-09-15 NOTE — NURSING NOTE
Pt experienced  Hypotension  overnight, she was asymptomatic, neuro and neurovascular assessment WNL  LLE CDI  Notified on call physician Alba Buckley PA-C  Orders obtained  for boluses  Oncoming Shift RN made aware and will continue to monitor patient closely

## 2022-09-15 NOTE — PROGRESS NOTES
Progress Note - 2001 Doctors  76 y o  female MRN: [id number]  Unit/Bed#: S -01      Subjective:    76 y  o female Seen and examined at bedside  She feels great today  No SOB  No pain in the left leg  She has some right heel pain from it being in on position all night, but otherwise no complaints  She did have hypotension overnight, given fluid bolus x 2  No complaints of chest pain, SOB, lightheadedness  Eating and drinking well  Urinating well  Feels "much better"  Working with PT at time of consultation       Labs:  0   Lab Value Date/Time    HCT 21 6 (L) 09/15/2022 0615    HCT 24 2 (L) 09/14/2022 0954    HCT 27 7 (L) 09/13/2022 0522    HGB 6 7 (LL) 09/15/2022 0615    HGB 7 8 (L) 09/14/2022 0954    HGB 8 7 (L) 09/13/2022 0522    INR 1 29 (H) 08/17/2022 1135    WBC 14 44 (H) 09/15/2022 0615    WBC 17 52 (H) 09/14/2022 0954    WBC 20 93 (H) 09/13/2022 0522    CRP 5 2 (H) 05/17/2022 1139       Meds:    Current Facility-Administered Medications:     acetaminophen (TYLENOL) tablet 650 mg, 650 mg, Oral, Q6H PRN, Viviana Pushmataha, PA-C, 650 mg at 09/15/22 0116    albuterol (PROVENTIL HFA,VENTOLIN HFA) inhaler 2 puff, 2 puff, Inhalation, Q6H PRN, Northbridge Jaki, PA-C    ascorbic acid (VITAMIN C) tablet 500 mg, 500 mg, Oral, BID, Viviana Pushmataha, PA-C, 500 mg at 09/15/22 0831    diltiazem (CARDIZEM CD) 24 hr capsule 120 mg, 120 mg, Oral, Daily, Northbridge Pushmataha, PA-C, 120 mg at 09/14/22 1245    docusate sodium (COLACE) capsule 100 mg, 100 mg, Oral, BID, Northbridge Pushmataha, PA-C, 100 mg at 09/15/22 0835    DULoxetine (CYMBALTA) delayed release capsule 30 mg, 30 mg, Oral, Daily, Viviana Jaki, PA-C, 30 mg at 09/15/22 0831    enoxaparin (LOVENOX) subcutaneous injection 40 mg, 40 mg, Subcutaneous, Q24H [address], Northbridge Pushmataha, PA-C, 40 mg at 09/15/22 0206    ezetimibe (ZETIA) tablet 10 mg, 10 mg, Oral, Daily, Viviana Pushmataha, PA-C, 10 mg at 09/15/22 0835    ferrous sulfate tablet 325 mg, 325 mg, Oral, BID With Meals, Kamlesh Glatter, PA-C, 325 mg at 09/15/22 0831    furosemide (LASIX) tablet 40 mg, 40 mg, Oral, Daily, Kamlesh Glatter, PA-C, 40 mg at 09/14/22 0926    lisinopril (ZESTRIL) tablet 2 5 mg, 2 5 mg, Oral, Daily, Kamlesh Glatter, PA-C, 2 5 mg at 09/14/22 0926    LORazepam (ATIVAN) tablet 0 5 mg, 0 5 mg, Oral, Q8H PRN, Kamlesh Glatter, PA-C    melatonin tablet 9 mg, 9 mg, Oral, HS PRN, Kamlesh Glatter, PA-C, 9 mg at 09/15/22 0116    metoprolol tartrate (LOPRESSOR) tablet 100 mg, 100 mg, Oral, Daily, Melissa Sola Rettaliata, PA-C, 100 mg at 09/14/22 2432    metoprolol tartrate (LOPRESSOR) tablet 50 mg, 50 mg, Oral, HS, Melissa Sola Rettaliata, PA-C, 50 mg at 09/12/22 2015    oxybutynin (DITROPAN-XL) 24 hr tablet 5 mg, 5 mg, Oral, Daily, Kamlesh Glatter, PA-C, 5 mg at 09/15/22 0831    oxyCODONE (ROXICODONE) immediate release tablet 10 mg, 10 mg, Oral, Q4H PRN, Kamlesh Glatter, PA-C    oxyCODONE (ROXICODONE) IR tablet 5 mg, 5 mg, Oral, Q4H PRN, Kamlesh Glatter, PA-C, 5 mg at 09/14/22 0745    senna (SENOKOT) tablet 8 6 mg, 1 tablet, Oral, Daily, Kamlesh Glatter, PA-C, 8 6 mg at 09/15/22 0830    Blood Culture:   Lab Results   Component Value Date    BLOODCX No Growth After 5 Days  09/20/2021       Wound Culture:   No results found for: WOUNDCULT    Ins and Outs:  I/O last 24 hours: In: -   Out: 1850 [Urine:1850]          Physical:  Vitals:    09/15/22 0714   BP: 102/53   Pulse: (!) 109   Resp: 16   Temp: 98 3 °F (36 8 °C)   SpO2: 98%     Musculoskeletal: left Lower Extremity  · Dressings taken down, her incision is C/D/I with staples in place  Drain site is C/D/I    · Dressings replaced with dry ABDs and ace wraps  · SILT s/s/sp/dp/t  +fhl/ehl, +ankle dorsi/plantar flexion  2+ DP pulse      Assessment:    76 y  o female POD 3 left distal femoral replacement, removal of deep implants left femur  From surgical perspective is doing well       Plan:  · WBAT to the left lower extremity  · Hgb 6 7 today  Greater than 2 gram drop qualifies for diagnosis of acute blood loss anemia  Will give 1 U PRBCs and monitor response  Consent obtained  · Leukocystosis improving, suspect acute phase reactant  · Appreciate cardiology assistance  · Appreciate renal input/assistance  · PT/OT, discussed with PT, not yet cleared for discharge  · Appreciate case management assistance with placement  · Pain control  · DVT ppx: lovenox while inpatient, eliquis upon discharge  · Dispo: Ortho will follow   · Will need outpatient follow up with SYED SnyderC

## 2022-09-15 NOTE — PROGRESS NOTES
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  - last device interrogation (5/18/22) - 4 AT/AF episodes noted, normal device function  - echo (8/10/21) - LVEF 60%, no RWMA, grade 2 DD, mild MR, mild AI, mild TR w/ PASP 42 mmHg  - telemetry review - currently in atrial fibrillation, HR uncontrolled in the 120s-140s  - unable to receive home Lopressor secondary to hypotension likely in the setting of acute blood loss anemia     2  PAF  - EKG yesterday (reviewed with nursing, not in system) - Afib/flutter vs  atrial tachycardia,   - continue outpatient rate control regimen of Cardizem  mg po daily, lopressor 100 mg AM/ 50 mg HS   - chronically anticoagulated with Eliquis 5 mg po b i d  - currently on hold secondary to #3   - administer home beta blocker when tolerable - will change hold parameter to SBP > 100  - consider digoxin if blood pressures do not respond appropriately to blood products    3  Acute blood loss anemia  - likely contributing to hypotension precluding use of beta-blocker  - a m H/H 6 7/21 6  - ortho to administer 1 unit PRBC  - recheck H/H this afternoon      4  Hypertension  - now with hypotension - fluid boluses given overnight  - last documented /74  - outpatient antihypertensive regimen - lisinopril 2 5mg po daily, lasix 40 mg po daily, lopressor, cardizem; continue with hold parameters     5  Hyperlipidemia  - lipid panel (5/27/22) - / triglycerides 86/ HDL 67/   - continue home Zetia 10 mg po daily     6  Left distal femoral replacement   - POD #3  - management per ortho    Subjective:   Patient seen and examined  No significant events overnight  Patient offers no complaints this morning, outside of some knee pain  Currently eating lunch and is comfortable    Denies any chest pain, palpitations, or shortness of breath  Objective:   Vitals: Blood pressure 101/74, pulse (!) 128, temperature 98 4 °F (36 9 °C), resp  rate 20, height 5' 4" (1 626 m), weight 102 kg (225 lb), SpO2 99 %  , Body mass index is 38 62 kg/m² ,   Orthostatic Blood Pressures    Flowsheet Row Most Recent Value   Blood Pressure 101/74 filed at 09/15/2022 1027   Patient Position - Orthostatic VS Lying filed at 09/14/2022 2219          Intake/Output Summary (Last 24 hours) at 9/15/2022 1112  Last data filed at 9/15/2022 0640  Gross per 24 hour   Intake --   Output 1850 ml   Net -1850 ml     Physical Exam  Constitutional:       General: She is not in acute distress  Appearance: She is obese  She is not ill-appearing  HENT:      Head: Normocephalic and atraumatic  Nose: Nose normal       Mouth/Throat:      Mouth: Mucous membranes are moist       Pharynx: Oropharynx is clear  Eyes:      Pupils: Pupils are equal, round, and reactive to light  Cardiovascular:      Rate and Rhythm: Tachycardia present  Rhythm irregular  Pulses: Normal pulses  Heart sounds: Murmur heard  Pulmonary:      Effort: Pulmonary effort is normal       Breath sounds: Normal breath sounds  No wheezing or rhonchi  Abdominal:      General: Bowel sounds are normal       Palpations: Abdomen is soft  Musculoskeletal:         General: Normal range of motion  Cervical back: Normal range of motion  Right lower leg: No edema  Left lower leg: No edema  Skin:     General: Skin is warm and dry  Capillary Refill: Capillary refill takes less than 2 seconds  Coloration: Skin is pale  Neurological:      General: No focal deficit present  Mental Status: She is alert     Psychiatric:         Mood and Affect: Mood normal      Medications:    Current Facility-Administered Medications:     acetaminophen (TYLENOL) tablet 650 mg, 650 mg, Oral, Q6H PRN, John Paul Morales PA-C, 650 mg at 09/15/22 0116    albuterol (PROVENTIL HFA,VENTOLIN HFA) inhaler 2 puff, 2 puff, Inhalation, Q6H PRN, Angela Zohreh, PA-C    ascorbic acid (VITAMIN C) tablet 500 mg, 500 mg, Oral, BID, Angela Zohreh, PA-C, 500 mg at 09/15/22 0831    diltiazem (CARDIZEM CD) 24 hr capsule 120 mg, 120 mg, Oral, Daily, Angela Zohreh, PA-C, 120 mg at 09/14/22 7578    docusate sodium (COLACE) capsule 100 mg, 100 mg, Oral, BID, Angela Zohreh, PA-C, 100 mg at 09/15/22 0835    DULoxetine (CYMBALTA) delayed release capsule 30 mg, 30 mg, Oral, Daily, Angela Zohreh, PA-C, 30 mg at 09/15/22 0831    enoxaparin (LOVENOX) subcutaneous injection 40 mg, 40 mg, Subcutaneous, Q24H [address], Angela Zohreh, PA-C, 40 mg at 09/15/22 0206    ezetimibe (ZETIA) tablet 10 mg, 10 mg, Oral, Daily, Angela Zohreh, PA-C, 10 mg at 09/15/22 9308    ferrous sulfate tablet 325 mg, 325 mg, Oral, BID With Meals, Angela Zohreh, PA-C, 325 mg at 09/15/22 0831    furosemide (LASIX) tablet 40 mg, 40 mg, Oral, Daily, Angela Zohreh, PA-C, 40 mg at 09/14/22 0926    lisinopril (ZESTRIL) tablet 2 5 mg, 2 5 mg, Oral, Daily, Angela Zohreh, PA-C, 2 5 mg at 09/14/22 0926    LORazepam (ATIVAN) tablet 0 5 mg, 0 5 mg, Oral, Q8H PRN, Angela Zohreh, PA-C    melatonin tablet 9 mg, 9 mg, Oral, HS PRN, Angela Zohreh, PA-C, 9 mg at 09/15/22 0116    metoprolol tartrate (LOPRESSOR) tablet 100 mg, 100 mg, Oral, Daily, Melissa Saavedra, PA-C, 100 mg at 09/14/22 9882    metoprolol tartrate (LOPRESSOR) tablet 50 mg, 50 mg, Oral, HS, Melissa Sola Rettaliata, PA-C, 50 mg at 09/12/22 2015    oxybutynin (DITROPAN-XL) 24 hr tablet 5 mg, 5 mg, Oral, Daily, Angela Zohreh, PA-C, 5 mg at 09/15/22 0831    oxyCODONE (ROXICODONE) immediate release tablet 10 mg, 10 mg, Oral, Q4H PRN, Angela Zohreh, PA-C    oxyCODONE (ROXICODONE) IR tablet 5 mg, 5 mg, Oral, Q4H PRN, Angela Zohreh, PA-C, 5 mg at 09/14/22 0745    senna (SENOKOT) tablet 8 6 mg, 1 tablet, Oral, Daily, Angela Zohreh, PA-C, 8 6 mg at 09/15/22 0830     Labs & Results:      Results from last 7 days   Lab Units 09/15/22  0615 09/14/22  0954 09/13/22  0522   WBC Thousand/uL 14 44* 17 52* 20 93*   HEMOGLOBIN g/dL 6 7* 7 8* 8 7*   HEMATOCRIT % 21 6* 24 2* 27 7*   PLATELETS Thousands/uL 201 229 232         Results from last 7 days   Lab Units 09/15/22  0615 09/14/22  0954 09/13/22  0750   POTASSIUM mmol/L 4 6 4 0 4 5   CHLORIDE mmol/L 106 100 101   CO2 mmol/L 28 27 27   BUN mg/dL 28* 24 29*   CREATININE mg/dL 1 18 1 07 1 07   CALCIUM mg/dL 8 3* 8 4 8 5

## 2022-09-15 NOTE — PLAN OF CARE
Problem: PHYSICAL THERAPY ADULT  Goal: Performs mobility at highest level of function for planned discharge setting  See evaluation for individualized goals  Description: Treatment/Interventions: Functional transfer training, LE strengthening/ROM, Therapeutic exercise, Endurance training, Patient/family training, Equipment eval/education, Bed mobility, Gait training, Cognitive reorientation, Spoke to nursing, OT  Equipment Recommended: Wheelchair (As primary means for mobility, walker as secondary)       See flowsheet documentation for full assessment, interventions and recommendations  Outcome: Progressing  Note: Prognosis: Fair  Problem List: Decreased strength, Decreased range of motion, Impaired balance, Decreased endurance, Decreased mobility, Decreased coordination, Decreased cognition, Impaired judgement, Decreased safety awareness, Orthopedic restrictions, Pain  Assessment: Patient presents with increased alertness and agreeable to participate in therapy session  Patient demonstrates progression from yesterdays session however does continue to require significant assistance  Bed change required secondary to urinary incident  Bed mobility with max ax1, increased time and instruction for technique  Pt tolerated sitting EOB x 20 minutes with close supervision, denies lightheaded/dizziness  Multiple sit<>stand transfers from EOB with mod ax1 and limited standing tolerance to approx 20 seconds with forward flexed posture  Pt returned supine in bed and demonstrated ability to assist with repositioning HOB with using R LE for push off and pulling on headboard  Pt participated in supine LE exercise program with AAROM/AROM and input for form and pace  Pt reports R heel pain, site viewed with slight reddness, nursing notified and heels elevated in bed  Pt with tachycardia at times limiting further participation  Continue to focus on bed mobility and transfer progression as appropriate and able    Barriers to Discharge: Decreased caregiver support, Inaccessible home environment     PT Discharge Recommendation: Post acute rehabilitation services    See flowsheet documentation for full assessment

## 2022-09-16 ENCOUNTER — TELEPHONE (OUTPATIENT)
Dept: OTHER | Facility: OTHER | Age: 77
End: 2022-09-16

## 2022-09-16 VITALS
HEIGHT: 64 IN | WEIGHT: 225 LBS | BODY MASS INDEX: 38.41 KG/M2 | SYSTOLIC BLOOD PRESSURE: 127 MMHG | HEART RATE: 72 BPM | DIASTOLIC BLOOD PRESSURE: 59 MMHG | RESPIRATION RATE: 18 BRPM | OXYGEN SATURATION: 96 % | TEMPERATURE: 98.7 F

## 2022-09-16 LAB
ABO GROUP BLD BPU: NORMAL
ABO GROUP BLD BPU: NORMAL
ANION GAP SERPL CALCULATED.3IONS-SCNC: 6 MMOL/L (ref 4–13)
BASOPHILS # BLD MANUAL: 0.16 THOUSAND/UL (ref 0–0.1)
BASOPHILS NFR MAR MANUAL: 1 % (ref 0–1)
BPU ID: NORMAL
BPU ID: NORMAL
BUN SERPL-MCNC: 19 MG/DL (ref 5–25)
CALCIUM SERPL-MCNC: 8.2 MG/DL (ref 8.4–10.2)
CHLORIDE SERPL-SCNC: 104 MMOL/L (ref 96–108)
CO2 SERPL-SCNC: 26 MMOL/L (ref 21–32)
CREAT SERPL-MCNC: 0.85 MG/DL (ref 0.6–1.3)
CROSSMATCH: NORMAL
CROSSMATCH: NORMAL
EOSINOPHIL # BLD MANUAL: 0 THOUSAND/UL (ref 0–0.4)
EOSINOPHIL NFR BLD MANUAL: 0 % (ref 0–6)
ERYTHROCYTE [DISTWIDTH] IN BLOOD BY AUTOMATED COUNT: 14.5 % (ref 11.6–15.1)
FLUAV RNA RESP QL NAA+PROBE: NEGATIVE
FLUBV RNA RESP QL NAA+PROBE: NEGATIVE
GFR SERPL CREATININE-BSD FRML MDRD: 65 ML/MIN/1.73SQ M
GLUCOSE SERPL-MCNC: 124 MG/DL (ref 65–140)
HCT VFR BLD AUTO: 26.7 % (ref 34.8–46.1)
HCT VFR BLD AUTO: 27.3 % (ref 34.8–46.1)
HGB BLD-MCNC: 8.8 G/DL (ref 11.5–15.4)
HGB BLD-MCNC: 8.9 G/DL (ref 11.5–15.4)
LYMPHOCYTES # BLD AUTO: 16 % (ref 14–44)
LYMPHOCYTES # BLD AUTO: 2.63 THOUSAND/UL (ref 0.6–4.47)
MCH RBC QN AUTO: 31 PG (ref 26.8–34.3)
MCHC RBC AUTO-ENTMCNC: 33.3 G/DL (ref 31.4–37.4)
MCV RBC AUTO: 93 FL (ref 82–98)
METAMYELOCYTES NFR BLD MANUAL: 1 % (ref 0–1)
MONOCYTES # BLD AUTO: 0.99 THOUSAND/UL (ref 0–1.22)
MONOCYTES NFR BLD: 6 % (ref 4–12)
MYELOCYTES NFR BLD MANUAL: 1 % (ref 0–1)
NEUTROPHILS # BLD MANUAL: 12.33 THOUSAND/UL (ref 1.85–7.62)
NEUTS SEG NFR BLD AUTO: 75 % (ref 43–75)
NRBC BLD AUTO-RTO: 1 /100 WBC (ref 0–2)
PLATELET # BLD AUTO: 246 THOUSANDS/UL (ref 149–390)
PLATELET BLD QL SMEAR: ADEQUATE
PMV BLD AUTO: 9.5 FL (ref 8.9–12.7)
POTASSIUM SERPL-SCNC: 3.8 MMOL/L (ref 3.5–5.3)
RBC # BLD AUTO: 2.87 MILLION/UL (ref 3.81–5.12)
RBC MORPH BLD: NORMAL
RSV RNA RESP QL NAA+PROBE: NEGATIVE
SARS-COV-2 RNA RESP QL NAA+PROBE: NEGATIVE
SODIUM SERPL-SCNC: 136 MMOL/L (ref 135–147)
UNIT DISPENSE STATUS: NORMAL
UNIT DISPENSE STATUS: NORMAL
UNIT PRODUCT CODE: NORMAL
UNIT PRODUCT CODE: NORMAL
UNIT PRODUCT VOLUME: 350 ML
UNIT PRODUCT VOLUME: 350 ML
UNIT RH: NORMAL
UNIT RH: NORMAL
WBC # BLD AUTO: 16.44 THOUSAND/UL (ref 4.31–10.16)

## 2022-09-16 PROCEDURE — 91300 COVID-19 PFIZER VAC BIVALENT TRIS-SUCROSE 30 MCG/0.3 ML SUSP: CPT | Performed by: PHYSICIAN ASSISTANT

## 2022-09-16 PROCEDURE — 0241U HB NFCT DS VIR RESP RNA 4 TRGT: CPT | Performed by: PHYSICIAN ASSISTANT

## 2022-09-16 PROCEDURE — 0001A HB IMMUNIZATION ADMIN COVID-19 30MCG/0.3ML FIRST DOSE: CPT | Performed by: PHYSICIAN ASSISTANT

## 2022-09-16 PROCEDURE — 85027 COMPLETE CBC AUTOMATED: CPT | Performed by: PHYSICIAN ASSISTANT

## 2022-09-16 PROCEDURE — NC001 PR NO CHARGE: Performed by: PHYSICIAN ASSISTANT

## 2022-09-16 PROCEDURE — 85007 BL SMEAR W/DIFF WBC COUNT: CPT | Performed by: PHYSICIAN ASSISTANT

## 2022-09-16 PROCEDURE — 99024 POSTOP FOLLOW-UP VISIT: CPT | Performed by: STUDENT IN AN ORGANIZED HEALTH CARE EDUCATION/TRAINING PROGRAM

## 2022-09-16 PROCEDURE — 80048 BASIC METABOLIC PNL TOTAL CA: CPT | Performed by: PHYSICIAN ASSISTANT

## 2022-09-16 PROCEDURE — 99232 SBSQ HOSP IP/OBS MODERATE 35: CPT | Performed by: INTERNAL MEDICINE

## 2022-09-16 RX ORDER — OXYCODONE HYDROCHLORIDE 5 MG/1
5 TABLET ORAL EVERY 4 HOURS PRN
Qty: 40 TABLET | Refills: 0 | Status: SHIPPED | OUTPATIENT
Start: 2022-09-16 | End: 2022-10-04

## 2022-09-16 RX ADMIN — DULOXETINE HYDROCHLORIDE 30 MG: 30 CAPSULE, DELAYED RELEASE ORAL at 07:43

## 2022-09-16 RX ADMIN — DOCUSATE SODIUM 100 MG: 100 CAPSULE, LIQUID FILLED ORAL at 07:43

## 2022-09-16 RX ADMIN — OXYCODONE HYDROCHLORIDE AND ACETAMINOPHEN 500 MG: 500 TABLET ORAL at 07:43

## 2022-09-16 RX ADMIN — OXYCODONE HYDROCHLORIDE 10 MG: 10 TABLET ORAL at 03:48

## 2022-09-16 RX ADMIN — OXYBUTYNIN CHLORIDE 5 MG: 5 TABLET, EXTENDED RELEASE ORAL at 07:43

## 2022-09-16 RX ADMIN — FERROUS SULFATE TAB 325 MG (65 MG ELEMENTAL FE) 325 MG: 325 (65 FE) TAB at 07:43

## 2022-09-16 RX ADMIN — LISINOPRIL 2.5 MG: 2.5 TABLET ORAL at 07:43

## 2022-09-16 RX ADMIN — DILTIAZEM HYDROCHLORIDE 120 MG: 120 CAPSULE, COATED, EXTENDED RELEASE ORAL at 07:43

## 2022-09-16 RX ADMIN — SENNOSIDES 8.6 MG: 8.6 TABLET, FILM COATED ORAL at 07:43

## 2022-09-16 RX ADMIN — FUROSEMIDE 40 MG: 40 TABLET ORAL at 07:43

## 2022-09-16 RX ADMIN — ENOXAPARIN SODIUM 40 MG: 40 INJECTION SUBCUTANEOUS at 02:15

## 2022-09-16 RX ADMIN — BNT162B2 ORIGINAL AND OMICRON BA.4/BA.5 0.3 ML: .1125; .1125 INJECTION, SUSPENSION INTRAMUSCULAR at 13:25

## 2022-09-16 RX ADMIN — METOPROLOL TARTRATE 100 MG: 100 TABLET, FILM COATED ORAL at 07:43

## 2022-09-16 RX ADMIN — EZETIMIBE 10 MG: 10 TABLET ORAL at 07:43

## 2022-09-16 NOTE — PLAN OF CARE
Problem: Prexisting or High Potential for Compromised Skin Integrity  Goal: Skin integrity is maintained or improved  Description: INTERVENTIONS:  - Identify patients at risk for skin breakdown  - Assess and monitor skin integrity  - Assess and monitor nutrition and hydration status  - Monitor labs   - Assess for incontinence   - Turn and reposition patient  - Assist with mobility/ambulation  - Relieve pressure over bony prominences  - Avoid friction and shearing  - Provide appropriate hygiene as needed including keeping skin clean and dry  - Evaluate need for skin moisturizer/barrier cream  - Collaborate with interdisciplinary team   - Patient/family teaching  - Consider wound care consult   Outcome: Progressing     Problem: MOBILITY - ADULT  Goal: Maintain or return to baseline ADL function  Description: INTERVENTIONS:  -  Assess patient's ability to carry out ADLs; assess patient's baseline for ADL function and identify physical deficits which impact ability to perform ADLs (bathing, care of mouth/teeth, toileting, grooming, dressing, etc )  - Assess/evaluate cause of self-care deficits   - Assess range of motion  - Assess patient's mobility; develop plan if impaired  - Assess patient's need for assistive devices and provide as appropriate  - Encourage maximum independence but intervene and supervise when necessary  - Involve family in performance of ADLs  - Assess for home care needs following discharge   - Consider OT consult to assist with ADL evaluation and planning for discharge  - Provide patient education as appropriate  Outcome: Progressing  Goal: Maintains/Returns to pre admission functional level  Description: INTERVENTIONS:  - Perform BMAT or MOVE assessment daily    - Set and communicate daily mobility goal to care team and patient/family/caregiver  - Collaborate with rehabilitation services on mobility goals if consulted  - Perform Range of Motion  times a day    - Reposition patient every hours   - Dangle patient  times a day  - Stand patient  times a day  - Ambulate patient  times a day  - Out of bed to chair  times a day   - Out of bed for meals  times a day  - Out of bed for toileting  - Record patient progress and toleration of activity level   Outcome: Progressing     Problem: Potential for Falls  Goal: Patient will remain free of falls  Description: INTERVENTIONS:  - Educate patient/family on patient safety including physical limitations  - Instruct patient to call for assistance with activity   - Consult OT/PT to assist with strengthening/mobility   - Keep Call bell within reach  - Keep bed low and locked with side rails adjusted as appropriate  - Keep care items and personal belongings within reach  - Initiate and maintain comfort rounds  - Make Fall Risk Sign visible to staff  - Offer Toileting every  Hours, in advance of need  - Initiate/Maintain alarm  - Obtain necessary fall risk management equipment  - Apply yellow socks and bracelet for high fall risk patients  - Consider moving patient to room near nurses station  Outcome: Progressing     Problem: PAIN - ADULT  Goal: Verbalizes/displays adequate comfort level or baseline comfort level  Description: Interventions:  - Encourage patient to monitor pain and request assistance  - Assess pain using appropriate pain scale  - Administer analgesics based on type and severity of pain and evaluate response  - Implement non-pharmacological measures as appropriate and evaluate response  - Consider cultural and social influences on pain and pain management  - Notify physician/advanced practitioner if interventions unsuccessful or patient reports new pain  Outcome: Progressing     Problem: INFECTION - ADULT  Goal: Absence or prevention of progression during hospitalization  Description: INTERVENTIONS:  - Assess and monitor for signs and symptoms of infection  - Monitor lab/diagnostic results  - Monitor all insertion sites, i e  indwelling lines, tubes, and drains  - Monitor endotracheal if appropriate and nasal secretions for changes in amount and color  - [place] appropriate cooling/warming therapies per order  - Administer medications as ordered  - Instruct and encourage patient and family to use good hand hygiene technique  - Identify and instruct in appropriate isolation precautions for identified infection/condition  Outcome: Progressing

## 2022-09-16 NOTE — PROGRESS NOTES
Progress Note - [address] Doctors  76 y o  female MRN: [id number]  Unit/Bed#: S -01      Subjective:    76 y  o female Seen and examined at bedside  She feels good  No CP/SOB, lightheadedness or dizziness  Knee pain on the left is controlled  Seen by cardiology this AM    Eating well, drinking well  Urinating without issue  Family friend at bedside       Labs:  0   Lab Value Date/Time    HCT 26 7 (L) 09/16/2022 0502    HCT 27 3 (L) 09/15/2022 2353    HCT 21 6 (L) 09/15/2022 0615    HGB 8 9 (L) 09/16/2022 0502    HGB 8 8 (L) 09/15/2022 2353    HGB 6 7 (LL) 09/15/2022 0615    INR 1 29 (H) 08/17/2022 1135    WBC 16 44 (H) 09/16/2022 0502    WBC 14 44 (H) 09/15/2022 0615    WBC 17 52 (H) 09/14/2022 0954    CRP 5 2 (H) 05/17/2022 1139       Meds:    Current Facility-Administered Medications:     acetaminophen (TYLENOL) tablet 650 mg, 650 mg, Oral, Q6H PRN, Rui Quarry, PA-C, 650 mg at 09/15/22 0116    albuterol (PROVENTIL HFA,VENTOLIN HFA) inhaler 2 puff, 2 puff, Inhalation, Q6H PRN, Rui Quarry, PA-C    ascorbic acid (VITAMIN C) tablet 500 mg, 500 mg, Oral, BID, Rui Quarry, PA-C, 500 mg at 09/16/22 0743    diltiazem (CARDIZEM CD) 24 hr capsule 120 mg, 120 mg, Oral, Daily, Rui Quarry, PA-C, 120 mg at 09/16/22 0743    docusate sodium (COLACE) capsule 100 mg, 100 mg, Oral, BID, Rui Quarry, PA-C, 100 mg at 09/16/22 0743    DULoxetine (CYMBALTA) delayed release capsule 30 mg, 30 mg, Oral, Daily, Rui Quarry, PA-C, 30 mg at 09/16/22 0743    enoxaparin (LOVENOX) subcutaneous injection 40 mg, 40 mg, Subcutaneous, Q24H [address], Rui Quarry, PA-C, 40 mg at 09/16/22 0215    ezetimibe (ZETIA) tablet 10 mg, 10 mg, Oral, Daily, Rui Quarry, PA-C, 10 mg at 09/16/22 8743    ferrous sulfate tablet 325 mg, 325 mg, Oral, BID With Meals, Rui Westonry, PA-C, 325 mg at 09/16/22 0743    furosemide (LASIX) tablet 40 mg, 40 mg, Oral, Daily, Rui Westonry, PA-C, 40 mg at 09/16/22 0743    lisinopril (ZESTRIL) tablet 2 5 mg, 2 5 mg, Oral, Daily, HELENA Merida-C, 2 5 mg at 09/16/22 0743    LORazepam (ATIVAN) tablet 0 5 mg, 0 5 mg, Oral, Q8H PRN, Shelly Shoemaker PA-C    melatonin tablet 9 mg, 9 mg, Oral, HS PRN, Shelly Shoemaker PA-C, 9 mg at 09/15/22 0116    metoprolol tartrate (LOPRESSOR) tablet 100 mg, 100 mg, Oral, Daily, Shital Danielle, CRNP, 100 mg at 09/16/22 0743    metoprolol tartrate (LOPRESSOR) tablet 50 mg, 50 mg, Oral, HS, Shital Danielle, CRNP, 50 mg at 09/15/22 2144    oxybutynin (DITROPAN-XL) 24 hr tablet 5 mg, 5 mg, Oral, Daily, Shelly Shoemaker PA-C, 5 mg at 09/16/22 0743    oxyCODONE (ROXICODONE) immediate release tablet 10 mg, 10 mg, Oral, Q4H PRN, Shelly Shoemaker PA-C, 10 mg at 09/16/22 0348    oxyCODONE (ROXICODONE) IR tablet 5 mg, 5 mg, Oral, Q4H PRN, Shelly Shoemaker PA-C, 5 mg at 09/14/22 0745    senna (SENOKOT) tablet 8 6 mg, 1 tablet, Oral, Daily, HELENA Merida-C, 8 6 mg at 09/16/22 0743    Blood Culture:   Lab Results   Component Value Date    BLOODCX No Growth After 5 Days  09/20/2021       Wound Culture:   No results found for: WOUNDCULT    Ins and Outs:  I/O last 24 hours: In: 642 1 [Blood:642 1]  Out: 1900 [Urine:1900]          Physical:  Vitals:    09/16/22 0729   BP: 129/73   Pulse: (!) 119   Resp: 18   Temp: 98 9 °F (37 2 °C)   SpO2: 95%     Musculoskeletal: left Lower Extremity  · Dressings are C/D/I without strike through  · SILT s/s/sp/dp/t  +fhl/ehl, +ankle dorsi/plantar flexion  · 2+ DP pulse      Assessment:    76 y  o female POD 4 left distal femoral replacement, removal of deep implants left femur  From surgical perspective is doing well  Plan:  · WBAT to the left lower extremity  · Hgb 8 9 today  Good response to PRBCs given yesterday  No signs of active bleeding  · Leukocystosis improving, suspect acute phase reactant  · Appreciate cardiology assistance   Cleared from cardiology for discharge today    · Appreciate renal input/assistance  · PT/OT, discussed with PT, recommending rehab  · Appreciate case management assistance with placement  Bed available today  · Pain control  · DVT ppx: lovenox while inpatient, eliquis upon discharge  · Dispo: [id number] Colleen Mckeon for discharge from ortho perspective to rehab today  · Will need outpatient follow up with Dr Gara Hodgkins Sheena Ryan Proffer, PA-C

## 2022-09-16 NOTE — CASE MANAGEMENT
Case Management Discharge Planning Note    Patient name Jose J Anguiano S /S -01 MRN [id number]  : 1945 Date 2022       Current Admission Date: 2022  Current Admission Diagnosis:Chronic pain of left knee   Patient Active Problem List    Diagnosis Date Noted    Renal insufficiency     Stage 3a chronic kidney disease ([facility] ) 2022    Preop examination 2022    Preop cardiovascular exam 2022    Poor dentition 2022    Closed bicondylar fracture of left femur with delayed healing 2022    Paroxysmal atrial fibrillation ([facility] ) 2022    Chronic gout with tophus 2021    Current moderate episode of major depressive disorder ([facility] ) 2021    Morbid obesity with body mass index (BMI) of 40 0 to 49 9 ([facility] ) 2021    Reactive airway disease with acute exacerbation 2021    Ambulatory dysfunction 2021    Leukocytosis 2021    Arthritis 2021    Other fatigue 2021    Hyperlipidemia 11/10/2020    Dysphonia 2020    Vitamin D insufficiency 2020    Chronic pain of left knee 2019    Age related osteoporosis 2019    Anxiety 2019    Tremor 2019    Other insomnia 2019    Urinary incontinence 2018    Psoriasis 2018    Thyroid nodule 2018    Tachy-smita syndrome ([facility] ) 02/15/2018    Essential hypertension 02/15/2018    Pacemaker 02/15/2018    GERD (gastroesophageal reflux disease) 2017    Mild carpal tunnel syndrome, right 04/10/2017    Degenerative lumbar spinal stenosis 2017    Low back pain 2016    Lumbar degenerative disc disease 2016    Chronic bilateral low back pain without sciatica 2016    Chronic GERD 2016    Overweight 2016    Fibromyalgia 2013      LOS (days): 3  Geometric Mean LOS (GMLOS) (days): 4 30  Days to GMLOS:1 5     OBJECTIVE:  Risk of Unplanned Readmission Score: 12 34 Current admission status: Inpatient   Preferred Pharmacy:   [facility] [address] [facility], [facility] [address] [address]  Phone: [phone] Fax: [phone]    [address], [address]  [address] [address]  [address] [facility]  [address]  [facility] [address] [address]  Phone: [phone] Fax: [phone]    Primary Care Provider: Lana Bob MD    Primary Insurance: MEDICARE  Secondary Insurance: AARP    DISCHARGE DETAILS:          Contacts  Patient Contacts: caregiver  Relationship to Patient[de-identified] Friend  Contact Method: In Person  Reason/Outcome: Continuity of Care, Emergency Contact, Discharge Planning, Referral          Other Referral/Resources/Interventions Provided:  Interventions: Transportation  Referral Comments: Pt is in need of BLS transportation to go to rehab at [address]  Pt must be in the building by 1600  Pt is in need of booster that must be sent over from B  Booster has been ordered  COVID swab has been ordered  Referral has been made to [employer] via [employer] requesting a 1530  time  Nursing is aware as is pt and friend      Would you like to participate in our [address] service program?  : No - Declined    Treatment Team Recommendation: Short Term Rehab  Discharge Destination Plan[de-identified] Short Term Rehab  Transport at Discharge : [facility]  Dispatcher Contacted: Yes        ETA of Transport (Date): 09/16/22  ETA of Transport (Time): 8711     Transfer Mode: Stretcher  Accompanied by: EMS personnel  Transfer Equipment: BLS devices  IMM Given (Date):: 09/16/22  IMM Given to[de-identified] Patient          Accepting Facility Name, [facility] : [facility]  Receiving Facility/Agency Phone Number: [phone]

## 2022-09-16 NOTE — PROGRESS NOTES
Cardiology Progress Note - Team 1  Oleg Marino 76 y o  female MRN: [id number]  Unit/Bed#: S -01 Encounter: [id number]      Assessment/Plan:  1  Tachy-smita syndrome  - s/p MDT DC PPM  - last device interrogation (5/18/22) - 4 AT/AF episodes noted, normal device function  - echo (8/10/21) - LVEF 60%, no RWMA, grade 2 DD, mild MR, mild AI, mild TR w/ PASP 42 mmHg  - telemetry review - A-paced, HR 70, no further high rate episodes noted  - home lopressor resumed yesterday resulting in adequate HR control     2  PAF  - telemetry review as noted above  - continue outpatient rate control regimen of Cardizem  mg po daily, lopressor 100 mg AM/ 50 mg HS   - chronically anticoagulated with Eliquis 5 mg po b i d  - currently on hold secondary to #3; resume when appropriate    3  Acute blood loss anemia  - likely secondary to #6  - contributing to hypotension precluding use of beta-blocker; now resolved  - yesterday H/H 6 7/21 6 s/p 2 units PRBC   - a m H/H 8 9/26 7; improved - recommend outpatient H/H early next week if to be discharged today, especially when resuming Eliquis     4  Hypertension  - last documented /73  - outpatient antihypertensive regimen - lisinopril 2 5mg po daily, lasix 40 mg po daily, lopressor, cardizem; continue with hold parameters     5  Hyperlipidemia  - lipid panel (5/27/22) - / triglycerides 86/ HDL 67/   - continue home Zetia 10 mg po daily     6  Left distal femoral replacement   - POD #4  - management per ortho    Subjective:   Patient seen and examined  No significant events overnight  Patient feels well this morning  Denies any CP, SOB, or palpitations  Pain is adequately controlled  Objective:   Vitals: Blood pressure 129/73, pulse (!) 119, temperature 98 9 °F (37 2 °C), resp  rate 18, height 5' 4" (1 626 m), weight 102 kg (225 lb), SpO2 95 %  , Body mass index is 38 62 kg/m² ,   Orthostatic Blood Pressures    Flowsheet Row Most Recent Value   Blood Pressure 129/73 filed at 09/16/2022 1017   Patient Position - Orthostatic VS Lying filed at 09/15/2022 1533          Intake/Output Summary (Last 24 hours) at 9/16/2022 0857  Last data filed at 9/16/2022 0216  Gross per 24 hour   Intake 642 09 ml   Output 1900 ml   Net -1257 91 ml     Physical Exam  Constitutional:       General: She is not in acute distress  Appearance: She is obese  She is not ill-appearing  HENT:      Head: Normocephalic and atraumatic  Nose: Nose normal       Mouth/Throat:      Mouth: Mucous membranes are moist       Pharynx: Oropharynx is clear  Eyes:      Pupils: Pupils are equal, round, and reactive to light  Cardiovascular:      Rate and Rhythm: Normal rate and regular rhythm  Pulses: Normal pulses  Heart sounds: Murmur heard  Pulmonary:      Effort: Pulmonary effort is normal       Breath sounds: Normal breath sounds  No wheezing, rhonchi or rales  Comments: On room air  Abdominal:      General: Bowel sounds are normal  There is no distension  Palpations: Abdomen is soft  Musculoskeletal:         General: Normal range of motion  Right lower leg: No edema  Left lower leg: No edema  Skin:     General: Skin is warm and dry  Capillary Refill: Capillary refill takes less than 2 seconds  Coloration: Skin is pale  Neurological:      General: No focal deficit present  Mental Status: She is alert  Mental status is at baseline     Psychiatric:         Mood and Affect: Mood normal          Behavior: Behavior normal      Medications:    Current Facility-Administered Medications:     acetaminophen (TYLENOL) tablet 650 mg, 650 mg, Oral, Q6H PRN, Angela Zohreh, PA-C, 650 mg at 09/15/22 0116    albuterol (PROVENTIL HFA,VENTOLIN HFA) inhaler 2 puff, 2 puff, Inhalation, Q6H PRN, Angela Zohreh, PA-C    ascorbic acid (VITAMIN C) tablet 500 mg, 500 mg, Oral, BID, Angela Zohreh, PA-C, 500 mg at 09/16/22 0743    diltiazem (CARDIZEM CD) 24 hr capsule 120 mg, 120 mg, Oral, Daily, [facility] PATIENCE Tyson, 120 mg at 09/16/22 0743    docusate sodium (COLACE) capsule 100 mg, 100 mg, Oral, BID, [facility], 100 mg at 09/16/22 0743    DULoxetine (CYMBALTA) delayed release capsule 30 mg, 30 mg, Oral, Daily, [facility], 30 mg at 09/16/22 0743    enoxaparin (LOVENOX) subcutaneous injection 40 mg, 40 mg, Subcutaneous, Q24H MERLINE, [facility], 40 mg at 09/16/22 0215    ezetimibe (ZETIA) tablet 10 mg, 10 mg, Oral, Daily, [facility], 10 mg at 09/16/22 9878    ferrous sulfate tablet 325 mg, 325 mg, Oral, BID With Meals, [facility], 325 mg at 09/16/22 0743    furosemide (LASIX) tablet 40 mg, 40 mg, Oral, Daily, [facility], 40 mg at 09/16/22 0743    lisinopril (ZESTRIL) tablet 2 5 mg, 2 5 mg, Oral, Daily, [facility], 2 5 mg at 09/16/22 0743    LORazepam (ATIVAN) tablet 0 5 mg, 0 5 mg, Oral, Q8H PRN, [facility]    melatonin tablet 9 mg, 9 mg, Oral, HS PRN, [facility], 9 mg at 09/15/22 0116    metoprolol tartrate (LOPRESSOR) tablet 100 mg, 100 mg, Oral, Daily, JEIR Jackson, 100 mg at 09/16/22 0743    metoprolol tartrate (LOPRESSOR) tablet 50 mg, 50 mg, Oral, HS, JERI Jackson, 50 mg at 09/15/22 2144    oxybutynin (DITROPAN-XL) 24 hr tablet 5 mg, 5 mg, Oral, Daily, [facility], 5 mg at 09/16/22 0743    oxyCODONE (ROXICODONE) immediate release tablet 10 mg, 10 mg, Oral, Q4H PRN, HELENA Dillard-C, 10 mg at 09/16/22 0348    oxyCODONE (ROXICODONE) IR tablet 5 mg, 5 mg, Oral, Q4H PRN, HELENA Dillard-DANDRE, 5 mg at 09/14/22 0745    senna (SENOKOT) tablet 8 6 mg, 1 tablet, Oral, Daily, Kristine Tyson PA-C, 8 6 mg at 09/16/22 0743     Labs & Results:      Results from last 7 days   Lab Units 09/16/22  0502 09/15/22  2353 09/15/22  0615 09/14/22  0954   WBC Thousand/uL 16 44*  --  14 44* 17 52*   HEMOGLOBIN g/dL 8 9* 8 8* 6  7* 7 8*   HEMATOCRIT % 26 7* 27 3* 21 6* 24 2*   PLATELETS Thousands/uL 246  --  201 229         Results from last 7 days   Lab Units 09/16/22  0502 09/15/22  0615 09/14/22  0954   POTASSIUM mmol/L 3 8 4 6 4 0   CHLORIDE mmol/L 104 106 100   CO2 mmol/L 26 28 27   BUN mg/dL 19 28* 24   CREATININE mg/dL 0 85 1 18 1 07   CALCIUM mg/dL 8 2* 8 3* 8 4

## 2022-09-17 NOTE — DISCHARGE SUMMARY
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, unspecified fracture morphology, unspecified portion of femur, subsequent encounter [S72 92XK]  Primary osteoarthritis of left knee [M17 12]  Preop testing [Z01 818]  Retained orthopedic hardware [Z96 9]    Date of admission: 9/12/2022    Date of discharge: 09/17/22         Procedure: left distal femoral replacement, removal of deep implants left femur     HPI:  This is a 76y o  year old female that presented to the office with signs and symptoms of left knee pain, and was diagnosed with a left distal femur non-union  She had a history of a retrograde nailing in the left femur several months ago  She had ambulatory difficulties secondary to her left knee pain  They tried and failed conservative treatment measures and wished to proceed with surgical intervention  The risks, benefits, and complications of the procedure were discussed with the patient and informed consent was obtained  Hospital Course: The patient was admitted to the hospital on 9/12/2022 and underwent an uncomplicated left distal femoral replacement and removal of deep implants left femur  They were transferred to the floor after a brief stay in the post-anesthesia care unit  Their pain was well managed with IV and oral pain medications  They began therapy on post operative day #1  Lovenox was also started for DVT prophylaxis 12 hours post operatively  Hemovac drain was removed on POD1  She did experience acute blood loss anemia and some tachycardia during admission   She was given 2 U PRBCs with good response, and resolution  During admission she was evaluated by both nephrology and cardiology teams  On discharge date pt was cleared by PT and the cardiology team and determined to be safe for discharge  Daily discussion was had with the patient, nursing staff, orthopaedic team, and family members if present  All questions were answered to the patients satisifaction  0   Lab Value Date/Time    HGB 8 9 (L) 09/16/2022 0502    HGB 8 8 (L) 09/15/2022 2353    HGB 6 7 (LL) 09/15/2022 0615    HGB 7 8 (L) 09/14/2022 0954    HGB 8 7 (L) 09/13/2022 0522    HGB 11 9 08/17/2022 1135    HGB 12 9 05/17/2022 1139    HGB 13 0 09/27/2021 0455    HGB 12 0 09/24/2021 0446    HGB 11 5 09/22/2021 0506    HGB 11 9 09/21/2021 0549    HGB 12 7 09/20/2021 0305    HGB 11 3 (L) 09/06/2021 0440    HGB 8 4 (L) 08/12/2021 0507    HGB 7 6 (L) 08/11/2021 0947    HGB 8 1 (L) 08/10/2021 0435    HGB 8 2 (L) 08/09/2021 0433    HGB 8 2 (L) 08/08/2021 0940    HGB 8 7 (L) 08/07/2021 1935    HGB 7 2 (L) 08/07/2021 0522    HGB 7 8 (L) 08/06/2021 0624    HGB 10 2 (L) 08/05/2021 1038    HGB 7 1 (L) 08/05/2021 0901    HGB 9 0 (L) 08/05/2021 0453    HGB 9 5 (L) 08/04/2021 0444    HGB 11 1 (L) 08/03/2021 0442    HGB 12 9 08/02/2021 1547    HGB 13 9 05/27/2021 1402    HGB 13 9 10/17/2017 0449    HGB 14 5 10/16/2017 1038    HGB 13 6 08/25/2017 1113    HGB 12 0 11/10/2016 0937       Greater than 2 gram drop which qualifies for diagnosis of acute blood loss anemia  Vital signs remained stable and pt was resuscitated with IVF as needed   Body mass index is 38 62 kg/m²  moderately obese  Recommend behavior modifications and nutrition  Discharge Instructions: The patient was discharged weight bearing as tolerated to the left lower extremity  Lovenox was discontinued, and she was restarted on her eliquis  Continue PT/OT  Take pain medications as instructed  Discharge Medications:   For the complete list of discharge medications, please refer to the patient's medication reconciliation

## 2022-09-19 ENCOUNTER — NURSING HOME VISIT (OUTPATIENT)
Dept: GERIATRICS | Facility: OTHER | Age: 77
End: 2022-09-19
Payer: MEDICARE

## 2022-09-19 VITALS
RESPIRATION RATE: 18 BRPM | SYSTOLIC BLOOD PRESSURE: 148 MMHG | BODY MASS INDEX: 40.27 KG/M2 | OXYGEN SATURATION: 94 % | TEMPERATURE: 98.1 F | DIASTOLIC BLOOD PRESSURE: 64 MMHG | HEART RATE: 70 BPM | WEIGHT: 234.6 LBS

## 2022-09-19 DIAGNOSIS — S72.422G CLOSED BICONDYLAR FRACTURE OF LEFT FEMUR WITH DELAYED HEALING: Primary | ICD-10-CM

## 2022-09-19 DIAGNOSIS — S72.432G CLOSED BICONDYLAR FRACTURE OF LEFT FEMUR WITH DELAYED HEALING: Primary | ICD-10-CM

## 2022-09-19 PROCEDURE — 99306 1ST NF CARE HIGH MDM 50: CPT | Performed by: INTERNAL MEDICINE

## 2022-09-19 NOTE — ASSESSMENT & PLAN NOTE
Patient has history of distal left femur fracture with delayed healing  She was admitted to the hospital on 9/12/22 and underwent uncomplicated left distal femoral replacement and removal of deep implant left femur  Postop course was complicated by anemia needing PRBC transfusion  Patient remains on Eliquis for PAF and DVT prophylaxis  Patient is noted to have mild area of erythema adjacent to left distal thigh incision  Per niece patient's dressing was soaked with urine upon admission to the facility  There was a concern regarding volving infection  Will empirically start patient on doxycycline 100 mg b i d and follow  Follow-up with orthopedic service    Continue PT

## 2022-09-19 NOTE — PROGRESS NOTES
[facility]  [address] [place] [address], [address] [place] [address]  [id number]    Nursing Home Admission    NAME: Oleg Marino  AGE: 76 y o  SEX: female [id number]      Patient Location     [facility]    Patients care was coordinated with nursing facility staff  Recent vitals, labs and updated medications were reviewed on [employer] system of facility  Past Medical, surgical, social, medication and allergy history and patients previous records reviewed  Assessment/Plan:    Closed bicondylar fracture of left femur with delayed healing  Patient has history of distal left femur fracture with delayed healing  She was admitted to the hospital on 9/12/22 and underwent uncomplicated left distal femoral replacement and removal of deep implant left femur  Postop course was complicated by anemia needing PRBC transfusion  Patient remains on Eliquis for PAF and DVT prophylaxis  Patient is noted to have mild area of erythema adjacent to left distal thigh incision  Per niece patient's dressing was soaked with urine upon admission to the facility  There was a concern regarding volving infection  Will empirically start patient on doxycycline 100 mg b i d and follow  Follow-up with orthopedic service  Continue PT      PAF:  Patient had few episodes of tachycardia recently at the hospital   She has known history of tachy-smita syndrome status post ppm   Per records last interrogation was performed on 05/18/2022 revealing AT/AF episodes  Metoprolol, diltiazem were resumed  Heart rate remains stable  Continue Eliquis  Echo done in August 21 revealed EF of 60% with grade 2 diastolic dysfunction    Acute blood loss anemia:  Recent postop course was complicated by acute blood loss anemia with hemoglobin dropping down to 6 7  Patient received 2 units of PRBC transfusion  Repeat hemoglobin was 8 9  Will continue to monitor  Continue iron supplements    Follow-up repeat CBC    Hypertension:  Blood pressure stable on lisinopril 2 5 mg daily, Lasix 40 mg daily, diltiazem 120 mg daily and metoprolol 100 mg daily    Hyperlipidemia:  Continue Zetia 10 mg daily lipid profile in May 22 revealed total cholesterol of 229, triglycerides 86, HDL 67 and LDL of 145 consider adding statin if patient is able to tolerate    History of gout:  Continue allopurinol    Vitamin-D deficiency:  Continue vitamin-D supplement    History of fibromyalgia:  Continue duloxetine    Chief Complaint     Close fracture of left femur status post left distal femoral replacement, removal of deep implant left femur left knee osteoarthritis     HPI       Patient is a 76 y o  female with past medical history significant for anxiety, arthritis asthma, heart failure, fibromyalgia and  history of rheumatic fever  Patient was initially seen at the orthopedic office for left knee pain  She was diagnosed with left distal femur non-union  Patient had history of retrograde nailing in the left femur several months ago  She had ambulatory difficulties due to left knee pain  Pt failed conservative treatment and wished to proceed with surgical intervention  Pt was admitted to the hospital on 9/12/22 and underwent uncomplicated left distal femoral replacement and removal of deep implant left femur  Postop course was complicated by acute blood loss anemia with some tachycardia  Hemoglobin dropped down to 6 7 Patient was transfused 2 units of PRBC with good response  Patient was evaluated by cardiology and nephrology services  Tele revealed paced rhythm with stable heart rate  No further episodes of tachyarrhythmias were noted  Lopressor was resumed  Patient remained on Eliquis for PAF  Patient was subsequently discharged to [facility] rehab where she is being seen for post hospital admission  At the time of my evaluation patient is doing okay except complains of intermittent left lower extremity pain      Past Medical History:   Diagnosis Date    Abnormal ECG     Last Assessed 9/29/2016    Anxiety     Last Assessed 6/08/2016    Arthritis     knees    Asthma     Last Assessed 11/06/2013    Atrial premature complex     Cataract, bilateral     Last Assessed 7/14/2016    Cataract, left eye     Both eyes  Had surgery on left   COVID-19     Difficulty swallowing     Dizziness     Fibromyalgia     Fibromyalgia, primary     Gastric reflux     Heart failure ([address] )     5/23/22 Pt reports had heart failure in the past    History of COVID-19     5/23/22 Pt reports having COVID in 2021   History of transfusion     no reaction    [place] (hard of hearing)     Hyperlipidemia     Hypertension     Incontinence in female     5/23/22 Pt reports has incontinence -wears depends especially at night/riding in car    Irregular heart beat     5/23/22 Pt reports hx of A fib    Lyme disease     PONV (postoperative nausea and vomiting)     Premature ventricular contraction     Primary osteoarthritis of both knees     Last Assessed 7/14/2016    Rheumatic fever     5/23/22 Pt reports had hx of rheumatic fever as a child twice    Sore throat     Tuberculosis 1947       Past Surgical History:   Procedure Laterality Date    APPENDECTOMY      CARDIAC PACEMAKER PLACEMENT  2019    COLONOSCOPY      DENTAL SURGERY      extractions    HYSTERECTOMY      5/23/22 Pt reports had appendix out with hysterectomy and "tightened up my bladder"    ORIF FEMUR FRACTURE Left 08/05/2021    Procedure: OPEN REDUCTION W/ INTERNAL FIXATION (ORIF) DISTAL FEMUR; INSERTION RETROGRADE NAIL;  Surgeon: Vish Price MD;  [facility];  Service: Orthopedics    WY DILATE ESOPHAGUS N/A 04/06/2016    Procedure: DILATATION ESOPHAGEAL;  Surgeon: Romario Ellington MD;  Location: [facility]; Service: Gastroenterology    WY EGD TRANSORAL BIOPSY SINGLE/MULTIPLE N/A 04/06/2016    Procedure: ESOPHAGOGASTRODUODENOSCOPY (EGD);   Surgeon: Romario Ellington MD; Location: [facility]; Service: Gastroenterology    AK REMOVAL DEEP IMPLANT Left 9/12/2022    Procedure: REMOVAL NAIL IM  FEMUR;  Surgeon: Brendan Gagnon MD;  [facility];  Service: Orthopedics    AK TOTAL KNEE ARTHROPLASTY Left 9/12/2022    Procedure: ARTHROPLASTY KNEE TOTAL;  Surgeon: Brendan Gagnon MD;  [facility];  Service: Orthopedics    AK XCAPSL CTRC RMVL INSJ IO LENS PROSTH W/O ECP Left 03/15/2016    Procedure: EXTRACTION EXTRACAPSULAR CATARACT PHACO INTRAOCULAR LENS (IOL);   Surgeon: Carrol Sacks, MD;  [facility];  Service: Ophthalmology    REPLACEMENT TOTAL KNEE Right     REPLACEMENT TOTAL KNEE      right     TONSILLECTOMY         Social History     Tobacco Use   Smoking Status Never Smoker   Smokeless Tobacco Never Used   Tobacco Comment    Denies any former or current smoking          Family History   Problem Relation Age of Onset    Coronary artery disease Mother     Heart attack Mother         Prior    Heart disease Father     Heart attack Father         Prior    Anesthesia problems Neg Hx         Allergies   Allergen Reactions    Penicillins Hives     Itching and terrible hives    Sulfa Antibiotics Itching    I32 Folate [Folic Acid-Vit G8-HNO T67 - Food Allergy] Other (See Comments)     5/23/22 Pt reports no allergic reaction known     [place] Other (See Comments)     Unknown, 5/23 -pt is unaware of reaction     Lyrica [Pregabalin] Other (See Comments)     5/23/22 Pt reports doesn't remember reaction     Other Other (See Comments)     Black rubber 5/23/22 per allergy test - pt unaware of reaction    Cortisone Acetate [Cortisone] Itching and Rash     5/23/22 pt reports makes her violent     [place] Other (See Comments)     Skin discoloration          Current Outpatient Medications:     acetaminophen (TYLENOL) 650 mg CR tablet, Take 1 tablet (650 mg total) by mouth every 8 (eight) hours as needed for mild pain, Disp: 30 tablet, Rfl: 0   albuterol (PROVENTIL HFA,VENTOLIN HFA) 90 mcg/act inhaler, INHALE 2 PUFFS BY MOUTH EVERY 6 HOURS AS NEEDED FOR WHEEZING (Patient not taking: No sig reported), Disp: 3 Inhaler, Rfl: 0    allopurinol (ZYLOPRIM) 100 mg tablet, Take 1 tablet (100 mg total) by mouth daily, Disp: 90 tablet, Rfl: 3    apixaban (Eliquis) 5 mg, Take 1 tablet (5 mg total) by mouth 2 (two) times a day, Disp: 60 tablet, Rfl: 6    ascorbic acid (VITAMIN C) 500 MG tablet, Take 1 tablet (500 mg total) by mouth 2 (two) times a day, Disp: 60 tablet, Rfl: 1    BIOTIN PO, Take by mouth in the morning, Disp: , Rfl:     Cholecalciferol (VITAMIN D3 PO), Take by mouth in the morning, Disp: , Rfl:     Cyanocobalamin (VITAMIN B-12 PO), Take by mouth in the morning, Disp: , Rfl:     diltiazem (CARDIZEM CD) 120 mg 24 hr capsule, Take 1 capsule (120 mg total) by mouth daily (Patient taking differently: Take 120 mg by mouth daily Takes in the am), Disp: 90 capsule, Rfl: 3    docusate sodium (COLACE) 100 mg capsule, Take 1 capsule (100 mg total) by mouth 2 (two) times a day, Disp: 10 capsule, Rfl: 0    DULoxetine (CYMBALTA) 30 mg delayed release capsule, TAKE 1 CAPSULE(30 MG) BY MOUTH DAILY, Disp: 30 capsule, Rfl: 5    ezetimibe (ZETIA) 10 mg tablet, Take 1 tablet (10 mg total) by mouth daily, Disp: 90 tablet, Rfl: 1    ferrous sulfate 324 (65 Fe) mg, Take 1 tablet (324 mg total) by mouth 2 (two) times a day before meals, Disp: 60 tablet, Rfl: 1    FIBER PO, Take 1 tablet by mouth daily, Disp: , Rfl:     folic acid (FOLVITE) 1 mg tablet, TAKE 1 TABLET(1 MG) BY MOUTH DAILY (Patient not taking: No sig reported), Disp: 90 tablet, Rfl: 0    furosemide (LASIX) 40 mg tablet, Take 1 tablet (40 mg total) by mouth in the morning , Disp: 90 tablet, Rfl: 1    lisinopril (ZESTRIL) 2 5 mg tablet, Take 1 tablet (2 5 mg total) by mouth daily, Disp: 30 tablet, Rfl: 2    LORazepam (Ativan) 0 5 mg tablet, Take 1 tablet (0 5 mg total) by mouth every 8 (eight) hours as needed for anxiety, Disp: 5 tablet, Rfl: 0    Melatonin 10 MG TABS, Take by mouth Takes daily at night, Disp: , Rfl:     metoprolol tartrate (LOPRESSOR) 50 mg tablet, Take 100 mg in the morning and 50 mg in the evening, Disp: 270 tablet, Rfl: 3    Mirabegron ER (Myrbetriq) 25 MG TB24, Take 25 mg by mouth daily Takes in the evening, Disp: 30 tablet, Rfl: 6    Multiple Vitamin (MULTIVITAMIN ADULT PO), Take by mouth in the morning, Disp: , Rfl:     naloxone (NARCAN) 4 mg/0 1 mL nasal spray, Administer 1 spray into a nostril  If no response after 2-3 minutes, give another dose in the other nostril using a new spray  (Patient not taking: No sig reported), Disp: 1 each, Rfl: 1    oxybutynin (DITROPAN-XL) 5 mg 24 hr tablet, Take 1 tablet (5 mg total) by mouth daily (Patient taking differently: Take 5 mg by mouth daily Takes at night), Disp: 90 tablet, Rfl: 3    oxyCODONE (ROXICODONE) 5 immediate release tablet, Take 1 tablet (5 mg total) by mouth every 4 (four) hours as needed for moderate pain for up to 10 days Max Daily Amount: 30 mg, Disp: 40 tablet, Rfl: 0    Updated list was reviewed in [facility] system of facility  Vitals:    09/19/22 1006   BP: 148/64   Pulse: 70   Resp: 18   Temp: 98 1 °F (36 7 °C)   SpO2: 94%       Vital signs were reviewed in [facility]    Review of Systems   Constitutional: Negative for chills, fatigue and fever  HENT: Negative for nosebleeds and rhinorrhea  Eyes: Negative for discharge and redness  Respiratory: Negative for cough, chest tightness, shortness of breath, wheezing and stridor  Cardiovascular: Negative for chest pain and leg swelling  Gastrointestinal: Negative for abdominal distention, abdominal pain, diarrhea and vomiting  Genitourinary: Negative for dysuria, flank pain and hematuria  Musculoskeletal: Positive for arthralgias (Left lower extremity pain), back pain and gait problem  Skin: Negative for pallor     Neurological: Negative for tremors, seizures, syncope, weakness and headaches  Psychiatric/Behavioral: Negative for agitation, behavioral problems and confusion  Physical Exam  Constitutional:       General: She is not in acute distress  Appearance: She is well-developed  She is obese  She is not diaphoretic  HENT:      Head: Normocephalic and atraumatic  Eyes:      General: No scleral icterus  Right eye: No discharge  Left eye: No discharge  Cardiovascular:      Rate and Rhythm: Rhythm irregular  Pulmonary:      Effort: No respiratory distress  Breath sounds: No stridor  No wheezing  Abdominal:      General: There is no distension  Palpations: Abdomen is soft  Tenderness: There is no abdominal tenderness  There is no guarding  Musculoskeletal:      Cervical back: Neck supple  Right lower leg: No edema  Left lower leg: No edema  Comments: Dressing noted over left lower extremity incision   Skin:     Coloration: Skin is not jaundiced or pale  Findings: Erythema (Adjacent to distal left thigh incision) present  Comments: Two incisions noted over left lower extremity adjacent to the hip and at distal aspect adjacent to the knee  Staples are intact  Mild erythema noted adjacent to distal thigh incision  Neurological:      General: No focal deficit present  Mental Status: She is alert  Cranial Nerves: No cranial nerve deficit  Comments: Oriented in year, does not remember the month   Psychiatric:         Mood and Affect: Mood normal          Behavior: Behavior normal            Diagnostic Data       Recent labs and imaging studies were reviewed    Lab Results   Component Value Date    WBC 16 44 (H) 09/16/2022    HGB 8 9 (L) 09/16/2022    HCT 26 7 (L) 09/16/2022    MCV 93 09/16/2022     09/16/2022     Lab Results   Component Value Date    SODIUM 136 09/16/2022    K 3 8 09/16/2022     09/16/2022    CO2 26 09/16/2022    BUN 19 09/16/2022 CREATININE 0 85 09/16/2022    GLUC 124 09/16/2022    CALCIUM 8 2 (L) 09/16/2022        Code Status:      Full code    Additional notes:      Care coordinated with patient's niece at bedside  Start doxycycline 100 mg b i d  For possible mild cellulitis adjacent to left thigh incision        This note was electronically signed by Dr Disha Torres

## 2022-09-21 ENCOUNTER — NURSING HOME VISIT (OUTPATIENT)
Dept: GERIATRICS | Facility: OTHER | Age: 77
End: 2022-09-21
Payer: MEDICARE

## 2022-09-21 ENCOUNTER — OFFICE VISIT (OUTPATIENT)
Dept: CARDIOLOGY CLINIC | Facility: SKILLED NURSING FACILITY | Age: 77
End: 2022-09-21
Payer: MEDICARE

## 2022-09-21 ENCOUNTER — NURSING HOME VISIT (OUTPATIENT)
Dept: WOUND CARE | Facility: HOSPITAL | Age: 77
End: 2022-09-21
Payer: MEDICARE

## 2022-09-21 ENCOUNTER — PATIENT OUTREACH (OUTPATIENT)
Dept: CASE MANAGEMENT | Facility: HOSPITAL | Age: 77
End: 2022-09-21

## 2022-09-21 DIAGNOSIS — R26.2 AMBULATORY DYSFUNCTION: ICD-10-CM

## 2022-09-21 DIAGNOSIS — Z95.0 CARDIAC PACEMAKER IN SITU: ICD-10-CM

## 2022-09-21 DIAGNOSIS — E78.2 MIXED HYPERLIPIDEMIA: ICD-10-CM

## 2022-09-21 DIAGNOSIS — S72.432G CLOSED BICONDYLAR FRACTURE OF LEFT FEMUR WITH DELAYED HEALING: Primary | ICD-10-CM

## 2022-09-21 DIAGNOSIS — D64.9 ANEMIA, UNSPECIFIED TYPE: ICD-10-CM

## 2022-09-21 DIAGNOSIS — I50.32 CHRONIC HEART FAILURE WITH PRESERVED EJECTION FRACTION (HCC): ICD-10-CM

## 2022-09-21 DIAGNOSIS — I10 ESSENTIAL HYPERTENSION: ICD-10-CM

## 2022-09-21 DIAGNOSIS — S72.422G CLOSED BICONDYLAR FRACTURE OF LEFT FEMUR WITH DELAYED HEALING: Primary | ICD-10-CM

## 2022-09-21 DIAGNOSIS — I35.0 NONRHEUMATIC AORTIC VALVE STENOSIS: ICD-10-CM

## 2022-09-21 DIAGNOSIS — T14.8XXA SURGICAL WOUND PRESENT: Primary | ICD-10-CM

## 2022-09-21 DIAGNOSIS — I48.0 PAROXYSMAL ATRIAL FIBRILLATION (HCC): Primary | ICD-10-CM

## 2022-09-21 PROCEDURE — 99304 1ST NF CARE SF/LOW MDM 25: CPT | Performed by: NURSE PRACTITIONER

## 2022-09-21 PROCEDURE — 99309 SBSQ NF CARE MODERATE MDM 30: CPT | Performed by: NURSE PRACTITIONER

## 2022-09-21 PROCEDURE — 99309 SBSQ NF CARE MODERATE MDM 30: CPT | Performed by: INTERNAL MEDICINE

## 2022-09-21 NOTE — ASSESSMENT & PLAN NOTE
Left trochanter  S/p  REMOVAL NAIL IM  FEMUR (Left Leg Upper) , ARTHROPLASTY KNEE TOTAL (Left Knee)  - incision stapled, intact, no obvious sign of infection  - patient have follow up appointment with 9/27/2022  - follow up : as per referral by facility

## 2022-09-22 NOTE — PROGRESS NOTES
[facility] [place] [address] [address]  [facility] [address] [address]  Phone#  [phone]  Fax#  [phone]                                               Cardiology Nursing Home Visit - Follow up  Roxanna Chaudhary, 76 y o  female  Date of Latisha Saba  MRN: [id number] Encounter: [id number]      PCP - Luis Temple MD  Outpatient cardiologist - Peggy Cohen    No chief complaint on file  Assessment  1  Paroxysmal atrial fibrillation (HCC)     2  Chronic heart failure with preserved ejection fraction (HCC)     3  Cardiac pacemaker in situ     4  Nonrheumatic aortic valve stenosis     5  Essential hypertension     6  Mixed hyperlipidemia          Plan  Paroxysmal Atrial Fibrillation  Had mildly elevated rates during recent hospitalization, but rates currently under control  Continue Cardizem CD 1 20 mg daily, metoprolol 100 mg in a m , 50 mg in p m  Her anticoagulation with Eliquis was recently held due to acute blood loss anemia post orthopedic surgery,  But she is now back on Eliquis and does not report any acute bleeding  Her hemoglobin remains on the lower side at 8 3  Monitor closely    S/p cardiac pacemaker  No swelling or tenderness at pacemaker site   See remains mostly atrially paced on last device check although does have 3% AFib burden    Aortic stenosis+regurgitation  TANISHA-9/2021 - mild AS, sclerotic AV, mild-moderate MR  No acute or recent symptoms related to the same  Can follow up outpatient with her routine cardiologist for monitoring and follow-up echocardiogram    No results found for this visit on 09/21/22  No orders of the defined types were placed in this encounter  Return in about 4 months (around 1/21/2023), or if symptoms worsen or fail to improve  History of Present Illness   76 y o  female is currently admitted to the [facility] rehabilitation [place] after recent discharge from [address] [place] [address] on 9/16/22    We are currently consulted to evaluate and assist with management of her cardiac problems  She originally had a mechanical fall onto her knees has led to a fracture and was being followed up outpatient with Orthopedics  He subsequently presented to the office with same crease/continued pain and was 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, for which he was recommended operative treatment  She was admitted fall or left distal from oral replacement and removal of deep implants earlier this month  This was complicated by acute blood loss anemia and she was evaluated by Cardiology due to atrial fibrillation with rapid rates during this  She remains on medical therapy with Cardizem and metoprolol, and heart rates improved with treatment of blood-loss anemia  She was subsequently discharged to rehab and has been recovering here  He reports no new symptoms since arrival, and her heart rate remains under control  Historical Information   Past Medical History:   Diagnosis Date    Abnormal ECG     Last Assessed 9/29/2016    Anxiety     Last Assessed 6/08/2016    Arthritis     knees    Asthma     Last Assessed 11/06/2013    Atrial premature complex     Cataract, bilateral     Last Assessed 7/14/2016    Cataract, left eye     Both eyes  Had surgery on left   COVID-19     Difficulty swallowing     Dizziness     Fibromyalgia     Fibromyalgia, primary     Gastric reflux     Heart failure ([facility] [address] )     5/23/22 Pt reports had heart failure in the past    History of COVID-19     5/23/22 Pt reports having COVID in 2021      History of transfusion     no reaction    Citizen [place] (hard of hearing)     Hyperlipidemia     Hypertension     Incontinence in female     5/23/22 Pt reports has incontinence -wears depends especially at night/riding in car    Irregular heart beat     5/23/22 Pt reports hx of A fib    Lyme disease     PONV (postoperative nausea and vomiting)     Premature ventricular contraction     Primary osteoarthritis of both knees     Last Assessed 7/14/2016    Rheumatic fever     5/23/22 Pt reports had hx of rheumatic fever as a child twice    Sore throat     Tuberculosis 1947     Past Surgical History:   Procedure Laterality Date    APPENDECTOMY      CARDIAC PACEMAKER PLACEMENT  2019    COLONOSCOPY      DENTAL SURGERY      extractions    HYSTERECTOMY      5/23/22 Pt reports had appendix out with hysterectomy and "tightened up my bladder"    ORIF FEMUR FRACTURE Left 08/05/2021    Procedure: OPEN REDUCTION W/ INTERNAL FIXATION (ORIF) DISTAL FEMUR; INSERTION RETROGRADE NAIL;  Surgeon: Abelardo Garg MD;  [facility];  Service: Orthopedics    IN DILATE ESOPHAGUS N/A 04/06/2016    Procedure: DILATATION ESOPHAGEAL;  Surgeon: Jenn Clinton MD;  Location: [facility]; Service: Gastroenterology    IN EGD TRANSORAL BIOPSY SINGLE/MULTIPLE N/A 04/06/2016    Procedure: ESOPHAGOGASTRODUODENOSCOPY (EGD); Surgeon: Jenn Clinton MD;  [facility]; Service: Gastroenterology    IN REMOVAL DEEP IMPLANT Left 9/12/2022    Procedure: REMOVAL NAIL IM  FEMUR;  Surgeon: Abelardo Garg MD;  [facility];  Service: Orthopedics    IN TOTAL KNEE ARTHROPLASTY Left 9/12/2022    Procedure: ARTHROPLASTY KNEE TOTAL;  Surgeon: Abelardo Garg MD;  [facility];  Service: Orthopedics    IN XCAPSL CTRC RMVL INSJ IO LENS PROSTH W/O ECP Left 03/15/2016    Procedure: EXTRACTION EXTRACAPSULAR CATARACT PHACO INTRAOCULAR LENS (IOL);   Surgeon: Linzie Opitz, MD;  [facility];  Service: Ophthalmology    REPLACEMENT TOTAL KNEE Right     REPLACEMENT TOTAL KNEE      right     TONSILLECTOMY       Family History   Problem Relation Age of Onset    Coronary artery disease Mother     Heart attack Mother         Prior    Heart disease Father     Heart attack Father         Prior    Anesthesia problems Neg Hx      Current Outpatient Medications on File Prior to Visit   Medication Sig Dispense Refill    acetaminophen (TYLENOL) 650 mg CR tablet Take 1 tablet (650 mg total) by mouth every 8 (eight) hours as needed for mild pain 30 tablet 0    albuterol (PROVENTIL HFA,VENTOLIN HFA) 90 mcg/act inhaler INHALE 2 PUFFS BY MOUTH EVERY 6 HOURS AS NEEDED FOR WHEEZING (Patient not taking: No sig reported) 3 Inhaler 0    allopurinol (ZYLOPRIM) 100 mg tablet Take 1 tablet (100 mg total) by mouth daily 90 tablet 3    apixaban (Eliquis) 5 mg Take 1 tablet (5 mg total) by mouth 2 (two) times a day 60 tablet 6    ascorbic acid (VITAMIN C) 500 MG tablet Take 1 tablet (500 mg total) by mouth 2 (two) times a day 60 tablet 1    BIOTIN PO Take by mouth in the morning      Cholecalciferol (VITAMIN D3 PO) Take by mouth in the morning      Cyanocobalamin (VITAMIN B-12 PO) Take by mouth in the morning      diltiazem (CARDIZEM CD) 120 mg 24 hr capsule Take 1 capsule (120 mg total) by mouth daily (Patient taking differently: Take 120 mg by mouth daily Takes in the am) 90 capsule 3    docusate sodium (COLACE) 100 mg capsule Take 1 capsule (100 mg total) by mouth 2 (two) times a day 10 capsule 0    DULoxetine (CYMBALTA) 30 mg delayed release capsule TAKE 1 CAPSULE(30 MG) BY MOUTH DAILY 30 capsule 5    ezetimibe (ZETIA) 10 mg tablet Take 1 tablet (10 mg total) by mouth daily 90 tablet 1    ferrous sulfate 324 (65 Fe) mg Take 1 tablet (324 mg total) by mouth 2 (two) times a day before meals 60 tablet 1    FIBER PO Take 1 tablet by mouth daily      folic acid (FOLVITE) 1 mg tablet TAKE 1 TABLET(1 MG) BY MOUTH DAILY (Patient not taking: No sig reported) 90 tablet 0    furosemide (LASIX) 40 mg tablet Take 1 tablet (40 mg total) by mouth in the morning   90 tablet 1    lisinopril (ZESTRIL) 2 5 mg tablet Take 1 tablet (2 5 mg total) by mouth daily 30 tablet 2    LORazepam (Ativan) 0 5 mg tablet Take 1 tablet (0 5 mg total) by mouth every 8 (eight) hours as needed for anxiety 5 tablet 0    Melatonin 10 MG TABS Take by mouth Takes daily at night      metoprolol tartrate (LOPRESSOR) 50 mg tablet Take 100 mg in the morning and 50 mg in the evening 270 tablet 3    Mirabegron ER (Myrbetriq) 25 MG TB24 Take 25 mg by mouth daily Takes in the evening 30 tablet 6    Multiple Vitamin (MULTIVITAMIN ADULT PO) Take by mouth in the morning      naloxone (NARCAN) 4 mg/0 1 mL nasal spray Administer 1 spray into a nostril  If no response after 2-3 minutes, give another dose in the other nostril using a new spray  (Patient not taking: No sig reported) 1 each 1    oxybutynin (DITROPAN-XL) 5 mg 24 hr tablet Take 1 tablet (5 mg total) by mouth daily (Patient taking differently: Take 5 mg by mouth daily Takes at night) 90 tablet 3    oxyCODONE (ROXICODONE) 5 immediate release tablet Take 1 tablet (5 mg total) by mouth every 4 (four) hours as needed for moderate pain for up to 10 days Max Daily Amount: 30 mg 40 tablet 0     No current facility-administered medications on file prior to visit  Allergies   Allergen Reactions    Penicillins Hives     Itching and terrible hives    Sulfa Antibiotics Itching    N60 Folate [Folic Acid-Vit S4-LAV W38 - Food Allergy] Other (See Comments)     5/23/22 Pt reports no allergic reaction known     [place] Other (See Comments)     Unknown, 5/23 -pt is unaware of reaction     Lyrica [Pregabalin] Other (See Comments)     5/23/22 Pt reports doesn't remember reaction     Other Other (See Comments)     Black rubber 5/23/22 per allergy test - pt unaware of reaction    Cortisone Acetate [Cortisone] Itching and Rash     5/23/22 pt reports makes her violent     Nickel Other (See Comments)     Skin discoloration     Social History     Socioeconomic History    Marital status:       Spouse name: Not on file    Number of children: Not on file    Years of education: Not on file    Highest education level: Not on file   Occupational History    Not on file   Tobacco Use    Smoking status: Never Smoker    Smokeless tobacco: Never Used    Tobacco comment: Denies any former or current smoking   Vaping Use    Vaping Use: Never used   Substance and Sexual Activity    Alcohol use: Not Currently     Comment: Per [employer]- pt reports no current alcohol use at this time    Drug use: No     Comment: Denies any former or current drug use    Sexual activity: Never     Comment:  for 12 years - not active   Other Topics Concern    Not on file   Social History Narrative    Not on file     Social Determinants of Health     Financial Resource Strain: Not on file   Food Insecurity: No Food Insecurity    Worried About Running Out of Food in the Last Year: Never true    Luda of Food in the Last Year: Never true   Transportation Needs: No Transportation Needs    Lack of Transportation (Medical): No    Lack of Transportation (Non-Medical): No   Physical Activity: Not on file   Stress: Not on file   Social Connections: Not on file   Intimate Partner Violence: Not on file   Housing Stability: Unknown    Unable to Pay for Housing in the Last Year: No    Number of Places Lived in the Last Year: Not on file    Unstable Housing in the Last Year: No        Review of Systems   All other systems reviewed and are negative  Vitals: There were no vitals filed for this visit  BMI - There is no height or weight on file to calculate BMI  Wt Readings from Last 7 Encounters:   09/19/22 106 kg (234 lb 9 6 oz)   05/23/22 102 kg (225 lb)   09/06/22 105 kg (231 lb)   08/23/22 105 kg (231 lb 12 8 oz)   07/20/22 104 kg (230 lb)   06/01/22 105 kg (232 lb)   04/20/22 106 kg (233 lb 11 oz)       Physical Exam  Vitals and nursing note reviewed  Constitutional:       General: She is not in acute distress  Appearance: Normal appearance  She is well-developed  She is obese  She is not ill-appearing  HENT:      Head: Normocephalic and atraumatic  Nose: No congestion  Eyes:      General: No scleral icterus       Conjunctiva/sclera: Conjunctivae normal    Neck:      Vascular: No carotid bruit or JVD  Cardiovascular:      Rate and Rhythm: Normal rate and regular rhythm  Pulses: Normal pulses  Heart sounds: Murmur heard  Crescendo decrescendo systolic murmur is present with a grade of 3/6  No friction rub  No gallop  Pulmonary:      Effort: Pulmonary effort is normal  No respiratory distress  Breath sounds: Normal breath sounds  No rales  Abdominal:      General: There is no distension  Palpations: Abdomen is soft  Tenderness: There is no abdominal tenderness  Musculoskeletal:         General: No swelling or tenderness  Cervical back: Neck supple  Right lower leg: No edema  Left lower leg: No edema  Skin:     General: Skin is warm  Neurological:      General: No focal deficit present  Mental Status: She is alert and oriented to person, place, and time  Mental status is at baseline  Psychiatric:         Mood and Affect: Mood normal          Behavior: Behavior normal          Thought Content:  Thought content normal          Labs:    CBC:   Lab Results   Component Value Date    WBC 16 44 (H) 09/16/2022    RBC 2 87 (L) 09/16/2022    HGB 8 9 (L) 09/16/2022    HCT 26 7 (L) 09/16/2022    MCV 93 09/16/2022     09/16/2022    RDW 14 5 09/16/2022       CMP:   Lab Results   Component Value Date    K 3 8 09/16/2022     09/16/2022    CO2 26 09/16/2022    BUN 19 09/16/2022    CREATININE 0 85 09/16/2022    EGFR 65 09/16/2022    GLUCOSE 127 08/05/2021    CALCIUM 8 2 (L) 09/16/2022    AST 23 08/17/2022    ALT 20 08/17/2022    ALKPHOS 71 08/17/2022       Magnesium:  Lab Results   Component Value Date    MG 2 5 09/27/2021       Lipid Profile:   Lab Results   Component Value Date    HDL 67 05/27/2021    TRIG 86 05/27/2021    LDLCALC 145 (H) 05/27/2021       Thyroid Studies:   Lab Results   Component Value Date    FQT0OKCJYQWS 1 350 08/10/2021    FREET4 1 07 07/27/2018       A1c:  No components found for: HGA1C    INR:  Lab Results   Component Value Date    INR 1 29 (H) 2022    INR 1 36 (H) 2022    INR 1 90 (H) 2021   5    Imaging: XR chest portable    Result Date: 2022  Narrative: CHEST INDICATION:   shortness of breath  COMPARISON:  2021 EXAM PERFORMED/VIEWS:  XR CHEST PORTABLE Single view FINDINGS: Left-sided cardiac pacer again evident Cardiomediastinal silhouette remains mildly enlarged The lungs are clear  No pneumothorax or pleural effusion  Osseous structures appear within normal limits for patient age  Impression: No acute cardiopulmonary disease  Findings are stable Workstation performed: [id number]     XR knee 1 or 2 vw left    Result Date: 2022  Narrative: C-ARM - left knee INDICATION: Closed fracture of left femur with nonunion, unspecified fracture morphology, unspecified portion of femur, subsequent encounter [[id number]]  Procedure guidance  COMPARISON:  Left knee radiographs 3/29/2022 TECHNIQUE: FLUOROSCOPY TIME:   12 sft 11 FLUOROSCOPIC IMAGES FINDINGS: Fluoroscopic guidance provided for procedure guidance  Osseous and soft tissue detail limited by technique  Impression: Fluoroscopic guidance provided for procedure guidance  Please refer to the separate procedure notes for additional details    Workstation performed: [id number]       Cardiac testing:   Results for orders placed during the hospital encounter of 21    Echo complete with contrast if indicated    Narrative  [address]  [facility], [address] [place] [address]  [phone]    Transthoracic Echocardiogram  2D, M-mode, Doppler, and Color Doppler    Study date:  10-Aug-2021    Patient: Jeff Zuniga  MR number: [id number]  Account number: [de-identified]  : 1945  Age: 66 years  Gender: Female  Status: Inpatient  Location: Bedside  Height: 64 in  Weight: 289 3 lb  BP: 110/ 49 mmHg    Indications: Atrial Fibrillation    Diagnoses: I48 0 - Atrial fibrillation    Sonographer:  Rosann Leventhal, RCS  Primary Physician:  Vel Pederson MD  Referring Physician:  Cady Dietrich DO  Group:  [id number]  Cardiology Fellow: Cesar Mar MD  Interpreting Physician:  Eliseo Greenfield DO    SUMMARY    LEFT VENTRICLE:  Systolic function was normal  Ejection fraction was estimated to be 60 %  There were no regional wall motion abnormalities  Wall thickness was mildly increased  The changes were consistent with concentric remodeling (increased wall thickness with normal wall mass)  Features were consistent with a pseudonormal left ventricular filling pattern, with concomitant abnormal relaxation and increased filling pressure (grade 2 diastolic dysfunction)  Doppler parameters were consistent with high ventricular filling pressure  VENTRICULAR SEPTUM:  There was dyssynergic motion  These changes are consistent with a conduction abnormality or paced rhythm  RIGHT VENTRICLE:  The size was normal   Systolic function was normal     LEFT ATRIUM:  The atrium was moderately dilated  RIGHT ATRIUM:  The atrium was mildly dilated  MITRAL VALVE:  There was mild annular calcification  There was mild regurgitation  AORTIC VALVE:  There was mild regurgitation  TRICUSPID VALVE:  There was mild regurgitation  Pulmonary artery systolic pressure was mildly increased  Estimated peak PA pressure was 42 mmHg  HISTORY: PRIOR HISTORY: Atrial fibrillation; Hypertension; Hyperlipidemia; Pacemaker; GERD; Tachy-Reji Syndrome    PROCEDURE: The procedure was performed at the bedside  This was a routine study  The transthoracic approach was used  The study included complete 2D imaging, M-mode, complete spectral Doppler, and color Doppler  The heart rate was 72 bpm,  at the start of the study  Images were obtained from the parasternal, apical, subcostal, and suprasternal notch acoustic windows  Image quality was adequate      LEFT VENTRICLE: Size was normal  Systolic function was normal  Ejection fraction was estimated to be 60 %  There were no regional wall motion abnormalities  Wall thickness was mildly increased  The changes were consistent with concentric  remodeling (increased wall thickness with normal wall mass)  DOPPLER: Features were consistent with a pseudonormal left ventricular filling pattern, with concomitant abnormal relaxation and increased filling pressure (grade 2 diastolic  dysfunction)  Doppler parameters were consistent with high ventricular filling pressure  VENTRICULAR SEPTUM: There was dyssynergic motion  These changes are consistent with a conduction abnormality or paced rhythm  RIGHT VENTRICLE: The size was normal  Systolic function was normal  A pacing wire was present in the ventricular cavity  LEFT ATRIUM: The atrium was moderately dilated  RIGHT ATRIUM: The atrium was mildly dilated  MITRAL VALVE: There was mild annular calcification  There was normal leaflet separation  DOPPLER: The transmitral velocity was within the normal range  There was no evidence for stenosis  There was mild regurgitation  AORTIC VALVE: The valve was trileaflet  Leaflets exhibited mildly to moderately increased thickness, mild to moderate calcification, and normal cuspal separation  DOPPLER: Transaortic velocity was within the normal range  There was no  evidence for stenosis  There was mild regurgitation  TRICUSPID VALVE: The valve structure was normal  There was normal leaflet separation  DOPPLER: The transtricuspid velocity was within the normal range  There was no evidence for stenosis  There was mild regurgitation  Pulmonary artery  systolic pressure was mildly increased  Estimated peak PA pressure was 42 mmHg  PULMONIC VALVE: Leaflets exhibited normal thickness, no calcification, and normal cuspal separation  DOPPLER: The transpulmonic velocity was within the normal range  There was trace regurgitation      PERICARDIUM: There was no pericardial effusion  AORTA: The root exhibited normal size  SYSTEMIC VEINS: IVC: The inferior vena cava was normal in size and course  Respirophasic changes were normal     PULMONARY VEINS: DOPPLER: There was systolic blunting in the pulmonary vein(s)  SYSTEM MEASUREMENT TABLES    2D  %FS: 29 06 %  Ao Diam: 3 41 cm  Ao asc: 3 45 cm  EDV(Teich): 102 92 ml  EF(Teich): 55 78 %  ESV(Teich): 45 51 ml  IVSd: 1 5 cm  LA Area: 31 15 cm2  LA Diam: 4 08 cm  LVEDV MOD A4C: 97 29 ml  LVEF MOD A4C: 58 13 %  LVESV MOD A4C: 40 73 ml  LVIDd: 4 71 cm  LVIDs: 3 34 cm  LVLd A4C: 7 25 cm  LVLs A4C: 6 25 cm  LVPWd: 0 98 cm  RA Area: 21 28 cm2  RVIDd: 3 78 cm  SV MOD A4C: 56 56 ml  SV(Teich): 57 41 ml    CW  AR Dec Lander: 2 44 m/s2  AR Dec Time: 1437 1 ms  AR PHT: 416 76 ms  AR Vmax: 3 45 m/s  AR maxP 75 mmHg  TR Vmax: 3 04 m/s  TR maxP 85 mmHg    MM  TAPSE: 2 09 cm    PW  E' Sept: 0 08 m/s  E/E' Sept: 14 61  MV A Boaz: 0 42 m/s  MV Dec Lander: 5 86 m/s2  MV DecT: 210 91 ms  MV E Boaz: 1 24 m/s  MV E/A Ratio: 2 97  MV PHT: 61 16 ms  MVA By PHT: 3 6 cm2    [facility]    Prepared and electronically signed by    Gorden Bumpers, DO  Signed 10-Aug-2021 11:21:21    Results for orders placed during the hospital encounter of 21    TANISHA    Narrative  Procedure note:  TANISHA Preliminary Report    Impression:    LV:  Normal size and low normalsystolic function  Left ventricular ejection fraction ~ 50%  LA:  Normal size  LITTLE:  Severely dilated LITTLE with low function  +spontaneous echocontrast ("smoke"), with probable sign of early thrombus  RA:  Normal size  Interatrial septum:  Appears normal with no PFO or ASD  RV:  Normal size and systolic function  MV:  Normal structure  No mitral stenosis  Mild to moderate regurgitation  AV:  Normal structure  Probable very mild aortic stenosis by 2D study  No significant regurgitation  TV:  Normal structure    No tricuspid stenosis  Mild regurgitation  PV:  Grossly normal but poorly visualized  No gross pulmonic stenosis or significant regurgitation  AO:  Not well visualized    Informed consent obtained from patient prior to procedure after all indications, risks, and benefits of TANISHA thoroughly discussed  Propofol was utilized for sedation and administered intravenously by Anesthesia  Blood pressure, EKG, pulse oximetry, respirations, and level of consciousness were monitored throughout the procedure  Pt tolerated procedure well with nurse anesthetist performing sedation and monitoring  TANISHA probe passed without difficulty with no blood seen on probe upon extubation  No results found for this or any previous visit  No results found for this or any previous visit  XR chest portable  Narrative: CHEST     INDICATION:   shortness of breath  COMPARISON:  9/26/2021    EXAM PERFORMED/VIEWS:  XR CHEST PORTABLE  Single view    FINDINGS:  Left-sided cardiac pacer again evident    Cardiomediastinal silhouette remains mildly enlarged    The lungs are clear  No pneumothorax or pleural effusion  Osseous structures appear within normal limits for patient age  Impression: No acute cardiopulmonary disease  Findings are stable    Workstation performed: [id number]  XR knee 1 or 2 vw left  Narrative: C-ARM - left knee     INDICATION: Closed fracture of left femur with nonunion, unspecified fracture morphology, unspecified portion of femur, subsequent encounter [[id number]]  Procedure guidance  COMPARISON:  Left knee radiographs 3/29/2022    TECHNIQUE:    FLUOROSCOPY TIME:   12 sft    11 FLUOROSCOPIC IMAGES    FINDINGS:    Fluoroscopic guidance provided for procedure guidance  Osseous and soft tissue detail limited by technique  Impression: Fluoroscopic guidance provided for procedure guidance  Please refer to the separate procedure notes for additional details         Workstation performed: [id number]

## 2022-09-23 ENCOUNTER — NURSING HOME VISIT (OUTPATIENT)
Dept: GERIATRICS | Facility: OTHER | Age: 77
End: 2022-09-23
Payer: MEDICARE

## 2022-09-23 DIAGNOSIS — N18.31 STAGE 3A CHRONIC KIDNEY DISEASE (HCC): ICD-10-CM

## 2022-09-23 DIAGNOSIS — I10 ESSENTIAL HYPERTENSION: ICD-10-CM

## 2022-09-23 DIAGNOSIS — T14.8XXA SURGICAL WOUND PRESENT: ICD-10-CM

## 2022-09-23 DIAGNOSIS — E78.2 MIXED HYPERLIPIDEMIA: ICD-10-CM

## 2022-09-23 DIAGNOSIS — I48.0 PAROXYSMAL ATRIAL FIBRILLATION (HCC): ICD-10-CM

## 2022-09-23 DIAGNOSIS — M79.7 FIBROMYALGIA: ICD-10-CM

## 2022-09-23 DIAGNOSIS — S72.422G CLOSED BICONDYLAR FRACTURE OF LEFT FEMUR WITH DELAYED HEALING: Primary | ICD-10-CM

## 2022-09-23 DIAGNOSIS — F41.9 ANXIETY: ICD-10-CM

## 2022-09-23 DIAGNOSIS — S72.432G CLOSED BICONDYLAR FRACTURE OF LEFT FEMUR WITH DELAYED HEALING: Primary | ICD-10-CM

## 2022-09-23 PROCEDURE — 99309 SBSQ NF CARE MODERATE MDM 30: CPT | Performed by: INTERNAL MEDICINE

## 2022-09-23 NOTE — PROGRESS NOTES
[address]  [address]   [address] [facility] [address] [facility] [address]  47     Progress Note  Code [facility]    Patient Location     [facility]    Reason for visit     Closed bicondylar fracture of left femur with delayed healing  Patient has history of distal left femur fracture with delayed healing  She was admitted to the hospital on 9/12/22 and underwent uncomplicated left distal femoral replacement and removal of deep implant left femur  Postop course was complicated by anemia needing PRBC transfusion  Patient remains on Eliquis for PAF and DVT prophylaxis  Patient is noted to have mild area of erythema adjacent to left distal thigh incision  Per niece patient's dressing was soaked with urine upon admission to the facility  9/23- Has been off antibiotics  Adequate wound healing  Reduced swelling and redness from the demarcated lines  No apparent discharge from the surgical site  Follow-up with orthopedic service  Continue PT        PAF:  Patient had few episodes of tachycardia recently at the hospital   She has known history of tachy-smita syndrome status post ppm   Per records last interrogation was performed on 05/18/2022 revealing AT/AF episodes  Metoprolol, diltiazem were resumed  Heart rate remains stable  Continue Eliquis  Echo done in August 21 revealed EF of 60% with grade 2 diastolic dysfunction  5/67- Continue Metoprolol, Cardizem and Eliquis     Acute blood loss anemia:  Recent postop course was complicated by acute blood loss anemia with hemoglobin dropping down to 6 7  Patient received 2 units of PRBC transfusion  Repeat hemoglobin was 8 9  Will continue to monitor  Continue iron supplements    Follow-up repeat CBC  Hb stable at 8 3    Hypertension:  Blood pressure stable on lisinopril 2 5 mg daily, Lasix 40 mg daily, diltiazem 120 mg daily and metoprolol 100 mg daily  9/23 /74  Continue Current management      Hyperlipidemia:  Continue Zetia 10 mg daily lipid profile in May 22 revealed total cholesterol of 229, triglycerides 86, HDL 67 and LDL of 145 consider adding statin if patient is able to tolerate     History of gout:  Continue allopurinol     Vitamin-D deficiency:  Continue vitamin-D supplement     History of fibromyalgia:  Continue duloxetine    Patients care was coordinated with nursing facility staff  Recent vitals, labs and updated medications were reviewed on [employer] system of facility  HPI       Patient is a 76 y o  female with past medical history significant for anxiety, arthritis asthma, heart failure, fibromyalgia and  history of rheumatic fever  Here for rehabilitation for post left femur fracture  She is doing well at this time  Does endorse some tenderness at the surgical site but no apparent distress from it  Denies any chest pain, SOB, abdominal pain, nausea, vomiting, fever or chills  Review of Systems   Constitutional: Negative for chills and fever  HENT: Negative for drooling  Eyes: Negative for visual disturbance  Respiratory: Negative for cough, chest tightness and wheezing  Cardiovascular: Negative for chest pain, palpitations and leg swelling  Gastrointestinal: Negative for abdominal pain and nausea  Endocrine: Negative for polydipsia and polyuria  Genitourinary: Negative for dysuria  Neurological: Negative for weakness and numbness  Past Medical History:   Diagnosis Date    Abnormal ECG     Last Assessed 9/29/2016    Anxiety     Last Assessed 6/08/2016    Arthritis     knees    Asthma     Last Assessed 11/06/2013    Atrial premature complex     Cataract, bilateral     Last Assessed 7/14/2016    Cataract, left eye     Both eyes  Had surgery on left      COVID-19     Difficulty swallowing     Dizziness     Fibromyalgia     Fibromyalgia, primary     Gastric reflux     Heart failure ([facility] [address] )     5/23/22 Pt reports had heart failure in the past    History of COVID-19 5/23/22 Pt reports having Flores in 2021   History of transfusion     no reaction    [place] (hard of hearing)     Hyperlipidemia     Hypertension     Incontinence in female     5/23/22 Pt reports has incontinence -wears depends especially at night/riding in car    Irregular heart beat     5/23/22 Pt reports hx of A fib    Lyme disease     PONV (postoperative nausea and vomiting)     Premature ventricular contraction     Primary osteoarthritis of both knees     Last Assessed 7/14/2016    Rheumatic fever     5/23/22 Pt reports had hx of rheumatic fever as a child twice    Sore throat     Tuberculosis 1947       Past Surgical History:   Procedure Laterality Date    APPENDECTOMY      CARDIAC PACEMAKER PLACEMENT  2019    COLONOSCOPY      DENTAL SURGERY      extractions    HYSTERECTOMY      5/23/22 Pt reports had appendix out with hysterectomy and "tightened up my bladder"    ORIF FEMUR FRACTURE Left 08/05/2021    Procedure: OPEN REDUCTION W/ INTERNAL FIXATION (ORIF) DISTAL FEMUR; INSERTION RETROGRADE NAIL;  Surgeon: Sandip Davila MD;  [facility];  Service: Orthopedics    DE DILATE ESOPHAGUS N/A 04/06/2016    Procedure: DILATATION ESOPHAGEAL;  Surgeon: Gricelda Reyes MD;  Location: [facility]; Service: Gastroenterology    DE EGD TRANSORAL BIOPSY SINGLE/MULTIPLE N/A 04/06/2016    Procedure: ESOPHAGOGASTRODUODENOSCOPY (EGD); Surgeon: Gricelda Reyes MD;  Location: [facility]; Service: Gastroenterology    DE REMOVAL DEEP IMPLANT Left 9/12/2022    Procedure: REMOVAL NAIL IM  FEMUR;  Surgeon: Sandip Davila MD;  [facility];  Service: Orthopedics    DE TOTAL KNEE ARTHROPLASTY Left 9/12/2022    Procedure: ARTHROPLASTY KNEE TOTAL;  Surgeon: Sandip Davila MD;  [facility];  Service: Orthopedics    DE XCAPSL CTRC RMVL INSJ IO LENS PROSTH W/O ECP Left 03/15/2016    Procedure: EXTRACTION EXTRACAPSULAR CATARACT PHACO INTRAOCULAR LENS (IOL);   Surgeon: Beronica Silva MD; [facility];  Service: Ophthalmology    REPLACEMENT TOTAL KNEE Right     REPLACEMENT TOTAL KNEE      right     TONSILLECTOMY         Social History     Tobacco Use   Smoking Status Never Smoker   Smokeless Tobacco Never Used   Tobacco Comment    Denies any former or current smoking       Family History   Problem Relation Age of Onset    Coronary artery disease Mother     Heart attack Mother         Prior    Heart disease Father     Heart attack Father         Prior    Anesthesia problems Neg Hx         Allergies   Allergen Reactions    Penicillins Hives     Itching and terrible hives    Sulfa Antibiotics Itching    Q41 Folate [Folic Acid-Vit I2-ZXX W14 - Food Allergy] Other (See Comments)     5/23/22 Pt reports no allergic reaction known     [place] Other (See Comments)     Unknown, 5/23 -pt is unaware of reaction     Lyrica [Pregabalin] Other (See Comments)     5/23/22 Pt reports doesn't remember reaction     Other Other (See Comments)     Black rubber 5/23/22 per allergy test - pt unaware of reaction    Cortisone Acetate [Cortisone] Itching and Rash     5/23/22 pt reports makes her violent     Nickel Other (See Comments)     Skin discoloration         Current Outpatient Medications:     acetaminophen (TYLENOL) 650 mg CR tablet, Take 1 tablet (650 mg total) by mouth every 8 (eight) hours as needed for mild pain, Disp: 30 tablet, Rfl: 0    albuterol (PROVENTIL HFA,VENTOLIN HFA) 90 mcg/act inhaler, INHALE 2 PUFFS BY MOUTH EVERY 6 HOURS AS NEEDED FOR WHEEZING (Patient not taking: No sig reported), Disp: 3 Inhaler, Rfl: 0    allopurinol (ZYLOPRIM) 100 mg tablet, Take 1 tablet (100 mg total) by mouth daily, Disp: 90 tablet, Rfl: 3    apixaban (Eliquis) 5 mg, Take 1 tablet (5 mg total) by mouth 2 (two) times a day, Disp: 60 tablet, Rfl: 6    ascorbic acid (VITAMIN C) 500 MG tablet, Take 1 tablet (500 mg total) by mouth 2 (two) times a day, Disp: 60 tablet, Rfl: 1    BIOTIN PO, Take by mouth in the morning, Disp: , Rfl:     Cholecalciferol (VITAMIN D3 PO), Take by mouth in the morning, Disp: , Rfl:     Cyanocobalamin (VITAMIN B-12 PO), Take by mouth in the morning, Disp: , Rfl:     diltiazem (CARDIZEM CD) 120 mg 24 hr capsule, Take 1 capsule (120 mg total) by mouth daily (Patient taking differently: Take 120 mg by mouth daily Takes in the am), Disp: 90 capsule, Rfl: 3    docusate sodium (COLACE) 100 mg capsule, Take 1 capsule (100 mg total) by mouth 2 (two) times a day, Disp: 10 capsule, Rfl: 0    DULoxetine (CYMBALTA) 30 mg delayed release capsule, TAKE 1 CAPSULE(30 MG) BY MOUTH DAILY, Disp: 30 capsule, Rfl: 5    ezetimibe (ZETIA) 10 mg tablet, Take 1 tablet (10 mg total) by mouth daily, Disp: 90 tablet, Rfl: 1    ferrous sulfate 324 (65 Fe) mg, Take 1 tablet (324 mg total) by mouth 2 (two) times a day before meals, Disp: 60 tablet, Rfl: 1    FIBER PO, Take 1 tablet by mouth daily, Disp: , Rfl:     folic acid (FOLVITE) 1 mg tablet, TAKE 1 TABLET(1 MG) BY MOUTH DAILY (Patient not taking: No sig reported), Disp: 90 tablet, Rfl: 0    furosemide (LASIX) 40 mg tablet, Take 1 tablet (40 mg total) by mouth in the morning , Disp: 90 tablet, Rfl: 1    lisinopril (ZESTRIL) 2 5 mg tablet, Take 1 tablet (2 5 mg total) by mouth daily, Disp: 30 tablet, Rfl: 2    LORazepam (Ativan) 0 5 mg tablet, Take 1 tablet (0 5 mg total) by mouth every 8 (eight) hours as needed for anxiety, Disp: 5 tablet, Rfl: 0    Melatonin 10 MG TABS, Take by mouth Takes daily at night, Disp: , Rfl:     metoprolol tartrate (LOPRESSOR) 50 mg tablet, Take 100 mg in the morning and 50 mg in the evening, Disp: 270 tablet, Rfl: 3    Mirabegron ER (Myrbetriq) 25 MG TB24, Take 25 mg by mouth daily Takes in the evening, Disp: 30 tablet, Rfl: 6    Multiple Vitamin (MULTIVITAMIN ADULT PO), Take by mouth in the morning, Disp: , Rfl:     naloxone (NARCAN) 4 mg/0 1 mL nasal spray, Administer 1 spray into a nostril   If no response after 2-3 minutes, give another dose in the other nostril using a new spray  (Patient not taking: No sig reported), Disp: 1 each, Rfl: 1    oxybutynin (DITROPAN-XL) 5 mg 24 hr tablet, Take 1 tablet (5 mg total) by mouth daily (Patient taking differently: Take 5 mg by mouth daily Takes at night), Disp: 90 tablet, Rfl: 3    oxyCODONE (ROXICODONE) 5 immediate release tablet, Take 1 tablet (5 mg total) by mouth every 4 (four) hours as needed for moderate pain for up to 10 days Max Daily Amount: 30 mg, Disp: 40 tablet, Rfl: 0    Updated list was reviewed in [facility] of facility  There were no vitals filed for this visit  Physical Exam  Constitutional:       General: She is not in acute distress  Appearance: She is obese  She is not ill-appearing, toxic-appearing or diaphoretic  HENT:      Head: Normocephalic and atraumatic  Cardiovascular:      Rate and Rhythm: Normal rate and regular rhythm  Pulses: Normal pulses  Heart sounds: Normal heart sounds  No murmur heard  No friction rub  No gallop  Pulmonary:      Effort: Pulmonary effort is normal  No respiratory distress  Breath sounds: No wheezing, rhonchi or rales  Chest:      Chest wall: No tenderness  Abdominal:      General: There is distension  Palpations: There is no mass  Tenderness: There is no abdominal tenderness  There is no right CVA tenderness or left CVA tenderness  Hernia: No hernia is present  Musculoskeletal:      Right lower leg: Edema present  Left lower leg: Edema present  Comments: surgical site evaluated  Decrease swelling and redness from the demarcated line  No drainage     Skin:     Findings: No lesion  Neurological:      General: No focal deficit present  Mental Status: She is oriented to person, place, and time  Psychiatric:         Mood and Affect: Mood normal          Behavior: Behavior normal          Diagnostic Data:    Recent labs were reviewed      9/21/22-  8 3 from 8 9   WBC 11 4, K 4 1, Cr  9 Glu 118

## 2022-09-24 ENCOUNTER — TELEPHONE (OUTPATIENT)
Dept: OTHER | Facility: OTHER | Age: 77
End: 2022-09-24

## 2022-09-25 ENCOUNTER — APPOINTMENT (OUTPATIENT)
Dept: RADIOLOGY | Facility: HOSPITAL | Age: 77
DRG: 872 | End: 2022-09-25
Payer: MEDICARE

## 2022-09-25 ENCOUNTER — APPOINTMENT (EMERGENCY)
Dept: CT IMAGING | Facility: HOSPITAL | Age: 77
DRG: 872 | End: 2022-09-25
Payer: MEDICARE

## 2022-09-25 ENCOUNTER — HOSPITAL ENCOUNTER (INPATIENT)
Facility: HOSPITAL | Age: 77
LOS: 8 days | Discharge: NURSING, REHAB OR LONG-TERM CARE | DRG: 872 | End: 2022-10-04
Attending: EMERGENCY MEDICINE | Admitting: GENERAL PRACTICE
Payer: MEDICARE

## 2022-09-25 DIAGNOSIS — R11.2 NAUSEA AND VOMITING: ICD-10-CM

## 2022-09-25 DIAGNOSIS — T79.2XXS: ICD-10-CM

## 2022-09-25 DIAGNOSIS — F41.9 ANXIETY: ICD-10-CM

## 2022-09-25 DIAGNOSIS — S72.422G CLOSED BICONDYLAR FRACTURE OF LEFT FEMUR WITH DELAYED HEALING: ICD-10-CM

## 2022-09-25 DIAGNOSIS — S72.432G CLOSED BICONDYLAR FRACTURE OF LEFT FEMUR WITH DELAYED HEALING: ICD-10-CM

## 2022-09-25 DIAGNOSIS — R78.81 BACTEREMIA: ICD-10-CM

## 2022-09-25 DIAGNOSIS — R78.81 BACTEREMIA DUE TO GRAM-POSITIVE BACTERIA: ICD-10-CM

## 2022-09-25 DIAGNOSIS — K92.0 HEMATEMESIS: Primary | ICD-10-CM

## 2022-09-25 PROBLEM — N32.89 BLADDER WALL THICKENING: Status: ACTIVE | Noted: 2022-09-25

## 2022-09-25 PROBLEM — J90 PLEURAL EFFUSION ON RIGHT: Status: ACTIVE | Noted: 2022-09-25

## 2022-09-25 PROBLEM — R19.7 VOMITING AND DIARRHEA: Status: ACTIVE | Noted: 2022-09-25

## 2022-09-25 PROBLEM — K57.90 DIVERTICULOSIS: Status: ACTIVE | Noted: 2022-09-25

## 2022-09-25 PROBLEM — D64.9 ANEMIA: Status: ACTIVE | Noted: 2022-09-25

## 2022-09-25 PROBLEM — R11.10 VOMITING AND DIARRHEA: Status: ACTIVE | Noted: 2022-09-25

## 2022-09-25 PROBLEM — R18.8 ABDOMINAL FLUID COLLECTION: Status: ACTIVE | Noted: 2022-09-25

## 2022-09-25 LAB
4HR DELTA HS TROPONIN: 0 NG/L
ABO GROUP BLD: NORMAL
ALBUMIN SERPL BCP-MCNC: 3.1 G/DL (ref 3.5–5)
ALP SERPL-CCNC: 66 U/L (ref 34–104)
ALT SERPL W P-5'-P-CCNC: 15 U/L (ref 7–52)
ANION GAP SERPL CALCULATED.3IONS-SCNC: 9 MMOL/L (ref 4–13)
APTT PPP: 35 SECONDS (ref 23–37)
AST SERPL W P-5'-P-CCNC: 18 U/L (ref 13–39)
BASOPHILS # BLD AUTO: 0.05 THOUSANDS/ΜL (ref 0–0.1)
BASOPHILS NFR BLD AUTO: 1 % (ref 0–1)
BILIRUB SERPL-MCNC: 0.42 MG/DL (ref 0.2–1)
BLD GP AB SCN SERPL QL: NEGATIVE
BUN SERPL-MCNC: 13 MG/DL (ref 5–25)
CALCIUM ALBUM COR SERPL-MCNC: 9.9 MG/DL (ref 8.3–10.1)
CALCIUM SERPL-MCNC: 9.2 MG/DL (ref 8.4–10.2)
CARDIAC TROPONIN I PNL SERPL HS: 13 NG/L
CARDIAC TROPONIN I PNL SERPL HS: 13 NG/L
CHLORIDE SERPL-SCNC: 101 MMOL/L (ref 96–108)
CO2 SERPL-SCNC: 33 MMOL/L (ref 21–32)
CREAT SERPL-MCNC: 0.82 MG/DL (ref 0.6–1.3)
EOSINOPHIL # BLD AUTO: 0.37 THOUSAND/ΜL (ref 0–0.61)
EOSINOPHIL NFR BLD AUTO: 5 % (ref 0–6)
ERYTHROCYTE [DISTWIDTH] IN BLOOD BY AUTOMATED COUNT: 14.7 % (ref 11.6–15.1)
GFR SERPL CREATININE-BSD FRML MDRD: 68 ML/MIN/1.73SQ M
GLUCOSE SERPL-MCNC: 130 MG/DL (ref 65–140)
HCT VFR BLD AUTO: 25.9 % (ref 34.8–46.1)
HCT VFR BLD AUTO: 26.9 % (ref 34.8–46.1)
HCT VFR BLD AUTO: 27.9 % (ref 34.8–46.1)
HGB BLD-MCNC: 7.9 G/DL (ref 11.5–15.4)
HGB BLD-MCNC: 8.2 G/DL (ref 11.5–15.4)
HGB BLD-MCNC: 8.8 G/DL (ref 11.5–15.4)
IMM GRANULOCYTES # BLD AUTO: 0.1 THOUSAND/UL (ref 0–0.2)
IMM GRANULOCYTES NFR BLD AUTO: 1 % (ref 0–2)
INR PPP: 1.28 (ref 0.84–1.19)
LYMPHOCYTES # BLD AUTO: 1.24 THOUSANDS/ΜL (ref 0.6–4.47)
LYMPHOCYTES NFR BLD AUTO: 16 % (ref 14–44)
MCH RBC QN AUTO: 29.9 PG (ref 26.8–34.3)
MCHC RBC AUTO-ENTMCNC: 31.5 G/DL (ref 31.4–37.4)
MCV RBC AUTO: 95 FL (ref 82–98)
MONOCYTES # BLD AUTO: 0.68 THOUSAND/ΜL (ref 0.17–1.22)
MONOCYTES NFR BLD AUTO: 9 % (ref 4–12)
NEUTROPHILS # BLD AUTO: 5.4 THOUSANDS/ΜL (ref 1.85–7.62)
NEUTS SEG NFR BLD AUTO: 68 % (ref 43–75)
NRBC BLD AUTO-RTO: 0 /100 WBCS
PLATELET # BLD AUTO: 601 THOUSANDS/UL (ref 149–390)
PLATELET # BLD AUTO: 645 THOUSANDS/UL (ref 149–390)
PMV BLD AUTO: 8.3 FL (ref 8.9–12.7)
PMV BLD AUTO: 8.4 FL (ref 8.9–12.7)
POTASSIUM SERPL-SCNC: 3.8 MMOL/L (ref 3.5–5.3)
PROT SERPL-MCNC: 6.5 G/DL (ref 6.4–8.4)
PROTHROMBIN TIME: 16.3 SECONDS (ref 11.6–14.5)
RBC # BLD AUTO: 2.94 MILLION/UL (ref 3.81–5.12)
RH BLD: POSITIVE
SODIUM SERPL-SCNC: 143 MMOL/L (ref 135–147)
SPECIMEN EXPIRATION DATE: NORMAL
WBC # BLD AUTO: 7.84 THOUSAND/UL (ref 4.31–10.16)

## 2022-09-25 PROCEDURE — 96365 THER/PROPH/DIAG IV INF INIT: CPT

## 2022-09-25 PROCEDURE — 85025 COMPLETE CBC W/AUTO DIFF WBC: CPT | Performed by: EMERGENCY MEDICINE

## 2022-09-25 PROCEDURE — 99220 PR INITIAL OBSERVATION CARE/DAY 70 MINUTES: CPT | Performed by: INTERNAL MEDICINE

## 2022-09-25 PROCEDURE — 99285 EMERGENCY DEPT VISIT HI MDM: CPT | Performed by: EMERGENCY MEDICINE

## 2022-09-25 PROCEDURE — 86850 RBC ANTIBODY SCREEN: CPT | Performed by: EMERGENCY MEDICINE

## 2022-09-25 PROCEDURE — 80053 COMPREHEN METABOLIC PANEL: CPT | Performed by: EMERGENCY MEDICINE

## 2022-09-25 PROCEDURE — 74177 CT ABD & PELVIS W/CONTRAST: CPT

## 2022-09-25 PROCEDURE — 36415 COLL VENOUS BLD VENIPUNCTURE: CPT | Performed by: EMERGENCY MEDICINE

## 2022-09-25 PROCEDURE — 87186 SC STD MICRODIL/AGAR DIL: CPT | Performed by: INTERNAL MEDICINE

## 2022-09-25 PROCEDURE — 96375 TX/PRO/DX INJ NEW DRUG ADDON: CPT

## 2022-09-25 PROCEDURE — 85049 AUTOMATED PLATELET COUNT: CPT | Performed by: INTERNAL MEDICINE

## 2022-09-25 PROCEDURE — 71046 X-RAY EXAM CHEST 2 VIEWS: CPT

## 2022-09-25 PROCEDURE — C9113 INJ PANTOPRAZOLE SODIUM, VIA: HCPCS | Performed by: INTERNAL MEDICINE

## 2022-09-25 PROCEDURE — G1004 CDSM NDSC: HCPCS

## 2022-09-25 PROCEDURE — 93005 ELECTROCARDIOGRAM TRACING: CPT

## 2022-09-25 PROCEDURE — 85610 PROTHROMBIN TIME: CPT | Performed by: EMERGENCY MEDICINE

## 2022-09-25 PROCEDURE — 85018 HEMOGLOBIN: CPT | Performed by: INTERNAL MEDICINE

## 2022-09-25 PROCEDURE — 96361 HYDRATE IV INFUSION ADD-ON: CPT

## 2022-09-25 PROCEDURE — 87040 BLOOD CULTURE FOR BACTERIA: CPT | Performed by: INTERNAL MEDICINE

## 2022-09-25 PROCEDURE — 99285 EMERGENCY DEPT VISIT HI MDM: CPT

## 2022-09-25 PROCEDURE — 85730 THROMBOPLASTIN TIME PARTIAL: CPT | Performed by: EMERGENCY MEDICINE

## 2022-09-25 PROCEDURE — C9113 INJ PANTOPRAZOLE SODIUM, VIA: HCPCS | Performed by: EMERGENCY MEDICINE

## 2022-09-25 PROCEDURE — 86901 BLOOD TYPING SEROLOGIC RH(D): CPT | Performed by: EMERGENCY MEDICINE

## 2022-09-25 PROCEDURE — 84484 ASSAY OF TROPONIN QUANT: CPT | Performed by: INTERNAL MEDICINE

## 2022-09-25 PROCEDURE — 85014 HEMATOCRIT: CPT | Performed by: INTERNAL MEDICINE

## 2022-09-25 PROCEDURE — 87077 CULTURE AEROBIC IDENTIFY: CPT | Performed by: INTERNAL MEDICINE

## 2022-09-25 PROCEDURE — 86900 BLOOD TYPING SEROLOGIC ABO: CPT | Performed by: EMERGENCY MEDICINE

## 2022-09-25 PROCEDURE — 87154 CUL TYP ID BLD PTHGN 6+ TRGT: CPT | Performed by: INTERNAL MEDICINE

## 2022-09-25 RX ORDER — ACETAMINOPHEN 325 MG/1
650 TABLET ORAL EVERY 6 HOURS SCHEDULED
Status: DISCONTINUED | OUTPATIENT
Start: 2022-09-25 | End: 2022-10-04 | Stop reason: HOSPADM

## 2022-09-25 RX ORDER — LANOLIN ALCOHOL/MO/W.PET/CERES
400 CREAM (GRAM) TOPICAL DAILY
Status: DISCONTINUED | OUTPATIENT
Start: 2022-09-25 | End: 2022-10-04 | Stop reason: HOSPADM

## 2022-09-25 RX ORDER — ONDANSETRON 2 MG/ML
4 INJECTION INTRAMUSCULAR; INTRAVENOUS ONCE
Status: COMPLETED | OUTPATIENT
Start: 2022-09-25 | End: 2022-09-25

## 2022-09-25 RX ORDER — DILTIAZEM HYDROCHLORIDE 120 MG/1
120 CAPSULE, COATED, EXTENDED RELEASE ORAL DAILY
Status: DISCONTINUED | OUTPATIENT
Start: 2022-09-25 | End: 2022-10-04 | Stop reason: HOSPADM

## 2022-09-25 RX ORDER — PANTOPRAZOLE SODIUM 40 MG/10ML
40 INJECTION, POWDER, LYOPHILIZED, FOR SOLUTION INTRAVENOUS ONCE
Status: DISCONTINUED | OUTPATIENT
Start: 2022-09-25 | End: 2022-09-25

## 2022-09-25 RX ORDER — FERROUS SULFATE 325(65) MG
325 TABLET ORAL 2 TIMES DAILY WITH MEALS
Status: DISCONTINUED | OUTPATIENT
Start: 2022-09-25 | End: 2022-10-04 | Stop reason: HOSPADM

## 2022-09-25 RX ORDER — METOPROLOL TARTRATE 50 MG/1
50 TABLET, FILM COATED ORAL
Status: DISCONTINUED | OUTPATIENT
Start: 2022-09-25 | End: 2022-10-04 | Stop reason: HOSPADM

## 2022-09-25 RX ORDER — FUROSEMIDE 40 MG/1
40 TABLET ORAL DAILY
Status: DISCONTINUED | OUTPATIENT
Start: 2022-09-25 | End: 2022-10-04 | Stop reason: HOSPADM

## 2022-09-25 RX ORDER — DULOXETIN HYDROCHLORIDE 30 MG/1
30 CAPSULE, DELAYED RELEASE ORAL DAILY
Status: DISCONTINUED | OUTPATIENT
Start: 2022-09-25 | End: 2022-10-04 | Stop reason: HOSPADM

## 2022-09-25 RX ORDER — EZETIMIBE 10 MG/1
10 TABLET ORAL DAILY
Status: DISCONTINUED | OUTPATIENT
Start: 2022-09-25 | End: 2022-10-04 | Stop reason: HOSPADM

## 2022-09-25 RX ORDER — ASCORBIC ACID 500 MG
500 TABLET ORAL 2 TIMES DAILY
Status: DISCONTINUED | OUTPATIENT
Start: 2022-09-25 | End: 2022-10-04 | Stop reason: HOSPADM

## 2022-09-25 RX ORDER — OXYCODONE HYDROCHLORIDE 5 MG/1
5 TABLET ORAL EVERY 4 HOURS PRN
Status: DISPENSED | OUTPATIENT
Start: 2022-09-25 | End: 2022-09-26

## 2022-09-25 RX ORDER — LISINOPRIL 2.5 MG/1
2.5 TABLET ORAL DAILY
Status: DISCONTINUED | OUTPATIENT
Start: 2022-09-25 | End: 2022-10-04 | Stop reason: HOSPADM

## 2022-09-25 RX ORDER — LORAZEPAM 0.5 MG/1
0.5 TABLET ORAL EVERY 8 HOURS PRN
Status: DISCONTINUED | OUTPATIENT
Start: 2022-09-25 | End: 2022-10-04 | Stop reason: HOSPADM

## 2022-09-25 RX ORDER — OMEGA-3S/DHA/EPA/FISH OIL/D3 300MG-1000
400 CAPSULE ORAL DAILY
Status: DISCONTINUED | OUTPATIENT
Start: 2022-09-25 | End: 2022-10-04 | Stop reason: HOSPADM

## 2022-09-25 RX ORDER — ONDANSETRON 2 MG/ML
4 INJECTION INTRAMUSCULAR; INTRAVENOUS EVERY 4 HOURS PRN
Status: DISCONTINUED | OUTPATIENT
Start: 2022-09-25 | End: 2022-10-04 | Stop reason: HOSPADM

## 2022-09-25 RX ORDER — ALLOPURINOL 100 MG/1
100 TABLET ORAL DAILY
Status: DISCONTINUED | OUTPATIENT
Start: 2022-09-25 | End: 2022-10-04 | Stop reason: HOSPADM

## 2022-09-25 RX ORDER — METOPROLOL TARTRATE 100 MG/1
100 TABLET ORAL DAILY
Status: DISCONTINUED | OUTPATIENT
Start: 2022-09-25 | End: 2022-10-04 | Stop reason: HOSPADM

## 2022-09-25 RX ORDER — HEPARIN SODIUM 5000 [USP'U]/ML
5000 INJECTION, SOLUTION INTRAVENOUS; SUBCUTANEOUS EVERY 8 HOURS SCHEDULED
Status: DISCONTINUED | OUTPATIENT
Start: 2022-09-25 | End: 2022-09-25

## 2022-09-25 RX ORDER — DOCUSATE SODIUM 100 MG/1
100 CAPSULE, LIQUID FILLED ORAL 2 TIMES DAILY
Status: DISCONTINUED | OUTPATIENT
Start: 2022-09-25 | End: 2022-09-27

## 2022-09-25 RX ADMIN — FOLIC ACID TAB 400 MCG 400 MCG: 400 TAB at 17:05

## 2022-09-25 RX ADMIN — METOPROLOL TARTRATE 100 MG: 100 TABLET, FILM COATED ORAL at 15:50

## 2022-09-25 RX ADMIN — ALLOPURINOL 100 MG: 100 TABLET ORAL at 15:50

## 2022-09-25 RX ADMIN — FUROSEMIDE 40 MG: 40 TABLET ORAL at 15:50

## 2022-09-25 RX ADMIN — OXYCODONE HYDROCHLORIDE AND ACETAMINOPHEN 500 MG: 500 TABLET ORAL at 17:06

## 2022-09-25 RX ADMIN — SODIUM CHLORIDE 1000 ML: 0.9 INJECTION, SOLUTION INTRAVENOUS at 07:06

## 2022-09-25 RX ADMIN — ACETAMINOPHEN 650 MG: 325 TABLET, FILM COATED ORAL at 15:50

## 2022-09-25 RX ADMIN — CHOLECALCIFEROL TAB 10 MCG (400 UNIT) 400 UNITS: 10 TAB at 15:50

## 2022-09-25 RX ADMIN — METOPROLOL TARTRATE 50 MG: 100 TABLET, FILM COATED ORAL at 21:15

## 2022-09-25 RX ADMIN — ONDANSETRON 4 MG: 2 INJECTION INTRAMUSCULAR; INTRAVENOUS at 07:07

## 2022-09-25 RX ADMIN — DILTIAZEM HYDROCHLORIDE 120 MG: 120 CAPSULE, COATED, EXTENDED RELEASE ORAL at 15:49

## 2022-09-25 RX ADMIN — EZETIMIBE 10 MG: 10 TABLET ORAL at 21:18

## 2022-09-25 RX ADMIN — FERROUS SULFATE TAB 325 MG (65 MG ELEMENTAL FE) 325 MG: 325 (65 FE) TAB at 15:54

## 2022-09-25 RX ADMIN — DULOXETINE HYDROCHLORIDE 30 MG: 30 CAPSULE, DELAYED RELEASE ORAL at 21:19

## 2022-09-25 RX ADMIN — LORAZEPAM 0.5 MG: 0.5 TABLET ORAL at 13:52

## 2022-09-25 RX ADMIN — OXYCODONE HYDROCHLORIDE 5 MG: 5 TABLET ORAL at 13:52

## 2022-09-25 RX ADMIN — DOCUSATE SODIUM 100 MG: 100 CAPSULE, LIQUID FILLED ORAL at 17:05

## 2022-09-25 RX ADMIN — IOHEXOL 100 ML: 350 INJECTION, SOLUTION INTRAVENOUS at 08:11

## 2022-09-25 RX ADMIN — SODIUM CHLORIDE 8 MG/HR: 9 INJECTION, SOLUTION INTRAVENOUS at 08:44

## 2022-09-25 RX ADMIN — SODIUM CHLORIDE 80 MG: 9 INJECTION, SOLUTION INTRAVENOUS at 08:29

## 2022-09-25 RX ADMIN — LISINOPRIL 2.5 MG: 2.5 TABLET ORAL at 15:50

## 2022-09-25 RX ADMIN — SODIUM CHLORIDE 8 MG/HR: 9 INJECTION, SOLUTION INTRAVENOUS at 17:07

## 2022-09-25 NOTE — ASSESSMENT & PLAN NOTE
· Blood pressure acceptable systolics in 873 to 923V  · Will continue home meds including metoprolol, lisinopril, Lasix, Cardizem  · Monitor with routine vitals

## 2022-09-25 NOTE — ASSESSMENT & PLAN NOTE
Background: Presented to the ED with complaining of vomiting and diarrhea earlier and on presentation still feeling "nauseous"  · CLD> regular diet for now   · IV fluids> discontinued on 9/26  · Secondary viral gastroenteritis versus other intra-abdominal process  · Stool cultures pending; low suspicion for C diff or infectious etiology as resolved without treatment    · Blood cultures negative to date  · Troponin unremarkable  · Telemetry discontinued

## 2022-09-25 NOTE — TELEPHONE ENCOUNTER
Patient vomiting (dark brown) and has diminished bowel sounds  VS within normal limits  Paged on call and provided call back number

## 2022-09-25 NOTE — ED PROVIDER NOTES
History  Chief Complaint   Patient presents with    Vomiting     Pt arrives ems for vomiting, unable to keep stuffs down  Small BM yesterday per pt         49-year-old female with past medical history of chronic kidney disease and morbid obesity presents emergency department complaining of vomiting and diarrhea  Patient states that she usually is constipated and treats herself with prune juice however has developed diarrhea that is nonbloody as well as episodes of spontaneous vomiting after eating or drinking  Patient denies nausea but states that whenever she consumes anything she is able to swallow without difficulty but then a few minutes later she spontaneously regurgitates  Patient states that when she has these episodes occasionally it appears similar to coffee-grounds however she is unsure if this is similar to the prune juice that she has been taking  Patient denies any abdominal pain or at this time  Patient denies any fevers, headaches, visual changes, hearing changes, nasal congestion, shortness of breath, chest pain, numbness, tingling, weakness, or any other concerns at this time  Vomiting  Severity:  Moderate  Duration:  1 day  Timing:  Constant  Quality:  Coffee grounds  How soon after eating does vomiting occur:  10 minutes  Progression:  Unchanged  Chronicity:  New  Recent urination:  Normal  Relieved by:  Nothing  Worsened by:  Nothing  Ineffective treatments:  None tried  Associated symptoms: diarrhea    Associated symptoms: no abdominal pain, no arthralgias, no chills, no cough, no fever and no sore throat        Prior to Admission Medications   Prescriptions Last Dose Informant Patient Reported? Taking?    BIOTIN PO  Self Yes Yes   Sig: Take by mouth in the morning   Cholecalciferol (VITAMIN D3 PO)  Self Yes Yes   Sig: Take by mouth in the morning   Cyanocobalamin (VITAMIN B-12 PO)  Self Yes Yes   Sig: Take by mouth in the morning   DULoxetine (CYMBALTA) 30 mg delayed release capsule No Yes   Sig: TAKE 1 CAPSULE(30 MG) BY MOUTH DAILY   FIBER PO   Yes No   Sig: Take 1 tablet by mouth daily   LORazepam (Ativan) 0 5 mg tablet   No No   Sig: Take 1 tablet (0 5 mg total) by mouth every 8 (eight) hours as needed for anxiety   Melatonin 10 MG TABS  Self Yes Yes   Sig: Take by mouth Takes daily at night   Mirabegron ER (Myrbetriq) 25 MG TB24   No No   Sig: Take 25 mg by mouth daily Takes in the evening   Multiple Vitamin (MULTIVITAMIN ADULT PO)  Self Yes Yes   Sig: Take by mouth in the morning   acetaminophen (TYLENOL) 650 mg CR tablet   No Yes   Sig: Take 1 tablet (650 mg total) by mouth every 8 (eight) hours as needed for mild pain   albuterol (PROVENTIL HFA,VENTOLIN HFA) 90 mcg/act inhaler   No Yes   Sig: INHALE 2 PUFFS BY MOUTH EVERY 6 HOURS AS NEEDED FOR WHEEZING   allopurinol (ZYLOPRIM) 100 mg tablet  Self No Yes   Sig: Take 1 tablet (100 mg total) by mouth daily   apixaban (Eliquis) 5 mg   No Yes   Sig: Take 1 tablet (5 mg total) by mouth 2 (two) times a day   ascorbic acid (VITAMIN C) 500 MG tablet   No Yes   Sig: Take 1 tablet (500 mg total) by mouth 2 (two) times a day   diltiazem (CARDIZEM CD) 120 mg 24 hr capsule  Self No Yes   Sig: Take 1 capsule (120 mg total) by mouth daily   Patient taking differently: Take 120 mg by mouth daily Takes in the am   docusate sodium (COLACE) 100 mg capsule   No Yes   Sig: Take 1 capsule (100 mg total) by mouth 2 (two) times a day   ezetimibe (ZETIA) 10 mg tablet   No Yes   Sig: Take 1 tablet (10 mg total) by mouth daily   ferrous sulfate 324 (65 Fe) mg   No Yes   Sig: Take 1 tablet (324 mg total) by mouth 2 (two) times a day before meals   folic acid (FOLVITE) 1 mg tablet   No Yes   Sig: TAKE 1 TABLET(1 MG) BY MOUTH DAILY   furosemide (LASIX) 40 mg tablet  Self No Yes   Sig: Take 1 tablet (40 mg total) by mouth in the morning     lisinopril (ZESTRIL) 2 5 mg tablet  Self No Yes   Sig: Take 1 tablet (2 5 mg total) by mouth daily   metoprolol tartrate (LOPRESSOR) 50 mg tablet  Self No Yes   Sig: Take 100 mg in the morning and 50 mg in the evening   naloxone (NARCAN) 4 mg/0 1 mL nasal spray   No No   Sig: Administer 1 spray into a nostril  If no response after 2-3 minutes, give another dose in the other nostril using a new spray  Patient not taking: No sig reported   oxyCODONE (ROXICODONE) 5 immediate release tablet   No Yes   Sig: Take 1 tablet (5 mg total) by mouth every 4 (four) hours as needed for moderate pain for up to 10 days Max Daily Amount: 30 mg   oxybutynin (DITROPAN-XL) 5 mg 24 hr tablet   No Yes   Sig: Take 1 tablet (5 mg total) by mouth daily   Patient taking differently: Take 5 mg by mouth daily Takes at night      Facility-Administered Medications: None       Past Medical History:   Diagnosis Date    Abnormal ECG     Last Assessed 9/29/2016    Anxiety     Last Assessed 6/08/2016    Arthritis     knees    Asthma     Last Assessed 11/06/2013    Atrial premature complex     Cataract, bilateral     Last Assessed 7/14/2016    Cataract, left eye     Both eyes  Had surgery on left   COVID-19     Difficulty swallowing     Dizziness     Fibromyalgia     Fibromyalgia, primary     Gastric reflux     Heart failure ([facility] [address] )     5/23/22 Pt reports had heart failure in the past    History of COVID-19     5/23/22 Pt reports having COVID in 2021      History of transfusion     no reaction    [place]     Hyperlipidemia     Hypertension     Incontinence in female     5/23/22 Pt reports has incontinence -wears depends especially at night/riding in car    Irregular heart beat     5/23/22 Pt reports hx of A fib    Lyme disease     PONV (postoperative nausea and vomiting)     Premature ventricular contraction     Primary osteoarthritis of both knees     Last Assessed 7/14/2016    Rheumatic fever     5/23/22 Pt reports had hx of rheumatic fever as a child twice    Sore throat     Tuberculosis 1947       Past Surgical History: Procedure Laterality Date    APPENDECTOMY      CARDIAC PACEMAKER PLACEMENT  2019    COLONOSCOPY      DENTAL SURGERY      extractions    HYSTERECTOMY      5/23/22 Pt reports had appendix out with hysterectomy and "tightened up my bladder"    ORIF FEMUR FRACTURE Left 08/05/2021    Procedure: OPEN REDUCTION W/ INTERNAL FIXATION (ORIF) DISTAL FEMUR; INSERTION RETROGRADE NAIL;  Surgeon: Ruben Remy MD;  [facility];  Service: Orthopedics    IA DILATE ESOPHAGUS N/A 04/06/2016    Procedure: DILATATION ESOPHAGEAL;  Surgeon: Rolando Jiménez MD;  [facility]: [facility]; Service: Gastroenterology    IA EGD TRANSORAL BIOPSY SINGLE/MULTIPLE N/A 04/06/2016    Procedure: ESOPHAGOGASTRODUODENOSCOPY (EGD); Surgeon: Rolando Jiménez MD;  [facility]; Service: Gastroenterology    IA REMOVAL DEEP IMPLANT Left 9/12/2022    Procedure: REMOVAL NAIL IM  FEMUR;  Surgeon: Ruben Remy MD;  [facility];  Service: Orthopedics    IA TOTAL KNEE ARTHROPLASTY Left 9/12/2022    Procedure: ARTHROPLASTY KNEE TOTAL;  Surgeon: Ruben Remy MD;  [facility];  Service: Orthopedics    IA XCAPSL CTRC RMVL INSJ IO LENS PROSTH W/O ECP Left 03/15/2016    Procedure: EXTRACTION EXTRACAPSULAR CATARACT PHACO INTRAOCULAR LENS (IOL); Surgeon: Mary Levy MD;  [facility];  Service: Ophthalmology    REPLACEMENT TOTAL KNEE Right     REPLACEMENT TOTAL KNEE      right     TONSILLECTOMY         Family History   Problem Relation Age of Onset    Coronary artery disease Mother     Heart attack Mother         Prior    Heart disease Father     Heart attack Father         Prior    Anesthesia problems Neg Hx      I have reviewed and agree with the history as documented      E-Cigarette/Vaping    E-Cigarette Use Never User     Comments Denies any former or current use      E-Cigarette/Vaping Substances     Social History     Tobacco Use    Smoking status: Never Smoker    Smokeless tobacco: Never Used    Tobacco comment: Denies any former or current smoking   Vaping Use    Vaping Use: Never used   Substance Use Topics    Alcohol use: Not Currently     Comment: Per [employer]- pt reports no current alcohol use at this time    Drug use: No     Comment: Denies any former or current drug use        Review of Systems   Constitutional: Negative for chills and fever  HENT: Negative for ear pain and sore throat  Eyes: Negative for pain and visual disturbance  Respiratory: Negative for cough and shortness of breath  Cardiovascular: Negative for chest pain and palpitations  Gastrointestinal: Positive for diarrhea and vomiting  Negative for abdominal pain  Genitourinary: Negative for dysuria and hematuria  Musculoskeletal: Negative for arthralgias and back pain  Skin: Negative for color change and rash  Neurological: Negative for seizures and syncope  All other systems reviewed and are negative  Physical Exam  ED Triage Vitals   Temperature Pulse Respirations Blood Pressure SpO2   09/25/22 0631 09/25/22 0631 09/25/22 0631 09/25/22 0631 09/25/22 0631   98 3 °F (36 8 °C) 72 18 139/60 95 %      Temp Source Heart Rate Source Patient Position - Orthostatic VS BP Location FiO2 (%)   09/25/22 0631 09/25/22 0631 09/25/22 0800 09/25/22 0631 --   Oral Monitor Lying Right arm       Pain Score       09/25/22 0631       No Pain             Orthostatic Vital Signs  Vitals:    09/25/22 0631 09/25/22 0800   BP: 139/60 144/65   Pulse: 72 72   Patient Position - Orthostatic VS:  Lying       Physical Exam  Vitals and nursing note reviewed  Constitutional:       General: She is not in acute distress  Appearance: She is well-developed  She is obese  HENT:      Head: Normocephalic and atraumatic        Right Ear: External ear normal       Left Ear: External ear normal       Nose: Nose normal       Mouth/Throat:      Mouth: Mucous membranes are moist    Eyes:      Conjunctiva/sclera: Conjunctivae normal    Cardiovascular:      Rate and Rhythm: Normal rate and regular rhythm  Heart sounds: No murmur heard  Pulmonary:      Effort: Pulmonary effort is normal  No respiratory distress  Breath sounds: Normal breath sounds  Abdominal:      Palpations: Abdomen is soft  Tenderness: There is no abdominal tenderness  Musculoskeletal:      Cervical back: Normal range of motion and neck supple  Skin:     General: Skin is warm and dry  Neurological:      Mental Status: She is alert  Mental status is at baseline     Psychiatric:         Mood and Affect: Mood normal          Behavior: Behavior normal          ED Medications  Medications   pantoprazole (PROTONIX) 80 mg in sodium chloride 0 9 % 100 mL IVPB (0 mg Intravenous Stopped 9/25/22 0844)     Followed by   pantoprazole (PROTONIX) 80 mg in sodium chloride 0 9 % 100 mL infusion (8 mg/hr Intravenous New Bag 9/25/22 0844)   ondansetron (ZOFRAN) injection 4 mg (4 mg Intravenous Given 9/25/22 0707)   sodium chloride 0 9 % bolus 1,000 mL (1,000 mL Intravenous New Bag 9/25/22 0706)   iohexol (OMNIPAQUE) 350 MG/ML injection (SINGLE-DOSE) 100 mL (100 mL Intravenous Given 9/25/22 0811)       Diagnostic Studies  Results Reviewed     Procedure Component Value Units Date/Time    Protime-INR [[id number]]  (Abnormal) Collected: 09/25/22 0706    Lab Status: Final result Specimen: Blood from Arm, Left Updated: 09/25/22 0840     Protime 16 3 seconds      INR 1 28    APTT [[id number]]  (Normal) Collected: 09/25/22 0706    Lab Status: Final result Specimen: Blood from Arm, Left Updated: 09/25/22 0840     PTT 35 seconds     Comprehensive metabolic panel [[id number]]  (Abnormal) Collected: 09/25/22 0706    Lab Status: Final result Specimen: Blood from Arm, Left Updated: 09/25/22 0735     Sodium 143 mmol/L      Potassium 3 8 mmol/L      Chloride 101 mmol/L      CO2 33 mmol/L      ANION GAP 9 mmol/L      BUN 13 mg/dL      Creatinine 0 82 mg/dL      Glucose 130 mg/dL      Calcium 9 2 mg/dL      Corrected Calcium 9 9 mg/dL      AST 18 U/L      ALT 15 U/L      Alkaline Phosphatase 66 U/L      Total Protein 6 5 g/dL      Albumin 3 1 g/dL      Total Bilirubin 0 42 mg/dL      eGFR 68 ml/min/1 73sq m     Narrative:      [place] guidelines for Chronic Kidney Disease (CKD):     Stage 1 with normal or high GFR (GFR > 90 mL/min/1 73 square meters)    Stage 2 Mild CKD (GFR = 60-89 mL/min/1 73 square meters)    Stage 3A Moderate CKD (GFR = 45-59 mL/min/1 73 square meters)    Stage 3B Moderate CKD (GFR = 30-44 mL/min/1 73 square meters)    Stage 4 Severe CKD (GFR = 15-29 mL/min/1 73 square meters)    Stage 5 End Stage CKD (GFR <15 mL/min/1 73 square meters)  Note: GFR calculation is accurate only with a steady state creatinine    CBC and differential [[id number]]  (Abnormal) Collected: 09/25/22 0706    Lab Status: Final result Specimen: Blood from Arm, Left Updated: 09/25/22 0730     WBC 7 84 Thousand/uL      RBC 2 94 Million/uL      Hemoglobin 8 8 g/dL      Hematocrit 27 9 %      MCV 95 fL      MCH 29 9 pg      MCHC 31 5 g/dL      RDW 14 7 %      MPV 8 4 fL      Platelets 720 Thousands/uL      nRBC 0 /100 WBCs      Neutrophils Relative 68 %      Immat GRANS % 1 %      Lymphocytes Relative 16 %      Monocytes Relative 9 %      Eosinophils Relative 5 %      Basophils Relative 1 %      Neutrophils Absolute 5 40 Thousands/µL      Immature Grans Absolute 0 10 Thousand/uL      Lymphocytes Absolute 1 24 Thousands/µL      Monocytes Absolute 0 68 Thousand/µL      Eosinophils Absolute 0 37 Thousand/µL      Basophils Absolute 0 05 Thousands/µL                  CT abdomen pelvis with contrast   Final Result by Huy Calle MD (09/25 4671)      Small right pleural effusion with minimal bibasilar subsegmental atelectasis, right greater than left  Minimal nonspecific bladder wall thickening likely underdistention   Correlate with history and if necessary UA to exclude cystitis  Otherwise, no evidence of acute intraabdominopelvic process  Partially visualized left lateral proximal femoral deep subcutaneous fluid collection measuring at least 5 5 x 5 cm presumably seroma given recent orthopedic procedure  Colonic diverticulosis  Additional chronic findings and negatives as above  This study demonstrates a significant  finding and was documented as such in [facility] for liaison and referring practitioner notification  Workstation performed: [id number]         XR chest 2 views   ED Interpretation by Angel Villalobos MD (09/25 8532)   No acute cardiopulmonary disease            Procedures  ECG 12 Lead Documentation Only    Date/Time: 9/25/2022 9:05 AM  Performed by: Angel Villalobos MD  Authorized by: Angel Villalobos MD     Patient location:  ED  Previous ECG:     Previous ECG:  Compared to current    Comparison ECG info:  Now paced rhythm    Similarity:  Changes noted  Interpretation:     Interpretation: abnormal    Rate:     ECG rate:  71    ECG rate assessment: normal    Rhythm:     Rhythm: paced    Pacing:     Capture:  Complete    Type of pacing:  Atrial  Comments:      Atrial paced at 71 beats per minute, no acute ischemic changes  ED Course                                       MDM  Number of Diagnoses or Management Options  Hematemesis  Nausea and vomiting  Diagnosis management comments: 49-year-old female with past medical history of chronic kidney disease, Afib on Eliquis, and morbid obesity presents emergency department complaining of vomiting and diarrhea  Patient was seen examined noted to be obese, not in acute distress, abdomen soft nontender  Due to patient's history and presentation CBC, CMP, type and screen, coags, APTT was obtained  Laboratory analysis reported stable H&H at 8 8/27 9 and stable coags at 16 3/1  28    EKG reported atrial paced rhythm at 71 beats per minute without acute ischemic changes  Chest x-ray reported no acute cardiopulmonary disease  CT of the abdomen and pelvis is reported no acute intra-abdominal abnormality however small right pleural effusion with minimal bibasilar subsegmental atelectasis was noted  Patient was given Zofran, fluids as well as Protonix  Discussed patient's case with jack who accepted the patient for admission with further evaluation and management  Amount and/or Complexity of Data Reviewed  Clinical lab tests: ordered and reviewed  Tests in the radiology section of CPT®: ordered and reviewed  Tests in the medicine section of CPT®: reviewed and ordered  Decide to obtain previous medical records or to obtain history from someone other than the patient: yes  Review and summarize past medical records: yes    Patient Progress  Patient progress: stable      Disposition  Final diagnoses:   Hematemesis   Nausea and vomiting     Time reflects when diagnosis was documented in both MDM as applicable and the Disposition within this note     Time User Action Codes Description Comment    9/25/2022  9:14 AM maxwell West Add [K92 0] Hematemesis     9/25/2022  9:14 AM maxwell West Add [R11 2] Nausea and vomiting       ED Disposition     ED Disposition   Admit    Condition   Stable    Date/Time   Sun Sep 25, 2022  9:14 AM    Comment   Case was discussed with JACK and the patient's admission status was agreed to be Admission Status: observation status to the service of Dr Nina Lopez  Follow-up Information    None         Patient's Medications   Discharge Prescriptions    No medications on file     No discharge procedures on file  PDMP Review       Value Time User    PDMP Reviewed  Yes 9/25/2022  6:32 AM Selin Schmidt MD           ED Provider  Attending physically available and evaluated Quentin Nguyen  GILBERTO managed the patient along with the ED Attending      Electronically Signed by         Rylee Antonio MD  09/25/22 0772

## 2022-09-25 NOTE — ED ATTENDING ATTESTATION
9/25/2022  IPalmer MD, saw and evaluated the patient  I have discussed the patient with the resident/non-physician practitioner and agree with the resident's/non-physician practitioner's findings, Plan of Care, and MDM as documented in the resident's/non-physician practitioner's note, except where noted  All available labs and Radiology studies were reviewed  I was present for key portions of any procedure(s) performed by the resident/non-physician practitioner and I was immediately available to provide assistance  At this point I agree with the current assessment done in the Emergency Department  I have conducted an independent evaluation of this patient a history and physical is as follows: Patient is a 76year old female with worsening N/V with dark coffee ground like emesis since yesterday  No abdominal pain  No head injury or trauma  No diarrhea  No GI bleeding  No urinary sx  Patient is on eliquis  Was last seen at [address] ED on 9/20/21 for acute respiratory failure with hypoxia  [facility] website checked on this patient and last Rx filled was on 9/16/22 for oxycodone for 6 day supply  NCAT  Mucous membranes somewhat moist  Lungs clear  Heart regular with murmur  Abdomen soft and nontender  Good bowel sounds  No rash or jaundice noted  DDx including but not limited to: food poisoning, viral illness, metabolic abnormality, dehydration, intracranial process, GI etiology, UTI, adverse reaction; doubt acute surgical intraabdominal process, Boorhave's syndrome, mediastinitis  Differential diagnosis including but not limited to: upper GI bleed, esophageal varices, gastritis, PUD; doubt diverticulosis, AVM, hemorrhoids, anemia, coagulopathy, liver disease, tumor, anal fissure  Will check labs and EKG and CXR and CT  IV protonix drip ordered       ED Course         Critical Care Time  Procedures

## 2022-09-25 NOTE — ASSESSMENT & PLAN NOTE
· Unclear etiology however of note patient extremely more sedentary than usual postoperatively  · Liver ultrasound for completeness; if not completed prior to discharge can follow up outpatient

## 2022-09-25 NOTE — H&P
[address] 1945, 76 y o  female MRN: [id number]  Unit/Bed#: ED-04 Encounter: [id number]  Primary Care Provider: Tanesha Hargrove MD   Date and time admitted to hospital: 9/25/2022  6:25 AM     * Vomiting and diarrhea  Assessment & Plan  Patient complaining of vomiting and diarrhea earlier today  Still feeling "nauseous"  Will order clear liquid diet patient will only order food if she feels up to  IV fluids  ? Secondary viral gastroenteritis versus other intra-abdominal process  Will get stool cultures  Also will get blood cultures  Will rule out cardiac cause as well - telemetry , tropx 3             Pleural effusion on right  Assessment & Plan  Unclear etiology  Will get ultrasound of the liver     Diverticulosis  Assessment & Plan  No evidence of diverticulitis also patient does not have any diarrhea     Abdominal fluid collection  Assessment & Plan  Patient has what appears to be a seroma at the left femur proximally  Could be a postoperative complication  Will consult Ortho to determine if this is an expected change  Patient has had poor mobility since her surgery a week ago and has not been able to weightbear on her left lower extremity     Bladder wall thickening  Assessment & Plan  Unclear significance  Will follow-up on UA and urine micro     Anemia  Assessment & Plan  Hemoglobin is 8 8 today this appears pretty stable when compared to 8 9 a week ago  Patient is on iron and folic acid as well as R16  Will not check levels at this juncture     Anxiety  Assessment & Plan  Stable will continue home Ativan     Essential hypertension  Assessment & Plan  Blood pressure is 144/65  Will continue home meds including metoprolol, lisinopril, Lasix, Cardizem           VTE Pharmacologic Prophylaxis: VTE Score: 4 Moderate Risk (Score 3-4) - Pharmacological DVT Prophylaxis Ordered: heparin  Code Status: Prior   Discussion with family: called Carol Cummins    Anticipated Length of Stay: Patient will be admitted on an observation basis with an anticipated length of stay of less than 2 midnights secondary to abdominal pain and nausea and vomiting        Total Time for Visit, including Counseling / Coordination of Care: 45 minutes Greater than 50% of this total time spent on direct patient counseling and coordination of care      Chief Complaint: abdominal pain, nausea and vomiting       History of Present Illness:  Shivani Torres is a 76 y o  female with a 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, paroxysmal atrial fibrillation, essential hypertension, CKD as well as closed bicondylar fracture of the left femur, who recently had hardware removal by Orthopedic surgery earlier this month  of  who presents with a 3 day history of diarrhea, nausea inability to keep any oral intake down and some abdominal pain      Today in the ED when I see her she is not complaining of any pain but still feels nauseous and not herself  She states she has never had symptoms like this before and is concerned about what could be going on      She has not managed to keep any food down for 3 days and feels better after IV fluids      She states that since her hardware removal earlier this month she has been unable to weightbear on her left leg and has been weight-bearing only on the right leg ambulating with a walker      In terms of laboratory workup her INR is 1 28, her BMP is unremarkable apart from a slightly low albumin at 3 1  Her hemoglobin is 8 8 but this is relatively stable when compared to prior and he has no leukocytosis      Blood pressure and heart rates are within the normal range    A CT scan done in the emergency department shows a small right pleural effusion as well as nonspecific bladder wall thickening in addition to left lateral proximal femoral deep subcutaneous fluid collection measuring 5 x 5 cm and presumed to be a seroma by reading radiologist            Review of Systems:  Review of Systems   Constitutional: Negative for appetite change, fatigue, fever and unexpected weight change  HENT: Negative  Eyes: Negative  Respiratory: Negative  Cardiovascular: Negative  Gastrointestinal: Positive for abdominal distention, abdominal pain, diarrhea and nausea  Endocrine: Negative  Genitourinary: Negative  Musculoskeletal: Positive for arthralgias (left hip pain )  Skin: Positive for color change (some erythema surrounding left hip  )  Neurological: Negative  Hematological: Negative  Psychiatric/Behavioral: Negative           Past Medical and Surgical History:   Medical History        Past Medical History:   Diagnosis Date    Abnormal ECG       Last Assessed 9/29/2016    Anxiety       Last Assessed 6/08/2016    Arthritis       knees    Asthma       Last Assessed 11/06/2013    Atrial premature complex      Cataract, bilateral       Last Assessed 7/14/2016    Cataract, left eye       Both eyes  Had surgery on left   COVID-19      Difficulty swallowing      Dizziness      Fibromyalgia      Fibromyalgia, primary      Gastric reflux      Heart failure ([facility] [address] )       5/23/22 Pt reports had heart failure in the past    History of COVID-19       5/23/22 Pt reports having COVID in 2021      History of transfusion       no reaction    Kaw (hard of hearing)      Hyperlipidemia      Hypertension      Incontinence in female       5/23/22 Pt reports has incontinence -wears depends especially at night/riding in car    Irregular heart beat       5/23/22 Pt reports hx of A fib    Lyme disease      PONV (postoperative nausea and vomiting)      Premature ventricular contraction      Primary osteoarthritis of both knees       Last Assessed 7/14/2016    Rheumatic fever       5/23/22 Pt reports had hx of rheumatic fever as a child twice    Sore throat      Tuberculosis 1947            Surgical History         Past Surgical History:   Procedure Laterality Date    APPENDECTOMY        CARDIAC PACEMAKER PLACEMENT   2019    COLONOSCOPY        DENTAL SURGERY         extractions    HYSTERECTOMY         5/23/22 Pt reports had appendix out with hysterectomy and "tightened up my bladder"    ORIF FEMUR FRACTURE Left 08/05/2021     Procedure: OPEN REDUCTION W/ INTERNAL FIXATION (ORIF) DISTAL FEMUR; INSERTION RETROGRADE NAIL;  Surgeon: Lesvia Tran MD;  [facility];  Service: Orthopedics    NJ DILATE ESOPHAGUS N/A 04/06/2016     Procedure: DILATATION ESOPHAGEAL;  Surgeon: Maverick Mosher MD;  [facility]; Service: Gastroenterology    NJ EGD TRANSORAL BIOPSY SINGLE/MULTIPLE N/A 04/06/2016     Procedure: ESOPHAGOGASTRODUODENOSCOPY (EGD); Surgeon: Maverick Mosher MD;  [facility]; Service: Gastroenterology    NJ REMOVAL DEEP IMPLANT Left 9/12/2022     Procedure: REMOVAL NAIL IM  FEMUR;  Surgeon: Lesvia Tran MD;  [facility];  Service: Orthopedics    NJ TOTAL KNEE ARTHROPLASTY Left 9/12/2022     Procedure: ARTHROPLASTY KNEE TOTAL;  Surgeon: Lesvia Tran MD;  [facility];  Service: Orthopedics    NJ XCAPSL CTRC RMVL INSJ IO LENS PROSTH W/O ECP Left 03/15/2016     Procedure: EXTRACTION EXTRACAPSULAR CATARACT PHACO INTRAOCULAR LENS (IOL); Surgeon: Sarkis Ojeda MD;  [facility];  Service: Ophthalmology    REPLACEMENT TOTAL KNEE Right      REPLACEMENT TOTAL KNEE         right     TONSILLECTOMY                Meds/Allergies:          Prior to Admission medications    Medication Sig Start Date End Date Taking?  Authorizing Provider   acetaminophen (TYLENOL) 650 mg CR tablet Take 1 tablet (650 mg total) by mouth every 8 (eight) hours as needed for mild pain 9/12/22 10/12/22 Yes Angela Bruner PA-C   albuterol (PROVENTIL HFA,VENTOLIN HFA) 90 mcg/act inhaler INHALE 2 PUFFS BY MOUTH EVERY 6 HOURS AS NEEDED FOR WHEEZING 12/23/20   Yes Yolette Remy MD allopurinol (ZYLOPRIM) 100 mg tablet Take 1 tablet (100 mg total) by mouth daily 12/7/21   Yes Sajan Calderon MD   apixaban (Eliquis) 5 mg Take 1 tablet (5 mg total) by mouth 2 (two) times a day 8/23/22   Yes Sajan Calderon MD   ascorbic acid (VITAMIN C) 500 MG tablet Take 1 tablet (500 mg total) by mouth 2 (two) times a day 8/9/22   Yes Macy Evans PA-C   BIOTIN PO Take by mouth in the morning     Yes Historical Provider, MD   Cholecalciferol (VITAMIN D3 PO) Take by mouth in the morning     Yes Historical Provider, MD   Cyanocobalamin (VITAMIN B-12 PO) Take by mouth in the morning     Yes Historical Provider, MD   diltiazem (CARDIZEM CD) 120 mg 24 hr capsule Take 1 capsule (120 mg total) by mouth daily  Patient taking differently: Take 120 mg by mouth daily Takes in the am 3/29/22   Yes Alek Tyson MD   docusate sodium (COLACE) 100 mg capsule Take 1 capsule (100 mg total) by mouth 2 (two) times a day 9/12/22   Yes Macy Evans PA-C   DULoxetine (CYMBALTA) 30 mg delayed release capsule TAKE 1 CAPSULE(30 MG) BY MOUTH DAILY 8/5/22   Yes Sajan Calderon MD   ezetimibe (ZETIA) 10 mg tablet Take 1 tablet (10 mg total) by mouth daily 2/28/22   Yes JERI Bianchi   ferrous sulfate 324 (65 Fe) mg Take 1 tablet (324 mg total) by mouth 2 (two) times a day before meals 8/9/22   Yes Macy Evans PA-C   folic acid (FOLVITE) 1 mg tablet TAKE 1 TABLET(1 MG) BY MOUTH DAILY 4/20/22   Yes Macy Evans PA-C   furosemide (LASIX) 40 mg tablet Take 1 tablet (40 mg total) by mouth in the morning  5/23/22   Yes JERI Bianchi   lisinopril (ZESTRIL) 2 5 mg tablet Take 1 tablet (2 5 mg total) by mouth daily 6/8/22   Yes Alek Tyson MD   Melatonin 10 MG TABS Take by mouth Takes daily at night     Yes Historical Provider, MD   metoprolol tartrate (LOPRESSOR) 50 mg tablet Take 100 mg in the morning and 50 mg in the evening 6/1/22   Yes Sajan Calderon MD   Multiple Vitamin (MULTIVITAMIN ADULT PO) Take by mouth in the morning     Yes Historical Provider, MD   oxybutynin (DITROPAN-XL) 5 mg 24 hr tablet Take 1 tablet (5 mg total) by mouth daily  Patient taking differently: Take 5 mg by mouth daily Takes at night 12/7/21   Yes Corinna Halsted, MD   oxyCODONE (ROXICODONE) 5 immediate release tablet Take 1 tablet (5 mg total) by mouth every 4 (four) hours as needed for moderate pain for up to 10 days Max Daily Amount: 30 mg 9/16/22 9/26/22 Yes Melissa Echeverria Fillers Rettaliata, PA-C   FIBER PO Take 1 tablet by mouth daily       Historical Provider, MD   LORazepam (Ativan) 0 5 mg tablet Take 1 tablet (0 5 mg total) by mouth every 8 (eight) hours as needed for anxiety 8/23/22     Corinna Halsted, MD   Mirabegron ER (Myrbetriq) 25 MG TB24 Take 25 mg by mouth daily Takes in the evening 8/23/22     Corinna Halsted, MD   naloxone [facility]) 4 mg/0 1 mL nasal spray Administer 1 spray into a nostril  If no response after 2-3 minutes, give another dose in the other nostril using a new spray  Patient not taking: No sig reported 4/20/22     Alena Hayden MD      I have reviewed home medications with patient personally      Allergies: Allergies   Allergen Reactions    Penicillins Hives       Itching and terrible hives    Sulfa Antibiotics Itching    P20 Folate [Folic Acid-Vit F9-BZR A39 - Food Allergy] Other (See Comments)       5/23/22 Pt reports no allergic reaction known     [place] Other (See Comments)       Unknown, 5/23 -pt is unaware of reaction     Lyrica [Pregabalin] Other (See Comments)       5/23/22 Pt reports doesn't remember reaction     Other Other (See Comments)       Black rubber 5/23/22 per allergy test - pt unaware of reaction    Cortisone Acetate [Cortisone] Itching and Rash       5/23/22 pt reports makes her violent     Nickel Other (See Comments)       Skin discoloration         Social History:  Marital Status:     Occupation:  Retired  Patient Pre-hospital Living Situation: Home  Patient Pre-hospital Level of Mobility: walks with walker  Patient Pre-hospital Diet Restrictions:  None  Substance Use History:   Social History           Substance and Sexual Activity   Alcohol Use Not Currently     Comment: Per [employer]- pt reports no current alcohol use at this time      Social History           Tobacco Use   Smoking Status Never Smoker   Smokeless Tobacco Never Used   Tobacco Comment     Denies any former or current smoking      Social History      Substance and Sexual Activity   Drug Use No     Comment: Denies any former or current drug use         Family History:  Family History         Family History   Problem Relation Age of Onset    Coronary artery disease Mother      Heart attack Mother           Prior    Heart disease Father      Heart attack Father           Prior    Anesthesia problems Neg Hx              Physical Exam:      Vitals:   Blood Pressure: 144/65 (09/25/22 0800)  Pulse: 72 (09/25/22 0800)  Temperature: 98 3 °F (36 8 °C) (09/25/22 0631)  Temp Source: Oral (09/25/22 0631)  Respirations: 16 (09/25/22 0800)  Weight - Scale: 107 kg (235 lb 14 3 oz) (09/25/22 0631)  SpO2: 93 % (09/25/22 0800)     Physical Exam  HENT:      Head: Normocephalic  Mouth/Throat:      Mouth: Mucous membranes are moist    Eyes:      General: No scleral icterus  Right eye: No discharge  Left eye: No discharge  Pupils: Pupils are equal, round, and reactive to light  Cardiovascular:      Rate and Rhythm: Normal rate  Pulmonary:      Effort: Pulmonary effort is normal  No respiratory distress  Breath sounds: No stridor  No wheezing, rhonchi or rales  Chest:      Chest wall: No tenderness  Abdominal:      General: Abdomen is flat  There is no distension  Palpations: There is no mass  Tenderness: There is no right CVA tenderness, left CVA tenderness or guarding  Hernia: No hernia is present     Musculoskeletal: General: No swelling, tenderness, deformity or signs of injury  Right lower leg: No edema  Left lower leg: No edema  Skin:     Capillary Refill: Capillary refill takes less than 2 seconds  Coloration: Skin is pale  Skin is not jaundiced  Findings: No bruising, lesion or rash  Neurological:      General: No focal deficit present  Mental Status: She is alert  Cranial Nerves: No cranial nerve deficit  Sensory: No sensory deficit  Motor: No weakness  Coordination: Coordination normal       Gait: Gait normal       Deep Tendon Reflexes: Reflexes normal    Psychiatric:         Mood and Affect: Mood normal             Additional Data:      Lab Results:       Results from last 7 days   Lab Units 09/25/22  0706   WBC Thousand/uL 7 84   HEMOGLOBIN g/dL 8 8*   HEMATOCRIT % 27 9*   PLATELETS Thousands/uL 645*   NEUTROS PCT % 68   LYMPHS PCT % 16   MONOS PCT % 9   EOS PCT % 5           Results from last 7 days   Lab Units 09/25/22  0706   SODIUM mmol/L 143   POTASSIUM mmol/L 3 8   CHLORIDE mmol/L 101   CO2 mmol/L 33*   BUN mg/dL 13   CREATININE mg/dL 0 82   ANION GAP mmol/L 9   CALCIUM mg/dL 9 2   ALBUMIN g/dL 3 1*   TOTAL BILIRUBIN mg/dL 0 42   ALK PHOS U/L 66   ALT U/L 15   AST U/L 18   GLUCOSE RANDOM mg/dL 130           Results from last 7 days   Lab Units 09/25/22  0706   INR   1 28*                     Imaging: Reviewed radiology reports from this admission including: abdominal/pelvic CT  CT abdomen pelvis with contrast   Final Result by Chris Gallardo MD (09/25 6355)       Small right pleural effusion with minimal bibasilar subsegmental atelectasis, right greater than left        Minimal nonspecific bladder wall thickening likely underdistention   Correlate with history and if necessary UA to exclude cystitis        Otherwise, no evidence of acute intraabdominopelvic process        Partially visualized left lateral proximal femoral deep subcutaneous fluid collection measuring at least 5 5 x 5 cm presumably seroma given recent orthopedic procedure        Colonic diverticulosis        Additional chronic findings and negatives as above        This study demonstrates a significant  finding and was documented as such in Epic for liaison and referring practitioner notification                 Workstation performed: [id number]           XR chest 2 views   ED Interpretation by Angel Villalobos MD (09/25 9637)   No acute cardiopulmonary disease       Final Result by Gerald Darnell MD (09/25 2909)       No acute cardiopulmonary disease                        Workstation performed: [id number]                 EKG and Other Studies Reviewed on Admission:   · EKG: sinus rhythm       ** Please Note: This note has been constructed using a voice recognition system   **

## 2022-09-25 NOTE — ASSESSMENT & PLAN NOTE
· Hemoglobin is 8 8 this appears pretty stable when compared to 8 9 a week ago    · Continue on iron and folic acid as well as H85  · Ongoing outpatient monitoring and follow-up with PCP    Lab Results   Component Value Date    HGB 8 2 (L) 09/26/2022    HGB 7 9 (L) 09/25/2022    HGB 8 2 (L) 09/25/2022

## 2022-09-25 NOTE — PROGRESS NOTES
[address], [address] [place] [address], [address] [place] [address]  [phone]    NAME: Kizzie Severe  AGE: 76 y o  SEX: female    Progress Note    Location: [facility]   POS: 31 (SNF)    Assessment/Plan:    Closed bicondylar fracture of left femur with delayed healing  S/p distal of left femur fx with delayed healing  Recent hospitalization on 9/12/22 and underwent uncomplicated left distal femoral replacement and removal of deep implant left femur  Postop course was complicated by anemia- s/p PRBC transfusion   Remains on Eliquis for PAF and DVT prophylaxis  When arrived at facility[facility] reports left leg lower incision was saturated with urine  At that time incision was with mild area of erythema adjacent to left distal thigh incision, warm to touch and no drainage  Started on doxycycline, patient had an allergic reaction and stopped  Today on exam left leg lower incision erythema has improved, no warm to touch and no drainage   Denies fever or chills  Remains afebrile, non toxic and VSS  Continue PT  F/u with orthopedic service    Anemia  Recent hemoglobin 8 3, hct 24 9, stable   No active bleeding noted on exam  Continue daily MVI and supplements  Continue to monitor CBC and trend   CBC in 10- days     Ambulatory dysfunction  S/o closed bicondylar fx of left femur with delayed healing  PT OT  Fall and safety precautions  Continue with supportive   Optimize acute and chronic medical conditions   SW following for d/c planning     Essential hypertension  bp log reviewed and stable   BP this am 138/60, SBPs trending 114-170, one episode of 99/51 on 9/23  Asymptomatic  C/w lisinopril 25 mg daily, diltiazem 120 mg daily, and metoprolol 100 mg daily   Remains on lasix 40 mg daily  Continue with adequate po hydration  monitor    Chief complaint / Reason for visit:  STR F/U     Patient's care was coordinated with nursing facility staff   Recent vitals, labs, and updated medications were review on [facility] in facility  History of Present Illness:  41-year-old female seen and examined for short-term rehab follow-up  Offers no complaint and is in no acute distress  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, removal of deep implants of left femur  Left leg with upper incision, dressing intact with no drainage and no erythema around the site  Left leg lower incision approximated with staples  Area appears today with less erythema, no warm to touch and no drainage  During examination noted right neck with red areas, noted to be hives  Per patient, it has been itching  Patient was placed on doxycycline in the setting of presumptive infection in left leg lower incision  Per patient and niece Marilu Camille) when patient arrived at facility, incision was saturated with urine  Patient is incontinent of bowel and bladder  Will discontinue doxycycline due to allergic reaction  Patient has a significant history of allergies to medication  Today incision appears with less erythema no warm to touch and no drainage  Patient and niece agreeable to continue to monitor  Patient remains afebrile, nontoxic and vital signs stable  Denies fever or chills  Tolerating diet  Is sleeping with no difficulties  Denies /GI discomfort  Per nursing no other concerns or issues at this time    Review of Systems:  Per history of present illness, all other systems reviewed and negative    Other than those noted in HPI    HISTORY:  Medical Hx: Reviewed, unchanged  Family Hx: Reviewed, unchanged  Soc Hx: Reviewed,  unchanged    ALLERGY: Reviewed, unchanged  Allergies   Allergen Reactions    Penicillins Hives     Itching and terrible hives    Sulfa Antibiotics Itching    A47 Folate [Folic Acid-Vit K5-XXB Y37 - Food Allergy] Other (See Comments)     5/23/22 Pt reports no allergic reaction known     [place] Other (See Comments)     Unknown, 5/23 -pt is unaware of reaction     Lyrica [Pregabalin] Other (See Comments)     5/23/22 Pt reports doesn't remember reaction     Other Other (See Comments)     Black rubber 5/23/22 per allergy test - pt unaware of reaction    Cortisone Acetate [Cortisone] Itching and Rash     5/23/22 pt reports makes her violent     Nickel Other (See Comments)     Skin discoloration        PHYSICAL EXAM:  Vital Signs: blood pressure 138/60, pulse 77, respirations 18, Temp 97 6, O2 sats 94% RA  Weight:232 1 lbs    General:  Appears weak and cooperative  Head: Atraumatic  Normocephalic  Eye Exam: anicteric sclera, no discharge, PERRLA, No injection  Oral Exam: moist mucous membranes, no buccaloropharyngeal erythema, palatine tonsils WNL  Neck Exam: no anterior cervical lymphadenopathy noted, neck supple  Cardiovascular: regular rate, regular rhythm, + murmurs, rubs, or gallops  Pulmonary:  Decreased breath sounds with no wheeze, no rhonchi, no rales  No chest tenderness  Abdominal: soft, rounded, non-tender, nondistended, bowel sounds audible x 4 quadrants  : Non distended bladder  Extremities and skin: no edema noted, left leg +1 edema with upper incision unable to assess due to do not remove dressing intact with no drainage noted on top a dressing area with no erythema  Left leg lower incision approximated with staples, with slight erythema and no drainage, nurse changing dressing and securing wraps  no rashes  Stage 3 in buttocks   Neurological: alert, cooperative and responsive, Oriented x 3, ble generalized weakness     Laboratory results / Imaging reviewed: Hard copy/ies in medical chart:  Reviewed from [facility]    CBC-BMP reviewed from 09/19/2022   Hemoglobin 8 3, hematocrit 24 9, WBC 11 4, platelets 369, blood glucose 118, BUN 20, creatinine 0 93, sodium 139, potassium 4 1, calcium 8 4, EGFR 63    Current Medications:   All medications reviewed and updated in Nursing Home Chart reviewed from [facility]    Please note:  Voice-recognition software may have been used in the preparation of this document  Occasional wrong word or "sound-alike" substitutions may have occurred due to the inherent limitations of voice recognition software  Interpretation should be guided by context      Hawa Mei  9/21/2022

## 2022-09-25 NOTE — ASSESSMENT & PLAN NOTE
· CT noting what appears to be seroma at the left femur proximally  · Orthopedic surgery consulted; recommendations as noted below  · No indication for antibiotics at this time  · Weight-bearing as tolerated  · If remains through 9/27 please contact Orthopedic surgery to remove staples prior to discharge  · PT/OT consulted for re-evaluation  · Case management on board to assist with dispo  · Spoke to daughter at the bedside he does not want patient to return to [facility]

## 2022-09-25 NOTE — PROGRESS NOTES
[facility]  Progress Note - Emy Correia 1945, 76 y o  female MRN: [id number]  Unit/Bed#: ED-04 Encounter: [id number]  Primary Care Provider: Kevin Garcia MD   Date and time admitted to hospital: 9/25/2022  6:25 AM    * Vomiting and diarrhea  Assessment & Plan  Patient complaining of vomiting and diarrhea earlier today  Still feeling "nauseous"  Will order clear liquid diet patient will only order food if she feels up to  IV fluids  ? Secondary viral gastroenteritis versus other intra-abdominal process  Will get stool cultures  Also will get blood cultures  Will rule out cardiac cause as well - telemetry , tropx 3  Pleural effusion on right  Assessment & Plan  Unclear etiology  Will get ultrasound of the liver    Diverticulosis  Assessment & Plan  No evidence of diverticulitis also patient does not have any diarrhea    Abdominal fluid collection  Assessment & Plan  Patient has what appears to be a seroma at the left femur proximally  Could be a postoperative complication  Will consult Ortho to determine if this is an expected change  Patient has had poor mobility since her surgery a week ago and has not been able to weightbear on her left lower extremity    Bladder wall thickening  Assessment & Plan  Unclear significance  Will follow-up on UA and urine micro    Anemia  Assessment & Plan  Hemoglobin is 8 8 today this appears pretty stable when compared to 8 9 a week ago  Patient is on iron and folic acid as well as Z88  Will not check levels at this juncture    Anxiety  Assessment & Plan  Stable will continue home Ativan    Essential hypertension  Assessment & Plan  Blood pressure is 144/65  Will continue home meds including metoprolol, lisinopril, Lasix, Cardizem        VTE Pharmacologic Prophylaxis: VTE Score: 4 Moderate Risk (Score 3-4) - Pharmacological DVT Prophylaxis Ordered: heparin  Code Status: Prior   Discussion with family: called Av Lazcano Anticipated Length of Stay: Patient will be admitted on an observation basis with an anticipated length of stay of less than 2 midnights secondary to abdominal pain and nausea and vomiting       Total Time for Visit, including Counseling / Coordination of Care: 45 minutes Greater than 50% of this total time spent on direct patient counseling and coordination of care  Chief Complaint: abdominal pain, nausea and vomiting  History of Present Illness:  Oleg Marino is a 76 y o  female with a 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, paroxysmal atrial fibrillation, essential hypertension, CKD as well as closed bicondylar fracture of the left femur, who recently had hardware removal by Orthopedic surgery earlier this month  of  who presents with a 3 day history of diarrhea, nausea inability to keep any oral intake down and some abdominal pain  Today in the ED when I see her she is not complaining of any pain but still feels nauseous and not herself  She states she has never had symptoms like this before and is concerned about what could be going on  She has not managed to keep any food down for 3 days and feels better after IV fluids  She states that since her hardware removal earlier this month she has been unable to weightbear on her left leg and has been weight-bearing only on the right leg ambulating with a walker  In terms of laboratory workup her INR is 1 28, her BMP is unremarkable apart from a slightly low albumin at 3 1  Her hemoglobin is 8 8 but this is relatively stable when compared to prior and he has no leukocytosis  Blood pressure and heart rates are within the normal range    A CT scan done in the emergency department shows a small right pleural effusion as well as nonspecific bladder wall thickening in addition to left lateral proximal femoral deep subcutaneous fluid collection measuring 5 x 5 cm and presumed to be a seroma by reading radiologist         Review of Systems:  Review of Systems   Constitutional: Negative for appetite change, fatigue, fever and unexpected weight change  HENT: Negative  Eyes: Negative  Respiratory: Negative  Cardiovascular: Negative  Gastrointestinal: Positive for abdominal distention, abdominal pain, diarrhea and nausea  Endocrine: Negative  Genitourinary: Negative  Musculoskeletal: Positive for arthralgias (left hip pain )  Skin: Positive for color change (some erythema surrounding left hip  )  Neurological: Negative  Hematological: Negative  Psychiatric/Behavioral: Negative  Past Medical and Surgical History:   Past Medical History:   Diagnosis Date    Abnormal ECG     Last Assessed 9/29/2016    Anxiety     Last Assessed 6/08/2016    Arthritis     knees    Asthma     Last Assessed 11/06/2013    Atrial premature complex     Cataract, bilateral     Last Assessed 7/14/2016    Cataract, left eye     Both eyes  Had surgery on left   COVID-19     Difficulty swallowing     Dizziness     Fibromyalgia     Fibromyalgia, primary     Gastric reflux     Heart failure ([facility] [address] )     5/23/22 Pt reports had heart failure in the past    History of COVID-19     5/23/22 Pt reports having COVID in 2021      History of transfusion     no reaction    [place] (hard of hearing)     Hyperlipidemia     Hypertension     Incontinence in female     5/23/22 Pt reports has incontinence -wears depends especially at night/riding in car    Irregular heart beat     5/23/22 Pt reports hx of A fib    Lyme disease     PONV (postoperative nausea and vomiting)     Premature ventricular contraction     Primary osteoarthritis of both knees     Last Assessed 7/14/2016    Rheumatic fever     5/23/22 Pt reports had hx of rheumatic fever as a child twice    Sore throat     Tuberculosis 1947       Past Surgical History:   Procedure Laterality Date    APPENDECTOMY      CARDIAC PACEMAKER PLACEMENT  2019    COLONOSCOPY      DENTAL SURGERY      extractions    HYSTERECTOMY      5/23/22 Pt reports had appendix out with hysterectomy and "tightened up my bladder"    ORIF FEMUR FRACTURE Left 08/05/2021    Procedure: OPEN REDUCTION W/ INTERNAL FIXATION (ORIF) DISTAL FEMUR; INSERTION RETROGRADE NAIL;  Surgeon: Abelardo Garg MD;  [facility];  Service: Orthopedics    VT DILATE ESOPHAGUS N/A 04/06/2016    Procedure: DILATATION ESOPHAGEAL;  Surgeon: Jenn Clinton MD;  [facility]: [facility]; Service: Gastroenterology    VT EGD TRANSORAL BIOPSY SINGLE/MULTIPLE N/A 04/06/2016    Procedure: ESOPHAGOGASTRODUODENOSCOPY (EGD); Surgeon: Jenn Clinton MD;  [facility]: [facility]; Service: Gastroenterology    VT REMOVAL DEEP IMPLANT Left 9/12/2022    Procedure: REMOVAL NAIL IM  FEMUR;  Surgeon: Abelardo Garg MD;  [facility];  Service: Orthopedics    VT TOTAL KNEE ARTHROPLASTY Left 9/12/2022    Procedure: ARTHROPLASTY KNEE TOTAL;  Surgeon: Abelardo Garg MD;  [facility];  Service: Orthopedics    VT XCAPSL CTRC RMVL INSJ IO LENS PROSTH W/O ECP Left 03/15/2016    Procedure: EXTRACTION EXTRACAPSULAR CATARACT PHACO INTRAOCULAR LENS (IOL); Surgeon: Linzie Opitz, MD;  [facility];  Service: Ophthalmology    REPLACEMENT TOTAL KNEE Right     REPLACEMENT TOTAL KNEE      right     TONSILLECTOMY         Meds/Allergies:  Prior to Admission medications    Medication Sig Start Date End Date Taking?  Authorizing Provider   acetaminophen (TYLENOL) 650 mg CR tablet Take 1 tablet (650 mg total) by mouth every 8 (eight) hours as needed for mild pain 9/12/22 10/12/22 Yes Charlie Gray PA-C   albuterol (PROVENTIL HFA,VENTOLIN HFA) 90 mcg/act inhaler INHALE 2 PUFFS BY MOUTH EVERY 6 HOURS AS NEEDED FOR WHEEZING 12/23/20  Yes Temo Mullins MD   allopurinol (ZYLOPRIM) 100 mg tablet Take 1 tablet (100 mg total) by mouth daily 12/7/21  Yes Temo Mullins MD   apixaban (Eliquis) 5 mg Take 1 tablet (5 mg total) by mouth 2 (two) times a day 8/23/22  Yes Mason Jackson MD   ascorbic acid (VITAMIN C) 500 MG tablet Take 1 tablet (500 mg total) by mouth 2 (two) times a day 8/9/22  Yes José Miguel Judd PA-C   BIOTIN PO Take by mouth in the morning   Yes Historical Provider, MD   Cholecalciferol (VITAMIN D3 PO) Take by mouth in the morning   Yes Historical Provider, MD   Cyanocobalamin (VITAMIN B-12 PO) Take by mouth in the morning   Yes Historical Provider, MD   diltiazem (CARDIZEM CD) 120 mg 24 hr capsule Take 1 capsule (120 mg total) by mouth daily  Patient taking differently: Take 120 mg by mouth daily Takes in the am 3/29/22  Yes Bridget Pool MD   docusate sodium (COLACE) 100 mg capsule Take 1 capsule (100 mg total) by mouth 2 (two) times a day 9/12/22  Yes José Miguel Judd PA-C   DULoxetine (CYMBALTA) 30 mg delayed release capsule TAKE 1 CAPSULE(30 MG) BY MOUTH DAILY 8/5/22  Yes Mason Jackson MD   ezetimibe (ZETIA) 10 mg tablet Take 1 tablet (10 mg total) by mouth daily 2/28/22  Yes JERI Camejo   ferrous sulfate 324 (65 Fe) mg Take 1 tablet (324 mg total) by mouth 2 (two) times a day before meals 8/9/22  Yes José Miguel Judd PA-C   folic acid (FOLVITE) 1 mg tablet TAKE 1 TABLET(1 MG) BY MOUTH DAILY 4/20/22  Yes José Miguel Judd PA-C   furosemide (LASIX) 40 mg tablet Take 1 tablet (40 mg total) by mouth in the morning  5/23/22  Yes JERI Camejo   lisinopril (ZESTRIL) 2 5 mg tablet Take 1 tablet (2 5 mg total) by mouth daily 6/8/22  Yes Bridget Pool MD   Melatonin 10 MG TABS Take by mouth Takes daily at night   Yes Historical Provider, MD   metoprolol tartrate (LOPRESSOR) 50 mg tablet Take 100 mg in the morning and 50 mg in the evening 6/1/22  Yes Mason Jackson MD   Multiple Vitamin (MULTIVITAMIN ADULT PO) Take by mouth in the morning   Yes Historical Provider, MD   oxybutynin (DITROPAN-XL) 5 mg 24 hr tablet Take 1 tablet (5 mg total) by mouth daily  Patient taking differently: Take 5 mg by mouth daily Takes at night 12/7/21  Yes Angy Henao MD   oxyCODONE (ROXICODONE) 5 immediate release tablet Take 1 tablet (5 mg total) by mouth every 4 (four) hours as needed for moderate pain for up to 10 days Max Daily Amount: 30 mg 9/16/22 9/26/22 Yes Melissa Fernando Catarinajohn Rettaliata, PA-C   FIBER PO Take 1 tablet by mouth daily    Historical Provider, MD   LORazepam (Ativan) 0 5 mg tablet Take 1 tablet (0 5 mg total) by mouth every 8 (eight) hours as needed for anxiety 8/23/22   Angy Henao MD   Mirabegron ER (Myrbetriq) 25 MG TB24 Take 25 mg by mouth daily Takes in the evening 8/23/22   Angy Henao MD   naloxone [facility]) 4 mg/0 1 mL nasal spray Administer 1 spray into a nostril  If no response after 2-3 minutes, give another dose in the other nostril using a new spray  Patient not taking: No sig reported 4/20/22   Nimisha Bland MD     I have reviewed home medications with patient personally  Allergies: Allergies   Allergen Reactions    Penicillins Hives     Itching and terrible hives    Sulfa Antibiotics Itching    P47 Folate [Folic Acid-Vit E4-BQR Y18 - Food Allergy] Other (See Comments)     5/23/22 Pt reports no allergic reaction known     [place] Other (See Comments)     Unknown, 5/23 -pt is unaware of reaction     Lyrica [Pregabalin] Other (See Comments)     5/23/22 Pt reports doesn't remember reaction     Other Other (See Comments)     Black rubber 5/23/22 per allergy test - pt unaware of reaction    Cortisone Acetate [Cortisone] Itching and Rash     5/23/22 pt reports makes her violent     [place] Other (See Comments)     Skin discoloration       Social History:  Marital Status:     Occupation:  Retired  Patient Pre-hospital Living Situation: Home  Patient Pre-hospital Level of Mobility: walks with walker  Patient Pre-hospital Diet Restrictions:  None  Substance Use History:   Social History     Substance and Sexual Activity   Alcohol Use Not Currently    Comment: Per [employer]- pt reports no current alcohol use at this time     Social History     Tobacco Use   Smoking Status Never Smoker   Smokeless Tobacco Never Used   Tobacco Comment    Denies any former or current smoking     Social History     Substance and Sexual Activity   Drug Use No    Comment: Denies any former or current drug use       Family History:  Family History   Problem Relation Age of Onset    Coronary artery disease Mother     Heart attack Mother         Prior    Heart disease Father     Heart attack Father         Prior    Anesthesia problems Neg Hx        Physical Exam:     Vitals:   Blood Pressure: 144/65 (09/25/22 0800)  Pulse: 72 (09/25/22 0800)  Temperature: 98 3 °F (36 8 °C) (09/25/22 0631)  Temp Source: Oral (09/25/22 0631)  Respirations: 16 (09/25/22 0800)  Weight - Scale: 107 kg (235 lb 14 3 oz) (09/25/22 0631)  SpO2: 93 % (09/25/22 0800)    Physical Exam  HENT:      Head: Normocephalic  Mouth/Throat:      Mouth: Mucous membranes are moist    Eyes:      General: No scleral icterus  Right eye: No discharge  Left eye: No discharge  Pupils: Pupils are equal, round, and reactive to light  Cardiovascular:      Rate and Rhythm: Normal rate  Pulmonary:      Effort: Pulmonary effort is normal  No respiratory distress  Breath sounds: No stridor  No wheezing, rhonchi or rales  Chest:      Chest wall: No tenderness  Abdominal:      General: Abdomen is flat  There is no distension  Palpations: There is no mass  Tenderness: There is no right CVA tenderness, left CVA tenderness or guarding  Hernia: No hernia is present  Musculoskeletal:         General: No swelling, tenderness, deformity or signs of injury  Right lower leg: No edema  Left lower leg: No edema  Skin:     Capillary Refill: Capillary refill takes less than 2 seconds  Coloration: Skin is pale  Skin is not jaundiced        Findings: No bruising, lesion or rash  Neurological:      General: No focal deficit present  Mental Status: She is alert  Cranial Nerves: No cranial nerve deficit  Sensory: No sensory deficit  Motor: No weakness  Coordination: Coordination normal       Gait: Gait normal       Deep Tendon Reflexes: Reflexes normal    Psychiatric:         Mood and Affect: Mood normal           Additional Data:     Lab Results:  Results from last 7 days   Lab Units 09/25/22  0706   WBC Thousand/uL 7 84   HEMOGLOBIN g/dL 8 8*   HEMATOCRIT % 27 9*   PLATELETS Thousands/uL 645*   NEUTROS PCT % 68   LYMPHS PCT % 16   MONOS PCT % 9   EOS PCT % 5     Results from last 7 days   Lab Units 09/25/22  0706   SODIUM mmol/L 143   POTASSIUM mmol/L 3 8   CHLORIDE mmol/L 101   CO2 mmol/L 33*   BUN mg/dL 13   CREATININE mg/dL 0 82   ANION GAP mmol/L 9   CALCIUM mg/dL 9 2   ALBUMIN g/dL 3 1*   TOTAL BILIRUBIN mg/dL 0 42   ALK PHOS U/L 66   ALT U/L 15   AST U/L 18   GLUCOSE RANDOM mg/dL 130     Results from last 7 days   Lab Units 09/25/22  0706   INR  1 28*                   Imaging: Reviewed radiology reports from this admission including: abdominal/pelvic CT  CT abdomen pelvis with contrast   Final Result by Rafal Espinoza MD (09/25 8145)      Small right pleural effusion with minimal bibasilar subsegmental atelectasis, right greater than left  Minimal nonspecific bladder wall thickening likely underdistention  Correlate with history and if necessary UA to exclude cystitis  Otherwise, no evidence of acute intraabdominopelvic process  Partially visualized left lateral proximal femoral deep subcutaneous fluid collection measuring at least 5 5 x 5 cm presumably seroma given recent orthopedic procedure  Colonic diverticulosis  Additional chronic findings and negatives as above        This study demonstrates a significant  finding and was documented as such in [facility] for liaison and referring practitioner notification  Workstation performed: [id number]         XR chest 2 views   ED Interpretation by Ria Ortega MD (09/25 3563)   No acute cardiopulmonary disease      Final Result by Steph Koehler MD (09/25 0126)      No acute cardiopulmonary disease  Workstation performed: [id number]             EKG and Other Studies Reviewed on Admission:   · EKG: sinus rhythm  ** Please Note: This note has been constructed using a voice recognition system   **

## 2022-09-26 ENCOUNTER — APPOINTMENT (OUTPATIENT)
Dept: RADIOLOGY | Facility: HOSPITAL | Age: 77
DRG: 872 | End: 2022-09-26
Payer: MEDICARE

## 2022-09-26 ENCOUNTER — APPOINTMENT (OUTPATIENT)
Dept: ULTRASOUND IMAGING | Facility: HOSPITAL | Age: 77
DRG: 872 | End: 2022-09-26
Payer: MEDICARE

## 2022-09-26 PROBLEM — R78.81 BACTEREMIA DUE TO GRAM-POSITIVE BACTERIA: Status: ACTIVE | Noted: 2022-09-26

## 2022-09-26 LAB
2HR DELTA HS TROPONIN: -2 NG/L
BACTERIA UR QL AUTO: ABNORMAL /HPF
BILIRUB UR QL STRIP: NEGATIVE
CARDIAC TROPONIN I PNL SERPL HS: 11 NG/L
CLARITY UR: ABNORMAL
COLOR UR: YELLOW
GLUCOSE UR STRIP-MCNC: NEGATIVE MG/DL
HCT VFR BLD AUTO: 26.7 % (ref 34.8–46.1)
HGB BLD-MCNC: 8.2 G/DL (ref 11.5–15.4)
HGB UR QL STRIP.AUTO: NEGATIVE
HYALINE CASTS #/AREA URNS LPF: ABNORMAL /LPF
KETONES UR STRIP-MCNC: NEGATIVE MG/DL
LEUKOCYTE ESTERASE UR QL STRIP: ABNORMAL
NITRITE UR QL STRIP: POSITIVE
NON-SQ EPI CELLS URNS QL MICRO: ABNORMAL /HPF
PH UR STRIP.AUTO: 8 [PH]
PROT UR STRIP-MCNC: ABNORMAL MG/DL
RBC #/AREA URNS AUTO: ABNORMAL /HPF
SP GR UR STRIP.AUTO: 1.02 (ref 1–1.03)
UROBILINOGEN UR STRIP-ACNC: <2 MG/DL
WBC #/AREA URNS AUTO: ABNORMAL /HPF

## 2022-09-26 PROCEDURE — 85014 HEMATOCRIT: CPT | Performed by: INTERNAL MEDICINE

## 2022-09-26 PROCEDURE — 81001 URINALYSIS AUTO W/SCOPE: CPT | Performed by: NURSE PRACTITIONER

## 2022-09-26 PROCEDURE — 85018 HEMOGLOBIN: CPT | Performed by: INTERNAL MEDICINE

## 2022-09-26 PROCEDURE — 87186 SC STD MICRODIL/AGAR DIL: CPT | Performed by: NURSE PRACTITIONER

## 2022-09-26 PROCEDURE — 87086 URINE CULTURE/COLONY COUNT: CPT | Performed by: NURSE PRACTITIONER

## 2022-09-26 PROCEDURE — 87077 CULTURE AEROBIC IDENTIFY: CPT | Performed by: NURSE PRACTITIONER

## 2022-09-26 PROCEDURE — 73552 X-RAY EXAM OF FEMUR 2/>: CPT

## 2022-09-26 PROCEDURE — C9113 INJ PANTOPRAZOLE SODIUM, VIA: HCPCS | Performed by: NURSE PRACTITIONER

## 2022-09-26 PROCEDURE — 99232 SBSQ HOSP IP/OBS MODERATE 35: CPT | Performed by: NURSE PRACTITIONER

## 2022-09-26 PROCEDURE — 76705 ECHO EXAM OF ABDOMEN: CPT

## 2022-09-26 PROCEDURE — 84484 ASSAY OF TROPONIN QUANT: CPT | Performed by: INTERNAL MEDICINE

## 2022-09-26 PROCEDURE — C9113 INJ PANTOPRAZOLE SODIUM, VIA: HCPCS | Performed by: INTERNAL MEDICINE

## 2022-09-26 PROCEDURE — 73560 X-RAY EXAM OF KNEE 1 OR 2: CPT

## 2022-09-26 PROCEDURE — 99024 POSTOP FOLLOW-UP VISIT: CPT | Performed by: ORTHOPAEDIC SURGERY

## 2022-09-26 RX ORDER — PANTOPRAZOLE SODIUM 40 MG/10ML
40 INJECTION, POWDER, LYOPHILIZED, FOR SOLUTION INTRAVENOUS EVERY 12 HOURS SCHEDULED
Status: DISCONTINUED | OUTPATIENT
Start: 2022-09-26 | End: 2022-10-03

## 2022-09-26 RX ADMIN — DILTIAZEM HYDROCHLORIDE 120 MG: 120 CAPSULE, COATED, EXTENDED RELEASE ORAL at 08:05

## 2022-09-26 RX ADMIN — PANTOPRAZOLE SODIUM 40 MG: 40 INJECTION, POWDER, FOR SOLUTION INTRAVENOUS at 08:10

## 2022-09-26 RX ADMIN — FERROUS SULFATE TAB 325 MG (65 MG ELEMENTAL FE) 325 MG: 325 (65 FE) TAB at 08:05

## 2022-09-26 RX ADMIN — CHOLECALCIFEROL TAB 10 MCG (400 UNIT) 400 UNITS: 10 TAB at 08:05

## 2022-09-26 RX ADMIN — SODIUM CHLORIDE 8 MG/HR: 9 INJECTION, SOLUTION INTRAVENOUS at 02:20

## 2022-09-26 RX ADMIN — DULOXETINE HYDROCHLORIDE 30 MG: 30 CAPSULE, DELAYED RELEASE ORAL at 21:13

## 2022-09-26 RX ADMIN — METOPROLOL TARTRATE 100 MG: 100 TABLET, FILM COATED ORAL at 08:05

## 2022-09-26 RX ADMIN — VANCOMYCIN HYDROCHLORIDE 1500 MG: 5 INJECTION, POWDER, LYOPHILIZED, FOR SOLUTION INTRAVENOUS at 16:41

## 2022-09-26 RX ADMIN — ONDANSETRON 4 MG: 2 INJECTION INTRAMUSCULAR; INTRAVENOUS at 08:16

## 2022-09-26 RX ADMIN — OXYCODONE HYDROCHLORIDE AND ACETAMINOPHEN 500 MG: 500 TABLET ORAL at 18:40

## 2022-09-26 RX ADMIN — APIXABAN 5 MG: 5 TABLET, FILM COATED ORAL at 11:35

## 2022-09-26 RX ADMIN — DOCUSATE SODIUM 100 MG: 100 CAPSULE, LIQUID FILLED ORAL at 18:40

## 2022-09-26 RX ADMIN — DOCUSATE SODIUM 100 MG: 100 CAPSULE, LIQUID FILLED ORAL at 08:05

## 2022-09-26 RX ADMIN — ACETAMINOPHEN 650 MG: 325 TABLET, FILM COATED ORAL at 05:16

## 2022-09-26 RX ADMIN — METOPROLOL TARTRATE 50 MG: 100 TABLET, FILM COATED ORAL at 21:12

## 2022-09-26 RX ADMIN — ACETAMINOPHEN 650 MG: 325 TABLET, FILM COATED ORAL at 11:35

## 2022-09-26 RX ADMIN — PANTOPRAZOLE SODIUM 40 MG: 40 INJECTION, POWDER, FOR SOLUTION INTRAVENOUS at 21:13

## 2022-09-26 RX ADMIN — FERROUS SULFATE TAB 325 MG (65 MG ELEMENTAL FE) 325 MG: 325 (65 FE) TAB at 18:40

## 2022-09-26 RX ADMIN — FOLIC ACID TAB 400 MCG 400 MCG: 400 TAB at 08:06

## 2022-09-26 RX ADMIN — LISINOPRIL 2.5 MG: 2.5 TABLET ORAL at 08:05

## 2022-09-26 RX ADMIN — OXYCODONE HYDROCHLORIDE AND ACETAMINOPHEN 500 MG: 500 TABLET ORAL at 08:05

## 2022-09-26 RX ADMIN — ACETAMINOPHEN 650 MG: 325 TABLET, FILM COATED ORAL at 18:40

## 2022-09-26 RX ADMIN — EZETIMIBE 10 MG: 10 TABLET ORAL at 21:13

## 2022-09-26 RX ADMIN — ALLOPURINOL 100 MG: 100 TABLET ORAL at 08:05

## 2022-09-26 RX ADMIN — APIXABAN 5 MG: 5 TABLET, FILM COATED ORAL at 18:40

## 2022-09-26 RX ADMIN — FUROSEMIDE 40 MG: 40 TABLET ORAL at 08:05

## 2022-09-26 NOTE — PLAN OF CARE
Problem: MOBILITY - ADULT  Goal: Maintain or return to baseline ADL function  Description: INTERVENTIONS:  -  Assess patient's ability to carry out ADLs; assess patient's baseline for ADL function and identify physical deficits which impact ability to perform ADLs (bathing, care of mouth/teeth, toileting, grooming, dressing, etc )  - Assess/evaluate cause of self-care deficits   - Assess range of motion  - Assess patient's mobility; develop plan if impaired  - Assess patient's need for assistive devices and provide as appropriate  - Encourage maximum independence but intervene and supervise when necessary  - Involve family in performance of ADLs  - Assess for home care needs following discharge   - Consider OT consult to assist with ADL evaluation and planning for discharge  - Provide patient education as appropriate  Outcome: Progressing  Goal: Maintains/Returns to pre admission functional level  Description: INTERVENTIONS:  - Perform BMAT or MOVE assessment daily    - Set and communicate daily mobility goal to care team and patient/family/caregiver  - Collaborate with rehabilitation services on mobility goals if consulted  - Perform Range of Motion 3 times a day  - Reposition patient every 2 hours    - Dangle patient 3 times a day  - Stand patient 3 times a day  - Ambulate patient 3 times a day  - Out of bed to chair 3 times a day   - Out of bed for meals 3 times a day  - Out of bed for toileting  - Record patient progress and toleration of activity level   Outcome: Progressing     Problem: Potential for Falls  Goal: Patient will remain free of falls  Description: INTERVENTIONS:  - Educate patient/family on patient safety including physical limitations  - Instruct patient to call for assistance with activity   - Consult OT/PT to assist with strengthening/mobility   - Keep Call bell within reach  - Keep bed low and locked with side rails adjusted as appropriate  - Keep care items and personal belongings within reach  - Initiate and maintain comfort rounds  - Make Fall Risk Sign visible to staff  - Offer Toileting every 2 Hours, in advance of need  - Initiate/Maintain bed alarm  - Obtain necessary fall risk management equipment  - Apply yellow socks and bracelet for high fall risk patients  - Consider moving patient to room near nurses station  Outcome: Progressing     Problem: Prexisting or High Potential for Compromised Skin Integrity  Goal: Skin integrity is maintained or improved  Description: INTERVENTIONS:  - Identify patients at risk for skin breakdown  - Assess and monitor skin integrity  - Assess and monitor nutrition and hydration status  - Monitor labs   - Assess for incontinence   - Turn and reposition patient  - Assist with mobility/ambulation  - Relieve pressure over bony prominences  - Avoid friction and shearing  - Provide appropriate hygiene as needed including keeping skin clean and dry  - Evaluate need for skin moisturizer/barrier cream  - Collaborate with interdisciplinary team   - Patient/family teaching  - Consider wound care consult   Outcome: Progressing     Problem: MOBILITY - ADULT  Goal: Maintain or return to baseline ADL function  Description: INTERVENTIONS:  -  Assess patient's ability to carry out ADLs; assess patient's baseline for ADL function and identify physical deficits which impact ability to perform ADLs (bathing, care of mouth/teeth, toileting, grooming, dressing, etc )  - Assess/evaluate cause of self-care deficits   - Assess range of motion  - Assess patient's mobility; develop plan if impaired  - Assess patient's need for assistive devices and provide as appropriate  - Encourage maximum independence but intervene and supervise when necessary  - Involve family in performance of ADLs  - Assess for home care needs following discharge   - Consider OT consult to assist with ADL evaluation and planning for discharge  - Provide patient education as appropriate  9/26/2022 0312 by Vilma Benson Twyla Carlson RN  Outcome: Progressing  9/26/2022 0311 by Malik Moy RN  Outcome: Progressing  Goal: Maintains/Returns to pre admission functional level  Description: INTERVENTIONS:  - Perform BMAT or MOVE assessment daily    - Set and communicate daily mobility goal to care team and patient/family/caregiver  - Collaborate with rehabilitation services on mobility goals if consulted  - Perform Range of Motion 3 times a day  - Reposition patient every 2 hours    - Dangle patient 3 times a day  - Stand patient 3 times a day  - Ambulate patient 3 times a day  - Out of bed to chair 3 times a day   - Out of bed for meals 3 times a day  - Out of bed for toileting  - Record patient progress and toleration of activity level   9/26/2022 0312 by Malik Moy RN  Outcome: Progressing  9/26/2022 0311 by Malik Moy RN  Outcome: Progressing     Problem: Potential for Falls  Goal: Patient will remain free of falls  Description: INTERVENTIONS:  - Educate patient/family on patient safety including physical limitations  - Instruct patient to call for assistance with activity   - Consult OT/PT to assist with strengthening/mobility   - Keep Call bell within reach  - Keep bed low and locked with side rails adjusted as appropriate  - Keep care items and personal belongings within reach  - Initiate and maintain comfort rounds  - Make Fall Risk Sign visible to staff  - Offer Toileting every 2 Hours, in advance of need  - Initiate/Maintain bed alarm  - Obtain necessary fall risk management equipment  - Apply yellow socks and bracelet for high fall risk patients  - Consider moving patient to room near nurses station  9/26/2022 0312 by Malik Moy RN  Outcome: Progressing  9/26/2022 0311 by Malik Moy RN  Outcome: Progressing     Problem: Prexisting or High Potential for Compromised Skin Integrity  Goal: Skin integrity is maintained or improved  Description: INTERVENTIONS:  - Identify patients at risk for skin breakdown  - Assess and monitor skin integrity  - Assess and monitor nutrition and hydration status  - Monitor labs   - Assess for incontinence   - Turn and reposition patient  - Assist with mobility/ambulation  - Relieve pressure over bony prominences  - Avoid friction and shearing  - Provide appropriate hygiene as needed including keeping skin clean and dry  - Evaluate need for skin moisturizer/barrier cream  - Collaborate with interdisciplinary team   - Patient/family teaching  - Consider wound care consult   9/26/2022 0312 by Annette Haywood RN  Outcome: Progressing  9/26/2022 0311 by Annette Haywood RN  Outcome: Progressing     Problem: HEMATOLOGIC - ADULT  Goal: Maintains hematologic stability  Description: INTERVENTIONS  - Assess for signs and symptoms of bleeding or hemorrhage  - Monitor labs  - Administer supportive blood products/factors as ordered and appropriate  Outcome: Progressing     Problem: MUSCULOSKELETAL - ADULT  Goal: Maintain or return mobility to safest level of function  Description: INTERVENTIONS:  - Assess patient's ability to carry out ADLs; assess patient's baseline for ADL function and identify physical deficits which impact ability to perform ADLs (bathing, care of mouth/teeth, toileting, grooming, dressing, etc )  - Assess/evaluate cause of self-care deficits   - Assess range of motion  - Assess patient's mobility  - Assess patient's need for assistive devices and provide as appropriate  - Encourage maximum independence but intervene and supervise when necessary  - Involve family in performance of ADLs  - Assess for home care needs following discharge   - Consider OT consult to assist with ADL evaluation and planning for discharge  - Provide patient education as appropriate  Outcome: Progressing  Goal: Maintain proper alignment of affected body part  Description: INTERVENTIONS:  - Support, maintain and protect limb and body alignment  - Provide patient/ family with appropriate education  Outcome: Progressing     Problem: PAIN - ADULT  Goal: Verbalizes/displays adequate comfort level or baseline comfort level  Description: Interventions:  - Encourage patient to monitor pain and request assistance  - Assess pain using appropriate pain scale  - Administer analgesics based on type and severity of pain and evaluate response  - Implement non-pharmacological measures as appropriate and evaluate response  - Consider cultural and social influences on pain and pain management  - Notify physician/advanced practitioner if interventions unsuccessful or patient reports new pain  Outcome: Progressing     Problem: INFECTION - ADULT  Goal: Absence or prevention of progression during hospitalization  Description: INTERVENTIONS:  - Assess and monitor for signs and symptoms of infection  - Monitor lab/diagnostic results  - Monitor all insertion sites, i e  indwelling lines, tubes, and drains  - Monitor endotracheal if appropriate and nasal secretions for changes in amount and color  - [place] appropriate cooling/warming therapies per order  - Administer medications as ordered  - Instruct and encourage patient and family to use good hand hygiene technique  - Identify and instruct in appropriate isolation precautions for identified infection/condition  Outcome: Progressing     Problem: SAFETY ADULT  Goal: Patient will remain free of falls  Description: INTERVENTIONS:  - Educate patient/family on patient safety including physical limitations  - Instruct patient to call for assistance with activity   - Consult OT/PT to assist with strengthening/mobility   - Keep Call bell within reach  - Keep bed low and locked with side rails adjusted as appropriate  - Keep care items and personal belongings within reach  - Initiate and maintain comfort rounds  - Make Fall Risk Sign visible to staff  - Offer Toileting every 2 Hours, in advance of need  - Initiate/Maintain bed alarm  - Obtain necessary fall risk management equipment  - Apply yellow socks and bracelet for high fall risk patients  - Consider moving patient to room near nurses station  9/26/2022 0312 by Jessa Sun RN  Outcome: Progressing  9/26/2022 0311 by Jessa Sun RN  Outcome: Progressing     Problem: DISCHARGE PLANNING  Goal: Discharge to home or other facility with appropriate resources  Description: INTERVENTIONS:  - Identify barriers to discharge w/patient and caregiver  - Arrange for needed discharge resources and transportation as appropriate  - Identify discharge learning needs (meds, wound care, etc )  - Arrange for interpretive services to assist at discharge as needed  - Refer to Case Management Department for coordinating discharge planning if the patient needs post-hospital services based on physician/advanced practitioner order or complex needs related to functional status, cognitive ability, or social support system  Outcome: Progressing     Problem: Knowledge Deficit  Goal: Patient/family/caregiver demonstrates understanding of disease process, treatment plan, medications, and discharge instructions  Description: Complete learning assessment and assess knowledge base    Interventions:  - Provide teaching at level of understanding  - Provide teaching via preferred learning methods  Outcome: Progressing

## 2022-09-26 NOTE — ASSESSMENT & PLAN NOTE
S/o closed bicondylar fx of left femur with delayed healing  PT OT  Fall and safety precautions  Continue with supportive   Optimize acute and chronic medical conditions   SW following for d/c planning

## 2022-09-26 NOTE — DISCHARGE INSTRUCTIONS
Discharge Instructions - [address] 76 y o  female MRN: [id number]  Unit/Bed#: AN ULTRASOUND    Weight Bearing Status:                                           Weight bearing as tolerated to the left lower extremity  DVT prophylaxis  As directed  Pain:  Continue analgesics as directed    Appt Instructions: If you do not have your appointment, please call the clinic at [phone] to schedule with Dr Joya Cueto for post operative evaluation  Contact the office sooner if you experience any increased numbness/tingling in the extremities  Abscess/fluid collection Aspiration      WHAT YOU NEED TO KNOW:     Today you underwent a fluid collection/abscess aspiration  Usually the fluid is sent to the lab for culture  You may have some pain associated with the puncture site  The Procedure is performed with Local anesthesia (Lidocaine) and sometimes with local and IV Sedation  After you go Home:    Home care  These tips can help your wound heal:  The wound may drain for the first 2 days  Cover the wound with a clean dry dressing  Change the dressing if it becomes soaked with blood or pus  If a gauze packing was placed inside the abscess pocket, you may be told to remove it yourself  You may do this in the shower  Once the packing is removed, you should wash the area in the shower, or clean the area as directed by your provider  Continue to do this until the skin opening has closed  Make sure you wash your hands after changing the packing or cleaning the wound  If you were prescribed antibiotics, take them as directed until they are all gone  You may use acetaminophen or ibuprofen to control pain, unless another pain medicine was prescribed   If you have liver disease or ever had a stomach ulcer, talk with your doctor before using these medicines  Follow-up care  Follow up with your healthcare provider, or as advised  If a gauze packing was put in your wound, it should be removed in 1 to 2 days  Check your wound every day for any signs that the infection is getting worse  The signs are listed below  When to seek medical advice  Call your healthcare provider right away if any of these occur:   Increasing redness or swelling  Red streaks in the skin leading away from the wound  Increasing local pain or swelling  Continued pus draining from the wound 2 days after treatment  Fever of 100 4ºF (38ºC) or higher, or as directed by your healthcare provider  Abscess  returns when you are at home

## 2022-09-26 NOTE — ASSESSMENT & PLAN NOTE
bp log reviewed and stable   BP this am 138/60, SBPs trending 114-170, one episode of 99/51 on 9/23  Asymptomatic  C/w lisinopril 25 mg daily, diltiazem 120 mg daily, and metoprolol 100 mg daily   Remains on lasix 40 mg daily  Continue with adequate po hydration  monitor

## 2022-09-26 NOTE — ASSESSMENT & PLAN NOTE
Recent hemoglobin 8 3, hct 24 9, stable   No active bleeding noted on exam  Continue daily MVI and supplements  Continue to monitor CBC and trend   CBC in 10- days

## 2022-09-26 NOTE — PLAN OF CARE
Problem: MOBILITY - ADULT  Goal: Maintain or return to baseline ADL function  Description: INTERVENTIONS:  -  Assess patient's ability to carry out ADLs; assess patient's baseline for ADL function and identify physical deficits which impact ability to perform ADLs (bathing, care of mouth/teeth, toileting, grooming, dressing, etc )  - Assess/evaluate cause of self-care deficits   - Assess range of motion  - Assess patient's mobility; develop plan if impaired  - Assess patient's need for assistive devices and provide as appropriate  - Encourage maximum independence but intervene and supervise when necessary  - Involve family in performance of ADLs  - Assess for home care needs following discharge   - Consider OT consult to assist with ADL evaluation and planning for discharge  - Provide patient education as appropriate  Outcome: Progressing  Goal: Maintains/Returns to pre admission functional level  Description: INTERVENTIONS:  - Perform BMAT or MOVE assessment daily    - Set and communicate daily mobility goal to care team and patient/family/caregiver  - Collaborate with rehabilitation services on mobility goals if consulted  - Perform Range of Motion 4 times a day  - Reposition patient every 2 hours    - Dangle patient 4 times a day  - Stand patient 4 times a day  - Ambulate patient 4 times a day  - Out of bed to chair 4 times a day   - Out of bed for meals 3 times a day  - Out of bed for toileting  - Record patient progress and toleration of activity level   Outcome: Progressing     Problem: Potential for Falls  Goal: Patient will remain free of falls  Description: INTERVENTIONS:  - Educate patient/family on patient safety including physical limitations  - Instruct patient to call for assistance with activity   - Consult OT/PT to assist with strengthening/mobility   - Keep Call bell within reach  - Keep bed low and locked with side rails adjusted as appropriate  - Keep care items and personal belongings within reach  - Initiate and maintain comfort rounds  - Make Fall Risk Sign visible to staff  - Offer Toileting every 2 Hours, in advance of need  - Initiate/Maintain bed alarm  - Obtain necessary fall risk management equipment: alarms  - Apply yellow socks and bracelet for high fall risk patients  - Consider moving patient to room near nurses station  Outcome: Progressing     Problem: Prexisting or High Potential for Compromised Skin Integrity  Goal: Skin integrity is maintained or improved  Description: INTERVENTIONS:  - Identify patients at risk for skin breakdown  - Assess and monitor skin integrity  - Assess and monitor nutrition and hydration status  - Monitor labs   - Assess for incontinence   - Turn and reposition patient  - Assist with mobility/ambulation  - Relieve pressure over bony prominences  - Avoid friction and shearing  - Provide appropriate hygiene as needed including keeping skin clean and dry  - Evaluate need for skin moisturizer/barrier cream  - Collaborate with interdisciplinary team   - Patient/family teaching  - Consider wound care consult   Outcome: Progressing     Problem: PAIN - ADULT  Goal: Verbalizes/displays adequate comfort level or baseline comfort level  Description: Interventions:  - Encourage patient to monitor pain and request assistance  - Assess pain using appropriate pain scale  - Administer analgesics based on type and severity of pain and evaluate response  - Implement non-pharmacological measures as appropriate and evaluate response  - Consider cultural and social influences on pain and pain management  - Notify physician/advanced practitioner if interventions unsuccessful or patient reports new pain  Outcome: Progressing

## 2022-09-26 NOTE — CASE MANAGEMENT
Case Management Discharge Planning Note    Patient name Aimee Hernandez  Location S /S -02 MRN [id number]  : 1945 Date 2022       Current Admission Date: 2022  Current Admission Diagnosis:Vomiting and diarrhea   Patient Active Problem List    Diagnosis Date Noted    Bacteremia due to Gram-positive bacteria 2022    Anemia 2022    Bladder wall thickening 2022    Abdominal fluid collection 2022    Diverticulosis 2022    Pleural effusion on right 2022    Vomiting and diarrhea 2022    Surgical wound present 2022    Renal insufficiency     Stage 3a chronic kidney disease ([address] ) 2022    Preop examination 2022    Preop cardiovascular exam 2022    Poor dentition 2022    Closed bicondylar fracture of left femur with delayed healing 2022    Paroxysmal atrial fibrillation ([facility] [address] ) 2022    Chronic gout with tophus 2021    Current moderate episode of major depressive disorder ([address] ) 2021    Morbid obesity with body mass index (BMI) of 40 0 to 49 9 ([address] ) 2021    Reactive airway disease with acute exacerbation 2021    Ambulatory dysfunction 2021    Leukocytosis 2021    Arthritis 2021    Other fatigue 2021    Hyperlipidemia 11/10/2020    Dysphonia 2020    Vitamin D insufficiency 2020    Chronic pain of left knee 2019    Age related osteoporosis 2019    Anxiety 2019    Tremor 2019    Other insomnia 2019    Urinary incontinence 2018    Psoriasis 2018    Thyroid nodule 2018    Tachy-smita syndrome ([address] ) 02/15/2018    Essential hypertension 02/15/2018    Pacemaker 02/15/2018    GERD (gastroesophageal reflux disease) 2017    Mild carpal tunnel syndrome, right 04/10/2017    Degenerative lumbar spinal stenosis 2017    Low back pain 2016    Lumbar degenerative disc disease 08/29/2016    Chronic bilateral low back pain without sciatica 06/08/2016    Chronic GERD 05/06/2016    Overweight 02/12/2016    Fibromyalgia 11/06/2013      LOS (days): 0  Geometric Mean LOS (GMLOS) (days): 2 60  Days to GMLOS:2 4     OBJECTIVE:  Risk of Unplanned Readmission Score: 17 48         Current admission status: Inpatient   Preferred Pharmacy:   [facility]ary[facility] Finch [address] [facility], [address] [place] [address] [address] [facility] [address]  Phone: [phone] Fax: [phone] [facility], [facility] [phone] [address]leen [address]keon  [address] [facility]  [address]  [facility]  [address] [address]  Phone: [phone] Fax: [phone]    Primary Care Provider: Ariana Aldana MD    Primary Insurance: MEDICARE  Secondary Insurance: AARP    DISCHARGE DETAILS:    Discharge planning discussed with[de-identified] Netta-caregiver     Comments - Freedom of Choice: Aruna Martinez reported she was in contact with [facility] regarding her concerns, and is now agreeable to the Pt returning to their faclity upon d/c  CM received a call from [employer] who reported [address] [place] [address] can accept the Pt  CM made Netta aware of the [facility]'s acceptance but she is still wants Critical access hospital at this time  Other Referral/Resources/Interventions Provided:  Referral Comments: Per Aruna Martinez the will return to [facility] upon d/c

## 2022-09-26 NOTE — PROGRESS NOTES
[facility]  Progress Note - Zulema Alvarado 1945, 76 y o  female MRN: [id number]  Unit/Bed#: S -01 Encounter: [id number]  Primary Care Provider: Rubia Fortune MD   Date and time admitted to hospital: 9/25/2022  6:25 AM    * Vomiting and diarrhea  Assessment & Plan  Background: Presented to the ED with complaining of vomiting and diarrhea earlier and on presentation still feeling "nauseous"  · CLD> regular diet for now   · IV fluids> discontinued on 9/26  · Secondary viral gastroenteritis versus other intra-abdominal process  · Stool cultures pending; low suspicion for C diff or infectious etiology as resolved without treatment    · Blood cultures negative to date  · Troponin unremarkable  · Telemetry discontinued        Pleural effusion on right  Assessment & Plan  · Unclear etiology however of note patient extremely more sedentary than usual postoperatively  · Liver ultrasound for completeness; if not completed prior to discharge can follow up outpatient    Anemia  Assessment & Plan  · Hemoglobin is 8 8 this appears pretty stable when compared to 8 9 a week ago    · Continue on iron and folic acid as well as D86  · Ongoing outpatient monitoring and follow-up with PCP    Lab Results   Component Value Date    HGB 8 2 (L) 09/26/2022    HGB 7 9 (L) 09/25/2022    HGB 8 2 (L) 09/25/2022       Bladder wall thickening  Assessment & Plan  · Unclear significance inpatient denies any urinary symptoms  · UA ordered    Abdominal fluid collection  Assessment & Plan  · CT noting what appears to be seroma at the left femur proximally  · Orthopedic surgery consulted; recommendations as noted below  · No indication for antibiotics at this time  · Weight-bearing as tolerated  · If remains through 9/27 please contact Orthopedic surgery to remove staples prior to discharge  · PT/OT consulted for re-evaluation  · Case management on board to assist with dispo  · Spoke to daughter at the bedside he does not want patient to return to [facility]    Diverticulosis  Assessment & Plan  · No evidence of diverticulitis also patient does not have any diarrhea    Anxiety  Assessment & Plan  · Stable will continue home Ativan    Essential hypertension  Assessment & Plan  · Blood pressure acceptable systolics in 718 to 598C  · Will continue home meds including metoprolol, lisinopril, Lasix, Cardizem  · Monitor with routine vitals      VTE Pharmacologic Prophylaxis: VTE Score: 4 Moderate Risk (Score 3-4) - Pharmacological DVT Prophylaxis Ordered: apixaban (Eliquis)  Patient Centered Rounds: I performed bedside rounds with nursing staff today  Discussions with Specialists or Other Care Team Provider:  Orthopedic surgery    Education and Discussions with Family / Patient: Updated  (daughter) at bedside  Time Spent for Care: 30 minutes  More than 50% of total time spent on counseling and coordination of care as described above  Current Length of Stay: 0 day(s)  Current Patient Status: Observation   Certification Statement: The patient will continue to require additional inpatient hospital stay due to Placement to alternative facility per family and patient request  Discharge Plan: Anticipate discharge in 24-48 hrs to rehab facility  Code Status: Level 1 - Full Code    Subjective:   Patient currently reporting that she was able to tolerate her oral diet did not have any nausea or vomiting afterwards  She does report pain in her knees and her hip however no different from prior to admission  Objective:     Vitals:   Temp (24hrs), Av 4 °F (36 9 °C), Min:97 9 °F (36 6 °C), Max:98 6 °F (37 °C)    Temp:  [97 9 °F (36 6 °C)-98 6 °F (37 °C)] 97 9 °F (36 6 °C)  HR:  [65-70] 69  Resp:  [16-18] 18  BP: (124-141)/(48-57) 141/57  SpO2:  [94 %-95 %] 95 %  Body mass index is 40 49 kg/m²       Input and Output Summary (last 24 hours):   No intake or output data in the 24 hours ending 22 1102    Physical Exam:   Physical Exam  Constitutional:       Appearance: She is not ill-appearing  Cardiovascular:      Rate and Rhythm: Normal rate  Heart sounds: Murmur heard  Pulmonary:      Effort: Pulmonary effort is normal    Abdominal:      General: Bowel sounds are normal    Musculoskeletal:         General: Swelling and tenderness present  Skin:     General: Skin is warm and dry  Capillary Refill: Capillary refill takes less than 2 seconds  Neurological:      Mental Status: She is alert and oriented to person, place, and time  Mental status is at baseline  Psychiatric:         Mood and Affect: Mood normal          Behavior: Behavior normal           Additional Data:     Labs:  Results from last 7 days   Lab Units 09/26/22  0549 09/25/22  1712 09/25/22  1346 09/25/22  0706   WBC Thousand/uL  --   --   --  7 84   HEMOGLOBIN g/dL 8 2*   < >  --  8 8*   HEMATOCRIT % 26 7*   < >  --  27 9*   PLATELETS Thousands/uL  --   --  601* 645*   NEUTROS PCT %  --   --   --  68   LYMPHS PCT %  --   --   --  16   MONOS PCT %  --   --   --  9   EOS PCT %  --   --   --  5    < > = values in this interval not displayed       Results from last 7 days   Lab Units 09/25/22  0706   SODIUM mmol/L 143   POTASSIUM mmol/L 3 8   CHLORIDE mmol/L 101   CO2 mmol/L 33*   BUN mg/dL 13   CREATININE mg/dL 0 82   ANION GAP mmol/L 9   CALCIUM mg/dL 9 2   ALBUMIN g/dL 3 1*   TOTAL BILIRUBIN mg/dL 0 42   ALK PHOS U/L 66   ALT U/L 15   AST U/L 18   GLUCOSE RANDOM mg/dL 130     Results from last 7 days   Lab Units 09/25/22  0706   INR  1 28*                   Lines/Drains:  Invasive Devices  Report    Peripheral Intravenous Line  Duration           Peripheral IV 09/12/22 Right Forearm 14 days    Peripheral IV 09/25/22 Left Antecubital 1 day                  Telemetry:  Telemetry Orders (From admission, onward)             48 Hour Telemetry Monitoring  Continuous x 48 hours        References:    Telemetry Guidelines   Question:  Reason for 48 Hour Telemetry  Answer:  Acute MI, chest pain - R/O MI, or unstable angina                 Telemetry Reviewed: reviewed and discontinued   Indication for Continued Telemetry Use: No indication for continued use  Will discontinue  Imaging: Reviewed radiology reports from this admission including: chest xray, chest CT scan, abdominal/pelvic CT and xray(s)    Recent Cultures (last 7 days):   Results from last 7 days   Lab Units 09/25/22  1346   BLOOD CULTURE  Received in Microbiology Lab  Culture in Progress  Received in Microbiology Lab  Culture in Progress  Last 24 Hours Medication List:   Current Facility-Administered Medications   Medication Dose Route Frequency Provider Last Rate    acetaminophen  650 mg Oral Q6H [facility] Dar Aldana MD      allopurinol  100 mg Oral Daily Dar Aldana MD      ascorbic acid  500 mg Oral BID Dar Aldana MD      cholecalciferol  400 Units Oral Daily Dar Aldana MD      diltiazem  120 mg Oral Daily Dar Aldana MD      docusate sodium  100 mg Oral BID Dar Aldana MD      DULoxetine  30 mg Oral Daily Dar Aldana MD      ezetimibe  10 mg Oral Daily Dar Aldana MD      ferrous sulfate  325 mg Oral BID With Meals Dar Aldana MD      folic acid  411 mcg Oral Daily Dar Aldana MD      furosemide  40 mg Oral Daily Dar Aldana MD      lisinopril  2 5 mg Oral Daily Dar Aldana MD      LORazepam  0 5 mg Oral Q8H PRN Dar Aldana MD      metoprolol tartrate  100 mg Oral Daily Dar Aldana MD      metoprolol tartrate  50 mg Oral HS Dar Aldana MD      ondansetron  4 mg Intravenous Q4H PRN Dar Aldana MD      oxyCODONE  5 mg Oral Q4H PRN Dar Aldana MD      pantoprazole  40 mg Intravenous Q12H [address] [facility], [address] [place] [address], Patient Was Seen By: JERI Bledsoe    **Please Note: This note may have been constructed using a voice recognition system  **

## 2022-09-26 NOTE — PROGRESS NOTES
Vancomycin Assessment    Sejal Simon is a 76 y o  female who is currently receiving vancomycin IV 1500 mg once for bacteremia  Relevant clinical data and objective history reviewed:  Creatinine   Date Value Ref Range Status   09/25/2022 0 82 0 60 - 1 30 mg/dL Final     Comment:     Standardized to IDMS reference method   09/16/2022 0 85 0 60 - 1 30 mg/dL Final     Comment:     Standardized to IDMS reference method   09/15/2022 1 18 0 60 - 1 30 mg/dL Final     Comment:     Standardized to IDMS reference method     /56   Pulse 70   Temp 98 °F (36 7 °C)   Resp 18   Wt 107 kg (235 lb 14 3 oz)   SpO2 93%   BMI 40 49 kg/m²   I/O last 3 completed shifts: In: 1100 [IV Piggyback:1100]  Out: -   Lab Results   Component Value Date/Time    BUN 13 09/25/2022 07:06 AM    WBC 7 84 09/25/2022 07:06 AM    HGB 8 2 (L) 09/26/2022 05:49 AM    HCT 26 7 (L) 09/26/2022 05:49 AM    MCV 95 09/25/2022 07:06 AM     (H) 09/25/2022 01:46 PM     Temp Readings from Last 3 Encounters:   09/26/22 98 °F (36 7 °C)   09/19/22 98 1 °F (36 7 °C)   09/16/22 98 7 °F (37 1 °C) (Oral)     Vancomycin Days of Therapy: 1    Assessment/Plan  The patient is currently on vancomycin utilizing scheduled dosing based on adjusted body weight (due to obesity)  Baseline risks associated with therapy include: advanced age  The patient is currently receiving IV 1500 mg once and after clinical evaluation will be changed to IV 1250 mg every 12 hours  Pharmacy will also follow closely for s/sx of nephrotoxicity, infusion reactions, and appropriateness of therapy  BMP and CBC will be ordered per protocol  Plan for trough as patient approaches steady state, prior to the 4th  dose at approximately 0600 on 9/28  Due to infection severity, will target a trough of 15/20   Pharmacy will continue to follow the patients culture results and clinical progress daily      Rimma Zamora PharmD

## 2022-09-26 NOTE — ASSESSMENT & PLAN NOTE
S/p distal of left femur fx with delayed healing  Recent hospitalization on 9/12/22 and underwent uncomplicated left distal femoral replacement and removal of deep implant left femur  Postop course was complicated by anemia- s/p PRBC transfusion   Remains on Eliquis for PAF and DVT prophylaxis  When arrived at facility[facility] reports left leg lower incision was saturated with urine   At that time incision was with mild area of erythema adjacent to left distal thigh incision, warm to touch and no drainage  Started on doxycycline, patient had an allergic reaction and stopped  Today on exam left leg lower incision erythema has improved, no warm to touch and no drainage   Denies fever or chills  Remains afebrile, non toxic and VSS  Continue PT  F/u with orthopedic service

## 2022-09-26 NOTE — CONSULTS
[address]  76 y o  female MRN: [id number]  Unit/Bed#: AN XRAY      Chief Complaint:   Recent left knee surgery, review imaging findings    HPI:   76 y  o female s/p recent orthopedic intervention 9/12/2022  Underwent left distal femoral replacement, removal of deep implants of left femur  She was admitted from 9/12-9/16/2022, and subsequently discharged to rehab  She and her caregiver report that she did well on the first day at rehab, but subsequently felt she has not made much progress  Her family friend notes that when she was at rehab, her dressings came loose on the left lower extremity, and there was concern for a "red spot"  For this she was started on antibiotics  Subsequently, she has developed significant GI distress for which is she is currently admitted  She has had nausea/vomiting, and inability to tolerate PO intake  Orthopedics has been called to evaluate her for imaging of the left lower extremity  Review Of Systems:   · Skin: Normal  · Neuro: See HPI  · Musculoskeletal: See HPI  · 14 point review of systems negative except as stated above     Past Medical History:   Past Medical History:   Diagnosis Date    Abnormal ECG     Last Assessed 9/29/2016    Anxiety     Last Assessed 6/08/2016    Arthritis     knees    Asthma     Last Assessed 11/06/2013    Atrial premature complex     Cataract, bilateral     Last Assessed 7/14/2016    Cataract, left eye     Both eyes  Had surgery on left   COVID-19     Difficulty swallowing     Dizziness     Fibromyalgia     Fibromyalgia, primary     Gastric reflux     Heart failure ([facility] [address] )     5/23/22 Pt reports had heart failure in the past    History of COVID-19     5/23/22 Pt reports having COVID in 2021      History of transfusion     no reaction    [place] (hard of hearing)     Hyperlipidemia     Hypertension     Incontinence in female     5/23/22 Pt reports has incontinence -wears depends especially at night/riding in car    Irregular heart beat     5/23/22 Pt reports hx of A fib    Lyme disease     PONV (postoperative nausea and vomiting)     Premature ventricular contraction     Primary osteoarthritis of both knees     Last Assessed 7/14/2016    Rheumatic fever     5/23/22 Pt reports had hx of rheumatic fever as a child twice    Sore throat     Tuberculosis 1947       Past Surgical History:   Past Surgical History:   Procedure Laterality Date    APPENDECTOMY      CARDIAC PACEMAKER PLACEMENT  2019    COLONOSCOPY      DENTAL SURGERY      extractions    HYSTERECTOMY      5/23/22 Pt reports had appendix out with hysterectomy and "tightened up my bladder"    ORIF FEMUR FRACTURE Left 08/05/2021    Procedure: OPEN REDUCTION W/ INTERNAL FIXATION (ORIF) DISTAL FEMUR; INSERTION RETROGRADE NAIL;  Surgeon: Astrid Milan MD;  [facility];  Service: Orthopedics    HI DILATE ESOPHAGUS N/A 04/06/2016    Procedure: DILATATION ESOPHAGEAL;  Surgeon: Guerline Mcqueen MD;  [facility]; Service: Gastroenterology    HI EGD TRANSORAL BIOPSY SINGLE/MULTIPLE N/A 04/06/2016    Procedure: ESOPHAGOGASTRODUODENOSCOPY (EGD); Surgeon: Guerline Mcqueen MD;  [facility]; Service: Gastroenterology    HI REMOVAL DEEP IMPLANT Left 9/12/2022    Procedure: REMOVAL NAIL IM  FEMUR;  Surgeon: Astrid Milan MD;  [facility];  Service: Orthopedics    HI TOTAL KNEE ARTHROPLASTY Left 9/12/2022    Procedure: ARTHROPLASTY KNEE TOTAL;  Surgeon: Astrid Milan MD;  [facility];  Service: Orthopedics    HI XCAPSL CTRC RMVL INSJ IO LENS PROSTH W/O ECP Left 03/15/2016    Procedure: EXTRACTION EXTRACAPSULAR CATARACT PHACO INTRAOCULAR LENS (IOL);   Surgeon: Franko Mullins MD;  [facility];  Service: Ophthalmology    REPLACEMENT TOTAL KNEE Right     REPLACEMENT TOTAL KNEE      right     TONSILLECTOMY         Family History:  Family history reviewed and non-contributory  Family History   Problem Relation Age of Onset    Coronary artery disease Mother     Heart attack Mother         Prior    Heart disease Father     Heart attack Father         Prior    Anesthesia problems Neg Hx        Social History:  Social History     Socioeconomic History    Marital status:      Spouse name: None    Number of children: None    Years of education: None    Highest education level: None   Occupational History    None   Tobacco Use    Smoking status: Never Smoker    Smokeless tobacco: Never Used    Tobacco comment: Denies any former or current smoking   Vaping Use    Vaping Use: Never used   Substance and Sexual Activity    Alcohol use: Not Currently     Comment: Per [employer]- pt reports no current alcohol use at this time    Drug use: No     Comment: Denies any former or current drug use    Sexual activity: Never     Comment:  for 12 years - not active   Other Topics Concern    None   Social History Narrative    None     Social Determinants of Health     Financial Resource Strain: Not on file   Food Insecurity: No Food Insecurity    Worried About Running Out of Food in the Last Year: Never true    Luda of Food in the Last Year: Never true   Transportation Needs: No Transportation Needs    Lack of Transportation (Medical): No    Lack of Transportation (Non-Medical): No   Physical Activity: Not on file   Stress: Not on file   Social Connections: Not on file   Intimate Partner Violence: Not on file   Housing Stability: Unknown    Unable to Pay for Housing in the Last Year: No    Number of Places Lived in the Last Year: Not on file    Unstable Housing in the Last Year: No       Allergies:    Allergies   Allergen Reactions    Penicillins Hives     Itching and terrible hives    Sulfa Antibiotics Itching    K20 Folate [Folic Acid-Vit X0-JLL V83 - Food Allergy] Other (See Comments)     5/23/22 Pt reports no allergic reaction known     [place] Other (See Comments)     Unknown, 5/23 -pt is unaware of reaction     Lyrica [Pregabalin] Other (See Comments)     5/23/22 Pt reports doesn't remember reaction     Other Other (See Comments)     Black rubber 5/23/22 per allergy test - pt unaware of reaction    Cortisone Acetate [Cortisone] Itching and Rash     5/23/22 pt reports makes her violent     Doxycycline Hives and Itching    [place] Other (See Comments)     Skin discoloration           Labs:  0   Lab Value Date/Time    HCT 26 7 (L) 09/26/2022 0549    HCT 25 9 (L) 09/25/2022 2056    HCT 26 9 (L) 09/25/2022 1712    HGB 8 2 (L) 09/26/2022 0549    HGB 7 9 (L) 09/25/2022 2056    HGB 8 2 (L) 09/25/2022 1712    INR 1 28 (H) 09/25/2022 0706    WBC 7 84 09/25/2022 0706    WBC 16 44 (H) 09/16/2022 0502    WBC 14 44 (H) 09/15/2022 0615    CRP 5 2 (H) 05/17/2022 1139       Meds:    Current Facility-Administered Medications:     acetaminophen (TYLENOL) tablet 650 mg, 650 mg, Oral, Q6H [address], Cady Flores MD, 650 mg at 09/26/22 0516    allopurinol (ZYLOPRIM) tablet 100 mg, 100 mg, Oral, Daily, Cady Flores MD, 100 mg at 09/26/22 0805    ascorbic acid (VITAMIN C) tablet 500 mg, 500 mg, Oral, BID, Cady Flores MD, 500 mg at 09/26/22 0805    cholecalciferol (VITAMIN D3) tablet 400 Units, 400 Units, Oral, Daily, Cady Flores MD, 400 Units at 09/26/22 0805    diltiazem (CARDIZEM CD) 24 hr capsule 120 mg, 120 mg, Oral, Daily, Cady Flores MD, 120 mg at 09/26/22 0805    docusate sodium (COLACE) capsule 100 mg, 100 mg, Oral, BID, Cady Flores MD, 100 mg at 09/26/22 0805    DULoxetine (CYMBALTA) delayed release capsule 30 mg, 30 mg, Oral, Daily, Cady Flores MD, 30 mg at 09/25/22 2119    ezetimibe (ZETIA) tablet 10 mg, 10 mg, Oral, Daily, Cady Flores MD, 10 mg at 09/25/22 2118    ferrous sulfate tablet 325 mg, 325 mg, Oral, BID With Meals, Cady Flores MD, 325 mg at 52/87/10 1171    folic acid (FOLVITE) tablet 400 mcg, 400 mcg, Oral, Daily, Cady Flores MD, 400 mcg at 09/26/22 0806    furosemide (LASIX) tablet 40 mg, 40 mg, Oral, Daily, Iman Farley MD, 40 mg at 09/26/22 0805    lisinopril (ZESTRIL) tablet 2 5 mg, 2 5 mg, Oral, Daily, Iman Farley MD, 2 5 mg at 09/26/22 0805    LORazepam (ATIVAN) tablet 0 5 mg, 0 5 mg, Oral, Q8H PRN, Iman Farley MD, 0 5 mg at 09/25/22 1352    metoprolol tartrate (LOPRESSOR) tablet 100 mg, 100 mg, Oral, Daily, Iman Farley MD, 100 mg at 09/26/22 0805    metoprolol tartrate (LOPRESSOR) tablet 50 mg, 50 mg, Oral, HS, Iman Farley MD, 50 mg at 09/25/22 2115    ondansetron [facility]) injection 4 mg, 4 mg, Intravenous, Q4H PRN, Iman Farley MD, 4 mg at 09/26/22 0816    oxyCODONE (ROXICODONE) IR tablet 5 mg, 5 mg, Oral, Q4H PRN, Iman Farley MD, 5 mg at 09/25/22 1352    pantoprazole (PROTONIX) injection 40 mg, 40 mg, Intravenous, Q12H [facility], 40 mg at 09/26/22 0810    Blood Culture:   Lab Results   Component Value Date    BLOODCX Received in Microbiology Lab  Culture in Progress  09/25/2022    BLOODCX Received in Microbiology Lab  Culture in Progress  09/25/2022       Wound Culture:   No results found for: WOUNDCULT    Ins and Outs:  I/O last 24 hours: In: 1100 [IV Piggyback:1100]  Out: -           Physical Exam:   /57   Pulse 69   Temp 97 9 °F (36 6 °C)   Resp 18   Wt 107 kg (235 lb 14 3 oz)   SpO2 95%   BMI 40 49 kg/m²   Gen: No acute distress, resting comfortably in bed  HEENT: Eyes clear, moist mucus membranes, hearing intact  Respiratory: No audible wheezing or stridor  Cardiovascular: Well Perfused peripherally, 2+ distal pulse  Abdomen: nondistended, no peritoneal signs  Musculoskeletal: left lower extremity  · Skin intact  · Surgical incisions C/D/I with staples, intact  No surrounding erythema, edema or drainage  · She has mild wil-incisional ecchymosis  · No significant ttp over the knee  · Thigh and calf are soft and compressible  · +ankle dorsi/plantarflexion  +FHL/EHL  · Can perform straight leg raise  · Sensation intact L3-S1  · 2+ DP/PT pulse  · Leg lengths equal        Radiology:   I personally reviewed the films  CT a/p: IMPRESSION:   Small right pleural effusion with minimal bibasilar subsegmental atelectasis, right greater than left    Minimal nonspecific bladder wall thickening likely underdistention  Correlate with history and if necessary UA to exclude cystitis    Otherwise, no evidence of acute intraabdominopelvic process    Partially visualized left lateral proximal femoral deep subcutaneous fluid collection measuring at least 5 5 x 5 cm presumably seroma given recent orthopedic procedure    Colonic diverticulosis    Additional chronic findings and negatives as above  XRAYs femur/knee: expected post operative changes without acute complication      _*_*_*_*_*_*_*_*_*_*_*_*_*_*_*_*_*_*_*_*_*_*_*_*_*_*_*_*_*_*_*_*_*_*_*_*_*_*_*_*_*    Assessment:  76 y o  female with recent orthopedic intervention 9/12/2022  Underwent left distal femoral replacement, removal of deep implants of left femur  She was admitted from 9/12-9/16/2022, and subsequently discharged to rehab  Her wounds today are well appearing, with staple lines intact  There is no evidence of acute post operative infection  Plan:   · Weight bearing as tolerated  left lower extremity  · No acute orthopedic intervention planned  · Staples are not yet ready for removal  Will plan for removal prior to discharge  Please page orthopedics for removal when patients disposition is final    · PT/OT  · Pain control per primary team    · Body mass index is 40 49 kg/m²  morbidly obese  Recommend behavior modifications and nutrition  · Dispo: Ortho signing off  · Will need follow up with Dr Chavo Keating upon discharge for routine post op checks       Melissa Simon PA-C

## 2022-09-26 NOTE — CASE MANAGEMENT
Case Management Assessment & Discharge Planning Note    Patient name Essence Weaver  Location S /S -13 MRN [id number]  : 1945 Date 2022       Current Admission Date: 2022  Current Admission Diagnosis:Vomiting and diarrhea   Patient Active Problem List    Diagnosis Date Noted    Anemia 2022    Bladder wall thickening 2022    Abdominal fluid collection 2022    Diverticulosis 2022    Pleural effusion on right 2022    Vomiting and diarrhea 2022    Surgical wound present 2022    Renal insufficiency     Stage 3a chronic kidney disease ([address] ) 2022    Preop examination 2022    Preop cardiovascular exam 2022    Poor dentition 2022    Closed bicondylar fracture of left femur with delayed healing 2022    Paroxysmal atrial fibrillation ([facility] ) 2022    Chronic gout with tophus 2021    Current moderate episode of major depressive disorder ([address] ) 2021    Morbid obesity with body mass index (BMI) of 40 0 to 49 9 ([address] ) 2021    Reactive airway disease with acute exacerbation 2021    Ambulatory dysfunction 2021    Leukocytosis 2021    Arthritis 2021    Other fatigue 2021    Hyperlipidemia 11/10/2020    Dysphonia 2020    Vitamin D insufficiency 2020    Chronic pain of left knee 2019    Age related osteoporosis 2019    Anxiety 2019    Tremor 2019    Other insomnia 2019    Urinary incontinence 2018    Psoriasis 2018    Thyroid nodule 2018    Tachy-smita syndrome ([address] ) 02/15/2018    Essential hypertension 02/15/2018    Pacemaker 02/15/2018    GERD (gastroesophageal reflux disease) 2017    Mild carpal tunnel syndrome, right 04/10/2017    Degenerative lumbar spinal stenosis 2017    Low back pain 2016    Lumbar degenerative disc disease 2016    Chronic bilateral low back pain without sciatica 06/08/2016    Chronic GERD 05/06/2016    Overweight 02/12/2016    Fibromyalgia 11/06/2013      LOS (days): 0  Geometric Mean LOS (GMLOS) (days):   Days to GMLOS:     OBJECTIVE:  PATIENT READMITTED TO HOSPITAL            Current admission status: Inpatient       Preferred Pharmacy:   [facility] [address], [address] [facility] [address]  Phone: [phone] Fax: [phone]    [address], [address]  [address] [address]  [address] [facility]  [address]  [facility]  75 [address]  Phone: [phone] Fax: [phone]    Primary Care Provider: Gustavo Haywood MD    Primary Insurance: MEDICARE  Secondary Insurance: [address]:  [address], [address] [place] [address]   Primary Phone: [phone] (Mobile)  Work Phone: [phone]               Advance Directives  Does patient have a [address]?: Yes  Does patient have Advance Directives?: Yes  Advance Directives: Living will, Power of  for health care         Readmission Root Cause  30 Day Readmission: Yes  Who directed you to return to the hospital?: Other (comment) Cleave Ritesh)  Did you understand whom to contact if you had questions or problems?: Yes  Did you get your prescriptions before you left the hospital?: Yes  Were you able to get your prescriptions filled when you left the hospital?: Yes  Did you take your medications as prescribed?: Yes  Were you able to get to your follow-up appointments?: Yes  During previous admission, was a post-acute recommendation made?: Yes  What post-acute resources were offered?: STR  Patient was readmitted due to: Abdominal pain, vomiting and diarrhea    Patient Information  Mental Status: Alert  During Assessment patient was accompanied by:  Other-Comment  Assessment information provided by[de-identified] Other - please comment (Netta-caregiver)  Primary Caregiver: Other (Comment)  Caregiver's Name[de-identified] Theresa Avelar Relationship to Patient[de-identified] Other (Specify)  Caregiver's Telephone Number[de-identified] [phone]  Support Systems: [address] [place] [address] [address] of Residence: [address] [facility] do you live in?: [place] entry access options   Select all that apply : No steps to enter home  Type of Current Residence: Facility  Upon entering residence, is there a bedroom on the main floor (no further steps)?: Yes  Upon entering residence, is there a bathroom on the main floor (no further steps)?: Yes  In the last 12 months, how many places have you lived?: 2  In the last 12 months, was there a time when you did not have a steady place to sleep or slept in a shelter (including now)?: No  Homeless/housing insecurity resource given?: N/A  Living Arrangements: Other (Comment)  Is patient a ?: No    Activities of Daily Living Prior to Admission  Functional Status: Assistance  Completes ADLs independently?: No  Level of ADL dependence: Assistance  Ambulates independently?: No  Level of ambulatory dependence: Assistance  Does patient use assisted devices?: Yes  Assisted Devices (DME) used: Bnenie Isabell  Does patient have a history of Outpatient Therapy (PT/OT)?: No  Does the patient have a history of Short-Term Rehab?: Yes ([facility])  Does patient have a history of HHC?: Yes  Does patient currently have [facility]?: No         Patient Information Continued  Income Source: SSI/SSD  Does patient have prescription coverage?: Yes  Within the past 12 months, you worried that your food would run out before you got the money to buy more : Never true  Within the past 12 months, the food you bought just didn't last and you didn't have money to get more : Never true  Food insecurity resource given?: N/A  Does patient receive dialysis treatments?: No  Does patient have a history of substance abuse?: No    PHQ 2/9 Screening   Reviewed PHQ 2/9 Depression Screening Score?: No    Means of Transportation  Means of Transport to [facility][de-identified] Family transport  In the past 12 months, has lack of transportation kept you from medical appointments or from getting medications?: No  In the past 12 months, has lack of transportation kept you from meetings, work, or from getting things needed for daily living?: No  Was application for public transport provided?: N/A        DISCHARGE DETAILS:    Discharge planning discussed with[de-identified] Netta-caregiver  Freedom of Choice: Yes  Comments - Freedom of Choice: Carmen Sanchez requested an additional SNF referral be made to [address] [place] [address], as she isn't currently please with the amount therapy the Pt is currently receiveing at her existing SNF of [facility]  CM discussed freedom of choice and made the requested referral   CM contacted family/caregiver?: Yes             Contacts  Patient Contacts: Carmen Sanchez  Relationship to Patient[de-identified] Friend  Contact Method:  In Person  Reason/Outcome: Continuity of Care, Referral, Discharge [address]         [facility]  at [facility]?: No    DME Referral Provided  Referral made for DME?: No    Other Referral/Resources/Interventions Provided:  Interventions: Short Term Rehab  Referral Comments: [facility] and [facility]         Treatment Team Recommendation: Short Term Rehab  Discharge Destination Plan[de-identified] Short Term Rehab

## 2022-09-27 LAB
ANION GAP SERPL CALCULATED.3IONS-SCNC: 5 MMOL/L (ref 4–13)
ATRIAL RATE: 71 BPM
BASOPHILS # BLD AUTO: 0.07 THOUSANDS/ΜL (ref 0–0.1)
BASOPHILS NFR BLD AUTO: 1 % (ref 0–1)
BUN SERPL-MCNC: 15 MG/DL (ref 5–25)
CALCIUM SERPL-MCNC: 8.5 MG/DL (ref 8.4–10.2)
CHLORIDE SERPL-SCNC: 101 MMOL/L (ref 96–108)
CO2 SERPL-SCNC: 34 MMOL/L (ref 21–32)
CREAT SERPL-MCNC: 1.13 MG/DL (ref 0.6–1.3)
EOSINOPHIL # BLD AUTO: 0.75 THOUSAND/ΜL (ref 0–0.61)
EOSINOPHIL NFR BLD AUTO: 9 % (ref 0–6)
ERYTHROCYTE [DISTWIDTH] IN BLOOD BY AUTOMATED COUNT: 14.9 % (ref 11.6–15.1)
GFR SERPL CREATININE-BSD FRML MDRD: 46 ML/MIN/1.73SQ M
GLUCOSE SERPL-MCNC: 118 MG/DL (ref 65–140)
HCT VFR BLD AUTO: 26.4 % (ref 34.8–46.1)
HGB BLD-MCNC: 8 G/DL (ref 11.5–15.4)
IMM GRANULOCYTES # BLD AUTO: 0.13 THOUSAND/UL (ref 0–0.2)
IMM GRANULOCYTES NFR BLD AUTO: 2 % (ref 0–2)
LYMPHOCYTES # BLD AUTO: 1.74 THOUSANDS/ΜL (ref 0.6–4.47)
LYMPHOCYTES NFR BLD AUTO: 20 % (ref 14–44)
MCH RBC QN AUTO: 29.1 PG (ref 26.8–34.3)
MCHC RBC AUTO-ENTMCNC: 30.3 G/DL (ref 31.4–37.4)
MCV RBC AUTO: 96 FL (ref 82–98)
MONOCYTES # BLD AUTO: 1.12 THOUSAND/ΜL (ref 0.17–1.22)
MONOCYTES NFR BLD AUTO: 13 % (ref 4–12)
NEUTROPHILS # BLD AUTO: 4.75 THOUSANDS/ΜL (ref 1.85–7.62)
NEUTS SEG NFR BLD AUTO: 55 % (ref 43–75)
NRBC BLD AUTO-RTO: 0 /100 WBCS
PLATELET # BLD AUTO: 582 THOUSANDS/UL (ref 149–390)
PMV BLD AUTO: 8.4 FL (ref 8.9–12.7)
POTASSIUM SERPL-SCNC: 3.7 MMOL/L (ref 3.5–5.3)
PR INTERVAL: 352 MS
QRS AXIS: -39 DEGREES
QRSD INTERVAL: 120 MS
QT INTERVAL: 442 MS
QTC INTERVAL: 480 MS
RBC # BLD AUTO: 2.75 MILLION/UL (ref 3.81–5.12)
SODIUM SERPL-SCNC: 140 MMOL/L (ref 135–147)
T WAVE AXIS: -10 DEGREES
VENTRICULAR RATE: 71 BPM
WBC # BLD AUTO: 8.56 THOUSAND/UL (ref 4.31–10.16)

## 2022-09-27 PROCEDURE — 97163 PT EVAL HIGH COMPLEX 45 MIN: CPT

## 2022-09-27 PROCEDURE — 97535 SELF CARE MNGMENT TRAINING: CPT

## 2022-09-27 PROCEDURE — 97167 OT EVAL HIGH COMPLEX 60 MIN: CPT

## 2022-09-27 PROCEDURE — 99223 1ST HOSP IP/OBS HIGH 75: CPT | Performed by: INTERNAL MEDICINE

## 2022-09-27 PROCEDURE — 97530 THERAPEUTIC ACTIVITIES: CPT

## 2022-09-27 PROCEDURE — 85025 COMPLETE CBC W/AUTO DIFF WBC: CPT | Performed by: NURSE PRACTITIONER

## 2022-09-27 PROCEDURE — 80048 BASIC METABOLIC PNL TOTAL CA: CPT | Performed by: NURSE PRACTITIONER

## 2022-09-27 PROCEDURE — 93010 ELECTROCARDIOGRAM REPORT: CPT | Performed by: INTERNAL MEDICINE

## 2022-09-27 PROCEDURE — 97110 THERAPEUTIC EXERCISES: CPT

## 2022-09-27 PROCEDURE — 99232 SBSQ HOSP IP/OBS MODERATE 35: CPT | Performed by: NURSE PRACTITIONER

## 2022-09-27 PROCEDURE — C9113 INJ PANTOPRAZOLE SODIUM, VIA: HCPCS | Performed by: NURSE PRACTITIONER

## 2022-09-27 RX ORDER — AMOXICILLIN 250 MG
2 CAPSULE ORAL 2 TIMES DAILY
Status: DISCONTINUED | OUTPATIENT
Start: 2022-09-27 | End: 2022-10-04 | Stop reason: HOSPADM

## 2022-09-27 RX ORDER — POLYETHYLENE GLYCOL 3350 17 G/17G
17 POWDER, FOR SOLUTION ORAL DAILY PRN
Status: DISCONTINUED | OUTPATIENT
Start: 2022-09-27 | End: 2022-10-04 | Stop reason: HOSPADM

## 2022-09-27 RX ORDER — VANCOMYCIN HYDROCHLORIDE 1 G/200ML
12.5 INJECTION, SOLUTION INTRAVENOUS EVERY 12 HOURS
Status: DISCONTINUED | OUTPATIENT
Start: 2022-09-27 | End: 2022-09-29

## 2022-09-27 RX ADMIN — FERROUS SULFATE TAB 325 MG (65 MG ELEMENTAL FE) 325 MG: 325 (65 FE) TAB at 17:57

## 2022-09-27 RX ADMIN — PANTOPRAZOLE SODIUM 40 MG: 40 INJECTION, POWDER, FOR SOLUTION INTRAVENOUS at 20:37

## 2022-09-27 RX ADMIN — LISINOPRIL 2.5 MG: 2.5 TABLET ORAL at 08:03

## 2022-09-27 RX ADMIN — ACETAMINOPHEN 650 MG: 325 TABLET, FILM COATED ORAL at 06:01

## 2022-09-27 RX ADMIN — ALLOPURINOL 100 MG: 100 TABLET ORAL at 08:03

## 2022-09-27 RX ADMIN — APIXABAN 5 MG: 5 TABLET, FILM COATED ORAL at 08:03

## 2022-09-27 RX ADMIN — VANCOMYCIN HYDROCHLORIDE 1000 MG: 1 INJECTION, SOLUTION INTRAVENOUS at 17:57

## 2022-09-27 RX ADMIN — ACETAMINOPHEN 650 MG: 325 TABLET, FILM COATED ORAL at 00:01

## 2022-09-27 RX ADMIN — ACETAMINOPHEN 650 MG: 325 TABLET, FILM COATED ORAL at 17:57

## 2022-09-27 RX ADMIN — METOPROLOL TARTRATE 100 MG: 100 TABLET, FILM COATED ORAL at 08:03

## 2022-09-27 RX ADMIN — EZETIMIBE 10 MG: 10 TABLET ORAL at 20:37

## 2022-09-27 RX ADMIN — OXYCODONE HYDROCHLORIDE AND ACETAMINOPHEN 500 MG: 500 TABLET ORAL at 17:57

## 2022-09-27 RX ADMIN — FOLIC ACID TAB 400 MCG 400 MCG: 400 TAB at 08:04

## 2022-09-27 RX ADMIN — PANTOPRAZOLE SODIUM 40 MG: 40 INJECTION, POWDER, FOR SOLUTION INTRAVENOUS at 08:03

## 2022-09-27 RX ADMIN — SENNOSIDES AND DOCUSATE SODIUM 2 TABLET: 8.6; 5 TABLET ORAL at 17:57

## 2022-09-27 RX ADMIN — VANCOMYCIN HYDROCHLORIDE 1250 MG: 5 INJECTION, POWDER, LYOPHILIZED, FOR SOLUTION INTRAVENOUS at 06:05

## 2022-09-27 RX ADMIN — METOPROLOL TARTRATE 50 MG: 100 TABLET, FILM COATED ORAL at 21:02

## 2022-09-27 RX ADMIN — OXYCODONE HYDROCHLORIDE AND ACETAMINOPHEN 500 MG: 500 TABLET ORAL at 08:02

## 2022-09-27 RX ADMIN — FERROUS SULFATE TAB 325 MG (65 MG ELEMENTAL FE) 325 MG: 325 (65 FE) TAB at 08:03

## 2022-09-27 RX ADMIN — SENNOSIDES AND DOCUSATE SODIUM 2 TABLET: 8.6; 5 TABLET ORAL at 09:14

## 2022-09-27 RX ADMIN — FUROSEMIDE 40 MG: 40 TABLET ORAL at 08:03

## 2022-09-27 RX ADMIN — DOCUSATE SODIUM 100 MG: 100 CAPSULE, LIQUID FILLED ORAL at 08:02

## 2022-09-27 RX ADMIN — DULOXETINE HYDROCHLORIDE 30 MG: 30 CAPSULE, DELAYED RELEASE ORAL at 20:37

## 2022-09-27 RX ADMIN — APIXABAN 5 MG: 5 TABLET, FILM COATED ORAL at 17:57

## 2022-09-27 RX ADMIN — DILTIAZEM HYDROCHLORIDE 120 MG: 120 CAPSULE, COATED, EXTENDED RELEASE ORAL at 08:03

## 2022-09-27 RX ADMIN — CHOLECALCIFEROL TAB 10 MCG (400 UNIT) 400 UNITS: 10 TAB at 08:03

## 2022-09-27 NOTE — ASSESSMENT & PLAN NOTE
· Hemoglobin is 8 8 this appears pretty stable when compared to 8 9 a week ago    · Continue on iron and folic acid as well as Q17  · Ongoing outpatient monitoring and follow-up with PCP    Lab Results   Component Value Date    HGB 8 0 (L) 09/27/2022    HGB 8 2 (L) 09/26/2022    HGB 7 9 (L) 09/25/2022

## 2022-09-27 NOTE — OCCUPATIONAL THERAPY NOTE
Occupational Therapy Evaluation     Patient Name: Oleg Marino  EXTJH'P Date: 9/27/2022  Problem List  Principal Problem:    Vomiting and diarrhea  Active Problems:    Essential hypertension    Anxiety    Anemia    Bladder wall thickening    Abdominal fluid collection    Diverticulosis    Pleural effusion on right    Bacteremia due to Gram-positive bacteria    Past Medical History  Past Medical History:   Diagnosis Date    Abnormal ECG     Last Assessed 9/29/2016    Anxiety     Last Assessed 6/08/2016    Arthritis     knees    Asthma     Last Assessed 11/06/2013    Atrial premature complex     Cataract, bilateral     Last Assessed 7/14/2016    Cataract, left eye     Both eyes  Had surgery on left  COVID-19     Difficulty swallowing     Dizziness     Fibromyalgia     Fibromyalgia, primary     Gastric reflux     Heart failure ([facility] [address] [facility] )     5/23/22 Pt reports had heart failure in the past    History of COVID-19     5/23/22 Pt reports having [place] in 2021      History of transfusion     no reaction    [place] (hard of hearing)     Hyperlipidemia     Hypertension     Incontinence in female     5/23/22 Pt reports has incontinence -wears depends especially at night/riding in car    Irregular heart beat     5/23/22 Pt reports hx of A fib    Lyme disease     PONV (postoperative nausea and vomiting)     Premature ventricular contraction     Primary osteoarthritis of both knees     Last Assessed 7/14/2016    Rheumatic fever     5/23/22 Pt reports had hx of rheumatic fever as a child twice    Sore throat     Tuberculosis 1947     Past Surgical History  Past Surgical History:   Procedure Laterality Date    APPENDECTOMY      CARDIAC PACEMAKER PLACEMENT  2019    COLONOSCOPY      DENTAL SURGERY      extractions    HYSTERECTOMY      5/23/22 Pt reports had appendix out with hysterectomy and "tightened up my bladder"    ORIF FEMUR FRACTURE Left 08/05/2021    Procedure: OPEN REDUCTION W/ INTERNAL FIXATION (ORIF) DISTAL FEMUR; INSERTION RETROGRADE NAIL;  Surgeon: Stephanie Fong MD;  Location: [facility];  Service: Orthopedics    KY DILATE ESOPHAGUS N/A 04/06/2016    Procedure: DILATATION ESOPHAGEAL;  Surgeon: Kathrin Calderon MD;  Location: [facility]; Service: Gastroenterology    KY EGD TRANSORAL BIOPSY SINGLE/MULTIPLE N/A 04/06/2016    Procedure: ESOPHAGOGASTRODUODENOSCOPY (EGD); Surgeon: Kathrin Calderon MD;  [facility]: [facility]; Service: Gastroenterology    KY REMOVAL DEEP IMPLANT Left 9/12/2022    Procedure: REMOVAL NAIL IM  FEMUR;  Surgeon: Stephanie Fong MD;  [facility];  Service: Orthopedics    KY TOTAL KNEE ARTHROPLASTY Left 9/12/2022    Procedure: ARTHROPLASTY KNEE TOTAL;  Surgeon: Stephanie Fong MD;  [facility];  Service: Orthopedics    KY XCAPSL CTRC RMVL INSJ IO LENS PROSTH W/O ECP Left 03/15/2016    Procedure: EXTRACTION EXTRACAPSULAR CATARACT PHACO INTRAOCULAR LENS (IOL); Surgeon: Jaime Greenwood MD;  [facility];  Service: Ophthalmology    REPLACEMENT TOTAL KNEE Right     REPLACEMENT TOTAL KNEE      right     TONSILLECTOMY          09/27/22 0914   OT Last Visit   OT Visit Date 09/27/22  (Tuesday)   Note Type   Note type Evaluation  (Evaluation 6189-3106; tx 9586-8156,5741-4128)   Restrictions/Precautions   Weight Bearing Precautions Per Order Yes   LLE Weight Bearing Per Order WBAT   Other Precautions Chair Alarm; Bed Alarm;WBS;Fall Risk;Pain  (Manny on room air)   Pain Assessment   Pain Assessment Tool 0-10   Pain Score No Pain  (upon therapist arrival)   Home Living   Type of [address] [place] [address] level   Home Equipment Walker;Cane;Quad cane; Wheelchair-manual;Grab bars; Other (Comment)  (adjustable bed, rollator, power recliner)   Additional Comments Pt admit from rehab and lives alone at baseline in apt     Prior Function   Level of [place] Needs assistance with IADLs   Lives With Alone   Receives Help From Family  (supportive niece, Chip Cassidy present during eval)   ADL Assistance Needs assistance   IADLs Needs assistance   Vocational Retired   Comments Pt admit from rehab   Lifestyle   Autonomy Pt reports mod I w/ ADLs at baseline and primarily uses w/c for functional mobility  Household distances in 79 feet for pt at baseline   Reciprocal Relationships Supportive family  Niece present during eval   Service to Others Pt reports retired and 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   Intrinsic Gratification Pt reports enjoying spending time w/ family and watching tv   Psychosocial   Patient Behaviors/Mood Cooperative   Subjective   Subjective "I am taller than you" (stated to her niece standing using Quick Move)   ADL   Where Assessed Edge of bed  (vs OOB In chair post eval)   Eating Assistance 6  Modified independent   Eating Deficit Setup   Grooming Assistance 4  Minimal Assistance   Grooming Deficit Setup; Increased time to complete;Brushing hair  (due to limited R UE shoulder ROM)   UB Bathing Assistance Unable to assess   LB Bathing Assistance Unable to assess   UB Dressing Assistance 5  Supervision/Setup   UB Dressing Deficit Setup; Increased time to complete   LB Dressing Assistance 2  Maximal Assistance   Toileting Assistance  Unable to assess   Bed Mobility   Supine to Sit 4  Minimal assistance   Additional items Assist x 1; Increased time required;Verbal cues; Bedrails;HOB elevated  (to pt's L)   Sit to Supine Unable to assess   Additional Comments Pt seated OOB In chair post eval w/ needs met, call bell in reach and chair alarm activated  Care coordination w/ PT, Kristi Ortiz due to decreased activity tolerance, anticipated physical assistance required and regression from baseline  Transfers   Sit to Stand 4  Minimal assistance   Additional items Assist x 2; Increased time required;Verbal cues; Bedrails;Armrests  (from EOB using Quick Move)   Stand to Sit 4  Minimal assistance   Additional items Assist x 2; Increased time required;Verbal cues;Bedrails;Armrests  (to recliner chair from [facility])   Stand pivot Unable to assess   Other 1  Dependent  (use of Quick Move to facilitate OOB to recliner chair)   Additional items Assist x 2; Increased time required;Verbal cues; Bedrails;Armrests   Functional Mobility   Additional Comments NT  Will continue to assess   Balance   Static Sitting Fair +   Static Standing Poor +   Activity Tolerance   Activity Tolerance Patient limited by fatigue;Patient limited by pain   Medical Staff Made Aware care coordination w/ PT, Ann-Marie Hernandez and spoke to [address] [place] [address] per Benson FERNANDEZ appropriate to see pt  Spoke to [facility]   RUE Assessment   RUE Assessment X   RUE Strength   RUE Overall Strength Deficits  (limited shoulder AROM > 90 degrees)   LUE Assessment   LUE Assessment X   LUE Strength   LUE Overall Strength   (able to participate in ADL w/ ROM)   Hand Function   Gross Motor Coordination Functional   Fine Motor Coordination Functional   Cognition   Overall Cognitive Status   (able to follow directions and participate in conversation)   Arousal/Participation Alert; Cooperative   Attention Attends with cues to redirect   Orientation Level Oriented to person;Oriented to place;Oriented to situation   Memory Decreased recall of recent events   Following Commands Follows one step commands with increased time or repetition   Comments Identified pt by full name and wristband  Alert and able to communicate wants / needs and follow directions during OT eval/ ADL tasks  [place]   Assessment   Limitation Decreased ADL status; Decreased UE strength;Decreased endurance;Decreased self-care trans;Decreased high-level ADLs   Assessment Pt is a 69yo female [facility]25[facility]22  Pt presented from rehab and presented w/ diarrhea, nausea, and abdominal pain   Significant PMH impacting her occupational performance includes anxiety/ depression, CKD, anemia, fibromyalgia, obesity, L femur fracture, s/p L distal femur replacement, removal of deep implants on 9/12/22  Per orthopedics, pt is WBAT L L LE  Personal factors impacting performance includes limited insight into deficits, recent admission, difficulty completing ADL/ IADL, lives alone, and increased pain  Pt lives alone at baseline in apt and completes ADLs w/ mod I for + time  Pt primarily uses w/c for functional mobility, and needs assistance w/ IADLs  Pt admit from rehab  Upon eval, pt alert and able to follow directions during ADLs/ communicate wants / needs  Pt required min A to complete bed mobility, min A x2 sit <> stand from EOB using quick move  Pt required total A x2 for transition to chair using Quick Move  Pt required max A to complete LBD and S to complete UBD  Pt required min A to complete grooming while seated to manage knots / tangles back of head while combing hair due to R UE limited shoulder ROM  Pt presents w/ decreased activity tolerance, decreased endurance, generalized weakness / deconditioning, limited L LE ROM / strength, decreased standing tolerance, decreased standing balance, increased pain impacting her I w/ dressing, bathing, oral hygiene, functional mobility, functional transfers, activity engagement, clothing mgmt  Pt completing ADL below baseline level of I and would benefit from OT while in acute care to address deficits  Based on Barthel Index and AMPAC 6 clicks, recommend post acute rehab when medically stable for discharge from acute care  Will continue to follow   Goals   Patient Goals Pt stated that she would like to eventually get back to her apt and baseline level of I   Plan   Treatment Interventions ADL retraining;Functional transfer training; Endurance training;Patient/family training;Equipment evaluation/education; Compensatory technique education;UE strengthening/ROM; Continued evaluation; Energy conservation; Activityengagement   Goal Expiration Date 10/07/22   Additional Treatment Session   Start Time 3654  (8615-3582, 4336-4178)   End Time 3805 Treatment Assessment Pt seen for skilled OT tx session day 1 following eval from 3831-9895, 1147-7838 focusing on activity engagement and toileting  Pt seen for split OT tx session due to time to void seated on commode w/ call bell in reach and niece present  Pt required mod AX1 w/ stand by assist on 2nd to complete sit to stand from recliner chair using Quick Move  Pt required total A x2 using Quick Move to complete transition to commode  Pt required max A to complete personal hygiene and clothing mgmt during toileting  Pt required max A to complete sit to stand from commode using Quick Move  Pt requested to return to bed following toileting due to fatigue  Pt added that she did not sleep well / much last night  Pt required max A x2 to complete bed mobiltiy sit to supine  Pt supine HOB elevated at end of session w/ needs met, call bell in reach and bed alarm activated  Care coordination w/ PT, Shreya and PCA  Continue to recommend post acute rehab   Will continue to follow   Additional Treatment Day 1  (Tuesday)   Recommendation   OT Discharge Recommendation Post acute rehabilitation services   AM-PAC Daily Activity Inpatient   Lower Body Dressing 2   Bathing 2   Toileting 2   Upper Body Dressing 3   Grooming 3   Eating 4   Daily Activity Raw Score 16   Daily Activity Standardized Score (Calc for Raw Score >=11) 35 96   AM-PAC Applied Cognition Inpatient   Following a Speech/Presentation 3   Understanding Ordinary Conversation 4   Taking Medications 3   Remembering Where Things Are Placed or Put Away 3   Remembering List of 4-5 Errands 3   Taking Care of Complicated Tasks 2   Applied Cognition Raw Score 18   Applied Cognition Standardized Score 38 07   Barthel Index   Feeding 10   Bathing 0   Grooming Score 0   Dressing Score 5   Bladder Score 5   Bowels Score 10   Toilet Use Score 0   Transfers (Bed/Chair) Score 5   Mobility (Level Surface) Score 0   Stairs Score 0   Barthel Index Score 35   Modified [place]enton Scale Modified Palmer Scale 4      The patient's raw score on the AM-PAC Daily Activity inpatient short form is 16, standardized score is 35 96, less than 39 4  Patients at this level are likely to benefit from discharge to post-acute rehabilitation services  Please refer to the recommendation of the Occupational Therapist for safe discharge planning  Pt goals to be met by 10/7/22 to max I w/ ADLs and minimize burden of care to eventually return home to her apt include:    -Pt will demonstrate good attention and participation in ongoing eval of functional cognitive skills to assist in DC planning    -Pt will consistently follow multi step directions to max I w/ ADLs and improve engagement to return home    -Pt will complete bed mobility supine <> sit w/ S to max I w/ ADLs and minimize burden of care    -Pt will complete sit <> stand w/ S using AD as needed to max I w/ functional transfers and LB ADLs    -Pt will complete LBD w/ min A using LHAE as needed to max I and minimize burden of care    -Pt will demonstrate improved activity and sitting tolerance OOB in chair for all meals    -Pt will demonstrate good attention and participation in ongoing eval to assess functional mobility to assist in DC planning    -Pt will demonstrate improved functional standing tolerance for at least 5 minutes w/ fair - balance using AD to participate in grooming w/ S to max I w/ functional mobility / transfers       KIKE Hamilton/L

## 2022-09-27 NOTE — ASSESSMENT & PLAN NOTE
· CT noting what appears to be seroma at the left femur proximally  · Orthopedic surgery consulted; recommendations as noted below  · No indication for antibiotics at this time  · Weight-bearing as tolerated  · If remains through 9/27 please contact Orthopedic surgery to remove staples prior to discharge  · PT/OT consulted for re-evaluation  · Case management on board to assist with dispo  · Jaky Rosie versus [address]

## 2022-09-27 NOTE — PLAN OF CARE
Problem: Potential for Falls  Goal: Patient will remain free of falls  Description: INTERVENTIONS:  - Educate patient/family on patient safety including physical limitations  - Instruct patient to call for assistance with activity   - Consult OT/PT to assist with strengthening/mobility   - Keep Call bell within reach  - Keep bed low and locked with side rails adjusted as appropriate  - Keep care items and personal belongings within reach  - Initiate and maintain comfort rounds  - Make Fall Risk Sign visible to staff  - Apply yellow socks and bracelet for high fall risk patients  - Consider moving patient to room near nurses station  Outcome: Progressing     Problem: Prexisting or High Potential for Compromised Skin Integrity  Goal: Skin integrity is maintained or improved  Description: INTERVENTIONS:  - Identify patients at risk for skin breakdown  - Assess and monitor skin integrity  - Assess and monitor nutrition and hydration status  - Monitor labs   - Assess for incontinence   - Turn and reposition patient  - Assist with mobility/ambulation  - Relieve pressure over bony prominences  - Avoid friction and shearing  - Provide appropriate hygiene as needed including keeping skin clean and dry  - Evaluate need for skin moisturizer/barrier cream  - Collaborate with interdisciplinary team   - Patient/family teaching  - Consider wound care consult   Outcome: Progressing     Problem: MUSCULOSKELETAL - ADULT  Goal: Maintain proper alignment of affected body part  Description: INTERVENTIONS:  - Support, maintain and protect limb and body alignment  - Provide patient/ family with appropriate education  Outcome: Progressing     Problem: PAIN - ADULT  Goal: Verbalizes/displays adequate comfort level or baseline comfort level  Description: Interventions:  - Encourage patient to monitor pain and request assistance  - Assess pain using appropriate pain scale  - Administer analgesics based on type and severity of pain and evaluate response  - Implement non-pharmacological measures as appropriate and evaluate response  - Consider cultural and social influences on pain and pain management  - Notify physician/advanced practitioner if interventions unsuccessful or patient reports new pain  Outcome: Progressing     Problem: SAFETY ADULT  Goal: Patient will remain free of falls  Description: INTERVENTIONS:  - Educate patient/family on patient safety including physical limitations  - Instruct patient to call for assistance with activity   - Consult OT/PT to assist with strengthening/mobility   - Keep Call bell within reach  - Keep bed low and locked with side rails adjusted as appropriate  - Keep care items and personal belongings within reach  - Initiate and maintain comfort rounds  - Make Fall Risk Sign visible to staff  - Apply yellow socks and bracelet for high fall risk patients  - Consider moving patient to room near nurses station  Outcome: Progressing

## 2022-09-27 NOTE — PROGRESS NOTES
Vancomycin IV Pharmacy-to-Dose Consultation    Alejandra Garcia is a 76 y o  female who is currently receiving Vancomycin IV with management by the Pharmacy Consult service  Assessment/Plan:  The patient was reviewed  Based on todays assessment, change current vancomycin (day # 2)to dosing of 1000 mg IV q12h, with a plan for trough to be drawn at 1730 on 09/29  We will continue to follow the patients culture results and clinical progress daily      Maia Ramires, Pharmacist

## 2022-09-27 NOTE — ASSESSMENT & PLAN NOTE
· Unclear etiology however of note patient extremely more sedentary than usual postoperatively  · Liver ultrasound with cholelithiasis without cholecystitis

## 2022-09-27 NOTE — PHYSICAL THERAPY NOTE
PHYSICAL THERAPY EVALUATION  NAME:  Eleanor Sheffield  DATE: 09/27/22    AGE:   76 y o   Mrn:   [id number]  Principal problem: Principal Problem:    Vomiting and diarrhea  Active Problems:    Essential hypertension    Anxiety    Anemia    Bladder wall thickening    Abdominal fluid collection    Diverticulosis    Pleural effusion on right    Bacteremia due to Gram-positive bacteria      Vitals:    09/26/22 0736 09/26/22 1508 09/26/22 2108 09/27/22 0736   BP: 141/57 137/56 119/59 141/61   BP Location:   Right arm    Pulse: 69 70 70 70   Resp: 18 18 18 16   Temp: 97 9 °F (36 6 °C) 98 °F (36 7 °C) 98 5 °F (36 9 °C) 99 2 °F (37 3 °C)   TempSrc:   Oral    SpO2: 95% 93% 95% 94%   Weight:           Length Of Stay: 1  Performed at least 2 patient identifiers during session: Name and Birthday  PHYSICAL THERAPY EVALUATION :    09/27/22 0905   PT Last Visit   PT Visit Date 09/27/22   Note Type   Note type Evaluation   Pain Assessment   Pain Assessment Tool 0-10   Pain Score No Pain  (@ rest, increased w/ activity)   Effect of Pain on Daily Activities limits speed and indep of mobility, limits AROM/PROM   Patient's Stated Pain Goal No pain   Hospital Pain Intervention(s) Repositioned; Ambulation/increased activity; Emotional support  (declined pain meds prior to session)   Restrictions/Precautions   Weight Bearing Precautions Per Order Yes   LLE Weight Bearing Per Order WBAT   Other Precautions Bed Alarm; Chair Alarm;Cognitive; Fall Risk;Pain   Home Living   Type of [address] [place] [address] level   Additional Comments Prior to last admission: Niece provided virtual home assessment paperwork to be filed in media  Patient has wheelchair, walker, single-point cane, quad cane, Rollator walker, motorized recliner  Prior to recent surgery, Patient was primarily wheelchair mobile in performing independent transfers  Negeno Chatman has about 70 ft to ambulate further distances the apartment    Has adjustable hospital bed   Prior Function Lives With Alone   Receives Help From Family  (Niece stays with her p r n  but only during the day and not at night, Pt has daily visits 4-6 hours depending on need)   General   Additional Pertinent History Recent surgery 9/12/2022: Left distal femoral replacement; removal of deep implants left femur  no restrictions w/ ROM per prior admission information from [facility]   Family/Caregiver Present Yes  (Niece)   Cognition   Arousal/Participation Alert   Orientation Level Oriented to person   Following Commands Follows one step commands without difficulty   Comments Mild [place]   Subjective   Subjective Patient pleasant cooperative  Reports that she wanted to take a nap but is agreeable to participate including trials at edge of bed and out of bed mobility  Strength RLE   R Hip Flexion 4/5   R Knee Extension 4/5   R Ankle Dorsiflexion 4/5   LLE Assessment   LLE Assessment   (Hip internal rotation limited from neutral   Position of comfort including hip external rotation)   LLE Overall PROM   L Hip Flexion Limited   L Knee Flexion Limited   Strength LLE   L Hip Extension 3+/5   L Knee Extension 3+/5   L Ankle Dorsiflexion 4/5   Coordination   Movements are Fluid and Coordinated 0   Coordination and Movement Description Tremors   Light Touch   RLE Light Touch Grossly intact   LLE Light Touch Grossly intact   Bed Mobility   Supine to Sit 5  Supervision   Additional items Assist x 1;HOB elevated; Increased time required; Bedrails;Verbal cues  (Bed controls use to maximize head of bed prior to progressing to edge of bed with left egress)   Transfers   Sit to Stand 4  Minimal assistance  (From low deck bed height)   Additional items Assist x 2; Increased time required;Verbal cues  (With UE support of quick move sit to stand lift)   Stand to Sit 4  Minimal assistance   Additional items Assist x 2; Increased time required;Verbal cues   Ambulation/Elevation   Gait pattern Not appropriate   Balance   Static Sitting Fair + Static Standing Poor +  (Standing tolerance 45 seconds max, limited by reports of lightheadedness)   Endurance Deficit   Endurance Deficit Yes   Endurance Deficit Description Limited standing tolerance, include reports of lightheadedness  Needs therapeutic rest between mobility trials   Activity Tolerance   Activity Tolerance Patient limited by pain; Patient limited by fatigue   Medical Staff Edis coordination for portions of session with Isabella from OT  Nurse Made Aware Spoke to [facility] during session   Assessment   Prognosis Fair   Problem List Decreased range of motion;Decreased endurance;Decreased strength; Impaired balance;Decreased coordination;Decreased mobility; Decreased cognition; Impaired judgement;Decreased safety awareness; Impaired hearing;Obesity;Pain;Orthopedic restrictions  (Gait deviations, fear and retreat)   Assessment Pt is a 76 y o  female seen for PT evaluation s/p admit to [facility] on 9/25/2022 w/ Vomiting and diarrhea  Patient recently here earlier this month for a Left distal femoral replacement; 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  Order placed for PT  Prior to last admission: Pt lived in apartment with no steps to enter and was primarily wheelchair mobile, performing independent transfers to and from surfaces  Patient also did short distance ambulation with a walker and HHPT  Upon this admission evaluation: Pt required only supervision for bed mobility but needed maximal support of the bed controls  Patient needed Min assist of 2 for sit to stand transfers using quick move to get to the target surface, but was unable to perform any gait training at this time    Pt's clinical presentation is currently unstable/unpredictable given the functional mobility deficits above, especially (but not limited to) weakness, decreased ROM and decreased functional mobility tolerance, coupled with fall risks including hx of falls and impaired balance, and combined with medical complications of multiple readmissions and fear/retreat  Recommend continue mobility training with quick move via nursing and PT to get to target surfaces, but PT should continue to work on progression with pre gait and gait activities using a short rolling walker  Also recommend TherEx, standing tolerance  Barriers to Discharge Decreased caregiver support; Inaccessible home environment   Goals   Patient Goals To eventually get back home to my apartment   STG Expiration Date 10/07/22   Short Term Goal #1 Pt will: Perform bed mobility tasks with modified I to prepare for transfers and reposition in bed using hospital bed controls as patient has a hospital bed home  Perform transfers with supervision to decrease risk for falls and improve ease of transfers  Perform ambulation with rolling walker for up to 50 ft with supervision to improve gait quality and promote proper use of assistive device  Propel wheelchair for 50 ft as alternative to ambulation to perform independent mobility within her home environment  Perform at least 2 HEP w/o physical A to demonstrate compliance w/therex and improve ease of transfers   PT Treatment Day 1   Plan   Treatment/Interventions Functional transfer training;LE strengthening/ROM; Therapeutic exercise; Endurance training;Cognitive reorientation;Patient/family training;Bed mobility;Gait training;Equipment eval/education;Spoke to nursing;Spoke to case management;OT   PT Frequency 5-7x/wk   Recommendation   PT Discharge Recommendation Return to facility with rehabilitation services   Equipment Recommended Walker; Wheelchair   Additional Comments As primary means for mobility, walker as secondary   AM-PAC Basic Mobility Inpatient   Turning in Bed Without Bedrails 3   Lying on Back to Sitting on Edge of Flat Bed 2   Moving Bed to Chair 1   Standing Up From Chair 1   Walk in Room 1   Climb 3-5 Stairs 1   Basic Mobility Inpatient Raw Score 9   Turning Head Towards Sound 4   Follow Simple Instructions 3   Low Function Basic Mobility Raw Score 16   Low Function Basic Mobility Standardized Score 25 72   Highest Level Of Mobility   [facility] Goal 3: Sit at edge of bed   -[facility] Achieved 4: Move to chair/commode   Additional Treatment Session   Start Time 1360  ([phone])   End Time 1008   Treatment Assessment Patient requires fluctuating assistance levels of 2 for performing sit to stand transfers after initial performance  She requires verbal instruction as well as facilitation for upright posture, anterior pelvic tilt to get her weight into morbid upright position  She still requires assistance with TherEx but is able to respond to cuing/tactile support to perform TherEx properly with less substitution  Skilled PT is recommended to progress patient towards treatment goals  Recommend patient be mobilized out of bed to target surfaces with quick move transfer aid via nursing for out of bed for meals and toileting, and PT work on gait and pre gait activities   Exercises   Quad Sets Sitting;10 reps;AROM; Left   Hip Abduction Sitting;10 reps;AAROM;AROM; Left  (With facilitation to keep left lower extremity in more of a neutral rotation)   Hip Adduction Sitting;10 reps;AAROM;AROM; Left  (With facilitation to keep left lower extremity in more of a neutral rotation)   Neuro re-ed Hip internal rotation in sitting times 10 reps--instructed needs to patient perform same  Patient performed sit to stand transfers with fluctuating assist of 2 levels (between Min assist of 2 to max assist of 2 depending on initial surface height)  Standing tolerance time max of 45 seconds  Performed 4 trials total with UE support of quick move sit to stand aid    Patient performed trials to and from commode, from recliner, and to bed during treatment   End of Consult   Patient Position at End of Consult All needs within reach;Bed/Chair alarm activated;Supine   Pt requires PT /OT co-treat and co-eval due to required skilled interventions of at least 2 clinicians for care delivery, medical complexity, limited activity tolerance, and cognitive-behavioral impairments including fear and retreat  PT and OT goals addressed separately  (Please find full objective findings from PT assessment regarding body systems outlined above)  The patient's [facility] Basic Mobility Inpatient Short Form Raw Score is 9  A Raw Score of less than 16 suggests the patient may benefit from discharge to post-acute rehabilitation services, which DOES coincide with CURRENT above PT recommendations  However please refer to therapist recommendation for discharge planning given other factors that may influence destination  Adapted from [employer] [facility] 6-Clicks Basic Mobility and Daily Activity Scores With Discharge Destination  Physical Therapy, 2021;101:1-9  DOI: [id number]    Portions of the record may have been created with voice recognition software  Occasional wrong word or "sound a like" substitutions may have occurred due to the inherent limitations of voice recognition software    Read the chart carefully and recognize, using context, where substitutions have occurred

## 2022-09-27 NOTE — ASSESSMENT & PLAN NOTE
· Blood pressure acceptable systolics in 006 to 614V  · Will continue home meds including metoprolol, lisinopril, Lasix, Cardizem  · Monitor with routine vitals

## 2022-09-27 NOTE — PLAN OF CARE
Problem: OCCUPATIONAL THERAPY ADULT  Goal: Performs self-care activities at highest level of function for planned discharge setting  See evaluation for individualized goals  Description: Treatment Interventions: ADL retraining, Functional transfer training, Endurance training, Patient/family training, Equipment evaluation/education, Compensatory technique education, UE strengthening/ROM, Continued evaluation, Energy conservation, Activityengagement          See flowsheet documentation for full assessment, interventions and recommendations  Note: Limitation: Decreased ADL status, Decreased UE strength, Decreased endurance, Decreased self-care trans, Decreased high-level ADLs     Assessment: Pt is a 69yo female [facility]  Pt presented from rehab and presented w/ diarrhea, nausea, and abdominal pain  Significant PMH impacting her occupational performance includes anxiety/ depression, CKD, anemia, fibromyalgia, obesity, L femur fracture, s/p L distal femur replacement, removal of deep implants on 9/12/22  Per orthopedics, pt is WBAT L L LE  Personal factors impacting performance includes limited insight into deficits, recent admission, difficulty completing ADL/ IADL, lives alone, and increased pain  Pt lives alone at baseline in apt and completes ADLs w/ mod I for + time  Pt primarily uses w/c for functional mobility, and needs assistance w/ IADLs  Pt admit from rehab  Upon eval, pt alert and able to follow directions during ADLs/ communicate wants / needs  Pt required min A to complete bed mobility, min A x2 sit <> stand from EOB using quick move  Pt required total A x2 for transition to chair using Quick Move  Pt required max A to complete LBD and S to complete UBD  Pt required min A to complete grooming while seated to manage knots / tangles back of head while combing hair due to R UE limited shoulder ROM   Pt presents w/ decreased activity tolerance, decreased endurance, generalized weakness / deconditioning, limited L LE ROM / strength, decreased standing tolerance, decreased standing balance, increased pain impacting her I w/ dressing, bathing, oral hygiene, functional mobility, functional transfers, activity engagement, clothing mgmt  Pt completing ADL below baseline level of I and would benefit from OT while in acute care to address deficits  Based on Barthel Index and [facility] 6 clicks, recommend post acute rehab when medically stable for discharge from acute care   Will continue to follow     OT Discharge Recommendation: Post acute rehabilitation services

## 2022-09-27 NOTE — PROGRESS NOTES
[address] 1945, 76 y o  female MRN: [id number]  Unit/Bed#: S -01 Encounter: [id number]  Primary Care Provider: Tommi Seip, MD   Date and time admitted to hospital: 9/25/2022  6:25 AM    * Vomiting and diarrhea  Assessment & Plan  Background: Presented to the ED with complaining of vomiting and diarrhea earlier and on presentation still feeling "nauseous"  · CLD> regular diet resumed and thus far tolerated   · IV fluids> discontinued on 9/26  · Secondary viral gastroenteritis versus other intra-abdominal process  · Stool cultures pending; low suspicion for C diff or infectious etiology as resolved without treatment    · Blood cultures positive as noted below   · Troponin unremarkable  · Telemetry discontinued        Pleural effusion on right  Assessment & Plan  · Unclear etiology however of note patient extremely more sedentary than usual postoperatively  · Liver ultrasound with cholelithiasis without cholecystitis     Anemia  Assessment & Plan  · Hemoglobin is 8 8 this appears pretty stable when compared to 8 9 a week ago    · Continue on iron and folic acid as well as D92  · Ongoing outpatient monitoring and follow-up with PCP    Lab Results   Component Value Date    HGB 8 0 (L) 09/27/2022    HGB 8 2 (L) 09/26/2022    HGB 7 9 (L) 09/25/2022       Bladder wall thickening  Assessment & Plan  · Unclear significance inpatient denies any urinary symptoms  · UA ordered    Abdominal fluid collection  Assessment & Plan  · CT noting what appears to be seroma at the left femur proximally  · Orthopedic surgery consulted; recommendations as noted below  · No indication for antibiotics at this time  · Weight-bearing as tolerated  · If remains through 9/27 please contact Orthopedic surgery to remove staples prior to discharge  · PT/OT consulted for re-evaluation  · Case management on board to assist with dispo  · [facility], later [facility] versus [address] [place] [address] Diverticulosis  Assessment & Plan  · No evidence of diverticulitis also patient does not have any diarrhea    Bacteremia due to Gram-positive bacteria  Assessment & Plan  · Blood cultures from  2 positive for staphylococcus epidermidis   · In the setting of pacemaker as well   · Repeats ordered per ID   · Infectious disease consulted; input appreciated   · Continue IV Vancomycin   · Awaiting final results   · Contaminant versus true bacteremia at this point     [address]  · Stable will continue home Ativan    Essential hypertension  Assessment & Plan  · Blood pressure acceptable systolics in 543 to 481P  · Will continue home meds including metoprolol, lisinopril, Lasix, Cardizem  · Monitor with routine vitals      VTE Pharmacologic Prophylaxis: VTE Score: 4 Moderate Risk (Score 3-4) - Pharmacological DVT Prophylaxis Ordered: apixaban (Eliquis)  Patient Centered Rounds: I performed bedside rounds with nursing staff today  Discussions with Specialists or Other Care Team Provider: nursing staff, CM as well as ID and ortho     Education and Discussions with Family / Patient: Updated  (daughter) at bedside  Time Spent for Care: 30 minutes  More than 50% of total time spent on counseling and coordination of care as described above      Current Length of Stay: 1 day(s)  Current Patient Status: Inpatient   Certification Statement: The patient will continue to require additional inpatient hospital stay due to IV abx   Discharge Plan: Anticipate discharge in 48-72 hrs to [place] versus [place]     Code Status: Level 1 - Full Code    Subjective:   Patient reports a bit of abdominal discomfort that she reports comes and goes     Objective:     Vitals:   Temp (24hrs), Av 6 °F (37 °C), Min:98 °F (36 7 °C), Max:99 2 °F (37 3 °C)    Temp:  [98 °F (36 7 °C)-99 2 °F (37 3 °C)] 99 2 °F (37 3 °C)  HR:  [70] 70  Resp:  [16-18] 16  BP: (119-141)/(56-61) 141/61  SpO2:  [93 %-95 %] 94 %  Body mass index is 40 49 kg/m²  Input and Output Summary (last 24 hours): Intake/Output Summary (Last 24 hours) at 9/27/2022 1423  Last data filed at 9/27/2022 5443  Gross per 24 hour   Intake 360 ml   Output 1250 ml   Net -890 ml       Physical Exam:   Physical Exam  Constitutional:       Appearance: She is not ill-appearing  Cardiovascular:      Rate and Rhythm: Normal rate  Heart sounds: Murmur heard  Pulmonary:      Effort: Pulmonary effort is normal    Abdominal:      General: Bowel sounds are normal    Musculoskeletal:         General: Swelling and tenderness present  Skin:     General: Skin is warm and dry  Capillary Refill: Capillary refill takes less than 2 seconds  Neurological:      Mental Status: She is alert and oriented to person, place, and time  Mental status is at baseline     Psychiatric:         Mood and Affect: Mood normal          Behavior: Behavior normal           Additional Data:     Labs:  Results from last 7 days   Lab Units 09/27/22  0437   WBC Thousand/uL 8 56   HEMOGLOBIN g/dL 8 0*   HEMATOCRIT % 26 4*   PLATELETS Thousands/uL 582*   NEUTROS PCT % 55   LYMPHS PCT % 20   MONOS PCT % 13*   EOS PCT % 9*     Results from last 7 days   Lab Units 09/27/22  0437 09/25/22  0706   SODIUM mmol/L 140 143   POTASSIUM mmol/L 3 7 3 8   CHLORIDE mmol/L 101 101   CO2 mmol/L 34* 33*   BUN mg/dL 15 13   CREATININE mg/dL 1 13 0 82   ANION GAP mmol/L 5 9   CALCIUM mg/dL 8 5 9 2   ALBUMIN g/dL  --  3 1*   TOTAL BILIRUBIN mg/dL  --  0 42   ALK PHOS U/L  --  66   ALT U/L  --  15   AST U/L  --  18   GLUCOSE RANDOM mg/dL 118 130     Results from last 7 days   Lab Units 09/25/22  0706   INR  1 28*                   Lines/Drains:  Invasive Devices  Report    Peripheral Intravenous Line  Duration           Peripheral IV 09/12/22 Right Forearm 15 days    Peripheral IV 09/25/22 Left Antecubital 2 days          Drain  Duration           External Urinary Catheter 2 days Imaging: No pertinent imaging reviewed  Recent Cultures (last 7 days):   Results from last 7 days   Lab Units 09/25/22  1346   BLOOD CULTURE  Staphylococcus epidermidis*   GRAM STAIN RESULT  Gram positive cocci in clusters*  Gram positive cocci in clusters*       Last 24 Hours Medication List:   Current Facility-Administered Medications   Medication Dose Route Frequency Provider Last Rate    acetaminophen  650 mg Oral Q6H [address] Triny Banuelos MD      allopurinol  100 mg Oral Daily Triny Banuelos MD      apixaban  5 mg Oral BID JERI Raygoza      ascorbic acid  500 mg Oral BID Triny Banuelos MD      cholecalciferol  400 Units Oral Daily Triny Banuelos MD      diltiazem  120 mg Oral Daily Triny Banuelos MD      DULoxetine  30 mg Oral Daily Triny Banuelos MD      ezetimibe  10 mg Oral Daily Triny Banuelos MD      ferrous sulfate  325 mg Oral BID With Meals Triny Banuelos MD      folic acid  455 mcg Oral Daily Triny Banuelos MD      furosemide  40 mg Oral Daily Triny Banuelos MD      lisinopril  2 5 mg Oral Daily Triny Banuelos MD      LORazepam  0 5 mg Oral Q8H PRN Triny Banuelos MD      metoprolol tartrate  100 mg Oral Daily Triny Banuelos MD      metoprolol tartrate  50 mg Oral HS Triny Banuelos MD      ondansetron  4 mg Intravenous Q4H PRN Triny Banuelos MD      pantoprazole  40 mg Intravenous Q12H [address] JERI Raygoza      polyethylene glycol  17 g Oral Daily PRN JERI Raygoza      senna-docusate sodium  2 tablet Oral BID JERI Raygoza      vancomycin  12 5 mg/kg (Adjusted) Intravenous Q12H Cherylin Morning, DO          Today, Patient Was Seen By: JERI Ziegler    **Please Note: This note may have been constructed using a voice recognition system  **

## 2022-09-27 NOTE — ASSESSMENT & PLAN NOTE
· Blood cultures from 9/25 2/2 positive for staphylococcus epidermidis   · In the setting of pacemaker as well   · Repeats ordered per ID   · Infectious disease consulted; input appreciated   · Continue IV Vancomycin   · Awaiting final results   · Contaminant versus true bacteremia at this point

## 2022-09-27 NOTE — CONSULTS
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  · Patient reportedly has an old right-sided knee replacement  · Patient has a place maker in place  · Was admitted to the hospital on 09/25/2022 for nausea and vomiting, in the setting of recent doxycycline use  · Blood cultures 09/25/2022:  2/2 g positive cocci in clusters, awaiting speciation  · Does not meet SIRS criteria  Plan:  · Repeat blood cultures now  · Will continue vancomycin for now    2  Vomiting and diarrhea  · Improving, possibly and adverse side effect to doxycycline that she was taking as an outpatient or gastroenteritis  · Now improving  · Continue to monitor clinically    3  Bladder wall thickening  · UA growing nitrates and leukocytes, innumerable WBC  · Patient is asymptomatic  · Will follow along with urine cultures    4  Seroma   · Partially visualized left lateral proximal femoral deep subcutaneous fluid collection measuring at least 5 5 x 5 cm presumably seroma given recent orthopedic procedure  · Will follow with blood cultures   May have to further investigate for septic seroma     Have discussed the above management plan in detail with the primary service    Extensive review of the medical records in epic including review of the notes, radiographs, and laboratory results     HISTORY OF PRESENT ILLNESS:  Reason for Consult:  Bacteremia    HPI: Patricia Pena is a 76y o  year old female with a past medical history tachy-smita syndrome with pacemaker placement, paroxysmal atrial fibrillation, essential hypertension, CKD, and a recent fracture of the left femur who recently had hardware removal and replacement, now presenting with through several days of diarrhea, nausea, vomiting  CT abdomen/pelvis revealed a left relative oral proximal femoral deep tissue fluid collection, presumably a seroma given her recent orthopedic surgery  Orthopedic surgery was consulted and they have recommended against aspiration of the fluid which is likely a seroma in the setting of postop changes  Blood cultures that were drawn on 09/25/2022 are now growing 2/2 g positive cocci in clusters  Blood culture identification panel is detecting staff follow-up coccus epidermidis  Patient remains without leukocytosis and is afebrile  REVIEW OF SYSTEMS:  A complete review of systems is negative other than that noted in the HPI  PAST MEDICAL HISTORY:  Past Medical History:   Diagnosis Date    Abnormal ECG     Last Assessed 9/29/2016    Anxiety     Last Assessed 6/08/2016    Arthritis     knees    Asthma     Last Assessed 11/06/2013    Atrial premature complex     Cataract, bilateral     Last Assessed 7/14/2016    Cataract, left eye     Both eyes  Had surgery on left   COVID-19     Difficulty swallowing     Dizziness     Fibromyalgia     Fibromyalgia, primary     Gastric reflux     Heart failure ([address] )     5/23/22 Pt reports had heart failure in the past    History of COVID-19     5/23/22 Pt reports having COVID in 2021      History of transfusion     no reaction    [place] (hard of hearing)     Hyperlipidemia     Hypertension     Incontinence in female     5/23/22 Pt reports has incontinence -wears depends especially at night/riding in car    Irregular heart beat     5/23/22 Pt reports hx of A fib    Lyme disease     PONV (postoperative nausea and vomiting)     Premature ventricular contraction     Primary osteoarthritis of both knees     Last Assessed 7/14/2016    Rheumatic fever     5/23/22 Pt reports had hx of rheumatic fever as a child twice    Sore throat     Tuberculosis 1947     Past Surgical History:   Procedure Laterality Date  APPENDECTOMY      CARDIAC PACEMAKER PLACEMENT  2019    COLONOSCOPY      DENTAL SURGERY      extractions    HYSTERECTOMY      5/23/22 Pt reports had appendix out with hysterectomy and "tightened up my bladder"    ORIF FEMUR FRACTURE Left 08/05/2021    Procedure: OPEN REDUCTION W/ INTERNAL FIXATION (ORIF) DISTAL FEMUR; INSERTION RETROGRADE NAIL;  Surgeon: Javan Leos MD;  [facility];  Service: Orthopedics    HI DILATE ESOPHAGUS N/A 04/06/2016    Procedure: DILATATION ESOPHAGEAL;  Surgeon: Shakir Navarrete MD;  [facility]; Service: Gastroenterology    HI EGD TRANSORAL BIOPSY SINGLE/MULTIPLE N/A 04/06/2016    Procedure: ESOPHAGOGASTRODUODENOSCOPY (EGD); Surgeon: Shakir Navarrete MD;  [facility]; Service: Gastroenterology    HI REMOVAL DEEP IMPLANT Left 9/12/2022    Procedure: REMOVAL NAIL IM  FEMUR;  Surgeon: Javan Leos MD;  [facility];  Service: Orthopedics    HI TOTAL KNEE ARTHROPLASTY Left 9/12/2022    Procedure: ARTHROPLASTY KNEE TOTAL;  Surgeon: Javan Leos MD;  [facility];  Service: Orthopedics    HI XCAPSL CTRC RMVL INSJ IO LENS PROSTH W/O ECP Left 03/15/2016    Procedure: EXTRACTION EXTRACAPSULAR CATARACT PHACO INTRAOCULAR LENS (IOL);   Surgeon: Vinod Quinones MD;  [facility];  Service: Ophthalmology    REPLACEMENT TOTAL KNEE Right     REPLACEMENT TOTAL KNEE      right     TONSILLECTOMY         FAMILY HISTORY:  Non-contributory    SOCIAL HISTORY:  Social History   Social History     Substance and Sexual Activity   Alcohol Use Not Currently    Comment: Per [employer]- pt reports no current alcohol use at this time     Social History     Substance and Sexual Activity   Drug Use No    Comment: Denies any former or current drug use     Social History     Tobacco Use   Smoking Status Never Smoker   Smokeless Tobacco Never Used   Tobacco Comment    Denies any former or current smoking       ALLERGIES:  Allergies Allergen Reactions    Penicillins Hives     Itching and terrible hives    Sulfa Antibiotics Itching    S64 Folate [Folic Acid-Vit F3-DSA N46 - Food Allergy] Other (See Comments)     22 Pt reports no allergic reaction known     [place] Other (See Comments)     Unknown,  -pt is unaware of reaction     Lyrica [Pregabalin] Other (See Comments)     22 Pt reports doesn't remember reaction     Other Other (See Comments)     Black rubber 22 per allergy test - pt unaware of reaction    Cortisone Acetate [Cortisone] Itching and Rash     22 pt reports makes her violent     Doxycycline Hives and Itching    [place] Other (See Comments)     Skin discoloration       MEDICATIONS:  All current active medications have been reviewed        PHYSICAL EXAM:  Temp:  [98 °F (36 7 °C)-99 2 °F (37 3 °C)] 99 2 °F (37 3 °C)  HR:  [70] 70  Resp:  [16-18] 16  BP: (119-141)/(56-61) 141/61  SpO2:  [93 %-95 %] 94 %  Temp (24hrs), Av 6 °F (37 °C), Min:98 °F (36 7 °C), Max:99 2 °F (37 3 °C)  Current: Temperature: 99 2 °F (37 3 °C)    Intake/Output Summary (Last 24 hours) at 2022 9943  Last data filed at 2022 1105  Gross per 24 hour   Intake 360 ml   Output 1250 ml   Net -890 ml       General Appearance:  Appearing well, nontoxic, and in no distress   Head:  Normocephalic, without obvious abnormality, atraumatic   Eyes:  Conjunctiva pink and sclera anicteric, both eyes   Nose: Nares normal, mucosa normal, no drainage   Throat: Oropharynx moist without lesions   Neck: Supple, symmetrical, no adenopathy, no tenderness/mass/nodules   Back:   Symmetric, no curvature, ROM normal, no CVA tenderness   Lungs:   Clear to auscultation bilaterally, respirations unlabored   Chest Wall:  No tenderness or deformity   Heart:  RRR; no murmur, rub or gallop   Abdomen:   Soft, non-tender, non-distended, positive bowel sounds    Extremities: Surgical incision sites on the left lower extremity dry, intact, mildly warm, no drainage   Skin: No rashes or lesions  No draining wounds noted  Lymph nodes: Cervical, supraclavicular nodes normal   Neurologic: Alert and oriented times 3, extremity strength 5/5 and symmetric       LABS, IMAGING, & OTHER STUDIES:  Lab Results:  I have personally reviewed pertinent labs  Results from last 7 days   Lab Units 09/27/22  0437 09/26/22  0549 09/25/22  2056 09/25/22  1712 09/25/22  1346 09/25/22  0706   WBC Thousand/uL 8 56  --   --   --   --  7 84   HEMOGLOBIN g/dL 8 0* 8 2* 7 9*   < >  --  8 8*   PLATELETS Thousands/uL 582*  --   --   --  601* 645*    < > = values in this interval not displayed  Results from last 7 days   Lab Units 09/27/22  0437 09/25/22  0706   SODIUM mmol/L 140 143   POTASSIUM mmol/L 3 7 3 8   CHLORIDE mmol/L 101 101   CO2 mmol/L 34* 33*   BUN mg/dL 15 13   CREATININE mg/dL 1 13 0 82   EGFR ml/min/1 73sq m 46 68   CALCIUM mg/dL 8 5 9 2   AST U/L  --  18   ALT U/L  --  15   ALK PHOS U/L  --  66     Results from last 7 days   Lab Units 09/25/22  1346   GRAM STAIN RESULT  Gram positive cocci in clusters*  Gram positive cocci in clusters*                     Imaging Studies:   I have personally reviewed pertinent imaging study reports and images in PACS  XR chest 2 views    Result Date: 9/25/2022  Impression: No acute cardiopulmonary disease  Workstation performed: [id number]     US right upper quadrant    Result Date: 9/26/2022  Impression: Cholelithiasis without sonographic evidence of cholecystitis  Workstation performed: [id number]     CT abdomen pelvis with contrast    Result Date: 9/25/2022  Impression: Small right pleural effusion with minimal bibasilar subsegmental atelectasis, right greater than left  Minimal nonspecific bladder wall thickening likely underdistention  Correlate with history and if necessary UA to exclude cystitis  Otherwise, no evidence of acute intraabdominopelvic process   Partially visualized left lateral proximal femoral deep subcutaneous fluid collection measuring at least 5 5 x 5 cm presumably seroma given recent orthopedic procedure  Colonic diverticulosis  Additional chronic findings and negatives as above  This study demonstrates a significant  finding and was documented as such in [facility] for liaison and referring practitioner notification  Workstation performed: [id number]       No Chest XR results available for this patient  Other Studies:   I have personally reviewed pertinent reports

## 2022-09-27 NOTE — PLAN OF CARE
Problem: PHYSICAL THERAPY ADULT  Goal: Performs mobility at highest level of function for planned discharge setting  See evaluation for individualized goals  Description: Treatment/Interventions: Functional transfer training, LE strengthening/ROM, Therapeutic exercise, Endurance training, Cognitive reorientation, Patient/family training, Bed mobility, Gait training, Equipment eval/education, Spoke to nursing, Spoke to case management, OT  Equipment Recommended: Hanna Conway, Wheelchair       See flowsheet documentation for full assessment, interventions and recommendations  Note: Prognosis: Fair  Problem List: Decreased range of motion, Decreased endurance, Decreased strength, Impaired balance, Decreased coordination, Decreased mobility, Decreased cognition, Impaired judgement, Decreased safety awareness, Impaired hearing, Obesity, Pain, Orthopedic restrictions (Gait deviations, fear and retreat)  Assessment: Pt is a 76 y o  female seen for PT evaluation s/p admit to [facility] on 9/25/2022 w/ Vomiting and diarrhea  Patient recently here earlier this month for a Left distal femoral replacement; 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  Order placed for PT  Prior to last admission: Pt lived in apartment with no steps to enter and was primarily wheelchair mobile, performing independent transfers to and from surfaces  Patient also did short distance ambulation with a walker and HHPT  Upon this admission evaluation: Pt required only supervision for bed mobility but needed maximal support of the bed controls  Patient needed Min assist of 2 for sit to stand transfers using quick move to get to the target surface, but was unable to perform any gait training at this time    Pt's clinical presentation is currently unstable/unpredictable given the functional mobility deficits above, especially (but not limited to) weakness, decreased ROM and decreased functional mobility tolerance, coupled with fall risks including hx of falls and impaired balance, and combined with medical complications of multiple readmissions and fear/retreat  Recommend continue mobility training with quick move via nursing and PT to get to target surfaces, but PT should continue to work on progression with pre gait and gait activities using a short rolling walker  Also recommend TherEx, standing tolerance  Barriers to Discharge: Decreased caregiver support, Inaccessible home environment     PT Discharge Recommendation: Return to facility with rehabilitation services    See flowsheet documentation for full assessment

## 2022-09-27 NOTE — ASSESSMENT & PLAN NOTE
Background: Presented to the ED with complaining of vomiting and diarrhea earlier and on presentation still feeling "nauseous"  · CLD> regular diet resumed and thus far tolerated   · IV fluids> discontinued on 9/26  · Secondary viral gastroenteritis versus other intra-abdominal process  · Stool cultures pending; low suspicion for C diff or infectious etiology as resolved without treatment    · Blood cultures positive as noted below   · Troponin unremarkable  · Telemetry discontinued

## 2022-09-28 PROBLEM — K57.90 DIVERTICULOSIS: Status: RESOLVED | Noted: 2022-09-25 | Resolved: 2022-09-28

## 2022-09-28 LAB
ANION GAP SERPL CALCULATED.3IONS-SCNC: 5 MMOL/L (ref 4–13)
BACTERIA BLD CULT: ABNORMAL
BACTERIA BLD CULT: ABNORMAL
BASOPHILS # BLD AUTO: 0.09 THOUSANDS/ΜL (ref 0–0.1)
BASOPHILS NFR BLD AUTO: 1 % (ref 0–1)
BUN SERPL-MCNC: 14 MG/DL (ref 5–25)
CALCIUM SERPL-MCNC: 8.7 MG/DL (ref 8.4–10.2)
CHLORIDE SERPL-SCNC: 102 MMOL/L (ref 96–108)
CO2 SERPL-SCNC: 32 MMOL/L (ref 21–32)
CREAT SERPL-MCNC: 0.93 MG/DL (ref 0.6–1.3)
EOSINOPHIL # BLD AUTO: 0.75 THOUSAND/ΜL (ref 0–0.61)
EOSINOPHIL NFR BLD AUTO: 9 % (ref 0–6)
ERYTHROCYTE [DISTWIDTH] IN BLOOD BY AUTOMATED COUNT: 14.8 % (ref 11.6–15.1)
GFR SERPL CREATININE-BSD FRML MDRD: 58 ML/MIN/1.73SQ M
GLUCOSE SERPL-MCNC: 117 MG/DL (ref 65–140)
GRAM STN SPEC: ABNORMAL
GRAM STN SPEC: ABNORMAL
HCT VFR BLD AUTO: 29 % (ref 34.8–46.1)
HGB BLD-MCNC: 8.6 G/DL (ref 11.5–15.4)
IMM GRANULOCYTES # BLD AUTO: 0.13 THOUSAND/UL (ref 0–0.2)
IMM GRANULOCYTES NFR BLD AUTO: 2 % (ref 0–2)
LYMPHOCYTES # BLD AUTO: 1.84 THOUSANDS/ΜL (ref 0.6–4.47)
LYMPHOCYTES NFR BLD AUTO: 21 % (ref 14–44)
MCH RBC QN AUTO: 29 PG (ref 26.8–34.3)
MCHC RBC AUTO-ENTMCNC: 29.7 G/DL (ref 31.4–37.4)
MCV RBC AUTO: 98 FL (ref 82–98)
MECA+MECC ISLT/SPM QL: DETECTED
MONOCYTES # BLD AUTO: 0.92 THOUSAND/ΜL (ref 0.17–1.22)
MONOCYTES NFR BLD AUTO: 11 % (ref 4–12)
NEUTROPHILS # BLD AUTO: 4.87 THOUSANDS/ΜL (ref 1.85–7.62)
NEUTS SEG NFR BLD AUTO: 56 % (ref 43–75)
NRBC BLD AUTO-RTO: 0 /100 WBCS
PLATELET # BLD AUTO: 566 THOUSANDS/UL (ref 149–390)
PMV BLD AUTO: 8.3 FL (ref 8.9–12.7)
POTASSIUM SERPL-SCNC: 3.5 MMOL/L (ref 3.5–5.3)
RBC # BLD AUTO: 2.97 MILLION/UL (ref 3.81–5.12)
S EPIDERMIDIS DNA BLD POS QL NAA+NON-PRB: DETECTED
SODIUM SERPL-SCNC: 139 MMOL/L (ref 135–147)
WBC # BLD AUTO: 8.6 THOUSAND/UL (ref 4.31–10.16)

## 2022-09-28 PROCEDURE — 85025 COMPLETE CBC W/AUTO DIFF WBC: CPT | Performed by: NURSE PRACTITIONER

## 2022-09-28 PROCEDURE — 87505 NFCT AGENT DETECTION GI: CPT | Performed by: NURSE PRACTITIONER

## 2022-09-28 PROCEDURE — C9113 INJ PANTOPRAZOLE SODIUM, VIA: HCPCS | Performed by: NURSE PRACTITIONER

## 2022-09-28 PROCEDURE — 99233 SBSQ HOSP IP/OBS HIGH 50: CPT | Performed by: INTERNAL MEDICINE

## 2022-09-28 PROCEDURE — 99232 SBSQ HOSP IP/OBS MODERATE 35: CPT | Performed by: NURSE PRACTITIONER

## 2022-09-28 PROCEDURE — 97110 THERAPEUTIC EXERCISES: CPT

## 2022-09-28 PROCEDURE — 99024 POSTOP FOLLOW-UP VISIT: CPT | Performed by: PHYSICIAN ASSISTANT

## 2022-09-28 PROCEDURE — 80048 BASIC METABOLIC PNL TOTAL CA: CPT | Performed by: NURSE PRACTITIONER

## 2022-09-28 PROCEDURE — 97530 THERAPEUTIC ACTIVITIES: CPT

## 2022-09-28 RX ORDER — CEFTRIAXONE 1 G/50ML
1000 INJECTION, SOLUTION INTRAVENOUS EVERY 24 HOURS
Status: DISCONTINUED | OUTPATIENT
Start: 2022-09-28 | End: 2022-09-29

## 2022-09-28 RX ADMIN — FOLIC ACID TAB 400 MCG 400 MCG: 400 TAB at 09:42

## 2022-09-28 RX ADMIN — DULOXETINE HYDROCHLORIDE 30 MG: 30 CAPSULE, DELAYED RELEASE ORAL at 20:58

## 2022-09-28 RX ADMIN — SENNOSIDES AND DOCUSATE SODIUM 2 TABLET: 8.6; 5 TABLET ORAL at 09:34

## 2022-09-28 RX ADMIN — CHOLECALCIFEROL TAB 10 MCG (400 UNIT) 400 UNITS: 10 TAB at 09:34

## 2022-09-28 RX ADMIN — METOPROLOL TARTRATE 50 MG: 100 TABLET, FILM COATED ORAL at 21:03

## 2022-09-28 RX ADMIN — OXYCODONE HYDROCHLORIDE AND ACETAMINOPHEN 500 MG: 500 TABLET ORAL at 16:37

## 2022-09-28 RX ADMIN — DILTIAZEM HYDROCHLORIDE 120 MG: 120 CAPSULE, COATED, EXTENDED RELEASE ORAL at 09:33

## 2022-09-28 RX ADMIN — FERROUS SULFATE TAB 325 MG (65 MG ELEMENTAL FE) 325 MG: 325 (65 FE) TAB at 16:36

## 2022-09-28 RX ADMIN — FERROUS SULFATE TAB 325 MG (65 MG ELEMENTAL FE) 325 MG: 325 (65 FE) TAB at 09:33

## 2022-09-28 RX ADMIN — PANTOPRAZOLE SODIUM 40 MG: 40 INJECTION, POWDER, FOR SOLUTION INTRAVENOUS at 09:35

## 2022-09-28 RX ADMIN — SENNOSIDES AND DOCUSATE SODIUM 2 TABLET: 8.6; 5 TABLET ORAL at 16:37

## 2022-09-28 RX ADMIN — ACETAMINOPHEN 650 MG: 325 TABLET, FILM COATED ORAL at 11:49

## 2022-09-28 RX ADMIN — OXYCODONE HYDROCHLORIDE AND ACETAMINOPHEN 500 MG: 500 TABLET ORAL at 09:34

## 2022-09-28 RX ADMIN — VANCOMYCIN HYDROCHLORIDE 1000 MG: 1 INJECTION, SOLUTION INTRAVENOUS at 18:05

## 2022-09-28 RX ADMIN — FUROSEMIDE 40 MG: 40 TABLET ORAL at 09:33

## 2022-09-28 RX ADMIN — METOPROLOL TARTRATE 100 MG: 100 TABLET, FILM COATED ORAL at 09:40

## 2022-09-28 RX ADMIN — CEFTRIAXONE 1000 MG: 1 INJECTION, SOLUTION INTRAVENOUS at 16:33

## 2022-09-28 RX ADMIN — VANCOMYCIN HYDROCHLORIDE 1000 MG: 1 INJECTION, SOLUTION INTRAVENOUS at 06:02

## 2022-09-28 RX ADMIN — EZETIMIBE 10 MG: 10 TABLET ORAL at 20:58

## 2022-09-28 RX ADMIN — ACETAMINOPHEN 650 MG: 325 TABLET, FILM COATED ORAL at 09:34

## 2022-09-28 RX ADMIN — ACETAMINOPHEN 650 MG: 325 TABLET, FILM COATED ORAL at 16:36

## 2022-09-28 RX ADMIN — APIXABAN 5 MG: 5 TABLET, FILM COATED ORAL at 16:36

## 2022-09-28 RX ADMIN — APIXABAN 5 MG: 5 TABLET, FILM COATED ORAL at 09:33

## 2022-09-28 RX ADMIN — ALLOPURINOL 100 MG: 100 TABLET ORAL at 09:33

## 2022-09-28 RX ADMIN — PANTOPRAZOLE SODIUM 40 MG: 40 INJECTION, POWDER, FOR SOLUTION INTRAVENOUS at 20:58

## 2022-09-28 RX ADMIN — LISINOPRIL 2.5 MG: 2.5 TABLET ORAL at 09:32

## 2022-09-28 NOTE — PLAN OF CARE
Problem: PHYSICAL THERAPY ADULT  Goal: Performs mobility at highest level of function for planned discharge setting  See evaluation for individualized goals  Description: Treatment/Interventions: Functional transfer training, LE strengthening/ROM, Therapeutic exercise, Endurance training, Cognitive reorientation, Patient/family training, Bed mobility, Gait training, Equipment eval/education, Spoke to nursing, Spoke to case management, OT  Equipment Recommended: Lavern Marcos, Wheelchair       See flowsheet documentation for full assessment, interventions and recommendations  Outcome: Not Progressing  Note: Prognosis: Fair  Problem List: Decreased strength, Decreased range of motion, Decreased endurance, Impaired balance, Decreased mobility, Decreased coordination, Decreased cognition, Impaired judgement, Decreased safety awareness, Impaired hearing, Obesity, Orthopedic restrictions, Pain  Assessment: pt began tx session lying supine in the bed and was agreeable to participate in PT intervention  Upon arrivl to pt room pt required cleaning due to urinary incontinence  pt was able to roll and reposition in the bed from L to R with min Ax1and VC's for using bed rail to assist with repositioning  pt also remains consistant with min Ax1 for completing a supine<>sit EOB transfer w/ LE management  pt was able to sit EOB with out LOB while educated on proper form and strategies in order to complete functional transfers with RW safely  pt required slightly more assistance in todays tx session compared to previous tx sessions as pt required mod Ax1 for a sit<>stand and min Ax1 for stand <>sit  pt was unable to complete STS from EoB to RW as pt demonstrated bilateral flexed knees and was limited with standing tolerance to 15-20 seconds then required to be placed seated EOB  Quick move was utilized in order to complete transfer from EOB to recliner   While perorming TE activities seated in recliner pt varied from AROM to AAROM to complete TE activities  Continue to recommend post acute rehab services at the time of D/C in order to maximize pt functional independence and safety with all OOB mobility when appropriate  post tx pt in recliner with call bell and all pt needs met  Barriers to Discharge: Inaccessible home environment, Decreased caregiver support     PT Discharge Recommendation: Return to facility with rehabilitation services    See flowsheet documentation for full assessment

## 2022-09-28 NOTE — PHYSICAL THERAPY NOTE
PHYSICAL THERAPY NOTE          Patient Name: George Haywood  YCNXD'K Date: 22 1415   PT Last Visit   PT Visit Date 22   Note Type   Note Type Treatment   Pain Assessment   Pain Assessment Tool 0-10   Pain Score No Pain   Pain Location/Orientation Location: Abdomen   Patient's Stated Pain Goal No pain   Hospital Pain Intervention(s) Repositioned; Ambulation/increased activity; Elevated; Emotional support; Rest   Multiple Pain Sites No   Restrictions/Precautions   Weight Bearing Precautions Per Order Yes   LLE Weight Bearing Per Order WBAT   Other Precautions Chair Alarm; Bed Alarm;WBS;Fall Risk;Pain   General   Chart Reviewed Yes   Response to Previous Treatment Patient with no complaints from previous session  Family/Caregiver Present Yes   Cognition   Overall Cognitive Status   (pt continues to be able to follow direction and participate in PT intervention)   Arousal/Participation Alert; Responsive; Cooperative   Attention Attends with cues to redirect   Orientation Level Oriented to person;Oriented to situation  (pt stated that she didnt know she was at the [facility])   Memory Decreased recall of recent events;Decreased short term memory   Following Commands Follows one step commands with increased time or repetition   Comments pt identified by name and    Subjective   Subjective pt was agreeable to participate in PT intervention   Bed Mobility   Rolling R 4  Minimal assistance   Additional items Assist x 1;Bedrails;HOB elevated; Increased time required;Verbal cues   Rolling L 4  Minimal assistance   Additional items Assist x 1;HOB elevated; Bedrails; Increased time required;Verbal cues   Supine to Sit 4  Minimal assistance   Additional items Assist x 1;HOB elevated; Bedrails; Increased time required;Verbal cues;LE management   Sit to Supine Unable to assess   Additional Comments pt seated OOB inm the recliner post tx session with call bell, legs elevated and all pt needs met   Transfers   Sit to Stand 3  Moderate assistance   Additional items (S)  Assist x 1; Increased time required;Verbal cues  (pt attempted 2 STS from EOB to RW and required mod Ax1 but was unable to complete transfer as pt had difficulty standing due to bilateral flexed knees)   Stand to Sit 4  Minimal assistance   Additional items Assist x 1; Increased time required;Verbal cues   Stand pivot Unable to assess   Other (S)  1  Dependent  (use of Quick move utilized in order to assist pt with transfer from EOB to recliner)   Ambulation/Elevation   Gait pattern Not appropriate   Balance   Static Sitting Fair +   Static Standing Poor -   Ambulatory Zero   Endurance Deficit   Endurance Deficit Yes   Endurance Deficit Description limited standing tolerance and functional mobility   Activity Tolerance   Activity Tolerance Patient limited by fatigue; Other (Comment)  (generalized weakness)   Nurse Made Aware Spoke to RN   Exercises   Quad Sets Sitting;10 reps;AROM; Left   Heelslides Sitting;10 reps; Left  (long sit position)   Glute Sets Sitting;10 reps;AROM; Bilateral   Hip Adduction Sitting;10 reps;AAROM; Left   Knee AROM Short Arc Quad Sitting;10 reps;AROM; Left   Knee AROM Long Arc Quad Sitting;10 reps;AAROM; Left   Ankle Pumps Sitting;20 reps;AROM; Bilateral   Marching Sitting;AROM;5 reps; Left   Assessment   Prognosis Fair   Problem List Decreased strength;Decreased range of motion;Decreased endurance; Impaired balance;Decreased mobility; Decreased coordination;Decreased cognition; Impaired judgement;Decreased safety awareness; Impaired hearing;Obesity;Orthopedic restrictions;Pain   Assessment pt began tx session lying supine in the bed and was agreeable to participate in PT intervention  Upon arrivl to pt room pt required cleaning due to urinary incontinence   pt was able to roll and reposition in the bed from L to R with min Ax1and VC's for using bed rail to assist with repositioning  pt also remains consistant with min Ax1 for completing a supine<>sit EOB transfer w/ LE management  pt was able to sit EOB with out LOB while educated on proper form and strategies in order to complete functional transfers with RW safely  pt required slightly more assistance in todays tx session compared to previous tx sessions as pt required mod Ax1 for a sit<>stand and min Ax1 for stand <>sit  pt was unable to complete STS from EoB to RW as pt demonstrated bilateral flexed knees and was limited with standing tolerance to 15-20 seconds then required to be placed seated EOB  Quick move was utilized in order to complete transfer from EOB to recliner  While perorming TE activities seated in recliner pt varied from AROM to AAROM to complete TE activities  Continue to recommend post acute rehab services at the time of D/C in order to maximize pt functional independence and safety with all OOB mobility when appropriate  post tx pt in recliner with call bell and all pt needs met   Barriers to Discharge Inaccessible home environment;Decreased caregiver support   Goals   Patient Goals to move better so she can find a man   STG Expiration Date 10/07/22   PT Treatment Day 2   Plan   Treatment/Interventions Functional transfer training;LE strengthening/ROM; Therapeutic exercise; Endurance training;Cognitive reorientation;Equipment eval/education;Patient/family training;Bed mobility;Gait training;Spoke to nursing   Progress Slow progress, decreased activity tolerance   PT Frequency 5-7x/wk   Recommendation   PT Discharge Recommendation Return to facility with rehabilitation services   Equipment Recommended Walker; Wheelchair   AM-PAC Basic Mobility Inpatient   Turning in Bed Without Bedrails 3   Lying on Back to Sitting on Edge of Flat Bed 3   Moving Bed to Chair 1   Standing Up From Chair 1   Walk in Room 1   Climb 3-5 Stairs 1   Basic Mobility Inpatient Raw Score 10   Highest Level Of Mobility   [facility] Goal 4: Move to chair/commode   -[facility] Achieved 3: Sit at edge of bed   Education   Education Provided Other  (functional transfers)   Patient Reinforcement needed   End of Consult   Patient Position at End of Consult Bedside chair;Bed/Chair alarm activated; All needs within reach   The patient's AM-PAC Basic Mobility Inpatient Short Form Raw Score is 10  A Raw score of less than or equal to 16 suggests the patient may benefit from discharge to post-acute rehabilitation services  Please also refer to the recommendation of the Physical Therapist for safe discharge planning       Siomara Green

## 2022-09-28 NOTE — ASSESSMENT & PLAN NOTE
· Blood cultures from 9/25 2/2 positive for staphylococcus epidermidis   · In the setting of pacemaker as well   · Repeats ordered per ID   · Infectious disease consulted; input appreciated   · Continue IV Vancomycin   · Repeat cultures ordered   · Ideally to have ortho culture fluid collection and await cultures

## 2022-09-28 NOTE — ASSESSMENT & PLAN NOTE
· CT noting what appears to be seroma at the left femur proximally  · Orthopedic surgery consulted; recommendations as noted below  · No indication for antibiotics at this time  · Weight-bearing as tolerated  · If remains to contact Orthopedic surgery to remove staples prior to discharge  · PT/OT consulted for re-evaluation  · Case management on board to assist with dispo  · [facility] upon discharge

## 2022-09-28 NOTE — ASSESSMENT & PLAN NOTE
· Blood pressure acceptable systolics in 071 to 888B  · Will continue home meds including metoprolol, lisinopril, Lasix, Cardizem  · Monitor with routine vitals

## 2022-09-28 NOTE — PROGRESS NOTES
Vancomycin IV Pharmacy-to-Dose Consultation    Miguel Flores is a 76 y o  female who is currently receiving Vancomycin IV with management by the Pharmacy Consult service  Assessment/Plan:  The patient was reviewed  Renal function is stable and no signs or symptoms of nephrotoxicity and/or infusion reactions were documented in the chart  Based on todays assessment, continue current vancomycin (day # 3) dosing of 1000 mg IV q12h , with a plan for trough to be drawn at 1730 on 09/29  We will continue to follow the patients culture results and clinical progress daily      Michael Hart, Pharmacist

## 2022-09-28 NOTE — PROGRESS NOTES
Progress Note - Orthopedics   Anny Crowley 76 y o  female MRN: [id number]  Unit/Bed#: S -01      Subjective:    76 y  o female patient seen and evaluated at bedside  She states that she has no significant left leg pain  In fact she has been standing and ambulating, and feels that her leg pain has improved, specifically from pre operatively  She has some mild left knee pain, but overall improved  She has no wound drainage  No fevers  No chills       Labs:  0   Lab Value Date/Time    HCT 29 0 (L) 09/28/2022 0622    HCT 26 4 (L) 09/27/2022 0437    HCT 26 7 (L) 09/26/2022 0549    HGB 8 6 (L) 09/28/2022 0622    HGB 8 0 (L) 09/27/2022 0437    HGB 8 2 (L) 09/26/2022 0549    INR 1 28 (H) 09/25/2022 0706    WBC 8 60 09/28/2022 0622    WBC 8 56 09/27/2022 0437    WBC 7 84 09/25/2022 0706    CRP 5 2 (H) 05/17/2022 1139       Meds:    Current Facility-Administered Medications:     acetaminophen (TYLENOL) tablet 650 mg, 650 mg, Oral, Q6H [address], Afia Mars MD, 650 mg at 09/28/22 1149    allopurinol (ZYLOPRIM) tablet 100 mg, 100 mg, Oral, Daily, Afia Mars MD, 100 mg at 09/28/22 0933    apixaban (ELIQUIS) tablet 5 mg, 5 mg, Oral, BID, JERI Raygoza, 5 mg at 09/28/22 0704    ascorbic acid (VITAMIN C) tablet 500 mg, 500 mg, Oral, BID, Afia Mars MD, 500 mg at 09/28/22 8605    cefTRIAXone (ROCEPHIN) IVPB (premix in dextrose) 1,000 mg 50 mL, 1,000 mg, Intravenous, Q24H, JERI Raygoza    cholecalciferol (VITAMIN D3) tablet 400 Units, 400 Units, Oral, Daily, Afia Mars MD, 400 Units at 09/28/22 0934    diltiazem (CARDIZEM CD) 24 hr capsule 120 mg, 120 mg, Oral, Daily, Afia Mars MD, 120 mg at 09/28/22 0933    DULoxetine (CYMBALTA) delayed release capsule 30 mg, 30 mg, Oral, Daily, Afia Mars MD, 30 mg at 09/27/22 2037    ezetimibe (ZETIA) tablet 10 mg, 10 mg, Oral, Daily, Afia Mars MD, 10 mg at 09/27/22 2037    ferrous sulfate tablet 325 mg, 325 mg, Oral, BID With Meals, Irma Villegas MD, 325 mg at 03/89/46 3539    folic acid (FOLVITE) tablet 400 mcg, 400 mcg, Oral, Daily, Irma Villegas MD, 400 mcg at 09/28/22 8853    furosemide (LASIX) tablet 40 mg, 40 mg, Oral, Daily, Irma Villegas MD, 40 mg at 09/28/22 0933    lisinopril (ZESTRIL) tablet 2 5 mg, 2 5 mg, Oral, Daily, Irma Villegas MD, 2 5 mg at 09/28/22 0932    LORazepam (ATIVAN) tablet 0 5 mg, 0 5 mg, Oral, Q8H PRN, Irma Villegas MD, 0 5 mg at 09/25/22 1352    metoprolol tartrate (LOPRESSOR) tablet 100 mg, 100 mg, Oral, Daily, Irma Villegas MD, 100 mg at 09/28/22 0940    metoprolol tartrate (LOPRESSOR) tablet 50 mg, 50 mg, Oral, HS, Irma Villegas MD, 50 mg at 09/27/22 2102    [facility]) injection 4 mg, 4 mg, Intravenous, Q4H PRN, Irma Villegas MD, 4 mg at 09/26/22 0816    pantoprazole (PROTONIX) injection 40 mg, 40 mg, Intravenous, Q12H [address], JERI Raygoza, 40 mg at 09/28/22 0935    polyethylene glycol (MIRALAX) packet 17 g, 17 g, Oral, Daily PRN, JERI Raygoza    senna-docusate sodium (SENOKOT S) 8 6-50 mg per tablet 2 tablet, 2 tablet, Oral, BID, JERI Raygoza, 2 tablet at 09/28/22 0934    vancomycin (VANCOCIN) IVPB (premix in dextrose) 1,000 mg 200 mL, 12 5 mg/kg (Adjusted), Intravenous, Q12H, Eri Sanches DO, Last Rate: 200 mL/hr at 09/28/22 0602, 1,000 mg at 09/28/22 0602    Blood Culture:   Lab Results   Component Value Date    BLOODCX Staphylococcus epidermidis (A) 09/25/2022    BLOODCX Staphylococcus epidermidis (A) 09/25/2022       Wound Culture:   No results found for: WOUNDCULT    Ins and Outs:  I/O last 24 hours: In: -   Out: 1050 [Urine:1050]          Physical:  Vitals:    09/28/22 0932   BP: (!) 122/48   Pulse: 71   Resp:    Temp:    SpO2: 96%     Musculoskeletal: left Lower Extremity  · Incisions: C/D/I with staple lines intact  No surrounding erythema, drainage  · No palpable fluid collections     · She is able to range her knee 0-100 in both passive/active ROM  · She has no significant ttp  · +EHL/FHL, dorsi/plantarflesion  · 2+ DP  Assessment:    76 y o  female with recent orthopedic intervention 9/12/2022  Underwent left distal femoral replacement, removal of deep implants of left femur  She was admitted from 9/12-9/16/2022, and subsequently discharged to rehab  Her wounds today are well appearing, with staple lines intact  There is no evidence of acute post operative infection  She is bacteremic   Plan:  · Weight bearing as tolerated  left lower extremity  · No acute orthopedic intervention planned  · Case was discussed with ID attg by orthopedics attg today  Plan for CT of lower extremity  Once this has been completed, please let the ortho team know so that we can coordinate her care with the rest of the teams  · Staples are not yet ready for removal  Will plan for removal prior to discharge  Please page orthopedics for removal when patients disposition is final    · PT/OT  · Pain control per primary team    · Body mass index is 40 49 kg/m²  morbidly obese  Recommend behavior modifications and nutrition  · Will need follow up with Dr Dora Ziegler upon discharge       Melissa Pederson PA-C

## 2022-09-28 NOTE — PROGRESS NOTES
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   4  Nausea/vomiting/diarrhea  Improved  5  Tachy-smita syndrome post Pacemaker  Consider infection in the setting of GPC bacteremia  6  Asymptomatic bacteriuria      Both sets are now showing MRSE and were drawn from separate sites  Consider postoperative infection including possible infected seroma  Incision site remains without clear signs of acute infection  Would discuss with Ortho possible aspiration of fluid collection to rule out infected seroma  Patient will also need 2D echo  Check repeat blood cultures x2 sets in the a m  Continue IV vancomycin with dosing recommendations per pharmacy      I discussed above plan with Internal Medicine Service and Orthopedic surgery Service, and with patient and her daughter at bedside  Antibiotic  Vancomycin 3        Subjective:  Patient states she feels well  Denies worsening pain  No fevers, chills  No dysuria, pelvic discomfort or flank pain      Objective:  Vitals:  Temp:  [98 5 °F (36 9 °C)-99 2 °F (37 3 °C)] 98 6 °F (37 °C)  HR:  [70-72] 71  Resp:  [14-20] 14  BP: (122-144)/(48-62) 122/48  SpO2:  [93 %-96 %] 96 %  Temp (24hrs), Av 8 °F (37 1 °C), Min:98 5 °F (36 9 °C), Max:99 2 °F (37 3 °C)  Current: Temperature: 98 6 °F (37 °C)    Physical Exam:   General:  No acute distress, nontoxic  HEENT:  Atraumatic normocephalic  Psychiatric:  Awake and alert  Pulmonary:  Normal respiratory excursion without accessory muscle use  Abdomen:  Soft, nontender  Extremities:  No edema or cyanosis  Skin:  No rashes or ulceration  Left knee incision with no dehiscence, erythema or expressible drainage  Neuro: Moves all extremities spontaneously    Lab Results:  I have personally reviewed pertinent labs  Results from last 7 days   Lab Units 09/28/22 0622 09/27/22  0437 09/25/22  0706   POTASSIUM mmol/L 3 5 3 7 3 8   CHLORIDE mmol/L 102 101 101   CO2 mmol/L 32 34* 33*   BUN mg/dL 14 15 13   CREATININE mg/dL 0 93 1 13 0 82   EGFR ml/min/1 73sq m 58 46 68   CALCIUM mg/dL 8 7 8 5 9 2   AST U/L  --   --  18   ALT U/L  --   --  15   ALK PHOS U/L  --   --  66     Results from last 7 days   Lab Units 09/28/22 0622 09/27/22 0437 09/26/22  0549 09/25/22  1712 09/25/22  1346 09/25/22  0706   WBC Thousand/uL 8 60 8 56  --   --   --  7 84   HEMOGLOBIN g/dL 8 6* 8 0* 8 2*   < >  --  8 8*   PLATELETS Thousands/uL 566* 582*  --   --  601* 645*    < > = values in this interval not displayed  Results from last 7 days   Lab Units 09/26/22  2118 09/25/22  1346   BLOOD CULTURE   --  Staphylococcus epidermidis*   GRAM STAIN RESULT   --  Gram positive cocci in clusters*  Gram positive cocci in clusters*   URINE CULTURE  >100,000 cfu/ml Gram Negative Aiden*  --        Imaging Studies:   I have personally reviewed pertinent imaging study reports and images in PACS  EKG, Pathology, and Other Studies:   I have personally reviewed pertinent reports

## 2022-09-28 NOTE — ASSESSMENT & PLAN NOTE
· Hemoglobin is 8 8 this appears pretty stable when compared to 8 9 a week ago    · Continue on iron and folic acid as well as Z54  · Ongoing outpatient monitoring and follow-up with PCP    Lab Results   Component Value Date    HGB 8 6 (L) 09/28/2022    HGB 8 0 (L) 09/27/2022    HGB 8 2 (L) 09/26/2022

## 2022-09-28 NOTE — PROGRESS NOTES
[address] 1945, 76 y o  female MRN: [id number]  Unit/Bed#: S -01 Encounter: [id number]  Primary Care Provider: Tommi Seip, MD   Date and time admitted to hospital: 9/25/2022  6:25 AM    * Vomiting and diarrhea  Assessment & Plan  Background: Presented to the ED with complaining of vomiting and diarrhea earlier and on presentation still feeling "nauseous"  · CLD> regular diet resumed and thus far tolerated   · IV fluids> discontinued on 9/26  · Secondary viral gastroenteritis versus other intra-abdominal process  · Stool cultures pending; low suspicion for C diff or infectious etiology as resolved without treatment    · Blood cultures positive as noted below   · Troponin unremarkable  · Telemetry discontinued        Pleural effusion on right  Assessment & Plan  · Unclear etiology however of note patient extremely more sedentary than usual postoperatively  · Liver ultrasound with cholelithiasis without cholecystitis     Anemia  Assessment & Plan  · Hemoglobin is 8 8 this appears pretty stable when compared to 8 9 a week ago    · Continue on iron and folic acid as well as M91  · Ongoing outpatient monitoring and follow-up with PCP    Lab Results   Component Value Date    HGB 8 6 (L) 09/28/2022    HGB 8 0 (L) 09/27/2022    HGB 8 2 (L) 09/26/2022       Bladder wall thickening  Assessment & Plan  · Unclear significance inpatient denies any urinary symptoms  · UA + for gram negative rods     Abdominal fluid collection  Assessment & Plan  · CT noting what appears to be seroma at the left femur proximally  · Orthopedic surgery consulted; recommendations as noted below  · No indication for antibiotics at this time  · Weight-bearing as tolerated  · If remains to contact Orthopedic surgery to remove staples prior to discharge  · PT/OT consulted for re-evaluation  · Case management on board to assist with dispo  · Allocadia upon discharge Diverticulosis-resolved as of 2022  Assessment & Plan  · No evidence of diverticulitis also patient does not have any diarrhea    Bacteremia due to Gram-positive bacteria  Assessment & Plan  · Blood cultures from  positive for staphylococcus epidermidis   · In the setting of pacemaker as well   · Repeats ordered per ID   · Infectious disease consulted; input appreciated   · Continue IV Vancomycin   · Repeat cultures ordered   · Ideally to have ortho culture fluid collection and await cultures     Anxiety  Assessment & Plan  · Stable will continue home Ativan    Essential hypertension  Assessment & Plan  · Blood pressure acceptable systolics in 724 to 895C  · Will continue home meds including metoprolol, lisinopril, Lasix, Cardizem  · Monitor with routine vitals        VTE Pharmacologic Prophylaxis: VTE Score: 4 Moderate Risk (Score 3-4) - Pharmacological DVT Prophylaxis Ordered: apixaban (Eliquis)  Patient Centered Rounds: I performed bedside rounds with nursing staff today  Discussions with Specialists or Other Care Team Provider:  Nursing staff, case management, and Infectious Disease team    Education and Discussions with Family / Patient: Updated  (daughter) at bedside  Time Spent for Care: 30 minutes  More than 50% of total time spent on counseling and coordination of care as described above  Current Length of Stay: 2 day(s)  Current Patient Status: Inpatient   Certification Statement: The patient will continue to require additional inpatient hospital stay due to IV antibiotics  Discharge Plan: Anticipate discharge in 48-72 hrs to rehab facility  Code Status: Level 1 - Full Code    Subjective:   Patient currently reporting left knee pain      Objective:     Vitals:   Temp (24hrs), Av 8 °F (37 1 °C), Min:98 5 °F (36 9 °C), Max:99 2 °F (37 3 °C)    Temp:  [98 5 °F (36 9 °C)-99 2 °F (37 3 °C)] 98 6 °F (37 °C)  HR:  [70-72] 71  Resp:  [14-20] 14  BP: (122-144)/(48-62) 122/48  SpO2:  [93 %-96 %] 96 %  Body mass index is 40 49 kg/m²  Input and Output Summary (last 24 hours): Intake/Output Summary (Last 24 hours) at 9/28/2022 1433  Last data filed at 9/27/2022 1859  Gross per 24 hour   Intake --   Output 900 ml   Net -900 ml       Physical Exam:   Physical Exam  Constitutional:       Appearance: She is not ill-appearing  Cardiovascular:      Rate and Rhythm: Normal rate  Heart sounds: Murmur heard  Pulmonary:      Effort: Pulmonary effort is normal    Abdominal:      General: Bowel sounds are normal    Musculoskeletal:         General: Swelling and tenderness present  Skin:     General: Skin is warm and dry  Capillary Refill: Capillary refill takes less than 2 seconds  Neurological:      Mental Status: She is alert and oriented to person, place, and time  Mental status is at baseline  Psychiatric:         Mood and Affect: Mood normal          Behavior: Behavior normal           Additional Data:     Labs:  Results from last 7 days   Lab Units 09/28/22  0622   WBC Thousand/uL 8 60   HEMOGLOBIN g/dL 8 6*   HEMATOCRIT % 29 0*   PLATELETS Thousands/uL 566*   NEUTROS PCT % 56   LYMPHS PCT % 21   MONOS PCT % 11   EOS PCT % 9*     Results from last 7 days   Lab Units 09/28/22  0622 09/27/22  0437 09/25/22  0706   SODIUM mmol/L 139   < > 143   POTASSIUM mmol/L 3 5   < > 3 8   CHLORIDE mmol/L 102   < > 101   CO2 mmol/L 32   < > 33*   BUN mg/dL 14   < > 13   CREATININE mg/dL 0 93   < > 0 82   ANION GAP mmol/L 5   < > 9   CALCIUM mg/dL 8 7   < > 9 2   ALBUMIN g/dL  --   --  3 1*   TOTAL BILIRUBIN mg/dL  --   --  0 42   ALK PHOS U/L  --   --  66   ALT U/L  --   --  15   AST U/L  --   --  18   GLUCOSE RANDOM mg/dL 117   < > 130    < > = values in this interval not displayed       Results from last 7 days   Lab Units 09/25/22  0706   INR  1 28*                   Lines/Drains:  Invasive Devices  Report    Peripheral Intravenous Line  Duration           Peripheral IV 09/12/22 Right Forearm 16 days    Peripheral IV 09/25/22 Left Antecubital 3 days          Drain  Duration           External Urinary Catheter 3 days                      Imaging: Reviewed radiology reports from this admission including: xray(s)    Recent Cultures (last 7 days):   Results from last 7 days   Lab Units 09/26/22  2118 09/25/22  1346   BLOOD CULTURE   --  Staphylococcus epidermidis*  Staphylococcus epidermidis*   GRAM STAIN RESULT   --  Gram positive cocci in clusters*  Gram positive cocci in clusters*   URINE CULTURE  >100,000 cfu/ml Proteus mirabilis*  --        Last 24 Hours Medication List:   Current Facility-Administered Medications   Medication Dose Route Frequency Provider Last Rate    acetaminophen  650 mg Oral Q6H [address] Irineo Lopez MD      allopurinol  100 mg Oral Daily Irineo Lopez MD      apixaban  5 mg Oral BID JERI Raygoza      ascorbic acid  500 mg Oral BID Irineo Lopez MD      cholecalciferol  400 Units Oral Daily Irineo Lopez MD      diltiazem  120 mg Oral Daily Irineo Lopez MD      DULoxetine  30 mg Oral Daily Irineo Lopez MD      ezetimibe  10 mg Oral Daily Irineo Lopez MD      ferrous sulfate  325 mg Oral BID With Meals Irineo Lopez MD      folic acid  284 mcg Oral Daily Irineo Lopez MD      furosemide  40 mg Oral Daily Irineo Lopez MD      lisinopril  2 5 mg Oral Daily Irineo Lopez MD      LORazepam  0 5 mg Oral Q8H PRN Irineo Lopez MD      metoprolol tartrate  100 mg Oral Daily Irineo Lopez MD      metoprolol tartrate  50 mg Oral HS Irineo Lopez MD      ondansetron  4 mg Intravenous Q4H PRN Irineo Lopez MD      pantoprazole  40 mg Intravenous Q12H [address] JERI Raygoza      polyethylene glycol  17 g Oral Daily PRN Aura TeniznJERI      senna-docusate sodium  2 tablet Oral BID Aura TenzinJERI dave      vancomycin  12 5 mg/kg (Adjusted) Intravenous Q12H Eri Aldea, DO 1,000 mg (09/28/22 0602)        Today, Patient Was Seen By: Ramin Richmond    **Please Note: This note may have been constructed using a voice recognition system  **

## 2022-09-29 ENCOUNTER — APPOINTMENT (INPATIENT)
Dept: NON INVASIVE DIAGNOSTICS | Facility: HOSPITAL | Age: 77
DRG: 872 | End: 2022-09-29
Payer: MEDICARE

## 2022-09-29 ENCOUNTER — APPOINTMENT (INPATIENT)
Dept: CT IMAGING | Facility: HOSPITAL | Age: 77
DRG: 872 | End: 2022-09-29
Payer: MEDICARE

## 2022-09-29 LAB
ANION GAP SERPL CALCULATED.3IONS-SCNC: 5 MMOL/L (ref 4–13)
AORTIC ROOT: 3.5 CM
AORTIC VALVE MEAN VELOCITY: 17 M/S
APICAL FOUR CHAMBER EJECTION FRACTION: 68 %
ASCENDING AORTA: 4 CM
AV LVOT MEAN GRADIENT: 3 MMHG
AV LVOT PEAK GRADIENT: 4 MMHG
AV MEAN GRADIENT: 13 MMHG
AV PEAK GRADIENT: 19 MMHG
AV REGURGITATION PRESSURE HALF TIME: 399 MS
AV VELOCITY RATIO: 0.46
BACTERIA UR CULT: ABNORMAL
BASOPHILS # BLD AUTO: 0.07 THOUSANDS/ΜL (ref 0–0.1)
BASOPHILS NFR BLD AUTO: 1 % (ref 0–1)
BUN SERPL-MCNC: 12 MG/DL (ref 5–25)
CALCIUM SERPL-MCNC: 8.6 MG/DL (ref 8.4–10.2)
CAMPYLOBACTER DNA SPEC NAA+PROBE: NORMAL
CHLORIDE SERPL-SCNC: 103 MMOL/L (ref 96–108)
CO2 SERPL-SCNC: 31 MMOL/L (ref 21–32)
CREAT SERPL-MCNC: 0.82 MG/DL (ref 0.6–1.3)
DOP CALC AO PEAK VEL: 2.19 M/S
DOP CALC AO VTI: 53.06 CM
DOP CALC LVOT PEAK VEL VTI: 24.33 CM
DOP CALC LVOT PEAK VEL: 1.01 M/S
E WAVE DECELERATION TIME: 243 MS
EOSINOPHIL # BLD AUTO: 0.74 THOUSAND/ΜL (ref 0–0.61)
EOSINOPHIL NFR BLD AUTO: 7 % (ref 0–6)
ERYTHROCYTE [DISTWIDTH] IN BLOOD BY AUTOMATED COUNT: 14.9 % (ref 11.6–15.1)
FRACTIONAL SHORTENING: 26 % (ref 28–44)
GFR SERPL CREATININE-BSD FRML MDRD: 68 ML/MIN/1.73SQ M
GLUCOSE SERPL-MCNC: 117 MG/DL (ref 65–140)
HCT VFR BLD AUTO: 27.8 % (ref 34.8–46.1)
HGB BLD-MCNC: 8.4 G/DL (ref 11.5–15.4)
IMM GRANULOCYTES # BLD AUTO: 0.19 THOUSAND/UL (ref 0–0.2)
IMM GRANULOCYTES NFR BLD AUTO: 2 % (ref 0–2)
INTERVENTRICULAR SEPTUM IN DIASTOLE (PARASTERNAL SHORT AXIS VIEW): 1.4 CM
INTERVENTRICULAR SEPTUM: 1.4 CM (ref 0.6–1.1)
LAAS-AP2: 23.6 CM2
LAAS-AP4: 25 CM2
LEFT ATRIUM SIZE: 3.9 CM
LEFT INTERNAL DIMENSION IN SYSTOLE: 3.2 CM (ref 2.1–4)
LEFT VENTRICULAR INTERNAL DIMENSION IN DIASTOLE: 4.3 CM (ref 3.5–6)
LEFT VENTRICULAR POSTERIOR WALL IN END DIASTOLE: 1.4 CM
LEFT VENTRICULAR STROKE VOLUME: 43 ML
LVSV (TEICH): 43 ML
LYMPHOCYTES # BLD AUTO: 1.85 THOUSANDS/ΜL (ref 0.6–4.47)
LYMPHOCYTES NFR BLD AUTO: 17 % (ref 14–44)
MCH RBC QN AUTO: 29 PG (ref 26.8–34.3)
MCHC RBC AUTO-ENTMCNC: 30.2 G/DL (ref 31.4–37.4)
MCV RBC AUTO: 96 FL (ref 82–98)
MITRAL REGURGITATION PEAK VELOCITY: 5.94 M/S
MITRAL VALVE MEAN INFLOW VELOCITY: 4.47 M/S
MITRAL VALVE REGURGITANT PEAK GRADIENT: 141 MMHG
MONOCYTES # BLD AUTO: 1.37 THOUSAND/ΜL (ref 0.17–1.22)
MONOCYTES NFR BLD AUTO: 13 % (ref 4–12)
MV E'TISSUE VEL-SEP: 7 CM/S
MV PEAK A VEL: 0.5 M/S
MV PEAK E VEL: 96 CM/S
MV STENOSIS PRESSURE HALF TIME: 70 MS
MV VALVE AREA P 1/2 METHOD: 3.14 CM2
NEUTROPHILS # BLD AUTO: 6.78 THOUSANDS/ΜL (ref 1.85–7.62)
NEUTS SEG NFR BLD AUTO: 60 % (ref 43–75)
NRBC BLD AUTO-RTO: 0 /100 WBCS
PA SYSTOLIC PRESSURE: 35 MMHG
PLATELET # BLD AUTO: 558 THOUSANDS/UL (ref 149–390)
PMV BLD AUTO: 8.2 FL (ref 8.9–12.7)
POTASSIUM SERPL-SCNC: 3.5 MMOL/L (ref 3.5–5.3)
RBC # BLD AUTO: 2.9 MILLION/UL (ref 3.81–5.12)
RIGHT ATRIUM AREA SYSTOLE A4C: 16 CM2
RIGHT VENTRICLE ID DIMENSION: 3.8 CM
SALMONELLA DNA SPEC QL NAA+PROBE: NORMAL
SHIGA TOXIN STX GENE SPEC NAA+PROBE: NORMAL
SHIGELLA DNA SPEC QL NAA+PROBE: NORMAL
SL CV AV DECELERATION TIME RETROGRADE: 1376 MS
SL CV AV PEAK GRADIENT RETROGRADE: 76 MMHG
SL CV DOP CALC MV VTI RETROGRADE: 198.8 CM
SL CV LEFT ATRIUM LENGTH A2C: 6.2 CM
SL CV LV EF: 65
SL CV MV MEAN GRADIENT RETROGRADE: 89 MMHG
SL CV PED ECHO LEFT VENTRICLE DIASTOLIC VOLUME (MOD BIPLANE) 2D: 83 ML
SL CV PED ECHO LEFT VENTRICLE SYSTOLIC VOLUME (MOD BIPLANE) 2D: 41 ML
SODIUM SERPL-SCNC: 139 MMOL/L (ref 135–147)
TR MAX PG: 30 MMHG
TR PEAK VELOCITY: 2.7 M/S
TRICUSPID VALVE PEAK REGURGITATION VELOCITY: 2.74 M/S
VANCOMYCIN TROUGH SERPL-MCNC: 20.7 UG/ML (ref 10–20)
WBC # BLD AUTO: 11 THOUSAND/UL (ref 4.31–10.16)

## 2022-09-29 PROCEDURE — 93306 TTE W/DOPPLER COMPLETE: CPT

## 2022-09-29 PROCEDURE — G1004 CDSM NDSC: HCPCS

## 2022-09-29 PROCEDURE — 93306 TTE W/DOPPLER COMPLETE: CPT | Performed by: INTERNAL MEDICINE

## 2022-09-29 PROCEDURE — 72193 CT PELVIS W/DYE: CPT

## 2022-09-29 PROCEDURE — 85025 COMPLETE CBC W/AUTO DIFF WBC: CPT | Performed by: NURSE PRACTITIONER

## 2022-09-29 PROCEDURE — 99232 SBSQ HOSP IP/OBS MODERATE 35: CPT | Performed by: NURSE PRACTITIONER

## 2022-09-29 PROCEDURE — 73701 CT LOWER EXTREMITY W/DYE: CPT

## 2022-09-29 PROCEDURE — 99232 SBSQ HOSP IP/OBS MODERATE 35: CPT | Performed by: INTERNAL MEDICINE

## 2022-09-29 PROCEDURE — 80048 BASIC METABOLIC PNL TOTAL CA: CPT | Performed by: NURSE PRACTITIONER

## 2022-09-29 PROCEDURE — C9113 INJ PANTOPRAZOLE SODIUM, VIA: HCPCS | Performed by: NURSE PRACTITIONER

## 2022-09-29 PROCEDURE — 80202 ASSAY OF VANCOMYCIN: CPT | Performed by: INTERNAL MEDICINE

## 2022-09-29 PROCEDURE — 87040 BLOOD CULTURE FOR BACTERIA: CPT | Performed by: INTERNAL MEDICINE

## 2022-09-29 RX ORDER — OXYCODONE HYDROCHLORIDE 5 MG/1
2.5 TABLET ORAL EVERY 6 HOURS PRN
Status: DISCONTINUED | OUTPATIENT
Start: 2022-09-29 | End: 2022-10-04 | Stop reason: HOSPADM

## 2022-09-29 RX ADMIN — PANTOPRAZOLE SODIUM 40 MG: 40 INJECTION, POWDER, FOR SOLUTION INTRAVENOUS at 20:05

## 2022-09-29 RX ADMIN — SENNOSIDES AND DOCUSATE SODIUM 2 TABLET: 8.6; 5 TABLET ORAL at 08:33

## 2022-09-29 RX ADMIN — PANTOPRAZOLE SODIUM 40 MG: 40 INJECTION, POWDER, FOR SOLUTION INTRAVENOUS at 08:32

## 2022-09-29 RX ADMIN — ERTAPENEM SODIUM 1000 MG: 1 INJECTION, POWDER, LYOPHILIZED, FOR SOLUTION INTRAMUSCULAR; INTRAVENOUS at 14:22

## 2022-09-29 RX ADMIN — ACETAMINOPHEN 650 MG: 325 TABLET, FILM COATED ORAL at 05:40

## 2022-09-29 RX ADMIN — FOLIC ACID TAB 400 MCG 400 MCG: 400 TAB at 08:36

## 2022-09-29 RX ADMIN — APIXABAN 5 MG: 5 TABLET, FILM COATED ORAL at 17:06

## 2022-09-29 RX ADMIN — APIXABAN 5 MG: 5 TABLET, FILM COATED ORAL at 08:34

## 2022-09-29 RX ADMIN — VANCOMYCIN HYDROCHLORIDE 1000 MG: 1 INJECTION, SOLUTION INTRAVENOUS at 05:45

## 2022-09-29 RX ADMIN — OXYCODONE HYDROCHLORIDE AND ACETAMINOPHEN 500 MG: 500 TABLET ORAL at 08:34

## 2022-09-29 RX ADMIN — ACETAMINOPHEN 650 MG: 325 TABLET, FILM COATED ORAL at 13:25

## 2022-09-29 RX ADMIN — POLYETHYLENE GLYCOL 3350 17 G: 17 POWDER, FOR SOLUTION ORAL at 17:05

## 2022-09-29 RX ADMIN — METOPROLOL TARTRATE 100 MG: 100 TABLET, FILM COATED ORAL at 08:34

## 2022-09-29 RX ADMIN — METOPROLOL TARTRATE 50 MG: 100 TABLET, FILM COATED ORAL at 21:32

## 2022-09-29 RX ADMIN — DULOXETINE HYDROCHLORIDE 30 MG: 30 CAPSULE, DELAYED RELEASE ORAL at 20:05

## 2022-09-29 RX ADMIN — ALLOPURINOL 100 MG: 100 TABLET ORAL at 08:34

## 2022-09-29 RX ADMIN — OXYCODONE HYDROCHLORIDE 2.5 MG: 5 TABLET ORAL at 20:31

## 2022-09-29 RX ADMIN — FERROUS SULFATE TAB 325 MG (65 MG ELEMENTAL FE) 325 MG: 325 (65 FE) TAB at 08:34

## 2022-09-29 RX ADMIN — VANCOMYCIN HYDROCHLORIDE 1000 MG: 1 INJECTION, SOLUTION INTRAVENOUS at 18:45

## 2022-09-29 RX ADMIN — DILTIAZEM HYDROCHLORIDE 120 MG: 120 CAPSULE, COATED, EXTENDED RELEASE ORAL at 08:33

## 2022-09-29 RX ADMIN — EZETIMIBE 10 MG: 10 TABLET ORAL at 20:05

## 2022-09-29 RX ADMIN — CHOLECALCIFEROL TAB 10 MCG (400 UNIT) 400 UNITS: 10 TAB at 08:34

## 2022-09-29 RX ADMIN — SENNOSIDES AND DOCUSATE SODIUM 2 TABLET: 8.6; 5 TABLET ORAL at 17:06

## 2022-09-29 RX ADMIN — ACETAMINOPHEN 650 MG: 325 TABLET, FILM COATED ORAL at 17:05

## 2022-09-29 RX ADMIN — IOHEXOL 100 ML: 350 INJECTION, SOLUTION INTRAVENOUS at 12:40

## 2022-09-29 RX ADMIN — FERROUS SULFATE TAB 325 MG (65 MG ELEMENTAL FE) 325 MG: 325 (65 FE) TAB at 17:05

## 2022-09-29 RX ADMIN — LISINOPRIL 2.5 MG: 2.5 TABLET ORAL at 08:35

## 2022-09-29 RX ADMIN — ACETAMINOPHEN 650 MG: 325 TABLET, FILM COATED ORAL at 00:10

## 2022-09-29 RX ADMIN — FUROSEMIDE 40 MG: 40 TABLET ORAL at 08:35

## 2022-09-29 RX ADMIN — OXYCODONE HYDROCHLORIDE AND ACETAMINOPHEN 500 MG: 500 TABLET ORAL at 17:05

## 2022-09-29 NOTE — PROGRESS NOTES
[address] 1945, 76 y o  female MRN: [id number]  Unit/Bed#: S -01 Encounter: [id number]  Primary Care Provider: Justin Magallon MD   Date and time admitted to hospital: 9/25/2022  6:25 AM    * Vomiting and diarrhea  Assessment & Plan  Background: Presented to the ED with complaining of vomiting and diarrhea earlier and on presentation still feeling "nauseous"  · CLD> regular diet resumed and thus far tolerated   · IV fluids> discontinued on 9/26  · Secondary viral gastroenteritis versus other intra-abdominal process  · Stool cultures pending; low suspicion for C diff or infectious etiology as resolved without treatment    · Blood cultures positive as noted below   · Troponin unremarkable  · Telemetry discontinued        Pleural effusion on right  Assessment & Plan  · Unclear etiology however of note patient extremely more sedentary than usual postoperatively  · Liver ultrasound with cholelithiasis without cholecystitis     Anemia  Assessment & Plan  · Hemoglobin is 8 8 this appears pretty stable when compared to 8 9 a week ago    · Continue on iron and folic acid as well as F26  · Ongoing outpatient monitoring and follow-up with PCP    Lab Results   Component Value Date    HGB 8 4 (L) 09/29/2022    HGB 8 6 (L) 09/28/2022    HGB 8 0 (L) 09/27/2022       Bladder wall thickening  Assessment & Plan  · Unclear significance inpatient denies any urinary symptoms  · UA + for gram negative rods; now with ESBL/MRDO  · Initially started on IV Rocephin however given susceptibility will transition to IV ertapenem x3 days    Abdominal fluid collection  Assessment & Plan  · CT noting what appears to be seroma at the left femur proximally  · Orthopedic surgery consulted; recommendations as noted below  · No indication for antibiotics at this time  · Weight-bearing as tolerated  · If remains to contact Orthopedic surgery to remove staples prior to discharge  · CT of lower extremity to evaluate for further fluid collection  · PT/OT consulted for re-evaluation  · Case management on board to assist with dispo  · Maria Teresa Oliva upon discharge     Bacteremia due to Gram-positive bacteria  Assessment & Plan  · Blood cultures from  positive for staphylococcus epidermidis   · In the setting of pacemaker as well   · Repeats ordered per ID   · Infectious disease consulted; input appreciated   · Continue IV Vancomycin   · Repeat cultures ordered   · Ideally to have ortho culture fluid collection and await cultures     Anxiety  Assessment & Plan  · Stable will continue home Ativan    Essential hypertension  Assessment & Plan  · Blood pressure acceptable systolics in 875 to 522K  · Will continue home meds including metoprolol, lisinopril, Lasix, Cardizem  · Monitor with routine vitals        VTE Pharmacologic Prophylaxis: VTE Score: 4 Moderate Risk (Score 3-4) - Pharmacological DVT Prophylaxis Ordered: apixaban (Eliquis)  Patient Centered Rounds: I performed bedside rounds with nursing staff today  Discussions with Specialists or Other Care Team Provider: nursing staff, CM, as well ID and ortho     Education and Discussions with Family / Patient: attempted number in EMR however unable to reach   Time Spent for Care: 30 minutes  More than 50% of total time spent on counseling and coordination of care as described above  Current Length of Stay: 3 day(s)  Current Patient Status: Inpatient   Certification Statement: The patient will continue to require additional inpatient hospital stay due to IV abx, imaging, and ongoing abx plan   Discharge Plan: Anticipate discharge in 48-72 hrs to rehab facility  Code Status: Level 1 - Full Code    Subjective:   Patient reported that overnight she did have bowel movement however denies any nausea, vomiting or abdominal pain      Objective:     Vitals:   Temp (24hrs), Av 8 °F (37 1 °C), Min:98 7 °F (37 1 °C), Max:98 9 °F (37 2 °C)    Temp:  [98 7 °F (37 1 °C)-98 9 °F (37 2 °C)] 98 7 °F (37 1 °C)  HR:  [70] 70  Resp:  [17-18] 18  BP: (108-136)/(41-48) 136/46  SpO2:  [94 %-96 %] 96 %  Body mass index is 40 34 kg/m²  Input and Output Summary (last 24 hours): Intake/Output Summary (Last 24 hours) at 9/29/2022 1305  Last data filed at 9/29/2022 1225  Gross per 24 hour   Intake 240 ml   Output 1750 ml   Net -1510 ml       Physical Exam:   Physical Exam  Constitutional:       Appearance: She is not ill-appearing  Cardiovascular:      Rate and Rhythm: Normal rate  Heart sounds: Murmur heard  Pulmonary:      Effort: Pulmonary effort is normal    Abdominal:      General: Bowel sounds are normal    Musculoskeletal:         General: Swelling and tenderness present  Skin:     General: Skin is warm and dry  Capillary Refill: Capillary refill takes less than 2 seconds  Neurological:      Mental Status: She is alert and oriented to person, place, and time  Mental status is at baseline  Psychiatric:         Mood and Affect: Mood normal          Behavior: Behavior normal           Additional Data:     Labs:  Results from last 7 days   Lab Units 09/29/22  0534   WBC Thousand/uL 11 00*   HEMOGLOBIN g/dL 8 4*   HEMATOCRIT % 27 8*   PLATELETS Thousands/uL 558*   NEUTROS PCT % 60   LYMPHS PCT % 17   MONOS PCT % 13*   EOS PCT % 7*     Results from last 7 days   Lab Units 09/29/22  0534 09/27/22  0437 09/25/22  0706   SODIUM mmol/L 139   < > 143   POTASSIUM mmol/L 3 5   < > 3 8   CHLORIDE mmol/L 103   < > 101   CO2 mmol/L 31   < > 33*   BUN mg/dL 12   < > 13   CREATININE mg/dL 0 82   < > 0 82   ANION GAP mmol/L 5   < > 9   CALCIUM mg/dL 8 6   < > 9 2   ALBUMIN g/dL  --   --  3 1*   TOTAL BILIRUBIN mg/dL  --   --  0 42   ALK PHOS U/L  --   --  66   ALT U/L  --   --  15   AST U/L  --   --  18   GLUCOSE RANDOM mg/dL 117   < > 130    < > = values in this interval not displayed       Results from last 7 days   Lab Units 09/25/22  0706   INR 1 28*                   Lines/Drains:  Invasive Devices  Report    Peripheral Intravenous Line  Duration           Peripheral IV 09/29/22 Right Antecubital <1 day          Drain  Duration           External Urinary Catheter 4 days                      Imaging: Reviewed radiology reports from this admission including: awaiting imaging reads of CT scans    Recent Cultures (last 7 days):   Results from last 7 days   Lab Units 09/29/22  0535 09/26/22  2118 09/25/22  1346   BLOOD CULTURE  Received in Microbiology Lab  Culture in Progress  Received in Microbiology Lab  Culture in Progress    --  Staphylococcus epidermidis*  Staphylococcus epidermidis*   GRAM STAIN RESULT   --   --  Gram positive cocci in clusters*  Gram positive cocci in clusters*   URINE CULTURE   --  >100,000 cfu/ml Proteus mirabilis ESBL*  --        Last 24 Hours Medication List:   Current Facility-Administered Medications   Medication Dose Route Frequency Provider Last Rate    acetaminophen  650 mg Oral Q6H [address] Lucas Marin MD      allopurinol  100 mg Oral Daily Lucas Marin MD      apixaban  5 mg Oral BID JERI Raygoza      ascorbic acid  500 mg Oral BID Lucas Marin MD      cholecalciferol  400 Units Oral Daily Lucas Marin MD      diltiazem  120 mg Oral Daily Lucas Marin MD      DULoxetine  30 mg Oral Daily Lucas Marin MD      ertapenem  1,000 mg Intravenous Q24H JERI Jeffery      ezetimibe  10 mg Oral Daily Lucas Marin MD      ferrous sulfate  325 mg Oral BID With Meals Lucas Marin MD      folic acid  024 mcg Oral Daily Lucas Marin MD      furosemide  40 mg Oral Daily Lucas Marin MD      lisinopril  2 5 mg Oral Daily Lucas Marin MD      LORazepam  0 5 mg Oral Q8H PRN Lucas Marin MD      metoprolol tartrate  100 mg Oral Daily Lucas Marin MD      metoprolol tartrate  50 mg Oral HS Lucas Marin MD      ondansetron  4 mg Intravenous Q4H PRN Lucas Marin MD  pantoprazole  40 mg Intravenous Q12H [facility] JERI Raygoza      polyethylene glycol  17 g Oral Daily PRN JERI Addison      senna-docusate sodium  2 tablet Oral BID JERI Raygoza      vancomycin  12 5 mg/kg (Adjusted) Intravenous Q12H Eri Aldea, DO 1,000 mg (09/29/22 0545)        Today, Patient Was Seen By: Ravi Auguste    **Please Note: This note may have been constructed using a voice recognition system  **

## 2022-09-29 NOTE — PROGRESS NOTES
Vancomycin Assessment    Miguel Flores is a 76 y o  female who is currently receiving vancomycin 1000mg IV q12 hours for bacteremia     Relevant clinical data and objective history reviewed:  Creatinine   Date Value Ref Range Status   09/29/2022 0 82 0 60 - 1 30 mg/dL Final     Comment:     Standardized to IDMS reference method   09/28/2022 0 93 0 60 - 1 30 mg/dL Final     Comment:     Standardized to IDMS reference method   09/27/2022 1 13 0 60 - 1 30 mg/dL Final     Comment:     Standardized to IDMS reference method     BP (!) 121/47   Pulse 71   Temp 98 6 °F (37 °C)   Resp 18   Ht 5' 4" (1 626 m)   Wt 107 kg (235 lb)   SpO2 96%   BMI 40 34 kg/m²   I/O last 3 completed shifts: In: 240 [P O :240]  Out: 2550 [Urine:2550]  Lab Results   Component Value Date/Time    BUN 12 09/29/2022 05:34 AM    WBC 11 00 (H) 09/29/2022 05:34 AM    HGB 8 4 (L) 09/29/2022 05:34 AM    HCT 27 8 (L) 09/29/2022 05:34 AM    MCV 96 09/29/2022 05:34 AM     (H) 09/29/2022 05:34 AM     Temp Readings from Last 3 Encounters:   09/29/22 98 6 °F (37 °C)   09/19/22 98 1 °F (36 7 °C)   09/16/22 98 7 °F (37 1 °C) (Oral)     Vancomycin Days of Therapy: 4    Assessment/Plan  The patient is currently on vancomycin utilizing scheduled dosing  Baseline risks associated with therapy include: advanced age  The patient is receiving 1000mg IV q12 hours with the most recent vancomycin level being at steady-state and supratherapeutic based on a goal of 15-20 (appropriate for most indications) ; therefore, after clinical evaluation will be changed to 750mg IV q12 hours   Pharmacy will continue to follow closely for s/sx of nephrotoxicity, infusion reactions, and appropriateness of therapy  BMP and CBC will be ordered per protocol  Plan for trough as patient approaches steady state, prior to the 4th  dose at approximately 1800 on 10/1/22  Pharmacy will continue to follow the patients culture results and clinical progress daily      Santa Dove [address] [place] [address], Pharmacist

## 2022-09-29 NOTE — ASSESSMENT & PLAN NOTE
· Hemoglobin is 8 8 this appears pretty stable when compared to 8 9 a week ago    · Continue on iron and folic acid as well as G20  · Ongoing outpatient monitoring and follow-up with PCP    Lab Results   Component Value Date    HGB 8 4 (L) 09/29/2022    HGB 8 6 (L) 09/28/2022    HGB 8 0 (L) 09/27/2022

## 2022-09-29 NOTE — PROGRESS NOTES
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   4  Nausea/vomiting/diarrhea  Improved  5  Tachy-smita syndrome post Pacemaker   Consider infection in the setting of GPC bacteremia  6  Bacteriuria versus possible symptomatic UTI  Urine culture shows ESBL Proteus  7  Multiple antibiotic allergies  Penicillins/sulfa/doxycycline  Hives secondary to penicillin      Follow-up CT of the lower extremity to determine if further investigation is warranted  Incision site remains without new signs of acute infection  Orthopedic follow-up appreciated  Continue IV vancomycin with dosing recommendations per pharmacy  Follow up 2D echo and repeat blood cultures  Patient was also reportedly complaining of some new urinary symptoms  Urine culture now shows ESBL Proteus  Unfortunately, antibiotic options are very limited due to resistance and patient's allergies  We can give 3 day course of ertapenem      I discussed above plan with Internal Medicine Service      Antibiotic  Vancomycin 4  Ceftriaxone 2        Subjective:  Patient was a little delirious this morning and reportedly called 911  She denies new pain, fevers or chills  She is going down for CT        Objective:  Vitals:  Temp:  [98 7 °F (37 1 °C)-98 9 °F (37 2 °C)] 98 7 °F (37 1 °C)  HR:  [70] 70  Resp:  [17-18] 18  BP: (108-136)/(41-48) 136/46  SpO2:  [94 %-96 %] 96 %  Temp (24hrs), Av 8 °F (37 1 °C), Min:98 7 °F (37 1 °C), Max:98 9 °F (37 2 °C)  Current: Temperature: 98 7 °F (37 1 °C)    Physical Exam:   General:  No acute distress, nontoxic  Psychiatric:  Awake and alert  Pulmonary:  Normal respiratory excursion without accessory muscle use  Abdomen:  Soft, nontender  Extremities:  No edema  Left knee incision remains clean and dry with no dehiscence or expressible drainage  Skin:  No new rashes or ulceration    Lab Results:  I have personally reviewed pertinent labs  Results from last 7 days   Lab Units 09/29/22  0534 09/28/22  0622 09/27/22  0437 09/25/22  0706   POTASSIUM mmol/L 3 5 3 5 3 7 3 8   CHLORIDE mmol/L 103 102 101 101   CO2 mmol/L 31 32 34* 33*   BUN mg/dL 12 14 15 13   CREATININE mg/dL 0 82 0 93 1 13 0 82   EGFR ml/min/1 73sq m 68 58 46 68   CALCIUM mg/dL 8 6 8 7 8 5 9 2   AST U/L  --   --   --  18   ALT U/L  --   --   --  15   ALK PHOS U/L  --   --   --  66     Results from last 7 days   Lab Units 09/29/22 0534 09/28/22 0622 09/27/22  0437   WBC Thousand/uL 11 00* 8 60 8 56   HEMOGLOBIN g/dL 8 4* 8 6* 8 0*   PLATELETS Thousands/uL 558* 566* 582*     Results from last 7 days   Lab Units 09/29/22  0535 09/26/22  2118 09/25/22  1346   BLOOD CULTURE  Received in Microbiology Lab  Culture in Progress  Received in Microbiology Lab  Culture in Progress  --  Staphylococcus epidermidis*  Staphylococcus epidermidis*   GRAM STAIN RESULT   --   --  Gram positive cocci in clusters*  Gram positive cocci in clusters*   URINE CULTURE   --  >100,000 cfu/ml Proteus mirabilis ESBL*  --        Imaging Studies:   I have personally reviewed pertinent imaging study reports and images in PACS  EKG, Pathology, and Other Studies:   I have personally reviewed pertinent reports

## 2022-09-29 NOTE — ASSESSMENT & PLAN NOTE
· CT noting what appears to be seroma at the left femur proximally  · Orthopedic surgery consulted; recommendations as noted below  · No indication for antibiotics at this time  · Weight-bearing as tolerated  · If remains to contact Orthopedic surgery to remove staples prior to discharge  · CT of lower extremity to evaluate for further fluid collection  · PT/OT consulted for re-evaluation  · Case management on board to assist with dispo  · Manoj Culp upon discharge

## 2022-09-29 NOTE — PLAN OF CARE
Problem: MOBILITY - ADULT  Goal: Maintain or return to baseline ADL function  Description: INTERVENTIONS:  -  Assess patient's ability to carry out ADLs; assess patient's baseline for ADL function and identify physical deficits which impact ability to perform ADLs (bathing, care of mouth/teeth, toileting, grooming, dressing, etc )  - Assess/evaluate cause of self-care deficits   - Assess range of motion  - Assess patient's mobility; develop plan if impaired  - Assess patient's need for assistive devices and provide as appropriate  - Encourage maximum independence but intervene and supervise when necessary  - Involve family in performance of ADLs  - Assess for home care needs following discharge   - Consider OT consult to assist with ADL evaluation and planning for discharge  - Provide patient education as appropriate  Outcome: Progressing  Goal: Maintains/Returns to pre admission functional level  Description: INTERVENTIONS:  - Perform BMAT or MOVE assessment daily    - Set and communicate daily mobility goal to care team and patient/family/caregiver     - Collaborate with rehabilitation services on mobility goals if consulted  - Perform Range of Motion   - Reposition patie  - Stand patient   - Ambulate patient   - Out of bed to chair  - Out of bed for meals  - Out of bed for toileting  - Record patient progress and toleration of activity level   Outcome: Progressing     Problem: Potential for Falls  Goal: Patient will remain free of falls  Description: INTERVENTIONS:  - Educate patient/family on patient safety including physical limitations  - Instruct patient to call for assistance with activity   - Consult OT/PT to assist with strengthening/mobility   - Keep Call bell within reach  - Keep bed low and locked with side rails adjusted as appropriate  - Keep care items and personal belongings within reach  - Initiate and maintain comfort rounds  - Make Fall Risk Sign visible to staff    - Apply yellow socks and bracelet for high fall risk patients  - Consider moving patient to room near nurses station  Outcome: Progressing     Problem: Prexisting or High Potential for Compromised Skin Integrity  Goal: Skin integrity is maintained or improved  Description: INTERVENTIONS:  - Identify patients at risk for skin breakdown  - Assess and monitor skin integrity  - Assess and monitor nutrition and hydration status  - Monitor labs   - Assess for incontinence   - Turn and reposition patient  - Assist with mobility/ambulation  - Relieve pressure over bony prominences  - Avoid friction and shearing  - Provide appropriate hygiene as needed including keeping skin clean and dry  - Evaluate need for skin moisturizer/barrier cream  - Collaborate with interdisciplinary team   - Patient/family teaching  - Consider wound care consult   Outcome: Progressing     Problem: HEMATOLOGIC - ADULT  Goal: Maintains hematologic stability  Description: INTERVENTIONS  - Assess for signs and symptoms of bleeding or hemorrhage  - Monitor labs  - Administer supportive blood products/factors as ordered and appropriate  Outcome: Progressing     Problem: MUSCULOSKELETAL - ADULT  Goal: Maintain or return mobility to safest level of function  Description: INTERVENTIONS:  - Assess patient's ability to carry out ADLs; assess patient's baseline for ADL function and identify physical deficits which impact ability to perform ADLs (bathing, care of mouth/teeth, toileting, grooming, dressing, etc )  - Assess/evaluate cause of self-care deficits   - Assess range of motion  - Assess patient's mobility  - Assess patient's need for assistive devices and provide as appropriate  - Encourage maximum independence but intervene and supervise when necessary  - Involve family in performance of ADLs  - Assess for home care needs following discharge   - Consider OT consult to assist with ADL evaluation and planning for discharge  - Provide patient education as appropriate  Outcome: Progressing  Goal: Maintain proper alignment of affected body part  Description: INTERVENTIONS:  - Support, maintain and protect limb and body alignment  - Provide patient/ family with appropriate education  Outcome: Progressing     Problem: PAIN - ADULT  Goal: Verbalizes/displays adequate comfort level or baseline comfort level  Description: Interventions:  - Encourage patient to monitor pain and request assistance  - Assess pain using appropriate pain scale  - Administer analgesics based on type and severity of pain and evaluate response  - Implement non-pharmacological measures as appropriate and evaluate response  - Consider cultural and social influences on pain and pain management  - Notify physician/advanced practitioner if interventions unsuccessful or patient reports new pain  Outcome: Progressing     Problem: INFECTION - ADULT  Goal: Absence or prevention of progression during hospitalization  Description: INTERVENTIONS:  - Assess and monitor for signs and symptoms of infection  - Monitor lab/diagnostic results  - Monitor all insertion sites, i e  indwelling lines, tubes, and drains  - Monitor endotracheal if appropriate and nasal secretions for changes in amount and color  - [place] appropriate cooling/warming therapies per order  - Administer medications as ordered  - Instruct and encourage patient and family to use good hand hygiene technique  - Identify and instruct in appropriate isolation precautions for identified infection/condition  Outcome: Progressing     Problem: SAFETY ADULT  Goal: Patient will remain free of falls  Description: INTERVENTIONS:  - Educate patient/family on patient safety including physical limitations  - Instruct patient to call for assistance with activity   - Consult OT/PT to assist with strengthening/mobility   - Keep Call bell within reach  - Keep bed low and locked with side rails adjusted as appropriate  - Keep care items and personal belongings within reach  - Initiate and maintain comfort rounds  - Make Fall Risk Sign visible to staff  - Apply yellow socks and bracelet for high fall risk patients  - Consider moving patient to room near nurses station  Outcome: Progressing     Problem: DISCHARGE PLANNING  Goal: Discharge to home or other facility with appropriate resources  Description: INTERVENTIONS:  - Identify barriers to discharge w/patient and caregiver  - Arrange for needed discharge resources and transportation as appropriate  - Identify discharge learning needs (meds, wound care, etc )  - Arrange for interpretive services to assist at discharge as needed  - Refer to Case Management Department for coordinating discharge planning if the patient needs post-hospital services based on physician/advanced practitioner order or complex needs related to functional status, cognitive ability, or social support system  Outcome: Progressing     Problem: Knowledge Deficit  Goal: Patient/family/caregiver demonstrates understanding of disease process, treatment plan, medications, and discharge instructions  Description: Complete learning assessment and assess knowledge base    Interventions:  - Provide teaching at level of understanding  - Provide teaching via preferred learning methods  Outcome: Progressing

## 2022-09-29 NOTE — PLAN OF CARE
Problem: MOBILITY - ADULT  Goal: Maintain or return to baseline ADL function  Description: INTERVENTIONS:  -  Assess patient's ability to carry out ADLs; assess patient's baseline for ADL function and identify physical deficits which impact ability to perform ADLs (bathing, care of mouth/teeth, toileting, grooming, dressing, etc )  - Assess/evaluate cause of self-care deficits   - Assess range of motion  - Assess patient's mobility; develop plan if impaired  - Assess patient's need for assistive devices and provide as appropriate  - Encourage maximum independence but intervene and supervise when necessary  - Involve family in performance of ADLs  - Assess for home care needs following discharge   - Consider OT consult to assist with ADL evaluation and planning for discharge  - Provide patient education as appropriate  Outcome: Progressing  Goal: Maintains/Returns to pre admission functional level  Description: INTERVENTIONS:  - Perform BMAT or MOVE assessment daily    - Set and communicate daily mobility goal to care team and patient/family/caregiver  - Collaborate with rehabilitation services on mobility goals if consulted  - Perform Range of Motion 3 times a day  - Reposition patient every 2 hours    - Dangle patient 3 times a day  - Stand patient 3 times a day  - Ambulate patient 3 times a day  - Out of bed to chair 3 times a day   - Out of bed for meals 3 times a day  - Out of bed for toileting  - Record patient progress and toleration of activity level   Outcome: Progressing     Problem: Potential for Falls  Goal: Patient will remain free of falls  Description: INTERVENTIONS:  - Educate patient/family on patient safety including physical limitations  - Instruct patient to call for assistance with activity   - Consult OT/PT to assist with strengthening/mobility   - Keep Call bell within reach  - Keep bed low and locked with side rails adjusted as appropriate  - Keep care items and personal belongings within reach  - Initiate and maintain comfort rounds  - Make Fall Risk Sign visible to staff  - Offer Toileting every 2 Hours, in advance of need  - Initiate/Maintain bed alarm  - Obtain necessary fall risk management equipment  - Apply yellow socks and bracelet for high fall risk patients  - Consider moving patient to room near nurses station  Outcome: Progressing     Problem: Prexisting or High Potential for Compromised Skin Integrity  Goal: Skin integrity is maintained or improved  Description: INTERVENTIONS:  - Identify patients at risk for skin breakdown  - Assess and monitor skin integrity  - Assess and monitor nutrition and hydration status  - Monitor labs   - Assess for incontinence   - Turn and reposition patient  - Assist with mobility/ambulation  - Relieve pressure over bony prominences  - Avoid friction and shearing  - Provide appropriate hygiene as needed including keeping skin clean and dry  - Evaluate need for skin moisturizer/barrier cream  - Collaborate with interdisciplinary team   - Patient/family teaching  - Consider wound care consult   Outcome: Progressing     Problem: HEMATOLOGIC - ADULT  Goal: Maintains hematologic stability  Description: INTERVENTIONS  - Assess for signs and symptoms of bleeding or hemorrhage  - Monitor labs  - Administer supportive blood products/factors as ordered and appropriate  Outcome: Progressing     Problem: MUSCULOSKELETAL - ADULT  Goal: Maintain or return mobility to safest level of function  Description: INTERVENTIONS:  - Assess patient's ability to carry out ADLs; assess patient's baseline for ADL function and identify physical deficits which impact ability to perform ADLs (bathing, care of mouth/teeth, toileting, grooming, dressing, etc )  - Assess/evaluate cause of self-care deficits   - Assess range of motion  - Assess patient's mobility  - Assess patient's need for assistive devices and provide as appropriate  - Encourage maximum independence but intervene and supervise when necessary  - Involve family in performance of ADLs  - Assess for home care needs following discharge   - Consider OT consult to assist with ADL evaluation and planning for discharge  - Provide patient education as appropriate  Outcome: Progressing  Goal: Maintain proper alignment of affected body part  Description: INTERVENTIONS:  - Support, maintain and protect limb and body alignment  - Provide patient/ family with appropriate education  Outcome: Progressing     Problem: PAIN - ADULT  Goal: Verbalizes/displays adequate comfort level or baseline comfort level  Description: Interventions:  - Encourage patient to monitor pain and request assistance  - Assess pain using appropriate pain scale  - Administer analgesics based on type and severity of pain and evaluate response  - Implement non-pharmacological measures as appropriate and evaluate response  - Consider cultural and social influences on pain and pain management  - Notify physician/advanced practitioner if interventions unsuccessful or patient reports new pain  Outcome: Progressing     Problem: INFECTION - ADULT  Goal: Absence or prevention of progression during hospitalization  Description: INTERVENTIONS:  - Assess and monitor for signs and symptoms of infection  - Monitor lab/diagnostic results  - Monitor all insertion sites, i e  indwelling lines, tubes, and drains  - Monitor endotracheal if appropriate and nasal secretions for changes in amount and color  - [place] appropriate cooling/warming therapies per order  - Administer medications as ordered  - Instruct and encourage patient and family to use good hand hygiene technique  - Identify and instruct in appropriate isolation precautions for identified infection/condition  Outcome: Progressing     Problem: SAFETY ADULT  Goal: Patient will remain free of falls  Description: INTERVENTIONS:  - Educate patient/family on patient safety including physical limitations  - Instruct patient to call for assistance with activity   - Consult OT/PT to assist with strengthening/mobility   - Keep Call bell within reach  - Keep bed low and locked with side rails adjusted as appropriate  - Keep care items and personal belongings within reach  - Initiate and maintain comfort rounds  - Make Fall Risk Sign visible to staff  - Offer Toileting every 2 Hours, in advance of need  - Initiate/Maintain bed alarm  - Obtain necessary fall risk management equipment  - Apply yellow socks and bracelet for high fall risk patients  - Consider moving patient to room near nurses station  Outcome: Progressing     Problem: DISCHARGE PLANNING  Goal: Discharge to home or other facility with appropriate resources  Description: INTERVENTIONS:  - Identify barriers to discharge w/patient and caregiver  - Arrange for needed discharge resources and transportation as appropriate  - Identify discharge learning needs (meds, wound care, etc )  - Arrange for interpretive services to assist at discharge as needed  - Refer to Case Management Department for coordinating discharge planning if the patient needs post-hospital services based on physician/advanced practitioner order or complex needs related to functional status, cognitive ability, or social support system  Outcome: Progressing     Problem: Knowledge Deficit  Goal: Patient/family/caregiver demonstrates understanding of disease process, treatment plan, medications, and discharge instructions  Description: Complete learning assessment and assess knowledge base    Interventions:  - Provide teaching at level of understanding  - Provide teaching via preferred learning methods  Outcome: Progressing

## 2022-09-29 NOTE — PLAN OF CARE
Problem: MOBILITY - ADULT  Goal: Maintain or return to baseline ADL function  Description: INTERVENTIONS:  -  Assess patient's ability to carry out ADLs; assess patient's baseline for ADL function and identify physical deficits which impact ability to perform ADLs (bathing, care of mouth/teeth, toileting, grooming, dressing, etc )  - Assess/evaluate cause of self-care deficits   - Assess range of motion  - Assess patient's mobility; develop plan if impaired  - Assess patient's need for assistive devices and provide as appropriate  - Encourage maximum independence but intervene and supervise when necessary  - Involve family in performance of ADLs  - Assess for home care needs following discharge   - Consider OT consult to assist with ADL evaluation and planning for discharge  - Provide patient education as appropriate  Outcome: Progressing  Goal: Maintains/Returns to pre admission functional level  Description: INTERVENTIONS:  - Perform BMAT or MOVE assessment daily    - Set and communicate daily mobility goal to care team and patient/family/caregiver  - Collaborate with rehabilitation services on mobility goals if consulted  - Perform Range of Motion  times a day  - Reposition patient every  hours    - Dangle patient  times a day  - Stand patient  times a day  - Ambulate patient  times a day  - Out of bed to chair  times a day   - Out of bed for meals  times a day  - Out of bed for toileting  - Record patient progress and toleration of activity level   Outcome: Progressing     Problem: Potential for Falls  Goal: Patient will remain free of falls  Description: INTERVENTIONS:  - Educate patient/family on patient safety including physical limitations  - Instruct patient to call for assistance with activity   - Consult OT/PT to assist with strengthening/mobility   - Keep Call bell within reach  - Keep bed low and locked with side rails adjusted as appropriate  - Keep care items and personal belongings within reach  - Initiate and maintain comfort rounds  - Make Fall Risk Sign visible to staff  - Offer Toileting every  Hours, in advance of need  - Initiate/Maintain alarm  - Obtain necessary fall risk management equipment:   - Apply yellow socks and bracelet for high fall risk patients  - Consider moving patient to room near nurses station  Outcome: Progressing     Problem: Prexisting or High Potential for Compromised Skin Integrity  Goal: Skin integrity is maintained or improved  Description: INTERVENTIONS:  - Identify patients at risk for skin breakdown  - Assess and monitor skin integrity  - Assess and monitor nutrition and hydration status  - Monitor labs   - Assess for incontinence   - Turn and reposition patient  - Assist with mobility/ambulation  - Relieve pressure over bony prominences  - Avoid friction and shearing  - Provide appropriate hygiene as needed including keeping skin clean and dry  - Evaluate need for skin moisturizer/barrier cream  - Collaborate with interdisciplinary team   - Patient/family teaching  - Consider wound care consult   Outcome: Progressing     Problem: HEMATOLOGIC - ADULT  Goal: Maintains hematologic stability  Description: INTERVENTIONS  - Assess for signs and symptoms of bleeding or hemorrhage  - Monitor labs  - Administer supportive blood products/factors as ordered and appropriate  Outcome: Progressing     Problem: MUSCULOSKELETAL - ADULT  Goal: Maintain or return mobility to safest level of function  Description: INTERVENTIONS:  - Assess patient's ability to carry out ADLs; assess patient's baseline for ADL function and identify physical deficits which impact ability to perform ADLs (bathing, care of mouth/teeth, toileting, grooming, dressing, etc )  - Assess/evaluate cause of self-care deficits   - Assess range of motion  - Assess patient's mobility  - Assess patient's need for assistive devices and provide as appropriate  - Encourage maximum independence but intervene and supervise when necessary  - Involve family in performance of ADLs  - Assess for home care needs following discharge   - Consider OT consult to assist with ADL evaluation and planning for discharge  - Provide patient education as appropriate  Outcome: Progressing  Goal: Maintain proper alignment of affected body part  Description: INTERVENTIONS:  - Support, maintain and protect limb and body alignment  - Provide patient/ family with appropriate education  Outcome: Progressing     Problem: PAIN - ADULT  Goal: Verbalizes/displays adequate comfort level or baseline comfort level  Description: Interventions:  - Encourage patient to monitor pain and request assistance  - Assess pain using appropriate pain scale  - Administer analgesics based on type and severity of pain and evaluate response  - Implement non-pharmacological measures as appropriate and evaluate response  - Consider cultural and social influences on pain and pain management  - Notify physician/advanced practitioner if interventions unsuccessful or patient reports new pain  Outcome: Progressing     Problem: INFECTION - ADULT  Goal: Absence or prevention of progression during hospitalization  Description: INTERVENTIONS:  - Assess and monitor for signs and symptoms of infection  - Monitor lab/diagnostic results  - Monitor all insertion sites, i e  indwelling lines, tubes, and drains  - Monitor endotracheal if appropriate and nasal secretions for changes in amount and color  - [place] appropriate cooling/warming therapies per order  - Administer medications as ordered  - Instruct and encourage patient and family to use good hand hygiene technique  - Identify and instruct in appropriate isolation precautions for identified infection/condition  Outcome: Progressing     Problem: SAFETY ADULT  Goal: Patient will remain free of falls  Description: INTERVENTIONS:  - Educate patient/family on patient safety including physical limitations  - Instruct patient to call for assistance with activity   - Consult OT/PT to assist with strengthening/mobility   - Keep Call bell within reach  - Keep bed low and locked with side rails adjusted as appropriate  - Keep care items and personal belongings within reach  - Initiate and maintain comfort rounds  - Make Fall Risk Sign visible to staff  - Offer Toileting every  Hours, in advance of need  - Initiate/Maintain alarm  - Obtain necessary fall risk management equipment  - Apply yellow socks and bracelet for high fall risk patients  - Consider moving patient to room near nurses station  Outcome: Progressing     Problem: DISCHARGE PLANNING  Goal: Discharge to home or other facility with appropriate resources  Description: INTERVENTIONS:  - Identify barriers to discharge w/patient and caregiver  - Arrange for needed discharge resources and transportation as appropriate  - Identify discharge learning needs (meds, wound care, etc )  - Arrange for interpretive services to assist at discharge as needed  - Refer to Case Management Department for coordinating discharge planning if the patient needs post-hospital services based on physician/advanced practitioner order or complex needs related to functional status, cognitive ability, or social support system  Outcome: Progressing     Problem: Knowledge Deficit  Goal: Patient/family/caregiver demonstrates understanding of disease process, treatment plan, medications, and discharge instructions  Description: Complete learning assessment and assess knowledge base    Interventions:  - Provide teaching at level of understanding  - Provide teaching via preferred learning methods  Outcome: Progressing

## 2022-09-29 NOTE — ASSESSMENT & PLAN NOTE
· Unclear significance inpatient denies any urinary symptoms  · UA + for gram negative rods; now with ESBL/MRDO  · Initially started on IV Rocephin however given susceptibility will transition to IV ertapenem x3 days

## 2022-09-29 NOTE — ASSESSMENT & PLAN NOTE
· Blood pressure acceptable systolics in 107 to 203Y  · Will continue home meds including metoprolol, lisinopril, Lasix, Cardizem  · Monitor with routine vitals

## 2022-09-30 ENCOUNTER — APPOINTMENT (INPATIENT)
Dept: RADIOLOGY | Facility: HOSPITAL | Age: 77
DRG: 872 | End: 2022-09-30
Payer: MEDICARE

## 2022-09-30 LAB
ANION GAP SERPL CALCULATED.3IONS-SCNC: 9 MMOL/L (ref 4–13)
BASOPHILS # BLD AUTO: 0.08 THOUSANDS/ΜL (ref 0–0.1)
BASOPHILS NFR BLD AUTO: 1 % (ref 0–1)
BUN SERPL-MCNC: 10 MG/DL (ref 5–25)
CALCIUM SERPL-MCNC: 8.7 MG/DL (ref 8.4–10.2)
CHLORIDE SERPL-SCNC: 103 MMOL/L (ref 96–108)
CO2 SERPL-SCNC: 27 MMOL/L (ref 21–32)
CREAT SERPL-MCNC: 0.9 MG/DL (ref 0.6–1.3)
EOSINOPHIL # BLD AUTO: 0.8 THOUSAND/ΜL (ref 0–0.61)
EOSINOPHIL NFR BLD AUTO: 9 % (ref 0–6)
ERYTHROCYTE [DISTWIDTH] IN BLOOD BY AUTOMATED COUNT: 15.2 % (ref 11.6–15.1)
GFR SERPL CREATININE-BSD FRML MDRD: 61 ML/MIN/1.73SQ M
GLUCOSE SERPL-MCNC: 106 MG/DL (ref 65–140)
HCT VFR BLD AUTO: 27.7 % (ref 34.8–46.1)
HGB BLD-MCNC: 8.4 G/DL (ref 11.5–15.4)
IMM GRANULOCYTES # BLD AUTO: 0.18 THOUSAND/UL (ref 0–0.2)
IMM GRANULOCYTES NFR BLD AUTO: 2 % (ref 0–2)
LYMPHOCYTES # BLD AUTO: 1.84 THOUSANDS/ΜL (ref 0.6–4.47)
LYMPHOCYTES NFR BLD AUTO: 20 % (ref 14–44)
MAGNESIUM SERPL-MCNC: 1.9 MG/DL (ref 1.9–2.7)
MCH RBC QN AUTO: 29.2 PG (ref 26.8–34.3)
MCHC RBC AUTO-ENTMCNC: 30.3 G/DL (ref 31.4–37.4)
MCV RBC AUTO: 96 FL (ref 82–98)
MONOCYTES # BLD AUTO: 1.26 THOUSAND/ΜL (ref 0.17–1.22)
MONOCYTES NFR BLD AUTO: 14 % (ref 4–12)
NEUTROPHILS # BLD AUTO: 4.97 THOUSANDS/ΜL (ref 1.85–7.62)
NEUTS SEG NFR BLD AUTO: 54 % (ref 43–75)
NRBC BLD AUTO-RTO: 0 /100 WBCS
PLATELET # BLD AUTO: 516 THOUSANDS/UL (ref 149–390)
PMV BLD AUTO: 8.5 FL (ref 8.9–12.7)
POTASSIUM SERPL-SCNC: 3.5 MMOL/L (ref 3.5–5.3)
RBC # BLD AUTO: 2.88 MILLION/UL (ref 3.81–5.12)
SODIUM SERPL-SCNC: 139 MMOL/L (ref 135–147)
WBC # BLD AUTO: 9.13 THOUSAND/UL (ref 4.31–10.16)

## 2022-09-30 PROCEDURE — 85025 COMPLETE CBC W/AUTO DIFF WBC: CPT | Performed by: NURSE PRACTITIONER

## 2022-09-30 PROCEDURE — 80048 BASIC METABOLIC PNL TOTAL CA: CPT | Performed by: NURSE PRACTITIONER

## 2022-09-30 PROCEDURE — 76942 ECHO GUIDE FOR BIOPSY: CPT | Performed by: INTERNAL MEDICINE

## 2022-09-30 PROCEDURE — 87070 CULTURE OTHR SPECIMN AEROBIC: CPT | Performed by: STUDENT IN AN ORGANIZED HEALTH CARE EDUCATION/TRAINING PROGRAM

## 2022-09-30 PROCEDURE — C9113 INJ PANTOPRAZOLE SODIUM, VIA: HCPCS | Performed by: NURSE PRACTITIONER

## 2022-09-30 PROCEDURE — 10160 PNXR ASPIR ABSC HMTMA BULLA: CPT | Performed by: INTERNAL MEDICINE

## 2022-09-30 PROCEDURE — NC001 PR NO CHARGE: Performed by: INTERNAL MEDICINE

## 2022-09-30 PROCEDURE — 83735 ASSAY OF MAGNESIUM: CPT | Performed by: NURSE PRACTITIONER

## 2022-09-30 PROCEDURE — 99232 SBSQ HOSP IP/OBS MODERATE 35: CPT | Performed by: INTERNAL MEDICINE

## 2022-09-30 PROCEDURE — 87075 CULTR BACTERIA EXCEPT BLOOD: CPT | Performed by: STUDENT IN AN ORGANIZED HEALTH CARE EDUCATION/TRAINING PROGRAM

## 2022-09-30 PROCEDURE — 0J9M3ZZ DRAINAGE OF LEFT UPPER LEG SUBCUTANEOUS TISSUE AND FASCIA, PERCUTANEOUS APPROACH: ICD-10-PCS | Performed by: HOSPITALIST

## 2022-09-30 PROCEDURE — 99024 POSTOP FOLLOW-UP VISIT: CPT | Performed by: PHYSICIAN ASSISTANT

## 2022-09-30 PROCEDURE — 87205 SMEAR GRAM STAIN: CPT | Performed by: STUDENT IN AN ORGANIZED HEALTH CARE EDUCATION/TRAINING PROGRAM

## 2022-09-30 PROCEDURE — 10007 FNA BX W/FLUOR GDN 1ST LES: CPT

## 2022-09-30 RX ORDER — LIDOCAINE HYDROCHLORIDE 10 MG/ML
INJECTION, SOLUTION EPIDURAL; INFILTRATION; INTRACAUDAL; PERINEURAL CODE/TRAUMA/SEDATION MEDICATION
Status: COMPLETED | OUTPATIENT
Start: 2022-09-30 | End: 2022-09-30

## 2022-09-30 RX ADMIN — ERTAPENEM SODIUM 1000 MG: 1 INJECTION, POWDER, LYOPHILIZED, FOR SOLUTION INTRAMUSCULAR; INTRAVENOUS at 13:42

## 2022-09-30 RX ADMIN — VANCOMYCIN HYDROCHLORIDE 750 MG: 750 INJECTION, SOLUTION INTRAVENOUS at 17:54

## 2022-09-30 RX ADMIN — APIXABAN 5 MG: 5 TABLET, FILM COATED ORAL at 09:27

## 2022-09-30 RX ADMIN — ACETAMINOPHEN 650 MG: 325 TABLET, FILM COATED ORAL at 00:17

## 2022-09-30 RX ADMIN — OXYCODONE HYDROCHLORIDE AND ACETAMINOPHEN 500 MG: 500 TABLET ORAL at 09:25

## 2022-09-30 RX ADMIN — CHOLECALCIFEROL TAB 10 MCG (400 UNIT) 400 UNITS: 10 TAB at 09:25

## 2022-09-30 RX ADMIN — OXYCODONE HYDROCHLORIDE 2.5 MG: 5 TABLET ORAL at 11:42

## 2022-09-30 RX ADMIN — SENNOSIDES AND DOCUSATE SODIUM 2 TABLET: 8.6; 5 TABLET ORAL at 17:31

## 2022-09-30 RX ADMIN — ACETAMINOPHEN 650 MG: 325 TABLET, FILM COATED ORAL at 05:26

## 2022-09-30 RX ADMIN — FOLIC ACID TAB 400 MCG 400 MCG: 400 TAB at 09:28

## 2022-09-30 RX ADMIN — FERROUS SULFATE TAB 325 MG (65 MG ELEMENTAL FE) 325 MG: 325 (65 FE) TAB at 09:26

## 2022-09-30 RX ADMIN — EZETIMIBE 10 MG: 10 TABLET ORAL at 21:12

## 2022-09-30 RX ADMIN — VANCOMYCIN HYDROCHLORIDE 750 MG: 750 INJECTION, SOLUTION INTRAVENOUS at 05:37

## 2022-09-30 RX ADMIN — ACETAMINOPHEN 650 MG: 325 TABLET, FILM COATED ORAL at 17:31

## 2022-09-30 RX ADMIN — ACETAMINOPHEN 650 MG: 325 TABLET, FILM COATED ORAL at 11:42

## 2022-09-30 RX ADMIN — ALLOPURINOL 100 MG: 100 TABLET ORAL at 09:26

## 2022-09-30 RX ADMIN — DULOXETINE HYDROCHLORIDE 30 MG: 30 CAPSULE, DELAYED RELEASE ORAL at 21:12

## 2022-09-30 RX ADMIN — PANTOPRAZOLE SODIUM 40 MG: 40 INJECTION, POWDER, FOR SOLUTION INTRAVENOUS at 09:25

## 2022-09-30 RX ADMIN — ACETAMINOPHEN 650 MG: 325 TABLET, FILM COATED ORAL at 23:20

## 2022-09-30 RX ADMIN — FERROUS SULFATE TAB 325 MG (65 MG ELEMENTAL FE) 325 MG: 325 (65 FE) TAB at 17:30

## 2022-09-30 RX ADMIN — SENNOSIDES AND DOCUSATE SODIUM 2 TABLET: 8.6; 5 TABLET ORAL at 09:26

## 2022-09-30 RX ADMIN — LIDOCAINE HYDROCHLORIDE 10 ML: 10 INJECTION, SOLUTION EPIDURAL; INFILTRATION; INTRACAUDAL; PERINEURAL at 11:01

## 2022-09-30 RX ADMIN — FUROSEMIDE 40 MG: 40 TABLET ORAL at 09:26

## 2022-09-30 RX ADMIN — PANTOPRAZOLE SODIUM 40 MG: 40 INJECTION, POWDER, FOR SOLUTION INTRAVENOUS at 21:13

## 2022-09-30 RX ADMIN — METOPROLOL TARTRATE 50 MG: 100 TABLET, FILM COATED ORAL at 21:12

## 2022-09-30 RX ADMIN — LISINOPRIL 2.5 MG: 2.5 TABLET ORAL at 09:28

## 2022-09-30 RX ADMIN — APIXABAN 5 MG: 5 TABLET, FILM COATED ORAL at 17:31

## 2022-09-30 RX ADMIN — OXYCODONE HYDROCHLORIDE AND ACETAMINOPHEN 500 MG: 500 TABLET ORAL at 17:31

## 2022-09-30 RX ADMIN — METOPROLOL TARTRATE 100 MG: 100 TABLET, FILM COATED ORAL at 09:25

## 2022-09-30 RX ADMIN — DILTIAZEM HYDROCHLORIDE 120 MG: 120 CAPSULE, COATED, EXTENDED RELEASE ORAL at 09:26

## 2022-09-30 NOTE — ASSESSMENT & PLAN NOTE
· Unclear significance inpatient denies any urinary symptoms  · UA + for gram negative rods; now with ESBL/MRDO  · Transitioned to IV ertapenem x3 days due to susceptibility   Appreciate ID input

## 2022-09-30 NOTE — PROGRESS NOTES
Vancomycin IV Pharmacy-to-Dose Consultation    Kizzie Severe is a 76 y o  female who is currently receiving Vancomycin IV with management by the Pharmacy Consult service  Assessment/Plan:  The patient was reviewed  Renal function is stable and no signs or symptoms of nephrotoxicity and/or infusion reactions were documented in the chart  Based on todays assessment, continue current vancomycin dosing of 750 mg IV q12h , with a plan for trough to be drawn at 1800 on 10/01  We will continue to follow the patients culture results and clinical progress daily      Rafita Sanchez

## 2022-09-30 NOTE — TELEMEDICINE
e-Consult (IPC)  - Interventional Radiology  Viktor Zaidi 76 y o  female MRN: [id number]  Unit/Bed#: S -12 Encounter: [id number]          Interventional Radiology has been consulted to evaluate Viktor Zaidi    We were consulted by orthopedic surgery concerning this patient with left lower extremity fluid collections  IP Consult to IR  Consult performed by: Pinky Hernandez MD  Consult ordered by: Amada Anderson PA-C        09/30/22    Assessment/Recommendation:   76year-old female with history of left distal femoral replacement and removal of deep implants of left femur on 9/12/2022 and found to have bacteremia  CT of the left lower extremity showed fluid collections  Request was made for aspiration of the proximal left lower extremity fluid collection  We will proceed with aspiration only of the proximal left thigh fluid collection for culture  Please maintain NPO status  5-10 minutes, >50% of the total time devoted to medical consultative verbal/EMR discussion between providers  Written report will be generated in the EMR  Thank you for allowing Interventional Radiology to participate in the care of Viktor Zaidi  Please don't hesitate to call or TigerText us with any questions       Pinky Hernandez

## 2022-09-30 NOTE — PROGRESS NOTES
Progress Note - [facility]  76 y o  female MRN: [id number]  Unit/Bed#: AN CT SCAN      Subjective:    76 y  o female patient seen and evaluated at bedside      Labs:  0   Lab Value Date/Time    HCT 27 7 (L) 09/30/2022 0446    HCT 27 8 (L) 09/29/2022 0534    HCT 29 0 (L) 09/28/2022 0622    HGB 8 4 (L) 09/30/2022 0446    HGB 8 4 (L) 09/29/2022 0534    HGB 8 6 (L) 09/28/2022 0622    INR 1 28 (H) 09/25/2022 0706    WBC 9 13 09/30/2022 0446    WBC 11 00 (H) 09/29/2022 0534    WBC 8 60 09/28/2022 0622    CRP 5 2 (H) 05/17/2022 1139       Meds:    Current Facility-Administered Medications:     acetaminophen (TYLENOL) tablet 650 mg, 650 mg, Oral, Q6H [address], Rober Escudero MD, 650 mg at 09/30/22 0526    allopurinol (ZYLOPRIM) tablet 100 mg, 100 mg, Oral, Daily, Rober Escudero MD, 100 mg at 09/29/22 0834    apixaban (ELIQUIS) tablet 5 mg, 5 mg, Oral, BID, JERI Raygoza, 5 mg at 09/29/22 1706    ascorbic acid (VITAMIN C) tablet 500 mg, 500 mg, Oral, BID, Rober Escudero MD, 500 mg at 09/29/22 1705    cholecalciferol (VITAMIN D3) tablet 400 Units, 400 Units, Oral, Daily, Rober Escudero MD, 400 Units at 09/29/22 0834    diltiazem (CARDIZEM CD) 24 hr capsule 120 mg, 120 mg, Oral, Daily, Rober Escudero MD, 120 mg at 09/29/22 8975    DULoxetine (CYMBALTA) delayed release capsule 30 mg, 30 mg, Oral, Daily, Rober Escudero MD, 30 mg at 09/29/22 2005    ertapenem (INVanz) 1,000 mg in sodium chloride 0 9 % 50 mL IVPB, 1,000 mg, Intravenous, Q24H, JERI Raygoza, Last Rate: 100 mL/hr at 09/29/22 1422, 1,000 mg at 09/29/22 1422    ezetimibe (ZETIA) tablet 10 mg, 10 mg, Oral, Daily, Rober Escudero MD, 10 mg at 09/29/22 2005    ferrous sulfate tablet 325 mg, 325 mg, Oral, BID With Meals, Rober Escudero MD, 325 mg at 66/88/02 2528    folic acid (FOLVITE) tablet 400 mcg, 400 mcg, Oral, Daily, Rober Escudero MD, 400 mcg at 09/29/22 0836    furosemide (LASIX) tablet 40 mg, 40 mg, Oral, Daily, Jeff Payton MD, 40 mg at 09/29/22 0835    lisinopril (ZESTRIL) tablet 2 5 mg, 2 5 mg, Oral, Daily, Jeff Payton MD, 2 5 mg at 09/29/22 0835    LORazepam (ATIVAN) tablet 0 5 mg, 0 5 mg, Oral, Q8H PRN, Jeff Payton MD, 0 5 mg at 09/25/22 1352    metoprolol tartrate (LOPRESSOR) tablet 100 mg, 100 mg, Oral, Daily, Jeff Payton MD, 100 mg at 09/29/22 0834    metoprolol tartrate (LOPRESSOR) tablet 50 mg, 50 mg, Oral, HS, Jeff Payton MD, 50 mg at 09/29/22 2132    ondansetron [facility]) injection 4 mg, 4 mg, Intravenous, Q4H PRN, Jeff Payton MD, 4 mg at 09/26/22 0816    oxyCODONE (ROXICODONE) IR tablet 2 5 mg, 2 5 mg, Oral, Q6H PRN, JERI Moreno, 2 5 mg at 09/29/22 2031    pantoprazole (PROTONIX) injection 40 mg, 40 mg, Intravenous, Q12H [address], JERI Raygoza, 40 mg at 09/29/22 2005    polyethylene glycol (MIRALAX) packet 17 g, 17 g, Oral, Daily PRN, JERI Raygoza, 17 g at 09/29/22 1705    senna-docusate sodium (SENOKOT S) 8 6-50 mg per tablet 2 tablet, 2 tablet, Oral, BID, JERI Raygoza, 2 tablet at 09/29/22 1706    vancomycin (VANCOCIN) IVPB (premix in dextrose) 750 mg 150 mL, 10 mg/kg (Adjusted), Intravenous, Q12H, Eri Sanches DO, Last Rate: 150 mL/hr at 09/30/22 0537, 750 mg at 09/30/22 0537    Blood Culture:   Lab Results   Component Value Date    BLOODCX Received in Microbiology Lab  Culture in Progress  09/29/2022    BLOODCX Received in Microbiology Lab  Culture in Progress  09/29/2022       Wound Culture:   No results found for: WOUNDCULT    Ins and Outs:  I/O last 24 hours: In: 240 [P O :240]  Out: 3350 [Urine:3350]          Physical:  Vitals:    09/29/22 2224   BP: 151/56   Pulse: 70   Resp:    Temp: 99 1 °F (37 3 °C)   SpO2: 96%     Musculoskeletal: left Lower Extremity  · Incisions: C/D/I with staple lines intact  No surrounding erythema, drainage     · Moderate swelling throughout the thigh without discrete palpable fluid collection  · She is able to range her knee 0-90 in both passive/active ROM  Some pain with terminal extension  · She has no significant ttp  · +EHL/FHL, dorsi/plantarflesion  · 2+ DP  Assessment:    76 y o  female with recent orthopedic intervention 9/12/2022  Underwent left distal femoral replacement, removal of deep implants of left femur  She was admitted from 9/12-9/16/2022, and subsequently discharged to rehab  Bacteremia due to Staph epidermidis    Plan:  · Weight bearing as tolerated  left lower extremity  · Case was discussed with ID attg by orthopedics attg today  At this time recommend IR aspiration and lavage of proximal fluid collection only please do not leave drain in the proximal collection  · Patient's clinical exam is not consistent with an acute infection in the thigh with no micro motion tenderness no pain with range of motion from 0-90 degrees of the knee no erythema or warmth around the incisions or thigh  · Began removing staples from the distal lateral incision today  Removed about half the staples there were 2 staples that caused her some pain discomfort she refused any further staple removal   Will attempt this later the different staple removal as well as some form of local anesthesia and/or pain medicine  · PT/OT  · Pain control per primary team    · Body mass index is 40 49 kg/m²  morbidly obese  Recommend behavior modifications and nutrition  · Will need follow up with Dr Lucho Powell upon discharge       Geraline Schwab, PA-C

## 2022-09-30 NOTE — BRIEF OP NOTE (RAD/CATH)
INTERVENTIONAL RADIOLOGY PROCEDURE NOTE    Date: 9/30/2022    Procedure: Aspiration of proximal left thigh fluid collection    Preoperative diagnosis:   1  Hematemesis    2  Nausea and vomiting    3  Closed bicondylar fracture of left femur with delayed healing    4  Bacteremia    5  Traumatic seroma of thigh, left, sequela         Postoperative diagnosis: Same  Surgeon: Gaby Rodriguez     Assistant: None  No qualified resident was available  Blood loss: None    Specimens: 10 mL of dark bloody appearing fluid, sent to the laboratory for culture     Findings: Ultrasound-guided aspiration of a proximal left thigh fluid collection with 10 mL of dark bloody appearing fluid aspirated  Complications: None immediate      Anesthesia: local

## 2022-09-30 NOTE — PLAN OF CARE
Problem: MOBILITY - ADULT  Goal: Maintain or return to baseline ADL function  Description: INTERVENTIONS:  -  Assess patient's ability to carry out ADLs; assess patient's baseline for ADL function and identify physical deficits which impact ability to perform ADLs (bathing, care of mouth/teeth, toileting, grooming, dressing, etc )  - Assess/evaluate cause of self-care deficits   - Assess range of motion  - Assess patient's mobility; develop plan if impaired  - Assess patient's need for assistive devices and provide as appropriate  - Encourage maximum independence but intervene and supervise when necessary  - Involve family in performance of ADLs  - Assess for home care needs following discharge   - Consider OT consult to assist with ADL evaluation and planning for discharge  - Provide patient education as appropriate  Outcome: Progressing     Problem: Prexisting or High Potential for Compromised Skin Integrity  Goal: Skin integrity is maintained or improved  Description: INTERVENTIONS:  - Identify patients at risk for skin breakdown  - Assess and monitor skin integrity  - Assess and monitor nutrition and hydration status  - Monitor labs   - Assess for incontinence   - Turn and reposition patient  - Assist with mobility/ambulation  - Relieve pressure over bony prominences  - Avoid friction and shearing  - Provide appropriate hygiene as needed including keeping skin clean and dry  - Evaluate need for skin moisturizer/barrier cream  - Collaborate with interdisciplinary team   - Patient/family teaching  - Consider wound care consult   Outcome: Progressing     Problem: HEMATOLOGIC - ADULT  Goal: Maintains hematologic stability  Description: INTERVENTIONS  - Assess for signs and symptoms of bleeding or hemorrhage  - Monitor labs  - Administer supportive blood products/factors as ordered and appropriate  Outcome: Progressing     Problem: MUSCULOSKELETAL - ADULT  Goal: Maintain or return mobility to safest level of function  Description: INTERVENTIONS:  - Assess patient's ability to carry out ADLs; assess patient's baseline for ADL function and identify physical deficits which impact ability to perform ADLs (bathing, care of mouth/teeth, toileting, grooming, dressing, etc )  - Assess/evaluate cause of self-care deficits   - Assess range of motion  - Assess patient's mobility  - Assess patient's need for assistive devices and provide as appropriate  - Encourage maximum independence but intervene and supervise when necessary  - Involve family in performance of ADLs  - Assess for home care needs following discharge   - Consider OT consult to assist with ADL evaluation and planning for discharge  - Provide patient education as appropriate  Outcome: Progressing     Problem: PAIN - ADULT  Goal: Verbalizes/displays adequate comfort level or baseline comfort level  Description: Interventions:  - Encourage patient to monitor pain and request assistance  - Assess pain using appropriate pain scale  - Administer analgesics based on type and severity of pain and evaluate response  - Implement non-pharmacological measures as appropriate and evaluate response  - Consider cultural and social influences on pain and pain management  - Notify physician/advanced practitioner if interventions unsuccessful or patient reports new pain  Outcome: Progressing     Problem: INFECTION - ADULT  Goal: Absence or prevention of progression during hospitalization  Description: INTERVENTIONS:  - Assess and monitor for signs and symptoms of infection  - Monitor lab/diagnostic results  - Monitor all insertion sites, i e  indwelling lines, tubes, and drains  - Monitor endotracheal if appropriate and nasal secretions for changes in amount and color  - [place] appropriate cooling/warming therapies per order  - Administer medications as ordered  - Instruct and encourage patient and family to use good hand hygiene technique  - Identify and instruct in appropriate isolation precautions for identified infection/condition  Outcome: Progressing     Problem: DISCHARGE PLANNING  Goal: Discharge to home or other facility with appropriate resources  Description: INTERVENTIONS:  - Identify barriers to discharge w/patient and caregiver  - Arrange for needed discharge resources and transportation as appropriate  - Identify discharge learning needs (meds, wound care, etc )  - Arrange for interpretive services to assist at discharge as needed  - Refer to Case Management Department for coordinating discharge planning if the patient needs post-hospital services based on physician/advanced practitioner order or complex needs related to functional status, cognitive ability, or social support system  Outcome: Progressing     Problem: Knowledge Deficit  Goal: Patient/family/caregiver demonstrates understanding of disease process, treatment plan, medications, and discharge instructions  Description: Complete learning assessment and assess knowledge base  Interventions:  - Provide teaching at level of understanding  - Provide teaching via preferred learning methods  Outcome: Progressing     Problem: Nutrition/Hydration-ADULT  Goal: Nutrient/Hydration intake appropriate for improving, restoring or maintaining nutritional needs  Description: Monitor and assess patient's nutrition/hydration status for malnutrition  Collaborate with interdisciplinary team and initiate plan and interventions as ordered  Monitor patient's weight and dietary intake as ordered or per policy  Utilize nutrition screening tool and intervene as necessary  Determine patient's food preferences and provide high-protein, high-caloric foods as appropriate       INTERVENTIONS:  - Monitor oral intake, urinary output, labs, and treatment plans  - Assess nutrition and hydration status and recommend course of action  - Evaluate amount of meals eaten  - Assist patient with eating if necessary   - Allow adequate time for meals  - Recommend/ encourage appropriate diets, oral nutritional supplements, and vitamin/mineral supplements  - Order, calculate, and assess calorie counts as needed  - Recommend, monitor, and adjust tube feedings and TPN/PPN based on assessed needs  - Assess need for intravenous fluids  - Provide specific nutrition/hydration education as appropriate  - Include patient/family/caregiver in decisions related to nutrition  Outcome: Progressing

## 2022-09-30 NOTE — SEDATION DOCUMENTATION
Pt tolerated left upper thigh fluid collection aspiration  10ml bloody red fluid removed  Specimen sent to lab  Pt rerturning to IP room   Report given to receiving nurse

## 2022-09-30 NOTE — PLAN OF CARE
Problem: MOBILITY - ADULT  Goal: Maintain or return to baseline ADL function  Description: INTERVENTIONS:  -  Assess patient's ability to carry out ADLs; assess patient's baseline for ADL function and identify physical deficits which impact ability to perform ADLs (bathing, care of mouth/teeth, toileting, grooming, dressing, etc )  - Assess/evaluate cause of self-care deficits   - Assess range of motion  - Assess patient's mobility; develop plan if impaired  - Assess patient's need for assistive devices and provide as appropriate  - Encourage maximum independence but intervene and supervise when necessary  - Involve family in performance of ADLs  - Assess for home care needs following discharge   - Consider OT consult to assist with ADL evaluation and planning for discharge  - Provide patient education as appropriate  Outcome: Progressing  Goal: Maintains/Returns to pre admission functional level  Description: INTERVENTIONS:  - Perform BMAT or MOVE assessment daily    - Set and communicate daily mobility goal to care team and patient/family/caregiver  - Collaborate with rehabilitation services on mobility goals if consulted  - Perform Range of Motion 3 times a day  - Reposition patient every 2 hours    - Dangle patient 3 times a day  - Stand patient 3 times a day  - Ambulate patient 3 times a day  - Out of bed to chair 3 times a day   - Out of bed for meals 3 times a day  - Out of bed for toileting  - Record patient progress and toleration of activity level   Outcome: Progressing     Problem: Potential for Falls  Goal: Patient will remain free of falls  Description: INTERVENTIONS:  - Educate patient/family on patient safety including physical limitations  - Instruct patient to call for assistance with activity   - Consult OT/PT to assist with strengthening/mobility   - Keep Call bell within reach  - Keep bed low and locked with side rails adjusted as appropriate  - Keep care items and personal belongings within reach  - Initiate and maintain comfort rounds  - Make Fall Risk Sign visible to staff  - Offer Toileting every 2 Hours, in advance of need  - Initiate/Maintain bed alarm  - Obtain necessary fall risk management equipment  - Apply yellow socks and bracelet for high fall risk patients  - Consider moving patient to room near nurses station  Outcome: Progressing     Problem: Prexisting or High Potential for Compromised Skin Integrity  Goal: Skin integrity is maintained or improved  Description: INTERVENTIONS:  - Identify patients at risk for skin breakdown  - Assess and monitor skin integrity  - Assess and monitor nutrition and hydration status  - Monitor labs   - Assess for incontinence   - Turn and reposition patient  - Assist with mobility/ambulation  - Relieve pressure over bony prominences  - Avoid friction and shearing  - Provide appropriate hygiene as needed including keeping skin clean and dry  - Evaluate need for skin moisturizer/barrier cream  - Collaborate with interdisciplinary team   - Patient/family teaching  - Consider wound care consult   Outcome: Progressing     Problem: HEMATOLOGIC - ADULT  Goal: Maintains hematologic stability  Description: INTERVENTIONS  - Assess for signs and symptoms of bleeding or hemorrhage  - Monitor labs  - Administer supportive blood products/factors as ordered and appropriate  Outcome: Progressing     Problem: MUSCULOSKELETAL - ADULT  Goal: Maintain or return mobility to safest level of function  Description: INTERVENTIONS:  - Assess patient's ability to carry out ADLs; assess patient's baseline for ADL function and identify physical deficits which impact ability to perform ADLs (bathing, care of mouth/teeth, toileting, grooming, dressing, etc )  - Assess/evaluate cause of self-care deficits   - Assess range of motion  - Assess patient's mobility  - Assess patient's need for assistive devices and provide as appropriate  - Encourage maximum independence but intervene and supervise when necessary  - Involve family in performance of ADLs  - Assess for home care needs following discharge   - Consider OT consult to assist with ADL evaluation and planning for discharge  - Provide patient education as appropriate  Outcome: Progressing  Goal: Maintain proper alignment of affected body part  Description: INTERVENTIONS:  - Support, maintain and protect limb and body alignment  - Provide patient/ family with appropriate education  Outcome: Progressing     Problem: PAIN - ADULT  Goal: Verbalizes/displays adequate comfort level or baseline comfort level  Description: Interventions:  - Encourage patient to monitor pain and request assistance  - Assess pain using appropriate pain scale  - Administer analgesics based on type and severity of pain and evaluate response  - Implement non-pharmacological measures as appropriate and evaluate response  - Consider cultural and social influences on pain and pain management  - Notify physician/advanced practitioner if interventions unsuccessful or patient reports new pain  Outcome: Progressing     Problem: INFECTION - ADULT  Goal: Absence or prevention of progression during hospitalization  Description: INTERVENTIONS:  - Assess and monitor for signs and symptoms of infection  - Monitor lab/diagnostic results  - Monitor all insertion sites, i e  indwelling lines, tubes, and drains  - Monitor endotracheal if appropriate and nasal secretions for changes in amount and color  - [place] appropriate cooling/warming therapies per order  - Administer medications as ordered  - Instruct and encourage patient and family to use good hand hygiene technique  - Identify and instruct in appropriate isolation precautions for identified infection/condition  Outcome: Progressing     Problem: SAFETY ADULT  Goal: Patient will remain free of falls  Description: INTERVENTIONS:  - Educate patient/family on patient safety including physical limitations  - Instruct patient to call for assistance with activity   - Consult OT/PT to assist with strengthening/mobility   - Keep Call bell within reach  - Keep bed low and locked with side rails adjusted as appropriate  - Keep care items and personal belongings within reach  - Initiate and maintain comfort rounds  - Make Fall Risk Sign visible to staff  - Offer Toileting every 2 Hours, in advance of need  - Initiate/Maintain bed alarm  - Obtain necessary fall risk management equipment  - Apply yellow socks and bracelet for high fall risk patients  - Consider moving patient to room near nurses station  Outcome: Progressing     Problem: DISCHARGE PLANNING  Goal: Discharge to home or other facility with appropriate resources  Description: INTERVENTIONS:  - Identify barriers to discharge w/patient and caregiver  - Arrange for needed discharge resources and transportation as appropriate  - Identify discharge learning needs (meds, wound care, etc )  - Arrange for interpretive services to assist at discharge as needed  - Refer to Case Management Department for coordinating discharge planning if the patient needs post-hospital services based on physician/advanced practitioner order or complex needs related to functional status, cognitive ability, or social support system  Outcome: Progressing     Problem: Knowledge Deficit  Goal: Patient/family/caregiver demonstrates understanding of disease process, treatment plan, medications, and discharge instructions  Description: Complete learning assessment and assess knowledge base  Interventions:  - Provide teaching at level of understanding  - Provide teaching via preferred learning methods  Outcome: Progressing     Problem: Nutrition/Hydration-ADULT  Goal: Nutrient/Hydration intake appropriate for improving, restoring or maintaining nutritional needs  Description: Monitor and assess patient's nutrition/hydration status for malnutrition   Collaborate with interdisciplinary team and initiate plan and interventions as ordered  Monitor patient's weight and dietary intake as ordered or per policy  Utilize nutrition screening tool and intervene as necessary  Determine patient's food preferences and provide high-protein, high-caloric foods as appropriate       INTERVENTIONS:  - Monitor oral intake, urinary output, labs, and treatment plans  - Assess nutrition and hydration status and recommend course of action  - Evaluate amount of meals eaten  - Assist patient with eating if necessary   - Allow adequate time for meals  - Recommend/ encourage appropriate diets, oral nutritional supplements, and vitamin/mineral supplements  - Order, calculate, and assess calorie counts as needed  - Recommend, monitor, and adjust tube feedings and TPN/PPN based on assessed needs  - Assess need for intravenous fluids  - Provide specific nutrition/hydration education as appropriate  - Include patient/family/caregiver in decisions related to nutrition  Outcome: Progressing

## 2022-09-30 NOTE — PROGRESS NOTES
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  · Patient has a place maker in place  · Was admitted to the hospital on 2022 for nausea and vomiting, in the setting of recent doxycycline use  · Blood cultures 2022:  Staph epidermidis  · Blood cultures 22: pending  · Does not meet SIRS criteria  Plan:  · Continue IV vancomycin  · Follow body fluid studies  · TTE no vegetations noted  Defer TANISHA for now  2  Vomiting and diarrhea  · Now improving  · Continue to monitor clinically     3  Bladder wall thickening  · UA growing nitrates and leukocytes, innumerable WBC  · Urine culture growing ESBL Proteus  · Treating with 3 days ertapenem (Day 2/3)      4  Seroma   · Partially visualized left lateral proximal femoral deep subcutaneous fluid collection measuring at least 5 5 x 5 cm presumably seroma given recent orthopedic procedure  · S/p I&D today, follow body fluid studies      Antibiotics:  Vancomycin  Ertapenem    Subjective:  Patient has no fever, chills, sweats; no nausea, vomiting, diarrhea; no cough, shortness of breath; no pain  No new symptoms  Objective:  Vitals:  Temp:  [98 4 °F (36 9 °C)-99 1 °F (37 3 °C)] 98 6 °F (37 °C)  HR:  [70-73] 71  Resp:  [16-18] 16  BP: (121-151)/(46-57) 140/57  SpO2:  [95 %-97 %] 97 %  Temp (24hrs), Av 7 °F (37 1 °C), Min:98 4 °F (36 9 °C), Max:99 1 °F (37 3 °C)  Current: Temperature: 98 6 °F (37 °C)    Physical Exam:   General Appearance:  Alert, interactive, nontoxic, no acute distress  Throat: Oropharynx moist without lesions      Lungs:   Clear to auscultation bilaterally; no wheezes, rhonchi or rales; respirations unlabored   Heart:  RRR; no murmur, rub or gallop   Abdomen:   Soft, non-tender, non-distended, positive bowel sounds  Extremities: Surgical site appears well healing  Skin: No new rashes or lesions  No draining wounds noted  Labs, Imaging, & Other studies:   All pertinent labs and imaging studies were personally reviewed  Results from last 7 days   Lab Units 09/30/22  0446 09/29/22  0534 09/28/22  0622   WBC Thousand/uL 9 13 11 00* 8 60   HEMOGLOBIN g/dL 8 4* 8 4* 8 6*   PLATELETS Thousands/uL 516* 558* 566*     Results from last 7 days   Lab Units 09/30/22  0446 09/29/22  0534 09/28/22  0622 09/27/22  0437 09/25/22  0706   SODIUM mmol/L 139 139 139   < > 143   POTASSIUM mmol/L 3 5 3 5 3 5   < > 3 8   CHLORIDE mmol/L 103 103 102   < > 101   CO2 mmol/L 27 31 32   < > 33*   BUN mg/dL 10 12 14   < > 13   CREATININE mg/dL 0 90 0 82 0 93   < > 0 82   EGFR ml/min/1 73sq m 61 68 58   < > 68   CALCIUM mg/dL 8 7 8 6 8 7   < > 9 2   AST U/L  --   --   --   --  18   ALT U/L  --   --   --   --  15   ALK PHOS U/L  --   --   --   --  66    < > = values in this interval not displayed  Results from last 7 days   Lab Units 09/29/22  0535 09/26/22  2118 09/25/22  1346   BLOOD CULTURE  Received in Microbiology Lab  Culture in Progress  Received in Microbiology Lab  Culture in Progress    --  Staphylococcus epidermidis*  Staphylococcus epidermidis*   GRAM STAIN RESULT   --   --  Gram positive cocci in clusters*  Gram positive cocci in clusters*   URINE CULTURE   --  >100,000 cfu/ml Proteus mirabilis ESBL*  --

## 2022-09-30 NOTE — ASSESSMENT & PLAN NOTE
· Hemoglobin is 8 8 this appears pretty stable when compared to 8 9 a week ago    · Continue on iron and folic acid as well as E52  · Ongoing outpatient monitoring and follow-up with PCP    Lab Results   Component Value Date    HGB 8 4 (L) 09/30/2022    HGB 8 4 (L) 09/29/2022    HGB 8 6 (L) 09/28/2022

## 2022-09-30 NOTE — ASSESSMENT & PLAN NOTE
Background: Presented to the ED with complaining of vomiting and diarrhea earlier and on presentation still feeling "nauseous"  · CLD> regular diet resumed and thus far tolerated   · IV fluids> discontinued on 9/26  · Secondary viral gastroenteritis versus other intra-abdominal process  · Stool cultures pending; low suspicion for C diff or infectious etiology as resolved without treatment    · Blood cultures positive as noted above  · Troponin unremarkable  · Telemetry discontinued

## 2022-09-30 NOTE — ASSESSMENT & PLAN NOTE
· Blood pressure acceptable systolics in 571 to 925G  · Will continue home meds including metoprolol, lisinopril, Lasix, Cardizem  · Monitor with routine vitals

## 2022-09-30 NOTE — PROGRESS NOTES
[facility]  Progress Note - Cayla Montoya 1945, 76 y o  female MRN: [id number]  Unit/Bed#: S -83 Encounter: [id number]  Primary Care Provider: Lubna Romero MD   Date and time admitted to hospital: 9/25/2022  6:25 AM    Bacteremia due to Gram-positive bacteria  Assessment & Plan  · Blood cultures from 9/25 2/2 positive for staphylococcus epidermidis   · In the setting of pacemaker as well   · Repeats ordered per ID   · Infectious disease consulted; input appreciated   · Continue IV Vancomycin   · Repeat cultures ordered   · Ideally to have ortho culture fluid collection and await cultures     Abdominal fluid collection  Assessment & Plan  · CT noting what appears to be seroma at the left femur proximally  · Orthopedic surgery consulted; recommendations as noted below  ID concerned about infectious etiology and may need sampling  · Cont vancomycin and ertapenem for bacteremia per ID  · Weight-bearing as tolerated  · t/c Orthopedic surgery to remove staples prior to discharge depending on length of stay  · CT of lower extremity to evaluate for further fluid collection:   IMPRESSION:      Rim-enhancing subcutaneous fluid collection at the lateral aspect of the proximal femur that measures 5 6 x 2 1 x 8 3 cm  (transverse by AP by CC, images #4/55 and #302/49)      Second rim-enhancing subcutaneous fluid collection at the lateral aspect of the distal femur/knee that measures 6 9 x 6 3  x 22 2 cm  · CT pelvis pending  · PT/OT consulted for re-evaluation  · Case management on board to assist with dispo  · [facility] upon discharge     Bladder wall thickening  Assessment & Plan  · Unclear significance inpatient denies any urinary symptoms  · UA + for gram negative rods; now with ESBL/MRDO  · Transitioned to IV ertapenem x3 days due to susceptibility   Appreciate ID input    * Vomiting and diarrhea  Assessment & Plan  Background: Presented to the ED with complaining of vomiting and diarrhea earlier and on presentation still feeling "nauseous"  · CLD> regular diet resumed and thus far tolerated   · IV fluids> discontinued on 9/26  · Secondary viral gastroenteritis versus other intra-abdominal process  · Stool cultures pending; low suspicion for C diff or infectious etiology as resolved without treatment    · Blood cultures positive as noted above  · Troponin unremarkable  · Telemetry discontinued        Pleural effusion on right  Assessment & Plan  · Unclear etiology however of note patient extremely more sedentary than usual postoperatively  · Liver ultrasound with cholelithiasis without cholecystitis     Anemia  Assessment & Plan  · Hemoglobin is 8 8 this appears pretty stable when compared to 8 9 a week ago  · Continue on iron and folic acid as well as B20  · Ongoing outpatient monitoring and follow-up with PCP    Lab Results   Component Value Date    HGB 8 4 (L) 09/30/2022    HGB 8 4 (L) 09/29/2022    HGB 8 6 (L) 09/28/2022       Anxiety  Assessment & Plan  · Stable will continue home Ativan    Essential hypertension  Assessment & Plan  · Blood pressure acceptable systolics in 630 to 266S  · Will continue home meds including metoprolol, lisinopril, Lasix, Cardizem  · Monitor with routine vitals          VTE Pharmacologic Prophylaxis: VTE Score: 4 Moderate Risk (Score 3-4) - Pharmacological DVT Prophylaxis Ordered: apixaban (Eliquis)  Patient Centered Rounds: I performed bedside rounds with nursing staff today  Discussions with Specialists or Other Care Team Provider:  Orthopedics, Infectious Disease    Education and Discussions with Family / Patient: Updated  (niece) at bedside  Time Spent for Care: 30 minutes  More than 50% of total time spent on counseling and coordination of care as described above      Current Length of Stay: 4 day(s)  Current Patient Status: Inpatient   Certification Statement: The patient will continue to require additional inpatient hospital stay due to Monitoring of left lower extremity pain, bacteremia and UTI  Discharge Plan: Anticipate discharge in 48-72 hrs to discharge location to be determined pending rehab evaluations  Code Status: Level 1 - Full Code    Subjective:   She is sleeping on my arrival but easily aroused  Her niece is at her bedside  She reports an uneventful procedure of removal of the fluid  Rama Gosia told me it was blood she noted from the procedure  She feels the pain is controlled especially when she does not move  She denies any fevers or chills  She denies any chest pain or shortness of breath  We discussed the plan of waiting on the culture results in narrowing her antibiotics  Objective:     Vitals:   Temp (24hrs), Av 7 °F (37 1 °C), Min:98 4 °F (36 9 °C), Max:99 1 °F (37 3 °C)    Temp:  [98 4 °F (36 9 °C)-99 1 °F (37 3 °C)] 98 6 °F (37 °C)  HR:  [70-73] 71  Resp:  [16-18] 16  BP: (121-151)/(46-57) 140/57  SpO2:  [95 %-97 %] 97 %  Body mass index is 40 34 kg/m²  Input and Output Summary (last 24 hours): Intake/Output Summary (Last 24 hours) at 2022 0800  Last data filed at 2022 0533  Gross per 24 hour   Intake 240 ml   Output 2550 ml   Net -2310 ml       Physical Exam:   Physical Exam  Vitals and nursing note reviewed  Constitutional:       Appearance: Normal appearance  She is not ill-appearing or diaphoretic  HENT:      Head: Normocephalic  Mouth/Throat:      Mouth: Mucous membranes are moist       Pharynx: No oropharyngeal exudate  Eyes:      General: No scleral icterus  Conjunctiva/sclera: Conjunctivae normal    Pulmonary:      Effort: Pulmonary effort is normal  No respiratory distress  Breath sounds: No wheezing or rales  Abdominal:      General: Bowel sounds are normal  There is no distension  Palpations: Abdomen is soft  Tenderness: There is no abdominal tenderness  Musculoskeletal:      Cervical back: Normal range of motion and neck supple  No rigidity  Right lower leg: Edema present  Left lower leg: Edema present  Comments: Left lower extremity Steri-Strips applied to what appears to be healing incision   Skin:     General: Skin is warm  Coloration: Skin is not jaundiced  Neurological:      Mental Status: She is alert and oriented to person, place, and time  Psychiatric:         Mood and Affect: Mood normal          Behavior: Behavior normal           Additional Data:     Labs:  Results from last 7 days   Lab Units 09/30/22  0446   WBC Thousand/uL 9 13   HEMOGLOBIN g/dL 8 4*   HEMATOCRIT % 27 7*   PLATELETS Thousands/uL 516*   NEUTROS PCT % 54   LYMPHS PCT % 20   MONOS PCT % 14*   EOS PCT % 9*     Results from last 7 days   Lab Units 09/30/22  0446 09/27/22  0437 09/25/22  0706   SODIUM mmol/L 139   < > 143   POTASSIUM mmol/L 3 5   < > 3 8   CHLORIDE mmol/L 103   < > 101   CO2 mmol/L 27   < > 33*   BUN mg/dL 10   < > 13   CREATININE mg/dL 0 90   < > 0 82   ANION GAP mmol/L 9   < > 9   CALCIUM mg/dL 8 7   < > 9 2   ALBUMIN g/dL  --   --  3 1*   TOTAL BILIRUBIN mg/dL  --   --  0 42   ALK PHOS U/L  --   --  66   ALT U/L  --   --  15   AST U/L  --   --  18   GLUCOSE RANDOM mg/dL 106   < > 130    < > = values in this interval not displayed  Results from last 7 days   Lab Units 09/25/22  0706   INR  1 28*                   Lines/Drains:  Invasive Devices  Report    Peripheral Intravenous Line  Duration           Peripheral IV 09/29/22 Right Antecubital 1 day          Drain  Duration           External Urinary Catheter 5 days                      Imaging: Personally reviewed the following imaging: CT extremity: IMPRESSION:      Rim-enhancing subcutaneous fluid collection at the lateral aspect of the proximal femur that measures 5 6 x 2 1 x 8 3 cm (transverse by AP by CC, images #4/55 and #302/49)         Second rim-enhancing subcutaneous fluid collection at the lateral aspect of the distal femur/knee that measures 6 9 x 6 3 x 22 2 cm    Recent Cultures (last 7 days):   Results from last 7 days   Lab Units 09/29/22  0535 09/26/22  2118 09/25/22  1346   BLOOD CULTURE  Received in Microbiology Lab  Culture in Progress  Received in Microbiology Lab  Culture in Progress    --  Staphylococcus epidermidis*  Staphylococcus epidermidis*   GRAM STAIN RESULT   --   --  Gram positive cocci in clusters*  Gram positive cocci in clusters*   URINE CULTURE   --  >100,000 cfu/ml Proteus mirabilis ESBL*  --        Last 24 Hours Medication List:   Current Facility-Administered Medications   Medication Dose Route Frequency Provider Last Rate    acetaminophen  650 mg Oral Q6H [address] [facility], MD      allopurinol  100 mg Oral Daily [facility], MD      apixaban  5 mg Oral BID JERI Raygoza      ascorbic acid  500 mg Oral BID [facility], MD      cholecalciferol  400 Units Oral Daily [facility], MD      diltiazem  120 mg Oral Daily [facility], MD      DULoxetine  30 mg Oral Daily [facility], MD      ertapenem  1,000 mg Intravenous Q24H JERI Raygoza 1,000 mg (09/29/22 1422)    ezetimibe  10 mg Oral Daily [facility], MD      ferrous sulfate  325 mg Oral BID With Meals [facility], MD      folic acid  179 mcg Oral Daily [facility], MD      furosemide  40 mg Oral Daily [facility], MD      lisinopril  2 5 mg Oral Daily [facility], MD      LORazepam  0 5 mg Oral Q8H PRN [facility], MD      metoprolol tartrate  100 mg Oral Daily [facility], MD      metoprolol tartrate  50 mg Oral HS [facility], MD      ondansetron  4 mg Intravenous Q4H PRN [facility], MD      oxyCODONE  2 5 mg Oral Q6H PRN JERI Blanton      pantoprazole  40 mg Intravenous Q12H [address] JERI Raygoza      polyethylene glycol  17 g Oral Daily PRN JERI Raygoza      senna-docusate sodium  2 tablet Oral BID JERI Raygoza      vancomycin  10 mg/kg (Adjusted) Intravenous Q12H Eri Aldea,  mg (09/30/22 0537)        Today, Patient Was Seen By: Rodriguez Smith    **Please Note: This note may have been constructed using a voice recognition system  **

## 2022-10-01 LAB — VANCOMYCIN TROUGH SERPL-MCNC: 19.1 UG/ML (ref 10–20)

## 2022-10-01 PROCEDURE — 80202 ASSAY OF VANCOMYCIN: CPT | Performed by: INTERNAL MEDICINE

## 2022-10-01 PROCEDURE — 99232 SBSQ HOSP IP/OBS MODERATE 35: CPT | Performed by: INTERNAL MEDICINE

## 2022-10-01 PROCEDURE — C9113 INJ PANTOPRAZOLE SODIUM, VIA: HCPCS | Performed by: NURSE PRACTITIONER

## 2022-10-01 RX ADMIN — OXYCODONE HYDROCHLORIDE AND ACETAMINOPHEN 500 MG: 500 TABLET ORAL at 17:20

## 2022-10-01 RX ADMIN — METOPROLOL TARTRATE 100 MG: 100 TABLET, FILM COATED ORAL at 08:33

## 2022-10-01 RX ADMIN — PANTOPRAZOLE SODIUM 40 MG: 40 INJECTION, POWDER, FOR SOLUTION INTRAVENOUS at 21:43

## 2022-10-01 RX ADMIN — DILTIAZEM HYDROCHLORIDE 120 MG: 120 CAPSULE, COATED, EXTENDED RELEASE ORAL at 08:33

## 2022-10-01 RX ADMIN — FOLIC ACID TAB 400 MCG 400 MCG: 400 TAB at 08:36

## 2022-10-01 RX ADMIN — SENNOSIDES AND DOCUSATE SODIUM 2 TABLET: 8.6; 5 TABLET ORAL at 17:20

## 2022-10-01 RX ADMIN — LISINOPRIL 2.5 MG: 2.5 TABLET ORAL at 08:32

## 2022-10-01 RX ADMIN — ACETAMINOPHEN 650 MG: 325 TABLET, FILM COATED ORAL at 05:46

## 2022-10-01 RX ADMIN — EZETIMIBE 10 MG: 10 TABLET ORAL at 21:43

## 2022-10-01 RX ADMIN — APIXABAN 5 MG: 5 TABLET, FILM COATED ORAL at 17:20

## 2022-10-01 RX ADMIN — ERTAPENEM SODIUM 1000 MG: 1 INJECTION, POWDER, LYOPHILIZED, FOR SOLUTION INTRAMUSCULAR; INTRAVENOUS at 13:17

## 2022-10-01 RX ADMIN — METOPROLOL TARTRATE 50 MG: 100 TABLET, FILM COATED ORAL at 21:39

## 2022-10-01 RX ADMIN — VANCOMYCIN HYDROCHLORIDE 750 MG: 750 INJECTION, SOLUTION INTRAVENOUS at 18:29

## 2022-10-01 RX ADMIN — FERROUS SULFATE TAB 325 MG (65 MG ELEMENTAL FE) 325 MG: 325 (65 FE) TAB at 08:32

## 2022-10-01 RX ADMIN — CHOLECALCIFEROL TAB 10 MCG (400 UNIT) 400 UNITS: 10 TAB at 08:33

## 2022-10-01 RX ADMIN — APIXABAN 5 MG: 5 TABLET, FILM COATED ORAL at 08:33

## 2022-10-01 RX ADMIN — VANCOMYCIN HYDROCHLORIDE 750 MG: 750 INJECTION, SOLUTION INTRAVENOUS at 05:46

## 2022-10-01 RX ADMIN — ACETAMINOPHEN 650 MG: 325 TABLET, FILM COATED ORAL at 23:47

## 2022-10-01 RX ADMIN — FERROUS SULFATE TAB 325 MG (65 MG ELEMENTAL FE) 325 MG: 325 (65 FE) TAB at 17:20

## 2022-10-01 RX ADMIN — PANTOPRAZOLE SODIUM 40 MG: 40 INJECTION, POWDER, FOR SOLUTION INTRAVENOUS at 08:35

## 2022-10-01 RX ADMIN — DULOXETINE HYDROCHLORIDE 30 MG: 30 CAPSULE, DELAYED RELEASE ORAL at 21:43

## 2022-10-01 RX ADMIN — SENNOSIDES AND DOCUSATE SODIUM 2 TABLET: 8.6; 5 TABLET ORAL at 08:33

## 2022-10-01 RX ADMIN — OXYCODONE HYDROCHLORIDE AND ACETAMINOPHEN 500 MG: 500 TABLET ORAL at 08:33

## 2022-10-01 RX ADMIN — ALLOPURINOL 100 MG: 100 TABLET ORAL at 08:33

## 2022-10-01 RX ADMIN — ACETAMINOPHEN 650 MG: 325 TABLET, FILM COATED ORAL at 13:17

## 2022-10-01 RX ADMIN — ACETAMINOPHEN 650 MG: 325 TABLET, FILM COATED ORAL at 17:20

## 2022-10-01 RX ADMIN — FUROSEMIDE 40 MG: 40 TABLET ORAL at 08:33

## 2022-10-01 NOTE — ASSESSMENT & PLAN NOTE
· Blood cultures from 9/25 2/2 positive for staphylococcus epidermidis   · In the setting of pacemaker as well   · Repeats ordered per ID   · Infectious disease consulted; input appreciated   · Continue IV Vancomycin   · Repeat blood cultures pending- currently ngtd  · Sampled fluid without growth

## 2022-10-01 NOTE — PROGRESS NOTES
[facility]  Progress Note - Jeremias Bojorqueze 1945, 76 y o  female MRN: [id number]  Unit/Bed#: S -11 Encounter: [id number]  Primary Care Provider: Penny Castro MD   Date and time admitted to hospital: 9/25/2022  6:25 AM    Bacteremia due to Gram-positive bacteria  Assessment & Plan  · Blood cultures from 9/25 2/2 positive for staphylococcus epidermidis   · In the setting of pacemaker as well   · Repeats ordered per ID   · Infectious disease consulted; input appreciated   · Continue IV Vancomycin   · Repeat blood cultures pending- currently ngtd  · Sampled fluid without growth      Abdominal fluid collection  Assessment & Plan  · CT noting what appears to be seroma at the left femur proximally, sampling fluid cx without bacteria  · Orthopedic surgery consulted; recommendations as noted below  ID concerned about infectious etiology and thus sampling via IR performed on 9/30  · Cont vancomycin and ertapenem for bacteremia per ID, cx data currently without bacteria or polys  · Weight-bearing as tolerated  · Orthopedic surgery removed several staples yesterday, incomplete due to patient pain  · CT of lower extremity to evaluate for further fluid collection:   IMPRESSION:      Rim-enhancing subcutaneous fluid collection at the lateral aspect of the proximal femur that measures 5 6 x 2 1 x 8 3 cm  (transverse by AP by CC, images #4/55 and #302/49)      Second rim-enhancing subcutaneous fluid collection at the lateral aspect of the distal femur/knee that measures 6 9 x 6 3  x 22 2 cm  · CT pelvis noted fluid as above  · PT/OT consulted and support appreciated  · Case management on board to assist with dispo  · [facility] upon discharge     Bladder wall thickening  Assessment & Plan  · Unclear significance inpatient denies any urinary symptoms  · UA + for gram negative rods; now with ESBL/MRDO  · Transitioned to IV ertapenem x3 days due to susceptibility   Appreciate ID input    Pleural effusion on right  Assessment & Plan  · Unclear etiology however of note patient extremely more sedentary than usual postoperatively  · Liver ultrasound with cholelithiasis without cholecystitis     Anemia  Assessment & Plan  · Hemoglobin is 8 4 this appears pretty stable when compared to 8 9 a week ago  · Continue on iron and folic acid as well as P66  · Ongoing outpatient monitoring and follow-up with PCP    Lab Results   Component Value Date    HGB 8 4 (L) 09/30/2022    HGB 8 4 (L) 09/29/2022    HGB 8 6 (L) 09/28/2022       Anxiety  Assessment & Plan  · Stable will continue home Ativan    Essential hypertension  Assessment & Plan  · Blood pressure acceptable systolics in 117 to 052N  · Will continue home meds including metoprolol, lisinopril, Lasix, Cardizem  · Monitor with routine vitals          VTE Pharmacologic Prophylaxis: VTE Score: 4 Moderate Risk (Score 3-4) - Pharmacological DVT Prophylaxis Ordered: apixaban (Eliquis)  Patient Centered Rounds: I performed bedside rounds with nursing staff today  Discussions with Specialists or Other Care Team Provider:  Orthopedic surgery, Infectious Disease and Interventional Radiology notes reviewed    Education and Discussions with Family / Patient: Updated  (niece) at bedside  Time Spent for Care: 30 minutes  More than 50% of total time spent on counseling and coordination of care as described above  Current Length of Stay: 5 day(s)  Current Patient Status: Inpatient   Certification Statement: The patient will continue to require additional inpatient hospital stay due to Monitoring of bacteremia  Discharge Plan: Anticipate discharge in 24-48 hrs to rehab facility  Code Status: Level 1 - Full Code    Subjective:   She feels well  She notes that she has not gotten out of bed yet  She is grateful for her niece to provide her support at the bedside and encouraged hers her to move more    She denies any fevers or chills but does note feeling warm call  She did ask me to look at her left arm that her nurse and indicated may be more tender due to previous IV placement  She denies any abdominal pain  She denies any nausea or vomiting though just feels that her stomach is a bit unsettled  She denies any chest pain or shortness of breath  Both she and her niece acknowledge that the next steps are rehab placement and hopes today are to narrow her antibiotics further seeing that the sampling fluid does not appear to be a source of infection  Objective:     Vitals:   Temp (24hrs), Av 9 °F (37 2 °C), Min:98 9 °F (37 2 °C), Max:99 °F (37 2 °C)    Temp:  [98 9 °F (37 2 °C)-99 °F (37 2 °C)] 98 9 °F (37 2 °C)  HR:  [69-74] 70  Resp:  [16-18] 18  BP: (117-136)/(42-58) 117/45  SpO2:  [94 %-96 %] 94 %  Body mass index is 40 34 kg/m²  Input and Output Summary (last 24 hours): Intake/Output Summary (Last 24 hours) at 10/1/2022 0815  Last data filed at 10/1/2022 0608  Gross per 24 hour   Intake 240 ml   Output 900 ml   Net -660 ml       Physical Exam:   Physical Exam  Vitals and nursing note reviewed  Constitutional:       Appearance: Normal appearance  She is obese  She is not ill-appearing or diaphoretic  HENT:      Head: Normocephalic and atraumatic  Nose: Nose normal  No congestion  Mouth/Throat:      Pharynx: Oropharynx is clear  No oropharyngeal exudate  Eyes:      General: No scleral icterus  Conjunctiva/sclera: Conjunctivae normal    Pulmonary:      Effort: Pulmonary effort is normal  No respiratory distress  Breath sounds: No wheezing or rales  Abdominal:      General: Bowel sounds are normal  There is no distension  Palpations: Abdomen is soft  Tenderness: There is no abdominal tenderness  Musculoskeletal:      Cervical back: Normal range of motion and neck supple  No rigidity  Skin:     General: Skin is warm  Coloration: Skin is not jaundiced     Neurological:      Mental Status: She is alert and oriented to person, place, and time  Psychiatric:         Mood and Affect: Mood normal          Behavior: Behavior normal           Additional Data:     Labs:  Results from last 7 days   Lab Units 09/30/22  0446   WBC Thousand/uL 9 13   HEMOGLOBIN g/dL 8 4*   HEMATOCRIT % 27 7*   PLATELETS Thousands/uL 516*   NEUTROS PCT % 54   LYMPHS PCT % 20   MONOS PCT % 14*   EOS PCT % 9*     Results from last 7 days   Lab Units 09/30/22  0446 09/27/22  0437 09/25/22  0706   SODIUM mmol/L 139   < > 143   POTASSIUM mmol/L 3 5   < > 3 8   CHLORIDE mmol/L 103   < > 101   CO2 mmol/L 27   < > 33*   BUN mg/dL 10   < > 13   CREATININE mg/dL 0 90   < > 0 82   ANION GAP mmol/L 9   < > 9   CALCIUM mg/dL 8 7   < > 9 2   ALBUMIN g/dL  --   --  3 1*   TOTAL BILIRUBIN mg/dL  --   --  0 42   ALK PHOS U/L  --   --  66   ALT U/L  --   --  15   AST U/L  --   --  18   GLUCOSE RANDOM mg/dL 106   < > 130    < > = values in this interval not displayed  Results from last 7 days   Lab Units 09/25/22  0706   INR  1 28*                   Lines/Drains:  Invasive Devices  Report    Peripheral Intravenous Line  Duration           Peripheral IV 09/29/22 Right Antecubital 2 days          Drain  Duration           External Urinary Catheter 6 days                      Imaging: Personally reviewed the following imaging: procedure reports    Recent Cultures (last 7 days):   Results from last 7 days   Lab Units 09/30/22  1106 09/29/22  0535 09/26/22  2118 09/25/22  1346   BLOOD CULTURE   --  No Growth at 24 hrs    No Growth at 24 hrs   --  Staphylococcus epidermidis*  Staphylococcus epidermidis*   GRAM STAIN RESULT  No Polys or Bacteria seen  --   --  Gram positive cocci in clusters*  Gram positive cocci in clusters*   URINE CULTURE   --   --  >100,000 cfu/ml Proteus mirabilis ESBL*  --        Last 24 Hours Medication List:   Current Facility-Administered Medications   Medication Dose Route Frequency Provider Last Rate    acetaminophen  650 mg Oral Q6H [address] Siena Vargas MD      allopurinol  100 mg Oral Daily Siena Vargas MD      apixaban  5 mg Oral BID JERI Marshall      ascorbic acid  500 mg Oral BID Siena Vargas MD      cholecalciferol  400 Units Oral Daily Siena Vargas MD      diltiazem  120 mg Oral Daily Siena Vargas MD      DULoxetine  30 mg Oral Daily Siena Vargas MD      ertapenem  1,000 mg Intravenous Q24H JERI Raygoza 1,000 mg (09/30/22 1342)    ezetimibe  10 mg Oral Daily Siena Vargas MD      ferrous sulfate  325 mg Oral BID With Meals Siena Vargas MD      folic acid  902 mcg Oral Daily Siena Vargas MD      furosemide  40 mg Oral Daily Siena Vargas MD      lisinopril  2 5 mg Oral Daily Siena Vargas MD      LORazepam  0 5 mg Oral Q8H PRN Siena Vargas MD      metoprolol tartrate  100 mg Oral Daily Siena Vargas MD      metoprolol tartrate  50 mg Oral HS Siena Vargas MD      ondansetron  4 mg Intravenous Q4H PRN Siena Vargas MD      oxyCODONE  2 5 mg Oral Q6H PRN JERI Lawson      pantoprazole  40 mg Intravenous Q12H [address] JERI Raygoza      polyethylene glycol  17 g Oral Daily PRN JERI Marshall      senna-docusate sodium  2 tablet Oral BID JERI Marshall      vancomycin  10 mg/kg (Adjusted) Intravenous Q12H Eri Sanches,  mg (10/01/22 0546)        Today, Patient Was Seen By: Candie Stafford    **Please Note: This note may have been constructed using a voice recognition system  **

## 2022-10-01 NOTE — ASSESSMENT & PLAN NOTE
· CT noting what appears to be seroma at the left femur proximally, sampling fluid cx without bacteria  · Orthopedic surgery consulted; recommendations as noted below  ID concerned about infectious etiology and thus sampling via IR performed on 9/30  · Cont vancomycin and ertapenem for bacteremia per ID, cx data currently without bacteria or polys  · Weight-bearing as tolerated  · Orthopedic surgery removed several staples yesterday, incomplete due to patient pain  · CT of lower extremity to evaluate for further fluid collection:   IMPRESSION:      Rim-enhancing subcutaneous fluid collection at the lateral aspect of the proximal femur that measures 5 6 x 2 1 x 8 3 cm  (transverse by AP by CC, images #4/55 and #302/49)          Second rim-enhancing subcutaneous fluid collection at the lateral aspect of the distal femur/knee that measures 6 9 x 6 3  x 22 2 cm  · CT pelvis noted fluid as above  · PT/OT consulted and support appreciated  · Case management on board to assist with dispo  · Manoj Culp upon discharge

## 2022-10-01 NOTE — PLAN OF CARE
Problem: MOBILITY - ADULT  Goal: Maintain or return to baseline ADL function  Description: INTERVENTIONS:  -  Assess patient's ability to carry out ADLs; assess patient's baseline for ADL function and identify physical deficits which impact ability to perform ADLs (bathing, care of mouth/teeth, toileting, grooming, dressing, etc )  - Assess/evaluate cause of self-care deficits   - Assess range of motion  - Assess patient's mobility; develop plan if impaired  - Assess patient's need for assistive devices and provide as appropriate  - Encourage maximum independence but intervene and supervise when necessary  - Involve family in performance of ADLs  - Assess for home care needs following discharge   - Consider OT consult to assist with ADL evaluation and planning for discharge  - Provide patient education as appropriate  Outcome: Progressing  Goal: Maintains/Returns to pre admission functional level  Description: INTERVENTIONS:  - Perform BMAT or MOVE assessment daily    - Set and communicate daily mobility goal to care team and patient/family/caregiver  - Collaborate with rehabilitation services on mobility goals if consulted  - Perform Range of Motion  times a day  - Reposition patient every  hours    - Dangle patient  times a day  - Stand patient  times a day  - Ambulate patient  times a day  - Out of bed to chair  times a day   - Out of bed for meals  times a day  - Out of bed for toileting  - Record patient progress and toleration of activity level   Outcome: Progressing     Problem: Potential for Falls  Goal: Patient will remain free of falls  Description: INTERVENTIONS:  - Educate patient/family on patient safety including physical limitations  - Instruct patient to call for assistance with activity   - Consult OT/PT to assist with strengthening/mobility   - Keep Call bell within reach  - Keep bed low and locked with side rails adjusted as appropriate  - Keep care items and personal belongings within reach  - Initiate and maintain comfort rounds  - Make Fall Risk Sign visible to staff  - Offer Toileting every  Hours, in advance of need  - Initiate/Maintain alarm  - Obtain necessary fall risk management equipment:   - Apply yellow socks and bracelet for high fall risk patients  - Consider moving patient to room near nurses station  Outcome: Progressing     Problem: Prexisting or High Potential for Compromised Skin Integrity  Goal: Skin integrity is maintained or improved  Description: INTERVENTIONS:  - Identify patients at risk for skin breakdown  - Assess and monitor skin integrity  - Assess and monitor nutrition and hydration status  - Monitor labs   - Assess for incontinence   - Turn and reposition patient  - Assist with mobility/ambulation  - Relieve pressure over bony prominences  - Avoid friction and shearing  - Provide appropriate hygiene as needed including keeping skin clean and dry  - Evaluate need for skin moisturizer/barrier cream  - Collaborate with interdisciplinary team   - Patient/family teaching  - Consider wound care consult   Outcome: Progressing     Problem: HEMATOLOGIC - ADULT  Goal: Maintains hematologic stability  Description: INTERVENTIONS  - Assess for signs and symptoms of bleeding or hemorrhage  - Monitor labs  - Administer supportive blood products/factors as ordered and appropriate  Outcome: Progressing     Problem: MUSCULOSKELETAL - ADULT  Goal: Maintain or return mobility to safest level of function  Description: INTERVENTIONS:  - Assess patient's ability to carry out ADLs; assess patient's baseline for ADL function and identify physical deficits which impact ability to perform ADLs (bathing, care of mouth/teeth, toileting, grooming, dressing, etc )  - Assess/evaluate cause of self-care deficits   - Assess range of motion  - Assess patient's mobility  - Assess patient's need for assistive devices and provide as appropriate  - Encourage maximum independence but intervene and supervise when necessary  - Involve family in performance of ADLs  - Assess for home care needs following discharge   - Consider OT consult to assist with ADL evaluation and planning for discharge  - Provide patient education as appropriate  Outcome: Progressing  Goal: Maintain proper alignment of affected body part  Description: INTERVENTIONS:  - Support, maintain and protect limb and body alignment  - Provide patient/ family with appropriate education  Outcome: Progressing     Problem: PAIN - ADULT  Goal: Verbalizes/displays adequate comfort level or baseline comfort level  Description: Interventions:  - Encourage patient to monitor pain and request assistance  - Assess pain using appropriate pain scale  - Administer analgesics based on type and severity of pain and evaluate response  - Implement non-pharmacological measures as appropriate and evaluate response  - Consider cultural and social influences on pain and pain management  - Notify physician/advanced practitioner if interventions unsuccessful or patient reports new pain  Outcome: Progressing     Problem: INFECTION - ADULT  Goal: Absence or prevention of progression during hospitalization  Description: INTERVENTIONS:  - Assess and monitor for signs and symptoms of infection  - Monitor lab/diagnostic results  - Monitor all insertion sites, i e  indwelling lines, tubes, and drains  - Monitor endotracheal if appropriate and nasal secretions for changes in amount and color  - [place] appropriate cooling/warming therapies per order  - Administer medications as ordered  - Instruct and encourage patient and family to use good hand hygiene technique  - Identify and instruct in appropriate isolation precautions for identified infection/condition  Outcome: Progressing     Problem: SAFETY ADULT  Goal: Patient will remain free of falls  Description: INTERVENTIONS:  - Educate patient/family on patient safety including physical limitations  - Instruct patient to call for assistance with activity   - Consult OT/PT to assist with strengthening/mobility   - Keep Call bell within reach  - Keep bed low and locked with side rails adjusted as appropriate  - Keep care items and personal belongings within reach  - Initiate and maintain comfort rounds  - Make Fall Risk Sign visible to staff  - Offer Toileting every  Hours, in advance of need  - Initiate/Maintain alarm  - Obtain necessary fall risk management equipment  - Apply yellow socks and bracelet for high fall risk patients  - Consider moving patient to room near nurses station  Outcome: Progressing     Problem: DISCHARGE PLANNING  Goal: Discharge to home or other facility with appropriate resources  Description: INTERVENTIONS:  - Identify barriers to discharge w/patient and caregiver  - Arrange for needed discharge resources and transportation as appropriate  - Identify discharge learning needs (meds, wound care, etc )  - Arrange for interpretive services to assist at discharge as needed  - Refer to Case Management Department for coordinating discharge planning if the patient needs post-hospital services based on physician/advanced practitioner order or complex needs related to functional status, cognitive ability, or social support system  Outcome: Progressing     Problem: Knowledge Deficit  Goal: Patient/family/caregiver demonstrates understanding of disease process, treatment plan, medications, and discharge instructions  Description: Complete learning assessment and assess knowledge base  Interventions:  - Provide teaching at level of understanding  - Provide teaching via preferred learning methods  Outcome: Progressing     Problem: Nutrition/Hydration-ADULT  Goal: Nutrient/Hydration intake appropriate for improving, restoring or maintaining nutritional needs  Description: Monitor and assess patient's nutrition/hydration status for malnutrition   Collaborate with interdisciplinary team and initiate plan and interventions as ordered  Monitor patient's weight and dietary intake as ordered or per policy  Utilize nutrition screening tool and intervene as necessary  Determine patient's food preferences and provide high-protein, high-caloric foods as appropriate       INTERVENTIONS:  - Monitor oral intake, urinary output, labs, and treatment plans  - Assess nutrition and hydration status and recommend course of action  - Evaluate amount of meals eaten  - Assist patient with eating if necessary   - Allow adequate time for meals  - Recommend/ encourage appropriate diets, oral nutritional supplements, and vitamin/mineral supplements  - Order, calculate, and assess calorie counts as needed  - Recommend, monitor, and adjust tube feedings and TPN/PPN based on assessed needs  - Assess need for intravenous fluids  - Provide specific nutrition/hydration education as appropriate  - Include patient/family/caregiver in decisions related to nutrition  Outcome: Progressing

## 2022-10-01 NOTE — ASSESSMENT & PLAN NOTE
· Blood pressure acceptable systolics in 486 to 726F  · Will continue home meds including metoprolol, lisinopril, Lasix, Cardizem  · Monitor with routine vitals

## 2022-10-02 LAB
ANION GAP SERPL CALCULATED.3IONS-SCNC: 7 MMOL/L (ref 4–13)
BASOPHILS # BLD AUTO: 0.08 THOUSANDS/ΜL (ref 0–0.1)
BASOPHILS NFR BLD AUTO: 1 % (ref 0–1)
BUN SERPL-MCNC: 9 MG/DL (ref 5–25)
CALCIUM SERPL-MCNC: 8.9 MG/DL (ref 8.4–10.2)
CHLORIDE SERPL-SCNC: 104 MMOL/L (ref 96–108)
CO2 SERPL-SCNC: 30 MMOL/L (ref 21–32)
CREAT SERPL-MCNC: 0.81 MG/DL (ref 0.6–1.3)
EOSINOPHIL # BLD AUTO: 0.7 THOUSAND/ΜL (ref 0–0.61)
EOSINOPHIL NFR BLD AUTO: 8 % (ref 0–6)
ERYTHROCYTE [DISTWIDTH] IN BLOOD BY AUTOMATED COUNT: 14.9 % (ref 11.6–15.1)
GFR SERPL CREATININE-BSD FRML MDRD: 69 ML/MIN/1.73SQ M
GLUCOSE SERPL-MCNC: 120 MG/DL (ref 65–140)
HCT VFR BLD AUTO: 27.7 % (ref 34.8–46.1)
HGB BLD-MCNC: 8.4 G/DL (ref 11.5–15.4)
IMM GRANULOCYTES # BLD AUTO: 0.12 THOUSAND/UL (ref 0–0.2)
IMM GRANULOCYTES NFR BLD AUTO: 1 % (ref 0–2)
LYMPHOCYTES # BLD AUTO: 1.96 THOUSANDS/ΜL (ref 0.6–4.47)
LYMPHOCYTES NFR BLD AUTO: 22 % (ref 14–44)
MCH RBC QN AUTO: 28.7 PG (ref 26.8–34.3)
MCHC RBC AUTO-ENTMCNC: 30.3 G/DL (ref 31.4–37.4)
MCV RBC AUTO: 95 FL (ref 82–98)
MONOCYTES # BLD AUTO: 1.2 THOUSAND/ΜL (ref 0.17–1.22)
MONOCYTES NFR BLD AUTO: 14 % (ref 4–12)
NEUTROPHILS # BLD AUTO: 4.75 THOUSANDS/ΜL (ref 1.85–7.62)
NEUTS SEG NFR BLD AUTO: 54 % (ref 43–75)
NRBC BLD AUTO-RTO: 0 /100 WBCS
PLATELET # BLD AUTO: 474 THOUSANDS/UL (ref 149–390)
PMV BLD AUTO: 8.4 FL (ref 8.9–12.7)
POTASSIUM SERPL-SCNC: 3.8 MMOL/L (ref 3.5–5.3)
RBC # BLD AUTO: 2.93 MILLION/UL (ref 3.81–5.12)
SODIUM SERPL-SCNC: 141 MMOL/L (ref 135–147)
WBC # BLD AUTO: 8.81 THOUSAND/UL (ref 4.31–10.16)

## 2022-10-02 PROCEDURE — 99232 SBSQ HOSP IP/OBS MODERATE 35: CPT | Performed by: INTERNAL MEDICINE

## 2022-10-02 PROCEDURE — C9113 INJ PANTOPRAZOLE SODIUM, VIA: HCPCS | Performed by: NURSE PRACTITIONER

## 2022-10-02 PROCEDURE — 80048 BASIC METABOLIC PNL TOTAL CA: CPT | Performed by: INTERNAL MEDICINE

## 2022-10-02 PROCEDURE — 85025 COMPLETE CBC W/AUTO DIFF WBC: CPT | Performed by: INTERNAL MEDICINE

## 2022-10-02 RX ADMIN — METOPROLOL TARTRATE 50 MG: 100 TABLET, FILM COATED ORAL at 21:04

## 2022-10-02 RX ADMIN — LISINOPRIL 2.5 MG: 2.5 TABLET ORAL at 08:11

## 2022-10-02 RX ADMIN — DULOXETINE HYDROCHLORIDE 30 MG: 30 CAPSULE, DELAYED RELEASE ORAL at 21:08

## 2022-10-02 RX ADMIN — PANTOPRAZOLE SODIUM 40 MG: 40 INJECTION, POWDER, FOR SOLUTION INTRAVENOUS at 08:11

## 2022-10-02 RX ADMIN — EZETIMIBE 10 MG: 10 TABLET ORAL at 21:08

## 2022-10-02 RX ADMIN — FERROUS SULFATE TAB 325 MG (65 MG ELEMENTAL FE) 325 MG: 325 (65 FE) TAB at 17:51

## 2022-10-02 RX ADMIN — SENNOSIDES AND DOCUSATE SODIUM 2 TABLET: 8.6; 5 TABLET ORAL at 08:11

## 2022-10-02 RX ADMIN — METOPROLOL TARTRATE 100 MG: 100 TABLET, FILM COATED ORAL at 08:11

## 2022-10-02 RX ADMIN — VANCOMYCIN HYDROCHLORIDE 750 MG: 750 INJECTION, SOLUTION INTRAVENOUS at 06:05

## 2022-10-02 RX ADMIN — CHOLECALCIFEROL TAB 10 MCG (400 UNIT) 400 UNITS: 10 TAB at 08:11

## 2022-10-02 RX ADMIN — FUROSEMIDE 40 MG: 40 TABLET ORAL at 08:11

## 2022-10-02 RX ADMIN — OXYCODONE HYDROCHLORIDE AND ACETAMINOPHEN 500 MG: 500 TABLET ORAL at 08:11

## 2022-10-02 RX ADMIN — FERROUS SULFATE TAB 325 MG (65 MG ELEMENTAL FE) 325 MG: 325 (65 FE) TAB at 08:11

## 2022-10-02 RX ADMIN — OXYCODONE HYDROCHLORIDE 2.5 MG: 5 TABLET ORAL at 22:03

## 2022-10-02 RX ADMIN — ALLOPURINOL 100 MG: 100 TABLET ORAL at 08:11

## 2022-10-02 RX ADMIN — DILTIAZEM HYDROCHLORIDE 120 MG: 120 CAPSULE, COATED, EXTENDED RELEASE ORAL at 08:12

## 2022-10-02 RX ADMIN — APIXABAN 5 MG: 5 TABLET, FILM COATED ORAL at 08:11

## 2022-10-02 RX ADMIN — ACETAMINOPHEN 650 MG: 325 TABLET, FILM COATED ORAL at 11:55

## 2022-10-02 RX ADMIN — ACETAMINOPHEN 650 MG: 325 TABLET, FILM COATED ORAL at 06:05

## 2022-10-02 RX ADMIN — FOLIC ACID TAB 400 MCG 400 MCG: 400 TAB at 08:12

## 2022-10-02 RX ADMIN — OXYCODONE HYDROCHLORIDE 2.5 MG: 5 TABLET ORAL at 13:36

## 2022-10-02 RX ADMIN — SENNOSIDES AND DOCUSATE SODIUM 2 TABLET: 8.6; 5 TABLET ORAL at 17:51

## 2022-10-02 RX ADMIN — PANTOPRAZOLE SODIUM 40 MG: 40 INJECTION, POWDER, FOR SOLUTION INTRAVENOUS at 21:08

## 2022-10-02 RX ADMIN — ACETAMINOPHEN 650 MG: 325 TABLET, FILM COATED ORAL at 23:51

## 2022-10-02 RX ADMIN — APIXABAN 5 MG: 5 TABLET, FILM COATED ORAL at 17:51

## 2022-10-02 RX ADMIN — ACETAMINOPHEN 650 MG: 325 TABLET, FILM COATED ORAL at 17:51

## 2022-10-02 RX ADMIN — OXYCODONE HYDROCHLORIDE AND ACETAMINOPHEN 500 MG: 500 TABLET ORAL at 17:51

## 2022-10-02 NOTE — ASSESSMENT & PLAN NOTE
· Blood pressure acceptable systolics in 640 to 082U  · Will continue home meds including metoprolol, lisinopril, Lasix, Cardizem  · Monitor with routine vitals

## 2022-10-02 NOTE — ASSESSMENT & PLAN NOTE
· Unclear significance inpatient denies any urinary symptoms  · UA + for gram negative rods; now with ESBL/MRDO  · Transitioned to IV ertapenem x3 days due to susceptibility  Today the course should be completed    Appreciate ID input

## 2022-10-02 NOTE — PLAN OF CARE
Problem: MOBILITY - ADULT  Goal: Maintain or return to baseline ADL function  Description: INTERVENTIONS:  -  Assess patient's ability to carry out ADLs; assess patient's baseline for ADL function and identify physical deficits which impact ability to perform ADLs (bathing, care of mouth/teeth, toileting, grooming, dressing, etc )  - Assess/evaluate cause of self-care deficits   - Assess range of motion  - Assess patient's mobility; develop plan if impaired  - Assess patient's need for assistive devices and provide as appropriate  - Encourage maximum independence but intervene and supervise when necessary  - Involve family in performance of ADLs  - Assess for home care needs following discharge   - Consider OT consult to assist with ADL evaluation and planning for discharge  - Provide patient education as appropriate  Outcome: Progressing  Goal: Maintains/Returns to pre admission functional level  Description: INTERVENTIONS:  - Perform BMAT or MOVE assessment daily    - Set and communicate daily mobility goal to care team and patient/family/caregiver  - Collaborate with rehabilitation services on mobility goals if consulted  - Perform Range of Motion  times a day  - Reposition patient every  hours    - Dangle patient  times a day  - Stand patient  times a day  - Ambulate patient  times a day  - Out of bed to chair  times a day   - Out of bed for meals  times a day  - Out of bed for toileting  - Record patient progress and toleration of activity level   Outcome: Progressing     Problem: Potential for Falls  Goal: Patient will remain free of falls  Description: INTERVENTIONS:  - Educate patient/family on patient safety including physical limitations  - Instruct patient to call for assistance with activity   - Consult OT/PT to assist with strengthening/mobility   - Keep Call bell within reach  - Keep bed low and locked with side rails adjusted as appropriate  - Keep care items and personal belongings within reach  - Initiate and maintain comfort rounds  - Make Fall Risk Sign visible to staff  - Offer Toileting every  Hours, in advance of need  - Initiate/Maintain alarm  - Obtain necessary fall risk management equipment:   - Apply yellow socks and bracelet for high fall risk patients  - Consider moving patient to room near nurses station  Outcome: Progressing     Problem: Prexisting or High Potential for Compromised Skin Integrity  Goal: Skin integrity is maintained or improved  Description: INTERVENTIONS:  - Identify patients at risk for skin breakdown  - Assess and monitor skin integrity  - Assess and monitor nutrition and hydration status  - Monitor labs   - Assess for incontinence   - Turn and reposition patient  - Assist with mobility/ambulation  - Relieve pressure over bony prominences  - Avoid friction and shearing  - Provide appropriate hygiene as needed including keeping skin clean and dry  - Evaluate need for skin moisturizer/barrier cream  - Collaborate with interdisciplinary team   - Patient/family teaching  - Consider wound care consult   Outcome: Progressing     Problem: HEMATOLOGIC - ADULT  Goal: Maintains hematologic stability  Description: INTERVENTIONS  - Assess for signs and symptoms of bleeding or hemorrhage  - Monitor labs  - Administer supportive blood products/factors as ordered and appropriate  Outcome: Progressing     Problem: MUSCULOSKELETAL - ADULT  Goal: Maintain or return mobility to safest level of function  Description: INTERVENTIONS:  - Assess patient's ability to carry out ADLs; assess patient's baseline for ADL function and identify physical deficits which impact ability to perform ADLs (bathing, care of mouth/teeth, toileting, grooming, dressing, etc )  - Assess/evaluate cause of self-care deficits   - Assess range of motion  - Assess patient's mobility  - Assess patient's need for assistive devices and provide as appropriate  - Encourage maximum independence but intervene and supervise when necessary  - Involve family in performance of ADLs  - Assess for home care needs following discharge   - Consider OT consult to assist with ADL evaluation and planning for discharge  - Provide patient education as appropriate  Outcome: Progressing  Goal: Maintain proper alignment of affected body part  Description: INTERVENTIONS:  - Support, maintain and protect limb and body alignment  - Provide patient/ family with appropriate education  Outcome: Progressing     Problem: PAIN - ADULT  Goal: Verbalizes/displays adequate comfort level or baseline comfort level  Description: Interventions:  - Encourage patient to monitor pain and request assistance  - Assess pain using appropriate pain scale  - Administer analgesics based on type and severity of pain and evaluate response  - Implement non-pharmacological measures as appropriate and evaluate response  - Consider cultural and social influences on pain and pain management  - Notify physician/advanced practitioner if interventions unsuccessful or patient reports new pain  Outcome: Progressing     Problem: INFECTION - ADULT  Goal: Absence or prevention of progression during hospitalization  Description: INTERVENTIONS:  - Assess and monitor for signs and symptoms of infection  - Monitor lab/diagnostic results  - Monitor all insertion sites, i e  indwelling lines, tubes, and drains  - Monitor endotracheal if appropriate and nasal secretions for changes in amount and color  - [place] appropriate cooling/warming therapies per order  - Administer medications as ordered  - Instruct and encourage patient and family to use good hand hygiene technique  - Identify and instruct in appropriate isolation precautions for identified infection/condition  Outcome: Progressing     Problem: SAFETY ADULT  Goal: Patient will remain free of falls  Description: INTERVENTIONS:  - Educate patient/family on patient safety including physical limitations  - Instruct patient to call for assistance with activity   - Consult OT/PT to assist with strengthening/mobility   - Keep Call bell within reach  - Keep bed low and locked with side rails adjusted as appropriate  - Keep care items and personal belongings within reach  - Initiate and maintain comfort rounds  - Make Fall Risk Sign visible to staff  - Offer Toileting every  Hours, in advance of need  - Initiate/Maintain alarm  - Obtain necessary fall risk management equipment  - Apply yellow socks and bracelet for high fall risk patients  - Consider moving patient to room near nurses station  Outcome: Progressing     Problem: DISCHARGE PLANNING  Goal: Discharge to home or other facility with appropriate resources  Description: INTERVENTIONS:  - Identify barriers to discharge w/patient and caregiver  - Arrange for needed discharge resources and transportation as appropriate  - Identify discharge learning needs (meds, wound care, etc )  - Arrange for interpretive services to assist at discharge as needed  - Refer to Case Management Department for coordinating discharge planning if the patient needs post-hospital services based on physician/advanced practitioner order or complex needs related to functional status, cognitive ability, or social support system  Outcome: Progressing     Problem: Knowledge Deficit  Goal: Patient/family/caregiver demonstrates understanding of disease process, treatment plan, medications, and discharge instructions  Description: Complete learning assessment and assess knowledge base  Interventions:  - Provide teaching at level of understanding  - Provide teaching via preferred learning methods  Outcome: Progressing     Problem: Nutrition/Hydration-ADULT  Goal: Nutrient/Hydration intake appropriate for improving, restoring or maintaining nutritional needs  Description: Monitor and assess patient's nutrition/hydration status for malnutrition   Collaborate with interdisciplinary team and initiate plan and interventions as ordered  Monitor patient's weight and dietary intake as ordered or per policy  Utilize nutrition screening tool and intervene as necessary  Determine patient's food preferences and provide high-protein, high-caloric foods as appropriate       INTERVENTIONS:  - Monitor oral intake, urinary output, labs, and treatment plans  - Assess nutrition and hydration status and recommend course of action  - Evaluate amount of meals eaten  - Assist patient with eating if necessary   - Allow adequate time for meals  - Recommend/ encourage appropriate diets, oral nutritional supplements, and vitamin/mineral supplements  - Order, calculate, and assess calorie counts as needed  - Recommend, monitor, and adjust tube feedings and TPN/PPN based on assessed needs  - Assess need for intravenous fluids  - Provide specific nutrition/hydration education as appropriate  - Include patient/family/caregiver in decisions related to nutrition  Outcome: Progressing

## 2022-10-02 NOTE — PLAN OF CARE
Problem: MOBILITY - ADULT  Goal: Maintain or return to baseline ADL function  Description: INTERVENTIONS:  -  Assess patient's ability to carry out ADLs; assess patient's baseline for ADL function and identify physical deficits which impact ability to perform ADLs (bathing, care of mouth/teeth, toileting, grooming, dressing, etc )  - Assess/evaluate cause of self-care deficits   - Assess range of motion  - Assess patient's mobility; develop plan if impaired  - Assess patient's need for assistive devices and provide as appropriate  - Encourage maximum independence but intervene and supervise when necessary  - Involve family in performance of ADLs  - Assess for home care needs following discharge   - Consider OT consult to assist with ADL evaluation and planning for discharge  - Provide patient education as appropriate  Outcome: Progressing  Goal: Maintains/Returns to pre admission functional level  Description: INTERVENTIONS:  - Perform BMAT or MOVE assessment daily    - Set and communicate daily mobility goal to care team and patient/family/caregiver  - Collaborate with rehabilitation services on mobility goals if consulted  - Perform Range of Motion 3 times a day  - Reposition patient every 2 hours    - Dangle patient 3 times a day  - Stand patient 3 times a day  - Ambulate patient 3 times a day  - Out of bed to chair 3 times a day   - Out of bed for meals 3 times a day  - Out of bed for toileting  - Record patient progress and toleration of activity level   Outcome: Progressing     Problem: Potential for Falls  Goal: Patient will remain free of falls  Description: INTERVENTIONS:  - Educate patient/family on patient safety including physical limitations  - Instruct patient to call for assistance with activity   - Consult OT/PT to assist with strengthening/mobility   - Keep Call bell within reach  - Keep bed low and locked with side rails adjusted as appropriate  - Keep care items and personal belongings within reach  - Initiate and maintain comfort rounds  - Make Fall Risk Sign visible to staff  - Offer Toileting every 2 Hours, in advance of need  - Initiate/Maintain bed alarm  - Obtain necessary fall risk management equipment  - Apply yellow socks and bracelet for high fall risk patients  - Consider moving patient to room near nurses station  Outcome: Progressing     Problem: Prexisting or High Potential for Compromised Skin Integrity  Goal: Skin integrity is maintained or improved  Description: INTERVENTIONS:  - Identify patients at risk for skin breakdown  - Assess and monitor skin integrity  - Assess and monitor nutrition and hydration status  - Monitor labs   - Assess for incontinence   - Turn and reposition patient  - Assist with mobility/ambulation  - Relieve pressure over bony prominences  - Avoid friction and shearing  - Provide appropriate hygiene as needed including keeping skin clean and dry  - Evaluate need for skin moisturizer/barrier cream  - Collaborate with interdisciplinary team   - Patient/family teaching  - Consider wound care consult   Outcome: Progressing     Problem: HEMATOLOGIC - ADULT  Goal: Maintains hematologic stability  Description: INTERVENTIONS  - Assess for signs and symptoms of bleeding or hemorrhage  - Monitor labs  - Administer supportive blood products/factors as ordered and appropriate  Outcome: Progressing     Problem: MUSCULOSKELETAL - ADULT  Goal: Maintain or return mobility to safest level of function  Description: INTERVENTIONS:  - Assess patient's ability to carry out ADLs; assess patient's baseline for ADL function and identify physical deficits which impact ability to perform ADLs (bathing, care of mouth/teeth, toileting, grooming, dressing, etc )  - Assess/evaluate cause of self-care deficits   - Assess range of motion  - Assess patient's mobility  - Assess patient's need for assistive devices and provide as appropriate  - Encourage maximum independence but intervene and supervise when necessary  - Involve family in performance of ADLs  - Assess for home care needs following discharge   - Consider OT consult to assist with ADL evaluation and planning for discharge  - Provide patient education as appropriate  Outcome: Progressing  Goal: Maintain proper alignment of affected body part  Description: INTERVENTIONS:  - Support, maintain and protect limb and body alignment  - Provide patient/ family with appropriate education  Outcome: Progressing     Problem: PAIN - ADULT  Goal: Verbalizes/displays adequate comfort level or baseline comfort level  Description: Interventions:  - Encourage patient to monitor pain and request assistance  - Assess pain using appropriate pain scale  - Administer analgesics based on type and severity of pain and evaluate response  - Implement non-pharmacological measures as appropriate and evaluate response  - Consider cultural and social influences on pain and pain management  - Notify physician/advanced practitioner if interventions unsuccessful or patient reports new pain  Outcome: Progressing     Problem: INFECTION - ADULT  Goal: Absence or prevention of progression during hospitalization  Description: INTERVENTIONS:  - Assess and monitor for signs and symptoms of infection  - Monitor lab/diagnostic results  - Monitor all insertion sites, i e  indwelling lines, tubes, and drains  - Monitor endotracheal if appropriate and nasal secretions for changes in amount and color  - [place] appropriate cooling/warming therapies per order  - Administer medications as ordered  - Instruct and encourage patient and family to use good hand hygiene technique  - Identify and instruct in appropriate isolation precautions for identified infection/condition  Outcome: Progressing     Problem: SAFETY ADULT  Goal: Patient will remain free of falls  Description: INTERVENTIONS:  - Educate patient/family on patient safety including physical limitations  - Instruct patient to call for assistance with activity   - Consult OT/PT to assist with strengthening/mobility   - Keep Call bell within reach  - Keep bed low and locked with side rails adjusted as appropriate  - Keep care items and personal belongings within reach  - Initiate and maintain comfort rounds  - Make Fall Risk Sign visible to staff  - Offer Toileting every 2 Hours, in advance of need  - Initiate/Maintain bed alarm  - Obtain necessary fall risk management equipment  - Apply yellow socks and bracelet for high fall risk patients  - Consider moving patient to room near nurses station  Outcome: Progressing     Problem: DISCHARGE PLANNING  Goal: Discharge to home or other facility with appropriate resources  Description: INTERVENTIONS:  - Identify barriers to discharge w/patient and caregiver  - Arrange for needed discharge resources and transportation as appropriate  - Identify discharge learning needs (meds, wound care, etc )  - Arrange for interpretive services to assist at discharge as needed  - Refer to Case Management Department for coordinating discharge planning if the patient needs post-hospital services based on physician/advanced practitioner order or complex needs related to functional status, cognitive ability, or social support system  Outcome: Progressing     Problem: Knowledge Deficit  Goal: Patient/family/caregiver demonstrates understanding of disease process, treatment plan, medications, and discharge instructions  Description: Complete learning assessment and assess knowledge base  Interventions:  - Provide teaching at level of understanding  - Provide teaching via preferred learning methods  Outcome: Progressing     Problem: Nutrition/Hydration-ADULT  Goal: Nutrient/Hydration intake appropriate for improving, restoring or maintaining nutritional needs  Description: Monitor and assess patient's nutrition/hydration status for malnutrition   Collaborate with interdisciplinary team and initiate plan and interventions as ordered  Monitor patient's weight and dietary intake as ordered or per policy  Utilize nutrition screening tool and intervene as necessary  Determine patient's food preferences and provide high-protein, high-caloric foods as appropriate       INTERVENTIONS:  - Monitor oral intake, urinary output, labs, and treatment plans  - Assess nutrition and hydration status and recommend course of action  - Evaluate amount of meals eaten  - Assist patient with eating if necessary   - Allow adequate time for meals  - Recommend/ encourage appropriate diets, oral nutritional supplements, and vitamin/mineral supplements  - Order, calculate, and assess calorie counts as needed  - Recommend, monitor, and adjust tube feedings and TPN/PPN based on assessed needs  - Assess need for intravenous fluids  - Provide specific nutrition/hydration education as appropriate  - Include patient/family/caregiver in decisions related to nutrition  Outcome: Progressing

## 2022-10-02 NOTE — PROGRESS NOTES
Vancomycin IV Pharmacy-to-Dose Consultation    Matt Gomes is a 76 y o  female who is currently receiving Vancomycin IV with management by the Pharmacy Consult service  Assessment/Plan:  The patient was reviewed  Renal function is stable and no signs or symptoms of nephrotoxicity and/or infusion reactions were documented in the chart  Based on todays assessment, continue current vancomycin (day # 6) dosing of 750 mg IV every 12 hours, with a plan for trough to be drawn at 0600 on 10/5  We will continue to follow the patients culture results and clinical progress daily      Lana Romero, Pharmacist

## 2022-10-02 NOTE — PROGRESS NOTES
[facility]  Progress Note - Jessica Arias 1945, 76 y o  female MRN: [id number]  Unit/Bed#: S -19 Encounter: [id number]  Primary Care Provider: Nikki Shi MD   Date and time admitted to hospital: 9/25/2022  6:25 AM    Bacteremia due to Gram-positive bacteria  Assessment & Plan  · Blood cultures from 9/25 2/2 positive for staphylococcus epidermidis   · In the setting of pacemaker as well   · Repeats ordered per ID   · Infectious disease consulted; input appreciated   · Discontinue IV Vancomycin   · Repeat blood cultures pending- currently times 48 hours  · Sampled fluid without growth      Abdominal fluid collection  Assessment & Plan  · CT noting what appears to be seroma at the left femur proximally, sampling fluid cx without bacteria  · Orthopedic surgery consulted; recommendations as noted below  ID concerned about infectious etiology and thus sampling via IR performed on 9/30  · Discontinue vancomycin per ID, cx data currently without bacteria or polys  · Weight-bearing as tolerated  · Orthopedic surgery removed several staples yesterday, incomplete due to patient pain  · CT of lower extremity to evaluate for further fluid collection:   IMPRESSION:      Rim-enhancing subcutaneous fluid collection at the lateral aspect of the proximal femur that measures 5 6 x 2 1 x 8 3 cm  (transverse by AP by CC, images #4/55 and #302/49)      Second rim-enhancing subcutaneous fluid collection at the lateral aspect of the distal femur/knee that measures 6 9 x 6 3  x 22 2 cm  · CT pelvis noted fluid as above  · PT/OT consulted and support appreciated  · Case management on board to assist with dispo  · Jaky Rosie upon discharge     Bladder wall thickening  Assessment & Plan  · Unclear significance inpatient denies any urinary symptoms  · UA + for gram negative rods; now with ESBL/MRDO  · Transitioned to IV ertapenem x3 days due to susceptibility  Today the course should be completed  Appreciate ID input    * Vomiting and diarrhea  Assessment & Plan  Background: Presented to the ED with complaining of vomiting and diarrhea earlier and on presentation still feeling "nauseous"  · CLD> regular diet resumed and thus far tolerated   · IV fluids> discontinued on 9/26  · Secondary viral gastroenteritis versus other intra-abdominal process  · Stool cultures pending; low suspicion for C diff or infectious etiology as resolved without treatment    · Blood cultures positive as noted above  · Troponin unremarkable  · Telemetry discontinued        Anemia  Assessment & Plan  · Hemoglobin is 8 4 this appears pretty stable when compared to 8 9 a week ago  · Continue on iron and folic acid as well as U57  · Ongoing outpatient monitoring and follow-up with PCP    Lab Results   Component Value Date    HGB 8 4 (L) 10/02/2022    HGB 8 4 (L) 09/30/2022    HGB 8 4 (L) 09/29/2022       Anxiety  Assessment & Plan  · Stable will continue home Ativan    Essential hypertension  Assessment & Plan  · Blood pressure acceptable systolics in 255 to 811M  · Will continue home meds including metoprolol, lisinopril, Lasix, Cardizem  · Monitor with routine vitals          VTE Pharmacologic Prophylaxis: VTE Score: 4 Moderate Risk (Score 3-4) - Pharmacological DVT Prophylaxis Ordered: apixaban (Eliquis)  Patient Centered Rounds: I performed bedside rounds with nursing staff today  Discussions with Specialists or Other Care Team Provider:  Infectious disease    Education and Discussions with Family / Patient: Attempted to update  (niece) via phone  Left voicemail  Time Spent for Care: 30 minutes  More than 50% of total time spent on counseling and coordination of care as described above      Current Length of Stay: 6 day(s)  Current Patient Status: Inpatient   Certification Statement: The patient will continue to require additional inpatient hospital stay due to Ambulatory dysfunction  Discharge Plan: Anticipate discharge tomorrow to rehab facility  Code Status: Level 1 - Full Code    Subjective:   She is doing well today  She slept well overnight  She notes that her niece is hopefully getting some rest today and is not at her bedside  I updated her on the recommendation to stop her antibiotics and she is happy about that  Her stomach remains on settled though she denies any nausea, vomiting or diarrhea  She denies any chest pain or shortness of breath  She denies fevers or chills or feeling warm like she had the day prior  Objective:     Vitals:   Temp (24hrs), Av 5 °F (36 9 °C), Min:98 1 °F (36 7 °C), Max:98 7 °F (37 1 °C)    Temp:  [98 1 °F (36 7 °C)-98 7 °F (37 1 °C)] 98 7 °F (37 1 °C)  HR:  [70] 70  Resp:  [18] 18  BP: (125-154)/(40-49) 154/40  SpO2:  [95 %-97 %] 95 %  Body mass index is 40 34 kg/m²  Input and Output Summary (last 24 hours): Intake/Output Summary (Last 24 hours) at 10/2/2022 1422  Last data filed at 10/2/2022 1306  Gross per 24 hour   Intake --   Output 950 ml   Net -950 ml       Physical Exam:   Physical Exam  Vitals and nursing note reviewed  Constitutional:       Appearance: Normal appearance  She is not ill-appearing or diaphoretic  HENT:      Head: Normocephalic  Nose: Nose normal  No congestion  Mouth/Throat:      Mouth: Mucous membranes are moist       Pharynx: No oropharyngeal exudate  Eyes:      General: No scleral icterus  Conjunctiva/sclera: Conjunctivae normal    Pulmonary:      Effort: Pulmonary effort is normal  No respiratory distress  Breath sounds: No wheezing  Abdominal:      General: Bowel sounds are normal  There is no distension  Palpations: Abdomen is soft  Tenderness: There is no abdominal tenderness  Musculoskeletal:      Cervical back: Normal range of motion and neck supple  No rigidity  Skin:     General: Skin is warm  Neurological:      Mental Status: She is alert     Psychiatric:         Mood and Affect: Mood normal          Behavior: Behavior normal           Additional Data:     Labs:  Results from last 7 days   Lab Units 10/02/22  0550   WBC Thousand/uL 8 81   HEMOGLOBIN g/dL 8 4*   HEMATOCRIT % 27 7*   PLATELETS Thousands/uL 474*   NEUTROS PCT % 54   LYMPHS PCT % 22   MONOS PCT % 14*   EOS PCT % 8*     Results from last 7 days   Lab Units 10/02/22  0550   SODIUM mmol/L 141   POTASSIUM mmol/L 3 8   CHLORIDE mmol/L 104   CO2 mmol/L 30   BUN mg/dL 9   CREATININE mg/dL 0 81   ANION GAP mmol/L 7   CALCIUM mg/dL 8 9   GLUCOSE RANDOM mg/dL 120                       Lines/Drains:  Invasive Devices  Report    Peripheral Intravenous Line  Duration           Peripheral IV 09/29/22 Right Antecubital 3 days          Drain  Duration           External Urinary Catheter 7 days                      Imaging: No pertinent imaging reviewed  Recent Cultures (last 7 days):   Results from last 7 days   Lab Units 09/30/22  1106 09/29/22  0535 09/26/22  2118   BLOOD CULTURE   --  No Growth at 72 hrs    No Growth at 72 hrs   --    GRAM STAIN RESULT  No Polys or Bacteria seen  --   --    URINE CULTURE   --   --  >100,000 cfu/ml Proteus mirabilis ESBL*   BODY FLUID CULTURE, STERILE  No growth  --   --        Last 24 Hours Medication List:   Current Facility-Administered Medications   Medication Dose Route Frequency Provider Last Rate    acetaminophen  650 mg Oral Q6H [address] Lisas Elder MD      allopurinol  100 mg Oral Daily Lissa Elder MD      apixaban  5 mg Oral BID [place] TOMMYbarmarceloyanna AMANDA      ascorbic acid  500 mg Oral BID Lissa Elder MD      cholecalciferol  400 Units Oral Daily Lissa Elder MD      diltiazem  120 mg Oral Daily Lissa Elder MD      DULoxetine  30 mg Oral Daily Lissa Elder MD      ezetimibe  10 mg Oral Daily Lissa Elder MD      ferrous sulfate  325 mg Oral BID With Meals Lissa Elder MD      folic acid  605 mcg Oral Daily Lissa Elder MD      furosemide  40 mg Oral Daily Valrie Nageotte Anca Mitchell MD      lisinopril  2 5 mg Oral Daily Jv Castillo MD      LORazepam  0 5 mg Oral Q8H PRN Jv Castillo MD      metoprolol tartrate  100 mg Oral Daily Jv Castillo MD      metoprolol tartrate  50 mg Oral HS Jv Castillo MD      ondansetron  4 mg Intravenous Q4H PRN Jv Castillo MD      oxyCODONE  2 5 mg Oral Q6H PRN JERI Freitas      pantoprazole  40 mg Intravenous Q12H [address] JERI Raygoza      polyethylene glycol  17 g Oral Daily PRN JERI Raygoza      senna-docusate sodium  2 tablet Oral BID Joyce Bence, CRNP          Today, Patient Was Seen By: Elizabeth Villa    **Please Note: This note may have been constructed using a voice recognition system  **

## 2022-10-02 NOTE — QUICK NOTE
Patient not Formally evaluated  Asked to review cultures  Had Staph epidermidis in 2 blood cultures from admission which were felt likely to be contaminant  Repeat blood cultures negative  Thigh collection aspirated yielding dark bloody fluid, possibly consistent with hematoma  Cultures negative  Clinical impression remains that blood cultures may have been contaminant  Has received 7 days of IV antibiotics regardless  TTE negative  Can discontinue vancomycin today  Has completed treatment course for possible UTI  Dr Park Chisholm will formally assessed tomorrow  Discussed in detail with Dr Christ Milner

## 2022-10-02 NOTE — ASSESSMENT & PLAN NOTE
· CT noting what appears to be seroma at the left femur proximally, sampling fluid cx without bacteria  · Orthopedic surgery consulted; recommendations as noted below  ID concerned about infectious etiology and thus sampling via IR performed on 9/30  · Discontinue vancomycin per ID, cx data currently without bacteria or polys  · Weight-bearing as tolerated  · Orthopedic surgery removed several staples yesterday, incomplete due to patient pain  · CT of lower extremity to evaluate for further fluid collection:   IMPRESSION:      Rim-enhancing subcutaneous fluid collection at the lateral aspect of the proximal femur that measures 5 6 x 2 1 x 8 3 cm  (transverse by AP by CC, images #4/55 and #302/49)          Second rim-enhancing subcutaneous fluid collection at the lateral aspect of the distal femur/knee that measures 6 9 x 6 3  x 22 2 cm  · CT pelvis noted fluid as above  · PT/OT consulted and support appreciated  · Case management on board to assist with dispo  · Natalie Blandon upon discharge

## 2022-10-02 NOTE — PROGRESS NOTES
Vancomycin IV Pharmacy-to-Dose Consultation    Oleg Marino is a 76 y o  female who is currently receiving Vancomycin IV with management by the Pharmacy Consult service  Assessment/Plan:  The patient was reviewed  Renal function is stable and no signs or symptoms of nephrotoxicity and/or infusion reactions were documented in the chart  Based on todays assessment, continue current vancomycin (day # 6) dosing of 750 mg IV every 12 hours, with a plan for trough to be drawn at 0600 on 10/5  We will continue to follow the patients culture results and clinical progress daily      Lisa Castaneda, Pharmacist

## 2022-10-02 NOTE — ASSESSMENT & PLAN NOTE
· Blood cultures from 9/25 2/2 positive for staphylococcus epidermidis   · In the setting of pacemaker as well   · Repeats ordered per ID   · Infectious disease consulted; input appreciated   · Discontinue IV Vancomycin   · Repeat blood cultures pending- currently times 48 hours  · Sampled fluid without growth

## 2022-10-02 NOTE — PROGRESS NOTES
Vancomycin IV Pharmacy-to-Dose Consultation    Cherie Gill is a 76 y o  female who was receiving Vancomycin IV with management by the Pharmacy Consult service for treatment of bacteremia    The patient's Vancomycin therapy has been completed / discontinued  Thank you for allowing us to take part in this patient's care  Pharmacy will sign-off now; please call or re-consult if there are any questions          Rakel Spine  Pharmacist

## 2022-10-02 NOTE — ASSESSMENT & PLAN NOTE
· Hemoglobin is 8 4 this appears pretty stable when compared to 8 9 a week ago    · Continue on iron and folic acid as well as Q71  · Ongoing outpatient monitoring and follow-up with PCP    Lab Results   Component Value Date    HGB 8 4 (L) 10/02/2022    HGB 8 4 (L) 09/30/2022    HGB 8 4 (L) 09/29/2022

## 2022-10-03 PROBLEM — R82.71 BACTERIA IN URINE: Status: ACTIVE | Noted: 2022-10-03

## 2022-10-03 LAB
BACTERIA SPEC ANAEROBE CULT: NO GROWTH
FLUAV RNA RESP QL NAA+PROBE: NEGATIVE
FLUBV RNA RESP QL NAA+PROBE: NEGATIVE
RSV RNA RESP QL NAA+PROBE: NEGATIVE
SARS-COV-2 RNA RESP QL NAA+PROBE: NEGATIVE

## 2022-10-03 PROCEDURE — 97530 THERAPEUTIC ACTIVITIES: CPT

## 2022-10-03 PROCEDURE — 0241U HB NFCT DS VIR RESP RNA 4 TRGT: CPT | Performed by: PHYSICIAN ASSISTANT

## 2022-10-03 PROCEDURE — 99232 SBSQ HOSP IP/OBS MODERATE 35: CPT | Performed by: PHYSICIAN ASSISTANT

## 2022-10-03 PROCEDURE — C9113 INJ PANTOPRAZOLE SODIUM, VIA: HCPCS | Performed by: NURSE PRACTITIONER

## 2022-10-03 PROCEDURE — 97110 THERAPEUTIC EXERCISES: CPT

## 2022-10-03 PROCEDURE — 99232 SBSQ HOSP IP/OBS MODERATE 35: CPT | Performed by: INTERNAL MEDICINE

## 2022-10-03 PROCEDURE — 99024 POSTOP FOLLOW-UP VISIT: CPT

## 2022-10-03 RX ORDER — PANTOPRAZOLE SODIUM 40 MG/1
40 TABLET, DELAYED RELEASE ORAL EVERY 12 HOURS SCHEDULED
Status: DISCONTINUED | OUTPATIENT
Start: 2022-10-03 | End: 2022-10-04 | Stop reason: HOSPADM

## 2022-10-03 RX ADMIN — OXYCODONE HYDROCHLORIDE AND ACETAMINOPHEN 500 MG: 500 TABLET ORAL at 18:00

## 2022-10-03 RX ADMIN — METOPROLOL TARTRATE 50 MG: 100 TABLET, FILM COATED ORAL at 22:15

## 2022-10-03 RX ADMIN — ACETAMINOPHEN 650 MG: 325 TABLET, FILM COATED ORAL at 12:40

## 2022-10-03 RX ADMIN — LISINOPRIL 2.5 MG: 2.5 TABLET ORAL at 08:16

## 2022-10-03 RX ADMIN — OXYCODONE HYDROCHLORIDE AND ACETAMINOPHEN 500 MG: 500 TABLET ORAL at 08:16

## 2022-10-03 RX ADMIN — PANTOPRAZOLE SODIUM 40 MG: 40 INJECTION, POWDER, FOR SOLUTION INTRAVENOUS at 08:16

## 2022-10-03 RX ADMIN — APIXABAN 5 MG: 5 TABLET, FILM COATED ORAL at 08:16

## 2022-10-03 RX ADMIN — PANTOPRAZOLE SODIUM 40 MG: 40 TABLET, DELAYED RELEASE ORAL at 22:15

## 2022-10-03 RX ADMIN — EZETIMIBE 10 MG: 10 TABLET ORAL at 22:15

## 2022-10-03 RX ADMIN — APIXABAN 5 MG: 5 TABLET, FILM COATED ORAL at 18:00

## 2022-10-03 RX ADMIN — ACETAMINOPHEN 650 MG: 325 TABLET, FILM COATED ORAL at 18:00

## 2022-10-03 RX ADMIN — FOLIC ACID TAB 400 MCG 400 MCG: 400 TAB at 08:23

## 2022-10-03 RX ADMIN — VANCOMYCIN HYDROCHLORIDE 750 MG: 750 INJECTION, SOLUTION INTRAVENOUS at 10:41

## 2022-10-03 RX ADMIN — FUROSEMIDE 40 MG: 40 TABLET ORAL at 08:16

## 2022-10-03 RX ADMIN — FERROUS SULFATE TAB 325 MG (65 MG ELEMENTAL FE) 325 MG: 325 (65 FE) TAB at 18:00

## 2022-10-03 RX ADMIN — DILTIAZEM HYDROCHLORIDE 120 MG: 120 CAPSULE, COATED, EXTENDED RELEASE ORAL at 08:16

## 2022-10-03 RX ADMIN — CHOLECALCIFEROL TAB 10 MCG (400 UNIT) 400 UNITS: 10 TAB at 08:16

## 2022-10-03 RX ADMIN — ACETAMINOPHEN 650 MG: 325 TABLET, FILM COATED ORAL at 05:36

## 2022-10-03 RX ADMIN — FERROUS SULFATE TAB 325 MG (65 MG ELEMENTAL FE) 325 MG: 325 (65 FE) TAB at 08:16

## 2022-10-03 RX ADMIN — METOPROLOL TARTRATE 100 MG: 100 TABLET, FILM COATED ORAL at 08:11

## 2022-10-03 RX ADMIN — OXYCODONE HYDROCHLORIDE 2.5 MG: 5 TABLET ORAL at 19:44

## 2022-10-03 RX ADMIN — DULOXETINE HYDROCHLORIDE 30 MG: 30 CAPSULE, DELAYED RELEASE ORAL at 22:16

## 2022-10-03 RX ADMIN — SENNOSIDES AND DOCUSATE SODIUM 2 TABLET: 8.6; 5 TABLET ORAL at 18:00

## 2022-10-03 RX ADMIN — SENNOSIDES AND DOCUSATE SODIUM 2 TABLET: 8.6; 5 TABLET ORAL at 08:16

## 2022-10-03 RX ADMIN — ALLOPURINOL 100 MG: 100 TABLET ORAL at 08:16

## 2022-10-03 NOTE — PHYSICAL THERAPY NOTE
PHYSICAL THERAPY NOTE          Patient Name: Benito ESPINALTGD'I Date: 10/3/2022         10/03/22 1522   PT Last Visit   PT Visit Date 10/03/22   Note Type   Note Type Treatment   Pain Assessment   Pain Assessment Tool 0-10   Pain Score 2   Pain Location/Orientation Location: Back   Pain Onset/Description Frequency: Constant/Continuous   Effect of Pain on Daily Activities limited activity tolerance and functioanl mobility   Patient's Stated Pain Goal No pain   Hospital Pain Intervention(s) Repositioned; Ambulation/increased activity; Emotional support; Rest   Multiple Pain Sites No   Restrictions/Precautions   Weight Bearing Precautions Per Order Yes   LLE Weight Bearing Per Order WBAT   Other Precautions Chair Alarm; Bed Alarm;WBS;Fall Risk;Pain   General   Chart Reviewed Yes   Response to Previous Treatment Patient with no complaints from previous session  Family/Caregiver Present Yes   Cognition   Overall Cognitive Status WFL   Arousal/Participation Alert; Responsive; Cooperative   Attention Attends with cues to redirect   Orientation Level Oriented X4   Memory Decreased recall of recent events;Decreased recall of precautions   Following Commands Follows one step commands with increased time or repetition   Comments pt identified by name and    Subjective   Subjective pt was agreeable to participate in PT intervention   Bed Mobility   Rolling R 4  Minimal assistance   Additional items Assist x 1; Increased time required;Verbal cues;HOB elevated; Bedrails;LE management; Other  (trunk management)   Rolling L 4  Minimal assistance   Additional items Assist x 1;HOB elevated; Bedrails; Increased time required;Verbal cues;LE management; Other  (trunk management)   Supine to Sit 4  Minimal assistance   Additional items Assist x 1;HOB elevated; Bedrails; Increased time required;Verbal cues;LE management   Sit to Supine 3  Moderate assistance Additional items Assist x 1;HOB elevated; Bedrails; Increased time required;Verbal cues;LE management   Additional Comments pt was able to sit EOB 10 minutes while performing TE activities w/o lOB in order to increase static/dynamic sitting balance and tolerance   Transfers   Sit to Stand 2  Maximal assistance   Additional items Assist x 1; Increased time required;Verbal cues   Stand to Sit 2  Maximal assistance   Additional items Assist x 1; Increased time required;Verbal cues   Stand pivot Unable to assess   Additional Comments pt was able to clear buttocks from EOB but was unable to complete transfer from EoB to standing with RW   Ambulation/Elevation   Gait pattern Not appropriate   Balance   Static Sitting Fair +   Static Standing Poor -   Ambulatory Zero   Endurance Deficit   Endurance Deficit Yes   Endurance Deficit Description limited standing tolerance   Activity Tolerance   Activity Tolerance Patient limited by fatigue   Nurse Made Aware Spoke to RN   Exercises   Quad Sets Supine;10 reps;AROM; Bilateral   Heelslides Supine;10 reps;AROM; Bilateral   Glute Sets Supine;10 reps;AROM; Bilateral   Hip Abduction Sitting;10 reps;AROM; Bilateral   Hip Adduction Sitting;10 reps;AROM; Bilateral  (pillow squeezes)   Knee AROM Long Arc Quad Sitting;10 reps;AAROM; Left   Ankle Pumps Sitting;20 reps;AROM; Bilateral   Marching Sitting;5 reps;AROM; Left   Assessment   Prognosis Fair   Problem List Decreased strength;Decreased range of motion;Decreased endurance; Impaired balance;Decreased mobility; Decreased coordination;Decreased cognition; Impaired judgement;Decreased safety awareness; Obesity;Pain;Orthopedic restrictions; Impaired hearing   Assessment pt began tx session lying supine in the bed and was agreeale to participate in PT intervention   pt continues to remain consistant with min Ax1 for all bed mobility such as rolling and repositioning in the bed from L to R with LE management and completing a supine<>sit EOB transfer with LE management  pt was able to increase static/dynamic sitting balance and tolerance while perorming TE activities at EOB for 10 minutes w/o LOB  pt attempted 3 STS from EoB to [address] [place] [address] in todays tx session  All 3 attempts pt was able to clear buttockd from EoB but was unable to complete transfer to RW with max Ax1 and vC's for hand placement for safety and balance  pt required mod Ax1 to return back to supine position with LE management  post tx session pt in bed with call bell and all pt needs met  pt would benefit from continued skilled PT intervention in order to strengthen LE's and increase independence and safety with all OOB mobility when appropriate  Goals   Patient Goals to get back to rehab   STG Expiration Date 10/07/22   PT Treatment Day 3   Plan   Treatment/Interventions Functional transfer training;LE strengthening/ROM; Therapeutic exercise; Endurance training;Cognitive reorientation;Patient/family training;Equipment eval/education; Bed mobility;Gait training;Spoke to nursing   Progress No functional improvements   PT Frequency 5-7x/wk   Recommendation   PT Discharge Recommendation Return to facility with rehabilitation services   Equipment Recommended Walker; Wheelchair   AM-PAC Basic Mobility Inpatient   Turning in Bed Without Bedrails 3   Lying on Back to Sitting on Edge of Flat Bed 3   Moving Bed to Chair 1   Standing Up From Chair 1   Walk in Room 1   Climb 3-5 Stairs 1   Basic Mobility Inpatient Raw Score 10   Turning Head Towards Sound 4   Follow Simple Instructions 3   Low Function Basic Mobility Raw Score 17   Low Function Basic Mobility Standardized Score 27 46   Highest Level Of Mobility   JH-HLM Goal 4: Move to chair/commode   JH-HLM Achieved 3: Sit at edge of bed   Education   Education Provided Other  (bed mobility and functional transfers)   Patient Reinforcement needed   End of Consult   Patient Position at End of Consult Supine;Bed/Chair alarm activated; All needs within reach   The patient's AM-PAC Basic Mobility Inpatient Short Form Raw Score is 10  A Raw score of less than or equal to 16 suggests the patient may benefit from discharge to post-acute rehabilitation services  Please also refer to the recommendation of the Physical Therapist for safe discharge planning        Hines Battles

## 2022-10-03 NOTE — CASE MANAGEMENT
Case Management Discharge Planning Note    Patient name Eleanor Sheffield  Location S /S -50 MRN [id number]  : 1945 Date 10/3/2022       Current Admission Date: 2022  Current Admission Diagnosis:Bacteremia due to Gram-positive bacteria   Patient Active Problem List    Diagnosis Date Noted    Bacteria in urine 10/03/2022    Bacteremia due to Gram-positive bacteria 2022    Anemia 2022    Bladder wall thickening 2022    left femoral fluid collection 2022    Pleural effusion on right 2022    Vomiting and diarrhea 2022    Surgical wound present 2022    Renal insufficiency     Stage 3a chronic kidney disease ([address] ) 2022    Preop examination 2022    Preop cardiovascular exam 2022    Poor dentition 2022    Closed bicondylar fracture of left femur with delayed healing 2022    Paroxysmal atrial fibrillation ([address] ) 2022    Chronic gout with tophus 2021    Current moderate episode of major depressive disorder ([address] ) 2021    Morbid obesity with body mass index (BMI) of 40 0 to 49 9 ([address] ) 2021    Reactive airway disease with acute exacerbation 2021    Ambulatory dysfunction 2021    Leukocytosis 2021    Arthritis 2021    Other fatigue 2021    Hyperlipidemia 11/10/2020    Dysphonia 2020    Vitamin D insufficiency 2020    Chronic pain of left knee 2019    Age related osteoporosis 2019    Anxiety 2019    Tremor 2019    Other insomnia 2019    Urinary incontinence 2018    Psoriasis 2018    Thyroid nodule 2018    Atrial fibrillation ([address] ) 02/15/2018    Tachy-smita syndrome ([address] ) 02/15/2018    Essential hypertension 02/15/2018    Pacemaker 02/15/2018    GERD (gastroesophageal reflux disease) 2017    Mild carpal tunnel syndrome, right 04/10/2017    Degenerative lumbar spinal stenosis 03/31/2017    Low back pain 08/29/2016    Lumbar degenerative disc disease 08/29/2016    Chronic bilateral low back pain without sciatica 06/08/2016    Chronic GERD 05/06/2016    Overweight 02/12/2016    Fibromyalgia 11/06/2013      LOS (days): 7  Geometric Mean LOS (GMLOS) (days): 3 50  Days to GMLOS:-3 7     OBJECTIVE:  Risk of Unplanned Readmission Score: 17 52         Current admission status: Inpatient   Preferred Pharmacy:   [facility] [address], [address] [place] [address] [address] [facility] [address]  Phone: [phone] Fax: [phone] [facility], [facility] [address]  [address] [facility]  [address]  [facility]  [address]  [address]  Phone: [phone] Fax: [phone]    Primary Care Provider: Penny Castro MD    Primary Insurance: MEDICARE  Secondary Insurance: AARP    DISCHARGE DETAILS:    Discharge planning discussed with[de-identified] daughter and pt  Freedom of Choice: Yes  Comments - Freedom of Choice: [facility] has a bed available for pt tomorrow as PICC needs to be placed prior to DC  PICC will be able to be placed tomorrow at 1000  CM contacted family/caregiver?: Yes  Were Treatment Team discharge recommendations reviewed with patient/caregiver?: Yes  Did patient/caregiver verbalize understanding of patient care needs?: Yes  Were patient/caregiver advised of the risks associated with not following Treatment Team discharge recommendations?: Yes    Contacts  Patient Contacts: daughter  Relationship to Patient[de-identified] Family  Contact Method: In Person  Reason/Outcome: Continuity of Care, Referral, Discharge Planning, Emergency Contact          Other Referral/Resources/Interventions Provided:  Interventions: Transportation  Referral Comments: Pt will be returning to [facility] tomorrow for rehab  Pt needs a PICC placed prior to DC  Transportation is needed for pt at this time  Pt will need BLS and this has been requested for 1230    [facility] will be tranporting pt to [facility] tomorrow  Would you like to participate in our [address] service program?  : No - Declined    Treatment Team Recommendation: Short Term Rehab  Discharge Destination Plan[de-identified] Short Term Rehab         IMM Given (Date):: 10/03/22  IMM Given to[de-identified] Patient  Family notified[de-identified] daughter     IMM reviewed with patient, patient agrees with discharge determination

## 2022-10-03 NOTE — PROGRESS NOTES
The pantoprazole has / have been converted to Oral per [facility]PO [facility]version Protocol for Adults as approved by the Pharmacy and Therapeutics Committee  The patient met all eligible criteria:  3 Age = 23years old   2) Received at least one dose of the IV form   3) Receiving at least one other scheduled oral/enteral medication   4) Tolerating an oral/enteral diet   and did not have any exclusions:   1) Critical care patient   2) Active GI bleed (IF assessing H2RAs or PPIs)   3) Continuous tube feeding (IF assessing cipro, doxycycline, levofloxacin, minocycline, rifampin, or voriconazole)   4) Receiving PO vancomycin (IF assessing metronidazole)   5) Persistent nausea and/or vomiting   6) Ileus or gastrointestinal obstruction   7) Chandrika/nasogastric tube set for continuous suction   8) Specific order not to automatically convert to PO (in the order's comments or if discussed in the most recent Infectious Disease or primary team's progress notes)

## 2022-10-03 NOTE — PROGRESS NOTES
Progress Note - [address]  76 y o  female MRN: [id number]  Unit/Bed#: AN CT SCAN      Subjective:    76 y  o female patient seen and evaluated at bedside  She is status post left distal femoral replacement and removal of deep implants of left femur on September 12, 2022  She was discharged on September 16, 2022  She was readmitted for bacteremia due to Staph epidermidis on September 25, 2022  Her niece was in the room during the visit  She states that everything is going fine  She has no new complaints today  Her pain is well managed  She denies nausea, vomiting, diarrhea, fever, chills, shortness of breath      Labs:  0   Lab Value Date/Time    HCT 27 7 (L) 09/30/2022 0446    HCT 27 8 (L) 09/29/2022 0534    HCT 29 0 (L) 09/28/2022 0622    HGB 8 4 (L) 09/30/2022 0446    HGB 8 4 (L) 09/29/2022 0534    HGB 8 6 (L) 09/28/2022 0622    INR 1 28 (H) 09/25/2022 0706    WBC 9 13 09/30/2022 0446    WBC 11 00 (H) 09/29/2022 0534    WBC 8 60 09/28/2022 0622    CRP 5 2 (H) 05/17/2022 1139       Meds:    Current Facility-Administered Medications:     acetaminophen (TYLENOL) tablet 650 mg, 650 mg, Oral, Q6H [address], Cady Flores MD, 650 mg at 09/30/22 0526    allopurinol (ZYLOPRIM) tablet 100 mg, 100 mg, Oral, Daily, Cady Flores MD, 100 mg at 09/29/22 0834    apixaban (ELIQUIS) tablet 5 mg, 5 mg, Oral, BID, JERI Raygoza, 5 mg at 09/29/22 1706    ascorbic acid (VITAMIN C) tablet 500 mg, 500 mg, Oral, BID, Cady Flores MD, 500 mg at 09/29/22 1705    cholecalciferol (VITAMIN D3) tablet 400 Units, 400 Units, Oral, Daily, Cady Flores MD, 400 Units at 09/29/22 0834    diltiazem (CARDIZEM CD) 24 hr capsule 120 mg, 120 mg, Oral, Daily, Cady Flores MD, 120 mg at 09/29/22 2921    DULoxetine (CYMBALTA) delayed release capsule 30 mg, 30 mg, Oral, Daily, Cady Flores MD, 30 mg at 09/29/22 2005    ertapenem (INVanz) 1,000 mg in sodium chloride 0 9 % 50 mL IVPB, 1,000 mg, Intravenous, Q24H, JERI Raygoza, Last Rate: 100 mL/hr at 09/29/22 1422, 1,000 mg at 09/29/22 1422    ezetimibe (ZETIA) tablet 10 mg, 10 mg, Oral, Daily, Rober Escudero MD, 10 mg at 09/29/22 2005    ferrous sulfate tablet 325 mg, 325 mg, Oral, BID With Meals, Rober Escudero MD, 325 mg at 79/72/29 6930    folic acid (FOLVITE) tablet 400 mcg, 400 mcg, Oral, Daily, Rober Escudero MD, 400 mcg at 09/29/22 0836    furosemide (LASIX) tablet 40 mg, 40 mg, Oral, Daily, Rober Escudero MD, 40 mg at 09/29/22 0835    lisinopril (ZESTRIL) tablet 2 5 mg, 2 5 mg, Oral, Daily, Rober Escudero MD, 2 5 mg at 09/29/22 0835    LORazepam (ATIVAN) tablet 0 5 mg, 0 5 mg, Oral, Q8H PRN, Rober Escudero MD, 0 5 mg at 09/25/22 1352    metoprolol tartrate (LOPRESSOR) tablet 100 mg, 100 mg, Oral, Daily, Rober Escudero MD, 100 mg at 09/29/22 0834    metoprolol tartrate (LOPRESSOR) tablet 50 mg, 50 mg, Oral, HS, Rober Escudero MD, 50 mg at 09/29/22 2132    [facility]etron [facility]) injection 4 mg, 4 mg, Intravenous, Q4H PRN, Rober Escudero MD, 4 mg at 09/26/22 0816    oxyCODONE (ROXICODONE) IR tablet 2 5 mg, 2 5 mg, Oral, Q6H PRN, JERI Ortega, 2 5 mg at 09/29/22 2031    pantoprazole (PROTONIX) injection 40 mg, 40 mg, Intravenous, Q12H [address], JERI Raygoza, 40 mg at 09/29/22 2005    polyethylene glycol (MIRALAX) packet 17 g, 17 g, Oral, Daily PRN, JERI Raygoza, 17 g at 09/29/22 1705    senna-docusate sodium (SENOKOT S) 8 6-50 mg per tablet 2 tablet, 2 tablet, Oral, BID, JERI Raygoza, 2 tablet at 09/29/22 1706    vancomycin (VANCOCIN) IVPB (premix in dextrose) 750 mg 150 mL, 10 mg/kg (Adjusted), Intravenous, Q12H, Eri Sanches DO, Last Rate: 150 mL/hr at 09/30/22 0537, 750 mg at 09/30/22 0537    Blood Culture:   Lab Results   Component Value Date    BLOODCX Received in Microbiology Lab  Culture in Progress  09/29/2022    BLOODCX Received in Microbiology Lab  Culture in Progress   09/29/2022 Wound Culture:   No results found for: WOUNDCULT    Ins and Outs:  I/O last 24 hours: In: 240 [P O :240]  Out: 3350 [Urine:3350]          Physical:  Vitals:    09/29/22 2224   BP: 151/56   Pulse: 70   Resp:    Temp: 99 1 °F (37 3 °C)   SpO2: 96%     Musculoskeletal: left Lower Extremity  · Incisions:  Uncovered and exposed open air  Non erythematous and no discharge seen all incision sites  No discoloration of the incision sites  No surgical staples at site of incision  · Moderate swelling throughout the thigh without discrete palpable fluid collection  · Range of motion to 90° with flexion of the knee  · Sensation intact  · She has no significant ttp  · +EHL/FHL, dorsi/plantarflesion  · 2+ DP  Assessment:    76 y o  female with bacteremia due to unknown cause  Doing well with treatment  Plan:  · Weight bearing as tolerated  left lower extremity  · Antibiotic treatment per Infectious Disease  Currently receiving vancomycin  · Continue to work with physical therapy and occupational therapy  · Pain control per primary team    · Body mass index is 40 49 kg/m²  morbidly obese  Recommend behavior modifications and nutrition  · Ortho to follow peripherally  · Will need follow up with Dr Chichi Alan upon discharge       Wilfrid Chacon PA-C

## 2022-10-03 NOTE — ASSESSMENT & PLAN NOTE
· Blood cultures from 9/25 2/2 positive for staphylococcus epidermidis   · In the setting of pacemaker as well   · Repeat blood cultures negative at >72 hours  · Infectious disease consulted; input appreciated   · 2d echo negative  · IV Vancomycin for 6 week course--PICC consent obtained

## 2022-10-03 NOTE — PROGRESS NOTES
Vancomycin Assessment    Shivani Torres is a 76 y o  female who is currently receiving vancomycin 750 mg every 12 hours for bacteremia     Relevant clinical data and objective history reviewed:  Creatinine   Date Value Ref Range Status   10/02/2022 0 81 0 60 - 1 30 mg/dL Final     Comment:     Standardized to IDMS reference method   09/30/2022 0 90 0 60 - 1 30 mg/dL Final     Comment:     Standardized to IDMS reference method   09/29/2022 0 82 0 60 - 1 30 mg/dL Final     Comment:     Standardized to IDMS reference method     BP (!) 126/46   Pulse 70   Temp 98 8 °F (37 1 °C)   Resp 16   Ht 5' 4" (1 626 m)   Wt 107 kg (235 lb)   SpO2 95%   BMI 40 34 kg/m²   I/O last 3 completed shifts:  In: -   Out: 1200 [Urine:1200]  Lab Results   Component Value Date/Time    BUN 9 10/02/2022 05:50 AM    WBC 8 81 10/02/2022 05:50 AM    HGB 8 4 (L) 10/02/2022 05:50 AM    HCT 27 7 (L) 10/02/2022 05:50 AM    MCV 95 10/02/2022 05:50 AM     (H) 10/02/2022 05:50 AM     Temp Readings from Last 3 Encounters:   10/03/22 98 8 °F (37 1 °C)   09/19/22 98 1 °F (36 7 °C)   09/16/22 98 7 °F (37 1 °C) (Oral)     Vancomycin Days of Therapy: 1    Assessment/Plan  The patient is currently on vancomycin utilizing scheduled dosing based on adjusted body weight (due to obesity)  Baseline risks associated with therapy include: concomitant nephrotoxic medications and advanced age  The patient is currently receiving 750 mg every 12 hours and is clinically appropriate and dose will be continued  Pharmacy will also follow closely for s/sx of nephrotoxicity, infusion reactions, and appropriateness of therapy  BMP and CBC will be ordered per protocol  Plan for trough as patient approaches steady state, prior to the 4th  dose at approximately 2130 on 10/4  Due to infection severity, will target a trough of 15-20 (appropriate for most indications)     Pharmacy will continue to follow the patients culture results and clinical progress daily     Bernardo Sun, Pharmacist

## 2022-10-03 NOTE — PLAN OF CARE
Problem: MOBILITY - ADULT  Goal: Maintain or return to baseline ADL function  Description: INTERVENTIONS:  -  Assess patient's ability to carry out ADLs; assess patient's baseline for ADL function and identify physical deficits which impact ability to perform ADLs (bathing, care of mouth/teeth, toileting, grooming, dressing, etc )  - Assess/evaluate cause of self-care deficits   - Assess range of motion  - Assess patient's mobility; develop plan if impaired  - Assess patient's need for assistive devices and provide as appropriate  - Encourage maximum independence but intervene and supervise when necessary  - Involve family in performance of ADLs  - Assess for home care needs following discharge   - Consider OT consult to assist with ADL evaluation and planning for discharge  - Provide patient education as appropriate  Outcome: Progressing     Problem: Potential for Falls  Goal: Patient will remain free of falls  Description: INTERVENTIONS:  - Educate patient/family on patient safety including physical limitations  - Instruct patient to call for assistance with activity   - Consult OT/PT to assist with strengthening/mobility   - Keep Call bell within reach  - Keep bed low and locked with side rails adjusted as appropriate  - Keep care items and personal belongings within reach  - Initiate and maintain comfort rounds  - Make Fall Risk Sign visible to staff  - Offer Toileting every 2 Hours, in advance of need  - Initiate/Maintain bed alarm  - Obtain necessary fall risk management equipment: fall cushion   - Apply yellow socks and bracelet for high fall risk patients  - Consider moving patient to room near nurses station  Outcome: Progressing     Problem: Prexisting or High Potential for Compromised Skin Integrity  Goal: Skin integrity is maintained or improved  Description: INTERVENTIONS:  - Identify patients at risk for skin breakdown  - Assess and monitor skin integrity  - Assess and monitor nutrition and hydration status  - Monitor labs   - Assess for incontinence   - Turn and reposition patient  - Assist with mobility/ambulation  - Relieve pressure over bony prominences  - Avoid friction and shearing  - Provide appropriate hygiene as needed including keeping skin clean and dry  - Evaluate need for skin moisturizer/barrier cream  - Collaborate with interdisciplinary team   - Patient/family teaching  - Consider wound care consult   Outcome: Progressing     Problem: MUSCULOSKELETAL - ADULT  Goal: Maintain or return mobility to safest level of function  Description: INTERVENTIONS:  - Assess patient's ability to carry out ADLs; assess patient's baseline for ADL function and identify physical deficits which impact ability to perform ADLs (bathing, care of mouth/teeth, toileting, grooming, dressing, etc )  - Assess/evaluate cause of self-care deficits   - Assess range of motion  - Assess patient's mobility  - Assess patient's need for assistive devices and provide as appropriate  - Encourage maximum independence but intervene and supervise when necessary  - Involve family in performance of ADLs  - Assess for home care needs following discharge   - Consider OT consult to assist with ADL evaluation and planning for discharge  - Provide patient education as appropriate  Outcome: Progressing     Problem: PAIN - ADULT  Goal: Verbalizes/displays adequate comfort level or baseline comfort level  Description: Interventions:  - Encourage patient to monitor pain and request assistance  - Assess pain using appropriate pain scale  - Administer analgesics based on type and severity of pain and evaluate response  - Implement non-pharmacological measures as appropriate and evaluate response  - Consider cultural and social influences on pain and pain management  - Notify physician/advanced practitioner if interventions unsuccessful or patient reports new pain  Outcome: Progressing

## 2022-10-03 NOTE — PROGRESS NOTES
[facility]  Progress Note - Roxanna Chaudhary 1945, 76 y o  female MRN: [id number]  Unit/Bed#: S -09 Encounter: [id number]  Primary Care Provider: Sinai Salter MD   Date and time admitted to hospital: 9/25/2022  6:25 AM    * Bacteremia due to Gram-positive bacteria  Assessment & Plan  · Blood cultures from 9/25 2/2 positive for staphylococcus epidermidis   · In the setting of pacemaker as well   · Repeat blood cultures negative at >72 hours  · Infectious disease consulted; input appreciated   · 2d echo negative  · IV Vancomycin for 6 week course--PICC consent obtained        Vomiting and diarrhea  Assessment & Plan  · Background: Presented to the ED with complaints of vomiting and diarrhea, symptoms resolved  · CT abdomen and pelvis unrevealing  Stool cultures negative  · Tolerated advancement in diet          left femoral fluid collection  Assessment & Plan  · Post op left thigh collection  · CT LE: "Rim-enhancing subcutaneous fluid collection at the lateral aspect of the proximal femur that measures 5 6 x 2 1 x 8 3 cm  Second rim-enhancing subcutaneous fluid collection at the lateral aspect of the distal femur/knee that measures 6 9 x 6 3 x 22 2 cm "  · Aspiration did not reveal any bacteria  · Appreciate orthopedic and Infectious Disease input       Bacteria in urine  Assessment & Plan  · MDRO >100k proteus in urine culture, likely colonization per ID    Anemia  Assessment & Plan  · Hemoglobin is 8 4 this appears pretty stable when compared to 8 9 a week ago    · Continue on iron and folic acid as well as J08  · Ongoing outpatient monitoring and follow-up with PCP    Lab Results   Component Value Date    HGB 8 4 (L) 10/02/2022    HGB 8 4 (L) 09/30/2022    HGB 8 4 (L) 09/29/2022       Atrial fibrillation (HCC)  Assessment & Plan  · Continue metoprolol, Cardizem  · eliquis for A/C    Morbid obesity with body mass index (BMI) of 40 0 to 49 9 (HCC)  Assessment & Plan  · BMI 40 34, recommend weight loss      VTE Pharmacologic Prophylaxis: VTE Score: 4 eliquis    Patient Centered Rounds: I performed bedside rounds with nursing staff today  Discussions with Specialists or Other Care Team Provider:  Case management, Infectious Disease    Education and Discussions with Family / Patient: Updated  (Caregiver) at bedside  Time Spent for Care: 30 minutes  More than 50% of total time spent on counseling and coordination of care as described above  Current Length of Stay: 7 day(s)  Current Patient Status: Inpatient   Certification Statement: The patient will continue to require additional inpatient hospital stay due to Awaiting PICC line and rehab placement  Discharge Plan: Anticipate discharge tomorrow to rehab facility  Code Status: Level 1 - Full Code    Subjective:   Patient denies any pain at this time or any other new events or concerns overnight    Objective:     Vitals:   Temp (24hrs), Av 7 °F (37 1 °C), Min:98 6 °F (37 °C), Max:98 8 °F (37 1 °C)    Temp:  [98 6 °F (37 °C)-98 8 °F (37 1 °C)] 98 8 °F (37 1 °C)  HR:  [70-73] 70  Resp:  [16-18] 16  BP: (126-150)/(46-52) 126/46  SpO2:  [91 %-95 %] 95 %  Body mass index is 40 34 kg/m²  Input and Output Summary (last 24 hours): Intake/Output Summary (Last 24 hours) at 10/3/2022 1015  Last data filed at 10/2/2022 2208  Gross per 24 hour   Intake --   Output 1200 ml   Net -1200 ml       Physical Exam:   Physical Exam  Vitals reviewed  Constitutional:       General: She is not in acute distress  Appearance: She is obese  She is not ill-appearing, toxic-appearing or diaphoretic  Comments: Patient lying comfortably in bed in no distress   Eyes:      General: No scleral icterus  Right eye: No discharge  Left eye: No discharge  Conjunctiva/sclera: Conjunctivae normal    Cardiovascular:      Rate and Rhythm: Normal rate  Heart sounds: No murmur heard    Pulmonary:      Effort: No respiratory distress  Breath sounds: No stridor  No wheezing, rhonchi or rales  Abdominal:      General: Bowel sounds are normal  There is no distension  Palpations: Abdomen is soft  Tenderness: There is no abdominal tenderness  There is no guarding  Comments: obese   Genitourinary:     Vagina: Vaginal discharge: SCDs  Musculoskeletal:      Right lower leg: No edema  Left lower leg: No edema  Skin:     Coloration: Skin is not jaundiced or pale  Findings: No bruising, erythema, lesion or rash  Neurological:      General: No focal deficit present  Mental Status: She is alert  Mental status is at baseline  Comments: Awake alert interactive good historian   Psychiatric:         Mood and Affect: Mood normal          Thought Content: Thought content normal           Additional Data:     Labs:  Results from last 7 days   Lab Units 10/02/22  0550   WBC Thousand/uL 8 81   HEMOGLOBIN g/dL 8 4*   HEMATOCRIT % 27 7*   PLATELETS Thousands/uL 474*   NEUTROS PCT % 54   LYMPHS PCT % 22   MONOS PCT % 14*   EOS PCT % 8*     Results from last 7 days   Lab Units 10/02/22  0550   SODIUM mmol/L 141   POTASSIUM mmol/L 3 8   CHLORIDE mmol/L 104   CO2 mmol/L 30   BUN mg/dL 9   CREATININE mg/dL 0 81   ANION GAP mmol/L 7   CALCIUM mg/dL 8 9   GLUCOSE RANDOM mg/dL 120                       Lines/Drains:  Invasive Devices  Report    Peripheral Intravenous Line  Duration           Peripheral IV 09/29/22 Right Antecubital 4 days          Drain  Duration           External Urinary Catheter 8 days              Imaging:     Recent Cultures (last 7 days):   Results from last 7 days   Lab Units 09/30/22  1106 09/29/22  0535 09/26/22  2118   BLOOD CULTURE   --  No Growth After 4 Days  No Growth After 4 Days    --    GRAM STAIN RESULT  No Polys or Bacteria seen  --   --    URINE CULTURE   --   --  >100,000 cfu/ml Proteus mirabilis ESBL*   BODY FLUID CULTURE, STERILE  No growth  --   --        Last 24 Hours Medication List:   Current Facility-Administered Medications   Medication Dose Route Frequency Provider Last Rate    acetaminophen  650 mg Oral Q6H [address] Chary Griffin MD      allopurinol  100 mg Oral Daily Chary Griffin MD      apixaban  5 mg Oral BID JERI Zavala      ascorbic acid  500 mg Oral BID Chary Griffin MD      cholecalciferol  400 Units Oral Daily Chary Griffin MD      diltiazem  120 mg Oral Daily Chary Griffin MD      DULoxetine  30 mg Oral Daily Chary Griffin MD      ezetimibe  10 mg Oral Daily Chary Griffin MD      ferrous sulfate  325 mg Oral BID With Meals Chary Griffin MD      folic acid  186 mcg Oral Daily Chary Griffin MD      furosemide  40 mg Oral Daily Chary Griffin MD      lisinopril  2 5 mg Oral Daily Chary Griffin MD      LORazepam  0 5 mg Oral Q8H PRN Chary Griffin MD      metoprolol tartrate  100 mg Oral Daily Chary Griffin MD      metoprolol tartrate  50 mg Oral HS Chary Griffin MD      ondansetron  4 mg Intravenous Q4H PRN Chary Griffin MD      oxyCODONE  2 5 mg Oral Q6H PRN JERI Muir      pantoprazole  40 mg Intravenous Q12H [address] JERI Raygoza      polyethylene glycol  17 g Oral Daily PRN JERI Raygoza      senna-docusate sodium  2 tablet Oral BID JERI Raygoza      vancomycin  10 mg/kg (Adjusted) Intravenous Q12H Nichelle eKnt, DO          Today, Patient Was Seen By: Aspen Garg    **Please Note: This note may have been constructed using a voice recognition system  **

## 2022-10-03 NOTE — PROGRESS NOTES
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  · Patient reportedly has an old right-sided knee replacement  · Patient has a place maker in place  · Was admitted to the hospital on 2022 for nausea and vomiting, in the setting of recent doxycycline use  · Blood cultures 2022:  Staph epidermidis  · Blood cultures 22: negative x 72 hours  · Does not meet SIRS criteria  Plan:  · Vancomycin was D/C yesterday  · TTE no vegetations noted  Defer TANISHA for now       2  Vomiting and diarrhea  · Now improving  · Continue to monitor clinically     3  Bladder wall thickening  · UA growing nitrates and leukocytes, innumerable WBC  · Urine culture growing ESBL Proteus  · Treated with 3 days ertapenem (completed     4  Seroma   · Partially visualized left lateral proximal femoral deep subcutaneous fluid collection measuring at least 5 5 x 5 cm presumably seroma given recent orthopedic procedure  · S/p I&D 10/1/22,  body fluid studies - no growth, anaerobic culture - no growth        Antibiotics:  Vancomycin  Ertapenem    Subjective:  Patient has no fever, chills, sweats; no nausea, vomiting, diarrhea; no cough, shortness of breath; no pain  No new symptoms  Patient sleeping  Daughter at bedside  Objective:  Vitals:  Temp:  [98 6 °F (37 °C)-98 8 °F (37 1 °C)] 98 8 °F (37 1 °C)  HR:  [70-73] 70  Resp:  [16-18] 16  BP: (126-150)/(46-52) 126/46  SpO2:  [91 %-95 %] 95 %  Temp (24hrs), Av 7 °F (37 1 °C), Min:98 6 °F (37 °C), Max:98 8 °F (37 1 °C)  Current: Temperature: 98 8 °F (37 1 °C)    Physical Exam:   General Appearance:  No acute distress  Throat: Oropharynx moist without lesions      Lungs:   Clear to auscultation bilaterally; no wheezes, rhonchi or rales; respirations unlabored   Heart:  RRR; no murmur, rub or gallop   Abdomen:   Soft, non-tender, non-distended, positive bowel sounds  Extremities: No clubbing, cyanosis or edema   Skin: No new rashes or lesions  No draining wounds noted  Labs, Imaging, & Other studies:   All pertinent labs and imaging studies were personally reviewed  Results from last 7 days   Lab Units 10/02/22  0550 09/30/22  0446 09/29/22  0534   WBC Thousand/uL 8 81 9 13 11 00*   HEMOGLOBIN g/dL 8 4* 8 4* 8 4*   PLATELETS Thousands/uL 474* 516* 558*     Results from last 7 days   Lab Units 10/02/22  0550 09/30/22  0446 09/29/22  0534   SODIUM mmol/L 141 139 139   POTASSIUM mmol/L 3 8 3 5 3 5   CHLORIDE mmol/L 104 103 103   CO2 mmol/L 30 27 31   BUN mg/dL 9 10 12   CREATININE mg/dL 0 81 0 90 0 82   EGFR ml/min/1 73sq m 69 61 68   CALCIUM mg/dL 8 9 8 7 8 6     Results from last 7 days   Lab Units 09/30/22  1106 09/29/22  0535 09/26/22  2118   BLOOD CULTURE   --  No Growth at 72 hrs    No Growth at 72 hrs   --    GRAM STAIN RESULT  No Polys or Bacteria seen  --   --    URINE CULTURE   --   --  >100,000 cfu/ml Proteus mirabilis ESBL*   BODY FLUID CULTURE, STERILE  No growth  --   --

## 2022-10-03 NOTE — ASSESSMENT & PLAN NOTE
· Hemoglobin is 8 4 this appears pretty stable when compared to 8 9 a week ago    · Continue on iron and folic acid as well as Z44  · Ongoing outpatient monitoring and follow-up with PCP    Lab Results   Component Value Date    HGB 8 4 (L) 10/02/2022    HGB 8 4 (L) 09/30/2022    HGB 8 4 (L) 09/29/2022

## 2022-10-03 NOTE — ASSESSMENT & PLAN NOTE
· Post op left thigh collection  · CT LE: "Rim-enhancing subcutaneous fluid collection at the lateral aspect of the proximal femur that measures 5 6 x 2 1 x 8 3 cm   Second rim-enhancing subcutaneous fluid collection at the lateral aspect of the distal femur/knee that measures 6 9 x 6 3 x 22 2 cm "  · Aspiration did not reveal any bacteria  · Appreciate orthopedic and Infectious Disease input

## 2022-10-03 NOTE — CASE MANAGEMENT
Case Management Discharge Planning Note    Patient name Afshin Adkins  Location S /S -92 MRN [id number]  : 1945 Date 10/3/2022       Current Admission Date: 2022  Current Admission Diagnosis:Bacteremia due to Gram-positive bacteria   Patient Active Problem List    Diagnosis Date Noted    Bacteria in urine 10/03/2022    Bacteremia due to Gram-positive bacteria 2022    Anemia 2022    Bladder wall thickening 2022    left femoral fluid collection 2022    Pleural effusion on right 2022    Vomiting and diarrhea 2022    Surgical wound present 2022    Renal insufficiency     Stage 3a chronic kidney disease ([address] ) 2022    Preop examination 2022    Preop cardiovascular exam 2022    Poor dentition 2022    Closed bicondylar fracture of left femur with delayed healing 2022    Paroxysmal atrial fibrillation ([address] ) 2022    Chronic gout with tophus 2021    Current moderate episode of major depressive disorder ([address] ) 2021    Morbid obesity with body mass index (BMI) of 40 0 to 49 9 ([address] ) 2021    Reactive airway disease with acute exacerbation 2021    Ambulatory dysfunction 2021    Leukocytosis 2021    Arthritis 2021    Other fatigue 2021    Hyperlipidemia 11/10/2020    Dysphonia 2020    Vitamin D insufficiency 2020    Chronic pain of left knee 2019    Age related osteoporosis 2019    Anxiety 2019    Tremor 2019    Other insomnia 2019    Urinary incontinence 2018    Psoriasis 2018    Thyroid nodule 2018    Atrial fibrillation ([address] ) 02/15/2018    Tachy-smita syndrome ([address] ) 02/15/2018    Essential hypertension 02/15/2018    Pacemaker 02/15/2018    GERD (gastroesophageal reflux disease) 2017    Mild carpal tunnel syndrome, right 04/10/2017    Degenerative lumbar spinal stenosis 03/31/2017    Low back pain 08/29/2016    Lumbar degenerative disc disease 08/29/2016    Chronic bilateral low back pain without sciatica 06/08/2016    Chronic GERD 05/06/2016    Overweight 02/12/2016    Fibromyalgia 11/06/2013      LOS (days): 7  Geometric Mean LOS (GMLOS) (days): 3 50  Days to GMLOS:-3 6     OBJECTIVE:  Risk of Unplanned Readmission Score: 17 52         Current admission status: Inpatient   Preferred Pharmacy:   [facility] [address] [facility], [address] [place] [address] [address] [facility] [address] [facility] [address]  Phone: [phone] Fax: [phone] [facility], [facility] [address]  [address] [address]  [address] [facility]  [address] [place] [address]  51 [address]  Phone: [phone] Fax: [phone]    Primary Care Provider: Magdiel Hamm MD    Primary Insurance: MEDICARE  Secondary Insurance: AARP    DISCHARGE DETAILS:        Discharge Destination Plan[de-identified] Short Term Rehab  Transport at Discharge : [facility]  Dispatcher Contacted: Yes  Number/Name of Dispatcher: Roundtrip     ETA of Transport (Date): 10/04/22  ETA of Transport (Time): 1230     Transfer Mode: Stretcher  Accompanied by: EMS personnel  Transfer Equipment: [address] [facility]

## 2022-10-03 NOTE — PLAN OF CARE
Problem: MOBILITY - ADULT  Goal: Maintain or return to baseline ADL function  Description: INTERVENTIONS:  -  Assess patient's ability to carry out ADLs; assess patient's baseline for ADL function and identify physical deficits which impact ability to perform ADLs (bathing, care of mouth/teeth, toileting, grooming, dressing, etc )  - Assess/evaluate cause of self-care deficits   - Assess range of motion  - Assess patient's mobility; develop plan if impaired  - Assess patient's need for assistive devices and provide as appropriate  - Encourage maximum independence but intervene and supervise when necessary  - Involve family in performance of ADLs  - Assess for home care needs following discharge   - Consider OT consult to assist with ADL evaluation and planning for discharge  - Provide patient education as appropriate  Outcome: Progressing  Goal: Maintains/Returns to pre admission functional level  Description: INTERVENTIONS:  - Perform BMAT or MOVE assessment daily    - Set and communicate daily mobility goal to care team and patient/family/caregiver  - Collaborate with rehabilitation services on mobility goals if consulted  - Perform Range of Motion 3 times a day  - Reposition patient every 2 hours    - Dangle patient 3 times a day  - Stand patient 3 times a day  - Ambulate patient 3 times a day  - Out of bed to chair 3 times a day   - Out of bed for meals 3 times a day  - Out of bed for toileting  - Record patient progress and toleration of activity level   Outcome: Progressing     Problem: Potential for Falls  Goal: Patient will remain free of falls  Description: INTERVENTIONS:  - Educate patient/family on patient safety including physical limitations  - Instruct patient to call for assistance with activity   - Consult OT/PT to assist with strengthening/mobility   - Keep Call bell within reach  - Keep bed low and locked with side rails adjusted as appropriate  - Keep care items and personal belongings within reach  - Initiate and maintain comfort rounds  - Make Fall Risk Sign visible to staff  - Offer Toileting every 2 Hours, in advance of need  - Initiate/Maintain alarm  - Obtain necessary fall risk management equipment  - Apply yellow socks and bracelet for high fall risk patients  - Consider moving patient to room near nurses station  Outcome: Progressing     Problem: Prexisting or High Potential for Compromised Skin Integrity  Goal: Skin integrity is maintained or improved  Description: INTERVENTIONS:  - Identify patients at risk for skin breakdown  - Assess and monitor skin integrity  - Assess and monitor nutrition and hydration status  - Monitor labs   - Assess for incontinence   - Turn and reposition patient  - Assist with mobility/ambulation  - Relieve pressure over bony prominences  - Avoid friction and shearing  - Provide appropriate hygiene as needed including keeping skin clean and dry  - Evaluate need for skin moisturizer/barrier cream  - Collaborate with interdisciplinary team   - Patient/family teaching  - Consider wound care consult   Outcome: Progressing     Problem: HEMATOLOGIC - ADULT  Goal: Maintains hematologic stability  Description: INTERVENTIONS  - Assess for signs and symptoms of bleeding or hemorrhage  - Monitor labs  - Administer supportive blood products/factors as ordered and appropriate  Outcome: Progressing     Problem: MUSCULOSKELETAL - ADULT  Goal: Maintain or return mobility to safest level of function  Description: INTERVENTIONS:  - Assess patient's ability to carry out ADLs; assess patient's baseline for ADL function and identify physical deficits which impact ability to perform ADLs (bathing, care of mouth/teeth, toileting, grooming, dressing, etc )  - Assess/evaluate cause of self-care deficits   - Assess range of motion  - Assess patient's mobility  - Assess patient's need for assistive devices and provide as appropriate  - Encourage maximum independence but intervene and supervise when necessary  - Involve family in performance of ADLs  - Assess for home care needs following discharge   - Consider OT consult to assist with ADL evaluation and planning for discharge  - Provide patient education as appropriate  Outcome: Progressing  Goal: Maintain proper alignment of affected body part  Description: INTERVENTIONS:  - Support, maintain and protect limb and body alignment  - Provide patient/ family with appropriate education  Outcome: Progressing     Problem: INFECTION - ADULT  Goal: Absence or prevention of progression during hospitalization  Description: INTERVENTIONS:  - Assess and monitor for signs and symptoms of infection  - Monitor lab/diagnostic results  - Monitor all insertion sites, i e  indwelling lines, tubes, and drains  - Monitor endotracheal if appropriate and nasal secretions for changes in amount and color  - [place] appropriate cooling/warming therapies per order  - Administer medications as ordered  - Instruct and encourage patient and family to use good hand hygiene technique  - Identify and instruct in appropriate isolation precautions for identified infection/condition  Outcome: Progressing     Problem: PAIN - ADULT  Goal: Verbalizes/displays adequate comfort level or baseline comfort level  Description: Interventions:  - Encourage patient to monitor pain and request assistance  - Assess pain using appropriate pain scale  - Administer analgesics based on type and severity of pain and evaluate response  - Implement non-pharmacological measures as appropriate and evaluate response  - Consider cultural and social influences on pain and pain management  - Notify physician/advanced practitioner if interventions unsuccessful or patient reports new pain  Outcome: Progressing     Problem: SAFETY ADULT  Goal: Patient will remain free of falls  Description: INTERVENTIONS:  - Educate patient/family on patient safety including physical limitations  - Instruct patient to call for assistance with activity   - Consult OT/PT to assist with strengthening/mobility   - Keep Call bell within reach  - Keep bed low and locked with side rails adjusted as appropriate  - Keep care items and personal belongings within reach  - Initiate and maintain comfort rounds  - Make Fall Risk Sign visible to staff  - Offer Toileting every 2 Hours, in advance of need  - Initiate/Maintain alarm  - Obtain necessary fall risk management equipment  - Apply yellow socks and bracelet for high fall risk patients  - Consider moving patient to room near nurses station  Outcome: Progressing     Problem: DISCHARGE PLANNING  Goal: Discharge to home or other facility with appropriate resources  Description: INTERVENTIONS:  - Identify barriers to discharge w/patient and caregiver  - Arrange for needed discharge resources and transportation as appropriate  - Identify discharge learning needs (meds, wound care, etc )  - Arrange for interpretive services to assist at discharge as needed  - Refer to Case Management Department for coordinating discharge planning if the patient needs post-hospital services based on physician/advanced practitioner order or complex needs related to functional status, cognitive ability, or social support system  Outcome: Progressing

## 2022-10-03 NOTE — CASE MANAGEMENT
Case Management Discharge Planning Note    Patient name Steve Marquez  Location S /S -60 MRN [id number]  : 1945 Date 10/3/2022       Current Admission Date: 2022  Current Admission Diagnosis:Bacteremia due to Gram-positive bacteria   Patient Active Problem List    Diagnosis Date Noted    Bacteria in urine 10/03/2022    Bacteremia due to Gram-positive bacteria 2022    Anemia 2022    Bladder wall thickening 2022    left femoral fluid collection 2022    Pleural effusion on right 2022    Vomiting and diarrhea 2022    Surgical wound present 2022    Renal insufficiency     Stage 3a chronic kidney disease ([address] ) 2022    Preop examination 2022    Preop cardiovascular exam 2022    Poor dentition 2022    Closed bicondylar fracture of left femur with delayed healing 2022    Paroxysmal atrial fibrillation ([facility] ) 2022    Chronic gout with tophus 2021    Current moderate episode of major depressive disorder ([address] ) 2021    Morbid obesity with body mass index (BMI) of 40 0 to 49 9 ([address] ) 2021    Reactive airway disease with acute exacerbation 2021    Ambulatory dysfunction 2021    Leukocytosis 2021    Arthritis 2021    Other fatigue 2021    Hyperlipidemia 11/10/2020    Dysphonia 2020    Vitamin D insufficiency 2020    Chronic pain of left knee 2019    Age related osteoporosis 2019    Anxiety 2019    Tremor 2019    Other insomnia 2019    Urinary incontinence 2018    Psoriasis 2018    Thyroid nodule 2018    Atrial fibrillation ([address] ) 02/15/2018    Tachy-smita syndrome ([address] ) 02/15/2018    Essential hypertension 02/15/2018    Pacemaker 02/15/2018    GERD (gastroesophageal reflux disease) 2017    Mild carpal tunnel syndrome, right 04/10/2017    Degenerative lumbar spinal stenosis 03/31/2017    Low back pain 08/29/2016    Lumbar degenerative disc disease 08/29/2016    Chronic bilateral low back pain without sciatica 06/08/2016    Chronic GERD 05/06/2016    Overweight 02/12/2016    Fibromyalgia 11/06/2013      LOS (days): 7  Geometric Mean LOS (GMLOS) (days): 3 50  Days to GMLOS:-3 6     OBJECTIVE:  Risk of Unplanned Readmission Score: 17 52         Current admission status: Inpatient   Preferred Pharmacy:   [place] [address] [address] [facility] [address], [address] [place] [address]  Phone: [phone] Fax: [phone] [facility], [facility] [phone] [address]leen [address]keon  [address] Ch[address] [facility]  [address] [place] [address]  [phone] [id number]  Phone: [phone] Fax: [phone]    Primary Care Provider: Shelley Chavez MD    Primary Insurance: MEDICARE  Secondary Insurance: [employer]    DISCHARGE DETAILS:        [facility] confirmed with [facility] that they have a bed available for Pt for STR  Pt will now need a PICC for LT IV abx  This information has been forwarded to the facility  PICC is not able to be placed today as VAS team is not available and IR is not able to schedule this today  VAS team will place pt's PICC tomorrow morning and transportation will be arranged for pt to go to [facility] after this  [facility] is tommy to save the bed for pt until tomorrow

## 2022-10-03 NOTE — ASSESSMENT & PLAN NOTE
· Background: Presented to the ED with complaints of vomiting and diarrhea, symptoms resolved  · CT abdomen and pelvis unrevealing    Stool cultures negative  · Tolerated advancement in diet

## 2022-10-04 ENCOUNTER — APPOINTMENT (OUTPATIENT)
Dept: RADIOLOGY | Facility: HOSPITAL | Age: 77
DRG: 872 | End: 2022-10-04
Payer: MEDICARE

## 2022-10-04 VITALS
RESPIRATION RATE: 16 BRPM | BODY MASS INDEX: 40.12 KG/M2 | TEMPERATURE: 99.1 F | OXYGEN SATURATION: 92 % | DIASTOLIC BLOOD PRESSURE: 52 MMHG | HEART RATE: 71 BPM | WEIGHT: 235 LBS | SYSTOLIC BLOOD PRESSURE: 140 MMHG | HEIGHT: 64 IN

## 2022-10-04 PROBLEM — I35.1 AORTIC REGURGITATION: Status: ACTIVE | Noted: 2022-10-04

## 2022-10-04 LAB
BACTERIA BLD CULT: NORMAL
BACTERIA BLD CULT: NORMAL
BACTERIA SPEC BFLD CULT: NO GROWTH
GRAM STN SPEC: NORMAL

## 2022-10-04 PROCEDURE — 71045 X-RAY EXAM CHEST 1 VIEW: CPT

## 2022-10-04 PROCEDURE — 99239 HOSP IP/OBS DSCHRG MGMT >30: CPT | Performed by: PHYSICIAN ASSISTANT

## 2022-10-04 PROCEDURE — C1751 CATH, INF, PER/CENT/MIDLINE: HCPCS

## 2022-10-04 PROCEDURE — 99232 SBSQ HOSP IP/OBS MODERATE 35: CPT | Performed by: INTERNAL MEDICINE

## 2022-10-04 PROCEDURE — 36569 INSJ PICC 5 YR+ W/O IMAGING: CPT

## 2022-10-04 RX ORDER — POLYETHYLENE GLYCOL 3350 17 G/17G
17 POWDER, FOR SOLUTION ORAL DAILY PRN
Refills: 0
Start: 2022-10-04

## 2022-10-04 RX ORDER — LANOLIN ALCOHOL/MO/W.PET/CERES
1000 CREAM (GRAM) TOPICAL DAILY
Refills: 0
Start: 2022-10-04

## 2022-10-04 RX ORDER — OXYCODONE HYDROCHLORIDE 5 MG/1
2.5 TABLET ORAL EVERY 6 HOURS PRN
Qty: 10 TABLET | Refills: 0 | Status: SHIPPED | OUTPATIENT
Start: 2022-10-04 | End: 2022-10-14

## 2022-10-04 RX ORDER — AMOXICILLIN 250 MG
2 CAPSULE ORAL 2 TIMES DAILY
Refills: 0
Start: 2022-10-04

## 2022-10-04 RX ORDER — LORAZEPAM 0.5 MG/1
0.5 TABLET ORAL EVERY 8 HOURS PRN
Qty: 10 TABLET | Refills: 0 | Status: SHIPPED | OUTPATIENT
Start: 2022-10-04 | End: 2022-10-14

## 2022-10-04 RX ORDER — PANTOPRAZOLE SODIUM 40 MG/1
40 TABLET, DELAYED RELEASE ORAL EVERY 12 HOURS SCHEDULED
Refills: 0
Start: 2022-10-04

## 2022-10-04 RX ORDER — OMEGA-3S/DHA/EPA/FISH OIL/D3 300MG-1000
400 CAPSULE ORAL DAILY
Refills: 0
Start: 2022-10-04

## 2022-10-04 RX ORDER — ACETAMINOPHEN 325 MG/1
650 TABLET ORAL EVERY 6 HOURS SCHEDULED
Refills: 0
Start: 2022-10-04

## 2022-10-04 RX ADMIN — ALLOPURINOL 100 MG: 100 TABLET ORAL at 08:46

## 2022-10-04 RX ADMIN — FOLIC ACID TAB 400 MCG 400 MCG: 400 TAB at 08:49

## 2022-10-04 RX ADMIN — METOPROLOL TARTRATE 100 MG: 100 TABLET, FILM COATED ORAL at 08:47

## 2022-10-04 RX ADMIN — EZETIMIBE 10 MG: 10 TABLET ORAL at 20:06

## 2022-10-04 RX ADMIN — SENNOSIDES AND DOCUSATE SODIUM 2 TABLET: 8.6; 5 TABLET ORAL at 17:12

## 2022-10-04 RX ADMIN — PANTOPRAZOLE SODIUM 40 MG: 40 TABLET, DELAYED RELEASE ORAL at 20:05

## 2022-10-04 RX ADMIN — APIXABAN 5 MG: 5 TABLET, FILM COATED ORAL at 17:12

## 2022-10-04 RX ADMIN — OXYCODONE HYDROCHLORIDE 2.5 MG: 5 TABLET ORAL at 20:06

## 2022-10-04 RX ADMIN — FUROSEMIDE 40 MG: 40 TABLET ORAL at 08:46

## 2022-10-04 RX ADMIN — DILTIAZEM HYDROCHLORIDE 120 MG: 120 CAPSULE, COATED, EXTENDED RELEASE ORAL at 08:46

## 2022-10-04 RX ADMIN — OXYCODONE HYDROCHLORIDE AND ACETAMINOPHEN 500 MG: 500 TABLET ORAL at 08:46

## 2022-10-04 RX ADMIN — PANTOPRAZOLE SODIUM 40 MG: 40 TABLET, DELAYED RELEASE ORAL at 08:46

## 2022-10-04 RX ADMIN — ACETAMINOPHEN 650 MG: 325 TABLET, FILM COATED ORAL at 05:23

## 2022-10-04 RX ADMIN — APIXABAN 5 MG: 5 TABLET, FILM COATED ORAL at 08:46

## 2022-10-04 RX ADMIN — FERROUS SULFATE TAB 325 MG (65 MG ELEMENTAL FE) 325 MG: 325 (65 FE) TAB at 08:49

## 2022-10-04 RX ADMIN — LORAZEPAM 0.5 MG: 0.5 TABLET ORAL at 10:11

## 2022-10-04 RX ADMIN — FERROUS SULFATE TAB 325 MG (65 MG ELEMENTAL FE) 325 MG: 325 (65 FE) TAB at 17:12

## 2022-10-04 RX ADMIN — DULOXETINE HYDROCHLORIDE 30 MG: 30 CAPSULE, DELAYED RELEASE ORAL at 20:06

## 2022-10-04 RX ADMIN — VANCOMYCIN HYDROCHLORIDE 750 MG: 750 INJECTION, SOLUTION INTRAVENOUS at 11:35

## 2022-10-04 RX ADMIN — ACETAMINOPHEN 650 MG: 325 TABLET, FILM COATED ORAL at 17:12

## 2022-10-04 RX ADMIN — CHOLECALCIFEROL TAB 10 MCG (400 UNIT) 400 UNITS: 10 TAB at 08:47

## 2022-10-04 RX ADMIN — LISINOPRIL 2.5 MG: 2.5 TABLET ORAL at 08:46

## 2022-10-04 RX ADMIN — ACETAMINOPHEN 650 MG: 325 TABLET, FILM COATED ORAL at 11:35

## 2022-10-04 RX ADMIN — OXYCODONE HYDROCHLORIDE AND ACETAMINOPHEN 500 MG: 500 TABLET ORAL at 17:12

## 2022-10-04 RX ADMIN — SENNOSIDES AND DOCUSATE SODIUM 2 TABLET: 8.6; 5 TABLET ORAL at 08:46

## 2022-10-04 NOTE — PROCEDURES
Insert PICC line    Date/Time: 10/4/2022 11:23 AM  Performed by: Natty Hunter RN  Authorized by: Di Chinchilla PA-C     Patient location:  Bedside  Consent:     Consent obtained: Obtained by Physician  Consent given by: Obtained by Physician  Procedural risks discussed: Discussed with Physician  Alternatives discussed: Discussed with Physician  Universal protocol:     Procedure explained and questions answered to patient or proxy's satisfaction: yes      Relevant documents present and verified: yes      Test results available and properly labeled: yes      Radiology Images displayed and confirmed  If images not available, report reviewed: yes      Required blood products, implants, devices, and special equipment available: yes      Site/side marked: yes      Immediately prior to procedure, a time out was called: yes    Pre-procedure details:     Hand hygiene: Hand hygiene performed prior to insertion      Sterile barrier technique: All elements of maximal sterile technique followed      Skin preparation:  ChloraPrep    Skin preparation agent: Skin preparation agent completely dried prior to procedure    Indications:     PICC line indications: long term antibiotics    Anesthesia (see MAR for exact dosages): Anesthesia method:  Local infiltration    Local anesthetic:  Lidocaine 1% w/o epi (2 ml used)  Procedure details:     Location:  Basilic    Vessel type: vein      Laterality:  Right    Approach: percutaneous technique used      Patient position:  Flat    Procedural supplies:  Single lumen    Catheter size:  4 Fr ([facility]#[id number]  Lot#[id number],exp06/30/23)    Landmarks identified: yes      Ultrasound guidance: yes      Ultrasound image availability:  Not saved (Left Pacer)    Sterile ultrasound techniques: Sterile gel and sterile probe covers were used      Number of attempts:  1    Successful placement: yes      Vessel of catheter tip end:  Chest Xray needed to confirm placement    Total catheter length (cm):  42    Catheter out on skin (cm):  0    Max flow rate:  999ml/hr    Arm circumference:  42 5cm  Post-procedure details:     Post-procedure:  Securement device placed    Assessment:  Blood return through all ports and placement verification pending x-ray result    Post-procedure complications: none      Patient tolerance of procedure:   Tolerated well, no immediate complications

## 2022-10-04 NOTE — ASSESSMENT & PLAN NOTE
· Hemoglobin is 8 4 this appears pretty stable when compared to 8 9 a week ago    · Continue on iron and folic acid as well as S86  · Ongoing outpatient monitoring and follow-up with PCP    Lab Results   Component Value Date    HGB 8 4 (L) 10/02/2022    HGB 8 4 (L) 09/30/2022    HGB 8 4 (L) 09/29/2022

## 2022-10-04 NOTE — ASSESSMENT & PLAN NOTE
· Blood cultures from 9/25 2/2 positive for staphylococcus epidermidis   · In the setting of pacemaker as well   · Repeat blood cultures negative at >72 hours  · Infectious disease consulted; input appreciated   · 2d echo negative  · IV Vancomycin for 6 week course--PICC consent obtained; PICC line placed today with antibiotics through November 9th

## 2022-10-04 NOTE — DISCHARGE SUMMARY
[facility]  Discharge- Oralee Shouts 1945, 76 y o  female MRN: [id number]  Unit/Bed#: S -10 Encounter: [id number]  Primary Care Provider: Tanesha Hargrove MD   Date and time admitted to hospital: 9/25/2022  6:25 AM    * Bacteremia due to Gram-positive bacteria  Assessment & Plan  · Blood cultures from 9/25 2/2 positive for staphylococcus epidermidis   · In the setting of pacemaker as well   · Repeat blood cultures negative at >72 hours  · Infectious disease consulted; input appreciated   · 2d echo negative  · IV Vancomycin for 6 week course--PICC consent obtained; PICC line placed today with antibiotics through November 9th        Vomiting and diarrhea  Assessment & Plan  · Background: Presented to the ED with complaints of vomiting and diarrhea, symptoms resolved  · CT abdomen and pelvis unrevealing  Stool cultures negative  · Tolerated advancement in diet          left femoral fluid collection  Assessment & Plan  · Post op left thigh collection  · CT LE: "Rim-enhancing subcutaneous fluid collection at the lateral aspect of the proximal femur that measures 5 6 x 2 1 x 8 3 cm  Second rim-enhancing subcutaneous fluid collection at the lateral aspect of the distal femur/knee that measures 6 9 x 6 3 x 22 2 cm "  · Aspiration did not reveal any bacteria  · Appreciate orthopedic and Infectious Disease input       Aortic regurgitation  Assessment & Plan  · moderate AR on echo    Bacteria in urine  Assessment & Plan  · MDRO >100k proteus in urine culture, likely colonization per ID    Anemia  Assessment & Plan  · Hemoglobin is 8 4 this appears pretty stable when compared to 8 9 a week ago    · Continue on iron and folic acid as well as J32  · Ongoing outpatient monitoring and follow-up with PCP    Lab Results   Component Value Date    HGB 8 4 (L) 10/02/2022    HGB 8 4 (L) 09/30/2022    HGB 8 4 (L) 09/29/2022       Atrial fibrillation (HCC)  Assessment & Plan  · Continue metoprolol, Cardizem  · eliquis for A/C    Morbid obesity with body mass index (BMI) of 40 0 to 49 9 ([facility])  Assessment & Plan  · BMI 40 34, recommend weight loss    Medical Problems             Resolved Problems  Date Reviewed: 10/4/2022          Resolved    Diverticulosis 9/28/2022     Resolved by  Hawa Scott              Discharging Physician / Practitioner: Roosevelt Palencia  PCP: Ariana Aldana MD  Admission Date:   Admission Orders (From admission, onward)     Ordered        09/26/22 1109  Inpatient Admission  Once            09/25/22 0915  Place in Observation  Once                      Discharge Date: 10/04/22    Consultations During Hospital Stay:  · Infectious disease  · Orthopedics    Procedures Performed:   · 2D echocardiogram  · PICC line  · Aspiration in Interventional Radiology    Significant Findings / Test Results:   · CT lower extremity left  · CT pelvis  · Right upper quadrant ultrasound    Incidental Findings:   · None    Test Results Pending at Discharge (will require follow up): · None     Outpatient Tests Requested:  · BMP, CBC, Vanco trough    Complications:  None    Reason for Admission:  Vomiting and diarrhea    Hospital Course:   Alejandra Garcia is a 76 y o  female patient atrial fibrillation, anemia, and morbid obesity who originally presented to the hospital on 9/25/2022 due to nausea, vomiting, and diarrhea  The symptoms resolved  On admission is CT incidentally revealed a seroma at the left femur proximally which was felt to be possibly a postoperative complicated home  During the hospitalization she was seen by orthopedic team and by Infectious Disease  The collection was aspirated and will not grow bacteria but patient was found to have methicillin resistance Staph epidermidis in 2 blood cultures  Echocardiogram did not reveal any vegetation and repeat blood cultures were negative    However, based on patient having an internal defibrillator and Orthopedic surgery, Infectious Disease did recommend a full 6 week course of IV antibiotics  A PICC line was placed and she was sent to rehab    Please see above list of diagnoses and related plan for additional information  Condition at Discharge: stable    Discharge Day Visit / Exam:   Subjective:  Patient feels well, with no events or concerns except that she is asking for a tranquilizer" before her PICC line  Vitals: Blood Pressure: 144/58 (10/04/22 0746)  Pulse: 70 (10/04/22 0746)  Temperature: 98 4 °F (36 9 °C) (10/04/22 0746)  Temp Source: Oral (10/02/22 1538)  Respirations: 16 (10/04/22 0746)  Height: 5' 4" (162 6 cm) (09/29/22 1055)  Weight - Scale: 107 kg (235 lb) (09/29/22 1055)  SpO2: 96 % (10/04/22 0746)  Exam:   Physical Exam  Vitals reviewed  Constitutional:       General: She is not in acute distress  Appearance: She is obese  She is not ill-appearing, toxic-appearing or diaphoretic  Eyes:      General: No scleral icterus  Right eye: No discharge  Left eye: No discharge  Conjunctiva/sclera: Conjunctivae normal    Cardiovascular:      Rate and Rhythm: Normal rate  Rhythm irregular  Heart sounds: Murmur (mild KHLOE) heard  Pulmonary:      Effort: No respiratory distress  Breath sounds: No stridor  No wheezing, rhonchi or rales  Abdominal:      General: There is no distension  Tenderness: There is no guarding  Musculoskeletal:      Right lower leg: No edema  Left lower leg: No edema  Skin:     General: Skin is warm and dry  Coloration: Skin is not jaundiced or pale  Findings: No bruising, erythema, lesion or rash  Neurological:      General: No focal deficit present  Mental Status: She is alert  Mental status is at baseline  Psychiatric:         Mood and Affect: Mood normal          Thought Content: Thought content normal         Discussion with Family: Updated  (caregiver) via phone      Discharge instructions/Information to patient and family:   See after visit summary for information provided to patient and family  Provisions for Follow-Up Care:  See after visit summary for information related to follow-up care and any pertinent home health orders  Disposition:   [facility]    Planned Readmission: none     Discharge Statement:  I spent 35 minutes discharging the patient  This time was spent on the day of discharge  I had direct contact with the patient on the day of discharge  Greater than 50% of the total time was spent examining patient, answering all patient questions, arranging and discussing plan of care with patient as well as directly providing post-discharge instructions  Additional time then spent on discharge activities  Spoke with RN, case mgmt and ID    Discharge Medications:  See after visit summary for reconciled discharge medications provided to patient and/or family        **Please Note: This note may have been constructed using a voice recognition system** 37.2

## 2022-10-04 NOTE — CASE MANAGEMENT
Case Management Discharge Planning Note    Patient name Jessica Arias  Location S /S -40 MRN [id number]  : 1945 Date 10/4/2022       Current Admission Date: 2022  Current Admission Diagnosis:Bacteremia due to Gram-positive bacteria   Patient Active Problem List    Diagnosis Date Noted    Aortic regurgitation 10/04/2022    Bacteria in urine 10/03/2022    Bacteremia due to Gram-positive bacteria 2022    Anemia 2022    Bladder wall thickening 2022    left femoral fluid collection 2022    Pleural effusion on right 2022    Vomiting and diarrhea 2022    Surgical wound present 2022    Renal insufficiency     Stage 3a chronic kidney disease ([address] ) 2022    Preop examination 2022    Preop cardiovascular exam 2022    Poor dentition 2022    Closed bicondylar fracture of left femur with delayed healing 2022    Paroxysmal atrial fibrillation ([address] ) 2022    Chronic gout with tophus 2021    Current moderate episode of major depressive disorder ([address] ) 2021    Morbid obesity with body mass index (BMI) of 40 0 to 49 9 ([address] ) 2021    Reactive airway disease with acute exacerbation 2021    Ambulatory dysfunction 2021    Leukocytosis 2021    Arthritis 2021    Other fatigue 2021    Hyperlipidemia 11/10/2020    Dysphonia 2020    Vitamin D insufficiency 2020    Chronic pain of left knee 2019    Age related osteoporosis 2019    Anxiety 2019    Tremor 2019    Other insomnia 2019    Urinary incontinence 2018    Psoriasis 2018    Thyroid nodule 2018    Atrial fibrillation ([address] ) 02/15/2018    Tachy-smita syndrome ([address] ) 02/15/2018    Essential hypertension 02/15/2018    Pacemaker 02/15/2018    GERD (gastroesophageal reflux disease) 2017    Mild carpal tunnel syndrome, right 04/10/2017    Degenerative lumbar spinal stenosis 03/31/2017    Low back pain 08/29/2016    Lumbar degenerative disc disease 08/29/2016    Chronic bilateral low back pain without sciatica 06/08/2016    Chronic GERD 05/06/2016    Overweight 02/12/2016    Fibromyalgia 11/06/2013      LOS (days): 8  Geometric Mean LOS (GMLOS) (days): 3 50  Days to GMLOS:-4 6     OBJECTIVE:  Risk of Unplanned Readmission Score: 18 71         Current admission status: Inpatient   Preferred Pharmacy:   [facility] [address] [facility], [address] [place] [address]  Phone: [phone] Fax: [phone] [facility], [facility] [phone] Colleen Mckeon  [address]  [address]  Phone: [phone] Fax: [phone]    Primary Care Provider: Vani Kamara MD    Primary Insurance: MEDICARE  Secondary Insurance: [facility]    DISCHARGE DETAILS:        CM contacted [facility] as [facility] did not  pt yet and scheduled  time was 1230  Per Ed at [facility] had a call out and they cancelled the transport however did not notify [facility]  [facility] today for [facility] and LT IV abx  Transportation was requested again for 1415  Transportation was not able to be arranged until 2030  Nursing and facility and pt were made aware

## 2022-10-04 NOTE — DISCHARGE INSTR - AVS FIRST PAGE
Continue IV vanco through November 9th  Weekly CBC with diff, CMP, and vancomycin trough  Remove PICC line when course complete

## 2022-10-04 NOTE — PLAN OF CARE
Problem: MOBILITY - ADULT  Goal: Maintain or return to baseline ADL function  Description: INTERVENTIONS:  -  Assess patient's ability to carry out ADLs; assess patient's baseline for ADL function and identify physical deficits which impact ability to perform ADLs (bathing, care of mouth/teeth, toileting, grooming, dressing, etc )  - Assess/evaluate cause of self-care deficits   - Assess range of motion  - Assess patient's mobility; develop plan if impaired  - Assess patient's need for assistive devices and provide as appropriate  - Encourage maximum independence but intervene and supervise when necessary  - Involve family in performance of ADLs  - Assess for home care needs following discharge   - Consider OT consult to assist with ADL evaluation and planning for discharge  - Provide patient education as appropriate  Outcome: Adequate for Discharge  Goal: Maintains/Returns to pre admission functional level  Description: INTERVENTIONS:  - Perform BMAT or MOVE assessment daily    - Set and communicate daily mobility goal to care team and patient/family/caregiver  - Collaborate with rehabilitation services on mobility goals if consulted  - Perform Range of Motion  times a day  - Reposition patient every  hours    - Dangle patient  times a day  - Stand patient  times a day  - Ambulate patient  times a day  - Out of bed to chair  times a day   - Out of bed for meals  times a day  - Out of bed for toileting  - Record patient progress and toleration of activity level   Outcome: Adequate for Discharge     Problem: Potential for Falls  Goal: Patient will remain free of falls  Description: INTERVENTIONS:  - Educate patient/family on patient safety including physical limitations  - Instruct patient to call for assistance with activity   - Consult OT/PT to assist with strengthening/mobility   - Keep Call bell within reach  - Keep bed low and locked with side rails adjusted as appropriate  - Keep care items and personal belongings within reach  - Initiate and maintain comfort rounds  - Make Fall Risk Sign visible to staff  - Offer Toileting every  Hours, in advance of need  - Initiate/Maintain alarm  - Obtain necessary fall risk management equipment:   - Apply yellow socks and bracelet for high fall risk patients  - Consider moving patient to room near nurses station  Outcome: Adequate for Discharge     Problem: Prexisting or High Potential for Compromised Skin Integrity  Goal: Skin integrity is maintained or improved  Description: INTERVENTIONS:  - Identify patients at risk for skin breakdown  - Assess and monitor skin integrity  - Assess and monitor nutrition and hydration status  - Monitor labs   - Assess for incontinence   - Turn and reposition patient  - Assist with mobility/ambulation  - Relieve pressure over bony prominences  - Avoid friction and shearing  - Provide appropriate hygiene as needed including keeping skin clean and dry  - Evaluate need for skin moisturizer/barrier cream  - Collaborate with interdisciplinary team   - Patient/family teaching  - Consider wound care consult   Outcome: Adequate for Discharge     Problem: HEMATOLOGIC - ADULT  Goal: Maintains hematologic stability  Description: INTERVENTIONS  - Assess for signs and symptoms of bleeding or hemorrhage  - Monitor labs  - Administer supportive blood products/factors as ordered and appropriate  Outcome: Adequate for Discharge     Problem: MUSCULOSKELETAL - ADULT  Goal: Maintain or return mobility to safest level of function  Description: INTERVENTIONS:  - Assess patient's ability to carry out ADLs; assess patient's baseline for ADL function and identify physical deficits which impact ability to perform ADLs (bathing, care of mouth/teeth, toileting, grooming, dressing, etc )  - Assess/evaluate cause of self-care deficits   - Assess range of motion  - Assess patient's mobility  - Assess patient's need for assistive devices and provide as appropriate  - Encourage maximum independence but intervene and supervise when necessary  - Involve family in performance of ADLs  - Assess for home care needs following discharge   - Consider OT consult to assist with ADL evaluation and planning for discharge  - Provide patient education as appropriate  Outcome: Adequate for Discharge  Goal: Maintain proper alignment of affected body part  Description: INTERVENTIONS:  - Support, maintain and protect limb and body alignment  - Provide patient/ family with appropriate education  Outcome: Adequate for Discharge     Problem: PAIN - ADULT  Goal: Verbalizes/displays adequate comfort level or baseline comfort level  Description: Interventions:  - Encourage patient to monitor pain and request assistance  - Assess pain using appropriate pain scale  - Administer analgesics based on type and severity of pain and evaluate response  - Implement non-pharmacological measures as appropriate and evaluate response  - Consider cultural and social influences on pain and pain management  - Notify physician/advanced practitioner if interventions unsuccessful or patient reports new pain  Outcome: Adequate for Discharge     Problem: INFECTION - ADULT  Goal: Absence or prevention of progression during hospitalization  Description: INTERVENTIONS:  - Assess and monitor for signs and symptoms of infection  - Monitor lab/diagnostic results  - Monitor all insertion sites, i e  indwelling lines, tubes, and drains  - Monitor endotracheal if appropriate and nasal secretions for changes in amount and color  - [place] appropriate cooling/warming therapies per order  - Administer medications as ordered  - Instruct and encourage patient and family to use good hand hygiene technique  - Identify and instruct in appropriate isolation precautions for identified infection/condition  Outcome: Adequate for Discharge     Problem: SAFETY ADULT  Goal: Patient will remain free of falls  Description: INTERVENTIONS:  - Educate patient/family on patient safety including physical limitations  - Instruct patient to call for assistance with activity   - Consult OT/PT to assist with strengthening/mobility   - Keep Call bell within reach  - Keep bed low and locked with side rails adjusted as appropriate  - Keep care items and personal belongings within reach  - Initiate and maintain comfort rounds  - Make Fall Risk Sign visible to staff  - Offer Toileting every  Hours, in advance of need  - Initiate/Maintain alarm  - Obtain necessary fall risk management equipment  - Apply yellow socks and bracelet for high fall risk patients  - Consider moving patient to room near nurses station  Outcome: Adequate for Discharge     Problem: DISCHARGE PLANNING  Goal: Discharge to home or other facility with appropriate resources  Description: INTERVENTIONS:  - Identify barriers to discharge w/patient and caregiver  - Arrange for needed discharge resources and transportation as appropriate  - Identify discharge learning needs (meds, wound care, etc )  - Arrange for interpretive services to assist at discharge as needed  - Refer to Case Management Department for coordinating discharge planning if the patient needs post-hospital services based on physician/advanced practitioner order or complex needs related to functional status, cognitive ability, or social support system  Outcome: Adequate for Discharge     Problem: Knowledge Deficit  Goal: Patient/family/caregiver demonstrates understanding of disease process, treatment plan, medications, and discharge instructions  Description: Complete learning assessment and assess knowledge base    Interventions:  - Provide teaching at level of understanding  - Provide teaching via preferred learning methods  Outcome: Adequate for Discharge     Problem: Nutrition/Hydration-ADULT  Goal: Nutrient/Hydration intake appropriate for improving, restoring or maintaining nutritional needs  Description: Monitor and assess patient's nutrition/hydration status for malnutrition  Collaborate with interdisciplinary team and initiate plan and interventions as ordered  Monitor patient's weight and dietary intake as ordered or per policy  Utilize nutrition screening tool and intervene as necessary  Determine patient's food preferences and provide high-protein, high-caloric foods as appropriate       INTERVENTIONS:  - Monitor oral intake, urinary output, labs, and treatment plans  - Assess nutrition and hydration status and recommend course of action  - Evaluate amount of meals eaten  - Assist patient with eating if necessary   - Allow adequate time for meals  - Recommend/ encourage appropriate diets, oral nutritional supplements, and vitamin/mineral supplements  - Order, calculate, and assess calorie counts as needed  - Recommend, monitor, and adjust tube feedings and TPN/PPN based on assessed needs  - Assess need for intravenous fluids  - Provide specific nutrition/hydration education as appropriate  - Include patient/family/caregiver in decisions related to nutrition  Outcome: Adequate for Discharge

## 2022-10-04 NOTE — PROGRESS NOTES
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  · Patient has a place maker in place  · Was admitted to the hospital on 2022 for nausea and vomiting, in the setting of recent doxycycline use  · Blood cultures 2022:  Staph epidermidis  · Blood cultures 22: negative x 72 hours  · Does not meet SIRS criteria  Plan:  · S/p IR aspiration of proximal left lateral thigh collection - negative  · No aspiration of more distal leg collection  · Given risk factors of recent orthopedic surgery and pacemaker - will plan for 6 week course of vancomycin through   · PICC line placed 10/3/22  · TTE no vegetations noted  Defer TANISHA for now       2  Vomiting and diarrhea  · Now improving  · Continue to monitor clinically     3  Bladder wall thickening  · UA growing nitrates and leukocytes, innumerable WBC  · Urine culture growing ESBL Proteus  · Treated with 3 days ertapenem (completed)     4  Seroma   · Partially visualized left lateral proximal femoral deep subcutaneous fluid collection measuring at least 5 5 x 5 cm presumably seroma given recent orthopedic procedure  · S/p I&D 10/1/22,  body fluid studies - no growth, anaerobic culture - no growth       Antibiotics:  Vancomycin     Subjective:  Patient has no fever, chills, sweats; no nausea, vomiting, diarrhea; no cough, shortness of breath; no pain  No new symptoms      Objective:  Vitals:  Temp:  [98 4 °F (36 9 °C)-98 5 °F (36 9 °C)] 98 4 °F (36 9 °C)  HR:  [70] 70  Resp:  [14-16] 16  BP: (122-144)/(46-58) 144/58  SpO2:  [94 %-96 %] 96 %  Temp (24hrs), Av 5 °F (36 9 °C), Min:98 4 °F (36 9 °C), Max:98 5 °F (36 9 °C)  Current: Temperature: 98 4 °F (36 9 °C)    Physical Exam:   General Appearance:  Alert, interactive, nontoxic, no acute distress  Throat: Oropharynx moist without lesions  Lungs:   Clear to auscultation bilaterally; no wheezes, rhonchi or rales; respirations unlabored   Heart:  RRR; no murmur, rub or gallop   Abdomen:   Soft, non-tender, non-distended, positive bowel sounds  Extremities: Left lower extremity incision sites appear clean and intact  Skin: No new rashes or lesions  No draining wounds noted  Labs, Imaging, & Other studies:   All pertinent labs and imaging studies were personally reviewed  Results from last 7 days   Lab Units 10/02/22  0550 09/30/22  0446 09/29/22  0534   WBC Thousand/uL 8 81 9 13 11 00*   HEMOGLOBIN g/dL 8 4* 8 4* 8 4*   PLATELETS Thousands/uL 474* 516* 558*     Results from last 7 days   Lab Units 10/02/22  0550 09/30/22  0446 09/29/22  0534   SODIUM mmol/L 141 139 139   POTASSIUM mmol/L 3 8 3 5 3 5   CHLORIDE mmol/L 104 103 103   CO2 mmol/L 30 27 31   BUN mg/dL 9 10 12   CREATININE mg/dL 0 81 0 90 0 82   EGFR ml/min/1 73sq m 69 61 68   CALCIUM mg/dL 8 9 8 7 8 6     Results from last 7 days   Lab Units 09/30/22  1106 09/29/22  0535   BLOOD CULTURE   --  No Growth After 4 Days  No Growth After 4 Days     GRAM STAIN RESULT  No Polys or Bacteria seen  --    BODY FLUID CULTURE, STERILE  No growth  --

## 2022-10-05 ENCOUNTER — NURSING HOME VISIT (OUTPATIENT)
Dept: WOUND CARE | Facility: HOSPITAL | Age: 77
End: 2022-10-05
Payer: MEDICARE

## 2022-10-05 ENCOUNTER — TELEPHONE (OUTPATIENT)
Dept: INFECTIOUS DISEASES | Facility: CLINIC | Age: 77
End: 2022-10-05

## 2022-10-05 ENCOUNTER — NURSING HOME VISIT (OUTPATIENT)
Dept: GERIATRICS | Facility: OTHER | Age: 77
End: 2022-10-05
Payer: MEDICARE

## 2022-10-05 ENCOUNTER — PATIENT OUTREACH (OUTPATIENT)
Dept: CASE MANAGEMENT | Facility: HOSPITAL | Age: 77
End: 2022-10-05

## 2022-10-05 VITALS
BODY MASS INDEX: 39.84 KG/M2 | WEIGHT: 232.1 LBS | SYSTOLIC BLOOD PRESSURE: 162 MMHG | DIASTOLIC BLOOD PRESSURE: 66 MMHG | RESPIRATION RATE: 18 BRPM | HEART RATE: 64 BPM | TEMPERATURE: 98.1 F | OXYGEN SATURATION: 94 %

## 2022-10-05 DIAGNOSIS — R78.81 BACTEREMIA: ICD-10-CM

## 2022-10-05 DIAGNOSIS — R78.81 MRSA BACTEREMIA: Primary | ICD-10-CM

## 2022-10-05 DIAGNOSIS — B95.62 MRSA BACTEREMIA: Primary | ICD-10-CM

## 2022-10-05 DIAGNOSIS — L89.320 PRESSURE INJURY OF LEFT BUTTOCK, UNSTAGEABLE (HCC): Primary | ICD-10-CM

## 2022-10-05 DIAGNOSIS — R26.2 AMBULATORY DYSFUNCTION: ICD-10-CM

## 2022-10-05 DIAGNOSIS — R78.81 BACTEREMIA DUE TO GRAM-POSITIVE BACTERIA: Primary | ICD-10-CM

## 2022-10-05 PROCEDURE — 99306 1ST NF CARE HIGH MDM 50: CPT | Performed by: INTERNAL MEDICINE

## 2022-10-05 PROCEDURE — 99308 SBSQ NF CARE LOW MDM 20: CPT | Performed by: NURSE PRACTITIONER

## 2022-10-05 NOTE — PROGRESS NOTES
[facility]  [address] [facility], [address] [place] [address]    Nursing Home Admission    NAME: Kizzie Severe  AGE: 76 y o  SEX: female [id number]      Patient Location     [facility] rehab    Patients recent vitals, labs and updated medications were reviewed on [facility]  Past Medical, surgical, social, medication and allergy history and patients previous records reviewed  Assessment/Plan:    Bacteremia due to Gram-positive bacteria  Patient was noted to have positive blood cultures for Staph epidermidis on 09/25/2022  She was started on IV vancomycin  Repeat blood cultures were negative after 72 hours  2D echo was negative for vegetation  Given history of ICD 6 weeks IV antibiotic therapy was recommended  PICC line was placed  Patient remains on IV vancomycin through 11/09/2022  Will continue to follow    Closed bicondylar fracture of left femur with delayed healing:  Patient has history of distal left femur fracture with delayed healing  She was admitted to the hospital on 9/12/22 and underwent uncomplicated left distal femoral replacement and removal of deep implant left femur  Postop course was complicated by acute blood loss anemia needing PRBC transfusion  Recent CT lower extremity revealed rim enhancing subcutaneous fluid collection at the lateral aspect of proximal femur and other rim enhancing subcutaneous fluid collection at the lateral aspect of distal femur  Patient was evaluated by orthopedic service, underwent aspiration  Cultures were negative  PAF:  Patient had few episodes of tachycardia during earlier admission at the hospital   She has known history of tachy-smita syndrome status post ppm   Last interrogation was performed on 05/18/2022 revealing AT/AF episodes  Metoprolol, diltiazem were resumed  Heart rate remains stable    Continue Eliquis  Recent echo revealed EF of 60% with grade 2 diastolic dysfunction       Acute blood loss anemia:   Patient needed 2 units of PRBC transfusion for acute drop in hemoglobin to 6 7 postoperatively  Recent hemoglobin was stable  Will continue to monitor  Continue iron, folic acid and P52 supplements  Follow-up repeat CBC     Hypertension:  Blood pressure stable at 134/76 on lisinopril 2 5 mg daily, Lasix 40 mg daily, diltiazem 120 mg daily and metoprolol 50 mg daily     Hyperlipidemia:  Continue Zetia 10 mg daily  Lipid profile in May 22 revealed total cholesterol of 229, triglycerides 86, HDL 67 and LDL of 145 consider adding statin if patient is able to tolerate     History of gout:  Continue allopurinol     Vitamin-D deficiency:  Continue vitamin-D supplement     History of fibromyalgia:  Continue duloxetine    Bacteria in urine:  Recent urine culture revealed greater than 100,000 colonies of MDR Proteus, suspected to be colonization  Hold off antimicrobial treatment  Vomiting and diarrhea:  Reported last week  Resolved at present  CT abdomen pelvis was unremarkable    Chief Complaint     Gram-positive bacteremia, AFib, recent closed bicondylar fracture of left femur status post surgery    HPI       Patient is a 76 y o  female with past medical history significant for asthma, heart failure, fibromyalgia, anxiety, rheumatoid fever and arthritis  Patient was hospitalized twice during the last month  Initial admission was on 912 for elective left distal femoral replacement removal of deep implant left femur  Patient had history of Prema grade nailing in the left femur several months ago  She had ambulatory difficulties due to left knee pain  Patient failed conservative treatment and underwent planned surgical intervention on 09/12/2022  Postop course was complicated by acute blood loss anemia with hemoglobin dropping down to 6 7 needing 2 units of PRBC transfusion  Patient additionally had issues with tachyarrhythmias controlled with beta-blocker    Patient remains on Eliquis for PAF  Patient was discharged to [facility] for rehab where she developed acute onset vomiting and nausea  Patient was sent to the hospital for further evaluation  CT abdomen pelvis was unrevealing  She was incidentally noted to have a seroma at the left fever proximally which was felt to be possibly a postop complication  Collection was aspirated  Culture came back negative  Nausea and vomiting later subsided  Workup in the hospital additionally revealed 2 sets of blood cultures positive for Staph epidermidis  Echocardiogram was negative for vegetation  Patient was evaluated by ID and Orthopedic surgery  Given history of ICD 6 week course of IV antibiotic was recommended  PICC line was placed  Patient was discharged to [facility] on IV vancomycin with last dose on 11/09/2022  Patient is being seen for initial nursing home admission  At the time of my evaluation she is awake alert answering questions appropriately  Denies any dyspnea chest pain GI or  complaints  Left hip pain is controlled  Patient is tolerating antibiotics well       Past Medical History:   Diagnosis Date    Abnormal ECG     Last Assessed 9/29/2016    Anxiety     Last Assessed 6/08/2016    Arthritis     knees    Asthma     Last Assessed 11/06/2013    Atrial premature complex     Cataract, bilateral     Last Assessed 7/14/2016    Cataract, left eye     Both eyes  Had surgery on left   COVID-19     Difficulty swallowing     Diverticulosis 9/25/2022    Dizziness     Fibromyalgia     Fibromyalgia, primary     Gastric reflux     Heart failure ([facility] [address] )     5/23/22 Pt reports had heart failure in the past    History of COVID-19     5/23/22 Pt reports having COVID in 2021      History of transfusion     no reaction    [place] (hard of hearing)     Hyperlipidemia     Hypertension     Incontinence in female     5/23/22 Pt reports has incontinence -wears depends especially at night/riding in car    Irregular heart beat     5/23/22 Pt reports hx of A fib    Lyme disease     PONV (postoperative nausea and vomiting)     Premature ventricular contraction     Primary osteoarthritis of both knees     Last Assessed 7/14/2016    Rheumatic fever     5/23/22 Pt reports had hx of rheumatic fever as a child twice    Sore throat     Tuberculosis 1945       Past Surgical History:   Procedure Laterality Date    APPENDECTOMY      CARDIAC PACEMAKER PLACEMENT  2019    COLONOSCOPY      DENTAL SURGERY      extractions    HYSTERECTOMY      5/23/22 Pt reports had appendix out with hysterectomy and "tightened up my bladder"    IR ASPIRATION ONLY  9/30/2022    ORIF FEMUR FRACTURE Left 08/05/2021    Procedure: OPEN REDUCTION W/ INTERNAL FIXATION (ORIF) DISTAL FEMUR; INSERTION RETROGRADE NAIL;  Surgeon: Ladarius Brown MD;  Location: [facility];  Service: Orthopedics    HI DILATE ESOPHAGUS N/A 04/06/2016    Procedure: DILATATION ESOPHAGEAL;  Surgeon: Michelle Haro MD;  Location: [facility]; Service: Gastroenterology    HI EGD TRANSORAL BIOPSY SINGLE/MULTIPLE N/A 04/06/2016    Procedure: ESOPHAGOGASTRODUODENOSCOPY (EGD); Surgeon: Michelle Haro MD;  [facility]: [facility]; Service: Gastroenterology    HI REMOVAL DEEP IMPLANT Left 9/12/2022    Procedure: REMOVAL NAIL IM  FEMUR;  Surgeon: Ladarius Brown MD;  [facility];  Service: Orthopedics    HI TOTAL KNEE ARTHROPLASTY Left 9/12/2022    Procedure: ARTHROPLASTY KNEE TOTAL;  Surgeon: Ladarius Brown MD;  [facility];  Service: Orthopedics    HI XCAPSL CTRC RMVL INSJ IO LENS PROSTH W/O ECP Left 03/15/2016    Procedure: EXTRACTION EXTRACAPSULAR CATARACT PHACO INTRAOCULAR LENS (IOL);   Surgeon: Ashely Narvaez MD;  [facility];  Service: Ophthalmology    REPLACEMENT TOTAL KNEE Right     REPLACEMENT TOTAL KNEE      right     TONSILLECTOMY         Social History     Tobacco Use   Smoking Status Never Smoker   Smokeless Tobacco Never Used   Tobacco Comment    Denies any former or current smoking          Family History   Problem Relation Age of Onset    Coronary artery disease Mother     Heart attack Mother         Prior    Heart disease Father     Heart attack Father         Prior    Anesthesia problems Neg Hx         Allergies   Allergen Reactions    Penicillins Hives     Itching and terrible hives    Sulfa Antibiotics Itching    N07 Folate [Folic Acid-Vit N9-TRN W13 - Food Allergy] Other (See Comments)     5/23/22 Pt reports no allergic reaction known     [place] Other (See Comments)     Unknown, 5/23 -pt is unaware of reaction     Lyrica [Pregabalin] Other (See Comments)     5/23/22 Pt reports doesn't remember reaction     Other Other (See Comments)     Black rubber 5/23/22 per allergy test - pt unaware of reaction    Cortisone Acetate [Cortisone] Itching and Rash     5/23/22 pt reports makes her violent     Doxycycline Hives and Itching    Nickel Other (See Comments)     Skin discoloration          Current Outpatient Medications:     acetaminophen (TYLENOL) 325 mg tablet, Take 2 tablets (650 mg total) by mouth every 6 (six) hours, Disp: , Rfl: 0    albuterol (PROVENTIL HFA,VENTOLIN HFA) 90 mcg/act inhaler, INHALE 2 PUFFS BY MOUTH EVERY 6 HOURS AS NEEDED FOR WHEEZING, Disp: 3 Inhaler, Rfl: 0    allopurinol (ZYLOPRIM) 100 mg tablet, Take 1 tablet (100 mg total) by mouth daily, Disp: 90 tablet, Rfl: 3    apixaban (Eliquis) 5 mg, Take 1 tablet (5 mg total) by mouth 2 (two) times a day, Disp: 60 tablet, Rfl: 6    ascorbic acid (VITAMIN C) 500 MG tablet, Take 1 tablet (500 mg total) by mouth 2 (two) times a day, Disp: 60 tablet, Rfl: 1    cholecalciferol (VITAMIN D3) 400 units tablet, Take 1 tablet (400 Units total) by mouth in the morning, Disp: , Rfl: 0    diltiazem (CARDIZEM CD) 120 mg 24 hr capsule, Take 1 capsule (120 mg total) by mouth daily (Patient taking differently: Take 120 mg by mouth daily Takes in the am), Disp: 90 capsule, Rfl: 3    DULoxetine (CYMBALTA) 30 mg delayed release capsule, TAKE 1 CAPSULE(30 MG) BY MOUTH DAILY, Disp: 30 capsule, Rfl: 5    ezetimibe (ZETIA) 10 mg tablet, Take 1 tablet (10 mg total) by mouth daily, Disp: 90 tablet, Rfl: 1    ferrous sulfate 324 (65 Fe) mg, Take 1 tablet (324 mg total) by mouth 2 (two) times a day before meals, Disp: 60 tablet, Rfl: 1    FIBER PO, Take 1 tablet by mouth daily, Disp: , Rfl:     folic acid (FOLVITE) 1 mg tablet, TAKE 1 TABLET(1 MG) BY MOUTH DAILY, Disp: 90 tablet, Rfl: 0    furosemide (LASIX) 40 mg tablet, Take 1 tablet (40 mg total) by mouth in the morning , Disp: 90 tablet, Rfl: 1    lisinopril (ZESTRIL) 2 5 mg tablet, Take 1 tablet (2 5 mg total) by mouth daily, Disp: 30 tablet, Rfl: 2    LORazepam (Ativan) 0 5 mg tablet, Take 1 tablet (0 5 mg total) by mouth every 8 (eight) hours as needed for anxiety for up to 10 days, Disp: 10 tablet, Rfl: 0    Melatonin 10 MG TABS, Take by mouth Takes daily at night, Disp: , Rfl:     metoprolol tartrate (LOPRESSOR) 50 mg tablet, Take 100 mg in the morning and 50 mg in the evening, Disp: 270 tablet, Rfl: 3    Mirabegron ER (Myrbetriq) 25 MG TB24, Take 25 mg by mouth daily Takes in the evening, Disp: 30 tablet, Rfl: 6    Multiple Vitamin (MULTIVITAMIN ADULT PO), Take by mouth in the morning, Disp: , Rfl:     naloxone (NARCAN) 4 mg/0 1 mL nasal spray, Administer 1 spray into a nostril  If no response after 2-3 minutes, give another dose in the other nostril using a new spray   (Patient not taking: No sig reported), Disp: 1 each, Rfl: 1    oxyCODONE (ROXICODONE) 5 immediate release tablet, Take 0 5 tablets (2 5 mg total) by mouth every 6 (six) hours as needed for moderate pain for up to 10 days Max Daily Amount: 10 mg, Disp: 10 tablet, Rfl: 0    pantoprazole (PROTONIX) 40 mg tablet, Take 1 tablet (40 mg total) by mouth every 12 (twelve) hours, Disp: , Rfl: 0    polyethylene glycol (MIRALAX) 17 g packet, Take 17 g by mouth daily as needed (constipation), Disp: , Rfl: 0    senna-docusate sodium (SENOKOT S) 8 6-50 mg per tablet, Take 2 tablets by mouth 2 (two) times a day, Disp: , Rfl: 0    vancomycin (VANCOCIN), Inject 150 mL (750 mg total) into a catheter in a vein every 12 (twelve) hours, Disp: 04678 mL, Rfl: 0    vitamin B-12 (VITAMIN B-12) 1,000 mcg tablet, Take 1 tablet (1,000 mcg total) by mouth daily, Disp: , Rfl: 0  No current facility-administered medications for this visit  Updated list was reviewed in [address] [place] [address] system of facility  Vitals:    10/05/22 0837   BP: 162/66   Pulse: 64   Resp: 18   Temp: 98 1 °F (36 7 °C)   SpO2: 94%       Vital signs were reviewed in point click care    Review of Systems   Constitutional: Positive for fatigue  Negative for chills and fever  HENT: Negative for nosebleeds and rhinorrhea  Eyes: Negative for discharge and redness  Respiratory: Negative for cough, chest tightness, shortness of breath, wheezing and stridor  Cardiovascular: Negative for chest pain and leg swelling  Gastrointestinal: Negative for abdominal distention, abdominal pain, diarrhea and vomiting  Genitourinary: Negative for dysuria, flank pain and hematuria  Musculoskeletal: Positive for arthralgias (Left hip pain at times) and gait problem  Negative for back pain  Skin: Negative for pallor  Neurological: Positive for weakness (Generalized)  Negative for tremors, seizures, syncope and headaches  Psychiatric/Behavioral: Negative for agitation and behavioral problems  Physical Exam  Constitutional:       General: She is not in acute distress  HENT:      Head: Normocephalic and atraumatic  Nose: No rhinorrhea  Eyes:      General: No scleral icterus  Right eye: No discharge  Left eye: No discharge  Cardiovascular:      Rate and Rhythm: Normal rate and regular rhythm  Pulmonary:      Breath sounds: No wheezing, rhonchi or rales     Abdominal: General: There is no distension  Palpations: Abdomen is soft  Tenderness: There is no abdominal tenderness  There is no guarding  Musculoskeletal:      Cervical back: Neck supple  Right lower leg: No edema  Left lower leg: No edema  Skin:     Coloration: Skin is not jaundiced  Comments: Left hip incision has healed well  Staples are out  No cellulitis or drainage   Neurological:      General: No focal deficit present  Mental Status: Mental status is at baseline  Cranial Nerves: No cranial nerve deficit  Motor: Weakness present  Comments: Oriented in year  Knows the current president  Could not remember the month correctly   Psychiatric:         Mood and Affect: Mood normal          Behavior: Behavior normal          Diagnostic Data       Recent labs and imaging studies were reviewed  Lab Results   Component Value Date    SODIUM 141 10/02/2022    K 3 8 10/02/2022     10/02/2022    CO2 30 10/02/2022    BUN 9 10/02/2022    CREATININE 0 81 10/02/2022    GLUC 120 10/02/2022    CALCIUM 8 9 10/02/2022        Lab Results   Component Value Date    WBC 8 81 10/02/2022    HGB 8 4 (L) 10/02/2022    HCT 27 7 (L) 10/02/2022    MCV 95 10/02/2022     (H) 10/02/2022     Code Status:      Full code    Additional notes:      Continue antibiotics through 11/09/2022     Check weekly labs while on antibiotics  Local PICC line care  Continue PT OT    This note was electronically signed by Dr Greg Sams

## 2022-10-05 NOTE — ASSESSMENT & PLAN NOTE
- Location : Left buttock  - Wound healing status: declining  -Enzymatic debridement using santyl  - Pressure redistribution device - ordered air mattress and w/c pressure prevention cushion  - Continue to offload  - Increase protein    Refer to RD  - No sign localized infection during visit  - Clinical factors affecting wound healing includes age, chronicity,  co-morbidities, incontinence, location of the wound, mobility impairement   -Follow up : Next week

## 2022-10-05 NOTE — PROGRESS NOTES
[address] MANAGEMENT   AND [facility]PER[facility]INE CENTER       Patient ID: Yolanda Samuel is a 76 y o  female Date of Birth 1945     Location of Service: [facility]    Performed wound round with: Wound team     Chief Complaint : Left buttock    Wound Instructions:  Wound:  Left buttock  Discontinue previous wound order  Cleanse the wound bed with NSS   Apply non-sting skin prep to periwound area  Apply santyl collagenase to wound bed, then cover with bordered foam  Frequency : daily and prn for soiling  Needs air mattress and w/c pressure relief cushion  Offload all wounds  Turn and reposition frequently, maximum of every two hours  Instruct / Assist with weight shifting every 15 - 20 minutes when in chair  Increase protein intake  Consult RD  Monitor for any sign of infection or worsening, inform PCP or patient's primary physician in your facility  Allergies  Penicillins, Sulfa antibiotics, W41 folate [folic acid-vit R4-JWL H27 - food allergy], Cobalt, Lyrica [pregabalin], Other, Cortisone acetate [cortisone], Doxycycline, and Nickel      Assessment & Plan:  1  Pressure injury of left buttock, unstageable (HCC)  Assessment & Plan:  - Location : Left buttock  - Wound healing status: declining  -Enzymatic debridement using santyl  - Pressure redistribution device - ordered air mattress and w/c pressure prevention cushion  - Continue to offload  - Increase protein   Refer to RD  - No sign localized infection during visit  - Clinical factors affecting wound healing includes age, chronicity,  co-morbidities, incontinence, location of the wound, mobility impairement   -Follow up : Next week         2  Ambulatory dysfunction  Assessment & Plan:  On STR             Subjective:   10/5/2022This is a 76 y o , female referred to our service for wound/ skin alterations on left buttock  Patient have a complex medical history including but not limited to  Fibromyalgia, Fibromyalgia, primary, Gastric reflux, Heart failure ([facility] [address] ), History of COVID-19, History of transfusion, [place] (hard of hearing), Hyperlipidemia, Hypertension, Incontinence in female, Irregular heart beat, Lyme disease, PONV (postoperative nausea and vomiting), Premature ventricular contraction, Primary osteoarthritis of both knees, Rheumatic fever, Sore throat, and Tuberculosis    Patient was referred by Senior Care Team  Patient was seen in collaboration with the facility wound team      Wound History:   As per medical record review, the wound was first assessed in acute care on 9/25/2022  Wound started as stage 2 pressure ulcer  Received patient in bed, seems comfortable  Denies pain  Incontinent of both bowel and bladder  Need assistance with turning when in bed  NO current treatment  Review of Systems   Constitutional: Negative  HENT: Negative  Eyes: Negative  Respiratory: Negative  Cardiovascular: Negative for chest pain and leg swelling  Gastrointestinal: Negative  Endocrine: Negative  Genitourinary: Negative  Musculoskeletal: Positive for gait problem  Skin: Positive for wound  See HPI   Neurological: Negative for dizziness and headaches  Psychiatric/Behavioral: Negative for behavioral problems  Objective:    Physical Exam  Constitutional:       Appearance: She is obese  HENT:      Head: Normocephalic and atraumatic  Nose: Nose normal       Mouth/Throat:      Pharynx: Oropharynx is clear  Eyes:      Conjunctiva/sclera: Conjunctivae normal    Cardiovascular:      Rate and Rhythm: Normal rate  Pulmonary:      Effort: Pulmonary effort is normal    Abdominal:      Tenderness: There is no abdominal tenderness  There is no guarding  Genitourinary:     Comments: incontinent  Musculoskeletal:         General: No tenderness  Cervical back: Normal range of motion  Right lower leg: No edema  Left lower leg: No edema        Comments: LROM   Skin:     Findings: Lesion present  Comments: Left buttock- wound size is 4 5 x 2 5 x 0 1 cm, 100% slough, no drainage, no obvious sign of infection, no malodor    Neurological:      Mental Status: She is alert  Gait: Gait abnormal    Psychiatric:         Mood and Affect: Mood normal          Behavior: Behavior normal               Procedures           Patient's care was coordinated with nursing facility staff  Recent vitals, labs and updated medications were reviewed on EMR or chart system of facility  Past Medical, surgical, social, medication and allergy history and patient's previous records were reviewed and updated as appropriate: Most up-to date information is available in the facility EMR where the patient is currently admitted      Patient Active Problem List   Diagnosis    Atrial fibrillation ([facility] [address] )    Tachy-smita syndrome ([facility])    Essential hypertension    Pacemaker    Chronic bilateral low back pain without sciatica    Chronic GERD    Degenerative lumbar spinal stenosis    Fibromyalgia    GERD (gastroesophageal reflux disease)    Low back pain    Lumbar degenerative disc disease    Mild carpal tunnel syndrome, right    Overweight    Psoriasis    Thyroid nodule    Urinary incontinence    Other insomnia    Anxiety    Tremor    Chronic pain of left knee    Age related osteoporosis    Dysphonia    Vitamin D insufficiency    Hyperlipidemia    Arthritis    Other fatigue    Leukocytosis    Ambulatory dysfunction    Reactive airway disease with acute exacerbation    Morbid obesity with body mass index (BMI) of 40 0 to 49 9 ([facility])    Chronic gout with tophus    Current moderate episode of major depressive disorder ([facility])    Paroxysmal atrial fibrillation ([facility])    Poor dentition    Closed bicondylar fracture of left femur with delayed healing    Preop cardiovascular exam    Stage 3a chronic kidney disease ([address] )    Preop examination    Renal insufficiency    Surgical wound present    Anemia    Bladder wall thickening    left femoral fluid collection    Pleural effusion on right    Vomiting and diarrhea    Bacteremia due to Gram-positive bacteria    Bacteria in urine    Aortic regurgitation    Pressure injury of left buttock, unstageable (HCC)     Past Medical History:   Diagnosis Date    Abnormal ECG     Last Assessed 9/29/2016    Anxiety     Last Assessed 6/08/2016    Arthritis     knees    Asthma     Last Assessed 11/06/2013    Atrial premature complex     Cataract, bilateral     Last Assessed 7/14/2016    Cataract, left eye     Both eyes  Had surgery on left   COVID-19     Difficulty swallowing     Diverticulosis 9/25/2022    Dizziness     Fibromyalgia     Fibromyalgia, primary     Gastric reflux     Heart failure ([address] )     5/23/22 Pt reports had heart failure in the past    History of COVID-19     5/23/22 Pt reports having COVID in 2021      History of transfusion     no reaction    [place] (hard of hearing)     Hyperlipidemia     Hypertension     Incontinence in female     5/23/22 Pt reports has incontinence -wears depends especially at night/riding in car    Irregular heart beat     5/23/22 Pt reports hx of A fib    Lyme disease     PONV (postoperative nausea and vomiting)     Premature ventricular contraction     Primary osteoarthritis of both knees     Last Assessed 7/14/2016    Rheumatic fever     5/23/22 Pt reports had hx of rheumatic fever as a child twice    Sore throat     Tuberculosis 1945     Past Surgical History:   Procedure Laterality Date    APPENDECTOMY      CARDIAC PACEMAKER PLACEMENT  2019    COLONOSCOPY      DENTAL SURGERY      extractions    HYSTERECTOMY      5/23/22 Pt reports had appendix out with hysterectomy and "tightened up my bladder"    IR ASPIRATION ONLY  9/30/2022    ORIF FEMUR FRACTURE Left 08/05/2021    Procedure: OPEN REDUCTION W/ INTERNAL FIXATION (ORIF) DISTAL FEMUR; INSERTION RETROGRADE NAIL;  Surgeon: Ag Bautista MD; [facility];  Service: Orthopedics    CO DILATE ESOPHAGUS N/A 04/06/2016    Procedure: DILATATION ESOPHAGEAL;  Surgeon: Giana Hopson MD;  Location: [facility]; Service: Gastroenterology    CO EGD TRANSORAL BIOPSY SINGLE/MULTIPLE N/A 04/06/2016    Procedure: ESOPHAGOGASTRODUODENOSCOPY (EGD); Surgeon: Giana Hopson MD;  [facility]: [facility]; Service: Gastroenterology    CO REMOVAL DEEP IMPLANT Left 9/12/2022    Procedure: REMOVAL NAIL IM  FEMUR;  Surgeon: Micah Alonso MD;  [facility];  Service: Orthopedics    CO TOTAL KNEE ARTHROPLASTY Left 9/12/2022    Procedure: ARTHROPLASTY KNEE TOTAL;  Surgeon: Micah Alonso MD;  [facility];  Service: Orthopedics    CO XCAPSL CTRC RMVL INSJ IO LENS PROSTH W/O ECP Left 03/15/2016    Procedure: EXTRACTION EXTRACAPSULAR CATARACT PHACO INTRAOCULAR LENS (IOL); Surgeon: Gilberto Gregory MD;  [facility];  Service: Ophthalmology    REPLACEMENT TOTAL KNEE Right     REPLACEMENT TOTAL KNEE      right     TONSILLECTOMY       Social History     Socioeconomic History    Marital status:      Spouse name: None    Number of children: None    Years of education: None    Highest education level: None   Occupational History    None   Tobacco Use    Smoking status: Never Smoker    Smokeless tobacco: Never Used    Tobacco comment: Denies any former or current smoking   Vaping Use    Vaping Use: Never used   Substance and Sexual Activity    Alcohol use: Not Currently     Comment: Per [employer]- pt reports no current alcohol use at this time    Drug use: No     Comment: Denies any former or current drug use    Sexual activity: Never     Comment:   for 12 years - not active   Other Topics Concern    None   Social History Narrative    None     Social Determinants of Health     Financial Resource Strain: Not on file   Food Insecurity: No Food Insecurity    Worried About Running Out of Food in the Last Year: Never true    Luda of Food in the Last Year: Never true   Transportation Needs: No Transportation Needs    Lack of Transportation (Medical): No    Lack of Transportation (Non-Medical):  No   Physical Activity: Not on file   Stress: Not on file   Social Connections: Not on file   Intimate Partner Violence: Not on file   Housing Stability: Low Risk     Unable to Pay for Housing in the Last Year: No    Number of Places Lived in the Last Year: 2    Unstable Housing in the Last Year: No        Current Outpatient Medications:     acetaminophen (TYLENOL) 325 mg tablet, Take 2 tablets (650 mg total) by mouth every 6 (six) hours, Disp: , Rfl: 0    albuterol (PROVENTIL HFA,VENTOLIN HFA) 90 mcg/act inhaler, INHALE 2 PUFFS BY MOUTH EVERY 6 HOURS AS NEEDED FOR WHEEZING, Disp: 3 Inhaler, Rfl: 0    allopurinol (ZYLOPRIM) 100 mg tablet, Take 1 tablet (100 mg total) by mouth daily, Disp: 90 tablet, Rfl: 3    apixaban (Eliquis) 5 mg, Take 1 tablet (5 mg total) by mouth 2 (two) times a day, Disp: 60 tablet, Rfl: 6    ascorbic acid (VITAMIN C) 500 MG tablet, Take 1 tablet (500 mg total) by mouth 2 (two) times a day, Disp: 60 tablet, Rfl: 1    cholecalciferol (VITAMIN D3) 400 units tablet, Take 1 tablet (400 Units total) by mouth in the morning, Disp: , Rfl: 0    diltiazem (CARDIZEM CD) 120 mg 24 hr capsule, Take 1 capsule (120 mg total) by mouth daily (Patient taking differently: Take 120 mg by mouth daily Takes in the am), Disp: 90 capsule, Rfl: 3    DULoxetine (CYMBALTA) 30 mg delayed release capsule, TAKE 1 CAPSULE(30 MG) BY MOUTH DAILY, Disp: 30 capsule, Rfl: 5    ezetimibe (ZETIA) 10 mg tablet, Take 1 tablet (10 mg total) by mouth daily, Disp: 90 tablet, Rfl: 1    ferrous sulfate 324 (65 Fe) mg, Take 1 tablet (324 mg total) by mouth 2 (two) times a day before meals, Disp: 60 tablet, Rfl: 1    FIBER PO, Take 1 tablet by mouth daily, Disp: , Rfl:     folic acid (FOLVITE) 1 mg tablet, TAKE 1 TABLET(1 MG) BY MOUTH DAILY, Disp: 90 tablet, Rfl: 0    furosemide (LASIX) 40 mg tablet, Take 1 tablet (40 mg total) by mouth in the morning , Disp: 90 tablet, Rfl: 1    lisinopril (ZESTRIL) 2 5 mg tablet, Take 1 tablet (2 5 mg total) by mouth daily, Disp: 30 tablet, Rfl: 2    LORazepam (Ativan) 0 5 mg tablet, Take 1 tablet (0 5 mg total) by mouth every 8 (eight) hours as needed for anxiety for up to 10 days, Disp: 10 tablet, Rfl: 0    Melatonin 10 MG TABS, Take by mouth Takes daily at night, Disp: , Rfl:     metoprolol tartrate (LOPRESSOR) 50 mg tablet, Take 100 mg in the morning and 50 mg in the evening, Disp: 270 tablet, Rfl: 3    Mirabegron ER (Myrbetriq) 25 MG TB24, Take 25 mg by mouth daily Takes in the evening, Disp: 30 tablet, Rfl: 6    Multiple Vitamin (MULTIVITAMIN ADULT PO), Take by mouth in the morning, Disp: , Rfl:     naloxone (NARCAN) 4 mg/0 1 mL nasal spray, Administer 1 spray into a nostril  If no response after 2-3 minutes, give another dose in the other nostril using a new spray  (Patient not taking: No sig reported), Disp: 1 each, Rfl: 1    oxyCODONE (ROXICODONE) 5 immediate release tablet, Take 0 5 tablets (2 5 mg total) by mouth every 6 (six) hours as needed for moderate pain for up to 10 days Max Daily Amount: 10 mg, Disp: 10 tablet, Rfl: 0    pantoprazole (PROTONIX) 40 mg tablet, Take 1 tablet (40 mg total) by mouth every 12 (twelve) hours, Disp: , Rfl: 0    polyethylene glycol (MIRALAX) 17 g packet, Take 17 g by mouth daily as needed (constipation), Disp: , Rfl: 0    senna-docusate sodium (SENOKOT S) 8 6-50 mg per tablet, Take 2 tablets by mouth 2 (two) times a day, Disp: , Rfl: 0    vancomycin (VANCOCIN), Inject 150 mL (750 mg total) into a catheter in a vein every 12 (twelve) hours, Disp: 59754 mL, Rfl: 0    vitamin B-12 (VITAMIN B-12) 1,000 mcg tablet, Take 1 tablet (1,000 mcg total) by mouth daily, Disp: , Rfl: 0  No current facility-administered medications for this visit    Family History   Problem Relation Age of Onset    Coronary artery disease Mother     Heart attack Mother         Prior    Heart disease Father     Heart attack Father         Prior    Anesthesia problems Neg Hx               Coordination of Care: Wound team aware of the treatment plan  Facility nurse will provide wound treatment and monitor the wound for any changes  Patient / Staff education : Patient / Staff was given education on sign of infection and pressure ulcer prevention  Patient/ Staff verbalized understanding     Follow up :  Next week    Voice-recognition software may have been used in the preparation of this document  Occasional wrong word or "sound-alike" substitutions may have occurred due to the inherent limitations of voice recognition software  Interpretation should be guided by context        Henrique Kirkland

## 2022-10-05 NOTE — TELEPHONE ENCOUNTER
Contacted patient's unit and spoke with Hawa who verbalized receipt and understanding of antibiotic plan  Patient going to be on 750MG Q12 and is due for labs weekly, to start tomorrow or Friday  Patient follows up 10/17 with [address] states that should they have questions or concerns they will gladly give us a call, and I provided our contact information

## 2022-10-05 NOTE — ASSESSMENT & PLAN NOTE
Patient was noted to have positive blood cultures for Staph epidermidis on 09/25/2022  She was started on IV vancomycin  Repeat blood cultures were negative after 72 hours  2D echo was negative for vegetation  Given history of ICD 6 weeks IV antibiotic therapy was recommended  PICC line was placed  Patient remains on IV vancomycin through 11/09/2022    Will continue to follow

## 2022-10-06 ENCOUNTER — TELEPHONE (OUTPATIENT)
Dept: INFECTIOUS DISEASES | Facility: CLINIC | Age: 77
End: 2022-10-06

## 2022-10-06 ENCOUNTER — TELEPHONE (OUTPATIENT)
Dept: OTHER | Facility: OTHER | Age: 77
End: 2022-10-06

## 2022-10-06 NOTE — TELEPHONE ENCOUNTER
Cyndi from [facility] contacts office regarding pts vanco trough  Cyndi states they got the vanco trough results of 10 9  Cyndi will fax all resulted labs to us  Dr Shailesh Vela notified of the vanco trough result  Informed Cyndi per Dr Shailesh Vela increase vanco to 1g IV Q12  Cyndi verbalizes understanding at this time and I will fax new order to fax number provided  Informed Cyndi per Dr Shailesh Vela pt is to receive missed morning dose now and continue with 9PM dose as scheduled  Cyndi verbalizes understanding at this time

## 2022-10-06 NOTE — TELEPHONE ENCOUNTER
Pt's niece Kobe Carpenter calls office today regarding pts vanco dose  Kobe Carpenter states she came to the facility today to visit and was told pt did not receive the vanco this morning  Netta states nursing told her they did not give the morning vanco dose because the lab work from this morning has not resulted  Called [address] and spoke with Cyndi, pts nurse, who states pt was not given her morning vanco dose because they did not get lab results back from this morning  Informed Cyndi that we do not hold vanco dose while we are waiting for labs results we would advise once labs are resulted  Cyndi confirms pt is on Vanco 750mg IV Q12  Pt is dosed at 9AM and 9PM  Pt was given her 9PM dose last night  Pts labs were drawn this morning and sent to [facility]  Called [facility] and spoke with Tray Barnes states they did receive pts labs this morning and they are still being processed at this time   Tray Barnes will fax over results when they are final

## 2022-10-09 ENCOUNTER — TELEPHONE (OUTPATIENT)
Dept: OTHER | Facility: OTHER | Age: 77
End: 2022-10-09

## 2022-10-09 NOTE — TELEPHONE ENCOUNTER
Mandy Huffman called from [address] [place] [address] regarding a vancomycin trough    Paged on call for senior care, message was read

## 2022-10-10 ENCOUNTER — NURSING HOME VISIT (OUTPATIENT)
Dept: GERIATRICS | Facility: OTHER | Age: 77
End: 2022-10-10
Payer: MEDICARE

## 2022-10-10 DIAGNOSIS — R78.81 BACTEREMIA DUE TO GRAM-POSITIVE BACTERIA: ICD-10-CM

## 2022-10-10 DIAGNOSIS — R53.1 GENERALIZED WEAKNESS: ICD-10-CM

## 2022-10-10 DIAGNOSIS — D75.839 THROMBOCYTOSIS: Primary | ICD-10-CM

## 2022-10-10 DIAGNOSIS — Z95.0 PACEMAKER: ICD-10-CM

## 2022-10-10 DIAGNOSIS — I48.91 ATRIAL FIBRILLATION, UNSPECIFIED TYPE (HCC): ICD-10-CM

## 2022-10-10 DIAGNOSIS — R26.2 AMBULATORY DYSFUNCTION: ICD-10-CM

## 2022-10-10 DIAGNOSIS — D64.9 ANEMIA, UNSPECIFIED TYPE: ICD-10-CM

## 2022-10-10 PROCEDURE — 99309 SBSQ NF CARE MODERATE MDM 30: CPT | Performed by: NURSE PRACTITIONER

## 2022-10-11 ENCOUNTER — TELEPHONE (OUTPATIENT)
Dept: INFECTIOUS DISEASES | Facility: CLINIC | Age: 77
End: 2022-10-11

## 2022-10-11 PROBLEM — D75.839 THROMBOCYTOSIS: Status: ACTIVE | Noted: 2022-10-11

## 2022-10-11 PROBLEM — R53.1 GENERALIZED WEAKNESS: Status: ACTIVE | Noted: 2021-05-18

## 2022-10-11 NOTE — ASSESSMENT & PLAN NOTE
Heart rate control  Denies chest pain or palpitations  Free of lightheadedness, dizziness, headache  Continue metoprolol 50 mg daily and Cardizem 120 mg daily  Continue Eliquis 5 mg b i d

## 2022-10-11 NOTE — ASSESSMENT & PLAN NOTE
Status post pacemaker placement   During recent hospitalization blood culture from 9252/2 positive for a staphylococcal epidermidis, repeat cultures negative at >72 hour, Infectious Disease evaluated patient and follow patient during recent hospitalization 2D echo negative   Denies chest pain, palpitation, lightheadedness, or dizziness  Continues on IV vancomycin for 6 weeks course and will continue antibiotic course through November 9th  Last vanco through on 10/9-22 at 16 7   Vanco throat to follow on 10/11/2022

## 2022-10-11 NOTE — ASSESSMENT & PLAN NOTE
Recent hospitalization for positive blood culture for Staph epi the epidermidis on 09/25/2022   Repeat blood culture were negative after 72 hours  2D echo was negative for vegetation    Status post ICD placement will continue with 6 weeks of IV antibiotic therapy until November 09/20/2022  Continue on right PICC vancomycin at 16 7  Last Vanco T on 10/9 at 16 7  Next Vanco T on 10/11/22  Free of fever, vital signs stable and nontoxic appearance  Monitor

## 2022-10-11 NOTE — ASSESSMENT & PLAN NOTE
Hemoglobin is 8 6 appears stable when compare to 8 4 during recent hospitalization   No active bleeding noted on exam and none reported  Continue iron and folic acid as well as Y76  Continue to monitor CBC and trend  Next CBC 10/18/2022

## 2022-10-11 NOTE — PROGRESS NOTES
[address], [address] [place] [address], [address] [place] [address]  [phone]    NAME: Cherie Gill  AGE: 76 y o  SEX: female    Progress Note    Location: [facility]   POS: 31 (SNF)    Assessment/Plan:    Atrial fibrillation (HCC)  Heart rate control  Denies chest pain or palpitations  Free of lightheadedness, dizziness, headache  Continue metoprolol 50 mg daily and Cardizem 120 mg daily  Continue Eliquis 5 mg b i d  Ambulatory dysfunction  Continue PT/OT  Fall and safety precaution   Continue 24-7 supportive care   Optimize acute and chronic medical condition as outlined   PT/OT/sw following for DC planning    Pacemaker  Status post pacemaker placement   During recent hospitalization blood culture from 9252/2 positive for a staphylococcal epidermidis, repeat cultures negative at >72 hour, Infectious Disease evaluated patient and follow patient during recent hospitalization 2D echo negative   Denies chest pain, palpitation, lightheadedness, or dizziness  Continues on IV vancomycin for 6 weeks course and will continue antibiotic course through November 9th  Last vanco through on 10/9-22 at 16 7   Vanco throat to follow on 10/11/2022    Generalized weakness  On exams appear with generalized weakness   Continue PT/OT  Fall safety precaution   Continue 24-7 supportive care   Optimize acute and chronic medical condition as outlined   Continue adequate p o  hydration and nutrition    following for DC planning    Bacteremia due to Gram-positive bacteria  Recent hospitalization for positive blood culture for Staph epi the epidermidis on 09/25/2022   Repeat blood culture were negative after 72 hours  2D echo was negative for vegetation    Status post ICD placement will continue with 6 weeks of IV antibiotic therapy until November 09/20/2022  Continue on right PICC vancomycin at 16 7  Last Vanco T on 10/9 at 16 7  Next Vanco T on 10/11/22  Free of fever, vital signs stable and nontoxic appearance  Monitor    Anemia  Hemoglobin is 8 6 appears stable when compare to 8 4 during recent hospitalization   No active bleeding noted on exam and none reported  Continue iron and folic acid as well as L72  Continue to monitor CBC and trend  Next CBC 10/18/2022    Thrombocytosis  Platelets 241, suspect reactive in nature due to recent bacteremia during recent hospitalization   No active bleeding noted or reported   Next CBC 10-18-22  Continue to monitor platelets and trend    Chief complaint / Reason for visit:  STR F/U     Patient's care was coordinated with nursing facility staff  Recent vitals, labs, and updated medications were review on [facility] system in facility  History of Present Illness:  73-year-old female seen and examined for short-term rehab follow-up  Offers no complaint and is in no acute distress  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, removal of deep implants of left femur  Left leg with upper incision, BECCA, approximated no erythema/drainage  Left leg lower incision approximated remians with mild erythema, tender, no drainage  Remains on IV antibiotics- Vancomycin 1 gram every 12 hours  R PICC C/D/I +bruise around the site  Right chest small incision well healed  S/p pacemaker  Remains afebrile, nontoxic and vital signs stable  Denies fever or chills  Tolerating diet  Is sleeping with no difficulties  Incontinent of bowel and bladder  Denies /GI discomfort  Per nursing no other concerns or issues at this time    Review of Systems:  Per history of present illness, all other systems reviewed and negative    Other than those noted in HPI    HISTORY:  Medical Hx: Reviewed, unchanged  Family Hx: Reviewed, unchanged  Soc Hx: Reviewed,  unchanged    ALLERGY: Reviewed, unchanged  Allergies   Allergen Reactions   • Penicillins Hives     Itching and terrible hives   • Sulfa Antibiotics Itching   • G16 Folate [Folic Acid-Vit B2-SUJ B12 - Food Allergy] Other (See Comments)     5/23/22 Pt reports no allergic reaction known    • Elnora Other (See Comments)     Unknown, 5/23 -pt is unaware of reaction    • Lyrica [Pregabalin] Other (See Comments)     5/23/22 Pt reports doesn't remember reaction    • Other Other (See Comments)     Black rubber 5/23/22 per allergy test - pt unaware of reaction   • Cortisone Acetate [Cortisone] Itching and Rash     5/23/22 pt reports makes her violent    • Nickel Other (See Comments)     Skin discoloration        PHYSICAL EXAM:  Vital Signs: blood pressure 130/83, pulse 80, respirations 18, Temp 98 1, O2 sats 94% RA  Weight:237 0 lbs    General:  Appears weak and cooperative  Head: Atraumatic  Normocephalic  Eye Exam: anicteric sclera, no discharge, PERRLA, No injection  Oral Exam: moist mucous membranes, no buccaloropharyngeal erythema, palatine tonsils WNL  Neck Exam: no anterior cervical lymphadenopathy noted, neck supple  Cardiovascular: regular rate, regular rhythm, + murmurs, rubs, or gallops  Pulmonary:  Decreased breath sounds with no wheeze, no rhonchi, no rales  No chest tenderness  Abdominal: soft, rounded, non-tender, nondistended, bowel sounds audible x 4 quadrants  : Non distended bladder  Incontinent of bowel and bladder   Extremities and skin: no edema noted, left leg +1 edema  Left leg incisions BECCA- approximated  Lower incision remains with mild erythema, tender no drainage  No rashes  R PICC clean,dry and intact +bruise around the site  B/L UE bruises from prior blood draws  Right chest healed incision-s/p ICD placement    Stage 3 in buttocks   Neurological: alert, cooperative and responsive, Oriented x 3, ble generalized weakness     Laboratory results / Imaging reviewed: Hard copy/ies in medical chart:  Reviewed from [facility]    10/9/22 Vanco Trough 16 7    CBC with diff/creatinine/Vanco through on 10/6/22    Hemoglobin 8 6, hematocrit 26 0, WBC 6 1, platelets 534, absolute neutrophil 3 0, absolute lymphocyte 1 6, absolute monos 0 7, absolute eos 0 7, absolute basos 0 1, neutrophils 49, lymphocyte 26, monocytes 12, eosinophils 12, basophil 1, creatinine 0 74, EGFR 82, Vanco Through 10 9    CBC-BMP reviewed from 09/19/2022   Hemoglobin 8 3, hematocrit 24 9, WBC 11 4, platelets 029, blood glucose 118, BUN 20, creatinine 0 93, sodium 139, potassium 4 1, calcium 8 4, EGFR 63    Current Medications: All medications reviewed and updated in Nursing Home Chart reviewed from [facility]    Please note:  Voice-recognition software may have been used in the preparation of this document  Occasional wrong word or "sound-alike" substitutions may have occurred due to the inherent limitations of voice recognition software  Interpretation should be guided by context      Mere Mendoza  10/10/2022

## 2022-10-11 NOTE — ASSESSMENT & PLAN NOTE
Continue PT/OT  Fall and safety precaution   Continue 24-7 supportive care   Optimize acute and chronic medical condition as outlined   PT/OT/sw following for DC planning

## 2022-10-11 NOTE — TELEPHONE ENCOUNTER
Received vanco trough of 19 8 from [facility]  Current vanco dose is 1gq12  Verified true trough  They would like us to fax new order or continuation of same order to [phone]

## 2022-10-11 NOTE — ASSESSMENT & PLAN NOTE
Platelets 762, suspect reactive in nature due to recent bacteremia during recent hospitalization   No active bleeding noted or reported   Next CBC 10-18-22  Continue to monitor platelets and trend

## 2022-10-11 NOTE — ASSESSMENT & PLAN NOTE
On exams appear with generalized weakness   Continue PT/OT  Fall safety precaution   Continue 24-7 supportive care   Optimize acute and chronic medical condition as outlined   Continue adequate p o  hydration and nutrition    following for DC planning

## 2022-10-11 NOTE — TELEPHONE ENCOUNTER
Per Dr Charles Rodriguez, keep same dose -1gQ12    Spoke to Chanelle Crowley to inform her of no change  They state that they may be pending at this time  She states that she will fax them once they result  Malissa jiang

## 2022-10-12 ENCOUNTER — NURSING HOME VISIT (OUTPATIENT)
Dept: WOUND CARE | Facility: HOSPITAL | Age: 77
End: 2022-10-12
Payer: MEDICARE

## 2022-10-12 ENCOUNTER — PATIENT OUTREACH (OUTPATIENT)
Dept: CASE MANAGEMENT | Facility: HOSPITAL | Age: 77
End: 2022-10-12

## 2022-10-12 DIAGNOSIS — R26.2 AMBULATORY DYSFUNCTION: ICD-10-CM

## 2022-10-12 DIAGNOSIS — L89.320 PRESSURE INJURY OF LEFT BUTTOCK, UNSTAGEABLE (HCC): Primary | ICD-10-CM

## 2022-10-12 PROCEDURE — 99308 SBSQ NF CARE LOW MDM 20: CPT | Performed by: NURSE PRACTITIONER

## 2022-10-12 PROCEDURE — 97597 DBRDMT OPN WND 1ST 20 CM/<: CPT | Performed by: NURSE PRACTITIONER

## 2022-10-13 ENCOUNTER — NURSING HOME VISIT (OUTPATIENT)
Dept: GERIATRICS | Facility: OTHER | Age: 77
End: 2022-10-13
Payer: MEDICARE

## 2022-10-13 DIAGNOSIS — R78.81 BACTEREMIA DUE TO GRAM-POSITIVE BACTERIA: ICD-10-CM

## 2022-10-13 DIAGNOSIS — R26.2 AMBULATORY DYSFUNCTION: ICD-10-CM

## 2022-10-13 DIAGNOSIS — D75.839 THROMBOCYTOSIS: ICD-10-CM

## 2022-10-13 DIAGNOSIS — R53.1 GENERALIZED WEAKNESS: Primary | ICD-10-CM

## 2022-10-13 DIAGNOSIS — D64.9 ANEMIA, UNSPECIFIED TYPE: ICD-10-CM

## 2022-10-13 PROCEDURE — 99309 SBSQ NF CARE MODERATE MDM 30: CPT | Performed by: NURSE PRACTITIONER

## 2022-10-13 NOTE — ASSESSMENT & PLAN NOTE
- Location : Left buttock  - Wound healing status: improved  - wound size decrease, 2 5 x 1 5 compared to last week measurement of 4 x 2 cm  - selective debridement done  -Enzymatic debridement using santyl  - Pressure redistribution device - ordered air mattress and w/c pressure prevention cushion  - Continue to offload  - Increase protein    Refer to RD  - No sign localized infection during visit  - Clinical factors affecting wound healing includes age, chronicity,  co-morbidities, incontinence, location of the wound, mobility impairement   -Follow up : Next week

## 2022-10-13 NOTE — PROGRESS NOTES
[address] [facility]NAG[facility]   [facility]       Patient ID: Jessica Juana is a 76 y o  female Date of Birth 1945     Location of Service: [address] [place] [address]    Performed wound round with: Wound team     Chief Complaint : Left buttock    Wound Instructions:  Wound:  Left buttock  Discontinue previous wound order  Cleanse the wound bed with NSS   Apply non-sting skin prep to periwound area  Apply santyl collagenase to wound bed, then cover with bordered foam  Frequency : daily and prn for soiling  Needs air mattress and w/c pressure relief cushion  Offload all wounds  Turn and reposition frequently, maximum of every two hours  Instruct / Assist with weight shifting every 15 - 20 minutes when in chair  Increase protein intake  Consult RD  Monitor for any sign of infection or worsening, inform PCP or patient's primary physician in your facility  Allergies  Penicillins, Sulfa antibiotics, K72 folate [folic acid-vit N7-YLS B47 - food allergy], Cobalt, Lyrica [pregabalin], Other, Cortisone acetate [cortisone], Doxycycline, and Nickel      Assessment & Plan:  1  Pressure injury of left buttock, unstageable ([facility] [address] )  Assessment & Plan:  - Location : Left buttock  - Wound healing status: improved  - wound size decrease, 2 5 x 1 5 compared to last week measurement of 4 x 2 cm  - selective debridement done  -Enzymatic debridement using santyl  - Pressure redistribution device - ordered air mattress and w/c pressure prevention cushion  - Continue to offload  - Increase protein   Refer to RD  - No sign localized infection during visit  - Clinical factors affecting wound healing includes age, chronicity,  co-morbidities, incontinence, location of the wound, mobility impairement   -Follow up : Next week         2  Ambulatory dysfunction  Assessment & Plan:  On STR             Subjective:   10/5/2022Hugo is a 76 y o , female referred to our service for wound/ skin alterations on left buttock  Patient have a complex medical history including but not limited to  Fibromyalgia, Fibromyalgia, primary, Gastric reflux, Heart failure ([address] ), History of COVID-19, History of transfusion, [place] (hard of hearing), Hyperlipidemia, Hypertension, Incontinence in female, Irregular heart beat, Lyme disease, PONV (postoperative nausea and vomiting), Premature ventricular contraction, Primary osteoarthritis of both knees, Rheumatic fever, Sore throat, and Tuberculosis    Patient was referred by Senior Care Team  Patient was seen in collaboration with the facility wound team      Wound History:   As per medical record review, the wound was first assessed in acute care on 9/25/2022  Wound started as stage 2 pressure ulcer  Received patient in bed, seems comfortable  Denies pain  Incontinent of both bowel and bladder  Need assistance with turning when in bed  NO current treatment  10/12/2022 Follow up for wound on the left buttock  Received patient, not in distress  Facility staff did not report any significant issues related to the wound  Denies pain  Review of Systems   Constitutional: Negative  HENT: Negative  Eyes: Negative  Respiratory: Negative  Cardiovascular: Negative for chest pain and leg swelling  Gastrointestinal: Negative  Endocrine: Negative  Genitourinary: Negative  Musculoskeletal: Positive for gait problem  Skin: Positive for wound  See HPI   Neurological: Negative for dizziness and headaches  Psychiatric/Behavioral: Negative for behavioral problems  Objective:    Physical Exam  Constitutional:       Appearance: She is obese  HENT:      Head: Normocephalic and atraumatic  Nose: Nose normal       Mouth/Throat:      Pharynx: Oropharynx is clear  Eyes:      Conjunctiva/sclera: Conjunctivae normal    Pulmonary:      Effort: Pulmonary effort is normal    Abdominal:      Tenderness: There is no abdominal tenderness  There is no guarding     Genitourinary: Comments: incontinent  Musculoskeletal:         General: No tenderness  Cervical back: Normal range of motion  Right lower leg: No edema  Left lower leg: No edema  Comments: LROM   Skin:     Findings: Lesion present  Comments: Left buttock- wound size is 2 5 x 1 5 x 0 1 cm, 100% slough, small amount of serous drainage, no obvious sign of infection, no malodor, periwound is normal    Neurological:      Mental Status: She is alert  Gait: Gait abnormal    Psychiatric:         Mood and Affect: Mood normal          Behavior: Behavior normal               Debridement   Universal Protocol:  Consent: Verbal consent obtained  Risks and benefits: risks, benefits and alternatives were discussed  Consent given by: patient  Time out: Immediately prior to procedure a "time out" was called to verify the correct patient, procedure, equipment, support staff and site/side marked as required  Timeout called at: 10/12/2022 8:00 PM   Patient understanding: patient states understanding of the procedure being performed  Patient identity confirmed: verbally with patient      Performed by: NP (left buttock)  Debridement type: selective    Post-debridement measurements  Length (cm): 2 5  Width (cm): 1 5  Depth (cm): 0 1  Percent debrided: 100%  Surface Area (cm^2): 3 75  Area debrided (cm^2): 3 75  Volume (cm^3): 0 38  Devitalized tissue debrided: biofilm, fibrin and slough  Instrument(s) utilized: blade  Bleeding: small  Hemostasis obtained with: pressure  Procedural pain (0-10): 0  Post-procedural pain: 0   Response to treatment: procedure was tolerated well                 Patient's care was coordinated with nursing facility staff  Recent vitals, labs and updated medications were reviewed on EMR or chart system of facility   Past Medical, surgical, social, medication and allergy history and patient's previous records were reviewed and updated as appropriate: Most up-to date information is available in the facility EMR where the patient is currently admitted  Patient Active Problem List   Diagnosis   • Atrial fibrillation ([facility])   • Tachy-smita syndrome ([facility])   • Essential hypertension   • Pacemaker   • Chronic bilateral low back pain without sciatica   • Chronic GERD   • Degenerative lumbar spinal stenosis   • Fibromyalgia   • GERD (gastroesophageal reflux disease)   • Low back pain   • Lumbar degenerative disc disease   • Mild carpal tunnel syndrome, right   • Overweight   • Psoriasis   • Thyroid nodule   • Urinary incontinence   • Other insomnia   • Anxiety   • Tremor   • Chronic pain of left knee   • Age related osteoporosis   • Dysphonia   • Vitamin D insufficiency   • Hyperlipidemia   • Arthritis   • Generalized weakness   • Leukocytosis   • Ambulatory dysfunction   • Reactive airway disease with acute exacerbation   • Morbid obesity with body mass index (BMI) of 40 0 to 49 9 ([facility])   • Chronic gout with tophus   • Current moderate episode of major depressive disorder ([facility])   • Paroxysmal atrial fibrillation ([facility])   • Poor dentition   • Closed bicondylar fracture of left femur with delayed healing   • Preop cardiovascular exam   • Stage 3a chronic kidney disease ([facility])   • Preop examination   • Renal insufficiency   • Surgical wound present   • Anemia   • Bladder wall thickening   • left femoral fluid collection   • Pleural effusion on right   • Vomiting and diarrhea   • Bacteremia due to Gram-positive bacteria   • Bacteria in urine   • Aortic regurgitation   • Pressure injury of left buttock, unstageable ([facility])   • Thrombocytosis     Past Medical History:   Diagnosis Date   • Abnormal ECG     Last Assessed 9/29/2016   • Anxiety     Last Assessed 6/08/2016   • Arthritis     knees   • Asthma     Last Assessed 11/06/2013   • Atrial premature complex    • Cataract, bilateral     Last Assessed 7/14/2016   • Cataract, left eye     Both eyes  Had surgery on left     • COVID-19    • Difficulty swallowing    • Diverticulosis 9/25/2022   • Dizziness    • Fibromyalgia    • Fibromyalgia, primary    • Gastric reflux    • Heart failure ([address] )     5/23/22 Pt reports had heart failure in the past   • History of COVID-19     5/23/22 Pt reports having COVID in 2021  • History of transfusion     no reaction   • Craig (hard of hearing)    • Hyperlipidemia    • Hypertension    • Incontinence in female     5/23/22 Pt reports has incontinence -wears depends especially at night/riding in car   • Irregular heart beat     5/23/22 Pt reports hx of A fib   • Lyme disease    • PONV (postoperative nausea and vomiting)    • Premature ventricular contraction    • Primary osteoarthritis of both knees     Last Assessed 7/14/2016   • Rheumatic fever     5/23/22 Pt reports had hx of rheumatic fever as a child twice   • Sore throat    • Tuberculosis 1947     Past Surgical History:   Procedure Laterality Date   • APPENDECTOMY     • CARDIAC PACEMAKER PLACEMENT  2019   • COLONOSCOPY     • DENTAL SURGERY      extractions   • HYSTERECTOMY      5/23/22 Pt reports had appendix out with hysterectomy and "tightened up my bladder"   • IR ASPIRATION ONLY  9/30/2022   • ORIF FEMUR FRACTURE Left 08/05/2021    Procedure: OPEN REDUCTION W/ INTERNAL FIXATION (ORIF) DISTAL FEMUR; INSERTION RETROGRADE NAIL;  Surgeon: Olya Plasencia MD;  [facility];  Service: Orthopedics   • SC DILATE ESOPHAGUS N/A 04/06/2016    Procedure: DILATATION ESOPHAGEAL;  Surgeon: Sheryl Giron MD;  Location: [facility]; Service: Gastroenterology   • SC EGD TRANSORAL BIOPSY SINGLE/MULTIPLE N/A 04/06/2016    Procedure: ESOPHAGOGASTRODUODENOSCOPY (EGD); Surgeon: Sheryl Giron MD;  [facility];   Service: Gastroenterology   • SC REMOVAL DEEP IMPLANT Left 9/12/2022    Procedure: REMOVAL NAIL IM  FEMUR;  Surgeon: Olya Plasencia MD;  [facility];  Service: Orthopedics   • SC TOTAL KNEE ARTHROPLASTY Left 9/12/2022    Procedure: ARTHROPLASTY KNEE TOTAL;  Surgeon: Sonia Brock MD;  [facility];  Service: Orthopedics   • NY XCAPSL CTRC RMVL INSJ IO LENS PROSTH W/O ECP Left 03/15/2016    Procedure: EXTRACTION EXTRACAPSULAR CATARACT PHACO INTRAOCULAR LENS (IOL); Surgeon: Flynn Contreras MD;  [facility];  Service: Ophthalmology   • REPLACEMENT TOTAL KNEE Right    • REPLACEMENT TOTAL KNEE      right    • TONSILLECTOMY       Social History     Socioeconomic History   • Marital status:      Spouse name: None   • Number of children: None   • Years of education: None   • Highest education level: None   Occupational History   • None   Tobacco Use   • Smoking status: Never Smoker   • Smokeless tobacco: Never Used   • Tobacco comment: Denies any former or current smoking   Vaping Use   • Vaping Use: Never used   Substance and Sexual Activity   • Alcohol use: Not Currently     Comment: Per [employer]- pt reports no current alcohol use at this time   • Drug use: No     Comment: Denies any former or current drug use   • Sexual activity: Never     Comment:  for 12 years - not active   Other Topics Concern   • None   Social History Narrative   • None     Social Determinants of Health     Financial Resource Strain: Not on file   Food Insecurity: No Food Insecurity   • Worried About Running Out of Food in the Last Year: Never true   • Ran Out of Food in the Last Year: Never true   Transportation Needs: No Transportation Needs   • Lack of Transportation (Medical): No   • Lack of Transportation (Non-Medical):  No   Physical Activity: Not on file   Stress: Not on file   Social Connections: Not on file   Intimate Partner Violence: Not on file   Housing Stability: Low Risk    • Unable to Pay for Housing in the Last Year: No   • Number of Places Lived in the Last Year: 2   • Unstable Housing in the Last Year: No        Current Outpatient Medications:   •  acetaminophen (TYLENOL) 325 mg tablet, Take 2 tablets (650 mg total) by mouth every 6 (six) hours, Disp: , Rfl: 0  •  albuterol (PROVENTIL HFA,VENTOLIN HFA) 90 mcg/act inhaler, INHALE 2 PUFFS BY MOUTH EVERY 6 HOURS AS NEEDED FOR WHEEZING, Disp: 3 Inhaler, Rfl: 0  •  allopurinol (ZYLOPRIM) 100 mg tablet, Take 1 tablet (100 mg total) by mouth daily, Disp: 90 tablet, Rfl: 3  •  apixaban (Eliquis) 5 mg, Take 1 tablet (5 mg total) by mouth 2 (two) times a day, Disp: 60 tablet, Rfl: 6  •  ascorbic acid (VITAMIN C) 500 MG tablet, Take 1 tablet (500 mg total) by mouth 2 (two) times a day, Disp: 60 tablet, Rfl: 1  •  cholecalciferol (VITAMIN D3) 400 units tablet, Take 1 tablet (400 Units total) by mouth in the morning, Disp: , Rfl: 0  •  diltiazem (CARDIZEM CD) 120 mg 24 hr capsule, Take 1 capsule (120 mg total) by mouth daily (Patient taking differently: Take 120 mg by mouth daily Takes in the am), Disp: 90 capsule, Rfl: 3  •  DULoxetine (CYMBALTA) 30 mg delayed release capsule, TAKE 1 CAPSULE(30 MG) BY MOUTH DAILY, Disp: 30 capsule, Rfl: 5  •  ezetimibe (ZETIA) 10 mg tablet, Take 1 tablet (10 mg total) by mouth daily, Disp: 90 tablet, Rfl: 1  •  ferrous sulfate 324 (65 Fe) mg, Take 1 tablet (324 mg total) by mouth 2 (two) times a day before meals, Disp: 60 tablet, Rfl: 1  •  FIBER PO, Take 1 tablet by mouth daily, Disp: , Rfl:   •  folic acid (FOLVITE) 1 mg tablet, TAKE 1 TABLET(1 MG) BY MOUTH DAILY, Disp: 90 tablet, Rfl: 0  •  furosemide (LASIX) 40 mg tablet, Take 1 tablet (40 mg total) by mouth in the morning , Disp: 90 tablet, Rfl: 1  •  lisinopril (ZESTRIL) 2 5 mg tablet, Take 1 tablet (2 5 mg total) by mouth daily, Disp: 30 tablet, Rfl: 2  •  LORazepam (Ativan) 0 5 mg tablet, Take 1 tablet (0 5 mg total) by mouth every 8 (eight) hours as needed for anxiety for up to 10 days, Disp: 10 tablet, Rfl: 0  •  Melatonin 10 MG TABS, Take by mouth Takes daily at night, Disp: , Rfl:   •  metoprolol tartrate (LOPRESSOR) 50 mg tablet, Take 100 mg in the morning and 50 mg in the evening, Disp: 270 tablet, Rfl: 3  •  Mirabegron ER (Myrbetriq) 25 MG TB24, Take 25 mg by mouth daily Takes in the evening, Disp: 30 tablet, Rfl: 6  •  Multiple Vitamin (MULTIVITAMIN ADULT PO), Take by mouth in the morning, Disp: , Rfl:   •  naloxone (NARCAN) 4 mg/0 1 mL nasal spray, Administer 1 spray into a nostril  If no response after 2-3 minutes, give another dose in the other nostril using a new spray  (Patient not taking: No sig reported), Disp: 1 each, Rfl: 1  •  oxyCODONE (ROXICODONE) 5 immediate release tablet, Take 0 5 tablets (2 5 mg total) by mouth every 6 (six) hours as needed for moderate pain for up to 10 days Max Daily Amount: 10 mg, Disp: 10 tablet, Rfl: 0  •  pantoprazole (PROTONIX) 40 mg tablet, Take 1 tablet (40 mg total) by mouth every 12 (twelve) hours, Disp: , Rfl: 0  •  polyethylene glycol (MIRALAX) 17 g packet, Take 17 g by mouth daily as needed (constipation), Disp: , Rfl: 0  •  senna-docusate sodium (SENOKOT S) 8 6-50 mg per tablet, Take 2 tablets by mouth 2 (two) times a day, Disp: , Rfl: 0  •  vancomycin (VANCOCIN), Inject 150 mL (750 mg total) into a catheter in a vein every 12 (twelve) hours, Disp: 09711 mL, Rfl: 0  •  vitamin B-12 (VITAMIN B-12) 1,000 mcg tablet, Take 1 tablet (1,000 mcg total) by mouth daily, Disp: , Rfl: 0  Family History   Problem Relation Age of Onset   • Coronary artery disease Mother    • Heart attack Mother         Prior   • Heart disease Father    • Heart attack Father         Prior   • Anesthesia problems Neg Hx               Coordination of Care: Wound team aware of the treatment plan  Facility nurse will provide wound treatment and monitor the wound for any changes  Patient / Staff education : Patient / Staff was given education on sign of infection and pressure ulcer prevention  Patient/ Staff verbalized understanding     Follow up :  Next week    Voice-recognition software may have been used in the preparation of this document   Occasional wrong word or "sound-alike" substitutions may have occurred due to the inherent limitations of voice recognition software  Interpretation should be guided by celso Bingham

## 2022-10-14 ENCOUNTER — TELEPHONE (OUTPATIENT)
Dept: INFECTIOUS DISEASES | Facility: CLINIC | Age: 77
End: 2022-10-14

## 2022-10-14 ENCOUNTER — NURSING HOME VISIT (OUTPATIENT)
Dept: GERIATRICS | Facility: OTHER | Age: 77
End: 2022-10-14
Payer: MEDICARE

## 2022-10-14 ENCOUNTER — TELEPHONE (OUTPATIENT)
Dept: OTHER | Facility: OTHER | Age: 77
End: 2022-10-14

## 2022-10-14 DIAGNOSIS — R78.81 BACTEREMIA DUE TO METHICILLIN RESISTANT STAPHYLOCOCCUS EPIDERMIDIS: ICD-10-CM

## 2022-10-14 DIAGNOSIS — R53.1 GENERALIZED WEAKNESS: ICD-10-CM

## 2022-10-14 DIAGNOSIS — Z91.81 STATUS POST FALL: Primary | ICD-10-CM

## 2022-10-14 DIAGNOSIS — B95.7 BACTEREMIA DUE TO METHICILLIN RESISTANT STAPHYLOCOCCUS EPIDERMIDIS: ICD-10-CM

## 2022-10-14 DIAGNOSIS — R26.2 AMBULATORY DYSFUNCTION: ICD-10-CM

## 2022-10-14 DIAGNOSIS — Z16.29 BACTEREMIA DUE TO METHICILLIN RESISTANT STAPHYLOCOCCUS EPIDERMIDIS: ICD-10-CM

## 2022-10-14 DIAGNOSIS — D75.839 THROMBOCYTOSIS: ICD-10-CM

## 2022-10-14 DIAGNOSIS — N18.31 STAGE 3A CHRONIC KIDNEY DISEASE (HCC): ICD-10-CM

## 2022-10-14 PROCEDURE — 99309 SBSQ NF CARE MODERATE MDM 30: CPT | Performed by: NURSE PRACTITIONER

## 2022-10-14 NOTE — TELEPHONE ENCOUNTER
[phone]/PREMA from [facility]/Pt fell and has a bruise on her jaw line  [facility] was page via TC    Please allow the on-call provider 20-30 mins to respond  If you have not heard from them within that time, please feel free to call back

## 2022-10-14 NOTE — TELEPHONE ENCOUNTER
Called [facility] and spoke with staff  Tried to confirm appt with facility  Staff states the person who does that is gone for the day and would leave message to call back Monday morning   Gave staff office number

## 2022-10-14 NOTE — TELEPHONE ENCOUNTER
Isaías at [employer] states he did not received a call from the on call provider regarding the patient  A second page was sent to the on call provider with Isaías's contact information

## 2022-10-15 PROBLEM — Z91.81 STATUS POST FALL: Status: ACTIVE | Noted: 2022-10-15

## 2022-10-15 NOTE — ASSESSMENT & PLAN NOTE
Platelets 951, suspect reactive in nature due to recent bacteremia during recent hospitalization   No active bleeding noted or reported   Next CBC 10-18-22  Continue to monitor platelets and trend

## 2022-10-15 NOTE — ASSESSMENT & PLAN NOTE
Hemoglobin is 8 6 appears stable when compare to 8 4 during recent hospitalization   No active bleeding noted on exam and none reported  Continue iron and folic acid as well as Z20  Continue to monitor CBC and trend  Next CBC 10/18/2022

## 2022-10-15 NOTE — ASSESSMENT & PLAN NOTE
Recent hospitalization for positive blood culture for Staph epi the epidermidis on 09/25/2022   Repeat blood culture were negative after 72 hours  2D echo was negative for vegetation    Status post ICD placement will continue with 6 weeks of IV antibiotic therapy until November 09/20/2022  Continue on right PICC vancomycin at 16 7  Last Vanco T on 10/11 16 7, 10/9 at 16 7  Next Vanco T 10/17  Free of fever, vital signs stable and nontoxic appearance  Monitor

## 2022-10-15 NOTE — PROGRESS NOTES
[address], [address] [place] [address]  [phone]    NAME: Carline Castaneda  AGE: 76 y o  SEX: female    Progress Note    Location: [facility]   POS: 31 (SNF)    Assessment/Plan:    Status post fall  Reports was trying to move of her buttocks and ended up rolling out of bed, sustained bruise to right jaw/cheek and shoulder  Neuro check intact  Right shoulder +ROM  2+ pulses denies pain  Staff checking to install right rail to aid patient with movement  Remains afebrile, non toxic and VSS  Encourage to call for assistance and call bell within reach  Patient fall and safety  Continue 24-7 supportive care  Monitor     Generalized weakness  On exams appear with generalized weakness   Continue PT/OT  Fall safety precaution   Continue 24-7 supportive care   Optimize acute and chronic medical condition as outlined   Continue adequate p o  hydration and nutrition    following for DC planning    Ambulatory dysfunction  Continue PT/OT  Fall and safety precaution   Continue 24-7 supportive care   Optimize acute and chronic medical condition as outlined   PT/OT/sw following for DC planning    Bacteremia due to Gram-positive bacteria  Recent hospitalization for positive blood culture for Staph epi the epidermidis on 09/25/2022   Repeat blood culture were negative after 72 hours  2D echo was negative for vegetation    Status post ICD placement will continue with 6 weeks of IV antibiotic therapy until November 09/20/2022  Continue on right PICC vancomycin at 16 7  Last Vanco T on 10/11 16 7, 10/9 at 16 7  Next Vanco T 10/17  Free of fever, vital signs stable and nontoxic appearance  Monitor    Thrombocytosis  Platelets 632, suspect reactive in nature due to recent bacteremia during recent hospitalization   No active bleeding noted or reported   Next CBC 10-18-22  Continue to monitor platelets and trend    Anemia  Hemoglobin is 8 6 appears stable when compare to 8 4 during recent hospitalization   No active bleeding noted on exam and none reported  Continue iron and folic acid as well as V60  Continue to monitor CBC and trend  Next CBC 10/18/2022    Chief complaint / Reason for visit:  STR F/U     Patient's care was coordinated with nursing facility staff  Recent vitals, labs, and updated medications were review on [facility] in facility  History of Present Illness:  17-year-old female seen and examined for short-term rehab follow-up  Received patient lying in bed, appears comfortable  Per nursing staff, patient fell early this morning  Per patient she wanted to roll to her side  Patient reports, her buttocks was hurting and she rolled to her right side and ended up rolling out of bed  Niece and patient had asked nursing to change her mattress  However patient has wounds and currently she is a bed sore prevention air mattress  Nursing will get authorization to install a rail to left side of bed  This could assist patient with bed movement while in bed  Post fall patient with right jaw/cheek and right shoulder bruises  On exam denies pain  Right shoulder + ROM, 2+ pulses and denies pain  Neuro check intact  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, removal of deep implants of left femur  Left leg with upper incision, BECCA, approximated no erythema/drainage  Left leg lower incision approximated remains with mild erythema, tender, no drainage  Continue with IV antibiotics- Vancomycin 1 gram every 12 hours  R PICC C/D/I +bruise around the site  no drainage  Dressing intact  Right chest small incision well healed  S/p pacemaker  Remains afebrile, nontoxic and vital signs stable  Denies fever or chills  Tolerating diet  Sleeping with no difficulties  Incontinent of bowel and bladder  Denies /GI discomfort    Per nursing no other concerns or issues at this time    Review of Systems:  Per history of present illness, all other systems reviewed and negative  Other than those noted in HPI    HISTORY:  Medical Hx: Reviewed, unchanged  Family Hx: Reviewed, unchanged  Soc Hx: Reviewed,  unchanged    ALLERGY: Reviewed, unchanged  Allergies   Allergen Reactions   • Penicillins Hives     Itching and terrible hives   • Sulfa Antibiotics Itching   • R77 Folate [Folic Acid-Vit F0-OND C04 - Food Allergy] Other (See Comments)     5/23/22 Pt reports no allergic reaction known    • Ramer Other (See Comments)     Unknown, 5/23 -pt is unaware of reaction    • Lyrica [Pregabalin] Other (See Comments)     5/23/22 Pt reports doesn't remember reaction    • Other Other (See Comments)     Black rubber 5/23/22 per allergy test - pt unaware of reaction   • Cortisone Acetate [Cortisone] Itching and Rash     5/23/22 pt reports makes her violent    • Nickel Other (See Comments)     Skin discoloration        PHYSICAL EXAM:  Vital Signs: blood pressure 136/75, pulse 76, respirations 18, Temp 98 1, O2 sats 94% RA  Weight:237 5 lbs    General:  Appears weak and cooperative  Head: Atraumatic  Normocephalic  Eye Exam: anicteric sclera, no discharge, PERRLA, No injection  Oral Exam: moist mucous membranes, no buccaloropharyngeal erythema, palatine tonsils WNL  Neck Exam: no anterior cervical lymphadenopathy noted, neck supple  Cardiovascular: regular rate, regular rhythm, + murmurs, rubs, or gallops  Pulmonary:  Decreased breath sounds with no wheeze, no rhonchi, no rales  No chest tenderness  Abdominal: soft, rounded, non-tender, nondistended, bowel sounds audible x 4 quadrants  : Non distended bladder  Incontinent of bowel and bladder   Extremities and skin: no edema noted, left leg +1 edema  Left leg incisions BECCA- approximated  Upper and middle incisions approximated no erythema/drainage, Lower incision remains with mild erythema, tender no drainage  No rashes  R PICC clean,dry and intact +bruise around the site  B/L UE bruises from prior blood draws   Right chest healed incision-s/p ICD placement  Right jaw/cheeck bruise, Right shoulder bruise  Stage 3 in buttocks   Neurological: alert, cooperative and responsive, Oriented x 3, ble generalized weakness     Laboratory results / Imaging reviewed: Hard copy/ies in medical chart:  Reviewed from [facility]    BMP review on 10/13/2022   Blood glucose 107, BUN 10, creatinine 0 76, sodium 142, potassium 3 6, calcium 9 2, eGFR 80    10/11/22- Vanco Trough 19 8    10/9/22- Vanco Trough 16 7    CBC with diff/creatinine/Vanco through on 10/6/22    Hemoglobin 8 6, hematocrit 26 0, WBC 6 1, platelets 086, absolute neutrophil 3 0, absolute lymphocyte 1 6, absolute monos 0 7, absolute eos 0 7, absolute basos 0 1, neutrophils 49, lymphocyte 26, monocytes 12, eosinophils 12, basophil 1, creatinine 0 74, EGFR 82, Vanco Through 10 9    CBC-BMP reviewed from 09/19/2022   Hemoglobin 8 3, hematocrit 24 9, WBC 11 4, platelets 615, blood glucose 118, BUN 20, creatinine 0 93, sodium 139, potassium 4 1, calcium 8 4, EGFR 63    Current Medications: All medications reviewed and updated in Nursing Home Chart reviewed from [facility]    Please note:  Voice-recognition software may have been used in the preparation of this document  Occasional wrong word or "sound-alike" substitutions may have occurred due to the inherent limitations of voice recognition software  Interpretation should be guided by context      Melani Ledesma  10/13/2022

## 2022-10-15 NOTE — ASSESSMENT & PLAN NOTE
Reports was trying to move of her buttocks and ended up rolling out of bed, sustained bruise to right jaw/cheek and shoulder  Neuro check intact  Right shoulder +ROM   2+ pulses denies pain  Staff checking to install right rail to aid patient with movement  Remains afebrile, non toxic and VSS  Encourage to call for assistance and call bell within reach  Patient fall and safety  Continue 24-7 supportive care  Monitor

## 2022-10-17 ENCOUNTER — TELEMEDICINE (OUTPATIENT)
Dept: INFECTIOUS DISEASES | Facility: CLINIC | Age: 77
End: 2022-10-17
Payer: MEDICARE

## 2022-10-17 DIAGNOSIS — I49.5 TACHY-BRADY SYNDROME (HCC): Primary | ICD-10-CM

## 2022-10-17 DIAGNOSIS — B95.7 BACTEREMIA DUE TO METHICILLIN RESISTANT STAPHYLOCOCCUS EPIDERMIDIS: ICD-10-CM

## 2022-10-17 DIAGNOSIS — E66.01 MORBID OBESITY WITH BODY MASS INDEX (BMI) OF 40.0 TO 49.9 (HCC): Chronic | ICD-10-CM

## 2022-10-17 DIAGNOSIS — Z16.29 BACTEREMIA DUE TO METHICILLIN RESISTANT STAPHYLOCOCCUS EPIDERMIDIS: ICD-10-CM

## 2022-10-17 DIAGNOSIS — Z51.81 THERAPEUTIC DRUG MONITORING: ICD-10-CM

## 2022-10-17 DIAGNOSIS — Z79.2 LONG TERM (CURRENT) USE OF ANTIBIOTICS: ICD-10-CM

## 2022-10-17 DIAGNOSIS — Z95.0 PACEMAKER: ICD-10-CM

## 2022-10-17 DIAGNOSIS — R78.81 BACTEREMIA DUE TO METHICILLIN RESISTANT STAPHYLOCOCCUS EPIDERMIDIS: ICD-10-CM

## 2022-10-17 PROCEDURE — 99214 OFFICE O/P EST MOD 30 MIN: CPT | Performed by: INTERNAL MEDICINE

## 2022-10-17 NOTE — PATIENT INSTRUCTIONS
Continue vancomycin 1 g IV q 12 hours x6 week course through November 9th  Weekly labs: CBC with diff, BMP, vanco trough while on IV abx  PICC removal upon completion of IV abx  ID follow-up in 2 weeks

## 2022-10-17 NOTE — PROGRESS NOTES
Progress Note - Infectious Disease   Christos Warner 76 y o  female MRN: [id number]  Unit/Bed#:  Encounter: [id number]    REQUIRED DOCUMENTATION:   1  This service was provided via Telemedicine  2  Provider located at [address] [place] [address] office  3  TeleMed provider: Jonnie Gonzalez DO   4  Identify all parties in room with patient during tele consult: COLLINS Suazo at [facility] in [place], [place]   5  After connecting through [email or web address] patient was identified by name and date of birth  Patient was then informed that this was a Telemedicine visit and that the exam was being conducted confidentially over secure lines  My office door was closed  Patient acknowledged consent and understanding of privacy and security of the Telemedicine visit, and gave us permission to have the assistant stay in the room in order to assist with the history and to conduct the exam   I informed the patient that I have reviewed their record in Epic and presented the opportunity for them to ask any questions regarding the visit today  The patient agreed to participate  Impression/Recommendations:   1  MRSE bacteremia  2/2 sets from separate sites  No clear source of infection identified  Repeat blood cultures were negative prior to her recent hospital discharge  2D echocardiogram did not demonstrate valve vegetations  Vancomycin trough of 19 is therapeutic  · Continue vancomycin 1 g IV q 12 hours x6 week course through November 9th  · Weekly labs: CBC with diff, BMP, vanco trough while on IV abx  · PICC line removal upon completion of IV abx  · ID follow-up in 2 weeks, virtual visit is acceptable  2  Postoperative fluid collection   Presumably postop seroma based on CT and orthopedic evaluation  No clinical evidence of infection at incision site  9/30 LT upper thigh fluid aspiration cultures are negative  · serial exams  3   Status post left distal femoral replacement, 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 on 09/12/2022  · Serial exams  4  Tachy-smita syndrome s/p permanent Pacemaker  Recent TT echo negative for valve vegetations  · In the setting of recent bacteremia and with PPM place, agree with prolonged iv antibiotic treatment course as above  5  Morbid obesity with BMI of 39 8  6  Multiple antibiotic allergies  Penicillins/sulfa/doxycycline  Hives secondary to penicillin  My recommendations were discussed with the patient and LPN Juliet Osgood in detail who verbalized understanding  Thank you for allowing me to participate in the care of this patient  The ID service will follow  History   Subjective:  66-year-old woman seen for office follow-up after hospital discharge from [facility] on 10/04/2022  She remains on vancomycin IV for Staphylococcus epidermidis bacteremia  Pt says she feels alright  She is tolerating vancomycin well  Her PICC line has been functioning well  The patient denies fevers, chills, or sweats  Patient denies cough, nausea, vomiting, diarrhea, rash or itching      Antibiotics: iv vancomycin 1000mg q12 hours    Current Outpatient Medications:   •  acetaminophen (TYLENOL) 325 mg tablet, Take 2 tablets (650 mg total) by mouth every 6 (six) hours, Disp: , Rfl: 0  •  albuterol (PROVENTIL HFA,VENTOLIN HFA) 90 mcg/act inhaler, INHALE 2 PUFFS BY MOUTH EVERY 6 HOURS AS NEEDED FOR WHEEZING, Disp: 3 Inhaler, Rfl: 0  •  allopurinol (ZYLOPRIM) 100 mg tablet, Take 1 tablet (100 mg total) by mouth daily, Disp: 90 tablet, Rfl: 3  •  apixaban (Eliquis) 5 mg, Take 1 tablet (5 mg total) by mouth 2 (two) times a day, Disp: 60 tablet, Rfl: 6  •  ascorbic acid (VITAMIN C) 500 MG tablet, Take 1 tablet (500 mg total) by mouth 2 (two) times a day, Disp: 60 tablet, Rfl: 1  •  cholecalciferol (VITAMIN D3) 400 units tablet, Take 1 tablet (400 Units total) by mouth in the morning, Disp: , Rfl: 0  •  diltiazem (CARDIZEM CD) 120 mg 24 hr capsule, Take 1 capsule (120 mg total) by mouth daily (Patient taking differently: Take 120 mg by mouth daily Takes in the am), Disp: 90 capsule, Rfl: 3  •  DULoxetine (CYMBALTA) 30 mg delayed release capsule, TAKE 1 CAPSULE(30 MG) BY MOUTH DAILY, Disp: 30 capsule, Rfl: 5  •  ezetimibe (ZETIA) 10 mg tablet, Take 1 tablet (10 mg total) by mouth daily, Disp: 90 tablet, Rfl: 1  •  ferrous sulfate 324 (65 Fe) mg, Take 1 tablet (324 mg total) by mouth 2 (two) times a day before meals, Disp: 60 tablet, Rfl: 1  •  FIBER PO, Take 1 tablet by mouth daily, Disp: , Rfl:   •  folic acid (FOLVITE) 1 mg tablet, TAKE 1 TABLET(1 MG) BY MOUTH DAILY, Disp: 90 tablet, Rfl: 0  •  furosemide (LASIX) 40 mg tablet, Take 1 tablet (40 mg total) by mouth in the morning , Disp: 90 tablet, Rfl: 1  •  lisinopril (ZESTRIL) 2 5 mg tablet, Take 1 tablet (2 5 mg total) by mouth daily, Disp: 30 tablet, Rfl: 2  •  Melatonin 10 MG TABS, Take by mouth Takes daily at night, Disp: , Rfl:   •  metoprolol tartrate (LOPRESSOR) 50 mg tablet, Take 100 mg in the morning and 50 mg in the evening, Disp: 270 tablet, Rfl: 3  •  Mirabegron ER (Myrbetriq) 25 MG TB24, Take 25 mg by mouth daily Takes in the evening, Disp: 30 tablet, Rfl: 6  •  Multiple Vitamin (MULTIVITAMIN ADULT PO), Take by mouth in the morning, Disp: , Rfl:   •  naloxone (NARCAN) 4 mg/0 1 mL nasal spray, Administer 1 spray into a nostril   If no response after 2-3 minutes, give another dose in the other nostril using a new spray , Disp: 1 each, Rfl: 1  •  pantoprazole (PROTONIX) 40 mg tablet, Take 1 tablet (40 mg total) by mouth every 12 (twelve) hours, Disp: , Rfl: 0  •  polyethylene glycol (MIRALAX) 17 g packet, Take 17 g by mouth daily as needed (constipation), Disp: , Rfl: 0  •  senna-docusate sodium (SENOKOT S) 8 6-50 mg per tablet, Take 2 tablets by mouth 2 (two) times a day, Disp: , Rfl: 0  •  vancomycin (VANCOCIN), Inject 150 mL (750 mg total) into a catheter in a vein every 12 (twelve) hours (Patient taking differently: Inject 1,000 mg into a catheter in a vein every 12 (twelve) hours), Disp: 16427 mL, Rfl: 0  •  vitamin B-12 (VITAMIN B-12) 1,000 mcg tablet, Take 1 tablet (1,000 mcg total) by mouth daily, Disp: , Rfl: 0  •  LORazepam (Ativan) 0 5 mg tablet, Take 1 tablet (0 5 mg total) by mouth every 8 (eight) hours as needed for anxiety for up to 10 days, Disp: 10 tablet, Rfl: 0    Physical Exam   VS: T 97 8, HR 90, RR 18, O2S 96% on RA, /64    Limited exam due to tele health visit  Partial exam done that was corroborated with patient's RN  General Appearance:  Appearing well, nontoxic, and in no distress   Head:  Normocephalic, atraumatic   Eyes:  EOMI   Nose: Nares normal, mucosa normal   Throat: Oropharynx moist    Lungs:   Respirations nonlabored, CTA b/l per LPN   Heart:  Regular rate and rhythm, S1, S2 per LPN   Abdomen:   Soft, non-tender per LPN    No Duong catheter present per LPN   Extremities: RUE PICC line intact, no distal leg edema b/l per RN   Skin: No rash    Neurologic: Alert and oriented to person, surroundings, conversant, fluent speech     Invasive Devices:   PICC Line 10/04/22 (Active)       External Urinary Catheter (Active)     Labs, Imaging, & Other Studies   Lab Results: I have personally reviewed pertinent labs  10/11/22: vanco trough 19 8  10/06/2022:  WBC 6 1, HGB 8 6, HCT 26, , Creatinine 0 74     Imaging Studies:   I have personally reviewed pertinent imaging study reports  Counseling/Coordination of care: Total 30 minutes encounter including direct communication with the patient via telehealth, chart review and coordination of care  Labs, medical tests and imaging studies were independently reviewed by me as noted above  VIRTUAL VISIT DISCLAIMER  The patient has consented to an online visit or consultation   The patient understands that the online visit is based solely on information provided by them, and that, in the absence of a face-to-face physical evaluation by the physician, the diagnosis they receive are both limited and provisional in terms of accuracy and completeness  This is not intended to replace a full medical face-to-face evaluation by the physician  The patient understands and accepts these terms

## 2022-10-18 ENCOUNTER — NURSING HOME VISIT (OUTPATIENT)
Dept: GERIATRICS | Facility: OTHER | Age: 77
End: 2022-10-18
Payer: MEDICARE

## 2022-10-18 DIAGNOSIS — R53.81 PHYSICAL DECONDITIONING: ICD-10-CM

## 2022-10-18 DIAGNOSIS — Z91.81 STATUS POST FALL: Primary | ICD-10-CM

## 2022-10-18 DIAGNOSIS — R26.2 AMBULATORY DYSFUNCTION: ICD-10-CM

## 2022-10-18 DIAGNOSIS — B95.7 BACTEREMIA DUE TO METHICILLIN RESISTANT STAPHYLOCOCCUS EPIDERMIDIS: ICD-10-CM

## 2022-10-18 DIAGNOSIS — R78.81 BACTEREMIA DUE TO METHICILLIN RESISTANT STAPHYLOCOCCUS EPIDERMIDIS: ICD-10-CM

## 2022-10-18 DIAGNOSIS — D75.839 THROMBOCYTOSIS: ICD-10-CM

## 2022-10-18 DIAGNOSIS — Z16.29 BACTEREMIA DUE TO METHICILLIN RESISTANT STAPHYLOCOCCUS EPIDERMIDIS: ICD-10-CM

## 2022-10-18 PROCEDURE — 99309 SBSQ NF CARE MODERATE MDM 30: CPT | Performed by: NURSE PRACTITIONER

## 2022-10-18 NOTE — PROGRESS NOTES
[address], [address] [place] [address], [address] [place] [address]  [phone]    NAME: lAejandra Garcia  AGE: 76 y o  SEX: female    Progress Note    Location: [facility]   POS: 31 (SNF)    Assessment/Plan:    Status post fall  S/p fall day 2  Reports was trying to move of her buttocks and ended up rolling out of bed, sustained bruise to right jaw/cheek and shoulder  Neuro check intact  Right shoulder +ROM  2+ pulses  Denies pain  Acetaminophen 975 mg q8 hrs prn mod pain   Remains afebrile, non toxic and VSS  Encourage to call for assistance and call bell within reach  Patient fall and safety  Continue 24-7 supportive care  Monitor     Generalized weakness  On exams appear with generalized weakness   multifactorial  Continue PT/OT  Fall safety precaution   Continue 24-7 supportive care   Optimize acute and chronic medical condition as outlined   Continue adequate p o  hydration and nutrition    following for DC planning    Ambulatory dysfunction  Continue PT/OT  Fall and safety precaution   Continue 24-7 supportive care   Optimize acute and chronic medical condition as outlined   PT/OT/sw following for DC planning    Stage 3a chronic kidney disease ([facility] [address] )  Lab Results   Component Value Date    EGFR 69 10/02/2022    EGFR 61 09/30/2022    EGFR 68 09/29/2022    CREATININE 0 81 10/02/2022    CREATININE 0 90 09/30/2022    CREATININE 0 82 09/29/2022   Recent BUN 10 with a creatinine 0 76 and GFR of 80, stable  Avoid nephrotoxins, hypotension and BP fluctuation  Continue to monitor renal function  Next BMP in 1 week    Bacteremia due to methicillin resistant Staphylococcus epidermidis  Recent hospitalization for positive blood culture for Staph epi the epidermidis on 09/25/2022   Repeat blood culture were negative after 72 hours  2D echo was negative for vegetation    Status post ICD placement will continue with 6 weeks of IV antibiotic therapy until November 9, 2022  Continue on right PICC vancomycin at 16 7  Last Vanco T on 10/11 16 7, 10/9 at 16 7  Next Vanco T 10/17  Free of fever, vital signs stable and nontoxic appearance  Monitor    Thrombocytosis  Platelets 942, suspect reactive in nature due to recent bacteremia during recent hospitalization   No active bleeding noted or reported   Next CBC 10-18-22  Continue to monitor platelets and trend    Chief complaint / Reason for visit:  STR F/U     Patient's care was coordinated with nursing facility staff  Recent vitals, labs, and updated medications were review on [facility] system in facility  History of Present Illness:  75-year-old female seen and examined for STR acute visit post fall day 2  Received patient lying in bed, appears comfortable  S/p fall  Right jaw/cheek and right shoulder bruises  On exam denies pain  Right shoulder + ROM, 2+ pulses and denies pain  Neuro check intact  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, removal of deep implants of left femur  Left leg with upper incision, BECCA, approximated no erythema/drainage  Left leg lower incision approximated remains with mild erythema, tender, no drainage  Continue with IV antibiotics- Vancomycin 1 gram every 12 hours (end date 11/9/22)  R PICC C/D/I +bruise around the site  no drainage  Dressing intact  Right chest small incision well healed  S/p pacemaker  Remains afebrile, nontoxic and vital signs stable  Denies fever or chills  Tolerating diet  Sleeping with no difficulties  Incontinent of bowel and bladder  Denies /GI discomfort  Per nursing no other concerns or issues at this time    Review of Systems:  Per history of present illness, all other systems reviewed and negative    Other than those noted in HPI    HISTORY:  Medical Hx: Reviewed, unchanged  Family Hx: Reviewed, unchanged  Soc Hx: Reviewed,  unchanged    ALLERGY: Reviewed, unchanged  Allergies   Allergen Reactions   • Penicillins Hives     Itching and terrible hives • Sulfa Antibiotics Itching   • X39 Folate [Folic Acid-Vit T5-ODS P28 - Food Allergy] Other (See Comments)     5/23/22 Pt reports no allergic reaction known    • [place] Other (See Comments)     Unknown, 5/23 -pt is unaware of reaction    • Lyrica [Pregabalin] Other (See Comments)     5/23/22 Pt reports doesn't remember reaction    • Other Other (See Comments)     Black rubber 5/23/22 per allergy test - pt unaware of reaction   • Cortisone Acetate [Cortisone] Itching and Rash     5/23/22 pt reports makes her violent    • Nickel Other (See Comments)     Skin discoloration        PHYSICAL EXAM:  Vital Signs: blood pressure 136/75, pulse 76, respirations 18, Temp 98 1, O2 sats 94% RA  Weight:237 5 lbs    General:  Appears weak and cooperative  Head: Atraumatic  Normocephalic  Eye Exam: anicteric sclera, no discharge, PERRLA, No injection  Oral Exam: moist mucous membranes, no buccaloropharyngeal erythema, palatine tonsils WNL  Neck Exam: no anterior cervical lymphadenopathy noted, neck supple  Cardiovascular: regular rate, regular rhythm, + murmurs, rubs, or gallops  Pulmonary:  Decreased breath sounds with no wheeze, no rhonchi, no rales  No chest tenderness  Abdominal: soft, rounded, non-tender, nondistended, bowel sounds audible x 4 quadrants  : Non distended bladder  Incontinent of bowel and bladder   Extremities and skin: no edema noted, left leg +1 edema  Left leg incisions BECCA- approximated  Upper and middle incisions approximated no erythema/drainage, Lower incision remains with mild erythema, tender no drainage  No rashes  R PICC clean,dry and intact +bruise around the site  B/L UE bruises from prior blood draws  Right chest healed incision-s/p ICD placement   Right jaw/cheeck bruise, Right shoulder bruise  Stage 3 in buttocks   Neurological: alert, cooperative and responsive, Oriented x 3, ble generalized weakness     Laboratory results / Imaging reviewed: Hard copy/ies in medical chart:  Reviewed from [facility]    BMP review on 10/13/2022   Blood glucose 107, BUN 10, creatinine 0 76, sodium 142, potassium 3 6, calcium 9 2, eGFR 80    10/11/22- Vanco Trough 19 8    10/9/22- Vanco Trough 16 7    CBC with diff/creatinine/Vanco through on 10/6/22    Hemoglobin 8 6, hematocrit 26 0, WBC 6 1, platelets 688, absolute neutrophil 3 0, absolute lymphocyte 1 6, absolute monos 0 7, absolute eos 0 7, absolute basos 0 1, neutrophils 49, lymphocyte 26, monocytes 12, eosinophils 12, basophil 1, creatinine 0 74, EGFR 82, Vanco Through 10 9    CBC-BMP reviewed from 09/19/2022   Hemoglobin 8 3, hematocrit 24 9, WBC 11 4, platelets 445, blood glucose 118, BUN 20, creatinine 0 93, sodium 139, potassium 4 1, calcium 8 4, EGFR 63    Current Medications: All medications reviewed and updated in Nursing Home Chart reviewed from [facility]    Please note:  Voice-recognition software may have been used in the preparation of this document  Occasional wrong word or "sound-alike" substitutions may have occurred due to the inherent limitations of voice recognition software  Interpretation should be guided by context      Berenice Cantu  10/14/2022

## 2022-10-19 ENCOUNTER — NURSING HOME VISIT (OUTPATIENT)
Dept: WOUND CARE | Facility: HOSPITAL | Age: 77
End: 2022-10-19
Payer: MEDICARE

## 2022-10-19 ENCOUNTER — PATIENT OUTREACH (OUTPATIENT)
Dept: CASE MANAGEMENT | Facility: HOSPITAL | Age: 77
End: 2022-10-19

## 2022-10-19 DIAGNOSIS — R26.2 AMBULATORY DYSFUNCTION: ICD-10-CM

## 2022-10-19 DIAGNOSIS — L89.320 PRESSURE INJURY OF LEFT BUTTOCK, UNSTAGEABLE (HCC): Primary | ICD-10-CM

## 2022-10-19 PROBLEM — R53.81 PHYSICAL DECONDITIONING: Status: ACTIVE | Noted: 2022-10-19

## 2022-10-19 PROBLEM — I50.32 CHRONIC HEART FAILURE WITH PRESERVED EJECTION FRACTION (HCC): Status: ACTIVE | Noted: 2022-10-19

## 2022-10-19 PROCEDURE — 99309 SBSQ NF CARE MODERATE MDM 30: CPT | Performed by: NURSE PRACTITIONER

## 2022-10-19 NOTE — ASSESSMENT & PLAN NOTE
Lab Results   Component Value Date    EGFR 69 10/02/2022    EGFR 61 09/30/2022    EGFR 68 09/29/2022    CREATININE 0 81 10/02/2022    CREATININE 0 90 09/30/2022    CREATININE 0 82 09/29/2022   Recent BUN 10 with a creatinine 0 76 and GFR of 80, stable  Avoid nephrotoxins, hypotension and BP fluctuation  Continue to monitor renal function  Next BMP in 1 week

## 2022-10-19 NOTE — ASSESSMENT & PLAN NOTE
On exams appear with generalized weakness   multifactorial  Continue PT/OT  Fall safety precaution   Continue 24-7 supportive care   Optimize acute and chronic medical condition as outlined   Continue adequate p o  hydration and nutrition    following for DC planning

## 2022-10-19 NOTE — ASSESSMENT & PLAN NOTE
Recent hospitalization for positive blood culture for Staph epi the epidermidis on 09/25/2022   Repeat blood culture were negative after 72 hours  2D echo was negative for vegetation    Status post ICD placement will continue with 6 weeks of IV antibiotic therapy until November 9, 2022  Continue on right PICC vancomycin at 16 7  Last Vanco T on 10/11 16 7, 10/9 at 16 7  Next Vanco T 10/17  Free of fever, vital signs stable and nontoxic appearance  Monitor

## 2022-10-19 NOTE — ASSESSMENT & PLAN NOTE
S/p fall day 2  Reports was trying to move of her buttocks and ended up rolling out of bed, sustained bruise to right jaw/cheek and shoulder  Neuro check intact  Right shoulder +ROM   2+ pulses  Denies pain  Acetaminophen 975 mg q8 hrs prn mod pain   Remains afebrile, non toxic and VSS  Encourage to call for assistance and call bell within reach  Patient fall and safety  Continue 24-7 supportive care  Monitor

## 2022-10-19 NOTE — ASSESSMENT & PLAN NOTE
Status post fall day 3  Sustain bruise to right jaw-cheek and right shoulder  Right upper arm neuro vascular intact  Right shoulder positive for renal motion 2+ pulses  Denies pain  Available acetaminophen 975 mg 3 times a day times 10 days  Continue fall and safety precautions    Encourage patient to ask for assistance when getting out of bed or chair   Continue 24-7 supportive care with ADLs

## 2022-10-19 NOTE — ASSESSMENT & PLAN NOTE
- Location : Left buttock  - Wound healing status: improved  - wound size almost the same as last week 2 x 3 x 0 1 cm , increased granulation  - change to medihoney  - Pressure redistribution device - ordered air mattress and w/c pressure prevention cushion  - Continue to offload  - Increase protein    Refer to RD  - No sign localized infection during visit  - Clinical factors affecting wound healing includes age, chronicity,  co-morbidities, incontinence, location of the wound, mobility impairement   -Follow up : Next week

## 2022-10-19 NOTE — ASSESSMENT & PLAN NOTE
Wt Readings from Last 3 Encounters:   10/05/22 105 kg (232 lb 1 6 oz)   09/29/22 107 kg (235 lb)   09/19/22 106 kg (234 lb 9 6 oz)     Reports today feeling out of breath and fatigue  On exam is appears out of breath trying to catch her breath  Breath sounds diminished with fine crackles at bases  Will give extra dose of lasix 20 mg today and tomorrow  Remains on daily dose of lasix 40 mg daily  Extra dose of KCL 20 mEq daily for 3 days  Recent BUN/cr/eGFr - 10/0 76/80 all electrolytes wnl  OOB for meals as patient reports she has been in bed throughout weekend   Spoke with nursing and sent message to Rehab staff to get her OOB and then after therapy to place patient back in bed  Encourage pulmonary hygiene- IS Q1 hr x10 while awake   Weight stable, continue to monitor weight, I&s and fluid status

## 2022-10-19 NOTE — ASSESSMENT & PLAN NOTE
Platelets 725, suspect reactive in nature due to recent bacteremia during recent hospitalization   No active bleeding noted or reported   Next CBC 10-18-22  Continue to monitor platelets and trend

## 2022-10-19 NOTE — PROGRESS NOTES
[address] [facility]   [facility]       Patient ID: Traci Dey is a 76 y o  female Date of Birth 1945     Location of Service: [address] [place] [address]    Performed wound round with: Wound team     Chief Complaint : Left buttock    Wound Instructions:  Wound:  Left buttock  Discontinue previous wound order  Cleanse the wound bed with NSS   Apply non-sting skin prep to periwound area  Apply therahoney gel to wound bed, then cover with bordered foam  Frequency : daily and prn for soiling  Needs air mattress and w/c pressure relief cushion  Offload all wounds  Turn and reposition frequently, maximum of every two hours  Instruct / Assist with weight shifting every 15 - 20 minutes when in chair  Increase protein intake  Consult RD  Monitor for any sign of infection or worsening, inform PCP or patient's primary physician in your facility  Allergies  Penicillins, Sulfa antibiotics, B17 folate [folic acid-vit J6-YEV C59 - food allergy], Cobalt, Lyrica [pregabalin], Other, Cortisone acetate [cortisone], Doxycycline, and Nickel      Assessment & Plan:  1  Pressure injury of left buttock, unstageable ([facility] [address] )  Assessment & Plan:  - Location : Left buttock  - Wound healing status: improved  - wound size almost the same as last week 2 x 3 x 0 1 cm , increased granulation  - change to medihoney  - Pressure redistribution device - ordered air mattress and w/c pressure prevention cushion  - Continue to offload  - Increase protein   Refer to RD  - No sign localized infection during visit  - Clinical factors affecting wound healing includes age, chronicity,  co-morbidities, incontinence, location of the wound, mobility impairement   -Follow up : Next week         2  Ambulatory dysfunction           Subjective:   10/5/2022This is a 76 y o , female referred to our service for wound/ skin alterations on left buttock  Patient have a complex medical history including but not limited to  Fibromyalgia, Fibromyalgia, primary, Gastric reflux, Heart failure ([facility] [address] ), History of COVID-19, History of transfusion, [place] (hard of hearing), Hyperlipidemia, Hypertension, Incontinence in female, Irregular heart beat, Lyme disease, PONV (postoperative nausea and vomiting), Premature ventricular contraction, Primary osteoarthritis of both knees, Rheumatic fever, Sore throat, and Tuberculosis    Patient was referred by Senior Care Team  Patient was seen in collaboration with the facility wound team      Wound History:   As per medical record review, the wound was first assessed in acute care on 9/25/2022  Wound started as stage 2 pressure ulcer  Received patient in bed, seems comfortable  Denies pain  Incontinent of both bowel and bladder  Need assistance with turning when in bed  NO current treatment  10/12/2022 Follow up for wound on the left buttock  Received patient, not in distress  Facility staff did not report any significant issues related to the wound  Denies pain  10/19/2022 Follow up for wound on the left buttock   Received patient, not in distress  Facility staff did not report any significant issues related to the wound  Incontinent of urine during visit  Review of Systems   Constitutional: Negative  HENT: Negative  Eyes: Negative  Respiratory: Negative  Cardiovascular: Negative for chest pain and leg swelling  Gastrointestinal: Negative  Endocrine: Negative  Genitourinary: Negative  Musculoskeletal: Positive for gait problem  Skin: Positive for wound  See HPI   Neurological: Negative for dizziness and headaches  Psychiatric/Behavioral: Negative for behavioral problems  Objective:    Physical Exam  Constitutional:       Appearance: She is obese  HENT:      Head: Normocephalic and atraumatic  Nose: Nose normal       Mouth/Throat:      Pharynx: Oropharynx is clear     Eyes:      Conjunctiva/sclera: Conjunctivae normal    Pulmonary:      Effort: Pulmonary effort is normal    Abdominal:      Tenderness: There is no abdominal tenderness  There is no guarding  Genitourinary:     Comments: incontinent  Musculoskeletal:         General: No tenderness  Cervical back: Normal range of motion  Right lower leg: No edema  Left lower leg: No edema  Comments: LROM   Skin:     Findings: Lesion present  Comments: Left buttock- wound size is 2 x 3 x 0 1 cm  , 50% slough, 50% granulation, small amount of serous drainage, no obvious sign of infection, no malodor, periwound is normal    Neurological:      Mental Status: She is alert  Gait: Gait abnormal    Psychiatric:         Mood and Affect: Mood normal          Behavior: Behavior normal               Procedures           Patient's care was coordinated with nursing facility staff  Recent vitals, labs and updated medications were reviewed on EMR or chart system of facility  Past Medical, surgical, social, medication and allergy history and patient's previous records were reviewed and updated as appropriate: Most up-to date information is available in the facility EMR where the patient is currently admitted      Patient Active Problem List   Diagnosis   • Atrial fibrillation ([facility] [address] )   • Tachy-smita syndrome (HCC)   • Essential hypertension   • Pacemaker   • Chronic bilateral low back pain without sciatica   • Chronic GERD   • Degenerative lumbar spinal stenosis   • Fibromyalgia   • GERD (gastroesophageal reflux disease)   • Low back pain   • Lumbar degenerative disc disease   • Mild carpal tunnel syndrome, right   • Overweight   • Psoriasis   • Thyroid nodule   • Urinary incontinence   • Other insomnia   • Anxiety   • Tremor   • Chronic pain of left knee   • Age related osteoporosis   • Dysphonia   • Vitamin D insufficiency   • Hyperlipidemia   • Arthritis   • Generalized weakness   • Leukocytosis   • Ambulatory dysfunction   • Reactive airway disease with acute exacerbation   • Morbid obesity with body mass index (BMI) of 40 0 to 49 9 ([facility])   • Chronic gout with tophus   • Current moderate episode of major depressive disorder ([facility])   • Paroxysmal atrial fibrillation ([facility])   • Poor dentition   • Closed bicondylar fracture of left femur with delayed healing   • Preop cardiovascular exam   • Stage 3a chronic kidney disease ([facility])   • Preop examination   • Renal insufficiency   • Surgical wound present   • Anemia   • Bladder wall thickening   • left femoral fluid collection   • Pleural effusion on right   • Vomiting and diarrhea   • Bacteremia due to methicillin resistant Staphylococcus epidermidis   • Bacteria in urine   • Aortic regurgitation   • Pressure injury of left buttock, unstageable ([facility])   • Thrombocytosis   • Status post fall   • Physical deconditioning   • Chronic heart failure with preserved ejection fraction [facility])     Past Medical History:   Diagnosis Date   • Abnormal ECG     Last Assessed 9/29/2016   • Anxiety     Last Assessed 6/08/2016   • Arthritis     knees   • Asthma     Last Assessed 11/06/2013   • Atrial premature complex    • Cataract, bilateral     Last Assessed 7/14/2016   • Cataract, left eye     Both eyes  Had surgery on left  • COVID-19    • Difficulty swallowing    • Diverticulosis 9/25/2022   • Dizziness    • Fibromyalgia    • Fibromyalgia, primary    • Gastric reflux    • Heart failure ([facility] [address] )     5/23/22 Pt reports had heart failure in the past   • History of COVID-19     5/23/22 Pt reports having COVID in 2021     • History of transfusion     no reaction   • [place] (hard of hearing)    • Hyperlipidemia    • Hypertension    • Incontinence in female     5/23/22 Pt reports has incontinence -wears depends especially at night/riding in car   • Irregular heart beat     5/23/22 Pt reports hx of A fib   • Lyme disease    • PONV (postoperative nausea and vomiting)    • Premature ventricular contraction    • Primary osteoarthritis of both knees     Last Assessed 7/14/2016   • Rheumatic fever     5/23/22 Pt reports had hx of rheumatic fever as a child twice   • Sore throat    • Tuberculosis 1947     Past Surgical History:   Procedure Laterality Date   • APPENDECTOMY     • CARDIAC PACEMAKER PLACEMENT  2019   • COLONOSCOPY     • DENTAL SURGERY      extractions   • HYSTERECTOMY      5/23/22 Pt reports had appendix out with hysterectomy and "tightened up my bladder"   • IR ASPIRATION ONLY  9/30/2022   • ORIF FEMUR FRACTURE Left 08/05/2021    Procedure: OPEN REDUCTION W/ INTERNAL FIXATION (ORIF) DISTAL FEMUR; INSERTION RETROGRADE NAIL;  Surgeon: Ladarius Singh MD;  [facility]: [facility];  Service: Orthopedics   • NM DILATE ESOPHAGUS N/A 04/06/2016    Procedure: DILATATION ESOPHAGEAL;  Surgeon: Rebekah Melendez MD;  Location: [facility]; Service: Gastroenterology   • NM EGD TRANSORAL BIOPSY SINGLE/MULTIPLE N/A 04/06/2016    Procedure: ESOPHAGOGASTRODUODENOSCOPY (EGD); Surgeon: Rebekah Melendez MD;  Location: [facility]; Service: Gastroenterology   • NM REMOVAL DEEP IMPLANT Left 9/12/2022    Procedure: REMOVAL NAIL IM  FEMUR;  Surgeon: Ladarius Singh MD;  [facility];  Service: Orthopedics   • NM TOTAL KNEE ARTHROPLASTY Left 9/12/2022    Procedure: ARTHROPLASTY KNEE TOTAL;  Surgeon: Ladarius Singh MD;  [facility];  Service: Orthopedics   • NM XCAPSL CTRC RMVL INSJ IO LENS PROSTH W/O ECP Left 03/15/2016    Procedure: EXTRACTION EXTRACAPSULAR CATARACT PHACO INTRAOCULAR LENS (IOL); Surgeon: Silvana Duarte MD;  [facility];  Service: Ophthalmology   • REPLACEMENT TOTAL KNEE Right    • REPLACEMENT TOTAL KNEE      right    • TONSILLECTOMY       Social History     Socioeconomic History   • Marital status:       Spouse name: None   • Number of children: None   • Years of education: None   • Highest education level: None   Occupational History   • None   Tobacco Use   • Smoking status: Never Smoker   • Smokeless tobacco: Never Used   • Tobacco comment: Denies any former or current smoking   Vaping Use   • Vaping Use: Never used   Substance and Sexual Activity   • Alcohol use: Not Currently     Comment: Per [employer]- pt reports no current alcohol use at this time   • Drug use: No     Comment: Denies any former or current drug use   • Sexual activity: Never     Comment:  for 12 years - not active   Other Topics Concern   • None   Social History Narrative   • None     Social Determinants of Health     Financial Resource Strain: Not on file   Food Insecurity: No Food Insecurity   • Worried About Running Out of Food in the Last Year: Never true   • Ran Out of Food in the Last Year: Never true   Transportation Needs: No Transportation Needs   • Lack of Transportation (Medical): No   • Lack of Transportation (Non-Medical):  No   Physical Activity: Not on file   Stress: Not on file   Social Connections: Not on file   Intimate Partner Violence: Not on file   Housing Stability: Low Risk    • Unable to Pay for Housing in the Last Year: No   • Number of Places Lived in the Last Year: 2   • Unstable Housing in the Last Year: No        Current Outpatient Medications:   •  acetaminophen (TYLENOL) 325 mg tablet, Take 2 tablets (650 mg total) by mouth every 6 (six) hours, Disp: , Rfl: 0  •  albuterol (PROVENTIL HFA,VENTOLIN HFA) 90 mcg/act inhaler, INHALE 2 PUFFS BY MOUTH EVERY 6 HOURS AS NEEDED FOR WHEEZING, Disp: 3 Inhaler, Rfl: 0  •  allopurinol (ZYLOPRIM) 100 mg tablet, Take 1 tablet (100 mg total) by mouth daily, Disp: 90 tablet, Rfl: 3  •  apixaban (Eliquis) 5 mg, Take 1 tablet (5 mg total) by mouth 2 (two) times a day, Disp: 60 tablet, Rfl: 6  •  ascorbic acid (VITAMIN C) 500 MG tablet, Take 1 tablet (500 mg total) by mouth 2 (two) times a day, Disp: 60 tablet, Rfl: 1  •  cholecalciferol (VITAMIN D3) 400 units tablet, Take 1 tablet (400 Units total) by mouth in the morning, Disp: , Rfl: 0  •  diltiazem (CARDIZEM CD) 120 mg 24 hr capsule, Take 1 capsule (120 mg total) by mouth daily (Patient taking differently: Take 120 mg by mouth daily Takes in the am), Disp: 90 capsule, Rfl: 3  •  DULoxetine (CYMBALTA) 30 mg delayed release capsule, TAKE 1 CAPSULE(30 MG) BY MOUTH DAILY, Disp: 30 capsule, Rfl: 5  •  ezetimibe (ZETIA) 10 mg tablet, Take 1 tablet (10 mg total) by mouth daily, Disp: 90 tablet, Rfl: 1  •  ferrous sulfate 324 (65 Fe) mg, Take 1 tablet (324 mg total) by mouth 2 (two) times a day before meals, Disp: 60 tablet, Rfl: 1  •  FIBER PO, Take 1 tablet by mouth daily, Disp: , Rfl:   •  folic acid (FOLVITE) 1 mg tablet, TAKE 1 TABLET(1 MG) BY MOUTH DAILY, Disp: 90 tablet, Rfl: 0  •  furosemide (LASIX) 40 mg tablet, Take 1 tablet (40 mg total) by mouth in the morning , Disp: 90 tablet, Rfl: 1  •  lisinopril (ZESTRIL) 2 5 mg tablet, Take 1 tablet (2 5 mg total) by mouth daily, Disp: 30 tablet, Rfl: 2  •  LORazepam (Ativan) 0 5 mg tablet, Take 1 tablet (0 5 mg total) by mouth every 8 (eight) hours as needed for anxiety for up to 10 days, Disp: 10 tablet, Rfl: 0  •  Melatonin 10 MG TABS, Take by mouth Takes daily at night, Disp: , Rfl:   •  metoprolol tartrate (LOPRESSOR) 50 mg tablet, Take 100 mg in the morning and 50 mg in the evening, Disp: 270 tablet, Rfl: 3  •  Mirabegron ER (Myrbetriq) 25 MG TB24, Take 25 mg by mouth daily Takes in the evening, Disp: 30 tablet, Rfl: 6  •  Multiple Vitamin (MULTIVITAMIN ADULT PO), Take by mouth in the morning, Disp: , Rfl:   •  naloxone (NARCAN) 4 mg/0 1 mL nasal spray, Administer 1 spray into a nostril   If no response after 2-3 minutes, give another dose in the other nostril using a new spray , Disp: 1 each, Rfl: 1  •  pantoprazole (PROTONIX) 40 mg tablet, Take 1 tablet (40 mg total) by mouth every 12 (twelve) hours, Disp: , Rfl: 0  •  polyethylene glycol (MIRALAX) 17 g packet, Take 17 g by mouth daily as needed (constipation), Disp: , Rfl: 0  •  senna-docusate sodium (SENOKOT S) 8 6-50 mg per tablet, Take 2 tablets by mouth 2 (two) times a day, Disp: , Rfl: 0  •  vancomycin (VANCOCIN), Inject 150 mL (750 mg total) into a catheter in a vein every 12 (twelve) hours (Patient taking differently: Inject 1,000 mg into a catheter in a vein every 12 (twelve) hours), Disp: 24855 mL, Rfl: 0  •  vitamin B-12 (VITAMIN B-12) 1,000 mcg tablet, Take 1 tablet (1,000 mcg total) by mouth daily, Disp: , Rfl: 0  Family History   Problem Relation Age of Onset   • Coronary artery disease Mother    • Heart attack Mother         Prior   • Heart disease Father    • Heart attack Father         Prior   • Anesthesia problems Neg Hx               Coordination of Care: Wound team aware of the treatment plan  Facility nurse will provide wound treatment and monitor the wound for any changes  Patient / Staff education : Patient / Staff was given education on sign of infection and pressure ulcer prevention  Patient/ Staff verbalized understanding     Follow up :  Next week    Voice-recognition software may have been used in the preparation of this document  Occasional wrong word or "sound-alike" substitutions may have occurred due to the inherent limitations of voice recognition software  Interpretation should be guided by context        [place]

## 2022-10-19 NOTE — ASSESSMENT & PLAN NOTE
multifactorial   S/p , 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-left distal femoral replacement, removal of deep implants of left femur  PT/OT   Continue 24-7 supportive care with ADLs   Out of bed for meals as tolerated  Continue daily multivitamins, [employer], and supplements  Encourage adequate p o  hydration and nutrition   following for DC planning

## 2022-10-19 NOTE — ASSESSMENT & PLAN NOTE
Platelets 066 (22/31/92), suspect reactive in nature due to recent bacteremia during recent hospitalization  Remains on IV Vanco with weekly labs, ID following, recent Vanco T mildly elevated   Recent BUN at 10, cr 0 76, GFR 80  No active bleeding noted or reported   Next CBC 10-24-22  Continue to monitor platelets and trend

## 2022-10-19 NOTE — PROGRESS NOTES
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-left distal femoral replacement, removal of deep implants of left femur  PT/OT   Continue 24-7 supportive care with ADLs   Out of bed for meals as tolerated  Continue daily multivitamins, [employer], and supplements  Encourage adequate p o  hydration and nutrition   following for DC planning    Status post fall  Status post fall day 3  Sustain bruise to right jaw-cheek and right shoulder  Right upper arm neuro vascular intact  Right shoulder positive for renal motion 2+ pulses  Denies pain  Available acetaminophen 975 mg 3 times a day times 10 days  Continue fall and safety precautions  Encourage patient to ask for assistance when getting out of bed or chair   Continue 24-7 supportive care with ADLs     Chronic heart failure with preserved ejection fraction (HCC)  Wt Readings from Last 3 Encounters:   10/05/22 105 kg (232 lb 1 6 oz)   09/29/22 107 kg (235 lb)   09/19/22 106 kg (234 lb 9 6 oz)     Reports today feeling out of breath and fatigue  On exam is appears out of breath trying to catch her breath  Breath sounds diminished with fine crackles at bases  Will give extra dose of lasix 20 mg today and tomorrow  Remains on daily dose of lasix 40 mg daily  Extra dose of KCL 20 mEq daily for 3 days  Recent BUN/cr/eGFr - 10/0 76/80 all electrolytes wnl  OOB for meals as patient reports she has been in bed throughout weekend   Spoke with nursing and sent message to Rehab staff to get her OOB and then after therapy to place patient back in bed  Encourage pulmonary hygiene- IS Q1 hr x10 while awake   Weight stable, continue to monitor weight, I&s and fluid status     Bacteremia due to methicillin resistant Staphylococcus epidermidis  Recent hospitalization for positive blood culture for Staph epi the epidermidis on 09/25/2022   Repeat blood culture were negative after 72 hours  2D echo was negative for vegetation  Status post ICD placement will continue with 6 weeks of IV antibiotic therapy until November 9, 2022  Continue on right PICC vancomycin at 16 7  Last Vanco T on 10/11 16 7, 10/9 at 16 7  Next Vanco T 10/17  Free of fever, vital signs stable and nontoxic appearance  Monitor    Ambulatory dysfunction  Continue PT/OT  Fall and safety precaution   Continue 24-7 supportive care   Optimize acute and chronic medical condition as outlined   PT/OT/sw following for DC planning    Thrombocytosis  Platelets 988 (26/71/31), suspect reactive in nature due to recent bacteremia during recent hospitalization  Remains on IV Vanco with weekly labs, ID following, recent Vanco T mildly elevated  Recent BUN at 10, cr 0 76, GFR 80  No active bleeding noted or reported   Next CBC 10-24-22  Continue to monitor platelets and trend    Chief complaint / Reason for visit:  STR F/U     Patient's care was coordinated with nursing facility staff  Recent vitals, labs, and updated medications were review on [facility] system in facility  History of Present Illness:  68-year-old female seen and examined for STR f/U  Received patient lying in bed  Today patient is reporting feels out of breath  Patient informs would like to be out of bed after getting washed and right before lunch  Niece present in room, feels patient was not out of bed during the weekend  Feels patient is deteriorating  Respiratory status is 92% RA  Breath sounds diminished  There is fine crackles at the bases  No wheezes or rales  Denies fever or chills  Trace edema to Left leg  Right leg no edema  will give extra dose of lasix 20 mg today and tomorrow  Remains on daily dose of lasix of 40 mg  Encourage pulmonary hygiene   OOB for meals as tolerated  S/p fall  Right jaw/cheek and right shoulder bruises  On exam denies pain  Right shoulder + ROM, 2+ pulses and denies pain  Neuro check intact  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, removal of deep implants of left femur  Left leg with upper incision, BECCA, approximated no erythema/drainage  Left leg lower incision approximated remains with mild erythema, tender, no drainage  LLE WBAT  Continue with IV antibiotics- Vancomycin 1 gram every 12 hours (end date 11/9/22)  R PICC C/D/I +bruise around the site  no drainage  Dressing intact  Right chest small incision well healed  S/p pacemaker  Remains afebrile, nontoxic and vital signs stable  Denies fever or chills  Tolerating diet  Sleeping with no difficulties  Incontinent of bowel and bladder  Denies /GI discomfort  Per nursing no other concerns or issues at this time    Review of Systems:  Per history of present illness, all other systems reviewed and negative    Other than those noted in HPI    HISTORY:  Medical Hx: Reviewed, unchanged  Family Hx: Reviewed, unchanged  Soc Hx: Reviewed,  unchanged    ALLERGY: Reviewed, unchanged  Allergies   Allergen Reactions   • Penicillins Hives     Itching and terrible hives   • Sulfa Antibiotics Itching   • H67 Folate [Folic Acid-Vit I5-KTF X89 - Food Allergy] Other (See Comments)     5/23/22 Pt reports no allergic reaction known    • [place] Other (See Comments)     Unknown, 5/23 -pt is unaware of reaction    • Lyrica [Pregabalin] Other (See Comments)     5/23/22 Pt reports doesn't remember reaction    • Other Other (See Comments)     Black rubber 5/23/22 per allergy test - pt unaware of reaction   • Cortisone Acetate [Cortisone] Itching and Rash     5/23/22 pt reports makes her violent    • Nickel Other (See Comments)     Skin discoloration      PHYSICAL EXAM:  Vital Signs: blood pressure 130/72, pulse 76, respirations 18, Temp 98 1, O2 sats 94% RA  Weight:237 5 lbs    General:  Appears weak and cooperative  Head: Atraumatic  Normocephalic  Eye Exam: anicteric sclera, no discharge, PERRLA, No injection  Oral Exam: moist mucous membranes, no buccaloropharyngeal erythema, palatine tonsils WNL  Neck Exam: no anterior cervical lymphadenopathy noted, neck supple  Cardiovascular: regular rate, regular rhythm, + murmurs, rubs, or gallops  Pulmonary:  Decreased breath sounds with no wheeze, no rhonchi, no rales  No chest tenderness  Abdominal: soft, rounded, non-tender, nondistended, bowel sounds audible x 4 quadrants  : Non distended bladder  Incontinent of bowel and bladder   Extremities and skin: no edema noted, left leg +1 edema  Left leg incisions BECCA- approximated  Upper and middle incisions approximated no erythema/drainage, Lower incision remains with mild erythema, tender no drainage  No rashes  R PICC clean,dry and intact +bruise around the site  B/L UE bruises from prior blood draws  Right chest healed incision-s/p ICD placement   Right jaw/cheeck bruise, Right shoulder bruise  Stage 3 in buttocks   Neurological: alert, cooperative and responsive, Oriented x 3, ble generalized weakness     Laboratory results / Imaging reviewed: Hard copy/ies in medical chart:  Reviewed from [facility]    BMP review on 10/13/2022   Blood glucose 107, BUN 10, creatinine 0 76, sodium 142, potassium 3 6, calcium 9 2, eGFR 80    10/18/22- Vanco T 21 9- nursing notifying ID     10/11/22- Vanco Trough 19 8    10/9/22- Vanco Trough 16 7    CBC with diff-creatinine reviewed from 10/18/2022  Hemoglobin 8 7, hematocrit 26 7, WBC 8 0, platelets 773, absolute neutrophil 4 7, absolute lymphocyte 1 6, absolute monocytes 1 0, absolute use 0 6, absolute basos 0 1, neutrophils 53, lymphocytes 20, monocytes 12, eosinophils 8, basophil 1, band 2, metamyelocytes 1, myelocytes 3, anisocytosis rare, hypochromia rare, polychromasia rare, creatinine 0 70, EGFR 80     CBC with diff/creatinine/Vanco through on 10/6/22  Hemoglobin 8 6, hematocrit 26 0, WBC 6 1, platelets 846, absolute neutrophil 3 0, absolute lymphocyte 1 6, absolute monos 0 7, absolute eos 0 7, absolute basos 0 1, neutrophils 49, lymphocyte 26, monocytes 12, eosinophils 12, basophil 1, creatinine 0 74, EGFR 82, Vanco Through 10 9    CBC-BMP reviewed from 09/19/2022   Hemoglobin 8 3, hematocrit 24 9, WBC 11 4, platelets 584, blood glucose 118, BUN 20, creatinine 0 93, sodium 139, potassium 4 1, calcium 8 4, EGFR 63    Current Medications: All medications reviewed and updated in Nursing Home Chart reviewed from [facility]    Please note:  Voice-recognition software may have been used in the preparation of this document  Occasional wrong word or "sound-alike" substitutions may have occurred due to the inherent limitations of voice recognition software  Interpretation should be guided by context      Camilla Tellez  10/18/2022

## 2022-10-20 ENCOUNTER — NURSING HOME VISIT (OUTPATIENT)
Dept: GERIATRICS | Facility: OTHER | Age: 77
End: 2022-10-20

## 2022-10-20 ENCOUNTER — TELEPHONE (OUTPATIENT)
Dept: INFECTIOUS DISEASES | Facility: CLINIC | Age: 77
End: 2022-10-20

## 2022-10-20 DIAGNOSIS — Z16.29 BACTEREMIA DUE TO METHICILLIN RESISTANT STAPHYLOCOCCUS EPIDERMIDIS: ICD-10-CM

## 2022-10-20 DIAGNOSIS — R78.81 BACTEREMIA DUE TO GRAM-POSITIVE BACTERIA: Primary | ICD-10-CM

## 2022-10-20 DIAGNOSIS — I48.0 PAROXYSMAL ATRIAL FIBRILLATION (HCC): ICD-10-CM

## 2022-10-20 DIAGNOSIS — I50.32 CHRONIC HEART FAILURE WITH PRESERVED EJECTION FRACTION (HCC): ICD-10-CM

## 2022-10-20 DIAGNOSIS — B95.7 BACTEREMIA DUE TO METHICILLIN RESISTANT STAPHYLOCOCCUS EPIDERMIDIS: ICD-10-CM

## 2022-10-20 DIAGNOSIS — D75.839 THROMBOCYTOSIS: ICD-10-CM

## 2022-10-20 DIAGNOSIS — R78.81 BACTEREMIA DUE TO METHICILLIN RESISTANT STAPHYLOCOCCUS EPIDERMIDIS: ICD-10-CM

## 2022-10-20 DIAGNOSIS — R53.1 GENERALIZED WEAKNESS: ICD-10-CM

## 2022-10-20 DIAGNOSIS — R26.2 AMBULATORY DYSFUNCTION: ICD-10-CM

## 2022-10-20 NOTE — TELEPHONE ENCOUNTER
Received pt VT of 21 9 and creat of 0 70  Pt currently on Vanco 1g IV q 12h    Checked with Dr Urias change dose to Vanco 750mg Q12H  Repeat trough before 4th dose, continue weekly labs while on IV abx  Orders faxed to [facility]

## 2022-10-21 NOTE — PROGRESS NOTES
[address], [address] [place] [address], [address] [place] [address]  [phone]    NAME: Lily Burch  AGE: 76 y o  SEX: female    Progress Note    [facility]: [facility]   POS: 31 (SNF)     Patient's care was coordinated with nursing facility staff  Recent vitals, labs, and updated medications were review on [facility] system in facility  Assessment/Plan:    Bacteremia due to methicillin resistant Staphylococcus epidermidis  Recent hospitalization for positive blood culture for Staph epi the epidermidis on 09/25/2022   Repeat blood culture were negative after 72 hours  2D echo was negative for vegetation  Status post ICD placement will continue with 6 weeks of IV antibiotic therapy until November 9, 2022  Continue on right PICC, recent vancomycin T at 21 9, ID faxed orders to continue Vancomycin 750 mg every 12 hours   Last Vanco T on 10/11 16 7, 10/9 at 16 7, 21 9 (10/18)  Next Vanco T 10/25  Free of fever, vital signs stable and nontoxic appearance  Monitor    Generalized weakness  On exams appear with generalized weakness   multifactorial  Continue PT/OT  Fall safety precaution   Continue 24-7 supportive care   Optimize acute and chronic medical condition as outlined   Continue adequate p o  hydration and nutrition    following for DC planning    Ambulatory dysfunction  Continue PT/OT  Fall and safety precaution   Continue 24-7 supportive care   Optimize acute and chronic medical condition as outlined   PT/OT/sw following for DC planning    Thrombocytosis  Platelets 011 (25/25/05), suspect reactive in nature due to recent bacteremia during recent hospitalization  Remains on IV Vanco with weekly labs, ID following, recent Vanco T mildly elevated   Recent BUN at 10, cr 0 76, GFR 80  No active bleeding noted or reported   Next CBC 10-25-22  Continue to monitor platelets and trend    Paroxysmal atrial fibrillation (HCC)  HR log reviewed and stable at 76, denies chest pain or palpitations  S/p placed on pacemaker   Rate control on metoprolol 100 mg in the morning and 50 mg in the evening with hold parameters if systolic blood pressure less than 100 or heart rate less than 60  Remain on apixaban 5 mg b i d  Chronic heart failure with preserved ejection fraction (HCC)  Wt Readings from Last 3 Encounters:   10/05/22 105 kg (232 lb 1 6 oz)   09/29/22 107 kg (235 lb)   09/19/22 106 kg (234 lb 9 6 oz)     Weight stable at 237 5 lbs, clinically euvolemic  Respiratory status stable on room air  Jessica leg with trace edema at baseline, right leg no edema  Continue with daily Lasix 40 mg daily  Recent BUN/cr/eGFr - 10/0 76/80 all electrolytes wnl  Weight stable, continue to monitor weight, I&s and fluid status     Chief complaint / Reason for visit:  STR F/U     History of Present Illness:  26-year-old female seen and examined for STR f/U  Received patient sitting in w/c, resting  Offers no complaints and is in no acute distress  Patient reports feeling better now that she is getting out of bed every day  Continues to use incentive spirometer  Denies fever or chills  Left leg with trace edema however baseline  Right leg no edema  Remains on daily dose of lasix of 40 mg  Continue OOB for meals as tolerated  Left leg lower incision approximated remains with mild erythema, tender, no drainage  LLE WBAT  Continue with IV antibiotics- Vancomycin 1 gram every 12 hours (end date 11/9/22)  R PICC C/D/I +bruise around the site no drainage  Dressing intact  Right chest small incision well healed  S/p pacemaker  Remains afebrile, nontoxic and vital signs stable  Denies fever or chills  Tolerating diet  Sleeping with no difficulties  Incontinent of bowel and bladder  Denies /GI discomfort  Per nursing no other concerns or issues at this time    Review of Systems:  Per history of present illness, all other systems reviewed and negative    Other than those noted in HPI    HISTORY:  Medical Hx: Reviewed, unchanged  Family Hx: Reviewed, unchanged  Soc Hx: Reviewed,  unchanged    ALLERGY: Reviewed, unchanged  Allergies   Allergen Reactions   • Penicillins Hives     Itching and terrible hives   • Sulfa Antibiotics Itching   • T67 Folate [Folic Acid-Vit D0-GDG M95 - Food Allergy] Other (See Comments)     5/23/22 Pt reports no allergic reaction known    • [place] Other (See Comments)     Unknown, 5/23 -pt is unaware of reaction    • Lyrica [Pregabalin] Other (See Comments)     5/23/22 Pt reports doesn't remember reaction    • Other Other (See Comments)     Black rubber 5/23/22 per allergy test - pt unaware of reaction   • Cortisone Acetate [Cortisone] Itching and Rash     5/23/22 pt reports makes her violent    • Nickel Other (See Comments)     Skin discoloration      PHYSICAL EXAM:  Vital Signs: blood pressure 152/72, pulse 76, respirations 18, Temp 98 1, O2 sats 94% RA  Weight:237 5 lbs    General:  Appears weak and cooperative  Head: Atraumatic  Normocephalic  Eye Exam: anicteric sclera, no discharge, PERRLA, No injection  Oral Exam: moist mucous membranes, no buccaloropharyngeal erythema, palatine tonsils WNL  Neck Exam: no anterior cervical lymphadenopathy noted, neck supple  Cardiovascular: regular rate, regular rhythm, + murmurs, rubs, or gallops  Pulmonary:  Breath sounds clear, no wheeze, no rhonchi, no rales  No chest tenderness  Abdominal: soft, rounded, non-tender, nondistended, bowel sounds audible x 4 quadrants  : Non distended bladder  Incontinent of bowel and bladder   Extremities and skin: no edema noted, left leg trace edema  Left leg incisions BECCA- approximated  Upper and middle incisions approximated no erythema/drainage, Lower incision remains with mild erythema, tender no drainage  No rashes  R PICC clean,dry and intact +bruise around the site  B/L UE bruises from prior blood draws  Right chest healed incision-s/p ICD placement   Right jaw/cheeck bruise, Right shoulder bruise  Stage 3 in buttocks Neurological: alert, cooperative and responsive, Oriented x 3, ble generalized weakness     Laboratory results / Imaging reviewed: Hard copy/ies in medical chart:  Reviewed from [facility]    CBC/Creatinine/ Vanco T  Hgb 8 7, hct 26 7, WBC 8 0, Platelet 572, absolute neutrophils 4 7, absolute lymphocytes 1 6, absolute monocytes 1 0, absolute eosinophils 0 6, absolute basophils 0 1, neutrophils 53, lymphocytes 20, monocytes 12, eosinophils 8, basophils 1, band 2, metamyelocytes 1, myelocytes 3, anisocytosis rare  Hypochromia rare, polychromasia rate, creatinine 0 70, EGFR 88    Vanco T 21 9 (h)     BMP review on 10/13/2022   Blood glucose 107, BUN 10, creatinine 0 76, sodium 142, potassium 3 6, calcium 9 2, eGFR 80    10/18/22- Vanco T 21 9- nursing notifying ID     10/11/22- Vanco Trough 19 8    10/9/22- Vanco Trough 16 7    CBC with diff-creatinine reviewed from 10/18/2022  Hemoglobin 8 7, hematocrit 26 7, WBC 8 0, platelets 215, absolute neutrophil 4 7, absolute lymphocyte 1 6, absolute monocytes 1 0, absolute use 0 6, absolute basos 0 1, neutrophils 53, lymphocytes 20, monocytes 12, eosinophils 8, basophil 1, band 2, metamyelocytes 1, myelocytes 3, anisocytosis rare, hypochromia rare, polychromasia rare, creatinine 0 70, EGFR 80     CBC with diff/creatinine/Vanco through on 10/6/22  Hemoglobin 8 6, hematocrit 26 0, WBC 6 1, platelets 061, absolute neutrophil 3 0, absolute lymphocyte 1 6, absolute monos 0 7, absolute eos 0 7, absolute basos 0 1, neutrophils 49, lymphocyte 26, monocytes 12, eosinophils 12, basophil 1, creatinine 0 74, EGFR 82, Vanco Through 10 9    CBC-BMP reviewed from 09/19/2022   Hemoglobin 8 3, hematocrit 24 9, WBC 11 4, platelets 302, blood glucose 118, BUN 20, creatinine 0 93, sodium 139, potassium 4 1, calcium 8 4, EGFR 63    Current Medications:   All medications reviewed and updated in Nursing Home Chart reviewed from [facility]    Please note:  Voice-recognition software may have been used in the preparation of this document  Occasional wrong word or "sound-alike" substitutions may have occurred due to the inherent limitations of voice recognition software  Interpretation should be guided by context      Jesusita Cartagena  10/20/2022

## 2022-10-23 NOTE — ASSESSMENT & PLAN NOTE
Recent hospitalization for positive blood culture for Staph epi the epidermidis on 09/25/2022   Repeat blood culture were negative after 72 hours  2D echo was negative for vegetation    Status post ICD placement will continue with 6 weeks of IV antibiotic therapy until November 9, 2022  Continue on right PICC, recent vancomycin T at 21 9, ID faxed orders to continue Vancomycin 750 mg every 12 hours   Last Vanco T on 10/11 16 7, 10/9 at 16 7, 21 9 (10/18)  Next Vanco T 10/25  Free of fever, vital signs stable and nontoxic appearance  Monitor

## 2022-10-23 NOTE — ASSESSMENT & PLAN NOTE
Platelets 521 (77/62/03), suspect reactive in nature due to recent bacteremia during recent hospitalization  Remains on IV Vanco with weekly labs, ID following, recent Vanco T mildly elevated   Recent BUN at 10, cr 0 76, GFR 80  No active bleeding noted or reported   Next CBC 10-25-22  Continue to monitor platelets and trend

## 2022-10-23 NOTE — ASSESSMENT & PLAN NOTE
HR log reviewed and stable at 76, denies chest pain or palpitations  S/p placed on pacemaker   Rate control on metoprolol 100 mg in the morning and 50 mg in the evening with hold parameters if systolic blood pressure less than 100 or heart rate less than 60  Remain on apixaban 5 mg b i d

## 2022-10-23 NOTE — ASSESSMENT & PLAN NOTE
Wt Readings from Last 3 Encounters:   10/05/22 105 kg (232 lb 1 6 oz)   09/29/22 107 kg (235 lb)   09/19/22 106 kg (234 lb 9 6 oz)     Weight stable at 237 5 lbs, clinically euvolemic  Respiratory status stable on room air   Jessica leg with trace edema at baseline, right leg no edema  Continue with daily Lasix 40 mg daily  Recent BUN/cr/eGFr - 10/0 76/80 all electrolytes wnl  Weight stable, continue to monitor weight, I&s and fluid status

## 2022-10-25 ENCOUNTER — TELEPHONE (OUTPATIENT)
Dept: OTHER | Facility: OTHER | Age: 77
End: 2022-10-25

## 2022-10-25 ENCOUNTER — NURSING HOME VISIT (OUTPATIENT)
Dept: GERIATRICS | Facility: OTHER | Age: 77
End: 2022-10-25

## 2022-10-25 DIAGNOSIS — R78.81 BACTEREMIA DUE TO METHICILLIN RESISTANT STAPHYLOCOCCUS EPIDERMIDIS: Primary | ICD-10-CM

## 2022-10-25 DIAGNOSIS — D75.839 THROMBOCYTOSIS: ICD-10-CM

## 2022-10-25 DIAGNOSIS — R26.2 AMBULATORY DYSFUNCTION: ICD-10-CM

## 2022-10-25 DIAGNOSIS — D64.9 ANEMIA, UNSPECIFIED TYPE: ICD-10-CM

## 2022-10-25 DIAGNOSIS — B95.7 BACTEREMIA DUE TO METHICILLIN RESISTANT STAPHYLOCOCCUS EPIDERMIDIS: Primary | ICD-10-CM

## 2022-10-25 DIAGNOSIS — R53.1 GENERALIZED WEAKNESS: ICD-10-CM

## 2022-10-25 DIAGNOSIS — Z16.29 BACTEREMIA DUE TO METHICILLIN RESISTANT STAPHYLOCOCCUS EPIDERMIDIS: Primary | ICD-10-CM

## 2022-10-26 ENCOUNTER — NURSING HOME VISIT (OUTPATIENT)
Dept: WOUND CARE | Facility: HOSPITAL | Age: 77
End: 2022-10-26

## 2022-10-26 ENCOUNTER — TELEPHONE (OUTPATIENT)
Dept: INFECTIOUS DISEASES | Facility: CLINIC | Age: 77
End: 2022-10-26

## 2022-10-26 ENCOUNTER — PATIENT OUTREACH (OUTPATIENT)
Dept: CASE MANAGEMENT | Facility: HOSPITAL | Age: 77
End: 2022-10-26

## 2022-10-26 DIAGNOSIS — R32 DERMATITIS ASSOCIATED WITH INCONTINENCE: ICD-10-CM

## 2022-10-26 DIAGNOSIS — L25.8 DERMATITIS ASSOCIATED WITH INCONTINENCE: ICD-10-CM

## 2022-10-26 DIAGNOSIS — L89.320 PRESSURE INJURY OF LEFT BUTTOCK, UNSTAGEABLE (HCC): Primary | ICD-10-CM

## 2022-10-26 NOTE — ASSESSMENT & PLAN NOTE
Platelets 673 (61/23/48), suspect reactive in nature due to recent bacteremia during recent hospitalization  Remains on IV Vanco with weekly labs, ID following, recent Vanco T therapeutic     No active bleeding noted or reported   Next CBC 10-31-22  Continue to monitor platelets and trend

## 2022-10-26 NOTE — TELEPHONE ENCOUNTER
Janey Galloway called from [facility], requesting a call back from on call provider regarding pt's meds   Provider was paged via [facility]

## 2022-10-26 NOTE — ASSESSMENT & PLAN NOTE
Hemoglobin is 9 1, hct 27 9, slowly improving   Appears stable when compare to 8 4 during recent hospitalization   No active bleeding noted on exam and none reported  Continue iron and folic acid as well as W78  Continue to monitor CBC and trend  Next CBC 10/31/22

## 2022-10-26 NOTE — ASSESSMENT & PLAN NOTE
Buttocks  - patient is incontinent of bowel in bladder    Use of diaper is not a contributing factor   - rashes diffuse, red, with no obvious sign of infection  - local wound care with Triad paste

## 2022-10-26 NOTE — ASSESSMENT & PLAN NOTE
Recent hospitalization for positive blood culture for Staph epi the epidermidis on 09/25/2022   Repeat blood culture were negative after 72 hours  2D echo was negative for vegetation    Status post ICD placement will continue with 6 weeks of IV antibiotic therapy until November 9, 2022  ID following   Continue on right PICC, Vancomycin 750 mg q 12 hrs   Recent Vanco T on 18 0 (10/24/22)   Next Vanco T 10/31  Free of fever, vital signs stable and nontoxic appearance  Monitor

## 2022-10-26 NOTE — ASSESSMENT & PLAN NOTE
- Location : Left buttock  - Wound healing status: improved  - wound size almost the same as last week 2 x 3 x 0 1 cm , 100% slough  - change to Triad paste to rashes on the periwound area  - Pressure redistribution device - ordered air mattress and w/c pressure prevention cushion  - Continue to offload  - Increase protein    Refer to RD  - No sign localized infection during visit  - Clinical factors affecting wound healing includes age, chronicity,  co-morbidities, incontinence, location of the wound, mobility impairement   -Follow up : Next week

## 2022-10-26 NOTE — PROGRESS NOTES
[address] MANAGEMENT   AND HYPERBARIC MEDICINE CENTER       Patient ID: Jenna Mcpherson is a 76 y o  female Date of Birth 1945     Location of Service: [facility]    Performed wound round with: Wound team     Chief Complaint : Left buttock    Wound Instructions:  Wound:  Left buttock  Discontinue previous wound order  Cleanse the wound bed with NSS   Apply non-sting skin prep to periwound area  Apply Triad paste to wound bed  Frequency : daily and prn for soiling  Needs air mattress and w/c pressure relief cushion  Offload all wounds  Turn and reposition frequently, maximum of every two hours  Instruct / Assist with weight shifting every 15 - 20 minutes when in chair  Increase protein intake  Consult RD  Monitor for any sign of infection or worsening, inform PCP or patient's primary physician in your facility  Allergies  Penicillins, Sulfa antibiotics, L13 folate [folic acid-vit J8-IVU E74 - food allergy], Cobalt, Lyrica [pregabalin], Other, Cortisone acetate [cortisone], Doxycycline, and Nickel      Assessment & Plan:  1  Pressure injury of left buttock, unstageable ([facility] [address] )  Assessment & Plan:  - Location : Left buttock  - Wound healing status: improved  - wound size almost the same as last week 2 x 3 x 0 1 cm , 100% slough  - change to Triad paste to rashes on the periwound area  - Pressure redistribution device - ordered air mattress and w/c pressure prevention cushion  - Continue to offload  - Increase protein   Refer to RD  - No sign localized infection during visit  - Clinical factors affecting wound healing includes age, chronicity,  co-morbidities, incontinence, location of the wound, mobility impairement   -Follow up : Next week         2  Dermatitis associated with incontinence  Assessment & Plan:  Buttocks  - patient is incontinent of bowel in bladder    Use of diaper is not a contributing factor   - rashes diffuse, red, with no obvious sign of infection  - local wound care with Triad paste             Subjective:   10/5/2022This is a 76 y o , female referred to our service for wound/ skin alterations on left buttock  Patient have a complex medical history including but not limited to  Fibromyalgia, Fibromyalgia, primary, Gastric reflux, Heart failure ([address] ), History of COVID-19, History of transfusion, [place] (hard of hearing), Hyperlipidemia, Hypertension, Incontinence in female, Irregular heart beat, Lyme disease, PONV (postoperative nausea and vomiting), Premature ventricular contraction, Primary osteoarthritis of both knees, Rheumatic fever, Sore throat, and Tuberculosis    Patient was referred by Senior Care Team  Patient was seen in collaboration with the facility wound team      Wound History:   As per medical record review, the wound was first assessed in acute care on 9/25/2022  Wound started as stage 2 pressure ulcer  Received patient in bed, seems comfortable  Denies pain  Incontinent of both bowel and bladder  Need assistance with turning when in bed  NO current treatment  10/12/2022 Follow up for wound on the left buttock  Received patient, not in distress  Facility staff did not report any significant issues related to the wound  Denies pain  10/19/2022 Follow up for wound on the left buttock   Received patient, not in distress  Facility staff did not report any significant issues related to the wound  Incontinent of urine during visit  10/26/2022 Follow up for wound on the left buttock   Received patient, not in distress  Facility staff did not report any significant issues related to the wound  As per medical Record review, patient currently on vancomycin for bacteremia  Review of Systems   Constitutional: Negative  HENT: Negative  Eyes: Negative  Respiratory: Negative  Cardiovascular: Negative for chest pain and leg swelling  Gastrointestinal: Negative  Endocrine: Negative  Genitourinary: Negative      Musculoskeletal: Positive for gait problem  Skin: Positive for wound  See HPI   Neurological: Negative for dizziness and headaches  Psychiatric/Behavioral: Negative for behavioral problems  Objective:    Physical Exam  Constitutional:       Appearance: She is obese  HENT:      Head: Normocephalic and atraumatic  Nose: Nose normal       Mouth/Throat:      Pharynx: Oropharynx is clear  Eyes:      Conjunctiva/sclera: Conjunctivae normal    Pulmonary:      Effort: Pulmonary effort is normal    Abdominal:      Tenderness: There is no abdominal tenderness  There is no guarding  Genitourinary:     Comments: incontinent  Musculoskeletal:         General: No tenderness  Cervical back: Normal range of motion  Right lower leg: No edema  Left lower leg: No edema  Comments: LROM  With PICC line on the right arm   Skin:     Findings: Lesion present  Comments: Left buttock- wound size is 2 x 3 x 0 1 cm  , 100% slough, 0% granulation, small amount of serous drainage, no obvious sign of infection, no malodor, periwound and surrounding tissue - + rashes   Neurological:      Mental Status: She is alert  Gait: Gait abnormal    Psychiatric:         Mood and Affect: Mood normal          Behavior: Behavior normal               Procedures           Patient's care was coordinated with nursing facility staff  Recent vitals, labs and updated medications were reviewed on EMR or chart system of facility  Past Medical, surgical, social, medication and allergy history and patient's previous records were reviewed and updated as appropriate: Most up-to date information is available in the facility EMR where the patient is currently admitted      Patient Active Problem List   Diagnosis   • Atrial fibrillation ([address] )   • Tachy-smita syndrome ([address] )   • Essential hypertension   • Pacemaker   • Chronic bilateral low back pain without sciatica   • Chronic GERD   • Degenerative lumbar spinal stenosis   • Fibromyalgia • GERD (gastroesophageal reflux disease)   • Low back pain   • Lumbar degenerative disc disease   • Mild carpal tunnel syndrome, right   • Overweight   • Psoriasis   • Thyroid nodule   • Urinary incontinence   • Other insomnia   • Anxiety   • Tremor   • Chronic pain of left knee   • Age related osteoporosis   • Dysphonia   • Vitamin D insufficiency   • Hyperlipidemia   • Arthritis   • Generalized weakness   • Leukocytosis   • Ambulatory dysfunction   • Reactive airway disease with acute exacerbation   • Morbid obesity with body mass index (BMI) of 40 0 to 49 9 ([facility])   • Chronic gout with tophus   • Current moderate episode of major depressive disorder ([facility])   • Paroxysmal atrial fibrillation ([facility])   • Poor dentition   • Closed bicondylar fracture of left femur with delayed healing   • Preop cardiovascular exam   • Stage 3a chronic kidney disease ([facility])   • Preop examination   • Renal insufficiency   • Surgical wound present   • Anemia   • Bladder wall thickening   • left femoral fluid collection   • Pleural effusion on right   • Vomiting and diarrhea   • Bacteremia due to methicillin resistant Staphylococcus epidermidis   • Bacteria in urine   • Aortic regurgitation   • Pressure injury of left buttock, unstageable ([facility])   • Thrombocytosis   • Status post fall   • Physical deconditioning   • Chronic heart failure with preserved ejection fraction ([facility] [address] )   • Dermatitis associated with incontinence     Past Medical History:   Diagnosis Date   • Abnormal ECG     Last Assessed 9/29/2016   • Anxiety     Last Assessed 6/08/2016   • Arthritis     knees   • Asthma     Last Assessed 11/06/2013   • Atrial premature complex    • Cataract, bilateral     Last Assessed 7/14/2016   • Cataract, left eye     Both eyes  Had surgery on left     • COVID-19    • Difficulty swallowing    • Diverticulosis 9/25/2022   • Dizziness    • Fibromyalgia    • Fibromyalgia, primary    • Gastric reflux    • Heart failure ([facility] [address] )     5/23/22 Pt reports had heart failure in the past   • History of COVID-19     5/23/22 Pt reports having [place] in 2021  • History of transfusion     no reaction   • [place] (hard of hearing)    • Hyperlipidemia    • Hypertension    • Incontinence in female     5/23/22 Pt reports has incontinence -wears depends especially at night/riding in car   • Irregular heart beat     5/23/22 Pt reports hx of A fib   • Lyme disease    • PONV (postoperative nausea and vomiting)    • Premature ventricular contraction    • Primary osteoarthritis of both knees     Last Assessed 7/14/2016   • Rheumatic fever     5/23/22 Pt reports had hx of rheumatic fever as a child twice   • Sore throat    • Tuberculosis 1947     Past Surgical History:   Procedure Laterality Date   • APPENDECTOMY     • CARDIAC PACEMAKER PLACEMENT  2019   • COLONOSCOPY     • DENTAL SURGERY      extractions   • HYSTERECTOMY      5/23/22 Pt reports had appendix out with hysterectomy and "tightened up my bladder"   • IR ASPIRATION ONLY  9/30/2022   • ORIF FEMUR FRACTURE Left 08/05/2021    Procedure: OPEN REDUCTION W/ INTERNAL FIXATION (ORIF) DISTAL FEMUR; INSERTION RETROGRADE NAIL;  Surgeon: Shaye Quinones MD;  [facility];  Service: Orthopedics   • DE DILATE ESOPHAGUS N/A 04/06/2016    Procedure: DILATATION ESOPHAGEAL;  Surgeon: Roberth Manrique MD;  Location: [facility]; Service: Gastroenterology   • DE EGD TRANSORAL BIOPSY SINGLE/MULTIPLE N/A 04/06/2016    Procedure: ESOPHAGOGASTRODUODENOSCOPY (EGD); Surgeon: Roberth Manrique MD;  Location: [facility];   Service: Gastroenterology   • DE REMOVAL DEEP IMPLANT Left 9/12/2022    Procedure: REMOVAL NAIL IM  FEMUR;  Surgeon: Shaye Quinones MD;  [facility];  Service: Orthopedics   • DE TOTAL KNEE ARTHROPLASTY Left 9/12/2022    Procedure: ARTHROPLASTY KNEE TOTAL;  Surgeon: Shaye Quinones MD;  [facility];  Service: Orthopedics   • DE XCAPSL CTRC RMVL INSJ IO LENS PROSTH W/O ECP Left 03/15/2016    Procedure: EXTRACTION EXTRACAPSULAR CATARACT PHACO INTRAOCULAR LENS (IOL); Surgeon: Ari Rodriguez MD;  Location[facility];  Service: Ophthalmology   • REPLACEMENT TOTAL KNEE Right    • REPLACEMENT TOTAL KNEE      right    • TONSILLECTOMY       Social History     Socioeconomic History   • Marital status:      Spouse name: None   • Number of children: None   • Years of education: None   • Highest education level: None   Occupational History   • None   Tobacco Use   • Smoking status: Never Smoker   • Smokeless tobacco: Never Used   • Tobacco comment: Denies any former or current smoking   Vaping Use   • Vaping Use: Never used   Substance and Sexual Activity   • Alcohol use: Not Currently     Comment: Per [employer]- pt reports no current alcohol use at this time   • Drug use: No     Comment: Denies any former or current drug use   • Sexual activity: Never     Comment:  for 12 years - not active   Other Topics Concern   • None   Social History Narrative   • None     Social Determinants of Health     Financial Resource Strain: Not on file   Food Insecurity: No Food Insecurity   • Worried About Running Out of Food in the Last Year: Never true   • Ran Out of Food in the Last Year: Never true   Transportation Needs: No Transportation Needs   • Lack of Transportation (Medical): No   • Lack of Transportation (Non-Medical):  No   Physical Activity: Not on file   Stress: Not on file   Social Connections: Not on file   Intimate Partner Violence: Not on file   Housing Stability: Low Risk    • Unable to Pay for Housing in the Last Year: No   • Number of Places Lived in the Last Year: 2   • Unstable Housing in the Last Year: No        Current Outpatient Medications:   •  acetaminophen (TYLENOL) 325 mg tablet, Take 2 tablets (650 mg total) by mouth every 6 (six) hours, Disp: , Rfl: 0  •  albuterol (PROVENTIL HFA,VENTOLIN HFA) 90 mcg/act inhaler, INHALE 2 PUFFS BY MOUTH EVERY 6 HOURS AS NEEDED FOR WHEEZING, Disp: 3 Inhaler, Rfl: 0  •  allopurinol (ZYLOPRIM) 100 mg tablet, Take 1 tablet (100 mg total) by mouth daily, Disp: 90 tablet, Rfl: 3  •  apixaban (Eliquis) 5 mg, Take 1 tablet (5 mg total) by mouth 2 (two) times a day, Disp: 60 tablet, Rfl: 6  •  ascorbic acid (VITAMIN C) 500 MG tablet, Take 1 tablet (500 mg total) by mouth 2 (two) times a day, Disp: 60 tablet, Rfl: 1  •  cholecalciferol (VITAMIN D3) 400 units tablet, Take 1 tablet (400 Units total) by mouth in the morning, Disp: , Rfl: 0  •  diltiazem (CARDIZEM CD) 120 mg 24 hr capsule, Take 1 capsule (120 mg total) by mouth daily (Patient taking differently: Take 120 mg by mouth daily Takes in the am), Disp: 90 capsule, Rfl: 3  •  DULoxetine (CYMBALTA) 30 mg delayed release capsule, TAKE 1 CAPSULE(30 MG) BY MOUTH DAILY, Disp: 30 capsule, Rfl: 5  •  ezetimibe (ZETIA) 10 mg tablet, Take 1 tablet (10 mg total) by mouth daily, Disp: 90 tablet, Rfl: 1  •  ferrous sulfate 324 (65 Fe) mg, Take 1 tablet (324 mg total) by mouth 2 (two) times a day before meals, Disp: 60 tablet, Rfl: 1  •  FIBER PO, Take 1 tablet by mouth daily, Disp: , Rfl:   •  folic acid (FOLVITE) 1 mg tablet, TAKE 1 TABLET(1 MG) BY MOUTH DAILY, Disp: 90 tablet, Rfl: 0  •  furosemide (LASIX) 40 mg tablet, Take 1 tablet (40 mg total) by mouth in the morning , Disp: 90 tablet, Rfl: 1  •  lisinopril (ZESTRIL) 2 5 mg tablet, Take 1 tablet (2 5 mg total) by mouth daily, Disp: 30 tablet, Rfl: 2  •  LORazepam (Ativan) 0 5 mg tablet, Take 1 tablet (0 5 mg total) by mouth every 8 (eight) hours as needed for anxiety for up to 10 days, Disp: 10 tablet, Rfl: 0  •  Melatonin 10 MG TABS, Take by mouth Takes daily at night, Disp: , Rfl:   •  metoprolol tartrate (LOPRESSOR) 50 mg tablet, Take 100 mg in the morning and 50 mg in the evening, Disp: 270 tablet, Rfl: 3  •  Mirabegron ER (Myrbetriq) 25 MG TB24, Take 25 mg by mouth daily Takes in the evening, Disp: 30 tablet, Rfl: 6  •  Multiple Vitamin (MULTIVITAMIN ADULT PO), Take by mouth in the morning, Disp: , Rfl:   •  naloxone (NARCAN) 4 mg/0 1 mL nasal spray, Administer 1 spray into a nostril  If no response after 2-3 minutes, give another dose in the other nostril using a new spray , Disp: 1 each, Rfl: 1  •  pantoprazole (PROTONIX) 40 mg tablet, Take 1 tablet (40 mg total) by mouth every 12 (twelve) hours, Disp: , Rfl: 0  •  polyethylene glycol (MIRALAX) 17 g packet, Take 17 g by mouth daily as needed (constipation), Disp: , Rfl: 0  •  senna-docusate sodium (SENOKOT S) 8 6-50 mg per tablet, Take 2 tablets by mouth 2 (two) times a day, Disp: , Rfl: 0  •  vancomycin (VANCOCIN), Inject 150 mL (750 mg total) into a catheter in a vein every 12 (twelve) hours (Patient taking differently: Inject 1,000 mg into a catheter in a vein every 12 (twelve) hours), Disp: 14429 mL, Rfl: 0  •  vitamin B-12 (VITAMIN B-12) 1,000 mcg tablet, Take 1 tablet (1,000 mcg total) by mouth daily, Disp: , Rfl: 0  Family History   Problem Relation Age of Onset   • Coronary artery disease Mother    • Heart attack Mother         Prior   • Heart disease Father    • Heart attack Father         Prior   • Anesthesia problems Neg Hx               Coordination of Care: Wound team aware of the treatment plan  Facility nurse will provide wound treatment and monitor the wound for any changes  Patient / Staff education : Patient / Staff was given education on sign of infection and pressure ulcer prevention  Patient/ Staff verbalized understanding     Follow up :  Next week    Voice-recognition software may have been used in the preparation of this document  Occasional wrong word or "sound-alike" substitutions may have occurred due to the inherent limitations of voice recognition software  Interpretation should be guided by context        Haris Albarran

## 2022-10-26 NOTE — TELEPHONE ENCOUNTER
Hannah Clark calls us to verify that patient is OK to remain on same dose  Vanco trough was 18 on 10/24  Inquired about CBCD Cre  Patient has apparently had no blood return on PICC line and they were unable to draw these peripherally  They do have someone going in today to draw another patient's stat labs  They will add these on

## 2022-10-26 NOTE — PROGRESS NOTES
[address], [address] [place] [address], [address] [place] [address]  [phone]    NAME: iLly Burch  AGE: 76 y o  SEX: female    Progress Note    [facility]: [facility]   POS: 31 (SNF)     Patient's care was coordinated with nursing facility staff  Recent vitals, labs, and updated medications were review on [facility] system in facility  Assessment/Plan:    Bacteremia due to methicillin resistant Staphylococcus epidermidis  Recent hospitalization for positive blood culture for Staph epi the epidermidis on 09/25/2022   Repeat blood culture were negative after 72 hours  2D echo was negative for vegetation  Status post ICD placement will continue with 6 weeks of IV antibiotic therapy until November 9, 2022  ID following   Continue on right PICC, Vancomycin 750 mg q 12 hrs   Recent Vanco T on 18 0 (10/24/22)   Next Vanco T 10/31  Free of fever, vital signs stable and nontoxic appearance  Monitor    Ambulatory dysfunction  Continue PT/OT  Fall and safety precaution   Continue 24-7 supportive care   Optimize acute and chronic medical condition as outlined   PT/OT/sw following for DC planning    Anemia  Hemoglobin is 9 1, hct 27 9, slowly improving   Appears stable when compare to 8 4 during recent hospitalization   No active bleeding noted on exam and none reported  Continue iron and folic acid as well as T94  Continue to monitor CBC and trend  Next CBC 10/31/22    Generalized weakness  On exams appear with generalized weakness   multifactorial  Continue PT/OT  Fall safety precaution   Continue 24-7 supportive care   Optimize acute and chronic medical condition as outlined   Continue adequate p o  hydration and nutrition    following for DC planning    Thrombocytosis  Platelets 861 (83/72/68), suspect reactive in nature due to recent bacteremia during recent hospitalization  Remains on IV Vanco with weekly labs, ID following, recent Vanco T therapeutic     No active bleeding noted or reported   Next CBC 10-31-22  Continue to monitor platelets and trend    Chief complaint / Reason for visit:  STR F/U     History of Present Illness:  75-year-old female seen and examined for STR f/U  Received patient sitting in w/c, resting  Appears comfortable and is in no acute distress  On exam appears okay with no complaints  She is getting out of bed every day  Continue with IS  Denies cough or SOB, fever or chills  Left leg with trace edema however baseline  Right leg no edema  Remains on daily dose of lasix of 40 mg  Continue OOB for meals as tolerated  Left leg lower incision approximated remains with mild erythema, tender, no drainage  LLE WBAT  Continue with IV antibiotics- Vancomycin 750 mg every 12 hours (end date 11/9/22)  R PICC C/D/I +bruise around the site no drainage  Dressing intact  Right chest small incision well healed  S/p pacemaker  Tolerating diet  Sleeping with no difficulties  Incontinent of bowel and bladder  Denies /GI discomfort  Per nursing no other concerns or issues at this time    Review of Systems:  Per history of present illness, all other systems reviewed and negative    Other than those noted in HPI    HISTORY:  Medical Hx: Reviewed, unchanged  Family Hx: Reviewed, unchanged  Soc Hx: Reviewed,  unchanged    ALLERGY: Reviewed, unchanged  Allergies   Allergen Reactions   • Penicillins Hives     Itching and terrible hives   • Sulfa Antibiotics Itching   • D24 Folate [Folic Acid-Vit I3-CHANCE L73 - Food Allergy] Other (See Comments)     5/23/22 Pt reports no allergic reaction known    • Thousandsticks Other (See Comments)     Unknown, 5/23 -pt is unaware of reaction    • Lyrica [Pregabalin] Other (See Comments)     5/23/22 Pt reports doesn't remember reaction    • Other Other (See Comments)     Black rubber 5/23/22 per allergy test - pt unaware of reaction   • Cortisone Acetate [Cortisone] Itching and Rash     5/23/22 pt reports makes her violent    • Nickel Other (See Comments)     Skin discoloration      PHYSICAL EXAM:  Vital Signs: blood pressure 117/60, pulse 92, respirations 18, Temp 98 1, O2 sats 94% RA  Weight: 238 2 lbs    General:  Appears weak and cooperative  Head: Atraumatic  Normocephalic  Eye Exam: anicteric sclera, no discharge, PERRLA, No injection  Oral Exam: moist mucous membranes, no buccaloropharyngeal erythema, palatine tonsils WNL  Neck Exam: no anterior cervical lymphadenopathy noted, neck supple  Cardiovascular: regular rate, regular rhythm, + murmurs, rubs, or gallops  Pulmonary:  Breath sounds clear, no wheeze, no rhonchi, no rales  No chest tenderness  Abdominal: soft, rounded, non-tender, nondistended, bowel sounds audible x 4 quadrants  : Non distended bladder  Incontinent of bowel and bladder   Extremities and skin: no edema noted, left leg trace edema  Left leg incisions BECCA- approximated  Upper and middle incisions approximated no erythema/drainage, Lower incision remains with mild erythema, tender no drainage  No rashes  R PICC clean,dry and intact +bruise around the site  B/L UE bruises from prior blood draws  Right chest healed incision-s/p ICD placement   Right jaw/cheeck bruise, Right shoulder bruise  Stage 3 in buttocks   Neurological: alert, cooperative and responsive, Oriented x 3, ble generalized weakness     Laboratory results / Imaging reviewed: Hard copy/ies in medical chart:  Reviewed from [address] [place] [address]    CBC with diff/creatinine/Vanco T reviewed 10/26/22  Hemoglobin 8 7, hematocrit 26 7, WBC 8 0, platelets 755, absolute neutrophil 4 7, absolute lymphocyte 1 6, absolute monocytes 1 0, absolute using a feels 0 6, absolute basophil was 0 1, neutrophils 53, lymphocytes 20, monocytes 12, eosinophils 8, basophils 1, band 2, metamyelocytes 1, myelocytes 3, anisocytosis rare, hypochromia rare, polychromasia rare, creatinine 0 70, EGFR 88  Vanco T- 21 9    CBC/Creatinine/ Vanco T on 10/18/22  Hgb 8 7, hct 26 7, WBC 8 0, Platelet 372, absolute neutrophils 4 7, absolute lymphocytes 1 6, absolute monocytes 1 0, absolute eosinophils 0 6, absolute basophils 0 1, neutrophils 53, lymphocytes 20, monocytes 12, eosinophils 8, basophils 1, band 2, metamyelocytes 1, myelocytes 3, anisocytosis rare  Hypochromia rare, polychromasia rate, creatinine 0 70, EGFR 88    Vanco T 21 9 (h)     BMP review on 10/13/2022   Blood glucose 107, BUN 10, creatinine 0 76, sodium 142, potassium 3 6, calcium 9 2, eGFR 80    10/18/22- Vanco T 21 9- nursing notifying ID     10/11/22- Vanco Trough 19 8    10/9/22- Vanco Trough 16 7    CBC with diff-creatinine reviewed from 10/18/2022  Hemoglobin 8 7, hematocrit 26 7, WBC 8 0, platelets 828, absolute neutrophil 4 7, absolute lymphocyte 1 6, absolute monocytes 1 0, absolute use 0 6, absolute basos 0 1, neutrophils 53, lymphocytes 20, monocytes 12, eosinophils 8, basophil 1, band 2, metamyelocytes 1, myelocytes 3, anisocytosis rare, hypochromia rare, polychromasia rare, creatinine 0 70, EGFR 80     CBC with diff/creatinine/Vanco through on 10/6/22  Hemoglobin 8 6, hematocrit 26 0, WBC 6 1, platelets 924, absolute neutrophil 3 0, absolute lymphocyte 1 6, absolute monos 0 7, absolute eos 0 7, absolute basos 0 1, neutrophils 49, lymphocyte 26, monocytes 12, eosinophils 12, basophil 1, creatinine 0 74, EGFR 82, Vanco Through 10 9    CBC-BMP reviewed from 09/19/2022   Hemoglobin 8 3, hematocrit 24 9, WBC 11 4, platelets 159, blood glucose 118, BUN 20, creatinine 0 93, sodium 139, potassium 4 1, calcium 8 4, EGFR 63    Current Medications: All medications reviewed and updated in Nursing Home Chart reviewed from [facility]    Please note:  Voice-recognition software may have been used in the preparation of this document  Occasional wrong word or "sound-alike" substitutions may have occurred due to the inherent limitations of voice recognition software  Interpretation should be guided by celso Marinelli  10/25/2022

## 2022-10-27 ENCOUNTER — NURSING HOME VISIT (OUTPATIENT)
Dept: GERIATRICS | Facility: OTHER | Age: 77
End: 2022-10-27

## 2022-10-27 DIAGNOSIS — R26.2 AMBULATORY DYSFUNCTION: ICD-10-CM

## 2022-10-27 DIAGNOSIS — B95.7 BACTEREMIA DUE TO METHICILLIN RESISTANT STAPHYLOCOCCUS EPIDERMIDIS: Primary | ICD-10-CM

## 2022-10-27 DIAGNOSIS — R78.81 BACTEREMIA DUE TO METHICILLIN RESISTANT STAPHYLOCOCCUS EPIDERMIDIS: Primary | ICD-10-CM

## 2022-10-27 DIAGNOSIS — D64.9 ANEMIA, UNSPECIFIED TYPE: ICD-10-CM

## 2022-10-27 DIAGNOSIS — D75.839 THROMBOCYTOSIS: ICD-10-CM

## 2022-10-27 DIAGNOSIS — Z16.29 BACTEREMIA DUE TO METHICILLIN RESISTANT STAPHYLOCOCCUS EPIDERMIDIS: Primary | ICD-10-CM

## 2022-10-29 NOTE — ASSESSMENT & PLAN NOTE
Hemoglobin is 9 1, hct 27 9, slowly improving   Appears stable when compare to 8 4 during recent hospitalization   No active bleeding noted on exam and none reported  Continue iron and folic acid as well as V36  Continue to monitor CBC and trend  Next CBC 10/31/22

## 2022-10-29 NOTE — ASSESSMENT & PLAN NOTE
Platelets 464 (13/91/09), suspect reactive in nature due to recent bacteremia during recent hospitalization  Remains on IV Vanco with weekly labs, ID following, recent Vanco T therapeutic     No active bleeding noted or reported   Next CBC 10-31-22  Continue to monitor platelets and trend

## 2022-10-29 NOTE — ASSESSMENT & PLAN NOTE
Recent hospitalization for positive blood culture for Staph epi the epidermidis on 09/25/2022   Repeat blood culture were negative after 72 hours  2D echo was negative for vegetation    Status post ICD placement will continue with 6 weeks of IV antibiotic therapy until November 9, 2022  ID following   Continue on right PICC, Vancomycin 750 mg q 12 hrs   Recent Vanco T on 18 0 (10/24/22)   Next Vanco T 10/31  Free of fever, vital signs stable and nontoxic appearance  Monitor with weekly labs to be faxed to ID every Monday  ID following

## 2022-10-29 NOTE — ASSESSMENT & PLAN NOTE
bp log reviewed and stable   BP this am 152/62, SBPs trending 101s - 152s   (10/23-27)  Asymptomatic  C/w lisinopril 25 mg daily, diltiazem 120 mg daily, and metoprolol 100 mg daily   Remains on lasix 40 mg daily  Continue with adequate po hydration  monitor

## 2022-10-29 NOTE — PROGRESS NOTES
[address], [address] [place] [address], [address] [place] [address]  [phone]    NAME: Yessy Mendoza  AGE: 76 y o  SEX: female    Progress Note    Location: [facility]   POS: 31 (SNF)     Patient's care was coordinated with nursing facility staff  Recent vitals, labs, and updated medications were review on [facility] system in facility  Assessment/Plan:    Bacteremia due to methicillin resistant Staphylococcus epidermidis  Recent hospitalization for positive blood culture for Staph epi the epidermidis on 09/25/2022   Repeat blood culture were negative after 72 hours  2D echo was negative for vegetation  Status post ICD placement will continue with 6 weeks of IV antibiotic therapy until November 9, 2022  ID following   Continue on right PICC, Vancomycin 750 mg q 12 hrs   Recent Vanco T on 18 0 (10/24/22)   Next Vanco T 10/31  Free of fever, vital signs stable and nontoxic appearance  Monitor with weekly labs to be faxed to ID every Monday  ID following    Ambulatory dysfunction  Continue PT/OT  Fall and safety precaution   Continue 24-7 supportive care   Optimize acute and chronic medical condition as outlined   PT/OT/sw following for DC planning    Anemia  Hemoglobin is 9 1, hct 27 9, slowly improving   Appears stable when compare to 8 4 during recent hospitalization   No active bleeding noted on exam and none reported  Continue iron and folic acid as well as F25  Continue to monitor CBC and trend  Next CBC 10/31/22    Thrombocytosis  Platelets 124 (03/67/81), suspect reactive in nature due to recent bacteremia during recent hospitalization  Remains on IV Vanco with weekly labs, ID following, recent Vanco T therapeutic  No active bleeding noted or reported   Next CBC 10-31-22  Continue to monitor platelets and trend    Essential hypertension  bp log reviewed and stable   BP this am 152/62, SBPs trending 101s - 152s   (10/23-27)  Asymptomatic  C/w lisinopril 25 mg daily, diltiazem 120 mg daily, and metoprolol 100 mg daily   Remains on lasix 40 mg daily  Continue with adequate po hydration  monitor    Chief complaint / Reason for visit:  STR F/U     History of Present Illness:  75-year-old female seen and examined for STR f/U  Received patient sitting in w/c, resting  Appears comfortable and is in no acute distress  On exam appears okay with no complaints  Continue with IS  Denies cough or SOB, fever or chills  Left leg with trace edema however baseline  Right leg no edema  Remains on daily dose of lasix of 40 mg  Left leg lower incision approximated remains with mild erythema, tender, no drainage  LLE WBAT  Continue with IV antibiotics- Vancomycin 750 mg every 12 hours (end date 11/9/22)  R PICC C/D/I +bruise around the site no drainage  Dressing intact  Right chest small incision well healed  S/p pacemaker  Tolerating diet  Sleeping with no difficulties  Incontinent of bowel and bladder  Denies /GI discomfort  Per nursing no other concerns or issues at this time    Review of Systems:  Per history of present illness, all other systems reviewed and negative    Other than those noted in HPI    HISTORY:  Medical Hx: Reviewed, unchanged  Family Hx: Reviewed, unchanged  Soc Hx: Reviewed,  unchanged    ALLERGY: Reviewed, unchanged  Allergies   Allergen Reactions   • Penicillins Hives     Itching and terrible hives   • Sulfa Antibiotics Itching   • G82 Folate [Folic Acid-Vit C9-VML E60 - Food Allergy] Other (See Comments)     5/23/22 Pt reports no allergic reaction known    • [place] Other (See Comments)     Unknown, 5/23 -pt is unaware of reaction    • Lyrica [Pregabalin] Other (See Comments)     5/23/22 Pt reports doesn't remember reaction    • Other Other (See Comments)     Black rubber 5/23/22 per allergy test - pt unaware of reaction   • Cortisone Acetate [Cortisone] Itching and Rash     5/23/22 pt reports makes her violent    • Nickel Other (See Comments)     Skin discoloration      PHYSICAL EXAM:  Vital Signs: blood pressure 152/62, pulse 76, respirations 18, Temp 98 1, O2 sats 94% RA  Weight: 238 0 lbs    General:  Appears weak and cooperative  Head: Atraumatic  Normocephalic  Eye Exam: anicteric sclera, no discharge, PERRLA, No injection  Oral Exam: moist mucous membranes, no buccaloropharyngeal erythema, palatine tonsils WNL  Neck Exam: no anterior cervical lymphadenopathy noted, neck supple  Cardiovascular: regular rate, regular rhythm, + murmurs, rubs, or gallops  Pulmonary:  Breath sounds clear, no wheeze, no rhonchi, no rales  No chest tenderness  Abdominal: soft, rounded, non-tender, nondistended, bowel sounds audible x 4 quadrants  : Non distended bladder  Incontinent of bowel and bladder   Extremities and skin: no edema noted, left leg trace edema  Left leg incisions BECCA- approximated  Upper and middle incisions approximated no erythema/drainage, Lower incision remains with mild erythema, tender no drainage  No rashes  R PICC clean,dry and intact +bruise around the site  B/L UE bruises from prior blood draws  Right chest healed incision-s/p ICD placement  Right jaw/cheeck bruise, Right shoulder bruise   Stage 3 in L buttock  Neurological: alert, cooperative and responsive, Oriented x 3, ble generalized weakness     Laboratory results / Imaging reviewed: Hard copy/ies in medical chart:  Reviewed from [facility]    CBC with diff/creatinine/Vanco T reviewed 10/26/22  Hemoglobin 8 7, hematocrit 26 7, WBC 8 0, platelets 087, absolute neutrophil 4 7, absolute lymphocyte 1 6, absolute monocytes 1 0, absolute using a feels 0 6, absolute basophil was 0 1, neutrophils 53, lymphocytes 20, monocytes 12, eosinophils 8, basophils 1, band 2, metamyelocytes 1, myelocytes 3, anisocytosis rare, hypochromia rare, polychromasia rare, creatinine 0 70, EGFR 88  Vanco T- 21 9    CBC/Creatinine/ Vanco T on 10/18/22  Hgb 8 7, hct 26 7, WBC 8 0, Platelet 306, absolute neutrophils 4 7, absolute lymphocytes 1 6, absolute monocytes 1 0, absolute eosinophils 0 6, absolute basophils 0 1, neutrophils 53, lymphocytes 20, monocytes 12, eosinophils 8, basophils 1, band 2, metamyelocytes 1, myelocytes 3, anisocytosis rare  Hypochromia rare, polychromasia rate, creatinine 0 70, EGFR 88    Vanco T 21 9 (h)     BMP review on 10/13/2022   Blood glucose 107, BUN 10, creatinine 0 76, sodium 142, potassium 3 6, calcium 9 2, eGFR 80    10/18/22- Vanco T 21 9- nursing notifying ID     10/11/22- Vanco Trough 19 8    10/9/22- Vanco Trough 16 7    CBC with diff-creatinine reviewed from 10/18/2022  Hemoglobin 8 7, hematocrit 26 7, WBC 8 0, platelets 894, absolute neutrophil 4 7, absolute lymphocyte 1 6, absolute monocytes 1 0, absolute use 0 6, absolute basos 0 1, neutrophils 53, lymphocytes 20, monocytes 12, eosinophils 8, basophil 1, band 2, metamyelocytes 1, myelocytes 3, anisocytosis rare, hypochromia rare, polychromasia rare, creatinine 0 70, EGFR 80     CBC with diff/creatinine/Vanco through on 10/6/22  Hemoglobin 8 6, hematocrit 26 0, WBC 6 1, platelets 398, absolute neutrophil 3 0, absolute lymphocyte 1 6, absolute monos 0 7, absolute eos 0 7, absolute basos 0 1, neutrophils 49, lymphocyte 26, monocytes 12, eosinophils 12, basophil 1, creatinine 0 74, EGFR 82, Vanco Through 10 9    CBC-BMP reviewed from 09/19/2022   Hemoglobin 8 3, hematocrit 24 9, WBC 11 4, platelets 826, blood glucose 118, BUN 20, creatinine 0 93, sodium 139, potassium 4 1, calcium 8 4, EGFR 63    Current Medications: All medications reviewed and updated in Nursing Home Chart reviewed from [facility]    Please note:  Voice-recognition software may have been used in the preparation of this document  Occasional wrong word or "sound-alike" substitutions may have occurred due to the inherent limitations of voice recognition software  Interpretation should be guided by celso Guerra  10/27/2022

## 2022-10-31 ENCOUNTER — TELEPHONE (OUTPATIENT)
Dept: INFECTIOUS DISEASES | Facility: CLINIC | Age: 77
End: 2022-10-31

## 2022-10-31 ENCOUNTER — NURSING HOME VISIT (OUTPATIENT)
Dept: GERIATRICS | Facility: OTHER | Age: 77
End: 2022-10-31

## 2022-10-31 DIAGNOSIS — Z16.29 BACTEREMIA DUE TO METHICILLIN RESISTANT STAPHYLOCOCCUS EPIDERMIDIS: Primary | ICD-10-CM

## 2022-10-31 DIAGNOSIS — D64.9 ANEMIA, UNSPECIFIED TYPE: ICD-10-CM

## 2022-10-31 DIAGNOSIS — D75.839 THROMBOCYTOSIS: ICD-10-CM

## 2022-10-31 DIAGNOSIS — B95.7 BACTEREMIA DUE TO METHICILLIN RESISTANT STAPHYLOCOCCUS EPIDERMIDIS: Primary | ICD-10-CM

## 2022-10-31 DIAGNOSIS — R26.2 AMBULATORY DYSFUNCTION: ICD-10-CM

## 2022-10-31 DIAGNOSIS — R78.81 BACTEREMIA DUE TO METHICILLIN RESISTANT STAPHYLOCOCCUS EPIDERMIDIS: Primary | ICD-10-CM

## 2022-10-31 DIAGNOSIS — R53.1 GENERALIZED WEAKNESS: ICD-10-CM

## 2022-10-31 NOTE — TELEPHONE ENCOUNTER
Contacted Walker Weber to check on whether patient was still on her way  She confirmed patient had not even been picked up  They transferred me to the  who did not answer  LM on machine to have her call back ASAP and I had provided her a tentative appt in the interim

## 2022-10-31 NOTE — PROGRESS NOTES
[address], [address] [place] [address], [address] [place] [address]  [phone]    NAME: Aamir Dupont  AGE: 76 y o  SEX: female    Progress Note    Location: [facility]   POS: 31 (SNF)     Patient's care was coordinated with nursing facility staff  Recent vitals, labs, and updated medications were review on [facility] system in facility  Assessment/Plan:    Bacteremia due to methicillin resistant Staphylococcus epidermidis  Recent hospitalization for positive blood culture for Staph epi the epidermidis on 09/25/2022   Repeat blood culture were negative after 72 hours  2D echo was negative for vegetation  Status post ICD placement will continue with 6 weeks of IV antibiotic therapy until November 9, 2022  ID following   Continue on right PICC, Vancomycin 750 mg q 12 hrs   Recent Vanco T on 18 0 (10/31/22)   Next Vanco T 11/7  Free of fever, vital signs stable and nontoxic appearance  Monitor with weekly labs to be faxed to ID every Monday  ID following- Epic note reviewed patient had an appt today and no show  This provider does not know reason patient did not attend appt  Could be transportation issues ?    Will inform STR staff    Generalized weakness  On exams appear with generalized weakness   multifactorial  Continue PT/OT  Fall safety precaution   Continue 24-7 supportive care   Optimize acute and chronic medical condition as outlined   Continue adequate p o  hydration and nutrition    following for DC planning    Ambulatory dysfunction  Continue PT/OT  Fall and safety precaution   Continue 24-7 supportive care   Heavy assist of 2/3 staff   Optimize acute and chronic medical condition as outlined   PT/OT/sw following for DC planning    Anemia  Hemoglobin is 9 0, hct 27 2, slowly improving   Appears stable when compare to 8 4 during recent hospitalization   No active bleeding noted on exam and none reported  Continue iron and folic acid as well as P30  Continue to monitor CBC and trend  Next CBC 11/7/22    Thrombocytosis  Platelets 367 --> 483 (10/26/22), suspect reactive in nature due to recent bacteremia during recent hospitalization  Remains on IV Vanco with weekly labs, ID following, recent Vanco T therapeutic  No active bleeding noted or reported   Next CBC 11/7/22  Continue to monitor platelets and trend    Chief complaint / Reason for visit:  STR F/U     History of Present Illness:  77-year-old female seen and examined for STR f/U  Received patient sitting in w/c, resting  Appears comfortable and is in no acute distress  On exam appears okay with no complaints  Continue with IS  Denies cough or SOB, fever or chills  Left leg with trace edema however baseline  Right leg no edema  Remains on daily dose of lasix of 40 mg  Left leg lower incision approximated remains with mild erythema, tender, no drainage  LLE WBAT  Continue with IV antibiotics- Vancomycin 750 mg every 12 hours (end date 11/9/22)  R PICC C/D/I +bruise around the site no drainage  Dressing intact  S/p pacemaker  R chest small incision well-healed  Tolerating diet  Sleeping with no difficulties  B/B incontinent  Heavy assist of 2/3 staff  Denies /GI discomfort  Per nursing no other concerns or issues at this time    Review of Systems:  Per history of present illness, all other systems reviewed and negative    Other than those noted in HPI    HISTORY:  Medical Hx: Reviewed, unchanged  Family Hx: Reviewed, unchanged  Soc Hx: Reviewed,  unchanged    ALLERGY: Reviewed, unchanged  Allergies   Allergen Reactions   • Penicillins Hives     Itching and terrible hives   • Sulfa Antibiotics Itching   • L43 Folate [Folic Acid-Vit K2-MNT D89 - Food Allergy] Other (See Comments)     5/23/22 Pt reports no allergic reaction known    • [place] Other (See Comments)     Unknown, 5/23 -pt is unaware of reaction    • Lyrica [Pregabalin] Other (See Comments)     5/23/22 Pt reports doesn't remember reaction    • Other Other (See Comments)     Black rubber 5/23/22 per allergy test - pt unaware of reaction   • Cortisone Acetate [Cortisone] Itching and Rash     5/23/22 pt reports makes her violent    • Nickel Other (See Comments)     Skin discoloration      PHYSICAL EXAM:  Vital Signs: blood pressure 152/62, pulse 76, respirations 18, Temp 98 1, O2 sats 94% RA  Weight: 238 0 lbs    General:  Appears weak and cooperative  Head: Atraumatic  Normocephalic  Eye Exam: anicteric sclera, no discharge, PERRLA, No injection  Oral Exam: moist mucous membranes, no buccaloropharyngeal erythema, palatine tonsils WNL  Neck Exam: no anterior cervical lymphadenopathy noted, neck supple  Cardiovascular: regular rate, regular rhythm, + murmurs, rubs, or gallops  Pulmonary:  Breath sounds clear, no wheeze, no rhonchi, no rales  No chest tenderness  Abdominal: soft, rounded, non-tender, nondistended, bowel sounds audible x 4 quadrants  : Non distended bladder  Incontinent of bowel and bladder   Extremities and skin: no edema noted, left leg trace edema  Left leg incisions BECCA- approximated  Upper and middle incisions approximated no erythema/drainage, Lower incision remains with mild erythema, tender no drainage  No rashes  R PICC clean,dry and intact +bruise around the site  B/L UE bruises from prior blood draws  Right chest healed incision-s/p ICD placement  Right jaw/cheeck bruise, Right shoulder bruise   Stage 3 in L buttock  Neurological: alert, cooperative and responsive, Oriented x 3, ble generalized weakness     Laboratory results / Imaging reviewed: Hard copy/ies in medical chart:  Reviewed from [facility]    CBCD, BMP, Arnaldoo T reviewed on 10/31/2022  Vanco T 18 0  Glucose 107, BUN 11, Cr 0 65, Na 142, K 3 7, Ca 8 7, GFR 90, hgb 9 0, hct 27 2, WBC 6 4, pl 402,     CBC with diff/creatinine/Arnaldoo T reviewed 10/26/22  Hemoglobin 8 7, hematocrit 26 7, WBC 8 0, platelets 946, absolute neutrophil 4 7, absolute lymphocyte 1 6, absolute monocytes 1 0, absolute using a feels 0 6, absolute basophil was 0 1, neutrophils 53, lymphocytes 20, monocytes 12, eosinophils 8, basophils 1, band 2, metamyelocytes 1, myelocytes 3, anisocytosis rare, hypochromia rare, polychromasia rare, creatinine 0 70, EGFR 88  Vanco T- 21 9    CBC/Creatinine/ Vanco T on 10/18/22  Hgb 8 7, hct 26 7, WBC 8 0, Platelet 199, absolute neutrophils 4 7, absolute lymphocytes 1 6, absolute monocytes 1 0, absolute eosinophils 0 6, absolute basophils 0 1, neutrophils 53, lymphocytes 20, monocytes 12, eosinophils 8, basophils 1, band 2, metamyelocytes 1, myelocytes 3, anisocytosis rare  Hypochromia rare, polychromasia rate, creatinine 0 70, EGFR 88    Vanco T 21 9 (h)     BMP review on 10/13/2022   Blood glucose 107, BUN 10, creatinine 0 76, sodium 142, potassium 3 6, calcium 9 2, eGFR 80    10/18/22- Vanco T 21 9- nursing notifying ID     10/11/22- Vanco Trough 19 8    10/9/22- Vanco Trough 16 7    CBC with diff-creatinine reviewed from 10/18/2022  Hemoglobin 8 7, hematocrit 26 7, WBC 8 0, platelets 356, absolute neutrophil 4 7, absolute lymphocyte 1 6, absolute monocytes 1 0, absolute use 0 6, absolute basos 0 1, neutrophils 53, lymphocytes 20, monocytes 12, eosinophils 8, basophil 1, band 2, metamyelocytes 1, myelocytes 3, anisocytosis rare, hypochromia rare, polychromasia rare, creatinine 0 70, EGFR 80     CBC with diff/creatinine/Vanco through on 10/6/22  Hemoglobin 8 6, hematocrit 26 0, WBC 6 1, platelets 954, absolute neutrophil 3 0, absolute lymphocyte 1 6, absolute monos 0 7, absolute eos 0 7, absolute basos 0 1, neutrophils 49, lymphocyte 26, monocytes 12, eosinophils 12, basophil 1, creatinine 0 74, EGFR 82, Vanco Through 10 9    CBC-BMP reviewed from 09/19/2022   Hemoglobin 8 3, hematocrit 24 9, WBC 11 4, platelets 836, blood glucose 118, BUN 20, creatinine 0 93, sodium 139, potassium 4 1, calcium 8 4, EGFR 63    Current Medications:   All medications reviewed and updated in Nursing Home Chart reviewed from [facility]    Please note:  Voice-recognition software may have been used in the preparation of this document  Occasional wrong word or "sound-alike" substitutions may have occurred due to the inherent limitations of voice recognition software  Interpretation should be guided by celso Appiah  10/31/2022

## 2022-11-01 ENCOUNTER — HOSPITAL ENCOUNTER (OUTPATIENT)
Dept: RADIOLOGY | Facility: HOSPITAL | Age: 77
Discharge: HOME | End: 2022-11-01

## 2022-11-01 ENCOUNTER — OFFICE VISIT (OUTPATIENT)
Dept: OBGYN CLINIC | Facility: CLINIC | Age: 77
End: 2022-11-01

## 2022-11-01 VITALS
HEIGHT: 64 IN | SYSTOLIC BLOOD PRESSURE: 119 MMHG | WEIGHT: 232 LBS | BODY MASS INDEX: 39.61 KG/M2 | DIASTOLIC BLOOD PRESSURE: 65 MMHG | HEART RATE: 72 BPM

## 2022-11-01 DIAGNOSIS — Z98.890 STATUS POST SURGERY: ICD-10-CM

## 2022-11-01 DIAGNOSIS — Z98.890 STATUS POST SURGERY: Primary | ICD-10-CM

## 2022-11-01 NOTE — PROGRESS NOTES
76 y o female presents for Seven weeks postoperative visit status post left distal femoral placement and hardware removal   Patient currently resides at nursing facility due to recent bacteremia which she is being treated with antibiotics  Patient states she is getting therapy 20 minutes today  Patient's daughter is also present  Patient is moved with a [employer] lift at this time however she states she is able to move from the bed to the wheelchair without much assistance  Patient states he is able to ambulate in physical therapy  Patient has significant decreasing pain compared to her pain prior to her surgery 7 weeks ago  Denies any calf pain chest pain shortness of breath  Review of Systems  Review of systems negative unless otherwise specified in HPI    Past Medical History  Past Medical History:   Diagnosis Date   • Abnormal ECG     Last Assessed 9/29/2016   • Anxiety     Last Assessed 6/08/2016   • Arthritis     knees   • Asthma     Last Assessed 11/06/2013   • Atrial premature complex    • Cataract, bilateral     Last Assessed 7/14/2016   • Cataract, left eye     Both eyes  Had surgery on left  • COVID-19    • Difficulty swallowing    • Diverticulosis 9/25/2022   • Dizziness    • Fibromyalgia    • Fibromyalgia, primary    • Gastric reflux    • Heart failure ([facility] [address] )     5/23/22 Pt reports had heart failure in the past   • History of COVID-19     5/23/22 Pt reports having COVID in 2021     • History of transfusion     no reaction   • [place] (hard of hearing)    • Hyperlipidemia    • Hypertension    • Incontinence in female     5/23/22 Pt reports has incontinence -wears depends especially at night/riding in car   • Irregular heart beat     5/23/22 Pt reports hx of A fib   • Lyme disease    • PONV (postoperative nausea and vomiting)    • Premature ventricular contraction    • Primary osteoarthritis of both knees     Last Assessed 7/14/2016   • Rheumatic fever     5/23/22 Pt reports had hx of rheumatic fever as a child twice   • Sore throat    • Tuberculosis 1947       Past Surgical History  Past Surgical History:   Procedure Laterality Date   • APPENDECTOMY     • CARDIAC PACEMAKER PLACEMENT  2019   • COLONOSCOPY     • DENTAL SURGERY      extractions   • HYSTERECTOMY      5/23/22 Pt reports had appendix out with hysterectomy and "tightened up my bladder"   • IR ASPIRATION ONLY  9/30/2022   • ORIF FEMUR FRACTURE Left 08/05/2021    Procedure: OPEN REDUCTION W/ INTERNAL FIXATION (ORIF) DISTAL FEMUR; INSERTION RETROGRADE NAIL;  Surgeon: Anushka Peralta MD;  [facility]: [facility];  Service: Orthopedics   • NH DILATE ESOPHAGUS N/A 04/06/2016    Procedure: DILATATION ESOPHAGEAL;  Surgeon: Reji Whitaker MD;  Location: [facility]; Service: Gastroenterology   • NH EGD TRANSORAL BIOPSY SINGLE/MULTIPLE N/A 04/06/2016    Procedure: ESOPHAGOGASTRODUODENOSCOPY (EGD); Surgeon: Reji Whitaker MD;  Location: [facility]; Service: Gastroenterology   • NH REMOVAL DEEP IMPLANT Left 9/12/2022    Procedure: REMOVAL NAIL IM  FEMUR;  Surgeon: Anushka Peralta MD;  Location[facility];  Service: Orthopedics   • NH TOTAL KNEE ARTHROPLASTY Left 9/12/2022    Procedure: ARTHROPLASTY KNEE TOTAL;  Surgeon: Anushka Peralta MD;  [facility];  Service: Orthopedics   • NH XCAPSL CTRC RMVL INSJ IO LENS PROSTH W/O ECP Left 03/15/2016    Procedure: EXTRACTION EXTRACAPSULAR CATARACT PHACO INTRAOCULAR LENS (IOL);   Surgeon: Latosha Chavez MD;  Location: [facility];  Service: Ophthalmology   • REPLACEMENT TOTAL KNEE Right    • REPLACEMENT TOTAL KNEE      right    • TONSILLECTOMY         Current Medications  Current Outpatient Medications on File Prior to Visit   Medication Sig Dispense Refill   • acetaminophen (TYLENOL) 325 mg tablet Take 2 tablets (650 mg total) by mouth every 6 (six) hours  0   • albuterol (PROVENTIL HFA,VENTOLIN HFA) 90 mcg/act inhaler INHALE 2 PUFFS BY MOUTH EVERY 6 HOURS AS NEEDED FOR WHEEZING 3 Inhaler 0   • allopurinol (ZYLOPRIM) 100 mg tablet Take 1 tablet (100 mg total) by mouth daily 90 tablet 3   • apixaban (Eliquis) 5 mg Take 1 tablet (5 mg total) by mouth 2 (two) times a day 60 tablet 6   • ascorbic acid (VITAMIN C) 500 MG tablet Take 1 tablet (500 mg total) by mouth 2 (two) times a day 60 tablet 1   • cholecalciferol (VITAMIN D3) 400 units tablet Take 1 tablet (400 Units total) by mouth in the morning  0   • diltiazem (CARDIZEM CD) 120 mg 24 hr capsule Take 1 capsule (120 mg total) by mouth daily (Patient taking differently: Take 120 mg by mouth daily Takes in the am) 90 capsule 3   • DULoxetine (CYMBALTA) 30 mg delayed release capsule TAKE 1 CAPSULE(30 MG) BY MOUTH DAILY 30 capsule 5   • ezetimibe (ZETIA) 10 mg tablet Take 1 tablet (10 mg total) by mouth daily 90 tablet 1   • ferrous sulfate 324 (65 Fe) mg Take 1 tablet (324 mg total) by mouth 2 (two) times a day before meals 60 tablet 1   • FIBER PO Take 1 tablet by mouth daily     • folic acid (FOLVITE) 1 mg tablet TAKE 1 TABLET(1 MG) BY MOUTH DAILY 90 tablet 0   • furosemide (LASIX) 40 mg tablet Take 1 tablet (40 mg total) by mouth in the morning  90 tablet 1   • lisinopril (ZESTRIL) 2 5 mg tablet Take 1 tablet (2 5 mg total) by mouth daily 30 tablet 2   • LORazepam (Ativan) 0 5 mg tablet Take 1 tablet (0 5 mg total) by mouth every 8 (eight) hours as needed for anxiety for up to 10 days 10 tablet 0   • Melatonin 10 MG TABS Take by mouth Takes daily at night     • metoprolol tartrate (LOPRESSOR) 50 mg tablet Take 100 mg in the morning and 50 mg in the evening 270 tablet 3   • Mirabegron ER (Myrbetriq) 25 MG TB24 Take 25 mg by mouth daily Takes in the evening 30 tablet 6   • Multiple Vitamin (MULTIVITAMIN ADULT PO) Take by mouth in the morning     • naloxone (NARCAN) 4 mg/0 1 mL nasal spray Administer 1 spray into a nostril  If no response after 2-3 minutes, give another dose in the other nostril using a new spray   1 each 1   • pantoprazole (PROTONIX) 40 mg tablet Take 1 tablet (40 mg total) by mouth every 12 (twelve) hours  0   • polyethylene glycol (MIRALAX) 17 g packet Take 17 g by mouth daily as needed (constipation)  0   • senna-docusate sodium (SENOKOT S) 8 6-50 mg per tablet Take 2 tablets by mouth 2 (two) times a day  0   • vancomycin (VANCOCIN) Inject 150 mL (750 mg total) into a catheter in a vein every 12 (twelve) hours (Patient taking differently: Inject 1,000 mg into a catheter in a vein every 12 (twelve) hours) 90465 mL 0   • vitamin B-12 (VITAMIN B-12) 1,000 mcg tablet Take 1 tablet (1,000 mcg total) by mouth daily  0     No current facility-administered medications on file prior to visit         Recent Labs [facility]  0   Lab Value Date/Time    HCT 27 7 (L) 10/02/2022 0550    HGB 8 4 (L) 10/02/2022 0550    WBC 8 81 10/02/2022 0550    INR 1 28 (H) 09/25/2022 0706    CRP 5 2 (H) 05/17/2022 1139    GLUCOSE 127 08/05/2021 0901    HGBA1C 5 6 08/17/2022 1135         Physical exam  · General: Awake, Alert, Oriented  · Eyes: Pupils equal, round and reactive to light  · Heart: regular rate and rhythm  · Lungs: No audible wheezing    left Knee exam  · Examination patient's left knee well-healed anterior incision full passive extension mild limitations in full extension actively flexion greater than 90° no pain palpation distal femur medial and no pain palpation proximal tibia medial laterally active ankle dorsi plantar flexion sensation mildly limited Viry incisionally otherwise intact    Imaging  X-rays left knee reviewed x-rays reveal excellent aligned left distal femoral replacement no evidence of loosening slight lateral subluxation of the patella noted no significant change compared to previous films    Assessment:   Status post 7 weeks left distal femoral replacement  Plan:   Weightbearing as tolerated left lower extremity   Case discussed with Dr Jarrell Berger physical therapy range of motion stretching strengthening exercises   Activity as tolerated  Follow-up in 6 weeks time repeat x-rays left knee

## 2022-11-01 NOTE — ASSESSMENT & PLAN NOTE
Continue PT/OT  Fall and safety precaution   Continue 24-7 supportive care   Heavy assist of 2/3 staff   Optimize acute and chronic medical condition as outlined   PT/OT/sw following for DC planning

## 2022-11-01 NOTE — ASSESSMENT & PLAN NOTE
Hemoglobin is 9 0, hct 27 2, slowly improving   Appears stable when compare to 8 4 during recent hospitalization   No active bleeding noted on exam and none reported  Continue iron and folic acid as well as L42  Continue to monitor CBC and trend  Next CBC 11/7/22

## 2022-11-01 NOTE — PATIENT INSTRUCTIONS
Status post 7 weeks left distal femoral replacement  Weightbearing as tolerated left lower extremity  Range of motion stretching strengthening left knee  Ambulatory assistance in gait training as needed  Pain medication as needed  2 3 times per week times 6-8 weeks

## 2022-11-01 NOTE — ASSESSMENT & PLAN NOTE
Recent hospitalization for positive blood culture for Staph epi the epidermidis on 09/25/2022   Repeat blood culture were negative after 72 hours  2D echo was negative for vegetation  Status post ICD placement will continue with 6 weeks of IV antibiotic therapy until November 9, 2022  ID following   Continue on right PICC, Vancomycin 750 mg q 12 hrs   Recent Vanco T on 18 0 (10/31/22)   Next Vanco T 11/7  Free of fever, vital signs stable and nontoxic appearance  Monitor with weekly labs to be faxed to ID every Monday  ID following- Epic note reviewed patient had an appt today and no show  This provider does not know reason patient did not attend appt  Could be transportation issues ?    Will inform STR staff

## 2022-11-01 NOTE — ASSESSMENT & PLAN NOTE
Platelets 699 --> 468 (10/26/22), suspect reactive in nature due to recent bacteremia during recent hospitalization  Remains on IV Vanco with weekly labs, ID following, recent Vanco T therapeutic     No active bleeding noted or reported   Next CBC 11/7/22  Continue to monitor platelets and trend

## 2022-11-02 ENCOUNTER — PATIENT OUTREACH (OUTPATIENT)
Dept: CASE MANAGEMENT | Facility: HOSPITAL | Age: 77
End: 2022-11-02

## 2022-11-02 ENCOUNTER — NURSING HOME VISIT (OUTPATIENT)
Dept: GERIATRICS | Facility: OTHER | Age: 77
End: 2022-11-02

## 2022-11-02 ENCOUNTER — NURSING HOME VISIT (OUTPATIENT)
Dept: WOUND CARE | Facility: HOSPITAL | Age: 77
End: 2022-11-02

## 2022-11-02 DIAGNOSIS — R78.81 BACTEREMIA DUE TO GRAM-POSITIVE BACTERIA: ICD-10-CM

## 2022-11-02 DIAGNOSIS — S72.422G CLOSED BICONDYLAR FRACTURE OF LEFT FEMUR WITH DELAYED HEALING: ICD-10-CM

## 2022-11-02 DIAGNOSIS — R53.1 GENERALIZED WEAKNESS: ICD-10-CM

## 2022-11-02 DIAGNOSIS — R26.2 AMBULATORY DYSFUNCTION: ICD-10-CM

## 2022-11-02 DIAGNOSIS — L25.8 DERMATITIS ASSOCIATED WITH INCONTINENCE: ICD-10-CM

## 2022-11-02 DIAGNOSIS — I10 ESSENTIAL HYPERTENSION: ICD-10-CM

## 2022-11-02 DIAGNOSIS — B95.7 BACTEREMIA DUE TO METHICILLIN RESISTANT STAPHYLOCOCCUS EPIDERMIDIS: Primary | ICD-10-CM

## 2022-11-02 DIAGNOSIS — R78.81 BACTEREMIA DUE TO METHICILLIN RESISTANT STAPHYLOCOCCUS EPIDERMIDIS: Primary | ICD-10-CM

## 2022-11-02 DIAGNOSIS — L89.323 PRESSURE INJURY OF LEFT BUTTOCK, STAGE 3 (HCC): Primary | ICD-10-CM

## 2022-11-02 DIAGNOSIS — Z16.29 BACTEREMIA DUE TO METHICILLIN RESISTANT STAPHYLOCOCCUS EPIDERMIDIS: Primary | ICD-10-CM

## 2022-11-02 DIAGNOSIS — S72.432G CLOSED BICONDYLAR FRACTURE OF LEFT FEMUR WITH DELAYED HEALING: ICD-10-CM

## 2022-11-02 DIAGNOSIS — D75.839 THROMBOCYTOSIS: ICD-10-CM

## 2022-11-02 DIAGNOSIS — R32 DERMATITIS ASSOCIATED WITH INCONTINENCE: ICD-10-CM

## 2022-11-02 RX ORDER — ONDANSETRON 4 MG/1
4 TABLET, FILM COATED ORAL EVERY 8 HOURS PRN
COMMUNITY

## 2022-11-02 RX ORDER — POLYETHYLENE GLYCOL 3350 17 G/17G
17 POWDER, FOR SOLUTION ORAL DAILY PRN
COMMUNITY

## 2022-11-02 RX ORDER — OMEPRAZOLE 20 MG/1
20 CAPSULE, DELAYED RELEASE ORAL DAILY
COMMUNITY

## 2022-11-02 RX ORDER — ACETAMINOPHEN 325 MG/1
975 TABLET ORAL EVERY 6 HOURS PRN
COMMUNITY

## 2022-11-02 RX ORDER — BISACODYL 10 MG
10 SUPPOSITORY, RECTAL RECTAL DAILY PRN
COMMUNITY

## 2022-11-02 RX ORDER — OXYBUTYNIN CHLORIDE 10 MG/1
10 TABLET, EXTENDED RELEASE ORAL
COMMUNITY

## 2022-11-02 NOTE — ASSESSMENT & PLAN NOTE
Platelets 438 --> 727 (10/26/22), suspect reactive in nature due to recent bacteremia during recent hospitalization  Remains on IV Vanco with weekly labs, ID following, recent Vanco T therapeutic     No active bleeding noted or reported   Next CBC 11/7/22  Continue to monitor platelets and trend

## 2022-11-02 NOTE — ASSESSMENT & PLAN NOTE
On exams appear with generalized weakness   multifactorial  Continue PT/OT  Fall safety precaution   Continue 24-7 supportive care   Optimize acute and chronic medical condition as outlined   Continue adequate p o  hydration and nutrition    following for DC planning Patient will improve strength in L knee to 5/5 6-8 weeks to improve overall functional mobility including gait, transfers, bed mobility and decrease risk of falls.

## 2022-11-02 NOTE — ASSESSMENT & PLAN NOTE
S/p 7-weeks Left distal femoral placement   Had a f/u with ortho on 11/2/22  Ortho Note reviewed-  Recommended- Weightbearing as tolerated left lower extremity  Range of motion stretching strengthening left knee  Ambulatory assistance in gait training as needed  Pain medication as needed  2 3 times per week times 6-8 weeks  Next f/u in about 6-weeks (around 12/13/22)  Left leg incisions healing- approximated noted mild erythema in lower incision no drainage and no active sign of infection     Wound NP following in Facility c/w daily and PRN wound care  Monitor

## 2022-11-02 NOTE — ASSESSMENT & PLAN NOTE
Continue PT/OT  Fall and safety precaution   Continue 24-7 supportive care   OOB for meals as tolerated  Heavy assist of 2/3 staff   Optimize acute and chronic medical condition as outlined   PT/OT/sw following for DC planning

## 2022-11-02 NOTE — PROGRESS NOTES
[address], [address] [place] [address], [address] [place] [address]  [phone]    NAME: Usama Precise  AGE: 76 y o  SEX: female    Progress Note    Location: [facility]   POS: 31 (SNF)     Patient's care was coordinated with nursing facility staff  Recent vitals, labs, and updated medications were review on [facility] system in facility  Assessment/Plan:    Bacteremia due to methicillin resistant Staphylococcus epidermidis  Recent hospitalization for positive blood culture for Staph epi the epidermidis on 09/25/2022   Repeat blood culture were negative after 72 hours  2D echo was negative for vegetation  Status post ICD placement will continue with 6 weeks of IV antibiotic therapy until November 9, 2022  ID following   Continue on right PICC, Vancomycin 750 mg q 12 hrs   Recent Vanco T on 18 0 (10/31/22)   Next Vanco T 11/7  Free of fever, vital signs stable and nontoxic appearance  Monitor with weekly labs to be faxed to ID every Monday    Generalized weakness  On exams appear with generalized weakness   multifactorial  Continue PT/OT  Fall safety precaution   Continue 24-7 supportive care   Optimize acute and chronic medical condition as outlined   Continue adequate p o  hydration and nutrition    following for DC planning    Ambulatory dysfunction  Continue PT/OT  Fall and safety precaution   Continue 24-7 supportive care   OOB for meals as tolerated  Heavy assist of 2/3 staff   Optimize acute and chronic medical condition as outlined   PT/OT/sw following for DC planning    Closed bicondylar fracture of left femur with delayed healing  S/p 7-weeks Left distal femoral placement   Had a f/u with ortho on 11/2/22   Ortho Note reviewed-  Recommended- Weightbearing as tolerated left lower extremity  Range of motion stretching strengthening left knee  Ambulatory assistance in gait training as needed  Pain medication as needed  2 3 times per week times 6-8 weeks  Next f/u in about 6-weeks (around 12/13/22)  Left leg incisions healing- approximated noted mild erythema in lower incision no drainage and no active sign of infection  Wound NP following in Facility c/w daily and PRN wound care  Monitor     Essential hypertension  bp log reviewed and stable   BP this am 130/74, SBPs trending 101s - 186s  (10/23-11/2), isolated episode of HBP noted in [facility]  Suspect is not accurate since Bps are taken from wrist  Other Bps noted to be at patient's baseline  Will continue to monitor   Asymptomatic  C/w lisinopril 2 5 mg daily, diltiazem 120 mg daily, and metoprolol 100 mg in AM and 50 mg with hold if SBP <100 or HR <60 in the evening    Remains on lasix 40 mg daily  Continue with adequate po hydration  monitor    Thrombocytosis  Platelets 225 --> 774 (10/26/22), suspect reactive in nature due to recent bacteremia during recent hospitalization  Remains on IV Vanco with weekly labs, ID following, recent Vanco T therapeutic  No active bleeding noted or reported   Next CBC 11/7/22  Continue to monitor platelets and trend    Chief complaint / Reason for visit:  STR F/U     History of Present Illness:  75-year-old female seen and examined for STR f/U  Received patient sitting in w/c, resting  Appears comfortable and is in no acute distress  On exam appears okay with no complaints  Continue with IS  Denies cough or SOB, fever or chills  Had a f/u with ortho on 11/2/22  Ortho Note reviewed- recommended- Status post 7 weeks left distal femoral replacement  Weightbearing as tolerated left lower extremity  Range of motion stretching strengthening left knee  Ambulatory assistance in gait training as needed  Pain medication as needed  2 3 times per week times 6-8 weeks  Next f/u in about 6-weeks (around 12/13/22)  On exam today Left leg with trace edema however baseline  Right leg no edema  Remains on daily dose of lasix of 40 mg   Left leg lower incision approximated remains with mild erythema, tender, no drainage  LLE WBAT  Continue with IV antibiotics- Vancomycin 750 mg every 12 hours (end date 11/9/22)  R PICC C/D/I +bruise around the site no drainage  Dressing intact  S/p pacemaker  R chest small incision well-healed  Tolerating diet  Sleeping with no difficulties  B/B incontinent  Heavy assist of 2/3 staff  Denies /GI discomfort  Per nursing no other concerns or issues at this time    Review of Systems:  Per history of present illness, all other systems reviewed and negative  Other than those noted in HPI    HISTORY:  Medical Hx: Reviewed, unchanged  Family Hx: Reviewed, unchanged  Soc Hx: Reviewed,  unchanged    ALLERGY: Reviewed, unchanged  Allergies   Allergen Reactions   • Penicillins Hives     Itching and terrible hives   • Sulfa Antibiotics Itching   • F13 Folate [Folic Acid-Vit O8-QGV V50 - Food Allergy] Other (See Comments)     5/23/22 Pt reports no allergic reaction known    • [place]era Other (See Comments)     Unknown, 5/23 -pt is unaware of reaction    • Lyrica [Pregabalin] Other (See Comments)     5/23/22 Pt reports doesn't remember reaction    • Other Other (See Comments)     Black rubber 5/23/22 per allergy test - pt unaware of reaction   • Cortisone Acetate [Cortisone] Itching and Rash     5/23/22 pt reports makes her violent    • Nickel Other (See Comments)     Skin discoloration      PHYSICAL EXAM:  Vital Signs: blood pressure 130/74, pulse 74, respirations 18, Temp 98 1, O2 sats 94% RA  Weight: 239 3 lbs    General:  Appears weak and cooperative  Head: Atraumatic  Normocephalic  Eye Exam: anicteric sclera, no discharge, PERRLA, No injection  Oral Exam: moist mucous membranes, no buccaloropharyngeal erythema, palatine tonsils WNL  Neck Exam: no anterior cervical lymphadenopathy noted, neck supple  Cardiovascular: regular rate, regular rhythm, + murmurs, rubs, or gallops  Pulmonary:  Breath sounds clear, no wheeze, no rhonchi, no rales   No chest tenderness  Abdominal: soft, rounded, non-tender, nondistended, bowel sounds audible x 4 quadrants  : Non distended bladder  Incontinent of bowel and bladder   Extremities and skin: no edema noted, left leg trace edema  Left leg incisions BECCA- approximated  Upper and middle incisions approximated no erythema/drainage, Lower incision remains with mild erythema, tender no drainage  No rashes  R PICC clean,dry and intact +bruise around the site  B/L UE bruises from prior blood draws  Right chest healed incision-s/p ICD placement  Right jaw/cheeck bruise, Right shoulder bruise  Stage 3 in L buttock  Neurological: alert, cooperative and responsive, Oriented x 3, ble generalized weakness     Laboratory results / Imaging reviewed: Hard copy/ies in medical chart:  Reviewed from [facility]    CBCD, BMP, Vanco T reviewed on 10/31/2022  Vanco T 18 0  Glucose 107, BUN 11, Cr 0 65, Na 142, K 3 7, Ca 8 7, GFR 90, hgb 9 0, hct 27 2, WBC 6 4, pl 402,     CBC with diff/creatinine/Vanco T reviewed 10/26/22  Hemoglobin 8 7, hematocrit 26 7, WBC 8 0, platelets 562, absolute neutrophil 4 7, absolute lymphocyte 1 6, absolute monocytes 1 0, absolute using a feels 0 6, absolute basophil was 0 1, neutrophils 53, lymphocytes 20, monocytes 12, eosinophils 8, basophils 1, band 2, metamyelocytes 1, myelocytes 3, anisocytosis rare, hypochromia rare, polychromasia rare, creatinine 0 70, EGFR 88  Vanco T- 21 9    CBC/Creatinine/ Vanco T on 10/18/22  Hgb 8 7, hct 26 7, WBC 8 0, Platelet 191, absolute neutrophils 4 7, absolute lymphocytes 1 6, absolute monocytes 1 0, absolute eosinophils 0 6, absolute basophils 0 1, neutrophils 53, lymphocytes 20, monocytes 12, eosinophils 8, basophils 1, band 2, metamyelocytes 1, myelocytes 3, anisocytosis rare   Hypochromia rare, polychromasia rate, creatinine 0 70, EGFR 88    Vanco T 21 9 (h)     BMP review on 10/13/2022   Blood glucose 107, BUN 10, creatinine 0 76, sodium 142, potassium 3 6, calcium 9 2, eGFR 80    10/18/22- Vanco T 21 9- nursing notifying ID     10/11/22- Vanco Trough 19 8    10/9/22- Vanco Trough 16 7    CBC with diff-creatinine reviewed from 10/18/2022  Hemoglobin 8 7, hematocrit 26 7, WBC 8 0, platelets 841, absolute neutrophil 4 7, absolute lymphocyte 1 6, absolute monocytes 1 0, absolute use 0 6, absolute basos 0 1, neutrophils 53, lymphocytes 20, monocytes 12, eosinophils 8, basophil 1, band 2, metamyelocytes 1, myelocytes 3, anisocytosis rare, hypochromia rare, polychromasia rare, creatinine 0 70, EGFR 80     CBC with diff/creatinine/Vanco through on 10/6/22  Hemoglobin 8 6, hematocrit 26 0, WBC 6 1, platelets 752, absolute neutrophil 3 0, absolute lymphocyte 1 6, absolute monos 0 7, absolute eos 0 7, absolute basos 0 1, neutrophils 49, lymphocyte 26, monocytes 12, eosinophils 12, basophil 1, creatinine 0 74, EGFR 82, Vanco Through 10 9    CBC-BMP reviewed from 09/19/2022   Hemoglobin 8 3, hematocrit 24 9, WBC 11 4, platelets 718, blood glucose 118, BUN 20, creatinine 0 93, sodium 139, potassium 4 1, calcium 8 4, EGFR 63    Current Medications: All medications reviewed and updated in Nursing Home Chart reviewed from [facility]    Please note:  Voice-recognition software may have been used in the preparation of this document  Occasional wrong word or "sound-alike" substitutions may have occurred due to the inherent limitations of voice recognition software  Interpretation should be guided by context      Kari Goss  11/3/2022

## 2022-11-02 NOTE — ASSESSMENT & PLAN NOTE
Recent hospitalization for positive blood culture for Staph epi the epidermidis on 09/25/2022   Repeat blood culture were negative after 72 hours  2D echo was negative for vegetation    Status post ICD placement will continue with 6 weeks of IV antibiotic therapy until November 9, 2022  ID following   Continue on right PICC, Vancomycin 750 mg q 12 hrs   Recent Vanco T on 18 0 (10/31/22)   Next Vanco T 11/7  Free of fever, vital signs stable and nontoxic appearance  Monitor with weekly labs to be faxed to ID every Monday [employer]

## 2022-11-02 NOTE — ASSESSMENT & PLAN NOTE
bp log reviewed and stable   BP this am 130/74, SBPs trending 101s - 186s  (10/23-11/2), isolated episode of HBP noted in [facility]  Suspect is not accurate since Bps are taken from wrist  Other Bps noted to be at patient's baseline   Will continue to monitor   Asymptomatic  C/w lisinopril 2 5 mg daily, diltiazem 120 mg daily, and metoprolol 100 mg in AM and 50 mg with hold if SBP <100 or HR <60 in the evening    Remains on lasix 40 mg daily  Continue with adequate po hydration  monitor

## 2022-11-03 ENCOUNTER — TELEMEDICINE (OUTPATIENT)
Dept: INFECTIOUS DISEASES | Facility: CLINIC | Age: 77
End: 2022-11-03

## 2022-11-03 VITALS
TEMPERATURE: 97.8 F | OXYGEN SATURATION: 96 % | WEIGHT: 105 LBS | HEART RATE: 75 BPM | SYSTOLIC BLOOD PRESSURE: 132 MMHG | HEIGHT: 64 IN | RESPIRATION RATE: 18 BRPM | DIASTOLIC BLOOD PRESSURE: 70 MMHG | BODY MASS INDEX: 17.93 KG/M2

## 2022-11-03 DIAGNOSIS — Z16.29 BACTEREMIA DUE TO METHICILLIN RESISTANT STAPHYLOCOCCUS EPIDERMIDIS: ICD-10-CM

## 2022-11-03 DIAGNOSIS — B95.7 BACTEREMIA DUE TO METHICILLIN RESISTANT STAPHYLOCOCCUS EPIDERMIDIS: ICD-10-CM

## 2022-11-03 DIAGNOSIS — Z79.2 RECEIVING INTRAVENOUS ANTIBIOTIC TREATMENT AS OUTPATIENT: ICD-10-CM

## 2022-11-03 DIAGNOSIS — Z51.81 THERAPEUTIC DRUG MONITORING: ICD-10-CM

## 2022-11-03 DIAGNOSIS — R78.81 BACTEREMIA DUE TO METHICILLIN RESISTANT STAPHYLOCOCCUS EPIDERMIDIS: ICD-10-CM

## 2022-11-03 DIAGNOSIS — Z45.2 PICC (PERIPHERALLY INSERTED CENTRAL CATHETER) IN PLACE: ICD-10-CM

## 2022-11-03 DIAGNOSIS — R18.8 INGUINAL FLUID COLLECTION: Primary | ICD-10-CM

## 2022-11-03 PROBLEM — L89.323 PRESSURE INJURY OF LEFT BUTTOCK, STAGE 3 (HCC): Status: ACTIVE | Noted: 2022-10-05

## 2022-11-03 NOTE — PROGRESS NOTES
Progress Note - Infectious Disease   Jatin Sykes 76 y o  female MRN: [id number]  Unit/Bed#:  Encounter: [id number]    REQUIRED DOCUMENTATION:   1  This service was provided via Telemedicine  2  Provider located at [address] [place] [address] office  3  TeleMed provider: Lesa Romero DO   4  Identify all parties in room with patient during tele consult: [facility]LL[facility] at [facility], [place]  5  After connecting through [employer], patient was identified by name and date of birth  Patient was then informed that this was a Telemedicine visit and that the exam was being conducted confidentially over secure lines  My office door was closed  Patient acknowledged consent and understanding of privacy and security of the Telemedicine visit, and gave us permission to have the assistant stay in the room in order to assist with the history and to conduct the exam   I informed the patient that I have reviewed their record in Epic and presented the opportunity for them to ask any questions regarding the visit today  The patient agreed to participate  Impression/Recommendations:   1  MRSE bacteremia  2/2 sets from separate sites  No clear source of infection identified  Repeat blood cultures were negative prior to her recent hospital discharge  2D echocardiogram did not demonstrate valve vegetations  Vancomycin trough of 18 is therapeutic  ? Continue vancomycin 750 mg IV q 12 hours x6 week course through November 9th  ? Weekly labs: CBC with diff, BMP, vanco trough while on IV abx  ? PICC line removal upon completion of IV abx  ? ID follow-up prn  2  Postoperative fluid collection   Presumably postop seroma based on CT and orthopedic evaluation  No clinical evidence of infection at incision site  9/30 LT upper thigh fluid aspiration cultures are negative  ? serial exams  3   Status post left distal femoral replacement, 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 on 09/12/2022   ? Serial exams  4  Tachy-smita syndrome s/p permanent Pacemaker  Recent TT echo negative for valve vegetations  ? In the setting of recent bacteremia and with PPM place, agree with prolonged iv antibiotic treatment course as above  6  Multiple antibiotic allergies   Penicillins/sulfa/doxycycline   Hives secondary to penicillin  My recommendations were discussed with the patient in detail who verbalized understanding  My recommendations were discussed with COLLINS Reynoso  Thank you for allowing me to participate in the care of this patient  The ID service will follow  History   Subjective: 68-year-old woman seen for office follow-up after hospital discharge from [facility] on 10/04/2022  She remains on vancomycin IV for Staphylococcus epidermidis bacteremia  Pt says she feels alright  She is tolerating vancomycin well  Her PICC line has been functioning well  She denies fevers, chills, or sweats  Patient denies cough, SOB, nausea, vomiting, diarrhea, rash or itching      Antibiotics: vancomycin 750mg iv q12 hours    Current Outpatient Medications:   •  acetaminophen (TYLENOL) 325 mg tablet, Take 975 mg by mouth every 6 (six) hours as needed, Disp: , Rfl:   •  albuterol (PROVENTIL HFA,VENTOLIN HFA) 90 mcg/act inhaler, INHALE 2 PUFFS BY MOUTH EVERY 6 HOURS AS NEEDED FOR WHEEZING, Disp: 3 Inhaler, Rfl: 0  •  allopurinol (ZYLOPRIM) 100 mg tablet, Take 1 tablet (100 mg total) by mouth daily, Disp: 90 tablet, Rfl: 3  •  apixaban (Eliquis) 5 mg, Take 1 tablet (5 mg total) by mouth 2 (two) times a day, Disp: 60 tablet, Rfl: 6  •  ascorbic acid (VITAMIN C) 500 MG tablet, Take 1 tablet (500 mg total) by mouth 2 (two) times a day, Disp: 60 tablet, Rfl: 1  •  bisacodyl (DULCOLAX) 10 mg suppository, Insert 10 mg into the rectum daily as needed, Disp: , Rfl:   •  cholecalciferol (VITAMIN D3) 400 units tablet, Take 1 tablet (400 Units total) by mouth in the morning, Disp: , Rfl: 0  •  diltiazem (CARDIZEM CD) 120 mg 24 hr capsule, Take 1 capsule (120 mg total) by mouth daily (Patient taking differently: Take 120 mg by mouth daily Takes in the am), Disp: 90 capsule, Rfl: 3  •  DULoxetine (CYMBALTA) 30 mg delayed release capsule, TAKE 1 CAPSULE(30 MG) BY MOUTH DAILY, Disp: 30 capsule, Rfl: 5  •  ezetimibe (ZETIA) 10 mg tablet, Take 1 tablet (10 mg total) by mouth daily, Disp: 90 tablet, Rfl: 1  •  ferrous sulfate 324 (65 Fe) mg, Take 1 tablet (324 mg total) by mouth 2 (two) times a day before meals, Disp: 60 tablet, Rfl: 1  •  FIBER PO, Take 1 tablet by mouth daily, Disp: , Rfl:   •  folic acid (FOLVITE) 1 mg tablet, TAKE 1 TABLET(1 MG) BY MOUTH DAILY, Disp: 90 tablet, Rfl: 0  •  furosemide (LASIX) 40 mg tablet, Take 1 tablet (40 mg total) by mouth in the morning , Disp: 90 tablet, Rfl: 1  •  lisinopril (ZESTRIL) 2 5 mg tablet, Take 1 tablet (2 5 mg total) by mouth daily, Disp: 30 tablet, Rfl: 2  •  Melatonin 10 MG TABS, Take by mouth Takes daily at night, Disp: , Rfl:   •  metoprolol tartrate (LOPRESSOR) 50 mg tablet, Take 100 mg in the morning and 50 mg in the evening, Disp: 270 tablet, Rfl: 3  •  Multiple Vitamin (MULTIVITAMIN ADULT PO), Take by mouth in the morning, Disp: , Rfl:   •  omeprazole (PriLOSEC) 20 mg delayed release capsule, Take 20 mg by mouth daily, Disp: , Rfl:   •  ondansetron (ZOFRAN) 4 mg tablet, Take 4 mg by mouth every 8 (eight) hours as needed, Disp: , Rfl:   •  oxybutynin (DITROPAN-XL) 10 MG 24 hr tablet, Take 10 mg by mouth daily at bedtime, Disp: , Rfl:   •  polyethylene glycol (MIRALAX) 17 g packet, Take 17 g by mouth daily as needed, Disp: , Rfl:   •  senna-docusate sodium (SENOKOT S) 8 6-50 mg per tablet, Take 2 tablets by mouth 2 (two) times a day, Disp: , Rfl: 0  •  vancomycin (VANCOCIN), Inject 150 mL (750 mg total) into a catheter in a vein every 12 (twelve) hours (Patient taking differently: Inject 1,000 mg into a catheter in a vein every 12 (twelve) hours), Disp: 92896 mL, Rfl: 0  •  vitamin B-12 (VITAMIN B-12) 1,000 mcg tablet, Take 1 tablet (1,000 mcg total) by mouth daily, Disp: , Rfl: 0    Physical Exam     Vitals:    11/03/22 0845   BP: 132/70   Pulse: 75   Resp: 18   Temp: 97 8 °F (36 6 °C)   SpO2: 96%   Weight: 47 6 kg (105 lb)   Height: 5' 4" (1 626 m)   Limited exam due to tele health visit  Partial exam done that was corroborated with patient's LPN  General Appearance:  Appearing well, nontoxic, and in no distress   Head:  Normocephalic, atraumatic   Eyes:  EOMI   Nose: Nares normal, mucosa normal   Throat: Oropharynx moist    Lungs:   Respirations nonlabored, CTA b/l per LPN   Heart:  Regular rate and rhythm, S1, S2 per LPN   Abdomen:   Soft, non-tender per LPN    No Duong catheter present per LPN   Extremities: RUE PICC line intact  LLE wound is healed, closed  No distal leg edema b/l per LPN   Skin: No rash    Neurologic: Alert and oriented to person, surroundings, conversant, fluent speech     Invasive Devices:   PICC Line 10/04/22 (Active)       External Urinary Catheter (Active)     Labs, Imaging, & Other Studies   Lab Results: I have personally reviewed pertinent labs  10/31/2022:  WBC 6 4, HGB 9 0, HCT 27 2,   Vancomycin trough 18  BUN 11, creatinine 0 65, potassium 3 7, estimated GFR 90    Imaging Studies:   I have personally reviewed pertinent imaging study reports  Counseling/Coordination of care: Total 30 minutes encounter including direct communication with the patient via telehealth, chart review and coordination of care  Labs, medical tests and imaging studies were independently reviewed by me as noted above  VIRTUAL VISIT DISCLAIMER  The patient has consented to an online visit or consultation   The patient understands that the online visit is based solely on information provided by them, and that, in the absence of a face-to-face physical evaluation by the physician, the diagnosis they receive are both limited and provisional in terms of accuracy and completeness  This is not intended to replace a full medical face-to-face evaluation by the physician  The patient understands and accepts these terms

## 2022-11-03 NOTE — PROGRESS NOTES
[address] [facility]NAGEMENT   AND [facility]       Patient ID: Ebony Hernandez is a 76 y o  female Date of Birth 1945     Location of Service: [facility]    Performed wound round with: Wound team     Chief Complaint : Left buttock    Wound Instructions:  Wound:  Left buttock  Discontinue previous wound order  Cleanse the wound bed with NSS   Apply non-sting skin prep to periwound area  Apply Triad paste to wound bed  Frequency : Twice a day and prn for soiling  Needs air mattress and w/c pressure relief cushion  Offload all wounds  Turn and reposition frequently, maximum of every two hours  Instruct / Assist with weight shifting every 15 - 20 minutes when in chair  Increase protein intake  Consult RD  Monitor for any sign of infection or worsening, inform PCP or patient's primary physician in your facility  Allergies  Penicillins, Sulfa antibiotics, A37 folate [folic acid-vit R1-VWF W08 - food allergy], Cobalt, Lyrica [pregabalin], Other, Cortisone acetate [cortisone], Doxycycline, and Nickel      Assessment & Plan:  1  Pressure injury of left buttock, stage 3 ([facility])  Assessment & Plan:  - Location : Left buttock  - Wound healing status: improved  - wound size decrease, 2 x 0 5 cm compared to last week measurement of 2 x 3 x 0 1 cm , 100% epithelial  - continue  Triad paste  - Pressure redistribution device - ordered air mattress and w/c pressure prevention cushion  - Continue to offload  - Increase protein   Refer to RD  - No sign localized infection during visit  - Clinical factors affecting wound healing includes age, chronicity,  co-morbidities, incontinence, location of the wound, mobility impairement   -Follow up : Next week         2  Dermatitis associated with incontinence  Assessment & Plan:  Buttocks  - patient is incontinent of bowel in bladder    Use of diaper is not a contributing factor   - rashes resolved             Subjective:   10/5/2022This is a 76 y o , female referred to our service for wound/ skin alterations on left buttock  Patient have a complex medical history including but not limited to  Fibromyalgia, Fibromyalgia, primary, Gastric reflux, Heart failure ([address] ), History of COVID-19, History of transfusion, [facility] (hard of hearing), Hyperlipidemia, Hypertension, Incontinence in female, Irregular heart beat, Lyme disease, PONV (postoperative nausea and vomiting), Premature ventricular contraction, Primary osteoarthritis of both knees, Rheumatic fever, Sore throat, and Tuberculosis    Patient was referred by Senior Care Team  Patient was seen in collaboration with the facility wound team      Wound History:   As per medical record review, the wound was first assessed in acute care on 9/25/2022  Wound started as stage 2 pressure ulcer  Received patient in bed, seems comfortable  Denies pain  Incontinent of both bowel and bladder  Need assistance with turning when in bed  NO current treatment  10/12/2022 Follow up for wound on the left buttock  Received patient, not in distress  Facility staff did not report any significant issues related to the wound  Denies pain  10/19/2022 Follow up for wound on the left buttock   Received patient, not in distress  Facility staff did not report any significant issues related to the wound  Incontinent of urine during visit  10/26/2022 Follow up for wound on the left buttock   Received patient, not in distress  Facility staff did not report any significant issues related to the wound  As per medical Record review, patient currently on vancomycin for bacteremia  11/2/2022 Follow up for wound on the left buttock   Received patient, not in distress  Facility staff did not report any significant issues related to the wound  Denies pain  Review of Systems   Constitutional: Negative  HENT: Negative  Eyes: Negative  Respiratory: Negative  Cardiovascular: Negative for chest pain and leg swelling  Gastrointestinal: Negative  Endocrine: Negative  Genitourinary: Negative  Musculoskeletal: Positive for gait problem  Skin: Positive for wound  See HPI   Neurological: Negative for dizziness and headaches  Psychiatric/Behavioral: Negative for behavioral problems  Objective:    Physical Exam  Constitutional:       Appearance: She is obese  HENT:      Head: Normocephalic and atraumatic  Nose: Nose normal       Mouth/Throat:      Pharynx: Oropharynx is clear  Eyes:      Conjunctiva/sclera: Conjunctivae normal    Pulmonary:      Effort: Pulmonary effort is normal    Abdominal:      Tenderness: There is no abdominal tenderness  There is no guarding  Genitourinary:     Comments: incontinent  Musculoskeletal:         General: No tenderness  Cervical back: Normal range of motion  Right lower leg: No edema  Left lower leg: No edema  Comments: LROM  With PICC line on the right arm   Skin:     Findings: Lesion present  Comments: Left buttock- wound size is 2  X 0 5 x 0 1 cm  , 100% epithelial, small amount of serous drainage, no obvious sign of infection, no malodor, periwound - normal   Neurological:      Mental Status: She is alert  Gait: Gait abnormal    Psychiatric:         Mood and Affect: Mood normal          Behavior: Behavior normal               Procedures           Patient's care was coordinated with nursing facility staff  Recent vitals, labs and updated medications were reviewed on EMR or chart system of facility  Past Medical, surgical, social, medication and allergy history and patient's previous records were reviewed and updated as appropriate: Most up-to date information is available in the facility EMR where the patient is currently admitted      Patient Active Problem List   Diagnosis   • Atrial fibrillation ([facility] )   • Tachy-smita syndrome [facility])   • Essential hypertension   • Pacemaker   • Chronic bilateral low back pain without sciatica   • Chronic GERD   • Degenerative lumbar spinal stenosis   • Fibromyalgia   • GERD (gastroesophageal reflux disease)   • Low back pain   • Lumbar degenerative disc disease   • Mild carpal tunnel syndrome, right   • Overweight   • Psoriasis   • Thyroid nodule   • Urinary incontinence   • Other insomnia   • Anxiety   • Tremor   • Chronic pain of left knee   • Age related osteoporosis   • Dysphonia   • Vitamin D insufficiency   • Hyperlipidemia   • Arthritis   • Generalized weakness   • Leukocytosis   • Ambulatory dysfunction   • Reactive airway disease with acute exacerbation   • Morbid obesity with body mass index (BMI) of 40 0 to 49 9 ([facility])   • Chronic gout with tophus   • Current moderate episode of major depressive disorder ([facility])   • Paroxysmal atrial fibrillation ([facility])   • Poor dentition   • Closed bicondylar fracture of left femur with delayed healing   • Preop cardiovascular exam   • Stage 3a chronic kidney disease ([facility])   • Preop examination   • Renal insufficiency   • Surgical wound present   • Anemia   • Bladder wall thickening   • left femoral fluid collection   • Pleural effusion on right   • Vomiting and diarrhea   • Bacteremia due to methicillin resistant Staphylococcus epidermidis   • Bacteria in urine   • Aortic regurgitation   • Pressure injury of left buttock, stage 3 ([facility])   • Thrombocytosis   • Status post fall   • Physical deconditioning   • Chronic heart failure with preserved ejection fraction ([facility] [address] )   • Dermatitis associated with incontinence     Past Medical History:   Diagnosis Date   • Abnormal ECG     Last Assessed 9/29/2016   • Anxiety     Last Assessed 6/08/2016   • Arthritis     knees   • Asthma     Last Assessed 11/06/2013   • Atrial premature complex    • Cataract, bilateral     Last Assessed 7/14/2016   • Cataract, left eye     Both eyes  Had surgery on left     • COVID-19    • Difficulty swallowing    • Diverticulosis 9/25/2022   • Dizziness    • Fibromyalgia    • Fibromyalgia, primary • Gastric reflux    • Heart failure ([facility] [address] )     5/23/22 Pt reports had heart failure in the past   • History of COVID-19     5/23/22 Pt reports having COVID in 2021  • History of transfusion     no reaction   • [place] (hard of hearing)    • Hyperlipidemia    • Hypertension    • Incontinence in female     5/23/22 Pt reports has incontinence -wears depends especially at night/riding in car   • Irregular heart beat     5/23/22 Pt reports hx of A fib   • Lyme disease    • PONV (postoperative nausea and vomiting)    • Premature ventricular contraction    • Primary osteoarthritis of both knees     Last Assessed 7/14/2016   • Rheumatic fever     5/23/22 Pt reports had hx of rheumatic fever as a child twice   • Sore throat    • Tuberculosis 1945     Past Surgical History:   Procedure Laterality Date   • APPENDECTOMY     • CARDIAC PACEMAKER PLACEMENT  2019   • COLONOSCOPY     • DENTAL SURGERY      extractions   • HYSTERECTOMY      5/23/22 Pt reports had appendix out with hysterectomy and "tightened up my bladder"   • IR ASPIRATION ONLY  9/30/2022   • ORIF FEMUR FRACTURE Left 08/05/2021    Procedure: OPEN REDUCTION W/ INTERNAL FIXATION (ORIF) DISTAL FEMUR; INSERTION RETROGRADE NAIL;  Surgeon: Shaye Quinones MD;  [facility]: [facility];  Service: Orthopedics   • PA DILATE ESOPHAGUS N/A 04/06/2016    Procedure: DILATATION ESOPHAGEAL;  Surgeon: Roberth Manrique MD;  Location: [facility]; Service: Gastroenterology   • PA EGD TRANSORAL BIOPSY SINGLE/MULTIPLE N/A 04/06/2016    Procedure: ESOPHAGOGASTRODUODENOSCOPY (EGD); Surgeon: Roberth Manrique MD;  Location: [facility];   Service: Gastroenterology   • PA REMOVAL DEEP IMPLANT Left 9/12/2022    Procedure: REMOVAL NAIL IM  FEMUR;  Surgeon: Shaye Quinones MD;  [facility];  Service: Orthopedics   • PA TOTAL KNEE ARTHROPLASTY Left 9/12/2022    Procedure: ARTHROPLASTY KNEE TOTAL;  Surgeon: Shaye Quinones MD;  [facility];  Service: Orthopedics   • PA Jonathan Dutton RMVL INSJ IO LENS PROSTH W/O ECP Left 03/15/2016    Procedure: EXTRACTION EXTRACAPSULAR CATARACT PHACO INTRAOCULAR LENS (IOL); Surgeon: Angeles Tucker MD;  [facility];  Service: Ophthalmology   • REPLACEMENT TOTAL KNEE Right    • REPLACEMENT TOTAL KNEE      right    • TONSILLECTOMY       Social History     Socioeconomic History   • Marital status:      Spouse name: None   • Number of children: None   • Years of education: None   • Highest education level: None   Occupational History   • None   Tobacco Use   • Smoking status: Never Smoker   • Smokeless tobacco: Never Used   • Tobacco comment: Denies any former or current smoking   Vaping Use   • Vaping Use: Never used   Substance and Sexual Activity   • Alcohol use: Not Currently     Comment: Per [employer]- pt reports no current alcohol use at this time   • Drug use: No     Comment: Denies any former or current drug use   • Sexual activity: Never     Comment:  for 12 years - not active   Other Topics Concern   • None   Social History Narrative   • None     Social Determinants of Health     Financial Resource Strain: Not on file   Food Insecurity: No Food Insecurity   • Worried About Running Out of Food in the Last Year: Never true   • Ran Out of Food in the Last Year: Never true   Transportation Needs: No Transportation Needs   • Lack of Transportation (Medical): No   • Lack of Transportation (Non-Medical):  No   Physical Activity: Not on file   Stress: Not on file   Social Connections: Not on file   Intimate Partner Violence: Not on file   Housing Stability: Low Risk    • Unable to Pay for Housing in the Last Year: No   • Number of Places Lived in the Last Year: 2   • Unstable Housing in the Last Year: No        Current Outpatient Medications:   •  acetaminophen (TYLENOL) 325 mg tablet, Take 975 mg by mouth every 6 (six) hours as needed, Disp: , Rfl:   •  albuterol (PROVENTIL HFA,VENTOLIN HFA) 90 mcg/act inhaler, INHALE 2 PUFFS BY MOUTH EVERY 6 HOURS AS NEEDED FOR WHEEZING, Disp: 3 Inhaler, Rfl: 0  •  allopurinol (ZYLOPRIM) 100 mg tablet, Take 1 tablet (100 mg total) by mouth daily, Disp: 90 tablet, Rfl: 3  •  apixaban (Eliquis) 5 mg, Take 1 tablet (5 mg total) by mouth 2 (two) times a day, Disp: 60 tablet, Rfl: 6  •  ascorbic acid (VITAMIN C) 500 MG tablet, Take 1 tablet (500 mg total) by mouth 2 (two) times a day, Disp: 60 tablet, Rfl: 1  •  bisacodyl (DULCOLAX) 10 mg suppository, Insert 10 mg into the rectum daily as needed, Disp: , Rfl:   •  cholecalciferol (VITAMIN D3) 400 units tablet, Take 1 tablet (400 Units total) by mouth in the morning, Disp: , Rfl: 0  •  diltiazem (CARDIZEM CD) 120 mg 24 hr capsule, Take 1 capsule (120 mg total) by mouth daily (Patient taking differently: Take 120 mg by mouth daily Takes in the am), Disp: 90 capsule, Rfl: 3  •  DULoxetine (CYMBALTA) 30 mg delayed release capsule, TAKE 1 CAPSULE(30 MG) BY MOUTH DAILY, Disp: 30 capsule, Rfl: 5  •  ezetimibe (ZETIA) 10 mg tablet, Take 1 tablet (10 mg total) by mouth daily, Disp: 90 tablet, Rfl: 1  •  ferrous sulfate 324 (65 Fe) mg, Take 1 tablet (324 mg total) by mouth 2 (two) times a day before meals, Disp: 60 tablet, Rfl: 1  •  FIBER PO, Take 1 tablet by mouth daily, Disp: , Rfl:   •  folic acid (FOLVITE) 1 mg tablet, TAKE 1 TABLET(1 MG) BY MOUTH DAILY, Disp: 90 tablet, Rfl: 0  •  furosemide (LASIX) 40 mg tablet, Take 1 tablet (40 mg total) by mouth in the morning , Disp: 90 tablet, Rfl: 1  •  lisinopril (ZESTRIL) 2 5 mg tablet, Take 1 tablet (2 5 mg total) by mouth daily, Disp: 30 tablet, Rfl: 2  •  Melatonin 10 MG TABS, Take by mouth Takes daily at night, Disp: , Rfl:   •  metoprolol tartrate (LOPRESSOR) 50 mg tablet, Take 100 mg in the morning and 50 mg in the evening, Disp: 270 tablet, Rfl: 3  •  Multiple Vitamin (MULTIVITAMIN ADULT PO), Take by mouth in the morning, Disp: , Rfl:   •  omeprazole (PriLOSEC) 20 mg delayed release capsule, Take 20 mg by mouth daily, Disp: , Rfl:   •  ondansetron (ZOFRAN) 4 mg tablet, Take 4 mg by mouth every 8 (eight) hours as needed, Disp: , Rfl:   •  oxybutynin (DITROPAN-XL) 10 MG 24 hr tablet, Take 10 mg by mouth daily at bedtime, Disp: , Rfl:   •  polyethylene glycol (MIRALAX) 17 g packet, Take 17 g by mouth daily as needed, Disp: , Rfl:   •  senna-docusate sodium (SENOKOT S) 8 6-50 mg per tablet, Take 2 tablets by mouth 2 (two) times a day, Disp: , Rfl: 0  •  vancomycin (VANCOCIN), Inject 150 mL (750 mg total) into a catheter in a vein every 12 (twelve) hours (Patient taking differently: Inject 1,000 mg into a catheter in a vein every 12 (twelve) hours), Disp: 99622 mL, Rfl: 0  •  vitamin B-12 (VITAMIN B-12) 1,000 mcg tablet, Take 1 tablet (1,000 mcg total) by mouth daily, Disp: , Rfl: 0  Family History   Problem Relation Age of Onset   • Coronary artery disease Mother    • Heart attack Mother         Prior   • Heart disease Father    • Heart attack Father         Prior   • Anesthesia problems Neg Hx               Coordination of Care: Wound team aware of the treatment plan  Facility nurse will provide wound treatment and monitor the wound for any changes  Patient / Staff education : Patient / Staff was given education on sign of infection and pressure ulcer prevention  Patient/ Staff verbalized understanding     Follow up :  Next week    Voice-recognition software may have been used in the preparation of this document  Occasional wrong word or "sound-alike" substitutions may have occurred due to the inherent limitations of voice recognition software  Interpretation should be guided by celso Marino

## 2022-11-03 NOTE — ASSESSMENT & PLAN NOTE
Buttocks  - patient is incontinent of bowel in bladder    Use of diaper is not a contributing factor   - rashes resolved

## 2022-11-03 NOTE — ASSESSMENT & PLAN NOTE
- Location : Left buttock  - Wound healing status: improved  - wound size decrease, 2 x 0 5 cm compared to last week measurement of 2 x 3 x 0 1 cm , 100% epithelial  - continue  Triad paste  - Pressure redistribution device - ordered air mattress and w/c pressure prevention cushion  - Continue to offload  - Increase protein    Refer to RD  - No sign localized infection during visit  - Clinical factors affecting wound healing includes age, chronicity,  co-morbidities, incontinence, location of the wound, mobility impairement   -Follow up : Next week

## 2022-11-04 ENCOUNTER — TELEPHONE (OUTPATIENT)
Dept: OTHER | Facility: OTHER | Age: 77
End: 2022-11-04

## 2022-11-08 ENCOUNTER — NURSING HOME VISIT (OUTPATIENT)
Dept: GERIATRICS | Facility: OTHER | Age: 77
End: 2022-11-08

## 2022-11-08 DIAGNOSIS — S72.422G CLOSED BICONDYLAR FRACTURE OF LEFT FEMUR WITH DELAYED HEALING: ICD-10-CM

## 2022-11-08 DIAGNOSIS — R53.1 GENERALIZED WEAKNESS: ICD-10-CM

## 2022-11-08 DIAGNOSIS — S72.432G CLOSED BICONDYLAR FRACTURE OF LEFT FEMUR WITH DELAYED HEALING: ICD-10-CM

## 2022-11-08 DIAGNOSIS — R26.2 AMBULATORY DYSFUNCTION: ICD-10-CM

## 2022-11-08 DIAGNOSIS — B95.7 BACTEREMIA DUE TO METHICILLIN RESISTANT STAPHYLOCOCCUS EPIDERMIDIS: Primary | ICD-10-CM

## 2022-11-08 DIAGNOSIS — R78.81 BACTEREMIA DUE TO METHICILLIN RESISTANT STAPHYLOCOCCUS EPIDERMIDIS: Primary | ICD-10-CM

## 2022-11-08 DIAGNOSIS — Z16.29 BACTEREMIA DUE TO METHICILLIN RESISTANT STAPHYLOCOCCUS EPIDERMIDIS: Primary | ICD-10-CM

## 2022-11-08 DIAGNOSIS — N18.31 STAGE 3A CHRONIC KIDNEY DISEASE (HCC): ICD-10-CM

## 2022-11-08 DIAGNOSIS — R53.81 PHYSICAL DECONDITIONING: ICD-10-CM

## 2022-11-08 NOTE — PROGRESS NOTES
[address], [address] [place] [address], [address] [place] [address]  [phone]    NAME: Jarvis Lenz  AGE: 76 y o  SEX: female    Progress Note    Location: [facility]   POS: 31 (SNF)     Patient's care was coordinated with nursing facility staff  Recent vitals, labs, and updated medications were review on [facility] system in facility  Assessment/Plan:    Bacteremia due to methicillin resistant Staphylococcus epidermidis  Recent hospitalization for positive blood culture for Staph epi the epidermidis on 09/25/2022   Repeat blood culture were negative after 72 hours  2D echo was negative for vegetation    Status post ICD placement will continue with 6 weeks of IV antibiotic therapy until November 9, 2022  ID following   Continue on right PICC, Vancomycin 750 mg q 12 hrs   Recent Vanco T on 14 3 (11/8/22)  Free of fever, vital signs stable and nontoxic appearance  Monitor with weekly labs to be faxed to ID every Monday    Ambulatory dysfunction  Continue PT/OT  Fall and safety precaution   Continue 24-7 supportive care   OOB for meals as tolerated  Heavy assist of 2/3 staff   Optimize acute and chronic medical condition as outlined   PT/OT/sw following for DC planning    Stage 3a chronic kidney disease ([facility] [address] )  Lab Results   Component Value Date    EGFR 69 10/02/2022    EGFR 61 09/30/2022    EGFR 68 09/29/2022    CREATININE 0 81 10/02/2022    CREATININE 0 90 09/30/2022    CREATININE 0 82 09/29/2022   Recent BUN 11 with a creatinine 00 57 and GFR 92, stable  Avoid nephrotoxins, hypotension and BP fluctuation  Continue to monitor renal function periodically   Renal dose med for GFR <30     Generalized weakness  On exams appear with generalized weakness   multifactorial  Continue PT/OT  Fall safety precaution   Continue 24-7 supportive care   Optimize acute and chronic medical condition as outlined   Continue adequate p o  hydration and nutrition    following for DC planning    Physical deconditioning  Multifactorial  PT/OT   Continue 24-7 supportive care   Out of bed for meals as tolerated next  following for DC planning, goal is to discharge patient home    Closed bicondylar fracture of left femur with delayed healing  S/p 7-weeks Left distal femoral placement   Had a f/u with ortho on 11/2/22  Ortho Note reviewed-  Recommended- Weightbearing as tolerated left lower extremity  Range of motion stretching strengthening left knee  Ambulatory assistance in gait training as needed  Pain medication as needed  2 3 times per week times 6-8 weeks  Next f/u in about 6-weeks (around 12/13/22)  Left leg incisions healing- approximated noted mild erythema in lower incision no drainage and no active sign of infection  Wound NP following in Facility c/w daily and PRN wound care  Monitor     History of Present Illness:  25-year-old female seen and examined for STR f/U  Received patient sitting in w/c, resting  Appears comfortable and is in no acute distress  On exam appears okay with no complaints  Denies pain  Left leg with trace edema  Right leg no edema  Remains on daily dose of lasix of 40 mg  Left leg lower incision approximated, healing well  No erythema or drainage  Continue with WBAT  Remains with IV antibiotics- Vancomycin 750 mg every 12 hours (end date 11/9/22)  R PICC C/D/I +bruise around the site no drainage  Dressing intact  S/p pacemaker  R chest small incision well-healed  Tolerating diet  Sleeping with no difficulties  B/B incontinent  Heavy assist of 2/3 staff  Denies /GI discomfort  Per nursing no other concerns or issues at this time    Review of Systems:  Per history of present illness, all other systems reviewed and negative    Other than those noted in HPI    HISTORY:  Medical Hx: Reviewed, unchanged  Family Hx: Reviewed, unchanged  Soc Hx: Reviewed,  unchanged    ALLERGY: Reviewed, unchanged  Allergies   Allergen Reactions   • Penicillins Hives     Itching and terrible hives   • Sulfa Antibiotics Itching   • L38 Folate [Folic Acid-Vit X0-YNO A28 - Food Allergy] Other (See Comments)     5/23/22 Pt reports no allergic reaction known    • Munger Other (See Comments)     Unknown, 5/23 -pt is unaware of reaction    • Lyrica [Pregabalin] Other (See Comments)     5/23/22 Pt reports doesn't remember reaction    • Other Other (See Comments)     Black rubber 5/23/22 per allergy test - pt unaware of reaction   • Cortisone Acetate [Cortisone] Itching and Rash     5/23/22 pt reports makes her violent    • Nickel Other (See Comments)     Skin discoloration      PHYSICAL EXAM:  Vital Signs: blood pressure 122/70, pulse 74, respirations 18, Temp 98 1, O2 sats 94% RA  Weight: 239 8 lbs    General:  Appears weak and cooperative  Head: Atraumatic  Normocephalic  Eye Exam: anicteric sclera, no discharge, PERRLA, No injection  Oral Exam: moist mucous membranes, no buccaloropharyngeal erythema, palatine tonsils WNL  Neck Exam: no anterior cervical lymphadenopathy noted, neck supple  Cardiovascular: regular rate, regular rhythm, + murmurs, rubs, or gallops  Pulmonary:  Breath sounds clear, no wheeze, no rhonchi, no rales  No chest tenderness  Abdominal: soft, rounded, non-tender, nondistended, bowel sounds audible x 4 quadrants  : Non distended bladder  Incontinent of bowel and bladder   Extremities and skin: no edema noted, left leg trace edema  Left leg incisions BECCA- approximated  Upper and middle incisions approximated no erythema/drainage, Lower incision remains with mild erythema, tender no drainage  No rashes  R PICC clean,dry and intact +bruise around the site  B/L UE bruises from prior blood draws  Right chest healed incision-s/p ICD placement  Right jaw/cheeck bruise, Right shoulder bruise   Stage 3 in L buttock  Neurological: alert, cooperative and responsive, Oriented x 3, ble generalized weakness     Laboratory results / Imaging reviewed: Hard copy/ies in medical chart:  Reviewed from [address]    Vancomycin trough 14 3 on 11/08/2022    CBC with diff-BMP reviewed on 11/07/2022  Hemoglobin 9 1, hematocrit 28 2, WBC 7 0, platelets 374, absolute neutrophil 4 5, absolute lymphocyte 1 0, absolute monocyte 1 0, absolute use not feel 0 4, absolute basophil 0 1, neutrophils 65, lymphocytes 14, monocytes 14, eosinophils 6, basophils 1, blood glucose 101, BUN 11, creatinine 0 57, sodium 142, potassium 3 8, chloride 107, calcium 8 4, EGFR 92    CBCD, BMP, Vanco T reviewed on 10/31/2022  Vanco T 18 0  Glucose 107, BUN 11, Cr 0 65, Na 142, K 3 7, Ca 8 7, GFR 90, hgb 9 0, hct 27 2, WBC 6 4, pl 402,     CBC with diff/creatinine/Vanco T reviewed 10/26/22  Hemoglobin 8 7, hematocrit 26 7, WBC 8 0, platelets 273, absolute neutrophil 4 7, absolute lymphocyte 1 6, absolute monocytes 1 0, absolute using a feels 0 6, absolute basophil was 0 1, neutrophils 53, lymphocytes 20, monocytes 12, eosinophils 8, basophils 1, band 2, metamyelocytes 1, myelocytes 3, anisocytosis rare, hypochromia rare, polychromasia rare, creatinine 0 70, EGFR 88  Vanco T- 21 9    CBC/Creatinine/ Vanco T on 10/18/22  Hgb 8 7, hct 26 7, WBC 8 0, Platelet 153, absolute neutrophils 4 7, absolute lymphocytes 1 6, absolute monocytes 1 0, absolute eosinophils 0 6, absolute basophils 0 1, neutrophils 53, lymphocytes 20, monocytes 12, eosinophils 8, basophils 1, band 2, metamyelocytes 1, myelocytes 3, anisocytosis rare   Hypochromia rare, polychromasia rate, creatinine 0 70, EGFR 88    Vanco T 21 9 (h)     BMP review on 10/13/2022   Blood glucose 107, BUN 10, creatinine 0 76, sodium 142, potassium 3 6, calcium 9 2, eGFR 80    10/18/22- Vanco T 21 9- nursing notifying ID     10/11/22- Vanco Trough 19 8    10/9/22- Vanco Trough 16 7    CBC with diff-creatinine reviewed from 10/18/2022  Hemoglobin 8 7, hematocrit 26 7, WBC 8 0, platelets 977, absolute neutrophil 4 7, absolute lymphocyte 1 6, absolute monocytes 1 0, absolute use 0 6, absolute basos 0 1, neutrophils 53, lymphocytes 20, monocytes 12, eosinophils 8, basophil 1, band 2, metamyelocytes 1, myelocytes 3, anisocytosis rare, hypochromia rare, polychromasia rare, creatinine 0 70, EGFR 80     CBC with diff/creatinine/Vanco through on 10/6/22  Hemoglobin 8 6, hematocrit 26 0, WBC 6 1, platelets 889, absolute neutrophil 3 0, absolute lymphocyte 1 6, absolute monos 0 7, absolute eos 0 7, absolute basos 0 1, neutrophils 49, lymphocyte 26, monocytes 12, eosinophils 12, basophil 1, creatinine 0 74, EGFR 82, Vanco Through 10 9    CBC-BMP reviewed from 09/19/2022   Hemoglobin 8 3, hematocrit 24 9, WBC 11 4, platelets 952, blood glucose 118, BUN 20, creatinine 0 93, sodium 139, potassium 4 1, calcium 8 4, EGFR 63    Current Medications: All medications reviewed and updated in Nursing Home Chart reviewed from [facility]    Please note:  Voice-recognition software may have been used in the preparation of this document  Occasional wrong word or "sound-alike" substitutions may have occurred due to the inherent limitations of voice recognition software  Interpretation should be guided by celso Schneider  11/4/2022

## 2022-11-09 ENCOUNTER — PATIENT OUTREACH (OUTPATIENT)
Dept: CASE MANAGEMENT | Facility: HOSPITAL | Age: 77
End: 2022-11-09

## 2022-11-09 ENCOUNTER — NURSING HOME VISIT (OUTPATIENT)
Dept: WOUND CARE | Facility: HOSPITAL | Age: 77
End: 2022-11-09

## 2022-11-09 DIAGNOSIS — L89.323 PRESSURE INJURY OF LEFT BUTTOCK, STAGE 3 (HCC): Primary | ICD-10-CM

## 2022-11-10 ENCOUNTER — NURSING HOME VISIT (OUTPATIENT)
Dept: GERIATRICS | Facility: OTHER | Age: 77
End: 2022-11-10

## 2022-11-10 DIAGNOSIS — Z16.29 BACTEREMIA DUE TO METHICILLIN RESISTANT STAPHYLOCOCCUS EPIDERMIDIS: Primary | ICD-10-CM

## 2022-11-10 DIAGNOSIS — R78.81 BACTEREMIA DUE TO METHICILLIN RESISTANT STAPHYLOCOCCUS EPIDERMIDIS: Primary | ICD-10-CM

## 2022-11-10 DIAGNOSIS — B95.7 BACTEREMIA DUE TO METHICILLIN RESISTANT STAPHYLOCOCCUS EPIDERMIDIS: Primary | ICD-10-CM

## 2022-11-10 DIAGNOSIS — S72.432G CLOSED BICONDYLAR FRACTURE OF LEFT FEMUR WITH DELAYED HEALING: ICD-10-CM

## 2022-11-10 DIAGNOSIS — R26.2 AMBULATORY DYSFUNCTION: ICD-10-CM

## 2022-11-10 DIAGNOSIS — S72.422G CLOSED BICONDYLAR FRACTURE OF LEFT FEMUR WITH DELAYED HEALING: ICD-10-CM

## 2022-11-10 DIAGNOSIS — D64.9 ANEMIA, UNSPECIFIED TYPE: ICD-10-CM

## 2022-11-10 NOTE — ASSESSMENT & PLAN NOTE
Lab Results   Component Value Date    EGFR 69 10/02/2022    EGFR 61 09/30/2022    EGFR 68 09/29/2022    CREATININE 0 81 10/02/2022    CREATININE 0 90 09/30/2022    CREATININE 0 82 09/29/2022   Recent BUN 11 with a creatinine 00 57 and GFR 92, stable  Avoid nephrotoxins, hypotension and BP fluctuation  Continue to monitor renal function periodically   Renal dose med for GFR <30

## 2022-11-10 NOTE — ASSESSMENT & PLAN NOTE
Multifactorial  PT/OT   Continue 24-7 supportive care   Out of bed for meals as tolerated next  following for DC planning, goal is to discharge patient home

## 2022-11-10 NOTE — ASSESSMENT & PLAN NOTE
- Location : Left buttock  - Wound healing status: healed  - ordered Z guard for moisture management and prevention of pressure injury  - Continue to offload  - Increase protein   - Sign off  Facility staff will continue to provide treatment and monitor the wound  Can reconsult wound nurse practitioner if wound failed to heal or worsened

## 2022-11-10 NOTE — ASSESSMENT & PLAN NOTE
Recent hospitalization for positive blood culture for Staph epi the epidermidis on 09/25/2022   Repeat blood culture were negative after 72 hours  2D echo was negative for vegetation    Status post ICD placement will continue with 6 weeks of IV antibiotic therapy until November 9, 2022  ID following   Continue on right PICC, Vancomycin 750 mg q 12 hrs   Recent Vanco T on 14 3 (11/8/22)  Free of fever, vital signs stable and nontoxic appearance  Monitor with weekly labs to be faxed to ID every Monday

## 2022-11-10 NOTE — PROGRESS NOTES
[address] MANAGEMENT   AND [facility]PER[facility]IC [facility]INE [facility]ENTER       Patient ID: Ashley Schuster is a 76 y o  female Date of Birth 1945     Location of Service: [address] [place] [address]    Performed wound round with: Wound team     Chief Complaint : Left buttock    Wound Instructions:  Wound:  Left buttock  Discontinue previous wound order  Cleanse the skin with soap and water, pat dry   Apply Z guard to wound bed  Frequency : Twice a day and prn for soiling    Offload all wounds  Turn and reposition frequently, maximum of every two hours  Instruct / Assist with weight shifting every 15 - 20 minutes when in chair  Increase protein intake  Consult RD  Monitor for any sign of infection or worsening, inform PCP or patient's primary physician in your facility  Allergies  Penicillins, Sulfa antibiotics, B02 folate [folic acid-vit N8-RJE A36 - food allergy], Cobalt, Lyrica [pregabalin], Other, Cortisone acetate [cortisone], Doxycycline, and Nickel      Assessment & Plan:  1  Pressure injury of left buttock, stage 3 (HCC)  Assessment & Plan:  - Location : Left buttock  - Wound healing status: healed  - ordered Z guard for moisture management and prevention of pressure injury  - Continue to offload  - Increase protein   - Sign off  Facility staff will continue to provide treatment and monitor the wound  Can reconsult wound nurse practitioner if wound failed to heal or worsened  Subjective:   10/5/2022This is a 76 y o , female referred to our service for wound/ skin alterations on left buttock  Patient have a complex medical history including but not limited to  Fibromyalgia, Fibromyalgia, primary, Gastric reflux, Heart failure ([facility] [address] ), History of COVID-19, History of transfusion, [place] (hard of hearing), Hyperlipidemia, Hypertension, Incontinence in female, Irregular heart beat, Lyme disease, PONV (postoperative nausea and vomiting), Premature ventricular contraction, Primary osteoarthritis of both knees, Rheumatic fever, Sore throat, and Tuberculosis    Patient was referred by Senior Care Team  Patient was seen in collaboration with the facility wound team      Wound History:   As per medical record review, the wound was first assessed in acute care on 9/25/2022  Wound started as stage 2 pressure ulcer  Received patient in bed, seems comfortable  Denies pain  Incontinent of both bowel and bladder  Need assistance with turning when in bed  NO current treatment  10/12/2022 Follow up for wound on the left buttock  Received patient, not in distress  Facility staff did not report any significant issues related to the wound  Denies pain  10/19/2022 Follow up for wound on the left buttock   Received patient, not in distress  Facility staff did not report any significant issues related to the wound  Incontinent of urine during visit  10/26/2022 Follow up for wound on the left buttock   Received patient, not in distress  Facility staff did not report any significant issues related to the wound  As per medical Record review, patient currently on vancomycin for bacteremia  11/2/2022 Follow up for wound on the left buttock   Received patient, not in distress  Facility staff did not report any significant issues related to the wound  Denies pain  11/9/2022 Follow up for wound on the left buttock   Received patient, not in distress  Facility staff did not report any significant issues related to the wound  Denies pain  Review of Systems   Constitutional: Negative  HENT: Negative  Eyes: Negative  Respiratory: Negative  Cardiovascular: Negative for chest pain and leg swelling  Gastrointestinal: Negative  Endocrine: Negative  Genitourinary: Negative  Musculoskeletal: Positive for gait problem  Skin: Positive for wound  See HPI   Neurological: Negative for dizziness and headaches  Psychiatric/Behavioral: Negative for behavioral problems  Objective:    Physical Exam  Constitutional:       Appearance: She is obese  HENT:      Head: Normocephalic and atraumatic  Nose: Nose normal       Mouth/Throat:      Pharynx: Oropharynx is clear  Eyes:      Conjunctiva/sclera: Conjunctivae normal    Pulmonary:      Effort: Pulmonary effort is normal    Abdominal:      Tenderness: There is no abdominal tenderness  There is no guarding  Genitourinary:     Comments: incontinent  Musculoskeletal:         General: No tenderness  Cervical back: Normal range of motion  Right lower leg: No edema  Left lower leg: No edema  Comments: LROM  With PICC line on the right arm   Skin:     Findings: Lesion present  Comments: Left buttock- wound healed   Neurological:      Mental Status: She is alert  Gait: Gait abnormal    Psychiatric:         Mood and Affect: Mood normal          Behavior: Behavior normal               Procedures           Patient's care was coordinated with nursing facility staff  Recent vitals, labs and updated medications were reviewed on EMR or chart system of facility  Past Medical, surgical, social, medication and allergy history and patient's previous records were reviewed and updated as appropriate: Most up-to date information is available in the facility EMR where the patient is currently admitted      Patient Active Problem List   Diagnosis   • Atrial fibrillation ([address] )   • Tachy-smita syndrome (HCC)   • Essential hypertension   • Pacemaker   • Chronic bilateral low back pain without sciatica   • Chronic GERD   • Degenerative lumbar spinal stenosis   • Fibromyalgia   • GERD (gastroesophageal reflux disease)   • Low back pain   • Lumbar degenerative disc disease   • Mild carpal tunnel syndrome, right   • Overweight   • Psoriasis   • Thyroid nodule   • Urinary incontinence   • Other insomnia   • Anxiety   • Tremor   • Chronic pain of left knee   • Age related osteoporosis   • Dysphonia   • Vitamin D insufficiency   • Hyperlipidemia   • Arthritis   • Generalized weakness   • Leukocytosis   • Ambulatory dysfunction   • Reactive airway disease with acute exacerbation   • Morbid obesity with body mass index (BMI) of 40 0 to 49 9 ([facility])   • Chronic gout with tophus   • Current moderate episode of major depressive disorder ([facility])   • Paroxysmal atrial fibrillation ([facility])   • Poor dentition   • Closed bicondylar fracture of left femur with delayed healing   • Preop cardiovascular exam   • Stage 3a chronic kidney disease ([facility])   • Preop examination   • Renal insufficiency   • Surgical wound present   • Anemia   • Bladder wall thickening   • left femoral fluid collection   • Pleural effusion on right   • Vomiting and diarrhea   • Bacteremia due to methicillin resistant Staphylococcus epidermidis   • Bacteria in urine   • Aortic regurgitation   • Pressure injury of left buttock, stage 3 ([facility])   • Thrombocytosis   • Status post fall   • Physical deconditioning   • Chronic heart failure with preserved ejection fraction ([address] )   • Dermatitis associated with incontinence     Past Medical History:   Diagnosis Date   • Abnormal ECG     Last Assessed 9/29/2016   • Anxiety     Last Assessed 6/08/2016   • Arthritis     knees   • Asthma     Last Assessed 11/06/2013   • Atrial premature complex    • Cataract, bilateral     Last Assessed 7/14/2016   • Cataract, left eye     Both eyes  Had surgery on left  • COVID-19    • Difficulty swallowing    • Diverticulosis 9/25/2022   • Dizziness    • Fibromyalgia    • Fibromyalgia, primary    • Gastric reflux    • Heart failure ([address] )     5/23/22 Pt reports had heart failure in the past   • History of COVID-19     5/23/22 Pt reports having COVID in 2021     • History of transfusion     no reaction   • [place] (hard of hearing)    • Hyperlipidemia    • Hypertension    • Incontinence in female     5/23/22 Pt reports has incontinence -wears depends especially at night/riding in car   • Irregular heart beat     5/23/22 Pt reports hx of A fib   • Lyme disease    • PONV (postoperative nausea and vomiting)    • Premature ventricular contraction    • Primary osteoarthritis of both knees     Last Assessed 7/14/2016   • Rheumatic fever     5/23/22 Pt reports had hx of rheumatic fever as a child twice   • Sore throat    • Tuberculosis 1945     Past Surgical History:   Procedure Laterality Date   • APPENDECTOMY     • CARDIAC PACEMAKER PLACEMENT  2019   • COLONOSCOPY     • DENTAL SURGERY      extractions   • HYSTERECTOMY      5/23/22 Pt reports had appendix out with hysterectomy and "tightened up my bladder"   • IR ASPIRATION ONLY  9/30/2022   • ORIF FEMUR FRACTURE Left 08/05/2021    Procedure: OPEN REDUCTION W/ INTERNAL FIXATION (ORIF) DISTAL FEMUR; INSERTION RETROGRADE NAIL;  Surgeon: Jazmin Medina MD;  [facility];  Service: Orthopedics   • NJ DILATE ESOPHAGUS N/A 04/06/2016    Procedure: DILATATION ESOPHAGEAL;  Surgeon: Citlali Díaz MD;  Location: [facility]; Service: Gastroenterology   • NJ EGD TRANSORAL BIOPSY SINGLE/MULTIPLE N/A 04/06/2016    Procedure: ESOPHAGOGASTRODUODENOSCOPY (EGD); Surgeon: Citlali Díaz MD;  Location: [facility]; Service: Gastroenterology   • NJ REMOVAL DEEP IMPLANT Left 9/12/2022    Procedure: REMOVAL NAIL IM  FEMUR;  Surgeon: Jazmin Medina MD;  [facility]: [facility];  Service: Orthopedics   • NJ TOTAL KNEE ARTHROPLASTY Left 9/12/2022    Procedure: ARTHROPLASTY KNEE TOTAL;  Surgeon: Jazmin Medina MD;  [facility];  Service: Orthopedics   • NJ XCAPSL CTRC RMVL INSJ IO LENS PROSTH W/O ECP Left 03/15/2016    Procedure: EXTRACTION EXTRACAPSULAR CATARACT PHACO INTRAOCULAR LENS (IOL); Surgeon: Wicho Gant MD;  Location: [facility];  Service: Ophthalmology   • REPLACEMENT TOTAL KNEE Right    • REPLACEMENT TOTAL KNEE      right    • TONSILLECTOMY       Social History     Socioeconomic History   • Marital status:       Spouse name: None   • Number of children: None   • Years of education: None   • Highest education level: None   Occupational History   • None   Tobacco Use   • Smoking status: Never Smoker   • Smokeless tobacco: Never Used   • Tobacco comment: Denies any former or current smoking   Vaping Use   • Vaping Use: Never used   Substance and Sexual Activity   • Alcohol use: Not Currently     Comment: Per [employer]- pt reports no current alcohol use at this time   • Drug use: No     Comment: Denies any former or current drug use   • Sexual activity: Never     Comment:  for 12 years - not active   Other Topics Concern   • None   Social History Narrative   • None     Social Determinants of Health     Financial Resource Strain: Not on file   Food Insecurity: No Food Insecurity   • Worried About Running Out of Food in the Last Year: Never true   • Ran Out of Food in the Last Year: Never true   Transportation Needs: No Transportation Needs   • Lack of Transportation (Medical): No   • Lack of Transportation (Non-Medical):  No   Physical Activity: Not on file   Stress: Not on file   Social Connections: Not on file   Intimate Partner Violence: Not on file   Housing Stability: Low Risk    • Unable to Pay for Housing in the Last Year: No   • Number of Places Lived in the Last Year: 2   • Unstable Housing in the Last Year: No        Current Outpatient Medications:   •  acetaminophen (TYLENOL) 325 mg tablet, Take 975 mg by mouth every 6 (six) hours as needed, Disp: , Rfl:   •  albuterol (PROVENTIL HFA,VENTOLIN HFA) 90 mcg/act inhaler, INHALE 2 PUFFS BY MOUTH EVERY 6 HOURS AS NEEDED FOR WHEEZING, Disp: 3 Inhaler, Rfl: 0  •  allopurinol (ZYLOPRIM) 100 mg tablet, Take 1 tablet (100 mg total) by mouth daily, Disp: 90 tablet, Rfl: 3  •  apixaban (Eliquis) 5 mg, Take 1 tablet (5 mg total) by mouth 2 (two) times a day, Disp: 60 tablet, Rfl: 6  •  ascorbic acid (VITAMIN C) 500 MG tablet, Take 1 tablet (500 mg total) by mouth 2 (two) times a day, Disp: 60 tablet, Rfl: 1  •  bisacodyl (DULCOLAX) 10 mg suppository, Insert 10 mg into the rectum daily as needed, Disp: , Rfl:   •  cholecalciferol (VITAMIN D3) 400 units tablet, Take 1 tablet (400 Units total) by mouth in the morning, Disp: , Rfl: 0  •  diltiazem (CARDIZEM CD) 120 mg 24 hr capsule, Take 1 capsule (120 mg total) by mouth daily (Patient taking differently: Take 120 mg by mouth daily Takes in the am), Disp: 90 capsule, Rfl: 3  •  DULoxetine (CYMBALTA) 30 mg delayed release capsule, TAKE 1 CAPSULE(30 MG) BY MOUTH DAILY, Disp: 30 capsule, Rfl: 5  •  ezetimibe (ZETIA) 10 mg tablet, Take 1 tablet (10 mg total) by mouth daily, Disp: 90 tablet, Rfl: 1  •  ferrous sulfate 324 (65 Fe) mg, Take 1 tablet (324 mg total) by mouth 2 (two) times a day before meals, Disp: 60 tablet, Rfl: 1  •  FIBER PO, Take 1 tablet by mouth daily, Disp: , Rfl:   •  folic acid (FOLVITE) 1 mg tablet, TAKE 1 TABLET(1 MG) BY MOUTH DAILY, Disp: 90 tablet, Rfl: 0  •  furosemide (LASIX) 40 mg tablet, Take 1 tablet (40 mg total) by mouth in the morning , Disp: 90 tablet, Rfl: 1  •  lisinopril (ZESTRIL) 2 5 mg tablet, Take 1 tablet (2 5 mg total) by mouth daily, Disp: 30 tablet, Rfl: 2  •  Melatonin 10 MG TABS, Take by mouth Takes daily at night, Disp: , Rfl:   •  metoprolol tartrate (LOPRESSOR) 50 mg tablet, Take 100 mg in the morning and 50 mg in the evening, Disp: 270 tablet, Rfl: 3  •  Multiple Vitamin (MULTIVITAMIN ADULT PO), Take by mouth in the morning, Disp: , Rfl:   •  omeprazole (PriLOSEC) 20 mg delayed release capsule, Take 20 mg by mouth daily, Disp: , Rfl:   •  ondansetron (ZOFRAN) 4 mg tablet, Take 4 mg by mouth every 8 (eight) hours as needed, Disp: , Rfl:   •  oxybutynin (DITROPAN-XL) 10 MG 24 hr tablet, Take 10 mg by mouth daily at bedtime, Disp: , Rfl:   •  polyethylene glycol (MIRALAX) 17 g packet, Take 17 g by mouth daily as needed, Disp: , Rfl:   •  senna-docusate sodium (SENOKOT S) 8 6-50 mg per tablet, Take 2 tablets by mouth 2 (two) times a day, Disp: , Rfl: 0  •  vancomycin (VANCOCIN), Inject 150 mL (750 mg total) into a catheter in a vein every 12 (twelve) hours (Patient taking differently: Inject 1,000 mg into a catheter in a vein every 12 (twelve) hours), Disp: 10719 mL, Rfl: 0  •  vitamin B-12 (VITAMIN B-12) 1,000 mcg tablet, Take 1 tablet (1,000 mcg total) by mouth daily, Disp: , Rfl: 0  Family History   Problem Relation Age of Onset   • Coronary artery disease Mother    • Heart attack Mother         Prior   • Heart disease Father    • Heart attack Father         Prior   • Anesthesia problems Neg Hx               Coordination of Care: Wound team aware of the treatment plan  Facility nurse will provide wound treatment and monitor the wound for any changes  Patient / Staff education : Patient / Staff was given education on sign of infection and pressure ulcer prevention  Patient/ Staff verbalized understanding     Follow up :  Next week    Voice-recognition software may have been used in the preparation of this document  Occasional wrong word or "sound-alike" substitutions may have occurred due to the inherent limitations of voice recognition software  Interpretation should be guided by celso Barragan

## 2022-11-12 NOTE — ASSESSMENT & PLAN NOTE
Hemoglobin is 9 1, hct 28 2, slowly improving   Appears stable when compare to 8 4 during recent hospitalization   No active bleeding noted on exam and none reported  Continue iron and folic acid as well as I50  Continue to monitor CBC and trend

## 2022-11-12 NOTE — PROGRESS NOTES
[address], [address] [place] [address], [address] [place] [address]  [phone]    NAME: Berna Loaiza  AGE: 76 y o  SEX: female    Progress Note    Location: [facility]   POS: 31 (SNF)     Patient's care was coordinated with nursing facility staff  Recent vitals, labs, and updated medications were review on [facility] system in facility  Assessment/Plan:    Bacteremia due to methicillin resistant Staphylococcus epidermidis  Recent hospitalization for positive blood culture for Staph epi the epidermidis on 09/25/2022  Repeat blood culture were negative after 72 hours  2D echo was negative for vegetation  Status post ICD placement  Completed 6-weeks of Vanco   ID following with weekly labs  R PICC removed  Area C/D/I  Free of fever, vital signs stable and nontoxic appearance    Ambulatory dysfunction  Continue PT/OT  Fall and safety precaution   Continue 24-7 supportive care   OOB for meals as tolerated  Heavy assist of 2/3 staff   Optimize acute and chronic medical condition as outlined   PT/OT/sw following for DC planning    Anemia  Hemoglobin is 9 1, hct 28 2, slowly improving   Appears stable when compare to 8 4 during recent hospitalization   No active bleeding noted on exam and none reported  Continue iron and folic acid as well as U90  Continue to monitor CBC and trend    Closed bicondylar fracture of left femur with delayed healing  S/p 7-weeks Left distal femoral placement   Had a f/u with ortho on 11/2/22  Ortho Note reviewed-  Recommended- Weightbearing as tolerated left lower extremity  Range of motion stretching strengthening left knee  Ambulatory assistance in gait training as needed  Next f/u in about 6-weeks (around 12/13/22)  Left leg incisions healing well, approximated no erythema/ no drainage in   Wound NP following in Facility c/w daily and PRN wound care     History of Present Illness:  38-year-old female seen and examined for STR f/U   Received patient sitting in w/c, resting  Appears comfortable and is in no acute distress  On exam appears okay with no complaints  Denies pain  Left leg with trace edema  Right leg no edema  Remains on daily dose of lasix of 40 mg  Left leg lower incision approximated, healing well  No erythema or drainage  Continue with WBAT  Complete course of Vanco  R PICC removed  Area is C/D/I  Tolerating diet  Sleeping with no difficulties  B/B incontinent  Heavy assist of 2/3 staff  Denies /GI discomfort  Per nursing no other concerns or issues at this time    Review of Systems:  Per history of present illness, all other systems reviewed and negative  Other than those noted in HPI    HISTORY:  Medical Hx: Reviewed, unchanged  Family Hx: Reviewed, unchanged  Soc Hx: Reviewed,  unchanged    ALLERGY: Reviewed, unchanged  Allergies   Allergen Reactions   • Penicillins Hives     Itching and terrible hives   • Sulfa Antibiotics Itching   • O90 Folate [Folic Acid-Vit Q3-FZW V75 - Food Allergy] Other (See Comments)     5/23/22 Pt reports no allergic reaction known    • [place] Other (See Comments)     Unknown, 5/23 -pt is unaware of reaction    • Lyrica [Pregabalin] Other (See Comments)     5/23/22 Pt reports doesn't remember reaction    • Other Other (See Comments)     Black rubber 5/23/22 per allergy test - pt unaware of reaction   • Cortisone Acetate [Cortisone] Itching and Rash     5/23/22 pt reports makes her violent    • Nickel Other (See Comments)     Skin discoloration      PHYSICAL EXAM:  Vital Signs: blood pressure 150/69, pulse 74, respirations 18, Temp 98 1, O2 sats 94% RA  Weight: 245 0 lbs    General:  Appears weak and cooperative  Head: Atraumatic  Normocephalic  Eye Exam: anicteric sclera, no discharge, PERRLA, No injection  Oral Exam: moist mucous membranes, no buccaloropharyngeal erythema, palatine tonsils WNL    Neck Exam: no anterior cervical lymphadenopathy noted, neck supple  Cardiovascular: regular rate, regular rhythm, + murmurs, rubs, or gallops  Pulmonary:  Breath sounds clear, no wheeze, no rhonchi, no rales  No chest tenderness  Abdominal: soft, rounded, non-tender, nondistended, bowel sounds audible x 4 quadrants  : Non distended bladder  Incontinent of bowel and bladder   Extremities and skin: no edema noted, left leg trace edema  Left leg incisions BECCA- approximated  Upper and middle incisions approximated no erythema/drainage, Lower incision remains with mild erythema, tender no drainage  No rashes  R PICC clean,dry and intact +bruise around the site  B/L UE bruises from prior blood draws  Right chest healed incision-s/p ICD placement  Right jaw/cheeck bruise, Right shoulder bruise   Stage 3 in L buttock  Neurological: alert, cooperative and responsive, Oriented x 3, ble generalized weakness     Laboratory results / Imaging reviewed: Hard copy/ies in medical chart:  Reviewed from [facility]    Vancomycin trough 14 3 on 11/08/2022    CBC with diff-BMP reviewed on 11/07/2022  Hemoglobin 9 1, hematocrit 28 2, WBC 7 0, platelets 676, absolute neutrophil 4 5, absolute lymphocyte 1 0, absolute monocyte 1 0, absolute use not feel 0 4, absolute basophil 0 1, neutrophils 65, lymphocytes 14, monocytes 14, eosinophils 6, basophils 1, blood glucose 101, BUN 11, creatinine 0 57, sodium 142, potassium 3 8, chloride 107, calcium 8 4, EGFR 92    CBCD, BMP, Vanco T reviewed on 10/31/2022  Vanco T 18 0  Glucose 107, BUN 11, Cr 0 65, Na 142, K 3 7, Ca 8 7, GFR 90, hgb 9 0, hct 27 2, WBC 6 4, pl 402,     CBC with diff/creatinine/Vanco T reviewed 10/26/22  Hemoglobin 8 7, hematocrit 26 7, WBC 8 0, platelets 006, absolute neutrophil 4 7, absolute lymphocyte 1 6, absolute monocytes 1 0, absolute using a feels 0 6, absolute basophil was 0 1, neutrophils 53, lymphocytes 20, monocytes 12, eosinophils 8, basophils 1, band 2, metamyelocytes 1, myelocytes 3, anisocytosis rare, hypochromia rare, polychromasia rare, creatinine 0 70, EGFR 88  Vanco T- 21 9    CBC/Creatinine/ Vanco T on 10/18/22  Hgb 8 7, hct 26 7, WBC 8 0, Platelet 577, absolute neutrophils 4 7, absolute lymphocytes 1 6, absolute monocytes 1 0, absolute eosinophils 0 6, absolute basophils 0 1, neutrophils 53, lymphocytes 20, monocytes 12, eosinophils 8, basophils 1, band 2, metamyelocytes 1, myelocytes 3, anisocytosis rare  Hypochromia rare, polychromasia rate, creatinine 0 70, EGFR 88    Vanco T 21 9 (h)     BMP review on 10/13/2022   Blood glucose 107, BUN 10, creatinine 0 76, sodium 142, potassium 3 6, calcium 9 2, eGFR 80    10/18/22- Vanco T 21 9- nursing notifying ID     10/11/22- Vanco Trough 19 8    10/9/22- Vanco Trough 16 7    CBC with diff-creatinine reviewed from 10/18/2022  Hemoglobin 8 7, hematocrit 26 7, WBC 8 0, platelets 307, absolute neutrophil 4 7, absolute lymphocyte 1 6, absolute monocytes 1 0, absolute use 0 6, absolute basos 0 1, neutrophils 53, lymphocytes 20, monocytes 12, eosinophils 8, basophil 1, band 2, metamyelocytes 1, myelocytes 3, anisocytosis rare, hypochromia rare, polychromasia rare, creatinine 0 70, EGFR 80     CBC with diff/creatinine/Vanco through on 10/6/22  Hemoglobin 8 6, hematocrit 26 0, WBC 6 1, platelets 056, absolute neutrophil 3 0, absolute lymphocyte 1 6, absolute monos 0 7, absolute eos 0 7, absolute basos 0 1, neutrophils 49, lymphocyte 26, monocytes 12, eosinophils 12, basophil 1, creatinine 0 74, EGFR 82, Vanco Through 10 9    CBC-BMP reviewed from 09/19/2022   Hemoglobin 8 3, hematocrit 24 9, WBC 11 4, platelets 091, blood glucose 118, BUN 20, creatinine 0 93, sodium 139, potassium 4 1, calcium 8 4, EGFR 63    Current Medications: All medications reviewed and updated in Nursing Home Chart reviewed from [facility]    Please note:  Voice-recognition software may have been used in the preparation of this document  Occasional wrong word or "sound-alike" substitutions may have occurred due to the inherent limitations of voice recognition software   Interpretation should be guided by celso Magana  11/10/2022

## 2022-11-12 NOTE — ASSESSMENT & PLAN NOTE
S/p 7-weeks Left distal femoral placement   Had a f/u with ortho on 11/2/22  Ortho Note reviewed-  Recommended- Weightbearing as tolerated left lower extremity  Range of motion stretching strengthening left knee  Ambulatory assistance in gait training as needed  Next f/u in about 6-weeks (around 12/13/22)    Left leg incisions healing well, approximated no erythema/ no drainage in   Wound NP following in Facility c/w daily and PRN wound care

## 2022-11-12 NOTE — ASSESSMENT & PLAN NOTE
Recent hospitalization for positive blood culture for Staph epi the epidermidis on 09/25/2022  Repeat blood culture were negative after 72 hours  2D echo was negative for vegetation  Status post ICD placement  Completed 6-weeks of Vanco   ID following with weekly labs  R PICC removed   Area C/D/I  Free of fever, vital signs stable and nontoxic appearance

## 2022-11-13 ENCOUNTER — TELEPHONE (OUTPATIENT)
Dept: OTHER | Facility: OTHER | Age: 77
End: 2022-11-13

## 2022-11-13 NOTE — PROGRESS NOTES
[address]  [phone]    NAME: Yolanda Samuel  AGE: 76 y o  SEX: female    Progress Note    Location: [facility]   POS: 31 (SNF)     Patient's care was coordinated with nursing facility staff  Recent vitals, labs, and updated medications were review on [facility] system in facility  Assessment/Plan:    Hypokalemia  k level 3 2, asymptomatic per patient she was with loose stools  Now improving with imodium as needed   KCL 40 mEq bid x2 days   F/u K level and trend   Check K level on 11/17    Bacteremia due to methicillin resistant Staphylococcus epidermidis  Recent hospitalization for positive blood culture for Staph epi the epidermidis on 09/25/2022  Repeat blood culture were negative after 72 hours  2D echo was negative for vegetation  S/P ICD placement  Completed 6-weeks of Vanco   ID followed with weekly labs  R PICC removed  Area C/D/I  Free of fever, vital signs stable and nontoxic appearance    Generalized weakness  On exams appear with generalized weakness   multifactorial  Continue PT/OT  Fall safety precaution   Continue 24-7 supportive care   Optimize acute and chronic medical condition as outlined   Continue adequate p o  hydration and nutrition    following for DC planning    Atrial fibrillation ([address] )  Heart rate control   Denies chest pain or palpitation   Remains on Eliquis 5 mg b i d     Continue metoprolol 50 mg daily    Ambulatory dysfunction  Continue PT/OT  Fall and safety precaution   Continue 24-7 supportive care   OOB for meals as tolerated  Heavy assist of 2/3 staff   Optimize acute and chronic medical condition as outlined   PT/OT/sw following for DC planning    Thrombocytosis  Platelets 317  -- > 621 --> 469, suspect reactive in nature due to recent bacteremia during recent hospitalization  Recently completed 6 weeks of vanco  Id follow patient with weekly labs   No active bleeding noted or reported   Follow platelets and trend   CBC in 10 days    History of Present Illness:  29-year-old female seen and examined for STR f/U  Received patient sitting in w/c, resting  Appears comfortable and is in no acute distress  On exam appears okay with no complaints  Denies pain  Left leg with trace edema  Right leg no edema  Remains on daily dose of lasix of 40 mg  Left leg lower incision approximated, healing well  No erythema or drainage  Continue with WBAT  Complete course of Vanco  Tolerating diet  Sleeping with no difficulties  B/B incontinent  Heavy assist of 2/3 staff  Denies /GI discomfort  Per nursing no other concerns or issues at this time    Review of Systems:  Per history of present illness, all other systems reviewed and negative  Other than those noted in HPI    HISTORY:  Medical Hx: Reviewed, unchanged  Family Hx: Reviewed, unchanged  Soc Hx: Reviewed,  unchanged    ALLERGY: Reviewed, unchanged  Allergies   Allergen Reactions   • Penicillins Hives     Itching and terrible hives   • Sulfa Antibiotics Itching   • E04 Folate [Folic Acid-Vit V9-NEP J20 - Food Allergy] Other (See Comments)     5/23/22 Pt reports no allergic reaction known    • [place] Other (See Comments)     Unknown, 5/23 -pt is unaware of reaction    • Lyrica [Pregabalin] Other (See Comments)     5/23/22 Pt reports doesn't remember reaction    • Other Other (See Comments)     Black rubber 5/23/22 per allergy test - pt unaware of reaction   • Cortisone Acetate [Cortisone] Itching and Rash     5/23/22 pt reports makes her violent    • Nickel Other (See Comments)     Skin discoloration      PHYSICAL EXAM:  Vital Signs: blood pressure 128/64, pulse 74, respirations 18, Temp 98 1, O2 sats 94% RA  Weight: 245 0 lbs    General:  Appears weak and cooperative  Head: Atraumatic  Normocephalic    Eye Exam: anicteric sclera, no discharge, PERRLA, No injection  Oral Exam: moist mucous membranes, no buccaloropharyngeal erythema, palatine tonsils WNL   Neck Exam: no anterior cervical lymphadenopathy noted, neck supple  Cardiovascular: regular rate, regular rhythm, + murmurs, rubs, or gallops  Pulmonary:  Breath sounds clear, no wheeze, no rhonchi, no rales  No chest tenderness  Abdominal: soft, rounded, non-tender, nondistended, bowel sounds audible x 4 quadrants  : Non distended bladder  Incontinent of bowel and bladder   Extremities and skin: no edema noted, left leg trace edema  Left leg incisions BECAC- approximated  Upper and middle incisions approximated no erythema/drainage, Lower incision remains with mild erythema, tender no drainage  No rashes  R PICC clean,dry and intact +bruise around the site  B/L UE bruises from prior blood draws  Right chest healed incision-s/p ICD placement  Right jaw/cheeck bruise, Right shoulder bruise   Stage 3 in L buttock  Neurological: alert, cooperative and responsive, Oriented x 3, ble generalized weakness     Laboratory results / Imaging reviewed: Hard copy/ies in medical chart:  Reviewed from [facility]    CBC-BMP reviewed on 11/14/2022   Hemoglobin 9 2, hematocrit 28 5, WBC 6 1, platelets 472, blood glucose 111, BUN 9, creatinine 0 61, sodium 144, potassium 3 2, calcium 8 6, EGFR 91    Vancomycin trough 14 3 on 11/08/2022    CBC with diff-BMP reviewed on 11/07/2022  Hemoglobin 9 1, hematocrit 28 2, WBC 7 0, platelets 021, absolute neutrophil 4 5, absolute lymphocyte 1 0, absolute monocyte 1 0, absolute use not feel 0 4, absolute basophil 0 1, neutrophils 65, lymphocytes 14, monocytes 14, eosinophils 6, basophils 1, blood glucose 101, BUN 11, creatinine 0 57, sodium 142, potassium 3 8, chloride 107, calcium 8 4, EGFR 92    CBCD, BMP, Vanco T reviewed on 10/31/2022  Vanco T 18 0  Glucose 107, BUN 11, Cr 0 65, Na 142, K 3 7, Ca 8 7, GFR 90, hgb 9 0, hct 27 2, WBC 6 4, pl 402,     CBC with diff/creatinine/Vanco T reviewed 10/26/22  Hemoglobin 8 7, hematocrit 26 7, WBC 8 0, platelets 876, absolute neutrophil 4 7, absolute lymphocyte 1 6, absolute monocytes 1 0, absolute using a feels 0 6, absolute basophil was 0 1, neutrophils 53, lymphocytes 20, monocytes 12, eosinophils 8, basophils 1, band 2, metamyelocytes 1, myelocytes 3, anisocytosis rare, hypochromia rare, polychromasia rare, creatinine 0 70, EGFR 88  [facility]    CBC/Creatinine/ Vanco T on 10/18/22  Hgb 8 7, hct 26 7, WBC 8 0, Platelet 557, absolute neutrophils 4 7, absolute lymphocytes 1 6, absolute monocytes 1 0, absolute eosinophils 0 6, absolute basophils 0 1, neutrophils 53, lymphocytes 20, monocytes 12, eosinophils 8, basophils 1, band 2, metamyelocytes 1, myelocytes 3, anisocytosis rare  Hypochromia rare, polychromasia rate, creatinine 0 70, EGFR 88    Vanco T 21 9 (h)     BMP review on 10/13/2022   Blood glucose 107, BUN 10, creatinine 0 76, sodium 142, potassium 3 6, calcium 9 2, eGFR 80    10/18/22- Vanco T 21 9- nursing notifying ID     10/11/22- Vanco Trough 19 8    10/9/22- Vanco Trough 16 7    CBC with diff-creatinine reviewed from 10/18/2022  Hemoglobin 8 7, hematocrit 26 7, WBC 8 0, platelets 642, absolute neutrophil 4 7, absolute lymphocyte 1 6, absolute monocytes 1 0, absolute use 0 6, absolute basos 0 1, neutrophils 53, lymphocytes 20, monocytes 12, eosinophils 8, basophil 1, band 2, metamyelocytes 1, myelocytes 3, anisocytosis rare, hypochromia rare, polychromasia rare, creatinine 0 70, EGFR 80     CBC with diff/creatinine/Vanco through on 10/6/22  Hemoglobin 8 6, hematocrit 26 0, WBC 6 1, platelets 367, absolute neutrophil 3 0, absolute lymphocyte 1 6, absolute monos 0 7, absolute eos 0 7, absolute basos 0 1, neutrophils 49, lymphocyte 26, monocytes 12, eosinophils 12, basophil 1, creatinine 0 74, EGFR 82, Vanco Through 10 9    CBC-BMP reviewed from 09/19/2022   Hemoglobin 8 3, hematocrit 24 9, WBC 11 4, platelets 465, blood glucose 118, BUN 20, creatinine 0 93, sodium 139, potassium 4 1, calcium 8 4, EGFR 63    Current Medications:   All medications reviewed and updated in Nursing Home Chart reviewed from [facility]    Please note:  Voice-recognition software may have been used in the preparation of this document  Occasional wrong word or "sound-alike" substitutions may have occurred due to the inherent limitations of voice recognition software  Interpretation should be guided by celso Edward  11/14/2022

## 2022-11-14 ENCOUNTER — NURSING HOME VISIT (OUTPATIENT)
Dept: GERIATRICS | Facility: OTHER | Age: 77
End: 2022-11-14

## 2022-11-14 DIAGNOSIS — B95.7 BACTEREMIA DUE TO METHICILLIN RESISTANT STAPHYLOCOCCUS EPIDERMIDIS: Primary | ICD-10-CM

## 2022-11-14 DIAGNOSIS — I48.91 ATRIAL FIBRILLATION, UNSPECIFIED TYPE (HCC): ICD-10-CM

## 2022-11-14 DIAGNOSIS — E87.6 HYPOKALEMIA: ICD-10-CM

## 2022-11-14 DIAGNOSIS — R53.1 GENERALIZED WEAKNESS: ICD-10-CM

## 2022-11-14 DIAGNOSIS — D75.839 THROMBOCYTOSIS: ICD-10-CM

## 2022-11-14 DIAGNOSIS — R26.2 AMBULATORY DYSFUNCTION: ICD-10-CM

## 2022-11-14 DIAGNOSIS — R78.81 BACTEREMIA DUE TO METHICILLIN RESISTANT STAPHYLOCOCCUS EPIDERMIDIS: Primary | ICD-10-CM

## 2022-11-14 DIAGNOSIS — Z16.29 BACTEREMIA DUE TO METHICILLIN RESISTANT STAPHYLOCOCCUS EPIDERMIDIS: Primary | ICD-10-CM

## 2022-11-15 NOTE — ASSESSMENT & PLAN NOTE
Recent hospitalization for positive blood culture for Staph epi the epidermidis on 09/25/2022  Repeat blood culture were negative after 72 hours  2D echo was negative for vegetation  S/P ICD placement  Completed 6-weeks of Vanco   ID followed with weekly labs  R PICC removed   Area C/D/I  Free of fever, vital signs stable and nontoxic appearance

## 2022-11-15 NOTE — ASSESSMENT & PLAN NOTE
Heart rate control   Denies chest pain or palpitation   Remains on Eliquis 5 mg b i d     Continue metoprolol 50 mg daily

## 2022-11-15 NOTE — ASSESSMENT & PLAN NOTE
Platelets 983  -- > 977 --> 469, suspect reactive in nature due to recent bacteremia during recent hospitalization  Recently completed 6 weeks of vanco  Id follow patient with weekly labs   No active bleeding noted or reported   Follow platelets and trend   CBC in 10 days

## 2022-11-15 NOTE — ASSESSMENT & PLAN NOTE
k level 3 2, asymptomatic per patient she was with loose stools   Now improving with imodium as needed   KCL 40 mEq bid x2 days   F/u K level and trend   Check K level on 11/17

## 2022-11-16 ENCOUNTER — NURSING HOME VISIT (OUTPATIENT)
Dept: GERIATRICS | Facility: OTHER | Age: 77
End: 2022-11-16

## 2022-11-16 ENCOUNTER — PATIENT OUTREACH (OUTPATIENT)
Dept: CASE MANAGEMENT | Facility: HOSPITAL | Age: 77
End: 2022-11-16

## 2022-11-16 DIAGNOSIS — E87.6 HYPOKALEMIA: Primary | ICD-10-CM

## 2022-11-16 DIAGNOSIS — R53.1 GENERALIZED WEAKNESS: ICD-10-CM

## 2022-11-16 DIAGNOSIS — Z16.29 BACTEREMIA DUE TO METHICILLIN RESISTANT STAPHYLOCOCCUS EPIDERMIDIS: ICD-10-CM

## 2022-11-16 DIAGNOSIS — R78.81 BACTEREMIA DUE TO METHICILLIN RESISTANT STAPHYLOCOCCUS EPIDERMIDIS: ICD-10-CM

## 2022-11-16 DIAGNOSIS — R26.2 AMBULATORY DYSFUNCTION: ICD-10-CM

## 2022-11-16 DIAGNOSIS — D75.839 THROMBOCYTOSIS: ICD-10-CM

## 2022-11-16 DIAGNOSIS — D64.9 ANEMIA, UNSPECIFIED TYPE: ICD-10-CM

## 2022-11-16 DIAGNOSIS — N18.31 STAGE 3A CHRONIC KIDNEY DISEASE (HCC): ICD-10-CM

## 2022-11-16 DIAGNOSIS — B95.7 BACTEREMIA DUE TO METHICILLIN RESISTANT STAPHYLOCOCCUS EPIDERMIDIS: ICD-10-CM

## 2022-11-17 NOTE — PROGRESS NOTES
[address], [address] [place] [address]  [phone]    NAME: Emy Correia  AGE: 76 y o  SEX: female    Progress Note    Location: [facility]   POS: 31 (SNF)     Patient's care was coordinated with nursing facility staff  Recent vitals, labs, and updated medications were review on [facility] system in facility  Assessment/Plan:    Hypokalemia  k level 3 2, asymptomatic per patient she was with loose stools   Now improving with imodium as needed   KCL 40 mEq bid x2 days   F/u K level and trend   Check K level on 11/17, lab was not drawn today  HNL will obtain 11/18, AM Draw     Generalized weakness  On exams appear with generalized weakness   multifactorial  Continue PT/OT  Fall safety precaution   Continue 24-7 supportive care   Optimize acute and chronic medical condition as outlined   Continue adequate p o  hydration and nutrition    following for DC planning    Stage 3a chronic kidney disease ([facility] [address] )  Lab Results   Component Value Date    EGFR 69 10/02/2022    EGFR 61 09/30/2022    EGFR 68 09/29/2022    CREATININE 0 81 10/02/2022    CREATININE 0 90 09/30/2022    CREATININE 0 82 09/29/2022     Lab Results   Component Value Date    EGFR 69 10/02/2022    EGFR 61 09/30/2022    EGFR 68 09/29/2022    CREATININE 0 81 10/02/2022    CREATININE 0 90 09/30/2022    CREATININE 0 82 09/29/2022   Recent BUN 9 with a creatinine 0 61 and GFR 91, stable  Avoid nephrotoxins, hypotension and BP fluctuation  Continue to monitor renal function periodically   Renal dose med for GFR <30     Thrombocytosis  Platelets 291  -- > 788 --> 469, suspect reactive in nature due to recent bacteremia during recent hospitalization  Recently completed 6 weeks of vanco  Id follow patient with weekly labs   No active bleeding noted or reported   Follow platelets and trend   CBC in 10 days    Ambulatory dysfunction  Continue PT/OT  Fall and safety precaution   Continue 24-7 supportive care OOB for meals as tolerated  Heavy assist of 2/3 staff   Optimize acute and chronic medical condition as outlined   PT/OT/sw following for DC planning    Anemia  Hemoglobin is 9 1, hct 28 2, slowly improving   Appears stable when compare to 8 4 during recent hospitalization   No active bleeding noted on exam and none reported  Continue iron and folic acid as well as P59  Continue to monitor CBC and trend    Bacteremia due to methicillin resistant Staphylococcus epidermidis  Recent hospitalization for positive blood culture for Staph epi the epidermidis on 09/25/2022  Repeat blood culture were negative after 72 hours  2D echo was negative for vegetation  S/P ICD placement  Completed 6-weeks of Vanco   ID followed with weekly labs  R PICC removed  Area C/D/I  Free of fever, vital signs stable and nontoxic appearance    History of Present Illness:  12-year-old female seen and examined for STR f/U  Received patient lying in bed, resting  Appears comfortable and is in no acute distress  On exam appears okay with no complaints  Denies pain  Left leg with trace edema  Right leg no edema  Remains on daily dose of lasix of 40 mg  Left leg lower incision approximated, healing well  No erythema or drainage  Continue with WBAT  Complete course of Vanco  Tolerating diet  Sleeping with no difficulties  B/B incontinent  Heavy assist of 2/3 staff  Denies /GI discomfort  Per nursing no other concerns or issues at this time    Review of Systems:  Per history of present illness, all other systems reviewed and negative    Other than those noted in HPI    HISTORY:  Medical Hx: Reviewed, unchanged  Family Hx: Reviewed, unchanged  Soc Hx: Reviewed,  unchanged    ALLERGY: Reviewed, unchanged  Allergies   Allergen Reactions   • Penicillins Hives     Itching and terrible hives   • Sulfa Antibiotics Itching   • E38 Folate [Folic Acid-Vit A9-WYL R07 - Food Allergy] Other (See Comments)     5/23/22 Pt reports no allergic reaction known    • [place] Other (See Comments)     Unknown, 5/23 -pt is unaware of reaction    • Lyrica [Pregabalin] Other (See Comments)     5/23/22 Pt reports doesn't remember reaction    • Other Other (See Comments)     Black rubber 5/23/22 per allergy test - pt unaware of reaction   • Cortisone Acetate [Cortisone] Itching and Rash     5/23/22 pt reports makes her violent    • Nickel Other (See Comments)     Skin discoloration      PHYSICAL EXAM:  Vital Signs: blood pressure 113/60, pulse 74, respirations 18, Temp 98 1, O2 sats 94% RA  Weight: 245 0 lbs    General:  Appears weak and cooperative  Head: Atraumatic  Normocephalic  Eye Exam: anicteric sclera, no discharge, PERRLA, No injection  Oral Exam: moist mucous membranes, no buccaloropharyngeal erythema, palatine tonsils WNL  Neck Exam: no anterior cervical lymphadenopathy noted, neck supple  Cardiovascular: regular rate, regular rhythm, + murmurs, rubs, or gallops  Pulmonary:  Breath sounds clear, no wheeze, no rhonchi, no rales  No chest tenderness  Abdominal: soft, rounded, non-tender, nondistended, bowel sounds audible x 4 quadrants  : Non distended bladder  Incontinent of bowel and bladder   Extremities and skin: no edema noted, left leg trace edema  Left leg incisions BECCA- approximated  Upper and middle incisions approximated no erythema/drainage, Lower incision remains with mild erythema, tender no drainage  No rashes  R PICC clean,dry and intact +bruise around the site  B/L UE bruises from prior blood draws  Right chest healed incision-s/p ICD placement  Right jaw/cheeck bruise, Right shoulder bruise   Stage 3 in L buttock  Neurological: alert, cooperative and responsive, Oriented x 3, ble generalized weakness     Laboratory results / Imaging reviewed: Hard copy/ies in medical chart:  Reviewed from [facility]    CBC-BMP reviewed on 11/14/2022   Hemoglobin 9 2, hematocrit 28 5, WBC 6 1, platelets 151, blood glucose 111, BUN 9, creatinine 0 61, sodium 144, potassium 3 2, calcium 8 6, EGFR 91    Vancomycin trough 14 3 on 11/08/2022    CBC with diff-BMP reviewed on 11/07/2022  Hemoglobin 9 1, hematocrit 28 2, WBC 7 0, platelets 183, absolute neutrophil 4 5, absolute lymphocyte 1 0, absolute monocyte 1 0, absolute use not feel 0 4, absolute basophil 0 1, neutrophils 65, lymphocytes 14, monocytes 14, eosinophils 6, basophils 1, blood glucose 101, BUN 11, creatinine 0 57, sodium 142, potassium 3 8, chloride 107, calcium 8 4, EGFR 92    CBCD, BMP, Vanco T reviewed on 10/31/2022  Vanco T 18 0  Glucose 107, BUN 11, Cr 0 65, Na 142, K 3 7, Ca 8 7, GFR 90, hgb 9 0, hct 27 2, WBC 6 4, pl 402,     CBC with diff/creatinine/Vanco T reviewed 10/26/22  Hemoglobin 8 7, hematocrit 26 7, WBC 8 0, platelets 268, absolute neutrophil 4 7, absolute lymphocyte 1 6, absolute monocytes 1 0, absolute using a feels 0 6, absolute basophil was 0 1, neutrophils 53, lymphocytes 20, monocytes 12, eosinophils 8, basophils 1, band 2, metamyelocytes 1, myelocytes 3, anisocytosis rare, hypochromia rare, polychromasia rare, creatinine 0 70, EGFR 88  Vanco T- 21 9    CBC/Creatinine/ Vanco T on 10/18/22  Hgb 8 7, hct 26 7, WBC 8 0, Platelet 955, absolute neutrophils 4 7, absolute lymphocytes 1 6, absolute monocytes 1 0, absolute eosinophils 0 6, absolute basophils 0 1, neutrophils 53, lymphocytes 20, monocytes 12, eosinophils 8, basophils 1, band 2, metamyelocytes 1, myelocytes 3, anisocytosis rare   Hypochromia rare, polychromasia rate, creatinine 0 70, EGFR 88    Vanco T 21 9 (h)     BMP review on 10/13/2022   Blood glucose 107, BUN 10, creatinine 0 76, sodium 142, potassium 3 6, calcium 9 2, eGFR 80    10/18/22- Vanco T 21 9- nursing notifying ID     10/11/22- Vanco Trough 19 8    10/9/22- Vanco Trough 16 7    CBC with diff-creatinine reviewed from 10/18/2022  Hemoglobin 8 7, hematocrit 26 7, WBC 8 0, platelets 172, absolute neutrophil 4 7, absolute lymphocyte 1 6, absolute monocytes 1 0, absolute use 0 6, absolute basos 0 1, neutrophils 53, lymphocytes 20, monocytes 12, eosinophils 8, basophil 1, band 2, metamyelocytes 1, myelocytes 3, anisocytosis rare, hypochromia rare, polychromasia rare, creatinine 0 70, EGFR 80     CBC with diff/creatinine/Vanco through on 10/6/22  Hemoglobin 8 6, hematocrit 26 0, WBC 6 1, platelets 876, absolute neutrophil 3 0, absolute lymphocyte 1 6, absolute monos 0 7, absolute eos 0 7, absolute basos 0 1, neutrophils 49, lymphocyte 26, monocytes 12, eosinophils 12, basophil 1, creatinine 0 74, EGFR 82, Vanco Through 10 9    CBC-BMP reviewed from 09/19/2022   Hemoglobin 8 3, hematocrit 24 9, WBC 11 4, platelets 037, blood glucose 118, BUN 20, creatinine 0 93, sodium 139, potassium 4 1, calcium 8 4, EGFR 63    Current Medications: All medications reviewed and updated in Nursing Home Chart reviewed from [facility]    Please note:  Voice-recognition software may have been used in the preparation of this document  Occasional wrong word or "sound-alike" substitutions may have occurred due to the inherent limitations of voice recognition software  Interpretation should be guided by context Elwood Jeans  11/16/2022

## 2022-11-18 NOTE — ASSESSMENT & PLAN NOTE
Hemoglobin is 9 1, hct 28 2, slowly improving   Appears stable when compare to 8 4 during recent hospitalization   No active bleeding noted on exam and none reported  Continue iron and folic acid as well as X61  Continue to monitor CBC and trend

## 2022-11-18 NOTE — ASSESSMENT & PLAN NOTE
Platelets 999  -- > 843 --> 469, suspect reactive in nature due to recent bacteremia during recent hospitalization  Recently completed 6 weeks of vanco  Id follow patient with weekly labs   No active bleeding noted or reported   Follow platelets and trend   CBC in 10 days

## 2022-11-18 NOTE — ASSESSMENT & PLAN NOTE
Lab Results   Component Value Date    EGFR 69 10/02/2022    EGFR 61 09/30/2022    EGFR 68 09/29/2022    CREATININE 0 81 10/02/2022    CREATININE 0 90 09/30/2022    CREATININE 0 82 09/29/2022     Lab Results   Component Value Date    EGFR 69 10/02/2022    EGFR 61 09/30/2022    EGFR 68 09/29/2022    CREATININE 0 81 10/02/2022    CREATININE 0 90 09/30/2022    CREATININE 0 82 09/29/2022   Recent BUN 9 with a creatinine 0 61 and GFR 91, stable  Avoid nephrotoxins, hypotension and BP fluctuation  Continue to monitor renal function periodically   Renal dose med for GFR <30

## 2022-11-18 NOTE — ASSESSMENT & PLAN NOTE
k level 3 2, asymptomatic per patient she was with loose stools   Now improving with imodium as needed   KCL 40 mEq bid x2 days   F/u K level and trend   Check K level on 11/17, lab was not drawn today  HNL will obtain 11/18, AM Draw

## 2022-11-19 ENCOUNTER — TRANSCRIBE ORDERS (OUTPATIENT)
Dept: HOME HEALTH SERVICES | Facility: HOME HEALTHCARE | Age: 77
End: 2022-11-19

## 2022-11-19 ENCOUNTER — HOME HEALTH ADMISSION (OUTPATIENT)
Dept: HOME HEALTH SERVICES | Facility: HOME HEALTHCARE | Age: 77
End: 2022-11-19

## 2022-11-19 DIAGNOSIS — Z47.2 ENCOUNTER FOR REMOVAL OF INTERNAL FIXATION DEVICE: ICD-10-CM

## 2022-11-19 DIAGNOSIS — B95.7 BACTEREMIA DUE TO METHICILLIN RESISTANT STAPHYLOCOCCUS EPIDERMIDIS: ICD-10-CM

## 2022-11-19 DIAGNOSIS — R78.81 BACTEREMIA DUE TO METHICILLIN RESISTANT STAPHYLOCOCCUS EPIDERMIDIS: ICD-10-CM

## 2022-11-19 DIAGNOSIS — Z16.29 BACTEREMIA DUE TO METHICILLIN RESISTANT STAPHYLOCOCCUS EPIDERMIDIS: ICD-10-CM

## 2022-11-19 DIAGNOSIS — S72.452K: ICD-10-CM

## 2022-11-19 DIAGNOSIS — Z47.1 AFTERCARE FOLLOWING JOINT REPLACEMENT SURGERY, UNSPECIFIED JOINT: Primary | ICD-10-CM

## 2022-11-21 ENCOUNTER — NURSING HOME VISIT (OUTPATIENT)
Dept: GERIATRICS | Facility: OTHER | Age: 77
End: 2022-11-21

## 2022-11-21 DIAGNOSIS — D75.839 THROMBOCYTOSIS: ICD-10-CM

## 2022-11-21 DIAGNOSIS — R53.1 GENERALIZED WEAKNESS: ICD-10-CM

## 2022-11-21 DIAGNOSIS — R78.81 BACTEREMIA DUE TO METHICILLIN RESISTANT STAPHYLOCOCCUS EPIDERMIDIS: Primary | ICD-10-CM

## 2022-11-21 DIAGNOSIS — B95.7 BACTEREMIA DUE TO METHICILLIN RESISTANT STAPHYLOCOCCUS EPIDERMIDIS: Primary | ICD-10-CM

## 2022-11-21 DIAGNOSIS — Z16.29 BACTEREMIA DUE TO METHICILLIN RESISTANT STAPHYLOCOCCUS EPIDERMIDIS: Primary | ICD-10-CM

## 2022-11-21 DIAGNOSIS — R11.0 NAUSEA: ICD-10-CM

## 2022-11-21 DIAGNOSIS — R26.2 AMBULATORY DYSFUNCTION: ICD-10-CM

## 2022-11-22 ENCOUNTER — NURSING HOME VISIT (OUTPATIENT)
Dept: GERIATRICS | Facility: OTHER | Age: 77
End: 2022-11-22

## 2022-11-22 DIAGNOSIS — D75.839 THROMBOCYTOSIS: ICD-10-CM

## 2022-11-22 DIAGNOSIS — R11.0 NAUSEA: ICD-10-CM

## 2022-11-22 DIAGNOSIS — N17.9 AKI (ACUTE KIDNEY INJURY) (HCC): Primary | ICD-10-CM

## 2022-11-22 DIAGNOSIS — E87.5 HYPERKALEMIA: ICD-10-CM

## 2022-11-22 DIAGNOSIS — R39.89 SUSPECTED UTI: ICD-10-CM

## 2022-11-22 DIAGNOSIS — D72.829 LEUKOCYTOSIS, UNSPECIFIED TYPE: ICD-10-CM

## 2022-11-22 NOTE — PROGRESS NOTES
[address], [address] [place] [address], [address] [place] [address]  [phone]    NAME: Brynn Villavicencio  AGE: 76 y o  SEX: female    Progress Note    [facility]: [facility]   POS: 31 (SNF)     Patient's care was coordinated with nursing facility staff  Recent vitals, labs, and updated medications were review on [facility] system in facility  Assessment/Plan:    Nausea  Nursing reporting pt feeling nauseous, no vomiting  Patient refusing to take meds   Afebrile, vital signs stable and ill appearing  Available zofran   abd NT/ND, +BS throughout, denies pain, states is just not myself  Obtain urine studies, CBC, CMP, KUB to r/o obstruction  Bladder scan in vital signs Q shift x2 days  Follow-up CBC and CMP on 11/22    Bacteremia due to methicillin resistant Staphylococcus epidermidis  Recent hospitalization for positive blood culture for Staph epi the epidermidis on 09/25/2022  Repeat blood culture were negative after 72 hours  2D echo was negative for vegetation  S/P ICD placement  Completed 6-weeks of Vanco   ID followed with weekly labs  R PICC removed   Area C/D/I  Free of fever, vital signs stable and nontoxic appearance    Ambulatory dysfunction  Multifactorial  Continue PT/OT  Fall and safety precaution   Continue 24-7 supportive care   OOB for meals as tolerated  Heavy assist of 2/3 staff   Optimize acute and chronic medical condition as outlined   PT/OT/sw following for DC planning    Generalized weakness  On exams appear with generalized weakness   multifactorial  Continue PT/OT  Fall safety precaution   Continue 24-7 supportive care   Optimize acute and chronic medical condition as outlined   Continue adequate p o  hydration and nutrition    following for DC planning    Thrombocytosis  Platelets 859  -- > 384 --> 469, suspect reactive in nature due to recent bacteremia during recent hospitalization  Recently completed 6 weeks of vanco  Id follow patient with weekly labs No active bleeding noted or reported   Follow platelets and trend   CBC in 10 days    History of Present Illness:  22-year-old female seen and examined for STR acute visit  Nursing reporting pt with nausea and decrease po intake  on exam, pt reporting feeling nauseous no vomiting and not feeling herself  C/o not feeling right in her belly  Denies abd pain, states "is like tenderness " abdomen was palpated with no guarding no rebound  +BS  NT/ND  Reports have had semi formed bowel movements  Does not appear to be constipated  Will obtain KUB to r/o obstruction  Will obtain CBC,CMP  Denies chest pain,  palpitation, shortness of breath, cough, fever, chills, lightheadedness, dizziness, headache, or visual changes  Left leg with trace edema  Right leg no edema  Remains on daily dose of lasix of 40 mg  Left leg lower incision approximated, healing well  No erythema or drainage  Continue with WBAT  Sleeping with no difficulties  B/B incontinent  Heavy assist of 2/3 staff  Denies /GI discomfort  Per nursing no other concerns or issues at this time    Review of Systems:  Per history of present illness, all other systems reviewed and negative    Other than those noted in HPI    HISTORY:  Medical Hx: Reviewed, unchanged  Family Hx: Reviewed, unchanged  Soc Hx: Reviewed,  unchanged    ALLERGY: Reviewed, unchanged  Allergies   Allergen Reactions   • Penicillins Hives     Itching and terrible hives   • Sulfa Antibiotics Itching   • C58 Folate [Folic Acid-Vit H5-SILVINA R38 - Food Allergy] Other (See Comments)     5/23/22 Pt reports no allergic reaction known    • [place] Other (See Comments)     Unknown, 5/23 -pt is unaware of reaction    • Lyrica [Pregabalin] Other (See Comments)     5/23/22 Pt reports doesn't remember reaction    • Other Other (See Comments)     Black rubber 5/23/22 per allergy test - pt unaware of reaction   • Cortisone Acetate [Cortisone] Itching and Rash     5/23/22 pt reports makes her violent    • [place] Other (See Comments)     Skin discoloration      PHYSICAL EXAM:  Vital Signs: blood pressure 137/73, pulse 74, respirations 18, Temp 98 1, O2 sats 94% RA  Weight: 245 3 lbs    General:  Appears weak and cooperative  Head: Atraumatic  Normocephalic  Eye Exam: anicteric sclera, no discharge, PERRLA, No injection  Oral Exam: moist mucous membranes, no buccaloropharyngeal erythema, palatine tonsils WNL  Neck Exam: no anterior cervical lymphadenopathy noted, neck supple  Cardiovascular: regular rate, regular rhythm, + murmurs, rubs, or gallops  Pulmonary:  Breath sounds clear, no wheeze, no rhonchi, no rales  No chest tenderness  Abdominal: soft, rounded, non-tender, nondistended, bowel sounds audible x 4 quadrants  : Non distended bladder  Incontinent of bowel and bladder   Extremities and skin: no edema noted, left leg trace edema  Left leg incisions BECCA- approximated  Upper and middle incisions approximated no erythema/drainage, Lower incision remains with mild erythema, tender no drainage  No rashes  R PICC clean,dry and intact +bruise around the site  B/L UE bruises from prior blood draws  Right chest healed incision-s/p ICD placement  Right jaw/cheeck bruise, Right shoulder bruise   Stage 3 in L buttock  Neurological: alert, cooperative and responsive, Oriented x 3, ble generalized weakness     Laboratory results / Imaging reviewed: Hard copy/ies in medical chart:  Reviewed from [facility]    CBC-BMP reviewed on 11/14/2022   Hemoglobin 9 2, hematocrit 28 5, WBC 6 1, platelets 392, blood glucose 111, BUN 9, creatinine 0 61, sodium 144, potassium 3 2, calcium 8 6, EGFR 91    Vancomycin trough 14 3 on 11/08/2022    CBC with diff-BMP reviewed on 11/07/2022  Hemoglobin 9 1, hematocrit 28 2, WBC 7 0, platelets 094, absolute neutrophil 4 5, absolute lymphocyte 1 0, absolute monocyte 1 0, absolute use not feel 0 4, absolute basophil 0 1, neutrophils 65, lymphocytes 14, monocytes 14, eosinophils 6, basophils 1, blood glucose 101, BUN 11, creatinine 0 57, sodium 142, potassium 3 8, chloride 107, calcium 8 4, EGFR 92    CBCD, BMP, Vanco T reviewed on 10/31/2022  Vanco T 18 0  Glucose 107, BUN 11, Cr 0 65, Na 142, K 3 7, Ca 8 7, GFR 90, hgb 9 0, hct 27 2, WBC 6 4, pl 402,     CBC with diff/creatinine/Vanco T reviewed 10/26/22  Hemoglobin 8 7, hematocrit 26 7, WBC 8 0, platelets 325, absolute neutrophil 4 7, absolute lymphocyte 1 6, absolute monocytes 1 0, absolute using a feels 0 6, absolute basophil was 0 1, neutrophils 53, lymphocytes 20, monocytes 12, eosinophils 8, basophils 1, band 2, metamyelocytes 1, myelocytes 3, anisocytosis rare, hypochromia rare, polychromasia rare, creatinine 0 70, EGFR 88  Vanco T- 21 9    CBC/Creatinine/ Vanco T on 10/18/22  Hgb 8 7, hct 26 7, WBC 8 0, Platelet 854, absolute neutrophils 4 7, absolute lymphocytes 1 6, absolute monocytes 1 0, absolute eosinophils 0 6, absolute basophils 0 1, neutrophils 53, lymphocytes 20, monocytes 12, eosinophils 8, basophils 1, band 2, metamyelocytes 1, myelocytes 3, anisocytosis rare   Hypochromia rare, polychromasia rate, creatinine 0 70, EGFR 88    Vanco T 21 9 (h)     BMP review on 10/13/2022   Blood glucose 107, BUN 10, creatinine 0 76, sodium 142, potassium 3 6, calcium 9 2, eGFR 80    10/18/22- Vanco T 21 9- nursing notifying ID     10/11/22- Vanco Trough 19 8    10/9/22- Vanco Trough 16 7    CBC with diff-creatinine reviewed from 10/18/2022  Hemoglobin 8 7, hematocrit 26 7, WBC 8 0, platelets 652, absolute neutrophil 4 7, absolute lymphocyte 1 6, absolute monocytes 1 0, absolute use 0 6, absolute basos 0 1, neutrophils 53, lymphocytes 20, monocytes 12, eosinophils 8, basophil 1, band 2, metamyelocytes 1, myelocytes 3, anisocytosis rare, hypochromia rare, polychromasia rare, creatinine 0 70, EGFR 80     CBC with diff/creatinine/Vanco through on 10/6/22  Hemoglobin 8 6, hematocrit 26 0, WBC 6 1, platelets 838, absolute neutrophil 3 0, absolute lymphocyte 1 6, absolute monos 0 7, absolute eos 0 7, absolute basos 0 1, neutrophils 49, lymphocyte 26, monocytes 12, eosinophils 12, basophil 1, creatinine 0 74, EGFR 82, Vanco Through 10 9    CBC-BMP reviewed from 09/19/2022   Hemoglobin 8 3, hematocrit 24 9, WBC 11 4, platelets 919, blood glucose 118, BUN 20, creatinine 0 93, sodium 139, potassium 4 1, calcium 8 4, EGFR 63    Current Medications: All medications reviewed and updated in Nursing Home Chart reviewed from [facility]    Please note:  Voice-recognition software may have been used in the preparation of this document  Occasional wrong word or "sound-alike" substitutions may have occurred due to the inherent limitations of voice recognition software  Interpretation should be guided by context      Michael Lucero  11/21/2022

## 2022-11-23 ENCOUNTER — PATIENT OUTREACH (OUTPATIENT)
Dept: CASE MANAGEMENT | Facility: HOSPITAL | Age: 77
End: 2022-11-23

## 2022-11-23 NOTE — ASSESSMENT & PLAN NOTE
Multifactorial  Continue PT/OT  Fall and safety precaution   Continue 24-7 supportive care   OOB for meals as tolerated  Heavy assist of 2/3 staff   Optimize acute and chronic medical condition as outlined   PT/OT/sw following for DC planning

## 2022-11-23 NOTE — ASSESSMENT & PLAN NOTE
Nursing reporting pt feeling nauseous, no vomiting  Patient refusing to take meds   Afebrile, vital signs stable and ill appearing  Available zofran   abd NT/ND, +BS throughout, denies pain, states is just not myself  Obtain urine studies, CBC, CMP, KUB to r/o obstruction  Bladder scan in vital signs Q shift x2 days  Follow-up CBC and CMP on 11/22

## 2022-11-23 NOTE — ASSESSMENT & PLAN NOTE
Platelets 875  -- > 312 --> 469, suspect reactive in nature due to recent bacteremia during recent hospitalization  Recently completed 6 weeks of vanco  Id follow patient with weekly labs   No active bleeding noted or reported   Follow platelets and trend   CBC in 10 days

## 2022-11-24 ENCOUNTER — TELEPHONE (OUTPATIENT)
Dept: OTHER | Facility: OTHER | Age: 77
End: 2022-11-24

## 2022-11-24 NOTE — TELEPHONE ENCOUNTER
[facility] requesting a call back from on call provider regarding patient had a midline right upper extremeity and it dislodged and they need to know hwo to proceed      Paged on call provider SICU Consultation Note  ======================================================================================================  57y Male  w/ hx of  HTN, DM, ESRD (HD MWF, last dialysis was Saturday), A-fib        OR time:      EBL:          IV Fluids:       Blood Products:                UOP:      Procedure Findings-    PAST MEDICAL & SURGICAL HISTORY:  PAD (peripheral artery disease): multiple toe amputations  Anemia: chronic anemia - s/p transfusion 2018  Thyroid cancer  Chronic leg pain  OA (osteoarthritis)  SOB (shortness of breath) on exertion  ESRD (end stage renal disease): 2 yrs  Atrial fibrillation: on warfarin  Right sided weakness  Stroke: 8 yrs ago  Hypertension  High blood cholesterol  Diabetes mellitus, type 2  Dialysis patient: Mon, Wednesday, Friday ([facility])  Smoker  H/O thyroid nodule  H/O gastroesophageal reflux (GERD)  History of tonsillectomy  History of surgery: Multiple toe amputations (amp 4 1/2 left toes; has 1/2 left mid toe; amp right mid toe)  A-V fistula: left AVF      Home Meds:   Home Medications:  Ambien: 1 tab(s) orally once a day (at bedtime) (20 May 2019 08:23)  atorvastatin 20 mg oral tablet: 1 tab(s) orally once a day (at bedtime) (20 May 2019 08:23)  Coumadin 2.5 mg oral tablet: 2 tab(s) orally once a day (at bedtime) (20 May 2019 08:23)  furosemide 40 mg oral tablet: 1 tab(s) orally once a day (20 May 2019 08:23)  gabapentin 100 mg oral tablet: 1 tab(s) orally once a day (20 May 2019 08:23)  insulin lispro (concentrated) 200 units/mL subcutaneous solution: 7 unit(s) subcutaneous 3 times a day  5-10 units prn (20 May 2019 08:23)  Lantus 100 units/mL subcutaneous solution: 15 unit(s) subcutaneous once a day (at bedtime) (20 May 2019 08:23)  pantoprazole 40 mg oral delayed release tablet: 1 tab(s) orally once a day (20 May 2019 08:23)  pioglitazone 30 mg oral tablet: 1 tab(s) orally once a day (20 May 2019 08:23)  Renvela 800 mg oral tablet: 1 tab(s) orally 3 times a day (with meals) (20 May 2019 08:23)  Toprol-XL 25 mg oral tablet, extended release: 1 tab(s) orally once a day (20 May 2019 08:23)  Vit D 50,000: 1 cap(s) orally once a week (20 May 2019 08:23)      Allergies    Orange (Other)  Originally Entered as [HIVES] reaction to [sulfur] (Unknown)  penicillin (Unknown)  sulfADIAZINE (Unknown)    Intolerances          Advanced Directives: Full Code      CURRENT MEDICATIONS:   --------------------------------------------------------------------------------------  Neurologic Medications  HYDROmorphone  Injectable 0.5 milliGRAM(s) IV Push every 10 minutes PRN Moderate Pain (4 - 6)  HYDROmorphone  Injectable 1 milliGRAM(s) IV Push every 10 minutes PRN Severe Pain (7 - 10)  LORazepam   Injectable 2 milliGRAM(s) IV Push once  ondansetron Injectable 4 milliGRAM(s) IV Push once PRN Nausea and/or Vomiting    Respiratory Medications    Cardiovascular Medications  furosemide    Tablet 40 milliGRAM(s) Oral daily  metoprolol succinate ER 25 milliGRAM(s) Oral daily    Gastrointestinal Medications  dextrose 5%. 1000 milliLiter(s) IV Continuous <Continuous>  pantoprazole    Tablet 40 milliGRAM(s) Oral before breakfast  sodium chloride 0.9%. 1000 milliLiter(s) IV Continuous <Continuous>    Genitourinary Medications    Hematologic/Oncologic Medications    Antimicrobial/Immunologic Medications    Endocrine/Metabolic Medications  atorvastatin 20 milliGRAM(s) Oral at bedtime  dextrose 40% Gel 15 Gram(s) Oral once PRN Blood Glucose LESS THAN 70 milliGRAM(s)/deciliter  dextrose 50% Injectable 12.5 Gram(s) IV Push once  dextrose 50% Injectable 25 Gram(s) IV Push once  dextrose 50% Injectable 25 Gram(s) IV Push once  glucagon  Injectable 1 milliGRAM(s) IntraMuscular once PRN Glucose LESS THAN 70 milligrams/deciliter  insulin glargine Injectable (LANTUS) 15 Unit(s) SubCutaneous at bedtime  insulin lispro (HumaLOG) corrective regimen sliding scale   SubCutaneous three times a day before meals  insulin lispro Injectable (HumaLOG) 7 Unit(s) SubCutaneous three times a day before meals  pioglitazone 30 milliGRAM(s) Oral daily    Topical/Other Medications  sevelamer carbonate 800 milliGRAM(s) Oral three times a day with meals          VITAL SIGNS, INS/OUTS (Last 24hours):    ICU Vital Signs Last 24 Hrs  T(C): 36.4 (20 May 2019 13:54), Max: 36.6 (20 May 2019 08:27)  T(F): 97.5 (20 May 2019 13:54), Max: 97.8 (20 May 2019 08:27)  HR: 102 (20 May 2019 14:37) (102 - 105)  BP: 136/75 (20 May 2019 14:37) (123/70 - 150/81)  BP(mean): --  ABP: --  ABP(mean): --  RR: 20 (20 May 2019 14:37) (18 - 28)  SpO2: 99% (20 May 2019 14:37) (92% - 100%)    I&O's Summary        Height (cm): 182.88 (05-06-19)  Weight (kg): 99.8 (05-20-19)  BMI (kg/m2): 29.8 (05-20-19)  BSA (m2): 2.22 (05-20-19)    Physical Exam:  ---------------------------------------------------------------------------------------  RASS:   GCS:    E/M/V  Exam: A&Ox3, no focal deficits    RESPIRATORY:  Intubated/Tracheostomy  Lungs clear to auscultation b/l, Normal expansion/effort  Mechanical Ventilation:     CARDIOVASCULAR:   S1/S2.  RRR  No peripheral edema    GASTROINTESTINAL:  Abdomen soft, non-tender, non-distended, no wounds or discoloration  Colostomy/Ileostomy/NG/OG tube in place    MUSCULOSKELETAL:  Extremities warm, pink, well-perfused.  Palpable/Doppler pulses signals      DERM:  No skin breakdown     :   Exam: Garza catheter in place.       Tubes/Lines/Drains   ----------------------------------------------------------------------------------------------------------  [x] Peripheral IV  [] Central Venous Line    R/L        IJ/Femoral             Date Placed:    [] Arterial Line		   R/L         Radial/Femoral    Date Placed:   [] PICC:         	[] Midline		                                  Date Placed:   [] Urinary Catheter Garza                                             Date Placed:       LABS  --------------------------------------------------------------------------------------                CAPILLARY BLOOD GLUCOSE      POCT Blood Glucose.: 144 mg/dL (20 May 2019 08:14)                CT/XRAY/ECHO/TCD/EEG  ----------------------------------------------------------------------------------------------          --------------------------------------------------------------------------------------  Admit Diagnosis: C73, 14467      Critical Care Diagnoses: SICU Consultation Note  ======================================================================================================  57y Male  w/ hx of  HTN, DM, ESRD (HD MWF, last dialysis was Saturday), A-fib (on elliquis) and thyroid cancer, s/p total thyroidectomy today, no complications during the case as per Analia MICHAELS. Patient was evaluated by his nephroloist (Laura MICHAELS), recommends dialysis today. Patient currently without chest pain or shortness of breath. HD stable at this time.         OR time:  2.5 hours    EBL:   25cc       IV Fluids:  800cc     Blood Products:  None              UOP:  NA    Procedure Findings - s/p total thyroidectomy    PAST MEDICAL & SURGICAL HISTORY:  PAD (peripheral artery disease): multiple toe amputations  Anemia: chronic anemia - s/p transfusion 2018  Thyroid cancer  Chronic leg pain  OA (osteoarthritis)  SOB (shortness of breath) on exertion  ESRD (end stage renal disease): 2 yrs  Atrial fibrillation: on warfarin  Right sided weakness  Stroke: 8 yrs ago  Hypertension  High blood cholesterol  Diabetes mellitus, type 2  Dialysis patient: Mon, Wednesday, Friday ([facility])  Smoker  H/O thyroid nodule  H/O gastroesophageal reflux (GERD)  History of tonsillectomy  History of surgery: Multiple toe amputations (amp 4 1/2 left toes; has 1/2 left mid toe; amp right mid toe)  A-V fistula: left AVF      Home Meds:   Home Medications:  Ambien: 1 tab(s) orally once a day (at bedtime) (20 May 2019 08:23)  atorvastatin 20 mg oral tablet: 1 tab(s) orally once a day (at bedtime) (20 May 2019 08:23)  Coumadin 2.5 mg oral tablet: 2 tab(s) orally once a day (at bedtime) (20 May 2019 08:23)  furosemide 40 mg oral tablet: 1 tab(s) orally once a day (20 May 2019 08:23)  gabapentin 100 mg oral tablet: 1 tab(s) orally once a day (20 May 2019 08:23)  insulin lispro (concentrated) 200 units/mL subcutaneous solution: 7 unit(s) subcutaneous 3 times a day  5-10 units prn (20 May 2019 08:23)  Lantus 100 units/mL subcutaneous solution: 15 unit(s) subcutaneous once a day (at bedtime) (20 May 2019 08:23)  pantoprazole 40 mg oral delayed release tablet: 1 tab(s) orally once a day (20 May 2019 08:23)  pioglitazone 30 mg oral tablet: 1 tab(s) orally once a day (20 May 2019 08:23)  Renvela 800 mg oral tablet: 1 tab(s) orally 3 times a day (with meals) (20 May 2019 08:23)  Toprol-XL 25 mg oral tablet, extended release: 1 tab(s) orally once a day (20 May 2019 08:23)  Vit D 50,000: 1 cap(s) orally once a week (20 May 2019 08:23)      Allergies    Orange (Other)  Originally Entered as [HIVES] reaction to [sulfur] (Unknown)  penicillin (Unknown)  sulfADIAZINE (Unknown)    Intolerances          Advanced Directives: Full Code      CURRENT MEDICATIONS:   --------------------------------------------------------------------------------------  Neurologic Medications  HYDROmorphone  Injectable 0.5 milliGRAM(s) IV Push every 10 minutes PRN Moderate Pain (4 - 6)  HYDROmorphone  Injectable 1 milliGRAM(s) IV Push every 10 minutes PRN Severe Pain (7 - 10)  LORazepam   Injectable 2 milliGRAM(s) IV Push once  ondansetron Injectable 4 milliGRAM(s) IV Push once PRN Nausea and/or Vomiting    Respiratory Medications    Cardiovascular Medications  furosemide    Tablet 40 milliGRAM(s) Oral daily  metoprolol succinate ER 25 milliGRAM(s) Oral daily    Gastrointestinal Medications  dextrose 5%. 1000 milliLiter(s) IV Continuous <Continuous>  pantoprazole    Tablet 40 milliGRAM(s) Oral before breakfast  sodium chloride 0.9%. 1000 milliLiter(s) IV Continuous <Continuous>    Genitourinary Medications    Hematologic/Oncologic Medications    Antimicrobial/Immunologic Medications    Endocrine/Metabolic Medications  atorvastatin 20 milliGRAM(s) Oral at bedtime  dextrose 40% Gel 15 Gram(s) Oral once PRN Blood Glucose LESS THAN 70 milliGRAM(s)/deciliter  dextrose 50% Injectable 12.5 Gram(s) IV Push once  dextrose 50% Injectable 25 Gram(s) IV Push once  dextrose 50% Injectable 25 Gram(s) IV Push once  glucagon  Injectable 1 milliGRAM(s) IntraMuscular once PRN Glucose LESS THAN 70 milligrams/deciliter  insulin glargine Injectable (LANTUS) 15 Unit(s) SubCutaneous at bedtime  insulin lispro (HumaLOG) corrective regimen sliding scale   SubCutaneous three times a day before meals  insulin lispro Injectable (HumaLOG) 7 Unit(s) SubCutaneous three times a day before meals  pioglitazone 30 milliGRAM(s) Oral daily    Topical/Other Medications  sevelamer carbonate 800 milliGRAM(s) Oral three times a day with meals          VITAL SIGNS, INS/OUTS (Last 24hours):    ICU Vital Signs Last 24 Hrs  T(C): 36.4 (20 May 2019 13:54), Max: 36.6 (20 May 2019 08:27)  T(F): 97.5 (20 May 2019 13:54), Max: 97.8 (20 May 2019 08:27)  HR: 102 (20 May 2019 14:37) (102 - 105)  BP: 136/75 (20 May 2019 14:37) (123/70 - 150/81)  BP(mean): --  ABP: --  ABP(mean): --  RR: 20 (20 May 2019 14:37) (18 - 28)  SpO2: 99% (20 May 2019 14:37) (92% - 100%)    I&O's Summary        Height (cm): 182.88 (05-06-19)  Weight (kg): 99.8 (05-20-19)  BMI (kg/m2): 29.8 (05-20-19)  BSA (m2): 2.22 (05-20-19)    Physical Exam:  ---------------------------------------------------------------------------------------  RASS: 0  GCS:  15   Exam: A&Ox3, no focal deficits    RESPIRATORY:  Intubated/Tracheostomy  Lungs clear to auscultation b/l, Normal expansion/effort  Mechanical Ventilation:     CARDIOVASCULAR:   S1/S2.  RRR  No peripheral edema    GASTROINTESTINAL:  Abdomen soft, non-tender, non-distended, no wounds or discoloration  Colostomy/Ileostomy/NG/OG tube in place    MUSCULOSKELETAL:  Extremities warm, pink, well-perfused.  Palpable/Doppler pulses signals      DERM:  No skin breakdown     :   Exam: Garza catheter in place.       Tubes/Lines/Drains   ----------------------------------------------------------------------------------------------------------  [x] Peripheral IV  [] Central Venous Line    R/L        IJ/Femoral             Date Placed:    [] Arterial Line		   R/L         Radial/Femoral    Date Placed:   [] PICC:         	[] Midline		                                  Date Placed:   [] Urinary Catheter Garza                                             Date Placed:       LABS  --------------------------------------------------------------------------------------                CAPILLARY BLOOD GLUCOSE      POCT Blood Glucose.: 144 mg/dL (20 May 2019 08:14)                CT/XRAY/ECHO/TCD/EEG  ----------------------------------------------------------------------------------------------          --------------------------------------------------------------------------------------  Admit Diagnosis: C73, 47134      Critical Care Diagnoses: SICU Consultation Note  ======================================================================================================  57y Male  w/ hx of  HTN, DM, ESRD (HD MWF, last dialysis was Saturday), A-fib (on elliquis) and thyroid cancer, s/p total thyroidectomy today, no complications during the case as per Analia MICHAELS. Patient was evaluated by his nephroloist (Laura MICAHELS), recommends dialysis today. Patient currently without chest pain or shortness of breath. HD stable at this time.         OR time:  2.5 hours    EBL:   25cc       IV Fluids:  800cc     Blood Products:  None              UOP:  NA    Procedure Findings - s/p total thyroidectomy    PAST MEDICAL & SURGICAL HISTORY:  PAD (peripheral artery disease): multiple toe amputations  Anemia: chronic anemia - s/p transfusion 2018  Thyroid cancer  Chronic leg pain  OA (osteoarthritis)  SOB (shortness of breath) on exertion  ESRD (end stage renal disease): 2 yrs  Atrial fibrillation: on warfarin  Right sided weakness  Stroke: 8 yrs ago  Hypertension  High blood cholesterol  Diabetes mellitus, type 2  Dialysis patient: Mon, Wednesday, Friday ([facility])  Smoker  H/O thyroid nodule  H/O gastroesophageal reflux (GERD)  History of tonsillectomy  History of surgery: Multiple toe amputations (amp 4 1/2 left toes; has 1/2 left mid toe; amp right mid toe)  A-V fistula: left AVF      Home Meds:   Home Medications:  Ambien: 1 tab(s) orally once a day (at bedtime) (20 May 2019 08:23)  atorvastatin 20 mg oral tablet: 1 tab(s) orally once a day (at bedtime) (20 May 2019 08:23)  Coumadin 2.5 mg oral tablet: 2 tab(s) orally once a day (at bedtime) (20 May 2019 08:23)  furosemide 40 mg oral tablet: 1 tab(s) orally once a day (20 May 2019 08:23)  gabapentin 100 mg oral tablet: 1 tab(s) orally once a day (20 May 2019 08:23)  insulin lispro (concentrated) 200 units/mL subcutaneous solution: 7 unit(s) subcutaneous 3 times a day  5-10 units prn (20 May 2019 08:23)  Lantus 100 units/mL subcutaneous solution: 15 unit(s) subcutaneous once a day (at bedtime) (20 May 2019 08:23)  pantoprazole 40 mg oral delayed release tablet: 1 tab(s) orally once a day (20 May 2019 08:23)  pioglitazone 30 mg oral tablet: 1 tab(s) orally once a day (20 May 2019 08:23)  Renvela 800 mg oral tablet: 1 tab(s) orally 3 times a day (with meals) (20 May 2019 08:23)  Toprol-XL 25 mg oral tablet, extended release: 1 tab(s) orally once a day (20 May 2019 08:23)  Vit D 50,000: 1 cap(s) orally once a week (20 May 2019 08:23)      Allergies    Orange (Other)  Originally Entered as [HIVES] reaction to [sulfur] (Unknown)  penicillin (Unknown)  sulfADIAZINE (Unknown)    Intolerances          Advanced Directives: Full Code      CURRENT MEDICATIONS:   --------------------------------------------------------------------------------------  Neurologic Medications  HYDROmorphone  Injectable 0.5 milliGRAM(s) IV Push every 10 minutes PRN Moderate Pain (4 - 6)  HYDROmorphone  Injectable 1 milliGRAM(s) IV Push every 10 minutes PRN Severe Pain (7 - 10)  LORazepam   Injectable 2 milliGRAM(s) IV Push once  ondansetron Injectable 4 milliGRAM(s) IV Push once PRN Nausea and/or Vomiting    Respiratory Medications    Cardiovascular Medications  furosemide    Tablet 40 milliGRAM(s) Oral daily  metoprolol succinate ER 25 milliGRAM(s) Oral daily    Gastrointestinal Medications  dextrose 5%. 1000 milliLiter(s) IV Continuous <Continuous>  pantoprazole    Tablet 40 milliGRAM(s) Oral before breakfast  sodium chloride 0.9%. 1000 milliLiter(s) IV Continuous <Continuous>    Genitourinary Medications    Hematologic/Oncologic Medications    Antimicrobial/Immunologic Medications    Endocrine/Metabolic Medications  atorvastatin 20 milliGRAM(s) Oral at bedtime  dextrose 40% Gel 15 Gram(s) Oral once PRN Blood Glucose LESS THAN 70 milliGRAM(s)/deciliter  dextrose 50% Injectable 12.5 Gram(s) IV Push once  dextrose 50% Injectable 25 Gram(s) IV Push once  dextrose 50% Injectable 25 Gram(s) IV Push once  glucagon  Injectable 1 milliGRAM(s) IntraMuscular once PRN Glucose LESS THAN 70 milligrams/deciliter  insulin glargine Injectable (LANTUS) 15 Unit(s) SubCutaneous at bedtime  insulin lispro (HumaLOG) corrective regimen sliding scale   SubCutaneous three times a day before meals  insulin lispro Injectable (HumaLOG) 7 Unit(s) SubCutaneous three times a day before meals  pioglitazone 30 milliGRAM(s) Oral daily    Topical/Other Medications  sevelamer carbonate 800 milliGRAM(s) Oral three times a day with meals          VITAL SIGNS, INS/OUTS (Last 24hours):    ICU Vital Signs Last 24 Hrs  T(C): 36.4 (20 May 2019 13:54), Max: 36.6 (20 May 2019 08:27)  T(F): 97.5 (20 May 2019 13:54), Max: 97.8 (20 May 2019 08:27)  HR: 102 (20 May 2019 14:37) (102 - 105)  BP: 136/75 (20 May 2019 14:37) (123/70 - 150/81)  BP(mean): --  ABP: --  ABP(mean): --  RR: 20 (20 May 2019 14:37) (18 - 28)  SpO2: 99% (20 May 2019 14:37) (92% - 100%)    I&O's Summary        Height (cm): 182.88 (05-06-19)  Weight (kg): 99.8 (05-20-19)  BMI (kg/m2): 29.8 (05-20-19)  BSA (m2): 2.22 (05-20-19)    Physical Exam:  ---------------------------------------------------------------------------------------  RASS: 0  GCS:  15   Exam: A&Ox3, no focal deficits    Neck: wound vac noted over anterior neck with bloody drainage    RESPIRATORY:  Lungs clear to auscultation b/l, Normal expansion/effort    CARDIOVASCULAR:  S1/S2.  RRR  No peripheral edema    GASTROINTESTINAL:  Abdomen soft, non-tender, non-distended, no wounds or discoloration    MUSCULOSKELETAL:  Extremities warm, pink, well-perfused.  Palpable pulses    DERM:  No skin breakdown     Tubes/Lines/Drains   ----------------------------------------------------------------------------------------------------------  [x] Peripheral IV  [X] Arterial Line		   R         Radial	   	     Date Placed: 5/20/2019    LABS  --------------------------------------------------------------------------------------                CAPILLARY BLOOD GLUCOSE      POCT Blood Glucose.: 144 mg/dL (20 May 2019 08:14)                CT/XRAY/ECHO/TCD/EEG  ----------------------------------------------------------------------------------------------          --------------------------------------------------------------------------------------  Admit Diagnosis: C73, [id number]      Critical Care Diagnoses:

## 2022-11-25 ENCOUNTER — HOSPITAL ENCOUNTER (INPATIENT)
Facility: HOSPITAL | Age: 77
LOS: 3 days | Discharge: NURSING, REHAB OR LONG-TERM CARE | End: 2022-11-28
Attending: EMERGENCY MEDICINE | Admitting: INTERNAL MEDICINE

## 2022-11-25 ENCOUNTER — APPOINTMENT (EMERGENCY)
Dept: RADIOLOGY | Facility: HOSPITAL | Age: 77
End: 2022-11-25

## 2022-11-25 ENCOUNTER — HOME CARE VISIT (OUTPATIENT)
Dept: HOME HEALTH SERVICES | Facility: HOME HEALTHCARE | Age: 77
End: 2022-11-25

## 2022-11-25 ENCOUNTER — NURSING HOME VISIT (OUTPATIENT)
Dept: GERIATRICS | Facility: OTHER | Age: 77
End: 2022-11-25

## 2022-11-25 ENCOUNTER — APPOINTMENT (EMERGENCY)
Dept: CT IMAGING | Facility: HOSPITAL | Age: 77
End: 2022-11-25

## 2022-11-25 DIAGNOSIS — I48.91 ATRIAL FIBRILLATION WITH RVR (HCC): ICD-10-CM

## 2022-11-25 DIAGNOSIS — E87.1 HYPONATREMIA: ICD-10-CM

## 2022-11-25 DIAGNOSIS — R78.81 BACTEREMIA DUE TO METHICILLIN RESISTANT STAPHYLOCOCCUS EPIDERMIDIS: ICD-10-CM

## 2022-11-25 DIAGNOSIS — E86.0 DEHYDRATION: ICD-10-CM

## 2022-11-25 DIAGNOSIS — R11.0 NAUSEA: ICD-10-CM

## 2022-11-25 DIAGNOSIS — D72.829 LEUKOCYTOSIS, UNSPECIFIED TYPE: ICD-10-CM

## 2022-11-25 DIAGNOSIS — R39.89 SUSPECTED UTI: ICD-10-CM

## 2022-11-25 DIAGNOSIS — B95.7 BACTEREMIA DUE TO METHICILLIN RESISTANT STAPHYLOCOCCUS EPIDERMIDIS: ICD-10-CM

## 2022-11-25 DIAGNOSIS — R53.1 GENERALIZED WEAKNESS: Primary | ICD-10-CM

## 2022-11-25 DIAGNOSIS — R53.81 PHYSICAL DECONDITIONING: ICD-10-CM

## 2022-11-25 DIAGNOSIS — N17.9 AKI (ACUTE KIDNEY INJURY) (HCC): Primary | ICD-10-CM

## 2022-11-25 DIAGNOSIS — R21 RASH: ICD-10-CM

## 2022-11-25 DIAGNOSIS — Z16.29 BACTEREMIA DUE TO METHICILLIN RESISTANT STAPHYLOCOCCUS EPIDERMIDIS: ICD-10-CM

## 2022-11-25 DIAGNOSIS — D75.839 THROMBOCYTOSIS: ICD-10-CM

## 2022-11-25 DIAGNOSIS — R10.13 EPIGASTRIC PAIN: ICD-10-CM

## 2022-11-25 DIAGNOSIS — N17.9 AKI (ACUTE KIDNEY INJURY) (HCC): ICD-10-CM

## 2022-11-25 DIAGNOSIS — K21.9 CHRONIC GERD: ICD-10-CM

## 2022-11-25 PROBLEM — K62.89 PROCTITIS: Status: ACTIVE | Noted: 2022-11-25

## 2022-11-25 PROBLEM — N39.0 UTI (URINARY TRACT INFECTION): Status: ACTIVE | Noted: 2022-11-25

## 2022-11-25 LAB
2HR DELTA HS TROPONIN: 1 NG/L
4HR DELTA HS TROPONIN: 2 NG/L
ALBUMIN SERPL BCP-MCNC: 3.1 G/DL (ref 3.5–5)
ALP SERPL-CCNC: 60 U/L (ref 34–104)
ALT SERPL W P-5'-P-CCNC: 7 U/L (ref 7–52)
ANION GAP SERPL CALCULATED.3IONS-SCNC: 9 MMOL/L (ref 4–13)
ANISOCYTOSIS BLD QL SMEAR: PRESENT
AST SERPL W P-5'-P-CCNC: 15 U/L (ref 13–39)
BACTERIA UR QL AUTO: ABNORMAL /HPF
BASOPHILS # BLD MANUAL: 0 THOUSAND/UL (ref 0–0.1)
BASOPHILS NFR MAR MANUAL: 0 % (ref 0–1)
BILIRUB SERPL-MCNC: 0.3 MG/DL (ref 0.2–1)
BILIRUB UR QL STRIP: NEGATIVE
BUN SERPL-MCNC: 35 MG/DL (ref 5–25)
CALCIUM ALBUM COR SERPL-MCNC: 9.3 MG/DL (ref 8.3–10.1)
CALCIUM SERPL-MCNC: 8.6 MG/DL (ref 8.4–10.2)
CARDIAC TROPONIN I PNL SERPL HS: 12 NG/L
CARDIAC TROPONIN I PNL SERPL HS: 13 NG/L
CARDIAC TROPONIN I PNL SERPL HS: 14 NG/L
CHLORIDE SERPL-SCNC: 97 MMOL/L (ref 96–108)
CLARITY UR: ABNORMAL
CO2 SERPL-SCNC: 23 MMOL/L (ref 21–32)
COLOR UR: ABNORMAL
CREAT SERPL-MCNC: 1.85 MG/DL (ref 0.6–1.3)
EOSINOPHIL # BLD MANUAL: 0.35 THOUSAND/UL (ref 0–0.4)
EOSINOPHIL NFR BLD MANUAL: 3 % (ref 0–6)
ERYTHROCYTE [DISTWIDTH] IN BLOOD BY AUTOMATED COUNT: 15.8 % (ref 11.6–15.1)
FLUAV RNA RESP QL NAA+PROBE: NEGATIVE
FLUBV RNA RESP QL NAA+PROBE: NEGATIVE
GFR SERPL CREATININE-BSD FRML MDRD: 25 ML/MIN/1.73SQ M
GLUCOSE SERPL-MCNC: 117 MG/DL (ref 65–140)
GLUCOSE UR STRIP-MCNC: NEGATIVE MG/DL
HCT VFR BLD AUTO: 36 % (ref 34.8–46.1)
HGB BLD-MCNC: 11 G/DL (ref 11.5–15.4)
HGB UR QL STRIP.AUTO: ABNORMAL
KETONES UR STRIP-MCNC: NEGATIVE MG/DL
LEUKOCYTE ESTERASE UR QL STRIP: ABNORMAL
LIPASE SERPL-CCNC: 46 U/L (ref 11–82)
LYMPHOCYTES # BLD AUTO: 2.46 THOUSAND/UL (ref 0.6–4.47)
LYMPHOCYTES # BLD AUTO: 21 % (ref 14–44)
MAGNESIUM SERPL-MCNC: 1.6 MG/DL (ref 1.9–2.7)
MCH RBC QN AUTO: 26.6 PG (ref 26.8–34.3)
MCHC RBC AUTO-ENTMCNC: 30.6 G/DL (ref 31.4–37.4)
MCV RBC AUTO: 87 FL (ref 82–98)
METAMYELOCYTES NFR BLD MANUAL: 5 % (ref 0–1)
MONOCYTES # BLD AUTO: 1.17 THOUSAND/UL (ref 0–1.22)
MONOCYTES NFR BLD: 10 % (ref 4–12)
NEUTROPHILS # BLD MANUAL: 7.14 THOUSAND/UL (ref 1.85–7.62)
NEUTS BAND NFR BLD MANUAL: 10 % (ref 0–8)
NEUTS SEG NFR BLD AUTO: 51 % (ref 43–75)
NITRITE UR QL STRIP: POSITIVE
NON-SQ EPI CELLS URNS QL MICRO: ABNORMAL /HPF
PH UR STRIP.AUTO: 5 [PH]
PHOSPHATE SERPL-MCNC: 2.9 MG/DL (ref 2.3–4.1)
PLATELET # BLD AUTO: 502 THOUSANDS/UL (ref 149–390)
PLATELET BLD QL SMEAR: ABNORMAL
PMV BLD AUTO: 8.7 FL (ref 8.9–12.7)
POTASSIUM SERPL-SCNC: 4.2 MMOL/L (ref 3.5–5.3)
PROT SERPL-MCNC: 6.1 G/DL (ref 6.4–8.4)
PROT UR STRIP-MCNC: NEGATIVE MG/DL
RBC # BLD AUTO: 4.13 MILLION/UL (ref 3.81–5.12)
RBC #/AREA URNS AUTO: ABNORMAL /HPF
RSV RNA RESP QL NAA+PROBE: NEGATIVE
SARS-COV-2 RNA RESP QL NAA+PROBE: NEGATIVE
SODIUM SERPL-SCNC: 129 MMOL/L (ref 135–147)
SP GR UR STRIP.AUTO: 1.01 (ref 1–1.03)
TSH SERPL DL<=0.05 MIU/L-ACNC: 2.31 UIU/ML (ref 0.45–4.5)
UROBILINOGEN UR STRIP-ACNC: <2 MG/DL
WBC # BLD AUTO: 11.7 THOUSAND/UL (ref 4.31–10.16)
WBC #/AREA URNS AUTO: ABNORMAL /HPF

## 2022-11-25 RX ORDER — SODIUM CHLORIDE, SODIUM LACTATE, POTASSIUM CHLORIDE, CALCIUM CHLORIDE 600; 310; 30; 20 MG/100ML; MG/100ML; MG/100ML; MG/100ML
50 INJECTION, SOLUTION INTRAVENOUS CONTINUOUS
Status: DISCONTINUED | OUTPATIENT
Start: 2022-11-25 | End: 2022-11-26

## 2022-11-25 RX ORDER — OMEGA-3S/DHA/EPA/FISH OIL/D3 300MG-1000
400 CAPSULE ORAL DAILY
Status: DISCONTINUED | OUTPATIENT
Start: 2022-11-26 | End: 2022-11-28 | Stop reason: HOSPADM

## 2022-11-25 RX ORDER — METOPROLOL TARTRATE 5 MG/5ML
5 INJECTION INTRAVENOUS ONCE
Status: DISCONTINUED | OUTPATIENT
Start: 2022-11-25 | End: 2022-11-25

## 2022-11-25 RX ORDER — METOPROLOL TARTRATE 100 MG/1
100 TABLET ORAL DAILY
Status: DISCONTINUED | OUTPATIENT
Start: 2022-11-26 | End: 2022-11-28 | Stop reason: HOSPADM

## 2022-11-25 RX ORDER — POLYETHYLENE GLYCOL 3350 17 G/17G
17 POWDER, FOR SOLUTION ORAL DAILY PRN
Status: DISCONTINUED | OUTPATIENT
Start: 2022-11-25 | End: 2022-11-28 | Stop reason: HOSPADM

## 2022-11-25 RX ORDER — ONDANSETRON 2 MG/ML
4 INJECTION INTRAMUSCULAR; INTRAVENOUS EVERY 8 HOURS PRN
Status: DISCONTINUED | OUTPATIENT
Start: 2022-11-25 | End: 2022-11-25

## 2022-11-25 RX ORDER — LANOLIN ALCOHOL/MO/W.PET/CERES
6 CREAM (GRAM) TOPICAL
Status: DISCONTINUED | OUTPATIENT
Start: 2022-11-25 | End: 2022-11-28 | Stop reason: HOSPADM

## 2022-11-25 RX ORDER — EZETIMIBE 10 MG/1
10 TABLET ORAL DAILY
Status: DISCONTINUED | OUTPATIENT
Start: 2022-11-26 | End: 2022-11-28 | Stop reason: HOSPADM

## 2022-11-25 RX ORDER — PANTOPRAZOLE SODIUM 40 MG/10ML
40 INJECTION, POWDER, LYOPHILIZED, FOR SOLUTION INTRAVENOUS ONCE
Status: COMPLETED | OUTPATIENT
Start: 2022-11-25 | End: 2022-11-25

## 2022-11-25 RX ORDER — FOLIC ACID 1 MG/1
1 TABLET ORAL DAILY
Status: DISCONTINUED | OUTPATIENT
Start: 2022-11-26 | End: 2022-11-28 | Stop reason: HOSPADM

## 2022-11-25 RX ORDER — BISACODYL 10 MG
10 SUPPOSITORY, RECTAL RECTAL DAILY PRN
Status: DISCONTINUED | OUTPATIENT
Start: 2022-11-25 | End: 2022-11-28 | Stop reason: HOSPADM

## 2022-11-25 RX ORDER — DULOXETIN HYDROCHLORIDE 30 MG/1
30 CAPSULE, DELAYED RELEASE ORAL DAILY
Status: DISCONTINUED | OUTPATIENT
Start: 2022-11-26 | End: 2022-11-28 | Stop reason: HOSPADM

## 2022-11-25 RX ORDER — METOCLOPRAMIDE HYDROCHLORIDE 5 MG/ML
10 INJECTION INTRAMUSCULAR; INTRAVENOUS EVERY 6 HOURS PRN
Status: DISCONTINUED | OUTPATIENT
Start: 2022-11-25 | End: 2022-11-28 | Stop reason: HOSPADM

## 2022-11-25 RX ORDER — ACETAMINOPHEN 325 MG/1
650 TABLET ORAL EVERY 6 HOURS PRN
Status: DISCONTINUED | OUTPATIENT
Start: 2022-11-25 | End: 2022-11-28 | Stop reason: HOSPADM

## 2022-11-25 RX ORDER — DIPHENHYDRAMINE HCL 25 MG
25 TABLET ORAL ONCE
Status: COMPLETED | OUTPATIENT
Start: 2022-11-25 | End: 2022-11-25

## 2022-11-25 RX ORDER — DILTIAZEM HYDROCHLORIDE 120 MG/1
120 CAPSULE, COATED, EXTENDED RELEASE ORAL DAILY
Status: DISCONTINUED | OUTPATIENT
Start: 2022-11-26 | End: 2022-11-28 | Stop reason: HOSPADM

## 2022-11-25 RX ORDER — METOPROLOL TARTRATE 50 MG/1
50 TABLET, FILM COATED ORAL
Status: DISCONTINUED | OUTPATIENT
Start: 2022-11-25 | End: 2022-11-28 | Stop reason: HOSPADM

## 2022-11-25 RX ORDER — OXYBUTYNIN CHLORIDE 5 MG/1
10 TABLET, EXTENDED RELEASE ORAL
Status: DISCONTINUED | OUTPATIENT
Start: 2022-11-25 | End: 2022-11-28 | Stop reason: HOSPADM

## 2022-11-25 RX ORDER — SUCRALFATE 1 G/1
1 TABLET ORAL
Status: DISCONTINUED | OUTPATIENT
Start: 2022-11-25 | End: 2022-11-28 | Stop reason: HOSPADM

## 2022-11-25 RX ORDER — DIPHENHYDRAMINE HYDROCHLORIDE, ZINC ACETATE 2; .1 G/100G; G/100G
CREAM TOPICAL 3 TIMES DAILY PRN
Status: DISCONTINUED | OUTPATIENT
Start: 2022-11-25 | End: 2022-11-28 | Stop reason: HOSPADM

## 2022-11-25 RX ORDER — ASCORBIC ACID 500 MG
500 TABLET ORAL 2 TIMES DAILY
Status: DISCONTINUED | OUTPATIENT
Start: 2022-11-25 | End: 2022-11-28 | Stop reason: HOSPADM

## 2022-11-25 RX ORDER — AMOXICILLIN 250 MG
2 CAPSULE ORAL 2 TIMES DAILY
Status: DISCONTINUED | OUTPATIENT
Start: 2022-11-25 | End: 2022-11-28 | Stop reason: HOSPADM

## 2022-11-25 RX ORDER — MAGNESIUM SULFATE HEPTAHYDRATE 40 MG/ML
2 INJECTION, SOLUTION INTRAVENOUS ONCE
Status: COMPLETED | OUTPATIENT
Start: 2022-11-25 | End: 2022-11-25

## 2022-11-25 RX ORDER — PANTOPRAZOLE SODIUM 40 MG/1
40 TABLET, DELAYED RELEASE ORAL
Status: DISCONTINUED | OUTPATIENT
Start: 2022-11-26 | End: 2022-11-28 | Stop reason: HOSPADM

## 2022-11-25 RX ORDER — FERROUS SULFATE 325(65) MG
325 TABLET ORAL 2 TIMES DAILY WITH MEALS
Status: DISCONTINUED | OUTPATIENT
Start: 2022-11-26 | End: 2022-11-28 | Stop reason: HOSPADM

## 2022-11-25 RX ORDER — SODIUM CHLORIDE, SODIUM LACTATE, POTASSIUM CHLORIDE, CALCIUM CHLORIDE 600; 310; 30; 20 MG/100ML; MG/100ML; MG/100ML; MG/100ML
1000 INJECTION, SOLUTION INTRAVENOUS ONCE
Status: COMPLETED | OUTPATIENT
Start: 2022-11-25 | End: 2022-11-25

## 2022-11-25 RX ORDER — LISINOPRIL 2.5 MG/1
2.5 TABLET ORAL DAILY
Status: DISCONTINUED | OUTPATIENT
Start: 2022-11-26 | End: 2022-11-25

## 2022-11-25 RX ORDER — ACETAMINOPHEN 325 MG/1
975 TABLET ORAL ONCE
Status: COMPLETED | OUTPATIENT
Start: 2022-11-25 | End: 2022-11-25

## 2022-11-25 RX ORDER — ALLOPURINOL 100 MG/1
100 TABLET ORAL DAILY
Status: DISCONTINUED | OUTPATIENT
Start: 2022-11-26 | End: 2022-11-28 | Stop reason: HOSPADM

## 2022-11-25 RX ADMIN — ACETAMINOPHEN 975 MG: 325 TABLET, FILM COATED ORAL at 17:03

## 2022-11-25 RX ADMIN — Medication 6 MG: at 22:09

## 2022-11-25 RX ADMIN — SUCRALFATE 1 G: 1 TABLET ORAL at 22:09

## 2022-11-25 RX ADMIN — MAGNESIUM SULFATE HEPTAHYDRATE 2 G: 40 INJECTION, SOLUTION INTRAVENOUS at 18:10

## 2022-11-25 RX ADMIN — METOPROLOL TARTRATE 50 MG: 50 TABLET, FILM COATED ORAL at 22:08

## 2022-11-25 RX ADMIN — SODIUM CHLORIDE 500 ML: 0.9 INJECTION, SOLUTION INTRAVENOUS at 16:57

## 2022-11-25 RX ADMIN — SODIUM CHLORIDE, SODIUM LACTATE, POTASSIUM CHLORIDE, AND CALCIUM CHLORIDE 50 ML/HR: .6; .31; .03; .02 INJECTION, SOLUTION INTRAVENOUS at 21:53

## 2022-11-25 RX ADMIN — DIPHENHYDRAMINE HCL 25 MG: 25 TABLET, COATED ORAL at 17:03

## 2022-11-25 RX ADMIN — APIXABAN 5 MG: 5 TABLET, FILM COATED ORAL at 21:20

## 2022-11-25 RX ADMIN — PANTOPRAZOLE SODIUM 40 MG: 40 INJECTION, POWDER, FOR SOLUTION INTRAVENOUS at 21:20

## 2022-11-25 RX ADMIN — SENNOSIDES AND DOCUSATE SODIUM 2 TABLET: 8.6; 5 TABLET ORAL at 21:20

## 2022-11-25 RX ADMIN — OXYCODONE HYDROCHLORIDE AND ACETAMINOPHEN 500 MG: 500 TABLET ORAL at 21:20

## 2022-11-25 RX ADMIN — SODIUM CHLORIDE, POTASSIUM CHLORIDE, SODIUM LACTATE AND CALCIUM CHLORIDE 1000 ML/HR: 600; 310; 30; 20 INJECTION, SOLUTION INTRAVENOUS at 19:59

## 2022-11-25 RX ADMIN — OXYBUTYNIN CHLORIDE 10 MG: 5 TABLET, EXTENDED RELEASE ORAL at 22:09

## 2022-11-25 RX ADMIN — DIPHENHYDRAMINE HYDROCHLORIDE 25 MG: 25 TABLET ORAL at 23:49

## 2022-11-25 NOTE — LETTER
FAX      To:     [address]:  Phone:       Fax:        Email:        From:   Ananya Tran  Phone:     [phone]      Fax:    Email:      ________________________________________________    Patient has a 3:00pm pick-up time  AVS attached  Thanks! NOTICE:  This communication, including attachments, may contain information that is confidential and protected by the -client or other privilege(s)

## 2022-11-25 NOTE — Clinical Note
Case was discussed with ** and the patient's admission status was agreed to be Admission Status: inpatient status to the service of Dr Thien Russell

## 2022-11-25 NOTE — ED PROVIDER NOTES
History  Chief Complaint   Patient presents with   • Weakness - Generalized     Pt came via EMS stating shes been feeling generally weak over the past three days  Possible UTI, hx of afib     HPI     77F presenting to the ED with weakness over the past 3-4 days  PMH significant for atrial fibrillation on apixiban, HTN, HLD, TB, rheumatic fever  Patient reports that she has been feeling overall generally weak and also developed a slight headache and sore throat  Patient has not been eating well over the past several days due to nausea and intermittent epigastric pain  Denies dizziness, visual changes, chest pain, shortness of breath, palpitations, vomiting, urinary symptoms, changes in stool, leg pain or leg swelling  Denies any known recent changes to her medications  Lives in a nursing home  No recent travel  Prior to Admission Medications   Prescriptions Last Dose Informant Patient Reported? Taking?    DULoxetine (CYMBALTA) 30 mg delayed release capsule   No No   Sig: TAKE 1 CAPSULE(30 MG) BY MOUTH DAILY   FIBER PO   Yes No   Sig: Take 1 tablet by mouth daily   Melatonin 10 MG TABS   Yes No   Sig: Take by mouth Takes daily at night   Multiple Vitamin (MULTIVITAMIN ADULT PO)   Yes No   Sig: Take by mouth in the morning   acetaminophen (TYLENOL) 325 mg tablet   Yes No   Sig: Take 975 mg by mouth every 6 (six) hours as needed   albuterol (PROVENTIL HFA,VENTOLIN HFA) 90 mcg/act inhaler   No No   Sig: INHALE 2 PUFFS BY MOUTH EVERY 6 HOURS AS NEEDED FOR WHEEZING   allopurinol (ZYLOPRIM) 100 mg tablet   No No   Sig: Take 1 tablet (100 mg total) by mouth daily   apixaban (Eliquis) 5 mg   No No   Sig: Take 1 tablet (5 mg total) by mouth 2 (two) times a day   ascorbic acid (VITAMIN C) 500 MG tablet   No No   Sig: Take 1 tablet (500 mg total) by mouth 2 (two) times a day   bisacodyl (DULCOLAX) 10 mg suppository   Yes No   Sig: Insert 10 mg into the rectum daily as needed   cholecalciferol (VITAMIN D3) 400 units tablet   No No   Sig: Take 1 tablet (400 Units total) by mouth in the morning   diltiazem (CARDIZEM CD) 120 mg 24 hr capsule   No No   Sig: Take 1 capsule (120 mg total) by mouth daily   Patient taking differently: Take 120 mg by mouth daily Takes in the am   ezetimibe (ZETIA) 10 mg tablet   No No   Sig: Take 1 tablet (10 mg total) by mouth daily   ferrous sulfate 324 (65 Fe) mg   No No   Sig: Take 1 tablet (324 mg total) by mouth 2 (two) times a day before meals   folic acid (FOLVITE) 1 mg tablet   No No   Sig: TAKE 1 TABLET(1 MG) BY MOUTH DAILY   furosemide (LASIX) 40 mg tablet   No No   Sig: Take 1 tablet (40 mg total) by mouth in the morning  lisinopril (ZESTRIL) 2 5 mg tablet   No No   Sig: Take 1 tablet (2 5 mg total) by mouth daily   metoprolol tartrate (LOPRESSOR) 50 mg tablet   No No   Sig: Take 100 mg in the morning and 50 mg in the evening   omeprazole (PriLOSEC) 20 mg delayed release capsule   Yes No   Sig: Take 20 mg by mouth daily   ondansetron (ZOFRAN) 4 mg tablet   Yes No   Sig: Take 4 mg by mouth every 8 (eight) hours as needed   oxybutynin (DITROPAN-XL) 10 MG 24 hr tablet   Yes No   Sig: Take 10 mg by mouth daily at bedtime   polyethylene glycol (MIRALAX) 17 g packet   Yes No   Sig: Take 17 g by mouth daily as needed   senna-docusate sodium (SENOKOT S) 8 6-50 mg per tablet   No No   Sig: Take 2 tablets by mouth 2 (two) times a day   vitamin B-12 (VITAMIN B-12) 1,000 mcg tablet   No No   Sig: Take 1 tablet (1,000 mcg total) by mouth daily      Facility-Administered Medications: None       Past Medical History:   Diagnosis Date   • Abnormal ECG     Last Assessed 9/29/2016   • Anxiety     Last Assessed 6/08/2016   • Arthritis     knees   • Asthma     Last Assessed 11/06/2013   • Atrial premature complex    • Cataract, bilateral     Last Assessed 7/14/2016   • Cataract, left eye     Both eyes  Had surgery on left     • COVID-19    • Difficulty swallowing    • Diverticulosis 9/25/2022   • Dizziness • Fibromyalgia    • Fibromyalgia, primary    • Gastric reflux    • Heart failure ([facility] )     5/23/22 Pt reports had heart failure in the past   • History of COVID-19     5/23/22 Pt reports having COVID in 2021  • History of transfusion     no reaction   • Nikolai (hard of hearing)    • Hyperlipidemia    • Hypertension    • Incontinence in female     5/23/22 Pt reports has incontinence -wears depends especially at night/riding in car   • Irregular heart beat     5/23/22 Pt reports hx of A fib   • Lyme disease    • PONV (postoperative nausea and vomiting)    • Premature ventricular contraction    • Primary osteoarthritis of both knees     Last Assessed 7/14/2016   • Rheumatic fever     5/23/22 Pt reports had hx of rheumatic fever as a child twice   • Sore throat    • Tuberculosis 1947       Past Surgical History:   Procedure Laterality Date   • APPENDECTOMY     • CARDIAC PACEMAKER PLACEMENT  2019   • COLONOSCOPY     • DENTAL SURGERY      extractions   • HYSTERECTOMY      5/23/22 Pt reports had appendix out with hysterectomy and "tightened up my bladder"   • IR ASPIRATION ONLY  9/30/2022   • ORIF FEMUR FRACTURE Left 08/05/2021    Procedure: OPEN REDUCTION W/ INTERNAL FIXATION (ORIF) DISTAL FEMUR; INSERTION RETROGRADE NAIL;  Surgeon: Carmine Goldstein MD;  [facility];  Service: Orthopedics   • MO DILATE ESOPHAGUS N/A 04/06/2016    Procedure: DILATATION ESOPHAGEAL;  Surgeon: Kelly Caballero MD;  Location: [facility]; Service: Gastroenterology   • MO EGD TRANSORAL BIOPSY SINGLE/MULTIPLE N/A 04/06/2016    Procedure: ESOPHAGOGASTRODUODENOSCOPY (EGD); Surgeon: Kelly Caballero MD;  Location: [facility];   Service: Gastroenterology   • MO REMOVAL DEEP IMPLANT Left 9/12/2022    Procedure: REMOVAL NAIL IM  FEMUR;  Surgeon: Carmine Goldstein MD;  [facility];  Service: Orthopedics   • MO TOTAL KNEE ARTHROPLASTY Left 9/12/2022    Procedure: ARTHROPLASTY KNEE TOTAL;  Surgeon: Carmine Goldstein MD;  [facility];  Service: Orthopedics   • GA XCAPSL CTRC RMVL INSJ IO LENS PROSTH W/O ECP Left 03/15/2016    Procedure: EXTRACTION EXTRACAPSULAR CATARACT PHACO INTRAOCULAR LENS (IOL); Surgeon: Charity Berg MD;  [facility];  Service: Ophthalmology   • REPLACEMENT TOTAL KNEE Right    • REPLACEMENT TOTAL KNEE      right    • TONSILLECTOMY         Family History   Problem Relation Age of Onset   • Coronary artery disease Mother    • Heart attack Mother         Prior   • Heart disease Father    • Heart attack Father         Prior   • Anesthesia problems Neg Hx      I have reviewed and agree with the history as documented  E-Cigarette/Vaping   • E-Cigarette Use Never User    • Comments Denies any former or current use      E-Cigarette/Vaping Substances     Social History     Tobacco Use   • Smoking status: Never   • Smokeless tobacco: Never   • Tobacco comments:     Denies any former or current smoking   Vaping Use   • Vaping Use: Never used   Substance Use Topics   • Alcohol use: Not Currently     Comment: Per [employer]- pt reports no current alcohol use at this time   • Drug use: No     Comment: Denies any former or current drug use       Review of Systems   Constitutional: Negative for chills and fever  Respiratory: Positive for cough  Negative for apnea, choking, chest tightness, shortness of breath, wheezing and stridor  Cardiovascular: Negative for chest pain and leg swelling  Gastrointestinal: Positive for abdominal pain and nausea  Negative for abdominal distention, constipation, diarrhea, rectal pain and vomiting  Genitourinary: Negative for dysuria and hematuria  Musculoskeletal: Negative for back pain, gait problem and neck pain  Skin: Negative for color change, pallor, rash and wound  Neurological: Positive for weakness and headaches  Negative for dizziness, tremors, seizures, syncope, facial asymmetry, speech difficulty, light-headedness and numbness         Physical Exam  Physical Exam  Vitals and nursing note reviewed  Constitutional:       General: She is not in acute distress  Appearance: She is well-developed and well-nourished  She is not ill-appearing, toxic-appearing or diaphoretic  HENT:      Head: Normocephalic and atraumatic  Right Ear: External ear normal       Left Ear: External ear normal       Nose: Nose normal       Mouth/Throat:      Mouth: Oropharynx is clear and moist  Mucous membranes are moist       Pharynx: Oropharynx is clear  No oropharyngeal exudate or posterior oropharyngeal erythema  Eyes:      General:         Right eye: No discharge  Left eye: No discharge  Extraocular Movements: Extraocular movements intact and EOM normal       Conjunctiva/sclera: Conjunctivae normal       Pupils: Pupils are equal, round, and reactive to light  Cardiovascular:      Rate and Rhythm: Tachycardia present  Rhythm irregular  Pulses: Intact distal pulses  Heart sounds: Normal heart sounds  No murmur heard  No friction rub  No gallop  Pulmonary:      Effort: Pulmonary effort is normal  No respiratory distress  Breath sounds: Normal breath sounds  No stridor  No wheezing, rhonchi or rales  Chest:      Chest wall: No tenderness  Abdominal:      General: Bowel sounds are normal  There is no distension  Palpations: Abdomen is soft  There is no mass  Tenderness: There is no abdominal tenderness  There is no guarding or rebound  Hernia: No hernia is present  Musculoskeletal:         General: No tenderness, deformity or edema  Normal range of motion  Cervical back: Normal range of motion and neck supple  Left lower leg: No edema  Skin:     General: Skin is warm and dry  Capillary Refill: Capillary refill takes less than 2 seconds  Neurological:      Mental Status: She is alert and oriented to person, place, and time     Psychiatric:         Mood and Affect: Mood and affect and mood normal          Vital Signs  ED Triage Vitals   Temperature Pulse Respirations Blood Pressure SpO2   11/25/22 1612 11/25/22 1620 11/25/22 1612 11/25/22 1620 11/25/22 1620   98 1 °F (36 7 °C) (!) 125 18 107/57 96 %      Temp Source Heart Rate Source Patient Position - Orthostatic VS BP Location FiO2 (%)   11/25/22 1612 11/25/22 1612 11/25/22 1620 11/25/22 1620 --   Oral Monitor Lying Right arm       Pain Score       11/25/22 1703       Med Not Given for Pain - for MAR use only           Vitals:    11/25/22 1620 11/25/22 1815   BP: 107/57 102/62   Pulse: (!) 125 (!) 133   Patient Position - Orthostatic VS: Lying Lying         Visual Acuity      ED Medications  Medications   lactated ringers infusion (has no administration in time range)   sodium chloride 0 9 % bolus 500 mL (0 mL Intravenous Stopped 11/25/22 1837)   acetaminophen (TYLENOL) tablet 975 mg (975 mg Oral Given 11/25/22 1703)   diphenhydrAMINE (BENADRYL) tablet 25 mg (25 mg Oral Given 11/25/22 1703)   magnesium sulfate 2 g/50 mL IVPB (premix) 2 g (2 g Intravenous New Bag 11/25/22 1810)       Diagnostic Studies  Results Reviewed     Procedure Component Value Units Date/Time    HS Troponin I 2hr [[id number]] Collected: 11/25/22 1833    Lab Status: In process Specimen: Blood from Arm, Left Updated: 11/25/22 1843    HS Troponin I 4hr [[id number]]     Lab Status: No result Specimen: Blood     FLU/RSV/COVID - if FLU/RSV clinically relevant [[id number]]  (Normal) Collected: 11/25/22 1626    Lab Status: Final result Specimen: Nares from Nose Updated: 11/25/22 1811     SARS-CoV-2 Negative     INFLUENZA A PCR Negative     INFLUENZA B PCR Negative     RSV PCR Negative    Narrative:      FOR PEDIATRIC PATIENTS - copy/paste COVID Guidelines URL to browser: [email or web address]    SARS-CoV-2 assay is a Nucleic Acid Amplification assay intended for the  qualitative detection of nucleic acid from SARS-CoV-2 in nasopharyngeal  swabs   Results are for the presumptive identification of SARS-CoV-2 RNA  Positive results are indicative of infection with SARS-CoV-2, the virus  causing COVID-19, but do not rule out bacterial infection or co-infection  with other viruses  Laboratories within the [facility] and its  territories are required to report all positive results to the appropriate  public health authorities  Negative results do not preclude SARS-CoV-2  infection and should not be used as the sole basis for treatment or other  patient management decisions  Negative results must be combined with  clinical observations, patient history, and epidemiological information  This test has not been FDA cleared or approved  This test has been authorized by FDA under an Emergency Use Authorization  (EUA)  This test is only authorized for the duration of time the  declaration that circumstances exist justifying the authorization of the  emergency use of an in vitro diagnostic tests for detection of SARS-CoV-2  virus and/or diagnosis of COVID-19 infection under section 564(b)(1) of  the Act, 21 U  S C  985TVM-2(Q)(2), unless the authorization is terminated  or revoked sooner  The test has been validated but independent review by FDA  and CLIA is pending  Test performed using [employer] GeneXpert: This RT-PCR assay targets N2,  a region unique to SARS-CoV-2  A conserved region in the E-gene was chosen  for pan-Sarbecovirus detection which includes SARS-CoV-2  According to CMS-2020-01-R, this platform meets the definition of high-throughput technology      Lipase [[id number]]  (Normal) Collected: 11/25/22 1626    Lab Status: Final result Specimen: Blood from Arm, Left Updated: 11/25/22 1805     Lipase 46 u/L     CBC and differential [[id number]]  (Abnormal) Collected: 11/25/22 1626    Lab Status: Final result Specimen: Blood from Arm, Left Updated: 11/25/22 1742     WBC 11 70 Thousand/uL      RBC 4 13 Million/uL      Hemoglobin 11 0 g/dL      Hematocrit 36 0 % MCV 87 fL      MCH 26 6 pg      MCHC 30 6 g/dL      RDW 15 8 %      MPV 8 7 fL      Platelets 035 Thousands/uL     Narrative: This is an appended report  These results have been appended to a previously verified report  Manual Differential(PHLEBS Do Not Order) [[id number]]  (Abnormal) Collected: 11/25/22 1626    Lab Status: Final result Specimen: Blood from Arm, Left Updated: 11/25/22 1742     Segmented % 51 %      Bands % 10 %      Lymphocytes % 21 %      Monocytes % 10 %      Eosinophils, % 3 %      Basophils % 0 %      Metamyelocytes% 5 %      Absolute Neutrophils 7 14 Thousand/uL      Lymphocytes Absolute 2 46 Thousand/uL      Monocytes Absolute 1 17 Thousand/uL      Eosinophils Absolute 0 35 Thousand/uL      Basophils Absolute 0 00 Thousand/uL      Total Counted --     Anisocytosis Present     Platelet Estimate Increased    TSH [[id number]]  (Normal) Collected: 11/25/22 1626    Lab Status: Final result Specimen: Blood from Arm, Left Updated: 11/25/22 1729     TSH 3RD GENERATON 2 310 uIU/mL     Narrative:      Patients undergoing fluorescein dye angiography may retain small amounts of fluorescein in the body for 48-72 hours post procedure  Samples containing fluorescein can produce falsely depressed TSH values  If the patient had this procedure,a specimen should be resubmitted post fluorescein clearance        HS Troponin 0hr (reflex protocol) [[id number]]  (Normal) Collected: 11/25/22 1626    Lab Status: Final result Specimen: Blood from Arm, Left Updated: 11/25/22 1722     hs TnI 0hr 12 ng/L     Comprehensive metabolic panel [[id number]]  (Abnormal) Collected: 11/25/22 1626    Lab Status: Final result Specimen: Blood from Arm, Left Updated: 11/25/22 1717     Sodium 129 mmol/L      Potassium 4 2 mmol/L      Chloride 97 mmol/L      CO2 23 mmol/L      ANION GAP 9 mmol/L      BUN 35 mg/dL      Creatinine 1 85 mg/dL      Glucose 117 mg/dL      Calcium 8 6 mg/dL      Corrected Calcium 9 3 mg/dL      AST 15 U/L ALT 7 U/L      Alkaline Phosphatase 60 U/L      Total Protein 6 1 g/dL      Albumin 3 1 g/dL      Total Bilirubin 0 30 mg/dL      eGFR 25 ml/min/1 73sq m     Narrative:      [place] guidelines for Chronic Kidney Disease (CKD):   •  Stage 1 with normal or high GFR (GFR > 90 mL/min/1 73 square meters)  •  Stage 2 Mild CKD (GFR = 60-89 mL/min/1 73 square meters)  •  Stage 3A Moderate CKD (GFR = 45-59 mL/min/1 73 square meters)  •  Stage 3B Moderate CKD (GFR = 30-44 mL/min/1 73 square meters)  •  Stage 4 Severe CKD (GFR = 15-29 mL/min/1 73 square meters)  •  Stage 5 End Stage CKD (GFR <15 mL/min/1 73 square meters)  Note: GFR calculation is accurate only with a steady state creatinine    Phosphorus [[id number]]  (Normal) Collected: 11/25/22 1626    Lab Status: Final result Specimen: Blood from Arm, Left Updated: 11/25/22 1717     Phosphorus 2 9 mg/dL     Magnesium [[id number]]  (Abnormal) Collected: 11/25/22 1626    Lab Status: Final result Specimen: Blood from Arm, Left Updated: 11/25/22 1717     Magnesium 1 6 mg/dL     UA w Reflex to Microscopic w Reflex to Culture [[id number]]     Lab Status: No result Specimen: Urine                  CT abdomen pelvis wo contrast   Final Result by Daralene Fleischer, MD (11/25 1846)      1  Proctitis which can be of an infectious or inflammatory etiology  2   Mild cystitis  3   Resolution of fluid collection level of the left greater trochanter  The study was marked in [facility] for immediate notification  Workstation performed: [id number]         XR chest 1 view portable    (Results Pending)              Procedures  Procedures         ED Course  ED Course as of 11/25/22 1912 Fri Nov 25, 2022   1638 77F presenting to the ED with weakness, headache, itchy armpits  VS on arrival notable for tachycardia, a fib on montior     1639 Pulse(!): 125   1639 Blood Pressure: 107/57   1639 25 mg PO diphenhydramine given for itchy rash under armpit, 1g APAP given for headache    1707 ECG 12 lead  A Fib with , no STEMI      1716 WBC(!): 11 70   1716 Hemoglobin(!): 11 0   1723 Magnesium(!): 1 6  2 g IV Magnesium ordered   1724 On reassessment, patient appears well and has no complaints  /68,     1724 Sodium(!): 129  NS is running at this time for volume resuscitation  1725 XR chest 1 view portable  No acute findings  1726 BUN(!): 35   1726 Creatinine(!): 1 85   1726 eGFR: 25  MICHAEL noted  1726 Platelet Count(!): 913  Chronically elevated 400s   1726 hs TnI 0hr: 12   1727 Given patient's poor PO tolerance, intermittent epigastric pain, CT abd pelvis ordered  Non-con due to severe MICHAEL  1845 Bands Relative(!): 10   1848 /60,     1857 CT abdomen pelvis wo contrast  1  Proctitis which can be of an infectious or inflammatory etiology  2   Mild cystitis  3   Resolution of fluid collection level of the left greater trochanter  1911 Case discussed with general medicine for admission  Strong suspicion for UTI  UA results pending at this time  Bladder scan ordered                                               MDM    Disposition  Final diagnoses:   Generalized weakness   Dehydration   Hyponatremia   MICHAEL (acute kidney injury) ([facility] )   Atrial fibrillation with RVR (HCC)   Epigastric pain   Nausea   Leukocytosis, unspecified type     Time reflects when diagnosis was documented in both MDM as applicable and the Disposition within this note     Time User Action Codes Description Comment    11/25/2022  5:28 PM Edl Alondra Add [R53 1] Generalized weakness     11/25/2022  5:28 PM Edl Alonrda Add [E86 0] Dehydration     11/25/2022  5:28 PM Del Alondra Add [E87 1] Hyponatremia     11/25/2022  5:28 PM Del Alondra Add [N17 9] MIC[facility]     11/25/2022  5:28 PM Del Alondra Add [I48 91] Atrial fibrillation with RVR ([facility] )     11/25/2022  5:30 PM Del Alondra Add [R10 13] Epigastric pain     11/25/2022  5:30 PM Del Alondra Add [R11 0] Nausea 11/25/2022  5:31 PM Olga Arizmendi Add [D72 829] Leukocytosis, unspecified type       ED Disposition     ED Disposition   Admit    Condition   Stable    Date/Time   Fri Nov 25, 2022  7:10 PM    Comment   Case was discussed with Dr Preethi Wilder and the patient's admission status was agreed to be Admission Status: inpatient status to the service of Dr Preethi Wilder  Follow-up Information    None         Patient's Medications   Discharge Prescriptions    No medications on file       No discharge procedures on file      PDMP Review       Value Time User    PDMP Reviewed  Yes 9/25/2022  6:32 AM Adama Shearer MD          ED Provider  Electronically Signed by           Soo Miller MD  11/25/22 5076

## 2022-11-25 NOTE — PROGRESS NOTES
[address], [address] [place] [address], [address] [place] [address]  [phone]    NAME: Ramirez Bosch  AGE: 76 y o  SEX: female    Progress Note    [facility]: [facility]   POS: 31 (SNF)     Patient's care was coordinated with nursing facility staff  Recent vitals, labs, and updated medications were review on [facility] system in facility  Assessment/Plan:    Suspected UTI  Pt has been treated for suspected UTI +lower abd pressure nausea has decreased and not vomiting  Remains on zofran as needed  WBC today 11 7 from 18 8  Bun and creatinine increased  Bladder scan with no retention  Afebrile now with low bp and tachycardia   Per nurse pt has been refusing po meds   Started on ciprofloxacin for 3 days, till 11/26   Waiting on urine studies     MICHAEL (acute kidney injury) ([facility] [address] )  Bun and creatinine elevated from 26/1 46 now at 36/2 09  Started on IV fluids  Per pt she only received 1 1/2 liter, line became dislodged  Pt also reporting her bed was saturated with the fluids as her line was found to be dislodged  Suspect reason for her increase in Bun and creatinine   Avoid nephrotoxins, hypotension   Lisinopril, lasix, KCL, were held in the setting of MICHAEL  Monitor renal function     Hyponatremia  Na today 131 in the setting of decrease po intake  Pt reports today no nausea  Denies vomiting  Per pt she ate breakfast and feeling better than other days  Started IV fluids however line dislodged  Pt unable to receive total of 3 L since line dislodged and pt reported her bed was wet from the fluids  On call provider was called and nursing reported pt was eating/drinking and feeling better  On call provider ok to stop fluids  Lasix and KCL has been held since 11/21  monitor BMP    Nausea  Nursing reporting pt feeling nauseous, no vomiting  However today pt ate breakfast and reporting feels better   After eating breakfast reports no vomiting   Patient refusing to take meds, encourage to take bp meds Afebrile, vital signs stable and ill appearing  Available zofran, tums   abd NT/ND, +BS throughout, denies pain,   KUB unremarkable   C/w PPI   Bladder scan in vital signs Q shift x2 days, today low bp and tachycardia  After speaking with alyse Cortes reported low BP  Was not reported to provider  Spoke with nurse recheck manual bp low  Pt transfer to [address] [place] [address] for evaluation  Niarden updated and agreeable    Leukocytosis  Today 11 7 from 18 8  Afebrile and now low bp and tachycardia  Pt was started on Ciprofloxacin for suspected UTI  Pt with multiple abx allergies   Awaiting for urine studies since 11/21  With holiday labs have been delayed   KUB unremarkable   Send out for hypotension and tachycardia  Reena cullen requesting pt to be send to ER for evaluation     Bacteremia due to methicillin resistant Staphylococcus epidermidis  Recent hospitalization for positive blood culture for Staph epi the epidermidis on 09/25/2022  Repeat blood culture were negative after 72 hours  2D echo was negative for vegetation  S/P ICD placement  Completed 6-weeks of Vanco   ID followed with weekly labs  R PICC removed  Area C/D/I  Free of fever, vital signs stable and nontoxic appearance    Physical deconditioning  Multifactorial in the setting of acute and chronic medical conditions  Has been refusing PT  Physical therapist doing exercises with pt in bed since pt does not want to get out of bed     Heavy assist of 2/3 staff  PT/OT   Continue 24-7 supportive care   Out of bed for meals as tolerated   following for DC planning  Pt was going to be d/c today now in the setting of acute change in condition postponed  Pt send out for evaluation     Thrombocytosis  Platelets 621 --> 594  -- > 402 --> 469, suspect reactive in nature due to recent bacteremia during recent hospitalization  Recently completed 6 weeks of vanco, followed by ID   Timothy active bleeding noted or reported   Follow platelets and trend Dehydration  In the setting of decrease intake  Pt with nausea however today reports feeling better  Denies V/D/C  IV fluids were stopped  As line dislodged  On call provider ok to stop as pt was eating/drinking and improved  Labs resulted with increased in BUN and creatinine  Now with hyponatremia  Was able to placed peripheral IV catheter  Re-started IV fluids  however alyse Chowdhury requesting  pt to be send out to [address] [place] [address] for evaluation  History of Present Illness:  75-year-old female seen and examined for STR acute visit  Received patient lying in bed, resting  Reports feeling better today with some appetite  Per nurse, pt ate breakfast  No abdominal pain, nausea or vomiting reported by nurse  Currently pt denies pain or nausea and she has not had vomiting  Originally started on Ciprofloxacin x3 days for suspected UTI  Will complete course on 11/26  Urine studies not back yet  Started on IV fluids, pt only received 1 1/2 liter since IV line dislodged  Per pt, bed was saturated thinks was from IV catheter leaking  Facility called on call provider  Reported pt was eating/drinking and feeling better  On call provider ok to stop IV fluids  Labs today revealed WBC at 11 from 18 8, bun and creatinine increased from 26/1 46 to 36/2 09  Additionally pt with hyponatremia at 131, due to decrease po intake  Spoke with Matt cullen reported Low bp for which provider was not aware, since it was not reported but nursing staff  After speaking with primary nurse and re-checking bp manually BP at 100/ 64 with pulse of 110  Pt to be sent out for further evaluation  Alyse agreeable  Denies chest pain,  palpitation, N/V/D/C, shortness of breath, cough, fever, chills, lightheadedness, dizziness, headache, or visual changes  Left leg incisionsapproximated, healing well  No erythema or drainage  Continue with WBAT  Sleeping with no difficulties  B/B incontinent  Heavy assist of 2/3 staff   Denies /GI discomfort  Per nursing no other concerns or issues at this time    Review of Systems:  Per history of present illness, all other systems reviewed and negative  Other than those noted in HPI    HISTORY:  Medical Hx: Reviewed, unchanged  Family Hx: Reviewed, unchanged  Soc Hx: Reviewed,  unchanged    ALLERGY: Reviewed, unchanged  Allergies   Allergen Reactions   • Penicillins Hives     Itching and terrible hives   • Sulfa Antibiotics Itching   • X07 Folate [Folic Acid-Vit S4-NRC R48 - Food Allergy] Other (See Comments)     5/23/22 Pt reports no allergic reaction known    • [place] Other (See Comments)     Unknown, 5/23 -pt is unaware of reaction    • Lyrica [Pregabalin] Other (See Comments)     5/23/22 Pt reports doesn't remember reaction    • Other Other (See Comments)     Black rubber 5/23/22 per allergy test - pt unaware of reaction   • Cortisone Acetate [Cortisone] Itching and Rash     5/23/22 pt reports makes her violent    • [place] Other (See Comments)     Skin discoloration      PHYSICAL EXAM:  Vital Signs: blood pressure 90/54 (recheck manual 100/64) pulse 110, respirations 18, Temp 98 1, O2 sats 94% RA  Weight: 245 3 lbs    General: ill appearing with increased weakness   Head: Atraumatic  Normocephalic  Eye Exam: anicteric sclera, no discharge, PERRLA, No injection  Oral Exam: moist mucous membranes, no buccaloropharyngeal erythema, palatine tonsils WNL  Neck Exam: no anterior cervical lymphadenopathy noted, neck supple  Cardiovascular: regular rate, regular rhythm, + murmurs, rubs, or gallops  Pulmonary:  Breath sounds clear, no wheeze, no rhonchi, no rales  No chest tenderness  Abdominal: soft, rounded, non-tender, nondistended, bowel sounds audible x 4 quadrants  : Non distended bladder  Incontinent of bowel and bladder +lower abd tenderness   Extremities and skin: left leg trace edema  Left leg incisions approximated  No erythema/drainage  Blanchable redness in buttocks  RUE with bruise ?  From tourniquet  LUE near axilla with localized red areas (started today before sending -reported by nurse)   Neurological: alert, cooperative and responsive, Oriented x 3, ble generalized weakness     Laboratory results / Imaging reviewed: Hard copy/ies in medical chart:  Reviewed from [facility]    CBC-BMP reviewed on 11/25/2022   Hemoglobin 11 0, hematocrit 35 2, WBC 11 7, platelets 106, blood glucose 112, BUN 36, creatinine 2 09, sodium 131, potassium 4 9, calcium 8 7, EGFR 24    X-ray abdomen one view reviewed on 11/21/2022  No dilated loops of bowel identified  No gross free intraperitoneal air is identified     CBC-Bmp reviewed from 11-22  Hemoglobin 12 4, hematocrit 39 3, WBC 18 8, platelets 932, good blood glucose 131, BUN 26, creatinine 1 46, sodium 137, potassium 5 4, calcium 9 1 albumin 2 9 alkaline phosphatase 91, albumin 2 9, bilirubin total 0 4, protein total 6 7, AST 23, ALT 14, anion gap 15, EGFR 36    CBC-BMP reviewed on 11/14/2022   Hemoglobin 9 2, hematocrit 28 5, WBC 6 1, platelets 455, blood glucose 111, BUN 9, creatinine 0 61, sodium 144, potassium 3 2, calcium 8 6, EGFR 91    Vancomycin trough 14 3 on 11/08/2022    CBC with diff-BMP reviewed on 11/07/2022  Hemoglobin 9 1, hematocrit 28 2, WBC 7 0, platelets 376, absolute neutrophil 4 5, absolute lymphocyte 1 0, absolute monocyte 1 0, absolute use not feel 0 4, absolute basophil 0 1, neutrophils 65, lymphocytes 14, monocytes 14, eosinophils 6, basophils 1, blood glucose 101, BUN 11, creatinine 0 57, sodium 142, potassium 3 8, chloride 107, calcium 8 4, EGFR 92    CBCD, BMP, Vanco T reviewed on 10/31/2022  Vanco T 18 0  Glucose 107, BUN 11, Cr 0 65, Na 142, K 3 7, Ca 8 7, GFR 90, hgb 9 0, hct 27 2, WBC 6 4, pl 402,     CBC with diff/creatinine/Vanco T reviewed 10/26/22  Hemoglobin 8 7, hematocrit 26 7, WBC 8 0, platelets 696, absolute neutrophil 4 7, absolute lymphocyte 1 6, absolute monocytes 1 0, absolute using a feels 0 6, absolute basophil was 0 1, neutrophils 53, lymphocytes 20, monocytes 12, eosinophils 8, basophils 1, band 2, metamyelocytes 1, myelocytes 3, anisocytosis rare, hypochromia rare, polychromasia rare, creatinine 0 70, EGFR 88  Vanco T- 21 9    CBC/Creatinine/ Vanco T on 10/18/22  Hgb 8 7, hct 26 7, WBC 8 0, Platelet 665, absolute neutrophils 4 7, absolute lymphocytes 1 6, absolute monocytes 1 0, absolute eosinophils 0 6, absolute basophils 0 1, neutrophils 53, lymphocytes 20, monocytes 12, eosinophils 8, basophils 1, band 2, metamyelocytes 1, myelocytes 3, anisocytosis rare  Hypochromia rare, polychromasia rate, creatinine 0 70, EGFR 88    Vanco T 21 9 (h)     BMP review on 10/13/2022   Blood glucose 107, BUN 10, creatinine 0 76, sodium 142, potassium 3 6, calcium 9 2, eGFR 80    10/18/22- Vanco T 21 9- nursing notifying ID     10/11/22- Vanco Trough 19 8    10/9/22- Vanco Trough 16 7    CBC with diff-creatinine reviewed from 10/18/2022  Hemoglobin 8 7, hematocrit 26 7, WBC 8 0, platelets 507, absolute neutrophil 4 7, absolute lymphocyte 1 6, absolute monocytes 1 0, absolute use 0 6, absolute basos 0 1, neutrophils 53, lymphocytes 20, monocytes 12, eosinophils 8, basophil 1, band 2, metamyelocytes 1, myelocytes 3, anisocytosis rare, hypochromia rare, polychromasia rare, creatinine 0 70, EGFR 80     CBC with diff/creatinine/Vanco through on 10/6/22  Hemoglobin 8 6, hematocrit 26 0, WBC 6 1, platelets 065, absolute neutrophil 3 0, absolute lymphocyte 1 6, absolute monos 0 7, absolute eos 0 7, absolute basos 0 1, neutrophils 49, lymphocyte 26, monocytes 12, eosinophils 12, basophil 1, creatinine 0 74, EGFR 82, Vanco Through 10 9    CBC-BMP reviewed from 09/19/2022   Hemoglobin 8 3, hematocrit 24 9, WBC 11 4, platelets 092, blood glucose 118, BUN 20, creatinine 0 93, sodium 139, potassium 4 1, calcium 8 4, EGFR 63    Current Medications:   All medications reviewed and updated in Nursing Home Chart reviewed from [facility]    I have spent >30 minutes with patient today in which greater than 50% of this time was spent in counseling/coordination of care regarding Diagnostic results, Prognosis, Risks and benefits of tx options, Intructions for management, Patient and family education, Importance of tx compliance, Risk factor reductions, Impressions and care was collaborated with nurses, called alyse Brown regarding labs, care plan, diagnostics, vital signs and had to placed new IV line in patient, started IV fluids, spoke with DON to okay transfer of patient to ER and attempted 3 times to speak with receiving ER for report       Please note:  Voice-recognition software may have been used in the preparation of this document  Occasional wrong word or "sound-alike" substitutions may have occurred due to the inherent limitations of voice recognition software  Interpretation should be guided by context      Louise Joyner  11/25/2022

## 2022-11-26 PROBLEM — R39.89 SUSPECTED UTI: Status: ACTIVE | Noted: 2022-11-26

## 2022-11-26 PROBLEM — E86.0 DEHYDRATION: Status: ACTIVE | Noted: 2022-11-26

## 2022-11-26 RX ORDER — SODIUM CHLORIDE 9 MG/ML
50 INJECTION, SOLUTION INTRAVENOUS CONTINUOUS
Status: DISPENSED | OUTPATIENT
Start: 2022-11-26 | End: 2022-11-26

## 2022-11-26 RX ORDER — DIPHENHYDRAMINE HCL 25 MG
25 TABLET ORAL EVERY 6 HOURS PRN
Status: DISCONTINUED | OUTPATIENT
Start: 2022-11-26 | End: 2022-11-28 | Stop reason: HOSPADM

## 2022-11-26 RX ADMIN — PANTOPRAZOLE SODIUM 40 MG: 40 TABLET, DELAYED RELEASE ORAL at 06:02

## 2022-11-26 RX ADMIN — ALLOPURINOL 100 MG: 100 TABLET ORAL at 08:50

## 2022-11-26 RX ADMIN — CHOLECALCIFEROL TAB 10 MCG (400 UNIT) 400 UNITS: 10 TAB at 08:50

## 2022-11-26 RX ADMIN — DULOXETINE HYDROCHLORIDE 30 MG: 30 CAPSULE, DELAYED RELEASE ORAL at 08:50

## 2022-11-26 RX ADMIN — FERROUS SULFATE TAB 325 MG (65 MG ELEMENTAL FE) 325 MG: 325 (65 FE) TAB at 15:57

## 2022-11-26 RX ADMIN — DIPHENHYDRAMINE HYDROCHLORIDE, ZINC ACETATE: 2; .1 CREAM TOPICAL at 22:31

## 2022-11-26 RX ADMIN — DIPHENHYDRAMINE HYDROCHLORIDE, ZINC ACETATE 1 APPLICATION: 2; .1 CREAM TOPICAL at 04:54

## 2022-11-26 RX ADMIN — CYANOCOBALAMIN TAB 500 MCG 1000 MCG: 500 TAB at 08:50

## 2022-11-26 RX ADMIN — EZETIMIBE 10 MG: 10 TABLET ORAL at 08:50

## 2022-11-26 RX ADMIN — SUCRALFATE 1 G: 1 TABLET ORAL at 21:45

## 2022-11-26 RX ADMIN — SUCRALFATE 1 G: 1 TABLET ORAL at 11:11

## 2022-11-26 RX ADMIN — FOLIC ACID 1 MG: 1 TABLET ORAL at 08:50

## 2022-11-26 RX ADMIN — Medication 6 MG: at 21:46

## 2022-11-26 RX ADMIN — OXYCODONE HYDROCHLORIDE AND ACETAMINOPHEN 500 MG: 500 TABLET ORAL at 17:18

## 2022-11-26 RX ADMIN — SUCRALFATE 1 G: 1 TABLET ORAL at 15:57

## 2022-11-26 RX ADMIN — SENNOSIDES AND DOCUSATE SODIUM 2 TABLET: 8.6; 5 TABLET ORAL at 17:18

## 2022-11-26 RX ADMIN — OXYBUTYNIN CHLORIDE 10 MG: 5 TABLET, EXTENDED RELEASE ORAL at 21:45

## 2022-11-26 RX ADMIN — METOPROLOL TARTRATE 100 MG: 100 TABLET, FILM COATED ORAL at 12:39

## 2022-11-26 RX ADMIN — OXYCODONE HYDROCHLORIDE AND ACETAMINOPHEN 500 MG: 500 TABLET ORAL at 08:50

## 2022-11-26 RX ADMIN — SODIUM CHLORIDE 50 ML/HR: 0.9 INJECTION, SOLUTION INTRAVENOUS at 04:50

## 2022-11-26 RX ADMIN — APIXABAN 5 MG: 5 TABLET, FILM COATED ORAL at 17:18

## 2022-11-26 RX ADMIN — SODIUM CHLORIDE 250 ML: 0.9 INJECTION, SOLUTION INTRAVENOUS at 00:35

## 2022-11-26 RX ADMIN — SUCRALFATE 1 G: 1 TABLET ORAL at 06:02

## 2022-11-26 RX ADMIN — FERROUS SULFATE TAB 325 MG (65 MG ELEMENTAL FE) 325 MG: 325 (65 FE) TAB at 06:30

## 2022-11-26 RX ADMIN — NIFEDIPINE 120 MG: 10 CAPSULE ORAL at 12:41

## 2022-11-26 RX ADMIN — DIPHENHYDRAMINE HYDROCHLORIDE 25 MG: 25 TABLET ORAL at 12:51

## 2022-11-26 RX ADMIN — SENNOSIDES AND DOCUSATE SODIUM 2 TABLET: 8.6; 5 TABLET ORAL at 08:50

## 2022-11-26 RX ADMIN — APIXABAN 5 MG: 5 TABLET, FILM COATED ORAL at 08:50

## 2022-11-26 NOTE — ASSESSMENT & PLAN NOTE
Multifactorial in the setting of acute and chronic medical conditions  Has been refusing PT  Physical therapist doing exercises with pt in bed since pt does not want to get out of bed     Heavy assist of 2/3 staff  PT/OT   Continue 24-7 supportive care   Out of bed for meals as tolerated   following for DC planning  Pt was going to be d/c today now in the setting of acute change in condition postponed  Pt send out for evaluation

## 2022-11-26 NOTE — ASSESSMENT & PLAN NOTE
Bun and creatinine elevated from 26/1 46 now at 36/2 09  Started on IV fluids  Per pt she only received 1 1/2 liter, line became dislodged  Pt also reporting her bed was saturated with the fluids as her line was found to be dislodged   Suspect reason for her increase in Bun and creatinine   Avoid nephrotoxins, hypotension   Lisinopril, lasix, KCL, were held in the setting of MICHAEL  Monitor renal function

## 2022-11-26 NOTE — ASSESSMENT & PLAN NOTE
· 3-4 days of persistent n/v  Reports today is the first day she is unable to keep food down  · IV fluids  Hold lasix for now    · BMP in AM

## 2022-11-26 NOTE — ASSESSMENT & PLAN NOTE
· Presents with epigastric pain, n/v x 3-4 days  Has never had EGD  · IVF  · Will give dose protonix IV, continue protonix PO tomorrow  Start carafate scheduled    If symptoms persist, may need GI eval

## 2022-11-26 NOTE — H&P
[facility] 1945, 76 y o  female MRN: [id number]  Unit/Bed#: S -01 Encounter: [id number]  Primary Care Provider: Rachelle Vargas MD   Date and time admitted to hospital: 11/25/2022  4:08 PM    * MICHAEL (acute kidney injury) [facility])  Assessment & Plan  · 3-4 days of epigastric pain, nausea, vomiting  Unable to keep anything down  · IV fluids  · Hold lisinopril   · BMP in AM    Chronic GERD  Assessment & Plan  · Presents with epigastric pain, n/v x 3-4 days  Has never had EGD  · IVF  · Will give dose protonix IV, continue protonix PO tomorrow  Start carafate scheduled  If symptoms persist, may need GI eval    Atrial fibrillation RVR (HCC)  Assessment & Plan  · Likely due to dehydration, electrolyte abnormality, missed doses of metoprolol/cardizem in setting of n/v  · Magnesium repleted in ED   · IV fluids  · Continue home medications  · PRN metoprolol IV    Hyponatremia  Assessment & Plan  · 3-4 days of persistent n/v  Reports today is the first day she is unable to keep food down  · IV fluids  Hold lasix for now  · BMP in AM     Rash  Assessment & Plan  · Pruritic rash to right arm  Image in media tab  Reports this started yesterday after she was given IV fluids at nursing facility  · Responding to benedryl  Ideally, would avoid PO benedryl in elderly  Will continue with topical benedryl  Proctitis  Assessment & Plan  · Incidental finding on CT   · Patient denies prior colonoscopy   · Patient denies symptoms- bleeding, constipation/diarrhea, weight loss  Likely can follow with GI as outpatient    Bacteria in urine  Assessment & Plan  · Patient denies any urinary symptoms over the past week  · CT AP: mild cystitis   · UA to be collected  · Prior urine cx MDRO >100k proteus  Last treated with ertapenem 10/4  · Given patient's lack of urinary symptoms, afebrile and only very mild leukocytosis would monitor off antibiotics at this time  Suspect colonization     Bacteremia due to methicillin resistant Staphylococcus epidermidis  Assessment & Plan  · Follows with ID as outpatient  No clear source of infection was identified  TANISHA did not show any valvular vegitation  · Completed IV vanco x 6 weeks on 11/9     Ambulatory dysfunction  Assessment & Plan  · Multifactorial 2/2 recent surgery and hospitalizations  Patient tells me she is able to stand and pivot but otherwise wheelchair bound at facility  · PT OT     Essential hypertension  Assessment & Plan  · BP stable on current regimen    VTE Pharmacologic Prophylaxis: VTE Score: 4 Moderate Risk (Score 3-4) - Pharmacological DVT Prophylaxis Ordered: apixaban (Eliquis)  Code Status: Level 1 - Full Code   Discussion with family: Patient declined call to   Anticipated Length of Stay: Patient will be admitted on an inpatient basis with an anticipated length of stay of greater than 2 midnights secondary to MICHAEL  Total Time for Visit, including Counseling / Coordination of Care: 70 minutes Greater than 50% of this total time spent on direct patient counseling and coordination of care  Chief Complaint: nausea, vomiting    History of Present Illness:  Edwin Lobo is a 76 y o  female with a PMH of PAF (eliquis), HTN, GERD, tachy-smita syndrome s/p pacemaker, CKD who presents with 3 to 4 days of nausea, vomiting, epigastric pain  She reports just smelling food would sometimes make her feel ill  She was able to tolerate just a little liquid intake today  She denies fever, chills, cp, palpitations, constipation, diarrhea, weight loss, bleeding, urinary symptoms  Chronic left leg swelling  Initial labs reveal MICHAEL, hypomagnesemia, hyponatremia  Will give patient protonix IV and start carafate  If symptoms persist, may need to have GI evaluate  Review of Systems:  Review of Systems   Constitutional: Negative for chills and fever     Cardiovascular: Positive for leg swelling (left)  Gastrointestinal: Positive for abdominal pain (epigastric), nausea and vomiting  Negative for abdominal distention, anal bleeding, blood in stool, constipation, diarrhea and rectal pain  Genitourinary: Negative  All other systems reviewed and are negative  Past Medical and Surgical History:   Past Medical History:   Diagnosis Date   • Abnormal ECG     Last Assessed 9/29/2016   • Anxiety     Last Assessed 6/08/2016   • Arthritis     knees   • Asthma     Last Assessed 11/06/2013   • Atrial premature complex    • Cataract, bilateral     Last Assessed 7/14/2016   • Cataract, left eye     Both eyes  Had surgery on left  • COVID-19    • Difficulty swallowing    • Diverticulosis 9/25/2022   • Dizziness    • Fibromyalgia    • Fibromyalgia, primary    • Gastric reflux    • Heart failure ([facility] [address] )     5/23/22 Pt reports had heart failure in the past   • History of COVID-19     5/23/22 Pt reports having COVID in 2021     • History of transfusion     no reaction   • [place] (hard of hearing)    • Hyperlipidemia    • Hypertension    • Incontinence in female     5/23/22 Pt reports has incontinence -wears depends especially at night/riding in car   • Irregular heart beat     5/23/22 Pt reports hx of A fib   • Lyme disease    • PONV (postoperative nausea and vomiting)    • Premature ventricular contraction    • Primary osteoarthritis of both knees     Last Assessed 7/14/2016   • Rheumatic fever     5/23/22 Pt reports had hx of rheumatic fever as a child twice   • Sore throat    • Tuberculosis 1947       Past Surgical History:   Procedure Laterality Date   • APPENDECTOMY     • CARDIAC PACEMAKER PLACEMENT  2019   • COLONOSCOPY     • DENTAL SURGERY      extractions   • HYSTERECTOMY      5/23/22 Pt reports had appendix out with hysterectomy and "tightened up my bladder"   • IR ASPIRATION ONLY  9/30/2022   • ORIF FEMUR FRACTURE Left 08/05/2021    Procedure: OPEN REDUCTION W/ INTERNAL FIXATION (ORIF) DISTAL FEMUR; INSERTION RETROGRADE NAIL;  Surgeon: Viktor Brooke MD;  Location: [facility];  Service: Orthopedics   • RI DILATE ESOPHAGUS N/A 04/06/2016    Procedure: DILATATION ESOPHAGEAL;  Surgeon: Nandini Jensen MD;  Location: [facility]; Service: Gastroenterology   • RI EGD TRANSORAL BIOPSY SINGLE/MULTIPLE N/A 04/06/2016    Procedure: ESOPHAGOGASTRODUODENOSCOPY (EGD); Surgeon: Nandini Jensen MD;  Location: [facility]; Service: Gastroenterology   • RI REMOVAL DEEP IMPLANT Left 9/12/2022    Procedure: REMOVAL NAIL IM  FEMUR;  Surgeon: Viktor Brooke MD;  Location: [facility];  Service: Orthopedics   • RI TOTAL KNEE ARTHROPLASTY Left 9/12/2022    Procedure: ARTHROPLASTY KNEE TOTAL;  Surgeon: Viktor Brooke MD;  [facility];  Service: Orthopedics   • RI XCAPSL CTRC RMVL INSJ IO LENS PROSTH W/O ECP Left 03/15/2016    Procedure: EXTRACTION EXTRACAPSULAR CATARACT PHACO INTRAOCULAR LENS (IOL); Surgeon: Glen Carrasco MD;  Location: [facility];  Service: Ophthalmology   • REPLACEMENT TOTAL KNEE Right    • REPLACEMENT TOTAL KNEE      right    • TONSILLECTOMY         Meds/Allergies:  Prior to Admission medications    Medication Sig Start Date End Date Taking?  Authorizing Provider   acetaminophen (TYLENOL) 325 mg tablet Take 975 mg by mouth every 6 (six) hours as needed    Historical Provider, MD   albuterol (PROVENTIL HFA,VENTOLIN HFA) 90 mcg/act inhaler INHALE 2 PUFFS BY MOUTH EVERY 6 HOURS AS NEEDED FOR WHEEZING 12/23/20   Ananya Colmenares MD   allopurinol (ZYLOPRIM) 100 mg tablet Take 1 tablet (100 mg total) by mouth daily 12/7/21   Ananya Colmenares MD   apixaban (Eliquis) 5 mg Take 1 tablet (5 mg total) by mouth 2 (two) times a day 8/23/22   Ananya Colmenares MD   ascorbic acid (VITAMIN C) 500 MG tablet Take 1 tablet (500 mg total) by mouth 2 (two) times a day 8/9/22   Chema Soto PA-C   bisacodyl (DULCOLAX) 10 mg suppository Insert 10 mg into the rectum daily as needed    Historical Provider, MD   cholecalciferol (VITAMIN D3) 400 units tablet Take 1 tablet (400 Units total) by mouth in the morning 10/4/22   Marsha Thorne PA-C   diltiazem (CARDIZEM CD) 120 mg 24 hr capsule Take 1 capsule (120 mg total) by mouth daily  Patient taking differently: Take 120 mg by mouth daily Takes in the am 3/29/22   Justo Oglesby MD   DULoxetine (CYMBALTA) 30 mg delayed release capsule TAKE 1 CAPSULE(30 MG) BY MOUTH DAILY 8/5/22   Germaine Wesley MD   ezetimibe (ZETIA) 10 mg tablet Take 1 tablet (10 mg total) by mouth daily 2/28/22   JERI Uriostegui   ferrous sulfate 324 (65 Fe) mg Take 1 tablet (324 mg total) by mouth 2 (two) times a day before meals 8/9/22   Darrion Perez PA-C   FIBER PO Take 1 tablet by mouth daily    Historical Provider, MD   folic acid (FOLVITE) 1 mg tablet TAKE 1 TABLET(1 MG) BY MOUTH DAILY 4/20/22   Darrion Perez PA-C   furosemide (LASIX) 40 mg tablet Take 1 tablet (40 mg total) by mouth in the morning  5/23/22   JERI Uriostegui   lisinopril (ZESTRIL) 2 5 mg tablet Take 1 tablet (2 5 mg total) by mouth daily 6/8/22   Justo Oglesby MD   Melatonin 10 MG TABS Take by mouth Takes daily at night    Historical Provider, MD   metoprolol tartrate (LOPRESSOR) 50 mg tablet Take 100 mg in the morning and 50 mg in the evening 6/1/22   Germaine Wesley MD   Multiple Vitamin (MULTIVITAMIN ADULT PO) Take by mouth in the morning    Historical Provider, MD   omeprazole (PriLOSEC) 20 mg delayed release capsule Take 20 mg by mouth daily    Historical Provider, MD   ondansetron (ZOFRAN) 4 mg tablet Take 4 mg by mouth every 8 (eight) hours as needed    Historical Provider, MD   oxybutynin (DITROPAN-XL) 10 MG 24 hr tablet Take 10 mg by mouth daily at bedtime    Historical Provider, MD   polyethylene glycol (MIRALAX) 17 g packet Take 17 g by mouth daily as needed    Historical Provider, MD   senna-docusate sodium (SENOKOT S) 8 6-50 mg per tablet Take 2 tablets by mouth 2 (two) times a day 10/4/22   Charles Mcgarry PA-C   vitamin B-12 (VITAMIN B-12) 1,000 mcg tablet Take 1 tablet (1,000 mcg total) by mouth daily 10/4/22   Charles Mcgarry PA-C     I have reviewed home medications with a medical source (PCP, Pharmacy, other)  Allergies: Allergies   Allergen Reactions   • Penicillins Hives     Itching and terrible hives   • Sulfa Antibiotics Itching   • J31 Folate [Folic Acid-Vit Q9-MSC S88 - Food Allergy] Other (See Comments)     5/23/22 Pt reports no allergic reaction known    • [place] Other (See Comments)     Unknown, 5/23 -pt is unaware of reaction    • Lyrica [Pregabalin] Other (See Comments)     5/23/22 Pt reports doesn't remember reaction    • Other Other (See Comments)     Black rubber 5/23/22 per allergy test - pt unaware of reaction   • Cortisone Acetate [Cortisone] Itching and Rash     5/23/22 pt reports makes her violent    • Doxycycline Hives and Itching   • [place] Other (See Comments)     Skin discoloration       Social History:  Marital Status:    Occupation: ]  Patient Pre-hospital Living Situation: Home  Patient Pre-hospital Level of Mobility: mostly w/c bound  able to stand and pivot    Patient Pre-hospital Diet Restrictions:   Substance Use History:   Social History     Substance and Sexual Activity   Alcohol Use Not Currently    Comment: Per [employer]- pt reports no current alcohol use at this time     Social History     Tobacco Use   Smoking Status Never   Smokeless Tobacco Never   Tobacco Comments    Denies any former or current smoking     Social History     Substance and Sexual Activity   Drug Use No    Comment: Denies any former or current drug use       Family History:  Family History   Problem Relation Age of Onset   • Coronary artery disease Mother    • Heart attack Mother         Prior   • Heart disease Father    • Heart attack Father         Prior   • Anesthesia problems Neg Hx        Physical Exam:     Vitals:   Blood Pressure: 118/53 (11/25/22 2038)  Pulse: (!) 129 (11/25/22 2038)  Temperature: 98 2 °F (36 8 °C) (11/25/22 2038)  Temp Source: Oral (11/25/22 1612)  Respirations: 18 (11/25/22 2038)  Weight - Scale: 101 kg (221 lb 9 oz) (11/25/22 1612)  SpO2: 96 % (11/25/22 2038)    Physical Exam  Constitutional:       General: She is not in acute distress  Appearance: Normal appearance  She is not ill-appearing  HENT:      Head: Normocephalic and atraumatic  Right Ear: External ear normal       Left Ear: External ear normal       Nose: Nose normal       Mouth/Throat:      Mouth: Mucous membranes are dry  Pharynx: Oropharynx is clear  Eyes:      General: No scleral icterus  Extraocular Movements: Extraocular movements intact  Conjunctiva/sclera: Conjunctivae normal       Pupils: Pupils are equal, round, and reactive to light  Cardiovascular:      Rate and Rhythm: Tachycardia present  Rhythm irregular  Pulses: Normal pulses  Heart sounds: Normal heart sounds  Pulmonary:      Effort: Pulmonary effort is normal  No respiratory distress  Breath sounds: Normal breath sounds  No wheezing, rhonchi or rales  Abdominal:      General: Bowel sounds are normal  There is no distension  Palpations: Abdomen is soft  Tenderness: There is abdominal tenderness (epigastric to palpation)  There is no right CVA tenderness, left CVA tenderness, guarding or rebound  Musculoskeletal:         General: Normal range of motion  Cervical back: Normal range of motion  No rigidity or tenderness  Left lower leg: Edema present  Skin:     General: Skin is warm  Capillary Refill: Capillary refill takes less than 2 seconds  Neurological:      General: No focal deficit present  Mental Status: She is alert and oriented to person, place, and time     Psychiatric:         Mood and Affect: Mood normal          Behavior: Behavior normal           Additional Data:     Lab Results:  Results from last 7 days   Lab Units 11/25/22  1626   WBC Thousand/uL 11 70*   HEMOGLOBIN g/dL 11 0*   HEMATOCRIT % 36 0   PLATELETS Thousands/uL 502*   BANDS PCT % 10*   LYMPHO PCT % 21   MONO PCT % 10   EOS PCT % 3     Results from last 7 days   Lab Units 11/25/22  1626   SODIUM mmol/L 129*   POTASSIUM mmol/L 4 2   CHLORIDE mmol/L 97   CO2 mmol/L 23   BUN mg/dL 35*   CREATININE mg/dL 1 85*   ANION GAP mmol/L 9   CALCIUM mg/dL 8 6   ALBUMIN g/dL 3 1*   TOTAL BILIRUBIN mg/dL 0 30   ALK PHOS U/L 60   ALT U/L 7   AST U/L 15   GLUCOSE RANDOM mg/dL 117                       Lines/Drains:  Invasive Devices     Peripherally Inserted Central Catheter Line  Duration           PICC Line 10/04/22 52 days          Peripheral Intravenous Line  Duration           Peripheral IV 11/25/22 Left Antecubital <1 day          Drain  Duration           External Urinary Catheter 61 days                Central Line:  Goal for removal: PICC removed 11/10/2022 per nursing facility note           Imaging: Reviewed radiology reports from this admission including: abdominal/pelvic CT  CT abdomen pelvis wo contrast   Final Result by Jessica Calixto MD (11/25 1846)      1  Proctitis which can be of an infectious or inflammatory etiology  2   Mild cystitis  3   Resolution of fluid collection level of the left greater trochanter  The study was marked in [facility] for immediate notification  Workstation performed: [id number]         XR chest 1 view portable    (Results Pending)       EKG and Other Studies Reviewed on Admission:   · EKG: Atrial fibrillation    left axis deviation       ** Please Note: This note has been constructed using a voice recognition system   **

## 2022-11-26 NOTE — ASSESSMENT & PLAN NOTE
· Multifactorial 2/2 recent surgery and hospitalizations  Patient tells me she is able to stand and pivot but otherwise wheelchair bound at facility     · PT OT

## 2022-11-26 NOTE — ASSESSMENT & PLAN NOTE
Na today 131 in the setting of decrease po intake  Pt reports today no nausea  Denies vomiting  Per pt she ate breakfast and feeling better than other days  Started IV fluids however line dislodged  Pt unable to receive total of 3 L since line dislodged and pt reported her bed was wet from the fluids  On call provider was called and nursing reported pt was eating/drinking and feeling better  On call provider ok to stop fluids     Lasix and KCL has been held since 11/21  monitor BMP

## 2022-11-26 NOTE — ASSESSMENT & PLAN NOTE
Nursing reporting pt feeling nauseous, no vomiting  However today pt ate breakfast and reporting feels better  After eating breakfast reports no vomiting   Patient refusing to take meds, encourage to take bp meds   Afebrile, vital signs stable and ill appearing  Available zofran, tums   abd NT/ND, +BS throughout, denies pain,   KUB unremarkable   C/w PPI   Bladder scan in vital signs Q shift x2 days, today low bp and tachycardia  After speaking with alyse Baker reported low BP  Was not reported to provider  Spoke with nurse recheck manual bp low  Pt transfer to [facility] for evaluation   Alyse updated and agreeable

## 2022-11-26 NOTE — ASSESSMENT & PLAN NOTE
Today 11 7 from 18 8  Afebrile and now low bp and tachycardia  Pt was started on Ciprofloxacin for suspected UTI  Pt with multiple abx allergies   Awaiting for urine studies since 11/21  With holiday labs have been delayed   KUB unremarkable   Send out for hypotension and tachycardia   Netta cullen requesting pt to be send to ER for evaluation

## 2022-11-26 NOTE — PLAN OF CARE
Problem: MOBILITY - ADULT  Goal: Maintain or return to baseline ADL function  Description: INTERVENTIONS:  -  Assess patient's ability to carry out ADLs; assess patient's baseline for ADL function and identify physical deficits which impact ability to perform ADLs (bathing, care of mouth/teeth, toileting, grooming, dressing, etc )  - Assess/evaluate cause of self-care deficits   - Assess range of motion  - Assess patient's mobility; develop plan if impaired  - Assess patient's need for assistive devices and provide as appropriate  - Encourage maximum independence but intervene and supervise when necessary  - Involve family in performance of ADLs  - Assess for home care needs following discharge   - Consider OT consult to assist with ADL evaluation and planning for discharge  - Provide patient education as appropriate  Outcome: Progressing  Goal: Maintains/Returns to pre admission functional level  Description: INTERVENTIONS:  - Perform BMAT or MOVE assessment daily    - Set and communicate daily mobility goal to care team and patient/family/caregiver  - Collaborate with rehabilitation services on mobility goals if consulted  - Perform Range of Motion 3 times a day  - Reposition patient every 2 hours    - Dangle patient 3 times a day  - Stand patient 3 times a day  - Ambulate patient 3 times a day  - Out of bed to chair 3 times a day   - Out of bed for meals 3 times a day  - Out of bed for toileting  - Record patient progress and toleration of activity level   Outcome: Progressing     Problem: Potential for Falls  Goal: Patient will remain free of falls  Description: INTERVENTIONS:  - Educate patient/family on patient safety including physical limitations  - Instruct patient to call for assistance with activity   - Consult OT/PT to assist with strengthening/mobility   - Keep Call bell within reach  - Keep bed low and locked with side rails adjusted as appropriate  - Keep care items and personal belongings within reach  - Initiate and maintain comfort rounds  - Make Fall Risk Sign visible to staff  - Offer Toileting every 2 Hours, in advance of need  - Initiate/Maintain bed/chair alarm  - Obtain necessary fall risk management equipment: bed/chair alarm  - Apply yellow socks and bracelet for high fall risk patients  - Consider moving patient to room near nurses station  Outcome: Progressing

## 2022-11-26 NOTE — PROGRESS NOTES
Pt arrived to unit via stretcher  Pt unable to bare weight  AAOx4  Pt denies pain  Dual RN skin check completed with NK RN, skin intact, sacrum/buttocks red but blanchable, healed wounds visible  Pt oriented to room and call bell  Pt instructed to use call bell for assistance

## 2022-11-26 NOTE — ASSESSMENT & PLAN NOTE
· Pruritic rash to right arm  Image in media tab  Reports this started yesterday after she was given IV fluids at nursing facility  · Patient does report itching on her back this morning has begun to spread to her chest   · Due to nature of itching  Will give oral Benadryl    Prefer to use topical if tolerated

## 2022-11-26 NOTE — ASSESSMENT & PLAN NOTE
· Pruritic rash to right arm  Image in media tab  Reports this started yesterday after she was given IV fluids at nursing facility  · Responding to benedryl  Ideally, would avoid PO benedryl in elderly  Will continue with topical benedryl

## 2022-11-26 NOTE — ASSESSMENT & PLAN NOTE
· Secondary to 1 week of nausea and vomiting    · Continue gentle hydration  · Hold lisinopril   · BMP daily  · Morning lab pending

## 2022-11-26 NOTE — PROGRESS NOTES
[facility]  Progress Note - Lucille Oliver 1945, 76 y o  female MRN: [id number]  Unit/Bed#: S -01 Encounter: [id number]  Primary Care Provider: Rodolfo Dale MD   Date and time admitted to hospital: 11/25/2022  4:08 PM    Rash  Assessment & Plan  · Pruritic rash to right arm  Image in media tab  Reports this started yesterday after she was given IV fluids at nursing facility  · Patient does report itching on her back this morning has begun to spread to her chest   · Due to nature of itching  Will give oral Benadryl  Prefer to use topical if tolerated    Hyponatremia  Assessment & Plan  · Five days of persistent nausea and vomiting  · Continue gentle hydration  Encourage oral intake  · Morning BMP pending    Asymptomatic bacteriuria  Assessment & Plan  · Patient denies any urinary symptoms over the past week  · CT AP: mild cystitis   · UA positive  · Prior urine cx MDRO >100k proteus  Last treated with ertapenem 10/4  · Will not give antibiotics in the setting of asymptomatic bacteriuria  · Urine culture was collected  Reach out to Infectious Disease if antibiotics determined to be necessary    Bacteremia due to methicillin resistant Staphylococcus epidermidis  Assessment & Plan  · Follows with ID as outpatient  No clear source of infection was identified  TANISHA did not show any valvular vegitation  · Completed IV vanco x 6 weeks on 11/9   · Consult Infectious Disease if antibiotics determined to be necessary      Ambulatory dysfunction  Assessment & Plan  · Multifactorial 2/2 recent surgery and hospitalizations  Patient tells me she is able to stand and pivot but otherwise wheelchair bound at facility  · PT OT   · Patient's family at bedside    Would prefer not to return to [facility]    Chronic GERD  Assessment & Plan  · Denies any epigastric pain this morning  · Continue Protonix and Carafate as needed    Essential hypertension  Assessment & Plan  · BP stable on current regimen    Atrial fibrillation RVR (HCC)  Assessment & Plan  · Likely due to dehydration, electrolyte abnormality, missed doses of metoprolol/cardizem in setting of n/v  · Continue home medications  · Metoprolol p r n  · Medications were held this morning due to low blood pressure  Blood pressure did respond once patient was out of bed  * MICHAEL (acute kidney injury) ([facility] [address] )  Assessment & Plan  · Secondary to 1 week of nausea and vomiting  · Continue gentle hydration  · Hold lisinopril   · BMP daily  · Morning lab pending          VTE Pharmacologic Prophylaxis: VTE Score: 4 Moderate Risk (Score 3-4) - Pharmacological DVT Prophylaxis Ordered: apixaban (Eliquis)  Patient Centered Rounds: I performed bedside rounds with nursing staff today  Discussions with Specialists or Other Care Team Provider: None     Education and Discussions with Family / Patient: Updated  (alyse) at bedside  Time Spent for Care: 30 minutes  More than 50% of total time spent on counseling and coordination of care as described above  Current Length of Stay: 1 day(s)  Current Patient Status: Inpatient   Certification Statement: The patient will continue to require additional inpatient hospital stay due to hyponatremia   Discharge Plan: Anticipate discharge in 24-48 hrs to rehab facility  Code Status: Level 1 - Full Code    Subjective:   Patient reports a change in her back this morning  Denies any abdominal pain  Patient's family would not like her to return to 2 weeks would prefer she did not return to [facility]    Objective:     Vitals:   Temp (24hrs), Av 2 °F (36 8 °C), Min:98 1 °F (36 7 °C), Max:98 4 °F (36 9 °C)    Temp:  [98 1 °F (36 7 °C)-98 4 °F (36 9 °C)] 98 4 °F (36 9 °C)  HR:  [] 124  Resp:  [18] 18  BP: ()/(48-64) 110/64  SpO2:  [95 %-99 %] 97 %  Body mass index is 38 6 kg/m²  Input and Output Summary (last 24 hours):      Intake/Output Summary (Last 24 hours) at 11/26/2022 1300  Last data filed at 11/26/2022 0450  Gross per 24 hour   Intake 550 ml   Output 1075 ml   Net -525 ml       Physical Exam:   Physical Exam  Vitals and nursing note reviewed  Constitutional:       Appearance: She is obese  HENT:      Head: Normocephalic and atraumatic  Cardiovascular:      Rate and Rhythm: Tachycardia present  Rhythm irregular  Pulmonary:      Effort: Pulmonary effort is normal       Breath sounds: Normal breath sounds  Abdominal:      General: Abdomen is flat  There is no distension  Tenderness: There is no abdominal tenderness  Skin:     Findings: Erythema and rash present  Neurological:      Mental Status: She is alert     Psychiatric:         Mood and Affect: Mood normal          Behavior: Behavior normal          Additional Data:     Labs:  Results from last 7 days   Lab Units 11/25/22  1626   WBC Thousand/uL 11 70*   HEMOGLOBIN g/dL 11 0*   HEMATOCRIT % 36 0   PLATELETS Thousands/uL 502*   BANDS PCT % 10*   LYMPHO PCT % 21   MONO PCT % 10   EOS PCT % 3     Results from last 7 days   Lab Units 11/25/22  1626   SODIUM mmol/L 129*   POTASSIUM mmol/L 4 2   CHLORIDE mmol/L 97   CO2 mmol/L 23   BUN mg/dL 35*   CREATININE mg/dL 1 85*   ANION GAP mmol/L 9   CALCIUM mg/dL 8 6   ALBUMIN g/dL 3 1*   TOTAL BILIRUBIN mg/dL 0 30   ALK PHOS U/L 60   ALT U/L 7   AST U/L 15   GLUCOSE RANDOM mg/dL 117                       Lines/Drains:  Invasive Devices     Peripheral Intravenous Line  Duration           Peripheral IV 11/25/22 Left Antecubital <1 day          Drain  Duration           External Urinary Catheter 62 days                  Telemetry:  Telemetry Orders (From admission, onward)             48 Hour Telemetry Monitoring  Continuous x 48 hours        References:    Telemetry Guidelines   Question:  Reason for 48 Hour Telemetry  Answer:  Arrhythmias Requiring Medical Therapy (eg  SVT, Vtach/fib, Bradycardia, Uncontrolled A-fib)                 Telemetry Reviewed: Atrial fibrillation   HR averaging 115  Indication for Continued Telemetry Use: Metabolic/electrolyte disturbance with high probability of dysrhythmia             Imaging: Reviewed radiology reports from this admission including: abdominal/pelvic CT    Recent Cultures (last 7 days):         Last 24 Hours Medication List:   Current Facility-Administered Medications   Medication Dose Route Frequency Provider Last Rate   • acetaminophen  650 mg Oral Q6H PRN Vel Hoots, CRNP     • allopurinol  100 mg Oral Daily Vel Hoots, CRNP     • apixaban  5 mg Oral BID Vle Hoots, CRNP     • ascorbic acid  500 mg Oral BID Vel Hoots, CRNP     • bisacodyl  10 mg Rectal Daily PRN Vel Hoots, CRNP     • cholecalciferol  400 Units Oral Daily Vel Hoots, CRNP     • vitamin B-12  1,000 mcg Oral Daily Vel Hoots, CRNP     • diltiazem  120 mg Oral Daily Vel Hoots, CRNP     • diphenhydrAMINE  25 mg Oral Q6H PRN Jennifer Can MD     • diphenhydrAMINE-zinc acetate   Topical TID PRN Vel Hoots, CRNP     • DULoxetine  30 mg Oral Daily Vel Hoots, CRNP     • ezetimibe  10 mg Oral Daily Vel Hoots, CRNP     • ferrous sulfate  325 mg Oral BID With Meals Vel Hoots, CRNP     • folic acid  1 mg Oral Daily Vel Hoots, CRNP     • melatonin  6 mg Oral HS Vel Hoots, CRNP     • metoclopramide  10 mg Intravenous Q6H PRN Vel Hoots, CRNP     • metoprolol tartrate  100 mg Oral Daily Vel Hoots, CRNP     • metoprolol tartrate  50 mg Oral HS Vel Hoots, CRNP     • oxybutynin  10 mg Oral HS Vel Hoots, CRNP     • pantoprazole  40 mg Oral Early Morning Vel Hoots, CRNP     • polyethylene glycol  17 g Oral Daily PRN Vel Hoots, CRNP     • [START ON 11/27/2022] psyllium  1 packet Oral Daily Vel Hoots, CRNP     • senna-docusate sodium  2 tablet Oral BID Vel Hoots, CRNP     • sucralfate  1 g Oral 4x Daily (AC & HS) Vel Hoots, CRNP          Today, Patient Was Seen By: Jennifer Can MD    **Please Note: This note may have been constructed using a voice recognition system  **

## 2022-11-26 NOTE — ASSESSMENT & PLAN NOTE
· Incidental finding on CT   · Patient denies prior colonoscopy   · Patient denies symptoms- bleeding, constipation/diarrhea, weight loss    Likely can follow with GI as outpatient

## 2022-11-26 NOTE — PROGRESS NOTES
[address], [address] [place] [address], [address] [place] [address]  [phone]    NAME: Elsa Phan  AGE: 76 y o  SEX: female    Progress Note    Location: [facility]   POS: 31 (SNF)     Patient's care was coordinated with nursing facility staff  Recent vitals, labs, and updated medications were review on [facility] system in facility  Assessment/Plan:    MICHAEL (acute kidney injury) ([facility] [address] )  In the setting of dec po intake, prerenal  Bun and creatinine elevated 26/1 46, egfr 36  Now K 5 4  D/C lasix, KCL, lisinopril  With dec po intake, and reporting nausea, per nursing and pt no vomiting, no diarrhea/ constipation   Start NS at 80cc/hr for 3L  Per nursing pt is urinating- incontinent   Bladder scan negative for urine retention  Avoid nephrotoxins, hypotension   Monitor renal function   BMP on 11/25 AM draw    Suspected UTI  Symptomatic for UTI  + for lower abd tenderness  Decreased po intake and nausea  Denies vomiting  Remains on zofran as needed  VS q shift for 2 days   WBC today 18 8  Bun and creatinine increased  Bladder scan negative for retention  Afebrile and stable VS per nursing    Per nurse pt has been refusing po meds   Pt is allergic to multiple abx will start pt on ciprofloxacin 500 mg for 3 days completion of tx on 11/26   Obtain urine studies     Hyperkalemia  In the setting of decreased p o  intake and KCl maintenance treatment  Asymptomatic  DC KCl, Lasix, and lisinopril  Continue to encourage p o  hydration  Continue to monitor potassium levels and trend  BMP on 11/25    Nausea  Reports feeling nauseous  Denies vomiting  With decreased p o  intake  Patient reports eating some apple sauce and drinking ginger ale and tolerating  Abdomen with some tenderness along the epigastric area, lower side of abdomen/lower abdomen with tenderness, non-distended, positive bowel sounds throughout  KUB negative for obstruction    Denies diarrhea or constipation  Afebrile, vital signs stable, appears ill however nontoxic, patient is alert/awake and able to make her needs known  Started on IV NS at 80 cc/hour for 3 L  Continue with Zofran as needed  Follow-up CBC and CMP on 11/26    Leukocytosis  WBC 18 8, symptomatic for suspected UTI   Afebrile, VSS, ill appearing and non toxic  Alert and awake able to make her needs known  Start ciprofloxacin 500 mg  for 3 days  With multiple abx allergies   Obtain urine studies    KUB negative for obstruction  Trend CBC   CBC on 11/25     Thrombocytosis  Platelets 9828 -->633  -- > 402 --> 469, suspect reactive in nature due to recent bacteremia during recent hospitalization  Recently completed 6 weeks of vanco  Id follow patient with weekly labs   No active bleeding noted or reported   Follow platelets and trend   CBC 11/25    History of Present Illness:  38-year-old female seen and examined for STR acute visit  Nursing reporting pt with nausea and decrease po intake  on exam, pt reporting feeling nauseous  Denies vomiting  Describes, "tenderness" along the epigastric area and lower abdominal area  On exam there is guarding on left side of abd and lower abd, however not so much on epigastric area negative for rebound  N/D  BS active throughout  KUB revealed no dilated loops of bowel are identified  No gross free intraperitoneal air is identified  Pt now with increased WBC's, BUN/creatinine and hyperkalemia  Will d/c lasix, KCL, and lisinopril  Obtain Urine studies to r/o urinary infection  Since pt is symptomatic for UTI and with h/o of past UTI;s with similar symptoms witll start her on Cipro 500 mg x3 days  Start IV NS at 80cc/hr for 3 L  Case discussed with Dr Eron Grace and agreeable with care plan  Repeat CBC/BMP on 11/25/22  Pt is scheduled to be discharge on 11/25/22, discharge tentative on pt's improvement and labs  Per nurse pt is refusing meds due to nausea  Encouraged pt to take at last bp/cardiac meds   MVI, ferrous sulfate, and folic acid on hold, since pt requesting some of he rmeds exacerbate her symptoms  Continues with zofran and PPI  Also available tums  Pt has been having soft bowel movement no diarrhea per nursing  Currently her VSS, afebrile appear ill but no toxic  Pt is alert and able to make her needs known  Denies chest pain,  palpitation, shortness of breath, cough, fever, chills, lightheadedness, dizziness, headache, or visual changes  Left leg with trace edema  Right leg no edema  Left leg incisions approximated, healing well  No erythema or drainage  Continue with WBAT  Sleeping with no difficulties  B/B incontinent  Heavy assist of 2/3 staff  Per nursing no other concerns or issues at this time    Review of Systems:  Per history of present illness, all other systems reviewed and negative  Other than those noted in HPI    HISTORY:  Medical Hx: Reviewed, unchanged  Family Hx: Reviewed, unchanged  Soc Hx: Reviewed,  unchanged    ALLERGY: Reviewed, unchanged  Allergies   Allergen Reactions   • Penicillins Hives     Itching and terrible hives   • Sulfa Antibiotics Itching   • F76 Folate [Folic Acid-Vit Y2-XBD H85 - Food Allergy] Other (See Comments)     5/23/22 Pt reports no allergic reaction known    • Benton Other (See Comments)     Unknown, 5/23 -pt is unaware of reaction    • Lyrica [Pregabalin] Other (See Comments)     5/23/22 Pt reports doesn't remember reaction    • Other Other (See Comments)     Black rubber 5/23/22 per allergy test - pt unaware of reaction   • Cortisone Acetate [Cortisone] Itching and Rash     5/23/22 pt reports makes her violent    • Nickel Other (See Comments)     Skin discoloration      PHYSICAL EXAM:  Vital Signs: blood pressure 135/79, pulse 79, respirations 18, Temp 98 1, O2 sats 94% RA  Weight: 245 3 lbs    General:  Appears ill   Head: Atraumatic  Normocephalic    Eye Exam: anicteric sclera, no discharge, PERRLA, No injection  Oral Exam: dry mucous membranes, no buccaloropharyngeal erythema, palatine tonsils WNL   Neck Exam: no anterior cervical lymphadenopathy noted, neck supple  Cardiovascular: regular rate, regular rhythm, + murmur, rubs, or gallops  Pulmonary:  Breath sounds clear, no wheeze, no rhonchi, no rales  No chest tenderness  Abdominal: soft, rounded, tender, nondistended, bowel sounds audible x 4 quadrants +guarding at left side and lower abd mild tenderness at epigastric area +Nausea, decreased po intake  -diarrhea/constipation    : Non distended bladder  Incontinent of bowel and bladder   Extremities and skin: left leg trace edema  Left leg incisions BECCA- approximated  no erythema/drainage, blanchable redness in buttocks  Neurological: alert, cooperative and responsive, Oriented x 3, generalized weakness     Laboratory results / Imaging reviewed: Hard copy/ies in medical chart:  Reviewed from [facility]    CBC-BMP reviewed on 11/25/2022   Hemoglobin 11 0, hematocrit 35 2, WBC 11 7, platelets 328, blood glucose 112, BUN 36, creatinine 2 09, sodium 131, potassium 4 9, calcium 8 7, EGFR 24     X-ray abdomen one view reviewed on 11/21/2022  No dilated loops of bowel identified  No gross free intraperitoneal air is identified   Phleboliths overlie the pelvis     CBC-Bmp reviewed from 11-22  Hemoglobin 12 4, hematocrit 39 3, WBC 18 8, platelets 187, good blood glucose 131, BUN 26, creatinine 1 46, sodium 137, potassium 5 4, calcium 9 1 albumin 2 9 alkaline phosphatase 91, albumin 2 9, bilirubin total 0 4, protein total 6 7, AST 23, ALT 14, anion gap 15, EGFR 36    CBC-BMP reviewed on 11/14/2022   Hemoglobin 9 2, hematocrit 28 5, WBC 6 1, platelets 489, blood glucose 111, BUN 9, creatinine 0 61, sodium 144, potassium 3 2, calcium 8 6, EGFR 91    Vancomycin trough 14 3 on 11/08/2022    CBC with diff-BMP reviewed on 11/07/2022  Hemoglobin 9 1, hematocrit 28 2, WBC 7 0, platelets 446, absolute neutrophil 4 5, absolute lymphocyte 1 0, absolute monocyte 1 0, absolute use not feel 0 4, absolute basophil 0 1, neutrophils 65, lymphocytes 14, monocytes 14, eosinophils 6, basophils 1, blood glucose 101, BUN 11, creatinine 0 57, sodium 142, potassium 3 8, chloride 107, calcium 8 4, EGFR 92    CBCD, BMP, Vanco T reviewed on 10/31/2022  Vanco T 18 0  Glucose 107, BUN 11, Cr 0 65, Na 142, K 3 7, Ca 8 7, GFR 90, hgb 9 0, hct 27 2, WBC 6 4, pl 402,     CBC with diff/creatinine/Vanco T reviewed 10/26/22  Hemoglobin 8 7, hematocrit 26 7, WBC 8 0, platelets 560, absolute neutrophil 4 7, absolute lymphocyte 1 6, absolute monocytes 1 0, absolute using a feels 0 6, absolute basophil was 0 1, neutrophils 53, lymphocytes 20, monocytes 12, eosinophils 8, basophils 1, band 2, metamyelocytes 1, myelocytes 3, anisocytosis rare, hypochromia rare, polychromasia rare, creatinine 0 70, EGFR 88  Vanco T- 21 9    CBC/Creatinine/ Vanco T on 10/18/22  Hgb 8 7, hct 26 7, WBC 8 0, Platelet 209, absolute neutrophils 4 7, absolute lymphocytes 1 6, absolute monocytes 1 0, absolute eosinophils 0 6, absolute basophils 0 1, neutrophils 53, lymphocytes 20, monocytes 12, eosinophils 8, basophils 1, band 2, metamyelocytes 1, myelocytes 3, anisocytosis rare   Hypochromia rare, polychromasia rate, creatinine 0 70, EGFR 88    Vanco T 21 9 (h)     BMP review on 10/13/2022   Blood glucose 107, BUN 10, creatinine 0 76, sodium 142, potassium 3 6, calcium 9 2, eGFR 80    10/18/22- Vanco T 21 9- nursing notifying ID     10/11/22- Vanco Trough 19 8    10/9/22- Vanco Trough 16 7    CBC with diff-creatinine reviewed from 10/18/2022  Hemoglobin 8 7, hematocrit 26 7, WBC 8 0, platelets 557, absolute neutrophil 4 7, absolute lymphocyte 1 6, absolute monocytes 1 0, absolute use 0 6, absolute basos 0 1, neutrophils 53, lymphocytes 20, monocytes 12, eosinophils 8, basophil 1, band 2, metamyelocytes 1, myelocytes 3, anisocytosis rare, hypochromia rare, polychromasia rare, creatinine 0 70, EGFR 80     CBC with diff/creatinine/Vanco through on 10/6/22  Hemoglobin 8 6, hematocrit 26 0, WBC 6 1, platelets 279, absolute neutrophil 3 0, absolute lymphocyte 1 6, absolute monos 0 7, absolute eos 0 7, absolute basos 0 1, neutrophils 49, lymphocyte 26, monocytes 12, eosinophils 12, basophil 1, creatinine 0 74, EGFR 82, Vanco Through 10 9    CBC-BMP reviewed from 09/19/2022   Hemoglobin 8 3, hematocrit 24 9, WBC 11 4, platelets 050, blood glucose 118, BUN 20, creatinine 0 93, sodium 139, potassium 4 1, calcium 8 4, EGFR 63    Current Medications: All medications reviewed and updated in Nursing Home Chart reviewed from [facility]    I have spent >30 minutes with patient today in which greater than 50% of this time was spent in counseling/coordination of care regarding Diagnostic results, Prognosis, Risks and benefits of tx options, Intructions for management, Patient and family education, Importance of tx compliance, Risk factor reductions, Impressions and time spent >30 minutes discussing with pt results, diagnostics and prevention of complications r/t pt refusing to take bp/cardiac meds  advised pt to eat apple sauce, water or ginger ale and to take PPI, Tums and then take bp medications  attempted to place IV line since [facility] uses an outiside facility , at times this facility takes one day to place a line  unsucessul to place IV line  spoke with nursing to obtain urine studies and if any difficulty to contact his provider  this provider performed bladder scans  medications were reviewed and held for potential exacerbations of complications   Please note:  Voice-recognition software may have been used in the preparation of this document  Occasional wrong word or "sound-alike" substitutions may have occurred due to the inherent limitations of voice recognition software  Interpretation should be guided by context      Dora Zabala  11/23/2022

## 2022-11-26 NOTE — ASSESSMENT & PLAN NOTE
· Likely due to dehydration, electrolyte abnormality, missed doses of metoprolol/cardizem in setting of n/v  · Magnesium repleted in ED   · IV fluids  · Continue home medications  · PRN metoprolol IV

## 2022-11-26 NOTE — ASSESSMENT & PLAN NOTE
· Likely due to dehydration, electrolyte abnormality, missed doses of metoprolol/cardizem in setting of n/v  · Continue home medications  · Metoprolol p r n  · Medications were held this morning due to low blood pressure  Blood pressure did respond once patient was out of bed

## 2022-11-26 NOTE — ASSESSMENT & PLAN NOTE
Platelets 511 --> 175  -- > 402 --> 469, suspect reactive in nature due to recent bacteremia during recent hospitalization  Recently completed 6 weeks of vanco, followed by ID   Timothy active bleeding noted or reported   Follow platelets and trend

## 2022-11-26 NOTE — ASSESSMENT & PLAN NOTE
· 3-4 days of epigastric pain, nausea, vomiting  Unable to keep anything down     · IV fluids  · Hold lisinopril   · BMP in AM

## 2022-11-26 NOTE — ASSESSMENT & PLAN NOTE
Pt has been treated for suspected UTI +lower abd pressure nausea has decreased and not vomiting  Remains on zofran as needed  WBC today 11 7 from 18 8   Bun and creatinine increased  Bladder scan with no retention  Afebrile now with low bp and tachycardia   Per nurse pt has been refusing po meds   Started on ciprofloxacin for 3 days, till 11/26   Waiting on urine studies

## 2022-11-26 NOTE — ASSESSMENT & PLAN NOTE
· Multifactorial 2/2 recent surgery and hospitalizations  Patient tells me she is able to stand and pivot but otherwise wheelchair bound at facility  · PT OT   · Patient's family at bedside    Would prefer not to return to [facility]

## 2022-11-26 NOTE — ASSESSMENT & PLAN NOTE
In the setting of decrease intake  Pt with nausea however today reports feeling better  Denies V/D/C  IV fluids were stopped  As line dislodged  On call provider ok to stop as pt was eating/drinking and improved  Labs resulted with increased in BUN and creatinine  Now with hyponatremia  Was able to placed peripheral IV catheter  Re-started IV fluids  however alyse Angeleso requesting  pt to be send out to [address] [place] [address] for evaluation

## 2022-11-26 NOTE — ASSESSMENT & PLAN NOTE
· Follows with ID as outpatient  No clear source of infection was identified  TANISHA did not show any valvular vegitation     · Completed IV vanco x 6 weeks on 11/9   · Consult Infectious Disease if antibiotics determined to be necessary

## 2022-11-26 NOTE — ASSESSMENT & PLAN NOTE
· Patient denies any urinary symptoms over the past week  · CT AP: mild cystitis   · UA positive  · Prior urine cx MDRO >100k proteus  Last treated with ertapenem 10/4  · Will not give antibiotics in the setting of asymptomatic bacteriuria  · Urine culture was collected    Reach out to Infectious Disease if antibiotics determined to be necessary

## 2022-11-26 NOTE — ASSESSMENT & PLAN NOTE
· Five days of persistent nausea and vomiting  · Continue gentle hydration    Encourage oral intake  · Morning BMP pending

## 2022-11-26 NOTE — ASSESSMENT & PLAN NOTE
· Patient denies any urinary symptoms over the past week  · CT AP: mild cystitis   · UA to be collected  · Prior urine cx MDRO >100k proteus  Last treated with ertapenem 10/4  · Given patient's lack of urinary symptoms, afebrile and only very mild leukocytosis would monitor off antibiotics at this time    Suspect colonization

## 2022-11-27 PROBLEM — E87.5 HYPERKALEMIA: Status: ACTIVE | Noted: 2022-11-27

## 2022-11-27 LAB
ANION GAP SERPL CALCULATED.3IONS-SCNC: 5 MMOL/L (ref 4–13)
ATRIAL RATE: 138 BPM
BASOPHILS # BLD MANUAL: 0.08 THOUSAND/UL (ref 0–0.1)
BASOPHILS NFR MAR MANUAL: 1 % (ref 0–1)
BUN SERPL-MCNC: 15 MG/DL (ref 5–25)
CALCIUM SERPL-MCNC: 8.8 MG/DL (ref 8.4–10.2)
CHLORIDE SERPL-SCNC: 106 MMOL/L (ref 96–108)
CO2 SERPL-SCNC: 25 MMOL/L (ref 21–32)
CREAT SERPL-MCNC: 0.9 MG/DL (ref 0.6–1.3)
EOSINOPHIL # BLD MANUAL: 0.08 THOUSAND/UL (ref 0–0.4)
EOSINOPHIL NFR BLD MANUAL: 1 % (ref 0–6)
ERYTHROCYTE [DISTWIDTH] IN BLOOD BY AUTOMATED COUNT: 15.7 % (ref 11.6–15.1)
GFR SERPL CREATININE-BSD FRML MDRD: 61 ML/MIN/1.73SQ M
GLUCOSE SERPL-MCNC: 101 MG/DL (ref 65–140)
HCT VFR BLD AUTO: 35.1 % (ref 34.8–46.1)
HGB BLD-MCNC: 10.6 G/DL (ref 11.5–15.4)
LYMPHOCYTES # BLD AUTO: 2.01 THOUSAND/UL (ref 0.6–4.47)
LYMPHOCYTES # BLD AUTO: 25 % (ref 14–44)
MCH RBC QN AUTO: 26.7 PG (ref 26.8–34.3)
MCHC RBC AUTO-ENTMCNC: 30.2 G/DL (ref 31.4–37.4)
MCV RBC AUTO: 88 FL (ref 82–98)
METAMYELOCYTES NFR BLD MANUAL: 8 % (ref 0–1)
MONOCYTES # BLD AUTO: 0.89 THOUSAND/UL (ref 0–1.22)
MONOCYTES NFR BLD: 11 % (ref 4–12)
NEUTROPHILS # BLD MANUAL: 4.35 THOUSAND/UL (ref 1.85–7.62)
NEUTS SEG NFR BLD AUTO: 54 % (ref 43–75)
PLATELET # BLD AUTO: 390 THOUSANDS/UL (ref 149–390)
PLATELET BLD QL SMEAR: ABNORMAL
PMV BLD AUTO: 8.7 FL (ref 8.9–12.7)
POTASSIUM SERPL-SCNC: 4.3 MMOL/L (ref 3.5–5.3)
QRS AXIS: -71 DEGREES
QRSD INTERVAL: 114 MS
QT INTERVAL: 330 MS
QTC INTERVAL: 507 MS
RBC # BLD AUTO: 3.97 MILLION/UL (ref 3.81–5.12)
SODIUM SERPL-SCNC: 136 MMOL/L (ref 135–147)
T WAVE AXIS: 122 DEGREES
VENTRICULAR RATE: 142 BPM
WBC # BLD AUTO: 8.05 THOUSAND/UL (ref 4.31–10.16)

## 2022-11-27 RX ADMIN — APIXABAN 5 MG: 5 TABLET, FILM COATED ORAL at 08:44

## 2022-11-27 RX ADMIN — METOPROLOL TARTRATE 50 MG: 50 TABLET, FILM COATED ORAL at 22:15

## 2022-11-27 RX ADMIN — PANTOPRAZOLE SODIUM 40 MG: 40 TABLET, DELAYED RELEASE ORAL at 05:15

## 2022-11-27 RX ADMIN — EZETIMIBE 10 MG: 10 TABLET ORAL at 08:44

## 2022-11-27 RX ADMIN — CYANOCOBALAMIN TAB 500 MCG 1000 MCG: 500 TAB at 08:44

## 2022-11-27 RX ADMIN — ALLOPURINOL 100 MG: 100 TABLET ORAL at 08:44

## 2022-11-27 RX ADMIN — DULOXETINE HYDROCHLORIDE 30 MG: 30 CAPSULE, DELAYED RELEASE ORAL at 08:44

## 2022-11-27 RX ADMIN — FERROUS SULFATE TAB 325 MG (65 MG ELEMENTAL FE) 325 MG: 325 (65 FE) TAB at 08:44

## 2022-11-27 RX ADMIN — SUCRALFATE 1 G: 1 TABLET ORAL at 11:24

## 2022-11-27 RX ADMIN — Medication 6 MG: at 22:16

## 2022-11-27 RX ADMIN — OXYCODONE HYDROCHLORIDE AND ACETAMINOPHEN 500 MG: 500 TABLET ORAL at 08:44

## 2022-11-27 RX ADMIN — SUCRALFATE 1 G: 1 TABLET ORAL at 17:19

## 2022-11-27 RX ADMIN — SUCRALFATE 1 G: 1 TABLET ORAL at 22:15

## 2022-11-27 RX ADMIN — NIFEDIPINE 120 MG: 10 CAPSULE ORAL at 08:44

## 2022-11-27 RX ADMIN — FERROUS SULFATE TAB 325 MG (65 MG ELEMENTAL FE) 325 MG: 325 (65 FE) TAB at 17:19

## 2022-11-27 RX ADMIN — METOPROLOL TARTRATE 100 MG: 100 TABLET, FILM COATED ORAL at 08:44

## 2022-11-27 RX ADMIN — FOLIC ACID 1 MG: 1 TABLET ORAL at 08:44

## 2022-11-27 RX ADMIN — SUCRALFATE 1 G: 1 TABLET ORAL at 05:15

## 2022-11-27 RX ADMIN — Medication 1 PACKET: at 08:44

## 2022-11-27 RX ADMIN — APIXABAN 5 MG: 5 TABLET, FILM COATED ORAL at 17:19

## 2022-11-27 RX ADMIN — OXYCODONE HYDROCHLORIDE AND ACETAMINOPHEN 500 MG: 500 TABLET ORAL at 17:19

## 2022-11-27 RX ADMIN — CHOLECALCIFEROL TAB 10 MCG (400 UNIT) 400 UNITS: 10 TAB at 08:44

## 2022-11-27 RX ADMIN — OXYBUTYNIN CHLORIDE 10 MG: 5 TABLET, EXTENDED RELEASE ORAL at 22:15

## 2022-11-27 NOTE — ASSESSMENT & PLAN NOTE
· Resolved as noted with controlled rates   · Likely due to dehydration, electrolyte abnormality, missed doses of metoprolol/cardizem in setting of n/v  · Continue home medications  · Metoprolol PRN

## 2022-11-27 NOTE — ASSESSMENT & PLAN NOTE
· Patient denies any urinary symptoms over the past week  · CT AP: mild cystitis   · UA positive  · Prior urine cx MDRO >100k proteus    Last treated with ertapenem 10/4  · Urine culture reviewed  · Continue to monitor off of antibiotics as patient remains asymptomatic

## 2022-11-27 NOTE — ASSESSMENT & PLAN NOTE
· Multifactorial 2/2 recent surgery and hospitalizations  Patient tells me she is able to stand and pivot but otherwise wheelchair bound at facility     · PT/OT consulted   · Case management on board to assist with dispo   · Family and patient requesting not to return to [facility]

## 2022-11-27 NOTE — ASSESSMENT & PLAN NOTE
· Five days of persistent nausea and vomiting  · Per patient denies in the morning on 11/27  · IVF discontinued  · Encouraged adequate PO intake   · Resolved as noted by trend below    Lab Results   Component Value Date    SODIUM 136 11/27/2022    SODIUM 129 (L) 11/25/2022    SODIUM 141 10/02/2022

## 2022-11-27 NOTE — PROGRESS NOTES
[facility]  Progress Note - Jack Ryder 1945, 76 y o  female MRN: [id number]  Unit/Bed#: S -01 Encounter: [id number]  Primary Care Provider: Dinorah Anthony MD   Date and time admitted to hospital: 11/25/2022  4:08 PM    * MICHAEL (acute kidney injury) [facility])  Assessment & Plan  · Secondary to 1 week of nausea and vomiting  · IVF discontinued   · Hold lisinopril initially; will hold off reinitiating as blood pressure on the lower side  · Serial lab monitoring   · Resolution as noted below     Lab Results   Component Value Date    CREATININE 0 90 11/27/2022    CREATININE 1 85 (H) 11/25/2022    CREATININE 0 81 10/02/2022       Ambulatory dysfunction  Assessment & Plan  · Multifactorial 2/2 recent surgery and hospitalizations  Patient tells me she is able to stand and pivot but otherwise wheelchair bound at facility  · PT/OT consulted   · Case management on board to assist with dispo   · Family and patient requesting not to return to [facility]    Rash  Assessment & Plan  · Pruritic rash to right arm  Image in media tab  Reports this started on 11/25/2022 after she was given IVF at nursing facility  · Does report itching on her back on 11/26   · Due to nature of itching      · Benadryl PO and topical ordered PRN     Hyponatremia  Assessment & Plan  · Five days of persistent nausea and vomiting  · Per patient denies in the morning on 11/27  · IVF discontinued  · Encouraged adequate PO intake   · Resolved as noted by trend below    Lab Results   Component Value Date    SODIUM 136 11/27/2022    SODIUM 129 (L) 11/25/2022    SODIUM 141 10/02/2022       Essential hypertension  Assessment & Plan  · Blood pressure acceptable with SBP in the 1teens   · Continue PTA regimen with Lopressor and Cardizem CD  · Monitor with routine vitals     Atrial fibrillation RVR (HCC)  Assessment & Plan  · Resolved as noted with controlled rates   · Likely due to dehydration, electrolyte abnormality, missed doses of metoprolol/cardizem in setting of n/v  · Continue home medications  · Metoprolol PRN    Bacteremia due to methicillin resistant Staphylococcus epidermidis  Assessment & Plan  · Follows with ID as outpatient; No clear source of infection was identified  · TANISHA did not show any valvular vegitation  · Completed IV vanco x 6 weeks on 11/9   · Consult Infectious Disease if antibiotics determined to be necessary      Asymptomatic bacteriuria  Assessment & Plan  · Patient denies any urinary symptoms over the past week  · CT AP: mild cystitis   · UA positive  · Prior urine cx MDRO >100k proteus  Last treated with ertapenem 10/4  · Urine culture reviewed  · Continue to monitor off of antibiotics as patient remains asymptomatic    Proctitis  Assessment & Plan  · Incidental finding on CT; Denies prior colonoscopy   · Denies symptoms; bleeding, constipation/diarrhea, weight loss  · Likely can follow with GI as OP    Chronic GERD  Assessment & Plan  · Denies any epigastric pain this morning  · Continue Protonix and Carafate as needed      VTE Pharmacologic Prophylaxis: VTE Score: 4 Moderate Risk (Score 3-4) - Pharmacological DVT Prophylaxis Ordered: apixaban (Eliquis)  Patient Centered Rounds: I performed bedside rounds with nursing staff today  Discussions with Specialists or Other Care Team Provider:  Nursing staff, case management    Education and Discussions with Family / Patient: Attempted to update  (daughter) via phone  Left voicemail  Time Spent for Care: 30 minutes  More than 50% of total time spent on counseling and coordination of care as described above      Current Length of Stay: 2 day(s)  Current Patient Status: Inpatient   Certification Statement: The patient will continue to require additional inpatient hospital stay due to Awaiting PT/OT evaluations for referral to alternative facility  Discharge Plan: Anticipate discharge in 24-48 hrs to rehab facility  Code Status: Level 1 - Full Code    Subjective:   Patient currently reporting significant fatigue and exhaustion however denies any nausea, vomiting, or abdominal pain  Also denies any urinary burning or pain    Objective:     Vitals:   Temp (24hrs), Av 4 °F (36 9 °C), Min:98 2 °F (36 8 °C), Max:98 6 °F (37 °C)    Temp:  [98 2 °F (36 8 °C)-98 6 °F (37 °C)] 98 2 °F (36 8 °C)  HR:  [] 76  Resp:  [17-18] 18  BP: (104-114)/(41-77) 114/48  SpO2:  [95 %-97 %] 96 %  Body mass index is 38 6 kg/m²  Input and Output Summary (last 24 hours): Intake/Output Summary (Last 24 hours) at 2022 1137  Last data filed at 2022 1855  Gross per 24 hour   Intake 420 ml   Output 550 ml   Net -130 ml       Physical Exam:   Physical Exam  Vitals and nursing note reviewed  Constitutional:       General: She is not in acute distress  Appearance: She is well-developed  She is obese  She is not ill-appearing  HENT:      Head: Normocephalic and atraumatic  Eyes:      Conjunctiva/sclera: Conjunctivae normal    Cardiovascular:      Rate and Rhythm: Normal rate and regular rhythm  Pulses: Normal pulses  Pulmonary:      Effort: Pulmonary effort is normal  No respiratory distress  Breath sounds: Normal breath sounds  Abdominal:      General: Bowel sounds are normal  There is no distension  Palpations: Abdomen is soft  Tenderness: There is no abdominal tenderness  Musculoskeletal:         General: No swelling  Cervical back: Neck supple  Skin:     General: Skin is warm and dry  Capillary Refill: Capillary refill takes less than 2 seconds  Neurological:      Mental Status: She is alert and oriented to person, place, and time     Psychiatric:         Mood and Affect: Mood normal          Behavior: Behavior normal           Additional Data:     Labs:  Results from last 7 days   Lab Units 22  0522 22  1626   WBC Thousand/uL 8 05 11 70*   HEMOGLOBIN g/dL 10 6* 11 0*   HEMATOCRIT % 35 1 36 0   PLATELETS Thousands/uL 390 502*   BANDS PCT %  --  10*   LYMPHO PCT % 25 21   MONO PCT % 11 10   EOS PCT % 1 3     Results from last 7 days   Lab Units 11/27/22  0522 11/25/22  1626   SODIUM mmol/L 136 129*   POTASSIUM mmol/L 4 3 4 2   CHLORIDE mmol/L 106 97   CO2 mmol/L 25 23   BUN mg/dL 15 35*   CREATININE mg/dL 0 90 1 85*   ANION GAP mmol/L 5 9   CALCIUM mg/dL 8 8 8 6   ALBUMIN g/dL  --  3 1*   TOTAL BILIRUBIN mg/dL  --  0 30   ALK PHOS U/L  --  60   ALT U/L  --  7   AST U/L  --  15   GLUCOSE RANDOM mg/dL 101 117                       Lines/Drains:  Invasive Devices     Peripheral Intravenous Line  Duration           Peripheral IV 11/25/22 Left Antecubital 1 day          Drain  Duration           External Urinary Catheter 63 days                  Telemetry:  Telemetry Orders (From admission, onward)             48 Hour Telemetry Monitoring  Continuous x 48 hours        Expiring   References:    Telemetry Guidelines   Question:  Reason for 48 Hour Telemetry  Answer:  Arrhythmias Requiring Medical Therapy (eg  SVT, Vtach/fib, Bradycardia, Uncontrolled A-fib)                 Telemetry Reviewed: Atrial fibrillation  HR averaging 70s   Indication for Continued Telemetry Use: No indication for continued use  Will discontinue                Imaging: Reviewed radiology reports from this admission including: chest xray and abdominal/pelvic CT    Recent Cultures (last 7 days):   Results from last 7 days   Lab Units 11/25/22  2224   URINE CULTURE  60,000-69,000 cfu/ml Gram Negative Aiden Enteric Like*       Last 24 Hours Medication List:   Current Facility-Administered Medications   Medication Dose Route Frequency Provider Last Rate   • acetaminophen  650 mg Oral Q6H PRN Helio Brake, CRNP     • allopurinol  100 mg Oral Daily Helio Brake, CRNP     • apixaban  5 mg Oral BID Helio Brake, CRNP     • ascorbic acid  500 mg Oral BID Helio Brake, CRNP     • bisacodyl  10 mg Rectal Daily PRN Vickie Ramirez Osmar Pulido     • cholecalciferol  400 Units Oral Daily JERI Raines     • vitamin B-12  1,000 mcg Oral Daily JERI Raines     • diltiazem  120 mg Oral Daily JERI Raines     • diphenhydrAMINE  25 mg Oral Q6H PRN Chaya Astorga MD     • diphenhydrAMINE-zinc acetate   Topical TID PRN JERI Raines     • DULoxetine  30 mg Oral Daily JERI Raines     • ezetimibe  10 mg Oral Daily JERI Raines     • ferrous sulfate  325 mg Oral BID With Meals JERI Raines     • folic acid  1 mg Oral Daily JERI Raines     • melatonin  6 mg Oral HS JERI Raines     • metoclopramide  10 mg Intravenous Q6H PRN JERI Raines     • metoprolol tartrate  100 mg Oral Daily JERI Raines     • metoprolol tartrate  50 mg Oral HS JERI Raines     • oxybutynin  10 mg Oral HS JERI Raines     • pantoprazole  40 mg Oral Early Morning JERI Raines     • polyethylene glycol  17 g Oral Daily PRN JERI Raines     • psyllium  1 packet Oral Daily JERI Raines     • senna-docusate sodium  2 tablet Oral BID JERI Raines     • sucralfate  1 g Oral 4x Daily (AC & HS) JERI Raines          Today, Patient Was Seen By: JERI Ojeda    **Please Note: This note may have been constructed using a voice recognition system  **

## 2022-11-27 NOTE — ASSESSMENT & PLAN NOTE
· Pruritic rash to right arm  Image in media tab  Reports this started on 11/25/2022 after she was given IVF at nursing facility  · Does report itching on her back on 11/26   · Due to nature of itching      · Benadryl PO and topical ordered PRN

## 2022-11-27 NOTE — PHYSICAL THERAPY NOTE
PHYSICAL THERAPY EVALUATION  NAME: Ayesha Mak  AGE:   76 y o  MRN:  [id number]  ADMIT DX: Dehydration [E86 0]  Hyponatremia [E87 1]  Nausea [R11 0]  Epigastric pain [R10 13]  UTI (urinary tract infection) [N39 0]  MICHAEL (acute kidney injury) ([facility] [address] ) [N17 9]  Atrial fibrillation with RVR ([facility] [facility] ) [I48 91]  Generalized weakness [R53 1]  Leukocytosis, unspecified type [D72 829]    PMH:   Past Medical History:   Diagnosis Date    Abnormal ECG     Last Assessed 9/29/2016    Anxiety     Last Assessed 6/08/2016    Arthritis     knees    Asthma     Last Assessed 11/06/2013    Atrial premature complex     Cataract, bilateral     Last Assessed 7/14/2016    Cataract, left eye     Both eyes  Had surgery on left  COVID-19     Difficulty swallowing     Diverticulosis 9/25/2022    Dizziness     Fibromyalgia     Fibromyalgia, primary     Gastric reflux     Heart failure ([facility] [address] )     5/23/22 Pt reports had heart failure in the past    History of COVID-19     5/23/22 Pt reports having [place] in 2021  History of transfusion     no reaction    [place] (hard of hearing)     Hyperlipidemia     Hypertension     Incontinence in female     5/23/22 Pt reports has incontinence -wears depends especially at night/riding in car    Irregular heart beat     5/23/22 Pt reports hx of A fib    Lyme disease     PONV (postoperative nausea and vomiting)     Premature ventricular contraction     Primary osteoarthritis of both knees     Last Assessed 7/14/2016    Rheumatic fever     5/23/22 Pt reports had hx of rheumatic fever as a child twice    Sore throat     Tuberculosis 1947     LENGTH OF STAY: 2       11/27/22 1137   PT Last Visit   PT Visit Date 11/27/22   Note Type   Note type Evaluation   Pain Assessment   Pain Assessment Tool 0-10   Pain Score No Pain   Restrictions/Precautions   Weight Bearing Precautions Per Order Yes   LLE Weight Bearing Per Order (S)  WBAT  (per last Ortho note)   Other Precautions Contact/isolation; Bed Alarm; Chair Alarm;Multiple lines;Telemetry; Fall Risk  ([place])   Home Living   Type of [address]; Wheelchair-manual   Additional Comments Pt is slightly inconsistent with timeframe, however reports she was independent with mobility at baseline  Pt also reports she has been in and out of the hospital/rehab for ~2 years now and most recently requiring assist for SPT and/or using isabelle lift in SNF rehab  Prior Function   Lives With Alone   General   Additional Pertinent History ~10 weeks out from left distal femoral placement and hardware removal   Family/Caregiver Present No   Cognition   Overall Cognitive Status Impaired  (inconsistent with timeline questions)   Arousal/Participation Cooperative   Orientation Level Oriented to person;Oriented to place;Oriented to time   Memory Decreased recall of recent events   Following Commands Follows one step commands with increased time or repetition   Comments Pt identified by name and   Subjective   Subjective Agrees to PT evaluation and is pleasant and cooperative throughout session  RLE Assessment   RLE Assessment X   Strength RLE   RLE Overall Strength 3+/5  (functionally)   LLE Assessment   LLE Assessment X   Strength LLE   LLE Overall Strength 3/5  (functionally)   Bed Mobility   Supine to Sit 3  Moderate assistance   Additional items Assist x 1;HOB elevated; Bedrails; Increased time required;Verbal cues;LE management   Sit to Supine Unable to assess  (left OOB in chair post session with alarm intact)   Transfers   Sit to Stand 3  Moderate assistance   Additional items Assist x 1; Increased time required;Verbal cues  (using quickmove sit to stand)   Stand to Sit 3  Moderate assistance   Additional items Assist x 1; Increased time required;Verbal cues   Stand pivot 1  Dependent  (used quickmove sit to stand machine)   Balance   Static Sitting Fair -   Dynamic Sitting Poor +   Static Standing Poor +   Endurance Deficit Endurance Deficit Yes   Endurance Deficit Description limited standing tolerance, fatigue   Activity Tolerance   Activity Tolerance Patient limited by fatigue   Nurse Made Aware Per RN, pt appropriate to evaluate; updated on status   Assessment   Problem List Decreased strength;Decreased endurance; Impaired balance;Decreased mobility; Decreased safety awareness; Obesity   Assessment Pt is a 76 y o  female seen for PT evaluation s/p admit to [address] [place] [address] on 8/18/2022 w/ MICHAEL (acute kidney injury) ([facility] [address] )  Order placed for PT  Comorbidities affecting pt's physical performance at time of assessment include: HTN, obesity, previous surgery and fibromyalgia  Personal factors affecting pt at time of IE include: anxiety, limited home support, advanced age, inability to perform IADLs, inability to perform ADLs, inability to ambulate household distances and limited insight into impairments  Prior to admission, pt was in and out of hospital and rehab for ~2 years, requiring inconsistent levels of assist for SPT to WC  Occasionally using isabelle lift for transfers at rehab  Prior to initial surgery, pt was living independently in an apartment with elevator access  Upon evaluation: Pt requires mod A for bed mobility, mod A for sit to  [place], and dependent for SPT with quickmove  (Please find full objective findings from PT assessment regarding body systems outlined above)  Impairments and limitations also listed above, especially due to  weakness, impaired balance, decreased endurance, decreased activity tolerance, decreased safety awareness, impaired judgement and fall risk  Pt's clinical presentation is currently unstable/unpredictable seen in pt's presentation of fall risk, decline in functional mobility compared to baseline, and limited insight into deficits    Pt to benefit from continued skilled PT tx while in hospital and upon DC to address deficits as defined above and maximize level of functional mobility  From PT/mobility standpoint, recommendation at time of d/c would be to return to rehab facility pending progress in order to maximize pt's functional independence and consistency w/ mobility in order to facilitate return to PLOF  Recommend progression of SPT and trial of sit to stand with AX2 and RW as appropriate  Goals   Patient Goals to get OOB and get stronger   STG Expiration Date 12/07/22   Short Term Goal #1 Pt will be able to: (1) perform bed mobility with min A to promote upright posture and OOB mobility (2) perform sit to stand with min A with RW to decrease burden of care (3) perform SPT with min A x2 and RW to decrease burden of care (4) increase standing balance by 1 grade to decrease risk of falls   PT Treatment Day 0   Plan   Treatment/Interventions Functional transfer training;LE strengthening/ROM; Therapeutic exercise; Endurance training;Patient/family training;Equipment eval/education; Bed mobility   PT Frequency 3-5x/wk   Recommendation   PT Discharge Recommendation Post acute rehabilitation services  (return to rehab)   Equipment Recommended Other (Comment)  (quickmove for now; may benefit from sit to stand trial with x2 assist and Luann's Egress test)   AM-PAC Basic Mobility Inpatient   Turning in Bed Without Bedrails 2   Lying on Back to Sitting on Edge of Flat Bed 2   Moving Bed to Chair 1   Standing Up From Chair 2   Walk in Room 1   Climb 3-5 Stairs 1   Basic Mobility Inpatient Raw Score 9   Turning Head Towards Sound 4   Follow Simple Instructions 3   Low Function Basic Mobility Raw Score 16   Low Function Basic Mobility Standardized Score 25 72   Highest Level Of Mobility   -[facility] Goal 3: Sit at edge of bed   -[facility] Achieved 4: Move to chair/commode   End of Consult   Patient Position at End of Consult Bedside chair;Bed/Chair alarm activated; All needs within reach     The patient's AM-PAC Basic Mobility Inpatient Short Form Raw Score is 9, Standardized Score is      A Raw Score of less than 16 suggests the patient may benefit from discharge to post-acute rehabilitation services, which DOES coincide with CURRENT above PT recommendations  However please refer to therapist recommendation for discharge planning given other factors that may influence destination  Adapted from Marquise Sharif [facility] of [facility] “6-Clicks” Basic Mobility and Daily Activity Scores With Discharge Destination  Physical Therapy, 2021;101:1-9   DOI: [id number]      Iliana Robert PT,DPT

## 2022-11-27 NOTE — ASSESSMENT & PLAN NOTE
· Follows with ID as outpatient; No clear source of infection was identified  · TANISHA did not show any valvular vegitation     · Completed IV vanco x 6 weeks on 11/9   · Consult Infectious Disease if antibiotics determined to be necessary

## 2022-11-27 NOTE — ASSESSMENT & PLAN NOTE
· Blood pressure acceptable with SBP in the 1teens   · Continue PTA regimen with Lopressor and Cardizem CD  · Monitor with routine vitals

## 2022-11-27 NOTE — ASSESSMENT & PLAN NOTE
· Secondary to 1 week of nausea and vomiting    · IVF discontinued   · Hold lisinopril initially; will hold off reinitiating as blood pressure on the lower side  · Serial lab monitoring   · Resolution as noted below     Lab Results   Component Value Date    CREATININE 0 90 11/27/2022    CREATININE 1 85 (H) 11/25/2022    CREATININE 0 81 10/02/2022

## 2022-11-27 NOTE — PLAN OF CARE
Problem: PHYSICAL THERAPY ADULT  Goal: Performs mobility at highest level of function for planned discharge setting  See evaluation for individualized goals  Description: Treatment/Interventions: Functional transfer training, LE strengthening/ROM, Therapeutic exercise, Endurance training, Patient/family training, Equipment eval/education, Bed mobility  Equipment Recommended: Other (Comment) (quickmove for now; may benefit from sit to stand trial with x2 assist and Luann's Egress test)       See flowsheet documentation for full assessment, interventions and recommendations  Note:    Problem List: Decreased strength, Decreased endurance, Impaired balance, Decreased mobility, Decreased safety awareness, Obesity  Assessment: Pt is a 76 y o  female seen for PT evaluation s/p admit to [address] [place] [address] on 8/18/2022 w/ MICHAEL (acute kidney injury) ([facility] [address] )  Order placed for PT  Comorbidities affecting pt's physical performance at time of assessment include: HTN, obesity, previous surgery and fibromyalgia  Personal factors affecting pt at time of IE include: anxiety, limited home support, advanced age, inability to perform IADLs, inability to perform ADLs, inability to ambulate household distances and limited insight into impairments  Prior to admission, pt was in and out of hospital and rehab for ~2 years, requiring inconsistent levels of assist for SPT to WC  Occasionally using isabelle lift for transfers at rehab  Prior to initial surgery, pt was living independently in an apartment with elevator access  Upon evaluation: Pt requires mod A for bed mobility, mod A for sit to  [place], and dependent for SPT with quickmove  (Please find full objective findings from PT assessment regarding body systems outlined above)   Impairments and limitations also listed above, especially due to  weakness, impaired balance, decreased endurance, decreased activity tolerance, decreased safety awareness, impaired judgement and fall risk  Pt's clinical presentation is currently unstable/unpredictable seen in pt's presentation of fall risk, decline in functional mobility compared to baseline, and limited insight into deficits  Pt to benefit from continued skilled PT tx while in hospital and upon DC to address deficits as defined above and maximize level of functional mobility  From PT/mobility standpoint, recommendation at time of d/c would be to return to rehab facility pending progress in order to maximize pt's functional independence and consistency w/ mobility in order to facilitate return to PLOF  Recommend progression of SPT and trial of sit to stand with AX2 and RW as appropriate  PT Discharge Recommendation: Post acute rehabilitation services (return to rehab)    See flowsheet documentation for full assessment

## 2022-11-27 NOTE — ASSESSMENT & PLAN NOTE
· Incidental finding on CT; Denies prior colonoscopy   · Denies symptoms; bleeding, constipation/diarrhea, weight loss      · Likely can follow with GI as OP

## 2022-11-28 VITALS
HEART RATE: 76 BPM | OXYGEN SATURATION: 97 % | TEMPERATURE: 97.6 F | RESPIRATION RATE: 18 BRPM | SYSTOLIC BLOOD PRESSURE: 113 MMHG | HEIGHT: 64 IN | BODY MASS INDEX: 38.39 KG/M2 | DIASTOLIC BLOOD PRESSURE: 45 MMHG | WEIGHT: 224.87 LBS

## 2022-11-28 RX ORDER — DIPHENHYDRAMINE HYDROCHLORIDE, ZINC ACETATE 2; .1 G/100G; G/100G
CREAM TOPICAL 3 TIMES DAILY PRN
Qty: 28.4 G | Refills: 0
Start: 2022-11-28

## 2022-11-28 RX ORDER — SUCRALFATE 1 G/1
1 TABLET ORAL
Refills: 0
Start: 2022-11-28

## 2022-11-28 RX ORDER — DIPHENHYDRAMINE HCL 25 MG
25 TABLET ORAL EVERY 6 HOURS PRN
Qty: 30 TABLET | Refills: 0
Start: 2022-11-28

## 2022-11-28 RX ADMIN — FERROUS SULFATE TAB 325 MG (65 MG ELEMENTAL FE) 325 MG: 325 (65 FE) TAB at 09:54

## 2022-11-28 RX ADMIN — NIFEDIPINE 120 MG: 10 CAPSULE ORAL at 09:45

## 2022-11-28 RX ADMIN — CYANOCOBALAMIN TAB 500 MCG 1000 MCG: 500 TAB at 09:43

## 2022-11-28 RX ADMIN — ALLOPURINOL 100 MG: 100 TABLET ORAL at 09:45

## 2022-11-28 RX ADMIN — SENNOSIDES AND DOCUSATE SODIUM 2 TABLET: 8.6; 5 TABLET ORAL at 09:45

## 2022-11-28 RX ADMIN — FOLIC ACID 1 MG: 1 TABLET ORAL at 09:44

## 2022-11-28 RX ADMIN — CHOLECALCIFEROL TAB 10 MCG (400 UNIT) 400 UNITS: 10 TAB at 09:43

## 2022-11-28 RX ADMIN — APIXABAN 5 MG: 5 TABLET, FILM COATED ORAL at 09:45

## 2022-11-28 RX ADMIN — Medication 1 PACKET: at 09:46

## 2022-11-28 RX ADMIN — OXYCODONE HYDROCHLORIDE AND ACETAMINOPHEN 500 MG: 500 TABLET ORAL at 09:54

## 2022-11-28 RX ADMIN — METOPROLOL TARTRATE 100 MG: 100 TABLET, FILM COATED ORAL at 09:44

## 2022-11-28 RX ADMIN — SUCRALFATE 1 G: 1 TABLET ORAL at 05:39

## 2022-11-28 RX ADMIN — PANTOPRAZOLE SODIUM 40 MG: 40 TABLET, DELAYED RELEASE ORAL at 05:39

## 2022-11-28 RX ADMIN — EZETIMIBE 10 MG: 10 TABLET ORAL at 09:44

## 2022-11-28 RX ADMIN — DULOXETINE HYDROCHLORIDE 30 MG: 30 CAPSULE, DELAYED RELEASE ORAL at 09:44

## 2022-11-28 NOTE — ASSESSMENT & PLAN NOTE
In the setting of dec po intake, prerenal  Bun and creatinine elevated 26/1 46, egfr 36  Now K 5 4  D/C lasix, KCL, lisinopril  With dec po intake, and reporting nausea, per nursing and pt no vomiting, no diarrhea/ constipation   Start NS at 80cc/hr for 3L  Per nursing pt is urinating- incontinent   Bladder scan negative for urine retention  Avoid nephrotoxins, hypotension   Monitor renal function   BMP on 11/25 AM draw

## 2022-11-28 NOTE — ASSESSMENT & PLAN NOTE
In the setting of decreased p o  intake and KCl maintenance treatment  Asymptomatic  DC KCl, Lasix, and lisinopril  Continue to encourage p o  hydration  Continue to monitor potassium levels and trend  BMP on 11/25

## 2022-11-28 NOTE — CASE MANAGEMENT
Case Management Discharge Planning Note    Patient name Shailesh Mann  Location S /S -05 MRN [id number]  : 1945 Date 2022       Current Admission Date: 2022  Current Admission Diagnosis:MICHAEL (acute kidney injury) [facility])   Patient Active Problem List    Diagnosis Date Noted   • Hyperkalemia 2022   • Suspected UTI 2022   • Dehydration 2022   • UTI (urinary tract infection) 2022   • Hyponatremia 2022   • Rash 2022   • Proctitis 2022   • Nausea 2022   • Hypokalemia 2022   • Dermatitis associated with incontinence 10/26/2022   • Physical deconditioning 10/19/2022   • Chronic heart failure with preserved ejection fraction ([address] ) 10/19/2022   • Status post fall 10/15/2022   • Thrombocytosis 10/11/2022   • Pressure injury of left buttock, stage 3 ([address] ) 10/05/2022   • Aortic regurgitation 10/04/2022   • Asymptomatic bacteriuria 10/03/2022   • Bacteremia due to methicillin resistant Staphylococcus epidermidis 2022   • Anemia 2022   • Bladder wall thickening 2022   • left femoral fluid collection 2022   • Pleural effusion on right 2022   • Vomiting and diarrhea 2022   • Surgical wound present 2022   • Renal insufficiency    • Stage 3a chronic kidney disease ([address] ) 2022   • Preop examination 2022   • Preop cardiovascular exam 2022   • Poor dentition 2022   • Closed bicondylar fracture of left femur with delayed healing 2022   • Paroxysmal atrial fibrillation ([address] ) 2022   • Chronic gout with tophus 2021   • Current moderate episode of major depressive disorder ([address] ) 2021   • Morbid obesity with body mass index (BMI) of 40 0 to 49 9 ([address] ) 2021   • Reactive airway disease with acute exacerbation 2021   • Ambulatory dysfunction 2021   • MICHAEL (acute kidney injury) ([facility] ) 2021   • Leukocytosis 2021   • Arthritis 2021   • Generalized weakness 05/18/2021   • Hyperlipidemia 11/10/2020   • Dysphonia 04/28/2020   • Vitamin D insufficiency 04/28/2020   • Chronic pain of left knee 08/12/2019   • Age related osteoporosis 08/12/2019   • Anxiety 03/27/2019   • Tremor 03/27/2019   • Other insomnia 02/27/2019   • Urinary incontinence 09/17/2018   • Psoriasis 07/20/2018   • Thyroid nodule 07/20/2018   • Atrial fibrillation RVR ([facility] [address] ) 02/15/2018   • Tachy-smita syndrome ([facility] [address] [facility] ) 02/15/2018   • Essential hypertension 02/15/2018   • Pacemaker 02/15/2018   • GERD (gastroesophageal reflux disease) 07/18/2017   • Mild carpal tunnel syndrome, right 04/10/2017   • Degenerative lumbar spinal stenosis 03/31/2017   • Low back pain 08/29/2016   • Lumbar degenerative disc disease 08/29/2016   • Chronic bilateral low back pain without sciatica 06/08/2016   • Chronic GERD 05/06/2016   • Overweight 02/12/2016   • Fibromyalgia 11/06/2013      LOS (days): 3  Geometric Mean LOS (GMLOS) (days): 3 10  Days to GMLOS:0 3     OBJECTIVE:  Risk of Unplanned Readmission Score: 22 71         Current admission status: Inpatient   Preferred Pharmacy:   [place] [address] [address], [place] [address] [place] [address]  Phone: [phone] Fax: 5866 [facility]  [address] [place] [address]  Phone: [phone] Fax: [phone]    Primary Care Provider: Kim Moyer MD    Primary Insurance: MEDICARE  Secondary Insurance: AARP    DISCHARGE DETAILS:    Discharge planning discussed with[de-identified] patient and niece, at bedside  [place] of Choice: Yes (re: rehab)  Comments - [place] of Choice: Earl Barrera CM contacted family/caregiver?: Yes  Were Treatment Team discharge recommendations reviewed with patient/caregiver?: Yes  Did patient/caregiver verbalize understanding of patient care needs?: N/A- going to facility  Were patient/caregiver advised of the risks associated with not following Treatment Team discharge recommendations?: Yes         [address]         Is the patient interested in [facility] at discharge?: No    DME Referral Provided  Referral made for DME?: No    Other Referral/Resources/Interventions Provided:  Interventions: SNF, Short Term Rehab  Referral Comments: Bed offer received from [facility] and [facility], liaison, confirmed ability to accept patient today  Transport requested for 3:00pm via BLS and confirmed with [employer]  Met with patient and niece, Trenia Cabot, at bedside to review same; both confirmed acceptance of bed at [address]  and aware of transport for this afternoon  IMM reviewed and copy provided for their records  TT sent to CRNP and RN to notify of pick-up time  AVS to be forwarded once complete  Would you like to participate in our [address] service program?  : No - Declined    Treatment Team Recommendation: Short Term Rehab, SNF  Discharge Destination Plan[de-identified] Short Term Rehab, SNF ([facility])  Transport at Discharge : BLS Ambulance  Dispatcher Contacted: Yes  Number/Name of Dispatcher: [employer]  Transported by [employer] and Unit #): [facility]  ETA of Transport (Date): 11/28/22  ETA of Transport (Time): 1500 (confirmed)              IMM Given (Date):: 11/28/22  IMM Given to[de-identified] Patient (and niece at bedside)  IMM reviewed with patient and niece, both agree with discharge determination             Accepting Facility Name, [facility] : [address]  Receiving Facility/Agency Phone Number: [phone]  Facility/Agency Fax Number: [phone]

## 2022-11-28 NOTE — ASSESSMENT & PLAN NOTE
· Five days of persistent nausea and vomiting  Likely hypovolemia hyponatremia     · Received IV fluid  · Encouraged adequate PO intake   · Resolved      Lab Results   Component Value Date    SODIUM 136 11/27/2022    SODIUM 129 (L) 11/25/2022    SODIUM 141 10/02/2022

## 2022-11-28 NOTE — ASSESSMENT & PLAN NOTE
· Blood pressure acceptable  · Continue PTA regimen with Lopressor and Cardizem CD  · Monitor with routine vitals

## 2022-11-28 NOTE — ASSESSMENT & PLAN NOTE
· Secondary to a week of nausea and vomiting    · Received IV fluids   · Resolution as noted below     Lab Results   Component Value Date    CREATININE 0 90 11/27/2022    CREATININE 1 85 (H) 11/25/2022    CREATININE 0 81 10/02/2022

## 2022-11-28 NOTE — INCIDENTAL FINDINGS
The following findings require follow up:  Radiographic finding   Finding: Proctitis which can be of an infectious or inflammatory etiology   Follow up required: GI evaluation   Follow up should be done within 2-4 week(s)    Please notify the following clinician to assist with the follow up:   Dr Wei Carrington, PCP

## 2022-11-28 NOTE — DISCHARGE SUMMARY
[facility] [place] 1945, 76 y o  female MRN: [id number]  Unit/Bed#: S -01 Encounter: [id number]  Primary Care Provider: Ronald Jiang MD   Date and time admitted to hospital: 11/25/2022  4:08 PM    * MICHAEL (acute kidney injury) [facility])  Assessment & Plan  · Secondary to a week of nausea and vomiting  · Received IV fluids   · Resolution as noted below     Lab Results   Component Value Date    CREATININE 0 90 11/27/2022    CREATININE 1 85 (H) 11/25/2022    CREATININE 0 81 10/02/2022       Proctitis  Assessment & Plan  · Incidental finding on CT; Denies prior colonoscopy   · Denies symptoms; bleeding, constipation/diarrhea, weight loss  · Recommend GI evaluation as OP      Rash  Assessment & Plan  · Pruritic rash to right arm  Reports this started on 11/25/2022 after she was given IVF at nursing facility  · Clinically much improved and nearly resolves   · Can continue with Benadryl PO and topical PRN     Hyponatremia  Assessment & Plan  · Five days of persistent nausea and vomiting  Likely hypovolemia hyponatremia  · Received IV fluid  · Encouraged adequate PO intake   · Resolved      Lab Results   Component Value Date    SODIUM 136 11/27/2022    SODIUM 129 (L) 11/25/2022    SODIUM 141 10/02/2022       Asymptomatic bacteriuria  Assessment & Plan  · Patient denies any urinary symptoms over the past week  · CT AP: mild cystitis   · UA positive  · Prior urine cx MDRO >100k proteus  Last treated with ertapenem 10/4  · Urine culture reviewed   · Continue to monitor off of antibiotics as patient remains asymptomatic     Bacteremia due to methicillin resistant Staphylococcus epidermidis  Assessment & Plan  · Follows with ID as outpatient; No clear source of infection was identified  · TANISHA did not show any valvular vegitation     · Completed IV vanco x 6 weeks on 11/9          Ambulatory dysfunction  Assessment & Plan  · Multifactorial 2/2 recent surgery and hospitalizations  Patient is able to stand and pivot but otherwise wheelchair bound at facility  · PT/OT input appreciated   · Case management on board to assist with dispo   · Family and patient requesting not to return to [facility]  The patient is accepted at [address] [place] [address] and will be discharged today  Essential hypertension  Assessment & Plan  · Blood pressure acceptable  · Continue PTA regimen with Lopressor and Cardizem CD      Atrial fibrillation RVR (HCC)  Assessment & Plan  · Resolved as noted with controlled rates   · Likely due to volume depletion, electrolyte abnormality, missed doses of metoprolol/cardizem in setting of n/v  · Continue home medications  · Continue apixaban for anticoagulation       Discharging Physician / Practitioner: JERI Lamas  PCP: Lorie Hart MD  Admission Date:   Admission Orders (From admission, onward)     Ordered        11/25/22 1911  INPATIENT ADMISSION  Once                      Discharge Date: 11/28/22    Medical Problems     Resolved Problems  Date Reviewed: 11/28/2022   None         Consultations During Hospital Stay:  · None      Procedures Performed:   CT abdomen pelvis wo contrast    Result Date: 11/25/2022  CT ABDOMEN AND PELVIS WITHOUT IV CONTRAST INDICATION:   Epigastric pain epigastric pain/nausea  COMPARISON:  CT pelvis and CT abdomen and pelvis September 2022, CT abdomen and pelvis 2006 TECHNIQUE:  CT examination of the abdomen and pelvis was performed without intravenous contrast  Axial, sagittal, and coronal 2D reformatted images were created from the source data and submitted for interpretation  Radiation dose length product (DLP) for this visit:  701 mGy-cm   This examination, like all CT scans performed in the [facility], was performed utilizing techniques to minimize radiation dose exposure, including the use of iterative reconstruction and automated exposure control  Enteric contrast was not administered  Absence of intravenous and oral contrast decreases the sensitivity of the exam  FINDINGS: ABDOMEN LOWER CHEST: Cardiac pacemaker atherosclerotic disease of the thoracic aorta  No clinically significant abnormality identified in the visualized lower chest  LIVER/BILIARY TREE:  Unremarkable  GALLBLADDER:  No calcified gallstones  No pericholecystic inflammatory change  SPLEEN:  Unremarkable  PANCREAS:  Unremarkable  ADRENAL GLANDS:  Unremarkable  KIDNEYS/URETERS:  Unremarkable  No hydronephrosis  STOMACH AND BOWEL: Stomach is unremarkable  Small bowel loops are within normal limits  Colonic diverticulosis is seen without associated diverticulitis  There is circumferential rectal wall thickening with stranding of the surrounding fat and small amount of presacral fluid  Findings suggest a proctitis of infectious or inflammatory etiology  APPENDIX:  Not visualized  ABDOMINOPELVIC CAVITY:  No ascites  No pneumoperitoneum  No lymphadenopathy  VESSELS:  Atherosclerotic changes are present  No evidence of aneurysm  PELVIS REPRODUCTIVE ORGANS:  Surgical changes of prior hysterectomy  URINARY BLADDER:  Mild stranding surrounding the wall of the urinary bladder which could reflect cystitis  Correlate with results of urine analysis  ABDOMINAL WALL/INGUINAL REGIONS:  Unremarkable  OSSEOUS STRUCTURES: Previously described collection lateral to the left greater trochanter has resolved there is an area of scarring at this level series 301 image 84  Degenerative disease of the lumbar spine is seen  There is a ghost track at the level of the left femur  1   Proctitis which can be of an infectious or inflammatory etiology  2   Mild cystitis  3   Resolution of fluid collection level of the left greater trochanter  The study was marked in [facility] for immediate notification  Workstation performed: [id number]     XR chest 1 view portable    Result Date: 11/26/2022  CHEST INDICATION:   Weakness   COMPARISON:  October 4, 2022 EXAM PERFORMED/VIEWS:  XR CHEST PORTABLE FINDINGS:  Left-sided chest wall intracardiac device is identified  Leads are intact  Interval removal of right upper extremity PICC line  Cardiomediastinal silhouette appears unremarkable  The lungs are clear  No pneumothorax or pleural effusion  Osseous structures appear within normal limits for patient age  No acute cardiopulmonary disease  Workstation performed: [id number]   •      Significant Findings / Test Results:   · Refer to above     Incidental Findings:   · Proctitis      Test Results Pending at Discharge (will require follow up): · None      Outpatient Tests Requested:  · Follow up with PCP, GI     Complications:  None     Reason for Admission: Weakness - Generalized (Pt came via EMS stating shes been feeling generally weak over the past three days  Possible UTI, hx of afib)        Hospital Course:     Enedina Hannah is a 76 y o  female patient who originally presented to the hospital on 11/25/2022 due to nausea and vomiting  The patient found to have acute kidney injury suspected to be due to poor oral intake  She received IV fluid with resolution  Her nausea and vomiting have resolved  There is nonspecific finding of proctitis on CT scan of abdomen and pelvis obtain in the ED  Recommend to follow-up with gastroenterology outpatient as the patient has no prior colonoscopy  PT/OT evaluation done in the patient is deemed to be an appropriate candidate for short-term rehab  Please see above list of diagnoses and related plan for additional information  Condition at Discharge: good     Discharge Day Visit / Exam:     Subjective:  Feeling better    Vitals: Blood Pressure: (!) 113/45 (11/28/22 0944)  Pulse: 76 (11/28/22 0944)  Temperature: 97 6 °F (36 4 °C) (11/28/22 0746)  Temp Source: Oral (11/28/22 0746)  Respirations: 18 (11/28/22 0746)  Height: 5' 4" (162 6 cm) (11/25/22 2348)  Weight - Scale: 102 kg (224 lb 13 9 oz) (11/25/22 2348)  SpO2: 97 % (11/28/22 0698)  Exam:   Physical Exam  Please refer to progress note from today  Discussion with Family: alyse is aware regarding discharge     Discharge instructions/Information to patient and family:   See after visit summary for information provided to patient and family  Provisions for Follow-Up Care:  See after visit summary for information related to follow-up care and any pertinent home health orders  Disposition:     [place] [facility]    Planned Readmission: no      Discharge Statement:  I spent 38 minutes discharging the patient  This time was spent on the day of discharge  I had direct contact with the patient on the day of discharge  Greater than 50% of the total time was spent examining patient, answering all patient questions, arranging and discussing plan of care with patient as well as directly providing post-discharge instructions  Additional time then spent on discharge activities  Discharge Medications:  See after visit summary for reconciled discharge medications provided to patient and family        ** Please Note: This note has been constructed using a voice recognition system **

## 2022-11-28 NOTE — ASSESSMENT & PLAN NOTE
· Incidental finding on CT; Denies prior colonoscopy   · Denies symptoms; bleeding, constipation/diarrhea, weight loss      · Recommend GI evaluation as OP

## 2022-11-28 NOTE — ASSESSMENT & PLAN NOTE
Reports feeling nauseous  Denies vomiting  With decreased p o  intake  Patient reports eating some apple sauce and drinking ginger ale and tolerating  Abdomen with some tenderness along the epigastric area, lower side of abdomen/lower abdomen with tenderness, non-distended, positive bowel sounds throughout  KUB negative for obstruction    Denies diarrhea or constipation  Afebrile, vital signs stable, appears ill however nontoxic, patient is alert/awake and able to make her needs known  Started on IV NS at 80 cc/hour for 3 L  Continue with Zofran as needed  Follow-up CBC and CMP on 11/26

## 2022-11-28 NOTE — ASSESSMENT & PLAN NOTE
· Pruritic rash to right arm  Reports this started on 11/25/2022 after she was given IVF at nursing facility     · Clinically much improved and nearly resolves   · Can continue with Benadryl PO and topical PRN

## 2022-11-28 NOTE — ASSESSMENT & PLAN NOTE
Platelets 3898 -->463  -- > 402 --> 469, suspect reactive in nature due to recent bacteremia during recent hospitalization  Recently completed 6 weeks of vanco  Id follow patient with weekly labs   No active bleeding noted or reported   Follow platelets and trend   CBC 11/25

## 2022-11-28 NOTE — PLAN OF CARE
Problem: Prexisting or High Potential for Compromised Skin Integrity  Goal: Skin integrity is maintained or improved  Description: INTERVENTIONS:  - Identify patients at risk for skin breakdown  - Assess and monitor skin integrity  - Assess and monitor nutrition and hydration status  - Monitor labs   - Assess for incontinence   - Turn and reposition patient  - Assist with mobility/ambulation  - Relieve pressure over bony prominences  - Avoid friction and shearing  - Provide appropriate hygiene as needed including keeping skin clean and dry  - Evaluate need for skin moisturizer/barrier cream  - Collaborate with interdisciplinary team   - Patient/family teaching  - Consider wound care consult   Outcome: Progressing     Problem: MOBILITY - ADULT  Goal: Maintain or return to baseline ADL function  Description: INTERVENTIONS:  -  Assess patient's ability to carry out ADLs; assess patient's baseline for ADL function and identify physical deficits which impact ability to perform ADLs (bathing, care of mouth/teeth, toileting, grooming, dressing, etc )  - Assess/evaluate cause of self-care deficits   - Assess range of motion  - Assess patient's mobility; develop plan if impaired  - Assess patient's need for assistive devices and provide as appropriate  - Encourage maximum independence but intervene and supervise when necessary  - Involve family in performance of ADLs  - Assess for home care needs following discharge   - Consider OT consult to assist with ADL evaluation and planning for discharge  - Provide patient education as appropriate  Outcome: Progressing  Goal: Maintains/Returns to pre admission functional level  Description: INTERVENTIONS:  - Perform BMAT or MOVE assessment daily    - Set and communicate daily mobility goal to care team and patient/family/caregiver  - Collaborate with rehabilitation services on mobility goals if consulted  - Reposition patient every 2 hours    - Stand patient 2 times a day  - Ambulate patient 2 times a day  - Out of bed to chair 2 times a day   - Out of bed for meals 2 times a day  - Out of bed for toileting  - Record patient progress and toleration of activity level   Outcome: Progressing     Problem: Potential for Falls  Goal: Patient will remain free of falls  Description: INTERVENTIONS:  - Educate patient/family on patient safety including physical limitations  - Instruct patient to call for assistance with activity   - Consult OT/PT to assist with strengthening/mobility   - Keep Call bell within reach  - Keep bed low and locked with side rails adjusted as appropriate  - Keep care items and personal belongings within reach  - Initiate and maintain comfort rounds  - Make Fall Risk Sign visible to staff  - Offer Toileting every 2 Hours, in advance of need  - Initiate/Maintain bed and chair alarm  - Obtain necessary fall risk management equipment  - Apply yellow socks and bracelet for high fall risk patients  - Consider moving patient to room near nurses station  Outcome: Progressing

## 2022-11-28 NOTE — PROGRESS NOTES
[facility]  Progress Note - Alcon Flatten 1945, 76 y o  female MRN: [id number]  Unit/Bed#: S -01 Encounter: [id number]  Primary Care Provider: Chaitanya Escobar MD   Date and time admitted to hospital: 11/25/2022  4:08 PM    * MICHAEL (acute kidney injury) [facility])  Assessment & Plan  · Secondary to a week of nausea and vomiting  · Received IV fluids   · Hold lisinopril initially; will hold off reinitiating as blood pressure on the lower side  · Serial lab monitoring   · Resolution as noted below     Lab Results   Component Value Date    CREATININE 0 90 11/27/2022    CREATININE 1 85 (H) 11/25/2022    CREATININE 0 81 10/02/2022       Proctitis  Assessment & Plan  · Incidental finding on CT; Denies prior colonoscopy   · Denies symptoms; bleeding, constipation/diarrhea, weight loss  · Recommend GI evaluation as OP     Rash  Assessment & Plan  · Pruritic rash to right arm  Reports this started on 11/25/2022 after she was given IVF at nursing facility  · Clinically much improved and nearly resolves   · Benadryl PO and topical ordered PRN     Hyponatremia  Assessment & Plan  · Five days of persistent nausea and vomiting  Likely hypovolemia hyponatremia  · Received IV fluid  · Encouraged adequate PO intake   · Resolved     Lab Results   Component Value Date    SODIUM 136 11/27/2022    SODIUM 129 (L) 11/25/2022    SODIUM 141 10/02/2022       Asymptomatic bacteriuria  Assessment & Plan  · Patient denies any urinary symptoms over the past week  · CT AP: mild cystitis   · UA positive  · Prior urine cx MDRO >100k proteus  Last treated with ertapenem 10/4  · Urine culture reviewed  · Continue to monitor off of antibiotics as patient remains asymptomatic    Bacteremia due to methicillin resistant Staphylococcus epidermidis  Assessment & Plan  · Follows with ID as outpatient; No clear source of infection was identified  · TANISHA did not show any valvular vegitation     · Completed IV vanco x 6 weeks on     · Consult Infectious Disease if antibiotics determined to be necessary      Ambulatory dysfunction  Assessment & Plan  · Multifactorial 2/2 recent surgery and hospitalizations  Patient tells me she is able to stand and pivot but otherwise wheelchair bound at facility  · PT/OT input appreciated   · Case management on board to assist with dispo   · Family and patient requesting not to return to [facility]  · The patient is medically cleared to be discharged  Essential hypertension  Assessment & Plan  · Blood pressure acceptable  · Continue PTA regimen with Lopressor and Cardizem CD  · Monitor with routine vitals      Atrial fibrillation RVR (HCC)  Assessment & Plan  · Resolved as noted with controlled rates   · Likely due to volume depletion, electrolyte abnormality, missed doses of metoprolol/cardizem in setting of n/v  · Continue home medications  · Continue apixaban for anticoagulation        VTE Prophylaxis:  Apixaban (Eliquis)    Patient Centered Rounds: I have performed bedside rounds with nursing staff today  Discussions with Specialists or Other Care Team Provider: CM   Education and Discussions with Family / Patient: patient     Current Length of Stay: 3 day(s)    Current Patient Status: Inpatient   Certification Statement: The patient will continue to require additional inpatient hospital stay due to AI     Discharge Plan: pending discharge disposition     Code Status: Level 1 - Full Code    Subjective:   Feeling slightly better but tired  Objective:     Vitals:   Temp (24hrs), Av 9 °F (36 6 °C), Min:97 6 °F (36 4 °C), Max:98 1 °F (36 7 °C)    Temp:  [97 6 °F (36 4 °C)-98 1 °F (36 7 °C)] 97 6 °F (36 4 °C)  HR:  [66-76] 76  Resp:  [18] 18  BP: (109-122)/(45-50) 113/45  SpO2:  [97 %-100 %] 97 %  Body mass index is 38 6 kg/m²  Input and Output Summary (last 24 hours):        Intake/Output Summary (Last 24 hours) at 2022 1105  Last data filed at 2022 1801  Gross per 24 hour   Intake --   Output 300 ml   Net -300 ml       Physical Exam:   Physical Exam  Vitals and nursing note reviewed  Constitutional:       General: She is not in acute distress  Appearance: Normal appearance  Comments: Frail and elderly      HENT:      Head: Normocephalic and atraumatic  Right Ear: External ear normal       Left Ear: External ear normal       Nose: Nose normal  No rhinorrhea  Mouth/Throat:      Mouth: Mucous membranes are moist       Pharynx: Oropharynx is clear  Eyes:      General:         Right eye: No discharge  Left eye: No discharge  Pupils: Pupils are equal, round, and reactive to light  Cardiovascular:      Rate and Rhythm: Normal rate and regular rhythm  Pulses: Normal pulses  Heart sounds: Normal heart sounds  No murmur heard  Pulmonary:      Effort: Pulmonary effort is normal  No respiratory distress  Comments: Decreased in bases    Abdominal:      General: Bowel sounds are normal  There is no distension  Palpations: Abdomen is soft  There is no mass  Tenderness: There is no abdominal tenderness  Musculoskeletal:         General: No swelling or tenderness  Normal range of motion  Cervical back: Normal range of motion and neck supple  No muscular tenderness  Skin:     General: Skin is warm and dry  Capillary Refill: Capillary refill takes less than 2 seconds  Findings: No erythema or rash  Neurological:      General: No focal deficit present  Mental Status: She is alert and oriented to person, place, and time  Mental status is at baseline  Psychiatric:         Mood and Affect: Mood normal          Behavior: Behavior normal          Thought Content:  Thought content normal          Judgment: Judgment normal          Additional Data:     Labs:    Results from last 7 days   Lab Units 11/27/22  0522   WBC Thousand/uL 8 05   HEMOGLOBIN g/dL 10 6*   HEMATOCRIT % 35 1   PLATELETS Thousands/uL 390   LYMPHO PCT % 25   MONO PCT % 11   EOS PCT % 1     Results from last 7 days   Lab Units 11/27/22  0522 11/25/22  1626   SODIUM mmol/L 136 129*   POTASSIUM mmol/L 4 3 4 2   CHLORIDE mmol/L 106 97   CO2 mmol/L 25 23   BUN mg/dL 15 35*   CREATININE mg/dL 0 90 1 85*   CALCIUM mg/dL 8 8 8 6   ALK PHOS U/L  --  60   ALT U/L  --  7   AST U/L  --  15                   * I Have Reviewed All Lab Data Listed Above  * Additional Pertinent Lab Tests Reviewed:  [facility] Admission  Reviewed    Imaging:  Imaging Reports Reviewed Today Include: n/a     Recent Cultures (last 7 days):     Results from last 7 days   Lab Units 11/25/22  2224   URINE CULTURE  60,000-69,000 cfu/ml Gram Negative Aiden Enteric Like*       Last 24 Hours Medication List:   Current Facility-Administered Medications   Medication Dose Route Frequency Provider Last Rate   • acetaminophen  650 mg Oral Q6H PRN Marcio Lutz, CRNP     • allopurinol  100 mg Oral Daily Marcio Lutz, CRNP     • apixaban  5 mg Oral BID Marcio Lutz, CRNP     • ascorbic acid  500 mg Oral BID Marcio Lutz, CRNP     • bisacodyl  10 mg Rectal Daily PRN Marcio Lutz, CRNP     • cholecalciferol  400 Units Oral Daily Marcio Lutz, CRNP     • vitamin B-12  1,000 mcg Oral Daily Marcio Lutz, CRNP     • diltiazem  120 mg Oral Daily Marcio Lutz, CRNP     • diphenhydrAMINE  25 mg Oral Q6H PRN Naa Tiwari MD     • diphenhydrAMINE-zinc acetate   Topical TID PRN Marcio Lutz CRNP     • DULoxetine  30 mg Oral Daily Marcio Lutz, CRNP     • ezetimibe  10 mg Oral Daily Marcio Lutz, CRNP     • ferrous sulfate  325 mg Oral BID With Meals Marcio Lutz, CRNP     • folic acid  1 mg Oral Daily Marcio Lutz, CRNP     • melatonin  6 mg Oral HS Marcio Lutz, CRNP     • metoclopramide  10 mg Intravenous Q6H PRN Marcio Lutz, CRNP     • metoprolol tartrate  100 mg Oral Daily Marcio Lutz, CRNP     • metoprolol tartrate  50 mg Oral HS Marcio Lutz, CRNP     • oxybutynin  10 mg Oral HS Marcio Lutz, JERI     • pantoprazole  40 mg Oral Early Morning JERI Valle     • polyethylene glycol  17 g Oral Daily PRN JERI Valle     • psyllium  1 packet Oral Daily JERI Valle     • senna-docusate sodium  2 tablet Oral BID JERI Valle     • sucralfate  1 g Oral 4x Daily [facility] & ) JERI Valle          Today, Patient Was Seen By: JERI Yanes    ** Please Note: Dictation voice to text software may have been used in the creation of this document   **

## 2022-11-28 NOTE — ASSESSMENT & PLAN NOTE
WBC 18 8, symptomatic for suspected UTI   Afebrile, VSS, ill appearing and non toxic  Alert and awake able to make her needs known  Start ciprofloxacin 500 mg  for 3 days   With multiple abx allergies   Obtain urine studies    KUB negative for obstruction  Trend CBC   CBC on 11/25

## 2022-11-28 NOTE — CASE MANAGEMENT
Case Management Assessment & Discharge Planning Note    Patient name Enedina Hannah  Location S /S -97 MRN [id number]  : 1945 Date 2022       Current Admission Date: 2022  Current Admission Diagnosis:MICHAEL (acute kidney injury) [facility])   Patient Active Problem List    Diagnosis Date Noted   • Hyperkalemia 2022   • Suspected UTI 2022   • Dehydration 2022   • UTI (urinary tract infection) 2022   • Hyponatremia 2022   • Rash 2022   • Proctitis 2022   • Nausea 2022   • Hypokalemia 2022   • Dermatitis associated with incontinence 10/26/2022   • Physical deconditioning 10/19/2022   • Chronic heart failure with preserved ejection fraction ([address] ) 10/19/2022   • Status post fall 10/15/2022   • Thrombocytosis 10/11/2022   • Pressure injury of left buttock, stage 3 ([address] ) 10/05/2022   • Aortic regurgitation 10/04/2022   • Asymptomatic bacteriuria 10/03/2022   • Bacteremia due to methicillin resistant Staphylococcus epidermidis 2022   • Anemia 2022   • Bladder wall thickening 2022   • left femoral fluid collection 2022   • Pleural effusion on right 2022   • Vomiting and diarrhea 2022   • Surgical wound present 2022   • Renal insufficiency    • Stage 3a chronic kidney disease ([address] ) 2022   • Preop examination 2022   • Preop cardiovascular exam 2022   • Poor dentition 2022   • Closed bicondylar fracture of left femur with delayed healing 2022   • Paroxysmal atrial fibrillation ([address] ) 2022   • Chronic gout with tophus 2021   • Current moderate episode of major depressive disorder ([address] ) 2021   • Morbid obesity with body mass index (BMI) of 40 0 to 49 9 ([address] ) 2021   • Reactive airway disease with acute exacerbation 2021   • Ambulatory dysfunction 2021   • MICHAEL (acute kidney injury) ([facility] ) 2021   • Leukocytosis 2021   • Arthritis 05/18/2021   • Generalized weakness 05/18/2021   • Hyperlipidemia 11/10/2020   • Dysphonia 04/28/2020   • Vitamin D insufficiency 04/28/2020   • Chronic pain of left knee 08/12/2019   • Age related osteoporosis 08/12/2019   • Anxiety 03/27/2019   • Tremor 03/27/2019   • Other insomnia 02/27/2019   • Urinary incontinence 09/17/2018   • Psoriasis 07/20/2018   • Thyroid nodule 07/20/2018   • Atrial fibrillation RVR ([facility] ) 02/15/2018   • Tachy-smita syndrome ([facility] ) 02/15/2018   • Essential hypertension 02/15/2018   • Pacemaker 02/15/2018   • GERD (gastroesophageal reflux disease) 07/18/2017   • Mild carpal tunnel syndrome, right 04/10/2017   • Degenerative lumbar spinal stenosis 03/31/2017   • Low back pain 08/29/2016   • Lumbar degenerative disc disease 08/29/2016   • Chronic bilateral low back pain without sciatica 06/08/2016   • Chronic GERD 05/06/2016   • Overweight 02/12/2016   • Fibromyalgia 11/06/2013      LOS (days): 3  Geometric Mean LOS (GMLOS) (days): 3 10  Days to GMLOS:0 4     OBJECTIVE:    Risk of Unplanned Readmission Score: 22 66         Current admission status: Inpatient       Preferred Pharmacy:   [place] [address] [address] [address], [place] [address] [place] [address]  Phone: [phone] Fax: [phone] [facility], [place] [address]  [address] [address]  [address] [facility] [address]  Phone: [phone] Fax: [phone]    Primary Care Provider: Alise Funez MD    Primary Insurance: MEDICARE  Secondary Insurance: [facility]:  [address] [facility], [address] [place] [address] Phone: [phone] (Mobile)  Work Phone: [phone]                              Patient Information  Admitted from[de-identified] Facility [facility])  Mental Status: Other (Comment) (patient sleeping)  During Assessment patient was accompanied by:  Other-Comment (niece/POA, Netta)  Assessment information provided by[de-identified] Other - please comment (alyse, Belem Lee)  Support Systems: Family members  Type of Current Residence: Facility (currently at rehab at [facility] per alyse, but had been living in apartment alone prior to rehab transfer)  In the last 12 months, was there a time when you did not have a steady place to sleep or slept in a shelter (including now)?: No  Homeless/housing insecurity resource given?: N/A  Living Arrangements: Lives Alone    Activities of Daily Living Prior to Admission  Functional Status: Assistance  Completes ADLs independently?: No  Level of ADL dependence: Assistance  Ambulates independently?: No  Level of ambulatory dependence: Assistance  Does patient use assisted devices?: Yes  Assisted Devices (DME) used: Bedside Commode, Walker, Wheelchair, Other (Comment), Shower Chair (grab bars)  Does patient currently own DME?: Yes  What DME does the patient currently own?: Bedside Commode, Shower Chair, Walker, Wheelchair  Does patient have a history of Outpatient Therapy (PT/OT)?: No  Does the patient have a history of Short-Term Rehab?: Yes ([facility] - transferred there on 10/4)         Patient Information Continued  Does patient have prescription coverage?: Yes  Within the past 12 months, you worried that your food would run out before you got the money to buy more : Never true  Within the past 12 months, the food you bought just didn't last and you didn't have money to get more : Never true  Food insecurity resource given?: N/A  Does patient receive dialysis treatments?: No  Does patient have a history of substance abuse?: No  Does patient have a history of Mental Health Diagnosis?: No         Means of Transportation  In the past 12 months, has lack of transportation kept you from medical appointments or from getting medications?: No  In the past 12 months, has lack of transportation kept you from meetings, work, or from getting things needed for daily living?: No  Was application for public transport provided?: N/A        DISCHARGE DETAILS:    Discharge planning discussed with[de-identified] patient's alyse/Thierry CARTER, at bedside  Talent of Choice: Yes (re: rehab)  Comments - Freedom of Choice: refusing return to [facility]; relayed preference for [facility], but agreeable for blanket referral  CM contacted family/caregiver?: Yes (Thierry Kay at bedside)  Were Treatment Team discharge recommendations reviewed with patient/caregiver?: Yes  Did patient/caregiver verbalize understanding of patient care needs?: N/A- going to facility  Were patient/caregiver advised of the risks associated with not following Treatment Team discharge recommendations?: Yes         Requested [address] [facility] [address]         Is the patient interested in [facility] at discharge?: No    DME Referral Provided  Referral made for DME?: No    Other Referral/Resources/Interventions Provided:  Interventions: SNF, Short Term Rehab  Referral Comments: Patient admitted from [facility] due to acute kidney injury  Patient sleeping during assessment, so spoke with Thierry Freire at bedside  Family reports that patient has been at [facility] for [address] since 10/4  States that patient was supposed to be discharged home from rehab on Friday, however condition declined which led to re-hospitalization  Family relays that they were not happy with care received at [facility] and are interested in patient going to an alternative rehab facility; agreeable for blanket referral to be sent  Niece reports that patient is vaccinated and has COVID booster from just prior to transfer to [facility]  Niece relays that plan at d/c from rehab is to return to her apartment - niece able to stay with patient during the day, and reports goal at rehab is only for patient to be able to stand and transfer/pivot to get in/out of her wheelchair  Patient has w/c, walker, shower seat and commode at home   Also has life alert for overnight when niece unable to stay with her  Niece aware that blanket referral will be sent, but relays preference for [facility]  Reviewed that medically, patient is clear for d/c today  Niece aware that referrals for rehab to be sent, will review responses once received  Referrals sent in [address]; awaiting responses      Would you like to participate in our [address] service program?  : No - Declined    Treatment Team Recommendation: Short Term Rehab, SNF  Discharge Destination Plan[de-identified] Short Term Rehab, SNF  Transport at Discharge : BLS Ambulance                             IMM Given (Date):: 11/26/22

## 2022-11-28 NOTE — ASSESSMENT & PLAN NOTE
· Multifactorial 2/2 recent surgery and hospitalizations  Patient is able to stand and pivot but otherwise wheelchair bound at facility  · PT/OT input appreciated   · Case management on board to assist with dispo   · Family and patient requesting not to return to [facility]  The patient is accepted at [facility] and will be discharged today

## 2022-11-28 NOTE — ASSESSMENT & PLAN NOTE
· Pruritic rash to right arm  Reports this started on 11/25/2022 after she was given IVF at nursing facility     · Clinically much improved and nearly resolves   · Benadryl PO and topical ordered PRN

## 2022-11-28 NOTE — PHYSICAL THERAPY NOTE
Physical Therapy Cancellation Note       11/28/22 1505   Note Type   Note Type Cancelled Session   Cancel Reasons Other   Assessment   Assessment attempted to see pt for PT intervention but Kendy Francisco CM states pt is scheduled to discontinue from the hospital today  PT session cancelled due to pending discharge       Miguel Angel Guillermo, PT

## 2022-11-28 NOTE — ASSESSMENT & PLAN NOTE
· Resolved as noted with controlled rates   · Likely due to volume depletion, electrolyte abnormality, missed doses of metoprolol/cardizem in setting of n/v  · Continue home medications  · Continue apixaban for anticoagulation

## 2022-11-28 NOTE — ASSESSMENT & PLAN NOTE
· Secondary to a week of nausea and vomiting    · Received IV fluids   · Hold lisinopril initially; will hold off reinitiating as blood pressure on the lower side  · Serial lab monitoring   · Resolution as noted below     Lab Results   Component Value Date    CREATININE 0 90 11/27/2022    CREATININE 1 85 (H) 11/25/2022    CREATININE 0 81 10/02/2022

## 2022-11-28 NOTE — ASSESSMENT & PLAN NOTE
Symptomatic for UTI  + for lower abd tenderness  Decreased po intake and nausea  Denies vomiting  Remains on zofran as needed  VS q shift for 2 days  WBC today 18 8   Bun and creatinine increased  Bladder scan negative for retention  Afebrile and stable VS per nursing    Per nurse pt has been refusing po meds   Pt is allergic to multiple abx will start pt on ciprofloxacin 500 mg for 3 days completion of tx on 11/26   Obtain urine studies

## 2022-11-28 NOTE — ASSESSMENT & PLAN NOTE
· Follows with ID as outpatient; No clear source of infection was identified  · TANISHA did not show any valvular vegitation     · Completed IV vanco x 6 weeks on 11/9

## 2022-11-28 NOTE — ASSESSMENT & PLAN NOTE
· Multifactorial 2/2 recent surgery and hospitalizations  Patient tells me she is able to stand and pivot but otherwise wheelchair bound at facility  · PT/OT input appreciated   · Case management on board to assist with dispo   · Family and patient requesting not to return to [facility]  · The patient is medically cleared to be discharged

## 2022-11-29 ENCOUNTER — NURSING HOME VISIT (OUTPATIENT)
Dept: GERIATRICS | Facility: OTHER | Age: 77
End: 2022-11-29

## 2022-11-29 DIAGNOSIS — M1A.9XX1 CHRONIC GOUT WITH TOPHUS, UNSPECIFIED CAUSE, UNSPECIFIED SITE: ICD-10-CM

## 2022-11-29 DIAGNOSIS — N39.41 URGE INCONTINENCE OF URINE: ICD-10-CM

## 2022-11-29 DIAGNOSIS — R26.2 AMBULATORY DYSFUNCTION: Primary | ICD-10-CM

## 2022-11-29 DIAGNOSIS — I48.91 ATRIAL FIBRILLATION, UNSPECIFIED TYPE (HCC): ICD-10-CM

## 2022-11-29 DIAGNOSIS — L89.323 PRESSURE INJURY OF LEFT BUTTOCK, STAGE 3 (HCC): ICD-10-CM

## 2022-11-29 DIAGNOSIS — K21.9 CHRONIC GERD: ICD-10-CM

## 2022-11-29 DIAGNOSIS — I10 ESSENTIAL HYPERTENSION: ICD-10-CM

## 2022-11-29 LAB — BACTERIA UR CULT: ABNORMAL

## 2022-11-29 NOTE — PROGRESS NOTES
Mi[address]   [facility]  History and Physical    NAME: Shai Patton  AGE: 76 y o  SEX: female [id number]    DATE OF ENCOUNTER: 12/1/2022    Code status:  CPR    Assessment and Plan     1  Ambulatory dysfunction  - PT/OT ordered  - Fall precautions in place  - uses cane, walker and wheelchair    2  Atrial fibrillation, unspecified type (HCC)  - cont Apixaban 5 mg po bid  - cont Metoprolol tartrate 50 mg and 100 mg po daily    3  Chronic GERD  - cont sucralfate 1 gm po before meals  - cont omeprazole 20 mg po qd    4  Chronic gout with tophus, unspecified cause, unspecified site  - cont allopurinol 100 mg po qd    5  Essential hypertension  - cont Diltiazem  mg po qd  - cont lisinopril 2 5 mg po qd  - cont zetia 10 mg po qhs    6  Pressure injury of left buttock, stage 3 (HCC)  - cont local wound care    7  Urge incontinence of urine  - cont Oxybutynin 10 mg po qd      All medications and routine orders were reviewed and updated as needed  Plan discussed with: patient, daughter and staff    Chief Complaint     Seen for admission at [address] [place] [address], a 75 y/o female with PMH of HTN, A  Fib, GERD, got admitted to the hospital with nausea and vomiting  She also found to be in MICHAEL due to poor po intake, treated with IV fluids  CT abdomen was negative except for non specific finding of proctitis, recommended outpatient follow up  She got discharged to  for subacute rehab  She was seen and examined at bedside, stable  Her daughter Read Hammed at bedside  She was at a nursing facility getting rehab, from there she was sent to the hospital    She lives at home, alone, independent of ADLs, daughter helps with IADLs  She uses cane, walker and wheelchair at home  Today she is feeling better, eating and sleeping well  She denies any c/o  Staff have no concerns at this time  HISTORY:  Past Medical History:   Diagnosis Date   • Abnormal ECG     Last Assessed 9/29/2016   • Anxiety     Last Assessed 6/08/2016   • Arthritis     knees   • Asthma     Last Assessed 11/06/2013   • Atrial premature complex    • Cataract, bilateral     Last Assessed 7/14/2016   • Cataract, left eye     Both eyes  Had surgery on left  • COVID-19    • Difficulty swallowing    • Diverticulosis 9/25/2022   • Dizziness    • Fibromyalgia    • Fibromyalgia, primary    • Gastric reflux    • Heart failure ([address] )     5/23/22 Pt reports had heart failure in the past   • History of COVID-19     5/23/22 Pt reports having COVID in 2021  • History of transfusion     no reaction   • [place] (hard of hearing)    • Hyperlipidemia    • Hypertension    • Incontinence in female     5/23/22 Pt reports has incontinence -wears depends especially at night/riding in car   • Irregular heart beat     5/23/22 Pt reports hx of A fib   • Lyme disease    • PONV (postoperative nausea and vomiting)    • Premature ventricular contraction    • Primary osteoarthritis of both knees     Last Assessed 7/14/2016   • Rheumatic fever     5/23/22 Pt reports had hx of rheumatic fever as a child twice   • Sore throat    • Tuberculosis 1945     Family History   Problem Relation Age of Onset   • Coronary artery disease Mother    • Heart attack Mother         Prior   • Heart disease Father    • Heart attack Father         Prior   • Anesthesia problems Neg Hx      Social History     Socioeconomic History   • Marital status:       Spouse name: None   • Number of children: None   • Years of education: None   • Highest education level: None   Occupational History   • None   Tobacco Use   • Smoking status: Never   • Smokeless tobacco: Never   • Tobacco comments:     Denies any former or current smoking   Vaping Use   • Vaping Use: Never used   Substance and Sexual Activity   • Alcohol use: Not Currently     Comment: Per [employer]- pt reports no current alcohol use at this time   • Drug use: No     Comment: Denies any former or current drug use   • Sexual activity: Never     Comment:  for 12 years - not active   Other Topics Concern   • None   Social History Narrative   • None     Social Determinants of Health     Financial Resource Strain: Not on file   Food Insecurity: No Food Insecurity   • Worried About Running Out of Food in the Last Year: Never true   • Ran Out of Food in the Last Year: Never true   Transportation Needs: No Transportation Needs   • Lack of Transportation (Medical): No   • Lack of Transportation (Non-Medical): No   Physical Activity: Not on file   Stress: Not on file   Social Connections: Not on file   Intimate Partner Violence: Not on file   Housing Stability: Low Risk    • Unable to Pay for Housing in the Last Year: No   • Number of Places Lived in the Last Year: 2   • Unstable Housing in the Last Year: No       Allergies: Allergies   Allergen Reactions   • Penicillins Hives     Itching and terrible hives   • Sulfa Antibiotics Itching   • D78 Folate [Folic Acid-Vit T4-BAD I95 - Food Allergy] Other (See Comments)     5/23/22 Pt reports no allergic reaction known    • [place] Other (See Comments)     Unknown, 5/23 -pt is unaware of reaction    • Lyrica [Pregabalin] Other (See Comments)     5/23/22 Pt reports doesn't remember reaction    • Other Other (See Comments)     Black rubber 5/23/22 per allergy test - pt unaware of reaction   • Cortisone Acetate [Cortisone] Itching and Rash     5/23/22 pt reports makes her violent    • Doxycycline Hives and Itching   • Nickel Other (See Comments)     Skin discoloration       Review of Systems     Review of Systems   Constitutional: Positive for activity change and fatigue  Negative for fever  HENT: Positive for hearing loss  Negative for dental problem and trouble swallowing  Eyes: Negative for photophobia and visual disturbance     Respiratory: Negative for cough and shortness of breath  Cardiovascular: Negative for chest pain, palpitations and leg swelling  Gastrointestinal: Negative for abdominal pain, constipation, diarrhea, nausea and vomiting  Genitourinary: Negative for difficulty urinating and dysuria  Musculoskeletal: Positive for gait problem  Negative for arthralgias  Neurological: Positive for weakness  Negative for dizziness and headaches  All other systems reviewed and are negative  As in HPI  Medications and orders     All medications reviewed and updated in Nursing Home EMR  Objective     Vitals: T: 97 5, P: 76, R: 16, BP: 138/76, 95% on RA, Wt: 215 lbs    Physical Exam  Vitals and nursing note reviewed  Constitutional:       General: She is not in acute distress  Appearance: She is well-developed  She is obese  She is not diaphoretic  HENT:      Head: Normocephalic and atraumatic  Nose: Nose normal       Mouth/Throat:      Mouth: Mucous membranes are moist       Pharynx: Oropharynx is clear  No oropharyngeal exudate  Eyes:      General: No scleral icterus  Right eye: No discharge  Left eye: No discharge  Extraocular Movements: Extraocular movements intact  Conjunctiva/sclera: Conjunctivae normal    Cardiovascular:      Rate and Rhythm: Normal rate and regular rhythm  Heart sounds: Murmur heard  Pulmonary:      Effort: Pulmonary effort is normal  No respiratory distress  Breath sounds: Normal breath sounds  No wheezing  Chest:      Chest wall: No tenderness  Abdominal:      General: Bowel sounds are normal       Palpations: Abdomen is soft  Tenderness: There is no abdominal tenderness  There is no guarding or rebound  Musculoskeletal:         General: No tenderness or deformity  Cervical back: Normal range of motion and neck supple  Right lower leg: No edema  Left lower leg: No edema  Comments: Impaired ROM in all 4 extremities  Skin:     General: Skin is warm and dry  Neurological:      Mental Status: She is alert and oriented to person, place, and time  Cranial Nerves: No cranial nerve deficit  Psychiatric:         Mood and Affect: Mood normal          Behavior: Behavior normal          Pertinent Laboratory/Diagnostic Studies: The following labs/studies were reviewed please see chart or hospital paperwork for details    Ref Range & Units 11/27/22 0522 11/25/22 1626 10/2/22 0550 9/30/22 0446 9/29/22 0534 9/28/22 0622 9/27/22 0437    WBC 4 31 - 10 16 Thousand/uL 8 05  11 70 High   8 81  9 13  11 00 High   8 60  8 56    RBC 3 81 - 5 12 Million/uL 3 97  4 13  2 93 Low   2 88 Low   2 90 Low   2 97 Low   2 75 Low     Hemoglobin 11 5 - 15 4 g/dL 10 6 Low   11 0 Low   8 4 Low   8 4 Low   8 4 Low   8 6 Low   8 0 Low     Hematocrit 34 8 - 46 1 % 35 1  36 0  27 7 Low   27 7 Low   27 8 Low   29 0 Low   26 4 Low     MCV 82 - 98 fL 88  87  95  96  96  98  96    MCH 26 8 - 34 3 pg 26 7 Low   26 6 Low   28 7  29 2  29 0  29 0  29 1    MCHC 31 4 - 37 4 g/dL 30 2 Low   30 6 Low   30 3 Low   30 3 Low   30 2 Low   29 7 Low   30 3 Low     RDW 11 6 - 15 1 % 15 7 High   15 8 High   14 9  15 2 High   14 9  14 8  14 9    MPV 8 9 - 12 7 fL 8 7 Low   8 7 Low   8 4 Low   8 5 Low   8 2 Low   8 3 Low   8 4 Low     Platelets 377 - 002 Thousands/uL 390  502 High   474 High   516 High   558 High   566 High   582 High     nRBC    0 R  0 R  0 R  0 R  0 R    Lymphocytes Absolute    1 96 R  1 84 R  1 85 R  1 84 R  1 74 R    Monocytes Absolute    1 20 R  1 26 High  R  1 37 High  R  0 92 R  1 12 R    Eosinophils Absolute    0 70 High  R  0 80 High  R  0 74 High  R  0 75 High  R  0 75 High  R    Basophils Absolute    0 08 R  0 08 R  0 07 R  0 09 R  0 07 R    Neutrophils Relative    54 R  54 R  60 R  56 R  55 R    Immat GRANS %    1 R  2 R  2 R  2 R  2 R    Lymphocytes Relative    22 R  20 R  17 R  21 R  20 R    Monocytes Relative    14 High  R  14 High  R  13 High  R  11 R  13 High  R    Eosinophils Relative 8 High  R  9 High  R  7 High  R  9 High  R  9 High  R    Basophils Relative    1 R  1 R  1 R  1 R  1 R    Neutrophils Absolute    4 75 R  4 97 R  6 78 R  4 87 R  4 75 R    Immature Grans Absolute    0 12          Ref Range & Units 11/27/22 0522 11/25/22 1626 10/2/22 0550 9/30/22 0446 9/29/22 0534 9/28/22 0622 9/27/22 0437    Sodium 135 - 147 mmol/L 136  129 Low   141  139  139  139  140    Potassium 3 5 - 5 3 mmol/L 4 3  4 2  3 8  3 5  3 5  3 5  3 7    Chloride 96 - 108 mmol/L 106  97  104  103  103  102  101    CO2 21 - 32 mmol/L 25  23  30  27  31  32  34 High     ANION GAP 4 - 13 mmol/L 5  9  7  9  5  5  5    BUN 5 - 25 mg/dL 15  35 High   9  10  12  14  15    Creatinine 0 60 - 1 30 mg/dL 0 90  1 85 High  CM  0 81 CM  0 90 CM  0 82 CM  0 93 CM  1 13 CM    Comment: Standardized to IDMS reference method   Glucose 65 - 140 mg/dL 101  117 CM  120 CM  106 CM  117 CM  117 CM  118 CM       Calcium 8 4 - 10 2 mg/dL 8 8  8 6  8 9  8 7  8 6  8 7  8 5    eGFR ml/min/1 73sq m 61  25  69            - Counseling Documentation: patient was counseled regarding: risk factor reductions

## 2022-11-30 ENCOUNTER — NURSING HOME VISIT (OUTPATIENT)
Dept: WOUND CARE | Facility: HOSPITAL | Age: 77
End: 2022-11-30

## 2022-11-30 ENCOUNTER — TRANSITIONAL CARE MANAGEMENT (OUTPATIENT)
Dept: INTERNAL MEDICINE CLINIC | Facility: CLINIC | Age: 77
End: 2022-11-30

## 2022-11-30 DIAGNOSIS — L89.323 PRESSURE INJURY OF LEFT BUTTOCK, STAGE 3 (HCC): Primary | ICD-10-CM

## 2022-11-30 NOTE — PROGRESS NOTES
[address] [facility]NAGEMENT   [facility]       Patient ID: Shailesh Mann is a 76 y o  female Date of Birth 1945     Location of Service: [address] [place] [address]    Performed wound round with: Wound team     Chief Complaint : Left buttock    Wound Instructions:  Wound:  Buttocks   Discontinue previous wound order  Cleanse the skin with soap and water, pat dry   Apply Z guard to skin for prevention  Frequency : Twice a day and prn for soiling    Offload all wounds  Turn and reposition frequently, maximum of every two hours  Instruct / Assist with weight shifting every 15 - 20 minutes when in chair  Increase protein intake  Consult RD  Monitor for any sign of infection or worsening, inform PCP or patient's primary physician in your facility  Allergies  Penicillins, Sulfa antibiotics, H06 folate [folic acid-vit O1-YAF Z77 - food allergy], Cobalt, Lyrica [pregabalin], Other, Cortisone acetate [cortisone], Doxycycline, and Nickel      Assessment & Plan:  1  Pressure injury of left buttock, stage 3 (HCC)  Assessment & Plan:  - Location : Left buttock  - Wound healing status: healed  - ordered Z guard for moisture management and prevention of pressure injury  - Continue to offload  - Increase protein   - Sign off  Facility staff will continue to provide treatment and monitor the wound  Can reconsult wound nurse practitioner if wound failed to heal or worsened  Subjective:   10/5/2022This is a 76 y o , female referred to our service for wound/ skin alterations on left buttock  Patient have a complex medical history including but not limited to  Fibromyalgia, Fibromyalgia, primary, Gastric reflux, Heart failure ([address] ), History of COVID-19, History of transfusion, [place] (hard of hearing), Hyperlipidemia, Hypertension, Incontinence in female, Irregular heart beat, Lyme disease, PONV (postoperative nausea and vomiting), Premature ventricular contraction, Primary osteoarthritis of both knees, Rheumatic fever, Sore throat, and Tuberculosis    Patient was referred by Senior Care Team  Patient was seen in collaboration with the facility wound team      Wound History:   As per medical record review, the wound was first assessed in acute care on 9/25/2022  Wound started as stage 2 pressure ulcer  Received patient in bed, seems comfortable  Denies pain  Incontinent of both bowel and bladder  Need assistance with turning when in bed  NO current treatment  10/12/2022 Follow up for wound on the left buttock  Received patient, not in distress  Facility staff did not report any significant issues related to the wound  Denies pain  10/19/2022 Follow up for wound on the left buttock   Received patient, not in distress  Facility staff did not report any significant issues related to the wound  Incontinent of urine during visit  10/26/2022 Follow up for wound on the left buttock   Received patient, not in distress  Facility staff did not report any significant issues related to the wound  As per medical Record review, patient currently on vancomycin for bacteremia  11/2/2022 Follow up for wound on the left buttock   Received patient, not in distress  Facility staff did not report any significant issues related to the wound  Denies pain  11/9/2022 Follow up for wound on the left buttock   Received patient, not in distress  Facility staff did not report any significant issues related to the wound  Denies pain  11/30/2022 Follow up for wound on the left buttock   Received patient, not in distress  Facility staff did not report any significant issues related to the wound  Patient was transferred to another facility, [address] [place] [address], for short-term rehab  Review of Systems   Constitutional: Negative  HENT: Negative  Eyes: Negative  Respiratory: Negative  Cardiovascular: Negative for chest pain and leg swelling  Gastrointestinal: Negative  Endocrine: Negative  Genitourinary: Negative  Musculoskeletal: Positive for gait problem  Skin: Positive for wound  See HPI   Neurological: Negative for dizziness and headaches  Psychiatric/Behavioral: Negative for behavioral problems  Objective:    Physical Exam  Constitutional:       Appearance: She is obese  HENT:      Head: Normocephalic and atraumatic  Nose: Nose normal       Mouth/Throat:      Pharynx: Oropharynx is clear  Eyes:      Conjunctiva/sclera: Conjunctivae normal    Pulmonary:      Effort: Pulmonary effort is normal    Abdominal:      Tenderness: There is no abdominal tenderness  There is no guarding  Genitourinary:     Comments: incontinent  Musculoskeletal:         General: No tenderness  Cervical back: Normal range of motion  Right lower leg: No edema  Left lower leg: No edema  Comments: LROM  With PICC line on the right arm   Skin:     Findings: Lesion present  Comments: Left buttock- wound healed, no open wound   Neurological:      Mental Status: She is alert  Gait: Gait abnormal    Psychiatric:         Mood and Affect: Mood normal          Behavior: Behavior normal               Procedures           Patient's care was coordinated with nursing facility staff  Recent vitals, labs and updated medications were reviewed on EMR or chart system of facility  Past Medical, surgical, social, medication and allergy history and patient's previous records were reviewed and updated as appropriate: Most up-to date information is available in the facility EMR where the patient is currently admitted      Patient Active Problem List   Diagnosis   • Atrial fibrillation RVR (HCC)   • Tachy-smita syndrome (HCC)   • Essential hypertension   • Pacemaker   • Chronic bilateral low back pain without sciatica   • Chronic GERD   • Degenerative lumbar spinal stenosis   • Fibromyalgia   • GERD (gastroesophageal reflux disease)   • Low back pain   • Lumbar degenerative disc disease • Mild carpal tunnel syndrome, right   • Overweight   • Psoriasis   • Thyroid nodule   • Urinary incontinence   • Other insomnia   • Anxiety   • Tremor   • Chronic pain of left knee   • Age related osteoporosis   • Dysphonia   • Vitamin D insufficiency   • Hyperlipidemia   • Arthritis   • Generalized weakness   • Leukocytosis   • MICHAEL (acute kidney injury) ([facility] [address] )   • Ambulatory dysfunction   • Reactive airway disease with acute exacerbation   • Morbid obesity with body mass index (BMI) of 40 0 to 49 9 ([facility])   • Chronic gout with tophus   • Current moderate episode of major depressive disorder ([facility])   • Paroxysmal atrial fibrillation ([facility])   • Poor dentition   • Closed bicondylar fracture of left femur with delayed healing   • Preop cardiovascular exam   • Stage 3a chronic kidney disease ([facility])   • Preop examination   • Renal insufficiency   • Surgical wound present   • Anemia   • Bladder wall thickening   • left femoral fluid collection   • Pleural effusion on right   • Vomiting and diarrhea   • Bacteremia due to methicillin resistant Staphylococcus epidermidis   • Asymptomatic bacteriuria   • Aortic regurgitation   • Pressure injury of left buttock, stage 3 ([facility])   • Thrombocytosis   • Status post fall   • Physical deconditioning   • Chronic heart failure with preserved ejection fraction ([facility])   • Dermatitis associated with incontinence   • Hypokalemia   • Nausea   • UTI (urinary tract infection)   • Hyponatremia   • Rash   • Proctitis   • Suspected UTI   • Dehydration   • Hyperkalemia     Past Medical History:   Diagnosis Date   • Abnormal ECG     Last Assessed 9/29/2016   • Anxiety     Last Assessed 6/08/2016   • Arthritis     knees   • Asthma     Last Assessed 11/06/2013   • Atrial premature complex    • Cataract, bilateral     Last Assessed 7/14/2016   • Cataract, left eye     Both eyes  Had surgery on left     • COVID-19    • Difficulty swallowing    • Diverticulosis 9/25/2022   • Dizziness    • Fibromyalgia • Fibromyalgia, primary    • Gastric reflux    • Heart failure ([facility] [address] )     5/23/22 Pt reports had heart failure in the past   • History of COVID-19     5/23/22 Pt reports having COVID in 2021  • History of transfusion     no reaction   • [place] (hard of hearing)    • Hyperlipidemia    • Hypertension    • Incontinence in female     5/23/22 Pt reports has incontinence -wears depends especially at night/riding in car   • Irregular heart beat     5/23/22 Pt reports hx of A fib   • Lyme disease    • PONV (postoperative nausea and vomiting)    • Premature ventricular contraction    • Primary osteoarthritis of both knees     Last Assessed 7/14/2016   • Rheumatic fever     5/23/22 Pt reports had hx of rheumatic fever as a child twice   • Sore throat    • Tuberculosis 1947     Past Surgical History:   Procedure Laterality Date   • APPENDECTOMY     • CARDIAC PACEMAKER PLACEMENT  2019   • COLONOSCOPY     • DENTAL SURGERY      extractions   • HYSTERECTOMY      5/23/22 Pt reports had appendix out with hysterectomy and "tightened up my bladder"   • IR ASPIRATION ONLY  9/30/2022   • ORIF FEMUR FRACTURE Left 08/05/2021    Procedure: OPEN REDUCTION W/ INTERNAL FIXATION (ORIF) DISTAL FEMUR; INSERTION RETROGRADE NAIL;  Surgeon: Celsa Maxwell MD;  [facility];  Service: Orthopedics   • MS DILATE ESOPHAGUS N/A 04/06/2016    Procedure: DILATATION ESOPHAGEAL;  Surgeon: Walter Vera MD;  Location: [facility]; Service: Gastroenterology   • MS EGD TRANSORAL BIOPSY SINGLE/MULTIPLE N/A 04/06/2016    Procedure: ESOPHAGOGASTRODUODENOSCOPY (EGD); Surgeon: Walter Vera MD;  Location: [facility];   Service: Gastroenterology   • MS REMOVAL DEEP IMPLANT Left 9/12/2022    Procedure: REMOVAL NAIL IM  FEMUR;  Surgeon: Celsa Maxwell MD;  [facility];  Service: Orthopedics   • MS TOTAL KNEE ARTHROPLASTY Left 9/12/2022    Procedure: ARTHROPLASTY KNEE TOTAL;  Surgeon: Celsa Maxwell MD;  [facility];  Service: Orthopedics   • MN XCAPSL CTRC RMVL INSJ IO LENS PROSTH W/O ECP Left 03/15/2016    Procedure: EXTRACTION EXTRACAPSULAR CATARACT PHACO INTRAOCULAR LENS (IOL); Surgeon: Tyler Sanchez MD;  Location[facility];  Service: Ophthalmology   • REPLACEMENT TOTAL KNEE Right    • REPLACEMENT TOTAL KNEE      right    • TONSILLECTOMY       Social History     Socioeconomic History   • Marital status:      Spouse name: None   • Number of children: None   • Years of education: None   • Highest education level: None   Occupational History   • None   Tobacco Use   • Smoking status: Never   • Smokeless tobacco: Never   • Tobacco comments:     Denies any former or current smoking   Vaping Use   • Vaping Use: Never used   Substance and Sexual Activity   • Alcohol use: Not Currently     Comment: Per [employer]- pt reports no current alcohol use at this time   • Drug use: No     Comment: Denies any former or current drug use   • Sexual activity: Never     Comment:  for 12 years - not active   Other Topics Concern   • None   Social History Narrative   • None     Social Determinants of Health     Financial Resource Strain: Not on file   Food Insecurity: No Food Insecurity   • Worried About Running Out of Food in the Last Year: Never true   • Ran Out of Food in the Last Year: Never true   Transportation Needs: No Transportation Needs   • Lack of Transportation (Medical): No   • Lack of Transportation (Non-Medical):  No   Physical Activity: Not on file   Stress: Not on file   Social Connections: Not on file   Intimate Partner Violence: Not on file   Housing Stability: Low Risk    • Unable to Pay for Housing in the Last Year: No   • Number of Places Lived in the Last Year: 2   • Unstable Housing in the Last Year: No        Current Outpatient Medications:   •  acetaminophen (TYLENOL) 325 mg tablet, Take 975 mg by mouth every 6 (six) hours as needed, Disp: , Rfl:   •  albuterol (PROVENTIL HFA,VENTOLIN HFA) 90 mcg/act inhaler, INHALE 2 PUFFS BY MOUTH EVERY 6 HOURS AS NEEDED FOR WHEEZING, Disp: 3 Inhaler, Rfl: 0  •  allopurinol (ZYLOPRIM) 100 mg tablet, Take 1 tablet (100 mg total) by mouth daily, Disp: 90 tablet, Rfl: 3  •  apixaban (Eliquis) 5 mg, Take 1 tablet (5 mg total) by mouth 2 (two) times a day, Disp: 60 tablet, Rfl: 6  •  ascorbic acid (VITAMIN C) 500 MG tablet, Take 1 tablet (500 mg total) by mouth 2 (two) times a day, Disp: 60 tablet, Rfl: 1  •  bisacodyl (DULCOLAX) 10 mg suppository, Insert 10 mg into the rectum daily as needed, Disp: , Rfl:   •  cholecalciferol (VITAMIN D3) 400 units tablet, Take 1 tablet (400 Units total) by mouth in the morning, Disp: , Rfl: 0  •  diltiazem (CARDIZEM CD) 120 mg 24 hr capsule, Take 1 capsule (120 mg total) by mouth daily, Disp: 90 capsule, Rfl: 3  •  diphenhydrAMINE (BENADRYL) 25 mg tablet, Take 1 tablet (25 mg total) by mouth every 6 (six) hours as needed for itching, Disp: 30 tablet, Rfl: 0  •  diphenhydrAMINE-zinc acetate (BENADRYL) cream, Apply topically 3 (three) times a day as needed for itching, Disp: 28 4 g, Rfl: 0  •  DULoxetine (CYMBALTA) 30 mg delayed release capsule, TAKE 1 CAPSULE(30 MG) BY MOUTH DAILY, Disp: 30 capsule, Rfl: 5  •  ezetimibe (ZETIA) 10 mg tablet, Take 1 tablet (10 mg total) by mouth daily, Disp: 90 tablet, Rfl: 1  •  ferrous sulfate 324 (65 Fe) mg, Take 1 tablet (324 mg total) by mouth 2 (two) times a day before meals, Disp: 60 tablet, Rfl: 1  •  FIBER PO, Take 1 tablet by mouth daily, Disp: , Rfl:   •  folic acid (FOLVITE) 1 mg tablet, TAKE 1 TABLET(1 MG) BY MOUTH DAILY, Disp: 90 tablet, Rfl: 0  •  Melatonin 10 MG TABS, Take by mouth Takes daily at night, Disp: , Rfl:   •  metoprolol tartrate (LOPRESSOR) 50 mg tablet, Take 100 mg in the morning and 50 mg in the evening, Disp: 270 tablet, Rfl: 3  •  Multiple Vitamin (MULTIVITAMIN ADULT PO), Take by mouth in the morning, Disp: , Rfl:   •  omeprazole (PriLOSEC) 20 mg delayed release capsule, Take 20 mg by mouth daily, Disp: , Rfl:   •  ondansetron (ZOFRAN) 4 mg tablet, Take 4 mg by mouth every 8 (eight) hours as needed, Disp: , Rfl:   •  oxybutynin (DITROPAN-XL) 10 MG 24 hr tablet, Take 10 mg by mouth daily at bedtime, Disp: , Rfl:   •  polyethylene glycol (MIRALAX) 17 g packet, Take 17 g by mouth daily as needed, Disp: , Rfl:   •  senna-docusate sodium (SENOKOT S) 8 6-50 mg per tablet, Take 2 tablets by mouth 2 (two) times a day, Disp: , Rfl: 0  •  sucralfate (CARAFATE) 1 g tablet, Take 1 tablet (1 g total) by mouth 4 (four) times a day (before meals and at bedtime), Disp: , Rfl: 0  •  vitamin B-12 (VITAMIN B-12) 1,000 mcg tablet, Take 1 tablet (1,000 mcg total) by mouth daily, Disp: , Rfl: 0  Family History   Problem Relation Age of Onset   • Coronary artery disease Mother    • Heart attack Mother         Prior   • Heart disease Father    • Heart attack Father         Prior   • Anesthesia problems Neg Hx               Coordination of Care: Wound team aware of the treatment plan  Facility nurse will provide wound treatment and monitor the wound for any changes  Patient / Staff education : Patient / Staff was given education on sign of infection and pressure ulcer prevention  Patient/ Staff verbalized understanding     Follow up :  Next week    Voice-recognition software may have been used in the preparation of this document  Occasional wrong word or "sound-alike" substitutions may have occurred due to the inherent limitations of voice recognition software  Interpretation should be guided by context        Tammy Luevano

## 2022-12-01 ENCOUNTER — NURSING HOME VISIT (OUTPATIENT)
Dept: GERIATRICS | Facility: OTHER | Age: 77
End: 2022-12-01

## 2022-12-01 VITALS
WEIGHT: 215.1 LBS | RESPIRATION RATE: 18 BRPM | SYSTOLIC BLOOD PRESSURE: 116 MMHG | DIASTOLIC BLOOD PRESSURE: 62 MMHG | HEART RATE: 97 BPM | TEMPERATURE: 96.8 F | BODY MASS INDEX: 36.92 KG/M2 | OXYGEN SATURATION: 95 %

## 2022-12-01 DIAGNOSIS — R21 RASH: ICD-10-CM

## 2022-12-01 DIAGNOSIS — Z16.29 BACTEREMIA DUE TO METHICILLIN RESISTANT STAPHYLOCOCCUS EPIDERMIDIS: ICD-10-CM

## 2022-12-01 DIAGNOSIS — N17.9 AKI (ACUTE KIDNEY INJURY) (HCC): ICD-10-CM

## 2022-12-01 DIAGNOSIS — K62.89 PROCTITIS: ICD-10-CM

## 2022-12-01 DIAGNOSIS — R78.81 BACTEREMIA DUE TO METHICILLIN RESISTANT STAPHYLOCOCCUS EPIDERMIDIS: ICD-10-CM

## 2022-12-01 DIAGNOSIS — I10 ESSENTIAL HYPERTENSION: ICD-10-CM

## 2022-12-01 DIAGNOSIS — I50.32 CHRONIC HEART FAILURE WITH PRESERVED EJECTION FRACTION (HCC): ICD-10-CM

## 2022-12-01 DIAGNOSIS — B95.7 BACTEREMIA DUE TO METHICILLIN RESISTANT STAPHYLOCOCCUS EPIDERMIDIS: ICD-10-CM

## 2022-12-01 DIAGNOSIS — I48.91 ATRIAL FIBRILLATION, UNSPECIFIED TYPE (HCC): ICD-10-CM

## 2022-12-01 DIAGNOSIS — R26.2 AMBULATORY DYSFUNCTION: Primary | ICD-10-CM

## 2022-12-01 DIAGNOSIS — R82.71 ASYMPTOMATIC BACTERIURIA: ICD-10-CM

## 2022-12-01 NOTE — ASSESSMENT & PLAN NOTE
Found incidentally on CT during hospitalization  Mild mid-upper abdominal cramping unchanged since hospitalization per pt     F/u with GI on 12/8

## 2022-12-01 NOTE — ASSESSMENT & PLAN NOTE
Likely in the setting of poor PO intake, nausea, vomiting  Improved s/p IVF during admission  Appetite fair/good- continue to encourge fluids  Repeat BMP due 12/2

## 2022-12-01 NOTE — ASSESSMENT & PLAN NOTE
S/p left distal femoral placement and hardware removal   Completed 6 week course of IV vancomycin with Staphylococcus epidermidis bacteremia without clear source of infection    Echo negative for vegetation  Return to ID if symptoms worse or fail to improve, last seen by ID on 11/3  No current s/s infection

## 2022-12-01 NOTE — ASSESSMENT & PLAN NOTE
Multifactorial - comorbidity, infection, deconditioning  Continue PT/OT at [facility]  Chair to bed transfers with moderate assistance  Continue fall precautions

## 2022-12-01 NOTE — ASSESSMENT & PLAN NOTE
Stable  HR avg 70s   Likely in setting of volume depletion, electrolyte disturbances and missed doses of medications  Conitnue Eliquis, Metoprolol, and Cardizem  BMP due 12/2

## 2022-12-01 NOTE — ASSESSMENT & PLAN NOTE
Wt Readings from Last 3 Encounters:   12/01/22 97 6 kg (215 lb 1 6 oz)   11/25/22 102 kg (224 lb 13 9 oz)   11/03/22 47 6 kg (105 lb)       Weight stable although weights are being checked in afternoon, discussed with staff to check in AM  Clinically apears euvolemic  Continue Metoprolol and low NA cardiac diet  Lasix held during hospitalization due to MICHAEL  BMP pending for 12/2, based on results, can restart daily Lasix on 12/2  Blood pressures and HR stable

## 2022-12-01 NOTE — ASSESSMENT & PLAN NOTE
UA + with imagining suggestive of mild cystitis during admission  Monitored off abx  Remains asymptomatic

## 2022-12-01 NOTE — PROGRESS NOTES
[facility]  [address]  [phone]  [place]  Code 31 (STR)          NAME: Alcon Roldan  AGE: 76 y o  SEX: female CODE STATUS: CPR    DATE OF ENCOUNTER: 12/1/2022    Assessment and Plan     1  Ambulatory dysfunction  Assessment & Plan:  Multifactorial - comorbidity, infection, deconditioning  Continue PT/OT at [facility]  Chair to bed transfers with moderate assistance  Continue fall precautions      2  Proctitis  Assessment & Plan:  Found incidentally on CT during hospitalization  Mild mid-upper abdominal cramping unchanged since hospitalization per pt  F/u with GI on 12/8      3  Atrial fibrillation, unspecified type [facility])  Assessment & Plan:  Stable  HR avg 70s   Likely in setting of volume depletion, electrolyte disturbances and missed doses of medications  Conitnue Eliquis, Metoprolol, and Cardizem  BMP due 12/2       4  Chronic heart failure with preserved ejection fraction [facility])  Assessment & Plan:  Wt Readings from Last 3 Encounters:   12/01/22 97 6 kg (215 lb 1 6 oz)   11/25/22 102 kg (224 lb 13 9 oz)   11/03/22 47 6 kg (105 lb)       Weight stable although weights are being checked in afternoon, discussed with staff to check in AM  Clinically apears euvolemic  Continue Metoprolol and low NA cardiac diet  Lasix held during hospitalization due to MICHAEL  BMP pending for 12/2, based on results, can restart daily Lasix on 12/2  Blood pressures and HR stable        5  Rash  Assessment & Plan:  Started s/p IVF on 11/25 - pruritic red  Rash appears resolved       6  MICHAEL (acute kidney injury) [facility])  Assessment & Plan:  Likely in the setting of poor PO intake, nausea, vomiting  Improved s/p IVF during admission  Appetite fair/good- continue to encourge fluids  Repeat BMP due 12/2      7  Asymptomatic bacteriuria  Assessment & Plan:  UA + with imagining suggestive of mild cystitis during admission  Monitored off abx  Remains asymptomatic       8   Bacteremia due to methicillin resistant Staphylococcus epidermidis  Assessment & Plan:   S/p left distal femoral placement and hardware removal   Completed 6 week course of IV vancomycin with Staphylococcus epidermidis bacteremia without clear source of infection  Echo negative for vegetation  Return to ID if symptoms worse or fail to improve, last seen by ID on 11/3  No current s/s infection      9  Essential hypertension  Assessment & Plan:  Stable, Bps reviewed  -143, DBP 58-72  Continue Metoprolol, diltiazem, and Furosemide         All medications and routine orders were reviewed and updated as needed  Chief Complaint     STR follow up visit      Past Medical and Surgica History      Past Medical History:   Diagnosis Date   • Abnormal ECG     Last Assessed 9/29/2016   • Anxiety     Last Assessed 6/08/2016   • Arthritis     knees   • Asthma     Last Assessed 11/06/2013   • Atrial premature complex    • Cataract, bilateral     Last Assessed 7/14/2016   • Cataract, left eye     Both eyes  Had surgery on left  • COVID-19    • Difficulty swallowing    • Diverticulosis 9/25/2022   • Dizziness    • Fibromyalgia    • Fibromyalgia, primary    • Gastric reflux    • Heart failure ([facility] [address] )     5/23/22 Pt reports had heart failure in the past   • History of COVID-19     5/23/22 Pt reports having COVID in 2021     • History of transfusion     no reaction   • Elem (hard of hearing)    • Hyperlipidemia    • Hypertension    • Incontinence in female     5/23/22 Pt reports has incontinence -wears depends especially at night/riding in car   • Irregular heart beat     5/23/22 Pt reports hx of A fib   • Lyme disease    • PONV (postoperative nausea and vomiting)    • Premature ventricular contraction    • Primary osteoarthritis of both knees     Last Assessed 7/14/2016   • Rheumatic fever     5/23/22 Pt reports had hx of rheumatic fever as a child twice   • Sore throat    • Tuberculosis 1947     Past Surgical History:   Procedure Laterality Date   • APPENDECTOMY     • CARDIAC PACEMAKER PLACEMENT  2019   • COLONOSCOPY     • DENTAL SURGERY      extractions   • HYSTERECTOMY      5/23/22 Pt reports had appendix out with hysterectomy and "tightened up my bladder"   • IR ASPIRATION ONLY  9/30/2022   • ORIF FEMUR FRACTURE Left 08/05/2021    Procedure: OPEN REDUCTION W/ INTERNAL FIXATION (ORIF) DISTAL FEMUR; INSERTION RETROGRADE NAIL;  Surgeon: Lady Thakkar MD;  Location: [facility];  Service: Orthopedics   • DE DILATE ESOPHAGUS N/A 04/06/2016    Procedure: DILATATION ESOPHAGEAL;  Surgeon: Krystyna Martin MD;  Location: [facility]; Service: Gastroenterology   • DE EGD TRANSORAL BIOPSY SINGLE/MULTIPLE N/A 04/06/2016    Procedure: ESOPHAGOGASTRODUODENOSCOPY (EGD); Surgeon: Krystyna Martin MD;  Location: [facility]; Service: Gastroenterology   • DE REMOVAL DEEP IMPLANT Left 9/12/2022    Procedure: REMOVAL NAIL IM  FEMUR;  Surgeon: Lady Thakkar MD;  Location: [facility];  Service: Orthopedics   • DE TOTAL KNEE ARTHROPLASTY Left 9/12/2022    Procedure: ARTHROPLASTY KNEE TOTAL;  Surgeon: Lady Thakkar MD;  Location[facility];  Service: Orthopedics   • DE XCAPSL CTRC RMVL INSJ IO LENS PROSTH W/O ECP Left 03/15/2016    Procedure: EXTRACTION EXTRACAPSULAR CATARACT PHACO INTRAOCULAR LENS (IOL);   Surgeon: Jose Lambert MD;  Location: [facility];  Service: Ophthalmology   • REPLACEMENT TOTAL KNEE Right    • REPLACEMENT TOTAL KNEE      right    • TONSILLECTOMY       Allergies   Allergen Reactions   • Penicillins Hives     Itching and terrible hives   • Sulfa Antibiotics Itching   • T99 Folate [Folic Acid-Vit U2-CGK F73 - Food Allergy] Other (See Comments)     5/23/22 Pt reports no allergic reaction known    • Peterson Other (See Comments)     Unknown, 5/23 -pt is unaware of reaction    • Lyrica [Pregabalin] Other (See Comments)     5/23/22 Pt reports doesn't remember reaction    • Other Other (See Comments)     Black rubber 5/23/22 per allergy test - pt unaware of reaction   • Cortisone Acetate [Cortisone] Itching and Rash     5/23/22 pt reports makes her violent    • Doxycycline Hives and Itching   • Nickel Other (See Comments)     Skin discoloration          History of Present Illness     Roseanne Morales is a 75 y/o female admitted to [address] [place] [address] on 11/28 for STR  Past Medical Hx including but not limited to GERD, reactive airway disease, Afib RVR, Tachy smita syndrome s/p PPM, HTN, CHF, lumbar degenerative disc disease, arthritis, MICHAEL on CKD, anxiety, depression, seen in collaboration with nursing for medical mgmt and STR follow up  Hospital Course:   She wasd admitted to [facility] from 11/25 through 11/28 with CC of nausea and vomiting with poor PO intake found to have MICHAEL  Proctitis also noted on CT with recommondation for GI f/u  Received IVF in setting of likely hypovolemic hyponatremia  She also had asymptomatic bacteriuria with monitoring off abx  Recent hx of IV vanco x6 weeks on 11/9 r/t methicillin resistant staph bacteremia  Discharged to [address] [place] [address] for STR, she is typically wheelchair bound  Rehab:   Seen and examined at bedside today  Patient is a reliable historian  Upon assessment, patient sitting in wheelchair, NAD  Reports she is doing "okay"  She does admit that her appetite has been poor due to the food at facility  She also reports that she has mild cramping discomfort of mid-upper abdomen that has been unchanged since hospital   She denies any nausea, vomiting, constipation, or diarrhea, last BM 12/1  Denies any difficulty voiding or dysuria  Per review of SNF records, appetite fair, completing around 75% of meals  Patient is actively participating in therapy requiring mod assistance with bed to chair transfers  No concerns from nursing at this time  The patient's allergies, past medical, surgical, social and family history were reviewed and unchanged      Review of Systems     Review of Systems   Constitutional: Negative for chills, fatigue and fever  HENT: Negative for congestion, ear pain and sore throat  Eyes: Negative for visual disturbance  Respiratory: Positive for cough  Negative for shortness of breath  Intermittent mild dry cough    Cardiovascular: Negative for chest pain and palpitations  Gastrointestinal: Negative for abdominal pain, constipation, diarrhea, nausea and vomiting  Mild mid-upper abdominal discomfort; cramping   Genitourinary: Negative for dysuria  Musculoskeletal: Positive for gait problem  Negative for arthralgias and back pain  Skin: Negative for color change and rash  Neurological: Positive for weakness  Negative for dizziness, light-headedness and headaches  Psychiatric/Behavioral: Negative for sleep disturbance  All other systems reviewed and are negative  Objective     Vitals:   Vitals:    12/01/22 1020   BP: 116/62   Pulse: 97   Resp: 18   Temp: (!) 96 8 °F (36 °C)   SpO2: 95%         Physical Exam  Exam conducted with a chaperone present  Constitutional:       General: She is not in acute distress  Appearance: Normal appearance  She is obese  She is not ill-appearing  HENT:      Head: Normocephalic and atraumatic  Right Ear: External ear normal       Left Ear: External ear normal       Nose: Nose normal  No congestion or rhinorrhea  Mouth/Throat:      Mouth: Mucous membranes are moist       Pharynx: No oropharyngeal exudate or posterior oropharyngeal erythema  Eyes:      General:         Right eye: No discharge  Left eye: No discharge  Extraocular Movements: Extraocular movements intact  Conjunctiva/sclera: Conjunctivae normal    Cardiovascular:      Rate and Rhythm: Normal rate and regular rhythm  Pulses: Normal pulses  Heart sounds: Murmur heard  Pulmonary:      Effort: Pulmonary effort is normal  No respiratory distress  Breath sounds: Normal breath sounds  No wheezing     Abdominal:      General: Bowel sounds are normal  There is no distension  Palpations: Abdomen is soft  Tenderness: There is no abdominal tenderness  There is no guarding  Musculoskeletal:         General: No tenderness  Cervical back: Normal range of motion and neck supple  No tenderness  Right lower leg: Edema present  Left lower leg: Edema present  Comments: Trace B/L LE edema   Skin:     General: Skin is warm and dry  Findings: No bruising, erythema or rash  Neurological:      General: No focal deficit present  Mental Status: She is alert  Mental status is at baseline  Sensory: No sensory deficit  Motor: Weakness present  Gait: Gait abnormal    Psychiatric:         Mood and Affect: Mood normal          Pertinent Laboratory/Diagnostic Studies:   Reviewed in facility chart-stable      Current Medications   Medications reviewed and updated see facility [facility] for details  Plan discussed with Dr Gary Srinivasan noted agreement with assessment and plan  Please note:  Voice-recognition software may have been used in the preparation of this document  Occasional wrong word or "sound-alike" substitutions may have occurred due to the inherent limitations of voice recognition software  Interpretation should be guided by context           JERI Monroy  12/1/2022  4:09 PM

## 2022-12-06 ENCOUNTER — NURSING HOME VISIT (OUTPATIENT)
Dept: GERIATRICS | Facility: OTHER | Age: 77
End: 2022-12-06

## 2022-12-06 DIAGNOSIS — I48.0 PAROXYSMAL ATRIAL FIBRILLATION (HCC): Primary | ICD-10-CM

## 2022-12-06 DIAGNOSIS — M25.562 CHRONIC PAIN OF LEFT KNEE: ICD-10-CM

## 2022-12-06 DIAGNOSIS — R26.2 AMBULATORY DYSFUNCTION: ICD-10-CM

## 2022-12-06 DIAGNOSIS — G89.29 CHRONIC PAIN OF LEFT KNEE: ICD-10-CM

## 2022-12-06 DIAGNOSIS — D64.9 ANEMIA, UNSPECIFIED TYPE: ICD-10-CM

## 2022-12-06 DIAGNOSIS — I50.32 CHRONIC HEART FAILURE WITH PRESERVED EJECTION FRACTION (HCC): ICD-10-CM

## 2022-12-07 PROBLEM — M25.561 ACUTE PAIN OF RIGHT KNEE: Status: ACTIVE | Noted: 2022-12-07

## 2022-12-08 ENCOUNTER — NURSING HOME VISIT (OUTPATIENT)
Dept: GERIATRICS | Facility: OTHER | Age: 77
End: 2022-12-08

## 2022-12-08 DIAGNOSIS — E87.1 HYPONATREMIA: ICD-10-CM

## 2022-12-08 DIAGNOSIS — B95.7 BACTEREMIA DUE TO METHICILLIN RESISTANT STAPHYLOCOCCUS EPIDERMIDIS: ICD-10-CM

## 2022-12-08 DIAGNOSIS — E87.6 HYPOKALEMIA: ICD-10-CM

## 2022-12-08 DIAGNOSIS — R82.71 ASYMPTOMATIC BACTERIURIA: ICD-10-CM

## 2022-12-08 DIAGNOSIS — I48.91 ATRIAL FIBRILLATION, UNSPECIFIED TYPE (HCC): ICD-10-CM

## 2022-12-08 DIAGNOSIS — Z16.29 BACTEREMIA DUE TO METHICILLIN RESISTANT STAPHYLOCOCCUS EPIDERMIDIS: ICD-10-CM

## 2022-12-08 DIAGNOSIS — N17.9 AKI (ACUTE KIDNEY INJURY) (HCC): ICD-10-CM

## 2022-12-08 DIAGNOSIS — I50.32 CHRONIC HEART FAILURE WITH PRESERVED EJECTION FRACTION (HCC): ICD-10-CM

## 2022-12-08 DIAGNOSIS — R78.81 BACTEREMIA DUE TO METHICILLIN RESISTANT STAPHYLOCOCCUS EPIDERMIDIS: ICD-10-CM

## 2022-12-08 DIAGNOSIS — K21.9 CHRONIC GERD: ICD-10-CM

## 2022-12-08 DIAGNOSIS — R26.2 AMBULATORY DYSFUNCTION: Primary | ICD-10-CM

## 2022-12-08 DIAGNOSIS — K62.89 PROCTITIS: ICD-10-CM

## 2022-12-08 NOTE — PROGRESS NOTES
[address], [address] [place] [address]  [phone]    NAME: Anabela Quesada  AGE: 76 y o  SEX: female    Progress Note    Location:   POS: 32 (SNF)    Assessment/Plan:    Paroxysmal atrial fibrillation ([address] )  Managed for RVR in hospital  Stable and controlled  BP range (11/28/22 to 12/6/2022) = 109/59 to 153/67  HR range (11/28/22 to 12/6/2022) = 64 to 97/min  Patient denies racing heart rate, shortness of breath, anxiety, chest pain  Continue the following home meds:  * Apixaban 5mg BID  * Metoprolol tartrate 100mg daily  * Diltiazem 24HR ER 120mg daily    Ambulatory dysfunction  Continue PT/OT as scheduled    Chronic heart failure with preserved ejection fraction (HCC)  Wt Readings from Last 3 Encounters:   12/01/22 97 6 kg (215 lb 1 6 oz)   11/25/22 102 kg (224 lb 13 9 oz)   11/03/22 47 6 kg (105 lb)   2DEcho (Sept, 2022): LVEF 65%  Weight stable  Euvolemic on assessment  Continue CHF protocol and daily weight check  Continue furosemide 40mg daily  Renal functions WNL (12/2/2022)  Chronic pain of left knee  Reported right knee pain on this visit  Hx of arthroplasty: surgical scar present  No swelling/erythema/nontender  Currently on Voltaren 1% gel TID  Continue as needed Tylenol    Anemia  Review of historical labs - low hemoglobin started (Sept, 2022)  Stable Hbg/Hct (12/2/2022): 9 8/30/8 (L) <= 10 6/ 35 1 (L: 11/27/2022)  Continue home med: FeSO4 325mg BID/ Vit C 500mg BID  Continue vitamin N58 and folic acid      Chief complaint / Reason for visit:     History of Present Illness:   This is a 68-year-old female patient currently admitted at [facility] (11/28/202 to present) following [facility] [facility] with Dx of MICHAEL (resolved), Proctitis, Asymptomatic Bacteriuria, Atrial Fibrillation with RVR (resolved), ambulatory dysfunction, Hx of Bacteremia (completed Vanco as out-patient on 1/9/2022; TANISHA (-) follows ID as out-patient), Hyponatremia (resolved)  Patient seen and examined today to follow-up acute on chronic medical conditions as mentioned above and Atrial Fibrillation, CHF, Chronic GERD, 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 and Anemia  Patient still in bed for this visit -alert, cooperative, calm, pleasant and not in distress  Patient is verbal with clear coherent speech -oriented to name/birthday/current date  Patient acknowledged feeling well on this visit but also reports left knee pain -likely chronic  Patient has a history of bilateral knee arthroplasty, " It was the Left first before the Right one"  Patient reported that she is nonambulatory at home  Patient denies any other acute medical concerns during ROS assessment  Per nursing, no acute medical concerns with this visit  Review of Systems:  Per history of present illness, all other systems reviewed and negative  HISTORY:  Medical Hx: Reviewed, unchanged  Family Hx: Reviewed, unchanged  Soc Hx: Reviewed,  unchanged    ALLERGY: Reviewed, unchanged  Allergies   Allergen Reactions   • Penicillins Hives     Itching and terrible hives   • Sulfa Antibiotics Itching   • G24 Folate [Folic Acid-Vit Y9-PKH S64 - Food Allergy] Other (See Comments)     5/23/22 Pt reports no allergic reaction known    • Davidson Other (See Comments)     Unknown, 5/23 -pt is unaware of reaction    • Lyrica [Pregabalin] Other (See Comments)     5/23/22 Pt reports doesn't remember reaction    • Other Other (See Comments)     Black rubber 5/23/22 per allergy test - pt unaware of reaction   • Cortisone Acetate [Cortisone] Itching and Rash     5/23/22 pt reports makes her violent    • Doxycycline Hives and Itching   • Nickel Other (See Comments)     Skin discoloration        PHYSICAL EXAM:  Vital Signs: T97 6F -P80 -R18 BP: 02/64 SpO2: 94% RA  Weight: 216 0 lbs (12/6/2022) <= 215 0 lbs (12/4/2022) <= 215 0 lbs (11/29/2022)    General: NAD  Head: Atraumatic  Normocephalic    Eye Exam: anicteric sclera, no discharge, PERRLA, No injection  Oral Exam: moist mucous membranes, no buccaloropharyngeal erythema, palatine tonsils WNL  Neck Exam: no anterior cervical lymphadenopathy noted, neck supple  Cardiovascular: regular rate, regular rhythm, no murmurs, rubs, or gallops  Pulmonary: no wheeze, no rhonchi, no rales  No chest tenderness  Abdominal: soft, non-tender, nondistended, bowel sounds audible x 4 quadrants  Ave meal completion: %  : Non distended bladder  Incontinent  Daily BM  Extremities and skin: no edema noted, no rashes  Resolving left buttock wound  Neurological: alert, cooperative and responsive, Oriented x 3, moving all 4 extremities symmetrically  Ambulatory dysfunction    Laboratory results / Imaging reviewed: Hard copy/ies in medical chart:    * CBC wo diff (12/2/2022):   HEMOGLOBIN 9 8 g/dL 11 5-14 5 L Final         HEMATOCRIT 30 8 % 35 0-43 0 L Final         WBC 9 2 thou/cmm 4 0-10 0  Final         RBC 3 69 mill/cmm 3 70-4 70 L Final         PLATELET COUNT 998 thou/cmm 140-350 H Final         MPV 7 0 fL 7 5-11 3 L Final         MCV 84 fL   Final         MCH 26 7 pg 26 0-34 0  Final         MCHC 31 9 g/dL 32 0-37 0 L Final         RDW 18 0 % 12 0-16 0 H Final       * BMP (12/2/2022):   GLUCOSE 94 mg/dL 65-99  Final         BUN 11 mg/dL 7-25  Final         CREATININE 0 68 mg/dL 0 40-1 10  Final         SODIUM 140 mmol/L 135-145  Final         POTASSIUM 4 4 mmol/L 3 5-5 2  Final         CHLORIDE 107 mmol/L 100-109  Final         CARBON DIOXIDE 26 mmol/L 23-31  Final         CALCIUM 9 4 mg/dL 8 5-10 1  Final         ANION GAP 7  3-11  Final         eGFRcr 89  >59  Final         eGFRcr COMMENT (Note)    Final       Current Medications: All medications reviewed and updated in Nursing Home Chart    Please note: This note was completed in part utilizing a voice-recognition software may have been used in the preparation of this document   Grammatical errors, random word insertion, spelling mistakes, and incomplete sentences may be an occasional consequence of the system secondary to software limitations, ambient noise and hardware issues  Occasional wrong word or "sound-alike" substitutions may have occurred due to the inherent limitations of voice recognition software  At the time of dictation, efforts were made to edit, clarify and/or correct errors  Interpretation should be guided by context  Please read the chart carefully and recognize, using context, where substitutions have occurred  If you have any questions or concerns about the context, text or information contained within the body of this dictation, please contact myself, the provider, for further clarification        JERI Mendez  12/7/2022

## 2022-12-08 NOTE — ASSESSMENT & PLAN NOTE
Managed for RVR in hospital  Stable and controlled    BP range (11/28/22 to 12/6/2022) = 109/59 to 153/67  HR range (11/28/22 to 12/6/2022) = 64 to 97/min  Patient denies racing heart rate, shortness of breath, anxiety, chest pain  Continue the following home meds:  * Apixaban 5mg BID  * Metoprolol tartrate 100mg daily  * Diltiazem 24HR ER 120mg daily

## 2022-12-08 NOTE — ASSESSMENT & PLAN NOTE
Review of historical labs - low hemoglobin started (Sept, 2022)  Stable Hbg/Hct (12/2/2022): 9 8/30/8 (L) <= 10 6/ 35 1 (L: 11/27/2022)  Continue home med: FeSO4 325mg BID/ Vit C 500mg BID  Continue vitamin N79 and folic acid

## 2022-12-08 NOTE — ASSESSMENT & PLAN NOTE
Continues with decreased strength of LLE  Patient reports she has difficulty moving left leg since surgery  Using transfer board from bed <> wheelchair  Continue PT/OT at STR  Maintain fall precautions

## 2022-12-08 NOTE — PROGRESS NOTES
[facility]  [address]  [phone]  [place]  Code 31 (STR)          NAME: Benito Evans  AGE: 76 y o  SEX: female     DATE OF ENCOUNTER: 12/8/2022    Assessment and Plan     1  Ambulatory dysfunction  Assessment & Plan:  Continues with decreased strength of LLE  Patient reports she has difficulty moving left leg since surgery  Using transfer board from bed <> wheelchair  Continue PT/OT at STR  Maintain fall precautions       2  Proctitis  Assessment & Plan:  Incidental finding during imagining on CT  Symptoms stable  F/U GI      3  Atrial fibrillation, unspecified type [facility])  Assessment & Plan:  Stable  Continue Metoprolol, cardizem, Eliquis      4  Chronic heart failure with preserved ejection fraction (HCC)  Assessment & Plan:  Stable  Weight trend reviewed - stable  Appears euvolemic on today's exam  Continue daily weights - notify provider of weight pain  Continue low sodium diet  Continue furosemide and metoprolol with monitoring of electrolytes and kidney function      5  MICHAEL (acute kidney injury) [facility])  Assessment & Plan:    BMP results reviewed today - unremarkable  Recheck BMP 12/14      6  Bacteremia due to methicillin resistant Staphylococcus epidermidis  Assessment & Plan:  Stable  S/p left distal femoral placement and hardware removal   Staph epidermis bacteremia - unclear source s/p IV vanco x6 weeks  TANISHA negative for vegetation  F/U with ID PRN, last seen on 11/3  No current s/s infection, continue to monitor VS and labs   CBC/BMP on 12/14      7  Asymptomatic bacteriuria  Assessment & Plan:  Stable  Mild cystitis noted during recent admission  Monitored off abx  Remains asymptomatic       8  Chronic GERD  Assessment & Plan:  Reports mild epigastric discomfort  Add PRN Tums  Continue Omeprazole and Carafate  Due for GI f/u 1/17/23      9  Hyponatremia  Assessment & Plan:  Stable  12/8 Sodium 141      10   Hypokalemia  Assessment & Plan:  Stable  Repeat BMP 12/12         All medications and routine orders were reviewed and updated as needed  Chief Complaint     STR follow up visit      Past Medical and Surgica History      Past Medical History:   Diagnosis Date   • Abnormal ECG     Last Assessed 9/29/2016   • Anxiety     Last Assessed 6/08/2016   • Arthritis     knees   • Asthma     Last Assessed 11/06/2013   • Atrial premature complex    • Cataract, bilateral     Last Assessed 7/14/2016   • Cataract, left eye     Both eyes  Had surgery on left  • COVID-19    • Difficulty swallowing    • Diverticulosis 9/25/2022   • Dizziness    • Fibromyalgia    • Fibromyalgia, primary    • Gastric reflux    • Heart failure ([facility] [address] )     5/23/22 Pt reports had heart failure in the past   • History of COVID-19     5/23/22 Pt reports having COVID in 2021     • History of transfusion     no reaction   • [place] (hard of hearing)    • Hyperlipidemia    • Hypertension    • Incontinence in female     5/23/22 Pt reports has incontinence -wears depends especially at night/riding in car   • Irregular heart beat     5/23/22 Pt reports hx of A fib   • Lyme disease    • PONV (postoperative nausea and vomiting)    • Premature ventricular contraction    • Primary osteoarthritis of both knees     Last Assessed 7/14/2016   • Rheumatic fever     5/23/22 Pt reports had hx of rheumatic fever as a child twice   • Sore throat    • Tuberculosis 1947     Past Surgical History:   Procedure Laterality Date   • APPENDECTOMY     • CARDIAC PACEMAKER PLACEMENT  2019   • COLONOSCOPY     • DENTAL SURGERY      extractions   • HYSTERECTOMY      5/23/22 Pt reports had appendix out with hysterectomy and "tightened up my bladder"   • IR ASPIRATION ONLY  9/30/2022   • ORIF FEMUR FRACTURE Left 08/05/2021    Procedure: OPEN REDUCTION W/ INTERNAL FIXATION (ORIF) DISTAL FEMUR; INSERTION RETROGRADE NAIL;  Surgeon: Ruben Remy MD;  [facility];  Service: Orthopedics   • MO DILATE ESOPHAGUS N/A 04/06/2016 Procedure: DILATATION ESOPHAGEAL;  Surgeon: Jesús Fuentes MD;  Location: [facility]; Service: Gastroenterology   • ME EGD TRANSORAL BIOPSY SINGLE/MULTIPLE N/A 04/06/2016    Procedure: ESOPHAGOGASTRODUODENOSCOPY (EGD); Surgeon: Jesús Fuentes MD;  Location: [facility]; Service: Gastroenterology   • ME REMOVAL DEEP IMPLANT Left 9/12/2022    Procedure: REMOVAL NAIL IM  FEMUR;  Surgeon: Brock Hollis MD;  [facility];  Service: Orthopedics   • ME TOTAL KNEE ARTHROPLASTY Left 9/12/2022    Procedure: ARTHROPLASTY KNEE TOTAL;  Surgeon: Brock Hollis MD;  [facility];  Service: Orthopedics   • ME XCAPSL CTRC RMVL INSJ IO LENS PROSTH W/O ECP Left 03/15/2016    Procedure: EXTRACTION EXTRACAPSULAR CATARACT PHACO INTRAOCULAR LENS (IOL); Surgeon: Lv Ray MD;  Location[facility];  Service: Ophthalmology   • REPLACEMENT TOTAL KNEE Right    • REPLACEMENT TOTAL KNEE      right    • TONSILLECTOMY       Allergies   Allergen Reactions   • Penicillins Hives     Itching and terrible hives   • Sulfa Antibiotics Itching   • C29 Folate [Folic Acid-Vit W3-RJP Y09 - Food Allergy] Other (See Comments)     5/23/22 Pt reports no allergic reaction known    • Riparius Other (See Comments)     Unknown, 5/23 -pt is unaware of reaction    • Lyrica [Pregabalin] Other (See Comments)     5/23/22 Pt reports doesn't remember reaction    • Other Other (See Comments)     Black rubber 5/23/22 per allergy test - pt unaware of reaction   • Cortisone Acetate [Cortisone] Itching and Rash     5/23/22 pt reports makes her violent    • Doxycycline Hives and Itching   • Nickel Other (See Comments)     Skin discoloration          History of Present Illness     Patricia Pena is a 77 y/o female admitted to [address] [place] [address] on 11/28 for STR   Past Medical Hx including but not limited to GERD, reactive airway disease, Afib RVR, Tachy smita syndrome s/p PPM, HTN, CHF, lumbar degenerative disc disease, arthritis, MICHAEL on CKD, anxiety, depression, seen in collaboration with nursing for medical mgmt and STR follow up    [facility] Course:     She wasd admitted to [facility] from 11/25 through 11/28 with CC of nausea and vomiting with poor PO intake found to have MICHAEL  Proctitis also noted on CT with recommondation for GI f/u  Received IVF in setting of likely hypovolemic hyponatremia  She also had asymptomatic bacteriuria with monitoring off abx  Recent hx of IV vanco x6 weeks on 11/9 r/t methicillin resistant staph bacteremia  Discharged to [address] [place] [address] for STR, she is typically wheelchair bound  Rehab;      Continues with supportive care at short term rehab with continued PT/OT  Seen laying in bed, NAD, denies any fever, fatigue, or chills  Appetite remains overall good eating 3 meals around 75 to 100%  Kidney function stable on last set of lab work completed at short-term rehab, due for BMP today  Remains on daily weights and low-sodium diet in setting of CHF, she denies any shortness of breath  A  fib remains stable, she denies any palpitations  Last BM on 12/7  Denies any abdominal pain or suprapubic pain  She denies having any questions  No concerns from nursing staff  The patient's allergies, past medical, surgical, social and family history were reviewed and unchanged  Review of Systems     REVIEW OF SYSTEMS:  Per history of present illness, all other systems reviewed and negative  Objective     Vitals: /56, temp 97 5, HR 66, RR 18, O2 97%  Weight 216 lb      Physical Exam  Exam conducted with a chaperone present  Constitutional:       General: She is not in acute distress  Appearance: Normal appearance  She is obese  She is not ill-appearing  HENT:      Head: Normocephalic and atraumatic  Right Ear: External ear normal       Left Ear: External ear normal       Nose: Nose normal  No rhinorrhea  Mouth/Throat:      Mouth: Mucous membranes are moist       Pharynx: No posterior oropharyngeal erythema  Eyes:      General:         Right eye: No discharge  Left eye: No discharge  Extraocular Movements: Extraocular movements intact  Conjunctiva/sclera: Conjunctivae normal    Cardiovascular:      Rate and Rhythm: Normal rate and regular rhythm  Pulses: Normal pulses  Heart sounds: Murmur heard  Pulmonary:      Effort: Pulmonary effort is normal  No respiratory distress  Breath sounds: Normal breath sounds  No wheezing  Abdominal:      General: Bowel sounds are normal  There is no distension  Palpations: Abdomen is soft  Tenderness: There is abdominal tenderness  There is no guarding  Comments: Mild tenderness to palpation of epigastric area    Musculoskeletal:         General: No tenderness  Cervical back: Normal range of motion and neck supple  No tenderness  Right lower leg: No edema  Left lower leg: No edema  Comments: Trace B/L LE edema  Continues with LLE weakness  Skin:     General: Skin is warm and dry  Findings: No bruising, erythema or rash  Neurological:      General: No focal deficit present  Mental Status: She is alert and oriented to person, place, and time  Mental status is at baseline  Sensory: No sensory deficit  Motor: Weakness present  Gait: Gait abnormal    Psychiatric:         Mood and Affect: Mood normal          Behavior: Behavior normal          Pertinent Laboratory/Diagnostic Studies:    12/8 BMP: BUN 14, creatinine 0 72, sodium 141, potassium 3 5, EGFR 85 transfer    Current Medications   Medications reviewed and updated see facility [facility] for details  Please note:  Voice-recognition software may have been used in the preparation of this document  Occasional wrong word or "sound-alike" substitutions may have occurred due to the inherent limitations of voice recognition software  Interpretation should be guided by context           JERI Marinelli  12/8/2022  2:19 PM  Patient: Traci Lorenzana Glinda Oppenheim

## 2022-12-08 NOTE — ASSESSMENT & PLAN NOTE
Wt Readings from Last 3 Encounters:   12/01/22 97 6 kg (215 lb 1 6 oz)   11/25/22 102 kg (224 lb 13 9 oz)   11/03/22 47 6 kg (105 lb)   2DEcho (Sept, 2022): LVEF 65%  Weight stable  Euvolemic on assessment  Continue CHF protocol and daily weight check  Continue furosemide 40mg daily  Renal functions WNL (12/2/2022)

## 2022-12-08 NOTE — ASSESSMENT & PLAN NOTE
Reported right knee pain on this visit  Hx of arthroplasty: surgical scar present  No swelling/erythema/nontender  Currently on Voltaren 1% gel TID  Continue as needed Tylenol [Negative] : Genitourinary

## 2022-12-08 NOTE — ASSESSMENT & PLAN NOTE
Stable  S/p left distal femoral placement and hardware removal   Staph epidermis bacteremia - unclear source s/p IV vanco x6 weeks  TANISHA negative for vegetation  F/U with ID PRN, last seen on 11/3  No current s/s infection, continue to monitor VS and labs   CBC/BMP on 12/14

## 2022-12-08 NOTE — ASSESSMENT & PLAN NOTE
Reports mild epigastric discomfort  Add PRN Tums  Continue Omeprazole and Carafate  Due for GI f/u 1/17/23

## 2022-12-08 NOTE — ASSESSMENT & PLAN NOTE
Stable  Weight trend reviewed - stable  Appears euvolemic on today's exam  Continue daily weights - notify provider of weight pain  Continue low sodium diet  Continue furosemide and metoprolol with monitoring of electrolytes and kidney function

## 2022-12-13 ENCOUNTER — NURSING HOME VISIT (OUTPATIENT)
Dept: GERIATRICS | Facility: OTHER | Age: 77
End: 2022-12-13

## 2022-12-13 DIAGNOSIS — I48.0 PAROXYSMAL ATRIAL FIBRILLATION (HCC): Primary | ICD-10-CM

## 2022-12-13 DIAGNOSIS — R26.2 AMBULATORY DYSFUNCTION: ICD-10-CM

## 2022-12-13 DIAGNOSIS — I50.32 CHRONIC HEART FAILURE WITH PRESERVED EJECTION FRACTION (HCC): ICD-10-CM

## 2022-12-13 DIAGNOSIS — D64.9 ANEMIA, UNSPECIFIED TYPE: ICD-10-CM

## 2022-12-13 DIAGNOSIS — Z87.898 HISTORY OF BACTEREMIA: ICD-10-CM

## 2022-12-13 DIAGNOSIS — M25.562 CHRONIC PAIN OF LEFT KNEE: ICD-10-CM

## 2022-12-13 DIAGNOSIS — G89.29 CHRONIC PAIN OF LEFT KNEE: ICD-10-CM

## 2022-12-13 DIAGNOSIS — N18.31 STAGE 3A CHRONIC KIDNEY DISEASE (HCC): ICD-10-CM

## 2022-12-14 ENCOUNTER — TELEPHONE (OUTPATIENT)
Age: 77
End: 2022-12-14

## 2022-12-14 NOTE — TELEPHONE ENCOUNTER
Johnathon Castro of [facility] [employer] ([phone]) called to say a second level appeal decision was determined to be "unfavorable and denied"

## 2022-12-15 PROBLEM — Z87.898 HISTORY OF BACTEREMIA: Status: ACTIVE | Noted: 2022-12-15

## 2022-12-15 RX ORDER — FUROSEMIDE 40 MG/1
40 TABLET ORAL DAILY
COMMUNITY

## 2022-12-15 NOTE — ASSESSMENT & PLAN NOTE
Managed for RVR in hospital  Stable and controlled    BP range (11/28/22 to 12/13/2022) = 102/64 to 153/97  HR range (11/28/22 to 12/13/2022) = 60 to 97/min  Continue the following home meds:  * Apixaban 5mg BID  * Metoprolol tartrate BID [100mg in AM/ 50mg at HS]  * Diltiazem 24HR ER 120mg daily

## 2022-12-15 NOTE — ASSESSMENT & PLAN NOTE
Hx of B/L Knee arthroplasty: surgical scar present  No swelling/erythema/nontender  Currently on Voltaren 1% gel TID  Continue PRN Tylenol

## 2022-12-15 NOTE — ASSESSMENT & PLAN NOTE
Wt Readings from Last 3 Encounters:   12/01/22 97 6 kg (215 lb 1 6 oz)   11/25/22 102 kg (224 lb 13 9 oz)   11/03/22 47 6 kg (105 lb)   2D Echo (Sept, 2022): LVEF 65%  Weight stable with small increments of weight gain  Diuretic recently resumed on 12/2/2022 (MICHAEL)  Euvolemic on assessment  Continue CHF protocol and daily weight check  Continue Furosemide 40mg daily  Renal functions WNL (12/8/2022)

## 2022-12-15 NOTE — PROGRESS NOTES
[facility]  [address]  [phone]  [facility]: [facility]  POS: 31: SNF/Short Term Rehab      NAME: Viktor Zaidi  AGE: 76 y o  SEX: female  DATE OF ADMISSION: NOVEMBER 28,2022  DATE OF DISCHARGE: December 14, 2022  DISCHARGE DISPOSITION: TRANSITION TO OO-LTCF    Reason for admission: Patient was admitted from [facility] for rehabilitation after hospitalization for ambulatory dysfunction and deconditioning  This is a 49-year-old female patient currently admitted at [facility] (11/28/202 to present) following discharge from acute care hospitalization ([facility]: 11/25-28/2022) with Dx of MICHAEL (resolved), Proctitis, Asymptomatic Bacteriuria, Atrial Fibrillation with RVR (resolved), ambulatory dysfunction, Hx of Bacteremia (completed Vanco as out-patient on 11/9/2022; TANISHA (-) follows ID as out-patient), Hyponatremia (resolved)  Admission Diagnoses: Ambulatory Dysfunction with deconditioning  Discharge Diagnoses:   * Ambulatory Dysfunction with deconditioning  * Atrial Fibrillation  * Chronic HFpEF  * Chronic pain to Left knee  * Anemia  * Hx of Bacteremia (unclear source) - completed Vanco on 11/9/2022  * CKD3A    Course of stay: Patient is currently admitted at [facility] for rehabilitation services due to ambulatory dysfunction and deconditioning  Patient received 24/7 SNF supportive care, PT/OT/ST services  To date, patient stay in [facility] has been ujneventful  Per , patient is scheduled to transition to  OO-LTCF on 12/14/2022       Therapy Progress Note: Per PT/ OT progress note, patient is:    PT Progress note:  *    OT Progress note:  *      Labs and testing performed during stay: Most recent lab results as below:     * BMP (12/8/2022):   GLUCOSE 94 mg/dL 65-99  Final         BUN 14 mg/dL 7-25  Final         CREATININE 0 72 mg/dL 0 40-1 10  Final         SODIUM 141 mmol/L 135-145  Final         POTASSIUM 3 5 mmol/L 3 5-5 2  Final  CHLORIDE 107 mmol/L 100-109  Final         CARBON DIOXIDE 29 mmol/L 23-31  Final         CALCIUM 8 8 mg/dL 8 5-10 1  Final         ANION GAP 5  3-11  Final         eGFRcr 85  >59  Final         eGFRcr COMMENT (Note)    Final         * CBC wo diff (12/2/2022):   HEMOGLOBIN 9 8 g/dL 11 5-14 5 L Final         HEMATOCRIT 30 8 % 35 0-43 0 L Final         WBC 9 2 thou/cmm 4 0-10 0  Final         RBC 3 69 mill/cmm 3 70-4 70 L Final         PLATELET COUNT 261 thou/cmm 140-350 H Final         MPV 7 0 fL 7 5-11 3 L Final         MCV 84 fL   Final         MCH 26 7 pg 26 0-34 0  Final         MCHC 31 9 g/dL 32 0-37 0 L Final         RDW 18 0 % 12 0-16 0 H Final       HISTORY:  Medical Hx: Reviewed, unchanged  Family Hx: Reviewed, unchanged  Soc Hx: Reviewed,  unchanged    ALLERGY: Reviewed, unchanged  Allergies   Allergen Reactions   • Penicillins Hives     Itching and terrible hives   • Sulfa Antibiotics Itching   • A23 Folate [Folic Acid-Vit M5-KGD X60 - Food Allergy] Other (See Comments)     5/23/22 Pt reports no allergic reaction known    • [place] Other (See Comments)     Unknown, 5/23 -pt is unaware of reaction    • Lyrica [Pregabalin] Other (See Comments)     5/23/22 Pt reports doesn't remember reaction    • Other Other (See Comments)     Black rubber 5/23/22 per allergy test - pt unaware of reaction   • Cortisone Acetate [Cortisone] Itching and Rash     5/23/22 pt reports makes her violent    • Doxycycline Hives and Itching   • Nickel Other (See Comments)     Skin discoloration       Discharge Medications: See discharge medication list which was reviewed and signed  Status at time of discharge: Stable    Today's Visit: December 13, 2022    Subjective: " I'm alright"  Vitals:T97 7F -P85 -R18 BP: 123/73 SpO2: 96% RA  Weight: 220 4 lbs (12/13/2022) <= 218 2 lbs (12/11/2022) <= 216 0 lbs (12/7/2022)    Review of Systems   Constitutional: Negative  HENT: Negative  Eyes: Negative      Respiratory: Negative  Cardiovascular: Negative  Gastrointestinal: Negative  Endocrine: Negative  Genitourinary: Negative  Musculoskeletal: Positive for joint swelling  Negative for arthralgias, back pain, myalgias, neck pain and neck stiffness  Chronic persistent LLE knee discomfort  Skin: Negative  Allergic/Immunologic: Negative  Neurological: Negative  Hematological: Negative  Psychiatric/Behavioral: Negative  All other systems reviewed and are negative  Exam: Physical Exam  Vitals and nursing note reviewed  Constitutional:       General: She is not in acute distress  Appearance: Normal appearance  She is obese  She is not ill-appearing, toxic-appearing or diaphoretic  HENT:      Head: Normocephalic and atraumatic  Right Ear: Tympanic membrane, ear canal and external ear normal  There is no impacted cerumen  Left Ear: Tympanic membrane, ear canal and external ear normal  There is no impacted cerumen  Nose: Nose normal  No congestion or rhinorrhea  Mouth/Throat:      Mouth: Mucous membranes are moist       Pharynx: Oropharynx is clear  No oropharyngeal exudate or posterior oropharyngeal erythema  Comments: Poor dentition -multiple missing teeth  Eyes:      General: No scleral icterus  Right eye: No discharge  Left eye: No discharge  Extraocular Movements: Extraocular movements intact  Conjunctiva/sclera: Conjunctivae normal       Pupils: Pupils are equal, round, and reactive to light  Neck:      Vascular: No carotid bruit  Cardiovascular:      Rate and Rhythm: Normal rate  Rhythm irregular  Heart sounds: Murmur heard  No friction rub  No gallop  Pulmonary:      Effort: Pulmonary effort is normal  No respiratory distress  Breath sounds: Normal breath sounds  No stridor  No wheezing, rhonchi or rales  Chest:      Chest wall: No tenderness     Abdominal:      General: Bowel sounds are normal  There is no distension  Palpations: Abdomen is soft  There is no mass  Tenderness: There is no abdominal tenderness  There is no right CVA tenderness, left CVA tenderness, guarding or rebound  Hernia: No hernia is present  Comments: Average meal completion: %   Genitourinary:     Comments: Nondistended bladder  Incontinent  Daily BM  Musculoskeletal:         General: No swelling, tenderness, deformity or signs of injury  Cervical back: Normal range of motion and neck supple  No rigidity or tenderness  No muscular tenderness  Right lower leg: Edema present  Left lower leg: Edema present  Comments: Ambulatory dysfunction  Trace B/L LE swelling -no dermatitis or signs of cellulitis  Left Knee enlarged joint - likely chronic - non tender/ non erythema  Lymphadenopathy:      Cervical: No cervical adenopathy  Skin:     General: Skin is warm  Capillary Refill: Capillary refill takes less than 2 seconds  Coloration: Skin is not jaundiced or pale  Findings: No bruising, erythema, lesion or rash  Comments: Right buttock wound - resolving  Neurological:      General: No focal deficit present  Mental Status: She is alert and oriented to person, place, and time  Mental status is at baseline  Gait: Gait abnormal       Comments: Verbally engaged with clear coherent speech -oriented to name/birthday/current date and place  Psychiatric:         Mood and Affect: Mood normal          Behavior: Behavior normal          Thought Content: Thought content normal       Comments: Alert, cooperative, calm, very pleasant and not in distress  Discussion with patient/family and further instructions:  -Fall precautions  -Aspiration precautions  -Bleeding precautions  -Monitor for signs/symptoms of infection  -Medication list was reviewed and signed  -DME form not needed at this time  Follow-up Recommendations: None    Patient will transition to OO-LTCF and will be followed by facility providers/therapist       Problem List Follow-up Recommendations:    Paroxysmal atrial fibrillation ([address] )  Managed for RVR in hospital  Stable and controlled  BP range (11/28/22 to 12/13/2022) = 102/64 to 153/97  HR range (11/28/22 to 12/13/2022) = 60 to 97/min  Continue the following home meds:  * Apixaban 5mg BID  * Metoprolol tartrate BID [100mg in AM/ 50mg at HS]  * Diltiazem 24HR ER 120mg daily    Ambulatory dysfunction  Continue fall precaution  Please see PT/OT discharge progress notes above  Patient will transition to OO-LTCF effective 12/14/2022    Chronic heart failure with preserved ejection fraction (HCC)  Wt Readings from Last 3 Encounters:   12/01/22 97 6 kg (215 lb 1 6 oz)   11/25/22 102 kg (224 lb 13 9 oz)   11/03/22 47 6 kg (105 lb)   2D Echo (Sept, 2022): LVEF 65%  Weight stable with small increments of weight gain  Diuretic recently resumed on 12/2/2022 (MICHAEL)  Euvolemic on assessment  Continue CHF protocol and daily weight check  Continue Furosemide 40mg daily  Renal functions WNL (12/8/2022)  Chronic pain of left knee  Hx of B/L Knee arthroplasty: surgical scar present  No swelling/erythema/nontender  Currently on Voltaren 1% gel TID  Continue PRN Tylenol    Anemia  Review of historical labs - low hemoglobin started (Sept, 2022)  Stable Hbg/Hct (12/2/2022): 9 8/30/8 (L) <= 10 6/ 35 1 (L: 11/27/2022)  Continue home med: FeSO4 325mg BID/ Vit C 500mg BID  Continue vitamin O02 and folic acid  Improving appetite per meal completion per nursing  documentation  CBC / BMP on 12/14/2022  History of bacteremia  Per hospital note - unclear etiology    Completed IV Vancomycin on 11/9/2022  WBC WNL (12/2/2022)  CBC / BMP on 12/14/2022    Stage 3a chronic kidney disease [facility])  Lab Results   Component Value Date    EGFR 61 11/27/2022    EGFR 25 11/25/2022    EGFR 69 10/02/2022    CREATININE 0 90 11/27/2022    CREATININE 1 85 (H) 11/25/2022    CREATININE 0 81 10/02/2022   Recent renal functions WNL (12/8/2022): Crea: 0 72 / BUN: 14/ eGFR: 85  Nursing to continue to monitor hydration/nutritional intake  Continue to avoid use of nephrotoxic medications  On daily diuretics (CHF)  BMP on 12/14/2022      CURRENT MEDICATION LIST IN OO-STR:    Current Outpatient Medications:   •  acetaminophen (TYLENOL) 325 mg tablet, Take 975 mg by mouth every 6 (six) hours as needed, Disp: , Rfl:   •  albuterol (PROVENTIL HFA,VENTOLIN HFA) 90 mcg/act inhaler, INHALE 2 PUFFS BY MOUTH EVERY 6 HOURS AS NEEDED FOR WHEEZING, Disp: 3 Inhaler, Rfl: 0  •  allopurinol (ZYLOPRIM) 100 mg tablet, Take 1 tablet (100 mg total) by mouth daily, Disp: 90 tablet, Rfl: 3  •  apixaban (Eliquis) 5 mg, Take 1 tablet (5 mg total) by mouth 2 (two) times a day, Disp: 60 tablet, Rfl: 6  •  ascorbic acid (VITAMIN C) 500 MG tablet, Take 1 tablet (500 mg total) by mouth 2 (two) times a day, Disp: 60 tablet, Rfl: 1  •  bisacodyl (DULCOLAX) 10 mg suppository, Insert 10 mg into the rectum daily as needed, Disp: , Rfl:   •  Calcium Polycarbophil (FIBER-LAX PO), Take 1 tablet by mouth daily at bedtime, Disp: , Rfl:   •  Cholecalciferol 25 MCG (1000 UT) tablet, Take 1,000 Units by mouth daily, Disp: , Rfl:   •  Diclofenac Sodium (VOLTAREN) 1 %, Apply 2 g topically 4 (four) times a day = to B/L knee, Disp: , Rfl:   •  diltiazem (CARDIZEM CD) 120 mg 24 hr capsule, Take 1 capsule (120 mg total) by mouth daily, Disp: 90 capsule, Rfl: 3  •  DULoxetine (CYMBALTA) 30 mg delayed release capsule, TAKE 1 CAPSULE(30 MG) BY MOUTH DAILY, Disp: 30 capsule, Rfl: 5  •  ezetimibe (ZETIA) 10 mg tablet, Take 1 tablet (10 mg total) by mouth daily, Disp: 90 tablet, Rfl: 1  •  ferrous sulfate 324 (65 Fe) mg, Take 1 tablet (324 mg total) by mouth 2 (two) times a day before meals, Disp: 60 tablet, Rfl: 1  •  folic acid (FOLVITE) 1 mg tablet, TAKE 1 TABLET(1 MG) BY MOUTH DAILY, Disp: 90 tablet, Rfl: 0  •  furosemide (LASIX) 40 mg tablet, Take 40 mg by mouth in the morning, Disp: , Rfl:   •  Melatonin 10 MG TABS, Take by mouth Takes daily at night, Disp: , Rfl:   •  metoprolol tartrate (LOPRESSOR) 50 mg tablet, Take 100 mg in the morning and 50 mg in the evening, Disp: 270 tablet, Rfl: 3  •  Multiple Vitamin (MULTIVITAMIN ADULT PO), Take by mouth in the morning, Disp: , Rfl:   •  omeprazole (PriLOSEC) 20 mg delayed release capsule, Take 20 mg by mouth daily, Disp: , Rfl:   •  oxybutynin (DITROPAN-XL) 10 MG 24 hr tablet, Take 10 mg by mouth daily at bedtime, Disp: , Rfl:   •  polyethylene glycol (MIRALAX) 17 g packet, Take 17 g by mouth daily as needed, Disp: , Rfl:   •  senna-docusate sodium (SENOKOT S) 8 6-50 mg per tablet, Take 2 tablets by mouth 2 (two) times a day, Disp: , Rfl: 0  •  sucralfate (CARAFATE) 1 g tablet, Take 1 tablet (1 g total) by mouth 4 (four) times a day (before meals and at bedtime), Disp: , Rfl: 0  •  vitamin B-12 (VITAMIN B-12) 1,000 mcg tablet, Take 1 tablet (1,000 mcg total) by mouth daily, Disp: , Rfl: 0      Discharge Statement:  * Spent more than 30 minutes discharging the patient; greater than 50% spent on physical examination of patient, answering questions, discussion of plan of care, recommendations and providing post discharge instructions  Additional time spend on other discharge related activities including documentation  Please note: This note was completed in part utilizing a voice-recognition software may have been used in the preparation of this document  Grammatical errors, random word insertion, spelling mistakes, and incomplete sentences may be an occasional consequence of the system secondary to software limitations, ambient noise and hardware issues  Occasional wrong word or "sound-alike" substitutions may have occurred due to the inherent limitations of voice recognition software  At the time of dictation, efforts were made to edit, clarify and/or correct errors  Interpretation should be guided by context   Please read the chart carefully and recognize, using context, where substitutions have occurred  If you have any questions or concerns about the context, text or information contained within the body of this dictation, please contact myself, the provider, for further clarification        JERI Williamson  12/15/410384:16 AM

## 2022-12-15 NOTE — ASSESSMENT & PLAN NOTE
Continue fall precaution  Please see PT/OT discharge progress notes above  Patient will transition to OO-LTCF effective 12/14/2022

## 2022-12-15 NOTE — ASSESSMENT & PLAN NOTE
Per hospital note - unclear etiology    Completed IV Vancomycin on 11/9/2022  WBC WNL (12/2/2022)  CBC / BMP on 12/14/2022

## 2022-12-15 NOTE — ASSESSMENT & PLAN NOTE
Lab Results   Component Value Date    EGFR 61 11/27/2022    EGFR 25 11/25/2022    EGFR 69 10/02/2022    CREATININE 0 90 11/27/2022    CREATININE 1 85 (H) 11/25/2022    CREATININE 0 81 10/02/2022   Recent renal functions WNL (12/8/2022): Crea: 0 72 / BUN: 14/ eGFR: 85  Nursing to continue to monitor hydration/nutritional intake  Continue to avoid use of nephrotoxic medications  On daily diuretics (CHF)  BMP on 12/14/2022

## 2022-12-15 NOTE — ASSESSMENT & PLAN NOTE
Review of historical labs - low hemoglobin started (Sept, 2022)  Stable Hbg/Hct (12/2/2022): 9 8/30/8 (L) <= 10 6/ 35 1 (L: 11/27/2022)  Continue home med: FeSO4 325mg BID/ Vit C 500mg BID  Continue vitamin F38 and folic acid  Improving appetite per meal completion per nursing  documentation  CBC / BMP on 12/14/2022

## 2022-12-16 ENCOUNTER — PATIENT OUTREACH (OUTPATIENT)
Dept: CASE MANAGEMENT | Facility: HOSPITAL | Age: 77
End: 2022-12-16

## 2022-12-20 ENCOUNTER — OFFICE VISIT (OUTPATIENT)
Dept: OBGYN CLINIC | Facility: CLINIC | Age: 77
End: 2022-12-20

## 2022-12-20 ENCOUNTER — PREP FOR PROCEDURE (OUTPATIENT)
Dept: OBGYN CLINIC | Facility: CLINIC | Age: 77
End: 2022-12-20

## 2022-12-20 ENCOUNTER — HOSPITAL ENCOUNTER (OUTPATIENT)
Dept: RADIOLOGY | Facility: HOSPITAL | Age: 77
Discharge: HOME | End: 2022-12-20
Attending: ORTHOPAEDIC SURGERY

## 2022-12-20 VITALS — BODY MASS INDEX: 36.7 KG/M2 | HEIGHT: 64 IN | WEIGHT: 215 LBS

## 2022-12-20 DIAGNOSIS — Z98.890 STATUS POST SURGERY: Primary | ICD-10-CM

## 2022-12-20 DIAGNOSIS — Z01.818 PREOP TESTING: ICD-10-CM

## 2022-12-20 DIAGNOSIS — Z98.890 STATUS POST SURGERY: ICD-10-CM

## 2022-12-20 DIAGNOSIS — S83.005A PATELLAR DISLOCATION, LEFT, INITIAL ENCOUNTER: ICD-10-CM

## 2022-12-20 RX ORDER — SODIUM CHLORIDE 9 MG/ML
125 INJECTION, SOLUTION INTRAVENOUS CONTINUOUS
OUTPATIENT
Start: 2022-12-20

## 2022-12-20 RX ORDER — ACETAMINOPHEN 325 MG/1
975 TABLET ORAL ONCE
OUTPATIENT
Start: 2022-12-20 | End: 2022-12-20

## 2022-12-20 NOTE — PROGRESS NOTES
Assessment:  1  Status post surgery  XR knee 3 vw left non injury      2  Patellar dislocation, left, initial encounter  Case request operating room: RELEASE RETINACULAR    Comprehensive metabolic panel    Hemoglobin A1C W/EAG Estimation    CBC and differential    Ambulatory referral to [facility]    Case request operating room: RELEASE RETINACULAR      3  Preop testing  Comprehensive metabolic panel    Hemoglobin A1C W/EAG Estimation    CBC and differential    Ambulatory referral to Family Practice          Plan:    • Patient is 11 weeks status post left distal femoral placement and hardware removal 9/12/22 and lateral patella tracking   • Discussed with the patient surgery for left knee patella retinaculum   • She will need clearance from her PCP prior to surgery   • She will be on ASA 81 mg BID for DVT prophylaxis post op when discharged from the hospital   • Repeat x-rays at the next off visit of the left knee   • Follow up PO 2 weeks          The above stated was discussed in layman's terms and the patient expressed understanding  All questions were answered to the patient's satisfaction  Subjective:   Amanuel Reza is a 76 y o  female who presents today 11 weeks status post left distal femoral placement and hardware removal 9/12/22  She current resided at [facility] [place]  She is present in the room today in a wheelchair and with her caretaker  She states she is still having pain but is not like it was before  She feels her left knee is weak and is having trouble walking  She is taking Tylenol for pain relief  Review of systems negative unless otherwise specified in HPI    Past Medical History:   Diagnosis Date   • Abnormal ECG     Last Assessed 9/29/2016   • Anxiety     Last Assessed 6/08/2016   • Arthritis     knees   • Asthma     Last Assessed 11/06/2013   • Atrial premature complex    • Cataract, bilateral     Last Assessed 7/14/2016   • Cataract, left eye     Both eyes   Had surgery on left  • COVID-19    • Difficulty swallowing    • Diverticulosis 9/25/2022   • Dizziness    • Fibromyalgia    • Fibromyalgia, primary    • Gastric reflux    • Heart failure ([address] )     5/23/22 Pt reports had heart failure in the past   • History of COVID-19     5/23/22 Pt reports having COVID in 2021  • History of transfusion     no reaction   • [place] (hard of hearing)    • Hyperlipidemia    • Hypertension    • Incontinence in female     5/23/22 Pt reports has incontinence -wears depends especially at night/riding in car   • Irregular heart beat     5/23/22 Pt reports hx of A fib   • Lyme disease    • PONV (postoperative nausea and vomiting)    • Premature ventricular contraction    • Primary osteoarthritis of both knees     Last Assessed 7/14/2016   • Rheumatic fever     5/23/22 Pt reports had hx of rheumatic fever as a child twice   • Sore throat    • Tuberculosis 1945       Past Surgical History:   Procedure Laterality Date   • APPENDECTOMY     • CARDIAC PACEMAKER PLACEMENT  2019   • COLONOSCOPY     • DENTAL SURGERY      extractions   • HYSTERECTOMY      5/23/22 Pt reports had appendix out with hysterectomy and "tightened up my bladder"   • IR ASPIRATION ONLY  9/30/2022   • ORIF FEMUR FRACTURE Left 08/05/2021    Procedure: OPEN REDUCTION W/ INTERNAL FIXATION (ORIF) DISTAL FEMUR; INSERTION RETROGRADE NAIL;  Surgeon: Nando Quintero MD;  [facility];  Service: Orthopedics   • CA DILATE ESOPHAGUS N/A 04/06/2016    Procedure: DILATATION ESOPHAGEAL;  Surgeon: Alcira Khan MD;  [facility]: [facility]; Service: Gastroenterology   • CA EGD TRANSORAL BIOPSY SINGLE/MULTIPLE N/A 04/06/2016    Procedure: ESOPHAGOGASTRODUODENOSCOPY (EGD); Surgeon: Alcira Khan MD;  Location: [facility];   Service: Gastroenterology   • CA REMOVAL DEEP IMPLANT Left 9/12/2022    Procedure: REMOVAL NAIL IM  FEMUR;  Surgeon: Nando Quintero MD;  [facility];  Service: Orthopedics   • CA TOTAL KNEE ARTHROPLASTY Left 9/12/2022    Procedure: ARTHROPLASTY KNEE TOTAL;  Surgeon: Amber Bobo MD;  [facility];  Service: Orthopedics   • AL XCAPSL CTRC RMVL INSJ IO LENS PROSTH W/O ECP Left 03/15/2016    Procedure: EXTRACTION EXTRACAPSULAR CATARACT PHACO INTRAOCULAR LENS (IOL); Surgeon: Dexter Severance, MD;  Location[facility];  Service: Ophthalmology   • REPLACEMENT TOTAL KNEE Right    • REPLACEMENT TOTAL KNEE      right    • TONSILLECTOMY         Family History   Problem Relation Age of Onset   • Coronary artery disease Mother    • Heart attack Mother         Prior   • Heart disease Father    • Heart attack Father         Prior   • Anesthesia problems Neg Hx        Social History     Occupational History   • Not on file   Tobacco Use   • Smoking status: Never   • Smokeless tobacco: Never   • Tobacco comments:     Denies any former or current smoking   Vaping Use   • Vaping Use: Never used   Substance and Sexual Activity   • Alcohol use: Not Currently     Comment: Per [employer]- pt reports no current alcohol use at this time   • Drug use: No     Comment: Denies any former or current drug use   • Sexual activity: Never     Comment:   for 12 years - not active         Current Outpatient Medications:   •  acetaminophen (TYLENOL) 325 mg tablet, Take 975 mg by mouth every 6 (six) hours as needed, Disp: , Rfl:   •  albuterol (PROVENTIL HFA,VENTOLIN HFA) 90 mcg/act inhaler, INHALE 2 PUFFS BY MOUTH EVERY 6 HOURS AS NEEDED FOR WHEEZING, Disp: 3 Inhaler, Rfl: 0  •  allopurinol (ZYLOPRIM) 100 mg tablet, Take 1 tablet (100 mg total) by mouth daily, Disp: 90 tablet, Rfl: 3  •  apixaban (Eliquis) 5 mg, Take 1 tablet (5 mg total) by mouth 2 (two) times a day, Disp: 60 tablet, Rfl: 6  •  ascorbic acid (VITAMIN C) 500 MG tablet, Take 1 tablet (500 mg total) by mouth 2 (two) times a day, Disp: 60 tablet, Rfl: 1  •  bisacodyl (DULCOLAX) 10 mg suppository, Insert 10 mg into the rectum daily as needed, Disp: , Rfl:   •  Calcium Polycarbophil (FIBER-LAX PO), Take 1 tablet by mouth daily at bedtime, Disp: , Rfl:   •  Cholecalciferol 25 MCG (1000 UT) tablet, Take 1,000 Units by mouth daily, Disp: , Rfl:   •  Diclofenac Sodium (VOLTAREN) 1 %, Apply 2 g topically 4 (four) times a day = to B/L knee, Disp: , Rfl:   •  diltiazem (CARDIZEM CD) 120 mg 24 hr capsule, Take 1 capsule (120 mg total) by mouth daily, Disp: 90 capsule, Rfl: 3  •  DULoxetine (CYMBALTA) 30 mg delayed release capsule, TAKE 1 CAPSULE(30 MG) BY MOUTH DAILY, Disp: 30 capsule, Rfl: 5  •  ezetimibe (ZETIA) 10 mg tablet, Take 1 tablet (10 mg total) by mouth daily, Disp: 90 tablet, Rfl: 1  •  ferrous sulfate 324 (65 Fe) mg, Take 1 tablet (324 mg total) by mouth 2 (two) times a day before meals, Disp: 60 tablet, Rfl: 1  •  folic acid (FOLVITE) 1 mg tablet, TAKE 1 TABLET(1 MG) BY MOUTH DAILY, Disp: 90 tablet, Rfl: 0  •  furosemide (LASIX) 40 mg tablet, Take 40 mg by mouth in the morning, Disp: , Rfl:   •  Melatonin 10 MG TABS, Take by mouth Takes daily at night, Disp: , Rfl:   •  metoprolol tartrate (LOPRESSOR) 50 mg tablet, Take 100 mg in the morning and 50 mg in the evening, Disp: 270 tablet, Rfl: 3  •  Multiple Vitamin (MULTIVITAMIN ADULT PO), Take by mouth in the morning, Disp: , Rfl:   •  omeprazole (PriLOSEC) 20 mg delayed release capsule, Take 20 mg by mouth daily, Disp: , Rfl:   •  oxybutynin (DITROPAN-XL) 10 MG 24 hr tablet, Take 10 mg by mouth daily at bedtime, Disp: , Rfl:   •  polyethylene glycol (MIRALAX) 17 g packet, Take 17 g by mouth daily as needed, Disp: , Rfl:   •  senna-docusate sodium (SENOKOT S) 8 6-50 mg per tablet, Take 2 tablets by mouth 2 (two) times a day, Disp: , Rfl: 0  •  sucralfate (CARAFATE) 1 g tablet, Take 1 tablet (1 g total) by mouth 4 (four) times a day (before meals and at bedtime), Disp: , Rfl: 0  •  vitamin B-12 (VITAMIN B-12) 1,000 mcg tablet, Take 1 tablet (1,000 mcg total) by mouth daily, Disp: , Rfl: 0    Allergies   Allergen Reactions   • Penicillins Hives     Itching and terrible hives   • Sulfa Antibiotics Itching   • Q03 Folate [Folic Acid-Vit N1-TIR C73 - Food Allergy] Other (See Comments)     5/23/22 Pt reports no allergic reaction known    • [place] Other (See Comments)     Unknown, 5/23 -pt is unaware of reaction    • Lyrica [Pregabalin] Other (See Comments)     5/23/22 Pt reports doesn't remember reaction    • Other Other (See Comments)     Black rubber 5/23/22 per allergy test - pt unaware of reaction   • Cortisone Acetate [Cortisone] Itching and Rash     5/23/22 pt reports makes her violent    • Doxycycline Hives and Itching   • Nickel Other (See Comments)     Skin discoloration            Vitals:       Objective:            Physical Exam  Physical Exam:      General Appearance:    Alert, cooperative, no distress, appears stated age   Head:    Normocephalic, without obvious abnormality, atraumatic   Eyes:    conjunctiva/corneas clear, both eyes         Nose:   Nares normal, septum midline, no drainage    Throat:   Lips normal; teeth and gums normal   Neck:    symmetrical, trachea midline, ;     thyroid:  no enlargement/   Back:     Symmetric, no curvature, ROM normal   Lungs:   No audible wheezing or labored breathing   Chest Wall:    No tenderness or deformity    Heart:    Regular rate and rhythm                         Pulses:   2+ and symmetric all extremities   Skin:   Skin color, texture, turgor normal, no rashes or lesions   Neurologic:   normal strength, sensation and reflexes     throughout                       Ortho Exam  Left LE  Surgical incision well healed  No erythema  TTP over lateral joint line   Mild TTP medially   Neurovascularly Intact Distally     Diagnostics, reviewed and taken today if performed as documented:       The attending physician has personally reviewed the pertinent films in PACS and interpretation is as follows demonstrates aligned left distal femoral replacement no evidence of loosening slight lateral the patella      Procedures, if performed today:    Procedures    None performed      Scribe Attestation    I,:  Courtneyadin Robertson am acting as a scribe while in the presence of the attending physician :       I,:  Tanisha Lilly MD personally performed the services described in this documentation    as scribed in my presence :             Portions of the record may have been created with voice recognition software  Occasional wrong word or "sound a like" substitutions may have occurred due to the inherent limitations of voice recognition software  Read the chart carefully and recognize, using context, where substitutions have occurred

## 2022-12-20 NOTE — PATIENT INSTRUCTIONS
S/P left femur removal of hardware  Patient is to be scheduled for left knee lateral patella retinaculum release   Weight bearing as tolerated with use a walker   Follow up PO 2 weeks

## 2022-12-26 ENCOUNTER — TELEPHONE (OUTPATIENT)
Dept: OTHER | Facility: OTHER | Age: 77
End: 2022-12-26

## 2022-12-27 ENCOUNTER — TELEPHONE (OUTPATIENT)
Dept: OTHER | Facility: OTHER | Age: 77
End: 2022-12-27

## 2022-12-28 ENCOUNTER — OFFICE VISIT (OUTPATIENT)
Dept: GASTROENTEROLOGY | Facility: AMBULARY SURGERY CENTER | Age: 77
End: 2022-12-28

## 2022-12-28 VITALS
HEIGHT: 64 IN | SYSTOLIC BLOOD PRESSURE: 140 MMHG | WEIGHT: 219.8 LBS | DIASTOLIC BLOOD PRESSURE: 78 MMHG | OXYGEN SATURATION: 97 % | BODY MASS INDEX: 37.52 KG/M2 | HEART RATE: 64 BPM

## 2022-12-28 DIAGNOSIS — D50.8 OTHER IRON DEFICIENCY ANEMIA: ICD-10-CM

## 2022-12-28 DIAGNOSIS — R93.5 ABNORMAL CT OF THE ABDOMEN: ICD-10-CM

## 2022-12-28 DIAGNOSIS — K21.9 GASTROESOPHAGEAL REFLUX DISEASE WITHOUT ESOPHAGITIS: Primary | ICD-10-CM

## 2022-12-28 NOTE — PROGRESS NOTES
Consultation - [facility]  Chen Weinstein 76 y o  female MRN: [id number]          Assessment & Plan:    75-year-old female, here for evaluation for abnormal CT scan  1   Abnormal CT scan: With rectal thickening on a noncontrast CAT scan, at that time she presented with vague abdominal symptoms and poor intake  Her GI symptoms have resolved  -Discussed with the patient and her niece who presents today that the abnormal CT scan findings could be secondary to a noncontrast CAT scan, could be due to constipation or stercoral ulceration, but could also be indicative more significant pathology such as malignancy  -They were reluctant to proceed with colonoscopy or flexible sigmoidoscopy at this time  -We agreed upon a CT scan with contrast, if this shows continued thickening would recommend recommended scopic visualization, sigmoidoscopy may be more amenable to the patient and her niece  -If the CT scan is normal and the patient is asymptomatic we can reevaluate them, discussed with both of them that a normal CT scan does not exclude luminal pathology    2  GERD:  -Continue PPI therapy    3  Anemia: Secondary to knee replacement and blood loss, continue iron supplementation  -Patient had a recent CBC as an outpatient, labs are not available to us at this time    Shira Alvarado was seen today for follow-up  Diagnoses and all orders for this visit:    Gastroesophageal reflux disease without esophagitis    Other iron deficiency anemia    Abnormal CT of the abdomen  -     CT abdomen pelvis w contrast; Future            _____________________________________________________________        CC: Abnormal CT scan    HPI:  Chen Weinstein is a 76 y  o female who was referred for evaluation of abnormal CAT scan  75-year-old female, recently mid to the hospital postoperative knee replacement with complications    Had some mild abdominal discomfort in the nursing home, poor appetite for about 5 days, presented to the ER reportedly was treated for urinary tract infection was found to have rectal thickening on CT scan without contrast due to acute kidney injury  Gradually her symptoms had improved, she is been eating better, denied any episodes of nausea, vomiting, diarrhea, rectal bleeding, change in bowel movements  Patient has not had a colonoscopy many years  She had significant anemia after her knee replacement, this is gradually improved with iron supplementation more recently  Patient is on PPI therapy, additionally she is on MiraLAX reports having fairly regular bowel movements and no significant GI complaint at this time  Surgical history is notable for left total knee replacement for    Denies any tobacco or alcohol, retired   [place] history negative GI associated malignancies  He is also had hysterectomy  ROS:  The remainder of the ROS was negative except for the pertinent positives mentioned in HPI           Allergies: Penicillins, Sulfa antibiotics, K80 folate [folic acid-vit I8-NXY O86 - food allergy], Cobalt, Lyrica [pregabalin], Other, Cortisone acetate [cortisone], Doxycycline, and Nickel    Medications:   Current Outpatient Medications:   •  acetaminophen (TYLENOL) 325 mg tablet, Take 975 mg by mouth every 6 (six) hours as needed, Disp: , Rfl:   •  albuterol (PROVENTIL HFA,VENTOLIN HFA) 90 mcg/act inhaler, INHALE 2 PUFFS BY MOUTH EVERY 6 HOURS AS NEEDED FOR WHEEZING, Disp: 3 Inhaler, Rfl: 0  •  allopurinol (ZYLOPRIM) 100 mg tablet, Take 1 tablet (100 mg total) by mouth daily, Disp: 90 tablet, Rfl: 3  •  apixaban (Eliquis) 5 mg, Take 1 tablet (5 mg total) by mouth 2 (two) times a day, Disp: 60 tablet, Rfl: 6  •  ascorbic acid (VITAMIN C) 500 MG tablet, Take 1 tablet (500 mg total) by mouth 2 (two) times a day, Disp: 60 tablet, Rfl: 1  •  bisacodyl (DULCOLAX) 10 mg suppository, Insert 10 mg into the rectum daily as needed, Disp: , Rfl:   •  Calcium Polycarbophil (FIBER-LAX PO), Take 1 tablet by mouth daily at bedtime, Disp: , Rfl:   •  Cholecalciferol 25 MCG (1000 UT) tablet, Take 1,000 Units by mouth daily, Disp: , Rfl:   •  Diclofenac Sodium (VOLTAREN) 1 %, Apply 2 g topically 4 (four) times a day = to B/L knee, Disp: , Rfl:   •  diltiazem (CARDIZEM CD) 120 mg 24 hr capsule, Take 1 capsule (120 mg total) by mouth daily, Disp: 90 capsule, Rfl: 3  •  DULoxetine (CYMBALTA) 30 mg delayed release capsule, TAKE 1 CAPSULE(30 MG) BY MOUTH DAILY, Disp: 30 capsule, Rfl: 5  •  ezetimibe (ZETIA) 10 mg tablet, Take 1 tablet (10 mg total) by mouth daily, Disp: 90 tablet, Rfl: 1  •  ferrous sulfate 324 (65 Fe) mg, Take 1 tablet (324 mg total) by mouth 2 (two) times a day before meals, Disp: 60 tablet, Rfl: 1  •  folic acid (FOLVITE) 1 mg tablet, TAKE 1 TABLET(1 MG) BY MOUTH DAILY, Disp: 90 tablet, Rfl: 0  •  furosemide (LASIX) 40 mg tablet, Take 40 mg by mouth in the morning, Disp: , Rfl:   •  Melatonin 10 MG TABS, Take by mouth Takes daily at night, Disp: , Rfl:   •  metoprolol tartrate (LOPRESSOR) 50 mg tablet, Take 100 mg in the morning and 50 mg in the evening, Disp: 270 tablet, Rfl: 3  •  Multiple Vitamin (MULTIVITAMIN ADULT PO), Take by mouth in the morning, Disp: , Rfl:   •  omeprazole (PriLOSEC) 20 mg delayed release capsule, Take 20 mg by mouth daily, Disp: , Rfl:   •  oxybutynin (DITROPAN-XL) 10 MG 24 hr tablet, Take 10 mg by mouth daily at bedtime, Disp: , Rfl:   •  senna-docusate sodium (SENOKOT S) 8 6-50 mg per tablet, Take 2 tablets by mouth 2 (two) times a day, Disp: , Rfl: 0  •  sucralfate (CARAFATE) 1 g tablet, Take 1 tablet (1 g total) by mouth 4 (four) times a day (before meals and at bedtime), Disp: , Rfl: 0  •  vitamin B-12 (VITAMIN B-12) 1,000 mcg tablet, Take 1 tablet (1,000 mcg total) by mouth daily, Disp: , Rfl: 0  •  polyethylene glycol (MIRALAX) 17 g packet, Take 17 g by mouth daily as needed (Patient not taking: Reported on 12/28/2022), Disp: , Rfl: '    Past Medical History: Diagnosis Date   • Abnormal ECG     Last Assessed 9/29/2016   • Anxiety     Last Assessed 6/08/2016   • Arthritis     knees   • Asthma     Last Assessed 11/06/2013   • Atrial premature complex    • Cataract, bilateral     Last Assessed 7/14/2016   • Cataract, left eye     Both eyes  Had surgery on left  • COVID-19    • Difficulty swallowing    • Diverticulosis 9/25/2022   • Dizziness    • Fibromyalgia    • Fibromyalgia, primary    • Gastric reflux    • Heart failure ([address] )     5/23/22 Pt reports had heart failure in the past   • History of COVID-19     5/23/22 Pt reports having COVID in 2021     • History of transfusion     no reaction   • [place] (hard of hearing)    • Hyperlipidemia    • Hypertension    • Incontinence in female     5/23/22 Pt reports has incontinence -wears depends especially at night/riding in car   • Irregular heart beat     5/23/22 Pt reports hx of A fib   • Lyme disease    • PONV (postoperative nausea and vomiting)    • Premature ventricular contraction    • Primary osteoarthritis of both knees     Last Assessed 7/14/2016   • Rheumatic fever     5/23/22 Pt reports had hx of rheumatic fever as a child twice   • Sore throat    • Tuberculosis 1945       Past Surgical History:   Procedure Laterality Date   • APPENDECTOMY     • CARDIAC PACEMAKER PLACEMENT  2019   • COLONOSCOPY     • DENTAL SURGERY      extractions   • HYSTERECTOMY      5/23/22 Pt reports had appendix out with hysterectomy and "tightened up my bladder"   • IR ASPIRATION ONLY  9/30/2022   • ORIF FEMUR FRACTURE Left 08/05/2021    Procedure: OPEN REDUCTION W/ INTERNAL FIXATION (ORIF) DISTAL FEMUR; INSERTION RETROGRADE NAIL;  Surgeon: Kody Rodriguez MD;  [facility];  Service: Orthopedics   • IA ARTHRP KNE CONDYLE&PLATU MEDIAL&LAT COMPARTMENTS Left 9/12/2022    Procedure: ARTHROPLASTY KNEE TOTAL;  Surgeon: Kody Rodriguez MD;  [facility];  Service: Orthopedics   • IA DILATION [address] UNGUIDED SOUND/BOUGIE 1/MULT PASS N/A 04/06/2016    Procedure: DILATATION ESOPHAGEAL;  Surgeon: Huy Shaikh MD;  [facility]; Service: Gastroenterology   • KS EGD TRANSORAL BIOPSY SINGLE/MULTIPLE N/A 04/06/2016    Procedure: ESOPHAGOGASTRODUODENOSCOPY (EGD); Surgeon: Huy Shaikh MD;  [facility]; Service: Gastroenterology   • KS REMOVAL IMPLANT DEEP Left 9/12/2022    Procedure: REMOVAL NAIL IM  FEMUR;  Surgeon: Twyla Bojorquez MD;  [facility];  Service: Orthopedics   • KS XCAPSL CTRC RMVL INSJ IO LENS PROSTH W/O ECP Left 03/15/2016    Procedure: EXTRACTION EXTRACAPSULAR CATARACT PHACO INTRAOCULAR LENS (IOL); Surgeon: Elena Mills MD;  [facility];  Service: Ophthalmology   • REPLACEMENT TOTAL KNEE Right    • REPLACEMENT TOTAL KNEE      right    • TONSILLECTOMY         Family History   Problem Relation Age of Onset   • Coronary artery disease Mother    • Heart attack Mother         Prior   • Heart disease Father    • Heart attack Father         Prior   • Anesthesia problems Neg Hx         reports that she has never smoked  She has never used smokeless tobacco  She reports that she does not currently use alcohol  She reports that she does not use drugs            Physical Exam:     /78   Pulse 64   Ht 5' 4" (1 626 m)   Wt 99 7 kg (219 lb 12 8 oz)   SpO2 97%   BMI 37 73 kg/m²     Gen: wn/wd, NAD, overweight female in wheelchair  HEENT: anicteric, MMM, no cervical LAD  CVS: RRR, no m/r/g  CHEST: CTA b/l  ABD: +BS, soft, NT,ND, no hepatosplenomegaly  EXT: Bilateral lower extremity edema, left lower extremity deformity  NEURO: aaox3  SKIN: NO rashes

## 2022-12-28 NOTE — LETTER
December 28, 2022     Bryce Novoa MD  [address]  [address], [place] [id number]    Patient: Bisi Christie   YOB: 1945   Date of Visit: 12/28/2022       Dear   [facility]:    Thank you for referring Alida Hargrove to me for evaluation  Below are my notes for this consultation  If you have questions, please do not hesitate to call me  I look forward to following your patient along with you  Sincerely,        Keith Bryant MD        CC: No Recipients  Keith Bryant MD  12/28/2022  1:05 PM  Sign when Signing Visit  Consultation - [facility]  Bisi Christie 76 y o  female MRN: [id number]          Assessment & Plan:    18-year-old female, here for evaluation for abnormal CT scan  1   Abnormal CT scan: With rectal thickening on a noncontrast CAT scan, at that time she presented with vague abdominal symptoms and poor intake  Her GI symptoms have resolved  -Discussed with the patient and her niece who presents today that the abnormal CT scan findings could be secondary to a noncontrast CAT scan, could be due to constipation or stercoral ulceration, but could also be indicative more significant pathology such as malignancy  -They were reluctant to proceed with colonoscopy or flexible sigmoidoscopy at this time  -We agreed upon a CT scan with contrast, if this shows continued thickening would recommend recommended scopic visualization, sigmoidoscopy may be more amenable to the patient and her niece  -If the CT scan is normal and the patient is asymptomatic we can reevaluate them, discussed with both of them that a normal CT scan does not exclude luminal pathology    2  GERD:  -Continue PPI therapy    3  Anemia: Secondary to knee replacement and blood loss, continue iron supplementation  -Patient had a recent CBC as an outpatient, labs are not available to us at this time    Reinier Hu was seen today for follow-up      Diagnoses and all orders for this visit:    Gastroesophageal reflux disease without esophagitis    Other iron deficiency anemia    Abnormal CT of the abdomen  -     CT abdomen pelvis w contrast; Future            _____________________________________________________________        CC: Abnormal CT scan    HPI:  Ahmet Neri is a 76 y  o female who was referred for evaluation of abnormal CAT scan  73-year-old female, recently mid to the hospital postoperative knee replacement with complications  Had some mild abdominal discomfort in the nursing home, poor appetite for about 5 days, presented to the ER reportedly was treated for urinary tract infection was found to have rectal thickening on CT scan without contrast due to acute kidney injury  Gradually her symptoms had improved, she is been eating better, denied any episodes of nausea, vomiting, diarrhea, rectal bleeding, change in bowel movements  Patient has not had a colonoscopy many years  She had significant anemia after her knee replacement, this is gradually improved with iron supplementation more recently  Patient is on PPI therapy, additionally she is on MiraLAX reports having fairly regular bowel movements and no significant GI complaint at this time  Surgical history is notable for left total knee replacement for    Denies any tobacco or alcohol, retired   [place] history negative GI associated malignancies  He is also had hysterectomy  ROS:  The remainder of the ROS was negative except for the pertinent positives mentioned in HPI           Allergies: Penicillins, Sulfa antibiotics, H41 folate [folic acid-vit D0-QGA L03 - food allergy], Cobalt, Lyrica [pregabalin], Other, Cortisone acetate [cortisone], Doxycycline, and Nickel    Medications:   Current Outpatient Medications:   •  acetaminophen (TYLENOL) 325 mg tablet, Take 975 mg by mouth every 6 (six) hours as needed, Disp: , Rfl:   •  albuterol (PROVENTIL HFA,VENTOLIN HFA) 90 mcg/act inhaler, INHALE 2 PUFFS BY MOUTH EVERY 6 HOURS AS NEEDED FOR WHEEZING, Disp: 3 Inhaler, Rfl: 0  •  allopurinol (ZYLOPRIM) 100 mg tablet, Take 1 tablet (100 mg total) by mouth daily, Disp: 90 tablet, Rfl: 3  •  apixaban (Eliquis) 5 mg, Take 1 tablet (5 mg total) by mouth 2 (two) times a day, Disp: 60 tablet, Rfl: 6  •  ascorbic acid (VITAMIN C) 500 MG tablet, Take 1 tablet (500 mg total) by mouth 2 (two) times a day, Disp: 60 tablet, Rfl: 1  •  bisacodyl (DULCOLAX) 10 mg suppository, Insert 10 mg into the rectum daily as needed, Disp: , Rfl:   •  Calcium Polycarbophil (FIBER-LAX PO), Take 1 tablet by mouth daily at bedtime, Disp: , Rfl:   •  Cholecalciferol 25 MCG (1000 UT) tablet, Take 1,000 Units by mouth daily, Disp: , Rfl:   •  Diclofenac Sodium (VOLTAREN) 1 %, Apply 2 g topically 4 (four) times a day = to B/L knee, Disp: , Rfl:   •  diltiazem (CARDIZEM CD) 120 mg 24 hr capsule, Take 1 capsule (120 mg total) by mouth daily, Disp: 90 capsule, Rfl: 3  •  DULoxetine (CYMBALTA) 30 mg delayed release capsule, TAKE 1 CAPSULE(30 MG) BY MOUTH DAILY, Disp: 30 capsule, Rfl: 5  •  ezetimibe (ZETIA) 10 mg tablet, Take 1 tablet (10 mg total) by mouth daily, Disp: 90 tablet, Rfl: 1  •  ferrous sulfate 324 (65 Fe) mg, Take 1 tablet (324 mg total) by mouth 2 (two) times a day before meals, Disp: 60 tablet, Rfl: 1  •  folic acid (FOLVITE) 1 mg tablet, TAKE 1 TABLET(1 MG) BY MOUTH DAILY, Disp: 90 tablet, Rfl: 0  •  furosemide (LASIX) 40 mg tablet, Take 40 mg by mouth in the morning, Disp: , Rfl:   •  Melatonin 10 MG TABS, Take by mouth Takes daily at night, Disp: , Rfl:   •  metoprolol tartrate (LOPRESSOR) 50 mg tablet, Take 100 mg in the morning and 50 mg in the evening, Disp: 270 tablet, Rfl: 3  •  Multiple Vitamin (MULTIVITAMIN ADULT PO), Take by mouth in the morning, Disp: , Rfl:   •  omeprazole (PriLOSEC) 20 mg delayed release capsule, Take 20 mg by mouth daily, Disp: , Rfl:   •  oxybutynin (DITROPAN-XL) 10 MG 24 hr tablet, Take 10 mg by mouth daily at bedtime, Disp: , Rfl:   •  senna-docusate sodium (SENOKOT S) 8 6-50 mg per tablet, Take 2 tablets by mouth 2 (two) times a day, Disp: , Rfl: 0  •  sucralfate (CARAFATE) 1 g tablet, Take 1 tablet (1 g total) by mouth 4 (four) times a day (before meals and at bedtime), Disp: , Rfl: 0  •  vitamin B-12 (VITAMIN B-12) 1,000 mcg tablet, Take 1 tablet (1,000 mcg total) by mouth daily, Disp: , Rfl: 0  •  polyethylene glycol (MIRALAX) 17 g packet, Take 17 g by mouth daily as needed (Patient not taking: Reported on 12/28/2022), Disp: , Rfl: '    Past Medical History:   Diagnosis Date   • Abnormal ECG     Last Assessed 9/29/2016   • Anxiety     Last Assessed 6/08/2016   • Arthritis     knees   • Asthma     Last Assessed 11/06/2013   • Atrial premature complex    • Cataract, bilateral     Last Assessed 7/14/2016   • Cataract, left eye     Both eyes  Had surgery on left  • COVID-19    • Difficulty swallowing    • Diverticulosis 9/25/2022   • Dizziness    • Fibromyalgia    • Fibromyalgia, primary    • Gastric reflux    • Heart failure ([facility] [address] )     5/23/22 Pt reports had heart failure in the past   • History of COVID-19     5/23/22 Pt reports having COVID in 2021     • History of transfusion     no reaction   • [place] (hard of hearing)    • Hyperlipidemia    • Hypertension    • Incontinence in female     5/23/22 Pt reports has incontinence -wears depends especially at night/riding in car   • Irregular heart beat     5/23/22 Pt reports hx of A fib   • Lyme disease    • PONV (postoperative nausea and vomiting)    • Premature ventricular contraction    • Primary osteoarthritis of both knees     Last Assessed 7/14/2016   • Rheumatic fever     5/23/22 Pt reports had hx of rheumatic fever as a child twice   • Sore throat    • Tuberculosis 1945       Past Surgical History:   Procedure Laterality Date   • APPENDECTOMY     • CARDIAC PACEMAKER PLACEMENT  2019   • COLONOSCOPY     • DENTAL SURGERY      extractions   • HYSTERECTOMY 5/23/22 Pt reports had appendix out with hysterectomy and "tightened up my bladder"   • IR ASPIRATION ONLY  9/30/2022   • ORIF FEMUR FRACTURE Left 08/05/2021    Procedure: OPEN REDUCTION W/ INTERNAL FIXATION (ORIF) DISTAL FEMUR; INSERTION RETROGRADE NAIL;  Surgeon: Vishnu Sun MD;  [facility];  Service: Orthopedics   • [place] ARTHRP KNE CONDYLE&PLATU MEDIAL&LAT COMPARTMENTS Left 9/12/2022    Procedure: ARTHROPLASTY KNEE TOTAL;  Surgeon: Vishnu Sun MD;  [facility];  Service: Orthopedics   • [place] DILATION ESOPH UNGUIDED SOUND/BOUGIE 1/MULT PASS N/A 04/06/2016    Procedure: DILATATION ESOPHAGEAL;  Surgeon: Noelle Velez MD;  Location: [facility]; Service: Gastroenterology   • [place] EGD TRANSORAL BIOPSY SINGLE/MULTIPLE N/A 04/06/2016    Procedure: ESOPHAGOGASTRODUODENOSCOPY (EGD); Surgeon: Noelle Velez MD;  Location: [facility]; Service: Gastroenterology   • [place] REMOVAL IMPLANT DEEP Left 9/12/2022    Procedure: REMOVAL NAIL IM  FEMUR;  Surgeon: Vishnu Sun MD;  [facility];  Service: Orthopedics   • [place] XCAPSL CTRC RMVL INSJ IO LENS PROSTH W/O ECP Left 03/15/2016    Procedure: EXTRACTION EXTRACAPSULAR CATARACT PHACO INTRAOCULAR LENS (IOL); Surgeon: Dennis Angel MD;  Location: [facility];  Service: Ophthalmology   • REPLACEMENT TOTAL KNEE Right    • REPLACEMENT TOTAL KNEE      right    • TONSILLECTOMY         Family History   Problem Relation Age of Onset   • Coronary artery disease Mother    • Heart attack Mother         Prior   • Heart disease Father    • Heart attack Father         Prior   • Anesthesia problems Neg Hx         reports that she has never smoked  She has never used smokeless tobacco  She reports that she does not currently use alcohol  She reports that she does not use drugs            Physical Exam:     /78   Pulse 64   Ht 5' 4" (1 626 m)   Wt 99 7 kg (219 lb 12 8 oz)   SpO2 97%   BMI 37 73 kg/m²     Gen: wn/wd, NAD, overweight female in wheelchair  HEENT: anicteric, MMM, no cervical LAD  CVS: RRR, no m/r/g  CHEST: CTA b/l  ABD: +BS, soft, NT,ND, no hepatosplenomegaly  EXT: Bilateral lower extremity edema, left lower extremity deformity  NEURO: aaox3  SKIN: NO rashes

## 2022-12-29 ENCOUNTER — NURSING HOME VISIT (OUTPATIENT)
Dept: GERIATRICS | Facility: OTHER | Age: 77
End: 2022-12-29

## 2022-12-29 ENCOUNTER — TELEPHONE (OUTPATIENT)
Age: 77
End: 2022-12-29
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
## 2022-12-29 DIAGNOSIS — M25.561 ACUTE PAIN OF RIGHT KNEE: ICD-10-CM

## 2022-12-29 DIAGNOSIS — I48.91 ATRIAL FIBRILLATION, UNSPECIFIED TYPE (HCC): ICD-10-CM

## 2022-12-29 DIAGNOSIS — R26.2 AMBULATORY DYSFUNCTION: ICD-10-CM
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
## 2022-12-29 NOTE — PROGRESS NOTES
[facility]  [address]  [phone]  [place]  Code 32  ACUTE VISIT    Assessment/Plan:    1  Fall, initial encounter  Assessment & Plan:  Now with right knee swelling s/p fall  Xray of right knee ordered  Continue ice therapy and tylenol  Maintain fall precautions      2  Ambulatory dysfunction  Assessment & Plan:  Completed PT/OT at short term rehab with transition to LTC  Remains high risk for falls due to weakness/comorbidity, Fall precautions reviewed with patient       3  Acute pain of right knee  Assessment & Plan:  Related to recent fall  Baseline B/L knee arthritis   Continue scheduled tylenol  Add ice therapy, SHARON to right knee wound with optifoam  Right knee xray results pending      4  Atrial fibrillation, unspecified type ([facility] [address] )  Assessment & Plan:  Stable  Continue Metoprolol, cardizem, Eliquis  Monitor for s/s of bleeding also in setting of recent fall  Remains at risk for falls, continue precautions with assistance with ADLs           Subjective:      Patient ID: Mane Whitten is a 76 y o  female  Patient is a STR patientt at [facility]  Seen and evaluated at bedside today for Acute visit per request of nursing for c/o knee swelling  PAST MEDICAL HISTORY:  Past Medical History:   Diagnosis Date   • Abnormal ECG     Last Assessed 9/29/2016   • Anxiety     Last Assessed 6/08/2016   • Arthritis     knees   • Asthma     Last Assessed 11/06/2013   • Atrial premature complex    • Cataract, bilateral     Last Assessed 7/14/2016   • Cataract, left eye     Both eyes  Had surgery on left  • COVID-19    • Difficulty swallowing    • Diverticulosis 9/25/2022   • Dizziness    • Fibromyalgia    • Fibromyalgia, primary    • Gastric reflux    • Heart failure ([facility] [address] )     5/23/22 Pt reports had heart failure in the past   • History of COVID-19     5/23/22 Pt reports having COVID in 2021     • History of transfusion     no reaction   • [place] (hard of hearing)    • Hyperlipidemia • Hypertension    • Incontinence in female     5/23/22 Pt reports has incontinence -wears depends especially at night/riding in car   • Irregular heart beat     5/23/22 Pt reports hx of A fib   • Lyme disease    • PONV (postoperative nausea and vomiting)    • Premature ventricular contraction    • Primary osteoarthritis of both knees     Last Assessed 7/14/2016   • Rheumatic fever     5/23/22 Pt reports had hx of rheumatic fever as a child twice   • Sore throat    • Tuberculosis 1945     Past Surgical History:   Procedure Laterality Date   • APPENDECTOMY     • CARDIAC PACEMAKER PLACEMENT  2019   • COLONOSCOPY     • DENTAL SURGERY      extractions   • HYSTERECTOMY      5/23/22 Pt reports had appendix out with hysterectomy and "tightened up my bladder"   • IR ASPIRATION ONLY  9/30/2022   • ORIF FEMUR FRACTURE Left 08/05/2021    Procedure: OPEN REDUCTION W/ INTERNAL FIXATION (ORIF) DISTAL FEMUR; INSERTION RETROGRADE NAIL;  Surgeon: Karla Mcintyre MD;  [facility];  Service: Orthopedics   • [place] ARTHRP KNE CONDYLE&PLATU MEDIAL&LAT COMPARTMENTS Left 9/12/2022    Procedure: ARTHROPLASTY KNEE TOTAL;  Surgeon: Karla Mcintyre MD;  [facility];  Service: Orthopedics   • [place] DILATION [facility] UNGUIDED SOUND/BOUGIE 1/MULT PASS N/A 04/06/2016    Procedure: DILATATION ESOPHAGEAL;  Surgeon: Shira Irby MD;  Location: [facility]; Service: Gastroenterology   • UT EGD TRANSORAL BIOPSY SINGLE/MULTIPLE N/A 04/06/2016    Procedure: ESOPHAGOGASTRODUODENOSCOPY (EGD); Surgeon: Shira Irby MD;  Location: [facility]; Service: Gastroenterology   • [place] REMOVAL IMPLANT DEEP Left 9/12/2022    Procedure: REMOVAL NAIL IM  FEMUR;  Surgeon: Karla Mcintyre MD;  [facility];  Service: Orthopedics   • [place] XCAPSL CTRC RMVL INSJ IO LENS PROSTH W/O ECP Left 03/15/2016    Procedure: EXTRACTION EXTRACAPSULAR CATARACT PHACO INTRAOCULAR LENS (IOL);   Surgeon: Marbella Strickland MD;  [facility];  Service: Ophthalmology   • REPLACEMENT TOTAL KNEE Right    • REPLACEMENT TOTAL KNEE      right    • TONSILLECTOMY           Cathy Mullins is a 77-year-old female LTC resident seen today per staff request for right knee swelling  She had a fall on 12/26 and when she was found next to bed and reportedly had rolled off bed not realizing she was so close to the edge  Neurochecks were subsequently initiated and remained stable  Staff now noting increased swelling and bruising of right knee  Upon today's assessment, patient lying in bed in NAD, niece at bedside  No change to right knee abrasion, continued application of optifoam  Patient does report the area is painful and "stiff"  She has baseline bilateral knee arthritis, due for upcoming left knee lateral patella retinaculum release and does follow with orthopedics  She reports baseline pain in her toes  She continues with Tylenol, reports ice therapy initially after injury  She does take Eliquis for Afib, no further s/s bleeding, denies any lightheadedness, fever, fatigue, chills  ROS:  Per history of present illness, all other systems reviewed and negative     Objective:    VITALS: /70, temp 98, HR 90, RR 18, O2 97%     Physical Exam  Exam conducted with a chaperone present  Constitutional:       General: She is not in acute distress  Appearance: Normal appearance  She is obese  She is not ill-appearing  HENT:      Head: Normocephalic and atraumatic  Right Ear: External ear normal       Left Ear: External ear normal       Mouth/Throat:      Mouth: Mucous membranes are moist       Pharynx: No posterior oropharyngeal erythema  Eyes:      General:         Right eye: No discharge  Left eye: No discharge  Extraocular Movements: Extraocular movements intact  Conjunctiva/sclera: Conjunctivae normal    Cardiovascular:      Rate and Rhythm: Normal rate and regular rhythm  Pulses: Normal pulses  Heart sounds: Murmur heard  Pulmonary:      Effort: Pulmonary effort is normal    Abdominal:      Comments: Mild tenderness to palpation of epigastric area    Musculoskeletal:         General: Swelling present  No tenderness  Cervical back: Normal range of motion and neck supple  No tenderness  Right knee: Swelling and ecchymosis present  Right lower leg: No edema  Left lower leg: No edema  Comments: Trace B/L LE edema  Continues with LLE weakness  New right knee swelling with bruising   Skin:     General: Skin is warm and dry  Findings: Bruising and wound present  No rash  Neurological:      General: No focal deficit present  Mental Status: She is alert and oriented to person, place, and time  Mental status is at baseline  Sensory: No sensory deficit  Motor: Weakness present  Gait: Gait abnormal    Psychiatric:         Mood and Affect: Mood normal          Behavior: Behavior normal            Please note:  Voice-recognition software may have been used in the preparation of this document  Occasional wrong word or "sound-alike" substitutions may have occurred due to the inherent limitations of voice recognition software  Interpretation should be guided by context         JERI Vargas  12/29/22  12:05 PM

## 2022-12-29 NOTE — ASSESSMENT & PLAN NOTE
Related to recent fall  Baseline B/L knee arthritis   Continue scheduled tylenol  Add ice therapy, SHARON to right knee wound with optifoam  Right knee xray results pending

## 2022-12-29 NOTE — ASSESSMENT & PLAN NOTE
Now with right knee swelling s/p fall  Xray of right knee ordered  Continue ice therapy and tylenol  Maintain fall precautions

## 2022-12-29 NOTE — ASSESSMENT & PLAN NOTE
Completed PT/OT at short term rehab with transition to \[facility]\"" high risk for falls due to weakness/comorbidity, Fall precautions reviewed with patient

## 2022-12-29 NOTE — TELEPHONE ENCOUNTER
Leilani Strong called from [address]  [phone])  She advised she has the clearance but needs orders regarding blood thinner for knee surgery on 1/4/2023  Advised provider

## 2022-12-29 NOTE — ASSESSMENT & PLAN NOTE
Stable  Continue Metoprolol, cardizem, Eliquis  Monitor for s/s of bleeding also in setting of recent fall  Remains at risk for falls, continue precautions with assistance with ADLs

## 2022-12-30 NOTE — TELEPHONE ENCOUNTER
I called Binu Sanders from [employer] ([phone]) to advised patient should hold Eliquis for 48 hours as per providers instructions

## 2022-12-31 ENCOUNTER — TELEPHONE (OUTPATIENT)
Dept: OTHER | Facility: OTHER | Age: 77
End: 2022-12-31

## 2022-12-31 NOTE — TELEPHONE ENCOUNTER
Apple Bird needs to review the patients right knee x-rays  Paged on call Dr Annabelle Chinchilla via TC

## 2023-01-03 ENCOUNTER — TELEPHONE (OUTPATIENT)
Dept: OTHER | Facility: OTHER | Age: 78
End: 2023-01-03

## 2023-01-04 ENCOUNTER — HOSPITAL ENCOUNTER (INPATIENT)
Facility: HOSPITAL | Age: 78
LOS: 2 days | Discharge: NURSING, REHAB OR LONG-TERM CARE | End: 2023-01-06
Attending: EMERGENCY MEDICINE | Admitting: INTERNAL MEDICINE

## 2023-01-04 ENCOUNTER — APPOINTMENT (EMERGENCY)
Dept: RADIOLOGY | Facility: HOSPITAL | Age: 78
End: 2023-01-04

## 2023-01-04 ENCOUNTER — APPOINTMENT (EMERGENCY)
Dept: CT IMAGING | Facility: HOSPITAL | Age: 78
End: 2023-01-04

## 2023-01-04 DIAGNOSIS — I50.32 CHRONIC HEART FAILURE WITH PRESERVED EJECTION FRACTION (HCC): ICD-10-CM

## 2023-01-04 DIAGNOSIS — A41.9 SEVERE SEPSIS (HCC): Primary | ICD-10-CM

## 2023-01-04 DIAGNOSIS — N39.0 UTI (URINARY TRACT INFECTION): ICD-10-CM

## 2023-01-04 DIAGNOSIS — M25.561 ACUTE PAIN OF RIGHT KNEE: ICD-10-CM

## 2023-01-04 DIAGNOSIS — R65.20 SEVERE SEPSIS (HCC): Primary | ICD-10-CM

## 2023-01-04 DIAGNOSIS — I50.9 CHF (CONGESTIVE HEART FAILURE) (HCC): ICD-10-CM

## 2023-01-04 DIAGNOSIS — E87.70 HYPERVOLEMIA, UNSPECIFIED HYPERVOLEMIA TYPE: ICD-10-CM

## 2023-01-04 PROBLEM — J96.00 ACUTE RESPIRATORY FAILURE (HCC): Status: ACTIVE | Noted: 2021-09-20

## 2023-01-04 PROBLEM — K59.09 CHRONIC CONSTIPATION: Status: ACTIVE | Noted: 2023-01-04

## 2023-01-04 LAB
2HR DELTA HS TROPONIN: 0 NG/L
ALBUMIN SERPL BCP-MCNC: 3.6 G/DL (ref 3.5–5)
ALP SERPL-CCNC: 104 U/L (ref 34–104)
ALT SERPL W P-5'-P-CCNC: 11 U/L (ref 7–52)
ANION GAP SERPL CALCULATED.3IONS-SCNC: 8 MMOL/L (ref 4–13)
APTT PPP: 40 SECONDS (ref 23–37)
AST SERPL W P-5'-P-CCNC: 16 U/L (ref 13–39)
ATRIAL RATE: 122 BPM
BACTERIA UR QL AUTO: ABNORMAL /HPF
BASE EX.OXY STD BLDV CALC-SCNC: 81.7 % (ref 60–80)
BASE EXCESS BLDV CALC-SCNC: 3 MMOL/L
BASOPHILS # BLD AUTO: 0.05 THOUSANDS/ÂΜL (ref 0–0.1)
BASOPHILS NFR BLD AUTO: 1 % (ref 0–1)
BILIRUB SERPL-MCNC: 0.53 MG/DL (ref 0.2–1)
BILIRUB UR QL STRIP: NEGATIVE
BNP SERPL-MCNC: 218 PG/ML (ref 0–100)
BUN SERPL-MCNC: 21 MG/DL (ref 5–25)
CALCIUM SERPL-MCNC: 9.4 MG/DL (ref 8.4–10.2)
CARDIAC TROPONIN I PNL SERPL HS: 10 NG/L
CARDIAC TROPONIN I PNL SERPL HS: 10 NG/L
CHLORIDE SERPL-SCNC: 103 MMOL/L (ref 96–108)
CLARITY UR: ABNORMAL
CO2 SERPL-SCNC: 28 MMOL/L (ref 21–32)
COLOR UR: ABNORMAL
CREAT SERPL-MCNC: 0.86 MG/DL (ref 0.6–1.3)
EOSINOPHIL # BLD AUTO: 0.48 THOUSAND/ÂΜL (ref 0–0.61)
EOSINOPHIL NFR BLD AUTO: 6 % (ref 0–6)
ERYTHROCYTE [DISTWIDTH] IN BLOOD BY AUTOMATED COUNT: 16.9 % (ref 11.6–15.1)
ERYTHROCYTE [DISTWIDTH] IN BLOOD BY AUTOMATED COUNT: 16.9 % (ref 11.6–15.1)
FLUAV RNA RESP QL NAA+PROBE: NEGATIVE
FLUBV RNA RESP QL NAA+PROBE: NEGATIVE
GFR SERPL CREATININE-BSD FRML MDRD: 64 ML/MIN/1.73SQ M
GLUCOSE SERPL-MCNC: 138 MG/DL (ref 65–140)
GLUCOSE UR STRIP-MCNC: NEGATIVE MG/DL
HCO3 BLDV-SCNC: 29.2 MMOL/L (ref 24–30)
HCT VFR BLD AUTO: 28.8 % (ref 34.8–46.1)
HCT VFR BLD AUTO: 31.5 % (ref 34.8–46.1)
HGB BLD-MCNC: 8.6 G/DL (ref 11.5–15.4)
HGB BLD-MCNC: 9.4 G/DL (ref 11.5–15.4)
HGB UR QL STRIP.AUTO: ABNORMAL
HYALINE CASTS #/AREA URNS LPF: ABNORMAL /LPF
IMM GRANULOCYTES # BLD AUTO: 0.05 THOUSAND/UL (ref 0–0.2)
IMM GRANULOCYTES NFR BLD AUTO: 1 % (ref 0–2)
INR PPP: 1.19 (ref 0.84–1.19)
KETONES UR STRIP-MCNC: NEGATIVE MG/DL
LACTATE SERPL-SCNC: 1.1 MMOL/L (ref 0.5–2)
LACTATE SERPL-SCNC: 2.1 MMOL/L (ref 0.5–2)
LEUKOCYTE ESTERASE UR QL STRIP: ABNORMAL
LYMPHOCYTES # BLD AUTO: 2.72 THOUSANDS/ÂΜL (ref 0.6–4.47)
LYMPHOCYTES NFR BLD AUTO: 32 % (ref 14–44)
MAGNESIUM SERPL-MCNC: 2 MG/DL (ref 1.9–2.7)
MCH RBC QN AUTO: 26.4 PG (ref 26.8–34.3)
MCH RBC QN AUTO: 26.9 PG (ref 26.8–34.3)
MCHC RBC AUTO-ENTMCNC: 29.8 G/DL (ref 31.4–37.4)
MCHC RBC AUTO-ENTMCNC: 29.9 G/DL (ref 31.4–37.4)
MCV RBC AUTO: 89 FL (ref 82–98)
MCV RBC AUTO: 90 FL (ref 82–98)
MONOCYTES # BLD AUTO: 0.83 THOUSAND/ÂΜL (ref 0.17–1.22)
MONOCYTES NFR BLD AUTO: 10 % (ref 4–12)
NEUTROPHILS # BLD AUTO: 4.37 THOUSANDS/ÂΜL (ref 1.85–7.62)
NEUTS SEG NFR BLD AUTO: 50 % (ref 43–75)
NITRITE UR QL STRIP: POSITIVE
NON-SQ EPI CELLS URNS QL MICRO: ABNORMAL /HPF
NRBC BLD AUTO-RTO: 0 /100 WBCS
O2 CT BLDV-SCNC: 12.2 ML/DL
PCO2 BLDV: 52.7 MM HG (ref 42–50)
PH BLDV: 7.36 [PH] (ref 7.3–7.4)
PH UR STRIP.AUTO: 5.5 [PH]
PLATELET # BLD AUTO: 336 THOUSANDS/UL (ref 149–390)
PLATELET # BLD AUTO: 351 THOUSANDS/UL (ref 149–390)
PMV BLD AUTO: 8.5 FL (ref 8.9–12.7)
PMV BLD AUTO: 8.5 FL (ref 8.9–12.7)
PO2 BLDV: 52.4 MM HG (ref 35–45)
POTASSIUM SERPL-SCNC: 3.8 MMOL/L (ref 3.5–5.3)
PROCALCITONIN SERPL-MCNC: <0.05 NG/ML
PROT SERPL-MCNC: 7 G/DL (ref 6.4–8.4)
PROT UR STRIP-MCNC: NEGATIVE MG/DL
PROTHROMBIN TIME: 15.3 SECONDS (ref 11.6–14.5)
QRS AXIS: 22 DEGREES
QRSD INTERVAL: 114 MS
QT INTERVAL: 338 MS
QTC INTERVAL: 483 MS
RBC # BLD AUTO: 3.2 MILLION/UL (ref 3.81–5.12)
RBC # BLD AUTO: 3.56 MILLION/UL (ref 3.81–5.12)
RBC #/AREA URNS AUTO: ABNORMAL /HPF
RSV RNA RESP QL NAA+PROBE: NEGATIVE
SARS-COV-2 RNA RESP QL NAA+PROBE: NEGATIVE
SODIUM SERPL-SCNC: 139 MMOL/L (ref 135–147)
SP GR UR STRIP.AUTO: 1.01 (ref 1–1.03)
T WAVE AXIS: 238 DEGREES
UROBILINOGEN UR STRIP-ACNC: <2 MG/DL
VENTRICULAR RATE: 123 BPM
WBC # BLD AUTO: 6.49 THOUSAND/UL (ref 4.31–10.16)
WBC # BLD AUTO: 8.5 THOUSAND/UL (ref 4.31–10.16)
WBC #/AREA URNS AUTO: ABNORMAL /HPF
WBC CLUMPS # UR AUTO: PRESENT /UL

## 2023-01-04 RX ORDER — LANOLIN ALCOHOL/MO/W.PET/CERES
6 CREAM (GRAM) TOPICAL
Status: DISCONTINUED | OUTPATIENT
Start: 2023-01-04 | End: 2023-01-06 | Stop reason: HOSPADM

## 2023-01-04 RX ORDER — FUROSEMIDE 10 MG/ML
20 INJECTION INTRAMUSCULAR; INTRAVENOUS ONCE
Status: COMPLETED | OUTPATIENT
Start: 2023-01-04 | End: 2023-01-04

## 2023-01-04 RX ORDER — FUROSEMIDE 10 MG/ML
40 INJECTION INTRAMUSCULAR; INTRAVENOUS DAILY
Status: DISCONTINUED | OUTPATIENT
Start: 2023-01-04 | End: 2023-01-06 | Stop reason: HOSPADM

## 2023-01-04 RX ORDER — SUCRALFATE 1 G/1
1 TABLET ORAL
Status: DISCONTINUED | OUTPATIENT
Start: 2023-01-04 | End: 2023-01-06 | Stop reason: HOSPADM

## 2023-01-04 RX ORDER — METOPROLOL TARTRATE 50 MG/1
50 TABLET, FILM COATED ORAL
Status: DISCONTINUED | OUTPATIENT
Start: 2023-01-04 | End: 2023-01-06 | Stop reason: HOSPADM

## 2023-01-04 RX ORDER — DIPHENHYDRAMINE HCL 25 MG
25 TABLET ORAL EVERY 6 HOURS PRN
Status: DISCONTINUED | OUTPATIENT
Start: 2023-01-04 | End: 2023-01-06 | Stop reason: HOSPADM

## 2023-01-04 RX ORDER — POLYETHYLENE GLYCOL 3350 17 G/17G
17 POWDER, FOR SOLUTION ORAL DAILY PRN
Status: DISCONTINUED | OUTPATIENT
Start: 2023-01-04 | End: 2023-01-06 | Stop reason: HOSPADM

## 2023-01-04 RX ORDER — IPRATROPIUM BROMIDE AND ALBUTEROL SULFATE .5; 3 MG/3ML; MG/3ML
1 SOLUTION RESPIRATORY (INHALATION) ONCE
Status: COMPLETED | OUTPATIENT
Start: 2023-01-04 | End: 2023-01-04

## 2023-01-04 RX ORDER — LANOLIN ALCOHOL/MO/W.PET/CERES
400 CREAM (GRAM) TOPICAL DAILY
Status: DISCONTINUED | OUTPATIENT
Start: 2023-01-04 | End: 2023-01-06 | Stop reason: HOSPADM

## 2023-01-04 RX ORDER — ACETAMINOPHEN 325 MG/1
975 TABLET ORAL EVERY 6 HOURS PRN
Status: DISCONTINUED | OUTPATIENT
Start: 2023-01-04 | End: 2023-01-06 | Stop reason: HOSPADM

## 2023-01-04 RX ORDER — BISACODYL 10 MG
10 SUPPOSITORY, RECTAL RECTAL DAILY PRN
Status: DISCONTINUED | OUTPATIENT
Start: 2023-01-04 | End: 2023-01-06 | Stop reason: HOSPADM

## 2023-01-04 RX ORDER — FUROSEMIDE 10 MG/ML
40 INJECTION INTRAMUSCULAR; INTRAVENOUS
Status: DISCONTINUED | OUTPATIENT
Start: 2023-01-04 | End: 2023-01-04

## 2023-01-04 RX ORDER — LEVOFLOXACIN 5 MG/ML
750 INJECTION, SOLUTION INTRAVENOUS ONCE
Status: COMPLETED | OUTPATIENT
Start: 2023-01-04 | End: 2023-01-04

## 2023-01-04 RX ORDER — ASCORBIC ACID 500 MG
500 TABLET ORAL 2 TIMES DAILY
Status: DISCONTINUED | OUTPATIENT
Start: 2023-01-04 | End: 2023-01-06 | Stop reason: HOSPADM

## 2023-01-04 RX ORDER — ALBUTEROL SULFATE 2.5 MG/3ML
1 SOLUTION RESPIRATORY (INHALATION) ONCE
Status: COMPLETED | OUTPATIENT
Start: 2023-01-04 | End: 2023-01-04

## 2023-01-04 RX ORDER — AMOXICILLIN 250 MG
2 CAPSULE ORAL 2 TIMES DAILY
Status: DISCONTINUED | OUTPATIENT
Start: 2023-01-04 | End: 2023-01-06 | Stop reason: HOSPADM

## 2023-01-04 RX ORDER — METOPROLOL TARTRATE 100 MG/1
100 TABLET ORAL
Status: DISCONTINUED | OUTPATIENT
Start: 2023-01-04 | End: 2023-01-06 | Stop reason: HOSPADM

## 2023-01-04 RX ORDER — MELATONIN
1000 DAILY
Status: DISCONTINUED | OUTPATIENT
Start: 2023-01-04 | End: 2023-01-06 | Stop reason: HOSPADM

## 2023-01-04 RX ORDER — PANTOPRAZOLE SODIUM 40 MG/1
40 TABLET, DELAYED RELEASE ORAL
Status: DISCONTINUED | OUTPATIENT
Start: 2023-01-04 | End: 2023-01-06 | Stop reason: HOSPADM

## 2023-01-04 RX ORDER — IPRATROPIUM BROMIDE AND ALBUTEROL SULFATE 2.5; .5 MG/3ML; MG/3ML
3 SOLUTION RESPIRATORY (INHALATION) EVERY 6 HOURS PRN
Status: DISCONTINUED | OUTPATIENT
Start: 2023-01-04 | End: 2023-01-06 | Stop reason: HOSPADM

## 2023-01-04 RX ORDER — DULOXETIN HYDROCHLORIDE 30 MG/1
30 CAPSULE, DELAYED RELEASE ORAL DAILY
Status: DISCONTINUED | OUTPATIENT
Start: 2023-01-04 | End: 2023-01-06 | Stop reason: HOSPADM

## 2023-01-04 RX ORDER — ALLOPURINOL 100 MG/1
100 TABLET ORAL DAILY
Status: DISCONTINUED | OUTPATIENT
Start: 2023-01-04 | End: 2023-01-06 | Stop reason: HOSPADM

## 2023-01-04 RX ORDER — DILTIAZEM HYDROCHLORIDE 120 MG/1
120 CAPSULE, COATED, EXTENDED RELEASE ORAL DAILY
Status: DISCONTINUED | OUTPATIENT
Start: 2023-01-04 | End: 2023-01-06 | Stop reason: HOSPADM

## 2023-01-04 RX ORDER — ALBUTEROL SULFATE 90 UG/1
2 AEROSOL, METERED RESPIRATORY (INHALATION) EVERY 6 HOURS PRN
Status: DISCONTINUED | OUTPATIENT
Start: 2023-01-04 | End: 2023-01-06 | Stop reason: HOSPADM

## 2023-01-04 RX ORDER — OXYBUTYNIN CHLORIDE 5 MG/1
10 TABLET, EXTENDED RELEASE ORAL
Status: DISCONTINUED | OUTPATIENT
Start: 2023-01-04 | End: 2023-01-06 | Stop reason: HOSPADM

## 2023-01-04 RX ORDER — EZETIMIBE 10 MG/1
10 TABLET ORAL DAILY
Status: DISCONTINUED | OUTPATIENT
Start: 2023-01-04 | End: 2023-01-06 | Stop reason: HOSPADM

## 2023-01-04 RX ORDER — FERROUS SULFATE 325(65) MG
325 TABLET ORAL 2 TIMES DAILY WITH MEALS
Status: DISCONTINUED | OUTPATIENT
Start: 2023-01-04 | End: 2023-01-06 | Stop reason: HOSPADM

## 2023-01-04 RX ADMIN — SUCRALFATE 1 G: 1 TABLET ORAL at 11:38

## 2023-01-04 RX ADMIN — FOLIC ACID TAB 400 MCG 400 MCG: 400 TAB at 09:36

## 2023-01-04 RX ADMIN — OXYCODONE HYDROCHLORIDE AND ACETAMINOPHEN 500 MG: 500 TABLET ORAL at 09:54

## 2023-01-04 RX ADMIN — DICLOFENAC SODIUM 2 G: 10 GEL TOPICAL at 21:20

## 2023-01-04 RX ADMIN — FERROUS SULFATE TAB 325 MG (65 MG ELEMENTAL FE) 325 MG: 325 (65 FE) TAB at 06:40

## 2023-01-04 RX ADMIN — SENNOSIDES AND DOCUSATE SODIUM 2 TABLET: 8.6; 5 TABLET ORAL at 17:32

## 2023-01-04 RX ADMIN — METOPROLOL TARTRATE 50 MG: 50 TABLET, FILM COATED ORAL at 21:16

## 2023-01-04 RX ADMIN — Medication 1 PACKET: at 21:16

## 2023-01-04 RX ADMIN — METOPROLOL TARTRATE 100 MG: 100 TABLET, FILM COATED ORAL at 05:24

## 2023-01-04 RX ADMIN — FUROSEMIDE 40 MG: 10 INJECTION, SOLUTION INTRAMUSCULAR; INTRAVENOUS at 09:54

## 2023-01-04 RX ADMIN — DULOXETINE HYDROCHLORIDE 30 MG: 30 CAPSULE, DELAYED RELEASE ORAL at 09:37

## 2023-01-04 RX ADMIN — PANTOPRAZOLE SODIUM 40 MG: 40 TABLET, DELAYED RELEASE ORAL at 05:25

## 2023-01-04 RX ADMIN — CHOLECALCIFEROL TAB 25 MCG (1000 UNIT) 1000 UNITS: 25 TAB at 09:42

## 2023-01-04 RX ADMIN — B-COMPLEX W/ C & FOLIC ACID TAB 1 TABLET: TAB at 09:39

## 2023-01-04 RX ADMIN — SUCRALFATE 1 G: 1 TABLET ORAL at 21:15

## 2023-01-04 RX ADMIN — FERROUS SULFATE TAB 325 MG (65 MG ELEMENTAL FE) 325 MG: 325 (65 FE) TAB at 17:32

## 2023-01-04 RX ADMIN — APIXABAN 5 MG: 5 TABLET, FILM COATED ORAL at 17:32

## 2023-01-04 RX ADMIN — ERTAPENEM SODIUM 1000 MG: 1 INJECTION, POWDER, LYOPHILIZED, FOR SOLUTION INTRAMUSCULAR; INTRAVENOUS at 09:41

## 2023-01-04 RX ADMIN — CYANOCOBALAMIN TAB 500 MCG 500 MCG: 500 TAB at 09:37

## 2023-01-04 RX ADMIN — SUCRALFATE 1 G: 1 TABLET ORAL at 17:40

## 2023-01-04 RX ADMIN — FUROSEMIDE 20 MG: 10 INJECTION, SOLUTION INTRAMUSCULAR; INTRAVENOUS at 03:35

## 2023-01-04 RX ADMIN — DIPHENHYDRAMINE HCL 25 MG: 25 TABLET, COATED ORAL at 06:40

## 2023-01-04 RX ADMIN — EZETIMIBE 10 MG: 10 TABLET ORAL at 09:36

## 2023-01-04 RX ADMIN — ALLOPURINOL 100 MG: 100 TABLET ORAL at 09:37

## 2023-01-04 RX ADMIN — LEVOFLOXACIN 750 MG: 750 INJECTION, SOLUTION INTRAVENOUS at 03:40

## 2023-01-04 RX ADMIN — IPRATROPIUM BROMIDE AND ALBUTEROL SULFATE 3 ML: .5; 3 SOLUTION RESPIRATORY (INHALATION) at 09:54

## 2023-01-04 RX ADMIN — Medication 6 MG: at 21:16

## 2023-01-04 RX ADMIN — OXYBUTYNIN CHLORIDE 10 MG: 5 TABLET, EXTENDED RELEASE ORAL at 21:15

## 2023-01-04 RX ADMIN — VANCOMYCIN HYDROCHLORIDE 2000 MG: 1 INJECTION, POWDER, LYOPHILIZED, FOR SOLUTION INTRAVENOUS at 05:25

## 2023-01-04 RX ADMIN — APIXABAN 5 MG: 5 TABLET, FILM COATED ORAL at 09:53

## 2023-01-04 RX ADMIN — DILTIAZEM HYDROCHLORIDE 120 MG: 120 CAPSULE, COATED, EXTENDED RELEASE ORAL at 09:38

## 2023-01-04 RX ADMIN — SUCRALFATE 1 G: 1 TABLET ORAL at 06:13

## 2023-01-04 RX ADMIN — OXYCODONE HYDROCHLORIDE AND ACETAMINOPHEN 500 MG: 500 TABLET ORAL at 17:32

## 2023-01-04 NOTE — H&P
[address] [facility] 1945, 76 y o  female MRN: [id number]  Unit/Bed#: ED-24 Encounter: [id number]  Primary Care Provider: Jeison Awad MD   Date and time admitted to hospital: 1/4/2023 12:18 AM    * Sepsis ([address] )  Assessment & Plan  · POA; as evidenced by low grade fever, tachycardia, lactic acidosis and tachypnea  · Blood Pressure: 141/65  · Pulse: (!) 122  · Respirations: 20   · Covid/Flu/RSV negative  · Patient with history of recurrent UTI  · Hx MDRO    Suspected source: Unclear at this time-UTI vs Soft tissue infection    Recent Labs     01/04/23  0037   WBC 8 50     Recent Labs     01/04/23  0130 01/04/23  0335   LACTICACID 2 1* 1 1     · CT chest without contrast showed moderate right and small left pleural effusions with adjacent compressive atelectasis  Plan:  • Blood and urine cultures pending  • Procalcitonin pending  • F u UA w/ reflex scope  • Abx: Vancomycin and Cefepime  o Narrow antibiotics pending culture data   • IVF: Currently held in setting of volume overload  • Trend fever curve  • CBC daily      CHF (congestive heart failure) (HCC)  Assessment & Plan  Wt Readings from Last 3 Encounters:   12/28/22 99 7 kg (219 lb 12 8 oz)   12/20/22 97 5 kg (215 lb)   12/01/22 97 6 kg (215 lb 1 6 oz)     Lab Results   Component Value Date    LVEF 65 09/29/2022    NTBNP 1,534 (H) 09/26/2021    NTBNP 2,905 (H) 09/20/2021       • Presents with increased shortness of breath, dyspnea on exertion, lower extremity edema and cough with productive sputum  • NYHA Class III , Stage C  • Patient is currently volume overloaded  • Last ECHO in September showed estimated EF of 65%    Plan:  • GDMT: Diuretic: Lasix 40 mg PO daily at home, 40 mg daily IV while inpatient B-Blocker: metoprolol tarteate 100 mg in the AM and 50 mg in PM, (No perscribed ACE/ARB/ARNi, Aldos   Blocker or SGLT2-I)  • Continue home AC with Eliquis  · Diet: Sodium restriction 2g  · Labs: BMP, magnesium tomorrow a m    · Maintain Mg > 2 and K > 4; Replete prn  · Elevate HOB > 30°, Daily weights, Measure I&O  · Monitor respiratory status    Acute respiratory failure (HCC)  Assessment & Plan  · Patient noted to be hypoxic on presentation to ED    · Complained of cough and SOB on admission  · Currently requiring 2L via NC  · Received albuterol in the ED  · Covid/Flu/RSV negative   · CT chest showed bilateral pleural effusions; no evidence PNA  · Volume overloaded to exam  · In likely setting of decompensated CHF with volume overload    Plan:   · Continue supplemental O2, and wean as appropriate  · Incentive spirometry   · PRN nebs  · Diuresis as documented under CHF    Stage 3a chronic kidney disease [facility])  Assessment & Plan  Lab Results   Component Value Date    EGFR 64 01/04/2023    EGFR 61 11/27/2022    EGFR 25 11/25/2022    CREATININE 0 86 01/04/2023    CREATININE 0 90 11/27/2022    CREATININE 1 85 (H) 11/25/2022     · Without superimposed MICHAEL Renal function noted WDL on admission CMP  · Volume overloaded on admission exam; diurese with Lasix 40 mg IV daily  · Avoid nephrotoxic medications  · I&O  · Monitor BMP      Chronic constipation  Assessment & Plan  · Patient on extensive bowel regimen at baseline  · Continue Senakot and metamucil   · Miralax PRN   · Doculax suppository PRN    Chronic gout with tophus  Assessment & Plan  · Asymptomatic at this time  · Continue home allopurinol    HLD (hyperlipidemia)  Assessment & Plan  · Continue home Zetia    Vitamin D insufficiency  Assessment & Plan  · Continue vitamin D3 repletion    Anxiety  Assessment & Plan  · Continue home Cymbalta    Chronic GERD  Assessment & Plan  · Continue home regimen with Carafate 1g TID with meals and at bedtime and  pantoprazole 40 mg daily     Essential hypertension  Assessment & Plan  BP Readings from Last 3 Encounters:   01/04/23 141/65   12/28/22 140/78   12/01/22 116/62     Plan:  • Continue home metoprolol and diltiazem • Lasix 40 mg IV daily for apparent volume overload in setting of decompensated CHF   • Monitor blood pressure      Tachy-smita syndrome (HCC)  Assessment & Plan  · S/p pacemaker placement  · Patient tachycardic into the 120's on presentation     Plan:   · Continue home metoprolol tartrate 100 mg in the morning and 50 mg in the afternoon   · Continue home diltiazem 120 mg daily   · Continue AC with Eliquis in setting of paroxysmal a-fib   · Monitor vital signs    VTE Pharmacologic Prophylaxis: VTE Score: 6 High Risk (Score >/= 5) - Pharmacological DVT Prophylaxis Ordered: apixaban (Eliquis)  Sequential Compression Devices Ordered  Code Status: Level 1 - Full Code   Discussion with family: Deferred until morning  Anticipated Length of Stay: Patient will be admitted on an inpatient basis with an anticipated length of stay of greater than 2 midnights secondary to Sepsis with volume overload  Chief Complaint: Cough    History of Present Illness:  Aimee Hernandez is a 76 y o  female with a PMH of CHF, paroxysmal A-fib on Eliquis, tachy-smita syndrome s/p pacemaker placement, essential HTN, HLD, stage III CKD, gout and asthma  who presented to the ED in setting of complaints of cough and shortness of breath  Patient was noted to be hypoxic on presentation  She met SIRS criteria with tachycardia into the 120's, tachypnea, lactic acidosis and low-grade fever s/p antipyretics per ED workup  No leukocytosis noted on CBC  BMP WDL  Patient volume overloaded to clinical exam with lower extremity edema and wheezing noted to ascultation; currently saturating well on 2L via NC s/p nebulized albuterol  CT chest showed bilateral pleural effusions  Patient received IV lasix and antibiotics in the ED for volume overload and presumed infection respectively  BNP elevated to 218  Blood and urine cultures pending   Patient admitted to [place] [id number] for further workup and management of sepsis, acute decompensated CHF and acute respiratory failure  She is awake alert and oriented and reported that she 'feels fine' on initial presentation and admission  Patient was scheduled for orthopedic surgery today  Review of Systems:  Review of Systems   Constitutional: Positive for fatigue  Negative for appetite change, chills, diaphoresis and fever  HENT: Negative for congestion, ear pain, rhinorrhea, sinus pain and sore throat  Eyes: Negative for pain and visual disturbance  Respiratory: Positive for cough, shortness of breath and wheezing  Negative for apnea, choking, chest tightness and stridor  Cardiovascular: Positive for leg swelling  Negative for chest pain and palpitations  Gastrointestinal: Positive for constipation  Negative for abdominal pain, diarrhea, nausea and vomiting  Endocrine: Negative for cold intolerance, heat intolerance, polydipsia, polyphagia and polyuria  Genitourinary: Positive for frequency  Negative for difficulty urinating, dysuria and hematuria  Musculoskeletal: Positive for arthralgias  Negative for back pain  Skin: Positive for wound  Negative for color change, pallor and rash  Neurological: Negative  Negative for seizures and syncope  Hematological: Negative  Psychiatric/Behavioral: Negative  All other systems reviewed and are negative  Past Medical and Surgical History:   Past Medical History:   Diagnosis Date   • Abnormal ECG     Last Assessed 9/29/2016   • Anxiety     Last Assessed 6/08/2016   • Arthritis     knees   • Asthma     Last Assessed 11/06/2013   • Atrial premature complex    • Cataract, bilateral     Last Assessed 7/14/2016   • Cataract, left eye     Both eyes  Had surgery on left     • COVID-19    • Difficulty swallowing    • Diverticulosis 9/25/2022   • Dizziness    • Fibromyalgia    • Fibromyalgia, primary    • Gastric reflux    • Heart failure ([facility] [address] )     5/23/22 Pt reports had heart failure in the past   • History of COVID-19     5/23/22 Pt reports having COVID in 2021  • History of transfusion     no reaction   • [place] (hard of hearing)    • Hyperlipidemia    • Hypertension    • Incontinence in female     5/23/22 Pt reports has incontinence -wears depends especially at night/riding in car   • Irregular heart beat     5/23/22 Pt reports hx of A fib   • Lyme disease    • PONV (postoperative nausea and vomiting)    • Premature ventricular contraction    • Primary osteoarthritis of both knees     Last Assessed 7/14/2016   • Rheumatic fever     5/23/22 Pt reports had hx of rheumatic fever as a child twice   • Sore throat    • Tuberculosis 1947       Past Surgical History:   Procedure Laterality Date   • APPENDECTOMY     • CARDIAC PACEMAKER PLACEMENT  2019   • COLONOSCOPY     • DENTAL SURGERY      extractions   • HYSTERECTOMY      5/23/22 Pt reports had appendix out with hysterectomy and "tightened up my bladder"   • IR ASPIRATION ONLY  9/30/2022   • ORIF FEMUR FRACTURE Left 08/05/2021    Procedure: OPEN REDUCTION W/ INTERNAL FIXATION (ORIF) DISTAL FEMUR; INSERTION RETROGRADE NAIL;  Surgeon: Obey Booker MD;  Location: [facility];  Service: Orthopedics   • HI ARTHRP KNE CONDYLE&PLATU MEDIAL&LAT COMPARTMENTS Left 9/12/2022    Procedure: ARTHROPLASTY KNEE TOTAL;  Surgeon: Obey Booker MD;  Location: [facility];  Service: Orthopedics   • HI DILATION [address] [facility] UNGUIDED SOUND/BOUGIE 1/MULT PASS N/A 04/06/2016    Procedure: DILATATION ESOPHAGEAL;  Surgeon: Nino Rea MD;  Location: [facility]; Service: Gastroenterology   • HI EGD TRANSORAL BIOPSY SINGLE/MULTIPLE N/A 04/06/2016    Procedure: ESOPHAGOGASTRODUODENOSCOPY (EGD); Surgeon: Nino Rea MD;  Location: [facility];   Service: Gastroenterology   • HI REMOVAL IMPLANT DEEP Left 9/12/2022    Procedure: REMOVAL NAIL IM  FEMUR;  Surgeon: Obey Booker MD;  [facility];  Service: Orthopedics   • HI XCAPSL CTRC RMVL INSJ IO LENS PROSTH W/O ECP Left 03/15/2016    Procedure: EXTRACTION EXTRACAPSULAR CATARACT PHACO INTRAOCULAR LENS (IOL); Surgeon: Jamey Villegas MD;  Location: [facility];  Service: Ophthalmology   • REPLACEMENT TOTAL KNEE Right    • REPLACEMENT TOTAL KNEE      right    • TONSILLECTOMY         Meds/Allergies:  Prior to Admission medications    Medication Sig Start Date End Date Taking?  Authorizing Provider   acetaminophen (TYLENOL) 325 mg tablet Take 975 mg by mouth every 6 (six) hours as needed    Historical Provider, MD   albuterol (PROVENTIL HFA,VENTOLIN HFA) 90 mcg/act inhaler INHALE 2 PUFFS BY MOUTH EVERY 6 HOURS AS NEEDED FOR WHEEZING 12/23/20   Gris Carrington MD   allopurinol (ZYLOPRIM) 100 mg tablet Take 1 tablet (100 mg total) by mouth daily 12/7/21   Gris Carrington MD   apixaban (Eliquis) 5 mg Take 1 tablet (5 mg total) by mouth 2 (two) times a day 8/23/22   Gris Carrington MD   ascorbic acid (VITAMIN C) 500 MG tablet Take 1 tablet (500 mg total) by mouth 2 (two) times a day 8/9/22   Henry Wesley PA-C   bisacodyl (DULCOLAX) 10 mg suppository Insert 10 mg into the rectum daily as needed    Historical Provider, MD   Calcium Polycarbophil (FIBER-LAX PO) Take 1 tablet by mouth daily at bedtime    Historical Provider, MD   Cholecalciferol 25 MCG (1000 UT) tablet Take 1,000 Units by mouth daily    Historical Provider, MD   Diclofenac Sodium (VOLTAREN) 1 % Apply 2 g topically 4 (four) times a day = to B/L knee    Historical Provider, MD   diltiazem (CARDIZEM CD) 120 mg 24 hr capsule Take 1 capsule (120 mg total) by mouth daily 3/29/22   Carolina Infante MD   DULoxetine (CYMBALTA) 30 mg delayed release capsule TAKE 1 CAPSULE(30 MG) BY MOUTH DAILY 8/5/22   Gris Carrington MD   ezetimibe (ZETIA) 10 mg tablet Take 1 tablet (10 mg total) by mouth daily 2/28/22   JERI Laura   ferrous sulfate 324 (65 Fe) mg Take 1 tablet (324 mg total) by mouth 2 (two) times a day before meals 8/9/22   Henry Wesley PA-C   folic acid (FOLVITE) 1 mg tablet TAKE 1 TABLET(1 MG) BY MOUTH DAILY 4/20/22   Yris Antonio PA-C   furosemide (LASIX) 40 mg tablet Take 40 mg by mouth in the morning    Historical Provider, MD   Melatonin 10 MG TABS Take by mouth Takes daily at night    Historical Provider, MD   metoprolol tartrate (LOPRESSOR) 50 mg tablet Take 100 mg in the morning and 50 mg in the evening 6/1/22   Cj Lanza MD   Multiple Vitamin (MULTIVITAMIN ADULT PO) Take by mouth in the morning    Historical Provider, MD   omeprazole (PriLOSEC) 20 mg delayed release capsule Take 20 mg by mouth daily    Historical Provider, MD   oxybutynin (DITROPAN-XL) 10 MG 24 hr tablet Take 10 mg by mouth daily at bedtime    Historical Provider, MD   polyethylene glycol (MIRALAX) 17 g packet Take 17 g by mouth daily as needed    Historical Provider, MD   senna-docusate sodium (SENOKOT S) 8 6-50 mg per tablet Take 2 tablets by mouth 2 (two) times a day 10/4/22   Marsha Thorne PA-C   sucralfate (CARAFATE) 1 g tablet Take 1 tablet (1 g total) by mouth 4 (four) times a day (before meals and at bedtime) 11/28/22   JERI Aldrich   vitamin B-12 (VITAMIN B-12) 1,000 mcg tablet Take 1 tablet (1,000 mcg total) by mouth daily 10/4/22   Brit Aguirre PA-C     I have reviewed home medications with patient personally  Allergies:    Allergies   Allergen Reactions   • Penicillins Hives     Itching and terrible hives   • Sulfa Antibiotics Itching   • R55 Folate [Folic Acid-Vit Z5-CZQ I94 - Food Allergy] Other (See Comments)     5/23/22 Pt reports no allergic reaction known    • [place] Other (See Comments)     Unknown, 5/23 -pt is unaware of reaction    • Lyrica [Pregabalin] Other (See Comments)     5/23/22 Pt reports doesn't remember reaction    • Other Other (See Comments)     Black rubber 5/23/22 per allergy test - pt unaware of reaction   • Cortisone Acetate [Cortisone] Itching and Rash     5/23/22 pt reports makes her violent    • Doxycycline Hives and Itching   • Nickel Other (See Comments)     Skin discoloration       Social History:  Marital Status:    Occupation: Retired  Patient Pre-hospital Living Situation: Presented from [facility] per patient  Patient Pre-hospital Level of Mobility: Limited; otherwise  unable to be assessed at time of evaluation  Patient Pre-hospital Diet Restrictions: Salt and fluid restricted diet  Substance Use History:   Social History     Substance and Sexual Activity   Alcohol Use Not Currently    Comment: Per [employer]- pt reports no current alcohol use at this time     Social History     Tobacco Use   Smoking Status Never   Smokeless Tobacco Never   Tobacco Comments    Denies any former or current smoking     Social History     Substance and Sexual Activity   Drug Use No    Comment: Denies any former or current drug use       Family History:  Family History   Problem Relation Age of Onset   • Coronary artery disease Mother    • Heart attack Mother         Prior   • Heart disease Father    • Heart attack Father         Prior   • Anesthesia problems Neg Hx        Physical Exam:     Vitals:   Blood Pressure: 141/65 (01/04/23 0530)  Pulse: (!) 122 (01/04/23 0530)  Temperature: 99 3 °F (37 4 °C) (01/04/23 0024)  Temp Source: Oral (01/04/23 0024)  Respirations: 20 (01/04/23 0530)  SpO2: 98 % (01/04/23 0530)    Physical Exam  Constitutional:       General: She is not in acute distress  Appearance: She is obese  She is not toxic-appearing  HENT:      Head: Normocephalic and atraumatic  Nose: No congestion  Mouth/Throat:      Mouth: Mucous membranes are moist       Pharynx: Oropharynx is clear  No oropharyngeal exudate or posterior oropharyngeal erythema  Comments: Poor dentition noted  Eyes:      General: No scleral icterus  Right eye: No discharge  Left eye: No discharge  Cardiovascular:      Rate and Rhythm: Regular rhythm  Tachycardia present  Pulses: Normal pulses        Heart sounds: No murmur heard   Pulmonary:      Effort: Pulmonary effort is normal  No respiratory distress  Breath sounds: Wheezing present  No rhonchi or rales  Abdominal:      General: There is no distension  Palpations: Abdomen is soft  There is no mass  Tenderness: There is no abdominal tenderness  There is no guarding  Musculoskeletal:         General: Signs of injury present  Normal range of motion  Cervical back: Normal range of motion and neck supple  No rigidity or tenderness  Right lower leg: Edema present  Left lower leg: Edema present  Skin:     General: Skin is warm  Capillary Refill: Capillary refill takes less than 2 seconds  Coloration: Skin is pale  Skin is not jaundiced  Findings: Bruising, erythema and lesion present  No rash  Comments: Clean and intact wound dressing noted on R knee with surrounding bruising and swelling   Neurological:      General: No focal deficit present  Mental Status: She is alert and oriented to person, place, and time  Mental status is at baseline  Motor: No weakness  Coordination: Coordination normal    Psychiatric:         Mood and Affect: Mood normal          Behavior: Behavior normal          Thought Content:  Thought content normal           Additional Data:     Lab Results:  Results from last 7 days   Lab Units 01/04/23  0037   WBC Thousand/uL 8 50   HEMOGLOBIN g/dL 9 4*   HEMATOCRIT % 31 5*   PLATELETS Thousands/uL 351   NEUTROS PCT % 50   LYMPHS PCT % 32   MONOS PCT % 10   EOS PCT % 6     Results from last 7 days   Lab Units 01/04/23  0037   SODIUM mmol/L 139   POTASSIUM mmol/L 3 8   CHLORIDE mmol/L 103   CO2 mmol/L 28   BUN mg/dL 21   CREATININE mg/dL 0 86   ANION GAP mmol/L 8   CALCIUM mg/dL 9 4   ALBUMIN g/dL 3 6   TOTAL BILIRUBIN mg/dL 0 53   ALK PHOS U/L 104   ALT U/L 11   AST U/L 16   GLUCOSE RANDOM mg/dL 138     Results from last 7 days   Lab Units 01/04/23  0037   INR  1 19             Results from last 7 days Lab Units 01/04/23  0335 01/04/23  0130   LACTIC ACID mmol/L 1 1 2 1*       Lines/Drains:  Invasive Devices     Peripheral Intravenous Line  Duration           Peripheral IV 01/04/23 Left;Ventral (anterior) Forearm <1 day    Peripheral IV 01/04/23 Right Antecubital <1 day                    Imaging: Reviewed radiology reports from this admission including: chest CT scan  CT chest without contrast   ED Interpretation by Iris Ji MD (01/04 0325)   FINDINGS:     LUNGS:  A few punctate calcified granuloma are seen within the right lung  Bilateral dependent atelectasis  No focal consolidation  The central airways are patent      PLEURA:  There is moderate right and small left pleural effusion with adjacent compressive atelectasis      HEART/GREAT VESSELS: Left-sided pacing device in place  Coronary artery calcifications  No pericardial effusion  No thoracic aortic aneurysm  Mild to moderate calcifications of the thoracic aorta      MEDIASTINUM AND JJ:  A right paratracheal calcified lymph node/granuloma measures 2 8 x 1 7 cm      CHEST WALL AND LOWER NECK:  Unremarkable      VISUALIZED STRUCTURES IN THE UPPER ABDOMEN:  Unremarkable      OSSEOUS STRUCTURES:  No acute fracture or destructive osseous lesion      IMPRESSION:     Moderate right and small left pleural effusions with adjacent compressive atelectasis        Workstation performed: [id number]      Final Result by Dorothy Chandler MD (01/04 3316)      Moderate right and small left pleural effusions with adjacent compressive atelectasis  Workstation performed: [id number]         XR chest 1 view portable   ED Interpretation by Iris Ji MD (01/04 0419)   Cardiomegaly, PPM read by me  EKG and Other Studies Reviewed on Admission:   · EKG: Junctional Rhythm;  BPM     ** Please Note: This note has been constructed using a voice recognition system   **

## 2023-01-04 NOTE — ED NOTES
Pt eating breakfast at this time      Latisha Hdez, [facility]  01/04/23 1467

## 2023-01-04 NOTE — ASSESSMENT & PLAN NOTE
Wt Readings from Last 3 Encounters:   12/28/22 99 7 kg (219 lb 12 8 oz)   12/20/22 97 5 kg (215 lb)   12/01/22 97 6 kg (215 lb 1 6 oz)     Lab Results   Component Value Date    LVEF 65 09/29/2022    NTBNP 1,534 (H) 09/26/2021    NTBNP 2,905 (H) 09/20/2021       • Presents with increased shortness of breath, dyspnea on exertion, lower extremity edema and cough with productive sputum  • NYHA Class III , Stage C  • Patient is currently volume overloaded  • Last ECHO in September showed estimated EF of 65%    Plan:  • GDMT: Diuretic: Lasix 40 mg PO daily at home, 40 mg daily IV while inpatient B-Blocker: metoprolol tarteate 100 mg in the AM and 50 mg in PM, (No perscribed ACE/ARB/ARNi, Aldos  Blocker or SGLT2-I)  • Continue home AC with Eliquis  · Diet: Sodium restriction 2g  · Labs: BMP, magnesium tomorrow a m    · Maintain Mg > 2 and K > 4; Replete prn  · Elevate HOB > 30°, Daily weights, Measure I&O  · Monitor respiratory status

## 2023-01-04 NOTE — ASSESSMENT & PLAN NOTE
· POA; as evidenced by low grade fever, tachycardia, lactic acidosis and tachypnea  · Blood Pressure: 141/65  · Pulse: (!) 122  · Respirations: 20   · Covid/Flu/RSV negative  · Patient with history of recurrent UTI  · Hx MDRO    Suspected source: Unclear at this time-UTI vs Soft tissue infection    Recent Labs     01/04/23  0037   WBC 8 50     Recent Labs     01/04/23  0130 01/04/23  0335   LACTICACID 2 1* 1 1     · CT chest without contrast showed moderate right and small left pleural effusions with adjacent compressive atelectasis        Plan:  • Blood and urine cultures pending  • Procalcitonin pending  • F u UA w/ reflex scope  • Abx: Vancomycin and Cefepime  o Narrow antibiotics pending culture data   • IVF: Currently held in setting of volume overload  • Trend fever curve  • CBC daily

## 2023-01-04 NOTE — SEPSIS NOTE
Sepsis Note   Kayla Ramos 76 y o  female MRN: [id number]  Unit/Bed#: ED-24 Encounter: [id number]       qSOFA     Row Name 01/04/23 0330 01/04/23 0159 01/04/23 0145 01/04/23 0024 01/04/23 0023    Altered mental status GCS < 15 -- 0 -- -- --    Respiratory Rate > / =22 1 -- 1 1 --    Systolic BP < / =725 0 -- -- -- 0    Q Sofa Score 1 1 1 1 --               Initial Sepsis Screening     Row Name 01/04/23 0241                Is the patient's history suggestive of a new or worsening infection? Yes (Proceed)  -AO        Suspected source of infection pneumonia  -AO        Are two or more of the following signs & symptoms of infection both present and new to the patient? Yes (Proceed)  -AO        Indicate SIRS criteria Tachycardia > 90 bpm;Tachypnea > 20 resp per min  -AO        If the answer is yes to both questions, suspicion of sepsis is present --        If severe sepsis is present AND tissue hypoperfusion perists in the hour after fluid resuscitation or lactate > 4, the patient meets criteria for SEPTIC SHOCK --        Are any of the following organ dysfunction criteria present within 6 hours of suspected infection and SIRS criteria that are NOT considered to be chronic conditions?  Yes  -AO        Organ dysfunction Lactate > 2 0 mmol/L  -AO        Date of presentation of severe sepsis 01/04/23  -AO        Time of presentation of severe sepsis 0242  -AO        Tissue hypoperfusion persists in the hour after crystalloid fluid administration, evidenced, by either: --        Was hypotension present within one hour of the conclusion of crystalloid fluid administration? --        Date of presentation of septic shock --        Time of presentation of septic shock --              User Key  (r) = Recorded By, (t) = Taken By, (c) = Cosigned By    [address] Name Provider Julio Royal MD Physician                  Default Flowsheet Data (last 720 hours)     Sepsis Reassess     Row Name 01/04/23 0320 Repeat Volume Status and Tissue Perfusion Assessment Performed    Repeat Volume Status and Tissue Perfusion Assessment Performed --           Volume Status and Tissue Perfusion Post Fluid Resuscitation * Must Document All *    Vital Signs Reviewed (HR, RR, BP, T) Yes  -AO        Shock Index Reviewed --        Arterial Oxygen Saturation Reviewed (POx, SaO2 or SpO2) Yes (comment %)  98% on nasal cannula indicating adequate oxygenation  -AO        Cardio Tachycardia  -AO        Pulmonary Other (comment); Tachypnea  breath sounds diminished bilaterally  -AO        Capillary Refill Sluggish  -AO        Peripheral Pulses Radial  -AO        Peripheral Pulse +3  -AO        Skin Warm;Dry  -AO        Urine output assessed None  -AO           *OR*   Intensive Monitoring- Must Document One of the Following Four *:    Vital Signs Reviewed --        * Central Venous Pressure (CVP or RAP) --        * Central Venous Oxygen (SVO2, ScvO2 or Oxygen saturation via central catheter) --        * Bedside Cardiovascular US in IVC diameter and % collapse --        * Passive Leg Raise OR Crystalloid Challenge --              User Key  (r) = Recorded By, (t) = Taken By, (c) = Cosigned By    [address] Name Provider Isa Olivares MD Physician

## 2023-01-04 NOTE — ASSESSMENT & PLAN NOTE
BP Readings from Last 3 Encounters:   01/04/23 141/65   12/28/22 140/78   12/01/22 116/62     Plan:  • Continue home metoprolol and diltiazem   • Lasix 40 mg IV daily for apparent volume overload in setting of decompensated CHF   • Monitor blood pressure

## 2023-01-04 NOTE — ASSESSMENT & PLAN NOTE
· S/p pacemaker placement  · Patient tachycardic into the 120's on presentation     Plan:   · Continue home metoprolol tartrate 100 mg in the morning and 50 mg in the afternoon   · Continue home diltiazem 120 mg daily   · Continue AC with Eliquis in setting of paroxysmal a-fib   · Monitor vital signs

## 2023-01-04 NOTE — ASSESSMENT & PLAN NOTE
· Patient noted to be hypoxic on presentation to ED    · Complained of cough and SOB on admission  · Currently requiring 2L via NC  · Received albuterol in the ED  · Covid/Flu/RSV negative   · CT chest showed bilateral pleural effusions; no evidence PNA  · Volume overloaded to exam  · In likely setting of decompensated CHF with volume overload    Plan:   · Continue supplemental O2, and wean as appropriate  · Incentive spirometry   · PRN nebs  · Diuresis as documented under CHF

## 2023-01-04 NOTE — ASSESSMENT & PLAN NOTE
Wt Readings from Last 3 Encounters:   12/28/22 99 7 kg (219 lb 12 8 oz)   12/20/22 97 5 kg (215 lb)   12/01/22 97 6 kg (215 lb 1 6 oz)     Lab Results   Component Value Date    LVEF 65 09/29/2022    NTBNP 1,534 (H) 09/26/2021    NTBNP 2,905 (H) 09/20/2021       • Presents with increased shortness of breath, dyspnea on exertion, lower extremity edema and cough with productive sputum  • Patient is currently volume overloaded  • Last ECHO in September 2022 showed estimated EF of 65%  • CXR 1/4/2023 showed Hazy density at the lung bases could reflect pleural effusions and/or artifact from patient's soft tissues and technique  Clear lungs    Plan:  • GDMT: Diuretic: Lasix 40 mg PO daily at home, 40 mg daily IV while inpatient B-Blocker: metoprolol tarteate 100 mg in the AM and 50 mg in PM, (No perscribed ACE/ARB/ARNi, Aldos  Blocker or SGLT2-I)  • Continue home AC with Eliquis  · Diet: Sodium restriction 2g  · Labs: BMP, magnesium tomorrow a m    · Maintain Mg > 2 and K > 4; Replete prn  · Elevate HOB > 30°, Daily weights, Measure I&O  · Monitor respiratory status

## 2023-01-04 NOTE — PROGRESS NOTES
Benito Evans is a 76 y o  female who is currently ordered Vancomycin IV with management by the Pharmacy Consult service  Relevant clinical data and objective / subjective history reviewed  Vancomycin Assessment:  Indication and Goal AUC/Trough: Urinary tract infection (goal -600, trough >10), -600, trough >10  Clinical Status: stable  Micro:     Renal Function:  SCr: 0 86 mg/dL  CrCl: 51 4 mL/min  Renal replacement: Not on dialysis  Days of Therapy: 1  Current Dose: vancomycin 1500mg q12h  Vancomycin Plan:  New Dosing: vancomycin 750mg q12h  Estimated AUC: 413 mcg*hr/mL  Estimated Trough: 14 1 mcg/mL  Next Level: 1/5 1600  Renal Function Monitoring: Daily BMP and UOP  Pharmacy will continue to follow closely for s/sx of nephrotoxicity, infusion reactions and appropriateness of therapy  BMP and CBC will be ordered per protocol  We will continue to follow the patient’s culture results and clinical progress daily      Zachary George, Pharmacist

## 2023-01-04 NOTE — ASSESSMENT & PLAN NOTE
· Patient on extensive bowel regimen at baseline  · Continue Senakot and metamucil   · Miralax PRN   · Doculax suppository PRN

## 2023-01-04 NOTE — ED PROVIDER NOTES
History  Chief Complaint   Patient presents with   • Shortness of Breath     Pt from [facility]  Pt states she began today with SOB / wheezing / palpitations  Per EMS pt 90% RA, duoneb and albuterol given PTA  Pt denies any other sx at this time  Patient is a 76year old female with worsening sob and cough since last night  Is due for knee surgery today  No fever  No chest pain  No N/V  Does not know if she is taking eliquis  Medication record from nursing facility states end date of eliquis on 11/28/22 and is on hold  Was last seen in this ED on 11/25/22 for MICHAEL  [facility] website checked on this patient and last Rx filled was on 9/16/22 for oxycodone for 6 day supply  Last dose of eliquis was 9 AM yesterday as per nursing facility (ED RN called nursing facility)  Pulse ox was 90% on RA in the field as per EMS  History provided by:  Patient, EMS personnel and nursing home   used: No    Shortness of Breath  Associated symptoms: cough    Associated symptoms: no chest pain, no fever and no vomiting        Prior to Admission Medications   Prescriptions Last Dose Informant Patient Reported? Taking?    Calcium Polycarbophil (FIBER-LAX PO)   Yes No   Sig: Take 1 tablet by mouth daily at bedtime   Cholecalciferol 25 MCG (1000 UT) tablet   Yes No   Sig: Take 1,000 Units by mouth daily   DULoxetine (CYMBALTA) 30 mg delayed release capsule   No No   Sig: TAKE 1 CAPSULE(30 MG) BY MOUTH DAILY   Diclofenac Sodium (VOLTAREN) 1 %   Yes No   Sig: Apply 2 g topically 4 (four) times a day = to B/L knee   Melatonin 10 MG TABS   Yes No   Sig: Take by mouth Takes daily at night   Multiple Vitamin (MULTIVITAMIN ADULT PO)   Yes No   Sig: Take by mouth in the morning   acetaminophen (TYLENOL) 325 mg tablet   Yes No   Sig: Take 975 mg by mouth every 6 (six) hours as needed   albuterol (PROVENTIL HFA,VENTOLIN HFA) 90 mcg/act inhaler   No No   Sig: INHALE 2 PUFFS BY MOUTH EVERY 6 HOURS AS NEEDED FOR WHEEZING allopurinol (ZYLOPRIM) 100 mg tablet   No No   Sig: Take 1 tablet (100 mg total) by mouth daily   apixaban (Eliquis) 5 mg   No No   Sig: Take 1 tablet (5 mg total) by mouth 2 (two) times a day   ascorbic acid (VITAMIN C) 500 MG tablet   No No   Sig: Take 1 tablet (500 mg total) by mouth 2 (two) times a day   bisacodyl (DULCOLAX) 10 mg suppository   Yes No   Sig: Insert 10 mg into the rectum daily as needed   diltiazem (CARDIZEM CD) 120 mg 24 hr capsule   No No   Sig: Take 1 capsule (120 mg total) by mouth daily   ezetimibe (ZETIA) 10 mg tablet   No No   Sig: Take 1 tablet (10 mg total) by mouth daily   ferrous sulfate 324 (65 Fe) mg   No No   Sig: Take 1 tablet (324 mg total) by mouth 2 (two) times a day before meals   folic acid (FOLVITE) 1 mg tablet   No No   Sig: TAKE 1 TABLET(1 MG) BY MOUTH DAILY   furosemide (LASIX) 40 mg tablet   Yes No   Sig: Take 40 mg by mouth in the morning   metoprolol tartrate (LOPRESSOR) 50 mg tablet   No No   Sig: Take 100 mg in the morning and 50 mg in the evening   omeprazole (PriLOSEC) 20 mg delayed release capsule   Yes No   Sig: Take 20 mg by mouth daily   oxybutynin (DITROPAN-XL) 10 MG 24 hr tablet   Yes No   Sig: Take 10 mg by mouth daily at bedtime   polyethylene glycol (MIRALAX) 17 g packet   Yes No   Sig: Take 17 g by mouth daily as needed   senna-docusate sodium (SENOKOT S) 8 6-50 mg per tablet   No No   Sig: Take 2 tablets by mouth 2 (two) times a day   sucralfate (CARAFATE) 1 g tablet   No No   Sig: Take 1 tablet (1 g total) by mouth 4 (four) times a day (before meals and at bedtime)   vitamin B-12 (VITAMIN B-12) 1,000 mcg tablet   No No   Sig: Take 1 tablet (1,000 mcg total) by mouth daily      Facility-Administered Medications: None       Past Medical History:   Diagnosis Date   • Abnormal ECG     Last Assessed 9/29/2016   • Anxiety     Last Assessed 6/08/2016   • Arthritis     knees   • Asthma     Last Assessed 11/06/2013   • Atrial premature complex    • Cataract, bilateral     Last Assessed 7/14/2016   • Cataract, left eye     Both eyes  Had surgery on left  • COVID-19    • Difficulty swallowing    • Diverticulosis 9/25/2022   • Dizziness    • Fibromyalgia    • Fibromyalgia, primary    • Gastric reflux    • Heart failure ([address] )     5/23/22 Pt reports had heart failure in the past   • History of COVID-19     5/23/22 Pt reports having COVID in 2021  • History of transfusion     no reaction   • [place] (hard of hearing)    • Hyperlipidemia    • Hypertension    • Incontinence in female     5/23/22 Pt reports has incontinence -wears depends especially at night/riding in car   • Irregular heart beat     5/23/22 Pt reports hx of A fib   • Lyme disease    • PONV (postoperative nausea and vomiting)    • Premature ventricular contraction    • Primary osteoarthritis of both knees     Last Assessed 7/14/2016   • Rheumatic fever     5/23/22 Pt reports had hx of rheumatic fever as a child twice   • Sore throat    • Tuberculosis 1947       Past Surgical History:   Procedure Laterality Date   • APPENDECTOMY     • CARDIAC PACEMAKER PLACEMENT  2019   • COLONOSCOPY     • DENTAL SURGERY      extractions   • HYSTERECTOMY      5/23/22 Pt reports had appendix out with hysterectomy and "tightened up my bladder"   • IR ASPIRATION ONLY  9/30/2022   • ORIF FEMUR FRACTURE Left 08/05/2021    Procedure: OPEN REDUCTION W/ INTERNAL FIXATION (ORIF) DISTAL FEMUR; INSERTION RETROGRADE NAIL;  Surgeon: Vish Price MD;  [facility];  Service: Orthopedics   • IA ARTHRP KNE CONDYLE&PLATU MEDIAL&LAT COMPARTMENTS Left 9/12/2022    Procedure: ARTHROPLASTY KNEE TOTAL;  Surgeon: Vish Price MD;  [facility];  Service: Orthopedics   • IA DILATION [facility] UNGUIDED SOUND/BOUGIE 1/MULT PASS N/A 04/06/2016    Procedure: DILATATION ESOPHAGEAL;  Surgeon: Romario Ellington MD;  Location: [facility];   Service: Gastroenterology   • IA EGD TRANSORAL BIOPSY SINGLE/MULTIPLE N/A 04/06/2016    Procedure: ESOPHAGOGASTRODUODENOSCOPY (EGD); Surgeon: Jenn Clinton MD;  Location: [facility]; Service: Gastroenterology   • AL REMOVAL IMPLANT DEEP Left 9/12/2022    Procedure: REMOVAL NAIL IM  FEMUR;  Surgeon: Abelardo Garg MD;  [facility];  Service: Orthopedics   • AL XCAPSL CTRC RMVL INSJ IO LENS PROSTH W/O ECP Left 03/15/2016    Procedure: EXTRACTION EXTRACAPSULAR CATARACT PHACO INTRAOCULAR LENS (IOL); Surgeon: Linzie Opitz, MD;  [facility];  Service: Ophthalmology   • REPLACEMENT TOTAL KNEE Right    • REPLACEMENT TOTAL KNEE      right    • TONSILLECTOMY         Family History   Problem Relation Age of Onset   • Coronary artery disease Mother    • Heart attack Mother         Prior   • Heart disease Father    • Heart attack Father         Prior   • Anesthesia problems Neg Hx      I have reviewed and agree with the history as documented  E-Cigarette/Vaping   • E-Cigarette Use Never User    • Comments Denies any former or current use      E-Cigarette/Vaping Substances   • Nicotine No    • THC No    • CBD No    • Flavoring No    • Other No    • Unknown No      Social History     Tobacco Use   • Smoking status: Never   • Smokeless tobacco: Never   • Tobacco comments:     Denies any former or current smoking   Vaping Use   • Vaping Use: Never used   Substance Use Topics   • Alcohol use: Not Currently     Comment: Per [employer]- pt reports no current alcohol use at this time   • Drug use: No     Comment: Denies any former or current drug use       Review of Systems   Constitutional: Negative for fever  Respiratory: Positive for cough and shortness of breath  Cardiovascular: Negative for chest pain  Gastrointestinal: Negative for nausea and vomiting  Musculoskeletal: Positive for arthralgias  All other systems reviewed and are negative  Physical Exam  Physical Exam  Vitals and nursing note reviewed  Constitutional:       General: She is in acute distress (moderate)  HENT:      Head: Normocephalic and atraumatic  Mouth/Throat:      Mouth: Mucous membranes are moist    Eyes:      General: No scleral icterus  Cardiovascular:      Rate and Rhythm: Regular rhythm  Tachycardia present  Heart sounds: Normal heart sounds  No murmur heard  Pulmonary:      Effort: Respiratory distress present  Breath sounds: No stridor  No wheezing, rhonchi or rales  Comments: Tachypnea  Diminished breath sounds bilaterally  Abdominal:      General: Bowel sounds are normal       Palpations: Abdomen is soft  Tenderness: There is no abdominal tenderness  Musculoskeletal:         General: No deformity  Cervical back: Normal range of motion and neck supple  Right lower leg: Edema present  Left lower leg: Edema present  Skin:     General: Skin is warm and dry  Findings: Bruising (R knee) present  No rash  Neurological:      General: No focal deficit present  Mental Status: She is alert     Psychiatric:         Mood and Affect: Mood normal          Vital Signs  ED Triage Vitals   Temperature Pulse Respirations Blood Pressure SpO2   01/04/23 0024 01/04/23 0023 01/04/23 0024 01/04/23 0023 01/04/23 0023   99 3 °F (37 4 °C) (!) 123 (!) 30 127/63 95 %      Temp Source Heart Rate Source Patient Position - Orthostatic VS BP Location FiO2 (%)   01/04/23 0024 01/04/23 0023 01/04/23 0023 01/04/23 0023 --   Oral Monitor Lying Right arm       Pain Score       --                  Vitals:    01/04/23 0145 01/04/23 0330 01/04/23 0524 01/04/23 0530   BP:  149/64 141/65 141/65   Pulse: (!) 120 105 (!) 122 (!) 122   Patient Position - Orthostatic VS:  Lying  Lying         Visual Acuity      ED Medications  Medications   vancomycin (VANCOCIN) 2,000 mg in sodium chloride 0 9 % 500 mL IVPB (2,000 mg Intravenous New Bag 1/4/23 0525)   acetaminophen (TYLENOL) tablet 975 mg (has no administration in time range)   albuterol (PROVENTIL HFA,VENTOLIN HFA) inhaler 2 puff (has no administration in time range)   allopurinol (ZYLOPRIM) tablet 100 mg (has no administration in time range)   apixaban (ELIQUIS) tablet 5 mg (has no administration in time range)   ascorbic acid (VITAMIN C) tablet 500 mg (has no administration in time range)   bisacodyl (DULCOLAX) rectal suppository 10 mg (has no administration in time range)   psyllium (METAMUCIL) 1 packet (has no administration in time range)   cholecalciferol (VITAMIN D3) tablet 1,000 Units (has no administration in time range)   Diclofenac Sodium (VOLTAREN) 1 % topical gel 2 g (has no administration in time range)   diltiazem (CARDIZEM CD) 24 hr capsule 120 mg (has no administration in time range)   DULoxetine (CYMBALTA) delayed release capsule 30 mg (has no administration in time range)   ezetimibe (ZETIA) tablet 10 mg (has no administration in time range)   ferrous sulfate tablet 325 mg (has no administration in time range)   folic acid (FOLVITE) tablet 400 mcg (has no administration in time range)   melatonin tablet 6 mg (has no administration in time range)   metoprolol tartrate (LOPRESSOR) tablet 100 mg (100 mg Oral Given 1/4/23 0524)   metoprolol tartrate (LOPRESSOR) tablet 50 mg (has no administration in time range)   multivitamin stress formula tablet 1 tablet (has no administration in time range)   pantoprazole (PROTONIX) EC tablet 40 mg (40 mg Oral Given 1/4/23 0525)   oxybutynin (DITROPAN-XL) 24 hr tablet 10 mg (has no administration in time range)   polyethylene glycol (MIRALAX) packet 17 g (has no administration in time range)   senna-docusate sodium (SENOKOT S) 8 6-50 mg per tablet 2 tablet (has no administration in time range)   sucralfate (CARAFATE) tablet 1 g (has no administration in time range)   cyanocobalamin (VITAMIN B-12) tablet 1,000 mcg (has no administration in time range)   cefepime (MAXIPIME) 2 g/50 mL dextrose IVPB (has no administration in time range)   ipratropium-albuterol (DUO-NEB) 0 5-2 5 mg/3 mL inhalation solution 3 mL (has no administration in time range)   diphenhydrAMINE (BENADRYL) tablet 25 mg (has no administration in time range)   furosemide (LASIX) injection 40 mg (has no administration in time range)   vancomycin (VANCOCIN) IVPB (premix in dextrose) 750 mg 150 mL (has no administration in time range)   ipratropium-albuterol (FOR EMS ONLY) (DUO-NEB) 0 5-2 5 mg/3 mL inhalation solution 3 mL (0 mL Does not apply Given to EMS 1/4/23 0035)   albuterol (FOR EMS ONLY) (2 5 mg/3 mL) 0 083 % inhalation solution 2 5 mg (0 mg Does not apply Given to EMS 1/4/23 0035)   furosemide (LASIX) injection 20 mg (20 mg Intravenous Given 1/4/23 0335)   levofloxacin (LEVAQUIN) IVPB (premix in dextrose) 750 mg 150 mL (0 mg Intravenous Stopped 1/4/23 0519)       Diagnostic Studies  Results Reviewed     Procedure Component Value Units Date/Time    Urine Microscopic [[id number]]  (Abnormal) Collected: 01/04/23 0448    Lab Status: Final result Specimen: Urine, Clean Catch Updated: 01/04/23 0607     RBC, UA 4-10 /hpf      WBC, UA Innumerable /hpf      Epithelial Cells Occasional /hpf      Bacteria, UA Innumerable /hpf      Hyaline Casts, UA 0-3 /lpf      WBC Clumps Present    UA (URINE) with reflex to Scope [[id number]]  (Abnormal) Collected: 01/04/23 0448    Lab Status: Final result Specimen: Urine, Clean Catch Updated: 01/04/23 0541     Color, UA Light Yellow     Clarity, UA Turbid     Specific Gravity, UA 1 009     pH, UA 5 5     Leukocytes, UA Large     Nitrite, UA Positive     Protein, UA Negative mg/dl      Glucose, UA Negative mg/dl      Ketones, UA Negative mg/dl      Urobilinogen, UA <2 0 mg/dl      Bilirubin, UA Negative     Occult Blood, UA Moderate    Procalcitonin [[id number]]     Lab Status: No result Specimen: Blood     Urine culture [[id number]] Collected: 01/04/23 0448    Lab Status:  In process Specimen: Urine, Other Updated: 01/04/23 0458    CBC (With Platelets) [[id number]]     Lab Status: No result Specimen: Blood Lactic acid 2 Hours [[id number]]  (Normal) Collected: 01/04/23 0335    Lab Status: Final result Specimen: Blood from Arm, Left Updated: 01/04/23 0415     LACTIC ACID 1 1 mmol/L     Narrative:      Result may be elevated if tourniquet was used during collection  HS Troponin I 2hr [[id number]]  (Normal) Collected: 01/04/23 0226    Lab Status: Final result Specimen: Blood from Arm, Left Updated: 01/04/23 0316     hs TnI 2hr 10 ng/L      Delta 2hr hsTnI 0 ng/L     Lactic acid [[id number]]  (Abnormal) Collected: 01/04/23 0130    Lab Status: Final result Specimen: Blood from Arm, Left Updated: 01/04/23 0251     LACTIC ACID 2 1 mmol/L     Narrative:      Result may be elevated if tourniquet was used during collection  FLU/RSV/COVID - if FLU/RSV clinically relevant [[id number]]  (Normal) Collected: 01/04/23 0130    Lab Status: Final result Specimen: Nares from Nose Updated: 01/04/23 0215     SARS-CoV-2 Negative     INFLUENZA A PCR Negative     INFLUENZA B PCR Negative     RSV PCR Negative    Narrative:      FOR PEDIATRIC PATIENTS - copy/paste COVID Guidelines URL to browser: [email or web address]  [employer]    SARS-CoV-2 assay is a Nucleic Acid Amplification assay intended for the  qualitative detection of nucleic acid from SARS-CoV-2 in nasopharyngeal  swabs  Results are for the presumptive identification of SARS-CoV-2 RNA  Positive results are indicative of infection with SARS-CoV-2, the virus  causing COVID-19, but do not rule out bacterial infection or co-infection  with other viruses  Laboratories within the [place] and its  territories are required to report all positive results to the appropriate  public health authorities  Negative results do not preclude SARS-CoV-2  infection and should not be used as the sole basis for treatment or other  patient management decisions   Negative results must be combined with  clinical observations, patient history, and epidemiological information  This test has not been FDA cleared or approved  This test has been authorized by FDA under an Emergency Use Authorization  (EUA)  This test is only authorized for the duration of time the  declaration that circumstances exist justifying the authorization of the  emergency use of an in vitro diagnostic tests for detection of SARS-CoV-2  virus and/or diagnosis of COVID-19 infection under section 564(b)(1) of  the Act, 21 U  S C  606WAG-2(C)(5), unless the authorization is terminated  or revoked sooner  The test has been validated but independent review by FDA  and CLIA is pending  Test performed using [employer] Gene[employer]pert: This RT-PCR assay targets N2,  a region unique to SARS-CoV-2  A conserved region in the E-gene was chosen  for pan-Sarbecovirus detection which includes SARS-CoV-2  According to CMS-2020-01-R, this platform meets the definition of high-throughput technology  B-Type Natriuretic Peptide(BNP) , [place],  Campuses Only [[id number]]  (Abnormal) Collected: 01/04/23 0037    Lab Status: Final result Specimen: Blood from Arm, Right Updated: 01/04/23 0158      pg/mL     Blood gas, venous [[id number]]  (Abnormal) Collected: 01/04/23 0130    Lab Status: Final result Specimen: Blood from Arm, Left Updated: 01/04/23 0157     pH, Manny 7 362     pCO2, Manny 52 7 mm Hg      pO2, Manny 52 4 mm Hg      HCO3, Manny 29 2 mmol/L      Base Excess, Manny 3 0 mmol/L      O2 Content, Manny 12 2 ml/dL      O2 HGB, VENOUS 81 7 %     Blood culture #2 [[id number]] Collected: 01/04/23 0130    Lab Status: In process Specimen: Blood from Arm, Right Updated: 01/04/23 0142    Blood culture #1 [[id number]] Collected: 01/04/23 0130    Lab Status:  In process Specimen: Blood from Arm, Left Updated: 01/04/23 0142    Magnesium [[id number]]  (Normal) Collected: 01/04/23 0037    Lab Status: Final result Specimen: Blood from Arm, Right Updated: 01/04/23 0141     Magnesium 2 0 mg/dL     Protime-INR [[id number]] (Abnormal) Collected: 01/04/23 0037    Lab Status: Final result Specimen: Blood from Arm, Right Updated: 01/04/23 0137     Protime 15 3 seconds      INR 1 19    APTT [[id number]]  (Abnormal) Collected: 01/04/23 0037    Lab Status: Final result Specimen: Blood from Arm, Right Updated: 01/04/23 0137     PTT 40 seconds     HS Troponin 0hr (reflex protocol) [[id number]]  (Normal) Collected: 01/04/23 0037    Lab Status: Final result Specimen: Blood from Arm, Right Updated: 01/04/23 0118     hs TnI 0hr 10 ng/L     Comprehensive metabolic panel [[id number]] Collected: 01/04/23 0037    Lab Status: Final result Specimen: Blood from Arm, Right Updated: 01/04/23 0112     Sodium 139 mmol/L      Potassium 3 8 mmol/L      Chloride 103 mmol/L      CO2 28 mmol/L      ANION GAP 8 mmol/L      BUN 21 mg/dL      Creatinine 0 86 mg/dL      Glucose 138 mg/dL      Calcium 9 4 mg/dL      AST 16 U/L      ALT 11 U/L      Alkaline Phosphatase 104 U/L      Total Protein 7 0 g/dL      Albumin 3 6 g/dL      Total Bilirubin 0 53 mg/dL      eGFR 64 ml/min/1 73sq m     Narrative:      [place] guidelines for Chronic Kidney Disease (CKD):   •  Stage 1 with normal or high GFR (GFR > 90 mL/min/1 73 square meters)  •  Stage 2 Mild CKD (GFR = 60-89 mL/min/1 73 square meters)  •  Stage 3A Moderate CKD (GFR = 45-59 mL/min/1 73 square meters)  •  Stage 3B Moderate CKD (GFR = 30-44 mL/min/1 73 square meters)  •  Stage 4 Severe CKD (GFR = 15-29 mL/min/1 73 square meters)  •  Stage 5 End Stage CKD (GFR <15 mL/min/1 73 square meters)  Note: GFR calculation is accurate only with a steady state creatinine    CBC and differential [[id number]]  (Abnormal) Collected: 01/04/23 0037    Lab Status: Final result Specimen: Blood from Arm, Right Updated: 01/04/23 0046     WBC 8 50 Thousand/uL      RBC 3 56 Million/uL      Hemoglobin 9 4 g/dL      Hematocrit 31 5 %      MCV 89 fL      MCH 26 4 pg      MCHC 29 8 g/dL      RDW 16 9 %      MPV 8 5 fL Platelets 575 Thousands/uL      nRBC 0 /100 WBCs      Neutrophils Relative 50 %      Immat GRANS % 1 %      Lymphocytes Relative 32 %      Monocytes Relative 10 %      Eosinophils Relative 6 %      Basophils Relative 1 %      Neutrophils Absolute 4 37 Thousands/µL      Immature Grans Absolute 0 05 Thousand/uL      Lymphocytes Absolute 2 72 Thousands/µL      Monocytes Absolute 0 83 Thousand/µL      Eosinophils Absolute 0 48 Thousand/µL      Basophils Absolute 0 05 Thousands/µL                  CT chest without contrast   ED Interpretation by Burak Day MD (01/04 8956)   FINDINGS:     LUNGS:  A few punctate calcified granuloma are seen within the right lung  Bilateral dependent atelectasis  No focal consolidation  The central airways are patent      PLEURA:  There is moderate right and small left pleural effusion with adjacent compressive atelectasis      HEART/GREAT VESSELS: Left-sided pacing device in place  Coronary artery calcifications  No pericardial effusion  No thoracic aortic aneurysm  Mild to moderate calcifications of the thoracic aorta      MEDIASTINUM AND JJ:  A right paratracheal calcified lymph node/granuloma measures 2 8 x 1 7 cm      CHEST WALL AND LOWER NECK:  Unremarkable      VISUALIZED STRUCTURES IN THE UPPER ABDOMEN:  Unremarkable      OSSEOUS STRUCTURES:  No acute fracture or destructive osseous lesion      IMPRESSION:     Moderate right and small left pleural effusions with adjacent compressive atelectasis        Workstation performed: [id number]      Final Result by Carol Hutton MD (01/04 3970)      Moderate right and small left pleural effusions with adjacent compressive atelectasis  Workstation performed: [id number]         XR chest 1 view portable   ED Interpretation by Burak Day MD (01/04 0059)   Cardiomegaly, PPM read by me                    Procedures  ECG 12 Lead Documentation Only    Date/Time: 1/4/2023 12:33 AM  Performed by: Mone Ceja Yudith Harris MD  Authorized by: Cynthia Tran MD     Indications / Diagnosis:  Sob  ECG reviewed by me, the ED Provider: yes    Patient location:  ED  Previous ECG:     Previous ECG:  Compared to current    Comparison ECG info:  11/25/22    Similarity:  Changes noted (not a  fib now)  Quality:     Tracing quality:  Limited by artifact  Rate:     ECG rate:  123    ECG rate assessment: tachycardic    Rhythm:     Rhythm: other rhythm      Rhythm comment:  Accelerated junctional rhythm vs  MAT  Ectopy:     Ectopy: none    QRS:     QRS axis:  Normal  ST segments:     ST segments:  Non-specific  T waves:     T waves: inverted      Inverted:  II, V4, V5 and V6  Q waves:     Q waves:  V1 and V2             ED Course  ED Course as of 01/04/23 0612   Wed Jan 04, 2023   0239 BNP(!): 218  IV lasix ordered   0242 Sepsis alert called by me and IV abx ordered  0347 Labs, CXR and CT chest d/w patient  HEART Risk Score    Flowsheet Row Most Recent Value   Heart Score Risk Calculator    History 1 Filed at: 01/04/2023 0241   ECG 1 Filed at: 01/04/2023 0241   Age 2 Filed at: 01/04/2023 0241   Risk Factors 2 Filed at: 01/04/2023 0241   Troponin 0 Filed at: 01/04/2023 0241   HEART Score 6 Filed at: 01/04/2023 0241                     Initial Sepsis Screening     Row Name 01/04/23 0241                Is the patient's history suggestive of a new or worsening infection? Yes (Proceed)  -AO        Suspected source of infection pneumonia  -AO        Are two or more of the following signs & symptoms of infection both present and new to the patient?  Yes (Proceed)  -AO        Indicate SIRS criteria Tachycardia > 90 bpm;Tachypnea > 20 resp per min  -AO        If the answer is yes to both questions, suspicion of sepsis is present --        If severe sepsis is present AND tissue hypoperfusion perists in the hour after fluid resuscitation or lactate > 4, the patient meets criteria for SEPTIC SHOCK --        Are any of the following organ dysfunction criteria present within 6 hours of suspected infection and SIRS criteria that are NOT considered to be chronic conditions? Yes  -AO        Organ dysfunction Lactate > 2 0 mmol/L  -AO        Date of presentation of severe sepsis 01/04/23  -AO        Time of presentation of severe sepsis 0242  -AO        Tissue hypoperfusion persists in the hour after crystalloid fluid administration, evidenced, by either: --        Was hypotension present within one hour of the conclusion of crystalloid fluid administration? --        Date of presentation of septic shock --        Time of presentation of septic shock --              User Key  (r) = Recorded By, (t) = Taken By, (c) = Cosigned By    [address] Name Provider Type    AO Palmer Robles MD Physician              Default Flowsheet Data (last 720 hours)     Sepsis Reassess     Row Name 01/04/23 0416                   Repeat Volume Status and Tissue Perfusion Assessment Performed    Repeat Volume Status and Tissue Perfusion Assessment Performed --           Volume Status and Tissue Perfusion Post Fluid Resuscitation * Must Document All *    Vital Signs Reviewed (HR, RR, BP, T) Yes  -AO        Shock Index Reviewed --        Arterial Oxygen Saturation Reviewed (POx, SaO2 or SpO2) Yes (comment %)  98% on nasal cannula indicating adequate oxygenation  -AO        Cardio Tachycardia  -AO        Pulmonary Other (comment); Tachypnea  breath sounds diminished bilaterally  -AO        Capillary Refill Sluggish  -AO        Peripheral Pulses Radial  -AO        Peripheral Pulse +3  -AO        Skin Warm;Dry  -AO        Urine output assessed None  -AO           *OR*   Intensive Monitoring- Must Document One of the Following Four *:    Vital Signs Reviewed --        * Central Venous Pressure (CVP or RAP) --        * Central Venous Oxygen (SVO2, ScvO2 or Oxygen saturation via central catheter) --        * Bedside Cardiovascular US in IVC diameter and % collapse --        * Passive Leg Raise OR Crystalloid Challenge --              User Key  (r) = Recorded By, (t) = Taken By, (c) = Cosigned By    [address] Name Provider Lana Dobson MD Physician                            Medical Decision Making  DDX including but not limited to: pneumonia, pleural effusion, CHF, doubt PE since patient is on eliquis; PTX, ACS, MI, asthma exacerbation, COPD exacerbation, COVID 23, arrhythmia, anemia, metabolic abnormality, renal failure  Amount and/or Complexity of Data Reviewed  External Data Reviewed: labs, radiology, ECG and notes  Labs: ordered  Radiology: ordered and independent interpretation performed  Risk  Prescription drug management  Disposition  Final diagnoses:   Severe sepsis ([facility] )   CHF (congestive heart failure) ([facility])   UTI (urinary tract infection)     Time reflects when diagnosis was documented in both MDM as applicable and the Disposition within this note     Time User Action Codes Description Comment    1/4/2023  3:26 AM Danie Mercy Add [A41 9,  R65 20] Severe sepsis ([facility] )     1/4/2023  3:26 AM Danie Mercy Add [I50 9] CHF (congestive heart failure) ([facility] )     1/4/2023  4:54 AM Avanell Harder Add [M25 561] Acute pain of right knee     1/4/2023  6:12 AM Danie Mercy Add [N39 0] UTI (urinary tract infection)       ED Disposition     ED Disposition   Admit    Condition   Stable    Date/Time   Wed Jan 4, 2023  4:49 AM    Comment   Case was discussed with [facility] resident and the patient's admission status was agreed to be Admission Status: inpatient status to the service of Dr Moon Banuelos   Follow-up Information    None         Patient's Medications   Discharge Prescriptions    No medications on file       No discharge procedures on file      PDMP Review       Value Time User    PDMP Reviewed  Yes 1/4/2023 12:40 AM Genna Jones MD          ED Provider  Electronically Signed by           Genna Jones MD  01/04/23 Roderick Belle MD  01/04/23 0207

## 2023-01-04 NOTE — CONSULTS
Vancomycin IV Pharmacy-to-Dose Consultation    Emy Correia is a 76 y o  female who was receiving Vancomycin IV with management by the Pharmacy Consult service  The patient's Vancomycin therapy has been discontinued  Thank you for allowing us to take part in this patient's care  Pharmacy will sign-off now; please call or re-consult if there are any questions          Sissy Torres, PharmD  Pharmacist

## 2023-01-04 NOTE — PLAN OF CARE
Problem: MOBILITY - ADULT  Goal: Maintain or return to baseline ADL function  Description: INTERVENTIONS:  -  Assess patient's ability to carry out ADLs; assess patient's baseline for ADL function and identify physical deficits which impact ability to perform ADLs (bathing, care of mouth/teeth, toileting, grooming, dressing, etc )  - Assess/evaluate cause of self-care deficits   - Assess range of motion  - Assess patient's mobility; develop plan if impaired  - Assess patient's need for assistive devices and provide as appropriate  - Encourage maximum independence but intervene and supervise when necessary  - Involve family in performance of ADLs  - Assess for home care needs following discharge   - Consider OT consult to assist with ADL evaluation and planning for discharge  - Provide patient education as appropriate  Outcome: Progressing  Goal: Maintains/Returns to pre admission functional level  Description: INTERVENTIONS:  - Perform BMAT or MOVE assessment daily    - Set and communicate daily mobility goal to care team and patient/family/caregiver     - Collaborate with rehabilitation services on mobility goals if consulted  -- Record patient progress and toleration of activity level   Outcome: Progressing     Problem: Potential for Falls  Goal: Patient will remain free of falls  Description: INTERVENTIONS:  - Educate patient/family on patient safety including physical limitations  - Instruct patient to call for assistance with activity   - Consult OT/PT to assist with strengthening/mobility   - Keep Call bell within reach  - Keep bed low and locked with side rails adjusted as appropriate  - Keep care items and personal belongings within reach  - Initiate and maintain comfort rounds  - Make Fall Risk Sign visible to staff  - Offer Toileting every 2 Hours, in advance of need  - Initiate/Maintain bed alarm  - Obtain necessary fall risk management equipment:  - Apply yellow socks and bracelet for high fall risk patients  - Consider moving patient to room near nurses station  Outcome: Progressing     Problem: Prexisting or High Potential for Compromised Skin Integrity  Goal: Skin integrity is maintained or improved  Description: INTERVENTIONS:  - Identify patients at risk for skin breakdown  - Assess and monitor skin integrity  - Assess and monitor nutrition and hydration status  - Monitor labs   - Assess for incontinence   - Turn and reposition patient  - Assist with mobility/ambulation  - Relieve pressure over bony prominences  - Avoid friction and shearing  - Provide appropriate hygiene as needed including keeping skin clean and dry  - Evaluate need for skin moisturizer/barrier cream  - Collaborate with interdisciplinary team   - Patient/family teaching  - Consider wound care consult   Outcome: Progressing     Problem: CARDIOVASCULAR - ADULT  Goal: Maintains optimal cardiac output and hemodynamic stability  Description: INTERVENTIONS:  - Monitor I/O, vital signs and rhythm  - Monitor for S/S and trends of decreased cardiac output  - Administer and titrate ordered vasoactive medications to optimize hemodynamic stability  - Assess quality of pulses, skin color and temperature  - Assess for signs of decreased coronary artery perfusion  - Instruct patient to report change in severity of symptoms  Outcome: Progressing  Goal: Absence of cardiac dysrhythmias or at baseline rhythm  Description: INTERVENTIONS:  - Continuous cardiac monitoring, vital signs, obtain 12 lead EKG if ordered  - Administer antiarrhythmic and heart rate control medications as ordered  - Monitor electrolytes and administer replacement therapy as ordered  Outcome: Progressing     Problem: RESPIRATORY - ADULT  Goal: Achieves optimal ventilation and oxygenation  Description: INTERVENTIONS:  - Assess for changes in respiratory status  - Assess for changes in mentation and behavior  - Position to facilitate oxygenation and minimize respiratory effort  - Oxygen administered by appropriate delivery if ordered  - Initiate smoking cessation education as indicated  - Encourage broncho-pulmonary hygiene including cough, deep breathe, Incentive Spirometry  - Assess the need for suctioning and aspirate as needed  - Assess and instruct to report SOB or any respiratory difficulty  - Respiratory Therapy support as indicated  Outcome: Progressing

## 2023-01-04 NOTE — ASSESSMENT & PLAN NOTE
Lab Results   Component Value Date    EGFR 64 01/04/2023    EGFR 61 11/27/2022    EGFR 25 11/25/2022    CREATININE 0 86 01/04/2023    CREATININE 0 90 11/27/2022    CREATININE 1 85 (H) 11/25/2022     · Without superimposed MICHAEL Renal function noted WDL on admission CMP  · Volume overloaded on admission exam; diurese with Lasix 40 mg IV daily  · Avoid nephrotoxic medications  · I&O  · Monitor BMP

## 2023-01-04 NOTE — CASE MANAGEMENT
Case Management Assessment & Discharge Planning Note    Patient name Matt Gomes  Location ED-24/ED-24 MRN [id number]  : 1945 Date 2023       Current Admission Date: 2023  Current Admission Diagnosis:Sepsis [facility])   Patient Active Problem List    Diagnosis Date Noted   • Sepsis ([facility] ) 2023   • Volume overload 2023   • Chronic constipation 2023   • Fall 2022   • Abnormal CT of the abdomen 2022   • History of bacteremia 12/15/2022   • Acute pain of right knee 2022   • Hyperkalemia 2022   • Suspected UTI 2022   • Dehydration 2022   • UTI (urinary tract infection) 2022   • Hyponatremia 2022   • Rash 2022   • Proctitis 2022   • Nausea 2022   • Hypokalemia 2022   • Dermatitis associated with incontinence 10/26/2022   • Physical deconditioning 10/19/2022   • Chronic heart failure with preserved ejection fraction ([address] ) 10/19/2022   • Status post fall 10/15/2022   • Thrombocytosis 10/11/2022   • Pressure injury of left buttock, stage 3 ([address] ) 10/05/2022   • Aortic regurgitation 10/04/2022   • Asymptomatic bacteriuria 10/03/2022   • Bacteremia due to methicillin resistant Staphylococcus epidermidis 2022   • Anemia 2022   • Bladder wall thickening 2022   • left femoral fluid collection 2022   • Pleural effusion on right 2022   • Vomiting and diarrhea 2022   • Surgical wound present 2022   • Renal insufficiency    • Stage 3a chronic kidney disease ([address] ) 2022   • Preop examination 2022   • Preop cardiovascular exam 2022   • Poor dentition 2022   • Closed bicondylar fracture of left femur with delayed healing 2022   • Paroxysmal atrial fibrillation ([address] ) 2022   • Chronic gout with tophus 2021   • Current moderate episode of major depressive disorder ([facility] ) 2021   • Morbid obesity with body mass index (BMI) of 40 0 to 49 9 ([facility] ) 09/23/2021   • Reactive airway disease with acute exacerbation 09/21/2021   • Acute respiratory failure ([facility] ) 09/20/2021   • Ambulatory dysfunction 08/12/2021   • MICHAEL (acute kidney injury) ([facility] ) 08/04/2021   • Leukocytosis 08/02/2021   • Arthritis 05/18/2021   • Generalized weakness 05/18/2021   • HLD (hyperlipidemia) 11/10/2020   • Dysphonia 04/28/2020   • Vitamin D insufficiency 04/28/2020   • Chronic pain of left knee 08/12/2019   • Age related osteoporosis 08/12/2019   • Anxiety 03/27/2019   • Tremor 03/27/2019   • Other insomnia 02/27/2019   • Urinary incontinence 09/17/2018   • Psoriasis 07/20/2018   • Thyroid nodule 07/20/2018   • Atrial fibrillation RVR ([facility] ) 02/15/2018   • Tachy-smita syndrome ([facility] ) 02/15/2018   • Essential hypertension 02/15/2018   • Pacemaker 02/15/2018   • GERD (gastroesophageal reflux disease) 07/18/2017   • Mild carpal tunnel syndrome, right 04/10/2017   • Degenerative lumbar spinal stenosis 03/31/2017   • Low back pain 08/29/2016   • Lumbar degenerative disc disease 08/29/2016   • Chronic bilateral low back pain without sciatica 06/08/2016   • Chronic GERD 05/06/2016   • Overweight 02/12/2016   • Fibromyalgia 11/06/2013      LOS (days): 0  Geometric Mean LOS (GMLOS) (days): 5 00  Days to GMLOS:4 8     OBJECTIVE:    Risk of Unplanned Readmission Score: 24 95         Current admission status: Inpatient       Preferred Pharmacy:   [facility] [address] [facility], [address] [place] [address], [facility] [address] [place], [place] [address]joanne Ruiz [address]  Phone: [phone] Fax: [phone] [facility], [facility]  [address] [address]  [address]  [facility]  49  [address]  Phone: [phone] Fax: [phone]    Primary Care Provider: Thierno Saez MD    Primary Insurance: MEDICARE  Secondary Insurance: [address]:  [address], [facility] [facility] Phone: [phone] (Mobile)  Work Phone: [phone]            Readmission Root Cause  30 Day Readmission: No    Patient Information  Admitted from[de-identified] Facility ([facility])  Mental Status: Confused  During Assessment patient was accompanied by: Not accompanied during assessment  Assessment information provided by[de-identified] Other - please comment (Niece/Caregiver Netta)  Support Systems: Family members  [place] of Residence: [address] [place] [address] do you live in?: [place] entry access options   Select all that apply : No steps to enter home  Type of Current Residence: Facility ([facility])  Upon entering residence, is there a bedroom on the main floor (no further steps)?: Yes  Upon entering residence, is there a bathroom on the main floor (no further steps)?: Yes  In the last 12 months, was there a time when you were not able to pay the mortgage or rent on time?: No  In the last 12 months, how many places have you lived?: 2  In the last 12 months, was there a time when you did not have a steady place to sleep or slept in a shelter (including now)?: No  Is patient a ?: No    Activities of Daily Living Prior to Admission  Functional Status: Assistance  Completes ADLs independently?: No  Level of ADL dependence: Assistance  Ambulates independently?: No  Level of ambulatory dependence: Assistance  Does patient use assisted devices?: Yes  Assisted Devices (DME) used: Maryana Socks, Wheelchair  Does patient have a history of Outpatient Therapy (PT/OT)?: No  Does the patient have a history of Short-Term Rehab?: Yes  Does patient have a history of HHC?: Yes    Patient Information Continued  Income Source: SSI/SSD  Does patient have prescription coverage?: Yes  Within the past 12 months, you worried that your food would run out before you got the money to buy more : Never true  Within the past 12 months, the food you bought just didn't last and you didn't have money to get more : Never true  Food insecurity resource given?: No  Does patient receive dialysis treatments?: No  Does patient have a history of Mental Health Diagnosis?: Yes (Anxiety)  Is patient receiving treatment for mental health?: No  Patient declined treatment information  Has patient received inpatient treatment related to mental health in the last 2 years?: No    PHQ 2/9 Screening   Reviewed PHQ 2/9 Depression Screening Score?: No    Means of Transportation  Means of Transport to Appts[de-identified] Family transport  In the past 12 months, has lack of transportation kept you from medical appointments or from getting medications?: No  In the past 12 months, has lack of transportation kept you from meetings, work, or from getting things needed for daily living?: No  Was application for public transport provided?: No    DISCHARGE DETAILS:    Discharge planning discussed with[de-identified] Patient and niece  Freedom of Choice: Yes  Comments - Freedom of Choice: Choice is to return to [address] [place] [address] at D/C  CM contacted family/caregiver?: Yes  Were Treatment Team discharge recommendations reviewed with patient/caregiver?: Yes  Did patient/caregiver verbalize understanding of patient care needs?: N/A- going to facility  Were patient/caregiver advised of the risks associated with not following Treatment Team discharge recommendations?: Yes    Contacts  Patient Contacts: Daniel Torres  Relationship to Patient[de-identified] Family  Contact Method: Phone  Phone Number: [phone]  Reason/Outcome: Emergency Contact, Discharge [address]         Is the patient interested in [facility] at discharge?: No    DME Referral Provided  Referral made for DME?: No    Other Referral/Resources/Interventions Provided:  Interventions: Facility Return, SNF  Referral Comments: Facility return referral opened to [place] via [facility]      Would you like to participate in our [address] service program?  : No - Declined    Treatment Team Recommendation: Facility Return, SNF  Discharge Destination Plan[de-identified] Facility Return, SNF ([facility])  Transport at Discharge : [facility], [facility] (PENDING CONDITION @ D/C )     Additional Comments: CM s/w patient bedside who requested call to [facility]  Chucky confirmed patient was discharged to [facility] for [address] in November 2022 and has since transitioned to LTC at facility  [facility] confirmed that patient still receives PT/OT at facility and that MA application is currently pending at this time       ANTONY Ramirez, GIOVANIW, ACJOSE ALFREDO, [facility]  01/04/23 10:27 AM

## 2023-01-04 NOTE — ASSESSMENT & PLAN NOTE
· Continue home regimen with Carafate 1g TID with meals and at bedtime and  pantoprazole 40 mg daily

## 2023-01-04 NOTE — TELEPHONE ENCOUNTER
[phone]/INWJQ from [facility] [address] calling on-call to advise that pt is having respiratory distress  Corina Archuleta was paged via TC    Please allow the on-call provider 20-30 mins to respond  If you have not heard from them within that time, please feel free to call back

## 2023-01-05 LAB
ANION GAP SERPL CALCULATED.3IONS-SCNC: 6 MMOL/L (ref 4–13)
BASOPHILS # BLD AUTO: 0.05 THOUSANDS/ÂΜL (ref 0–0.1)
BASOPHILS NFR BLD AUTO: 1 % (ref 0–1)
BUN SERPL-MCNC: 20 MG/DL (ref 5–25)
CALCIUM SERPL-MCNC: 9.5 MG/DL (ref 8.4–10.2)
CHLORIDE SERPL-SCNC: 102 MMOL/L (ref 96–108)
CO2 SERPL-SCNC: 31 MMOL/L (ref 21–32)
CREAT SERPL-MCNC: 0.86 MG/DL (ref 0.6–1.3)
EOSINOPHIL # BLD AUTO: 0.58 THOUSAND/ÂΜL (ref 0–0.61)
EOSINOPHIL NFR BLD AUTO: 10 % (ref 0–6)
ERYTHROCYTE [DISTWIDTH] IN BLOOD BY AUTOMATED COUNT: 16.8 % (ref 11.6–15.1)
GFR SERPL CREATININE-BSD FRML MDRD: 64 ML/MIN/1.73SQ M
GLUCOSE SERPL-MCNC: 110 MG/DL (ref 65–140)
HCT VFR BLD AUTO: 30.7 % (ref 34.8–46.1)
HGB BLD-MCNC: 9 G/DL (ref 11.5–15.4)
IMM GRANULOCYTES # BLD AUTO: 0.03 THOUSAND/UL (ref 0–0.2)
IMM GRANULOCYTES NFR BLD AUTO: 1 % (ref 0–2)
LYMPHOCYTES # BLD AUTO: 1.32 THOUSANDS/ÂΜL (ref 0.6–4.47)
LYMPHOCYTES NFR BLD AUTO: 22 % (ref 14–44)
MCH RBC QN AUTO: 26.4 PG (ref 26.8–34.3)
MCHC RBC AUTO-ENTMCNC: 29.3 G/DL (ref 31.4–37.4)
MCV RBC AUTO: 90 FL (ref 82–98)
MONOCYTES # BLD AUTO: 0.65 THOUSAND/ÂΜL (ref 0.17–1.22)
MONOCYTES NFR BLD AUTO: 11 % (ref 4–12)
NEUTROPHILS # BLD AUTO: 3.43 THOUSANDS/ÂΜL (ref 1.85–7.62)
NEUTS SEG NFR BLD AUTO: 55 % (ref 43–75)
NRBC BLD AUTO-RTO: 0 /100 WBCS
PLATELET # BLD AUTO: 347 THOUSANDS/UL (ref 149–390)
PMV BLD AUTO: 8.6 FL (ref 8.9–12.7)
POTASSIUM SERPL-SCNC: 4 MMOL/L (ref 3.5–5.3)
PROCALCITONIN SERPL-MCNC: 0.05 NG/ML
RBC # BLD AUTO: 3.41 MILLION/UL (ref 3.81–5.12)
SODIUM SERPL-SCNC: 139 MMOL/L (ref 135–147)
WBC # BLD AUTO: 6.06 THOUSAND/UL (ref 4.31–10.16)

## 2023-01-05 RX ADMIN — DICLOFENAC SODIUM 2 G: 10 GEL TOPICAL at 17:23

## 2023-01-05 RX ADMIN — DICLOFENAC SODIUM 2 G: 10 GEL TOPICAL at 21:26

## 2023-01-05 RX ADMIN — DICLOFENAC SODIUM 2 G: 10 GEL TOPICAL at 12:00

## 2023-01-05 RX ADMIN — OXYBUTYNIN CHLORIDE 10 MG: 5 TABLET, EXTENDED RELEASE ORAL at 21:19

## 2023-01-05 RX ADMIN — PANTOPRAZOLE SODIUM 40 MG: 40 TABLET, DELAYED RELEASE ORAL at 06:28

## 2023-01-05 RX ADMIN — Medication 1 PACKET: at 21:25

## 2023-01-05 RX ADMIN — ALLOPURINOL 100 MG: 100 TABLET ORAL at 08:42

## 2023-01-05 RX ADMIN — DULOXETINE HYDROCHLORIDE 30 MG: 30 CAPSULE, DELAYED RELEASE ORAL at 08:43

## 2023-01-05 RX ADMIN — METOPROLOL TARTRATE 50 MG: 50 TABLET, FILM COATED ORAL at 21:26

## 2023-01-05 RX ADMIN — FERROUS SULFATE TAB 325 MG (65 MG ELEMENTAL FE) 325 MG: 325 (65 FE) TAB at 08:43

## 2023-01-05 RX ADMIN — CYANOCOBALAMIN TAB 500 MCG 1000 MCG: 500 TAB at 08:43

## 2023-01-05 RX ADMIN — SUCRALFATE 1 G: 1 TABLET ORAL at 21:26

## 2023-01-05 RX ADMIN — FOLIC ACID TAB 400 MCG 400 MCG: 400 TAB at 08:43

## 2023-01-05 RX ADMIN — EZETIMIBE 10 MG: 10 TABLET ORAL at 08:42

## 2023-01-05 RX ADMIN — ERTAPENEM SODIUM 1000 MG: 1 INJECTION, POWDER, LYOPHILIZED, FOR SOLUTION INTRAMUSCULAR; INTRAVENOUS at 08:49

## 2023-01-05 RX ADMIN — OXYCODONE HYDROCHLORIDE AND ACETAMINOPHEN 500 MG: 500 TABLET ORAL at 17:23

## 2023-01-05 RX ADMIN — METOPROLOL TARTRATE 100 MG: 100 TABLET, FILM COATED ORAL at 06:27

## 2023-01-05 RX ADMIN — CHOLECALCIFEROL TAB 25 MCG (1000 UNIT) 1000 UNITS: 25 TAB at 08:43

## 2023-01-05 RX ADMIN — B-COMPLEX W/ C & FOLIC ACID TAB 1 TABLET: TAB at 08:43

## 2023-01-05 RX ADMIN — APIXABAN 5 MG: 5 TABLET, FILM COATED ORAL at 08:43

## 2023-01-05 RX ADMIN — DILTIAZEM HYDROCHLORIDE 120 MG: 120 CAPSULE, COATED, EXTENDED RELEASE ORAL at 08:43

## 2023-01-05 RX ADMIN — DICLOFENAC SODIUM 2 G: 10 GEL TOPICAL at 09:04

## 2023-01-05 RX ADMIN — FUROSEMIDE 40 MG: 10 INJECTION, SOLUTION INTRAMUSCULAR; INTRAVENOUS at 08:43

## 2023-01-05 RX ADMIN — Medication 6 MG: at 21:25

## 2023-01-05 RX ADMIN — FERROUS SULFATE TAB 325 MG (65 MG ELEMENTAL FE) 325 MG: 325 (65 FE) TAB at 17:23

## 2023-01-05 RX ADMIN — SUCRALFATE 1 G: 1 TABLET ORAL at 17:23

## 2023-01-05 RX ADMIN — APIXABAN 5 MG: 5 TABLET, FILM COATED ORAL at 17:23

## 2023-01-05 RX ADMIN — SENNOSIDES AND DOCUSATE SODIUM 2 TABLET: 8.6; 5 TABLET ORAL at 17:23

## 2023-01-05 RX ADMIN — SENNOSIDES AND DOCUSATE SODIUM 2 TABLET: 8.6; 5 TABLET ORAL at 08:42

## 2023-01-05 RX ADMIN — SUCRALFATE 1 G: 1 TABLET ORAL at 06:28

## 2023-01-05 RX ADMIN — OXYCODONE HYDROCHLORIDE AND ACETAMINOPHEN 500 MG: 500 TABLET ORAL at 08:43

## 2023-01-06 ENCOUNTER — TELEPHONE (OUTPATIENT)
Dept: OTHER | Facility: OTHER | Age: 78
End: 2023-01-06

## 2023-01-06 VITALS
HEART RATE: 73 BPM | WEIGHT: 211.2 LBS | BODY MASS INDEX: 36.25 KG/M2 | DIASTOLIC BLOOD PRESSURE: 54 MMHG | OXYGEN SATURATION: 92 % | RESPIRATION RATE: 18 BRPM | TEMPERATURE: 98.4 F | SYSTOLIC BLOOD PRESSURE: 137 MMHG

## 2023-01-06 LAB
ANION GAP SERPL CALCULATED.3IONS-SCNC: 6 MMOL/L (ref 4–13)
BUN SERPL-MCNC: 19 MG/DL (ref 5–25)
CALCIUM SERPL-MCNC: 9.2 MG/DL (ref 8.4–10.2)
CHLORIDE SERPL-SCNC: 102 MMOL/L (ref 96–108)
CO2 SERPL-SCNC: 32 MMOL/L (ref 21–32)
CREAT SERPL-MCNC: 0.91 MG/DL (ref 0.6–1.3)
ERYTHROCYTE [DISTWIDTH] IN BLOOD BY AUTOMATED COUNT: 16.6 % (ref 11.6–15.1)
GFR SERPL CREATININE-BSD FRML MDRD: 60 ML/MIN/1.73SQ M
GLUCOSE SERPL-MCNC: 110 MG/DL (ref 65–140)
HCT VFR BLD AUTO: 30.3 % (ref 34.8–46.1)
HGB BLD-MCNC: 8.9 G/DL (ref 11.5–15.4)
MCH RBC QN AUTO: 26.4 PG (ref 26.8–34.3)
MCHC RBC AUTO-ENTMCNC: 29.4 G/DL (ref 31.4–37.4)
MCV RBC AUTO: 90 FL (ref 82–98)
PLATELET # BLD AUTO: 354 THOUSANDS/UL (ref 149–390)
PMV BLD AUTO: 8.8 FL (ref 8.9–12.7)
POTASSIUM SERPL-SCNC: 3.9 MMOL/L (ref 3.5–5.3)
RBC # BLD AUTO: 3.37 MILLION/UL (ref 3.81–5.12)
SODIUM SERPL-SCNC: 140 MMOL/L (ref 135–147)
WBC # BLD AUTO: 7.35 THOUSAND/UL (ref 4.31–10.16)

## 2023-01-06 RX ORDER — METOPROLOL TARTRATE 50 MG/1
50 TABLET, FILM COATED ORAL
Refills: 0 | Status: ON HOLD
Start: 2023-01-06

## 2023-01-06 RX ORDER — FUROSEMIDE 20 MG/1
20 TABLET ORAL
Refills: 0 | Status: ON HOLD
Start: 2023-01-06

## 2023-01-06 RX ORDER — METOPROLOL TARTRATE 100 MG/1
100 TABLET ORAL
Refills: 0 | Status: ON HOLD
Start: 2023-01-07

## 2023-01-06 RX ADMIN — CYANOCOBALAMIN TAB 500 MCG 1000 MCG: 500 TAB at 09:52

## 2023-01-06 RX ADMIN — DULOXETINE HYDROCHLORIDE 30 MG: 30 CAPSULE, DELAYED RELEASE ORAL at 09:52

## 2023-01-06 RX ADMIN — CHOLECALCIFEROL TAB 25 MCG (1000 UNIT) 1000 UNITS: 25 TAB at 09:52

## 2023-01-06 RX ADMIN — FERROUS SULFATE TAB 325 MG (65 MG ELEMENTAL FE) 325 MG: 325 (65 FE) TAB at 09:52

## 2023-01-06 RX ADMIN — PANTOPRAZOLE SODIUM 40 MG: 40 TABLET, DELAYED RELEASE ORAL at 05:49

## 2023-01-06 RX ADMIN — OXYCODONE HYDROCHLORIDE AND ACETAMINOPHEN 500 MG: 500 TABLET ORAL at 09:52

## 2023-01-06 RX ADMIN — ERTAPENEM SODIUM 1000 MG: 1 INJECTION, POWDER, LYOPHILIZED, FOR SOLUTION INTRAMUSCULAR; INTRAVENOUS at 09:52

## 2023-01-06 RX ADMIN — APIXABAN 5 MG: 5 TABLET, FILM COATED ORAL at 09:52

## 2023-01-06 RX ADMIN — SUCRALFATE 1 G: 1 TABLET ORAL at 12:35

## 2023-01-06 RX ADMIN — FUROSEMIDE 40 MG: 10 INJECTION, SOLUTION INTRAMUSCULAR; INTRAVENOUS at 09:52

## 2023-01-06 RX ADMIN — METOPROLOL TARTRATE 100 MG: 100 TABLET, FILM COATED ORAL at 05:49

## 2023-01-06 RX ADMIN — B-COMPLEX W/ C & FOLIC ACID TAB 1 TABLET: TAB at 09:52

## 2023-01-06 RX ADMIN — DICLOFENAC SODIUM 2 G: 10 GEL TOPICAL at 12:17

## 2023-01-06 RX ADMIN — FOLIC ACID TAB 400 MCG 400 MCG: 400 TAB at 09:54

## 2023-01-06 RX ADMIN — DICLOFENAC SODIUM 2 G: 10 GEL TOPICAL at 09:57

## 2023-01-06 RX ADMIN — EZETIMIBE 10 MG: 10 TABLET ORAL at 09:52

## 2023-01-06 RX ADMIN — ALLOPURINOL 100 MG: 100 TABLET ORAL at 09:52

## 2023-01-06 RX ADMIN — SENNOSIDES AND DOCUSATE SODIUM 2 TABLET: 8.6; 5 TABLET ORAL at 09:52

## 2023-01-06 RX ADMIN — DILTIAZEM HYDROCHLORIDE 120 MG: 120 CAPSULE, COATED, EXTENDED RELEASE ORAL at 09:52

## 2023-01-06 RX ADMIN — SUCRALFATE 1 G: 1 TABLET ORAL at 05:49

## 2023-01-06 NOTE — ASSESSMENT & PLAN NOTE
· POA; as evidenced by low grade fever, tachycardia, lactic acidosis and tachypnea  Blood Pressure: 140/59  Pulse: 70  · Respirations: 18   · Covid/Flu/RSV negative  · Patient with history of recurrent UTI  · Hx MDRO    Suspected source: Unclear at this time-UTI vs Soft tissue infection    Recent Labs     01/04/23  0037 01/04/23  0613 01/05/23  0602   WBC 8 50 6 49 6 06     Recent Labs     01/04/23  0130 01/04/23  0335   LACTICACID 2 1* 1 1     · CT chest without contrast showed moderate right and small left pleural effusions with adjacent compressive atelectasis  · Blood cultures negative  · Urine culture positive for E  Coli  · Pro-Montez negative  · Resolved  Plan:  • Abx: Ertapenem IV daily day 2  • Cefepime and vancomycin discontinued    • CBC daily

## 2023-01-06 NOTE — DISCHARGE INSTR - AVS FIRST PAGE
Dear Kathryn Avila,     It was our pleasure to care for you here at [facility]  It is our hope that we were always able to exceed the expected standards for your care during your stay  You were hospitalized due to volume overload secondary to CHF and UTI  You were cared for on the [facility] third floor by Brigid Porter MD under the service of Lige Rinne, MD with the [facility] Internal Medicine Hospitalist Group who covers for your primary care physician (PCP), Cj Lanza MD, while you were hospitalized  If you have any questions or concerns related to this hospitalization, you may contact us at [phone]  For follow up as well as any medication refills, we recommend that you follow up with your primary care physician  A registered nurse will reach out to you by phone within a few days after your discharge to answer any additional questions that you may have after going home  However, at this time we provide for you here, the most important instructions / recommendations at discharge:     Notable Medication Adjustments -   Please take Lasix 40 mg by mouth in the morning  Please take Lasix 20 mg at dinner  Testing Required after Discharge -   Please check your electrolytes with a BMP in 1 week and follow-up with your primary care provider  Important follow up information -   Please follow-up with primary care provider in 1 week  Other Instructions -   Please get well soon  Do not hesitate to call 911 or go to the nearest emergency room symptoms persist or worsen  Please review this entire after visit summary as additional general instructions including medication list, appointments, activity, diet, any pertinent wound care, and other additional recommendations from your care team that may be provided for you        Sincerely,     Brigid Porter MD

## 2023-01-06 NOTE — ASSESSMENT & PLAN NOTE
Wt Readings from Last 3 Encounters:   01/06/23 95 8 kg (211 lb 3 2 oz)   12/28/22 99 7 kg (219 lb 12 8 oz)   12/20/22 97 5 kg (215 lb)     Lab Results   Component Value Date    LVEF 65 09/29/2022    NTBNP 1,534 (H) 09/26/2021    NTBNP 2,905 (H) 09/20/2021       • Presents with increased shortness of breath, dyspnea on exertion, lower extremity edema and cough with productive sputum  • Patient is currently volume overloaded  • Last ECHO in September 2022 showed estimated EF of 65%  • CXR 1/4/2023 showed Hazy density at the lung bases could reflect pleural effusions and/or artifact from patient's soft tissues and technique  Clear lungs    Plan:  • Discharge home with Lasix 40 mg p o  every morning and 20 mg p o  with dinner  • Continue home AC with Eliquis  · Diet: Sodium restriction 2g and 1800 mL/day fluid restriction    ·

## 2023-01-06 NOTE — ASSESSMENT & PLAN NOTE
BP Readings from Last 3 Encounters:   01/05/23 140/59   12/28/22 140/78   12/01/22 116/62     Plan:  • Continue home metoprolol and diltiazem   • Lasix 40 mg IV daily for apparent volume overload in setting of decompensated CHF   • Monitor blood pressure

## 2023-01-06 NOTE — PROGRESS NOTES
[facility]  Progress Note - [place] Ringer 1945, 76 y o  female MRN: [id number]  Unit/Bed#: S -01 Encounter: [id number]  Primary Care Provider: Cj Lanza MD   Date and time admitted to hospital: 1/4/2023 12:18 AM    * Sepsis ([facility] [address] )  Assessment & Plan  · POA; as evidenced by low grade fever, tachycardia, lactic acidosis and tachypnea  Blood Pressure: 140/59  Pulse: 70  · Respirations: 18   · Covid/Flu/RSV negative  · Patient with history of recurrent UTI  · Hx MDRO    Suspected source: Unclear at this time-UTI vs Soft tissue infection    Recent Labs     01/04/23  0037 01/04/23  0613 01/05/23  0602   WBC 8 50 6 49 6 06     Recent Labs     01/04/23  0130 01/04/23  0335   LACTICACID 2 1* 1 1     · CT chest without contrast showed moderate right and small left pleural effusions with adjacent compressive atelectasis  · Blood cultures negative  · Urine culture positive for E  Coli  · Pro-Montez negative  · Resolved  Plan:  • Abx: Ertapenem IV daily day 2  • Cefepime and vancomycin discontinued  • CBC daily    Acute respiratory failure ([address] )  Assessment & Plan  · Patient noted to be hypoxic on presentation to ED    · Not on O2 at home    · Complained of cough and SOB on admission  · Currently requiring 1 L via NC  · Received albuterol in the ED  · Covid/Flu/RSV negative   · CT chest showed bilateral pleural effusions; no evidence PNA  · Volume overloaded to exam  · In likely setting of decompensated CHF with volume overload    Plan:   · Continue supplemental O2, and wean as appropriate  · Incentive spirometry   · PRN nebs  · Diuresis as documented under CHF    Chronic constipation  Assessment & Plan  · Patient on extensive bowel regimen at baseline  · Continue Senakot and metamucil   · Miralax PRN   · Doculax suppository PRN    Congestive heart failure  Assessment & Plan  Wt Readings from Last 3 Encounters:   12/28/22 99 7 kg (219 lb 12 8 oz)   12/20/22 97 5 kg (215 lb)   12/01/22 97 6 kg (215 lb 1 6 oz)     Lab Results   Component Value Date    LVEF 65 09/29/2022    NTBNP 1,534 (H) 09/26/2021    NTBNP 2,905 (H) 09/20/2021       • Presents with increased shortness of breath, dyspnea on exertion, lower extremity edema and cough with productive sputum  • Patient is currently volume overloaded  • Last ECHO in September 2022 showed estimated EF of 65%  • CXR 1/4/2023 showed Hazy density at the lung bases could reflect pleural effusions and/or artifact from patient's soft tissues and technique  Clear lungs    Plan:  • GDMT: Diuretic: Lasix 40 mg PO daily at home, 40 mg daily IV while inpatient B-Blocker: metoprolol tarteate 100 mg in the AM and 50 mg in PM, (No perscribed ACE/ARB/ARNi, Aldos  Blocker or SGLT2-I)  • Continue home AC with Eliquis  · Diet: Sodium restriction 2g  · Labs: BMP, magnesium tomorrow a m  · Maintain Mg > 2 and K > 4; Replete prn  · Elevate HOB > 30°, Daily weights, Measure I&O  · Monitor respiratory status     Stage 3a chronic kidney disease [facility])  Assessment & Plan  Lab Results   Component Value Date    EGFR 64 01/05/2023    EGFR 64 01/04/2023    EGFR 61 11/27/2022    CREATININE 0 86 01/05/2023    CREATININE 0 86 01/04/2023    CREATININE 0 90 11/27/2022     · Stable baseline 0 8 -1 0   · Without superimposed MICHAEL Renal function noted WDL on admission CMP  · Volume overloaded on admission exam;     Plan:  · diurese with Lasix 40 mg IV daily  · Avoid nephrotoxic medications  · I&O  · Monitor BMP      Chronic gout with tophus  Assessment & Plan  · Asymptomatic at this time  · Continue home allopurinol    HLD (hyperlipidemia)  Assessment & Plan  Lab Results   Component Value Date    CHOLESTEROL 229 (H) 05/27/2021    TRIG 86 05/27/2021    HDL 67 05/27/2021    LDLCALC 145 (H) 05/27/2021     · Continue home Zetia    Vitamin D insufficiency  Assessment & Plan  · Continue vitamin D3 repletion  · Follow-up outpatient for vitamin D level check      Anxiety  Assessment & Plan  · Continue home Cymbalta    Chronic GERD  Assessment & Plan  · Continue home regimen with Carafate 1g TID with meals and at bedtime and  pantoprazole 40 mg daily     Essential hypertension  Assessment & Plan  BP Readings from Last 3 Encounters:   23 140/59   22 140/78   22 116/62     Plan:  • Continue home metoprolol and diltiazem   • Lasix 40 mg IV daily for apparent volume overload in setting of decompensated CHF   • Monitor blood pressure      Tachy-smita syndrome (HCC)  Assessment & Plan  · S/p pacemaker placement  · Patient tachycardic into the 120's on presentation     Plan:   · Continue home metoprolol tartrate 100 mg in the morning and 50 mg in the afternoon   · Continue home diltiazem 120 mg daily   · Continue AC with Eliquis in setting of paroxysmal a-fib   · Monitor vital signs        VTE Pharmacologic Prophylaxis:   VTE Score: 6 High Risk (Score >/= 5) - Pharmacological DVT Prophylaxis Ordered: Apixaban (Eliquis)  Sequential Compression Devices Ordered  Mechanical VTE Prophylaxis in Place: Yes    Patient Centered Rounds: I have performed bedside rounds with nursing staff today  Discussions with Specialists or Other Care Team Provider:, None    Education and Discussions with Family / Patient: Attempted to update  (Kaz Sánchez) via phone  Left voicemail  Current Length of Stay: 1 day(s)    Current Patient Status: Inpatient     Discharge Plan / Estimated Discharge Date: Anticipate discharge tomorrow to prior assisted or independent living facility  [facility]  Code Status: Level 1 - Full Code      Subjective:   Patient was awake and sitting up in bed  She admits to having good urine output  Feels better  Has less shortness of breath  She denies fever, chills, HA, CP, SOB,  palpitations, Abd pain, n/v/d      Objective:     Vitals:   Temp (24hrs), Av 3 °F (36 8 °C), Min:98 °F (36 7 °C), Max:98 5 °F (36 9 °C)    Temp:  [98 °F (36 7 °C)-98 5 °F (36 9 °C)] 98 5 °F (36 9 °C)  HR:  [70-95] 70  Resp:  [18] 18  BP: (114-140)/(53-70) 140/59  SpO2:  [94 %-96 %] 96 %  Body mass index is 36 71 kg/m²  Input and Output Summary (last 24 hours): Intake/Output Summary (Last 24 hours) at 1/5/2023 1911  Last data filed at 1/5/2023 1300  Gross per 24 hour   Intake 420 ml   Output 1550 ml   Net -1130 ml       Physical Exam:     Physical Exam  Vitals and nursing note reviewed  Constitutional:       General: She is not in acute distress  Appearance: Normal appearance  She is obese  She is not ill-appearing, toxic-appearing or diaphoretic  HENT:      Head: Normocephalic and atraumatic  Nose: Nose normal    Eyes:      General: No scleral icterus  Right eye: No discharge  Left eye: No discharge  Extraocular Movements: Extraocular movements intact  Pupils: Pupils are equal, round, and reactive to light  Neck:      Vascular: No carotid bruit  Cardiovascular:      Rate and Rhythm: Normal rate and regular rhythm  Pulses: Normal pulses  Heart sounds: Normal heart sounds  No murmur heard  No friction rub  No gallop  Pulmonary:      Effort: Pulmonary effort is normal       Breath sounds: Normal breath sounds  No wheezing, rhonchi or rales  Abdominal:      General: Bowel sounds are normal       Palpations: Abdomen is soft  Tenderness: There is no abdominal tenderness  There is no guarding or rebound  Musculoskeletal:         General: No swelling or tenderness  Cervical back: Normal range of motion and neck supple  No rigidity  No muscular tenderness  Right lower leg: No edema  Left lower leg: No edema  Skin:     Capillary Refill: Capillary refill takes less than 2 seconds  Coloration: Skin is not jaundiced  Findings: No bruising, erythema, lesion or rash  Neurological:      General: No focal deficit present  Mental Status: She is alert and oriented to person, place, and time  Motor: No weakness  Gait: Gait normal    Psychiatric:         Mood and Affect: Mood normal          Behavior: Behavior normal          Thought Content: Thought content normal          Judgment: Judgment normal           Additional Data:     Labs:  Results from last 7 days   Lab Units 01/05/23  0602   WBC Thousand/uL 6 06   HEMOGLOBIN g/dL 9 0*   HEMATOCRIT % 30 7*   PLATELETS Thousands/uL 347   NEUTROS PCT % 55   LYMPHS PCT % 22   MONOS PCT % 11   EOS PCT % 10*     Results from last 7 days   Lab Units 01/05/23  0602 01/04/23  0037   SODIUM mmol/L 139 139   POTASSIUM mmol/L 4 0 3 8   CHLORIDE mmol/L 102 103   CO2 mmol/L 31 28   BUN mg/dL 20 21   CREATININE mg/dL 0 86 0 86   ANION GAP mmol/L 6 8   CALCIUM mg/dL 9 5 9 4   ALBUMIN g/dL  --  3 6   TOTAL BILIRUBIN mg/dL  --  0 53   ALK PHOS U/L  --  104   ALT U/L  --  11   AST U/L  --  16   GLUCOSE RANDOM mg/dL 110 138     Results from last 7 days   Lab Units 01/04/23  0037   INR  1 19             Results from last 7 days   Lab Units 01/05/23  0602 01/04/23  0613 01/04/23  0335 01/04/23  0130   LACTIC ACID mmol/L  --   --  1 1 2 1*   PROCALCITONIN ng/ml 0 05 <0 05  --   --        Imaging: Reviewed radiology reports from this admission including: chest xray    Recent Cultures (last 7 days):     Results from last 7 days   Lab Units 01/04/23  0448 01/04/23  0130   BLOOD CULTURE   --  No Growth at 24 hrs  No Growth at 24 hrs     URINE CULTURE  >100,000 cfu/ml Escherichia coli*  --        Lines/Drains:  Invasive Devices     Peripheral Intravenous Line  Duration           Peripheral IV 01/04/23 Left;Ventral (anterior) Forearm 1 day    Peripheral IV 01/04/23 Right Antecubital 1 day                Telemetry:        Last 24 Hours Medication List:   Current Facility-Administered Medications   Medication Dose Route Frequency Provider Last Rate   • acetaminophen  975 mg Oral Q6H PRN Nancy Elizondo MD     • albuterol  2 puff Inhalation Q6H PRN Nancy Elizondo MD     • allopurinol  100 mg Oral Daily Ramírez Montgomery MD     • apixaban  5 mg Oral BID Ramírez Montgomery MD     • ascorbic acid  500 mg Oral BID Ramírez Montgomery MD     • bisacodyl  10 mg Rectal Daily PRN Ramírez Montgomery MD     • cholecalciferol  1,000 Units Oral Daily Ramírez Montgomery MD     • vitamin B-12  1,000 mcg Oral Daily Ramírez Montgomery MD     • Diclofenac Sodium  2 g Topical 4x Daily Ramírez Montgomery MD     • diltiazem  120 mg Oral Daily Ramírez Montgomery MD     • diphenhydrAMINE  25 mg Oral Q6H PRN Ramírez Montgomery MD     • DULoxetine  30 mg Oral Daily Ramírez Montgomery MD     • ertapenem  1,000 mg Intravenous Q24H Reginaldo Morgan MD 1,000 mg (01/05/23 0849)   • ezetimibe  10 mg Oral Daily Ramírez Montgomery MD     • ferrous sulfate  325 mg Oral BID With Meals Ramírez Montgomery MD     • folic acid  822 mcg Oral Daily Ramírez Montgomery MD     • furosemide  40 mg Intravenous Daily Ramírez Montgomery MD     • ipratropium-albuterol  3 mL Nebulization Q6H PRN Ramírez Montgomery MD     • melatonin  6 mg Oral HS Ramírez Montgomery MD     • metoprolol tartrate  100 mg Oral Early Morning Ramírez Montgomery MD     • metoprolol tartrate  50 mg Oral HS Ramírez Montgomery MD     • multivitamin stress formula  1 tablet Oral Daily Ramírez Montgomery MD     • oxybutynin  10 mg Oral HS Ramírez Montgomery MD     • pantoprazole  40 mg Oral Early Morning Ramírez Montgomery MD     • polyethylene glycol  17 g Oral Daily PRN Ramírez Montgomery MD     • psyllium  1 packet Oral HS Ramírez Montgomery MD     • senna-docusate sodium  2 tablet Oral BID Ramírez Montgomery MD     • sucralfate  1 g Oral 4x Daily (AC & HS) Ramírez Montgomery MD        Nutrition Assessment and Intervention:     Reviewed food recall journal      Physical Activity Assessment and Intervention:    Activity journal reviewed    Activity journal completion recommended      Emotional and Mental Well-being, Sleep, Connectedness Assessment and Intervention:    Sleep/stress assessment performed    Depression and anxiety screening performed and reviewed      Tobacco and Toxic Substance Assessment and Intervention:     Tobacco use screening performed    Alcohol and drug use screening performed      Today, Patient Was Seen By: Herbert Leone MD    ** Please Note: This note has been constructed using a voice recognition system   **

## 2023-01-06 NOTE — CASE MANAGEMENT
Case Management Discharge Planning Note    Patient name Kizzie Severe  Location S /S -28 MRN [id number]  : 1945 Date 2023       Current Admission Date: 2023  Current Admission Diagnosis:Sepsis [facility])   Patient Active Problem List    Diagnosis Date Noted   • Sepsis ([facility] [address] ) 2023   • Congestive heart failure 2023   • Chronic constipation 2023   • Fall 2022   • Abnormal CT of the abdomen 2022   • History of bacteremia 12/15/2022   • Acute pain of right knee 2022   • Hyperkalemia 2022   • Suspected UTI 2022   • Dehydration 2022   • UTI (urinary tract infection) 2022   • Hyponatremia 2022   • Rash 2022   • Proctitis 2022   • Nausea 2022   • Hypokalemia 2022   • Dermatitis associated with incontinence 10/26/2022   • Physical deconditioning 10/19/2022   • Chronic heart failure with preserved ejection fraction ([address] ) 10/19/2022   • Status post fall 10/15/2022   • Thrombocytosis 10/11/2022   • Pressure injury of left buttock, stage 3 ([address] ) 10/05/2022   • Aortic regurgitation 10/04/2022   • Asymptomatic bacteriuria 10/03/2022   • Bacteremia due to methicillin resistant Staphylococcus epidermidis 2022   • Anemia 2022   • Bladder wall thickening 2022   • left femoral fluid collection 2022   • Pleural effusion on right 2022   • Vomiting and diarrhea 2022   • Surgical wound present 2022   • Renal insufficiency    • Stage 3a chronic kidney disease ([address] ) 2022   • Preop examination 2022   • Preop cardiovascular exam 2022   • Poor dentition 2022   • Closed bicondylar fracture of left femur with delayed healing 2022   • Paroxysmal atrial fibrillation ([address] ) 2022   • Chronic gout with tophus 2021   • Current moderate episode of major depressive disorder ([facility] ) 2021   • Morbid obesity with body mass index (BMI) of 40 0 to 49 9 ([facility] ) 09/23/2021   • Reactive airway disease with acute exacerbation 09/21/2021   • Acute respiratory failure ([facility] [address] ) 09/20/2021   • Ambulatory dysfunction 08/12/2021   • MICHAEL (acute kidney injury) ([facility] [address] ) 08/04/2021   • Leukocytosis 08/02/2021   • Arthritis 05/18/2021   • Generalized weakness 05/18/2021   • HLD (hyperlipidemia) 11/10/2020   • Dysphonia 04/28/2020   • Vitamin D insufficiency 04/28/2020   • Chronic pain of left knee 08/12/2019   • Age related osteoporosis 08/12/2019   • Anxiety 03/27/2019   • Tremor 03/27/2019   • Other insomnia 02/27/2019   • Urinary incontinence 09/17/2018   • Psoriasis 07/20/2018   • Thyroid nodule 07/20/2018   • Atrial fibrillation RVR ([facility] [address] ) 02/15/2018   • Tachy-smita syndrome ([facility] [address] ) 02/15/2018   • Essential hypertension 02/15/2018   • Pacemaker 02/15/2018   • GERD (gastroesophageal reflux disease) 07/18/2017   • Mild carpal tunnel syndrome, right 04/10/2017   • Degenerative lumbar spinal stenosis 03/31/2017   • Low back pain 08/29/2016   • Lumbar degenerative disc disease 08/29/2016   • Chronic bilateral low back pain without sciatica 06/08/2016   • Chronic GERD 05/06/2016   • Overweight 02/12/2016   • Fibromyalgia 11/06/2013      LOS (days): 2  Geometric Mean LOS (GMLOS) (days): 5 00  Days to GMLOS:2 7     OBJECTIVE:  Risk of Unplanned Readmission Score: 25 43         Current admission status: Inpatient   Preferred Pharmacy:   [facility] [address] [facility], [address] [place] [address]  Phone: [phone] Fax: [phone] [facility], [facility] [address] [address] [address]  [address]  [facility]  [id number]  [phone]  Phone: [phone] Fax: [phone]    Primary Care Provider: Belkis Ellington MD    Primary Insurance: MEDICARE  Secondary Insurance: AARP    DISCHARGE DETAILS:    Contacts  Patient Contacts: Kristopher Martinez (caregiver)  Contact Method: Phone  Phone Number: [phone]  Reason/Outcome: Continuity of Care    Other Referral/Resources/Interventions Provided:  Interventions: Transportation, Facility Return  Referral Comments: CM informed pt stable for DC back to [facility]  CM requested 230pm transport via [facility], pending confirmation of time   CM informed [facility]-Twyla, RN-Dipika, Caregiver-Netta, and [facility] Resident Dr Faustino Fisher Team Recommendation: Facility Return  Discharge Destination Plan[de-identified] Facility Return  Transport at Discharge : [facility]  Dispatcher Contacted: Yes  Number/Name of Dispatcher: [employer]     ETA of Transport (Date): 01/06/23  ETA of Transport (Time): 7260 (requested-pending confirmation of time)     IMM Given (Date):: 01/04/23    Accepting Facility Name, [facility] : [address]  Receiving Facility/Agency Phone Number: [phone]  Facility/Agency Fax Number: [phone]

## 2023-01-06 NOTE — ASSESSMENT & PLAN NOTE
· POA; as evidenced by low grade fever, tachycardia, lactic acidosis and tachypnea  Blood Pressure: 137/54  Pulse: 73  · Respirations: 18   · Covid/Flu/RSV negative  · Patient with history of recurrent UTI  · Hx MDRO    Suspected source: Unclear at this time-UTI vs Soft tissue infection    Recent Labs     01/04/23  0613 01/05/23  0602 01/06/23  0500   WBC 6 49 6 06 7 35     Recent Labs     01/04/23  0130 01/04/23  0335   LACTICACID 2 1* 1 1     · CT chest without contrast showed moderate right and small left pleural effusions with adjacent compressive atelectasis  · Blood cultures negative  · Urine culture positive for E  Coli  · Pro-Montez negative  · Pleated 3 days of antibiotics for UTI  · Resolved

## 2023-01-06 NOTE — DISCHARGE SUMMARY
[facility]  Discharge- Alejandra Garcia 1945, 76 y o  female MRN: [id number]  Unit/Bed#: S -01 Encounter: [id number]  Primary Care Provider: Alicia Scruggs MD   Date and time admitted to hospital: 1/4/2023 12:18 AM    * Sepsis ([facility] [address] )  Assessment & Plan  · POA; as evidenced by low grade fever, tachycardia, lactic acidosis and tachypnea  Blood Pressure: 137/54  Pulse: 73  · Respirations: 18   · Covid/Flu/RSV negative  · Patient with history of recurrent UTI  · Hx MDRO    Suspected source: Unclear at this time-UTI vs Soft tissue infection    Recent Labs     01/04/23  0613 01/05/23  0602 01/06/23  0500   WBC 6 49 6 06 7 35     Recent Labs     01/04/23  0130 01/04/23  0335   LACTICACID 2 1* 1 1     · CT chest without contrast showed moderate right and small left pleural effusions with adjacent compressive atelectasis  · Blood cultures negative  · Urine culture positive for E  Coli  · Pro-Montez negative  · Pleated 3 days of antibiotics for UTI  · Resolved  Acute respiratory failure ([facility] [address] )  Assessment & Plan  · Patient noted to be hypoxic on presentation to ED    · Not on O2 at home  · Complained of cough and SOB on admission  · Currently requiring 1 L via NC  · Received albuterol in the ED  · Covid/Flu/RSV negative   · CT chest showed bilateral pleural effusions; no evidence PNA  · Volume overloaded to exam  · In likely setting of decompensated CHF with volume overload  · Resolved with diuresis      Chronic constipation  Assessment & Plan  · Patient on extensive bowel regimen at baseline  · Continue Senakot and metamucil   · Miralax PRN   · Doculax suppository PRN    Congestive heart failure  Assessment & Plan  Wt Readings from Last 3 Encounters:   01/06/23 95 8 kg (211 lb 3 2 oz)   12/28/22 99 7 kg (219 lb 12 8 oz)   12/20/22 97 5 kg (215 lb)     Lab Results   Component Value Date    LVEF 65 09/29/2022    NTBNP 1,534 (H) 09/26/2021    NTBNP 2,905 (H) 09/20/2021 • Presents with increased shortness of breath, dyspnea on exertion, lower extremity edema and cough with productive sputum  • Patient is currently volume overloaded  • Last ECHO in September 2022 showed estimated EF of 65%  • CXR 1/4/2023 showed Hazy density at the lung bases could reflect pleural effusions and/or artifact from patient's soft tissues and technique  Clear lungs    Plan:  • Discharge home with Lasix 40 mg p o  every morning and 20 mg p o  with dinner  • Continue home AC with Eliquis  · Diet: Sodium restriction 2g and 1800 mL/day fluid restriction  ·     Stage 3a chronic kidney disease [facility])  Assessment & Plan  Lab Results   Component Value Date    EGFR 60 01/06/2023    EGFR 64 01/05/2023    EGFR 64 01/04/2023    CREATININE 0 91 01/06/2023    CREATININE 0 86 01/05/2023    CREATININE 0 86 01/04/2023     · Stable baseline 0 8 -1 0   · Without superimposed MICHAEL Renal function noted WDL on admission CMP  · Volume overloaded on admission exam;     Plan:  · Discharge with Lasix 40 mg I p o  every morning and 20 mg p o  with dinner  ·     Chronic gout with tophus  Assessment & Plan  · Asymptomatic at this time  · Continue home allopurinol    HLD (hyperlipidemia)  Assessment & Plan  Lab Results   Component Value Date    CHOLESTEROL 229 (H) 05/27/2021    TRIG 86 05/27/2021    HDL 67 05/27/2021    LDLCALC 145 (H) 05/27/2021     · Continue home Zetia    Vitamin D insufficiency  Assessment & Plan  · Continue vitamin D3 repletion  · Follow-up outpatient for vitamin D level check      Anxiety  Assessment & Plan  · Continue home Cymbalta    Chronic GERD  Assessment & Plan  · Continue home regimen with Carafate 1g TID with meals and at bedtime and  pantoprazole 40 mg daily     Essential hypertension  Assessment & Plan  BP Readings from Last 3 Encounters:   01/06/23 137/54   12/28/22 140/78   12/01/22 116/62     Plan:  • Continue home metoprolol and diltiazem   • Lasix 40 mg po every morning and 20 mg p o  with dinner  • Monitor blood pressure      Tachy-smita syndrome (HCC)  Assessment & Plan  · S/p pacemaker placement  · Patient tachycardic into the 120's on presentation     Plan:   · Continue home metoprolol tartrate 100 mg in the morning and 50 mg in the afternoon   · Continue home diltiazem 120 mg daily   · Continue AC with Eliquis in setting of paroxysmal a-fib   · Monitor vital signs    Discharging Resident Physician: Herbert Leone MD  Attending: No att  providers found  PCP: Francis Mart MD  Admission Date: 1/4/2023  Discharge Date: 01/06/23    Disposition:     Other: LTAC    Reason for Admission: Acute respiratory failure secondary to CHF exacerbation  Consultations During Hospital Stay:  · None  Procedures Performed:     · None    Significant Findings / Test Results:     XR chest 1 view portable Result Date: 1/4/2023 Impression: Hazy density at the lung bases could reflect pleural effusions and/or artifact from patient's soft tissues and technique  Clear lungs  CT chest without contrast Result Date: 1/4/2023 Impression: Moderate right and small left pleural effusions with adjacent compressive atelectasis  Incidental Findings:   · None    Test Results Pending at Discharge (will require follow up): · None     Outpatient Tests Requested:  · BMP in 1 week  Complications:  None    Hospital Course:     Vivek Brown is a 76 y o  female patient with PMH of CHF, PAF on Eliquis, tachybradycardia syndrome with PPM, HTN, HLD, CKD 3, gout and asthma who originally presented to the hospital on 1/4/2023 due to worsening shortness of breath and cough     In the ED patient was tachycardia with HR 120s, tachypnea and febrile but noted to be mildly hypoxic  Labs were significant for lactic acidosis and rest of CBC and BMP were WNL  UA was positive for numerous leukocytes and was nitrite positive and hematuria  CT chest showed bilateral pleural effusions    Patient was placed on 2L O2 for hypoxia and received nebulizer treatments  She also received Lasix IV and was started on antibiotics for UTI as she met sepsis criteria  Due to being volume overloaded patient only received 500 cc of LR bolus  During hospitalization patient did not receive further IV fluids and continued Lasix 40 mg IV for diuresis  She also was transition from cefepime to ertapenem for UTI after reviewing previous cultures and sensitivities  Urine culture and sensitivity showed that patient was growing ESBL  Patient completed 3-day course of ertapenem IV  Acute respiratory failure secondary to volume overload resolved with diuresis and patient was discharged in stable condition on Lasix 40 mg p o  every morning with an additional dose of 20 mg p o  at dinnertime  Condition at Discharge: stable     Discharge Day Visit / Exam:     Subjective: Patient was awake and laying in bed  She states that her shortness of breath has completely resolved  She has no other complaints  She denies fever, chills, HA, CP, SOB,  palpitations, Abd pain, n/v/d  Vitals: Blood Pressure: 137/54 (01/06/23 0718)  Pulse: 73 (01/06/23 0718)  Temperature: 98 4 °F (36 9 °C) (01/06/23 0718)  Temp Source: Oral (01/06/23 0718)  Respirations: 18 (01/06/23 0718)  Weight - Scale: 95 8 kg (211 lb 3 2 oz) (01/06/23 0548)  SpO2: 92 % (01/06/23 0718)    Exam:   Physical Exam  Vitals and nursing note reviewed  Constitutional:       General: She is not in acute distress  Appearance: Normal appearance  She is not ill-appearing, toxic-appearing or diaphoretic  HENT:      Head: Normocephalic and atraumatic  Nose: Nose normal       Mouth/Throat:      Mouth: Mucous membranes are moist       Pharynx: No oropharyngeal exudate or posterior oropharyngeal erythema  Eyes:      General: No scleral icterus  Right eye: No discharge  Left eye: No discharge  Pupils: Pupils are equal, round, and reactive to light     Neck:      Vascular: No carotid bruit  Cardiovascular:      Rate and Rhythm: Normal rate and regular rhythm  Pulses: Normal pulses  Heart sounds: Normal heart sounds  No murmur heard  No friction rub  No gallop  Pulmonary:      Effort: Pulmonary effort is normal  No respiratory distress  Breath sounds: Normal breath sounds  No stridor  No wheezing, rhonchi or rales  Abdominal:      General: Bowel sounds are normal       Palpations: Abdomen is soft  Tenderness: There is no abdominal tenderness  There is no right CVA tenderness, left CVA tenderness, guarding or rebound  Musculoskeletal:         General: No swelling, tenderness or deformity  Cervical back: Normal range of motion and neck supple  No rigidity  No muscular tenderness  Right lower leg: No edema  Left lower leg: No edema  Lymphadenopathy:      Cervical: No cervical adenopathy  Skin:     General: Skin is warm and dry  Capillary Refill: Capillary refill takes less than 2 seconds  Coloration: Skin is not jaundiced  Findings: No lesion or rash  Neurological:      General: No focal deficit present  Mental Status: She is alert and oriented to person, place, and time  Mental status is at baseline  Motor: No weakness  Psychiatric:         Mood and Affect: Mood normal          Behavior: Behavior normal          Thought Content:  Thought content normal          Judgment: Judgment normal        Nutrition Assessment and Intervention:     Reviewed food recall journal      Physical Activity Assessment and Intervention:    Activity journal reviewed      Emotional and Mental Well-being, Sleep, Connectedness Assessment and Intervention:    Sleep/stress assessment performed    Depression and anxiety screening performed and reviewed      Tobacco and Toxic Substance Assessment and Intervention:     Tobacco use screening performed    Alcohol and drug use screening performed        Discussion with Family: Patient stated she will call personally to update family  Discharge instructions/Information to patient and family:   See after visit summary for information provided to patient and family  Provisions for Follow-Up Care:  See after visit summary for information related to follow-up care and any pertinent home health orders  Planned Readmission: None     Discharge Medications:  See after visit summary for reconciled discharge medications provided to patient and family        ** Please Note: This note has been constructed using a voice recognition system **

## 2023-01-06 NOTE — ASSESSMENT & PLAN NOTE
· Patient noted to be hypoxic on presentation to ED    · Not on O2 at home    · Complained of cough and SOB on admission  · Currently requiring 1 L via NC  · Received albuterol in the ED  · Covid/Flu/RSV negative   · CT chest showed bilateral pleural effusions; no evidence PNA  · Volume overloaded to exam  · In likely setting of decompensated CHF with volume overload    Plan:   · Continue supplemental O2, and wean as appropriate  · Incentive spirometry   · PRN nebs  · Diuresis as documented under CHF

## 2023-01-06 NOTE — OCCUPATIONAL THERAPY NOTE
Occupational Therapy Screen     Patient Name: Quentin ESPINAL Date: 1/6/2023  Problem List  Principal Problem:    Sepsis ([facility] [address] )  Active Problems:    Tachy-smita syndrome ([address] )    Essential hypertension    Chronic GERD    Anxiety    Vitamin D insufficiency    HLD (hyperlipidemia)    Acute respiratory failure (HCC)    Chronic gout with tophus    Stage 3a chronic kidney disease (HCC)    Congestive heart failure    Chronic constipation    Past Medical History  Past Medical History:   Diagnosis Date    Abnormal ECG     Last Assessed 9/29/2016    Anxiety     Last Assessed 6/08/2016    Arthritis     knees    Asthma     Last Assessed 11/06/2013    Atrial premature complex     Cataract, bilateral     Last Assessed 7/14/2016    Cataract, left eye     Both eyes  Had surgery on left  COVID-19     Difficulty swallowing     Diverticulosis 9/25/2022    Dizziness     Fibromyalgia     Fibromyalgia, primary     Gastric reflux     Heart failure ([facility] [address] )     5/23/22 Pt reports had heart failure in the past    History of COVID-19     5/23/22 Pt reports having [place] in 2021      History of transfusion     no reaction    [place]hard of hearing[place]     Hyperlipidemia     Hypertension     Incontinence in female     5/23/22 Pt reports has incontinence -wears depends especially at night/riding in car    Irregular heart beat     5/23/22 Pt reports hx of A fib    Lyme disease     PONV (postoperative nausea and vomiting)     Premature ventricular contraction     Primary osteoarthritis of both knees     Last Assessed 7/14/2016    Rheumatic fever     5/23/22 Pt reports had hx of rheumatic fever as a child twice    Sore throat     Tuberculosis 1947     Past Surgical History  Past Surgical History:   Procedure Laterality Date    APPENDECTOMY      CARDIAC PACEMAKER PLACEMENT  2019    COLONOSCOPY      DENTAL SURGERY      extractions    HYSTERECTOMY      5/23/22 Pt reports had appendix out with hysterectomy and "tightened up my bladder"    IR ASPIRATION ONLY  9/30/2022    ORIF FEMUR FRACTURE Left 08/05/2021    Procedure: OPEN REDUCTION W/ INTERNAL FIXATION (ORIF) DISTAL FEMUR; INSERTION RETROGRADE NAIL;  Surgeon: Kady Tellez MD;  [facility]: [facility];  Service: Orthopedics    NE ARTHRP KNE CONDYLE&PLATU MEDIAL&LAT COMPARTMENTS Left 9/12/2022    Procedure: ARTHROPLASTY KNEE TOTAL;  Surgeon: Kady Tellez MD;  [facility];  Service: Orthopedics    NE DILATION ESOPH UNGUIDED SOUND/BOUGIE 1/MULT PASS N/A 04/06/2016    Procedure: DILATATION ESOPHAGEAL;  Surgeon: Marco Vinson MD;  Location: [facility]; Service: Gastroenterology    NE EGD TRANSORAL BIOPSY SINGLE/MULTIPLE N/A 04/06/2016    Procedure: ESOPHAGOGASTRODUODENOSCOPY (EGD); Surgeon: aMrco Vinson MD;  Location: [facility]; Service: Gastroenterology    NE REMOVAL IMPLANT DEEP Left 9/12/2022    Procedure: REMOVAL NAIL IM  FEMUR;  Surgeon: Kady Tellez MD;  [facility];  Service: Orthopedics    NE XCAPSL CTRC RMVL INSJ IO LENS PROSTH W/O ECP Left 03/15/2016    Procedure: EXTRACTION EXTRACAPSULAR CATARACT PHACO INTRAOCULAR LENS (IOL); Surgeon: Ricky Mercado MD;  Location: [facility];  Service: Ophthalmology    REPLACEMENT TOTAL KNEE Right     REPLACEMENT TOTAL KNEE      right     TONSILLECTOMY          01/06/23 0928   OT Last Visit   OT Visit Date 01/06/23  (Friday)   Note Type   Note type Screen   Additional Comments OT orders received and chart review completed  Spoke w/ RN, Bayron Franks and PT, [address]  Pt admit from [address] [place] [address] and is a long term care resident  Pt needs assistance w/ ADLs at baseline  Recommend active participation in ADLs w/ RN staff in acute care and return to PLOF when medically stable w/ OT eval/tx as appropriate  No acute Ot needs at this time  Will screen from OT caseload  Please re consult if acute needs arise   DC OT     [facility], OTR/L

## 2023-01-06 NOTE — ASSESSMENT & PLAN NOTE
Lab Results   Component Value Date    CHOLESTEROL 229 (H) 05/27/2021    TRIG 86 05/27/2021    HDL 67 05/27/2021    LDLCALC 145 (H) 05/27/2021     · Continue home Zetia

## 2023-01-06 NOTE — ASSESSMENT & PLAN NOTE
Lab Results   Component Value Date    EGFR 60 01/06/2023    EGFR 64 01/05/2023    EGFR 64 01/04/2023    CREATININE 0 91 01/06/2023    CREATININE 0 86 01/05/2023    CREATININE 0 86 01/04/2023     · Stable baseline 0 8 -1 0   · Without superimposed MICHAEL Renal function noted WDL on admission CMP  · Volume overloaded on admission exam;     Plan:  · Discharge with Lasix 40 mg I p o  every morning and 20 mg p o  with dinner    ·

## 2023-01-06 NOTE — ASSESSMENT & PLAN NOTE
· Patient noted to be hypoxic on presentation to ED    · Not on O2 at home  · Complained of cough and SOB on admission  · Currently requiring 1 L via NC  · Received albuterol in the ED  · Covid/Flu/RSV negative   · CT chest showed bilateral pleural effusions; no evidence PNA  · Volume overloaded to exam  · In likely setting of decompensated CHF with volume overload  · Resolved with diuresis

## 2023-01-06 NOTE — ASSESSMENT & PLAN NOTE
Lab Results   Component Value Date    EGFR 64 01/05/2023    EGFR 64 01/04/2023    EGFR 61 11/27/2022    CREATININE 0 86 01/05/2023    CREATININE 0 86 01/04/2023    CREATININE 0 90 11/27/2022     · Stable baseline 0 8 -1 0   · Without superimposed MICHAEL Renal function noted WDL on admission CMP  · Volume overloaded on admission exam;     Plan:  · diurese with Lasix 40 mg IV daily  · Avoid nephrotoxic medications  · I&O  · Monitor BMP

## 2023-01-07 ENCOUNTER — TELEPHONE (OUTPATIENT)
Dept: OTHER | Facility: OTHER | Age: 78
End: 2023-01-07

## 2023-01-07 ENCOUNTER — HOSPITAL ENCOUNTER (EMERGENCY)
Facility: HOSPITAL | Age: 78
Discharge: NURSING, REHAB OR LONG-TERM CARE | End: 2023-01-07
Attending: EMERGENCY MEDICINE

## 2023-01-07 VITALS
SYSTOLIC BLOOD PRESSURE: 128 MMHG | TEMPERATURE: 98.3 F | HEIGHT: 64 IN | WEIGHT: 213.41 LBS | BODY MASS INDEX: 36.43 KG/M2 | DIASTOLIC BLOOD PRESSURE: 70 MMHG | OXYGEN SATURATION: 96 % | HEART RATE: 74 BPM | RESPIRATION RATE: 20 BRPM

## 2023-01-07 DIAGNOSIS — R00.0 TACHYCARDIA: Primary | ICD-10-CM

## 2023-01-07 LAB — BACTERIA UR CULT: ABNORMAL

## 2023-01-07 RX ORDER — DILTIAZEM HYDROCHLORIDE 120 MG/1
120 CAPSULE, COATED, EXTENDED RELEASE ORAL ONCE
Status: COMPLETED | OUTPATIENT
Start: 2023-01-07 | End: 2023-01-07

## 2023-01-07 RX ORDER — ACETAMINOPHEN 325 MG/1
650 TABLET ORAL ONCE
Status: COMPLETED | OUTPATIENT
Start: 2023-01-07 | End: 2023-01-07

## 2023-01-07 RX ADMIN — DILTIAZEM HYDROCHLORIDE 120 MG: 120 CAPSULE, COATED, EXTENDED RELEASE ORAL at 09:24

## 2023-01-07 RX ADMIN — ACETAMINOPHEN 650 MG: 325 TABLET, FILM COATED ORAL at 09:07

## 2023-01-07 NOTE — ED PROCEDURE NOTE
PROCEDURE  ECG 12 Lead Documentation Only    Date/Time: 1/7/2023 9:05 AM  Performed by: Luz Rutledge MD  Authorized by: Luz Rutledge MD     Indications / Diagnosis: [facility]Y AT NH   ECG reviewed by me, the ED Provider: yes    Patient location:  ED  Previous ECG:     Previous ECG:  Compared to current    Comparison ECG info:  1/4/23-- LATERAL T WAVE CHAGNES ADN TACHYCARDIA HAS RESOLVED - NO OTHER SIGN CHANGES  Interpretation:     Interpretation: non-specific    Rate:     ECG rate:  79    ECG rate assessment: normal    Rhythm:     Rhythm: A-V block and paced    Pacing:     Capture:  Intermittent    Type of pacing:  Ventricular  Ectopy:     Ectopy: none    QRS:     QRS axis:  Left    QRS intervals:   Wide  Conduction:     Conduction: abnormal      Abnormal conduction: non-specific intraventricular conduction delay    ST segments:     ST segments:  Normal  T waves:     T waves: flattening      Flattening:  I, II, III and aVF  Q waves:     Q waves:  II, III, aVF, V1, V3, V2, V4 and V5  Other findings:     Other findings: poor R wave progression    Comments:      NO SIGNS OF ISCHEMIA/ INJURY          Luz Rutledge MD  01/07/23 5931

## 2023-01-07 NOTE — ED PROVIDER NOTES
History  Chief Complaint   Patient presents with   • Rapid Heart Rate     Pt from [facility] where they report her HR was 140's  Hx of AFIB on Eliquis  For EMS she was 70s-117  No real complaints about her heart  • Leg Pain     [facility] also reports her knees look infected, but pt doesn't have complaint about knees  Said her ankles hurt  Bed bound baseline  76 YR FEMALE FROM [facility] WITH RECENT 1/4 TO 1/6/22 [facility] ADMIT FOR INFECTIOUS WORKUP-- NO SIGN RESULTS--  HX OF PAF/FLUTTER -- ON  DOAC/  CCB/AND AS OF TODAY TO START BB-- SENT FROM NH FOR  RAPID HEART RATE -- PT HAS NO COMPLAINTS AT ALL--  STATES HAS CHRONCI ANKLE PAIN AND R KNEE PAIN  ALL FROM F [place]LD F[place]-- PT DENEIS ANY CP/SOB/PALP- HAS RESIDUAL DRY COUGH FROM LAST ADMIT NOTHING NEW- HAS NOT HAD ANY OFHER MEDS THIS AM--       History provided by:  Caregiver and nursing home  Rapid Heart Rate  Associated symptoms: leg pain    Leg Pain      Prior to Admission Medications   Prescriptions Last Dose Informant Patient Reported? Taking?    Calcium Polycarbophil (FIBER-LAX PO)   Yes No   Sig: Take 1 tablet by mouth daily at bedtime   Cholecalciferol 25 MCG (1000 UT) tablet   Yes No   Sig: Take 1,000 Units by mouth daily   DULoxetine (CYMBALTA) 30 mg delayed release capsule   No No   Sig: TAKE 1 CAPSULE(30 MG) BY MOUTH DAILY   Diclofenac Sodium (VOLTAREN) 1 %   Yes No   Sig: Apply 2 g topically 4 (four) times a day = to B/L knee   Melatonin 10 MG TABS   Yes No   Sig: Take by mouth Takes daily at night   Multiple Vitamin (MULTIVITAMIN ADULT PO)   Yes No   Sig: Take by mouth in the morning   acetaminophen (TYLENOL) 325 mg tablet   Yes No   Sig: Take 975 mg by mouth every 6 (six) hours as needed   albuterol (PROVENTIL HFA,VENTOLIN HFA) 90 mcg/act inhaler   No No   Sig: INHALE 2 PUFFS BY MOUTH EVERY 6 HOURS AS NEEDED FOR WHEEZING   allopurinol (ZYLOPRIM) 100 mg tablet   No No   Sig: Take 1 tablet (100 mg total) by mouth daily   apixaban (Eliquis) 5 mg   No No   Sig: Take 1 tablet (5 mg total) by mouth 2 (two) times a day   ascorbic acid (VITAMIN C) 500 MG tablet   No No   Sig: Take 1 tablet (500 mg total) by mouth 2 (two) times a day   bisacodyl (DULCOLAX) 10 mg suppository   Yes No   Sig: Insert 10 mg into the rectum daily as needed   diltiazem (CARDIZEM CD) 120 mg 24 hr capsule   No No   Sig: Take 1 capsule (120 mg total) by mouth daily   ezetimibe (ZETIA) 10 mg tablet   No No   Sig: Take 1 tablet (10 mg total) by mouth daily   ferrous sulfate 324 (65 Fe) mg   No No   Sig: Take 1 tablet (324 mg total) by mouth 2 (two) times a day before meals   folic acid (FOLVITE) 1 mg tablet   No No   Sig: TAKE 1 TABLET(1 MG) BY MOUTH DAILY   furosemide (LASIX) 20 mg tablet   No No   Sig: Take 1 tablet (20 mg total) by mouth daily with dinner   furosemide (LASIX) 40 mg tablet   Yes No   Sig: Take 40 mg by mouth in the morning   metoprolol tartrate (LOPRESSOR) 100 mg tablet   No No   Sig: Take 1 tablet (100 mg total) by mouth daily in the early morning Do not start before January 7, 2023     metoprolol tartrate (LOPRESSOR) 50 mg tablet   No No   Sig: Take 1 tablet (50 mg total) by mouth daily at bedtime   omeprazole (PriLOSEC) 20 mg delayed release capsule   Yes No   Sig: Take 20 mg by mouth daily   oxybutynin (DITROPAN-XL) 10 MG 24 hr tablet   Yes No   Sig: Take 10 mg by mouth daily at bedtime   polyethylene glycol (MIRALAX) 17 g packet   Yes No   Sig: Take 17 g by mouth daily as needed   senna-docusate sodium (SENOKOT S) 8 6-50 mg per tablet   No No   Sig: Take 2 tablets by mouth 2 (two) times a day   sucralfate (CARAFATE) 1 g tablet   No No   Sig: Take 1 tablet (1 g total) by mouth 4 (four) times a day (before meals and at bedtime)   vitamin B-12 (VITAMIN B-12) 1,000 mcg tablet   No No   Sig: Take 1 tablet (1,000 mcg total) by mouth daily      Facility-Administered Medications: None       Past Medical History:   Diagnosis Date   • Abnormal ECG     Last Assessed 9/29/2016   • Anxiety     Last Assessed 6/08/2016   • Arthritis     knees   • Asthma     Last Assessed 11/06/2013   • Atrial premature complex    • Cataract, bilateral     Last Assessed 7/14/2016   • Cataract, left eye     Both eyes  Had surgery on left  • COVID-19    • Difficulty swallowing    • Diverticulosis 9/25/2022   • Dizziness    • Fibromyalgia    • Fibromyalgia, primary    • Gastric reflux    • Heart failure ([address] )     5/23/22 Pt reports had heart failure in the past   • History of COVID-19     5/23/22 Pt reports having COVID in 2021     • History of transfusion     no reaction   • [place] (hard of hearing)    • Hyperlipidemia    • Hypertension    • Incontinence in female     5/23/22 Pt reports has incontinence -wears depends especially at night/riding in car   • Irregular heart beat     5/23/22 Pt reports hx of A fib   • Lyme disease    • PONV (postoperative nausea and vomiting)    • Premature ventricular contraction    • Primary osteoarthritis of both knees     Last Assessed 7/14/2016   • Rheumatic fever     5/23/22 Pt reports had hx of rheumatic fever as a child twice   • Sore throat    • Tuberculosis 1945       Past Surgical History:   Procedure Laterality Date   • APPENDECTOMY     • CARDIAC PACEMAKER PLACEMENT  2019   • COLONOSCOPY     • DENTAL SURGERY      extractions   • HYSTERECTOMY      5/23/22 Pt reports had appendix out with hysterectomy and "tightened up my bladder"   • IR ASPIRATION ONLY  9/30/2022   • ORIF FEMUR FRACTURE Left 08/05/2021    Procedure: OPEN REDUCTION W/ INTERNAL FIXATION (ORIF) DISTAL FEMUR; INSERTION RETROGRADE NAIL;  Surgeon: Ladarius Singh MD;  [facility];  Service: Orthopedics   • MD ARTHRP KNE CONDYLE&PLATU MEDIAL&LAT COMPARTMENTS Left 9/12/2022    Procedure: ARTHROPLASTY KNEE TOTAL;  Surgeon: Ladarius Singh MD;  [facility];  Service: Orthopedics   • MD DILATION [facility] UNGUIDED SOUND/BOUGIE 1/MULT PASS N/A 04/06/2016    Procedure: DILATATION ESOPHAGEAL;  Surgeon: Marco Vinson MD;  Location: [facility]; Service: Gastroenterology   • AL EGD TRANSORAL BIOPSY SINGLE/MULTIPLE N/A 04/06/2016    Procedure: ESOPHAGOGASTRODUODENOSCOPY (EGD); Surgeon: Marco Vinson MD;  [facility]: [facility]; Service: Gastroenterology   • AL REMOVAL IMPLANT DEEP Left 9/12/2022    Procedure: REMOVAL NAIL IM  FEMUR;  Surgeon: Kady Tellez MD;  [facility];  Service: Orthopedics   • AL XCAPSL CTRC RMVL INSJ IO LENS PROSTH W/O ECP Left 03/15/2016    Procedure: EXTRACTION EXTRACAPSULAR CATARACT PHACO INTRAOCULAR LENS (IOL); Surgeon: Ricky Mercado MD;  [facility];  Service: Ophthalmology   • REPLACEMENT TOTAL KNEE Right    • REPLACEMENT TOTAL KNEE      right    • TONSILLECTOMY         Family History   Problem Relation Age of Onset   • Coronary artery disease Mother    • Heart attack Mother         Prior   • Heart disease Father    • Heart attack Father         Prior   • Anesthesia problems Neg Hx      I have reviewed and agree with the history as documented  E-Cigarette/Vaping   • E-Cigarette Use Never User    • Comments Denies any former or current use      E-Cigarette/Vaping Substances   • Nicotine No    • THC No    • CBD No    • Flavoring No    • Other No    • Unknown No      Social History     Tobacco Use   • Smoking status: Never   • Smokeless tobacco: Never   • Tobacco comments:     Denies any former or current smoking   Vaping Use   • Vaping Use: Never used   Substance Use Topics   • Alcohol use: Not Currently     Comment: Per [employer]- pt reports no current alcohol use at this time   • Drug use: No     Comment: Denies any former or current drug use       Review of Systems   Constitutional: Negative  HENT: Negative  Eyes: Negative  Respiratory: Negative  Cardiovascular: Positive for palpitations  Gastrointestinal: Negative  Endocrine: Negative  Genitourinary: Negative  Musculoskeletal: Negative           CHRONIC BILATERAL ANKLE/ R KNEE PAIN -- ALL OLD    Skin: Negative  Allergic/Immunologic: Negative  Neurological: Negative  Hematological: Negative  Psychiatric/Behavioral: Negative  Physical Exam  Physical Exam  Vitals and nursing note reviewed  Constitutional:       General: She is not in acute distress  Appearance: Normal appearance  She is not toxic-appearing or diaphoretic  Comments: AVSS--  PULSE OX 95 % ON RA- INTERPRETATION IS  NORMAL- NO INTERVENTION - IN NAD - DRY COUGH NOTED - CHRONICALLY ILL APPEARING-    HENT:      Head: Normocephalic and atraumatic  Nose: Nose normal       Mouth/Throat:      Mouth: Mucous membranes are moist    Eyes:      General: No scleral icterus  Right eye: No discharge  Left eye: No discharge  Extraocular Movements: Extraocular movements intact  Pupils: Pupils are equal, round, and reactive to light  Comments: PALE - CONJ   Neck:      Vascular: No carotid bruit  Comments: NO PMT C SPINE  Cardiovascular:      Rate and Rhythm: Rhythm irregular  Pulses: Normal pulses  Heart sounds: Murmur heard  No gallop  Pulmonary:      Effort: Pulmonary effort is normal  No respiratory distress  Breath sounds: No stridor  No wheezing, rhonchi or rales  Comments: FAINT DECREASED BS AT BASES BILATERALLY   Chest:      Chest wall: No tenderness  Abdominal:      General: Bowel sounds are normal  There is no distension  Palpations: Abdomen is soft  There is no mass  Tenderness: There is no abdominal tenderness  There is no right CVA tenderness, left CVA tenderness, guarding or rebound  Hernia: No hernia is present  Comments: SOFT NT/ND- NO HSM- NO CVA TENDERNESS- NO ASCITES- NO PERITONEAL SIGNS- NO PULSATILE ABD MASS/BRUIT/ TENDENRESS   Musculoskeletal:         General: Tenderness present  No swelling, deformity or signs of injury  Normal range of motion        Cervical back: Normal range of motion and neck supple  No rigidity or tenderness  Right lower leg: Edema present  Left lower leg: Edema present  Comments: TRACE BLE PRETIBIAL EDEMA--  EQUAL BILATERAL RADIAL/DP PULSES- R KNEE- WITH ANTERIOR OLD APPEARING ECCHYMOSIS-- WITH HEALING WOUND WITH GRANULATION TISSUE- NO ABSCESS/ CELLULITIS- CREPITUS- ASCENDING ERYTHEMA   Lymphadenopathy:      Cervical: No cervical adenopathy  Skin:     General: Skin is warm  Capillary Refill: Capillary refill takes less than 2 seconds  Coloration: Skin is pale  Skin is not jaundiced  Findings: No bruising, erythema, lesion or rash  Comments: SEE ABOVE FOR RLE DESCXRIPTION    Neurological:      General: No focal deficit present  Mental Status: She is alert  Mental status is at baseline  Cranial Nerves: No cranial nerve deficit  Sensory: No sensory deficit  Motor: No weakness        Comments: A AND O X 2- BASELINE- NON FOAL NEURO EXAM    Psychiatric:         Mood and Affect: Mood normal          Behavior: Behavior normal          Vital Signs  ED Triage Vitals [01/07/23 0849]   Temperature Pulse Respirations Blood Pressure SpO2   98 9 °F (37 2 °C) 76 20 142/65 95 %      Temp Source Heart Rate Source Patient Position - Orthostatic VS BP Location FiO2 (%)   Oral Monitor Lying Right arm --      Pain Score       No Pain           Vitals:    01/07/23 0849   BP: 142/65   Pulse: 76   Patient Position - Orthostatic VS: Lying         Visual Acuity      ED Medications  Medications   diltiazem (CARDIZEM CD) 24 hr capsule 120 mg (has no administration in time range)   acetaminophen (TYLENOL) tablet 650 mg (650 mg Oral Given 1/7/23 2036)       Diagnostic Studies  Results Reviewed     None                 No orders to display              Procedures  Procedures         ED Course  ED Course as of 01/07/23 1210   Sat Jan 07, 2023   0902 Denzel nuñez note- 1/6/22 d/c summary reviewed by denzel nuñez- labs/ studies reviewed by denzel nuñez   1006 - DENZEL NUÑEZ NOTE- PT- RE-EVALUATED- NIECE NOW AT [facility] TO [facility]-- PT ON MONITOR IN AFLUTTER IN 80-90'S-- WITH NO COMPS- ER MONITOR REVIEWED BY ER MD- NO EVENTS--  WILL WATCH FOR ANOTHER HR IN ER ON MONITOR- IF NO EVENTS- WILL D/C BACK TO [facility]RONIC CARE FACILITY    1107 ER MD NOTE- MONITOR REVIEWED AGAIN BY ER MD-  NO EVENTS--  PT WITH AFLUTTER IN 70-90'S IN ER WITH NO TACHYCARDIA- PT RE-EVALUATED- C/O NASAL CONGESTION - NO CP/SOB/PALP- NIECE WANTS PT TO VBE SAT UP AND FURTHER MONITORED  -- WILL PLACE PT UP AND MONITOR FOR ANOTHER HR AND IF NEG- WILL D/C BACK TO CHRONIC CARE FACILITY- NIECE AGREES WITH TX PLAN   200 ER MD NOTE- CONTINUOUS MONITOR REVIEWED- NO EVENTS- PULSED IN 70-80'S - PT RE-EVALUATED SLEEPING IN NAD WITH RATE 'S-- WILL D/C                               SBIRT 20yo+    Flowsheet Row Most Recent Value   SBIRT (25 yo +)    In order to provide better care to our patients, we are screening all of our patients for alcohol and drug use  Would it be okay to ask you these screening questions? Yes Filed at: 01/07/2023 0900   Initial Alcohol Screen: US AUDIT-C     1  How often do you have a drink containing alcohol? 0 Filed at: 01/07/2023 0900   2  How many drinks containing alcohol do you have on a typical day you are drinking? 0 Filed at: 01/07/2023 0900   3a  Male UNDER 65: How often do you have five or more drinks on one occasion? 0 Filed at: 01/07/2023 0900   3b  FEMALE Any Age, or MALE 65+: How often do you have 4 or more drinks on one occassion? 0 Filed at: 01/07/2023 0900   Audit-C Score 0 Filed at: 01/07/2023 0900   ALBERTO: How many times in the past year have you    Used an illegal drug or used a prescription medication for non-medical reasons?  Never Filed at: 01/07/2023 0900                    Medical Decision Making  - PT WITH RECENT HOSP AND D/C BACK TO CHRONIC CARE FACILITY -- HX OF AFIB/FLUTTER ON CCB/DOAC- WITH ADDITION TODAY OF BB-- SENT TO ER FROM CHRONIC CARE FACILITY FOR PULSE  WITH NO SYMPTOMS OR COMPLAINTS- AS PER EMS- PULSE BELOW 100--  NO FEVERS- NO V/D--   WITH R LABS FROM YESTERDAY ALL REVIEWED- WNL'S-- PT WILL NEED ECG AND MONITORING IN ER AND REPEATED RE-EVALS    Tachycardia: chronic illness or injury     Details: MOST LIKLEY ACUTE ON CHRONIC--    Amount and/or Complexity of Data Reviewed  Independent Historian: caregiver     Details: [facility]RON[facility]ARE FACILITY   External Data Reviewed: labs and radiology  Details: FROM PREVIOUS HOSP ALL REVIEWED BY ER MD  ECG/medicine tests: ordered and independent interpretation performed  Details: REVIEWED AND COMAPRED BY TAYLOR GOSS MD   Discussion of management or test interpretation with external provider(s): ER MD NOTE- LIKELY PT HAD AFLUTTER WITH RVR- ASYMPTOMATIC-- AND IS NO BACK IN RATE CONTROL  - PT ON AS OF TODAY 2 RATE CONTROL MEDS    Risk  OTC drugs  Prescription drug management  Disposition  Final diagnoses:   None     ED Disposition     None      Follow-up Information    None         Patient's Medications   Discharge Prescriptions    No medications on file       No discharge procedures on file      PDMP Review       Value Time User    PDMP Reviewed  Yes 1/4/2023 12:40 AM Genna Jones MD          ED Provider  Electronically Signed by           Jani Evans MD  01/07/23 1215       Jani Evans MD  01/07/23 1211

## 2023-01-07 NOTE — ED NOTES
Offered to order pt breakfast, she said she is not hungry because she had a large dinner  Did have pt eat applesauce with medscot Veras RN  01/07/23 8628

## 2023-01-07 NOTE — DISCHARGE INSTRUCTIONS
DIAGNOSIS: TACHYCARDIA- RESOLVED    - IN ER THE PATIENT HAS BEEN IN  A AFLUTTER /INTERMITENTLY PACED RHYTHM FROM 70-90'S WITH NO COMPLAINTS--       - WE DID GIVE HER  DILTIAZEM  MG  IN THE ER    - PLEASE RETURN TO  THE ER FOR ANY NEW/ WORSENING/CONCERNING SYMPTOMS TO YOU

## 2023-01-07 NOTE — ED NOTES
Katy requested for return to [address] [place] [address]  Report called back to facility  Pt declining lunch        Fransisco Alvarado, REBECCA  01/07/23 7547

## 2023-01-08 LAB
BACTERIA BLD CULT: NORMAL
BACTERIA BLD CULT: NORMAL

## 2023-01-09 ENCOUNTER — NURSING HOME VISIT (OUTPATIENT)
Dept: GERIATRICS | Facility: OTHER | Age: 78
End: 2023-01-09

## 2023-01-09 DIAGNOSIS — N18.31 STAGE 3A CHRONIC KIDNEY DISEASE (HCC): ICD-10-CM

## 2023-01-09 DIAGNOSIS — E66.01 MORBID OBESITY WITH BODY MASS INDEX (BMI) OF 40.0 TO 49.9 (HCC): Chronic | ICD-10-CM

## 2023-01-09 DIAGNOSIS — D50.8 OTHER IRON DEFICIENCY ANEMIA: ICD-10-CM

## 2023-01-09 DIAGNOSIS — I10 ESSENTIAL HYPERTENSION: ICD-10-CM

## 2023-01-09 DIAGNOSIS — I49.5 TACHY-BRADY SYNDROME (HCC): Primary | ICD-10-CM

## 2023-01-09 DIAGNOSIS — I50.32 CHRONIC HEART FAILURE WITH PRESERVED EJECTION FRACTION (HCC): ICD-10-CM

## 2023-01-09 DIAGNOSIS — Z95.0 PACEMAKER: ICD-10-CM

## 2023-01-09 DIAGNOSIS — R26.2 AMBULATORY DYSFUNCTION: ICD-10-CM

## 2023-01-09 LAB
ATRIAL RATE: 288 BPM
BACTERIA BLD CULT: NORMAL
BACTERIA BLD CULT: NORMAL
P AXIS: 103 DEGREES
QRS AXIS: -49 DEGREES
QRSD INTERVAL: 114 MS
QT INTERVAL: 370 MS
QTC INTERVAL: 424 MS
T WAVE AXIS: -4 DEGREES
VENTRICULAR RATE: 79 BPM

## 2023-01-09 NOTE — ASSESSMENT & PLAN NOTE
Lab Results   Component Value Date    EGFR 60 01/06/2023    EGFR 64 01/05/2023    EGFR 64 01/04/2023    CREATININE 0 91 01/06/2023    CREATININE 0 86 01/05/2023    CREATININE 0 86 01/04/2023     Kidney function is stable  May benefit from low dose ACEI  - avoid nephrotoxic medicaitons

## 2023-01-09 NOTE — ASSESSMENT & PLAN NOTE
Wt Readings from Last 3 Encounters:   01/07/23 96 8 kg (213 lb 6 5 oz)   01/06/23 95 8 kg (211 lb 3 2 oz)   12/28/22 99 7 kg (219 lb 12 8 oz)     Last ECHO in Sep 2022 showed EF of 65%, mild AV stenosis, mod AV regurgitaion    Follows with cardiology   - continue lasix 40+20 mg daily  - continue metoprolol tartrate   - daily weight  - salt restricted diet

## 2023-01-10 ENCOUNTER — TRANSITIONAL CARE MANAGEMENT (OUTPATIENT)
Dept: INTERNAL MEDICINE CLINIC | Facility: CLINIC | Age: 78
End: 2023-01-10

## 2023-01-11 ENCOUNTER — NURSING HOME VISIT (OUTPATIENT)
Dept: GERIATRICS | Facility: OTHER | Age: 78
End: 2023-01-11

## 2023-01-11 ENCOUNTER — TELEPHONE (OUTPATIENT)
Dept: OBGYN CLINIC | Facility: HOSPITAL | Age: 78
End: 2023-01-11

## 2023-01-11 DIAGNOSIS — K21.9 CHRONIC GERD: ICD-10-CM

## 2023-01-11 DIAGNOSIS — D50.8 OTHER IRON DEFICIENCY ANEMIA: ICD-10-CM

## 2023-01-11 DIAGNOSIS — R26.2 AMBULATORY DYSFUNCTION: ICD-10-CM

## 2023-01-11 DIAGNOSIS — M25.561 ACUTE PAIN OF RIGHT KNEE: ICD-10-CM

## 2023-01-11 DIAGNOSIS — I10 ESSENTIAL HYPERTENSION: ICD-10-CM

## 2023-01-11 DIAGNOSIS — I50.32 CHRONIC HEART FAILURE WITH PRESERVED EJECTION FRACTION (HCC): ICD-10-CM

## 2023-01-11 DIAGNOSIS — I49.5 TACHY-BRADY SYNDROME (HCC): Primary | ICD-10-CM

## 2023-01-11 DIAGNOSIS — N18.31 STAGE 3A CHRONIC KIDNEY DISEASE (HCC): ICD-10-CM

## 2023-01-11 DIAGNOSIS — K59.09 CHRONIC CONSTIPATION: ICD-10-CM

## 2023-01-11 NOTE — ASSESSMENT & PLAN NOTE
Stable  Blood pressure trends reviewed, SBP avg around 130, DBP avg 60s  Continue Lasix, metoprolol, diltiazem

## 2023-01-11 NOTE — ASSESSMENT & PLAN NOTE
Stable  Has PPM  Heart rate trends reviewed, heart rates averaging in the 70s  Continue metoprolol, diltiazem, Eliquis

## 2023-01-11 NOTE — PROGRESS NOTES
[facility]  [address]  [phone]  [place]  Code 32 LTC          NAME: Afshin Adkins  AGE: 76 y o  SEX: female     DATE OF ENCOUNTER: 1/11/2023    Assessment and Plan     1  Tachy-smita syndrome ([address] )  Assessment & Plan:  Stable  Has PPM  Heart rate trends reviewed, heart rates averaging in the 70s  Continue metoprolol, diltiazem, Eliquis      2  Ambulatory dysfunction  Assessment & Plan:  Multifactorial  Continue PT/OT  Non-ambulatory  F/u ortho       3  Essential hypertension  Assessment & Plan:  Stable  Blood pressure trends reviewed, SBP avg around 130, DBP avg 60s  Continue Lasix, metoprolol, diltiazem      4  Chronic heart failure with preserved ejection fraction [facility])  Assessment & Plan:  Wt Readings from Last 3 Encounters:   01/07/23 96 8 kg (213 lb 6 5 oz)   01/06/23 95 8 kg (211 lb 3 2 oz)   12/28/22 99 7 kg (219 lb 12 8 oz)     Weight stable at 210 lb  Euvolemic on today's exam  Most recent EF showing 65%, continue Lasix, metoprolol  Continue daily weights, 2 g sodium diet, fluid restriction  Monitor routine labs        5  Stage 3a chronic kidney disease [facility])  Assessment & Plan:  Lab Results   Component Value Date    EGFR 60 01/06/2023    EGFR 64 01/05/2023    EGFR 64 01/04/2023    CREATININE 0 91 01/06/2023    CREATININE 0 86 01/05/2023    CREATININE 0 86 01/04/2023   Stable  Most recent creatinine 0 91  Continue to avoid nephrotoxins, NSAIDs  Monitor routine labs      6  Chronic GERD  Assessment & Plan:  Stable  Continue carafate, omeprazole      7  Chronic constipation  Assessment & Plan:  Stable  Continue senna twice daily, daily MiraLAX as needed, suppository as needed       8  Other iron deficiency anemia  Assessment & Plan:  Stable  Most recent Hgb 8 9  Continue folic acid]  Monitor routine labs      9   Acute pain of right knee  Assessment & Plan:  Ongoing, somewhat improved  X-ray negative for fracture  Continue Voltaren gel, Tylenol  Could consider follow-up rehab medicine consult         All medications and routine orders were reviewed and updated as needed  Chief Complaint     STR follow up visit      Past Medical and Surgica History      Past Medical History:   Diagnosis Date   • Abnormal ECG     Last Assessed 9/29/2016   • Anxiety     Last Assessed 6/08/2016   • Arthritis     knees   • Asthma     Last Assessed 11/06/2013   • Atrial premature complex    • Cataract, bilateral     Last Assessed 7/14/2016   • Cataract, left eye     Both eyes  Had surgery on left  • COVID-19    • Difficulty swallowing    • Diverticulosis 9/25/2022   • Dizziness    • Fibromyalgia    • Fibromyalgia, primary    • Gastric reflux    • Heart failure ([address] )     5/23/22 Pt reports had heart failure in the past   • History of COVID-19     5/23/22 Pt reports having COVID in 2021     • History of transfusion     no reaction   • [place] (hard of hearing)    • Hyperlipidemia    • Hypertension    • Incontinence in female     5/23/22 Pt reports has incontinence -wears depends especially at night/riding in car   • Irregular heart beat     5/23/22 Pt reports hx of A fib   • Lyme disease    • PONV (postoperative nausea and vomiting)    • Premature ventricular contraction    • Primary osteoarthritis of both knees     Last Assessed 7/14/2016   • Rheumatic fever     5/23/22 Pt reports had hx of rheumatic fever as a child twice   • Sore throat    • Tuberculosis 1947     Past Surgical History:   Procedure Laterality Date   • APPENDECTOMY     • CARDIAC PACEMAKER PLACEMENT  2019   • COLONOSCOPY     • DENTAL SURGERY      extractions   • HYSTERECTOMY      5/23/22 Pt reports had appendix out with hysterectomy and "tightened up my bladder"   • IR ASPIRATION ONLY  9/30/2022   • ORIF FEMUR FRACTURE Left 08/05/2021    Procedure: OPEN REDUCTION W/ INTERNAL FIXATION (ORIF) DISTAL FEMUR; INSERTION RETROGRADE NAIL;  Surgeon: Ag Bautista MD;  [facility]: [facility];  Service: Orthopedics   • CT ARTHRP [facility] CONDYLE&PLATU MEDIAL&LAT COMPARTMENTS Left 9/12/2022    Procedure: ARTHROPLASTY KNEE TOTAL;  Surgeon: Teri Álvarez MD;  Location[facility];  Service: Orthopedics   • TX DILATION ESOPH UNGUIDED SOUND/BOUGIE 1/MULT PASS N/A 04/06/2016    Procedure: DILATATION ESOPHAGEAL;  Surgeon: Ashwini Way MD;  Location: [facility]; Service: Gastroenterology   • TX EGD TRANSORAL BIOPSY SINGLE/MULTIPLE N/A 04/06/2016    Procedure: ESOPHAGOGASTRODUODENOSCOPY (EGD); Surgeon: Ashwini Way MD;  Location: [facility]; Service: Gastroenterology   • TX REMOVAL IMPLANT DEEP Left 9/12/2022    Procedure: REMOVAL NAIL IM  FEMUR;  Surgeon: Teri Álvarez MD;  [facility];  Service: Orthopedics   • TX XCAPSL CTRC RMVL INSJ IO LENS PROSTH W/O ECP Left 03/15/2016    Procedure: EXTRACTION EXTRACAPSULAR CATARACT PHACO INTRAOCULAR LENS (IOL); Surgeon: Raz Coronado MD;  Location: [facility];  Service: Ophthalmology   • REPLACEMENT TOTAL KNEE Right    • REPLACEMENT TOTAL KNEE      right    • TONSILLECTOMY       Allergies   Allergen Reactions   • Penicillins Hives     Itching and terrible hives   • Sulfa Antibiotics Itching   • F44 Folate [Folic Acid-Vit I7-HXI A08 - Food Allergy] Other (See Comments)     5/23/22 Pt reports no allergic reaction known    • [place] Other (See Comments)     Unknown, 5/23 -pt is unaware of reaction    • Lyrica [Pregabalin] Other (See Comments)     5/23/22 Pt reports doesn't remember reaction    • Other Other (See Comments)     Black rubber 5/23/22 per allergy test - pt unaware of reaction   • Cortisone Acetate [Cortisone] Itching and Rash     5/23/22 pt reports makes her violent    • Doxycycline Hives and Itching   • Nickel Other (See Comments)     Skin discoloration          History of Present Illness     Cherie Gill is a 75 y/o female admitted to [facility] on 11/28 for STR   Past Medical Hx including but not limited to GERD, reactive airway disease, Afib RVR, Tachy smita syndrome s/p PPM, HTN, CHF, lumbar degenerative disc disease, arthritis, MICHAEL on CKD, anxiety, depression, seen in collaboration with nursing for medical mgmt and STR follow up    [facility] Course:     She wasd admitted to [address] [place] [address] from 11/25 through 11/28 with CC of nausea and vomiting with poor PO intake found to have MICHAEL  Proctitis also noted on CT with recommondation for GI f/u  Received IVF in setting of likely hypovolemic hyponatremia  She also had asymptomatic bacteriuria with monitoring off abx  Recent hx of IV vanco x6 weeks on 11/9 r/t methicillin resistant staph bacteremia  Discharged to [address] [place] [address] for STR, she is typically wheelchair bound  She was admitted to [facility] from 1/4 through 1/6 for UTI and bilateral pleural effusions  She was treated with IV Lasix and antibiotics, urine positive for ESBL status post 3-day course of end ertapenem  Lasix dosing increased to 40 mg every morning and 20 mg in the evening  She was then discharged back to [facility] short-term rehab  She was sent to the ED again on 1/7 for heart rate in the 140s EKG showing a flutter and leg pain  She was discharged and started on metoprolol  Right knee pain status post old fall  She was discharged back to [facility]  Rehab:    Continues with ongoing supportive care  Reports she is working with therapy, using transfer slide to go to chair  Ongoing right knee pain s/p previous fall, overall controlled  Sleeping relatively okay, depends on facility noise  Reports she is eating and drinking okay, continues on FR in setting of CHF, denies any SOB, orthopnea   Denies any palpitations or CP in setting of tachybradycardia syndrome  SNF chart reviewed, eating 2-3 meals per day eating %, last BM 1/10  She denies any fever, fatigue, chills  The patient's allergies, past medical, surgical, social and family history were reviewed and unchanged      Review of Systems     REVIEW OF SYSTEMS:  Per history of present illness, all other systems reviewed and negative  Objective     Vitals: /64, temp 97 3, HR 74, RR 18, O2 96%  Weight 210lb     Physical Exam    Constitutional:       General: She is not in acute distress  Appearance: Normal appearance  She is obese  She is not ill-appearing  HENT:      Head: Normocephalic and atraumatic  Right Ear: External ear normal       Left Ear: External ear normal       Nose: Nose normal  No rhinorrhea  Mouth/Throat:      Mouth: Mucous membranes are moist       Pharynx: No posterior oropharyngeal erythema  Eyes:      General:         Right eye: No discharge  Left eye: No discharge  Extraocular Movements: Extraocular movements intact  Conjunctiva/sclera: Conjunctivae normal    Cardiovascular:      Rate and Rhythm: Normal rate and regular rhythm  Pulses: Normal pulses  Heart sounds: Murmur heard  Pulmonary:      Effort: Pulmonary effort is normal  No respiratory distress  Breath sounds: Normal breath sounds  No wheezing  Abdominal:      General: Bowel sounds are normal  There is no distension  Palpations: Abdomen is soft  Tenderness: There is no abdominal tenderness  There is no guarding  Comments:   Musculoskeletal:         General: No tenderness  Cervical back: Normal range of motion and neck supple  No tenderness  Right lower leg: No edema  Left lower leg: No edema  Comments: Continues with LLE weakness  Skin:     General: Skin is warm and dry  Findings:Bruising of right knee  Neurological:      General: No focal deficit present  Mental Status: She is alert and oriented to person, place, and time  Mental status is at baseline  Sensory: No sensory deficit  Motor: Weakness present        Gait: Gait abnormal    Psychiatric:         Mood and Affect: Mood normal          Behavior: Behavior normal           Pertinent Laboratory/Diagnostic Studies:    Lab Results Component Value Date    SODIUM 140 01/06/2023    K 3 9 01/06/2023     01/06/2023    CO2 32 01/06/2023    AGAP 6 01/06/2023    BUN 19 01/06/2023    CREATININE 0 91 01/06/2023    GLUC 110 01/06/2023    GLUF 106 (H) 08/17/2022    CALCIUM 9 2 01/06/2023    AST 16 01/04/2023    ALT 11 01/04/2023    ALKPHOS 104 01/04/2023    TP 7 0 01/04/2023    TBILI 0 53 01/04/2023    EGFR 60 01/06/2023     Lab Results   Component Value Date    WBC 7 35 01/06/2023    HGB 8 9 (L) 01/06/2023    HCT 30 3 (L) 01/06/2023    MCV 90 01/06/2023     01/06/2023     Lab Results   Component Value Date    IVHZ46PXNADA 26 8 (L) 09/25/2019     Procedure: XR chest 1 view portable    Result Date: 1/4/2023  Narrative: CHEST INDICATION:  Shortness of breath  COMPARISON:  11/25/2020, CT chest 9/20/2021 EXAM PERFORMED/VIEWS:  XR CHEST PORTABLE FINDINGS:  Monitoring leads and clips project over the chest  Cardiomediastinal silhouette appears stable  Calcified right paratracheal lymph node  Left subclavian pacer with lead tips projecting at the right atrium and ventricle  The lungs are clear  No pneumothorax  Hazy density at the lung bases could reflect pleural effusions and/or artifact from patient's soft tissues and technique  Mild osteoarthritis left glenohumeral and right AC joints  Impression: Hazy density at the lung bases could reflect pleural effusions and/or artifact from patient's soft tissues and technique  Clear lungs  Procedure: CT chest without contrast    Result Date: 1/4/2023  Narrative: CT CHEST WITHOUT IV CONTRAST INDICATION:   Cough, persistent cough, sob  COMPARISON:  CT of the abdomen pelvis on November 25, 2022  TECHNIQUE: CT examination of the chest was performed without intravenous contrast  Axial, sagittal, and coronal 2D reformatted images were created from the source data and submitted for interpretation  Radiation dose length product (DLP) for this visit:  363 mGy-cm     This examination, like all CT scans performed in the [facility], was performed utilizing techniques to minimize radiation dose exposure, including the use of iterative reconstruction and automated exposure control  FINDINGS: LUNGS:  A few punctate calcified granuloma are seen within the right lung  Bilateral dependent atelectasis  No focal consolidation  The central airways are patent  PLEURA:  There is moderate right and small left pleural effusion with adjacent compressive atelectasis  HEART/GREAT VESSELS: Left-sided pacing device in place  Coronary artery calcifications  No pericardial effusion  No thoracic aortic aneurysm  Mild to moderate calcifications of the thoracic aorta  MEDIASTINUM AND JJ:  A right paratracheal calcified lymph node/granuloma measures 2 8 x 1 7 cm  CHEST WALL AND LOWER NECK:  Unremarkable  VISUALIZED STRUCTURES IN THE UPPER ABDOMEN:  Unremarkable  OSSEOUS STRUCTURES:  No acute fracture or destructive osseous lesion  Impression: Moderate right and small left pleural effusions with adjacent compressive atelectasis  Current Medications   Medications reviewed and updated see facility [facility] for details  Please note:  Voice-recognition software may have been used in the preparation of this document  Occasional wrong word or "sound-alike" substitutions may have occurred due to the inherent limitations of voice recognition software  Interpretation should be guided by context           JERI Piper  1/11/2023 9:15AM  Patient: Emy Correia

## 2023-01-11 NOTE — ASSESSMENT & PLAN NOTE
Ongoing, somewhat improved  X-ray negative for fracture  Continue Voltaren gel, Tylenol  Could consider follow-up rehab medicine consult

## 2023-01-11 NOTE — ASSESSMENT & PLAN NOTE
Wt Readings from Last 3 Encounters:   01/07/23 96 8 kg (213 lb 6 5 oz)   01/06/23 95 8 kg (211 lb 3 2 oz)   12/28/22 99 7 kg (219 lb 12 8 oz)     Weight stable at 210 lb  Euvolemic on today's exam  Most recent EF showing 65%, continue Lasix, metoprolol  Continue daily weights, 2 g sodium diet, fluid restriction  Monitor routine labs

## 2023-01-11 NOTE — ASSESSMENT & PLAN NOTE
Lab Results   Component Value Date    EGFR 60 01/06/2023    EGFR 64 01/05/2023    EGFR 64 01/04/2023    CREATININE 0 91 01/06/2023    CREATININE 0 86 01/05/2023    CREATININE 0 86 01/04/2023   Stable  Most recent creatinine 0 91  Continue to avoid nephrotoxins, NSAIDs  Monitor routine labs

## 2023-01-11 NOTE — TELEPHONE ENCOUNTER
Caller: Netta    Doctor/Office: Kimberly    Call regarding :  Asking to speak with surgery coordinator        Call was transferred to: Surgery Coordinator for [facility]

## 2023-01-16 ENCOUNTER — TELEPHONE (OUTPATIENT)
Dept: OBGYN CLINIC | Facility: HOSPITAL | Age: 78
End: 2023-01-16

## 2023-01-16 NOTE — TELEPHONE ENCOUNTER
Caller: Netta    Doctor: Osito Chowdary    Reason for call: Asking what type of anesthesia will be used for sx  Patient does not want any kind of spinal anesthesia    Please advise    Call back#: [phone] TO LEAVE VM IF NO ANSWER

## 2023-01-17 ENCOUNTER — PREP FOR PROCEDURE (OUTPATIENT)
Dept: OBGYN CLINIC | Facility: CLINIC | Age: 78
End: 2023-01-17

## 2023-01-17 DIAGNOSIS — S83.002A PATELLAR SUBLUXATION, LEFT, INITIAL ENCOUNTER: Primary | ICD-10-CM

## 2023-01-17 RX ORDER — CHLORHEXIDINE GLUCONATE 0.12 MG/ML
15 RINSE ORAL ONCE
Status: CANCELLED | OUTPATIENT
Start: 2023-01-17 | End: 2023-01-17

## 2023-01-18 ENCOUNTER — NURSING HOME VISIT (OUTPATIENT)
Dept: GERIATRICS | Facility: OTHER | Age: 78
End: 2023-01-18

## 2023-01-18 DIAGNOSIS — I49.5 TACHY-BRADY SYNDROME (HCC): Primary | ICD-10-CM

## 2023-01-18 DIAGNOSIS — R26.2 AMBULATORY DYSFUNCTION: ICD-10-CM

## 2023-01-18 DIAGNOSIS — I50.32 CHRONIC HEART FAILURE WITH PRESERVED EJECTION FRACTION (HCC): ICD-10-CM

## 2023-01-18 DIAGNOSIS — I10 ESSENTIAL HYPERTENSION: ICD-10-CM

## 2023-01-18 DIAGNOSIS — N18.31 STAGE 3A CHRONIC KIDNEY DISEASE (HCC): ICD-10-CM

## 2023-01-18 NOTE — PROGRESS NOTES
[facility]  [address]  [phone]  [place]  Code 32 LTC          NAME: Lupe Winston  AGE: 76 y o  SEX: female     DATE OF ENCOUNTER: 1/18/2023    Assessment and Plan     1  Tachy-smita syndrome ([address] )  Assessment & Plan:  Stable  Has PPM  Heart rate trends reviewed, heart rates averaging in the 70s  Continue metoprolol, diltiazem, Eliquis      2  Ambulatory dysfunction  Assessment & Plan:  Multifactorial, acute and chronic conditions  Continue PT/OT with plan to transition LTC on 1/20  Remains nonambulatory but is able to complete transfers via slide board    She has history of left Patellar subluxation and has upcoming procedure forretinacular release of left knee on 1/23, check CBC, BMP preop on 1/20  Hold Eliquis 48 hours prior  3  Essential hypertension  Assessment & Plan:  Stable  Blood pressure trends reviewed, SBP avg 120-130  Continue Lasix, metoprolol, diltiazem      4  Chronic heart failure with preserved ejection fraction (HCC)  Assessment & Plan:  Wt Readings from Last 3 Encounters:   01/07/23 96 8 kg (213 lb 6 5 oz)   01/06/23 95 8 kg (211 lb 3 2 oz)   12/28/22 99 7 kg (219 lb 12 8 oz)     Weights appear inaccurate  Showing 10 pound weight gain 5 days ago add daily weights x3 days and trend  She appears euvolemic on today's exam  Most recent EF showing 65%  Continue Lasix, metoprolol  Continue 2 g sodium diet, fluid restriction  Monitor routine labs          5  Stage 3a chronic kidney disease [facility])  Assessment & Plan:  Lab Results   Component Value Date    EGFR 60 01/06/2023    EGFR 64 01/05/2023    EGFR 64 01/04/2023    CREATININE 0 91 01/06/2023    CREATININE 0 86 01/05/2023    CREATININE 0 86 01/04/2023   Stable  Most recent creatinine 0 91  Continue to avoid nephrotoxins, NSAIDs  Monitor routine labs           6  Chronic GERD  Assessment & Plan:  Stable  Continue carafate, omeprazole        7   Chronic constipation  Assessment & Plan:  Stable  Continue senna twice daily, daily MiraLAX as needed, suppository as needed         8  Other iron deficiency anemia  Assessment & Plan:  Stable  Most recent Hgb 8 9  Continue folic acid  Monitor routine labs        9  Acute pain of right knee  Assessment & Plan:  Ongoing, somewhat improved  X-ray negative for fracture  Continue Voltaren gel, Tylenol  Rehab medicine consult entered for further B/L knee pain management          All medications and routine orders were reviewed and updated as needed  Chief Complaint     STR follow up visit      Past Medical and Surgica History      Past Medical History:   Diagnosis Date   • Abnormal ECG     Last Assessed 9/29/2016   • Anxiety     Last Assessed 6/08/2016   • Arthritis     knees   • Asthma     Last Assessed 11/06/2013   • Atrial premature complex    • Cataract, bilateral     Last Assessed 7/14/2016   • Cataract, left eye     Both eyes  Had surgery on left  • COVID-19    • Difficulty swallowing    • Diverticulosis 9/25/2022   • Dizziness    • Fibromyalgia    • Fibromyalgia, primary    • Gastric reflux    • Heart failure ([facility] [address] )     5/23/22 Pt reports had heart failure in the past   • History of COVID-19     5/23/22 Pt reports having COVID in 2021     • History of transfusion     no reaction   • [place] (hard of hearing)    • Hyperlipidemia    • Hypertension    • Incontinence in female     5/23/22 Pt reports has incontinence -wears depends especially at night/riding in car   • Irregular heart beat     5/23/22 Pt reports hx of A fib   • Lyme disease    • PONV (postoperative nausea and vomiting)    • Premature ventricular contraction    • Primary osteoarthritis of both knees     Last Assessed 7/14/2016   • Rheumatic fever     5/23/22 Pt reports had hx of rheumatic fever as a child twice   • Sore throat    • Tuberculosis 1947     Past Surgical History:   Procedure Laterality Date   • APPENDECTOMY     • CARDIAC PACEMAKER PLACEMENT  2019   • COLONOSCOPY     • DENTAL SURGERY      extractions   • HYSTERECTOMY      5/23/22 Pt reports had appendix out with hysterectomy and "tightened up my bladder"   • IR ASPIRATION ONLY  9/30/2022   • ORIF FEMUR FRACTURE Left 08/05/2021    Procedure: OPEN REDUCTION W/ INTERNAL FIXATION (ORIF) DISTAL FEMUR; INSERTION RETROGRADE NAIL;  Surgeon: Iza Sanchez MD;  Location: [facility];  Service: Orthopedics   • NV ARTHRP KNE CONDYLE&PLATU MEDIAL&LAT COMPARTMENTS Left 9/12/2022    Procedure: ARTHROPLASTY KNEE TOTAL;  Surgeon: Iza Sanchez MD;  Location: [facility];  Service: Orthopedics   • NV DILATION ESOPH UNGUIDED SOUND/BOUGIE 1/MULT PASS N/A 04/06/2016    Procedure: DILATATION ESOPHAGEAL;  Surgeon: Janes Gu MD;  Location: [facility]; Service: Gastroenterology   • NV EGD TRANSORAL BIOPSY SINGLE/MULTIPLE N/A 04/06/2016    Procedure: ESOPHAGOGASTRODUODENOSCOPY (EGD); Surgeon: Janes Gu MD;  Location: [facility]; Service: Gastroenterology   • NV REMOVAL IMPLANT DEEP Left 9/12/2022    Procedure: REMOVAL NAIL IM  FEMUR;  Surgeon: Iza Sanchez MD;  Location[facility];  Service: Orthopedics   • NV XCAPSL CTRC RMVL INSJ IO LENS PROSTH W/O ECP Left 03/15/2016    Procedure: EXTRACTION EXTRACAPSULAR CATARACT PHACO INTRAOCULAR LENS (IOL);   Surgeon: Joana Earl MD;  Location: [facility];  Service: Ophthalmology   • REPLACEMENT TOTAL KNEE Right    • REPLACEMENT TOTAL KNEE      right    • TONSILLECTOMY       Allergies   Allergen Reactions   • Penicillins Hives     Itching and terrible hives   • Sulfa Antibiotics Itching   • M87 Folate [Folic Acid-Vit G3-XQK B62 - Food Allergy] Other (See Comments)     5/23/22 Pt reports no allergic reaction known    • [place] Other (See Comments)     Unknown, 5/23 -pt is unaware of reaction    • Lyrica [Pregabalin] Other (See Comments)     5/23/22 Pt reports doesn't remember reaction    • Other Other (See Comments)     Black rubber 5/23/22 per allergy test - pt unaware of reaction   • Cortisone Acetate [Cortisone] Itching and Rash     5/23/22 pt reports makes her violent    • Doxycycline Hives and Itching   • Nickel Other (See Comments)     Skin discoloration          History of Present Illness     Jose M Faulkner is a 75 y/o female admitted to [address] [place] [address] on 11/28 for STR  Past Medical Hx including but not limited to GERD, reactive airway disease, Afib RVR, Tachy smita syndrome s/p PPM, HTN, CHF, lumbar degenerative disc disease, arthritis, MICHAEL on CKD, anxiety, depression, seen in collaboration with nursing for medical mgmt and STR follow up    [facility] Course:     She wasd admitted to [address] [place] [address] from 11/25 through 11/28 with CC of nausea and vomiting with poor PO intake found to have MICHAEL  Proctitis also noted on CT with recommondation for GI f/u  Received IVF in setting of likely hypovolemic hyponatremia  She also had asymptomatic bacteriuria with monitoring off abx  Recent hx of IV vanco x6 weeks on 11/9 r/t methicillin resistant staph bacteremia  Discharged to [address] [place] [address] for STR, she is typically wheelchair bound  She was admitted to [facility] from 1/4 through 1/6 for UTI and bilateral pleural effusions  She was treated with IV Lasix and antibiotics, urine positive for ESBL status post 3-day course of end ertapenem  Lasix dosing increased to 40 mg every morning and 20 mg in the evening  She was then discharged back to [facility] short-term rehab  She was sent to the ED again on 1/7 for heart rate in the 140s EKG showing a flutter and leg pain  She was discharged and started on metoprolol  Right knee pain status post old fall  She was discharged back to [facility]  Rehab:    Continues with ongoing supportive care including PT/OT with plan to transition to LTC on 1/20  Upon today's assessment she was seen sitting out in wheelchair chair, awake, alert, oriented, NAD  She remains nonambulatory and uses slide board for transfers    She continues to report bilateral knee pain despite use of Tylenol and Voltaren gel  She is due for upcoming retinacular release of left knee and she is status post previous fall of right knee  She reports fair appetite, remains on fluid restriction in setting of CHF, she continues on room air, denies any shortness of breath or orthopnea  Heart rate remains controlled history of tachybradycardia syndrome, denies any palpitations  The patient's allergies, past medical, surgical, social and family history were reviewed and unchanged  Review of Systems     REVIEW OF SYSTEMS:  Per history of present illness, all other systems reviewed and negative  Objective     Vitals: /55, temp 98 3, HR 76, RR 18, O2 96% on room air  Weight 220 pounds     Physical Exam    Constitutional:       General: She is not in acute distress  Appearance: Normal appearance  She is obese  She is not ill-appearing  HENT:      Head: Normocephalic and atraumatic  Right Ear: External ear normal       Left Ear: External ear normal       Nose: Nose normal  No rhinorrhea  Mouth/Throat:      Mouth: Mucous membranes are moist       Pharynx: No posterior oropharyngeal erythema  Eyes:      General:         Right eye: No discharge  Left eye: No discharge  Extraocular Movements: Extraocular movements intact  Conjunctiva/sclera: Conjunctivae normal    Cardiovascular:      Rate and Rhythm: Normal rate and irregular rhythm  Pulses: Normal pulses  Heart sounds: Murmur heard  Pulmonary:      Effort: Pulmonary effort is normal  No respiratory distress  Breath sounds: Normal breath sounds  No wheezing  Abdominal:      General: Bowel sounds are normal  There is no distension  Palpations: Abdomen is soft  Tenderness: There is no abdominal tenderness  There is no guarding  Comments:   Musculoskeletal:         General: No tenderness  Cervical back: Normal range of motion and neck supple  No tenderness  Right lower leg: No edema  Left lower leg: No edema  Comments: Continues with LLE weakness  Skin:     General: Skin is warm and dry  Findings:Bruising of right knee  Neurological:      General: No focal deficit present  Mental Status: She is alert and oriented to person, place, and time  Mental status is at baseline  Sensory: No sensory deficit  Motor: Weakness present  Gait: Gait abnormal    Psychiatric:         Mood and Affect: Mood normal          Behavior: Behavior normal           Pertinent Laboratory/Diagnostic Studies:    Lab Results   Component Value Date    SODIUM 140 01/06/2023    K 3 9 01/06/2023     01/06/2023    CO2 32 01/06/2023    AGAP 6 01/06/2023    BUN 19 01/06/2023    CREATININE 0 91 01/06/2023    GLUC 110 01/06/2023    GLUF 106 (H) 08/17/2022    CALCIUM 9 2 01/06/2023    AST 16 01/04/2023    ALT 11 01/04/2023    ALKPHOS 104 01/04/2023    TP 7 0 01/04/2023    TBILI 0 53 01/04/2023    EGFR 60 01/06/2023     Lab Results   Component Value Date    WBC 7 35 01/06/2023    HGB 8 9 (L) 01/06/2023    HCT 30 3 (L) 01/06/2023    MCV 90 01/06/2023     01/06/2023     Lab Results   Component Value Date    [id number] 26 8 (L) 09/25/2019     Procedure: XR chest 1 view portable    Result Date: 1/4/2023  Narrative: CHEST INDICATION:  Shortness of breath  COMPARISON:  11/25/2020, CT chest 9/20/2021 EXAM PERFORMED/VIEWS:  XR CHEST PORTABLE FINDINGS:  Monitoring leads and clips project over the chest  Cardiomediastinal silhouette appears stable  Calcified right paratracheal lymph node  Left subclavian pacer with lead tips projecting at the right atrium and ventricle  The lungs are clear  No pneumothorax  Hazy density at the lung bases could reflect pleural effusions and/or artifact from patient's soft tissues and technique  Mild osteoarthritis left glenohumeral and right AC joints       Impression: Hazy density at the lung bases could reflect pleural effusions and/or artifact from patient's soft tissues and technique  Clear lungs  Procedure: CT chest without contrast    Result Date: 1/4/2023  Narrative: CT CHEST WITHOUT IV CONTRAST INDICATION:   Cough, persistent cough, sob  COMPARISON:  CT of the abdomen pelvis on November 25, 2022  TECHNIQUE: CT examination of the chest was performed without intravenous contrast  Axial, sagittal, and coronal 2D reformatted images were created from the source data and submitted for interpretation  Radiation dose length product (DLP) for this visit:  363 mGy-cm   This examination, like all CT scans performed in the [facility], was performed utilizing techniques to minimize radiation dose exposure, including the use of iterative reconstruction and automated exposure control  FINDINGS: LUNGS:  A few punctate calcified granuloma are seen within the right lung  Bilateral dependent atelectasis  No focal consolidation  The central airways are patent  PLEURA:  There is moderate right and small left pleural effusion with adjacent compressive atelectasis  HEART/GREAT VESSELS: Left-sided pacing device in place  Coronary artery calcifications  No pericardial effusion  No thoracic aortic aneurysm  Mild to moderate calcifications of the thoracic aorta  MEDIASTINUM AND JJ:  A right paratracheal calcified lymph node/granuloma measures 2 8 x 1 7 cm  CHEST WALL AND LOWER NECK:  Unremarkable  VISUALIZED STRUCTURES IN THE UPPER ABDOMEN:  Unremarkable  OSSEOUS STRUCTURES:  No acute fracture or destructive osseous lesion  Impression: Moderate right and small left pleural effusions with adjacent compressive atelectasis  Current Medications   Medications reviewed and updated see facility [facility] for details  Please note:  Voice-recognition software may have been used in the preparation of this document    Occasional wrong word or "sound-alike" substitutions may have occurred due to the inherent limitations of voice recognition software  Interpretation should be guided by context           JERI Lafleur  1/18/2023  Patient: Noemy Bocanegra

## 2023-01-18 NOTE — ASSESSMENT & PLAN NOTE
Multifactorial, acute and chronic conditions  Continue PT/OT with plan to transition LTC on 1/20  Remains nonambulatory but is able to complete transfers via slide board    She has history of left Patellar subluxation and has upcoming procedure forretinacular release of left knee on 1/23, check CBC, BMP preop on 1/20  Hold Eliquis 48 hours prior

## 2023-01-18 NOTE — ASSESSMENT & PLAN NOTE
Wt Readings from Last 3 Encounters:   01/07/23 96 8 kg (213 lb 6 5 oz)   01/06/23 95 8 kg (211 lb 3 2 oz)   12/28/22 99 7 kg (219 lb 12 8 oz)     Weights appear inaccurate  Showing 10 pound weight gain 5 days ago add daily weights x3 days and trend  She appears euvolemic on today's exam  Most recent EF showing 65%  Continue Lasix, metoprolol  Continue 2 g sodium diet, fluid restriction  Monitor routine labs

## 2023-01-18 NOTE — H&P (VIEW-ONLY)
[facility]  [address]  [phone]  [place]  Code 32 LTC          NAME: Lupe Winston  AGE: 76 y o  SEX: female     DATE OF ENCOUNTER: 1/18/2023    Assessment and Plan     1  Tachy-smita syndrome ([address] )  Assessment & Plan:  Stable  Has PPM  Heart rate trends reviewed, heart rates averaging in the 70s  Continue metoprolol, diltiazem, Eliquis      2  Ambulatory dysfunction  Assessment & Plan:  Multifactorial, acute and chronic conditions  Continue PT/OT with plan to transition LTC on 1/20  Remains nonambulatory but is able to complete transfers via slide board    She has history of left Patellar subluxation and has upcoming procedure forretinacular release of left knee on 1/23, check CBC, BMP preop on 1/20  Hold Eliquis 48 hours prior  3  Essential hypertension  Assessment & Plan:  Stable  Blood pressure trends reviewed, SBP avg 120-130  Continue Lasix, metoprolol, diltiazem      4  Chronic heart failure with preserved ejection fraction (HCC)  Assessment & Plan:  Wt Readings from Last 3 Encounters:   01/07/23 96 8 kg (213 lb 6 5 oz)   01/06/23 95 8 kg (211 lb 3 2 oz)   12/28/22 99 7 kg (219 lb 12 8 oz)     Weights appear inaccurate  Showing 10 pound weight gain 5 days ago add daily weights x3 days and trend  She appears euvolemic on today's exam  Most recent EF showing 65%  Continue Lasix, metoprolol  Continue 2 g sodium diet, fluid restriction  Monitor routine labs          5  Stage 3a chronic kidney disease [facility])  Assessment & Plan:  Lab Results   Component Value Date    EGFR 60 01/06/2023    EGFR 64 01/05/2023    EGFR 64 01/04/2023    CREATININE 0 91 01/06/2023    CREATININE 0 86 01/05/2023    CREATININE 0 86 01/04/2023   Stable  Most recent creatinine 0 91  Continue to avoid nephrotoxins, NSAIDs  Monitor routine labs           6  Chronic GERD  Assessment & Plan:  Stable  Continue carafate, omeprazole        7   Chronic constipation  Assessment & Plan:  Stable  Continue senna twice daily, daily MiraLAX as needed, suppository as needed         8  Other iron deficiency anemia  Assessment & Plan:  Stable  Most recent Hgb 8 9  Continue folic acid  Monitor routine labs        9  Acute pain of right knee  Assessment & Plan:  Ongoing, somewhat improved  X-ray negative for fracture  Continue Voltaren gel, Tylenol  Rehab medicine consult entered for further B/L knee pain management          All medications and routine orders were reviewed and updated as needed  Chief Complaint     STR follow up visit      Past Medical and Surgica History      Past Medical History:   Diagnosis Date   • Abnormal ECG     Last Assessed 9/29/2016   • Anxiety     Last Assessed 6/08/2016   • Arthritis     knees   • Asthma     Last Assessed 11/06/2013   • Atrial premature complex    • Cataract, bilateral     Last Assessed 7/14/2016   • Cataract, left eye     Both eyes  Had surgery on left  • COVID-19    • Difficulty swallowing    • Diverticulosis 9/25/2022   • Dizziness    • Fibromyalgia    • Fibromyalgia, primary    • Gastric reflux    • Heart failure ([facility] [address] )     5/23/22 Pt reports had heart failure in the past   • History of COVID-19     5/23/22 Pt reports having COVID in 2021     • History of transfusion     no reaction   • [place] (hard of hearing)    • Hyperlipidemia    • Hypertension    • Incontinence in female     5/23/22 Pt reports has incontinence -wears depends especially at night/riding in car   • Irregular heart beat     5/23/22 Pt reports hx of A fib   • Lyme disease    • PONV (postoperative nausea and vomiting)    • Premature ventricular contraction    • Primary osteoarthritis of both knees     Last Assessed 7/14/2016   • Rheumatic fever     5/23/22 Pt reports had hx of rheumatic fever as a child twice   • Sore throat    • Tuberculosis 1947     Past Surgical History:   Procedure Laterality Date   • APPENDECTOMY     • CARDIAC PACEMAKER PLACEMENT  2019   • COLONOSCOPY     • DENTAL SURGERY      extractions   • HYSTERECTOMY      5/23/22 Pt reports had appendix out with hysterectomy and "tightened up my bladder"   • IR ASPIRATION ONLY  9/30/2022   • ORIF FEMUR FRACTURE Left 08/05/2021    Procedure: OPEN REDUCTION W/ INTERNAL FIXATION (ORIF) DISTAL FEMUR; INSERTION RETROGRADE NAIL;  Surgeon: Judith Manzanares MD;  Location: [facility];  Service: Orthopedics   • MT ARTHRP KNE CONDYLE&PLATU MEDIAL&LAT COMPARTMENTS Left 9/12/2022    Procedure: ARTHROPLASTY KNEE TOTAL;  Surgeon: Judith Manzanares MD;  Location: [facility];  Service: Orthopedics   • MT DILATION ESOPH UNGUIDED SOUND/BOUGIE 1/MULT PASS N/A 04/06/2016    Procedure: DILATATION ESOPHAGEAL;  Surgeon: Gene Mcfadden MD;  Location: [facility]; Service: Gastroenterology   • MT EGD TRANSORAL BIOPSY SINGLE/MULTIPLE N/A 04/06/2016    Procedure: ESOPHAGOGASTRODUODENOSCOPY (EGD); Surgeon: Gene Mcfadden MD;  Location: [facility]; Service: Gastroenterology   • MT REMOVAL IMPLANT DEEP Left 9/12/2022    Procedure: REMOVAL NAIL IM  FEMUR;  Surgeon: Judith Manzanares MD;  Location[facility];  Service: Orthopedics   • MT XCAPSL CTRC RMVL INSJ IO LENS PROSTH W/O ECP Left 03/15/2016    Procedure: EXTRACTION EXTRACAPSULAR CATARACT PHACO INTRAOCULAR LENS (IOL);   Surgeon: Berta Perez MD;  Location: [facility];  Service: Ophthalmology   • REPLACEMENT TOTAL KNEE Right    • REPLACEMENT TOTAL KNEE      right    • TONSILLECTOMY       Allergies   Allergen Reactions   • Penicillins Hives     Itching and terrible hives   • Sulfa Antibiotics Itching   • I68 Folate [Folic Acid-Vit I6-LRE D48 - Food Allergy] Other (See Comments)     5/23/22 Pt reports no allergic reaction known    • [place] Other (See Comments)     Unknown, 5/23 -pt is unaware of reaction    • Lyrica [Pregabalin] Other (See Comments)     5/23/22 Pt reports doesn't remember reaction    • Other Other (See Comments)     Black rubber 5/23/22 per allergy test - pt unaware of reaction   • Cortisone Acetate [Cortisone] Itching and Rash     5/23/22 pt reports makes her violent    • Doxycycline Hives and Itching   • Nickel Other (See Comments)     Skin discoloration          History of Present Illness     Gabriele Alegria is a 77 y/o female admitted to [address] [place] [address] on 11/28 for STR  Past Medical Hx including but not limited to GERD, reactive airway disease, Afib RVR, Tachy smita syndrome s/p PPM, HTN, CHF, lumbar degenerative disc disease, arthritis, MICHAEL on CKD, anxiety, depression, seen in collaboration with nursing for medical mgmt and STR follow up    [facility] Course:     She wasd admitted to [facility] from 11/25 through 11/28 with CC of nausea and vomiting with poor PO intake found to have MICHAEL  Proctitis also noted on CT with recommondation for GI f/u  Received IVF in setting of likely hypovolemic hyponatremia  She also had asymptomatic bacteriuria with monitoring off abx  Recent hx of IV vanco x6 weeks on 11/9 r/t methicillin resistant staph bacteremia  Discharged to [address] [place] [address] for STR, she is typically wheelchair bound  She was admitted to [facility] from 1/4 through 1/6 for UTI and bilateral pleural effusions  She was treated with IV Lasix and antibiotics, urine positive for ESBL status post 3-day course of end ertapenem  Lasix dosing increased to 40 mg every morning and 20 mg in the evening  She was then discharged back to [facility] short-term rehab  She was sent to the ED again on 1/7 for heart rate in the 140s EKG showing a flutter and leg pain  She was discharged and started on metoprolol  Right knee pain status post old fall  She was discharged back to [facility]  Rehab:    Continues with ongoing supportive care including PT/OT with plan to transition to LTC on 1/20  Upon today's assessment she was seen sitting out in wheelchair chair, awake, alert, oriented, NAD  She remains nonambulatory and uses slide board for transfers    She continues to report bilateral knee pain despite use of Tylenol and Voltaren gel  She is due for upcoming retinacular release of left knee and she is status post previous fall of right knee  She reports fair appetite, remains on fluid restriction in setting of CHF, she continues on room air, denies any shortness of breath or orthopnea  Heart rate remains controlled history of tachybradycardia syndrome, denies any palpitations  The patient's allergies, past medical, surgical, social and family history were reviewed and unchanged  Review of Systems     REVIEW OF SYSTEMS:  Per history of present illness, all other systems reviewed and negative  Objective     Vitals: /55, temp 98 3, HR 76, RR 18, O2 96% on room air  Weight 220 pounds     Physical Exam    Constitutional:       General: She is not in acute distress  Appearance: Normal appearance  She is obese  She is not ill-appearing  HENT:      Head: Normocephalic and atraumatic  Right Ear: External ear normal       Left Ear: External ear normal       Nose: Nose normal  No rhinorrhea  Mouth/Throat:      Mouth: Mucous membranes are moist       Pharynx: No posterior oropharyngeal erythema  Eyes:      General:         Right eye: No discharge  Left eye: No discharge  Extraocular Movements: Extraocular movements intact  Conjunctiva/sclera: Conjunctivae normal    Cardiovascular:      Rate and Rhythm: Normal rate and irregular rhythm  Pulses: Normal pulses  Heart sounds: Murmur heard  Pulmonary:      Effort: Pulmonary effort is normal  No respiratory distress  Breath sounds: Normal breath sounds  No wheezing  Abdominal:      General: Bowel sounds are normal  There is no distension  Palpations: Abdomen is soft  Tenderness: There is no abdominal tenderness  There is no guarding  Comments:   Musculoskeletal:         General: No tenderness  Cervical back: Normal range of motion and neck supple  No tenderness  Right lower leg: No edema  Left lower leg: No edema  Comments: Continues with LLE weakness  Skin:     General: Skin is warm and dry  Findings:Bruising of right knee  Neurological:      General: No focal deficit present  Mental Status: She is alert and oriented to person, place, and time  Mental status is at baseline  Sensory: No sensory deficit  Motor: Weakness present  Gait: Gait abnormal    Psychiatric:         Mood and Affect: Mood normal          Behavior: Behavior normal           Pertinent Laboratory/Diagnostic Studies:    Lab Results   Component Value Date    SODIUM 140 01/06/2023    K 3 9 01/06/2023     01/06/2023    CO2 32 01/06/2023    AGAP 6 01/06/2023    BUN 19 01/06/2023    CREATININE 0 91 01/06/2023    GLUC 110 01/06/2023    GLUF 106 (H) 08/17/2022    CALCIUM 9 2 01/06/2023    AST 16 01/04/2023    ALT 11 01/04/2023    ALKPHOS 104 01/04/2023    TP 7 0 01/04/2023    TBILI 0 53 01/04/2023    EGFR 60 01/06/2023     Lab Results   Component Value Date    WBC 7 35 01/06/2023    HGB 8 9 (L) 01/06/2023    HCT 30 3 (L) 01/06/2023    MCV 90 01/06/2023     01/06/2023     Lab Results   Component Value Date    [id number] 26 8 (L) 09/25/2019     Procedure: XR chest 1 view portable    Result Date: 1/4/2023  Narrative: CHEST INDICATION:  Shortness of breath  COMPARISON:  11/25/2020, CT chest 9/20/2021 EXAM PERFORMED/VIEWS:  XR CHEST PORTABLE FINDINGS:  Monitoring leads and clips project over the chest  Cardiomediastinal silhouette appears stable  Calcified right paratracheal lymph node  Left subclavian pacer with lead tips projecting at the right atrium and ventricle  The lungs are clear  No pneumothorax  Hazy density at the lung bases could reflect pleural effusions and/or artifact from patient's soft tissues and technique  Mild osteoarthritis left glenohumeral and right AC joints       Impression: Hazy density at the lung bases could reflect pleural effusions and/or artifact from patient's soft tissues and technique  Clear lungs  Procedure: CT chest without contrast    Result Date: 1/4/2023  Narrative: CT CHEST WITHOUT IV CONTRAST INDICATION:   Cough, persistent cough, sob  COMPARISON:  CT of the abdomen pelvis on November 25, 2022  TECHNIQUE: CT examination of the chest was performed without intravenous contrast  Axial, sagittal, and coronal 2D reformatted images were created from the source data and submitted for interpretation  Radiation dose length product (DLP) for this visit:  363 mGy-cm   This examination, like all CT scans performed in the [facility], was performed utilizing techniques to minimize radiation dose exposure, including the use of iterative reconstruction and automated exposure control  FINDINGS: LUNGS:  A few punctate calcified granuloma are seen within the right lung  Bilateral dependent atelectasis  No focal consolidation  The central airways are patent  PLEURA:  There is moderate right and small left pleural effusion with adjacent compressive atelectasis  HEART/GREAT VESSELS: Left-sided pacing device in place  Coronary artery calcifications  No pericardial effusion  No thoracic aortic aneurysm  Mild to moderate calcifications of the thoracic aorta  MEDIASTINUM AND JJ:  A right paratracheal calcified lymph node/granuloma measures 2 8 x 1 7 cm  CHEST WALL AND LOWER NECK:  Unremarkable  VISUALIZED STRUCTURES IN THE UPPER ABDOMEN:  Unremarkable  OSSEOUS STRUCTURES:  No acute fracture or destructive osseous lesion  Impression: Moderate right and small left pleural effusions with adjacent compressive atelectasis  Current Medications   Medications reviewed and updated see facility [facility] for details  Please note:  Voice-recognition software may have been used in the preparation of this document    Occasional wrong word or "sound-alike" substitutions may have occurred due to the inherent limitations of voice recognition software  Interpretation should be guided by context           JERI Pimentel  1/18/2023  Patient: Braulio Jacobo

## 2023-01-19 NOTE — PRE-PROCEDURE INSTRUCTIONS
Pre-Surgery Instructions:   Medication Instructions   • acetaminophen (TYLENOL) 325 mg tablet Uses PRN- OK to take day of surgery   • albuterol (PROVENTIL HFA,VENTOLIN HFA) 90 mcg/act inhaler Uses PRN- OK to take day of surgery   • allopurinol (ZYLOPRIM) 100 mg tablet Take day of surgery  • apixaban (Eliquis) 5 mg Instructions provided by MD   • ascorbic acid (VITAMIN C) 500 MG tablet instructed to hold   • bisacodyl (DULCOLAX) 10 mg suppository Hold day of surgery  • Calcium Polycarbophil (FIBER-LAX PO) Hold day of surgery  • Cholecalciferol 25 MCG (1000 UT) tablet instructed to hold   • Diclofenac Sodium (VOLTAREN) 1 % Hold day of surgery  • diltiazem (CARDIZEM CD) 120 mg 24 hr capsule Take day of surgery  • DULoxetine (CYMBALTA) 30 mg delayed release capsule Take day of surgery  • ezetimibe (ZETIA) 10 mg tablet Take night before surgery   • ferrous sulfate 324 (65 Fe) mg instructed to hold   • folic acid (FOLVITE) 1 mg tablet instructed to hold   • furosemide (LASIX) 20 mg tablet Hold day of surgery  • furosemide (LASIX) 40 mg tablet Hold day of surgery  • Melatonin 10 MG TABS Take night before surgery   • metoprolol tartrate (LOPRESSOR) 100 mg tablet Take day of surgery  • metoprolol tartrate (LOPRESSOR) 50 mg tablet Take night before surgery   • Multiple Vitamin (MULTIVITAMIN ADULT PO) instructed to hold   • omeprazole (PriLOSEC) 20 mg delayed release capsule Take day of surgery  • oxybutynin (DITROPAN-XL) 10 MG 24 hr tablet Take night before surgery   • polyethylene glycol (MIRALAX) 17 g packet Hold day of surgery  • senna-docusate sodium (SENOKOT S) 8 6-50 mg per tablet Hold day of surgery  • sucralfate (CARAFATE) 1 g tablet Hold day of surgery  • vitamin B-12 (VITAMIN B-12) 1,000 mcg tablet instructed to hold    [facility] preop instructions faxed

## 2023-01-22 ENCOUNTER — ANESTHESIA EVENT (OUTPATIENT)
Dept: PERIOP | Facility: HOSPITAL | Age: 78
End: 2023-01-22

## 2023-01-23 ENCOUNTER — ANESTHESIA (OUTPATIENT)
Dept: PERIOP | Facility: HOSPITAL | Age: 78
End: 2023-01-23

## 2023-01-23 ENCOUNTER — HOSPITAL ENCOUNTER (INPATIENT)
Facility: HOSPITAL | Age: 78
LOS: 1 days | Discharge: NURSING, REHAB OR LONG-TERM CARE | End: 2023-01-25
Attending: ORTHOPAEDIC SURGERY | Admitting: ORTHOPAEDIC SURGERY

## 2023-01-23 DIAGNOSIS — Z91.81 STATUS POST FALL: ICD-10-CM

## 2023-01-23 DIAGNOSIS — Z98.890 S/P KNEE SURGERY: ICD-10-CM

## 2023-01-23 DIAGNOSIS — Z98.890 STATUS POST SURGERY: ICD-10-CM

## 2023-01-23 DIAGNOSIS — I48.91 ATRIAL FIBRILLATION, UNSPECIFIED TYPE (HCC): ICD-10-CM

## 2023-01-23 DIAGNOSIS — Z95.0 PACEMAKER: ICD-10-CM

## 2023-01-23 DIAGNOSIS — R18.8 INGUINAL FLUID COLLECTION: Primary | ICD-10-CM

## 2023-01-23 PROCEDURE — 0SQD0ZZ REPAIR LEFT KNEE JOINT, OPEN APPROACH: ICD-10-PCS | Performed by: ORTHOPAEDIC SURGERY

## 2023-01-23 PROCEDURE — 0MNP0ZZ RELEASE LEFT KNEE BURSA AND LIGAMENT, OPEN APPROACH: ICD-10-PCS | Performed by: ORTHOPAEDIC SURGERY

## 2023-01-23 RX ORDER — SODIUM CHLORIDE, SODIUM LACTATE, POTASSIUM CHLORIDE, CALCIUM CHLORIDE 600; 310; 30; 20 MG/100ML; MG/100ML; MG/100ML; MG/100ML
INJECTION, SOLUTION INTRAVENOUS CONTINUOUS PRN
Status: DISCONTINUED | OUTPATIENT
Start: 2023-01-23 | End: 2023-01-23

## 2023-01-23 RX ORDER — NEOSTIGMINE METHYLSULFATE 1 MG/ML
INJECTION INTRAVENOUS AS NEEDED
Status: DISCONTINUED | OUTPATIENT
Start: 2023-01-23 | End: 2023-01-23

## 2023-01-23 RX ORDER — BUPIVACAINE HYDROCHLORIDE 5 MG/ML
INJECTION, SOLUTION PERINEURAL
Status: COMPLETED | OUTPATIENT
Start: 2023-01-23 | End: 2023-01-23

## 2023-01-23 RX ORDER — OXYCODONE HYDROCHLORIDE 10 MG/1
10 TABLET ORAL EVERY 4 HOURS PRN
Status: DISCONTINUED | OUTPATIENT
Start: 2023-01-23 | End: 2023-01-24

## 2023-01-23 RX ORDER — METOPROLOL TARTRATE 50 MG/1
50 TABLET, FILM COATED ORAL
Status: DISCONTINUED | OUTPATIENT
Start: 2023-01-23 | End: 2023-01-25 | Stop reason: HOSPADM

## 2023-01-23 RX ORDER — ALBUTEROL SULFATE 90 UG/1
2 AEROSOL, METERED RESPIRATORY (INHALATION) EVERY 6 HOURS PRN
Status: DISCONTINUED | OUTPATIENT
Start: 2023-01-23 | End: 2023-01-25 | Stop reason: HOSPADM

## 2023-01-23 RX ORDER — OXYBUTYNIN CHLORIDE 5 MG/1
10 TABLET, EXTENDED RELEASE ORAL
Status: DISCONTINUED | OUTPATIENT
Start: 2023-01-23 | End: 2023-01-25 | Stop reason: HOSPADM

## 2023-01-23 RX ORDER — CHLORHEXIDINE GLUCONATE 0.12 MG/ML
15 RINSE ORAL ONCE
Status: DISCONTINUED | OUTPATIENT
Start: 2023-01-23 | End: 2023-01-23 | Stop reason: HOSPADM

## 2023-01-23 RX ORDER — FENTANYL CITRATE 50 UG/ML
INJECTION, SOLUTION INTRAMUSCULAR; INTRAVENOUS AS NEEDED
Status: DISCONTINUED | OUTPATIENT
Start: 2023-01-23 | End: 2023-01-23

## 2023-01-23 RX ORDER — CEFAZOLIN SODIUM 2 G/50ML
2000 SOLUTION INTRAVENOUS ONCE
Status: COMPLETED | OUTPATIENT
Start: 2023-01-23 | End: 2023-01-23

## 2023-01-23 RX ORDER — METOCLOPRAMIDE HYDROCHLORIDE 5 MG/ML
10 INJECTION INTRAMUSCULAR; INTRAVENOUS ONCE AS NEEDED
Status: DISCONTINUED | OUTPATIENT
Start: 2023-01-23 | End: 2023-01-23 | Stop reason: HOSPADM

## 2023-01-23 RX ORDER — PROPOFOL 10 MG/ML
INJECTION, EMULSION INTRAVENOUS AS NEEDED
Status: DISCONTINUED | OUTPATIENT
Start: 2023-01-23 | End: 2023-01-23

## 2023-01-23 RX ORDER — SCOLOPAMINE TRANSDERMAL SYSTEM 1 MG/1
1 PATCH, EXTENDED RELEASE TRANSDERMAL
Status: DISCONTINUED | OUTPATIENT
Start: 2023-01-23 | End: 2023-01-23 | Stop reason: HOSPADM

## 2023-01-23 RX ORDER — DOCUSATE SODIUM 100 MG/1
100 CAPSULE, LIQUID FILLED ORAL 2 TIMES DAILY
Status: DISCONTINUED | OUTPATIENT
Start: 2023-01-23 | End: 2023-01-25 | Stop reason: HOSPADM

## 2023-01-23 RX ORDER — LANOLIN ALCOHOL/MO/W.PET/CERES
9 CREAM (GRAM) TOPICAL
Status: DISCONTINUED | OUTPATIENT
Start: 2023-01-23 | End: 2023-01-25 | Stop reason: HOSPADM

## 2023-01-23 RX ORDER — HYDROMORPHONE HCL/PF 1 MG/ML
0.5 SYRINGE (ML) INJECTION
Status: DISCONTINUED | OUTPATIENT
Start: 2023-01-23 | End: 2023-01-23 | Stop reason: HOSPADM

## 2023-01-23 RX ORDER — SENNOSIDES 8.6 MG
1 TABLET ORAL DAILY
Status: DISCONTINUED | OUTPATIENT
Start: 2023-01-24 | End: 2023-01-25 | Stop reason: HOSPADM

## 2023-01-23 RX ORDER — BISACODYL 10 MG
10 SUPPOSITORY, RECTAL RECTAL DAILY PRN
Status: DISCONTINUED | OUTPATIENT
Start: 2023-01-23 | End: 2023-01-25 | Stop reason: HOSPADM

## 2023-01-23 RX ORDER — HYDROMORPHONE HCL IN WATER/PF 6 MG/30 ML
0.2 PATIENT CONTROLLED ANALGESIA SYRINGE INTRAVENOUS
Status: DISCONTINUED | OUTPATIENT
Start: 2023-01-23 | End: 2023-01-23 | Stop reason: HOSPADM

## 2023-01-23 RX ORDER — MAGNESIUM HYDROXIDE 1200 MG/15ML
LIQUID ORAL AS NEEDED
Status: DISCONTINUED | OUTPATIENT
Start: 2023-01-23 | End: 2023-01-23 | Stop reason: HOSPADM

## 2023-01-23 RX ORDER — HYDROMORPHONE HCL/PF 1 MG/ML
0.5 SYRINGE (ML) INJECTION EVERY 2 HOUR PRN
Status: DISCONTINUED | OUTPATIENT
Start: 2023-01-23 | End: 2023-01-24

## 2023-01-23 RX ORDER — HYDROMORPHONE HCL/PF 1 MG/ML
SYRINGE (ML) INJECTION AS NEEDED
Status: DISCONTINUED | OUTPATIENT
Start: 2023-01-23 | End: 2023-01-23

## 2023-01-23 RX ORDER — LIDOCAINE HYDROCHLORIDE 10 MG/ML
INJECTION, SOLUTION EPIDURAL; INFILTRATION; INTRACAUDAL; PERINEURAL AS NEEDED
Status: DISCONTINUED | OUTPATIENT
Start: 2023-01-23 | End: 2023-01-23

## 2023-01-23 RX ORDER — ROCURONIUM BROMIDE 10 MG/ML
INJECTION, SOLUTION INTRAVENOUS AS NEEDED
Status: DISCONTINUED | OUTPATIENT
Start: 2023-01-23 | End: 2023-01-23

## 2023-01-23 RX ORDER — CEFAZOLIN SODIUM 1 G/50ML
1000 SOLUTION INTRAVENOUS EVERY 8 HOURS
Status: COMPLETED | OUTPATIENT
Start: 2023-01-23 | End: 2023-01-24

## 2023-01-23 RX ORDER — GLYCOPYRROLATE 0.2 MG/ML
INJECTION INTRAMUSCULAR; INTRAVENOUS AS NEEDED
Status: DISCONTINUED | OUTPATIENT
Start: 2023-01-23 | End: 2023-01-23

## 2023-01-23 RX ORDER — SODIUM CHLORIDE, SODIUM LACTATE, POTASSIUM CHLORIDE, CALCIUM CHLORIDE 600; 310; 30; 20 MG/100ML; MG/100ML; MG/100ML; MG/100ML
125 INJECTION, SOLUTION INTRAVENOUS CONTINUOUS
Status: DISCONTINUED | OUTPATIENT
Start: 2023-01-23 | End: 2023-01-23

## 2023-01-23 RX ORDER — DILTIAZEM HYDROCHLORIDE 120 MG/1
120 CAPSULE, COATED, EXTENDED RELEASE ORAL DAILY
Status: DISCONTINUED | OUTPATIENT
Start: 2023-01-24 | End: 2023-01-25 | Stop reason: HOSPADM

## 2023-01-23 RX ORDER — FENTANYL CITRATE/PF 50 MCG/ML
50 SYRINGE (ML) INJECTION
Status: DISCONTINUED | OUTPATIENT
Start: 2023-01-23 | End: 2023-01-23 | Stop reason: HOSPADM

## 2023-01-23 RX ORDER — FUROSEMIDE 40 MG/1
40 TABLET ORAL DAILY
Status: DISCONTINUED | OUTPATIENT
Start: 2023-01-24 | End: 2023-01-25 | Stop reason: HOSPADM

## 2023-01-23 RX ORDER — ACETAMINOPHEN 325 MG/1
975 TABLET ORAL EVERY 6 HOURS PRN
Status: DISCONTINUED | OUTPATIENT
Start: 2023-01-23 | End: 2023-01-24

## 2023-01-23 RX ORDER — PANTOPRAZOLE SODIUM 40 MG/1
40 TABLET, DELAYED RELEASE ORAL
Status: DISCONTINUED | OUTPATIENT
Start: 2023-01-24 | End: 2023-01-25 | Stop reason: HOSPADM

## 2023-01-23 RX ORDER — DULOXETIN HYDROCHLORIDE 30 MG/1
30 CAPSULE, DELAYED RELEASE ORAL DAILY
Status: DISCONTINUED | OUTPATIENT
Start: 2023-01-24 | End: 2023-01-25 | Stop reason: HOSPADM

## 2023-01-23 RX ORDER — POLYETHYLENE GLYCOL 3350 17 G/17G
17 POWDER, FOR SOLUTION ORAL DAILY PRN
Status: DISCONTINUED | OUTPATIENT
Start: 2023-01-23 | End: 2023-01-25 | Stop reason: HOSPADM

## 2023-01-23 RX ORDER — VANCOMYCIN HYDROCHLORIDE 1 G/20ML
INJECTION, POWDER, LYOPHILIZED, FOR SOLUTION INTRAVENOUS AS NEEDED
Status: DISCONTINUED | OUTPATIENT
Start: 2023-01-23 | End: 2023-01-23 | Stop reason: HOSPADM

## 2023-01-23 RX ORDER — PROPOFOL 10 MG/ML
INJECTION, EMULSION INTRAVENOUS CONTINUOUS PRN
Status: DISCONTINUED | OUTPATIENT
Start: 2023-01-23 | End: 2023-01-23

## 2023-01-23 RX ORDER — AMOXICILLIN 250 MG
2 CAPSULE ORAL 2 TIMES DAILY
Status: DISCONTINUED | OUTPATIENT
Start: 2023-01-23 | End: 2023-01-25 | Stop reason: HOSPADM

## 2023-01-23 RX ORDER — LIDOCAINE HYDROCHLORIDE 10 MG/ML
0.5 INJECTION, SOLUTION EPIDURAL; INFILTRATION; INTRACAUDAL; PERINEURAL ONCE AS NEEDED
Status: DISCONTINUED | OUTPATIENT
Start: 2023-01-23 | End: 2023-01-23 | Stop reason: HOSPADM

## 2023-01-23 RX ORDER — SODIUM CHLORIDE, SODIUM LACTATE, POTASSIUM CHLORIDE, CALCIUM CHLORIDE 600; 310; 30; 20 MG/100ML; MG/100ML; MG/100ML; MG/100ML
75 INJECTION, SOLUTION INTRAVENOUS CONTINUOUS
Status: DISCONTINUED | OUTPATIENT
Start: 2023-01-23 | End: 2023-01-24

## 2023-01-23 RX ORDER — DIPHENHYDRAMINE HYDROCHLORIDE 50 MG/ML
12.5 INJECTION INTRAMUSCULAR; INTRAVENOUS ONCE AS NEEDED
Status: DISCONTINUED | OUTPATIENT
Start: 2023-01-23 | End: 2023-01-23 | Stop reason: HOSPADM

## 2023-01-23 RX ORDER — OXYCODONE HYDROCHLORIDE 5 MG/1
5 TABLET ORAL EVERY 4 HOURS PRN
Status: DISCONTINUED | OUTPATIENT
Start: 2023-01-23 | End: 2023-01-25 | Stop reason: HOSPADM

## 2023-01-23 RX ORDER — FUROSEMIDE 20 MG/1
20 TABLET ORAL
Status: DISCONTINUED | OUTPATIENT
Start: 2023-01-24 | End: 2023-01-25 | Stop reason: HOSPADM

## 2023-01-23 RX ORDER — ONDANSETRON 2 MG/ML
INJECTION INTRAMUSCULAR; INTRAVENOUS AS NEEDED
Status: DISCONTINUED | OUTPATIENT
Start: 2023-01-23 | End: 2023-01-23

## 2023-01-23 RX ORDER — DEXAMETHASONE SODIUM PHOSPHATE 10 MG/ML
INJECTION, SOLUTION INTRAMUSCULAR; INTRAVENOUS AS NEEDED
Status: DISCONTINUED | OUTPATIENT
Start: 2023-01-23 | End: 2023-01-23

## 2023-01-23 RX ORDER — METOPROLOL TARTRATE 100 MG/1
100 TABLET ORAL
Status: DISCONTINUED | OUTPATIENT
Start: 2023-01-24 | End: 2023-01-25 | Stop reason: HOSPADM

## 2023-01-23 RX ORDER — KETAMINE HCL IN NACL, ISO-OSM 100MG/10ML
SYRINGE (ML) INJECTION AS NEEDED
Status: DISCONTINUED | OUTPATIENT
Start: 2023-01-23 | End: 2023-01-23

## 2023-01-23 RX ORDER — ONDANSETRON 2 MG/ML
4 INJECTION INTRAMUSCULAR; INTRAVENOUS ONCE AS NEEDED
Status: DISCONTINUED | OUTPATIENT
Start: 2023-01-23 | End: 2023-01-23 | Stop reason: HOSPADM

## 2023-01-23 RX ORDER — EZETIMIBE 10 MG/1
10 TABLET ORAL DAILY
Status: DISCONTINUED | OUTPATIENT
Start: 2023-01-24 | End: 2023-01-25 | Stop reason: HOSPADM

## 2023-01-23 RX ADMIN — Medication 20 MG: at 13:26

## 2023-01-23 RX ADMIN — Medication 30 MG: at 13:08

## 2023-01-23 RX ADMIN — ROCURONIUM BROMIDE 30 MG: 10 INJECTION, SOLUTION INTRAVENOUS at 13:47

## 2023-01-23 RX ADMIN — METOPROLOL TARTRATE 50 MG: 50 TABLET, FILM COATED ORAL at 22:55

## 2023-01-23 RX ADMIN — PROPOFOL 100 MG: 10 INJECTION, EMULSION INTRAVENOUS at 13:08

## 2023-01-23 RX ADMIN — LIDOCAINE HYDROCHLORIDE 50 MG: 10 INJECTION, SOLUTION EPIDURAL; INFILTRATION; INTRACAUDAL at 13:08

## 2023-01-23 RX ADMIN — SODIUM CHLORIDE, SODIUM LACTATE, POTASSIUM CHLORIDE, AND CALCIUM CHLORIDE: .6; .31; .03; .02 INJECTION, SOLUTION INTRAVENOUS at 12:45

## 2023-01-23 RX ADMIN — PROPOFOL 50 MCG/KG/MIN: 10 INJECTION, EMULSION INTRAVENOUS at 13:11

## 2023-01-23 RX ADMIN — DOCUSATE SODIUM 100 MG: 100 CAPSULE, LIQUID FILLED ORAL at 20:45

## 2023-01-23 RX ADMIN — SODIUM CHLORIDE, SODIUM LACTATE, POTASSIUM CHLORIDE, AND CALCIUM CHLORIDE 75 ML/HR: .6; .31; .03; .02 INJECTION, SOLUTION INTRAVENOUS at 20:37

## 2023-01-23 RX ADMIN — BUPIVACAINE 20 ML: 13.3 INJECTION, SUSPENSION, LIPOSOMAL INFILTRATION at 12:58

## 2023-01-23 RX ADMIN — CEFAZOLIN SODIUM 2000 MG: 2 SOLUTION INTRAVENOUS at 13:11

## 2023-01-23 RX ADMIN — ROCURONIUM BROMIDE 50 MG: 10 INJECTION, SOLUTION INTRAVENOUS at 13:09

## 2023-01-23 RX ADMIN — DEXAMETHASONE SODIUM PHOSPHATE 10 MG: 10 INJECTION, SOLUTION INTRAMUSCULAR; INTRAVENOUS at 13:16

## 2023-01-23 RX ADMIN — BUPIVACAINE HYDROCHLORIDE 10 ML: 5 INJECTION, SOLUTION PERINEURAL at 12:58

## 2023-01-23 RX ADMIN — NEOSTIGMINE METHYLSULFATE 4 MG: 1 INJECTION INTRAVENOUS at 14:55

## 2023-01-23 RX ADMIN — HYDROMORPHONE HYDROCHLORIDE 0.5 MG: 1 INJECTION, SOLUTION INTRAMUSCULAR; INTRAVENOUS; SUBCUTANEOUS at 13:30

## 2023-01-23 RX ADMIN — SODIUM CHLORIDE, SODIUM LACTATE, POTASSIUM CHLORIDE, AND CALCIUM CHLORIDE: .6; .31; .03; .02 INJECTION, SOLUTION INTRAVENOUS at 14:03

## 2023-01-23 RX ADMIN — GLYCOPYRROLATE 0.8 MG: 0.2 INJECTION, SOLUTION INTRAMUSCULAR; INTRAVENOUS at 14:55

## 2023-01-23 RX ADMIN — ONDANSETRON 4 MG: 2 INJECTION INTRAMUSCULAR; INTRAVENOUS at 13:16

## 2023-01-23 RX ADMIN — FENTANYL CITRATE 50 MCG: 50 INJECTION, SOLUTION INTRAMUSCULAR; INTRAVENOUS at 12:56

## 2023-01-23 RX ADMIN — PHENYLEPHRINE HYDROCHLORIDE 50 MCG/MIN: 50 INJECTION INTRAVENOUS at 13:11

## 2023-01-23 RX ADMIN — OXYBUTYNIN CHLORIDE 10 MG: 5 TABLET, EXTENDED RELEASE ORAL at 22:57

## 2023-01-23 RX ADMIN — CEFAZOLIN SODIUM 1000 MG: 1 SOLUTION INTRAVENOUS at 20:38

## 2023-01-23 RX ADMIN — FENTANYL CITRATE 50 MCG: 50 INJECTION, SOLUTION INTRAMUSCULAR; INTRAVENOUS at 13:08

## 2023-01-23 RX ADMIN — SENNOSIDES AND DOCUSATE SODIUM 2 TABLET: 8.6; 5 TABLET ORAL at 20:45

## 2023-01-23 RX ADMIN — FENTANYL CITRATE 50 MCG: 50 INJECTION INTRAMUSCULAR; INTRAVENOUS at 16:11

## 2023-01-23 NOTE — INTERVAL H&P NOTE
H&P reviewed  After examining the patient I find no changes in the patients condition since the H&P had been written      Vitals:    01/23/23 1239   BP: 152/66   Pulse: 72   Resp: 18   Temp: 99 2 °F (37 3 °C)   SpO2: 97%   Patient seen and examined  General: No apparent distress  CV: S1-S2  Chest: No audible wheezing  Abdomen: Soft and protuberant  Left lower extremity: externally rotated; patient able to flex but difficulty with extension of her knee; neurologically vastly intact  To the operating room for open extensor retinacular release and all associated procedures to the left knee  Pros and cons of operative and nonoperative intervention discussed with patient  We will follow-up postoperatively

## 2023-01-23 NOTE — DISCHARGE INSTR - AVS FIRST PAGE
Discharge Instructions - [address] 76 y o  female MRN: [id number]  Unit/Bed#: PACU 4    Weight Bearing Status:                                           Weight Bearing as tolerated to the left lower extremity with knee in knee immobilizer  May come out of knee immobilizer for ROM 0-30 degrees only  May use chair lift for transfers as needed    DVT prophylaxis:  Complete course of eliquis as directed    Pain:  Start oxycodone 5 mg every 6 hours as needed    Showering Instructions:   Do not shower until 5 days from date of surgery  Do not soak your incision(Tubs, Jacuzzi's, pools, ext)    Dressing Instructions: May remove dressing 5 days from date of surgery or may leave dressing in place until follow up office visit 2 weeks from surgery date  Keep dressing/incision clean, dry and intact until follow up appointment  Do not apply any creams or ointments/lotions to your incisions including antibiotic ointment, peroxide    Driving Instructions:  No driving until cleared by Orthopaedic Surgery  PT/OT:  Continue PT/OT on outpatient basis as directed  Continue motion and strengthening exercises while at home    Appt Instructions: If you do not have your appointment, please call the clinic at [phone]  Otherwise followup as scheduled    Contact the office sooner if you experience any increased numbness/tingling in the extremities

## 2023-01-23 NOTE — OP NOTE
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    Postop Dx: Same as preoperative diagnosis    Procedure: Lateral release of the retinaculum, left knee; imbrication of medial retinaculum    Surgeon: Chaya Ford    Assistants: Yarely Verduzco PA-C    No Qualified Resident Was Available For this Case  The physician assistant was needed for all portions of this case including prepping and draping the patient as well as prepping and soft tissue closure needed throughout this case    Fluids:     EBL:     Drains: Hemovac x1    Specimens: None    Complications: None    Condition: Stable and transferred to postanesthesia care    Implant: None    75-year-old female presents at this time secondary to difficulty with ambulation status post distal femoral replacement  Patient has been nonambulatory for greater than 1 year prior to distal femoral replacement  Patient presenting at this time with subluxation of the patella which is chronic in nature  At this time, the decision is made for lateral release and all associated procedures  The patient was told of all the pros and cons of operative and nonoperative intervention  Some of the complications of operative intervention include infection, bleeding, scarring, nerve injury, vascular injury, continued pain, decreased range of motion, DVT, PE death, loss of limb, need for further surgery, not obtain an results  The patient wished  Patient did consent for operative intervention for this pathology  Patient was brought to the operating room  Patient was anesthetized as anesthesia team  Patient was prepped and draped in normal sterile fashion  After this was done, we did conduct a time out to make sure correct  Patient was in the room, prepped marked and draped  DVT prophylaxis and antibiotics were addressed    Midline incision was made following patient's prior incision  After going through skin and abundant fatty tissue, we did create flaps medially and laterally  There was no rent noted the retinaculum  At this time, we were able to do a medial parapatellar incision  Minimal fluid was noted in the intracapsular region  We were able to at this time removed some fibrinous tissue within the intracapsular space  Patient did have quadriceps displaced laterally  We were able to do a quadricepsplasty and free of the quadriceps from the anterior and lateral aspects of the femur  Patella was still immobile at this time  We were able to subsequently go intracapsular and extracapsular to release the lateral retinaculum  Lateral retinaculum needed to be fully released noted to medialize our patella  At this time, copious irrigation was used at the operative site using saline injected with vancomycin  Once this was done, we did imbricate the loose medial retinacular tissue and this was then reapproximated to the lateral aspect of the parapatellar incision that was made  Once this was completed, we did raise the knee and it was noted to be stable between 0 to 45 degrees  I did not stress it anymore at this time  We did use another 3 L of normal saline injected with vancomycin at the operative site  The lateral capsule was then reapproximated to some of the soft tissue on the lateral aspect of flap  During range of motion, it was noted that the skin was not adhered to the lateral reapproximation that was just completed  Prior to this reapproximation, I did place a Hemovac drain which exited the lateral distal thigh region  Fatty  tissue layer was then reapproximated with Vicryl sutures  Subcu layer was then reapproximated also with Vicryl sutures  This was reinforced with staples  Wounds were cleaned and dried  Hemovac was then placed to suction  Acticoat, 4 x 4, ABD, Webril, and Ace bandage was placed  Patient was placed in a knee immobilizer    Patient was subsequently extubated as per anesthesia team, noted to be in stable condition, transferred to postanesthesia care unit in stable condition  Plan is for patient to be weightbearing as tolerated in knee immobilizer  We will allow range of motion from 0 to 30 degrees at this time  We will reevaluate patient in office and then subsequently increase patient's range of motion

## 2023-01-23 NOTE — ANESTHESIA PROCEDURE NOTES
Peripheral Block    Patient location during procedure: holding area  Start time: 1/23/2023 12:58 PM  Reason for block: at surgeon's request and post-op pain management  Staffing  Performed: Anesthesiologist   Anesthesiologist: Blanca Sandy MD  Preanesthetic Checklist  Completed: patient identified, IV checked, site marked, risks and benefits discussed, surgical consent, monitors and equipment checked, pre-op evaluation and timeout performed  Peripheral Block  Patient position: supine  Prep: ChloraPrep  Patient monitoring: continuous pulse ox and frequent blood pressure checks  Block type: adductor canal block  Laterality: left  Injection technique: single-shot  Procedures: ultrasound guided, Ultrasound guidance required for the procedure to increase accuracy and safety of medication placement and decrease risk of complications    Ultrasound permanent image savedbupivacaine (MARCAINE) 0 5 % - Perineural   10 mL - 1/23/2023 12:58:00 PM (Exparel 20ml)  Needle  Needle type: Stimuplex   Needle localization: ultrasound guidance  Test dose: negative  Assessment  Injection assessment: incremental injection, local visualized surrounding nerve on ultrasound, negative aspiration for CSF, negative aspiration for heme and no paresthesia on injection  Paresthesia pain: none  Heart rate change: no  Slow fractionated injection: yes  Post-procedure:  site cleaned  patient tolerated the procedure well with no immediate complications

## 2023-01-23 NOTE — ANESTHESIA POSTPROCEDURE EVALUATION
Post-Op Assessment Note    CV Status:  Stable    Pain management: adequate     Mental Status:  Lethargic and sleepy   Hydration Status:  Stable   PONV Controlled:  None   Airway Patency:  Patent      Post Op Vitals Reviewed: Yes      Staff: CRNA         No notable events documented      BP   137/60   Temp   97 8   Pulse  92   Resp   12   SpO2   100

## 2023-01-23 NOTE — ANESTHESIA PREPROCEDURE EVALUATION
Procedure:  RELEASE RETINACULAR, left knee and all associated procedures (Left: Knee)    Relevant Problems   ANESTHESIA (within normal limits)      CARDIO   (+) Aortic regurgitation   (+) Atrial fibrillation RVR (HCC)   (+) Essential hypertension   (+) HLD (hyperlipidemia)   (+) Pacemaker   (+) Paroxysmal atrial fibrillation (HCC)   (+) Tachy-smita syndrome (HCC)      ENDO (within normal limits)      GI/HEPATIC   (+) Chronic GERD   (+) GERD (gastroesophageal reflux disease)      /RENAL   (+) MICHAEL (acute kidney injury) (HCC)   (+) Renal insufficiency   (+) Stage 3a chronic kidney disease (HCC)      GYN (within normal limits)      HEMATOLOGY   (+) Anemia      MUSCULOSKELETAL   (+) Arthritis   (+) Chronic bilateral low back pain without sciatica   (+) Chronic gout with tophus   (+) Fibromyalgia   (+) Low back pain   (+) Lumbar degenerative disc disease      NEURO/PSYCH   (+) Anxiety   (+) Chronic bilateral low back pain without sciatica   (+) Current moderate episode of major depressive disorder ([facility])   (+) Fibromyalgia   (+) History of bacteremia   (+) Status post fall      PULMONARY   (+) Acute respiratory failure (HCC)   (+) Pleural effusion on right      Cardiovascular and Mediastinum   (+) Chronic heart failure with preserved ejection fraction (HCC)      Digestive   (+) Poor dentition      Other   (+) Chronic pain of left knee   (+) Congestive heart failure   (+) Degenerative lumbar spinal stenosis   (+) Dysphonia   (+) Pressure injury of left buttock, stage 3 ([facility])        Physical Exam    Airway    Mallampati score: II  TM Distance: >3 FB  Neck ROM: full     Dental   No notable dental hx     Cardiovascular  Rhythm: regular, Rate: normal, Cardiovascular exam normal    Pulmonary  Pulmonary exam normal Breath sounds clear to auscultation,     Other Findings        Anesthesia Plan  ASA Score- 3     Anesthesia Type- spinal with ASA Monitors           Additional Monitors:   Airway Plan:     Comment: Left adductor canal block for postoperative pain control  Plan Factors-    Chart reviewed  EKG reviewed  Imaging results reviewed  Existing labs reviewed  Patient summary reviewed  Induction- intravenous  Postoperative Plan- Plan for postoperative opioid use  Informed Consent- Anesthetic plan and risks discussed with patient  I personally reviewed this patient with the CRNA  Discussed and agreed on the Anesthesia Plan with the CRNA  Lisette Enamorado Recent labs personally reviewed:  Lab Results   Component Value Date    WBC 7 35 01/06/2023    HGB 8 9 (L) 01/06/2023     01/06/2023     Lab Results   Component Value Date    K 3 9 01/06/2023    BUN 19 01/06/2023    CREATININE 0 91 01/06/2023    GLUCOSE 127 08/05/2021     Lab Results   Component Value Date    PTT 40 (H) 01/04/2023      Lab Results   Component Value Date    INR 1 19 01/04/2023       Blood type B    Lab Results   Component Value Date    HGBA1C 5 6 08/17/2022       I, Alan Chase MD, have personally seen and evaluated the patient prior to anesthetic care  I have reviewed the pre-anesthetic record, and other medical records if appropriate to the anesthetic care  If a CRNA is involved in the case, I have reviewed the CRNA assessment, if present, and agree  Risks/benefits and alternatives discussed with patient including possible PONV, sore throat, and possibility of rare anesthetic and surgical emergencies

## 2023-01-24 PROBLEM — N39.0 UTI (URINARY TRACT INFECTION): Status: RESOLVED | Noted: 2022-11-25 | Resolved: 2023-01-24

## 2023-01-24 PROBLEM — Z98.890 S/P KNEE SURGERY: Status: ACTIVE | Noted: 2023-01-24

## 2023-01-24 LAB
ANION GAP SERPL CALCULATED.3IONS-SCNC: 7 MMOL/L (ref 4–13)
ATRIAL RATE: 111 BPM
BUN SERPL-MCNC: 19 MG/DL (ref 5–25)
CALCIUM SERPL-MCNC: 9.1 MG/DL (ref 8.4–10.2)
CHLORIDE SERPL-SCNC: 101 MMOL/L (ref 96–108)
CO2 SERPL-SCNC: 28 MMOL/L (ref 21–32)
CREAT SERPL-MCNC: 0.8 MG/DL (ref 0.6–1.3)
ERYTHROCYTE [DISTWIDTH] IN BLOOD BY AUTOMATED COUNT: 15.5 % (ref 11.6–15.1)
GFR SERPL CREATININE-BSD FRML MDRD: 70 ML/MIN/1.73SQ M
GLUCOSE SERPL-MCNC: 151 MG/DL (ref 65–140)
HCT VFR BLD AUTO: 31.7 % (ref 34.8–46.1)
HGB BLD-MCNC: 9.5 G/DL (ref 11.5–15.4)
MCH RBC QN AUTO: 26.5 PG (ref 26.8–34.3)
MCHC RBC AUTO-ENTMCNC: 30 G/DL (ref 31.4–37.4)
MCV RBC AUTO: 88 FL (ref 82–98)
PLATELET # BLD AUTO: 364 THOUSANDS/UL (ref 149–390)
PMV BLD AUTO: 9 FL (ref 8.9–12.7)
POTASSIUM SERPL-SCNC: 4.3 MMOL/L (ref 3.5–5.3)
QRS AXIS: -37 DEGREES
QRSD INTERVAL: 118 MS
QT INTERVAL: 386 MS
QTC INTERVAL: 519 MS
RBC # BLD AUTO: 3.59 MILLION/UL (ref 3.81–5.12)
SODIUM SERPL-SCNC: 136 MMOL/L (ref 135–147)
T WAVE AXIS: 244 DEGREES
VENTRICULAR RATE: 109 BPM
WBC # BLD AUTO: 11.84 THOUSAND/UL (ref 4.31–10.16)

## 2023-01-24 RX ORDER — ACETAMINOPHEN 325 MG/1
975 TABLET ORAL EVERY 8 HOURS SCHEDULED
Status: DISCONTINUED | OUTPATIENT
Start: 2023-01-24 | End: 2023-01-25 | Stop reason: HOSPADM

## 2023-01-24 RX ADMIN — DILTIAZEM HYDROCHLORIDE 120 MG: 120 CAPSULE, COATED, EXTENDED RELEASE ORAL at 10:42

## 2023-01-24 RX ADMIN — APIXABAN 5 MG: 5 TABLET, FILM COATED ORAL at 10:41

## 2023-01-24 RX ADMIN — DULOXETINE HYDROCHLORIDE 30 MG: 30 CAPSULE, DELAYED RELEASE ORAL at 10:42

## 2023-01-24 RX ADMIN — OXYCODONE HYDROCHLORIDE 5 MG: 5 TABLET ORAL at 18:41

## 2023-01-24 RX ADMIN — SENNOSIDES AND DOCUSATE SODIUM 2 TABLET: 8.6; 5 TABLET ORAL at 18:43

## 2023-01-24 RX ADMIN — ACETAMINOPHEN 975 MG: 325 TABLET, FILM COATED ORAL at 13:12

## 2023-01-24 RX ADMIN — PANTOPRAZOLE SODIUM 40 MG: 40 TABLET, DELAYED RELEASE ORAL at 06:27

## 2023-01-24 RX ADMIN — FUROSEMIDE 40 MG: 40 TABLET ORAL at 10:41

## 2023-01-24 RX ADMIN — FUROSEMIDE 20 MG: 40 TABLET ORAL at 18:33

## 2023-01-24 RX ADMIN — APIXABAN 5 MG: 5 TABLET, FILM COATED ORAL at 18:38

## 2023-01-24 RX ADMIN — DOCUSATE SODIUM 100 MG: 100 CAPSULE, LIQUID FILLED ORAL at 18:33

## 2023-01-24 RX ADMIN — METOPROLOL TARTRATE 50 MG: 50 TABLET, FILM COATED ORAL at 21:34

## 2023-01-24 RX ADMIN — ACETAMINOPHEN 975 MG: 325 TABLET, FILM COATED ORAL at 21:34

## 2023-01-24 RX ADMIN — STANDARDIZED SENNA CONCENTRATE 8.6 MG: 8.6 TABLET ORAL at 10:42

## 2023-01-24 RX ADMIN — CYANOCOBALAMIN TAB 500 MCG 1000 MCG: 500 TAB at 10:42

## 2023-01-24 RX ADMIN — SENNOSIDES AND DOCUSATE SODIUM 2 TABLET: 8.6; 5 TABLET ORAL at 10:42

## 2023-01-24 RX ADMIN — DOCUSATE SODIUM 100 MG: 100 CAPSULE, LIQUID FILLED ORAL at 10:42

## 2023-01-24 RX ADMIN — OXYBUTYNIN CHLORIDE 10 MG: 5 TABLET, EXTENDED RELEASE ORAL at 21:34

## 2023-01-24 RX ADMIN — SODIUM CHLORIDE, SODIUM LACTATE, POTASSIUM CHLORIDE, AND CALCIUM CHLORIDE 75 ML/HR: .6; .31; .03; .02 INJECTION, SOLUTION INTRAVENOUS at 10:50

## 2023-01-24 RX ADMIN — CEFAZOLIN SODIUM 1000 MG: 1 SOLUTION INTRAVENOUS at 04:04

## 2023-01-24 RX ADMIN — EZETIMIBE 10 MG: 10 TABLET ORAL at 10:42

## 2023-01-24 RX ADMIN — OXYCODONE HYDROCHLORIDE 5 MG: 5 TABLET ORAL at 10:40

## 2023-01-24 RX ADMIN — METOPROLOL TARTRATE 100 MG: 100 TABLET, FILM COATED ORAL at 06:26

## 2023-01-24 NOTE — OCCUPATIONAL THERAPY NOTE
Occupational Therapy Evaluation & Treatment Note      Jose M Faulkner    1/24/2023    Active Problems:    * No active hospital problems  *      Past Medical History:   Diagnosis Date    Abnormal ECG     Last Assessed 9/29/2016    Anxiety     Last Assessed 6/08/2016    Arthritis     knees    Asthma     Last Assessed 11/06/2013    Atrial premature complex     Cataract, bilateral     Last Assessed 7/14/2016    Cataract, left eye     Both eyes  Had surgery on left  COVID-19     Difficulty swallowing     Diverticulosis 9/25/2022    Dizziness     Fibromyalgia     Fibromyalgia, primary     Gastric reflux     Heart failure ([address] )     5/23/22 Pt reports had heart failure in the past    History of COVID-19     5/23/22 Pt reports having [place] in 2021      History of transfusion     no reaction    [place] (hard of hearing)     Hyperlipidemia     Hypertension     Incontinence in female     5/23/22 Pt reports has incontinence -wears depends especially at night/riding in car    Irregular heart beat     5/23/22 Pt reports hx of A fib    Lyme disease     PONV (postoperative nausea and vomiting)     Premature ventricular contraction     Primary osteoarthritis of both knees     Last Assessed 7/14/2016    Rheumatic fever     5/23/22 Pt reports had hx of rheumatic fever as a child twice    Sore throat     Tuberculosis 1945       Past Surgical History:   Procedure Laterality Date    APPENDECTOMY      CARDIAC PACEMAKER PLACEMENT  2019    COLONOSCOPY      DENTAL SURGERY      extractions    HYSTERECTOMY      5/23/22 Pt reports had appendix out with hysterectomy and "tightened up my bladder"    IR ASPIRATION ONLY  9/30/2022    ORIF FEMUR FRACTURE Left 08/05/2021    Procedure: OPEN REDUCTION W/ INTERNAL FIXATION (ORIF) DISTAL FEMUR; INSERTION RETROGRADE NAIL;  Surgeon: Keren Bang MD;  [facility];  Service: Orthopedics    SC ARTHRP KNE CONDYLE&PLATU MEDIAL&LAT COMPARTMENTS Left 9/12/2022    Procedure: ARTHROPLASTY KNEE TOTAL; Surgeon: Lorena Robbins MD;  [facility];  Service: Orthopedics    MI DILATION ESOPH UNGUIDED SOUND/BOUGIE 1/MULT PASS N/A 04/06/2016    Procedure: DILATATION ESOPHAGEAL;  Surgeon: Savannah Hebert MD;  [facility]; Service: Gastroenterology    MI EGD TRANSORAL BIOPSY SINGLE/MULTIPLE N/A 04/06/2016    Procedure: ESOPHAGOGASTRODUODENOSCOPY (EGD); Surgeon: Savannah Hebert MD;  [facility]; Service: Gastroenterology    MI REMOVAL IMPLANT DEEP Left 9/12/2022    Procedure: REMOVAL NAIL IM  FEMUR;  Surgeon: Lorena Robbins MD;  [facility];  Service: Orthopedics    MI XCAPSL CTRC RMVL INSJ IO LENS PROSTH W/O ECP Left 03/15/2016    Procedure: EXTRACTION EXTRACAPSULAR CATARACT PHACO INTRAOCULAR LENS (IOL); Surgeon: Avery Anguiano MD;  [facility];  Service: Ophthalmology    REPLACEMENT TOTAL KNEE Right     REPLACEMENT TOTAL KNEE      right     TONSILLECTOMY          01/24/23 0915   OT Last Visit   OT Visit Date 01/24/23   Note Type   Note type Evaluation   Pain Assessment   Pain Assessment Tool 0-10   Pain Score No Pain   Restrictions/Precautions   Weight Bearing Precautions Per Order Yes   RLE Weight Bearing Per Order WBAT   LLE Weight Bearing Per Order WBAT  (with immobilizer)   Braces or Orthoses Knee immobilizer   Other Precautions O2;Fall Risk; Chair Alarm; Bed Alarm;Hard of hearing;Cognitive   Home Living   Type of Home Assisted living  (states she lives in an assisted living apartment at [address] [place] [address])   Home Layout Multi-level;Elevator   Bathroom Shower/Tub Walk-in shower   Bathroom Equipment Grab bars in shower; Shower chair   Home Equipment Walker;Cane;Wheelchair-manual  (self propels w/c)   Additional Comments Pt is questionable historian   Prior Function   Level of [place] Needs assistance with IADLS;Needs assistance with ADLs; Needs assistance with functional mobility   Lives With Facility staff   Receives Help From Personal care attendant   IADLs Family/Friend/Other provides medication management; Family/Friend/Other provides meals; Family/Friend/Other provides transportation   Falls in the last 6 months 1 to 4   Comments Recently pt has been utilizing sliding board for transfers 2* knee injury   ADL   Eating Assistance 7  Independent   Grooming Assistance 5  Supervision/Setup   UB Bathing Assistance 4  Minimal Assistance   LB [address] 2  Maximal [address] 2  Maximal [address] [place] [address]  2  Maximal Assistance   Bed Mobility   Supine to Sit 3  Moderate assistance   Additional items Assist x 1   Sit to Supine 3  Moderate assistance   Additional items Assist x 1   Additional Comments Pt able to push from bed to shift hips towards HOB with Min A x2, 3X, prior to laying down  Transfers   Sit to Stand 2  Maximal assistance   Additional items Assist x 2   Stand to Sit 2  Maximal assistance   Additional items Assist x 2   Stand pivot Unable to assess   Additional Comments Pt able to stand 2X  2nd trial improved over first  Approx 10sec & approx 30sec  Activity Tolerance   Activity Tolerance Patient limited by fatigue;Patient limited by pain   Medical Staff Made [place]ware [place] [facility]   Nurse Made Aware RN Singhjackie Henning   RUE Assessment   RUE Assessment WFL   LUE Assessment   LUE Assessment WFL   Hand Function   Gross Motor Coordination Functional   Fine Motor Coordination Functional   Psychosocial   Psychosocial (WDL) WDL   Cognition   Overall Cognitive Status Impaired   Arousal/Participation Alert; Cooperative   Attention Within functional limits   Orientation Level Oriented to person;Disoriented to time;Disoriented to situation;Disoriented to place  (2001? 2003?  "I'm at rehab")   Memory Decreased recall of precautions;Decreased recall of recent events;Decreased short term memory   Following Commands Follows one step commands without difficulty   Comments [place]   Assessment Limitation Decreased ADL status; Decreased UE strength;Decreased Safe judgement during ADL;Decreased cognition;Decreased endurance;Decreased self-care trans;Decreased high-level ADLs   Prognosis Good   Assessment Pt is a 76 y o  female seen for OT evaluation at [facility], admitted 1/23/2023 w/ s/p retinacular release of left knee  OT completed extensive review of pt's medical and social history  Comorbidities affecting pt's functional performance at time of assessment include: aFib RVR, tachy-smita syndrome, HTN, pacemaker, chronic bilateral low back pain, chronic GERD, spinal stenosis, fibromyalgia, low back pain, CHF, falls, UTI, physical deconditioning, ambulatory dysfunction  Personal factors affecting pt at time of IE include:difficulty performing ADLS, limited insight into deficits, decreased initiation and engagement  and health management   Prior to admission, pt was living pt states she was in assisted living, but is a questionable historian  Per chart, pt has been at [facility] for [address] since November and was due to transition to LTC  Pt was requiring assist for ADL/IADL and transferring via sliding board to w/c  Upon evaluation, pt presents to OT below baseline due to the following performance deficits: weakness, decreased strength, decreased balance, decreased tolerance, impaired memory, impaired sequencing, impaired problem solving, increased pain and orthopedic restrictions  Pt to benefit from continued skilled OT tx while in the hospital to address deficits as defined above and maximize level of functional independence w ADL's and functional mobility  Occupational Performance areas to address include: grooming, bathing/shower, toilet hygiene, dressing, functional mobility and functional transfers, bed mobility  The patient's raw score on the AM-PAC Daily Activity inpatient short form is 12, standardized score is 30 6, less than 39 4   Patients at this level are likely to benefit from DC to post-acute rehabilitation services  Based on findings, pt is of high complexity, due to medical comorbidities  Pt co-treated with physical therapy due to skilled assist of 2 therapists required for safety  At this time, OT recommendations at time of discharge are short term rehab  Goals   Patient Goals get stronger   Plan   Treatment Interventions ADL retraining;Functional transfer training;UE strengthening/ROM; Endurance training;Cognitive reorientation;Patient/family training;Equipment evaluation/education; Compensatory technique education;Continued evaluation; Energy conservation   Goal Expiration Date 02/04/23   OT Frequency 3-5x/wk   Recommendation   OT Discharge Recommendation Post acute rehabilitation services   AM-PAC Daily Activity Inpatient   Lower Body Dressing 1   Bathing 1   Toileting 1   Upper Body Dressing 2   Grooming 3   Eating 4   Daily Activity Raw Score 12   Daily Activity Standardized Score (Calc for Raw Score >=11) 30 6   AM-PAC Applied Cognition Inpatient   Following a Speech/Presentation 3   Understanding Ordinary Conversation 3   Taking Medications 2   Remembering Where Things Are Placed or Put Away 2   Remembering List of 4-5 Errands 2   Taking Care of Complicated Tasks 2   Applied Cognition Raw Score 14   Applied Cognition Standardized Score 32 02   Additional Treatment Session   Treatment Assessment Patient participated in Skilled OT session this date with interventions consisting of ADL re training with the use of correct body mechnaics, safety awareness and fall prevention techniques,  bed mobility, functional transfers* and increase dynamic sit/ stand balance during functional activity    Patient agreeable to OT treatment session, upon arrival patient was found supine in bed  Treatment session as follows: Pt able to sit at EOB for UB and LB dressing for approx 15 mins  Required Min A for gown and Max A for socks  Demonstrates good supported and unsupported sitting balance at EOB  Patient continues to be functioning below baseline level, occupational performance remains limited secondary to factors listed above and increased risk for falls and injury  The patient's raw score on the AM-PAC Daily Activity inpatient short form is 12, standardized score is 30 6, less than 39 4  Patients at this level are likely to benefit from DC to post-acute rehabilitation services  From OT standpoint, recommendation at time of d/c would be Short Term Rehab  Patient to benefit from continued Occupational Therapy treatment while in the hospital to address deficits as defined above and maximize level of functional independence with ADLs and functional mobility  Pt left with PT Marney to finish session  Pt will achieve the following goals within 10 days  *Pt will complete grooming with modified independence  *Pt will complete UB bathing and dressing with modified independence  *Pt will complete LB bathing and dressing with Min A, including don/doff of knee immobilizer  *Pt will complete toileting (hygiene and clothing management) with supervision    *Pt will complete bed mobility with supervision, with bed flat and no side rail to prep for purposeful tasks    *Pt will perform functional transfers with Min Ax1 in order to complete ADL routine  *Pt will increase standing tolerance to 3+ minutes in order to complete ADL routine  *Pt will complete item retrieval with Mod I at w/c level while demonstrating good safety  *Pt will demonstrate increased activity tolerance in order to complete ADL routine  *Pt will participate in cognitive assessment to determine level of safety for returning home    *Pt will participate in UE therapeutic exercise in order to maximize strength for ADL transfers  *Pt will identify 3-5 fall risks to ensure safety upon discharge      [employer], MS, OTR/L

## 2023-01-24 NOTE — PHYSICAL THERAPY NOTE
PHYSICAL THERAPY EVALUATION  NAME:  Bisi Christie  DATE: 01/24/23    AGE:   76 y o   Mrn:   [id number]  Principal problem: Principal Problem:    S/P knee surgery  Active Problems:    Atrial fibrillation RVR (HCC)    Essential hypertension    Stage 3a chronic kidney disease ([facility] [address] )    Anemia    Chronic heart failure with preserved ejection fraction (HCC)      Vitals:    01/23/23 2305 01/24/23 0220 01/24/23 0626 01/24/23 0700   BP:  125/84 112/75    Pulse: (!) 110 (!) 107 (!) 108    Resp: 16 18  18   Temp: 98 1 °F (36 7 °C) 99 3 °F (37 4 °C)  98 4 °F (36 9 °C)   TempSrc:    Oral   SpO2: 93% 91%  95%       Length Of Stay: 0  Performed at least 2 patient identifiers during session: Name and Birthday  PHYSICAL THERAPY EVALUATION :    01/24/23 0942   PT Last Visit   PT Visit Date 01/24/23   Note Type   Note type Evaluation   Pain Assessment   Pain Assessment Tool 0-10   Pain Score 9  (w/ activity, 0 at rest, 1 post-activity in supine position)   Pain Location/Orientation Orientation: Left; Location: Knee   Effect of Pain on Daily Activities limits level of independence, limits ROM LLE   Patient's Stated Pain Goal No pain   Hospital Pain Intervention(s) Repositioned; Ambulation/increased activity; Emotional support  (Pt not premedicated prior to session per nursing)   Restrictions/Precautions   Weight Bearing Precautions Per Order Yes   RLE Weight Bearing Per Order WBAT   LLE Weight Bearing Per Order WBAT  (w/ knee immobilizer)   Braces or Orthoses Knee immobilizer  (AROM out of brace allowed 0-30 deg)   Other Precautions Contact/isolation; Bed Alarm;Multiple lines;O2;Fall Risk;Pain;WBS;Cognitive   Home Living   Type of Home Apartment  (prior to initial injury, pt most recently from [facility])   Home Layout One level   [id number]  (reports using WC as she has been non-ambulatory >1 year)   Additional Comments Per chart pt was using sliding board for transfers to/from [facility]   However per prior admission PT note dated 11/27/2022: "she has been in and out of the hospital/rehab for ~2 years now and most recently requiring assist for SPT and/or using isabelle lift in SNF rehab"  Prior Function   Level of Dent Needs assistance with functional mobility   Lives With Facility staff   Falls in the last 6 months 1 to 4  (1 fall in August)   General   Additional Pertinent History 01/23/2023: Lateral release of the retinaculum, left knee; imbrication of medial retinaculum  Post op WBAT BLE with L knee immobilizer   Family/Caregiver Present No   Cognition   Overall Cognitive Status Impaired   Arousal/Participation Alert   Attention Attends with cues to redirect   Orientation Level Oriented to person   Memory Decreased recall of precautions;Decreased recall of recent events;Decreased short term memory   Following Commands Follows one step commands with increased time or repetition   Comments Pt is inconsistent historian   Subjective   Subjective Pt reports increasing fatigue with mobilization  Sleeps on L side     RUE Assessment   RUE Assessment WFL   LUE Assessment   LUE Assessment WFL   RLE Assessment   RLE Assessment (S)    (soft tissue irregularity noted at anterior proximal lower leg)   Strength RLE   R Hip Flexion 4-/5   R Knee Extension 4/5   R Ankle Dorsiflexion 4/5   LLE Assessment   LLE Assessment   (Pt's position of comfort is w/ hip in ER, able to achieve neutral hip rotation in supine w/ A, instruction, positioning )   LLE Overall AROM   L Knee Flexion tested close to 30 flexion AAROM   L Knee Extension (S)  limited from terminal extension   Strength LLE   L Hip Flexion 3-/5   L Knee Extension   (able to quad set)   L Ankle Dorsiflexion   (tested to 3)   Vision-Basic Assessment   Current Vision   (blurry as per pt)   Light Touch   RLE Light Touch Not tested   LLE Light Touch Not tested   Bed Mobility   Additional items Assist x 1;Bedrails;HOB elevated  (as per OT, as pt @ EOB upon entering room ) Sit to Supine 3  Moderate assistance   Additional items Assist x 1; Increased time required;Verbal cues; Bedrails;LE management   Transfers   Sit to Stand 2  Maximal assistance   Additional items Assist x 2;Armrests; Increased time required;Verbal cues  (using pad as sling)   Stand to Sit 2  Maximal assistance   Additional items Assist x 2;Armrests; Increased time required;Verbal cues   Stand pivot Unable to assess  (due to limited standing tolerance)   Additional Comments Pt perform 1 STS w/ maxA x2 and RW, standing for 10s  Luann's Egress test FAIL   Ambulation/Elevation   Gait pattern   (unsuccessful performing w/ UE support of walker despite A of 2 w/ blocking RLE to prevent buckling )   Balance   Static Sitting Fair -   Static Standing Poor -  (Pt stood 20s w/ max A x2 and RW UE support )   Endurance Deficit   Endurance Deficit Yes   Endurance Deficit Description Self-reported fatigue, therapeutic rest breaks between STS trials; limits standing tolerance   Activity Tolerance   Activity Tolerance Patient limited by fatigue;Patient limited by pain   Medical Staff Made Aware care coordindation jese/ Indy from OT   Nurse Made Aware spoke to [facility] prior to session and post session, including recommending not mobilizing pt OOB to chair with walker nor with quick move (due to L knee immobilizer)   Assessment   Prognosis Fair   Problem List Decreased strength;Decreased range of motion;Decreased endurance;Decreased mobility; Decreased cognition; Impaired judgement;Decreased safety awareness; Obesity; Decreased skin integrity;Orthopedic restrictions;Pain; Impaired balance; Impaired vision  (knee immobilizer on L knee)   Assessment Pt is a 76 y o  female seen for PT evaluation s/p admit to [facility] on 1/23/2023 post op Lateral release of the retinaculum, left knee; imbrication of medial retinaculum due to 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   Order for PT placed with orthopedic restrictions of L knee immobilizer, but AROM 0-30deg out of brace  Pertinent PMH includes: tachy-smita syndrome, HTN, ambulatory dysfunction, CHF, CKD, R TKR, as well as recent admissions on 1/4/23 for UTI/PE, and 1/7/23 for tachycardia  Prior to admission, pt reports being non-ambulatory for greater than 1 year and used sliding board to transfer at [address], but pt is inconsistent historian  Upon evaluation, Pt was able to perform bed mobility w/ A of 1, but needed max x2 for sit <>stand and was unsuccessful in getting to the chair  Pt presented with the following deficits: decreased strength, decreased ROM, decreased endurance, decreased mobility, decreased cognition, decreased skin integrity, orthopedic restrictions of WBAT BLE and L knee immobilizer, decreased recall of precautions, and pain  Skilled PT recommendations include continued mobility trials using RW during pregait and progression to gait activities, LE strengthening/rom within ortho precautions  However with pt FAILING Luann's Egress test she will need to either have mechanical conveyance for OOB transfers (as she is not appropriate for quick move sit<>stand lift) or trial lateral transfers w/sliding board to WC/drop arm commode, or drop arm recliner  Barriers to Discharge Inaccessible home environment;Decreased caregiver support   Goals   Patient Goals to get stronger, to have no pain   STG Expiration Date 02/03/23   Short Term Goal #1 Pt will: Perform bed mobility tasks to consistent min A to prepare for transfers and reposition self in bed  Perform sit<>stand transfers to consistent min A to comply with ortho recommendations in Brace and increase Indep in prior living environment  Perform target transfers w/ sliding board vs sit pivot w/ consistent A of 1 to minimize risk for falls  Increase standing tolerance time w/ UE support of walker to > 2 min w/ no more than min A to progress with pregait activities   Propel  for 48 ' as alternative to amb  Perform pregait activites of advance/retreatx3 reps w/walker support w/ no more than min A of 1 to progress to amb status   Plan   Treatment/Interventions Functional transfer training;LE strengthening/ROM; Therapeutic exercise; Endurance training;Equipment eval/education; Bed mobility;OT;Spoke to nursing; Patient/family training;Cognitive reorientation  (pregait/gait training)   PT Frequency 3-5x/wk   Recommendation   PT Discharge Recommendation Return to facility with rehabilitation services   Equipment Recommended Wheelchair  (vs drop arm recliner chair and attempts with sliding board to/from target )   AM-PAC Basic Mobility Inpatient   Turning in Flat Bed Without Bedrails 2   Lying on Back to Sitting on Edge of Flat Bed Without Bedrails 2   Moving Bed to Chair 1   Standing Up From Chair Using Arms 1   Walk in Room 1   Climb 3-5 Stairs With Railing 1   Basic Mobility Inpatient Raw Score 8   Turning Head Towards Sound 4   Follow Simple Instructions 3   Low Function Basic Mobility Raw Score 15   Low Function Basic Mobility Standardized Score 23 9   Highest Level Of Mobility   JH-HLM Goal 3: Sit at edge of bed   JH-HLM Achieved 3: Sit at edge of bed   Additional Treatment Session   Start Time 0957   End Time 1013   Treatment Assessment Per treatment session, pt was able to tolerate increased activity while requiring similar assistance  Pt performed additional sit<>stand and lateral scoot transfers during treatment, but still requires A of 2  Pt was also able to stand for longer periods of time w/walker but still < 1 min   She needed A and instruction for therex  Skilled PT recommendations include continued mobility trials using RW to increase functional independence, and continued LE strengthening and therex to address above deficits  Equipment Use rolling walker   Additional Treatment Day 1   Exercises   Quad Sets Supine;10 reps;AAROM; Left   Heelslides Supine;5 reps;AAROM; Left  (out of brace allowed AROM 0-30deg per ortho note)   Hip Abduction Supine;5 reps;AAROM; Left   Hip Adduction Supine;10 reps;AAROM; Left  (to neutral)   Ankle Pumps Supine;10 reps;AROM; Left   Neuro re-ed L Hip IR to neutral/ER AAROM 10 reps; Additional time for repositioning pt close to neutral rotation and repositioning of LLE into knee immobilizer brace  Additional transfers with lateral shift @ EOB x 3 reps w/min A of 2 including repositioning of LEs   End of Consult   Patient Position at End of Consult Supine;Bed/Chair alarm activated; All needs within reach   Pt requires PT /OT co-treat and co-eval due to required skilled interventions of at least 2 clinicians for care delivery, medical complexity, limited activity tolerance, and cognitive-behavioral impairments  PT and OT goals addressed separately  (Please find full objective findings from PT assessment regarding body systems outlined above)  The patient's [facility] Basic Mobility Inpatient Short Form Raw Score is 8  A Raw score of less than or equal to 16 suggests the patient may benefit from discharge to post-acute rehabilitation services, which DOES coincide with CURRENT above PT recommendations  However please refer to therapist recommendation for discharge planning given other factors that may influence destination  Adapted from [employer] [facility] “6-Clicks” Basic Mobility and Daily Activity Scores With Discharge Destination  Physical Therapy, 2021;101:1-9  DOI: 10 [id number]    Portions of the record may have been created with voice recognition software  Occasional wrong word or "sound a like" substitutions may have occurred due to the inherent limitations of voice recognition software    Read the chart carefully and recognize, using context, where substitutions have occurred

## 2023-01-24 NOTE — ASSESSMENT & PLAN NOTE
Lab Results   Component Value Date    EGFR 70 01/24/2023    EGFR 60 01/06/2023    EGFR 64 01/05/2023    CREATININE 0 80 01/24/2023    CREATININE 0 91 01/06/2023    CREATININE 0 86 01/05/2023   at baseline  BMP in the am

## 2023-01-24 NOTE — PROGRESS NOTES
Peripheral Nerve Block Follow-up Note - Acute Pain Service    Katelyn Emmanuel 76 y o  female MRN: [id number]  Unit/Bed#: S -01 Encounter: [id number]      Assessment:   Active Problems:    * No active hospital problems  *    Katelyn Emmanuel is a 76y o  year old female status post lateral release of the retinaculum and imbrication of medial retinaculum of the left knee, POD 1  Plan:   - Left adductor block is functioning appropriately with expected sensory deficits  - Recommend oxycodone 2 5 mg/5 mg PO q4hrs PRN for moderate/severe pain  Dilaudid 0 2 mg IV q2hr PRN for breakthrough pain    - Multimodal analgesia with:  - Tylenol 975 mg PO q8hrs standing  Cymbalta 30 mg p o  daily    APS will sign off at this time  Thank you for the consult  All opioids and other analgesics to be written at discretion of primary team  Please contact Acute Pain Service - [facility] via [employer] from [phone] with additional questions or concerns  See [email or web address] or Mejia for additional contacts and after hours information  Pain History  Current pain location(s): No pain  Pain Scale:   No pain  Quality: No pain  24 hour history: Patient is postop day 1 following adductor canal block of the left lower extremity  Patient denies any pain whatsoever  Denies any numbness, tingling or weakness of the left leg  Opioid requirement previous 24 hours: Fentanyl 150 MCG IV perioperatively, Dilaudid 0 5 mg IV perioperatively      Meds/Allergies   all current active meds have been reviewed, current meds:   Current Facility-Administered Medications   Medication Dose Route Frequency   • acetaminophen (TYLENOL) tablet 975 mg  975 mg Oral Q6H PRN   • albuterol (PROVENTIL HFA,VENTOLIN HFA) inhaler 2 puff  2 puff Inhalation Q6H PRN   • apixaban (ELIQUIS) tablet 5 mg  5 mg Oral BID   • bisacodyl (DULCOLAX) rectal suppository 10 mg  10 mg Rectal Daily PRN   • cyanocobalamin (VITAMIN B-12) tablet 1,000 mcg  1,000 mcg Oral Daily   • diltiazem (CARDIZEM CD) 24 hr capsule 120 mg  120 mg Oral Daily   • docusate sodium (COLACE) capsule 100 mg  100 mg Oral BID   • DULoxetine (CYMBALTA) delayed release capsule 30 mg  30 mg Oral Daily   • ezetimibe (ZETIA) tablet 10 mg  10 mg Oral Daily   • furosemide (LASIX) tablet 20 mg  20 mg Oral Daily With Dinner   • furosemide (LASIX) tablet 40 mg  40 mg Oral Daily   • HYDROmorphone (DILAUDID) injection 0 5 mg  0 5 mg Intravenous Q2H PRN   • lactated ringers bolus 1,000 mL  1,000 mL Intravenous Once PRN    And   • lactated ringers bolus 1,000 mL  1,000 mL Intravenous Once PRN   • lactated ringers infusion  75 mL/hr Intravenous Continuous   • melatonin tablet 9 mg  9 mg Oral HS PRN   • metoprolol tartrate (LOPRESSOR) tablet 100 mg  100 mg Oral Early Morning   • metoprolol tartrate (LOPRESSOR) tablet 50 mg  50 mg Oral HS   • oxybutynin (DITROPAN-XL) 24 hr tablet 10 mg  10 mg Oral HS   • oxyCODONE (ROXICODONE) immediate release tablet 10 mg  10 mg Oral Q4H PRN   • oxyCODONE (ROXICODONE) IR tablet 5 mg  5 mg Oral Q4H PRN   • pantoprazole (PROTONIX) EC tablet 40 mg  40 mg Oral Early Morning   • polyethylene glycol (MIRALAX) packet 17 g  17 g Oral Daily PRN   • senna (SENOKOT) tablet 8 6 mg  1 tablet Oral Daily   • senna-docusate sodium (SENOKOT S) 8 6-50 mg per tablet 2 tablet  2 tablet Oral BID   • sodium chloride 0 9 % bolus 1,000 mL  1,000 mL Intravenous Once PRN    And   • sodium chloride 0 9 % bolus 1,000 mL  1,000 mL Intravenous Once PRN    and PTA meds:   Prior to Admission Medications   Prescriptions Last Dose Informant Patient Reported? Taking?    Calcium Polycarbophil (FIBER-LAX PO)   Yes Yes   Sig: Take 1 tablet by mouth daily at bedtime   Cholecalciferol 25 MCG (1000 UT) tablet   Yes Yes   Sig: Take 1,000 Units by mouth daily   DULoxetine (CYMBALTA) 30 mg delayed release capsule   No Yes   Sig: TAKE 1 CAPSULE(30 MG) BY MOUTH DAILY   Diclofenac Sodium (VOLTAREN) 1 %   Yes Yes   Sig: Apply 2 g topically 4 (four) times a day = to B/L knee   Melatonin 10 MG TABS   Yes Yes   Sig: Take by mouth Takes daily at night   Multiple Vitamin (MULTIVITAMIN ADULT PO)   Yes Yes   Sig: Take by mouth in the morning   acetaminophen (TYLENOL) 325 mg tablet   Yes Yes   Sig: Take 975 mg by mouth every 6 (six) hours as needed   albuterol (PROVENTIL HFA,VENTOLIN HFA) 90 mcg/act inhaler   No Yes   Sig: INHALE 2 PUFFS BY MOUTH EVERY 6 HOURS AS NEEDED FOR WHEEZING   allopurinol (ZYLOPRIM) 100 mg tablet   No Yes   Sig: Take 1 tablet (100 mg total) by mouth daily   apixaban (Eliquis) 5 mg   No Yes   Sig: Take 1 tablet (5 mg total) by mouth 2 (two) times a day   ascorbic acid (VITAMIN C) 500 MG tablet   No Yes   Sig: Take 1 tablet (500 mg total) by mouth 2 (two) times a day   bisacodyl (DULCOLAX) 10 mg suppository   Yes Yes   Sig: Insert 10 mg into the rectum daily as needed   diltiazem (CARDIZEM CD) 120 mg 24 hr capsule   No Yes   Sig: Take 1 capsule (120 mg total) by mouth daily   ezetimibe (ZETIA) 10 mg tablet   No Yes   Sig: Take 1 tablet (10 mg total) by mouth daily   ferrous sulfate 324 (65 Fe) mg   No Yes   Sig: Take 1 tablet (324 mg total) by mouth 2 (two) times a day before meals   folic acid (FOLVITE) 1 mg tablet   No Yes   Sig: TAKE 1 TABLET(1 MG) BY MOUTH DAILY   furosemide (LASIX) 20 mg tablet   No Yes   Sig: Take 1 tablet (20 mg total) by mouth daily with dinner   furosemide (LASIX) 40 mg tablet   Yes Yes   Sig: Take 40 mg by mouth in the morning   metoprolol tartrate (LOPRESSOR) 100 mg tablet   No Yes   Sig: Take 1 tablet (100 mg total) by mouth daily in the early morning Do not start before January 7, 2023     metoprolol tartrate (LOPRESSOR) 50 mg tablet   No Yes   Sig: Take 1 tablet (50 mg total) by mouth daily at bedtime   omeprazole (PriLOSEC) 20 mg delayed release capsule   Yes Yes   Sig: Take 20 mg by mouth daily   oxybutynin (DITROPAN-XL) 10 MG 24 hr tablet   Yes Yes   Sig: Take 10 mg by mouth daily at bedtime   polyethylene glycol (MIRALAX) 17 g packet   Yes Yes   Sig: Take 17 g by mouth daily as needed   senna-docusate sodium (SENOKOT S) 8 6-50 mg per tablet   No Yes   Sig: Take 2 tablets by mouth 2 (two) times a day   sucralfate (CARAFATE) 1 g tablet   No Yes   Sig: Take 1 tablet (1 g total) by mouth 4 (four) times a day (before meals and at bedtime)   vitamin B-12 (VITAMIN B-12) 1,000 mcg tablet   No Yes   Sig: Take 1 tablet (1,000 mcg total) by mouth daily      Facility-Administered Medications: None       Allergies   Allergen Reactions   • Penicillins Hives     Itching and terrible hives   • Sulfa Antibiotics Itching   • D50 Folate [Folic Acid-Vit H8-AXX N50 - Food Allergy] Other (See Comments)     5/23/22 Pt reports no allergic reaction known    • Hodgen Other (See Comments)     Unknown, 5/23 -pt is unaware of reaction    • Lyrica [Pregabalin] Other (See Comments)     5/23/22 Pt reports doesn't remember reaction    • Other Other (See Comments)     Black rubber 5/23/22 per allergy test - pt unaware of reaction   • Cortisone Acetate [Cortisone] Itching and Rash     5/23/22 pt reports makes her violent    • Doxycycline Hives and Itching   • Nickel Other (See Comments)     Skin discoloration       Objective     Temp:  [97 4 °F (36 3 °C)-99 3 °F (37 4 °C)] 98 4 °F (36 9 °C)  HR:  [] 108  Resp:  [10-18] 18  BP: (112-152)/(51-86) 112/75    Physical Exam  Vitals and nursing note reviewed  Constitutional:       General: She is awake  She is not in acute distress  Appearance: She is not ill-appearing, toxic-appearing or diaphoretic  Interventions: Nasal cannula in place  Cardiovascular:      Rate and Rhythm: Normal rate and regular rhythm  Musculoskeletal:      Left knee: Bony tenderness present  Decreased range of motion  Tenderness present  Skin:     General: Skin is warm and dry  Neurological:      General: No focal deficit present  Mental Status: She is alert and oriented to person, place, and time        GCS: GCS eye subscore is 4  GCS verbal subscore is 5  GCS motor subscore is 6  Psychiatric:         Attention and Perception: Attention normal          Speech: Speech normal          Behavior: Behavior normal  Behavior is cooperative  Lab Results:   Results from last 7 days   Lab Units 01/24/23  0529   WBC Thousand/uL 11 84*   HEMOGLOBIN g/dL 9 5*   HEMATOCRIT % 31 7*   PLATELETS Thousands/uL 364      Results from last 7 days   Lab Units 01/24/23  0529   POTASSIUM mmol/L 4 3   CHLORIDE mmol/L 101   CO2 mmol/L 28   BUN mg/dL 19   CREATININE mg/dL 0 80   CALCIUM mg/dL 9 1       Imaging Studies: I have personally reviewed pertinent reports  EKG, Pathology, and Other Studies: I have personally reviewed pertinent reports  Counseling / Coordination of Care  Total floor / unit time spent today 30 minutes  Greater than 50% of total time was spent with the patient and / or family counseling and / or coordination of care  A description of the counseling / coordination of care: Patient interview, physical examination, review of medical records, review of imaging and laboratory data, development of pain management plan, discussion of pain management plan with patient and primary service  Please note that the APS provides consultative services regarding pain management only  With the exception of ketamine, peripheral nerve catheters, and epidural infusions (and except when indicated), final decisions regarding starting or changing doses of analgesic medications are at the discretion of the consulting service  Off hours consultation and/or medication management is generally not available      Lucho Reynoso PA-C  Acute Pain Service

## 2023-01-24 NOTE — PROGRESS NOTES
Progress Note - 2001 [address]  76 y o  female MRN: [id number]  Unit/Bed#: S -01      Subjective:    76 y  o female s/p lateral release of retinaculum, left knee with imbrication of medial retinaculum with Dr Florecita Nuno on 1/23  Overnight, had episode of afib with RVR, no new complaints  Patient doing well  Pain well controlled  No reported fevers, chills, CP, SOB, N/V, numbness or tingling  Patient reports no issues with urination or bowel movements  Medicine and cardiology consults pending      Labs:  0   Lab Value Date/Time    HCT 31 7 (L) 01/24/2023 0529    HCT 30 3 (L) 01/06/2023 0500    HCT 30 7 (L) 01/05/2023 0602    HGB 9 5 (L) 01/24/2023 0529    HGB 8 9 (L) 01/06/2023 0500    HGB 9 0 (L) 01/05/2023 0602    INR 1 19 01/04/2023 0037    WBC 11 84 (H) 01/24/2023 0529    WBC 7 35 01/06/2023 0500    WBC 6 06 01/05/2023 0602    CRP 5 2 (H) 05/17/2022 1139       Meds:    Current Facility-Administered Medications:   •  acetaminophen (TYLENOL) tablet 975 mg, 975 mg, Oral, Q8H [address], Melissa Saavedra PA-C  •  albuterol (PROVENTIL HFA,VENTOLIN HFA) inhaler 2 puff, 2 puff, Inhalation, Q6H PRN, Dom Gelcarolina PA-C  •  apixaban Bhumi [place]) tablet 5 mg, 5 mg, Oral, BID, Dom Gell, PA-C, 5 mg at 01/24/23 1041  •  bisacodyl (DULCOLAX) rectal suppository 10 mg, 10 mg, Rectal, Daily PRN, Dom Gelcarolina PA-C  •  cyanocobalamin (VITAMIN B-12) tablet 1,000 mcg, 1,000 mcg, Oral, Daily, Ben Damico PA-C, 1,000 mcg at 01/24/23 1042  •  diltiazem (CARDIZEM CD) 24 hr capsule 120 mg, 120 mg, Oral, Daily, Ben Damico PA-C, 120 mg at 01/24/23 1042  •  docusate sodium (COLACE) capsule 100 mg, 100 mg, Oral, BID, Ben Damico PA-C, 100 mg at 01/24/23 1042  •  DULoxetine (CYMBALTA) delayed release capsule 30 mg, 30 mg, Oral, Daily, Ben Damico PA-C, 30 mg at 01/24/23 1042  •  ezetimibe (ZETIA) tablet 10 mg, 10 mg, Oral, Daily, Ben Sotelo PATIENCE Damico, 10 mg at 01/24/23 1042  •  furosemide (LASIX) tablet 20 mg, 20 mg, Oral, Daily With Dinner, Rosalio Sr PA-C  •  furosemide (LASIX) tablet 40 mg, 40 mg, Oral, Daily, Gianni Damico PA-C, 40 mg at 01/24/23 1041  •  lactated ringers bolus 1,000 mL, 1,000 mL, Intravenous, Once PRN **AND** lactated ringers bolus 1,000 mL, 1,000 mL, Intravenous, Once PRN, Rosalio Sr PA-C  •  lactated ringers infusion, 75 mL/hr, Intravenous, Continuous, Rosalio Sr PA-C, Last Rate: 75 mL/hr at 01/24/23 1050, 75 mL/hr at 01/24/23 1050  •  melatonin tablet 9 mg, 9 mg, Oral, HS PRN, Rosalio Sr PA-C  •  metoprolol tartrate (LOPRESSOR) tablet 100 mg, 100 mg, Oral, Early Morning, Gianni Damico PA-C, 100 mg at 01/24/23 0451  •  metoprolol tartrate (LOPRESSOR) tablet 50 mg, 50 mg, Oral, HS, Gianni Damico PA-C, 50 mg at 01/23/23 2255  •  oxybutynin (DITROPAN-XL) 24 hr tablet 10 mg, 10 mg, Oral, HS, Gianni Damico PA-C, 10 mg at 01/23/23 2257  •  oxyCODONE (ROXICODONE) IR tablet 5 mg, 5 mg, Oral, Q4H PRN, Rosalio Sr PA-C, 5 mg at 01/24/23 1040  •  pantoprazole (PROTONIX) EC tablet 40 mg, 40 mg, Oral, Early Morning, Rosalio Sr PA-C, 40 mg at 01/24/23 5417  •  polyethylene glycol (MIRALAX) packet 17 g, 17 g, Oral, Daily PRN, Rosalio Sr PA-C  •  [facility]) tablet 8 6 mg, 1 tablet, Oral, Daily, Gianni Damico PA-C, 8 6 mg at 01/24/23 1042  •  senna-docusate sodium (SENOKOT S) 8 6-50 mg per tablet 2 tablet, 2 tablet, Oral, BID, Rosalio Sr PA-C, 2 tablet at 01/24/23 1042  •  sodium chloride 0 9 % bolus 1,000 mL, 1,000 mL, Intravenous, Once PRN **AND** sodium chloride 0 9 % bolus 1,000 mL, 1,000 mL, Intravenous, Once PRN, Rosalio Sr PA-C    Blood Culture:   Lab Results   Component Value Date    BLOODCX No Growth After 5 Days  01/04/2023    BLOODCX No Growth After 5 Days  01/04/2023       Wound Culture:   No results found for: WOUNDCULT    Ins and Outs:  I/O last 24 hours: In: 2175 [I V :2175]  Out: 471 [Urine:171; Drains:200; Blood:100]    Physical:  Vitals:    01/24/23 0700   BP:    Pulse:    Resp: 18   Temp: 98 4 °F (36 9 °C)   SpO2: 95%     Musculoskeletal: left Lower Extremity  · Skin in dressing, not taken down   · Dressing c/d/i  · Drain removed with sanguinous output 200cc since placement  · Calf and thigh compartments soft and nontender   · Sensation intact to saphenous, sural, tibial, superficial peroneal nerve, and deep peroneal  · Motor intact to +FHL/EHL, +ankle dorsi/plantar flexion  · Digits warm and well perfused  · Capillary refill < 2 seconds    Assessment:    76 y  o female s/p left knee retinacular release   Patient doing well  Plan:  · WBAT in extension in knee immobilizer  · May come out of immobilizer for 0-30 ROM with PT  · Drain pulled this morning without issue  · Appreciate cardiology consult   · Appreciate medicine consult   · PT/OT   · Pain control - appreciate APS recommendations   · DVT ppx: Continue eliquis  · Dispo: Ortho will follow   Discharge pending medical/cardiac clearance  · Will need follow up with Dr China Phillips upon discharge    Jaida Hardy PA-C

## 2023-01-24 NOTE — CONSULTS
[address] 1945, 76 y o  female MRN: [id number]  Unit/Bed#: S -01 Encounter: [id number]  Primary Care Provider: Figueroa Bagley MD   Date and time admitted to hospital: 1/23/2023 12:22 PM    Inpatient consult to Internal Medicine  Consult performed by: Gardenia Toribio MD  Consult ordered by: Cullen Pereira PA-C         * S/P knee surgery  Assessment & Plan  She has history of left Patellar subluxation s/p retinacular release of left knee  Per ortho  Pain control , PT     Atrial fibrillation RVR ([address] )  Assessment & Plan  · H/o tachy smita; went into afib overnight after holding her CCB and BB pre-operatively  · Cardiology consulted  · Home meds resumed (diltiazem 120mg QD, metoprolol 100mg qAM, 50mg qPM)  · eliquis for [facility]   · monitor on telemetry     Stage 3a chronic kidney disease [facility])  Assessment & Plan  Lab Results   Component Value Date    EGFR 70 01/24/2023    EGFR 60 01/06/2023    EGFR 64 01/05/2023    CREATININE 0 80 01/24/2023    CREATININE 0 91 01/06/2023    CREATININE 0 86 01/05/2023   at baseline  BMP in the am     Chronic heart failure with preserved ejection fraction (HCC)  Assessment & Plan  Wt Readings from Last 3 Encounters:   01/07/23 96 8 kg (213 lb 6 5 oz)   01/06/23 95 8 kg (211 lb 3 2 oz)   12/28/22 99 7 kg (219 lb 12 8 oz)     · She appears euvolemic on today's exam  · Most recent EF showing 65%  · Cont home diuretics   · Continue 2 g sodium diet, fluid restriction  · Monitor routine labs          Anemia  Assessment & Plan  · Hgb: 9 5 today   · Monitor routine labs         Essential hypertension  Assessment & Plan  · BPs in acceptable range  · No changes to home med       VTE Prophylaxis: VTE Score: 9 High Risk (Score >/= 5) - Pharmacological DVT Prophylaxis Contraindicated  Sequential Compression Devices Ordered  Recommendations for Discharge:  · No anticipated changes at this time       Counseling / Coordination of Care Time: 60 minutes Greater than 50% of total time spent on patient counseling and coordination of care  Collaboration of Care: Were Recommendations Directly Discussed with Primary Treatment Team? Yes    History of Present Illness:  Jose M Faulkner is a 76 y o  female who is originally admitted to the orthopedic service due to left knee pain with plan for lateral retinacular release  We are consulted for assistance with medical management  Patient went into A  fib with RVR overnight  Noted some change in her breathing but denied palpitations, chest pain or pressure  Currently asymptomatic  Cardiology was consulted and home meds resumed  Review of Systems:  Review of Systems   Constitutional: Negative  Negative for chills  Musculoskeletal: Positive for arthralgias and gait problem  Psychiatric/Behavioral: Negative  All other systems reviewed and are negative  Past Medical and Surgical History:   Past Medical History:   Diagnosis Date   • Abnormal ECG     Last Assessed 9/29/2016   • Anxiety     Last Assessed 6/08/2016   • Arthritis     knees   • Asthma     Last Assessed 11/06/2013   • Atrial premature complex    • Cataract, bilateral     Last Assessed 7/14/2016   • Cataract, left eye     Both eyes  Had surgery on left  • COVID-19    • Difficulty swallowing    • Diverticulosis 9/25/2022   • Dizziness    • Fibromyalgia    • Fibromyalgia, primary    • Gastric reflux    • Heart failure ([facility] )     5/23/22 Pt reports had heart failure in the past   • History of COVID-19     5/23/22 Pt reports having COVID in 2021     • History of transfusion     no reaction   • [place] (hard of hearing)    • Hyperlipidemia    • Hypertension    • Incontinence in female     5/23/22 Pt reports has incontinence -wears depends especially at night/riding in car   • Irregular heart beat     5/23/22 Pt reports hx of A fib   • Lyme disease    • PONV (postoperative nausea and vomiting)    • Premature ventricular contraction    • Primary osteoarthritis of both knees     Last Assessed 7/14/2016   • Rheumatic fever     5/23/22 Pt reports had hx of rheumatic fever as a child twice   • Sore throat    • Tuberculosis 1947       Past Surgical History:   Procedure Laterality Date   • APPENDECTOMY     • CARDIAC PACEMAKER PLACEMENT  2019   • COLONOSCOPY     • DENTAL SURGERY      extractions   • HYSTERECTOMY      5/23/22 Pt reports had appendix out with hysterectomy and "tightened up my bladder"   • IR ASPIRATION ONLY  9/30/2022   • ORIF FEMUR FRACTURE Left 08/05/2021    Procedure: OPEN REDUCTION W/ INTERNAL FIXATION (ORIF) DISTAL FEMUR; INSERTION RETROGRADE NAIL;  Surgeon: Kristine Gomez MD;  Location: [facility];  Service: Orthopedics   • SD ARTHRP KNE CONDYLE&PLATU MEDIAL&LAT COMPARTMENTS Left 9/12/2022    Procedure: ARTHROPLASTY KNEE TOTAL;  Surgeon: Kristine Gomez MD;  Location: [facility];  Service: Orthopedics   • SD DILATION [facility] UNGUIDED SOUND/BOUGIE 1/MULT PASS N/A 04/06/2016    Procedure: DILATATION ESOPHAGEAL;  Surgeon: Che Crooks MD;  Location: [facility]; Service: Gastroenterology   • SD EGD TRANSORAL BIOPSY SINGLE/MULTIPLE N/A 04/06/2016    Procedure: ESOPHAGOGASTRODUODENOSCOPY (EGD); Surgeon: Che Crooks MD;  Location: [facility]; Service: Gastroenterology   • SD REMOVAL IMPLANT DEEP Left 9/12/2022    Procedure: REMOVAL NAIL IM  FEMUR;  Surgeon: Kristine Gomez MD;  [facility];  Service: Orthopedics   • SD XCAPSL CTRC RMVL INSJ IO LENS PROSTH W/O ECP Left 03/15/2016    Procedure: EXTRACTION EXTRACAPSULAR CATARACT PHACO INTRAOCULAR LENS (IOL); Surgeon: Maverick Ramsey MD;  Location: [facility];  Service: Ophthalmology   • REPLACEMENT TOTAL KNEE Right    • REPLACEMENT TOTAL KNEE      right    • TONSILLECTOMY         Meds/Allergies:  all medications and allergies reviewed    Allergies:    Allergies   Allergen Reactions   • Penicillins Hives     Itching and terrible hives   • Sulfa Antibiotics Itching   • B12 Folate [Folic Acid-Vit S6-XQC A25 - Food Allergy] Other (See Comments)     5/23/22 Pt reports no allergic reaction known    • Timber Other (See Comments)     Unknown, 5/23 -pt is unaware of reaction    • Lyrica [Pregabalin] Other (See Comments)     5/23/22 Pt reports doesn't remember reaction    • Other Other (See Comments)     Black rubber 5/23/22 per allergy test - pt unaware of reaction   • Cortisone Acetate [Cortisone] Itching and Rash     5/23/22 pt reports makes her violent    • Doxycycline Hives and Itching   • Nickel Other (See Comments)     Skin discoloration       Social History:  Marital Status:   Substance Use History:   Social History     Substance and Sexual Activity   Alcohol Use Not Currently    Comment: Per [employer]- pt reports no current alcohol use at this time     Social History     Tobacco Use   Smoking Status Never   Smokeless Tobacco Never   Tobacco Comments    Denies any former or current smoking     Social History     Substance and Sexual Activity   Drug Use No    Comment: Denies any former or current drug use       Family History:  Family History   Problem Relation Age of Onset   • Coronary artery disease Mother    • Heart attack Mother         Prior   • Heart disease Father    • Heart attack Father         Prior   • Anesthesia problems Neg Hx        Physical Exam:   Vitals:   Blood Pressure: 112/75 (01/24/23 0626)  Pulse: (!) 108 (01/24/23 0626)  Temperature: 98 4 °F (36 9 °C) (01/24/23 0700)  Temp Source: Oral (01/24/23 0700)  Respirations: 18 (01/24/23 0700)  SpO2: 95 % (01/24/23 0700)    Physical Exam  Vitals and nursing note reviewed  Constitutional:       Appearance: Normal appearance  HENT:      Head: Normocephalic and atraumatic  Cardiovascular:      Rate and Rhythm: Normal rate  Rhythm irregular  Pulses: Normal pulses  Heart sounds: Normal heart sounds  Pulmonary:      Effort: No respiratory distress  Breath sounds: No wheezing or rales  Chest:      Chest wall: No tenderness  Abdominal:      General: Abdomen is flat  There is no distension  Palpations: Abdomen is soft  Tenderness: There is no abdominal tenderness  There is no guarding or rebound  Musculoskeletal:      Right lower leg: No edema  Left lower leg: No edema  Neurological:      General: No focal deficit present  Mental Status: She is alert  Psychiatric:         Mood and Affect: Mood normal          Behavior: Behavior normal           Additional Data:   Lab Results:    Results from last 7 days   Lab Units 01/24/23  0529   WBC Thousand/uL 11 84*   HEMOGLOBIN g/dL 9 5*   HEMATOCRIT % 31 7*   PLATELETS Thousands/uL 364     Results from last 7 days   Lab Units 01/24/23  0529   SODIUM mmol/L 136   POTASSIUM mmol/L 4 3   CHLORIDE mmol/L 101   CO2 mmol/L 28   BUN mg/dL 19   CREATININE mg/dL 0 80   ANION GAP mmol/L 7   CALCIUM mg/dL 9 1   GLUCOSE RANDOM mg/dL 151*             Lab Results   Component Value Date/Time    HGBA1C 5 6 08/17/2022 11:35 AM    HGBA1C 5 5 05/17/2022 11:39 AM    HGBA1C 5 6 08/03/2021 04:42 AM               Imaging: No pertinent imaging reviewed  No orders to display       EKG, Pathology, and Other Studies Reviewed on Admission:   · EKG: Atrial fibrillation    ** Please Note: This note may have been constructed using a voice recognition system   **

## 2023-01-24 NOTE — ASSESSMENT & PLAN NOTE
· H/o tachy smita; went into afib overnight after holding her CCB and BB pre-operatively  · Cardiology consulted  · Home meds resumed (diltiazem 120mg QD, metoprolol 100mg qAM, 50mg qPM)  · eliquis for [facility]   · monitor on telemetry

## 2023-01-24 NOTE — PLAN OF CARE
Problem: PHYSICAL THERAPY ADULT  Goal: Performs mobility at highest level of function for planned discharge setting  See evaluation for individualized goals  Description: Treatment/Interventions: Functional transfer training, LE strengthening/ROM, Therapeutic exercise, Endurance training, Equipment eval/education, Bed mobility, OT, Spoke to nursing, Patient/family training, Cognitive reorientation (pregait/gait training)  Equipment Recommended: Wheelchair (vs drop arm recliner chair and attempts with sliding board to/from target )       See flowsheet documentation for full assessment, interventions and recommendations  Note: Prognosis: Fair  Problem List: Decreased strength, Decreased range of motion, Decreased endurance, Decreased mobility, Decreased cognition, Impaired judgement, Decreased safety awareness, Obesity, Decreased skin integrity, Orthopedic restrictions, Pain, Impaired balance, Impaired vision (knee immobilizer on L knee)  Assessment: Pt is a 76 y o  female seen for PT evaluation s/p admit to [facility] on 1/23/2023 post op Lateral release of the retinaculum, left knee; imbrication of medial retinaculum due to 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  Order for PT placed with orthopedic restrictions of L knee immobilizer, but AROM 0-30deg out of brace  Pertinent PMH includes: tachy-smita syndrome, HTN, ambulatory dysfunction, CHF, CKD, R TKR, as well as recent admissions on 1/4/23 for UTI/PE, and 1/7/23 for tachycardia  Prior to admission, pt reports being non-ambulatory for greater than 1 year and used sliding board to transfer at [address], but pt is inconsistent historian  Upon evaluation, Pt was able to perform bed mobility w/ A of 1, but needed max x2 for sit <>stand and was unsuccessful in getting to the chair    Pt presented with the following deficits: decreased strength, decreased ROM, decreased endurance, decreased mobility, decreased cognition, decreased skin integrity, orthopedic restrictions of WBAT BLE and L knee immobilizer, decreased recall of precautions, and pain  Skilled PT recommendations include continued mobility trials using RW during pregait and progression to gait activities, LE strengthening/rom within ortho precautions  However with pt FAILING Luann's Egress test she will need to either have mechanical conveyance for OOB transfers (as she is not appropriate for quick move sit<>stand lift) or trial lateral transfers w/sliding board to WC/drop arm commode, or drop arm recliner  Barriers to Discharge: Inaccessible home environment, Decreased caregiver support     PT Discharge Recommendation: Return to facility with rehabilitation services    See flowsheet documentation for full assessment

## 2023-01-24 NOTE — ASSESSMENT & PLAN NOTE
She has history of left Patellar subluxation s/p retinacular release of left knee  Per ortho  Pain control , PT

## 2023-01-24 NOTE — CASE MANAGEMENT
Case Management Discharge Planning Note    Patient name Wei Garcia  Location S /S -01 MRN [id number]  : 1945 Date 2023       Current Admission Date: 2023  Current Admission Diagnosis:S/P knee surgery   Patient Active Problem List    Diagnosis Date Noted   • S/P knee surgery 2023   • Sepsis ([address] ) 2023   • Congestive heart failure 2023   • Chronic constipation 2023   • Fall 2022   • Abnormal CT of the abdomen 2022   • History of bacteremia 12/15/2022   • Acute pain of right knee 2022   • Hyperkalemia 2022   • Suspected UTI 2022   • Dehydration 2022   • UTI (urinary tract infection) 2022   • Hyponatremia 2022   • Rash 2022   • Proctitis 2022   • Nausea 2022   • Hypokalemia 2022   • Dermatitis associated with incontinence 10/26/2022   • Physical deconditioning 10/19/2022   • Chronic heart failure with preserved ejection fraction ([address] ) 10/19/2022   • Status post fall 10/15/2022   • Thrombocytosis 10/11/2022   • Pressure injury of left buttock, stage 3 ([address] ) 10/05/2022   • Aortic regurgitation 10/04/2022   • Asymptomatic bacteriuria 10/03/2022   • Bacteremia due to methicillin resistant Staphylococcus epidermidis 2022   • Anemia 2022   • Bladder wall thickening 2022   • left femoral fluid collection 2022   • Pleural effusion on right 2022   • Vomiting and diarrhea 2022   • Surgical wound present 2022   • Renal insufficiency    • Stage 3a chronic kidney disease ([address] ) 2022   • Preop examination 2022   • Preop cardiovascular exam 2022   • Poor dentition 2022   • Closed bicondylar fracture of left femur with delayed healing 2022   • Paroxysmal atrial fibrillation ([address] ) 2022   • Chronic gout with tophus 2021   • Current moderate episode of major depressive disorder ([facility] ) 2021   • Morbid obesity with body mass index (BMI) of 40 0 to 49 9 ([facility]) 09/23/2021   • Reactive airway disease with acute exacerbation 09/21/2021   • Acute respiratory failure ([facility] [address] ) 09/20/2021   • Ambulatory dysfunction 08/12/2021   • MICHAEL (acute kidney injury) ([facility] [facility] ) 08/04/2021   • Leukocytosis 08/02/2021   • Arthritis 05/18/2021   • Generalized weakness 05/18/2021   • HLD (hyperlipidemia) 11/10/2020   • Dysphonia 04/28/2020   • Vitamin D insufficiency 04/28/2020   • Chronic pain of left knee 08/12/2019   • Age related osteoporosis 08/12/2019   • Anxiety 03/27/2019   • Tremor 03/27/2019   • Other insomnia 02/27/2019   • Urinary incontinence 09/17/2018   • Psoriasis 07/20/2018   • Thyroid nodule 07/20/2018   • Atrial fibrillation RVR ([facility] ) 02/15/2018   • Tachy-smita syndrome ([facility] ) 02/15/2018   • Essential hypertension 02/15/2018   • Pacemaker 02/15/2018   • GERD (gastroesophageal reflux disease) 07/18/2017   • Mild carpal tunnel syndrome, right 04/10/2017   • Degenerative lumbar spinal stenosis 03/31/2017   • Low back pain 08/29/2016   • Lumbar degenerative disc disease 08/29/2016   • Chronic bilateral low back pain without sciatica 06/08/2016   • Chronic GERD 05/06/2016   • Overweight 02/12/2016   • Fibromyalgia 11/06/2013      LOS (days): 0  Geometric Mean LOS (GMLOS) (days): 3 50  Days to GMLOS:3 5     OBJECTIVE:            Current admission status: Inpatient   Preferred Pharmacy:   [facility] [address] [facility], [address] [place] [address] [address], [facility] [address] [place] [id number]  Phone: [phone] Fax: [address] [facility], [place] [address]  [address]  [facility] [address]  Phone: [phone] Fax: [phone]    Primary Care Provider: Destiney Brand MD    Primary Insurance: MEDICARE  Secondary Insurance: AARP    DISCHARGE DETAILS:    Discharge planning discussed with[de-identified] Julia Or (POA/Nephew) - Message left with his assistant  Freedom of Choice: Yes  Comments - Freedom of Choice: CM reviewed with POA's assistant that just wanted to confirm DCP preference  CM contacted family/caregiver?: Yes             Contacts  Patient Contacts: Marylou Henning (POA/Nephew)  Relationship to Patient[de-identified] Family  Contact Method: Phone  Phone Number: [phone] (M)  Reason/Outcome: Discharge Planning, Emergency Contact              Other Referral/Resources/Interventions Provided:  Interventions: Facility Return  Referral Comments: Targeted assessment completed to confirm DCP  CM called patient's POA to discuss DCP preference however was only able to leave a verbal message with his assistant at this time  CM was told that POA or caregiver would call CM back to confirm  Treatment Team Recommendation: Facility Return, SNF  Discharge Destination Plan[de-identified] Facility Return, SNF  Transport at Discharge : [facility] Amb[facility]                                         CM department will continue to follow to assist with discharge coordination

## 2023-01-24 NOTE — ASSESSMENT & PLAN NOTE
Wt Readings from Last 3 Encounters:   01/07/23 96 8 kg (213 lb 6 5 oz)   01/06/23 95 8 kg (211 lb 3 2 oz)   12/28/22 99 7 kg (219 lb 12 8 oz)     · She appears euvolemic on today's exam  · Most recent EF showing 65%  · Cont home diuretics   · Continue 2 g sodium diet, fluid restriction  · Monitor routine labs

## 2023-01-24 NOTE — CONSULTS
Consultation - Cardiology Team One  Ivonne Taylor 76 y o  female MRN: [id number]  Unit/Bed#: S -01 Encounter: [id number]    Consults    Physician Requesting Consult: Kate Magdaleno MD  Reason for Consult / Principal Problem: tachycardia      Assessment/ Plan:    1  Paroxysmal atrial fibrillation: Patient was in A  fib with ventricular rate of 109 this morning on EKG  She is not on telemetry but overnight highest heart rate documented is 108  She has a known history of A  fib with recent device interrogation showing 27% A  fib burden  She did not get her Cardizem or metoprolol yesterday morning  Recommend resuming home medications metoprolol tartrate 100 mg in a m  and 50 mg in p m , Cardizem 120 mg daily  Placed on telemetry to monitor heart rates for next 24 hours  Anticipate she will achieve rate control with her home medications  Anticoagulation has already been resumed by primary team    She is asymptomatic and can remain in atrial fibrillation as long as her rates are controlled  2   Status post lateral release of left knee: Postop day #1   Followed by pain management and orthopedics  3  Mild aortic stenosis: Follow-up outpatient  4  Chronic diastolic heart failure: Takes Lasix 40 mg in the a m  and 20 mg in the p m     Currently euvolemic on exam on home oral diuretics, no changes made  Would discontinue IV fluids when she is taking p o   5   Hypertension: Average blood pressure to last 24 hours 130/67  Continue home meds      History of Present Illness      HPI: Ivonne Taylor is a 76y o  year old female who has a history of paroxysmal atrial fibrillation on Eliquis, sick sinus syndrome/tachybradycardia status post pacemaker, chronic diastolic congestive heart failure, mild aortic stenosis, hypertension, dyslipidemia  She follows with cardiologist Dr Willson Bile   Patient with above history who presented yesterday for elective orthopedic surgery on her left lower extremity  Surgery was uncomplicated per operative report  She was admitted postoperatively to the [facility] 5 floor  There were concerns of elevated heart rates overnight and an EKG was done this morning at 5:40 AM that showed atrial fibrillation with ventricular rate of 109bpm   Given these findings a cardiology consultation was requested today  At time my exam patient reports feeling okay  She does have some left lower extremity pain but nothing unbearable  She is not feeling any palpitations dizziness or lightheadedness, no shortness of breath, lower extremity swelling or chest pain  Of note she has been living in a facility since being at rehab in the fall after initial leg surgery  She has not been ambulatory recently  She has a known history of paroxysmal atrial fibrillation, typically on metoprolol tartrate 100 mg in the a m  and 50 mg in the p m  She also takes Cardizem 120 mg daily and is on systemic anticoagulation with Eliquis 5 mg twice daily  She tells me she did not take any of her oral medications yesterday due to her surgery  She did get 50 mg of Toprol tartrate last night and just received her morning dose of 100 mg  She does have a dual-chamber pacemaker due to history of tachybradycardia syndrome  Most recent interrogation last week showed 27% A  fib burden  EKG reviewed personally:  1/24/23  Atrial fibrillation with RVR  Ventricular rate 109    Telemetry reviewed personally:   No telemetry    Review of Systems   Constitutional: Negative for decreased appetite and fever  Cardiovascular: Negative for chest pain, irregular heartbeat, leg swelling, orthopnea and palpitations  Respiratory: Negative for cough and shortness of breath  Gastrointestinal: Negative for nausea and vomiting  Genitourinary: Negative for dysuria  Neurological: Negative for dizziness and light-headedness  Psychiatric/Behavioral: Negative for altered mental status     All other systems reviewed and are negative  Historical Information   Past Medical History:   Diagnosis Date   • Abnormal ECG     Last Assessed 9/29/2016   • Anxiety     Last Assessed 6/08/2016   • Arthritis     knees   • Asthma     Last Assessed 11/06/2013   • Atrial premature complex    • Cataract, bilateral     Last Assessed 7/14/2016   • Cataract, left eye     Both eyes  Had surgery on left  • COVID-19    • Difficulty swallowing    • Diverticulosis 9/25/2022   • Dizziness    • Fibromyalgia    • Fibromyalgia, primary    • Gastric reflux    • Heart failure ([address] )     5/23/22 Pt reports had heart failure in the past   • History of COVID-19     5/23/22 Pt reports having COVID in 2021     • History of transfusion     no reaction   • [place] (hard of hearing)    • Hyperlipidemia    • Hypertension    • Incontinence in female     5/23/22 Pt reports has incontinence -wears depends especially at night/riding in car   • Irregular heart beat     5/23/22 Pt reports hx of A fib   • Lyme disease    • PONV (postoperative nausea and vomiting)    • Premature ventricular contraction    • Primary osteoarthritis of both knees     Last Assessed 7/14/2016   • Rheumatic fever     5/23/22 Pt reports had hx of rheumatic fever as a child twice   • Sore throat    • Tuberculosis 1947     Past Surgical History:   Procedure Laterality Date   • APPENDECTOMY     • CARDIAC PACEMAKER PLACEMENT  2019   • COLONOSCOPY     • DENTAL SURGERY      extractions   • HYSTERECTOMY      5/23/22 Pt reports had appendix out with hysterectomy and "tightened up my bladder"   • IR ASPIRATION ONLY  9/30/2022   • ORIF FEMUR FRACTURE Left 08/05/2021    Procedure: OPEN REDUCTION W/ INTERNAL FIXATION (ORIF) DISTAL FEMUR; INSERTION RETROGRADE NAIL;  Surgeon: Haris Yadav MD;  [facility];  Service: Orthopedics   • ND ARTHRP KNE CONDYLE&PLATU MEDIAL&LAT COMPARTMENTS Left 9/12/2022    Procedure: ARTHROPLASTY KNEE TOTAL;  Surgeon: Haris Yadav MD;  [facility];  Service: Orthopedics   • NV DILATION ESOPH UNGUIDED SOUND/BOUGIE 1/MULT PASS N/A 04/06/2016    Procedure: DILATATION ESOPHAGEAL;  Surgeon: Courtney Marlow MD;  Location: [facility]; Service: Gastroenterology   • NV EGD TRANSORAL BIOPSY SINGLE/MULTIPLE N/A 04/06/2016    Procedure: ESOPHAGOGASTRODUODENOSCOPY (EGD); Surgeon: Courtney Marlow MD;  Location: [facility]; Service: Gastroenterology   • NV REMOVAL IMPLANT DEEP Left 9/12/2022    Procedure: REMOVAL NAIL IM  FEMUR;  Surgeon: Amber Bobo MD;  [facility];  Service: Orthopedics   • NV XCAPSL CTRC RMVL INSJ IO LENS PROSTH W/O ECP Left 03/15/2016    Procedure: EXTRACTION EXTRACAPSULAR CATARACT PHACO INTRAOCULAR LENS (IOL);   Surgeon: Dexter Severance, MD;  Location[facility];  Service: Ophthalmology   • REPLACEMENT TOTAL KNEE Right    • REPLACEMENT TOTAL KNEE      right    • TONSILLECTOMY       Social History     Substance and Sexual Activity   Alcohol Use Not Currently    Comment: Per [employer]- pt reports no current alcohol use at this time     Social History     Substance and Sexual Activity   Drug Use No    Comment: Denies any former or current drug use     Social History     Tobacco Use   Smoking Status Never   Smokeless Tobacco Never   Tobacco Comments    Denies any former or current smoking     Family History:   Family History   Problem Relation Age of Onset   • Coronary artery disease Mother    • Heart attack Mother         Prior   • Heart disease Father    • Heart attack Father         Prior   • Anesthesia problems Neg Hx      Meds/Allergies   current meds:   Current Facility-Administered Medications   Medication Dose Route Frequency   • acetaminophen (TYLENOL) tablet 975 mg  975 mg Oral Q8H [facility]   • albuterol (PROVENTIL HFA,VENTOLIN HFA) inhaler 2 puff  2 puff Inhalation Q6H PRN   • apixaban (ELIQUIS) tablet 5 mg  5 mg Oral BID   • bisacodyl (DULCOLAX) rectal suppository 10 mg  10 mg Rectal Daily PRN   • cyanocobalamin (VITAMIN B-12) tablet 1,000 mcg  1,000 mcg Oral Daily   • diltiazem (CARDIZEM CD) 24 hr capsule 120 mg  120 mg Oral Daily   • docusate sodium (COLACE) capsule 100 mg  100 mg Oral BID   • DULoxetine (CYMBALTA) delayed release capsule 30 mg  30 mg Oral Daily   • ezetimibe (ZETIA) tablet 10 mg  10 mg Oral Daily   • furosemide (LASIX) tablet 20 mg  20 mg Oral Daily With Dinner   • furosemide (LASIX) tablet 40 mg  40 mg Oral Daily   • lactated ringers bolus 1,000 mL  1,000 mL Intravenous Once PRN    And   • lactated ringers bolus 1,000 mL  1,000 mL Intravenous Once PRN   • lactated ringers infusion  75 mL/hr Intravenous Continuous   • melatonin tablet 9 mg  9 mg Oral HS PRN   • metoprolol tartrate (LOPRESSOR) tablet 100 mg  100 mg Oral Early Morning   • metoprolol tartrate (LOPRESSOR) tablet 50 mg  50 mg Oral HS   • oxybutynin (DITROPAN-XL) 24 hr tablet 10 mg  10 mg Oral HS   • oxyCODONE (ROXICODONE) IR tablet 5 mg  5 mg Oral Q4H PRN   • pantoprazole (PROTONIX) EC tablet 40 mg  40 mg Oral Early Morning   • polyethylene glycol (MIRALAX) packet 17 g  17 g Oral Daily PRN   • senna (SENOKOT) tablet 8 6 mg  1 tablet Oral Daily   • senna-docusate sodium (SENOKOT S) 8 6-50 mg per tablet 2 tablet  2 tablet Oral BID   • sodium chloride 0 9 % bolus 1,000 mL  1,000 mL Intravenous Once PRN    And   • sodium chloride 0 9 % bolus 1,000 mL  1,000 mL Intravenous Once PRN     lactated ringers, 75 mL/hr, Last Rate: 75 mL/hr (01/23/23 2037)      Allergies   Allergen Reactions   • Penicillins Hives     Itching and terrible hives   • Sulfa Antibiotics Itching   • K78 Folate [Folic Acid-Vit H9-YYT S09 - Food Allergy] Other (See Comments)     5/23/22 Pt reports no allergic reaction known    • [place] Other (See Comments)     Unknown, 5/23 -pt is unaware of reaction    • Lyrica [Pregabalin] Other (See Comments)     5/23/22 Pt reports doesn't remember reaction    • Other Other (See Comments)     Black rubber 5/23/22 per allergy test - pt unaware of reaction   • Cortisone Acetate [Cortisone] Itching and Rash     22 pt reports makes her violent    • Doxycycline Hives and Itching   • [place] Other (See Comments)     Skin discoloration     Objective   Vitals: Blood pressure 112/75, pulse (!) 108, temperature 98 4 °F (36 9 °C), temperature source Oral, resp  rate 18, SpO2 95 %  ,     There is no height or weight on file to calculate BMI ,     Systolic (18FVK), RRT:579 , Min:112 , NNS:198     Diastolic (40XAC), IM, Min:51, Max:86      Intake/Output Summary (Last 24 hours) at 2023 1049  Last data filed at 2023 3942  Gross per 24 hour   Intake 1200 ml   Output 471 ml   Net 729 ml     Weight (last 2 days)     None        Invasive Devices     Peripheral Intravenous Line  Duration           Peripheral IV 23 Left Hand <1 day    Peripheral IV 23 Right Hand <1 day          Drain  Duration           Closed/Suction Drain Left Knee Accordion 10 Fr  <1 day              Physical Exam  Vitals reviewed  Constitutional:       General: She is not in acute distress  Appearance: Normal appearance  Comments: Patient lying nearly flat in bed in [facility]   HENT:      Head: Normocephalic  Mouth/Throat:      Mouth: Mucous membranes are moist    Cardiovascular:      Rate and Rhythm: Tachycardia present  Rhythm irregularly irregular  Heart sounds: S1 normal and S2 normal  No murmur heard  Comments: Left lower extremity brace and Ace wrap intact  Pulmonary:      Effort: Pulmonary effort is normal       Breath sounds: Normal breath sounds  No wheezing or rales  Musculoskeletal:      Right lower leg: No edema  Left lower leg: No edema  Skin:     General: Skin is warm  Coloration: Skin is pale  Neurological:      Mental Status: She is alert and oriented to person, place, and time     Psychiatric:         Mood and Affect: Mood normal        LABORATORY RESULTS:      CBC with diff:   Results from last 7 days   Lab Units 23  0529   WBC Thousand/uL 11 84*   HEMOGLOBIN g/dL 9 5*   HEMATOCRIT % 31 7*   MCV fL 88   PLATELETS Thousands/uL 364   MCH pg 26 5*   MCHC g/dL 30 0*   RDW % 15 5*   MPV fL 9 0       CMP:  Results from last 7 days   Lab Units 23  0529   POTASSIUM mmol/L 4 3   CHLORIDE mmol/L 101   CO2 mmol/L 28   BUN mg/dL 19   CREATININE mg/dL 0 80   CALCIUM mg/dL 9 1   EGFR ml/min/1 73sq m 70       BMP:  Results from last 7 days   Lab Units 23  0529   POTASSIUM mmol/L 4 3   CHLORIDE mmol/L 101   CO2 mmol/L 28   BUN mg/dL 19   CREATININE mg/dL 0 80   CALCIUM mg/dL 9 1     Lab Results   Component Value Date    NTBNP 1,534 (H) 2021    NTBNP 2,905 (H) 2021         Lipid Profile:   No results found for: CHOL  Lab Results   Component Value Date    HDL 67 2021    HDL 57 2020    HDL 52 2019     Lab Results   Component Value Date    LDLCALC 145 (H) 2021    LDLCALC 150 (H) 2020    LDLCALC 139 (H) 2019     Lab Results   Component Value Date    TRIG 86 2021    TRIG 80 2020    TRIG 118 2019     Cardiac testing:   Results for orders placed during the hospital encounter of 21    Echo complete with contrast if indicated    [address]  [phone]    Transthoracic Echocardiogram  2D, M-mode, Doppler, and Color Doppler    Study date:  10-Aug-2021    Patient: Coreen Hale  MR number: [id number]  Account number: [de-identified]  : 1945  Age: 66 years  Gender: Female  Status: Inpatient  Location: Bedside  Height: 64 in  Weight: 289 3 lb  BP: 110/ 49 mmHg    Indications: Atrial Fibrillation    Diagnoses: I48 0 - Atrial fibrillation    Sonographer:  CANDACE Little  Primary Physician:  Nelson Bennett MD  Referring Physician:  Alena Litten, DO  Group:  [facility]  Cardiology Fellow:   Jessika Jimenez MD  Interpreting Physician:  Hazel Pacheco DO    SUMMARY    LEFT VENTRICLE:  Systolic function was normal  Ejection fraction was estimated to be 60 %  There were no regional wall motion abnormalities  Wall thickness was mildly increased  The changes were consistent with concentric remodeling (increased wall thickness with normal wall mass)  Features were consistent with a pseudonormal left ventricular filling pattern, with concomitant abnormal relaxation and increased filling pressure (grade 2 diastolic dysfunction)  Doppler parameters were consistent with high ventricular filling pressure  VENTRICULAR SEPTUM:  There was dyssynergic motion  These changes are consistent with a conduction abnormality or paced rhythm  RIGHT VENTRICLE:  The size was normal   Systolic function was normal     LEFT ATRIUM:  The atrium was moderately dilated  RIGHT ATRIUM:  The atrium was mildly dilated  MITRAL VALVE:  There was mild annular calcification  There was mild regurgitation  AORTIC VALVE:  There was mild regurgitation  TRICUSPID VALVE:  There was mild regurgitation  Pulmonary artery systolic pressure was mildly increased  Estimated peak PA pressure was 42 mmHg  HISTORY: PRIOR HISTORY: Atrial fibrillation; Hypertension; Hyperlipidemia; Pacemaker; GERD; Tachy-Reji Syndrome    PROCEDURE: The procedure was performed at the bedside  This was a routine study  The transthoracic approach was used  The study included complete 2D imaging, M-mode, complete spectral Doppler, and color Doppler  The heart rate was 72 bpm,  at the start of the study  Images were obtained from the parasternal, apical, subcostal, and suprasternal notch acoustic windows  Image quality was adequate  LEFT VENTRICLE: Size was normal  Systolic function was normal  Ejection fraction was estimated to be 60 %  There were no regional wall motion abnormalities  Wall thickness was mildly increased  The changes were consistent with concentric  remodeling (increased wall thickness with normal wall mass)   DOPPLER: Features were consistent with a pseudonormal left ventricular filling pattern, with concomitant abnormal relaxation and increased filling pressure (grade 2 diastolic  dysfunction)  Doppler parameters were consistent with high ventricular filling pressure  VENTRICULAR SEPTUM: There was dyssynergic motion  These changes are consistent with a conduction abnormality or paced rhythm  RIGHT VENTRICLE: The size was normal  Systolic function was normal  A pacing wire was present in the ventricular cavity  LEFT ATRIUM: The atrium was moderately dilated  RIGHT ATRIUM: The atrium was mildly dilated  MITRAL VALVE: There was mild annular calcification  There was normal leaflet separation  DOPPLER: The transmitral velocity was within the normal range  There was no evidence for stenosis  There was mild regurgitation  AORTIC VALVE: The valve was trileaflet  Leaflets exhibited mildly to moderately increased thickness, mild to moderate calcification, and normal cuspal separation  DOPPLER: Transaortic velocity was within the normal range  There was no  evidence for stenosis  There was mild regurgitation  TRICUSPID VALVE: The valve structure was normal  There was normal leaflet separation  DOPPLER: The transtricuspid velocity was within the normal range  There was no evidence for stenosis  There was mild regurgitation  Pulmonary artery  systolic pressure was mildly increased  Estimated peak PA pressure was 42 mmHg  PULMONIC VALVE: Leaflets exhibited normal thickness, no calcification, and normal cuspal separation  DOPPLER: The transpulmonic velocity was within the normal range  There was trace regurgitation  PERICARDIUM: There was no pericardial effusion  AORTA: The root exhibited normal size  SYSTEMIC VEINS: IVC: The inferior vena cava was normal in size and course  Respirophasic changes were normal     PULMONARY VEINS: DOPPLER: There was systolic blunting in the pulmonary vein(s)      SYSTEM MEASUREMENT TABLES    2D  %FS: 29 06 %  Ao Diam: 3 41 cm  Ao asc: 3 45 cm  EDV(Teich): 102 92 ml  EF(Teich): 55 78 %  ESV(Teich): 45 51 ml  IVSd: 1 5 cm  LA Area: 31 15 cm2  LA Diam: 4 08 cm  LVEDV MOD A4C: 97 29 ml  LVEF MOD A4C: 58 13 %  LVESV MOD A4C: 40 73 ml  LVIDd: 4 71 cm  LVIDs: 3 34 cm  LVLd A4C: 7 25 cm  LVLs A4C: 6 25 cm  LVPWd: 0 98 cm  RA Area: 21 28 cm2  RVIDd: 3 78 cm  SV MOD A4C: 56 56 ml  SV(Teich): 57 41 ml    CW  AR Dec [place]: 2 44 m/s2  AR Dec Time: 1437 1 ms  AR PHT: 416 76 ms  AR Vmax: 3 45 m/s  AR maxP 75 mmHg  TR Vmax: 3 04 m/s  TR maxP 85 mmHg    MM  TAPSE: 2 09 cm    PW  E' Sept: 0 08 m/s  E/E' Sept: 14 61  MV A [place]: 0 42 m/s  MV Dec [place]: 5 86 m/s2  MV DecT: 210 91 ms  MV E [place]: 1 24 m/s  MV E/A Ratio: 2 97  MV PHT: 61 16 ms  MVA By PHT: 3 6 cm2    [employer] Accredited Echocardiography Laboratory    Prepared and electronically signed by    Adi Urias DO  Signed 10-Aug-2021 11:21:21    Results for orders placed during the hospital encounter of 21    TANISHA    Narrative  Procedure note:  TANISHA Preliminary Report    Impression:    LV:  Normal size and low normalsystolic function  Left ventricular ejection fraction ~ 50%  LA:  Normal size  LITTLE:  Severely dilated LITTLE with low function  +spontaneous echocontrast ("smoke"), with probable sign of early thrombus  RA:  Normal size  Interatrial septum:  Appears normal with no PFO or ASD  RV:  Normal size and systolic function  MV:  Normal structure  No mitral stenosis  Mild to moderate regurgitation  AV:  Normal structure  Probable very mild aortic stenosis by 2D study  No significant regurgitation  TV:  Normal structure  No tricuspid stenosis  Mild regurgitation  PV:  Grossly normal but poorly visualized  No gross pulmonic stenosis or significant regurgitation  AO:  Not well visualized    Informed consent obtained from patient prior to procedure after all indications, risks, and benefits of TANISHA thoroughly discussed  Propofol was utilized for sedation and administered intravenously by Anesthesia  Blood pressure, EKG, pulse oximetry, respirations, and level of consciousness were monitored throughout the procedure  Pt tolerated procedure well with nurse anesthetist performing sedation and monitoring  TANISHA probe passed without difficulty with no blood seen on probe upon extubation  No valid procedures specified  No results found for this or any previous visit  Imaging: I have personally reviewed pertinent reports  XR chest 1 view portable    Result Date: 1/4/2023  Narrative: CHEST INDICATION:  Shortness of breath  COMPARISON:  11/25/2020, CT chest 9/20/2021 EXAM PERFORMED/VIEWS:  XR CHEST PORTABLE FINDINGS:  Monitoring leads and clips project over the chest  Cardiomediastinal silhouette appears stable  Calcified right paratracheal lymph node  Left subclavian pacer with lead tips projecting at the right atrium and ventricle  The lungs are clear  No pneumothorax  Hazy density at the lung bases could reflect pleural effusions and/or artifact from patient's soft tissues and technique  Mild osteoarthritis left glenohumeral and right AC joints  Impression: Hazy density at the lung bases could reflect pleural effusions and/or artifact from patient's soft tissues and technique  Clear lungs  Workstation performed: [id number]     CT chest without contrast    Result Date: 1/4/2023  Narrative: CT CHEST WITHOUT IV CONTRAST INDICATION:   Cough, persistent cough, sob  COMPARISON:  CT of the abdomen pelvis on November 25, 2022  TECHNIQUE: CT examination of the chest was performed without intravenous contrast  Axial, sagittal, and coronal 2D reformatted images were created from the source data and submitted for interpretation  Radiation dose length product (DLP) for this visit:  363 mGy-cm     This examination, like all CT scans performed in the [facility], was performed utilizing techniques to minimize radiation dose exposure, including the use of iterative reconstruction and automated exposure control  FINDINGS: LUNGS:  A few punctate calcified granuloma are seen within the right lung  Bilateral dependent atelectasis  No focal consolidation  The central airways are patent  PLEURA:  There is moderate right and small left pleural effusion with adjacent compressive atelectasis  HEART/GREAT VESSELS: Left-sided pacing device in place  Coronary artery calcifications  No pericardial effusion  No thoracic aortic aneurysm  Mild to moderate calcifications of the thoracic aorta  MEDIASTINUM AND JJ:  A right paratracheal calcified lymph node/granuloma measures 2 8 x 1 7 cm  CHEST WALL AND LOWER NECK:  Unremarkable  VISUALIZED STRUCTURES IN THE UPPER ABDOMEN:  Unremarkable  OSSEOUS STRUCTURES:  No acute fracture or destructive osseous lesion  Impression: Moderate right and small left pleural effusions with adjacent compressive atelectasis  Workstation performed: [id number]     Cardiac EP device report    Result Date: 1/16/2023  Narrative: MDT DUAL CHAMBER PPM (AAIR-DDDR MODE)/ ACTIVE SYSTEM IS MRI CONDITIONAL NB-CARELINK TRANSMISSION: 12 FAST A/V NOTED; RATE UPTO 182 BPM; AVAIL EGRAMS PRESENT AS RVR  11 AT/AF NOTED; 27% BURDEN; rATP GIVEN  PT ON ELIQUIS, METO TART, & CARDIZEM  EF 65% (ECHO 2022)  BATTERY STATUS "3 YRS " AP 71%  14%  ALL AVAILABLE LEAD PARAMETERS WITHIN NORMAL LIMITS  NC     Assessment  Active Problems:    * No active hospital problems  *    Thank you for allowing us to participate in this patient's care  This pt will follow up with Dr Katerina Perry once discharged  Counseling / Coordination of Care  Total floor / unit time spent today 45 minutes  Greater than 50% of total time was spent with the patient and / or family counseling and / or coordination of care  A description of the counseling / coordination of care: Review of history, current assessment, development of a plan      Code Status: Level 1 - Full Code    ** Please Note: Dragon 360 Dictation voice to text software may have been used in the creation of this document   **

## 2023-01-25 VITALS
DIASTOLIC BLOOD PRESSURE: 59 MMHG | SYSTOLIC BLOOD PRESSURE: 128 MMHG | TEMPERATURE: 98.1 F | HEART RATE: 73 BPM | OXYGEN SATURATION: 95 % | RESPIRATION RATE: 14 BRPM

## 2023-01-25 PROBLEM — E86.0 DEHYDRATION: Status: RESOLVED | Noted: 2022-11-26 | Resolved: 2023-01-25

## 2023-01-25 LAB
ANION GAP SERPL CALCULATED.3IONS-SCNC: 8 MMOL/L (ref 4–13)
BUN SERPL-MCNC: 24 MG/DL (ref 5–25)
CALCIUM SERPL-MCNC: 9 MG/DL (ref 8.4–10.2)
CHLORIDE SERPL-SCNC: 101 MMOL/L (ref 96–108)
CO2 SERPL-SCNC: 27 MMOL/L (ref 21–32)
CREAT SERPL-MCNC: 0.93 MG/DL (ref 0.6–1.3)
ERYTHROCYTE [DISTWIDTH] IN BLOOD BY AUTOMATED COUNT: 16 % (ref 11.6–15.1)
GFR SERPL CREATININE-BSD FRML MDRD: 58 ML/MIN/1.73SQ M
GLUCOSE SERPL-MCNC: 126 MG/DL (ref 65–140)
HCT VFR BLD AUTO: 30.6 % (ref 34.8–46.1)
HGB BLD-MCNC: 8.9 G/DL (ref 11.5–15.4)
MCH RBC QN AUTO: 26.3 PG (ref 26.8–34.3)
MCHC RBC AUTO-ENTMCNC: 29.1 G/DL (ref 31.4–37.4)
MCV RBC AUTO: 90 FL (ref 82–98)
PLATELET # BLD AUTO: 275 THOUSANDS/UL (ref 149–390)
PMV BLD AUTO: 9.2 FL (ref 8.9–12.7)
POTASSIUM SERPL-SCNC: 4.1 MMOL/L (ref 3.5–5.3)
RBC # BLD AUTO: 3.39 MILLION/UL (ref 3.81–5.12)
SODIUM SERPL-SCNC: 136 MMOL/L (ref 135–147)
WBC # BLD AUTO: 12.94 THOUSAND/UL (ref 4.31–10.16)

## 2023-01-25 RX ORDER — OXYCODONE HYDROCHLORIDE 5 MG/1
5 TABLET ORAL EVERY 6 HOURS PRN
Qty: 20 TABLET | Refills: 0 | Status: SHIPPED | OUTPATIENT
Start: 2023-01-25 | End: 2023-02-04

## 2023-01-25 RX ADMIN — DILTIAZEM HYDROCHLORIDE 120 MG: 120 CAPSULE, COATED, EXTENDED RELEASE ORAL at 08:33

## 2023-01-25 RX ADMIN — METOPROLOL TARTRATE 100 MG: 100 TABLET, FILM COATED ORAL at 06:22

## 2023-01-25 RX ADMIN — DULOXETINE HYDROCHLORIDE 30 MG: 30 CAPSULE, DELAYED RELEASE ORAL at 08:32

## 2023-01-25 RX ADMIN — FUROSEMIDE 40 MG: 40 TABLET ORAL at 08:32

## 2023-01-25 RX ADMIN — EZETIMIBE 10 MG: 10 TABLET ORAL at 08:32

## 2023-01-25 RX ADMIN — STANDARDIZED SENNA CONCENTRATE 8.6 MG: 8.6 TABLET ORAL at 08:33

## 2023-01-25 RX ADMIN — CYANOCOBALAMIN TAB 500 MCG 1000 MCG: 500 TAB at 08:32

## 2023-01-25 RX ADMIN — DOCUSATE SODIUM 100 MG: 100 CAPSULE, LIQUID FILLED ORAL at 08:32

## 2023-01-25 RX ADMIN — PANTOPRAZOLE SODIUM 40 MG: 40 TABLET, DELAYED RELEASE ORAL at 06:21

## 2023-01-25 RX ADMIN — SENNOSIDES AND DOCUSATE SODIUM 2 TABLET: 8.6; 5 TABLET ORAL at 08:32

## 2023-01-25 RX ADMIN — ACETAMINOPHEN 975 MG: 325 TABLET, FILM COATED ORAL at 06:21

## 2023-01-25 RX ADMIN — APIXABAN 5 MG: 5 TABLET, FILM COATED ORAL at 08:32

## 2023-01-25 NOTE — ASSESSMENT & PLAN NOTE
Wt Readings from Last 3 Encounters:   01/07/23 96 8 kg (213 lb 6 5 oz)   01/06/23 95 8 kg (211 lb 3 2 oz)   12/28/22 99 7 kg (219 lb 12 8 oz)     · She appears euvolemic on today's exam  · Most recent EF showing 65%  · Cont home diuretics   · Continue 2 g sodium diet, fluid restriction

## 2023-01-25 NOTE — ASSESSMENT & PLAN NOTE
Lab Results   Component Value Date    EGFR 58 01/25/2023    EGFR 70 01/24/2023    EGFR 60 01/06/2023    CREATININE 0 93 01/25/2023    CREATININE 0 80 01/24/2023    CREATININE 0 91 01/06/2023   · Creatinine at baseline   · No further inpatient work up required

## 2023-01-25 NOTE — NURSING NOTE
Attempted to call report to [address] [place] [address] three times with no success  The voicemail said that they are unable to accept phone calls at this time  Case management made aware  Patient  is 1330  Patient is resting in bed

## 2023-01-25 NOTE — PROGRESS NOTES
Cardiology Progress Note - Team [id number] [facility] 76 y o  female MRN: [id number]  Unit/Bed#: S -01 Encounter: [id number]      Assessment/Plan:  1  Paroxysmal atrial fibrillation w/ RVR  - known hx - recent device interrogation (MDALYSON PENA PPM, 1/16/2023) w/ 27% afib burden  - EKG yesterday - afib,   - telemetry review - afib, remains rate controlled in the 70s; no high rate episodes noted   - outpatient rate control medications not administered yesterday morning - lopressor 100 mg AM/50 mg PM + cardizem  mg daily resumed with HR improvement  - continue Eliquis 5 mg BID for systemic anticoagulation     2  S/p lateral release of left knee  - POD #2  - pain management and orthopedics following    3  Mild aortic stenosis  - echo (9/29/2022) - LVEF 65%, no RWMA, mild AS with moderate AI (valve mean gradient of 13 mmHg), mild MR/TR/PI, mildly dilated aortic root/ascending aorta  - continue outpatient survellience    4  Chronic HFpEF  - volume status stable - euvolemic on PE  - home diuretic regimen - Lasix 40 mg AM/20 mg PM; continue + BB for GDMT    5  Hypertension  - last documented /59; stable  - continue lopressor, cardizem, lasix    Subjective:   Patient seen and examined  No significant events overnight  Patient complains of "constant" left leg pain  Daughter is concerned about pain medications at discharge  Ortho aware  Objective:   Vitals: Blood pressure 128/59, pulse 73, temperature 98 1 °F (36 7 °C), resp  rate 14, SpO2 95 %  , There is no height or weight on file to calculate BMI ,   Orthostatic Blood Pressures    Flowsheet Row Most Recent Value   Blood Pressure 128/59 filed at 01/25/2023 0744   Patient Position - Orthostatic VS Lying filed at 01/24/2023 2133          Intake/Output Summary (Last 24 hours) at 1/25/2023 0839  Last data filed at 1/24/2023 1839  Gross per 24 hour   Intake 975 ml   Output 532 ml   Net 443 ml     Physical Exam  Constitutional:       General: She is not in acute distress  Appearance: She is obese  She is not ill-appearing  HENT:      Head: Normocephalic and atraumatic  Nose: Nose normal       Mouth/Throat:      Mouth: Mucous membranes are moist       Pharynx: Oropharynx is clear  Eyes:      Pupils: Pupils are equal, round, and reactive to light  Cardiovascular:      Rate and Rhythm: Normal rate  Rhythm irregular  Pulses: Normal pulses  Pulmonary:      Effort: Pulmonary effort is normal  No respiratory distress  Breath sounds: Normal breath sounds  No wheezing or rales  Comments: On RA  Abdominal:      General: Bowel sounds are normal  There is no distension  Palpations: Abdomen is soft  Musculoskeletal:         General: Normal range of motion  Cervical back: Normal range of motion  Comments: Left leg brace/ACE wrap applied   Skin:     General: Skin is warm and dry  Capillary Refill: Capillary refill takes less than 2 seconds  Coloration: Skin is pale  Neurological:      General: No focal deficit present  Mental Status: She is alert and oriented to person, place, and time     Psychiatric:         Mood and Affect: Mood normal          Behavior: Behavior normal      Medications:    Current Facility-Administered Medications:   •  acetaminophen (TYLENOL) tablet 975 mg, 975 mg, Oral, Q8H [address], Melissa Sola Rettaliatmarlon, PA-C, 975 mg at 01/25/23 9776  •  albuterol (PROVENTIL HFA,VENTOLIN HFA) inhaler 2 puff, 2 puff, Inhalation, Q6H PRN, Bryan Bowden PA-C  •  apixaban [place]) tablet 5 mg, 5 mg, Oral, BID, Bryancaterina Bowden PA-C, 5 mg at 01/25/23 2291  •  bisacodyl (DULCOLAX) rectal suppository 10 mg, 10 mg, Rectal, Daily PRN, Bryan Bowden PAISABELLE  •  cyanocobalamin (VITAMIN B-12) tablet 1,000 mcg, 1,000 mcg, Oral, Daily, Antony Damico PA-C, 1,000 mcg at 01/25/23 2686  •  diltiazem (CARDIZEM CD) 24 hr capsule 120 mg, 120 mg, Oral, Daily, Antony Damico PA-C, 120 mg at 01/25/23 0833  •  docusate sodium (COLACE) capsule 100 mg, 100 mg, Oral, BID, Maggiiridion  PATIENCE Damico, 100 mg at 01/25/23 8199  •  DULoxetine (CYMBALTA) delayed release capsule 30 mg, 30 mg, Oral, Daily, Myrondion  PATIENCE Damico, 30 mg at 01/25/23 4223  •  ezetimibe (ZETIA) tablet 10 mg, 10 mg, Oral, Daily, Maggijadedion  PATIENCE Damico, 10 mg at 01/25/23 7349  •  furosemide (LASIX) tablet 20 mg, 20 mg, Oral, Daily With Se Waldrop PA-C, 20 mg at 01/24/23 1833  •  furosemide (LASIX) tablet 40 mg, 40 mg, Oral, Daily, Myrondion  PATIENCE Damico, 40 mg at 01/25/23 9745  •  melatonin tablet 9 mg, 9 mg, Oral, HS PRN, Miki Buchanan PA-C  •  metoprolol tartrate (LOPRESSOR) tablet 100 mg, 100 mg, Oral, Early Morning, Paty Damico PA-C, 100 mg at 01/25/23 2039  •  metoprolol tartrate (LOPRESSOR) tablet 50 mg, 50 mg, Oral, HS, Myrondion  PATIENCE Damico, 50 mg at 01/24/23 2134  •  oxybutynin (DITROPAN-XL) 24 hr tablet 10 mg, 10 mg, Oral, HS, Miki Buchanan PA-C, 10 mg at 01/24/23 2134  •  oxyCODONE (ROXICODONE) IR tablet 5 mg, 5 mg, Oral, Q4H PRN, Miki Buchanan PA-C, 5 mg at 01/24/23 1841  •  pantoprazole (PROTONIX) EC tablet 40 mg, 40 mg, Oral, Early Morning, Myrondiromana Damico PA-C, 40 mg at 01/25/23 8760  •  polyethylene glycol (MIRALAX) packet 17 g, 17 g, Oral, Daily PRN, Miki Buchanan PA-C  •  [facility]) tablet 8 6 mg, 1 tablet, Oral, Daily, Espiridion  PATIENCE Damico, 8 6 mg at 01/25/23 8960  •  senna-docusate sodium (SENOKOT S) 8 6-50 mg per tablet 2 tablet, 2 tablet, Oral, BID, Miki Buchanan PA-C, 2 tablet at 01/25/23 5038     Labs & Results:      Results from last 7 days   Lab Units 01/25/23  0456 01/24/23  0529   WBC Thousand/uL 12 94* 11 84*   HEMOGLOBIN g/dL 8 9* 9 5*   HEMATOCRIT % 30 6* 31 7*   PLATELETS Thousands/uL 275 364         Results from last 7 days   Lab Units 01/25/23  0456 01/24/23  0529   POTASSIUM mmol/L 4 1 4 3   CHLORIDE mmol/L 101 101   CO2 mmol/L 27 28   BUN mg/dL 24 19   CREATININE mg/dL 0 93 0 80   CALCIUM mg/dL 9 0 9 1

## 2023-01-25 NOTE — ASSESSMENT & PLAN NOTE
· H/o tachy smita; went into afib overnight after holding her CCB and BB pre-operatively  · Tele with no events overnight   · Cardiology consulted  · Home meds resumed (diltiazem 120mg QD, metoprolol 100mg qAM, 50mg qPM)  · Eliquis for [address]   · Monitored on telemetry

## 2023-01-25 NOTE — CASE MANAGEMENT
Case Management Discharge Planning Note    Patient name Cordell Gitelman  Location S /S -01 MRN [id number]  : 1945 Date 2023       Current Admission Date: 2023  Current Admission Diagnosis:S/P knee surgery   Patient Active Problem List    Diagnosis Date Noted   • S/P knee surgery 2023   • Sepsis ([address] ) 2023   • Congestive heart failure 2023   • Chronic constipation 2023   • Fall 2022   • Abnormal CT of the abdomen 2022   • History of bacteremia 12/15/2022   • Acute pain of right knee 2022   • Hyperkalemia 2022   • Suspected UTI 2022   • Dehydration 2022   • Hyponatremia 2022   • Rash 2022   • Proctitis 2022   • Nausea 2022   • Hypokalemia 2022   • Dermatitis associated with incontinence 10/26/2022   • Physical deconditioning 10/19/2022   • Chronic heart failure with preserved ejection fraction ([address] ) 10/19/2022   • Status post fall 10/15/2022   • Thrombocytosis 10/11/2022   • Pressure injury of left buttock, stage 3 ([address] ) 10/05/2022   • Aortic regurgitation 10/04/2022   • Asymptomatic bacteriuria 10/03/2022   • Bacteremia due to methicillin resistant Staphylococcus epidermidis 2022   • Anemia 2022   • Bladder wall thickening 2022   • left femoral fluid collection 2022   • Pleural effusion on right 2022   • Vomiting and diarrhea 2022   • Surgical wound present 2022   • Renal insufficiency    • Stage 3a chronic kidney disease ([address] ) 2022   • Preop examination 2022   • Preop cardiovascular exam 2022   • Poor dentition 2022   • Closed bicondylar fracture of left femur with delayed healing 2022   • Paroxysmal atrial fibrillation ([address] ) 2022   • Chronic gout with tophus 2021   • Current moderate episode of major depressive disorder ([facility] ) 2021   • Morbid obesity with body mass index (BMI) of 40 0 to 49 9 ([place] [address] ) 2021 • Reactive airway disease with acute exacerbation 09/21/2021   • Acute respiratory failure ([facility] ) 09/20/2021   • Ambulatory dysfunction 08/12/2021   • MICHAEL (acute kidney injury) ([facility] ) 08/04/2021   • Leukocytosis 08/02/2021   • Arthritis 05/18/2021   • Generalized weakness 05/18/2021   • HLD (hyperlipidemia) 11/10/2020   • Dysphonia 04/28/2020   • Vitamin D insufficiency 04/28/2020   • Chronic pain of left knee 08/12/2019   • Age related osteoporosis 08/12/2019   • Anxiety 03/27/2019   • Tremor 03/27/2019   • Other insomnia 02/27/2019   • Urinary incontinence 09/17/2018   • Psoriasis 07/20/2018   • Thyroid nodule 07/20/2018   • Atrial fibrillation RVR ([facility] ) 02/15/2018   • Tachy-smita syndrome ([facility] ) 02/15/2018   • Essential hypertension 02/15/2018   • Pacemaker 02/15/2018   • GERD (gastroesophageal reflux disease) 07/18/2017   • Mild carpal tunnel syndrome, right 04/10/2017   • Degenerative lumbar spinal stenosis 03/31/2017   • Low back pain 08/29/2016   • Lumbar degenerative disc disease 08/29/2016   • Chronic bilateral low back pain without sciatica 06/08/2016   • Chronic GERD 05/06/2016   • Overweight 02/12/2016   • Fibromyalgia 11/06/2013      LOS (days): 1  Geometric Mean LOS (GMLOS) (days): 3 50  Days to GMLOS:2 7     OBJECTIVE:  Risk of Unplanned Readmission Score: 27 1         Current admission status: Inpatient   Preferred Pharmacy:   [facility] [address] [facility], [address] [place] [id number]  Phone: [phone] Fax: [phone] [facility], [place] [address]  [address] [address] [facility] [address]  Phone: [phone] Fax: [phone]    Primary Care Provider: Marjan Russell MD    Primary Insurance: MEDICARE  Secondary Insurance: AARP    DISCHARGE DETAILS:       Other Referral/Resources/Interventions Provided:  Interventions: Transportation  Referral Comments: Transportation is needed for pt to return to [facility] where she will be conitnuing rehab  Request has been made for 1200 BLS to [facility]  CM will continue to follow          Transport at Discharge : BLS Ambulance     Number/Name of Dispatcher: maya     ETA of Transport (Date): 01/25/23  ETA of Transport (Time): 1200   Transported by [facility]  Transfer Mode: Stretcher  Accompanied by: EMS personnel  Transfer Equipment: BLS devices

## 2023-01-25 NOTE — PROGRESS NOTES
-- Patient: Kory Corea  -- MRN: [id number]  -- Aidin Request ID: [id number]  -- Level of care reserved: Deepak Fuad  -- Partner Reserved: [facility] ([address]), [place], [address] [place] [address] [phone]  -- Clinical needs requested:  -- Geography searched: 10 miles around [id number]  -- Start of Service:  -- Request sent: 3:47pm EST on 1/24/2023 by Mansi Goins  -- Partner reserved: 10:25am EST on 1/25/2023 by Mansi Goins  -- Choice list shared: 10:25am EST on 1/25/2023 by Mansi Goins

## 2023-01-25 NOTE — PROGRESS NOTES
Progress Note - [facility]  76 y o  female MRN: [id number]  Unit/Bed#: S -01      Subjective:    76 y  o female s/p lateral release of retinaculum, left knee with imbrication of medial retinaculum with Dr Osito Chowdary on 1/23  Doing well this AM  Seen by cardiology, medicine, and PT/OT  No significant complaints, overall feeling well  Minimal left knee pain  Eating/drinking well, urinating without issue  Ricky Grace for discharge when able       Labs:  0   Lab Value Date/Time    HCT 30 6 (L) 01/25/2023 0456    HCT 31 7 (L) 01/24/2023 0529    HCT 30 3 (L) 01/06/2023 0500    HGB 8 9 (L) 01/25/2023 0456    HGB 9 5 (L) 01/24/2023 0529    HGB 8 9 (L) 01/06/2023 0500    INR 1 19 01/04/2023 0037    WBC 12 94 (H) 01/25/2023 0456    WBC 11 84 (H) 01/24/2023 0529    WBC 7 35 01/06/2023 0500    CRP 5 2 (H) 05/17/2022 1139       Meds:    Current Facility-Administered Medications:   •  acetaminophen (TYLENOL) tablet 975 mg, 975 mg, Oral, Q8H [address], MelissaHELENA Van-DANDRE, 975 mg at 01/25/23 9678  •  albuterol (PROVENTIL HFA,VENTOLIN HFA) inhaler 2 puff, 2 puff, Inhalation, Q6H PRN, Will Gustafson PA-C  •  apixaban [place] S[facility]) tablet 5 mg, 5 mg, Oral, BID, Will Gustafson PA-C, 5 mg at 01/25/23 2018  •  bisacodyl (DULCOLAX) rectal suppository 10 mg, 10 mg, Rectal, Daily PRN, Will Gustafson PA-C  •  cyanocobalamin (VITAMIN B-12) tablet 1,000 mcg, 1,000 mcg, Oral, Daily, Malcolm Damico PA-C, 1,000 mcg at 01/25/23 1862  •  diltiazem (CARDIZEM CD) 24 hr capsule 120 mg, 120 mg, Oral, Daily, Malcolm Damico PA-C, 120 mg at 01/25/23 7120  •  docusate sodium (COLACE) capsule 100 mg, 100 mg, Oral, BID, Malcolm Damico PA-C, 100 mg at 01/25/23 1247  •  DULoxetine (CYMBALTA) delayed release capsule 30 mg, 30 mg, Oral, Daily, Malcolm Damico PA-C, 30 mg at 01/25/23 9979  •  ezetimibe (ZETIA) tablet 10 mg, 10 mg, Oral, Daily, Malcolm Damico PA-C, 10 mg at 01/25/23 2194  •  furosemide (LASIX) tablet 20 mg, 20 mg, Oral, Daily With Mendoza Ozuna PA-C, 20 mg at 01/24/23 5428  •  furosemide (LASIX) tablet 40 mg, 40 mg, Oral, Daily, Kendy Damico PA-C, 40 mg at 01/25/23 6941  •  melatonin tablet 9 mg, 9 mg, Oral, HS PRN, Susana Ferro PA-C  •  metoprolol tartrate (LOPRESSOR) tablet 100 mg, 100 mg, Oral, Early Morning, Susana Ferro PA-C, 100 mg at 01/25/23 3844  •  metoprolol tartrate (LOPRESSOR) tablet 50 mg, 50 mg, Oral, HS, Kendy Damico PA-C, 50 mg at 01/24/23 2134  •  oxybutynin (DITROPAN-XL) 24 hr tablet 10 mg, 10 mg, Oral, HS, Susana Ferro PA-C, 10 mg at 01/24/23 2134  •  oxyCODONE (ROXICODONE) IR tablet 5 mg, 5 mg, Oral, Q4H PRN, Susana Ferro PA-C, 5 mg at 01/24/23 1841  •  pantoprazole (PROTONIX) EC tablet 40 mg, 40 mg, Oral, Early Morning, Kendy Damico PA-C, 40 mg at 01/25/23 8812  •  polyethylene glycol (MIRALAX) packet 17 g, 17 g, Oral, Daily PRN, Susana Ferro PA-C  •  senna [facility]) tablet 8 6 mg, 1 tablet, Oral, Daily, Kendy Damico PA-C, 8 6 mg at 01/25/23 1161  •  senna-docusate sodium (SENOKOT S) 8 6-50 mg per tablet 2 tablet, 2 tablet, Oral, BID, Susana Ferro PA-C, 2 tablet at 01/25/23 1897    Blood Culture:   Lab Results   Component Value Date    BLOODCX No Growth After 5 Days  01/04/2023    BLOODCX No Growth After 5 Days  01/04/2023       Wound Culture:   No results found for: WOUNDCULT    Ins and Outs:  I/O last 24 hours: In: 975 [I V :975]  Out: 882 [Urine:882]    Physical:  Vitals:    01/25/23 0744   BP: 128/59   Pulse: 73   Resp: 14   Temp: 98 1 °F (36 7 °C)   SpO2: 95%     Musculoskeletal: left Lower Extremity  · Skin in dressing, not taken down      · Calf and thigh compartments soft and nontender   · Sensation intact to saphenous, sural, tibial, superficial peroneal nerve, and deep peroneal  · Motor intact to +FHL/EHL, +ankle dorsi/plantar flexion  · Digits warm and well perfused  · Capillary refill < 2 seconds    Assessment:    76 y  o female s/p left knee retinacular release with Dr Angel Bach 1/23/23  Patient doing well  Plan:  · WBAT in extension in knee immobilizer  · May come out of immobilizer for 0-30 ROM with PT  · Appreciate cardiology consult - cleared for discharge  · Appreciate medicine consult - cleared for discharge  · PT/OT - cleared for discharge  · Pain control   · DVT ppx: Continue eliquis  · Dispo: stable for discharge to SNF today     · Will need follow up with Dr Angel Bach upon discharge    Michael Cho PA-C

## 2023-01-25 NOTE — DISCHARGE SUMMARY
Discharge Summary - [address] [facility]  76 y o  female MRN: [id number]  Unit/Bed#: S -01    Attending Physician: Dr Cici Monk    Admitting diagnosis: Patellar subluxation, left, initial encounter [S83 002A]    Discharge diagnosis: Patellar subluxation, left, initial encounter [S83 002A]    Date of admission: 1/23/2023    Date of discharge: 01/25/23    Procedure:  Lateral release of the retinaculum, left knee; imbrication of medial retinaculum    HPI: 76 y o  female with a history of difficulty with ambulation status post distal femoral replacement, who has been nonambulatory for more than 1 year prior to the distal femoral replacement  She presented with subluxation of the patella which was chronic in nature  She was seen in clinic by Dr Cici Monk and scheduled for operative intervention  Prior to surgery the risks and benefits of surgery were explained and informed consent was obtained  Hospital course: Pt was taken to the OR on 1/23/2023  Surgery went without complications and pt was discharged to the PACU in a stable condition and was transferred to the floor  On POD#0 patient went into Afib with RVR, subsequently internal medicine and cardiology were consulted for further assistance  She was monitored closely on telemetry  On post operative day two she was deemed stable by all teams involved for discharge  On discharge date pt was cleared by PT and the medicine team, as well as cardiology and determined to be safe for discharge  Daily discussion was had with the patient, nursing staff, orthopaedic team, and family members if present  All questions were answered to the patients satisifaction        0   Lab Value Date/Time    HGB 8 9 (L) 01/25/2023 0456    HGB 9 5 (L) 01/24/2023 0529    HGB 8 9 (L) 01/06/2023 0500    HGB 9 0 (L) 01/05/2023 0602    HGB 8 6 (L) 01/04/2023 0613    HGB 9 4 (L) 01/04/2023 0037    HGB 10 6 (L) 11/27/2022 0522    HGB 11 0 (L) 11/25/2022 1626    HGB 8 4 (L) 10/02/2022 0550    HGB 8 4 (L) 09/30/2022 0446    HGB 8 4 (L) 09/29/2022 0534    HGB 8 6 (L) 09/28/2022 0622    HGB 8 0 (L) 09/27/2022 0437    HGB 8 2 (L) 09/26/2022 0549    HGB 7 9 (L) 09/25/2022 2056    HGB 8 2 (L) 09/25/2022 1712    HGB 8 8 (L) 09/25/2022 0706    HGB 8 9 (L) 09/16/2022 0502    HGB 8 8 (L) 09/15/2022 2353    HGB 6 7 (LL) 09/15/2022 0615    HGB 7 8 (L) 09/14/2022 0954    HGB 8 7 (L) 09/13/2022 0522    HGB 11 9 08/17/2022 1135    HGB 12 9 05/17/2022 1139    HGB 13 0 09/27/2021 0455    HGB 12 0 09/24/2021 0446    HGB 11 5 09/22/2021 0506    HGB 11 9 09/21/2021 0549    HGB 12 7 09/20/2021 0305    HGB 11 3 (L) 09/06/2021 0440    HGB 8 4 (L) 08/12/2021 0507    HGB 7 6 (L) 08/11/2021 0947    HGB 8 1 (L) 08/10/2021 0435    HGB 8 2 (L) 08/09/2021 0433    HGB 8 2 (L) 08/08/2021 0940    HGB 8 7 (L) 08/07/2021 1935    HGB 7 2 (L) 08/07/2021 0522    HGB 7 8 (L) 08/06/2021 0624    HGB 10 2 (L) 08/05/2021 1038    HGB 7 1 (L) 08/05/2021 0901    HGB 9 0 (L) 08/05/2021 0453    HGB 9 5 (L) 08/04/2021 0444    HGB 11 1 (L) 08/03/2021 0442    HGB 12 9 08/02/2021 1547    HGB 13 9 05/27/2021 1402    HGB 13 9 10/17/2017 0449    HGB 14 5 10/16/2017 1038    HGB 13 6 08/25/2017 1113    HGB 12 0 11/10/2016 0937     Discharge Instructions:   · WBAT to the left lower extremity in knee immobilizer, patient may come out of knee immobilizer for range of motion 0-30 degrees while working with physical therapy  · Keep dressings clean and dry at all times   · Complete DVT prophylaxis as prescribed - patient on eliquis at baseline  · Physical therapy  · Follow-up as scheduled, otherwise call for appt  Discharge Medications: For the complete list of discharge medications, please refer to the patient's medication reconciliation

## 2023-01-25 NOTE — PROGRESS NOTES
[facility]  Progress Note - Gabriele Alegria 1945, 76 y o  female MRN: [id number]  Unit/Bed#: S -01 Encounter: [id number]  Primary Care Provider: Morgan Arias MD   Date and time admitted to hospital: 1/23/2023 12:22 PM    * S/P knee surgery  Assessment & Plan  · She has history of left Patellar subluxation s/p retinacular release of left knee  · Per ortho  · Pain control, PT   · Medically stable for discharge from [facility] perspective    Atrial fibrillation RVR ([facility] [address] )  Assessment & Plan  · H/o tachy smita; went into afib overnight after holding her CCB and BB pre-operatively  · Tele with no events overnight   · Cardiology consulted  · Home meds resumed (diltiazem 120mg QD, metoprolol 100mg qAM, 50mg qPM)  · Eliquis for [address]   · Monitored on telemetry     Chronic heart failure with preserved ejection fraction ([facility] [address] )  Assessment & Plan  Wt Readings from Last 3 Encounters:   01/07/23 96 8 kg (213 lb 6 5 oz)   01/06/23 95 8 kg (211 lb 3 2 oz)   12/28/22 99 7 kg (219 lb 12 8 oz)     · She appears euvolemic on today's exam  · Most recent EF showing 65%  · Cont home diuretics   · Continue 2 g sodium diet, fluid restriction          Stage 3a chronic kidney disease [facility])  Assessment & Plan  Lab Results   Component Value Date    EGFR 58 01/25/2023    EGFR 70 01/24/2023    EGFR 60 01/06/2023    CREATININE 0 93 01/25/2023    CREATININE 0 80 01/24/2023    CREATININE 0 91 01/06/2023   · Creatinine at baseline   · No further inpatient work up required    Essential hypertension  Assessment & Plan  · BPs in acceptable range  · No changes to home med     Anemia  Assessment & Plan  · Hgb noted at 8 9               VTE Pharmacologic Prophylaxis: VTE Score: 9 High Risk (Score >/= 5) - Pharmacological DVT Prophylaxis Ordered: apixaban (Eliquis)  Sequential Compression Devices Ordered  Patient Centered Rounds: I performed bedside rounds with nursing staff today    Discussions with Specialists or Other Care Team Provider: Discussed with Ortho    Education and Discussions with Family / Patient: Patient declined call to   Time Spent for Care: 30 minutes  More than 50% of total time spent on counseling and coordination of care as described above  Current Length of Stay: 1 day(s)  Current Patient Status: Inpatient   Certification Statement: The patient will continue to require additional inpatient hospital stay due to per ortho  Discharge Plan: John Castillo is following this patient on consult  They are medically stable for discharge when deemed appropriate by primary service  Code Status: Level 1 - Full Code    Subjective:   Patient has no complaints today  Denies chest pain, palpitations, or SOB  Objective:     Vitals:   Temp (24hrs), Av 6 °F (37 °C), Min:97 7 °F (36 5 °C), Max:99 3 °F (37 4 °C)    Temp:  [97 7 °F (36 5 °C)-99 3 °F (37 4 °C)] 98 1 °F (36 7 °C)  HR:  [] 73  Resp:  [14-18] 14  BP: (120-141)/(54-84) 128/59  SpO2:  [92 %-96 %] 95 %  There is no height or weight on file to calculate BMI  Input and Output Summary (last 24 hours): Intake/Output Summary (Last 24 hours) at 2023 1058  Last data filed at 2023 0801  Gross per 24 hour   Intake --   Output 882 ml   Net -882 ml       Physical Exam:   Physical Exam  Constitutional:       General: She is not in acute distress  Appearance: Normal appearance  She is normal weight  She is not ill-appearing or diaphoretic  HENT:      Head: Normocephalic and atraumatic  Mouth/Throat:      Mouth: Mucous membranes are moist    Eyes:      General: No scleral icterus  Pupils: Pupils are equal, round, and reactive to light  Cardiovascular:      Rate and Rhythm: Normal rate and regular rhythm  Pulses: Normal pulses  Heart sounds: Normal heart sounds, S1 normal and S2 normal  No murmur heard  No systolic murmur is present  No diastolic murmur is present  No gallop  No S3 or S4 sounds  Pulmonary:      Effort: Pulmonary effort is normal  No accessory muscle usage or respiratory distress  Breath sounds: Normal breath sounds  No stridor  No decreased breath sounds, wheezing, rhonchi or rales  Chest:      Chest wall: No tenderness  Abdominal:      General: Bowel sounds are normal  There is no distension  Palpations: Abdomen is soft  Tenderness: There is no abdominal tenderness  There is no guarding  Musculoskeletal:      Right lower leg: No edema  Left lower leg: No edema  Skin:     General: Skin is warm and dry  Coloration: Skin is not jaundiced  Neurological:      General: No focal deficit present  Mental Status: She is alert  Mental status is at baseline  Motor: No tremor or seizure activity  Psychiatric:         Behavior: Behavior is cooperative  Additional Data:     Labs:  Results from last 7 days   Lab Units 01/25/23  0456   WBC Thousand/uL 12 94*   HEMOGLOBIN g/dL 8 9*   HEMATOCRIT % 30 6*   PLATELETS Thousands/uL 275     Results from last 7 days   Lab Units 01/25/23  0456   SODIUM mmol/L 136   POTASSIUM mmol/L 4 1   CHLORIDE mmol/L 101   CO2 mmol/L 27   BUN mg/dL 24   CREATININE mg/dL 0 93   ANION GAP mmol/L 8   CALCIUM mg/dL 9 0   GLUCOSE RANDOM mg/dL 126                       Lines/Drains:  Invasive Devices     Peripheral Intravenous Line  Duration           Peripheral IV 01/23/23 Left Hand 1 day    Peripheral IV 01/23/23 Right Hand 1 day                  Telemetry:  Telemetry Orders (From admission, onward)             48 Hour Telemetry Monitoring  Continuous x 48 hours        References:    Telemetry Guidelines   Question:  Reason for 48 Hour Telemetry  Answer:  Arrhythmias Requiring Medical Therapy (eg  SVT, Vtach/fib, Bradycardia, Uncontrolled A-fib)                 Telemetry Reviewed: PAced rhythm   Indication for Continued Telemetry Use: No indication for continued use  Will discontinue                Imaging: No pertinent imaging reviewed  Recent Cultures (last 7 days):         Last 24 Hours Medication List:   Current Facility-Administered Medications   Medication Dose Route Frequency Provider Last Rate   • acetaminophen  975 mg Oral [facility] Melissa Osman PA-C     • albuterol  2 puff Inhalation Q6H PRN Rosalio Sr PA-C     • apixaban  5 mg Oral BID Rosalio Sr PA-C     • bisacodyl  10 mg Rectal Daily PRN Rosalio Sr PA-C     • vitamin B-12  1,000 mcg Oral Daily Rosalio Sr PA-C     • diltiazem  120 mg Oral Daily Rosalio Sr PA-C     • docusate sodium  100 mg Oral BID Rosalio Sr PA-C     • DULoxetine  30 mg Oral Daily Rosalio Sr PA-C     • ezetimibe  10 mg Oral Daily Rosalio Sr PA-C     • furosemide  20 mg Oral Daily With Dinner Rosalio Sr PA-C     • furosemide  40 mg Oral Daily Rosalio Sr PA-C     • melatonin  9 mg Oral HS PRN Rosalio Sr PA-C     • metoprolol tartrate  100 mg Oral Early Morning Rosalio Sr PA-C     • metoprolol tartrate  50 mg Oral HS Rosalio Sr PA-C     • oxybutynin  10 mg Oral HS Rosalio Sr PA-C     • oxyCODONE  5 mg Oral Q4H PRN Rosalio Sr PA-C     • pantoprazole  40 mg Oral Early Morning Rosalio Sr PA-C     • polyethylene glycol  17 g Oral Daily PRN Rosalio Sr PA-C     • senna  1 tablet Oral Daily Rosalio Sr PA-C     • senna-docusate sodium  2 tablet Oral BID Rosalio Sr PA-C          Today, Patient Was Seen By: Vito aHll PA-C    **Please Note: This note may have been constructed using a voice recognition system  **

## 2023-01-25 NOTE — ASSESSMENT & PLAN NOTE
· She has history of left Patellar subluxation s/p retinacular release of left knee  · Per ortho  · Pain control, PT   · Medically stable for discharge from [facility] perspective

## 2023-01-25 NOTE — CASE MANAGEMENT
Case Management Discharge Planning Note    Patient name Particia Saint Location S /S -01 MRN [id number]  : 1945 Date 2023       Current Admission Date: 2023  Current Admission Diagnosis:S/P knee surgery   Patient Active Problem List    Diagnosis Date Noted   • S/P knee surgery 2023   • Sepsis ([address] ) 2023   • Congestive heart failure 2023   • Chronic constipation 2023   • Fall 2022   • Abnormal CT of the abdomen 2022   • History of bacteremia 12/15/2022   • Acute pain of right knee 2022   • Hyperkalemia 2022   • Suspected UTI 2022   • Dehydration 2022   • Hyponatremia 2022   • Rash 2022   • Proctitis 2022   • Nausea 2022   • Hypokalemia 2022   • Dermatitis associated with incontinence 10/26/2022   • Physical deconditioning 10/19/2022   • Chronic heart failure with preserved ejection fraction ([address] ) 10/19/2022   • Status post fall 10/15/2022   • Thrombocytosis 10/11/2022   • Pressure injury of left buttock, stage 3 ([address] ) 10/05/2022   • Aortic regurgitation 10/04/2022   • Asymptomatic bacteriuria 10/03/2022   • Bacteremia due to methicillin resistant Staphylococcus epidermidis 2022   • Anemia 2022   • Bladder wall thickening 2022   • left femoral fluid collection 2022   • Pleural effusion on right 2022   • Vomiting and diarrhea 2022   • Surgical wound present 2022   • Renal insufficiency    • Stage 3a chronic kidney disease ([address] ) 2022   • Preop examination 2022   • Preop cardiovascular exam 2022   • Poor dentition 2022   • Closed bicondylar fracture of left femur with delayed healing 2022   • Paroxysmal atrial fibrillation ([address] ) 2022   • Chronic gout with tophus 2021   • Current moderate episode of major depressive disorder ([facility] ) 2021   • Morbid obesity with body mass index (BMI) of 40 0 to 49 9 ([facility] ) 2021 • Reactive airway disease with acute exacerbation 09/21/2021   • Acute respiratory failure ([facility] [address] ) 09/20/2021   • Ambulatory dysfunction 08/12/2021   • MICHAEL (acute kidney injury) ([facility] [address] ) 08/04/2021   • Leukocytosis 08/02/2021   • Arthritis 05/18/2021   • Generalized weakness 05/18/2021   • HLD (hyperlipidemia) 11/10/2020   • Dysphonia 04/28/2020   • Vitamin D insufficiency 04/28/2020   • Chronic pain of left knee 08/12/2019   • Age related osteoporosis 08/12/2019   • Anxiety 03/27/2019   • Tremor 03/27/2019   • Other insomnia 02/27/2019   • Urinary incontinence 09/17/2018   • Psoriasis 07/20/2018   • Thyroid nodule 07/20/2018   • Atrial fibrillation RVR ([facility] [address] ) 02/15/2018   • Tachy-smita syndrome ([facility] ) 02/15/2018   • Essential hypertension 02/15/2018   • Pacemaker 02/15/2018   • GERD (gastroesophageal reflux disease) 07/18/2017   • Mild carpal tunnel syndrome, right 04/10/2017   • Degenerative lumbar spinal stenosis 03/31/2017   • Low back pain 08/29/2016   • Lumbar degenerative disc disease 08/29/2016   • Chronic bilateral low back pain without sciatica 06/08/2016   • Chronic GERD 05/06/2016   • Overweight 02/12/2016   • Fibromyalgia 11/06/2013      LOS (days): 1  Geometric Mean LOS (GMLOS) (days): 3 50  Days to GMLOS:2 5     OBJECTIVE:  Risk of Unplanned Readmission Score: 27 4         Current admission status: Inpatient   Preferred Pharmacy:   Maximiliano Suarez [address] [facility], [address] [place] [address] [address] [facility] [address]  Phone: [phone] Fax: [phone] [facility], [place] [address]  [address]  [facility] [address]  Phone: [phone] Fax: [phone]    Primary Care Provider: April Murphy MD    Primary Insurance: MEDICARE  Secondary Insurance: AARP    DISCHARGE DETAILS:    Discharge planning discussed with[de-identified] Kady Bear (Sister)  Freedom of Choice: Yes  Comments - Freedom of Choice: Discussed DCP of return to [facility] with sister  CM contacted family/caregiver?: Yes  Were Treatment Team discharge recommendations reviewed with patient/caregiver?: Yes  Did patient/caregiver verbalize understanding of patient care needs?: N/A- going to facility  Were patient/caregiver advised of the risks associated with not following Treatment Team discharge recommendations?: Yes    Contacts  Patient Contacts: Luann Quijano (Sister)  Relationship to Patient[de-identified] Family  Contact Method: In Person  Reason/Outcome: Discharge Planning, Emergency Contact, Continuity of Care              Other Referral/Resources/Interventions Provided:  Interventions: Transportation, Facility Return  Referral Comments: Patient is medically stable for return to [facility] today  Transportation was requested and a 1:30 PM transport time was confirmed by [employer]  CM updated patient's sister Ilsa Colon on the transport time at bedside  Treatment Team Recommendation: Facility Return, SNF  Discharge Destination Plan[de-identified] Facility Return, SNF  Transport at Discharge : [facility]  Dispatcher Contacted: Yes     Transported by [employer] and Unit #): [address] [facility] of Transport (Date): 01/25/23  ETA of Transport (Time): 1330                          Primary nurse, Ortho, patient's family, and facility all aware of DCP and transport time  Primary nurse notified CM that they were having issues with getting in touch with facility to provide report  CM spoke with facility liaison who reported they were updating all their equipment today  They took primary nurse's information and the building will reach out to our nurse to get report  No further CM DC needs were discussed or identified

## 2023-01-26 ENCOUNTER — NURSING HOME VISIT (OUTPATIENT)
Dept: GERIATRICS | Facility: OTHER | Age: 78
End: 2023-01-26

## 2023-01-26 ENCOUNTER — TRANSITIONAL CARE MANAGEMENT (OUTPATIENT)
Dept: INTERNAL MEDICINE CLINIC | Facility: CLINIC | Age: 78
End: 2023-01-26

## 2023-01-26 DIAGNOSIS — I48.0 PAROXYSMAL ATRIAL FIBRILLATION (HCC): ICD-10-CM

## 2023-01-26 DIAGNOSIS — N18.31 STAGE 3A CHRONIC KIDNEY DISEASE (HCC): ICD-10-CM

## 2023-01-26 DIAGNOSIS — G89.29 CHRONIC PAIN OF LEFT KNEE: Primary | ICD-10-CM

## 2023-01-26 DIAGNOSIS — R26.2 AMBULATORY DYSFUNCTION: ICD-10-CM

## 2023-01-26 DIAGNOSIS — M25.562 CHRONIC PAIN OF LEFT KNEE: Primary | ICD-10-CM

## 2023-01-26 DIAGNOSIS — I50.32 CHRONIC HEART FAILURE WITH PRESERVED EJECTION FRACTION (HCC): ICD-10-CM

## 2023-01-26 LAB — MRSA NOSE QL CULT: NORMAL

## 2023-01-26 NOTE — ASSESSMENT & PLAN NOTE
Multifactorial, acute and chronic conditions  Now with recent left knee surgery  She is being evaluated for PT/OT  Remains nonambulatory but is able to complete transfers via slide board  Maintain fall precautions

## 2023-01-26 NOTE — ASSESSMENT & PLAN NOTE
Stable  HR trends reviwed, avg 70s  Had episode of Afib RVR s/p recent knee procedure, she was followed by cardiology while inpatient likely due to holding cardioac meds prior to procedure  She has PPM- recent device interrogation (MDT JEAN PPM, 1/16/2023) w/ 27% afib burden  Recommendations to continue Lopressor and Cardizem  Continue Eliquis   Has cardiology f/u apt on 1/30

## 2023-01-26 NOTE — ASSESSMENT & PLAN NOTE
Hx of subluxation of the left patella s/p left knee retinacular release procedure on  1/23  Post op recommendations include the following: WBAT to the left lower extremity in knee immobilizer, patient may come out of knee immobilizer for range of motion 0-30 degrees while working with physical therapy  Continue PRN oxy until 2/5  Continue PRN Tylenol  Continue bowel regimen to prevent constipation   Consult physical rehab medicine to also follow patient at facility, she also has chronic right knee pain    Has f/u ortho apt on 2/7

## 2023-01-26 NOTE — ASSESSMENT & PLAN NOTE
Stable  She appears euvolemic on today's exam  Most recent EF showing 65%  Continue Lasix, metoprolol  Continue 2 g sodium diet, fluid restriction  Monitor routine labs

## 2023-01-26 NOTE — PROGRESS NOTES
[facility]  [address]  [phone]  [facility]  Code 32 LTC          NAME: Gabriele Alegria  AGE: 76 y o  SEX: female     DATE OF ENCOUNTER: 1/26/2023    Assessment and Plan     1  Chronic pain of left knee  Assessment & Plan:  Hx of subluxation of the left patella s/p left knee retinacular release procedure on  1/23  Post op recommendations include the following: WBAT to the left lower extremity in knee immobilizer, patient may come out of knee immobilizer for range of motion 0-30 degrees while working with physical therapy  Continue PRN oxy until 2/5  Continue PRN Tylenol  Continue bowel regimen to prevent constipation   Consult physical rehab medicine to also follow patient at facility, she also has chronic right knee pain  Has f/u ortho apt on 2/7        2  Paroxysmal atrial fibrillation (HCC)  Assessment & Plan:  Stable  HR trends reviwed, avg 70s  Had episode of Afib RVR s/p recent knee procedure, she was followed by cardiology while inpatient likely due to holding cardioac meds prior to procedure  She has PPM- recent device interrogation (MDT DC PPM, 1/16/2023) w/ 27% afib burden  Recommendations to continue Lopressor and Cardizem  Continue Eliquis   Has cardiology f/u apt on 1/30      3  Ambulatory dysfunction  Assessment & Plan:  Multifactorial, acute and chronic conditions  Now with recent left knee surgery  She is being evaluated for PT/OT  Remains nonambulatory but is able to complete transfers via slide board  Maintain fall precautions      4   Chronic heart failure with preserved ejection fraction (HCC)  Assessment & Plan:  Stable  She appears euvolemic on today's exam  Most recent EF showing 65%  Continue Lasix, metoprolol  Continue 2 g sodium diet, fluid restriction  Monitor routine labs         5  Stage 3a chronic kidney disease [facility])  Assessment & Plan:  Lab Results   Component Value Date    EGFR 58 01/25/2023    EGFR 70 01/24/2023 EGFR 60 01/06/2023    CREATININE 0 93 01/25/2023    CREATININE 0 80 01/24/2023    CREATININE 0 91 01/06/2023   Stable  Continue to avoid nephrotoxins, NSAIDs  Monitor routine labs            6  Chronic GERD  Assessment & Plan:  Stable  Continue carafate, omeprazole        7  Chronic constipation  Assessment & Plan:  Stable  Last BM 1/25, now on PRN oxycodone in setting of recent knee procedure  Continue senna twice daily, daily MiraLAX as needed, suppository as needed         8  Other iron deficiency anemia  Assessment & Plan:  Stable  Most recent Hgb 8 5  Continue folic acid  Monitor routine labs        9  Acute pain of right knee  Assessment & Plan:  Ongoing, somewhat improved  X-ray negative for fracture  Continue Voltaren gel, Tylenol  Rehab medicine consult entered for further B/L knee pain management          All medications and routine orders were reviewed and updated as needed  Chief Complaint     STR follow up visit      Past Medical and Surgica History      Past Medical History:   Diagnosis Date   • Abnormal ECG     Last Assessed 9/29/2016   • Anxiety     Last Assessed 6/08/2016   • Arthritis     knees   • Asthma     Last Assessed 11/06/2013   • Atrial premature complex    • Cataract, bilateral     Last Assessed 7/14/2016   • Cataract, left eye     Both eyes  Had surgery on left  • COVID-19    • Difficulty swallowing    • Diverticulosis 9/25/2022   • Dizziness    • Fibromyalgia    • Fibromyalgia, primary    • Gastric reflux    • Heart failure ([address] )     5/23/22 Pt reports had heart failure in the past   • History of COVID-19     5/23/22 Pt reports having COVID in 2021     • History of transfusion     no reaction   • [place] (hard of hearing)    • Hyperlipidemia    • Hypertension    • Incontinence in female     5/23/22 Pt reports has incontinence -wears depends especially at night/riding in car   • Irregular heart beat     5/23/22 Pt reports hx of A fib   • Lyme disease    • PONV (postoperative nausea and vomiting)    • Premature ventricular contraction    • Primary osteoarthritis of both knees     Last Assessed 7/14/2016   • Rheumatic fever     5/23/22 Pt reports had hx of rheumatic fever as a child twice   • Sore throat    • Tuberculosis 1945     Past Surgical History:   Procedure Laterality Date   • APPENDECTOMY     • CARDIAC PACEMAKER PLACEMENT  2019   • COLONOSCOPY     • DENTAL SURGERY      extractions   • HYSTERECTOMY      5/23/22 Pt reports had appendix out with hysterectomy and "tightened up my bladder"   • IR ASPIRATION ONLY  9/30/2022   • ORIF FEMUR FRACTURE Left 08/05/2021    Procedure: OPEN REDUCTION W/ INTERNAL FIXATION (ORIF) DISTAL FEMUR; INSERTION RETROGRADE NAIL;  Surgeon: Kathryn Escobar MD;  Location: [facility] MA[facility] OR;  Service: Orthopedics   • FL ARTHRP KNE CONDYLE&PLATU MEDIAL&LAT COMPARTMENTS Left 9/12/2022    Procedure: ARTHROPLASTY KNEE TOTAL;  Surgeon: Kathryn Escobar MD;  Location: [facility] Main OR;  Service: Orthopedics   • FL DILATION ESOPH UNGUIDED SOUND/BOUGIE 1/MULT PASS N/A 04/06/2016    Procedure: DILATATION ESOPHAGEAL;  Surgeon: Fanny Ivey MD;  Location: [facility] GI LAB; Service: Gastroenterology   • FL EGD TRANSORAL BIOPSY SINGLE/MULTIPLE N/A 04/06/2016    Procedure: ESOPHAGOGASTRODUODENOSCOPY (EGD); Surgeon: Fanny Ivey MD;  Location: [facility]; Service: Gastroenterology   • FL LATERAL RETINACULAR RELEASE OPEN Left 1/23/2023    Procedure: RELEASE RETINACULAR, left knee and all associated procedures;  Surgeon: Kathryn Escobar MD;  Location: [facility] Main OR;  Service: Orthopedics   • FL REMOVAL IMPLANT DEEP Left 9/12/2022    Procedure: REMOVAL NAIL IM  FEMUR;  Surgeon: Kathryn Escobar MD;  Location: [facility] Main OR;  Service: Orthopedics   • FL XCAPSL CTRC RMVL INSJ IO LENS PROSTH W/O ECP Left 03/15/2016    Procedure: EXTRACTION EXTRACAPSULAR CATARACT PHACO INTRAOCULAR LENS (IOL);   Surgeon: Jose Alvarado MD;  Location: [facility] OR;  Service: Ophthalmology   • REPLACEMENT TOTAL KNEE Right • REPLACEMENT TOTAL KNEE      right    • TONSILLECTOMY       Allergies   Allergen Reactions   • Penicillins Hives     Itching and terrible hives   • Sulfa Antibiotics Itching   • M02 Folate [Folic Acid-Vit U0-ZAG E91 - Food Allergy] Other (See Comments)     5/23/22 Pt reports no allergic reaction known    • Mouthcard Other (See Comments)     Unknown, 5/23 -pt is unaware of reaction    • Lyrica [Pregabalin] Other (See Comments)     5/23/22 Pt reports doesn't remember reaction    • Other Other (See Comments)     Black rubber 5/23/22 per allergy test - pt unaware of reaction   • Cortisone Acetate [Cortisone] Itching and Rash     5/23/22 pt reports makes her violent    • Doxycycline Hives and Itching   • Nickel Other (See Comments)     Skin discoloration          History of Present Illness     Bisi Christie is a 77 y/o female admitted to [address] [place] [address] on 11/28 for STR  Past Medical Hx including but not limited to GERD, reactive airway disease, Afib RVR, Tachy smita syndrome s/p PPM, HTN, CHF, lumbar degenerative disc disease, arthritis, MICHAEL on CKD, anxiety, depression, seen in collaboration with nursing for medical mgmt and STR follow up    [facility] Course:     She wasd admitted to [address] [place] [address] from 11/25 through 11/28 with CC of nausea and vomiting with poor PO intake found to have MICHAEL  Proctitis also noted on CT with recommondation for GI f/u  Received IVF in setting of likely hypovolemic hyponatremia  She also had asymptomatic bacteriuria with monitoring off abx  Recent hx of IV vanco x6 weeks on 11/9 r/t methicillin resistant staph bacteremia  Discharged to [address] [place] [address] for STR, she is typically wheelchair bound  She was admitted to [facility] from 1/4 through 1/6 for UTI and bilateral pleural effusions  She was treated with IV Lasix and antibiotics, urine positive for ESBL status post 3-day course of end ertapenem  Lasix dosing increased to 40 mg every morning and 20 mg in the evening    She was then discharged back to [facility] short-term rehab  She was sent to the ED again on 1/7 for heart rate in the 140s EKG showing a flutter and leg pain  She was discharged and started on metoprolol  Right knee pain status post old fall  She was discharged back to old [facility]  Rehab:    Continues with ongoing supportive care now transitioned to LTC  Upon today's assessment she was seen laying in bed awake, alert, oriented, NAD  She typically remains nonambulatory and uses slide board for transfers  She recently had a left patellar release surgery, continues in knee immobilizer  She does report some increased pain to left knee, pain regimen discussed she will asked nurse for as needed oxycodone  She continues with fair appetite, remains on fluid restriction in setting of CHF, she continues on room air, denies any shortness of breath or orthopnea  Heart rate remains controlled history, she had episode of Afib RVR s/p knee procedure which has now resolved, she continues on metoprolol and diltiazem, denies any palpitations  SNF chart reviewed, she is eating approximately 2-3 meals per day 75 to 100%, last BM on 1/25    The patient's allergies, past medical, surgical, social and family history were reviewed and unchanged  Review of Systems     REVIEW OF SYSTEMS:  Per history of present illness, all other systems reviewed and negative  Objective     Vitals:/68, temp 97 3, HR 74, RR 18, O2 95%  Weight 220 pounds     Physical Exam    Constitutional:       General: She is not in acute distress  Appearance: Normal appearance  She is obese  She is not ill-appearing  HENT:      Head: Normocephalic and atraumatic  Right Ear: External ear normal       Left Ear: External ear normal       Nose: Nose normal  No rhinorrhea  Mouth/Throat:      Mouth: Mucous membranes are moist       Pharynx: No posterior oropharyngeal erythema  Eyes:      General:         Right eye: No discharge  Left eye: No discharge  Extraocular Movements: Extraocular movements intact  Conjunctiva/sclera: Conjunctivae normal    Cardiovascular:      Rate and Rhythm: Normal rate and irregular rhythm  Pulses: Normal pulses  Heart sounds: Murmur heard  Pulmonary:      Effort: Pulmonary effort is normal  No respiratory distress  Breath sounds: Normal breath sounds  No wheezing  Abdominal:      General: Bowel sounds are normal  There is no distension  Palpations: Abdomen is soft  Tenderness: There is no abdominal tenderness  There is no guarding  Comments:   Musculoskeletal:         General: No tenderness  Cervical back: Normal range of motion and neck supple  No tenderness  Right lower leg: No edema  Left lower leg: No edema  Comments: Continues with LLE weakness  Skin:     General: Skin is warm and dry  Findings:Bruising of right knee  Neurological:      General: No focal deficit present  Mental Status: She is alert and oriented to person, place, and time  Mental status is at baseline  Sensory: No sensory deficit  Motor: Weakness present        Gait: Gait abnormal    Psychiatric:         Mood and Affect: Mood normal          Behavior: Behavior normal           Pertinent Laboratory/Diagnostic Studies:    Lab Results   Component Value Date    SODIUM 136 01/25/2023    K 4 1 01/25/2023     01/25/2023    CO2 27 01/25/2023    AGAP 8 01/25/2023    BUN 24 01/25/2023    CREATININE 0 93 01/25/2023    GLUC 126 01/25/2023    GLUF 106 (H) 08/17/2022    CALCIUM 9 0 01/25/2023    AST 16 01/04/2023    ALT 11 01/04/2023    ALKPHOS 104 01/04/2023    TP 7 0 01/04/2023    TBILI 0 53 01/04/2023    EGFR 58 01/25/2023     Lab Results   Component Value Date    WBC 12 94 (H) 01/25/2023    HGB 8 9 (L) 01/25/2023    HCT 30 6 (L) 01/25/2023    MCV 90 01/25/2023     01/25/2023     Lab Results   Component Value Date    [id number] 26 8 (L) 09/25/2019 No results found  Current Medications   Medications reviewed and updated see facility [facility] for details  Please note:  Voice-recognition software may have been used in the preparation of this document  Occasional wrong word or "sound-alike" substitutions may have occurred due to the inherent limitations of voice recognition software  Interpretation should be guided by context           JERI Good  1/26/2023 2:30 PM  Patient: Kailyn Whalen

## 2023-01-26 NOTE — ASSESSMENT & PLAN NOTE
Lab Results   Component Value Date    EGFR 58 01/25/2023    EGFR 70 01/24/2023    EGFR 60 01/06/2023    CREATININE 0 93 01/25/2023    CREATININE 0 80 01/24/2023    CREATININE 0 91 01/06/2023   Stable  Continue to avoid nephrotoxins, NSAIDs  Monitor routine labs

## 2023-01-30 ENCOUNTER — IN-CLINIC DEVICE VISIT (OUTPATIENT)
Dept: CARDIOLOGY CLINIC | Facility: CLINIC | Age: 78
End: 2023-01-30

## 2023-01-30 DIAGNOSIS — Z95.0 PRESENCE OF PERMANENT CARDIAC PACEMAKER: Primary | ICD-10-CM

## 2023-01-31 NOTE — PROGRESS NOTES
MDT DUAL CHAMBER PPM (AAIR-DDDR MODE)/ ACTIVE SYSTEM IS MRI CONDITIONAL   DEVICE INTERROGATED IN THE AUSTYN OFFICE: BATTERY VOLTAGE ADEQUATE (3 YRS); AP 78 3%  10 4%; ALL LEAD PARAMETERS WITHIN NORMAL LIMITS  SINCE 1/16/23 REMOTE; 2 TREATED AT/AF EPISODES W/AF IN PROGRESS (7/27- 25 9%); AT/AF BURDEN 20 7%; PATIENT ON ELIQUIS, DILTIAZEM, METO TART  NO SIGNIFICANT HIGH RATE EPISODES  NO PROGRAMMING CHANGES MADE TO DEVICE PARAMETERS   NORMAL DEVICE FUNCTION   ES

## 2023-02-07 ENCOUNTER — OFFICE VISIT (OUTPATIENT)
Dept: OBGYN CLINIC | Facility: CLINIC | Age: 78
End: 2023-02-07

## 2023-02-07 ENCOUNTER — HOSPITAL ENCOUNTER (OUTPATIENT)
Dept: RADIOLOGY | Facility: HOSPITAL | Age: 78
Discharge: HOME | End: 2023-02-07

## 2023-02-07 VITALS — WEIGHT: 213 LBS | BODY MASS INDEX: 36.37 KG/M2 | HEIGHT: 64 IN

## 2023-02-07 DIAGNOSIS — Z98.890 STATUS POST SURGERY: ICD-10-CM

## 2023-02-07 DIAGNOSIS — E66.01 MORBID (SEVERE) OBESITY DUE TO EXCESS CALORIES (HCC): ICD-10-CM

## 2023-02-07 DIAGNOSIS — Z98.890 STATUS POST SURGERY: Primary | ICD-10-CM

## 2023-02-07 NOTE — PATIENT INSTRUCTIONS
Weightbearing as tolerated left lower extremity in full extension  Case discussed with Dr Peralta Peers  Patient may be weightbearing as tolerated left lower extremity in knee immobilizer in full extension  Patient may come out of knee immobilizer for 0 to 30 degrees range of motion with physical therapy  Patient may start quadricep strengthening February 21, 2023  Follow-up in 4 weeks time for repeat evaluation left knee

## 2023-02-07 NOTE — PROGRESS NOTES
76 y o female presents for 2 weeks postoperative visit status post left knee lateral release with a history of distal femoral replacement  Patient states she is doing well she is able to stand on her left lower extremity she states pain is well under control denies any chest pain shortness breath numbness or tingling  Patient states is mostly transported with a wheelchair however she is able to ambulate with the assistance of walker  Patient states she is working with physical therapy at her rehabilitation facility  Patient has been doing well with transfers from wheelchair and a knee immobilizer keeping her knee in extension and to 30 degrees of flexion  Review of Systems  Review of systems negative unless otherwise specified in HPI    Past Medical History  Past Medical History:   Diagnosis Date   • Abnormal ECG     Last Assessed 9/29/2016   • Anxiety     Last Assessed 6/08/2016   • Arthritis     knees   • Asthma     Last Assessed 11/06/2013   • Atrial premature complex    • Cataract, bilateral     Last Assessed 7/14/2016   • Cataract, left eye     Both eyes  Had surgery on left  • COVID-19    • Difficulty swallowing    • Diverticulosis 9/25/2022   • Dizziness    • Fibromyalgia    • Fibromyalgia, primary    • Gastric reflux    • Heart failure ([facility] [address] )     5/23/22 Pt reports had heart failure in the past   • History of COVID-19     5/23/22 Pt reports having COVID in 2021     • History of transfusion     no reaction   • [place] (hard of hearing)    • Hyperlipidemia    • Hypertension    • Incontinence in female     5/23/22 Pt reports has incontinence -wears depends especially at night/riding in car   • Irregular heart beat     5/23/22 Pt reports hx of A fib   • Lyme disease    • PONV (postoperative nausea and vomiting)    • Premature ventricular contraction    • Primary osteoarthritis of both knees     Last Assessed 7/14/2016   • Rheumatic fever     5/23/22 Pt reports had hx of rheumatic fever as a child twice   • Sore throat    • Tuberculosis 1945       Past Surgical History  Past Surgical History:   Procedure Laterality Date   • APPENDECTOMY     • CARDIAC PACEMAKER PLACEMENT  2019   • COLONOSCOPY     • DENTAL SURGERY      extractions   • HYSTERECTOMY      5/23/22 Pt reports had appendix out with hysterectomy and "tightened up my bladder"   • IR ASPIRATION ONLY  9/30/2022   • ORIF FEMUR FRACTURE Left 08/05/2021    Procedure: OPEN REDUCTION W/ INTERNAL FIXATION (ORIF) DISTAL FEMUR; INSERTION RETROGRADE NAIL;  Surgeon: Dillan Velasquez MD;  Location: [facility];  Service: Orthopedics   • CA ARTHRP KNE CONDYLE&PLATU MEDIAL&LAT COMPARTMENTS Left 9/12/2022    Procedure: ARTHROPLASTY KNEE TOTAL;  Surgeon: Dillan Velasquez MD;  Location: [facility];  Service: Orthopedics   • CA DILATION ESOPH UNGUIDED SOUND/BOUGIE 1/MULT PASS N/A 04/06/2016    Procedure: DILATATION ESOPHAGEAL;  Surgeon: Yamile Nair MD;  Location: [facility]; Service: Gastroenterology   • CA EGD TRANSORAL BIOPSY SINGLE/MULTIPLE N/A 04/06/2016    Procedure: ESOPHAGOGASTRODUODENOSCOPY (EGD); Surgeon: Yamile Nair MD;  Location: [facility]; Service: Gastroenterology   • CA LATERAL RETINACULAR RELEASE OPEN Left 1/23/2023    Procedure: RELEASE RETINACULAR, left knee and all associated procedures;  Surgeon: Dillan Velasquez MD;  Location[facility];  Service: Orthopedics   • CA REMOVAL IMPLANT DEEP Left 9/12/2022    Procedure: REMOVAL NAIL IM  FEMUR;  Surgeon: Dillan Velasquez MD;  [facility];  Service: Orthopedics   • CA XCAPSL CTRC RMVL INSJ IO LENS PROSTH W/O ECP Left 03/15/2016    Procedure: EXTRACTION EXTRACAPSULAR CATARACT PHACO INTRAOCULAR LENS (IOL);   Surgeon: Fifi Montenegro MD;  Location: [facility];  Service: Ophthalmology   • REPLACEMENT TOTAL KNEE Right    • REPLACEMENT TOTAL KNEE      right    • TONSILLECTOMY         Current Medications  Current Outpatient Medications on File Prior to Visit   Medication Sig Dispense Refill   • acetaminophen (TYLENOL) 325 mg tablet Take 975 mg by mouth every 6 (six) hours as needed     • albuterol (PROVENTIL HFA,VENTOLIN HFA) 90 mcg/act inhaler INHALE 2 PUFFS BY MOUTH EVERY 6 HOURS AS NEEDED FOR WHEEZING 3 Inhaler 0   • allopurinol (ZYLOPRIM) 100 mg tablet Take 1 tablet (100 mg total) by mouth daily 90 tablet 3   • apixaban (Eliquis) 5 mg Take 1 tablet (5 mg total) by mouth 2 (two) times a day 60 tablet 6   • ascorbic acid (VITAMIN C) 500 MG tablet Take 1 tablet (500 mg total) by mouth 2 (two) times a day 60 tablet 1   • bisacodyl (DULCOLAX) 10 mg suppository Insert 10 mg into the rectum daily as needed     • Calcium Polycarbophil (FIBER-LAX PO) Take 1 tablet by mouth daily at bedtime     • Cholecalciferol 25 MCG (1000 UT) tablet Take 1,000 Units by mouth daily     • Diclofenac Sodium (VOLTAREN) 1 % Apply 2 g topically 4 (four) times a day = to B/L knee     • diltiazem (CARDIZEM CD) 120 mg 24 hr capsule Take 1 capsule (120 mg total) by mouth daily 90 capsule 3   • DULoxetine (CYMBALTA) 30 mg delayed release capsule TAKE 1 CAPSULE(30 MG) BY MOUTH DAILY 30 capsule 5   • ezetimibe (ZETIA) 10 mg tablet Take 1 tablet (10 mg total) by mouth daily 90 tablet 1   • ferrous sulfate 324 (65 Fe) mg Take 1 tablet (324 mg total) by mouth 2 (two) times a day before meals 60 tablet 1   • folic acid (FOLVITE) 1 mg tablet TAKE 1 TABLET(1 MG) BY MOUTH DAILY 90 tablet 0   • furosemide (LASIX) 20 mg tablet Take 1 tablet (20 mg total) by mouth daily with dinner  0   • furosemide (LASIX) 40 mg tablet Take 40 mg by mouth in the morning     • Melatonin 10 MG TABS Take by mouth Takes daily at night     • metoprolol tartrate (LOPRESSOR) 100 mg tablet Take 1 tablet (100 mg total) by mouth daily in the early morning Do not start before January 7, 2023   0   • metoprolol tartrate (LOPRESSOR) 50 mg tablet Take 1 tablet (50 mg total) by mouth daily at bedtime  0   • Multiple Vitamin (MULTIVITAMIN ADULT PO) Take by mouth in the morning • omeprazole (PriLOSEC) 20 mg delayed release capsule Take 20 mg by mouth daily     • oxybutynin (DITROPAN-XL) 10 MG 24 hr tablet Take 10 mg by mouth daily at bedtime     • polyethylene glycol (MIRALAX) 17 g packet Take 17 g by mouth daily as needed     • senna-docusate sodium (SENOKOT S) 8 6-50 mg per tablet Take 2 tablets by mouth 2 (two) times a day  0   • sucralfate (CARAFATE) 1 g tablet Take 1 tablet (1 g total) by mouth 4 (four) times a day (before meals and at bedtime)  0   • vitamin B-12 (VITAMIN B-12) 1,000 mcg tablet Take 1 tablet (1,000 mcg total) by mouth daily  0     No current facility-administered medications on file prior to visit         Recent Labs [facility])  0   Lab Value Date/Time    HCT 30 6 (L) 01/25/2023 0456    HGB 8 9 (L) 01/25/2023 0456    WBC 12 94 (H) 01/25/2023 0456    INR 1 19 01/04/2023 0037    CRP 5 2 (H) 05/17/2022 1139    GLUCOSE 127 08/05/2021 0901    HGBA1C 5 6 08/17/2022 1135         Physical exam  · General: Awake, Alert, Oriented  · Eyes: Pupils equal, round and reactive to light  · Heart: regular rate and rhythm  · Lungs: No audible wheezing  ·   left Knee exam  · Examination of patient's left knee well approximated anterior incision no erythema and no effusion full knee extension and flexion to 30 degrees calf nontender palpation active ankle dorsiflexion, patient did inadvertently flex her left knee to 90 degrees in the office visit while going from a standing to seated position in her wheelchair    Imaging  X-ray imaging of the left knee reviewed x-rays reveal improved medialization of patella as compared to previous films no evidence of loosening of the left knee hardware    Assessment:  Status post 2 weeks left knee lateral release with a history of distal femoral replacement  Plan:  Weightbearing as tolerated left lower extremity in full extension  Case discussed with Dr Rice Query  Patient may be weightbearing as tolerated left lower extremity in knee immobilizer in full extension  Patient may come out of knee immobilizer for 0 to 30 degrees range of motion with physical therapy  Patient may start quadricep strengthening February 21, 2023  Follow-up in 4 weeks time for repeat evaluation left knee

## 2023-02-08 DIAGNOSIS — S72.422G CLOSED BICONDYLAR FRACTURE OF LEFT FEMUR WITH DELAYED HEALING: ICD-10-CM

## 2023-02-08 DIAGNOSIS — M54.50 CHRONIC BILATERAL LOW BACK PAIN WITHOUT SCIATICA: Primary | ICD-10-CM

## 2023-02-08 DIAGNOSIS — S72.432G CLOSED BICONDYLAR FRACTURE OF LEFT FEMUR WITH DELAYED HEALING: ICD-10-CM

## 2023-02-08 DIAGNOSIS — G89.29 CHRONIC BILATERAL LOW BACK PAIN WITHOUT SCIATICA: Primary | ICD-10-CM

## 2023-02-08 RX ORDER — OXYCODONE HYDROCHLORIDE 5 MG/1
5 TABLET ORAL EVERY 6 HOURS PRN
Qty: 10 TABLET | Refills: 0 | Status: SHIPPED | OUTPATIENT
Start: 2023-02-08

## 2023-02-23 ENCOUNTER — TELEPHONE (OUTPATIENT)
Dept: OBGYN CLINIC | Facility: HOSPITAL | Age: 78
End: 2023-02-23

## 2023-02-23 NOTE — TELEPHONE ENCOUNTER
Caller: Earl Barrera    Doctor: Chadd Saba    Reason for call: Cas Orantes would like the patients ROM ?   Please advise    Call back#: [phone]

## 2023-02-23 NOTE — TELEPHONE ENCOUNTER
Earl Barrera notified per last office note:    Patient may be weightbearing as tolerated left lower extremity in knee immobilizer in full extension  Patient may come out of knee immobilizer for 0 to 30 degrees range of motion with physical therapy

## 2023-02-27 ENCOUNTER — TELEPHONE (OUTPATIENT)
Dept: OBGYN CLINIC | Facility: HOSPITAL | Age: 78
End: 2023-02-27

## 2023-02-27 NOTE — TELEPHONE ENCOUNTER
Caller: Netta    Doctor: Mikal Álvarez    Reason for call: Requesting a written letter with ROM percentages and frequency of wearing brace faxed to [facility] @ [phone], they will not accept the verbal directions      Call back#: [phone]

## 2023-03-06 PROBLEM — A41.9 SEPSIS (HCC): Status: RESOLVED | Noted: 2023-01-04 | Resolved: 2023-03-06

## 2023-03-07 NOTE — TELEPHONE ENCOUNTER
Caller: Karina    Doctor: July Doe    Reason for call: Requesting a call back explaining why patient is scheduled for 4/11 when she was last seen on 2/7    Requesting a call back    Call back#: [phone]

## 2023-03-08 ENCOUNTER — TELEPHONE (OUTPATIENT)
Dept: OBGYN CLINIC | Facility: HOSPITAL | Age: 78
End: 2023-03-08

## 2023-03-08 NOTE — TELEPHONE ENCOUNTER
Reviewed chart and pt is at [address] (Previously [address]) called and left msg on 's, Brayden Greene to return call [phone] that post op appt was to be made 4 weeks after 2/7/23  Was there a transportation issue? Can we schedule appt sooner? If so, please schedule  Thanks

## 2023-03-08 NOTE — TELEPHONE ENCOUNTER
Caller: patient's Ольга    Doctor: prasanna     Reason for call: patients niece called in asking if patient is able to start bending her knee more than 30 degrees  Also does the patient need to wear the brace 24/7?      Call back#: [phone]

## 2023-03-08 NOTE — TELEPHONE ENCOUNTER
Informed tl that Aydin Gonzalez will be reassessed at her follow up, tl is weary of Isi staying at 30 degrees for 12 weeks she thinks it is a long time  Aydin Gonzalez informed tl she would be able to start bending little by little      Follow up scheduled 4/11/23

## 2023-03-09 NOTE — TELEPHONE ENCOUNTER
Called and was transferred to [facility] and request her to call to schedule pt for an earlier appt for next week as it has been 4weeks since her surgery on 4/7/23  Dr Jennifer Martin wants to see pt before the scheduled appt for 4/11/23  This is my 2nd CLM to change this appt

## 2023-03-09 NOTE — TELEPHONE ENCOUNTER
Can we please get patient a sooner appt?  Patient should have been seen this week but is not scheduled until 4/11/23

## 2023-03-10 NOTE — TELEPHONE ENCOUNTER
Caller: Earl Barrera    Doctor: Hunter Miller    Reason for call: they received a call from the office    They are calling to get the pt scheduled for a post op appt sooner than 4/11    Call back#: [phone] ok to leave message

## 2023-03-13 NOTE — TELEPHONE ENCOUNTER
Called and spoke with Jac Stanford at [facility]    Patient coming in tomorrow 3/14 @ 1:45pm   Jac Stanford stated she will inform patients family of appointment

## 2023-03-14 ENCOUNTER — OFFICE VISIT (OUTPATIENT)
Dept: OBGYN CLINIC | Facility: CLINIC | Age: 78
End: 2023-03-14

## 2023-03-14 ENCOUNTER — HOSPITAL ENCOUNTER (OUTPATIENT)
Dept: RADIOLOGY | Facility: HOSPITAL | Age: 78
Discharge: HOME | End: 2023-03-14
Attending: ORTHOPAEDIC SURGERY

## 2023-03-14 VITALS — SYSTOLIC BLOOD PRESSURE: 118 MMHG | DIASTOLIC BLOOD PRESSURE: 72 MMHG

## 2023-03-14 DIAGNOSIS — Z98.890 STATUS POST SURGERY: ICD-10-CM

## 2023-03-14 DIAGNOSIS — Z98.890 STATUS POST SURGERY: Primary | ICD-10-CM

## 2023-03-14 NOTE — PATIENT INSTRUCTIONS
Weightbearing as tolerated left lower extremity  Range of motion left knee as tolerated as the patient already has demonstrated greater than 90 degrees of flexion in the office today  Physical therapy for stretching and strengthening exercises left lower extremity  Hinged knee brace as needed  Follow-up in 2 months time
RLE/weight-bearing as tolerated

## 2023-03-14 NOTE — PROGRESS NOTES
76 y o female presents for 8 weeks postoperative visit status post left knee lateral release with history of left DFR  Patient states she has not been utilizing her knee immobilizer has been bothering her skin on her thigh and lower extremity states having difficulty getting up from a seated position such as sitting on the toilet  Patient states her pain is under control she is unable to extend her left knee denies any changes numbness tingling patient states she is able to utilize her left ankle at this time  Review of Systems  Review of systems negative unless otherwise specified in HPI    Past Medical History  Past Medical History:   Diagnosis Date   • Abnormal ECG     Last Assessed 9/29/2016   • Anxiety     Last Assessed 6/08/2016   • Arthritis     knees   • Asthma     Last Assessed 11/06/2013   • Atrial premature complex    • Cataract, bilateral     Last Assessed 7/14/2016   • Cataract, left eye     Both eyes  Had surgery on left  • COVID-19    • Difficulty swallowing    • Diverticulosis 9/25/2022   • Dizziness    • Fibromyalgia    • Fibromyalgia, primary    • Gastric reflux    • Heart failure ([facility] [address] )     5/23/22 Pt reports had heart failure in the past   • History of COVID-19     5/23/22 Pt reports having COVID in 2021     • History of transfusion     no reaction   • [place] (hard of hearing)    • Hyperlipidemia    • Hypertension    • Incontinence in female     5/23/22 Pt reports has incontinence -wears depends especially at night/riding in car   • Irregular heart beat     5/23/22 Pt reports hx of A fib   • Lyme disease    • PONV (postoperative nausea and vomiting)    • Premature ventricular contraction    • Primary osteoarthritis of both knees     Last Assessed 7/14/2016   • Rheumatic fever     5/23/22 Pt reports had hx of rheumatic fever as a child twice   • Sore throat    • Tuberculosis 1947       Past Surgical History  Past Surgical History:   Procedure Laterality Date   • APPENDECTOMY     • CARDIAC PACEMAKER PLACEMENT  2019   • COLONOSCOPY     • DENTAL SURGERY      extractions   • HYSTERECTOMY      5/23/22 Pt reports had appendix out with hysterectomy and "tightened up my bladder"   • IR ASPIRATION ONLY  9/30/2022   • ORIF FEMUR FRACTURE Left 08/05/2021    Procedure: OPEN REDUCTION W/ INTERNAL FIXATION (ORIF) DISTAL FEMUR; INSERTION RETROGRADE NAIL;  Surgeon: Jonathan Morales MD;  [facility];  Service: Orthopedics   • OK ARTHRP KNE CONDYLE&PLATU MEDIAL&LAT COMPARTMENTS Left 9/12/2022    Procedure: ARTHROPLASTY KNEE TOTAL;  Surgeon: Jonathan Morales MD;  [facility];  Service: Orthopedics   • OK DILATION ESOPH UNGUIDED SOUND/BOUGIE 1/MULT PASS N/A 04/06/2016    Procedure: DILATATION ESOPHAGEAL;  Surgeon: Janel Beltrán MD;  Location: [facility]; Service: Gastroenterology   • OK EGD TRANSORAL BIOPSY SINGLE/MULTIPLE N/A 04/06/2016    Procedure: ESOPHAGOGASTRODUODENOSCOPY (EGD); Surgeon: Janel Beltrán MD;  Location: [facility]; Service: Gastroenterology   • OK LATERAL RETINACULAR RELEASE OPEN Left 1/23/2023    Procedure: RELEASE RETINACULAR, left knee and all associated procedures;  Surgeon: Jonathan Morales MD;  [facility];  Service: Orthopedics   • OK REMOVAL IMPLANT DEEP Left 9/12/2022    Procedure: REMOVAL NAIL IM  FEMUR;  Surgeon: Jonathan Morales MD;  [facility];  Service: Orthopedics   • OK XCAPSL CTRC RMVL INSJ IO LENS PROSTH W/O ECP Left 03/15/2016    Procedure: EXTRACTION EXTRACAPSULAR CATARACT PHACO INTRAOCULAR LENS (IOL);   Surgeon: Ifeoma Nur MD;  [facility];  Service: Ophthalmology   • REPLACEMENT TOTAL KNEE Right    • REPLACEMENT TOTAL KNEE      right    • TONSILLECTOMY         Current Medications  Current Outpatient Medications on File Prior to Visit   Medication Sig Dispense Refill   • acetaminophen (TYLENOL) 325 mg tablet Take 975 mg by mouth every 6 (six) hours as needed     • albuterol (PROVENTIL HFA,VENTOLIN HFA) 90 mcg/act inhaler INHALE 2 PUFFS BY MOUTH EVERY 6 HOURS AS NEEDED FOR WHEEZING 3 Inhaler 0   • allopurinol (ZYLOPRIM) 100 mg tablet Take 1 tablet (100 mg total) by mouth daily 90 tablet 3   • apixaban (Eliquis) 5 mg Take 1 tablet (5 mg total) by mouth 2 (two) times a day 60 tablet 6   • ascorbic acid (VITAMIN C) 500 MG tablet Take 1 tablet (500 mg total) by mouth 2 (two) times a day 60 tablet 1   • bisacodyl (DULCOLAX) 10 mg suppository Insert 10 mg into the rectum daily as needed     • Calcium Polycarbophil (FIBER-LAX PO) Take 1 tablet by mouth daily at bedtime     • Cholecalciferol 25 MCG (1000 UT) tablet Take 1,000 Units by mouth daily     • Diclofenac Sodium (VOLTAREN) 1 % Apply 2 g topically 4 (four) times a day = to B/L knee     • diltiazem (CARDIZEM CD) 120 mg 24 hr capsule Take 1 capsule (120 mg total) by mouth daily 90 capsule 3   • DULoxetine (CYMBALTA) 30 mg delayed release capsule TAKE 1 CAPSULE(30 MG) BY MOUTH DAILY 30 capsule 5   • ezetimibe (ZETIA) 10 mg tablet Take 1 tablet (10 mg total) by mouth daily 90 tablet 1   • ferrous sulfate 324 (65 Fe) mg Take 1 tablet (324 mg total) by mouth 2 (two) times a day before meals 60 tablet 1   • folic acid (FOLVITE) 1 mg tablet TAKE 1 TABLET(1 MG) BY MOUTH DAILY 90 tablet 0   • furosemide (LASIX) 20 mg tablet Take 1 tablet (20 mg total) by mouth daily with dinner  0   • furosemide (LASIX) 40 mg tablet Take 40 mg by mouth in the morning     • Melatonin 10 MG TABS Take by mouth Takes daily at night     • metoprolol tartrate (LOPRESSOR) 100 mg tablet Take 1 tablet (100 mg total) by mouth daily in the early morning Do not start before January 7, 2023   0   • metoprolol tartrate (LOPRESSOR) 50 mg tablet Take 1 tablet (50 mg total) by mouth daily at bedtime  0   • Multiple Vitamin (MULTIVITAMIN ADULT PO) Take by mouth in the morning     • omeprazole (PriLOSEC) 20 mg delayed release capsule Take 20 mg by mouth daily     • oxybutynin (DITROPAN-XL) 10 MG 24 hr tablet Take 10 mg by mouth daily at bedtime     • oxyCODONE (Roxicodone) 5 immediate release tablet Take 1 tablet (5 mg total) by mouth every 6 (six) hours as needed for moderate pain or severe pain Max Daily Amount: 20 mg 10 tablet 0   • polyethylene glycol (MIRALAX) 17 g packet Take 17 g by mouth daily as needed     • senna-docusate sodium (SENOKOT S) 8 6-50 mg per tablet Take 2 tablets by mouth 2 (two) times a day  0   • sucralfate (CARAFATE) 1 g tablet Take 1 tablet (1 g total) by mouth 4 (four) times a day (before meals and at bedtime)  0   • vitamin B-12 (VITAMIN B-12) 1,000 mcg tablet Take 1 tablet (1,000 mcg total) by mouth daily  0     No current facility-administered medications on file prior to visit         Recent Labs [facility])  0   Lab Value Date/Time    HCT 30 6 (L) 01/25/2023 0456    HGB 8 9 (L) 01/25/2023 0456    WBC 12 94 (H) 01/25/2023 0456    INR 1 19 01/04/2023 0037    CRP 5 2 (H) 05/17/2022 1139    GLUCOSE 127 08/05/2021 0901    HGBA1C 5 6 08/17/2022 1135         Physical exam  · General: Awake, Alert, Oriented  · Eyes: Pupils equal, round and reactive to light  · Heart: regular rate and rhythm  · Lungs: No audible wheezing    left Knee exam  · Examination patient's left knee well-healed anterior incision 10 degrees all full extension and greater than 90 degrees of flexion actively calf nontender palpation active ankle dorsiflexion mild wil-incisional numbness patient has limited active knee extension stable to varus and valgus stress sensation otherwise intact distal pulse present    Imaging  X-rays of the left knee reveal a laterally displaced patella with minimal interval change as compared to previous films    Assessment:  Status post 8 weeks left knee lateral release with a history of DFR  Plan:  Weightbearing as tolerated left lower extremity  Patient may advance range of motion of left knee as this is already been in progress as patient's motion is greater than 90 degrees at this time which is far greater than her expected motion of 0 to 30 degrees according to her postoperative and last visits restrictions  Case discussed with Dr Sharad Teresa physical therapy range of motion stretching strengthening exercise  Patient may utilize knee immobilizer as needed for left knee  Follow-up in 2 months time repeat x-rays left knee

## 2023-03-15 ENCOUNTER — NURSING HOME VISIT (OUTPATIENT)
Dept: GERIATRICS | Facility: OTHER | Age: 78
End: 2023-03-15
Payer: MEDICARE

## 2023-03-15 DIAGNOSIS — I48.0 PAROXYSMAL ATRIAL FIBRILLATION (HCC): ICD-10-CM

## 2023-03-15 DIAGNOSIS — K21.9 CHRONIC GERD: Primary | ICD-10-CM

## 2023-03-15 PROCEDURE — 99307 SBSQ NF CARE SF MDM 10: CPT | Performed by: INTERNAL MEDICINE

## 2023-03-15 NOTE — PROGRESS NOTES
[facility]  [address]  [phone]  [facility]  [address]      NAME: Melisa Menjivar  AGE: 76 y o  SEX: female [id number]    DATE OF ENCOUNTER: 3/15/2023    Assessment and Plan     1  Chronic GERD        2  Paroxysmal atrial fibrillation (HCC)           Chronic GERD  Stable  Cont ppi    Paroxysmal atrial fibrillation (HCC)  HR stable  Cont eliquis  Cont metoprolol & diltiazem       Chief Complaint     Routine Long term follow-up visit  History of Present Illness   Pt seen and examined  Pt appears medically stable  No complaints today  The following portions of the patient's history were reviewed and updated as appropriate: allergies, current medications, past family history, past medical history, past social history, past surgical history and problem list     Review of Systems     Review of Systems   All other systems reviewed and are negative        Active Problem List     Patient Active Problem List   Diagnosis   • Atrial fibrillation RVR ([facility])   • Tachy-smita syndrome ([facility])   • Essential hypertension   • Pacemaker   • Chronic bilateral low back pain without sciatica   • Chronic GERD   • Degenerative lumbar spinal stenosis   • Fibromyalgia   • GERD (gastroesophageal reflux disease)   • Low back pain   • Lumbar degenerative disc disease   • Mild carpal tunnel syndrome, right   • Overweight   • Psoriasis   • Thyroid nodule   • Urinary incontinence   • Other insomnia   • Anxiety   • Tremor   • Chronic pain of left knee   • Age related osteoporosis   • Dysphonia   • Vitamin D insufficiency   • HLD (hyperlipidemia)   • Arthritis   • Generalized weakness   • Leukocytosis   • MICHAEL (acute kidney injury) ([facility] [address] )   • Ambulatory dysfunction   • Acute respiratory failure ([facility])   • Reactive airway disease with acute exacerbation   • Morbid obesity with body mass index (BMI) of 40 0 to 49 9 ([facility])   • Chronic gout with tophus   • Current moderate episode of major depressive disorder ([facility] [address] )   • Paroxysmal atrial fibrillation ([facility])   • Poor dentition   • Closed bicondylar fracture of left femur with delayed healing   • Preop cardiovascular exam   • Stage 3a chronic kidney disease ([facility])   • Preop examination   • Renal insufficiency   • Surgical wound present   • Anemia   • Bladder wall thickening   • left femoral fluid collection   • Pleural effusion on right   • Vomiting and diarrhea   • Bacteremia due to methicillin resistant Staphylococcus epidermidis   • Asymptomatic bacteriuria   • Aortic regurgitation   • Pressure injury of left buttock, stage 3 ([facility])   • Thrombocytosis   • Status post fall   • Physical deconditioning   • Chronic heart failure with preserved ejection fraction ([facility])   • Dermatitis associated with incontinence   • Hypokalemia   • Nausea   • Hyponatremia   • Rash   • Proctitis   • Suspected UTI   • Hyperkalemia   • Acute pain of right knee   • History of bacteremia   • Abnormal CT of the abdomen   • Fall   • Congestive heart failure   • Chronic constipation   • S/P knee surgery   • Wrist pain, acute   • Status post surgery       Objective     Vitals: Reviewed in [employer] system  VSS    General: Awake, alert & oriented  Head: atraumatic, normocephalic  Cardiovascular: RRR  Lungs: Clear to auscultation bilaterally  Abdomen: nontender/nondistended, +BS  Legs: no cyanosis, clubbing or edema  Skin: clean, dry, intact  Psych: calm, cooperative    Pertinent Laboratory/Diagnostic Studies:  Refer to facility chart  Current Medications   Medications reviewed and updated in facility chart    Total time spent: 10 minutes    Cathy Eckert MD  Internal Medicine  Senior Care Physician

## 2023-03-16 ENCOUNTER — TELEPHONE (OUTPATIENT)
Dept: OBGYN CLINIC | Facility: HOSPITAL | Age: 78
End: 2023-03-16

## 2023-03-16 NOTE — TELEPHONE ENCOUNTER
Caller: Gricelda/Nelida [facility]    Doctor: Kain Hansen    Reason for call: Patient refusing to wear brace. Causing bruising. Clarification of usage or change to soft brace?     Call back#: [phone]  Fax#[phone]

## 2023-03-31 ENCOUNTER — NURSING HOME VISIT (OUTPATIENT)
Dept: GERIATRICS | Facility: OTHER | Age: 78
End: 2023-03-31

## 2023-03-31 VITALS
HEART RATE: 74 BPM | RESPIRATION RATE: 18 BRPM | WEIGHT: 220 LBS | SYSTOLIC BLOOD PRESSURE: 128 MMHG | OXYGEN SATURATION: 94 % | DIASTOLIC BLOOD PRESSURE: 66 MMHG | TEMPERATURE: 97.3 F | BODY MASS INDEX: 37.76 KG/M2

## 2023-03-31 DIAGNOSIS — M25.531 ACUTE PAIN OF RIGHT WRIST: Primary | ICD-10-CM

## 2023-03-31 DIAGNOSIS — M25.562 CHRONIC PAIN OF LEFT KNEE: ICD-10-CM

## 2023-03-31 DIAGNOSIS — R53.81 PHYSICAL DECONDITIONING: ICD-10-CM

## 2023-03-31 DIAGNOSIS — N18.31 STAGE 3A CHRONIC KIDNEY DISEASE (HCC): ICD-10-CM

## 2023-03-31 DIAGNOSIS — G89.29 CHRONIC PAIN OF LEFT KNEE: ICD-10-CM

## 2023-03-31 DIAGNOSIS — M1A.9XX1 CHRONIC GOUT WITH TOPHUS, UNSPECIFIED CAUSE, UNSPECIFIED SITE: ICD-10-CM

## 2023-03-31 PROBLEM — M25.539 WRIST PAIN, ACUTE: Status: ACTIVE | Noted: 2023-03-31

## 2023-03-31 NOTE — ASSESSMENT & PLAN NOTE
· Multifactorial in setting of advancing age, overweight, Multiple chronic co morbidities- left knee surgical repair in Jan    · Resides at [facility] with 24/7 care   · Has had ongoing PT/OT  ·  Able to get oob to chair with assist   · Continue fall precautions   · Encourage nutrition/hydration

## 2023-03-31 NOTE — PROGRESS NOTES
"[facility]  [address]  [phone]  FACILITY: [facility]  Code 32  ACUTE VISIT    Assessment/Plan:    1  Acute pain of right wrist  Assessment & Plan:  · Patient c/o Right wrist pain describes it as \"electrical shocks\" that originate distal to her right thumb and go into her fingertips  · States pain only occurs when she sleeps and when she is grasping something like her RW   · Denies any recent injury/fall or trauma  ·  strength is equal - Good ROM to wrist   · Tenderness noted on palpation to right medial wrist distal to thumb     · DDx: Carpal Tunnel Syndrome vs Gout vs Osteoarthritis     Plan:  · Not likely gout flare as Uric acid level was 6 4 on Monday 3/27/23  · Continue allopurinol   · X ray of Right hand 3 views (3/31/23) : Right hand of tissue abnormality medial aspect of the wrist minor osteoarthritis no fractures no acute abnormality  · Continue Tylenol 975 mg TID   · Continue Voltaren gel   · Consult OT to eval and treat for Carpel tunnel syndrome  · Place soft carpal tunnel brace while sleeping- off in AM           2  Chronic gout with tophus, unspecified cause, unspecified site  Assessment & Plan:  C/o Right hand pain x 2 weeks  Treated with Tylenol and Voltaren  Still with complaints today   Uric Acid level checked 6 4 on 3/27/23    Plan:   Continue Allopurinol 100 mg daily   Continue Tylenol and Voltaren gel   Check Right Hand x ray  With osteoarthritis   OT eval r/o carpel tunnel syndrome         3  Stage 3a chronic kidney disease [facility])  Assessment & Plan:  Lab Results   Component Value Date    EGFR 58 01/25/2023    EGFR 70 01/24/2023    EGFR 60 01/06/2023    CREATININE 0 93 01/25/2023    CREATININE 0 80 01/24/2023    CREATININE 0 91 01/06/2023     Currently stable Creat 0 87 ( 3/27/23)  Renally dose medications as appropriate  Avoid Hypotension  Avoid NSAIDS       4   Chronic pain of left knee  Assessment & Plan:  · Hx of subluxation of Left " Patella   · s/p left knee lateral release   · 8 weeks OP f/u with Ortho done 3/14/23- reviewed office notes  · left knee incision healed  · WBAT  · Knee Immobilizer as needed - patient feels this is uncomfortable and does not like to wear  · Continue Tylenol and Voltaren gel       5  Physical deconditioning  Assessment & Plan:  · Multifactorial in setting of advancing age, overweight, Multiple chronic co morbidities- left knee surgical repair in Jan    · Resides at [facility] with 24/7 care   · Has had ongoing PT/OT  ·  Able to get oob to chair with assist   · Continue fall precautions   · Encourage nutrition/hydration            Subjective:      Patient ID: Crystal Stein is a 76 y o  female  CODE STATUS: CPR      Patient is a LTC resident at [facility]  Seen and evaluated at bedside today for Acute visit per request of nursing for c/o right hand pain  PAST MEDICAL HISTORY:  Past Medical History:   Diagnosis Date   • Abnormal ECG     Last Assessed 9/29/2016   • Anxiety     Last Assessed 6/08/2016   • Arthritis     knees   • Asthma     Last Assessed 11/06/2013   • Atrial premature complex    • Cataract, bilateral     Last Assessed 7/14/2016   • Cataract, left eye     Both eyes  Had surgery on left  • COVID-19    • Difficulty swallowing    • Diverticulosis 9/25/2022   • Dizziness    • Fibromyalgia    • Fibromyalgia, primary    • Gastric reflux    • Heart failure ([address] )     5/23/22 Pt reports had heart failure in the past   • History of COVID-19     5/23/22 Pt reports having COVID in 2021     • History of transfusion     no reaction   • [place] (hard of hearing)    • Hyperlipidemia    • Hypertension    • Incontinence in female     5/23/22 Pt reports has incontinence -wears depends especially at night/riding in car   • Irregular heart beat     5/23/22 Pt reports hx of A fib   • Lyme disease    • PONV (postoperative nausea and vomiting)    • Premature ventricular contraction    • Primary osteoarthritis of both knees     Last "Assessed 7/14/2016   • Rheumatic fever     5/23/22 Pt reports had hx of rheumatic fever as a child twice   • Sore throat    • Tuberculosis 1947     Past Surgical History:   Procedure Laterality Date   • APPENDECTOMY     • CARDIAC PACEMAKER PLACEMENT  2019   • COLONOSCOPY     • DENTAL SURGERY      extractions   • HYSTERECTOMY      5/23/22 Pt reports had appendix out with hysterectomy and \"tightened up my bladder\"   • IR ASPIRATION ONLY  9/30/2022   • ORIF FEMUR FRACTURE Left 08/05/2021    Procedure: OPEN REDUCTION W/ INTERNAL FIXATION (ORIF) DISTAL FEMUR; INSERTION RETROGRADE NAIL;  Surgeon: Yanira Reynoso MD;  [facility]: [facility];  Service: Orthopedics   • TN ARTHRP KNE CONDYLE&PLATU MEDIAL&LAT COMPARTMENTS Left 9/12/2022    Procedure: ARTHROPLASTY KNEE TOTAL;  Surgeon: Yanira Reynoso MD;  Location[facility];  Service: Orthopedics   • TN DILATION ESOPH UNGUIDED SOUND/BOUGIE 1/MULT PASS N/A 04/06/2016    Procedure: DILATATION ESOPHAGEAL;  Surgeon: Gee Hernandez MD;  Location: [facility]; Service: Gastroenterology   • TN EGD TRANSORAL BIOPSY SINGLE/MULTIPLE N/A 04/06/2016    Procedure: ESOPHAGOGASTRODUODENOSCOPY (EGD); Surgeon: Gee Hernandez MD;  Location: [facility]; Service: Gastroenterology   • TN LATERAL RETINACULAR RELEASE OPEN Left 1/23/2023    Procedure: RELEASE RETINACULAR, left knee and all associated procedures;  Surgeon: Yanira Reynoso MD;  [facility];  Service: Orthopedics   • TN REMOVAL IMPLANT DEEP Left 9/12/2022    Procedure: REMOVAL NAIL IM  FEMUR;  Surgeon: Yanira Reynoso MD;  [facility];  Service: Orthopedics   • TN XCAPSL CTRC RMVL INSJ IO LENS PROSTH W/O ECP Left 03/15/2016    Procedure: EXTRACTION EXTRACAPSULAR CATARACT PHACO INTRAOCULAR LENS (IOL);   Surgeon: Geneva Earl MD;  Location: [facility];  Service: Ophthalmology   • REPLACEMENT TOTAL KNEE Right    • REPLACEMENT TOTAL KNEE      right    • TONSILLECTOMY           Iris Rivas is a 76year old " "female, she is a LTC resident of [facility] since November 2022  She has a past medical hx of Afib s/p PPM, HTN, CHF,Gout, CKD, Fibromyalgia, Reactive Airway disease, Arthritis  Seen today for acute visit for c/o worsening hand pain  Patient has been working with PT/OT for strenthening and balance training since her left knee surgery  She is WBAT and She uses a RW  She c/o right hand pain  She denies any falls or trauma  She does have hx of gout  She is on allopurinol for this  Her hand pain was treated with Tylneol and voltaren gel, tylenol was helping but order fell off and pain came back  Ordered X ray for worsening pain to right hand revealing osteoarthritis  Scheduled Tylenol 975 mg q 8 hrs with voltaren gel  Uric acid level was 6 4 on Monday 3/27/23, continue allopurinol  On exam patient states the pain is \"electrical shocks\" from her wrist to her fingers  Concerning for possible carpel tunnel syndrome, ordered sleeping brace for this and consulted OT to jaleel  Patient is in agreement with plan  Review of Systems   Constitutional: Negative for chills and fever  HENT: Negative for congestion, rhinorrhea and sore throat  Eyes: Negative for pain and visual disturbance  Respiratory: Negative for cough, shortness of breath and wheezing  Cardiovascular: Negative for chest pain and palpitations  Gastrointestinal: Negative for abdominal pain, constipation, diarrhea and nausea  Genitourinary: Negative for decreased urine volume, difficulty urinating, dysuria and hematuria  Musculoskeletal: Positive for arthralgias and gait problem  Negative for back pain  Skin: Negative for color change and rash  Neurological: Positive for numbness  Negative for dizziness, syncope, weakness and headaches  Psychiatric/Behavioral: Negative for dysphoric mood and sleep disturbance  The patient is not nervous/anxious            Objective:    VITALS:   Vitals:    03/31/23 1311   BP: " 128/66   Pulse: 74   Resp: 18   Temp: (!) 97 3 °F (36 3 °C)   SpO2: 94%          Physical Exam  Vitals and nursing note reviewed  Exam conducted with a chaperone present  Constitutional:       General: She is not in acute distress  Appearance: Normal appearance  She is obese  HENT:      Head: Normocephalic and atraumatic  Nose: No congestion or rhinorrhea  Mouth/Throat:      Mouth: Mucous membranes are moist    Eyes:      General: No scleral icterus  Conjunctiva/sclera: Conjunctivae normal       Pupils: Pupils are equal, round, and reactive to light  Cardiovascular:      Rate and Rhythm: Normal rate and regular rhythm  Pulses: Normal pulses  Heart sounds: Normal heart sounds  No murmur heard  Pulmonary:      Effort: Pulmonary effort is normal  No respiratory distress  Breath sounds: Normal breath sounds  No wheezing, rhonchi or rales  Abdominal:      General: Bowel sounds are normal  There is no distension  Palpations: Abdomen is soft  There is no mass  Tenderness: There is no abdominal tenderness  Hernia: No hernia is present  Musculoskeletal:         General: Tenderness (Right wrist ) present  No swelling  Right wrist: Tenderness present  No swelling, deformity, effusion, lacerations, bony tenderness, snuff box tenderness or crepitus  Decreased range of motion  Normal pulse  Left wrist: Normal       Right hand: Tenderness present  No swelling, deformity, lacerations or bony tenderness  Normal range of motion  Normal sensation  Left hand: Normal    Lymphadenopathy:      Cervical: No cervical adenopathy  Skin:     General: Skin is warm and dry  Coloration: Skin is not pale  Findings: No rash  Neurological:      General: No focal deficit present  Mental Status: She is alert and oriented to person, place, and time  Mental status is at baseline  Motor: Weakness present        Gait: Gait abnormal    Psychiatric:         Mood and Affect: Mood normal          Behavior: Behavior normal              Current Outpatient Medications:   •  acetaminophen (TYLENOL) 325 mg tablet, Take 975 mg by mouth every 6 (six) hours as needed, Disp: , Rfl:   •  albuterol (PROVENTIL HFA,VENTOLIN HFA) 90 mcg/act inhaler, INHALE 2 PUFFS BY MOUTH EVERY 6 HOURS AS NEEDED FOR WHEEZING, Disp: 3 Inhaler, Rfl: 0  •  allopurinol (ZYLOPRIM) 100 mg tablet, Take 1 tablet (100 mg total) by mouth daily, Disp: 90 tablet, Rfl: 3  •  apixaban (Eliquis) 5 mg, Take 1 tablet (5 mg total) by mouth 2 (two) times a day, Disp: 60 tablet, Rfl: 6  •  ascorbic acid (VITAMIN C) 500 MG tablet, Take 1 tablet (500 mg total) by mouth 2 (two) times a day, Disp: 60 tablet, Rfl: 1  •  bisacodyl (DULCOLAX) 10 mg suppository, Insert 10 mg into the rectum daily as needed, Disp: , Rfl:   •  Calcium Polycarbophil (FIBER-LAX PO), Take 1 tablet by mouth daily at bedtime, Disp: , Rfl:   •  Cholecalciferol 25 MCG (1000 UT) tablet, Take 1,000 Units by mouth daily, Disp: , Rfl:   •  Diclofenac Sodium (VOLTAREN) 1 %, Apply 2 g topically 4 (four) times a day = to B/L knee, Disp: , Rfl:   •  diltiazem (CARDIZEM CD) 120 mg 24 hr capsule, Take 1 capsule (120 mg total) by mouth daily, Disp: 90 capsule, Rfl: 3  •  DULoxetine (CYMBALTA) 30 mg delayed release capsule, TAKE 1 CAPSULE(30 MG) BY MOUTH DAILY, Disp: 30 capsule, Rfl: 5  •  ezetimibe (ZETIA) 10 mg tablet, Take 1 tablet (10 mg total) by mouth daily, Disp: 90 tablet, Rfl: 1  •  ferrous sulfate 324 (65 Fe) mg, Take 1 tablet (324 mg total) by mouth 2 (two) times a day before meals, Disp: 60 tablet, Rfl: 1  •  folic acid (FOLVITE) 1 mg tablet, TAKE 1 TABLET(1 MG) BY MOUTH DAILY, Disp: 90 tablet, Rfl: 0  •  furosemide (LASIX) 20 mg tablet, Take 1 tablet (20 mg total) by mouth daily with dinner, Disp: , Rfl: 0  •  furosemide (LASIX) 40 mg tablet, Take 40 mg by mouth in the morning, Disp: , Rfl:   •  Melatonin 10 MG TABS, Take by mouth Takes daily at night, "Disp: , Rfl:   •  metoprolol tartrate (LOPRESSOR) 100 mg tablet, Take 1 tablet (100 mg total) by mouth daily in the early morning Do not start before January 7, 2023 , Disp: , Rfl: 0  •  metoprolol tartrate (LOPRESSOR) 50 mg tablet, Take 1 tablet (50 mg total) by mouth daily at bedtime, Disp: , Rfl: 0  •  Multiple Vitamin (MULTIVITAMIN ADULT PO), Take by mouth in the morning, Disp: , Rfl:   •  omeprazole (PriLOSEC) 20 mg delayed release capsule, Take 20 mg by mouth daily, Disp: , Rfl:   •  oxybutynin (DITROPAN-XL) 10 MG 24 hr tablet, Take 10 mg by mouth daily at bedtime, Disp: , Rfl:   •  oxyCODONE (Roxicodone) 5 immediate release tablet, Take 1 tablet (5 mg total) by mouth every 6 (six) hours as needed for moderate pain or severe pain Max Daily Amount: 20 mg, Disp: 10 tablet, Rfl: 0  •  polyethylene glycol (MIRALAX) 17 g packet, Take 17 g by mouth daily as needed, Disp: , Rfl:   •  senna-docusate sodium (SENOKOT S) 8 6-50 mg per tablet, Take 2 tablets by mouth 2 (two) times a day, Disp: , Rfl: 0  •  sucralfate (CARAFATE) 1 g tablet, Take 1 tablet (1 g total) by mouth 4 (four) times a day (before meals and at bedtime), Disp: , Rfl: 0  •  vitamin B-12 (VITAMIN B-12) 1,000 mcg tablet, Take 1 tablet (1,000 mcg total) by mouth daily, Disp: , Rfl: 0      Plan discussed with Dr Anirudh Schulte noted agreement with assessment and plan  Please note:  Voice-recognition software may have been used in the preparation of this document  Occasional wrong word or \"sound-alike\" substitutions may have occurred due to the inherent limitations of voice recognition software  Interpretation should be guided by JERI Schreiber  03/31/23  1:12 PM      "

## 2023-03-31 NOTE — ASSESSMENT & PLAN NOTE
· Hx of subluxation of Left Patella   · s/p left knee lateral release   · 8 weeks OP f/u with Ortho done 3/14/23- reviewed office notes  · left knee incision healed  · WBAT  · Knee Immobilizer as needed - patient feels this is uncomfortable and does not like to wear  · Continue Tylenol and Voltaren gel

## 2023-03-31 NOTE — ASSESSMENT & PLAN NOTE
"· Patient c/o Right wrist pain describes it as \"electrical shocks\" that originate distal to her right thumb and go into her fingertips  · States pain only occurs when she sleeps and when she is grasping something like her RW   · Denies any recent injury/fall or trauma  ·  strength is equal - Good ROM to wrist   · Tenderness noted on palpation to right medial wrist distal to thumb     · DDx: Carpal Tunnel Syndrome vs Gout vs Osteoarthritis     Plan:  · Not likely gout flare as Uric acid level was 6 4 on Monday 3/27/23  · Continue allopurinol   · X ray of Right hand 3 views (3/31/23) :  Right hand of tissue abnormality medial aspect of the wrist minor osteoarthritis no fractures no acute abnormality  · Continue Tylenol 975 mg TID   · Continue Voltaren gel   · Consult OT to eval and treat for Carpel tunnel syndrome  · Place soft carpal tunnel brace while sleeping- off in AM       "

## 2023-03-31 NOTE — ASSESSMENT & PLAN NOTE
C/o Right hand pain x 2 weeks  Treated with Tylenol and Voltaren  Still with complaints today   Uric Acid level checked 6 4 on 3/27/23    Plan:   Continue Allopurinol 100 mg daily   Continue Tylenol and Voltaren gel   Check Right Hand x ray  With osteoarthritis   OT eval r/o carpel tunnel syndrome

## 2023-03-31 NOTE — ASSESSMENT & PLAN NOTE
Lab Results   Component Value Date    EGFR 58 01/25/2023    EGFR 70 01/24/2023    EGFR 60 01/06/2023    CREATININE 0 93 01/25/2023    CREATININE 0 80 01/24/2023    CREATININE 0 91 01/06/2023     Currently stable Creat 0 87 ( 3/27/23)  Renally dose medications as appropriate  Avoid Hypotension  Avoid NSAIDS

## 2023-04-01 ENCOUNTER — TELEPHONE (OUTPATIENT)
Dept: OTHER | Facility: OTHER | Age: 78
End: 2023-04-01

## 2023-04-02 ENCOUNTER — TELEPHONE (OUTPATIENT)
Dept: OTHER | Facility: OTHER | Age: 78
End: 2023-04-02

## 2023-04-06 ENCOUNTER — NURSING HOME VISIT (OUTPATIENT)
Dept: GERIATRICS | Facility: OTHER | Age: 78
End: 2023-04-06

## 2023-04-06 VITALS
DIASTOLIC BLOOD PRESSURE: 68 MMHG | WEIGHT: 220 LBS | HEART RATE: 70 BPM | SYSTOLIC BLOOD PRESSURE: 130 MMHG | OXYGEN SATURATION: 94 % | TEMPERATURE: 97.5 F | BODY MASS INDEX: 37.76 KG/M2 | RESPIRATION RATE: 18 BRPM

## 2023-04-06 DIAGNOSIS — M54.50 CHRONIC BILATERAL LOW BACK PAIN WITHOUT SCIATICA: ICD-10-CM

## 2023-04-06 DIAGNOSIS — M25.531 ACUTE PAIN OF RIGHT WRIST: Primary | ICD-10-CM

## 2023-04-06 DIAGNOSIS — M1A.9XX1 CHRONIC GOUT WITH TOPHUS, UNSPECIFIED CAUSE, UNSPECIFIED SITE: ICD-10-CM

## 2023-04-06 DIAGNOSIS — S72.422G CLOSED BICONDYLAR FRACTURE OF LEFT FEMUR WITH DELAYED HEALING: ICD-10-CM

## 2023-04-06 DIAGNOSIS — G89.29 CHRONIC BILATERAL LOW BACK PAIN WITHOUT SCIATICA: ICD-10-CM

## 2023-04-06 DIAGNOSIS — R53.81 PHYSICAL DECONDITIONING: ICD-10-CM

## 2023-04-06 DIAGNOSIS — N18.31 STAGE 3A CHRONIC KIDNEY DISEASE (HCC): ICD-10-CM

## 2023-04-06 DIAGNOSIS — S72.432G CLOSED BICONDYLAR FRACTURE OF LEFT FEMUR WITH DELAYED HEALING: ICD-10-CM

## 2023-04-06 RX ORDER — OXYCODONE HYDROCHLORIDE 5 MG/1
2.5 TABLET ORAL EVERY 12 HOURS PRN
Qty: 30 TABLET | Refills: 0 | Status: SHIPPED | OUTPATIENT
Start: 2023-04-06

## 2023-04-06 NOTE — ASSESSMENT & PLAN NOTE
· C/o Right hand pain x 2 weeks  · Treated with Tylenol and Voltaren  · Still with complaints today   · Uric Acid level checked 6 4 on 3/27/23    Plan:   · Continue Allopurinol 100 mg daily   · Continue Tylenol and Voltaren gel   · Start PRN low dose oxycodone due to continue c/o pain despite tylenol/voltaren   · Continue OT Detail Level: Detailed Size Of Lesion: 13x9mm Size Of Lesion: [id number]

## 2023-04-06 NOTE — PROGRESS NOTES
"[facility]  [address]  [phone]  FACILITY: [facility]  Code 32  ACUTE VISIT    Assessment/Plan:    1  Acute pain of right wrist  Assessment & Plan:  · Seen last week on 3/31 for c/o Right wrist pain described it as \"electrical shocks\" that originate distal to her right thumb and go into her fingertips  · States pain only occurs when she sleeps and when she is grasping something like her RW   · Denies any recent injury/fall or trauma  ·  strength is equal - Good ROM to wrist   · Tenderness noted on palpation to right medial wrist distal to thumb     · X ray of Right hand 3 views (3/31/23) : Right hand of tissue abnormality medial aspect of the wrist minor osteoarthritis no fractures no acute abnormality  · Continue Tylenol 975 mg TID   · Continue Voltaren gel     · Today, Patient states she needs something stronger for pain- is requesting oxycodone as she has tolerated this in past after her knee surgery  · Will start Oxycodone 2 5 mg Q 12 hrs PRN  · Consult Dr Navjot Ayala with PM&R  · Continue OT   · Continue wrist brace               2  Chronic bilateral low back pain without sciatica  -     oxyCODONE (Roxicodone) 5 immediate release tablet; Take 0 5 tablets (2 5 mg total) by mouth every 12 (twelve) hours as needed for moderate pain or severe pain Max Daily Amount: 5 mg    3  Closed bicondylar fracture of left femur with delayed healing  -     oxyCODONE (Roxicodone) 5 immediate release tablet; Take 0 5 tablets (2 5 mg total) by mouth every 12 (twelve) hours as needed for moderate pain or severe pain Max Daily Amount: 5 mg    4   Physical deconditioning  Assessment & Plan:  · Multifactorial in setting of advancing age, overweight, Multiple chronic co morbidities- left knee surgical repair in Jan    · Resides at [facility] with 24/7 care   · Has had ongoing PT/OT  ·  Able to get oob to chair with assist   · Continue fall precautions   · Encourage nutrition/hydration " 5  Stage 3a chronic kidney disease [facility])  Assessment & Plan:  Lab Results   Component Value Date    EGFR 58 01/25/2023    EGFR 70 01/24/2023    EGFR 60 01/06/2023    CREATININE 0 93 01/25/2023    CREATININE 0 80 01/24/2023    CREATININE 0 91 01/06/2023     Currently stable Creat 0 87 ( 3/27/23)  Renally dose medications as appropriate  Avoid Hypotension  Avoid NSAIDS       6  Chronic gout with tophus, unspecified cause, unspecified site  Assessment & Plan:  · C/o Right hand pain x 2 weeks  · Treated with Tylenol and Voltaren  · Still with complaints today   · Uric Acid level checked 6 4 on 3/27/23    Plan:   · Continue Allopurinol 100 mg daily   · Continue Tylenol and Voltaren gel   · Start PRN low dose oxycodone due to continue c/o pain despite tylenol/voltaren   · Continue OT            Subjective:      Patient ID: Gayla Oro is a 76 y o  female  CODE STATUS: CPR   Patient is a  LTC resident at [facility]  Seen and evaluated at bedside today for Acute visit per request of nursing for c/o worsening Right wrist pain  PAST MEDICAL HISTORY:  Past Medical History:   Diagnosis Date   • Abnormal ECG     Last Assessed 9/29/2016   • Anxiety     Last Assessed 6/08/2016   • Arthritis     knees   • Asthma     Last Assessed 11/06/2013   • Atrial premature complex    • Cataract, bilateral     Last Assessed 7/14/2016   • Cataract, left eye     Both eyes  Had surgery on left  • COVID-19    • Difficulty swallowing    • Diverticulosis 9/25/2022   • Dizziness    • Fibromyalgia    • Fibromyalgia, primary    • Gastric reflux    • Heart failure ([address] )     5/23/22 Pt reports had heart failure in the past   • History of COVID-19     5/23/22 Pt reports having COVID in 2021     • History of transfusion     no reaction   • [place] (hard of hearing)    • Hyperlipidemia    • Hypertension    • Incontinence in female     5/23/22 Pt reports has incontinence -wears depends especially at night/riding in car   • Irregular heart beat     5/23/22 "Pt reports hx of A fib   • Lyme disease    • PONV (postoperative nausea and vomiting)    • Premature ventricular contraction    • Primary osteoarthritis of both knees     Last Assessed 7/14/2016   • Rheumatic fever     5/23/22 Pt reports had hx of rheumatic fever as a child twice   • Sore throat    • Tuberculosis 1947     Past Surgical History:   Procedure Laterality Date   • APPENDECTOMY     • CARDIAC PACEMAKER PLACEMENT  2019   • COLONOSCOPY     • DENTAL SURGERY      extractions   • HYSTERECTOMY      5/23/22 Pt reports had appendix out with hysterectomy and \"tightened up my bladder\"   • IR ASPIRATION ONLY  9/30/2022   • ORIF FEMUR FRACTURE Left 08/05/2021    Procedure: OPEN REDUCTION W/ INTERNAL FIXATION (ORIF) DISTAL FEMUR; INSERTION RETROGRADE NAIL;  Surgeon: Margaret Faulkner MD;  Location: [facility];  Service: Orthopedics   • MI ARTHRP KNE CONDYLE&PLATU MEDIAL&LAT COMPARTMENTS Left 9/12/2022    Procedure: ARTHROPLASTY KNEE TOTAL;  Surgeon: Margaret Faulkner MD;  Location: [facility];  Service: Orthopedics   • MI DILATION [facility] UNGUIDED SOUND/BOUGIE 1/MULT PASS N/A 04/06/2016    Procedure: DILATATION ESOPHAGEAL;  Surgeon: Malik Smith MD;  Location: [facility]; Service: Gastroenterology   • MI EGD TRANSORAL BIOPSY SINGLE/MULTIPLE N/A 04/06/2016    Procedure: ESOPHAGOGASTRODUODENOSCOPY (EGD); Surgeon: Malik Smith MD;  Location: [facility]; Service: Gastroenterology   • MI LATERAL RETINACULAR RELEASE OPEN Left 1/23/2023    Procedure: RELEASE RETINACULAR, left knee and all associated procedures;  Surgeon: Margaret Faulkner MD;  [facility];  Service: Orthopedics   • MI REMOVAL IMPLANT DEEP Left 9/12/2022    Procedure: REMOVAL NAIL IM  FEMUR;  Surgeon: Margaret Faulkner MD;  [facility];  Service: Orthopedics   • MI XCAPSL CTRC RMVL INSJ IO LENS PROSTH W/O ECP Left 03/15/2016    Procedure: EXTRACTION EXTRACAPSULAR CATARACT PHACO INTRAOCULAR LENS (IOL);   Surgeon: Mari Latif MD;  " Location: [facility];  Service: Ophthalmology   • REPLACEMENT TOTAL KNEE Right    • REPLACEMENT TOTAL KNEE      right    • TONSILLECTOMY           Alba Washburn is a 76year old female, she is a LTC resident of [facility] since November 2022  She has a past medical hx of Afib s/p PPM, HTN, CHF,Gout, CKD, Fibromyalgia, Reactive Airway disease, Arthritis  Seen today for acute visit for c/o worsening hand pain  Patient seen last week for c/o wrist pain, x ray revealed osteoarthritis, there is likely a component of carpel tunnel due to neuropathy, also known history of fibromyalgia  She has been using the wrist brace ordered, working with OT and has been given tylenol and Voltaren gel without releif  Patient states pain is worse at night and is requesting something stronger for pain  She states she has taken oxycodone in past after her knee surgery and tolerated this well  We will consult PM&R physician as well for assistance in managing her pain  Otherwise patient offers no other complaints at this time  She uses a RW and W/C for mobility  She denies any falls or trauma  Review of Systems   Constitutional: Negative for chills and fever  HENT: Negative for congestion, rhinorrhea and sore throat  Eyes: Negative for pain and visual disturbance  Respiratory: Negative for cough, shortness of breath and wheezing  Cardiovascular: Negative for chest pain and palpitations  Gastrointestinal: Negative for abdominal pain, constipation, diarrhea and nausea  Genitourinary: Negative for decreased urine volume, difficulty urinating, dysuria and hematuria  Musculoskeletal: Positive for arthralgias and gait problem  Negative for back pain  Skin: Negative for color change and rash  Neurological: Positive for numbness  Negative for dizziness, syncope, weakness and headaches  Psychiatric/Behavioral: Negative for dysphoric mood and sleep disturbance  The patient is not nervous/anxious  Objective:    VITALS:   Vitals:    04/06/23 1237   BP: 130/68   Pulse: 70   Resp: 18   Temp: 97 5 °F (36 4 °C)   SpO2: 94%          Physical Exam  Vitals and nursing note reviewed  Exam conducted with a chaperone present  Constitutional:       General: She is not in acute distress  Appearance: Normal appearance  She is obese  HENT:      Head: Normocephalic and atraumatic  Nose: No congestion or rhinorrhea  Mouth/Throat:      Mouth: Mucous membranes are moist    Eyes:      General: No scleral icterus  Conjunctiva/sclera: Conjunctivae normal       Pupils: Pupils are equal, round, and reactive to light  Cardiovascular:      Rate and Rhythm: Normal rate and regular rhythm  Pulses: Normal pulses  Heart sounds: Normal heart sounds  No murmur heard  Pulmonary:      Effort: Pulmonary effort is normal  No respiratory distress  Breath sounds: Normal breath sounds  No wheezing, rhonchi or rales  Abdominal:      General: Bowel sounds are normal  There is no distension  Palpations: Abdomen is soft  There is no mass  Tenderness: There is no abdominal tenderness  Hernia: No hernia is present  Musculoskeletal:         General: Tenderness (Right wrist ) present  No swelling  Right wrist: Tenderness present  No swelling, deformity, effusion, lacerations, bony tenderness, snuff box tenderness or crepitus  Decreased range of motion  Normal pulse  Left wrist: Normal       Right hand: Tenderness present  No swelling, deformity, lacerations or bony tenderness  Normal range of motion  Normal sensation  Left hand: Normal    Lymphadenopathy:      Cervical: No cervical adenopathy  Skin:     General: Skin is warm and dry  Coloration: Skin is not pale  Findings: No rash  Neurological:      General: No focal deficit present  Mental Status: She is alert and oriented to person, place, and time  Mental status is at baseline        Motor: Weakness present        Gait: Gait abnormal    Psychiatric:         Mood and Affect: Mood normal          Behavior: Behavior normal              Current Outpatient Medications:   •  oxyCODONE (Roxicodone) 5 immediate release tablet, Take 0 5 tablets (2 5 mg total) by mouth every 12 (twelve) hours as needed for moderate pain or severe pain Max Daily Amount: 5 mg, Disp: 30 tablet, Rfl: 0  •  acetaminophen (TYLENOL) 325 mg tablet, Take 975 mg by mouth every 6 (six) hours as needed, Disp: , Rfl:   •  albuterol (PROVENTIL HFA,VENTOLIN HFA) 90 mcg/act inhaler, INHALE 2 PUFFS BY MOUTH EVERY 6 HOURS AS NEEDED FOR WHEEZING, Disp: 3 Inhaler, Rfl: 0  •  allopurinol (ZYLOPRIM) 100 mg tablet, Take 1 tablet (100 mg total) by mouth daily, Disp: 90 tablet, Rfl: 3  •  apixaban (Eliquis) 5 mg, Take 1 tablet (5 mg total) by mouth 2 (two) times a day, Disp: 60 tablet, Rfl: 6  •  ascorbic acid (VITAMIN C) 500 MG tablet, Take 1 tablet (500 mg total) by mouth 2 (two) times a day, Disp: 60 tablet, Rfl: 1  •  bisacodyl (DULCOLAX) 10 mg suppository, Insert 10 mg into the rectum daily as needed, Disp: , Rfl:   •  Calcium Polycarbophil (FIBER-LAX PO), Take 1 tablet by mouth daily at bedtime, Disp: , Rfl:   •  Cholecalciferol 25 MCG (1000 UT) tablet, Take 1,000 Units by mouth daily, Disp: , Rfl:   •  Diclofenac Sodium (VOLTAREN) 1 %, Apply 2 g topically 4 (four) times a day = to B/L knee, Disp: , Rfl:   •  diltiazem (CARDIZEM CD) 120 mg 24 hr capsule, Take 1 capsule (120 mg total) by mouth daily, Disp: 90 capsule, Rfl: 3  •  DULoxetine (CYMBALTA) 30 mg delayed release capsule, TAKE 1 CAPSULE(30 MG) BY MOUTH DAILY, Disp: 30 capsule, Rfl: 5  •  ezetimibe (ZETIA) 10 mg tablet, Take 1 tablet (10 mg total) by mouth daily, Disp: 90 tablet, Rfl: 1  •  ferrous sulfate 324 (65 Fe) mg, Take 1 tablet (324 mg total) by mouth 2 (two) times a day before meals, Disp: 60 tablet, Rfl: 1  •  folic acid (FOLVITE) 1 mg tablet, TAKE 1 TABLET(1 MG) BY MOUTH DAILY, Disp: 90 "tablet, Rfl: 0  •  furosemide (LASIX) 20 mg tablet, Take 1 tablet (20 mg total) by mouth daily with dinner, Disp: , Rfl: 0  •  furosemide (LASIX) 40 mg tablet, Take 40 mg by mouth in the morning, Disp: , Rfl:   •  Melatonin 10 MG TABS, Take by mouth Takes daily at night, Disp: , Rfl:   •  metoprolol tartrate (LOPRESSOR) 100 mg tablet, Take 1 tablet (100 mg total) by mouth daily in the early morning Do not start before January 7, 2023 , Disp: , Rfl: 0  •  metoprolol tartrate (LOPRESSOR) 50 mg tablet, Take 1 tablet (50 mg total) by mouth daily at bedtime, Disp: , Rfl: 0  •  Multiple Vitamin (MULTIVITAMIN ADULT PO), Take by mouth in the morning, Disp: , Rfl:   •  omeprazole (PriLOSEC) 20 mg delayed release capsule, Take 20 mg by mouth daily, Disp: , Rfl:   •  oxybutynin (DITROPAN-XL) 10 MG 24 hr tablet, Take 10 mg by mouth daily at bedtime, Disp: , Rfl:   •  polyethylene glycol (MIRALAX) 17 g packet, Take 17 g by mouth daily as needed, Disp: , Rfl:   •  senna-docusate sodium (SENOKOT S) 8 6-50 mg per tablet, Take 2 tablets by mouth 2 (two) times a day, Disp: , Rfl: 0  •  sucralfate (CARAFATE) 1 g tablet, Take 1 tablet (1 g total) by mouth 4 (four) times a day (before meals and at bedtime), Disp: , Rfl: 0  •  vitamin B-12 (VITAMIN B-12) 1,000 mcg tablet, Take 1 tablet (1,000 mcg total) by mouth daily, Disp: , Rfl: 0      Plan discussed with Dr Kirk Rudd noted agreement with assessment and plan  Please note:  Voice-recognition software may have been used in the preparation of this document  Occasional wrong word or \"sound-alike\" substitutions may have occurred due to the inherent limitations of voice recognition software  Interpretation should be guided by JERI Everett  04/06/23  12:39 PM      "

## 2023-04-06 NOTE — ASSESSMENT & PLAN NOTE
"· Seen last week on 3/31 for c/o Right wrist pain described it as \"electrical shocks\" that originate distal to her right thumb and go into her fingertips  · States pain only occurs when she sleeps and when she is grasping something like her RW   · Denies any recent injury/fall or trauma  ·  strength is equal - Good ROM to wrist   · Tenderness noted on palpation to right medial wrist distal to thumb     · X ray of Right hand 3 views (3/31/23) :  Right hand of tissue abnormality medial aspect of the wrist minor osteoarthritis no fractures no acute abnormality  · Continue Tylenol 975 mg TID   · Continue Voltaren gel     · Today, Patient states she needs something stronger for pain- is requesting oxycodone as she has tolerated this in past after her knee surgery  · Will start Oxycodone 2 5 mg Q 12 hrs PRN  · Consult Dr Baljit Barnard with PM&R  · Continue OT   · Continue wrist brace           "

## 2023-04-06 NOTE — ASSESSMENT & PLAN NOTE
· Multifactorial in setting of advancing age, overweight, Multiple chronic co morbidities- left knee surgical repair in Jan    · Resides at [facility] with 24/7 care   · Has had ongoing PT/OT  ·  Able to get oob to chair with assist   · Continue fall precautions   · Encourage nutrition/hydration

## 2023-05-02 ENCOUNTER — OFFICE VISIT (OUTPATIENT)
Dept: GASTROENTEROLOGY | Facility: AMBULARY SURGERY CENTER | Age: 78
End: 2023-05-02

## 2023-05-02 VITALS
HEIGHT: 64 IN | OXYGEN SATURATION: 97 % | BODY MASS INDEX: 37.76 KG/M2 | HEART RATE: 65 BPM | SYSTOLIC BLOOD PRESSURE: 118 MMHG | DIASTOLIC BLOOD PRESSURE: 72 MMHG

## 2023-05-02 DIAGNOSIS — D62 ANEMIA DUE TO ACUTE BLOOD LOSS: ICD-10-CM

## 2023-05-02 DIAGNOSIS — K21.9 GASTROESOPHAGEAL REFLUX DISEASE WITHOUT ESOPHAGITIS: Primary | ICD-10-CM

## 2023-05-02 DIAGNOSIS — K62.89 PROCTITIS: ICD-10-CM

## 2023-05-02 RX ORDER — ERTAPENEM 1 G/1
INJECTION, POWDER, LYOPHILIZED, FOR SOLUTION INTRAMUSCULAR; INTRAVENOUS
COMMUNITY
Start: 2023-04-01

## 2023-05-02 RX ORDER — DILTIAZEM HYDROCHLORIDE 120 MG/1
CAPSULE, EXTENDED RELEASE ORAL
COMMUNITY
Start: 2023-04-19

## 2023-05-02 NOTE — PROGRESS NOTES
[facility]) [facility]  Mere Greenfield 76 y o  female MRN: [id number]            Assessment & Plan:  15-year-old female, status post knee surgery in January, history of atrial fibrillation, on anticoagulation, poor mobility  Abnormal CT scan and anemia  1   Anemia: Most likely secondary to the last knee surgery  -Repeat CBC at this time  -Continue iron supplementation    2  Chronic GERD: Well-controlled on daily omeprazole  -No indication for endoscopy at this time    3  Abnormal CT scan: Seen back in November at that time was a noncontrast CAT scan likely due to proctitis or constipation  Repeat CT scan of the abdomen pelvis at this time    Patient and her niece were reluctant to proceed with endoscopic evaluation on the last visit, will reevaluate both of these if she remains persistently anemic or has continued abnormal CT scan findings      Adelso Cage was seen today for follow-up  Diagnoses and all orders for this visit:    Gastroesophageal reflux disease without esophagitis    Proctitis  -     CT abdomen pelvis w contrast; Future    Anemia due to acute blood loss  -     Basic metabolic panel; Future  -     CBC and differential; Future  -     CT abdomen pelvis w contrast; Future            _____________________________________________________________        CC: Follow-up    HPI:  Mere Greenfield is a 76 y  o female who is here for follow-up  15-year-old female, we had seen her last November which presented after hospital stay she had an MR CT scan with rectal thickening at that time  Patient and her niece were both reluctant to proceed with colonoscopy, we had ordered a repeat CAT scan which was never completed  She was readmitted to the hospital, had additional knee surgery, found to have anemia  She reports that she is doing well, reflux is well controlled  She is on daily omeprazole  Denies any nausea, vomiting, heartburn    Reports that she has regular bowel movements despite taking iron pills     Presenting in nursing facility  Patient was sent here by herself, no relatives or caretakers  She reports that she still has difficulty ambulating  Still gets dizzy when sitting up      ROS:  The remainder of the ROS was negative except for the pertinent positives mentioned in HPI        Allergies: Penicillins, Sulfa antibiotics, I51 folate [folic acid-vit E4-WLD C96 - food allergy], Cobalt, Lyrica [pregabalin], Other, Cortisone acetate [cortisone], Doxycycline, and Nickel    Medications:   Current Outpatient Medications:   •  acetaminophen (TYLENOL) 325 mg tablet, Take 975 mg by mouth every 6 (six) hours as needed, Disp: , Rfl:   •  albuterol (PROVENTIL HFA,VENTOLIN HFA) 90 mcg/act inhaler, INHALE 2 PUFFS BY MOUTH EVERY 6 HOURS AS NEEDED FOR WHEEZING, Disp: 3 Inhaler, Rfl: 0  •  allopurinol (ZYLOPRIM) 100 mg tablet, Take 1 tablet (100 mg total) by mouth daily, Disp: 90 tablet, Rfl: 3  •  apixaban (Eliquis) 5 mg, Take 1 tablet (5 mg total) by mouth 2 (two) times a day, Disp: 60 tablet, Rfl: 6  •  ascorbic acid (VITAMIN C) 500 MG tablet, Take 1 tablet (500 mg total) by mouth 2 (two) times a day, Disp: 60 tablet, Rfl: 1  •  bisacodyl (DULCOLAX) 10 mg suppository, Insert 10 mg into the rectum daily as needed, Disp: , Rfl:   •  Calcium Polycarbophil (FIBER-LAX PO), Take 1 tablet by mouth daily at bedtime, Disp: , Rfl:   •  Cholecalciferol 25 MCG (1000 UT) tablet, Take 1,000 Units by mouth daily, Disp: , Rfl:   •  Diclofenac Sodium (VOLTAREN) 1 %, Apply 2 g topically 4 (four) times a day = to B/L knee, Disp: , Rfl:   •  diltiazem (CARDIZEM CD) 120 mg 24 hr capsule, Take 1 capsule (120 mg total) by mouth daily, Disp: 90 capsule, Rfl: 3  •  diltiazem (DILACOR XR) 120 MG 24 hr capsule, , Disp: , Rfl:   •  ertapenem (INVanz) 1 g, , Disp: , Rfl:   •  ezetimibe (ZETIA) 10 mg tablet, Take 1 tablet (10 mg total) by mouth daily, Disp: 90 tablet, Rfl: 1  •  ferrous sulfate 324 (65 Fe) mg, Take 1 tablet (324 mg total) by mouth 2 (two) times a day before meals, Disp: 60 tablet, Rfl: 1  •  folic acid (FOLVITE) 1 mg tablet, TAKE 1 TABLET(1 MG) BY MOUTH DAILY, Disp: 90 tablet, Rfl: 0  •  furosemide (LASIX) 20 mg tablet, Take 1 tablet (20 mg total) by mouth daily with dinner, Disp: , Rfl: 0  •  Melatonin 10 MG TABS, Take by mouth Takes daily at night, Disp: , Rfl:   •  metoprolol tartrate (LOPRESSOR) 100 mg tablet, Take 1 tablet (100 mg total) by mouth daily in the early morning Do not start before January 7, 2023 , Disp: , Rfl: 0  •  metoprolol tartrate (LOPRESSOR) 50 mg tablet, Take 1 tablet (50 mg total) by mouth daily at bedtime, Disp: , Rfl: 0  •  Multiple Vitamin (MULTIVITAMIN ADULT PO), Take by mouth in the morning, Disp: , Rfl:   •  omeprazole (PriLOSEC) 20 mg delayed release capsule, Take 20 mg by mouth daily, Disp: , Rfl:   •  oxybutynin (DITROPAN-XL) 10 MG 24 hr tablet, Take 10 mg by mouth daily at bedtime, Disp: , Rfl:   •  oxyCODONE (Roxicodone) 5 immediate release tablet, Take 0 5 tablets (2 5 mg total) by mouth every 12 (twelve) hours as needed for moderate pain or severe pain Max Daily Amount: 5 mg, Disp: 30 tablet, Rfl: 0  •  polyethylene glycol (MIRALAX) 17 g packet, Take 17 g by mouth daily as needed, Disp: , Rfl:   •  senna-docusate sodium (SENOKOT S) 8 6-50 mg per tablet, Take 2 tablets by mouth 2 (two) times a day, Disp: , Rfl: 0  •  sucralfate (CARAFATE) 1 g tablet, Take 1 tablet (1 g total) by mouth 4 (four) times a day (before meals and at bedtime), Disp: , Rfl: 0  •  vitamin B-12 (VITAMIN B-12) 1,000 mcg tablet, Take 1 tablet (1,000 mcg total) by mouth daily, Disp: , Rfl: 0  •  DULoxetine (CYMBALTA) 30 mg delayed release capsule, TAKE 1 CAPSULE(30 MG) BY MOUTH DAILY (Patient not taking: Reported on 5/2/2023), Disp: 30 capsule, Rfl: 5  •  furosemide (LASIX) 40 mg tablet, Take 40 mg by mouth in the morning (Patient not taking: Reported on 5/2/2023), Disp: , Rfl:     Past Medical History:   Diagnosis Date   • "Abnormal ECG     Last Assessed 9/29/2016   • Anxiety     Last Assessed 6/08/2016   • Arthritis     knees   • Asthma     Last Assessed 11/06/2013   • Atrial premature complex    • Cataract, bilateral     Last Assessed 7/14/2016   • Cataract, left eye     Both eyes  Had surgery on left  • COVID-19    • Difficulty swallowing    • Diverticulosis 9/25/2022   • Dizziness    • Fibromyalgia    • Fibromyalgia, primary    • Gastric reflux    • Heart failure ([address] )     5/23/22 Pt reports had heart failure in the past   • History of COVID-19     5/23/22 Pt reports having COVID in 2021     • History of transfusion     no reaction   • [place] (hard of hearing)    • Hyperlipidemia    • Hypertension    • Incontinence in female     5/23/22 Pt reports has incontinence -wears depends especially at night/riding in car   • Irregular heart beat     5/23/22 Pt reports hx of A fib   • Lyme disease    • PONV (postoperative nausea and vomiting)    • Premature ventricular contraction    • Primary osteoarthritis of both knees     Last Assessed 7/14/2016   • Rheumatic fever     5/23/22 Pt reports had hx of rheumatic fever as a child twice   • Sore throat    • Tuberculosis 1945       Past Surgical History:   Procedure Laterality Date   • APPENDECTOMY     • CARDIAC PACEMAKER PLACEMENT  2019   • COLONOSCOPY     • DENTAL SURGERY      extractions   • HYSTERECTOMY      5/23/22 Pt reports had appendix out with hysterectomy and \"tightened up my bladder\"   • IR ASPIRATION ONLY  9/30/2022   • ORIF FEMUR FRACTURE Left 08/05/2021    Procedure: OPEN REDUCTION W/ INTERNAL FIXATION (ORIF) DISTAL FEMUR; INSERTION RETROGRADE NAIL;  Surgeon: Lizbeth Carballo MD;  [facility];  Service: Orthopedics   • DE ARTHRP KNE CONDYLE&PLATU MEDIAL&LAT COMPARTMENTS Left 9/12/2022    Procedure: ARTHROPLASTY KNEE TOTAL;  Surgeon: Lizbeth Carballo MD;  [facility];  Service: Orthopedics   • DE DILATION ESOPH UNGUIDED SOUND/BOUGIE 1/MULT PASS N/A 04/06/2016    " "Procedure: DILATATION ESOPHAGEAL;  Surgeon: Jesse Pearson MD;  [facility]: [facility]; Service: Gastroenterology   • WV EGD TRANSORAL BIOPSY SINGLE/MULTIPLE N/A 04/06/2016    Procedure: ESOPHAGOGASTRODUODENOSCOPY (EGD); Surgeon: Jesse Pearson MD;  [facility]; Service: Gastroenterology   • W[place] LATERAL RETINACULAR RELEASE OPEN Left 1/23/2023    Procedure: RELEASE RETINACULAR, left knee and all associated procedures;  Surgeon: Esther Littlejohn MD;  [facility];  Service: Orthopedics   • WV REMOVAL IMPLANT DEEP Left 9/12/2022    Procedure: REMOVAL NAIL IM  FEMUR;  Surgeon: Esther Littlejohn MD;  [facility];  Service: Orthopedics   • W[place] XCAPSL CTRC RMVL INSJ IO LENS PROSTH W/O ECP Left 03/15/2016    Procedure: EXTRACTION EXTRACAPSULAR CATARACT PHACO INTRAOCULAR LENS (IOL); Surgeon: Reyes Germany, MD;  [facility];  Service: Ophthalmology   • REPLACEMENT TOTAL KNEE Right    • REPLACEMENT TOTAL KNEE      right    • TONSILLECTOMY         Family History   Problem Relation Age of Onset   • Coronary artery disease Mother    • Heart attack Mother         Prior   • Heart disease Father    • Heart attack Father         Prior   • Anesthesia problems Neg Hx         reports that she has never smoked  She has never used smokeless tobacco  She reports that she does not currently use alcohol  She reports that she does not use drugs        Physical Exam:    /72 (BP Location: Right arm, Patient Position: Sitting, Cuff Size: Standard)   Pulse 65   Ht 5' 4\" (1 626 m)   SpO2 97%   BMI 37 76 kg/m²     Gen: wn/wd, NAD overweight elderly female in wheelchair  HEENT: anicteric, MMM, no cervical LAD  CVS: RRR, systolic ejection murmur  CHEST: CTA b/l  ABD: +BS, soft, NT,ND, no hepatosplenomegaly  EXT: no c/c/e  NEURO: aaox3  SKIN: NO rashes    "

## 2023-05-02 NOTE — LETTER
May 2, 2023     Timothy Correa MD  [address]  [address], [place] [id number]    Patient: Kriss Verdugo   YOB: 1945   Date of Visit: 5/2/2023       Dear Dr Hughes Severe:    Thank you for referring Arcelia Umanzor to me for evaluation  Below are my notes for this consultation  If you have questions, please do not hesitate to call me  I look forward to following your patient along with you  Sincerely,        John Klein MD        CC: No Recipients  John Klein MD  5/2/2023 10:28 AM  Sign when Signing Visit  [address] [facility]  Kriss Verdugo 76 y o  female MRN: [id number]            Assessment & Plan:  66-year-old female, status post knee surgery in January, history of atrial fibrillation, on anticoagulation, poor mobility  Abnormal CT scan and anemia  1   Anemia: Most likely secondary to the last knee surgery  -Repeat CBC at this time  -Continue iron supplementation    2  Chronic GERD: Well-controlled on daily omeprazole  -No indication for endoscopy at this time    3  Abnormal CT scan: Seen back in November at that time was a noncontrast CAT scan likely due to proctitis or constipation  Repeat CT scan of the abdomen pelvis at this time    Patient and her niece were reluctant to proceed with endoscopic evaluation on the last visit, will reevaluate both of these if she remains persistently anemic or has continued abnormal CT scan findings      Kady Shoemaker was seen today for follow-up  Diagnoses and all orders for this visit:    Gastroesophageal reflux disease without esophagitis    Proctitis  -     CT abdomen pelvis w contrast; Future    Anemia due to acute blood loss  -     Basic metabolic panel; Future  -     CBC and differential; Future  -     CT abdomen pelvis w contrast; Future            _____________________________________________________________        CC: Follow-up    HPI:  Kriss Verdugo is a 76 y  o female who is here for follow-up    66-year-old female, we had seen her last November which presented after hospital stay she had an MR CT scan with rectal thickening at that time  Patient and her niece were both reluctant to proceed with colonoscopy, we had ordered a repeat CAT scan which was never completed  She was readmitted to the hospital, had additional knee surgery, found to have anemia  She reports that she is doing well, reflux is well controlled  She is on daily omeprazole  Denies any nausea, vomiting, heartburn  Reports that she has regular bowel movements despite taking iron pills  Presenting in nursing facility  Patient was sent here by herself, no relatives or caretakers  She reports that she still has difficulty ambulating  Still gets dizzy when sitting up      ROS:  The remainder of the ROS was negative except for the pertinent positives mentioned in HPI        Allergies: Penicillins, Sulfa antibiotics, D67 folate [folic acid-vit J3-KTO Y99 - food allergy], Cobalt, Lyrica [pregabalin], Other, Cortisone acetate [cortisone], Doxycycline, and Nickel    Medications:   Current Outpatient Medications:   •  acetaminophen (TYLENOL) 325 mg tablet, Take 975 mg by mouth every 6 (six) hours as needed, Disp: , Rfl:   •  albuterol (PROVENTIL HFA,VENTOLIN HFA) 90 mcg/act inhaler, INHALE 2 PUFFS BY MOUTH EVERY 6 HOURS AS NEEDED FOR WHEEZING, Disp: 3 Inhaler, Rfl: 0  •  allopurinol (ZYLOPRIM) 100 mg tablet, Take 1 tablet (100 mg total) by mouth daily, Disp: 90 tablet, Rfl: 3  •  apixaban (Eliquis) 5 mg, Take 1 tablet (5 mg total) by mouth 2 (two) times a day, Disp: 60 tablet, Rfl: 6  •  ascorbic acid (VITAMIN C) 500 MG tablet, Take 1 tablet (500 mg total) by mouth 2 (two) times a day, Disp: 60 tablet, Rfl: 1  •  bisacodyl (DULCOLAX) 10 mg suppository, Insert 10 mg into the rectum daily as needed, Disp: , Rfl:   •  Calcium Polycarbophil (FIBER-LAX PO), Take 1 tablet by mouth daily at bedtime, Disp: , Rfl:   •  Cholecalciferol 25 MCG (1000 UT) tablet, Take 1,000 Units by mouth daily, Disp: , Rfl:   •  Diclofenac Sodium (VOLTAREN) 1 %, Apply 2 g topically 4 (four) times a day = to B/L knee, Disp: , Rfl:   •  diltiazem (CARDIZEM CD) 120 mg 24 hr capsule, Take 1 capsule (120 mg total) by mouth daily, Disp: 90 capsule, Rfl: 3  •  diltiazem (DILACOR XR) 120 MG 24 hr capsule, , Disp: , Rfl:   •  ertapenem (INVanz) 1 g, , Disp: , Rfl:   •  ezetimibe (ZETIA) 10 mg tablet, Take 1 tablet (10 mg total) by mouth daily, Disp: 90 tablet, Rfl: 1  •  ferrous sulfate 324 (65 Fe) mg, Take 1 tablet (324 mg total) by mouth 2 (two) times a day before meals, Disp: 60 tablet, Rfl: 1  •  folic acid (FOLVITE) 1 mg tablet, TAKE 1 TABLET(1 MG) BY MOUTH DAILY, Disp: 90 tablet, Rfl: 0  •  furosemide (LASIX) 20 mg tablet, Take 1 tablet (20 mg total) by mouth daily with dinner, Disp: , Rfl: 0  •  Melatonin 10 MG TABS, Take by mouth Takes daily at night, Disp: , Rfl:   •  metoprolol tartrate (LOPRESSOR) 100 mg tablet, Take 1 tablet (100 mg total) by mouth daily in the early morning Do not start before January 7, 2023 , Disp: , Rfl: 0  •  metoprolol tartrate (LOPRESSOR) 50 mg tablet, Take 1 tablet (50 mg total) by mouth daily at bedtime, Disp: , Rfl: 0  •  Multiple Vitamin (MULTIVITAMIN ADULT PO), Take by mouth in the morning, Disp: , Rfl:   •  omeprazole (PriLOSEC) 20 mg delayed release capsule, Take 20 mg by mouth daily, Disp: , Rfl:   •  oxybutynin (DITROPAN-XL) 10 MG 24 hr tablet, Take 10 mg by mouth daily at bedtime, Disp: , Rfl:   •  oxyCODONE (Roxicodone) 5 immediate release tablet, Take 0 5 tablets (2 5 mg total) by mouth every 12 (twelve) hours as needed for moderate pain or severe pain Max Daily Amount: 5 mg, Disp: 30 tablet, Rfl: 0  •  polyethylene glycol (MIRALAX) 17 g packet, Take 17 g by mouth daily as needed, Disp: , Rfl:   •  senna-docusate sodium (SENOKOT S) 8 6-50 mg per tablet, Take 2 tablets by mouth 2 (two) times a day, Disp: , Rfl: 0  •  sucralfate (CARAFATE) 1 g tablet, Take 1 tablet (1 g total) by mouth 4 (four) times a day (before meals and at bedtime), Disp: , Rfl: 0  •  vitamin B-12 (VITAMIN B-12) 1,000 mcg tablet, Take 1 tablet (1,000 mcg total) by mouth daily, Disp: , Rfl: 0  •  DULoxetine (CYMBALTA) 30 mg delayed release capsule, TAKE 1 CAPSULE(30 MG) BY MOUTH DAILY (Patient not taking: Reported on 5/2/2023), Disp: 30 capsule, Rfl: 5  •  furosemide (LASIX) 40 mg tablet, Take 40 mg by mouth in the morning (Patient not taking: Reported on 5/2/2023), Disp: , Rfl:     Past Medical History:   Diagnosis Date   • Abnormal ECG     Last Assessed 9/29/2016   • Anxiety     Last Assessed 6/08/2016   • Arthritis     knees   • Asthma     Last Assessed 11/06/2013   • Atrial premature complex    • Cataract, bilateral     Last Assessed 7/14/2016   • Cataract, left eye     Both eyes  Had surgery on left  • COVID-19    • Difficulty swallowing    • Diverticulosis 9/25/2022   • Dizziness    • Fibromyalgia    • Fibromyalgia, primary    • Gastric reflux    • [address] failure [address] [address] )     5/23/22 Pt reports had heart failure in the past   • History of COVID-19     5/23/22 Pt reports having COVID in 2021     • History of transfusion     no reaction   • [place] (hard of hearing)    • Hyperlipidemia    • Hypertension    • Incontinence in female     5/23/22 Pt reports has incontinence -wears depends especially at night/riding in car   • Irregular heart beat     5/23/22 Pt reports hx of A fib   • Lyme disease    • PONV (postoperative nausea and vomiting)    • Premature ventricular contraction    • Primary osteoarthritis of both knees     Last Assessed 7/14/2016   • Rheumatic fever     5/23/22 Pt reports had hx of rheumatic fever as a child twice   • Sore throat    • Tuberculosis 1945       Past Surgical History:   Procedure Laterality Date   • APPENDECTOMY     • CARDIAC PACEMAKER PLACEMENT  2019   • COLONOSCOPY     • DENTAL SURGERY      extractions   • HYSTERECTOMY      5/23/22 Pt reports had appendix out with "hysterectomy and \"tightened up my bladder\"   • IR ASPIRATION ONLY  9/30/2022   • ORIF FEMUR FRACTURE Left 08/05/2021    Procedure: OPEN REDUCTION W/ INTERNAL FIXATION (ORIF) DISTAL FEMUR; INSERTION RETROGRADE NAIL;  Surgeon: Genna Mcguire MD;  [facility];  Service: Orthopedics   • WY ARTHRP KNE CONDYLE&PLATU MEDIAL&LAT COMPARTMENTS Left 9/12/2022    Procedure: ARTHROPLASTY KNEE TOTAL;  Surgeon: Genna Mcguire MD;  [facility];  Service: Orthopedics   • WY DILATION ESOPH UNGUIDED SOUND/BOUGIE 1/MULT PASS N/A 04/06/2016    Procedure: DILATATION ESOPHAGEAL;  Surgeon: Leon Brown MD;  [facility]; Service: Gastroenterology   • WY EGD TRANSORAL BIOPSY SINGLE/MULTIPLE N/A 04/06/2016    Procedure: ESOPHAGOGASTRODUODENOSCOPY (EGD); Surgeon: Leon Brown MD;  [facility]; Service: Gastroenterology   • WY LATERAL RETINACULAR RELEASE OPEN Left 1/23/2023    Procedure: RELEASE RETINACULAR, left knee and all associated procedures;  Surgeon: Genna Mcguire MD;  [facility];  Service: Orthopedics   • WY REMOVAL IMPLANT DEEP Left 9/12/2022    Procedure: REMOVAL NAIL IM  FEMUR;  Surgeon: Genna Mcguire MD;  [facility];  Service: Orthopedics   • WY XCAPSL CTRC RMVL INSJ IO LENS PROSTH W/O ECP Left 03/15/2016    Procedure: EXTRACTION EXTRACAPSULAR CATARACT PHACO INTRAOCULAR LENS (IOL); Surgeon: Earl Salgado MD;  [facility];  Service: Ophthalmology   • REPLACEMENT TOTAL KNEE Right    • REPLACEMENT TOTAL KNEE      right    • TONSILLECTOMY         Family History   Problem Relation Age of Onset   • Coronary artery disease Mother    • Heart attack Mother         Prior   • Heart disease Father    • Heart attack Father         Prior   • Anesthesia problems Neg Hx         reports that she has never smoked  She has never used smokeless tobacco  She reports that she does not currently use alcohol  She reports that she does not use drugs        Physical " "Exam:    /72 (BP Location: Right arm, Patient Position: Sitting, Cuff Size: Standard)   Pulse 65   Ht 5' 4\" (1 626 m)   SpO2 97%   BMI 37 76 kg/m²     Gen: wn/wd, NAD overweight elderly female in wheelchair  HEENT: anicteric, MMM, no cervical LAD  CVS: RRR, systolic ejection murmur  CHEST: CTA b/l  ABD: +BS, soft, NT,ND, no hepatosplenomegaly  EXT: no c/c/e  NEURO: aaox3  SKIN: NO rashes      "

## 2023-05-04 ENCOUNTER — REMOTE DEVICE CLINIC VISIT (OUTPATIENT)
Dept: CARDIOLOGY CLINIC | Facility: CLINIC | Age: 78
End: 2023-05-04

## 2023-05-04 DIAGNOSIS — Z95.0 CARDIAC PACEMAKER IN SITU: Primary | ICD-10-CM

## 2023-05-04 NOTE — PROGRESS NOTES
Results for orders placed or performed in visit on 05/04/23   Cardiac EP device report    Narrative    MDT DUAL CHAMBER PPM (AAIR-DDDR MODE)/ ACTIVE SYSTEM IS MRI CONDITIONAL  CARELINK TRANSMISSION: BATTERY VOLTAGE ADEQUATE (2 5 YRS)  AP-2%, -72% (>40% Marion@[employer])  ALL AVAILABLE LEAD PARAMETERS WITHIN NORMAL LIMITS  13 AF EPISODES TREATED W/ rATP (23 1% PACE TERMINATED) & 13 MONITORED AF EPISODES  AF BURDEN-98 2%  HX: PAF & ON ELIQUIS, DILTIAZEM & METOPROLOL  NORMAL DEVICE FUNCTION   GV

## 2023-05-09 ENCOUNTER — HOSPITAL ENCOUNTER (OUTPATIENT)
Dept: CT IMAGING | Facility: HOSPITAL | Age: 78
Discharge: HOME | End: 2023-05-09
Attending: INTERNAL MEDICINE

## 2023-05-09 DIAGNOSIS — D62 ANEMIA DUE TO ACUTE BLOOD LOSS: ICD-10-CM

## 2023-05-09 DIAGNOSIS — K62.89 PROCTITIS: ICD-10-CM

## 2023-05-09 RX ADMIN — IOHEXOL 100 ML: 350 INJECTION, SOLUTION INTRAVENOUS at 12:43

## 2023-05-11 ENCOUNTER — TELEPHONE (OUTPATIENT)
Dept: OBGYN CLINIC | Facility: CLINIC | Age: 78
End: 2023-05-11

## 2023-05-11 NOTE — TELEPHONE ENCOUNTER
JESM with Melissa Thomason (Caregiver) as well as [facility] [phone] to reschedule appointment from 5-16-23 with Dr Harris

## 2023-05-15 ENCOUNTER — TELEPHONE (OUTPATIENT)
Dept: GASTROENTEROLOGY | Facility: CLINIC | Age: 78
End: 2023-05-15

## 2023-05-15 NOTE — TELEPHONE ENCOUNTER
Patients GI provider:  Dr Sam Tran    Number to return call: [phone]    Reason for call: Jamshid calling from Radiology with immediate findings from pt's CT of abdomen and pelvis      Scheduled procedure/appointment date if applicable:  N/A

## 2023-05-16 NOTE — TELEPHONE ENCOUNTER
Spoke to patient's nurse at nursing home regarding CT findings and report faxed to [phone] for follow-up regarding bladder findings

## 2023-05-26 ENCOUNTER — TELEPHONE (OUTPATIENT)
Dept: GASTROENTEROLOGY | Facility: CLINIC | Age: 78
End: 2023-05-26

## 2023-05-26 NOTE — TELEPHONE ENCOUNTER
Tried contacting the patient unable to reach patient cell   lmom asking patient to contact the office for results and recommendations

## 2023-05-26 NOTE — TELEPHONE ENCOUNTER
----- Message from Ernestine Gee sent at 5/24/2023  9:02 AM EDT -----    ----- Message -----  From: Aaron Fay MD  Sent: 5/18/2023   9:54 AM EDT  To: G[facility]    This CAT scan showed some thickening of the bladder  The findings that were previously seen in the rectum were not present however the contrast did not make it way all the way to the rectum  Follow-up in the office in 2 to 3 months with our PA

## 2023-05-30 ENCOUNTER — HOSPITAL ENCOUNTER (OUTPATIENT)
Dept: RADIOLOGY | Facility: HOSPITAL | Age: 78
Discharge: HOME | End: 2023-05-30
Attending: ORTHOPAEDIC SURGERY

## 2023-05-30 ENCOUNTER — OFFICE VISIT (OUTPATIENT)
Dept: OBGYN CLINIC | Facility: CLINIC | Age: 78
End: 2023-05-30

## 2023-05-30 VITALS — BODY MASS INDEX: 37.56 KG/M2 | WEIGHT: 220 LBS | HEIGHT: 64 IN

## 2023-05-30 DIAGNOSIS — Z98.890 STATUS POST SURGERY: Primary | ICD-10-CM

## 2023-05-30 DIAGNOSIS — Z98.890 STATUS POST SURGERY: ICD-10-CM

## 2023-05-30 NOTE — PATIENT INSTRUCTIONS
Status post left knee retinacular release  Weightbearing as tolerated left lower extremity  Recommended physical therapy increase range of motion stretching strengthening as well as gait training  Knee immobilizer as tolerated left lower extremity  Custom hinged knee brace ordered at this time for left lower extremity  Follow-up in 3 months time repeat x-rays left knee

## 2023-05-30 NOTE — PROGRESS NOTES
Orthopedics          [facility] 76 y o  female MRN: [id number]      Chief Complaint:   left knee pain    HPI:   76 y  o female complaining of left knee pain  Patient has recent history of left knee lateral release in January 23, 2023  Patient states she has had some improvements in her left lower extremity  Patient mostly mobilizes in a wheelchair at this time  She has stopped physical therapy the past 2 weeks time  She has not been able to utilize a knee brace due to the brace aggravating her thigh  She states she has had improvements in rotation of her left lower extremity she does have occasional bouts of pain which is also improving  Denies any changes numbness and tingling  Review Of Systems:   · Skin: Normal  · Neuro: See HPI  · Musculoskeletal: See HPI  · All other systems reviewed and are negative    Past Medical History:   Past Medical History:   Diagnosis Date   • Abnormal ECG     Last Assessed 9/29/2016   • Anxiety     Last Assessed 6/08/2016   • Arthritis     knees   • Asthma     Last Assessed 11/06/2013   • Atrial premature complex    • Cataract, bilateral     Last Assessed 7/14/2016   • Cataract, left eye     Both eyes  Had surgery on left  • COVID-19    • Difficulty swallowing    • Diverticulosis 9/25/2022   • Dizziness    • Fibromyalgia    • Fibromyalgia, primary    • Gastric reflux    • Heart failure ([address] )     5/23/22 Pt reports had heart failure in the past   • History of COVID-19     5/23/22 Pt reports having COVID in 2021     • History of transfusion     no reaction   • [place] (hard of hearing)    • Hyperlipidemia    • Hypertension    • Incontinence in female     5/23/22 Pt reports has incontinence -wears depends especially at night/riding in car   • Irregular heart beat     5/23/22 Pt reports hx of A fib   • Lyme disease    • PONV (postoperative nausea and vomiting)    • Premature ventricular contraction    • Primary osteoarthritis of both knees     Last Assessed 7/14/2016 "  • Rheumatic fever     5/23/22 Pt reports had hx of rheumatic fever as a child twice   • Sore throat    • Tuberculosis 1947       Past Surgical History:   Past Surgical History:   Procedure Laterality Date   • APPENDECTOMY     • CARDIAC PACEMAKER PLACEMENT  2019   • COLONOSCOPY     • DENTAL SURGERY      extractions   • HYSTERECTOMY      5/23/22 Pt reports had appendix out with hysterectomy and \"tightened up my bladder\"   • IR ASPIRATION ONLY  9/30/2022   • ORIF FEMUR FRACTURE Left 08/05/2021    Procedure: OPEN REDUCTION W/ INTERNAL FIXATION (ORIF) DISTAL FEMUR; INSERTION RETROGRADE NAIL;  Surgeon: Bere Goodman MD;  Location: [facility];  Service: Orthopedics   • AZ ARTHRP KNE CONDYLE&PLATU MEDIAL&LAT COMPARTMENTS Left 9/12/2022    Procedure: ARTHROPLASTY KNEE TOTAL;  Surgeon: Bere Goodman MD;  Location[facility];  Service: Orthopedics   • AZ DILATION ESOPH UNGUIDED SOUND/BOUGIE 1/MULT PASS N/A 04/06/2016    Procedure: DILATATION ESOPHAGEAL;  Surgeon: Ray Tabor MD;  Location: [facility]; Service: Gastroenterology   • AZ EGD TRANSORAL BIOPSY SINGLE/MULTIPLE N/A 04/06/2016    Procedure: ESOPHAGOGASTRODUODENOSCOPY (EGD); Surgeon: Ray Tabor MD;  Location: [facility]; Service: Gastroenterology   • AZ LATERAL RETINACULAR RELEASE OPEN Left 1/23/2023    Procedure: RELEASE RETINACULAR, left knee and all associated procedures;  Surgeon: Bere Goodman MD;  [facility];  Service: Orthopedics   • AZ REMOVAL IMPLANT DEEP Left 9/12/2022    Procedure: REMOVAL NAIL IM  FEMUR;  Surgeon: Bere Goodman MD;  [facility];  Service: Orthopedics   • AZ XCAPSL CTRC RMVL INSJ IO LENS PROSTH W/O ECP Left 03/15/2016    Procedure: EXTRACTION EXTRACAPSULAR CATARACT PHACO INTRAOCULAR LENS (IOL);   Surgeon: Suzanna Melendez MD;  Location: [facility];  Service: Ophthalmology   • REPLACEMENT TOTAL KNEE Right    • REPLACEMENT TOTAL KNEE      right    • TONSILLECTOMY         Family History:  Family " history reviewed and non-contributory  Family History   Problem Relation Age of Onset   • Coronary artery disease Mother    • Heart attack Mother         Prior   • Heart disease Father    • Heart attack Father         Prior   • Anesthesia problems Neg Hx          Social History:  Social History     Socioeconomic History   • Marital status:      Spouse name: Not on file   • Number of children: Not on file   • Years of education: Not on file   • Highest education level: Not on file   Occupational History   • Not on file   Tobacco Use   • Smoking status: Never   • Smokeless tobacco: Never   • Tobacco comments:     Denies any former or current smoking   Vaping Use   • Vaping Use: Never used   Substance and Sexual Activity   • Alcohol use: Not Currently     Comment: Per [employer]- pt reports no current alcohol use at this time   • Drug use: No     Comment: Denies any former or current drug use   • Sexual activity: Never     Comment:  for 12 years - not active   Other Topics Concern   • Not on file   Social History Narrative   • Not on file     Social Determinants of Health     Financial Resource Strain: Not on file   Food Insecurity: No Food Insecurity (1/4/2023)    Hunger Vital Sign    • Worried About Running Out of Food in the Last Year: Never true    • Ran Out of Food in the Last Year: Never true   Transportation Needs: No Transportation Needs (1/4/2023)    PRAPARE - Transportation    • Lack of Transportation (Medical): No    • Lack of Transportation (Non-Medical): No   Physical Activity: Not on file   Stress: Not on file   Social Connections: Not on file   Intimate Partner Violence: Not on file   Housing Stability: Low Risk  (1/4/2023)    Housing Stability Vital Sign    • Unable to Pay for Housing in the Last Year: No    • Number of Places Lived in the Last Year: 2    • Unstable Housing in the Last Year: No       Allergies:    Allergies   Allergen Reactions   • Penicillins Hives     Itching and terrible hives   • Sulfa Antibiotics Itching   • E03 Folate [Folic Acid-Vit R9-OZQ H71 - Food Allergy] Other (See Comments)     5/23/22 Pt reports no allergic reaction known    • [place] Other (See Comments)     Unknown, 5/23 -pt is unaware of reaction    • Lyrica [Pregabalin] Other (See Comments)     5/23/22 Pt reports doesn't remember reaction    • Other Other (See Comments)     Black rubber 5/23/22 per allergy test - pt unaware of reaction   • Cortisone Acetate [Cortisone] Itching and Rash     5/23/22 pt reports makes her violent    • Doxycycline Hives and Itching   • [place]el Other (See Comments)     Skin discoloration       Labs:  0   Lab Value Date/Time    CRP 5 2 (H) 05/17/2022 1139    HCT 30 6 (L) 01/25/2023 0456    HCT 31 7 (L) 01/24/2023 0529    HCT 30 3 (L) 01/06/2023 0500    HGB 8 9 (L) 01/25/2023 0456    HGB 9 5 (L) 01/24/2023 0529    HGB 8 9 (L) 01/06/2023 0500    INR 1 19 01/04/2023 0037    WBC 12 94 (H) 01/25/2023 0456    WBC 11 84 (H) 01/24/2023 0529    WBC 7 35 01/06/2023 0500       Meds:    Current Outpatient Medications:   •  acetaminophen (TYLENOL) 325 mg tablet, Take 975 mg by mouth every 6 (six) hours as needed, Disp: , Rfl:   •  albuterol (PROVENTIL HFA,VENTOLIN HFA) 90 mcg/act inhaler, INHALE 2 PUFFS BY MOUTH EVERY 6 HOURS AS NEEDED FOR WHEEZING, Disp: 3 Inhaler, Rfl: 0  •  allopurinol (ZYLOPRIM) 100 mg tablet, Take 1 tablet (100 mg total) by mouth daily, Disp: 90 tablet, Rfl: 3  •  apixaban (Eliquis) 5 mg, Take 1 tablet (5 mg total) by mouth 2 (two) times a day, Disp: 60 tablet, Rfl: 6  •  ascorbic acid (VITAMIN C) 500 MG tablet, Take 1 tablet (500 mg total) by mouth 2 (two) times a day, Disp: 60 tablet, Rfl: 1  •  bisacodyl (DULCOLAX) 10 mg suppository, Insert 10 mg into the rectum daily as needed, Disp: , Rfl:   •  Calcium Polycarbophil (FIBER-LAX PO), Take 1 tablet by mouth daily at bedtime, Disp: , Rfl:   •  Cholecalciferol 25 MCG (1000 UT) tablet, Take 1,000 Units by mouth daily, Disp: , Rfl:   •  Diclofenac Sodium (VOLTAREN) 1 %, Apply 2 g topically 4 (four) times a day = to B/L knee, Disp: , Rfl:   •  diltiazem (CARDIZEM CD) 120 mg 24 hr capsule, Take 1 capsule (120 mg total) by mouth daily, Disp: 90 capsule, Rfl: 3  •  diltiazem (DILACOR XR) 120 MG 24 hr capsule, , Disp: , Rfl:   •  DULoxetine (CYMBALTA) 30 mg delayed release capsule, TAKE 1 CAPSULE(30 MG) BY MOUTH DAILY (Patient not taking: Reported on 5/2/2023), Disp: 30 capsule, Rfl: 5  •  ertapenem (INVanz) 1 g, , Disp: , Rfl:   •  ezetimibe (ZETIA) 10 mg tablet, Take 1 tablet (10 mg total) by mouth daily, Disp: 90 tablet, Rfl: 1  •  ferrous sulfate 324 (65 Fe) mg, Take 1 tablet (324 mg total) by mouth 2 (two) times a day before meals, Disp: 60 tablet, Rfl: 1  •  folic acid (FOLVITE) 1 mg tablet, TAKE 1 TABLET(1 MG) BY MOUTH DAILY, Disp: 90 tablet, Rfl: 0  •  furosemide (LASIX) 20 mg tablet, Take 1 tablet (20 mg total) by mouth daily with dinner, Disp: , Rfl: 0  •  furosemide (LASIX) 40 mg tablet, Take 40 mg by mouth in the morning (Patient not taking: Reported on 5/2/2023), Disp: , Rfl:   •  Melatonin 10 MG TABS, Take by mouth Takes daily at night, Disp: , Rfl:   •  metoprolol tartrate (LOPRESSOR) 100 mg tablet, Take 1 tablet (100 mg total) by mouth daily in the early morning Do not start before January 7, 2023 , Disp: , Rfl: 0  •  metoprolol tartrate (LOPRESSOR) 50 mg tablet, Take 1 tablet (50 mg total) by mouth daily at bedtime, Disp: , Rfl: 0  •  Multiple Vitamin (MULTIVITAMIN ADULT PO), Take by mouth in the morning, Disp: , Rfl:   •  omeprazole (PriLOSEC) 20 mg delayed release capsule, Take 20 mg by mouth daily, Disp: , Rfl:   •  oxybutynin (DITROPAN-XL) 10 MG 24 hr tablet, Take 10 mg by mouth daily at bedtime, Disp: , Rfl:   •  oxyCODONE (Roxicodone) 5 immediate release tablet, Take 0 5 tablets (2 5 mg total) by mouth every 12 (twelve) hours as needed for moderate pain or severe pain Max Daily Amount: 5 mg, Disp: 30 tablet, Rfl: 0  • polyethylene glycol (MIRALAX) 17 g packet, Take 17 g by mouth daily as needed, Disp: , Rfl:   •  senna-docusate sodium (SENOKOT S) 8 6-50 mg per tablet, Take 2 tablets by mouth 2 (two) times a day, Disp: , Rfl: 0  •  sucralfate (CARAFATE) 1 g tablet, Take 1 tablet (1 g total) by mouth 4 (four) times a day (before meals and at bedtime), Disp: , Rfl: 0  •  vitamin B-12 (VITAMIN B-12) 1,000 mcg tablet, Take 1 tablet (1,000 mcg total) by mouth daily, Disp: , Rfl: 0      Physical Exam:   Vitals:       General Appearance:    Alert, cooperative, no distress, appears stated age   Head:    Normocephalic, without obvious abnormality, atraumatic   Eyes:    conjunctiva/corneas clear, both eyes        Nose:   Nares normal, septum midline, no drainage    Throat:   Lips normal; teeth and gums normal   Neck:    symmetrical, trachea midline, ;     thyroid:  no enlargement/   Back:     Symmetric, no curvature, ROM normal   Lungs:   No audible wheezing or labored breathing   Chest Wall:    No tenderness or deformity    Heart:    Regular rate and rhythm               Pulses:   2+ and symmetric all extremities   Skin:   Skin color, texture, turgor normal, no rashes or lesions   Neurologic:   normal strength, sensation and reflexes     throughout       Musculoskeletal: left lower extremity  Examination patient's left lower extremity well-healed anterior incision active flexion to 100 degrees active extension to 70 degrees passive extension to full  Active ankle dorsi plantarflexion sensation mildly limited wil-incisional he otherwise intact distal pulses  Radiology:   I personally reviewed the films  X-rays left knee shows no evidence of loosening of the total knee arthroplasty lateral subluxation of patella noted unchanged compared to previous films    _*_*_*_*_*_*_*_*_*_*_*_*_*_*_*_*_*_*_*_*_*_*_*_*_*_*_*_*_*_*_*_*_*_*_*_*_*_*_*_*_*    Assessment:  76 y  o female with left knee pain history of distal femoral replacement and recent history of lateral release and continued extensor lag    Plan:   · Weight bearing as tolerated  left lower extremity  · Case discussed with Dr Nelsy Simon  · Advised patient to continue physical therapy as she has had improvements in motion and rotation of her left lower extremity  · We will order custom hinged knee brace of left lower extremity to allow patient to maintain full extension  · Patient may utilize knee immobilizer as tolerated for physical therapy gait training  · Follow up in 3 months or sooner if needed      Lestine Shone, PA-C

## 2023-06-14 ENCOUNTER — NURSING HOME VISIT (OUTPATIENT)
Dept: GERIATRICS | Facility: OTHER | Age: 78
End: 2023-06-14
Payer: MEDICARE

## 2023-06-14 DIAGNOSIS — N18.31 STAGE 3A CHRONIC KIDNEY DISEASE (HCC): ICD-10-CM

## 2023-06-14 DIAGNOSIS — I48.0 PAROXYSMAL ATRIAL FIBRILLATION (HCC): ICD-10-CM

## 2023-06-14 DIAGNOSIS — I10 ESSENTIAL HYPERTENSION: Primary | ICD-10-CM

## 2023-06-14 DIAGNOSIS — I50.32 CHRONIC HEART FAILURE WITH PRESERVED EJECTION FRACTION (HCC): ICD-10-CM

## 2023-06-14 PROCEDURE — 99309 SBSQ NF CARE MODERATE MDM 30: CPT | Performed by: INTERNAL MEDICINE

## 2023-06-14 NOTE — PROGRESS NOTES
[facility]  [address]  [phone]  [facility]  POS [address]      NAME: Armando Lance  AGE: 76 y o  SEX: female [id number]    DATE OF ENCOUNTER: 6/14/2023    Assessment and Plan     1  Essential hypertension        2  Paroxysmal atrial fibrillation (HCC)        3  Chronic heart failure with preserved ejection fraction (HCC)        4  Stage 3a chronic kidney disease ([facility] )           Essential hypertension  6/14/2023 21:47 132 / 72 mmHg   BP controlled  Cont diltiazem, metoprolol, lasix 40 mg in am & 20 mg in pm    Paroxysmal atrial fibrillation (HCC)  HR stable  Cont metoprolol, eliquis & diltiazem    Chronic heart failure with preserved ejection fraction (HCC)  Wt Readings from Last 3 Encounters:   05/30/23 99 8 kg (220 lb)   04/18/23 99 8 kg (220 lb)   04/06/23 99 8 kg (220 lb)     206 0 Lbs 6/13/2023 13:42     5/11/2023 14:48 203 5 Lbs     4/19/2023 15:20 206 0 Lbs     Stable weight from 2 months ago  Pt euvolemic  No BLE edema  Echo 9/2022 EF 65%  Cont lasix 40 mg in am & 20 mg in pm  Cont metoprolol        Stage 3a chronic kidney disease ([facility] [address] )  Lab Results   Component Value Date    EGFR 58 01/25/2023    EGFR 70 01/24/2023    EGFR 60 01/06/2023    CREATININE 0 93 01/25/2023    CREATININE 0 80 01/24/2023    CREATININE 0 91 01/06/2023 5/5/2023 Cr 0 9  Pt euvolemic  Cont lasix  Avoid NSAIDS/Nephrotoxins/IV contrast       Code status: full code    Chief Complaint     Routine Long term follow-up visit  History of Present Illness   Pt seen and examined  Pt denies any pain today  Pt has chronic skin changes beneath her Rt knee  No acute erythema or issue      The following portions of the patient's history were reviewed and updated as appropriate: allergies, current medications, past family history, past medical history, past social history, past surgical history and problem list     Review of Systems     Review of Systems   All other systems reviewed and are negative         Active Problem List     Patient Active Problem List   Diagnosis   • Atrial fibrillation RVR ([facility])   • Tachy-smita syndrome ([facility])   • Essential hypertension   • Pacemaker   • Chronic bilateral low back pain without sciatica   • Chronic GERD   • Degenerative lumbar spinal stenosis   • Fibromyalgia   • GERD (gastroesophageal reflux disease)   • Low back pain   • Lumbar degenerative disc disease   • Mild carpal tunnel syndrome, right   • Overweight   • Psoriasis   • Thyroid nodule   • Urinary incontinence   • Other insomnia   • Anxiety   • Tremor   • Chronic pain of left knee   • Age related osteoporosis   • Dysphonia   • Vitamin D insufficiency   • HLD (hyperlipidemia)   • Arthritis   • Generalized weakness   • Leukocytosis   • MICHAEL (acute kidney injury) ([facility] [address] )   • Ambulatory dysfunction   • Acute respiratory failure ([facility])   • Reactive airway disease with acute exacerbation   • Morbid obesity with body mass index (BMI) of 40 0 to 49 9 ([facility])   • Chronic gout with tophus   • Current moderate episode of major depressive disorder ([facility])   • Paroxysmal atrial fibrillation ([facility])   • Poor dentition   • Closed bicondylar fracture of left femur with delayed healing   • Preop cardiovascular exam   • Stage 3a chronic kidney disease ([facility])   • Preop examination   • Renal insufficiency   • Surgical wound present   • Anemia   • Bladder wall thickening   • left femoral fluid collection   • Pleural effusion on right   • Vomiting and diarrhea   • Bacteremia due to methicillin resistant Staphylococcus epidermidis   • Asymptomatic bacteriuria   • Aortic regurgitation   • Pressure injury of left buttock, stage 3 ([facility])   • Thrombocytosis   • Status post fall   • Physical deconditioning   • Chronic heart failure with preserved ejection fraction ([facility])   • Dermatitis associated with incontinence   • Hypokalemia   • Nausea   • Hyponatremia   • Rash   • Proctitis   • Suspected UTI   • Hyperkalemia   • Acute pain of right knee   • History of bacteremia   • Abnormal CT of the abdomen   • Fall   • Congestive heart failure   • Chronic constipation   • S/P knee surgery   • Wrist pain, acute   • Status post surgery       Objective     Vitals: Reviewed in [employer] system  VSS    General: Awake, alert & oriented x 2; thought it was 2022  Head: atraumatic, normocephalic  Cardiovascular: RRR  Lungs: Clear to auscultation bilaterally  Abdomen: nontender/nondistended, +BS  Legs: no cyanosis, clubbing or edema  Skin: clean, dry, intact  Psych: calm, cooperative    Pertinent Laboratory/Diagnostic Studies:  Refer to facility chart  Current Medications   Medications reviewed and updated in facility chart      Oxybutynin Chloride ER Oral Tablet Extended Release 24 Hour 10 MG (Oxybutynin Chloride)    Allopurinol Oral Tablet 100 MG (Allopurinol) Give 1 tablet by mouth one time a day related to GOUT, UNSPECIFIED (M10 9)   Dilt-XR Oral Capsule Extended Release 24 Hour 120 MG (Diltiazem HCl) Give 1 capsule by mouth one time a day related to HEART FAILURE, UNSPECIFIED (I50 9)   Thera M Plus Oral Tablet (Multiple Vitamins w/ Minerals) Give 1 tablet by mouth one time a day for anemia   Proventil HFA Inhalation Aerosol Solution 108 (90 Base) MCG/ACT (Albuterol Sulfate) 2 puff inhale orally every 6 hours as needed for wheezing   Melatonin Oral Tablet 10 MG (Melatonin) Give 1 tablet by mouth at bedtime for insomnia   Vitamin B12 Oral Tablet Extended Release 1000 MCG (Cyanocobalamin) Give 1 tablet by mouth one time a day for Vitamin b deficiency   Eliquis Oral Tablet 5 MG (Apixaban) Give 1 tablet by mouth two times a day related to UNSPECIFIED ATRIAL FIBRILLATION (I48 91)   Lasix Oral Tablet 20 MG (Furosemide) Give 2 tablet by mouth in the morning for CHF AND Give 1 tablet by mouth in the afternoon for CHF   DULoxetine HCl Oral Capsule Delayed Release Particles 30 MG (Duloxetine HCl) Give 1 capsule by mouth one time a day related to DEPRESSION, UNSPECIFIED (F32  A)   Folic Acid Oral Tablet 1 MG (Folic Acid) Give 1 tablet by mouth one time a day for anemia   Ferrous Sulfate Oral Tablet Delayed Release 325 (65 Fe) MG (Ferrous Sulfate) Give 1 tablet by mouth two times a day for anemia   Ezetimibe Oral Tablet 10 MG (Ezetimibe) Give 1 tablet by mouth one time a day related to HYPERLIPIDEMIA, UNSPECIFIED (E78 5)   Senna-Docusate Sodium Oral Tablet 8 6-50 MG (Sennosides-Docusate Sodium) Give 2 tablet by mouth two times a day for constipation   Metoprolol Tartrate Oral Tablet 100 MG (Metoprolol Tartrate) Give 1 tablet by mouth in the morning for HTN Hold for SBP <110 or HR <60   Vitamin C Oral Tablet 500 MG (Ascorbic Acid) Give 1 tablet by mouth two times a day for vitamin c deficiency   GaviLAX Powder (Polyethylene Glycol 3350) Give 17 gram by mouth every 24 hours as needed for constipation Mix in 4 ounces of suitable fluid   Bisac-Evac Suppository 10 MG (Bisacodyl) Insert 1 suppository rectally every 24 hours as needed for constipation   Vitamin D3 Tablet 1000 UNIT (Cholecalciferol) Give 1000 unit by mouth one time a day for vitamin d deficiency   Fiber-Lax Oral Tablet (Calcium Polycarbophil) Give 1 tablet by mouth at bedtime for constipation   Metoprolol Tartrate Oral Tablet 50 MG (Metoprolol Tartrate) Give 1 tablet by mouth at bedtime for htn Hold SBP <110 or hr <60   Voltaren External Gel 1 % (Diclofenac Sodium (Topical)) Apply to left knee topically every 6 hours as needed for chronic pain Apply 2 gram AND Apply to right knee topically every 6 hours as needed for chronic pain Apply 2 gram   Omeprazole Oral Capsule Delayed Release 20 MG (Omeprazole) Give 1 capsule by mouth in the morning related to GASTRO-ESOPHAGEAL REFLUX DISEASE WITHOUT ESOPHAGITIS (K21 9)   Tylenol Oral Tablet 325 MG (Acetaminophen) Give 3 tablet by mouth three times a day for Pain   Sucralfate Oral Tablet 1 GM (Sucralfate) Give 1 tablet by mouth two times a day related to [address] DISEASE WITHOUT ESOPHAGITIS (K21 9)         Gisela Field MD  Internal Medicine  Senior Care Physician

## 2023-06-15 ENCOUNTER — TELEPHONE (OUTPATIENT)
Dept: OBGYN CLINIC | Facility: HOSPITAL | Age: 78
End: 2023-06-15

## 2023-06-15 NOTE — TELEPHONE ENCOUNTER
Caller: Paola Gaines     Doctor: Kimberly     Reason for call: Calling to find out about the patient's brace  She is following up to see when it will come in to the office  Please advise  Call back#: [phone]  Okay to leave message

## 2023-06-15 NOTE — ASSESSMENT & PLAN NOTE
· Secondary to volume depletion, anemia, ACEI, femur fracture, now improved  · Continue to hold ACE-inhibitor, can likely resume on discharge  · Avoid nephrotoxins and hypotension  · BMP in a m  Detail Level: Detailed Initiate Treatment: Ciclopirox shampoo wash scalp with once a week

## 2023-06-17 PROBLEM — R05.9 COUGH: Status: RESOLVED | Noted: 2019-02-27 | Resolved: 2023-06-17

## 2023-06-18 NOTE — ASSESSMENT & PLAN NOTE
6/14/2023 21:47 132 / 72 mmHg   BP controlled  Cont diltiazem, metoprolol, lasix 40 mg in am & 20 mg in pm

## 2023-06-18 NOTE — ASSESSMENT & PLAN NOTE
Wt Readings from Last 3 Encounters:   05/30/23 99 8 kg (220 lb)   04/18/23 99 8 kg (220 lb)   04/06/23 99 8 kg (220 lb)     206 0 Lbs 6/13/2023 13:42     5/11/2023 14:48 203 5 Lbs     4/19/2023 15:20 206 0 Lbs     Stable weight from 2 months ago  Pt euvolemic  No BLE edema  Echo 9/2022 EF 65%  Cont lasix 40 mg in am & 20 mg in pm  Cont metoprolol

## 2023-06-18 NOTE — ASSESSMENT & PLAN NOTE
Lab Results   Component Value Date    EGFR 58 01/25/2023    EGFR 70 01/24/2023    EGFR 60 01/06/2023    CREATININE 0 93 01/25/2023    CREATININE 0 80 01/24/2023    CREATININE 0 91 01/06/2023 5/5/2023 Cr 0 9  Pt euvolemic  Cont lasix  Avoid NSAIDS/Nephrotoxins/IV contrast

## 2023-06-20 NOTE — TELEPHONE ENCOUNTER
Caller: Netta    Doctor: Joe Shook    Reason for call: Calling to find out when the knee brace will be completed and ready for patient so they an start PT    Call back#: [phone]

## 2023-06-21 NOTE — TELEPHONE ENCOUNTER
JESM with Lizeth Posey regarding the brace from [facility] their phone number is [phone] they did reach out to patient to schedule an appointment

## 2023-06-23 NOTE — TELEPHONE ENCOUNTER
Caller: Netta     Doctor: Dr Harris    Reason for call: Calling about brace, provided number to [facility] and updated patient # in chart     Call back#: n/a

## 2023-07-11 NOTE — PLAN OF CARE
Problem: PHYSICAL THERAPY ADULT  Goal: Performs mobility at highest level of function for planned discharge setting  See evaluation for individualized goals  Description: Treatment/Interventions: Functional transfer training, LE strengthening/ROM, Therapeutic exercise, Endurance training, Cognitive reorientation, Patient/family training, Bed mobility, Gait training, Equipment eval/education, Spoke to nursing, Spoke to case management, OT  Equipment Recommended: [place], Wheelchair       See flowsheet documentation for full assessment, interventions and recommendations  Outcome: Not Progressing  Note: Prognosis: Fair  Problem List: Decreased strength, Decreased range of motion, Decreased endurance, Impaired balance, Decreased mobility, Decreased coordination, Decreased cognition, Impaired judgement, Decreased safety awareness, Obesity, Pain, Orthopedic restrictions, Impaired hearing  Assessment: pt began tx session lying supine in the bed and was agreeale to participate in PT intervention  pt continues to remain consistant with min Ax1 for all bed mobility such as rolling and repositioning in the bed from L to R with LE management and completing a supine<>sit EOB transfer with LE management  pt was able to increase static/dynamic sitting balance and tolerance while perorming TE activities at EOB for 10 minutes w/o LOB  pt attempted 3 STS from EoB to [address] [place] [address] in todays tx session  All 3 attempts pt was able to clear buttockd from EoB but was unable to complete transfer to RW with max Ax1 and vC's for hand placement for safety and balance  pt required mod Ax1 to return back to supine position with LE management  post tx session pt in bed with call bell and all pt needs met  pt would benefit from continued skilled PT intervention in order to strengthen LE's and increase independence and safety with all OOB mobility when appropriate    Barriers to Discharge: Inaccessible home environment, Decreased caregiver support     PT Discharge Recommendation: Return to facility with rehabilitation services    See flowsheet documentation for full assessment  [place] Counseling: I discussed with the patient the risks of ixekizumab including but not limited to immunosuppression, serious infections, worsening of inflammatory bowel disease and drug reactions.  The patient understands that monitoring is required including a PPD at baseline and must alert us or the primary physician if symptoms of infection or other concerning signs are noted.

## 2023-07-14 ENCOUNTER — NURSING HOME VISIT (OUTPATIENT)
Dept: GERIATRICS | Facility: OTHER | Age: 78
End: 2023-07-14
Payer: MEDICARE

## 2023-07-14 VITALS
SYSTOLIC BLOOD PRESSURE: 151 MMHG | DIASTOLIC BLOOD PRESSURE: 78 MMHG | OXYGEN SATURATION: 94 % | BODY MASS INDEX: 34.93 KG/M2 | RESPIRATION RATE: 18 BRPM | WEIGHT: 203.5 LBS | TEMPERATURE: 97.9 F | HEART RATE: 71 BPM

## 2023-07-14 DIAGNOSIS — M19.90 ARTHRITIS: Primary | ICD-10-CM

## 2023-07-14 DIAGNOSIS — E78.2 MIXED HYPERLIPIDEMIA: ICD-10-CM

## 2023-07-14 DIAGNOSIS — I50.32 CHRONIC HEART FAILURE WITH PRESERVED EJECTION FRACTION (HCC): ICD-10-CM

## 2023-07-14 DIAGNOSIS — R26.2 AMBULATORY DYSFUNCTION: ICD-10-CM

## 2023-07-14 DIAGNOSIS — I48.0 PAROXYSMAL ATRIAL FIBRILLATION (HCC): ICD-10-CM

## 2023-07-14 DIAGNOSIS — N18.31 STAGE 3A CHRONIC KIDNEY DISEASE (HCC): ICD-10-CM

## 2023-07-14 DIAGNOSIS — I10 ESSENTIAL HYPERTENSION: ICD-10-CM

## 2023-07-14 PROCEDURE — 99309 SBSQ NF CARE MODERATE MDM 30: CPT | Performed by: NURSE PRACTITIONER

## 2023-07-14 NOTE — PROGRESS NOTES
[address]  [phone]  FACILITY: [facility]  Code 32 (LTC)        NAME: Sreekanth Castellanos  AGE: 76 y.o. SEX: female CODE STATUS: CPR    DATE OF ENCOUNTER: 7/14/2023    Assessment and Plan     1. Arthritis  Assessment & Plan:  Complains of chronic arthritic pain especially to her bilateral knees and hands  Patient is a long-term care resident at [facility]  Patient requires to assist out of bed is mostly wheelchair-bound  Continue Tylenol round-the-clock  Continue Voltaren gel  Continue Cymbalta      2. Ambulatory dysfunction  Assessment & Plan:  Multifactorial in setting of advanced age with debility, osteoarthritis  Fall Precautions  Multimodal pain management  Ensure adequate nutrition/hydration       3. Paroxysmal atrial fibrillation (HCC)  Assessment & Plan:  · Rate regular and controlled on exam  · Continue metoprolol, Eliquis and diltiazem      4. Essential hypertension  Assessment & Plan:  · BP controlled on exam  · Continue Cardizem, metoprolol, Lasix  · Monitor vitals at long-term care at least monthly      5. Mixed hyperlipidemia  Assessment & Plan:  Stable  Continue Zetia      6. Chronic heart failure with preserved ejection fraction [facility])  Assessment & Plan:  Wt Readings from Last 3 Encounters:   07/14/23 92.3 kg (203 lb 8 oz)   05/30/23 99.8 kg (220 lb)   04/18/23 99.8 kg (220 lb)     · Appears euvolemic on exam  · Last echo (September 2022) EF 65%  · Continue Lasix 40 in the morning and 20 in the evening  · Continue metoprolol  · Monthly weights  · Cardiac diet          7.  Stage 3a chronic kidney disease [facility])  Assessment & Plan:  Lab Results   Component Value Date    EGFR 58 01/25/2023    EGFR 70 01/24/2023    EGFR 60 01/06/2023    CREATININE 0.93 01/25/2023    CREATININE 0.80 01/24/2023    CREATININE 0.91 01/06/2023     · BMP reviewed from May 2023 stable  · BUN 21  · Creatinine 0.9  · GFR 60  · Avoid NSAIDs  · Avoid hypotension  · Renally dose medications as appropriate  · Monitor renal function every 3 to 6 months while stable         All medications and routine orders were reviewed and updated as needed. Chief Complaint     LTC follow up visit     Past Medical and Surgica History      Past Medical History:   Diagnosis Date   • Abnormal ECG     Last Assessed 9/29/2016   • Anxiety     Last Assessed 6/08/2016   • Arthritis     knees   • Asthma     Last Assessed 11/06/2013   • Atrial premature complex    • Cataract, bilateral     Last Assessed 7/14/2016   • Cataract, left eye     Both eyes. Had surgery on left. • COVID-19    • Difficulty swallowing    • Diverticulosis 9/25/2022   • Dizziness    • Fibromyalgia    • Fibromyalgia, primary    • Gastric reflux    • Heart failure ([address])     5/23/22 Pt reports had heart failure in the past   • History of COVID-19     5/23/22 Pt reports having COVID in 2021.    • History of transfusion     no reaction   • [place] (hard of hearing)    • Hyperlipidemia    • Hypertension    • Incontinence in female     5/23/22 Pt reports has incontinence -wears depends especially at night/riding in car   • Irregular heart beat     5/23/22 Pt reports hx of A fib   • Lyme disease    • PONV (postoperative nausea and vomiting)    • Premature ventricular contraction    • Primary osteoarthritis of both knees     Last Assessed 7/14/2016   • Rheumatic fever     5/23/22 Pt reports had hx of rheumatic fever as a child twice   • Sore throat    • Tuberculosis 1947     Past Surgical History:   Procedure Laterality Date   • APPENDECTOMY     • CARDIAC PACEMAKER PLACEMENT  2019   • COLONOSCOPY     • DENTAL SURGERY      extractions   • HYSTERECTOMY      5/23/22 Pt reports had appendix out with hysterectomy and "tightened up my bladder"   • IR ASPIRATION ONLY  9/30/2022   • ORIF FEMUR FRACTURE Left 08/05/2021    Procedure: OPEN REDUCTION W/ INTERNAL FIXATION (ORIF) DISTAL FEMUR; INSERTION RETROGRADE NAIL;  Surgeon: Mitchell Easley MD;  [facility];  Service: Orthopedics   • PA ARTHRP KNE CONDYLE&PLATU MEDIAL&LAT COMPARTMENTS Left 9/12/2022    Procedure: ARTHROPLASTY KNEE TOTAL;  Surgeon: Indiana Campo MD;  [facility];  Service: Orthopedics   • PA DILATION ESOPH UNGUIDED SOUND/BOUGIE 1/MULT PASS N/A 04/06/2016    Procedure: DILATATION ESOPHAGEAL;  Surgeon: Cris Santos MD;  Location: [facility]; Service: Gastroenterology   • PA EGD TRANSORAL BIOPSY SINGLE/MULTIPLE N/A 04/06/2016    Procedure: ESOPHAGOGASTRODUODENOSCOPY (EGD); Surgeon: Cris Santos MD;  [facility]: [facility]; Service: Gastroenterology   • PA LATERAL RETINACULAR RELEASE OPEN Left 1/23/2023    Procedure: RELEASE RETINACULAR, left knee and all associated procedures;  Surgeon: Indiana Campo MD;  [facility];  Service: Orthopedics   • PA REMOVAL IMPLANT DEEP Left 9/12/2022    Procedure: REMOVAL NAIL IM  FEMUR;  Surgeon: Indiana Campo MD;  [facility];  Service: Orthopedics   • PA XCAPSL CTRC RMVL INSJ IO LENS PROSTH W/O ECP Left 03/15/2016    Procedure: EXTRACTION EXTRACAPSULAR CATARACT PHACO INTRAOCULAR LENS (IOL);   Surgeon: Araceli Barragan MD;  [facility];  Service: Ophthalmology   • REPLACEMENT TOTAL KNEE Right    • REPLACEMENT TOTAL KNEE      right    • TONSILLECTOMY       Allergies   Allergen Reactions   • Penicillins Hives     Itching and terrible hives   • Sulfa Antibiotics Itching   • I59 Folate [Folic Acid-Vit V8-CJE C07 - Food Allergy] Other (See Comments)     5/23/22 Pt reports no allergic reaction known    • [place] Other (See Comments)     Unknown, 5/23 -pt is unaware of reaction    • Lyrica [Pregabalin] Other (See Comments)     5/23/22 Pt reports doesn't remember reaction    • Other Other (See Comments)     Black rubber 5/23/22 per allergy test - pt unaware of reaction   • Cortisone Acetate [Cortisone] Itching and Rash     5/23/22 pt reports makes her violent    • Doxycycline Hives and Itching   • Nickel Other (See Comments)     Skin discoloration          History of Present Illness     Collin Vora is a 76year old female she is a long-term care resident of [facility] due to debility. Patient has a past medical history including but not limited to  A. Fib, HTN, CHF, CKD, gout, fibromyalgia, arthritis, and ambulatory dysfunction. The patient is seen today for a routine LTC follow up visit for management of acute and chronic conditions. The patient was seen and examined at bedside in stable condition. She is alert and oriented x 3. Patient is out of bed in her wheelchair states it takes 2 people sometimes needs to use sit to stand. Patient states her arthritis in her hands and knees is consistent but Tylenol and Voltaren seems to be helping. Patient states she has a good appetite. Patient denies any bowel or bladder issues. Patient is in good spirits and smiling during exam.  Patient denies CP/SOB/N/V/D. Denies lightheadedness, dizziness, headaches, vision changes. No concerns from nursing staff. The patient's allergies, past medical, surgical, social and family history were reviewed and unchanged. Review of Systems     Review of Systems   Musculoskeletal: Positive for arthralgias and gait problem. All other systems reviewed and are negative. Objective     Vitals:   Vitals:    07/14/23 1615   BP: 151/78   Pulse: 71   Resp: 18   Temp: 97.9 °F (36.6 °C)   SpO2: 94%         Physical Exam  Vitals and nursing note reviewed. Constitutional:       General: She is not in acute distress. Appearance: Normal appearance. HENT:      Head: Normocephalic and atraumatic. Nose: No congestion or rhinorrhea. Mouth/Throat:      Mouth: Mucous membranes are moist.   Eyes:      General: No scleral icterus. Conjunctiva/sclera: Conjunctivae normal.      Pupils: Pupils are equal, round, and reactive to light.    Cardiovascular:      Rate and Rhythm: Normal rate and regular rhythm. Pulses: Normal pulses. Heart sounds: Normal heart sounds. No murmur heard. Pulmonary:      Effort: Pulmonary effort is normal. No respiratory distress. Breath sounds: Normal breath sounds. No wheezing, rhonchi or rales. Abdominal:      General: Bowel sounds are normal. There is no distension. Palpations: Abdomen is soft. There is no mass. Tenderness: There is no abdominal tenderness. Hernia: No hernia is present. Comments: PPM LCW   Musculoskeletal:         General: No swelling or tenderness. Lymphadenopathy:      Cervical: No cervical adenopathy. Skin:     General: Skin is warm and dry. Coloration: Skin is not pale. Findings: No rash. Neurological:      General: No focal deficit present. Mental Status: She is alert and oriented to person, place, and time. Mental status is at baseline. Motor: Weakness present. Gait: Gait abnormal.   Psychiatric:         Mood and Affect: Mood normal.         Behavior: Behavior normal.         Pertinent Laboratory/Diagnostic Studies:     Reviewed in facility chart      Current Medications   Medications reviewed and updated see facility [facility] for details.       Current Outpatient Medications:   •  acetaminophen (TYLENOL) 325 mg tablet, Take 975 mg by mouth every 6 (six) hours as needed, Disp: , Rfl:   •  albuterol (PROVENTIL HFA,VENTOLIN HFA) 90 mcg/act inhaler, INHALE 2 PUFFS BY MOUTH EVERY 6 HOURS AS NEEDED FOR WHEEZING, Disp: 3 Inhaler, Rfl: 0  •  allopurinol (ZYLOPRIM) 100 mg tablet, Take 1 tablet (100 mg total) by mouth daily, Disp: 90 tablet, Rfl: 3  •  apixaban (Eliquis) 5 mg, Take 1 tablet (5 mg total) by mouth 2 (two) times a day, Disp: 60 tablet, Rfl: 6  •  ascorbic acid (VITAMIN C) 500 MG tablet, Take 1 tablet (500 mg total) by mouth 2 (two) times a day, Disp: 60 tablet, Rfl: 1  •  bisacodyl (DULCOLAX) 10 mg suppository, Insert 10 mg into the rectum daily as needed, Disp: , Rfl:   • Calcium Polycarbophil (FIBER-LAX PO), Take 1 tablet by mouth daily at bedtime, Disp: , Rfl:   •  Cholecalciferol 25 MCG (1000 UT) tablet, Take 1,000 Units by mouth daily, Disp: , Rfl:   •  Diclofenac Sodium (VOLTAREN) 1 %, Apply 2 g topically 4 (four) times a day = to B/L knee, Disp: , Rfl:   •  diltiazem (CARDIZEM CD) 120 mg 24 hr capsule, Take 1 capsule (120 mg total) by mouth daily, Disp: 90 capsule, Rfl: 3  •  diltiazem (DILACOR XR) 120 MG 24 hr capsule, , Disp: , Rfl:   •  DULoxetine (CYMBALTA) 30 mg delayed release capsule, TAKE 1 CAPSULE(30 MG) BY MOUTH DAILY (Patient not taking: Reported on 5/2/2023), Disp: 30 capsule, Rfl: 5  •  ertapenem (INVanz) 1 g, , Disp: , Rfl:   •  ezetimibe (ZETIA) 10 mg tablet, Take 1 tablet (10 mg total) by mouth daily, Disp: 90 tablet, Rfl: 1  •  ferrous sulfate 324 (65 Fe) mg, Take 1 tablet (324 mg total) by mouth 2 (two) times a day before meals, Disp: 60 tablet, Rfl: 1  •  folic acid (FOLVITE) 1 mg tablet, TAKE 1 TABLET(1 MG) BY MOUTH DAILY, Disp: 90 tablet, Rfl: 0  •  furosemide (LASIX) 20 mg tablet, Take 1 tablet (20 mg total) by mouth daily with dinner, Disp: , Rfl: 0  •  furosemide (LASIX) 40 mg tablet, Take 40 mg by mouth in the morning (Patient not taking: Reported on 5/2/2023), Disp: , Rfl:   •  Melatonin 10 MG TABS, Take by mouth Takes daily at night, Disp: , Rfl:   •  metoprolol tartrate (LOPRESSOR) 100 mg tablet, Take 1 tablet (100 mg total) by mouth daily in the early morning Do not start before January 7, 2023., Disp: , Rfl: 0  •  metoprolol tartrate (LOPRESSOR) 50 mg tablet, Take 1 tablet (50 mg total) by mouth daily at bedtime, Disp: , Rfl: 0  •  Multiple Vitamin (MULTIVITAMIN ADULT PO), Take by mouth in the morning, Disp: , Rfl:   •  omeprazole (PriLOSEC) 20 mg delayed release capsule, Take 20 mg by mouth daily, Disp: , Rfl:   •  oxybutynin (DITROPAN-XL) 10 MG 24 hr tablet, Take 10 mg by mouth daily at bedtime, Disp: , Rfl:   •  oxyCODONE (Roxicodone) 5 immediate release tablet, Take 0.5 tablets (2.5 mg total) by mouth every 12 (twelve) hours as needed for moderate pain or severe pain Max Daily Amount: 5 mg, Disp: 30 tablet, Rfl: 0  •  polyethylene glycol (MIRALAX) 17 g packet, Take 17 g by mouth daily as needed, Disp: , Rfl:   •  senna-docusate sodium (SENOKOT S) 8.6-50 mg per tablet, Take 2 tablets by mouth 2 (two) times a day, Disp: , Rfl: 0  •  sucralfate (CARAFATE) 1 g tablet, Take 1 tablet (1 g total) by mouth 4 (four) times a day (before meals and at bedtime), Disp: , Rfl: 0  •  vitamin B-12 (VITAMIN B-12) 1,000 mcg tablet, Take 1 tablet (1,000 mcg total) by mouth daily, Disp: , Rfl: 0     Please note:  Voice-recognition software may have been used in the preparation of this document. Occasional wrong word or "sound-alike" substitutions may have occurred due to the inherent limitations of voice recognition software. Interpretation should be guided by context.          JERI Olea  7/14/2023  4:16 PM

## 2023-07-30 PROBLEM — R93.5 ABNORMAL CT OF THE ABDOMEN: Status: RESOLVED | Noted: 2022-12-28 | Resolved: 2023-07-30

## 2023-07-30 PROBLEM — Z01.818 PREOP EXAMINATION: Status: RESOLVED | Noted: 2022-08-23 | Resolved: 2023-07-30

## 2023-07-30 PROBLEM — E87.6 HYPOKALEMIA: Status: RESOLVED | Noted: 2022-11-14 | Resolved: 2023-07-30

## 2023-07-30 PROBLEM — J90 PLEURAL EFFUSION ON RIGHT: Status: RESOLVED | Noted: 2022-09-25 | Resolved: 2023-07-30

## 2023-07-30 PROBLEM — N17.9 AKI (ACUTE KIDNEY INJURY) (HCC): Status: RESOLVED | Noted: 2021-08-04 | Resolved: 2023-07-30

## 2023-07-30 PROBLEM — R49.0 DYSPHONIA: Status: RESOLVED | Noted: 2020-04-28 | Resolved: 2023-07-30

## 2023-07-30 PROBLEM — M25.561 ACUTE PAIN OF RIGHT KNEE: Status: RESOLVED | Noted: 2022-12-07 | Resolved: 2023-07-30

## 2023-07-30 PROBLEM — R11.0 NAUSEA: Status: RESOLVED | Noted: 2022-11-22 | Resolved: 2023-07-30

## 2023-07-30 PROBLEM — Z91.81 STATUS POST FALL: Status: RESOLVED | Noted: 2022-10-15 | Resolved: 2023-07-30

## 2023-07-30 PROBLEM — L89.323 PRESSURE INJURY OF LEFT BUTTOCK, STAGE 3 (HCC): Status: RESOLVED | Noted: 2022-10-05 | Resolved: 2023-07-30

## 2023-07-30 PROBLEM — Z87.898 HISTORY OF BACTEREMIA: Status: RESOLVED | Noted: 2022-12-15 | Resolved: 2023-07-30
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
## 2023-07-30 PROBLEM — R11.10 VOMITING AND DIARRHEA: Status: RESOLVED | Noted: 2022-09-25 | Resolved: 2023-07-30

## 2023-07-30 PROBLEM — R39.89 SUSPECTED UTI: Status: RESOLVED | Noted: 2022-11-26 | Resolved: 2023-07-30

## 2023-07-30 PROBLEM — Z98.890 STATUS POST SURGERY: Status: RESOLVED | Noted: 2023-05-30 | Resolved: 2023-07-30

## 2023-07-30 PROBLEM — R18.8 INGUINAL FLUID COLLECTION: Status: RESOLVED | Noted: 2022-09-25 | Resolved: 2023-07-30

## 2023-07-30 PROBLEM — T14.8XXA SURGICAL WOUND PRESENT: Status: RESOLVED | Noted: 2022-09-21 | Resolved: 2023-07-30

## 2023-07-30 PROBLEM — R21 RASH: Status: RESOLVED | Noted: 2022-11-25 | Resolved: 2023-07-30

## 2023-07-30 PROBLEM — Z01.810 PREOP CARDIOVASCULAR EXAM: Status: RESOLVED | Noted: 2022-07-20 | Resolved: 2023-07-30

## 2023-07-30 PROBLEM — E87.1 HYPONATREMIA: Status: RESOLVED | Noted: 2022-11-25 | Resolved: 2023-07-30

## 2023-07-30 PROBLEM — J96.00 ACUTE RESPIRATORY FAILURE (HCC): Status: RESOLVED | Noted: 2021-09-20 | Resolved: 2023-07-30

## 2023-07-30 PROBLEM — M25.539 WRIST PAIN, ACUTE: Status: RESOLVED | Noted: 2023-03-31 | Resolved: 2023-07-30

## 2023-07-30 PROBLEM — R19.7 VOMITING AND DIARRHEA: Status: RESOLVED | Noted: 2022-09-25 | Resolved: 2023-07-30

## 2023-07-30 PROBLEM — E87.5 HYPERKALEMIA: Status: RESOLVED | Noted: 2022-11-27 | Resolved: 2023-07-30

## 2023-07-30 PROBLEM — D72.829 LEUKOCYTOSIS: Status: RESOLVED | Noted: 2021-08-02 | Resolved: 2023-07-30

## 2023-07-30 NOTE — ASSESSMENT & PLAN NOTE
Wt Readings from Last 3 Encounters:   07/14/23 92.3 kg (203 lb 8 oz)   05/30/23 99.8 kg (220 lb)   04/18/23 99.8 kg (220 lb)     · Appears euvolemic on exam  · Last echo (September 2022) EF 65%  · Continue Lasix 40 in the morning and 20 in the evening  · Continue metoprolol  · Monthly weights  · Cardiac diet

## 2023-07-30 NOTE — ASSESSMENT & PLAN NOTE
Complains of chronic arthritic pain especially to her bilateral knees and hands  Patient is a long-term care resident at [facility]  Patient requires to assist out of bed is mostly wheelchair-bound  Continue Tylenol round-the-clock  Continue Voltaren gel  Continue Cymbalta

## 2023-07-30 NOTE — ASSESSMENT & PLAN NOTE
Multifactorial in setting of advanced age with debility, osteoarthritis  Fall Precautions  Multimodal pain management  Ensure adequate nutrition/hydration

## 2023-07-30 NOTE — ASSESSMENT & PLAN NOTE
· BP controlled on exam  · Continue Cardizem, metoprolol, Lasix  · Monitor vitals at long-term care at least monthly

## 2023-07-30 NOTE — ASSESSMENT & PLAN NOTE
Lab Results   Component Value Date    EGFR 58 01/25/2023    EGFR 70 01/24/2023    EGFR 60 01/06/2023    CREATININE 0.93 01/25/2023    CREATININE 0.80 01/24/2023    CREATININE 0.91 01/06/2023     · BMP reviewed from May 2023 stable  · BUN 21  · Creatinine 0.9  · GFR 60  · Avoid NSAIDs  · Avoid hypotension  · Renally dose medications as appropriate  · Monitor renal function every 3 to 6 months while stable

## 2023-08-03 ENCOUNTER — REMOTE DEVICE CLINIC VISIT (OUTPATIENT)
Dept: CARDIOLOGY CLINIC | Facility: CLINIC | Age: 78
End: 2023-08-03
Payer: MEDICARE

## 2023-08-03 DIAGNOSIS — Z95.0 PRESENCE OF PERMANENT CARDIAC PACEMAKER: Primary | ICD-10-CM

## 2023-08-03 PROCEDURE — 93296 REM INTERROG EVL PM/IDS: CPT | Performed by: INTERNAL MEDICINE

## 2023-08-03 PROCEDURE — 93294 REM INTERROG EVL PM/LDLS PM: CPT | Performed by: INTERNAL MEDICINE

## 2023-08-03 NOTE — PROGRESS NOTES
Results for orders placed or performed in visit on 08/03/23   Cardiac EP device report    Narrative    MDT DUAL CHAMBER PPM (AAIR-DDDR MODE)/ ACTIVE SYSTEM IS MRI CONDITIONAL  CARELINK TRANSMISSION: BATTERY VOLTAGE ADEQUATE (2.5 YRS). AP 4.5%  83.1% (>40%/DDIR 70) ALL AVAILABLE LEAD PARAMETERS WITHIN NORMAL LIMITS. 6 NEW AT/AF EPISODES WITH LONGEST >99 HRS; EPISODE IN PROGRESS SINCE 6/18/23. 0 OF 2 (0%) PACE TERMINATED EPISODES. AT/AF BURDEN 95.3% SINCE 5/3/23. TAKES ELIQUIS, DILTIAZEM, & METOPROLOL TART. NORMAL DEVICE FUNCTION.  AM/GV

## 2023-08-22 ENCOUNTER — HOSPITAL ENCOUNTER (OUTPATIENT)
Dept: RADIOLOGY | Facility: HOSPITAL | Age: 78
Discharge: HOME | End: 2023-08-22
Attending: ORTHOPAEDIC SURGERY
Payer: MEDICARE

## 2023-08-22 ENCOUNTER — OFFICE VISIT (OUTPATIENT)
Dept: OBGYN CLINIC | Facility: CLINIC | Age: 78
End: 2023-08-22
Payer: MEDICARE

## 2023-08-22 DIAGNOSIS — S72.462A CLOSED DISPLACED SUPRACONDYLAR FRACTURE OF DISTAL END OF LEFT FEMUR WITH INTRACONDYLAR EXTENSION, INITIAL ENCOUNTER (HCC): ICD-10-CM

## 2023-08-22 DIAGNOSIS — S72.92XK CLOSED FRACTURE OF LEFT FEMUR WITH NONUNION, UNSPECIFIED FRACTURE MORPHOLOGY, UNSPECIFIED PORTION OF FEMUR, SUBSEQUENT ENCOUNTER: ICD-10-CM

## 2023-08-22 DIAGNOSIS — S72.92XK CLOSED FRACTURE OF LEFT FEMUR WITH NONUNION, UNSPECIFIED FRACTURE MORPHOLOGY, UNSPECIFIED PORTION OF FEMUR, SUBSEQUENT ENCOUNTER: Primary | ICD-10-CM

## 2023-08-22 PROCEDURE — 99213 OFFICE O/P EST LOW 20 MIN: CPT | Performed by: ORTHOPAEDIC SURGERY

## 2023-08-22 PROCEDURE — 73562 X-RAY EXAM OF KNEE 3: CPT

## 2023-08-22 NOTE — PROGRESS NOTES
[address] [facility] 78 y.o. female MRN: [id number]      Chief Complaint:   Status post 1 year left distal femoral replacement due to periprosthetic fracture  Status post 8months lateral release    HPI:   78 y. o.female status post 1 year left distal femoral replacement due to left distal femur periprosthetic fracture status post 8 months lateral release. Patient states she is transported patient has been up and down the walker states her knee pain is well under control she has had improving symptoms she has completed formal physical therapy. Patient states she does like to pursue any further surgical intervention regarding her left knee at this time. Review Of Systems:   · Skin: Normal  · Neuro: See HPI  · Musculoskeletal: See HPI  · All other systems reviewed and are negative    Past Medical History:   Past Medical History:   Diagnosis Date   • Abnormal ECG     Last Assessed 9/29/2016   • Anxiety     Last Assessed 6/08/2016   • Arthritis     knees   • Asthma     Last Assessed 11/06/2013   • Atrial premature complex    • Cataract, bilateral     Last Assessed 7/14/2016   • Cataract, left eye     Both eyes. Had surgery on left. • COVID-19    • Difficulty swallowing    • Diverticulosis 9/25/2022   • Dizziness    • Fibromyalgia    • Fibromyalgia, primary    • Gastric reflux    • Heart failure ([address])     5/23/22 Pt reports had heart failure in the past   • History of COVID-19     5/23/22 Pt reports having COVID in 2021.    • History of transfusion     no reaction   • [place] (hard of hearing)    • Hyperlipidemia    • Hypertension    • Incontinence in female     5/23/22 Pt reports has incontinence -wears depends especially at night/riding in car   • Irregular heart beat     5/23/22 Pt reports hx of A fib   • Lyme disease    • PONV (postoperative nausea and vomiting)    • Premature ventricular contraction    • Primary osteoarthritis of both knees     Last Assessed 7/14/2016   • Rheumatic fever 5/23/22 Pt reports had hx of rheumatic fever as a child twice   • Sore throat    • Tuberculosis 1947       Past Surgical History:   Past Surgical History:   Procedure Laterality Date   • APPENDECTOMY     • CARDIAC PACEMAKER PLACEMENT  2019   • COLONOSCOPY     • DENTAL SURGERY      extractions   • HYSTERECTOMY      5/23/22 Pt reports had appendix out with hysterectomy and "tightened up my bladder"   • IR ASPIRATION ONLY  9/30/2022   • ORIF FEMUR FRACTURE Left 08/05/2021    Procedure: OPEN REDUCTION W/ INTERNAL FIXATION (ORIF) DISTAL FEMUR; INSERTION RETROGRADE NAIL;  Surgeon: Rebecca Vera MD;  [facility];  Service: Orthopedics   • MN ARTHRP KNE CONDYLE&PLATU MEDIAL&LAT COMPARTMENTS Left 9/12/2022    Procedure: ARTHROPLASTY KNEE TOTAL;  Surgeon: Rebecca Vera MD;  [facility];  Service: Orthopedics   • MN DILATION [facility] UNGUIDED SOUND/BOUGIE 1/MULT PASS N/A 04/06/2016    Procedure: DILATATION ESOPHAGEAL;  Surgeon: Elvis Mcgill MD;  Location: [facility]; Service: Gastroenterology   • MN EGD TRANSORAL BIOPSY SINGLE/MULTIPLE N/A 04/06/2016    Procedure: ESOPHAGOGASTRODUODENOSCOPY (EGD); Surgeon: Elvis Mcgill MD;  [facility]: [facility]; Service: Gastroenterology   • MN LATERAL RETINACULAR RELEASE OPEN Left 1/23/2023    Procedure: RELEASE RETINACULAR, left knee and all associated procedures;  Surgeon: Rebecca Vera MD;  [facility];  Service: Orthopedics   • MN REMOVAL IMPLANT DEEP Left 9/12/2022    Procedure: REMOVAL NAIL IM  FEMUR;  Surgeon: Rebecca Vera MD;  [facility];  Service: Orthopedics   • MN XCAPSL CTRC RMVL INSJ IO LENS PROSTH W/O ECP Left 03/15/2016    Procedure: EXTRACTION EXTRACAPSULAR CATARACT PHACO INTRAOCULAR LENS (IOL);   Surgeon: Aida Dasilva MD;  [facility];  Service: Ophthalmology   • REPLACEMENT TOTAL KNEE Right    • REPLACEMENT TOTAL KNEE      right    • TONSILLECTOMY         Family History:  Family history reviewed and non-contributory  Family History   Problem Relation Age of Onset   • Coronary artery disease Mother    • Heart attack Mother         Prior   • Heart disease Father    • Heart attack Father         Prior   • Anesthesia problems Neg Hx          Social History:  Social History     Socioeconomic History   • Marital status:      Spouse name: None   • Number of children: None   • Years of education: None   • Highest education level: None   Occupational History   • None   Tobacco Use   • Smoking status: Never   • Smokeless tobacco: Never   • Tobacco comments:     Denies any former or current smoking   Vaping Use   • Vaping Use: Never used   Substance and Sexual Activity   • Alcohol use: Not Currently     Comment: Per [employer]- pt reports no current alcohol use at this time   • Drug use: No     Comment: Denies any former or current drug use   • Sexual activity: Never     Comment:  for 12 years - not active   Other Topics Concern   • None   Social History Narrative   • None     Social Determinants of Health     Financial Resource Strain: Not on file   Food Insecurity: No Food Insecurity (1/4/2023)    Hunger Vital Sign    • Worried About Running Out of Food in the Last Year: Never true    • Ran Out of Food in the Last Year: Never true   Transportation Needs: No Transportation Needs (1/4/2023)    PRAPARE - Transportation    • Lack of Transportation (Medical): No    • Lack of Transportation (Non-Medical): No   Physical Activity: Not on file   Stress: Not on file   Social Connections: Not on file   Intimate Partner Violence: Not on file   Housing Stability: Low Risk  (1/4/2023)    Housing Stability Vital Sign    • Unable to Pay for Housing in the Last Year: No    • Number of Places Lived in the Last Year: 2    • Unstable Housing in the Last Year: No       Allergies:    Allergies   Allergen Reactions   • Penicillins Hives     Itching and terrible hives   • Sulfa Antibiotics Itching   • G20 Folate [Folic Acid-Vit B6-Vit B12 - Food Allergy] Other (See Comments)     5/23/22 Pt reports no allergic reaction known    • [place] Other (See Comments)     Unknown, 5/23 -pt is unaware of reaction    • Lyrica [Pregabalin] Other (See Comments)     5/23/22 Pt reports doesn't remember reaction    • Other Other (See Comments)     Black rubber 5/23/22 per allergy test - pt unaware of reaction   • Cortisone Acetate [Cortisone] Itching and Rash     5/23/22 pt reports makes her violent    • Doxycycline Hives and Itching   • Nickel Other (See Comments)     Skin discoloration       Labs:  0   Lab Value Date/Time    HCT 30.6 (L) 01/25/2023 0456    HCT 31.7 (L) 01/24/2023 0529    HCT 30.3 (L) 01/06/2023 0500    HGB 8.9 (L) 01/25/2023 0456    HGB 9.5 (L) 01/24/2023 0529    HGB 8.9 (L) 01/06/2023 0500    INR 1.19 01/04/2023 0037    WBC 12.94 (H) 01/25/2023 0456    WBC 11.84 (H) 01/24/2023 0529    WBC 7.35 01/06/2023 0500    CRP 5.2 (H) 05/17/2022 1139       Meds:    Current Outpatient Medications:   •  acetaminophen (TYLENOL) 325 mg tablet, Take 975 mg by mouth every 6 (six) hours as needed, Disp: , Rfl:   •  albuterol (PROVENTIL HFA,VENTOLIN HFA) 90 mcg/act inhaler, INHALE 2 PUFFS BY MOUTH EVERY 6 HOURS AS NEEDED FOR WHEEZING, Disp: 3 Inhaler, Rfl: 0  •  allopurinol (ZYLOPRIM) 100 mg tablet, Take 1 tablet (100 mg total) by mouth daily, Disp: 90 tablet, Rfl: 3  •  apixaban (Eliquis) 5 mg, Take 1 tablet (5 mg total) by mouth 2 (two) times a day, Disp: 60 tablet, Rfl: 6  •  ascorbic acid (VITAMIN C) 500 MG tablet, Take 1 tablet (500 mg total) by mouth 2 (two) times a day, Disp: 60 tablet, Rfl: 1  •  bisacodyl (DULCOLAX) 10 mg suppository, Insert 10 mg into the rectum daily as needed, Disp: , Rfl:   •  Calcium Polycarbophil (FIBER-LAX PO), Take 1 tablet by mouth daily at bedtime, Disp: , Rfl:   •  Cholecalciferol 25 MCG (1000 UT) tablet, Take 1,000 Units by mouth daily, Disp: , Rfl:   •  Diclofenac Sodium (VOLTAREN) 1 %, Apply 2 g topically 4 (four) times a day = to B/L knee, Disp: , Rfl:   •  diltiazem (CARDIZEM CD) 120 mg 24 hr capsule, Take 1 capsule (120 mg total) by mouth daily, Disp: 90 capsule, Rfl: 3  •  diltiazem (DILACOR XR) 120 MG 24 hr capsule, , Disp: , Rfl:   •  DULoxetine (CYMBALTA) 30 mg delayed release capsule, TAKE 1 CAPSULE(30 MG) BY MOUTH DAILY (Patient not taking: Reported on 5/2/2023), Disp: 30 capsule, Rfl: 5  •  ertapenem (INVanz) 1 g, , Disp: , Rfl:   •  ezetimibe (ZETIA) 10 mg tablet, Take 1 tablet (10 mg total) by mouth daily, Disp: 90 tablet, Rfl: 1  •  ferrous sulfate 324 (65 Fe) mg, Take 1 tablet (324 mg total) by mouth 2 (two) times a day before meals, Disp: 60 tablet, Rfl: 1  •  folic acid (FOLVITE) 1 mg tablet, TAKE 1 TABLET(1 MG) BY MOUTH DAILY, Disp: 90 tablet, Rfl: 0  •  furosemide (LASIX) 20 mg tablet, Take 1 tablet (20 mg total) by mouth daily with dinner, Disp: , Rfl: 0  •  furosemide (LASIX) 40 mg tablet, Take 40 mg by mouth in the morning (Patient not taking: Reported on 5/2/2023), Disp: , Rfl:   •  Melatonin 10 MG TABS, Take by mouth Takes daily at night, Disp: , Rfl:   •  metoprolol tartrate (LOPRESSOR) 100 mg tablet, Take 1 tablet (100 mg total) by mouth daily in the early morning Do not start before January 7, 2023., Disp: , Rfl: 0  •  metoprolol tartrate (LOPRESSOR) 50 mg tablet, Take 1 tablet (50 mg total) by mouth daily at bedtime, Disp: , Rfl: 0  •  Multiple Vitamin (MULTIVITAMIN ADULT PO), Take by mouth in the morning, Disp: , Rfl:   •  omeprazole (PriLOSEC) 20 mg delayed release capsule, Take 20 mg by mouth daily, Disp: , Rfl:   •  oxybutynin (DITROPAN-XL) 10 MG 24 hr tablet, Take 10 mg by mouth daily at bedtime, Disp: , Rfl:   •  oxyCODONE (Roxicodone) 5 immediate release tablet, Take 0.5 tablets (2.5 mg total) by mouth every 12 (twelve) hours as needed for moderate pain or severe pain Max Daily Amount: 5 mg, Disp: 30 tablet, Rfl: 0  •  polyethylene glycol (MIRALAX) 17 g packet, Take 17 g by mouth daily as needed, Disp: , Rfl:   •  senna-docusate sodium (SENOKOT S) 8.6-50 mg per tablet, Take 2 tablets by mouth 2 (two) times a day, Disp: , Rfl: 0  •  sucralfate (CARAFATE) 1 g tablet, Take 1 tablet (1 g total) by mouth 4 (four) times a day (before meals and at bedtime), Disp: , Rfl: 0  •  vitamin B-12 (VITAMIN B-12) 1,000 mcg tablet, Take 1 tablet (1,000 mcg total) by mouth daily, Disp: , Rfl: 0      Physical Exam:   There were no vitals filed for this visit. General Appearance:    Alert, cooperative, no distress, appears stated age   Head:    Normocephalic, without obvious abnormality, atraumatic   Eyes:    conjunctiva/corneas clear, both eyes        Nose:   Nares normal, septum midline, no drainage    Throat:   Lips normal; teeth and gums normal   Neck:    symmetrical, trachea midline, ;     thyroid:  no enlargement/   Back:     Symmetric, no curvature, ROM normal   Lungs:   No audible wheezing or labored breathing   Chest Wall:    No tenderness or deformity    Heart:    Regular rate and rhythm               Pulses:   2+ and symmetric all extremities   Skin:   Skin color, texture, turgor normal, no rashes or lesions   Neurologic:   normal strength, sensation and reflexes     throughout       Musculoskeletal: left lower extremity  · Examination patient's left knee no effusion. 10 degrees of left knee passively and active extension to 20 degrees. Knee flexion to 100 degrees stable to varus stress active ankle dorsi and plantarflexion    Radiology:   I personally reviewed the films. X-rays left knee shows no loosening left distal femur replacement. Lateral tracking patella sunrise view    _*_*_*_*_*_*_*_*_*_*_*_*_*_*_*_*_*_*_*_*_*_*_*_*_*_*_*_*_*_*_*_*_*_*_*_*_*_*_*_*_*    Assessment:  78 y. o.female with left knee pain with relief of symptoms status post 8 months lateral release 1 year distal femoral replacement due to periprosthetic fracture    Plan:   · Weight bearing as tolerated  left lower extremity  · Patient does not wish to pursue any surgical intervention at this time we will continue conservative management at home exercises  · Patient patient may have test as well as brace for her left leg as tolerated  · Case discussed with Dr. Jaden Duffy  · Follow up in 6 months or sooner if needed      Kady Bullock PA-C

## 2023-08-22 NOTE — PROGRESS NOTES
Orthopedics          Davey Epps 78 y.o. female MRN: [id number]      Chief Complaint:   Status post 1 year left distal femoral replacement  Status post months lateral release    HPI:   78 y. o.female complaining of left knee pain. ***                Review Of Systems:   · Skin: Normal  · Neuro: See HPI  · Musculoskeletal: See HPI  · All other systems reviewed and are negative    Past Medical History:   Past Medical History:   Diagnosis Date   • Abnormal ECG     Last Assessed 9/29/2016   • Anxiety     Last Assessed 6/08/2016   • Arthritis     knees   • Asthma     Last Assessed 11/06/2013   • Atrial premature complex    • Cataract, bilateral     Last Assessed 7/14/2016   • Cataract, left eye     Both eyes. Had surgery on left. • COVID-19    • Difficulty swallowing    • Diverticulosis 9/25/2022   • Dizziness    • Fibromyalgia    • Fibromyalgia, primary    • Gastric reflux    • Heart failure ([address])     5/23/22 Pt reports had heart failure in the past   • History of COVID-19     5/23/22 Pt reports having COVID in 2021.    • History of transfusion     no reaction   • [place] (hard of hearing)    • Hyperlipidemia    • Hypertension    • Incontinence in female     5/23/22 Pt reports has incontinence -wears depends especially at night/riding in car   • Irregular heart beat     5/23/22 Pt reports hx of A fib   • Lyme disease    • PONV (postoperative nausea and vomiting)    • Premature ventricular contraction    • Primary osteoarthritis of both knees     Last Assessed 7/14/2016   • Rheumatic fever     5/23/22 Pt reports had hx of rheumatic fever as a child twice   • Sore throat    • Tuberculosis 1945       Past Surgical History:   Past Surgical History:   Procedure Laterality Date   • APPENDECTOMY     • CARDIAC PACEMAKER PLACEMENT  2019   • COLONOSCOPY     • DENTAL SURGERY      extractions   • HYSTERECTOMY      5/23/22 Pt reports had appendix out with hysterectomy and "tightened up my bladder"   • IR ASPIRATION ONLY  9/30/2022 • ORIF FEMUR FRACTURE Left 08/05/2021    Procedure: OPEN REDUCTION W/ INTERNAL FIXATION (ORIF) DISTAL FEMUR; INSERTION RETROGRADE NAIL;  Surgeon: Margrett Heimlich, MD;  [facility];  Service: Orthopedics   • VT ARTHRP KNE CONDYLE&PLATU MEDIAL&LAT COMPARTMENTS Left 9/12/2022    Procedure: ARTHROPLASTY KNEE TOTAL;  Surgeon: Margrett Heimlich, MD;  [facility];  Service: Orthopedics   • VT DILATION ESOPH UNGUIDED SOUND/BOUGIE 1/MULT PASS N/A 04/06/2016    Procedure: DILATATION ESOPHAGEAL;  Surgeon: Domingo Thomas MD;  [facility]: [facility]; Service: Gastroenterology   • VT EGD TRANSORAL BIOPSY SINGLE/MULTIPLE N/A 04/06/2016    Procedure: ESOPHAGOGASTRODUODENOSCOPY (EGD); Surgeon: Domingo Thomas MD;  [facility]; Service: Gastroenterology   • VT LATERAL RETINACULAR RELEASE OPEN Left 1/23/2023    Procedure: RELEASE RETINACULAR, left knee and all associated procedures;  Surgeon: Margrett Heimlich, MD;  [facility];  Service: Orthopedics   • VT REMOVAL IMPLANT DEEP Left 9/12/2022    Procedure: REMOVAL NAIL IM  FEMUR;  Surgeon: Margrett Heimlich, MD;  [facility];  Service: Orthopedics   • VT XCAPSL CTRC RMVL INSJ IO LENS PROSTH W/O ECP Left 03/15/2016    Procedure: EXTRACTION EXTRACAPSULAR CATARACT PHACO INTRAOCULAR LENS (IOL); Surgeon: Jean Tafoya MD;  [facility];  Service: Ophthalmology   • REPLACEMENT TOTAL KNEE Right    • REPLACEMENT TOTAL KNEE      right    • TONSILLECTOMY         Family History:  Family history reviewed and non-contributory  Family History   Problem Relation Age of Onset   • Coronary artery disease Mother    • Heart attack Mother         Prior   • Heart disease Father    • Heart attack Father         Prior   • Anesthesia problems Neg Hx          Social History:  Social History     Socioeconomic History   • Marital status:       Spouse name: None   • Number of children: None   • Years of education: None   • Highest education level: None   Occupational History   • None   Tobacco Use   • Smoking status: Never   • Smokeless tobacco: Never   • Tobacco comments:     Denies any former or current smoking   Vaping Use   • Vaping Use: Never used   Substance and Sexual Activity   • Alcohol use: Not Currently     Comment: Per [employer]- pt reports no current alcohol use at this time   • Drug use: No     Comment: Denies any former or current drug use   • Sexual activity: Never     Comment:  for 12 years - not active   Other Topics Concern   • None   Social History Narrative   • None     Social Determinants of Health     Financial Resource Strain: Not on file   Food Insecurity: No Food Insecurity (1/4/2023)    Hunger Vital Sign    • Worried About Running Out of Food in the Last Year: Never true    • Ran Out of Food in the Last Year: Never true   Transportation Needs: No Transportation Needs (1/4/2023)    PRAPARE - Transportation    • Lack of Transportation (Medical): No    • Lack of Transportation (Non-Medical): No   Physical Activity: Not on file   Stress: Not on file   Social Connections: Not on file   Intimate Partner Violence: Not on file   Housing Stability: Low Risk  (1/4/2023)    Housing Stability Vital Sign    • Unable to Pay for Housing in the Last Year: No    • Number of Places Lived in the Last Year: 2    • Unstable Housing in the Last Year: No       Allergies:    Allergies   Allergen Reactions   • Penicillins Hives     Itching and terrible hives   • Sulfa Antibiotics Itching   • X65 Folate [Folic Acid-Vit T0-MYL Q07 - Food Allergy] Other (See Comments)     5/23/22 Pt reports no allergic reaction known    • [place] Other (See Comments)     Unknown, 5/23 -pt is unaware of reaction    • Lyrica [Pregabalin] Other (See Comments)     5/23/22 Pt reports doesn't remember reaction    • Other Other (See Comments)     Black rubber 5/23/22 per allergy test - pt unaware of reaction   • Cortisone Acetate [Cortisone] Itching and Rash     5/23/22 pt reports makes her violent    • Doxycycline Hives and Itching   • Nickel Other (See Comments)     Skin discoloration       Labs:  0   Lab Value Date/Time    HCT 30.6 (L) 01/25/2023 0456    HCT 31.7 (L) 01/24/2023 0529    HCT 30.3 (L) 01/06/2023 0500    HGB 8.9 (L) 01/25/2023 0456    HGB 9.5 (L) 01/24/2023 0529    HGB 8.9 (L) 01/06/2023 0500    INR 1.19 01/04/2023 0037    WBC 12.94 (H) 01/25/2023 0456    WBC 11.84 (H) 01/24/2023 0529    WBC 7.35 01/06/2023 0500    CRP 5.2 (H) 05/17/2022 1139       Meds:    Current Outpatient Medications:   •  acetaminophen (TYLENOL) 325 mg tablet, Take 975 mg by mouth every 6 (six) hours as needed, Disp: , Rfl:   •  albuterol (PROVENTIL HFA,VENTOLIN HFA) 90 mcg/act inhaler, INHALE 2 PUFFS BY MOUTH EVERY 6 HOURS AS NEEDED FOR WHEEZING, Disp: 3 Inhaler, Rfl: 0  •  allopurinol (ZYLOPRIM) 100 mg tablet, Take 1 tablet (100 mg total) by mouth daily, Disp: 90 tablet, Rfl: 3  •  apixaban (Eliquis) 5 mg, Take 1 tablet (5 mg total) by mouth 2 (two) times a day, Disp: 60 tablet, Rfl: 6  •  ascorbic acid (VITAMIN C) 500 MG tablet, Take 1 tablet (500 mg total) by mouth 2 (two) times a day, Disp: 60 tablet, Rfl: 1  •  bisacodyl (DULCOLAX) 10 mg suppository, Insert 10 mg into the rectum daily as needed, Disp: , Rfl:   •  Calcium Polycarbophil (FIBER-LAX PO), Take 1 tablet by mouth daily at bedtime, Disp: , Rfl:   •  Cholecalciferol 25 MCG (1000 UT) tablet, Take 1,000 Units by mouth daily, Disp: , Rfl:   •  Diclofenac Sodium (VOLTAREN) 1 %, Apply 2 g topically 4 (four) times a day = to B/L knee, Disp: , Rfl:   •  diltiazem (CARDIZEM CD) 120 mg 24 hr capsule, Take 1 capsule (120 mg total) by mouth daily, Disp: 90 capsule, Rfl: 3  •  diltiazem (DILACOR XR) 120 MG 24 hr capsule, , Disp: , Rfl:   •  DULoxetine (CYMBALTA) 30 mg delayed release capsule, TAKE 1 CAPSULE(30 MG) BY MOUTH DAILY (Patient not taking: Reported on 5/2/2023), Disp: 30 capsule, Rfl: 5  •  ertapenem (INVanz) 1 g, , Disp: , Rfl:   • ezetimibe (ZETIA) 10 mg tablet, Take 1 tablet (10 mg total) by mouth daily, Disp: 90 tablet, Rfl: 1  •  ferrous sulfate 324 (65 Fe) mg, Take 1 tablet (324 mg total) by mouth 2 (two) times a day before meals, Disp: 60 tablet, Rfl: 1  •  folic acid (FOLVITE) 1 mg tablet, TAKE 1 TABLET(1 MG) BY MOUTH DAILY, Disp: 90 tablet, Rfl: 0  •  furosemide (LASIX) 20 mg tablet, Take 1 tablet (20 mg total) by mouth daily with dinner, Disp: , Rfl: 0  •  furosemide (LASIX) 40 mg tablet, Take 40 mg by mouth in the morning (Patient not taking: Reported on 5/2/2023), Disp: , Rfl:   •  Melatonin 10 MG TABS, Take by mouth Takes daily at night, Disp: , Rfl:   •  metoprolol tartrate (LOPRESSOR) 100 mg tablet, Take 1 tablet (100 mg total) by mouth daily in the early morning Do not start before January 7, 2023., Disp: , Rfl: 0  •  metoprolol tartrate (LOPRESSOR) 50 mg tablet, Take 1 tablet (50 mg total) by mouth daily at bedtime, Disp: , Rfl: 0  •  Multiple Vitamin (MULTIVITAMIN ADULT PO), Take by mouth in the morning, Disp: , Rfl:   •  omeprazole (PriLOSEC) 20 mg delayed release capsule, Take 20 mg by mouth daily, Disp: , Rfl:   •  oxybutynin (DITROPAN-XL) 10 MG 24 hr tablet, Take 10 mg by mouth daily at bedtime, Disp: , Rfl:   •  oxyCODONE (Roxicodone) 5 immediate release tablet, Take 0.5 tablets (2.5 mg total) by mouth every 12 (twelve) hours as needed for moderate pain or severe pain Max Daily Amount: 5 mg, Disp: 30 tablet, Rfl: 0  •  polyethylene glycol (MIRALAX) 17 g packet, Take 17 g by mouth daily as needed, Disp: , Rfl:   •  senna-docusate sodium (SENOKOT S) 8.6-50 mg per tablet, Take 2 tablets by mouth 2 (two) times a day, Disp: , Rfl: 0  •  sucralfate (CARAFATE) 1 g tablet, Take 1 tablet (1 g total) by mouth 4 (four) times a day (before meals and at bedtime), Disp: , Rfl: 0  •  vitamin B-12 (VITAMIN B-12) 1,000 mcg tablet, Take 1 tablet (1,000 mcg total) by mouth daily, Disp: , Rfl: 0      Physical Exam:   There were no vitals filed for this visit. General Appearance:    Alert, cooperative, no distress, appears stated age   Head:    Normocephalic, without obvious abnormality, atraumatic   Eyes:    conjunctiva/corneas clear, both eyes        Nose:   Nares normal, septum midline, no drainage    Throat:   Lips normal; teeth and gums normal   Neck:    symmetrical, trachea midline, ;     thyroid:  no enlargement/   Back:     Symmetric, no curvature, ROM normal   Lungs:   No audible wheezing or labored breathing   Chest Wall:    No tenderness or deformity    Heart:    Regular rate and rhythm               Pulses:   2+ and symmetric all extremities   Skin:   Skin color, texture, turgor normal, no rashes or lesions   Neurologic:   normal strength, sensation and reflexes     throughout       Musculoskeletal: {RIGHT LEFT BILATERAL:21223} lower extremity  ·     Radiology:   I personally reviewed the films. X-rays {RIGHT LEFT BILATERAL:21223} knee shows ***    _*_*_*_*_*_*_*_*_*_*_*_*_*_*_*_*_*_*_*_*_*_*_*_*_*_*_*_*_*_*_*_*_*_*_*_*_*_*_*_*_*    Assessment:  78 y. o.female with {RIGHT LEFT BILATERAL:21223} knee ***    Plan:   · Weight bearing as tolerated  {RIGHT LEFT BILATERAL:21223} lower extremity  ·   · Follow up in 3 months or sooner if needed      Alien Quinonez PA-C

## 2023-08-25 ENCOUNTER — TELEPHONE (OUTPATIENT)
Dept: OBGYN CLINIC | Facility: HOSPITAL | Age: 78
End: 2023-08-25

## 2023-08-25 NOTE — TELEPHONE ENCOUNTER
Caller: [facility]    Doctor: Jaida Kingston    Reason for call: Had questions regarding knee brace.   Will call [employer].    Call back#: n/a

## 2023-08-30 ENCOUNTER — TELEPHONE (OUTPATIENT)
Age: 78
End: 2023-08-30

## 2023-08-30 NOTE — TELEPHONE ENCOUNTER
Spoke with Joao Anderson, she is sending brace options over for  to review which is best for patient.

## 2023-08-30 NOTE — TELEPHONE ENCOUNTER
Caller: Jennifer Mendiola    Doctor: Eric Crooks    Reason for call: Chidi Perez called to check on what knee brace is to be ordered for patient    Call back#: [phone]

## 2023-09-06 NOTE — TELEPHONE ENCOUNTER
Caller:[facility]     Doctor: Tyrone Watts     Reason for call: Asked if we faxed over the paperwork for her brace to number below     Fax- [phone]  Call back- [phone] [place]

## 2023-09-07 NOTE — TELEPHONE ENCOUNTER
All brace information was faxed to [facility].  She stated brace was being authorized

## 2023-09-08 ENCOUNTER — TELEPHONE (OUTPATIENT)
Age: 78
End: 2023-09-08

## 2023-09-08 ENCOUNTER — OFFICE VISIT (OUTPATIENT)
Dept: OBGYN CLINIC | Facility: CLINIC | Age: 78
End: 2023-09-08
Payer: MEDICARE

## 2023-09-08 VITALS — WEIGHT: 203 LBS | BODY MASS INDEX: 34.66 KG/M2 | HEIGHT: 64 IN

## 2023-09-08 DIAGNOSIS — R20.0 NUMBNESS AND TINGLING IN BOTH HANDS: Primary | ICD-10-CM

## 2023-09-08 DIAGNOSIS — R20.2 NUMBNESS AND TINGLING IN BOTH HANDS: Primary | ICD-10-CM

## 2023-09-08 PROCEDURE — 99213 OFFICE O/P EST LOW 20 MIN: CPT | Performed by: STUDENT IN AN ORGANIZED HEALTH CARE EDUCATION/TRAINING PROGRAM

## 2023-09-08 NOTE — TELEPHONE ENCOUNTER
Caller: Sirena at [facility]    Doctor: Nilam Calhoun    Reason for call:      Sirena requesting the office notes from 9/8/23, faxed to [phone], confirmed completed    Call back#: n/a

## 2023-09-08 NOTE — PROGRESS NOTES
ORTHOPAEDIC HAND, WRIST, AND ELBOW OFFICE  VISIT       ASSESSMENT/PLAN:      Diagnoses and all orders for this visit:    Numbness and tingling in both hands  -     EMG 2 Limb Upper Extremity; Future  -     Cock Up Wrist Splint        78 y.o. female with bilateral hand numbness and tingling  Treatment options and expected outcomes were discussed. The patient verbalized understanding of exam findings and treatment plan. The patient was given the opportunity to ask questions. Questions were answered to the patient's satisfaction. The patient decided to move forward with EMG, comfort cool to be worn at night via shared decision making. Bilateral cock up splints were fitted in clinic today    Follow Up: After testing     To Do Next Visit:  Re-evaluation of current issue    Discussions:  Carpal Tunnel Syndrome: The anatomy and physiology of carpal tunnel syndrome was discussed with the patient today. Increase pressure localized under the transverse carpal ligament can cause pain, numbness, tingling, or dysesthesias within the median nerve distribution as well as feelings of fatigue, clumsiness, or awkwardness. These symptoms typically occur at night and worse in the morning upon waking. Eventually, untreated carpal tunnel syndrome can result in weakness and permanent loss of muscle within the thenar compartment of the hand. Treatment options were discussed with the patient. Conservative treatment includes nocturnal resting splints to keep the nerve in a neutral position, ergonomic changes within the work or home environment, activity modification, and tendon gliding exercises. Vitamin B6 one tablet daily over the counter may helpful to reduce symptoms. Steroid injections within the carpal canal can help a majority of patients, however this is often self-limited in a majority of patients.   Surgical intervention to divide the transverse carpal ligament typically results in a long-lasting relief of the patient's complaints, with the recurrence rate of less than 1%. Gurdeep Shields MD  Attending, Orthopaedic Surgery  Hand, Wrist, and Elbow Surgery  [address] [facility]  ________________________________________________________________________________________________________________    CHIEF COMPLAINT:  Chief Complaint   Patient presents with   • Left Wrist - Pain   • Right Wrist - Pain     SUBJECTIVE:  Patient is a 78 y.o. RHD female who presents today for evaluation and treatment of b/l wrist pain with numbness/tingling. She notes thumb, index, long and half of the ring fingers are typically very painful. She notes when she touches her fingers, she gets a painful electrical shock through the fingers. She is given tylenol at the nursing home which she notes is not beneficial. Her niece purchased a wrist brace for her which she switches from hand to hand as they hurt. I have personally reviewed all the relevant PMH, PSH, SH, FH, Medications and allergies    PAST MEDICAL HISTORY:  Past Medical History:   Diagnosis Date   • Abnormal ECG     Last Assessed 9/29/2016   • Anxiety     Last Assessed 6/08/2016   • Arthritis     knees   • Asthma     Last Assessed 11/06/2013   • Atrial premature complex    • Cataract, bilateral     Last Assessed 7/14/2016   • Cataract, left eye     Both eyes. Had surgery on left. • COVID-19    • Difficulty swallowing    • Diverticulosis 9/25/2022   • Dizziness    • Fibromyalgia    • Fibromyalgia, primary    • Gastric reflux    • Heart failure ([address])     5/23/22 Pt reports had heart failure in the past   • History of COVID-19     5/23/22 Pt reports having COVID in 2021.    • History of transfusion     no reaction   • [place] (hard of hearing)    • Hyperlipidemia    • Hypertension    • Incontinence in female 5/23/22 Pt reports has incontinence -wears depends especially at night/riding in car   • Irregular heart beat     5/23/22 Pt reports hx of A fib   • Lyme disease    • PONV (postoperative nausea and vomiting)    • Premature ventricular contraction    • Primary osteoarthritis of both knees     Last Assessed 7/14/2016   • Rheumatic fever     5/23/22 Pt reports had hx of rheumatic fever as a child twice   • Sore throat    • Tuberculosis 1947     PAST SURGICAL HISTORY:  Past Surgical History:   Procedure Laterality Date   • APPENDECTOMY     • CARDIAC PACEMAKER PLACEMENT  2019   • COLONOSCOPY     • DENTAL SURGERY      extractions   • HYSTERECTOMY      5/23/22 Pt reports had appendix out with hysterectomy and "tightened up my bladder"   • IR ASPIRATION ONLY  9/30/2022   • ORIF FEMUR FRACTURE Left 08/05/2021    Procedure: OPEN REDUCTION W/ INTERNAL FIXATION (ORIF) DISTAL FEMUR; INSERTION RETROGRADE NAIL;  Surgeon: Dawson Miramontes MD;  [facility];  Service: Orthopedics   • HI ARTHRP KNE CONDYLE&PLATU MEDIAL&LAT COMPARTMENTS Left 9/12/2022    Procedure: ARTHROPLASTY KNEE TOTAL;  Surgeon: Dawson Miramontes MD;  [facility];  Service: Orthopedics   • HI DILATION [address] [facility] UNGUIDED SOUND/BOUGIE 1/MULT PASS N/A 04/06/2016    Procedure: DILATATION ESOPHAGEAL;  Surgeon: Salvador Lang MD;  [facility]: [facility]; Service: Gastroenterology   • HI EGD TRANSORAL BIOPSY SINGLE/MULTIPLE N/A 04/06/2016    Procedure: ESOPHAGOGASTRODUODENOSCOPY (EGD); Surgeon: Salvador Lang MD;  [facility];   Service: Gastroenterology   • HI LATERAL RETINACULAR RELEASE OPEN Left 1/23/2023    Procedure: RELEASE RETINACULAR, left knee and all associated procedures;  Surgeon: Dawson Miramontes MD;  [facility];  Service: Orthopedics   • HI REMOVAL IMPLANT DEEP Left 9/12/2022    Procedure: REMOVAL NAIL IM  FEMUR;  Surgeon: Dawson Miramontes MD;  [facility];  Service: Orthopedics   • HI XCAPSL CTRC RMVL INSJ IO LENS PROSTH W/O ECP Left 03/15/2016    Procedure: EXTRACTION EXTRACAPSULAR CATARACT PHACO INTRAOCULAR LENS (IOL);   Surgeon: Eusebia Lorenz MD;  [facility];  Service: Ophthalmology   • REPLACEMENT TOTAL KNEE Right    • REPLACEMENT TOTAL KNEE      right    • TONSILLECTOMY       FAMILY HISTORY:  Family History   Problem Relation Age of Onset   • Coronary artery disease Mother    • Heart attack Mother         Prior   • Heart disease Father    • Heart attack Father         Prior   • Anesthesia problems Neg Hx      SOCIAL HISTORY:  Social History     Tobacco Use   • Smoking status: Never   • Smokeless tobacco: Never   • Tobacco comments:     Denies any former or current smoking   Vaping Use   • Vaping Use: Never used   Substance Use Topics   • Alcohol use: Not Currently     Comment: Per [employer]- pt reports no current alcohol use at this time   • Drug use: No     Comment: Denies any former or current drug use     MEDICATIONS:    Current Outpatient Medications:   •  acetaminophen (TYLENOL) 325 mg tablet, Take 975 mg by mouth every 6 (six) hours as needed, Disp: , Rfl:   •  albuterol (PROVENTIL HFA,VENTOLIN HFA) 90 mcg/act inhaler, INHALE 2 PUFFS BY MOUTH EVERY 6 HOURS AS NEEDED FOR WHEEZING, Disp: 3 Inhaler, Rfl: 0  •  allopurinol (ZYLOPRIM) 100 mg tablet, Take 1 tablet (100 mg total) by mouth daily, Disp: 90 tablet, Rfl: 3  •  apixaban (Eliquis) 5 mg, Take 1 tablet (5 mg total) by mouth 2 (two) times a day, Disp: 60 tablet, Rfl: 6  •  ascorbic acid (VITAMIN C) 500 MG tablet, Take 1 tablet (500 mg total) by mouth 2 (two) times a day, Disp: 60 tablet, Rfl: 1  •  bisacodyl (DULCOLAX) 10 mg suppository, Insert 10 mg into the rectum daily as needed, Disp: , Rfl:   •  Calcium Polycarbophil (FIBER-LAX PO), Take 1 tablet by mouth daily at bedtime, Disp: , Rfl:   •  Cholecalciferol 25 MCG (1000 UT) tablet, Take 1,000 Units by mouth daily, Disp: , Rfl:   •  Diclofenac Sodium (VOLTAREN) 1 %, Apply 2 g topically 4 (four) times a day = to B/L knee, Disp: , Rfl:   •  diltiazem (CARDIZEM CD) 120 mg 24 hr capsule, Take 1 capsule (120 mg total) by mouth daily, Disp: 90 capsule, Rfl: 3  •  diltiazem (DILACOR XR) 120 MG 24 hr capsule, , Disp: , Rfl:   •  ertapenem (INVanz) 1 g, , Disp: , Rfl:   •  ezetimibe (ZETIA) 10 mg tablet, Take 1 tablet (10 mg total) by mouth daily, Disp: 90 tablet, Rfl: 1  •  ferrous sulfate 324 (65 Fe) mg, Take 1 tablet (324 mg total) by mouth 2 (two) times a day before meals, Disp: 60 tablet, Rfl: 1  •  folic acid (FOLVITE) 1 mg tablet, TAKE 1 TABLET(1 MG) BY MOUTH DAILY, Disp: 90 tablet, Rfl: 0  •  furosemide (LASIX) 20 mg tablet, Take 1 tablet (20 mg total) by mouth daily with dinner, Disp: , Rfl: 0  •  Melatonin 10 MG TABS, Take by mouth Takes daily at night, Disp: , Rfl:   •  metoprolol tartrate (LOPRESSOR) 100 mg tablet, Take 1 tablet (100 mg total) by mouth daily in the early morning Do not start before January 7, 2023., Disp: , Rfl: 0  •  metoprolol tartrate (LOPRESSOR) 50 mg tablet, Take 1 tablet (50 mg total) by mouth daily at bedtime, Disp: , Rfl: 0  •  Multiple Vitamin (MULTIVITAMIN ADULT PO), Take by mouth in the morning, Disp: , Rfl:   •  omeprazole (PriLOSEC) 20 mg delayed release capsule, Take 20 mg by mouth daily, Disp: , Rfl:   •  oxybutynin (DITROPAN-XL) 10 MG 24 hr tablet, Take 10 mg by mouth daily at bedtime, Disp: , Rfl:   •  oxyCODONE (Roxicodone) 5 immediate release tablet, Take 0.5 tablets (2.5 mg total) by mouth every 12 (twelve) hours as needed for moderate pain or severe pain Max Daily Amount: 5 mg, Disp: 30 tablet, Rfl: 0  •  polyethylene glycol (MIRALAX) 17 g packet, Take 17 g by mouth daily as needed, Disp: , Rfl:   •  senna-docusate sodium (SENOKOT S) 8.6-50 mg per tablet, Take 2 tablets by mouth 2 (two) times a day, Disp: , Rfl: 0  •  sucralfate (CARAFATE) 1 g tablet, Take 1 tablet (1 g total) by mouth 4 (four) times a day (before meals and at bedtime), Disp: , Rfl: 0  • vitamin B-12 (VITAMIN B-12) 1,000 mcg tablet, Take 1 tablet (1,000 mcg total) by mouth daily, Disp: , Rfl: 0  •  DULoxetine (CYMBALTA) 30 mg delayed release capsule, TAKE 1 CAPSULE(30 MG) BY MOUTH DAILY (Patient not taking: Reported on 5/2/2023), Disp: 30 capsule, Rfl: 5  •  furosemide (LASIX) 40 mg tablet, Take 40 mg by mouth in the morning (Patient not taking: Reported on 5/2/2023), Disp: , Rfl:     ALLERGIES:  Allergies   Allergen Reactions   • Penicillins Hives     Itching and terrible hives   • Sulfa Antibiotics Itching   • C61 Folate [Folic Acid-Vit J5-[id number] P50 - Food Allergy] Other (See Comments)     5/23/22 Pt reports no allergic reaction known    • [place] Other (See Comments)     Unknown, 5/23 -pt is unaware of reaction    • Lyrica [Pregabalin] Other (See Comments)     5/23/22 Pt reports doesn't remember reaction    • Other Other (See Comments)     Black rubber 5/23/22 per allergy test - pt unaware of reaction   • Cortisone Acetate [Cortisone] Itching and Rash     5/23/22 pt reports makes her violent    • Doxycycline Hives and Itching   • Nickel Other (See Comments)     Skin discoloration     REVIEW OF SYSTEMS:  Musculoskeletal:        As noted in HPI. All other systems reviewed and are negative. VITALS:  There were no vitals filed for this visit.     LABS:  HgA1c:   Lab Results   Component Value Date    HGBA1C 5.6 08/17/2022     BMP:   Lab Results   Component Value Date    GLUCOSE 127 08/05/2021    CALCIUM 9.0 01/25/2023    K 4.1 01/25/2023    CO2 27 01/25/2023     01/25/2023    BUN 24 01/25/2023    CREATININE 0.93 01/25/2023   _____________________________________________________  PHYSICAL EXAMINATION:  General: Well developed and well nourished, alert & oriented x 3, appears comfortable  Psychiatric: Normal  HEENT: Normocephalic, Atraumatic Trachea Midline, No torticollis  Pulmonary: No audible wheezing or respiratory distress   Abdomen/GI: Non tender, non distended   Cardiovascular: No pitting edema, 2+ radial pulse   Skin: No masses, erythema, lacerations, fluctation, ulcerations  Neurovascular: Sensation Intact to the Median, Ulnar, Radial Nerve, Motor Intact to the Median, Ulnar, Radial Nerve and Pulses Intact  Musculoskeletal: Normal, except as noted in detailed exam and in HPI.     MUSCULOSKELETAL EXAMINATION:    Mild thenar atrophy, severe on left moderate on left  Positive tinel's over median nerve at the wrist bilateral  Negative tinel's over ulnar nerve at the elbow  Abduction strength 5/5  Intrinsic strength 5/5  ___________________________________________________  STUDIES REVIEWED:  No studies to review     PROCEDURES PERFORMED:  Procedures  No Procedures performed today  _____________________________________________________    Brentford Manner    I,:  Micah Torres am acting as a scribe while in the presence of the attending physician.:       I,:  Betsy Alcazar MD personally performed the services described in this documentation    as scribed in my presence.:

## 2023-10-25 ENCOUNTER — NURSING HOME VISIT (OUTPATIENT)
Dept: GERIATRICS | Facility: OTHER | Age: 78
End: 2023-10-25
Payer: MEDICARE

## 2023-10-25 VITALS — DIASTOLIC BLOOD PRESSURE: 58 MMHG | SYSTOLIC BLOOD PRESSURE: 112 MMHG | HEART RATE: 72 BPM

## 2023-10-25 DIAGNOSIS — I48.0 PAROXYSMAL ATRIAL FIBRILLATION (HCC): Primary | ICD-10-CM

## 2023-10-25 DIAGNOSIS — R26.2 AMBULATORY DYSFUNCTION: ICD-10-CM

## 2023-10-25 DIAGNOSIS — E78.2 MIXED HYPERLIPIDEMIA: ICD-10-CM

## 2023-10-25 DIAGNOSIS — I50.32 CHRONIC HEART FAILURE WITH PRESERVED EJECTION FRACTION (HCC): ICD-10-CM

## 2023-10-25 DIAGNOSIS — I10 ESSENTIAL HYPERTENSION: ICD-10-CM

## 2023-10-25 DIAGNOSIS — F32.1 CURRENT MODERATE EPISODE OF MAJOR DEPRESSIVE DISORDER, UNSPECIFIED WHETHER RECURRENT (HCC): ICD-10-CM

## 2023-10-25 DIAGNOSIS — K21.9 GASTROESOPHAGEAL REFLUX DISEASE WITHOUT ESOPHAGITIS: ICD-10-CM

## 2023-10-25 PROCEDURE — 99309 SBSQ NF CARE MODERATE MDM 30: CPT

## 2023-10-25 RX ORDER — DULOXETIN HYDROCHLORIDE 20 MG/1
20 CAPSULE, DELAYED RELEASE ORAL
COMMUNITY

## 2023-10-25 NOTE — ASSESSMENT & PLAN NOTE
BP stable, 112/58  Continue to monitor BP  Continue metoprolol 100 mg in a.m., 50 mg at bedtime with hold parameters  continue Lasix 40 mg in a.m. and 20 mg in pm

## 2023-10-25 NOTE — ASSESSMENT & PLAN NOTE
Multifactorial in the setting of advanced age, chronic medical conditions  Continue PT/OT as needed  Fall/safety precautions  Ensure adequate nutrition/hydration   Assist patient with ADLs/IADLs as needed

## 2023-10-25 NOTE — PROGRESS NOTES
[address]  [phone]  [facility]  Code  32 (LTC)        NAME: Caprice Chaudhari  AGE: 78 y.o. SEX: female CODE STATUS: CPR    DATE OF ENCOUNTER: 10/25/2023    Assessment and Plan     1. Paroxysmal atrial fibrillation (HCC)  Assessment & Plan:  HR controlled, 72  Patient denies chest pain/palpitations  Continue diltiazem 120 mg daily  Continue Eliquis 5 mg twice daily  Continue metoprolol 100 mg in the a.m., 50 mg at bedtime with hold parameters  Patient follow-up with cardiology as recommended      2. Gastroesophageal reflux disease without esophagitis  Assessment & Plan:  Denies reflux  Continue omeprazole 20 mg daily  Continue sucralfate 1 g twice daily      3. Essential hypertension  Assessment & Plan:  BP stable, 112/58  Continue to monitor BP  Continue metoprolol 100 mg in a.m., 50 mg at bedtime with hold parameters  continue Lasix 40 mg in a.m. and 20 mg in pm      4. Chronic heart failure with preserved ejection fraction [facility])  Assessment & Plan:  Wt Readings from Last 3 Encounters:   09/08/23 92.1 kg (203 lb)   07/14/23 92.3 kg (203 lb 8 oz)   05/30/23 99.8 kg (220 lb)   Last documented weight 206.5 LB, appears stable  Patient appears euvolemic  Last echo 9/2022 EF of 65%  Continue Lasix 40 mg in a.m. and 20 mg in p.m. Continue metoprolol 100 mg in a.m. and 50 mg at at bedtime  Continue monthly weights  Encourage heart healthy diet              5. Mixed hyperlipidemia  Assessment & Plan:  Continue Zetia 10 mg daily      6. Ambulatory dysfunction  Assessment & Plan:  Multifactorial in the setting of advanced age, chronic medical conditions  Continue PT/OT as needed  Fall/safety precautions  Ensure adequate nutrition/hydration   Assist patient with ADLs/IADLs as needed      7.  Current moderate episode of major depressive disorder, unspecified whether recurrent ([address])         All medications and routine orders were reviewed and updated as needed. Chief Complaint   LTC follow up visit   Patient's care was coordinated with nursing facility staff. Recent vitals, labs, and updated medications were review on [facility] system in facility. Past Medical and Surgical History      Past Medical History:   Diagnosis Date    Abnormal ECG     Last Assessed 9/29/2016    Anxiety     Last Assessed 6/08/2016    Arthritis     knees    Asthma     Last Assessed 11/06/2013    Atrial premature complex     Cataract, bilateral     Last Assessed 7/14/2016    Cataract, left eye     Both eyes. Had surgery on left. COVID-19     Difficulty swallowing     Diverticulosis 9/25/2022    Dizziness     Fibromyalgia     Fibromyalgia, primary     Gastric reflux     Heart failure ([address])     5/23/22 Pt reports had heart failure in the past    History of COVID-19     5/23/22 Pt reports having [place] in 2021.     History of transfusion     no reaction    [place] (hard of hearing)     Hyperlipidemia     Hypertension     Incontinence in female     5/23/22 Pt reports has incontinence -wears depends especially at night/riding in car    Irregular heart beat     5/23/22 Pt reports hx of A fib    Lyme disease     PONV (postoperative nausea and vomiting)     Premature ventricular contraction     Primary osteoarthritis of both knees     Last Assessed 7/14/2016    Rheumatic fever     5/23/22 Pt reports had hx of rheumatic fever as a child twice    Sore throat     Tuberculosis 1945     Past Surgical History:   Procedure Laterality Date    APPENDECTOMY      CARDIAC PACEMAKER PLACEMENT  2019    COLONOSCOPY      DENTAL SURGERY      extractions    HYSTERECTOMY      5/23/22 Pt reports had appendix out with hysterectomy and "tightened up my bladder"    IR ASPIRATION ONLY  9/30/2022    ORIF FEMUR FRACTURE Left 08/05/2021    Procedure: OPEN REDUCTION W/ INTERNAL FIXATION (ORIF) DISTAL FEMUR; INSERTION RETROGRADE NAIL;  Surgeon: Mckenna Winston MD;  [facility];  Service: Orthopedics    NE ARTHRP KNE CONDYLE&PLATU MEDIAL&LAT COMPARTMENTS Left 9/12/2022    Procedure: ARTHROPLASTY KNEE TOTAL;  Surgeon: Hellen Lucero MD;  [facility];  Service: Orthopedics    SC DILATION ESOPH UNGUIDED SOUND/BOUGIE 1/MULT PASS N/A 04/06/2016    Procedure: DILATATION ESOPHAGEAL;  Surgeon: Zain Barger MD;  Location: [facility]; Service: Gastroenterology    SC EGD TRANSORAL BIOPSY SINGLE/MULTIPLE N/A 04/06/2016    Procedure: ESOPHAGOGASTRODUODENOSCOPY (EGD); Surgeon: Zain Barger MD;  [facility]: [facility]; Service: Gastroenterology    SC LATERAL RETINACULAR RELEASE OPEN Left 1/23/2023    Procedure: RELEASE RETINACULAR, left knee and all associated procedures;  Surgeon: Hellen Lucero MD;  [facility];  Service: Orthopedics    SC REMOVAL IMPLANT DEEP Left 9/12/2022    Procedure: REMOVAL NAIL IM  FEMUR;  Surgeon: Hellen Lucero MD;  [facility];  Service: Orthopedics    SC XCAPSL CTRC RMVL INSJ IO LENS PROSTH W/O ECP Left 03/15/2016    Procedure: EXTRACTION EXTRACAPSULAR CATARACT PHACO INTRAOCULAR LENS (IOL);   Surgeon: Jorge A Vences MD;  [facility];  Service: Ophthalmology    REPLACEMENT TOTAL KNEE Right     REPLACEMENT TOTAL KNEE      right     TONSILLECTOMY       Allergies   Allergen Reactions    Penicillins Hives     Itching and terrible hives    Sulfa Antibiotics Itching    X79 Folate [Folic Acid-Vit O7-EGT Z72 - Food Allergy] Other (See Comments)     5/23/22 Pt reports no allergic reaction known     [place] Other (See Comments)     Unknown, 5/23 -pt is unaware of reaction     Lyrica [Pregabalin] Other (See Comments)     5/23/22 Pt reports doesn't remember reaction     Other Other (See Comments)     Black rubber 5/23/22 per allergy test - pt unaware of reaction    Cortisone Acetate [Cortisone] Itching and Rash     5/23/22 pt reports makes her violent     Doxycycline Hives and Itching    Nickel Other (See Comments)     Skin discoloration          History of Present Lynn     [facility] is a 78year old female, she is a LTC resident of [address] [place] [address] since 11/28/22. Past Medical Hx including but not limited to GERD, A-fib, HTN, CHF, lumbar DDD, osteoporosis, gout, CKD, pacemaker, fibromyalgia, anxiety, HLD, vitamin D deficiency. She was seen in collaboration with nursing for medical mgmt and LTC follow up. Meir Singh was seen and examined at bedside today. Patient is a reliable historian and is alert and oriented x3. On exam patient is sitting in her chair at side of the bed and does not appear to be in any distress. She denies pain, difficulty sleeping, and reports having a good appetite. She incontinent of bowel and bladder, requires assistance with ADLs/IADLs. She is noted to have mild left hand swelling secondary arthritis which she says has improved with Voltaren and Tylenol. She has no concerns at this time. She denies CP/SOB/N/V/D. Denies lightheadedness, dizziness, headaches, vision changes. Patient states they are eating well and staying hydrated. Denies any bowel or bladder issues. Per review of SNF records, Patient is eating 3 meals per day, consuming %. Last documented BM 10/25/2023. No concerns from nursing at this time. The patient's allergies, past medical, surgical, social and family history were reviewed and unchanged. Review of Systems     Review of Systems   Constitutional:  Negative for activity change, appetite change and fever. HENT: Negative. Negative for congestion. Eyes: Negative. Respiratory: Negative. Negative for cough, shortness of breath and wheezing. Cardiovascular: Negative. Negative for chest pain and palpitations. Gastrointestinal: Negative. Negative for abdominal distention, abdominal pain, constipation, diarrhea, nausea and vomiting. Endocrine: Negative. Genitourinary:  Negative for difficulty urinating, dysuria and flank pain.    Musculoskeletal:  Positive for arthralgias and gait problem. Skin: Negative. Allergic/Immunologic: Negative. Neurological:  Positive for weakness. Negative for dizziness and headaches. Hematological: Negative. Psychiatric/Behavioral:  Negative for confusion and sleep disturbance. Objective     Vitals:   Vitals:    10/25/23 1804   BP: 112/58   Pulse: 72         Physical Exam  Vitals and nursing note reviewed. Constitutional:       General: She is not in acute distress. Appearance: Normal appearance. She is not ill-appearing. HENT:      Head: Normocephalic and atraumatic. Nose: No congestion. Eyes:      Conjunctiva/sclera: Conjunctivae normal.   Cardiovascular:      Rate and Rhythm: Normal rate and regular rhythm. Heart sounds: Murmur heard. Pulmonary:      Effort: Pulmonary effort is normal. No respiratory distress. Breath sounds: Normal breath sounds. No wheezing. Abdominal:      General: Bowel sounds are normal. There is no distension. Palpations: Abdomen is soft. Tenderness: There is no abdominal tenderness. Musculoskeletal:         General: Swelling present. Comments: Left hand, non tender   Skin:     General: Skin is warm. Neurological:      Mental Status: She is alert and oriented to person, place, and time. Mental status is at baseline. Motor: Weakness present. Gait: Gait abnormal.   Psychiatric:         Mood and Affect: Mood normal.         Behavior: Behavior normal.         Pertinent Laboratory/Diagnostic Studies:   Reviewed in facility chart-stable      Current Medications   Medications reviewed and updated see facility [facility] for details.       Current Outpatient Medications:     acetaminophen (TYLENOL) 325 mg tablet, Take 975 mg by mouth every 6 (six) hours as needed, Disp: , Rfl:     albuterol (PROVENTIL HFA,VENTOLIN HFA) 90 mcg/act inhaler, INHALE 2 PUFFS BY MOUTH EVERY 6 HOURS AS NEEDED FOR WHEEZING, Disp: 3 Inhaler, Rfl: 0    allopurinol (ZYLOPRIM) 100 mg tablet, Take 1 tablet (100 mg total) by mouth daily, Disp: 90 tablet, Rfl: 3    apixaban (Eliquis) 5 mg, Take 1 tablet (5 mg total) by mouth 2 (two) times a day, Disp: 60 tablet, Rfl: 6    ascorbic acid (VITAMIN C) 500 MG tablet, Take 1 tablet (500 mg total) by mouth 2 (two) times a day, Disp: 60 tablet, Rfl: 1    bisacodyl (DULCOLAX) 10 mg suppository, Insert 10 mg into the rectum daily as needed, Disp: , Rfl:     Calcium Polycarbophil (FIBER-LAX PO), Take 1 tablet by mouth daily at bedtime, Disp: , Rfl:     Cholecalciferol 25 MCG (1000 UT) tablet, Take 1,000 Units by mouth daily, Disp: , Rfl:     Diclofenac Sodium (VOLTAREN) 1 %, Apply 2 g topically 4 (four) times a day = to B/L knee, Disp: , Rfl:     diltiazem (CARDIZEM CD) 120 mg 24 hr capsule, Take 1 capsule (120 mg total) by mouth daily, Disp: 90 capsule, Rfl: 3    DULoxetine (CYMBALTA) 20 mg capsule, Take 20 mg by mouth daily at bedtime, Disp: , Rfl:     ezetimibe (ZETIA) 10 mg tablet, Take 1 tablet (10 mg total) by mouth daily, Disp: 90 tablet, Rfl: 1    ferrous sulfate 324 (65 Fe) mg, Take 1 tablet (324 mg total) by mouth 2 (two) times a day before meals, Disp: 60 tablet, Rfl: 1    folic acid (FOLVITE) 1 mg tablet, TAKE 1 TABLET(1 MG) BY MOUTH DAILY, Disp: 90 tablet, Rfl: 0    furosemide (LASIX) 20 mg tablet, Take 1 tablet (20 mg total) by mouth daily with dinner, Disp: , Rfl: 0    furosemide (LASIX) 40 mg tablet, Take 40 mg by mouth in the morning, Disp: , Rfl:     Melatonin 10 MG TABS, Take by mouth Takes daily at night, Disp: , Rfl:     metoprolol tartrate (LOPRESSOR) 100 mg tablet, Take 1 tablet (100 mg total) by mouth daily in the early morning Do not start before January 7, 2023., Disp: , Rfl: 0    metoprolol tartrate (LOPRESSOR) 50 mg tablet, Take 1 tablet (50 mg total) by mouth daily at bedtime, Disp: , Rfl: 0    Multiple Vitamin (MULTIVITAMIN ADULT PO), Take by mouth in the morning, Disp: , Rfl:     omeprazole (PriLOSEC) 20 mg delayed release capsule, Take 20 mg by mouth daily, Disp: , Rfl:     oxybutynin (DITROPAN-XL) 10 MG 24 hr tablet, Take 10 mg by mouth daily at bedtime, Disp: , Rfl:     polyethylene glycol (MIRALAX) 17 g packet, Take 17 g by mouth daily as needed, Disp: , Rfl:     senna-docusate sodium (SENOKOT S) 8.6-50 mg per tablet, Take 2 tablets by mouth 2 (two) times a day, Disp: , Rfl: 0    sucralfate (CARAFATE) 1 g tablet, Take 1 tablet (1 g total) by mouth 4 (four) times a day (before meals and at bedtime), Disp: , Rfl: 0    vitamin B-12 (VITAMIN B-12) 1,000 mcg tablet, Take 1 tablet (1,000 mcg total) by mouth daily, Disp: , Rfl: 0    oxyCODONE (Roxicodone) 5 immediate release tablet, Take 0.5 tablets (2.5 mg total) by mouth every 12 (twelve) hours as needed for moderate pain or severe pain Max Daily Amount: 5 mg, Disp: 30 tablet, Rfl: 0     Please note:  Voice-recognition software may have been used in the preparation of this document. Occasional wrong word or "sound-alike" substitutions may have occurred due to the inherent limitations of voice recognition software. Interpretation should be guided by context.          JERI Montano  10/25/2023  6:04 PM

## 2023-10-25 NOTE — ASSESSMENT & PLAN NOTE
HR controlled, 72  Patient denies chest pain/palpitations  Continue diltiazem 120 mg daily  Continue Eliquis 5 mg twice daily  Continue metoprolol 100 mg in the a.m., 50 mg at bedtime with hold parameters  Patient follow-up with cardiology as recommended

## 2023-11-02 ENCOUNTER — REMOTE DEVICE CLINIC VISIT (OUTPATIENT)
Dept: CARDIOLOGY CLINIC | Facility: CLINIC | Age: 78
End: 2023-11-02
Payer: MEDICARE

## 2023-11-02 DIAGNOSIS — Z95.0 PRESENCE OF PERMANENT CARDIAC PACEMAKER: Primary | ICD-10-CM

## 2023-11-02 PROCEDURE — 93280 PM DEVICE PROGR EVAL DUAL: CPT | Performed by: INTERNAL MEDICINE

## 2023-11-02 NOTE — PROGRESS NOTES
Results for orders placed or performed in visit on 11/02/23   Cardiac EP device report    Narrative    MDT DUAL CHAMBER PPM (AAIR-DDDR MODE)/ ACTIVE SYSTEM IS MRI CONDITIONAL  CARELINK TRANSMISSION: BATTERY VOLTAGE ADEQUATE (2.5 YRS). AP 14.1%  81.1% (>40%/AAIR-DDDR 70) ALL AVAILABLE LEAD PARAMETERS WITHIN NORMAL LIMITS. 1 NEW AT/AF EPISODE LASTING 28 SECS. AT/AF BURDEN 85.1% SINCE 8/3/23. TAKES ELIQUIS, METOPROLOL TART, & DILTIAZEM. NORMAL DEVICE FUNCTION.  AM/EDUARDO

## 2023-11-09 ENCOUNTER — HOSPITAL ENCOUNTER (OUTPATIENT)
Dept: NEUROLOGY | Facility: CLINIC | Age: 78
End: 2023-11-09
Payer: MEDICARE

## 2023-11-09 DIAGNOSIS — R20.2 NUMBNESS AND TINGLING IN BOTH HANDS: ICD-10-CM

## 2023-11-09 DIAGNOSIS — R20.0 NUMBNESS AND TINGLING IN BOTH HANDS: ICD-10-CM

## 2023-11-09 PROBLEM — G56.03 BILATERAL CARPAL TUNNEL SYNDROME: Status: ACTIVE | Noted: 2023-11-09

## 2023-11-09 PROCEDURE — 95885 MUSC TST DONE W/NERV TST LIM: CPT | Performed by: PSYCHIATRY & NEUROLOGY

## 2023-11-09 PROCEDURE — 95909 NRV CNDJ TST 5-6 STUDIES: CPT | Performed by: PSYCHIATRY & NEUROLOGY

## 2023-11-24 ENCOUNTER — OFFICE VISIT (OUTPATIENT)
Dept: OBGYN CLINIC | Facility: CLINIC | Age: 78
End: 2023-11-24
Payer: MEDICARE

## 2023-11-24 VITALS — BODY MASS INDEX: 34.66 KG/M2 | HEIGHT: 64 IN | WEIGHT: 203 LBS

## 2023-11-24 DIAGNOSIS — G56.03 BILATERAL CARPAL TUNNEL SYNDROME: Primary | ICD-10-CM

## 2023-11-24 PROCEDURE — 99214 OFFICE O/P EST MOD 30 MIN: CPT | Performed by: STUDENT IN AN ORGANIZED HEALTH CARE EDUCATION/TRAINING PROGRAM

## 2023-11-24 RX ORDER — PYRIDOXINE HCL (VITAMIN B6) 25 MG
25 TABLET ORAL DAILY
COMMUNITY

## 2023-11-24 NOTE — PROGRESS NOTES
ORTHOPAEDIC HAND, WRIST, AND ELBOW OFFICE  VISIT       ASSESSMENT/PLAN:      Diagnoses and all orders for this visit:    Bilateral carpal tunnel syndrome      78 y.o. female with b/l carpal tunnel syndrome. Treatment options and expected outcomes were discussed. The patient verbalized understanding of exam findings and treatment plan. The patient was given the opportunity to ask questions. Questions were answered to the patient's satisfaction. The patient decided to move forward with b/l bracing via shared decision making. Discussed options for b/l carpal tunnel syndrome. Discussed options such as surgery and injections. Discussed with patient that EMG showed severe nerve entrapment at the wrist bilaterally. Discussed with patient that severity of her carpal tunnel often warrants surgery. Informed conversation between patient concerning medical history including many prior orthopedic procedures. Patient expressed she does not desire any further surgical intervention. Patient expressed she does not desire an injection at this time. Discussed with patient to return to the office if she would like further intervention or with any other needs. Follow Up:  PRN     Discussions:  Carpal Tunnel Syndrome: The anatomy and physiology of carpal tunnel syndrome was discussed with the patient today. Increase pressure localized under the transverse carpal ligament can cause pain, numbness, tingling, or dysesthesias within the median nerve distribution as well as feelings of fatigue, clumsiness, or awkwardness. These symptoms typically occur at night and worse in the morning upon waking. Eventually, untreated carpal tunnel syndrome can result in weakness and permanent loss of muscle within the thenar compartment of the hand. Treatment options were discussed with the patient.   Conservative treatment includes nocturnal resting splints to keep the nerve in a neutral position, ergonomic changes within the work or home environment, activity modification, and tendon gliding exercises. Vitamin B6 one tablet daily over the counter may helpful to reduce symptoms. Steroid injections within the carpal canal can help a majority of patients, however this is often self-limited in a majority of patients. Surgical intervention to divide the transverse carpal ligament typically results in a long-lasting relief of the patient's complaints, with the recurrence rate of less than 1%. Viviana Thacker MD  Attending, Orthopaedic Surgery  Hand, Wrist, and Elbow Surgery  [facility]    ____________________________________________________________________________________________________________________________________________      CHIEF COMPLAINT:  Chief Complaint   Patient presents with   • Left Wrist - Follow-up   • Right Wrist - Follow-up       SUBJECTIVE:  Patient is a 78 y.o. RHD female who presents today for follow up of b/l numbness and tingling of thumb, index, long, and half ring fingers. Patient notes mild relief of b/l numbness and tingling since prior visit. Patient notes she uses b/l cock up bracing when she is feeling pain, notes minor relief with bracing. Patient notes use of tylenol with no relief of pain. Patient completed EMG on 11/09/2023, notes testing was limited due to pain. I have personally reviewed all the relevant PMH, PSH, SH, FH, Medications and allergies      PAST MEDICAL HISTORY:  Past Medical History:   Diagnosis Date   • Abnormal ECG     Last Assessed 9/29/2016   • Anxiety     Last Assessed 6/08/2016   • Arthritis     knees   • Asthma     Last Assessed 11/06/2013   • Atrial premature complex    • Cataract, bilateral     Last Assessed 7/14/2016   • Cataract, left eye     Both eyes. Had surgery on left.    • COVID-19 • Difficulty swallowing    • Diverticulosis 9/25/2022   • Dizziness    • Fibromyalgia    • Fibromyalgia, primary    • Gastric reflux    • Heart failure ([address])     5/23/22 Pt reports had heart failure in the past   • History of COVID-19     5/23/22 Pt reports having COVID in 2021. • History of transfusion     no reaction   • [place] (hard of hearing)    • Hyperlipidemia    • Hypertension    • Incontinence in female     5/23/22 Pt reports has incontinence -wears depends especially at night/riding in car   • Irregular heart beat     5/23/22 Pt reports hx of A fib   • Lyme disease    • PONV (postoperative nausea and vomiting)    • Premature ventricular contraction    • Primary osteoarthritis of both knees     Last Assessed 7/14/2016   • Rheumatic fever     5/23/22 Pt reports had hx of rheumatic fever as a child twice   • Sore throat    • Tuberculosis 1947       PAST SURGICAL HISTORY:  Past Surgical History:   Procedure Laterality Date   • APPENDECTOMY     • CARDIAC PACEMAKER PLACEMENT  2019   • COLONOSCOPY     • DENTAL SURGERY      extractions   • HYSTERECTOMY      5/23/22 Pt reports had appendix out with hysterectomy and "tightened up my bladder"   • IR ASPIRATION ONLY  9/30/2022   • ORIF FEMUR FRACTURE Left 08/05/2021    Procedure: OPEN REDUCTION W/ INTERNAL FIXATION (ORIF) DISTAL FEMUR; INSERTION RETROGRADE NAIL;  Surgeon: Mattie Carpenter MD;  [facility];  Service: Orthopedics   • AR ARTHRP KNE CONDYLE&PLATU MEDIAL&LAT COMPARTMENTS Left 9/12/2022    Procedure: ARTHROPLASTY KNEE TOTAL;  Surgeon: Mattie Carpenter MD;  [facility];  Service: Orthopedics   • AR DILATION [facility] UNGUIDED SOUND/BOUGIE 1/MULT PASS N/A 04/06/2016    Procedure: DILATATION ESOPHAGEAL;  Surgeon: Russella Bence, MD;  Location: [facility]; Service: Gastroenterology   • AR EGD TRANSORAL BIOPSY SINGLE/MULTIPLE N/A 04/06/2016    Procedure: ESOPHAGOGASTRODUODENOSCOPY (EGD);   Surgeon: Russella Bence, MD;  [facility]; Service: Gastroenterology   • VA LATERAL RETINACULAR RELEASE OPEN Left 1/23/2023    Procedure: RELEASE RETINACULAR, left knee and all associated procedures;  Surgeon: Margrett Heimlich, MD;  [facility];  Service: Orthopedics   • VA REMOVAL IMPLANT DEEP Left 9/12/2022    Procedure: REMOVAL NAIL IM  FEMUR;  Surgeon: Margrett Heimlich, MD;  [facility];  Service: Orthopedics   • VA XCAPSL CTRC RMVL INSJ IO LENS PROSTH W/O ECP Left 03/15/2016    Procedure: EXTRACTION EXTRACAPSULAR CATARACT PHACO INTRAOCULAR LENS (IOL);   Surgeon: Jean Tafoya MD;  [facility];  Service: Ophthalmology   • REPLACEMENT TOTAL KNEE Right    • REPLACEMENT TOTAL KNEE      right    • TONSILLECTOMY         FAMILY HISTORY:  Family History   Problem Relation Age of Onset   • Coronary artery disease Mother    • Heart attack Mother         Prior   • Heart disease Father    • Heart attack Father         Prior   • Anesthesia problems Neg Hx        SOCIAL HISTORY:  Social History     Tobacco Use   • Smoking status: Never   • Smokeless tobacco: Never   • Tobacco comments:     Denies any former or current smoking   Vaping Use   • Vaping Use: Never used   Substance Use Topics   • Alcohol use: Not Currently     Comment: Per [employer]- pt reports no current alcohol use at this time   • Drug use: No     Comment: Denies any former or current drug use       MEDICATIONS:    Current Outpatient Medications:   •  acetaminophen (TYLENOL) 325 mg tablet, Take 975 mg by mouth every 6 (six) hours as needed, Disp: , Rfl:   •  albuterol (PROVENTIL HFA,VENTOLIN HFA) 90 mcg/act inhaler, INHALE 2 PUFFS BY MOUTH EVERY 6 HOURS AS NEEDED FOR WHEEZING, Disp: 3 Inhaler, Rfl: 0  •  allopurinol (ZYLOPRIM) 100 mg tablet, Take 1 tablet (100 mg total) by mouth daily, Disp: 90 tablet, Rfl: 3  •  apixaban (Eliquis) 5 mg, Take 1 tablet (5 mg total) by mouth 2 (two) times a day, Disp: 60 tablet, Rfl: 6  •  ascorbic acid (VITAMIN C) 500 MG tablet, Take 1 tablet (500 mg total) by mouth 2 (two) times a day, Disp: 60 tablet, Rfl: 1  •  bisacodyl (DULCOLAX) 10 mg suppository, Insert 10 mg into the rectum daily as needed, Disp: , Rfl:   •  Calcium Polycarbophil (FIBER-LAX PO), Take 1 tablet by mouth daily at bedtime, Disp: , Rfl:   •  Cholecalciferol 25 MCG (1000 UT) tablet, Take 1,000 Units by mouth daily, Disp: , Rfl:   •  Diclofenac Sodium (VOLTAREN) 1 %, Apply 2 g topically 4 (four) times a day = to B/L knee, Disp: , Rfl:   •  diltiazem (CARDIZEM CD) 120 mg 24 hr capsule, Take 1 capsule (120 mg total) by mouth daily, Disp: 90 capsule, Rfl: 3  •  DULoxetine (CYMBALTA) 20 mg capsule, Take 20 mg by mouth daily at bedtime, Disp: , Rfl:   •  ezetimibe (ZETIA) 10 mg tablet, Take 1 tablet (10 mg total) by mouth daily, Disp: 90 tablet, Rfl: 1  •  ferrous sulfate 324 (65 Fe) mg, Take 1 tablet (324 mg total) by mouth 2 (two) times a day before meals, Disp: 60 tablet, Rfl: 1  •  folic acid (FOLVITE) 1 mg tablet, TAKE 1 TABLET(1 MG) BY MOUTH DAILY, Disp: 90 tablet, Rfl: 0  •  furosemide (LASIX) 20 mg tablet, Take 1 tablet (20 mg total) by mouth daily with dinner, Disp: , Rfl: 0  •  furosemide (LASIX) 40 mg tablet, Take 40 mg by mouth in the morning, Disp: , Rfl:   •  Melatonin 10 MG TABS, Take by mouth Takes daily at night, Disp: , Rfl:   •  metoprolol tartrate (LOPRESSOR) 100 mg tablet, Take 1 tablet (100 mg total) by mouth daily in the early morning Do not start before January 7, 2023., Disp: , Rfl: 0  •  metoprolol tartrate (LOPRESSOR) 50 mg tablet, Take 1 tablet (50 mg total) by mouth daily at bedtime, Disp: , Rfl: 0  •  Multiple Vitamin (MULTIVITAMIN ADULT PO), Take by mouth in the morning, Disp: , Rfl:   •  omeprazole (PriLOSEC) 20 mg delayed release capsule, Take 20 mg by mouth daily, Disp: , Rfl:   •  oxybutynin (DITROPAN-XL) 10 MG 24 hr tablet, Take 10 mg by mouth daily at bedtime, Disp: , Rfl:   •  polyethylene glycol (MIRALAX) 17 g packet, Take 17 g by mouth daily as needed, Disp: , Rfl:   •  pyridoxine (B-6) 25 MG tablet, Take 25 mg by mouth daily, Disp: , Rfl:   •  senna-docusate sodium (SENOKOT S) 8.6-50 mg per tablet, Take 2 tablets by mouth 2 (two) times a day, Disp: , Rfl: 0  •  sucralfate (CARAFATE) 1 g tablet, Take 1 tablet (1 g total) by mouth 4 (four) times a day (before meals and at bedtime), Disp: , Rfl: 0  •  vitamin B-12 (VITAMIN B-12) 1,000 mcg tablet, Take 1 tablet (1,000 mcg total) by mouth daily, Disp: , Rfl: 0  •  oxyCODONE (Roxicodone) 5 immediate release tablet, Take 0.5 tablets (2.5 mg total) by mouth every 12 (twelve) hours as needed for moderate pain or severe pain Max Daily Amount: 5 mg (Patient not taking: Reported on 11/24/2023), Disp: 30 tablet, Rfl: 0    ALLERGIES:  Allergies   Allergen Reactions   • Penicillins Hives     Itching and terrible hives   • Sulfa Antibiotics Itching   • V64 Folate [Folic Acid-Vit E3-NPU E81 - Food Allergy] Other (See Comments)     5/23/22 Pt reports no allergic reaction known    • Hooper Other (See Comments)     Unknown, 5/23 -pt is unaware of reaction    • Lyrica [Pregabalin] Other (See Comments)     5/23/22 Pt reports doesn't remember reaction    • Other Other (See Comments)     Black rubber 5/23/22 per allergy test - pt unaware of reaction   • Cortisone Acetate [Cortisone] Itching and Rash     5/23/22 pt reports makes her violent    • Doxycycline Hives and Itching   • Nickel Other (See Comments)     Skin discoloration           REVIEW OF SYSTEMS:  Musculoskeletal:        As noted in HPI. All other systems reviewed and are negative. VITALS:  There were no vitals filed for this visit.     LABS:  HgA1c:   Lab Results   Component Value Date    HGBA1C 5.6 08/17/2022     BMP:   Lab Results   Component Value Date    GLUCOSE 127 08/05/2021    CALCIUM 9.0 01/25/2023    K 4.1 01/25/2023    CO2 27 01/25/2023     01/25/2023    BUN 24 01/25/2023    CREATININE 0.93 01/25/2023 _____________________________________________________  PHYSICAL EXAMINATION:  General: Well developed and well nourished, alert & oriented x 3, appears comfortable  Psychiatric: Normal  HEENT: Normocephalic, Atraumatic Trachea Midline, No torticollis  Pulmonary: No audible wheezing or respiratory distress   Abdomen/GI: Non tender, non distended   Cardiovascular: No pitting edema, 2+ radial pulse   Skin: No masses, erythema, lacerations, fluctation, ulcerations  Neurovascular: Sensation Intact to the Median, Ulnar, Radial Nerve, Motor Intact to the Median, Ulnar, Radial Nerve, and Pulses Intact  Musculoskeletal: Normal, except as noted in detailed exam and in HPI. MUSCULOSKELETAL EXAMINATION:  Bilateral upper extremities  Mild thenar atrophy  Positive tinel's over median nerve at the wrist.   Negative tinel's over ulnar nerve at the elbow  Abduction strength 5/5  Intrinsic strength 5/5     ___________________________________________________  STUDIES REVIEWED:  EMG was personally reviewed and independently interpreted by Dr. Evelia Mustafa and demonstrates severe bilateral carpal tunnel syndrome.            PROCEDURES PERFORMED:  Procedures  No Procedures performed today    _____________________________________________________      Scribe Attestation    I,:  Jose R Jackson PA-C am acting as a scribe while in the presence of the attending physician.:       I,:  Gurdeep Shields MD personally performed the services described in this documentation    as scribed in my presence.:

## 2023-11-29 ENCOUNTER — NURSING HOME VISIT (OUTPATIENT)
Dept: GERIATRICS | Facility: OTHER | Age: 78
End: 2023-11-29
Payer: MEDICARE

## 2023-11-29 VITALS
DIASTOLIC BLOOD PRESSURE: 53 MMHG | WEIGHT: 210.5 LBS | TEMPERATURE: 97.9 F | HEART RATE: 70 BPM | RESPIRATION RATE: 18 BRPM | SYSTOLIC BLOOD PRESSURE: 145 MMHG | BODY MASS INDEX: 36.13 KG/M2 | OXYGEN SATURATION: 94 %

## 2023-11-29 DIAGNOSIS — I48.0 PAROXYSMAL ATRIAL FIBRILLATION (HCC): ICD-10-CM

## 2023-11-29 DIAGNOSIS — F33.0 MILD EPISODE OF RECURRENT MAJOR DEPRESSIVE DISORDER (HCC): ICD-10-CM

## 2023-11-29 DIAGNOSIS — I10 ESSENTIAL HYPERTENSION: ICD-10-CM

## 2023-11-29 DIAGNOSIS — N18.31 STAGE 3A CHRONIC KIDNEY DISEASE (HCC): ICD-10-CM

## 2023-11-29 DIAGNOSIS — G56.03 BILATERAL CARPAL TUNNEL SYNDROME: ICD-10-CM

## 2023-11-29 DIAGNOSIS — I50.32 CHRONIC HEART FAILURE WITH PRESERVED EJECTION FRACTION (HCC): Primary | ICD-10-CM

## 2023-11-29 DIAGNOSIS — K21.9 CHRONIC GERD: ICD-10-CM

## 2023-11-29 DIAGNOSIS — D64.89 ANEMIA DUE TO OTHER CAUSE, NOT CLASSIFIED: ICD-10-CM

## 2023-11-29 DIAGNOSIS — M1A.9XX1 CHRONIC GOUT WITH TOPHUS, UNSPECIFIED CAUSE, UNSPECIFIED SITE: ICD-10-CM

## 2023-11-29 DIAGNOSIS — E55.9 VITAMIN D INSUFFICIENCY: ICD-10-CM

## 2023-11-29 DIAGNOSIS — R26.2 AMBULATORY DYSFUNCTION: ICD-10-CM

## 2023-11-29 DIAGNOSIS — M81.0 AGE RELATED OSTEOPOROSIS, UNSPECIFIED PATHOLOGICAL FRACTURE PRESENCE: ICD-10-CM

## 2023-11-29 DIAGNOSIS — I49.5 TACHY-BRADY SYNDROME (HCC): ICD-10-CM

## 2023-11-29 DIAGNOSIS — K21.9 GASTROESOPHAGEAL REFLUX DISEASE WITHOUT ESOPHAGITIS: ICD-10-CM

## 2023-11-29 PROCEDURE — 99309 SBSQ NF CARE MODERATE MDM 30: CPT | Performed by: FAMILY MEDICINE

## 2023-11-29 NOTE — PROGRESS NOTES
[address]  [phone]  [facility]  POS 32    NAME: Sander Walker  AGE: 78 y.o. SEX: female [id number]    DATE OF ENCOUNTER: 11/29/2023    Assessment and Plan     Chronic heart failure with preserved ejection fraction (HCC)  Wt Readings from Last 3 Encounters:   11/29/23 95.5 kg (210 lb 8 oz)   11/24/23 92.1 kg (203 lb)   09/08/23 92.1 kg (203 lb)   Euvolemic on exam.  Stable, no exacerbation  Continue Lasix and Lopressor daily  Monitor weight, trend BMP      Essential hypertension  BP stable, at goal  Continue Lasix and BB in the setting of CHF and Afib  Monitor BP    GERD (gastroesophageal reflux disease)  Maintain on omeprazole 20 mg daily and Carafate 1g two times daily  Consider to wean off PPI as able    Paroxysmal atrial fibrillation (HCC)  Rate regular and controlled on exam  Continue metoprolol, Eliquis and diltiazem    Tachy-smita syndrome (HCC)  Stable  S/p PPM  Continue metoprolol, diltiazem, Eliquis    Bilateral carpal tunnel syndrome  S/p EMG. F/u with Orthopedics. Note on 11/24/23 reviewed. Patient opted for conservative management, no further surgical intervention or injection. Multimodal pain regimen with Tylenol, Cymbalta. Consider low-dose gabapentin at bed time if pain persists at night. Age related osteoporosis  DEXA scan ordered in 2019, but patient did not want to schedule. Continue vitamin D3 supplement    Chronic gout with tophus  Continue allopurinol daily  No flare reported recently. Uric acid 6.4 in 3/2023. Will repeat next blood drawn. Stage 3a chronic kidney disease (HCC)  Creatinine 0.9 and GFR 60 in 5/2023  Avoid NSAIDs. Avoid hypotension. BMP ordered    Ambulatory dysfunction  Multifactorial in the setting of advanced age, chronic medical conditions  Fall/safety precautions  Ensure adequate nutrition/hydration   Assist patient with ADLs/IADLs as needed    Anemia  Iron deficiency anemia, on iron supplement daily.   CBC check this visit. Order placed. Mild episode of recurrent major depressive disorder (HCC)  Continue cymbalta 20 mg daily    Vitamin D insufficiency  Continue vitamin D supplement daily      - Counseling Documentation: patient was counseled regarding: diagnostic results, instructions for management, risks and benefits of treatment options, and importance of compliance with treatment    Chief Complaint     Routine Long term follow-up visit. History of Present Illness     HPI  The patient is a LTC resident of [address] [place] [address] since 11/28/2022. PMH including but not limited to A-fib, HTN, CHF CKD, pacemaker fibromyalgia EF, anxiety, osteoporosis, gout, GERD. She is seen in her room today during routine long-term follow-up visit. She complains of pain in her hands, especially at night. She was seen by Orthopedics recently. She denies chest pain, SOB, palpitations. No concerns from nursing report. The following portions of the patient's history were reviewed and updated as appropriate: allergies, current medications, past family history, past medical history, past social history, past surgical history and problem list.    Review of Systems     Review of Systems   Musculoskeletal:  Positive for arthralgias (wrist pain b/l) and myalgias. Negative for joint swelling. All other systems reviewed and are negative.       Active Problem List     Patient Active Problem List   Diagnosis    Atrial fibrillation RVR (HCC)    Tachy-smita syndrome (HCC)    Essential hypertension    Pacemaker    Chronic bilateral low back pain without sciatica    Chronic GERD    Degenerative lumbar spinal stenosis    Fibromyalgia    GERD (gastroesophageal reflux disease)    Low back pain    Lumbar degenerative disc disease    Mild carpal tunnel syndrome, right    Overweight    Psoriasis    Thyroid nodule    Urinary incontinence    Other insomnia    Anxiety    Tremor    Chronic pain of left knee    Age related osteoporosis    Vitamin D insufficiency HLD (hyperlipidemia)    Arthritis    Generalized weakness    Ambulatory dysfunction    Reactive airway disease with acute exacerbation    Morbid obesity with body mass index (BMI) of 40.0 to 49.9 ([facility])    Chronic gout with tophus    Current moderate episode of major depressive disorder (HCC)    Paroxysmal atrial fibrillation (HCC)    Poor dentition    Closed bicondylar fracture of left femur with delayed healing    Stage 3a chronic kidney disease (HCC)    Renal insufficiency    Anemia    Bladder wall thickening    Bacteremia due to methicillin resistant Staphylococcus epidermidis    Asymptomatic bacteriuria    Aortic regurgitation    Thrombocytosis    Physical deconditioning    Chronic heart failure with preserved ejection fraction (HCC)    Dermatitis associated with incontinence    Proctitis    Congestive heart failure    Chronic constipation    S/P knee surgery    Numbness and tingling in both hands    Bilateral carpal tunnel syndrome       Objective     Vitals:  Vitals:    11/29/23 1313   BP: 145/53   Pulse: 70   Resp: 18   Temp: 97.9 °F (36.6 °C)   SpO2: 94%   Weight: 95.5 kg (210 lb 8 oz)     Physical Exam  Vitals and nursing note reviewed. Constitutional:       General: She is not in acute distress. HENT:      Head: Atraumatic. Nose: Nose normal.      Mouth/Throat:      Mouth: Mucous membranes are moist.   Eyes:      General:         Right eye: No discharge. Left eye: No discharge. Conjunctiva/sclera: Conjunctivae normal.   Cardiovascular:      Rate and Rhythm: Normal rate and regular rhythm. Heart sounds: No murmur heard. Pulmonary:      Effort: Pulmonary effort is normal.      Breath sounds: Normal breath sounds. No rales. Abdominal:      General: Bowel sounds are normal. There is no distension. Palpations: Abdomen is soft. Tenderness: There is no abdominal tenderness. Musculoskeletal:         General: No deformity. Cervical back: Neck supple.       Right lower leg: No edema. Left lower leg: No edema. Skin:     General: Skin is warm. Neurological:      Mental Status: She is alert and oriented to person, place, and time. Psychiatric:         Behavior: Behavior normal.         Pertinent Laboratory/Diagnostic Studies:  Refer to facility chart. Current Medications   Medications reviewed and updated in facility chart.

## 2023-12-03 PROBLEM — L25.8 DERMATITIS ASSOCIATED WITH INCONTINENCE: Status: RESOLVED | Noted: 2022-10-26 | Resolved: 2023-12-03

## 2023-12-03 PROBLEM — R82.71 ASYMPTOMATIC BACTERIURIA: Status: RESOLVED | Noted: 2022-10-03 | Resolved: 2023-12-03

## 2023-12-03 PROBLEM — R32 DERMATITIS ASSOCIATED WITH INCONTINENCE: Status: RESOLVED | Noted: 2022-10-26 | Resolved: 2023-12-03

## 2023-12-03 PROBLEM — N32.89 BLADDER WALL THICKENING: Status: RESOLVED | Noted: 2022-09-25 | Resolved: 2023-12-03

## 2023-12-03 PROBLEM — G56.01 MILD CARPAL TUNNEL SYNDROME, RIGHT: Status: RESOLVED | Noted: 2017-04-10 | Resolved: 2023-12-03

## 2023-12-03 PROBLEM — G47.09 OTHER INSOMNIA: Status: RESOLVED | Noted: 2019-02-27 | Resolved: 2023-12-03

## 2023-12-03 PROBLEM — B95.7 BACTEREMIA DUE TO METHICILLIN RESISTANT STAPHYLOCOCCUS EPIDERMIDIS: Status: RESOLVED | Noted: 2022-09-26 | Resolved: 2023-12-03

## 2023-12-03 PROBLEM — K62.89 PROCTITIS: Status: RESOLVED | Noted: 2022-11-25 | Resolved: 2023-12-03

## 2023-12-03 PROBLEM — J45.901 REACTIVE AIRWAY DISEASE WITH ACUTE EXACERBATION: Status: RESOLVED | Noted: 2021-09-21 | Resolved: 2023-12-03

## 2023-12-03 PROBLEM — Z16.29 BACTEREMIA DUE TO METHICILLIN RESISTANT STAPHYLOCOCCUS EPIDERMIDIS: Status: RESOLVED | Noted: 2022-09-26 | Resolved: 2023-12-03

## 2023-12-03 PROBLEM — F33.0 MILD EPISODE OF RECURRENT MAJOR DEPRESSIVE DISORDER (HCC): Status: ACTIVE | Noted: 2021-12-07

## 2023-12-03 PROBLEM — R78.81 BACTEREMIA DUE TO METHICILLIN RESISTANT STAPHYLOCOCCUS EPIDERMIDIS: Status: RESOLVED | Noted: 2022-09-26 | Resolved: 2023-12-03

## 2023-12-03 PROBLEM — S72.432G: Status: RESOLVED | Noted: 2022-06-01 | Resolved: 2023-12-03

## 2023-12-03 PROBLEM — K08.9 POOR DENTITION: Status: RESOLVED | Noted: 2022-06-01 | Resolved: 2023-12-03

## 2023-12-03 PROBLEM — I48.91 ATRIAL FIBRILLATION (HCC): Status: RESOLVED | Noted: 2018-02-15 | Resolved: 2023-12-03

## 2023-12-03 PROBLEM — S72.422G: Status: RESOLVED | Noted: 2022-06-01 | Resolved: 2023-12-03

## 2023-12-03 PROBLEM — K59.09 CHRONIC CONSTIPATION: Status: RESOLVED | Noted: 2023-01-04 | Resolved: 2023-12-03

## 2023-12-03 PROBLEM — E87.70 VOLUME OVERLOAD: Status: RESOLVED | Noted: 2023-01-04 | Resolved: 2023-12-03

## 2023-12-03 RX ORDER — SUCRALFATE 1 G/1
1 TABLET ORAL 2 TIMES DAILY
Qty: 30 TABLET | Refills: 0
Start: 2023-12-03

## 2023-12-04 NOTE — ASSESSMENT & PLAN NOTE
Continue allopurinol daily  No flare reported recently. Uric acid 6.4 in 3/2023. Will repeat next blood drawn.

## 2023-12-04 NOTE — ASSESSMENT & PLAN NOTE
Maintain on omeprazole 20 mg daily and Carafate 1g two times daily  Consider to wean off PPI as able

## 2023-12-04 NOTE — ASSESSMENT & PLAN NOTE
Multifactorial in the setting of advanced age, chronic medical conditions  Fall/safety precautions  Ensure adequate nutrition/hydration   Assist patient with ADLs/IADLs as needed

## 2023-12-04 NOTE — ASSESSMENT & PLAN NOTE
S/p EMG. F/u with Orthopedics. Note on 11/24/23 reviewed. Patient opted for conservative management, no further surgical intervention or injection. Multimodal pain regimen with Tylenol, Cymbalta. Consider low-dose gabapentin at bed time if pain persists at night.

## 2024-01-19 ENCOUNTER — NURSING HOME VISIT (OUTPATIENT)
Dept: GERIATRICS | Facility: OTHER | Age: 79
End: 2024-01-19

## 2024-01-19 VITALS
TEMPERATURE: 97.9 F | SYSTOLIC BLOOD PRESSURE: 122 MMHG | WEIGHT: 215 LBS | BODY MASS INDEX: 36.9 KG/M2 | HEART RATE: 70 BPM | RESPIRATION RATE: 18 BRPM | DIASTOLIC BLOOD PRESSURE: 66 MMHG | OXYGEN SATURATION: 94 %

## 2024-01-19 DIAGNOSIS — I50.32 CHRONIC HEART FAILURE WITH PRESERVED EJECTION FRACTION (HCC): ICD-10-CM

## 2024-01-19 DIAGNOSIS — I10 ESSENTIAL HYPERTENSION: Primary | ICD-10-CM

## 2024-01-19 DIAGNOSIS — I48.0 PAROXYSMAL ATRIAL FIBRILLATION (HCC): ICD-10-CM

## 2024-01-19 DIAGNOSIS — E78.2 MIXED HYPERLIPIDEMIA: ICD-10-CM

## 2024-01-19 DIAGNOSIS — R26.2 AMBULATORY DYSFUNCTION: ICD-10-CM

## 2024-01-19 DIAGNOSIS — F41.9 ANXIETY: ICD-10-CM

## 2024-01-19 NOTE — ASSESSMENT & PLAN NOTE
BP stable, 122/66  Continue to monitor BP  Continue metoprolol 100 mg in a.m., 50 mg at bedtime with hold parameters  continue Lasix 40 mg in a.m. and 20 mg in pm

## 2024-01-19 NOTE — PROGRESS NOTES
[facility]  [address] [address]  [phone]  [place] [facility]  Code  32 (LTC)        NAME: Isi Ernst  AGE: 78 y.o. SEX: female CODE STATUS: CPR    DATE OF ENCOUNTER: 1/19/2024    Assessment and Plan     1. Essential hypertension  Assessment & Plan:  BP stable, 122/66  Continue to monitor BP  Continue metoprolol 100 mg in a.m., 50 mg at bedtime with hold parameters  continue Lasix 40 mg in a.m. and 20 mg in pm      2. Paroxysmal atrial fibrillation (HCC)  Assessment & Plan:  Heart rate controlled, 70  patient denies chest pain/palpitations  Continue metoprolol tartrate 100 mg in the a.m., 50 mg in the p.m.  Continue diltiazem 120 mg daily  Continue Eliquis 5 mg twice daily      3. Chronic heart failure with preserved ejection fraction (HCC)  Assessment & Plan:  Wt Readings from Last 3 Encounters:   01/19/24 97.5 kg (215 lb)   11/29/23 95.5 kg (210 lb 8 oz)   11/24/23 92.1 kg (203 lb)   Slight weight increase over last month  Increased calorie consumption vs fluid  No signs of fluid overload, patient appears euvolemic  Last echo 9/2022 EF of 65%  Continue Lasix 40 mg in a.m. and 20 mg in p.m.  Continue metoprolol 100 mg in a.m. and 50 mg at at bedtime  Continue monthly weights  Encourage heart healthy diet        4. Ambulatory dysfunction  Assessment & Plan:  Multifactorial in the setting of advanced age, chronic medical conditions  Continue PT/OT as needed  Fall/safety precautions  Ensure adequate nutrition/hydration   Assist patient with ADLs/IADLs as needed      5. Mixed hyperlipidemia  Assessment & Plan:  Continue Zetia 10 mg daily  Will repeat lipid panel      6. Anxiety  Assessment & Plan:  Mood stable  Continue duloxetine 20 mg daily           All medications and routine orders were reviewed and updated as needed.    Chief Complaint   [facility] follow up visit   Patient's care was coordinated with nursing facility staff. Recent vitals, labs, and updated medications were review on  "[facility] system in facility.  Past Medical and Surgical History      Past Medical History:   Diagnosis Date    Abnormal ECG     Last Assessed 9/29/2016    Anxiety     Last Assessed 6/08/2016    Arthritis     knees    Asthma     Last Assessed 11/06/2013    Atrial premature complex     Cataract, bilateral     Last Assessed 7/14/2016    Cataract, left eye     Both eyes. Had surgery on left.    COVID-19     Difficulty swallowing     Diverticulosis 9/25/2022    Dizziness     Fibromyalgia     Fibromyalgia, primary     Gastric reflux     Heart failure (HCC)     5/23/22 Pt reports had heart failure in the past    History of COVID-19     5/23/22 Pt reports having COVID in 2021.    History of transfusion     no reaction    [place] (hard of hearing)     Hyperlipidemia     Hypertension     Incontinence in female     5/23/22 Pt reports has incontinence -wears depends especially at night/riding in car    Irregular heart beat     5/23/22 Pt reports hx of A fib    Lyme disease     PONV (postoperative nausea and vomiting)     Premature ventricular contraction     Primary osteoarthritis of both knees     Last Assessed 7/14/2016    Rheumatic fever     5/23/22 Pt reports had hx of rheumatic fever as a child twice    Sore throat     Tuberculosis 1945     Past Surgical History:   Procedure Laterality Date    APPENDECTOMY      CARDIAC PACEMAKER PLACEMENT  2019    COLONOSCOPY      DENTAL SURGERY      extractions    HYSTERECTOMY      5/23/22 Pt reports had appendix out with hysterectomy and \"tightened up my bladder\"    IR ASPIRATION ONLY  9/30/2022    ORIF FEMUR FRACTURE Left 08/05/2021    Procedure: OPEN REDUCTION W/ INTERNAL FIXATION (ORIF) DISTAL FEMUR; INSERTION RETROGRADE NAIL;  Surgeon: Rita Harris MD;  [facility];  Service: Orthopedics    ID ARTHRP KNE CONDYLE&PLATU MEDIAL&LAT COMPARTMENTS Left 9/12/2022    Procedure: ARTHROPLASTY KNEE TOTAL;  Surgeon: Rita Harris MD;  [facility];  Service: " Orthopedics    RI DILATION ESOPH UNGUIDED SOUND/BOUGIE 1/MULT PASS N/A 04/06/2016    Procedure: DILATATION ESOPHAGEAL;  Surgeon: Watson Turpin MD;  [facility];  Service: Gastroenterology    RI EGD TRANSORAL BIOPSY SINGLE/MULTIPLE N/A 04/06/2016    Procedure: ESOPHAGOGASTRODUODENOSCOPY (EGD);  Surgeon: Watson Turpin MD;  [facility];  Service: Gastroenterology    RI LATERAL RETINACULAR RELEASE OPEN Left 1/23/2023    Procedure: RELEASE RETINACULAR, left knee and all associated procedures;  Surgeon: Rita Harris MD;  [facility];  Service: Orthopedics    RI REMOVAL IMPLANT DEEP Left 9/12/2022    Procedure: REMOVAL NAIL IM  FEMUR;  Surgeon: Rita Harris MD;  [facility];  Service: Orthopedics    RI XCAPSL CTRC RMVL INSJ IO LENS PROSTH W/O ECP Left 03/15/2016    Procedure: EXTRACTION EXTRACAPSULAR CATARACT PHACO INTRAOCULAR LENS (IOL);  Surgeon: Jeanette Beckman MD;  [facility];  Service: Ophthalmology    REPLACEMENT TOTAL KNEE Right     REPLACEMENT TOTAL KNEE      right     TONSILLECTOMY       Allergies   Allergen Reactions    Penicillins Hives     Itching and terrible hives    Sulfa Antibiotics Itching    B12 Folate [Folic Acid-Vit B6-Vit B12 - Food Allergy] Other (See Comments)     5/23/22 Pt reports no allergic reaction known     [place] Other (See Comments)     Unknown, 5/23 -pt is unaware of reaction     Lyrica [Pregabalin] Other (See Comments)     5/23/22 Pt reports doesn't remember reaction     Other Other (See Comments)     Black rubber 5/23/22 per allergy test - pt unaware of reaction    Cortisone Acetate [Cortisone] Itching and Rash     5/23/22 pt reports makes her violent     Doxycycline Hives and Itching    Nickel Other (See Comments)     Skin discoloration          History of Present Illness     HPI  Isi Ernst is a 78 year old female, she is a LTC resident of [facility] since 11/28/22. Past Medical Hx including but not limited to  GERD, A-fib, HTN, CHF, lumbar DDD, osteoporosis, gout, CKD, pacemaker, fibromyalgia, anxiety, HLD, vitamin D deficiency.  She was seen in collaboration with nursing for medical mgmt and LTC follow up.     Isi was seen and examined at bedside today. Patient is a reliable historian and is alert and oriented x3.  On exam patient is sitting in her chair at side of the bed and does not appear to be in any distress.  She denies pain, difficulty sleeping, and has a good appetite.  She incontinent of bowel and bladder, requires assistance with ADLs/IADLs.  She states she is doing well and has no concerns.  She denies CP/SOB/N/V/D. Denies lightheadedness, dizziness, headaches, vision changes. Patient states they are eating well and staying hydrated. Denies any bowel or bladder issues. Per review of SNF records, Patient is eating 3 meals per day, consuming %. Last documented BM 1/19/24. No concerns from nursing at this time.    The patient's allergies, past medical, surgical, social and family history were reviewed and unchanged.    Review of Systems     Review of Systems   Constitutional:  Negative for activity change, appetite change and fever.   HENT: Negative.  Negative for congestion.    Eyes: Negative.    Respiratory: Negative.  Negative for cough, shortness of breath and wheezing.    Cardiovascular: Negative.  Negative for chest pain and palpitations.   Gastrointestinal: Negative.  Negative for abdominal distention, abdominal pain, constipation, diarrhea, nausea and vomiting.   Endocrine: Negative.    Genitourinary:  Negative for difficulty urinating, dysuria and flank pain.   Musculoskeletal:  Positive for arthralgias and gait problem.   Skin: Negative.    Allergic/Immunologic: Negative.    Neurological:  Positive for weakness. Negative for dizziness and headaches.   Hematological: Negative.    Psychiatric/Behavioral:  Negative for confusion and sleep disturbance.          Objective     Vitals:   Vitals:     01/19/24 1717   BP: 122/66   Pulse: 70   Resp: 18   Temp: 97.9 °F (36.6 °C)   SpO2: 94%           Physical Exam  Vitals and nursing note reviewed.   Constitutional:       General: She is not in acute distress (urmur).     Appearance: Normal appearance. She is not ill-appearing.   HENT:      Head: Normocephalic and atraumatic.      Nose: No congestion.   Eyes:      Conjunctiva/sclera: Conjunctivae normal.   Cardiovascular:      Rate and Rhythm: Normal rate and regular rhythm.      Heart sounds: Murmur heard.   Pulmonary:      Effort: Pulmonary effort is normal. No respiratory distress.      Breath sounds: Normal breath sounds. No wheezing.   Abdominal:      General: Bowel sounds are normal. There is no distension.      Palpations: Abdomen is soft.      Tenderness: There is no abdominal tenderness.   Skin:     General: Skin is warm.   Neurological:      Mental Status: She is alert and oriented to person, place, and time. Mental status is at baseline.      Motor: Weakness present.      Gait: Gait abnormal.   Psychiatric:         Mood and Affect: Mood normal.         Behavior: Behavior normal.         Pertinent Laboratory/Diagnostic Studies:   Reviewed in facility chart-stable      Current Medications   Medications reviewed and updated see facility MAR for details.      Current Outpatient Medications:     acetaminophen (TYLENOL) 325 mg tablet, Take 975 mg by mouth every 6 (six) hours as needed, Disp: , Rfl:     albuterol (PROVENTIL HFA,VENTOLIN HFA) 90 mcg/act inhaler, INHALE 2 PUFFS BY MOUTH EVERY 6 HOURS AS NEEDED FOR WHEEZING, Disp: 3 Inhaler, Rfl: 0    allopurinol (ZYLOPRIM) 100 mg tablet, Take 1 tablet (100 mg total) by mouth daily, Disp: 90 tablet, Rfl: 3    apixaban (Eliquis) 5 mg, Take 1 tablet (5 mg total) by mouth 2 (two) times a day, Disp: 60 tablet, Rfl: 6    ascorbic acid (VITAMIN C) 500 MG tablet, Take 1 tablet (500 mg total) by mouth 2 (two) times a day, Disp: 60 tablet, Rfl: 1    bisacodyl (DULCOLAX) 10  mg suppository, Insert 10 mg into the rectum daily as needed, Disp: , Rfl:     Calcium Polycarbophil (FIBER-LAX PO), Take 1 tablet by mouth daily at bedtime, Disp: , Rfl:     Cholecalciferol 25 MCG (1000 UT) tablet, Take 1,000 Units by mouth daily, Disp: , Rfl:     Diclofenac Sodium (VOLTAREN) 1 %, Apply 2 g topically 4 (four) times a day = to B/L knee, Disp: , Rfl:     diltiazem (CARDIZEM CD) 120 mg 24 hr capsule, Take 1 capsule (120 mg total) by mouth daily, Disp: 90 capsule, Rfl: 3    DULoxetine (CYMBALTA) 20 mg capsule, Take 20 mg by mouth daily at bedtime, Disp: , Rfl:     ezetimibe (ZETIA) 10 mg tablet, Take 1 tablet (10 mg total) by mouth daily, Disp: 90 tablet, Rfl: 1    ferrous sulfate 324 (65 Fe) mg, Take 1 tablet (324 mg total) by mouth 2 (two) times a day before meals, Disp: 60 tablet, Rfl: 1    folic acid (FOLVITE) 1 mg tablet, TAKE 1 TABLET(1 MG) BY MOUTH DAILY, Disp: 90 tablet, Rfl: 0    furosemide (LASIX) 20 mg tablet, Take 1 tablet (20 mg total) by mouth daily with dinner, Disp: , Rfl: 0    furosemide (LASIX) 40 mg tablet, Take 40 mg by mouth in the morning, Disp: , Rfl:     Melatonin 10 MG TABS, Take by mouth Takes daily at night, Disp: , Rfl:     metoprolol tartrate (LOPRESSOR) 100 mg tablet, Take 1 tablet (100 mg total) by mouth daily in the early morning Do not start before January 7, 2023., Disp: , Rfl: 0    metoprolol tartrate (LOPRESSOR) 50 mg tablet, Take 1 tablet (50 mg total) by mouth daily at bedtime, Disp: , Rfl: 0    Multiple Vitamin (MULTIVITAMIN ADULT PO), Take by mouth in the morning, Disp: , Rfl:     omeprazole (PriLOSEC) 20 mg delayed release capsule, Take 20 mg by mouth daily, Disp: , Rfl:     oxybutynin (DITROPAN-XL) 10 MG 24 hr tablet, Take 10 mg by mouth daily at bedtime, Disp: , Rfl:     polyethylene glycol (MIRALAX) 17 g packet, Take 17 g by mouth daily as needed, Disp: , Rfl:     pyridoxine (B-6) 25 MG tablet, Take 25 mg by mouth daily, Disp: , Rfl:     senna-docusate sodium  "(SENOKOT S) 8.6-50 mg per tablet, Take 2 tablets by mouth 2 (two) times a day, Disp: , Rfl: 0    sucralfate (CARAFATE) 1 g tablet, Take 1 tablet (1 g total) by mouth 2 (two) times a day, Disp: 30 tablet, Rfl: 0    vitamin B-12 (VITAMIN B-12) 1,000 mcg tablet, Take 1 tablet (1,000 mcg total) by mouth daily, Disp: , Rfl: 0     Please note:  Voice-recognition software may have been used in the preparation of this document.  Occasional wrong word or \"sound-alike\" substitutions may have occurred due to the inherent limitations of voice recognition software.  Interpretation should be guided by context.         JERI He  1/19/2024  5:39 PM    "

## 2024-01-19 NOTE — ASSESSMENT & PLAN NOTE
Wt Readings from Last 3 Encounters:   01/19/24 97.5 kg (215 lb)   11/29/23 95.5 kg (210 lb 8 oz)   11/24/23 92.1 kg (203 lb)   Slight weight increase over last month  Increased calorie consumption vs fluid  No signs of fluid overload, patient appears euvolemic  Last echo 9/2022 EF of 65%  Continue Lasix 40 mg in a.m. and 20 mg in p.m.  Continue metoprolol 100 mg in a.m. and 50 mg at at bedtime  Continue monthly weights  Encourage heart healthy diet

## 2024-01-19 NOTE — ASSESSMENT & PLAN NOTE
Lab Results   Component Value Date    EGFR 58 01/25/2023    EGFR 70 01/24/2023    EGFR 60 01/06/2023    CREATININE 0.93 01/25/2023    CREATININE 0.80 01/24/2023    CREATININE 0.91 01/06/2023

## 2024-01-19 NOTE — ASSESSMENT & PLAN NOTE
Heart rate controlled, 70  patient denies chest pain/palpitations  Continue metoprolol tartrate 100 mg in the a.m., 50 mg in the p.m.  Continue diltiazem 120 mg daily  Continue Eliquis 5 mg twice daily

## 2024-02-20 ENCOUNTER — OFFICE VISIT (OUTPATIENT)
Dept: OBGYN CLINIC | Facility: CLINIC | Age: 79
End: 2024-02-20
Payer: COMMERCIAL

## 2024-02-20 VITALS — BODY MASS INDEX: 36.7 KG/M2 | HEIGHT: 64 IN | WEIGHT: 215 LBS

## 2024-02-20 DIAGNOSIS — S72.92XK CLOSED FRACTURE OF LEFT FEMUR WITH NONUNION, UNSPECIFIED FRACTURE MORPHOLOGY, UNSPECIFIED PORTION OF FEMUR, SUBSEQUENT ENCOUNTER: Primary | ICD-10-CM

## 2024-02-20 PROCEDURE — 99213 OFFICE O/P EST LOW 20 MIN: CPT | Performed by: ORTHOPAEDIC SURGERY

## 2024-02-20 NOTE — PROGRESS NOTES
Assessment:  1. 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  All questions were answered to the patient's satisfaction.         Subjective:   Isi Ernst is a 78 y.o. female who presents today  status post 2 year left distal femoral replacement due to periprosthetic fracture, DOS 8/5/21 and 1 year status post  lateral release,DOS 1/23/24.       Review of systems negative unless otherwise specified in HPI    Past Medical History:   Diagnosis Date    Abnormal ECG     Last Assessed 9/29/2016    Anxiety     Last Assessed 6/08/2016    Arthritis     knees    Asthma     Last Assessed 11/06/2013    Atrial premature complex     Cataract, bilateral     Last Assessed 7/14/2016    Cataract, left eye     Both eyes. Had surgery on left.    COVID-19     Difficulty swallowing     Diverticulosis 9/25/2022    Dizziness     Fibromyalgia     Fibromyalgia, primary     Gastric reflux     Heart failure (HCC)     5/23/22 Pt reports had heart failure in the past    History of COVID-19     5/23/22 Pt reports having COVID in 2021.    History of transfusion     no reaction    [place] (hard of hearing)     Hyperlipidemia     Hypertension     Incontinence in female     5/23/22 Pt reports has incontinence -wears depends especially at night/riding in car    Irregular heart beat     5/23/22 Pt reports hx of A fib    Lyme disease     PONV (postoperative nausea and vomiting)     Premature ventricular contraction     Primary osteoarthritis of both knees     Last Assessed 7/14/2016    Rheumatic fever     5/23/22 Pt reports had hx of rheumatic fever as a child twice     "Sore throat     Tuberculosis 1945       Past Surgical History:   Procedure Laterality Date    APPENDECTOMY      CARDIAC PACEMAKER PLACEMENT  2019    COLONOSCOPY      DENTAL SURGERY      extractions    HYSTERECTOMY      5/23/22 Pt reports had appendix out with hysterectomy and \"tightened up my bladder\"    IR ASPIRATION ONLY  9/30/2022    ORIF FEMUR FRACTURE Left 08/05/2021    Procedure: OPEN REDUCTION W/ INTERNAL FIXATION (ORIF) DISTAL FEMUR; INSERTION RETROGRADE NAIL;  Surgeon: Rita Harris MD;  [facility];  Service: Orthopedics    RI ARTHRP KNE CONDYLE&PLATU MEDIAL&LAT COMPARTMENTS Left 9/12/2022    Procedure: ARTHROPLASTY KNEE TOTAL;  Surgeon: Rita Harris MD;  [facility];  Service: Orthopedics    RI DILATION ESOPH UNGUIDED SOUND/BOUGIE 1/MULT PASS N/A 04/06/2016    Procedure: DILATATION ESOPHAGEAL;  Surgeon: Watson Turpin MD;  [facility];  Service: Gastroenterology    RI EGD TRANSORAL BIOPSY SINGLE/MULTIPLE N/A 04/06/2016    Procedure: ESOPHAGOGASTRODUODENOSCOPY (EGD);  Surgeon: Watson Turpin MD;  [facility];  Service: Gastroenterology    RI LATERAL RETINACULAR RELEASE OPEN Left 1/23/2023    Procedure: RELEASE RETINACULAR, left knee and all associated procedures;  Surgeon: Rita Harris MD;  [facility];  Service: Orthopedics    RI REMOVAL IMPLANT DEEP Left 9/12/2022    Procedure: REMOVAL NAIL IM  FEMUR;  Surgeon: Rita Harris MD;  [facility];  Service: Orthopedics    RI XCAPSL CTRC RMVL INSJ IO LENS PROSTH W/O ECP Left 03/15/2016    Procedure: EXTRACTION EXTRACAPSULAR CATARACT PHACO INTRAOCULAR LENS (IOL);  Surgeon: Jeanette Beckman MD;  [facility];  Service: Ophthalmology    REPLACEMENT TOTAL KNEE Right     REPLACEMENT TOTAL KNEE      right     TONSILLECTOMY         Family History   Problem Relation Age of Onset    Coronary artery disease Mother     Heart attack Mother         Prior    Heart disease Father     Heart " attack Father         Prior    Anesthesia problems Neg Hx        Social History     Occupational History    Not on file   Tobacco Use    Smoking status: Never    Smokeless tobacco: Never    Tobacco comments:     Denies any former or current smoking   Vaping Use    Vaping status: Never Used   Substance and Sexual Activity    Alcohol use: Not Currently     Comment: Per [employer]- pt reports no current alcohol use at this time    Drug use: No     Comment: Denies any former or current drug use    Sexual activity: Never     Comment:  for 12 years - not active         Current Outpatient Medications:     acetaminophen (TYLENOL) 325 mg tablet, Take 975 mg by mouth every 6 (six) hours as needed, Disp: , Rfl:     albuterol (PROVENTIL HFA,VENTOLIN HFA) 90 mcg/act inhaler, INHALE 2 PUFFS BY MOUTH EVERY 6 HOURS AS NEEDED FOR WHEEZING, Disp: 3 Inhaler, Rfl: 0    allopurinol (ZYLOPRIM) 100 mg tablet, Take 1 tablet (100 mg total) by mouth daily, Disp: 90 tablet, Rfl: 3    apixaban (Eliquis) 5 mg, Take 1 tablet (5 mg total) by mouth 2 (two) times a day, Disp: 60 tablet, Rfl: 6    ascorbic acid (VITAMIN C) 500 MG tablet, Take 1 tablet (500 mg total) by mouth 2 (two) times a day, Disp: 60 tablet, Rfl: 1    bisacodyl (DULCOLAX) 10 mg suppository, Insert 10 mg into the rectum daily as needed, Disp: , Rfl:     Calcium Polycarbophil (FIBER-LAX PO), Take 1 tablet by mouth daily at bedtime, Disp: , Rfl:     Cholecalciferol 25 MCG (1000 UT) tablet, Take 1,000 Units by mouth daily, Disp: , Rfl:     Diclofenac Sodium (VOLTAREN) 1 %, Apply 2 g topically 4 (four) times a day = to B/L knee, Disp: , Rfl:     diltiazem (CARDIZEM CD) 120 mg 24 hr capsule, Take 1 capsule (120 mg total) by mouth daily, Disp: 90 capsule, Rfl: 3    DULoxetine (CYMBALTA) 20 mg capsule, Take 20 mg by mouth daily at bedtime, Disp: , Rfl:     ezetimibe (ZETIA) 10 mg tablet, Take 1 tablet (10 mg total) by mouth daily, Disp: 90 tablet, Rfl: 1    ferrous sulfate 324  (65 Fe) mg, Take 1 tablet (324 mg total) by mouth 2 (two) times a day before meals, Disp: 60 tablet, Rfl: 1    folic acid (FOLVITE) 1 mg tablet, TAKE 1 TABLET(1 MG) BY MOUTH DAILY, Disp: 90 tablet, Rfl: 0    furosemide (LASIX) 20 mg tablet, Take 1 tablet (20 mg total) by mouth daily with dinner, Disp: , Rfl: 0    furosemide (LASIX) 40 mg tablet, Take 40 mg by mouth in the morning, Disp: , Rfl:     Melatonin 10 MG TABS, Take by mouth Takes daily at night, Disp: , Rfl:     metoprolol tartrate (LOPRESSOR) 100 mg tablet, Take 1 tablet (100 mg total) by mouth daily in the early morning Do not start before January 7, 2023., Disp: , Rfl: 0    metoprolol tartrate (LOPRESSOR) 50 mg tablet, Take 1 tablet (50 mg total) by mouth daily at bedtime, Disp: , Rfl: 0    Multiple Vitamin (MULTIVITAMIN ADULT PO), Take by mouth in the morning, Disp: , Rfl:     omeprazole (PriLOSEC) 20 mg delayed release capsule, Take 20 mg by mouth daily, Disp: , Rfl:     oxybutynin (DITROPAN-XL) 10 MG 24 hr tablet, Take 10 mg by mouth daily at bedtime, Disp: , Rfl:     polyethylene glycol (MIRALAX) 17 g packet, Take 17 g by mouth daily as needed, Disp: , Rfl:     pyridoxine (B-6) 25 MG tablet, Take 25 mg by mouth daily, Disp: , Rfl:     senna-docusate sodium (SENOKOT S) 8.6-50 mg per tablet, Take 2 tablets by mouth 2 (two) times a day, Disp: , Rfl: 0    sucralfate (CARAFATE) 1 g tablet, Take 1 tablet (1 g total) by mouth 2 (two) times a day, Disp: 30 tablet, Rfl: 0    vitamin B-12 (VITAMIN B-12) 1,000 mcg tablet, Take 1 tablet (1,000 mcg total) by mouth daily, Disp: , Rfl: 0    Allergies   Allergen Reactions    Penicillins Hives     Itching and terrible hives    Sulfa Antibiotics Itching    B12 Folate [Folic Acid-Vit B6-Vit B12 - Food Allergy] Other (See Comments)     5/23/22 Pt reports no allergic reaction known     [place] Other (See Comments)     Unknown, 5/23 -pt is unaware of reaction     Lyrica [Pregabalin] Other (See Comments)     5/23/22 Pt  reports doesn't remember reaction     Other Other (See Comments)     Black rubber 5/23/22 per allergy test - pt unaware of reaction    Cortisone Acetate [Cortisone] Itching and Rash     5/23/22 pt reports makes her violent     Doxycycline Hives and Itching    Nickel Other (See Comments)     Skin discoloration          There were no vitals filed for this visit.  Body mass index is 36.9 kg/m².  Wt Readings from Last 3 Encounters:   02/20/24 97.5 kg (215 lb)   01/19/24 97.5 kg (215 lb)   11/29/23 95.5 kg (210 lb 8 oz)       Objective:            Physical Exam  Physical Exam:      General Appearance:    Alert, cooperative, no distress, appears stated age   Head:    Normocephalic, without obvious abnormality, atraumatic   Eyes:    conjunctiva/corneas clear, both eyes         Nose:   Nares normal, septum midline, no drainage    Throat:   Lips normal; teeth and gums normal   Neck:    symmetrical, trachea midline, ;     thyroid:  no enlargement/   Back:     Symmetric, no curvature, ROM normal   Lungs:   No audible wheezing or labored breathing   Chest Wall:    No tenderness or deformity    Heart:    Regular rate and rhythm                         Pulses:   2+ and symmetric all extremities   Skin:   Skin color, texture, turgor normal, no rashes or lesions   Neurologic:   normal strength, sensation and reflexes     throughout                       Ortho Exam      Diagnostics, reviewed and taken today if performed as documented:    None performed      The attending physician has personally reviewed the pertinent films in PACS and interpretation is as follows:      Procedures, if performed today:    Procedures    None performed      Scribe Attestation      I,:   am acting as a scribe while in the presence of the attending physician.:       I,:   personally performed the services described in this documentation    as scribed in my presence.:               Portions of the record may have been created with voice recognition  "software.  Occasional wrong word or \"sound a like\" substitutions may have occurred due to the inherent limitations of voice recognition software.  Read the chart carefully and recognize, using context, where substitutions have occurred.  "

## 2024-02-20 NOTE — PROGRESS NOTES
Orthopedics          Isi Ernst 78 y.o. female MRN: [id number]      Chief Complaint:   left knee pain    HPI:   78 y.o.female complaining of left knee pain.  She is status post 13 months out from left knee retinacular release and status post 17 months distal femoral replacement due to distal femur fracture.  Patient states having pain on and off in her left lower extremity she has had limited therapy.  Patient is not interested in any more surgical intervention regarding her left knee at this time.  Patient is also not interested in any bracing as she has had significant issues with bracing in her left knee.  She states having difficulty extending her left knee.  She denies wanting any further treatment from orthopedic surgery at this time.                Review Of Systems:   Skin: Normal  Neuro: See HPI  Musculoskeletal: See HPI  All other systems reviewed and are negative    Past Medical History:   Past Medical History:   Diagnosis Date    Abnormal ECG     Last Assessed 9/29/2016    Anxiety     Last Assessed 6/08/2016    Arthritis     knees    Asthma     Last Assessed 11/06/2013    Atrial premature complex     Cataract, bilateral     Last Assessed 7/14/2016    Cataract, left eye     Both eyes. Had surgery on left.    COVID-19     Difficulty swallowing     Diverticulosis 9/25/2022    Dizziness     Fibromyalgia     Fibromyalgia, primary     Gastric reflux     Heart failure (HCC)     5/23/22 Pt reports had heart failure in the past    History of COVID-19     5/23/22 Pt reports having COVID in 2021.    History of transfusion     no reaction    [place] (hard of hearing)     Hyperlipidemia     Hypertension     Incontinence in female     5/23/22 Pt reports has incontinence -wears depends especially at night/riding in car    Irregular heart beat     5/23/22 Pt reports hx of A fib    Lyme disease     PONV (postoperative nausea and vomiting)     Premature ventricular contraction     Primary osteoarthritis of both knees      "Last Assessed 7/14/2016    Rheumatic fever     5/23/22 Pt reports had hx of rheumatic fever as a child twice    Sore throat     Tuberculosis 1945       Past Surgical History:   Past Surgical History:   Procedure Laterality Date    APPENDECTOMY      CARDIAC PACEMAKER PLACEMENT  2019    COLONOSCOPY      DENTAL SURGERY      extractions    HYSTERECTOMY      5/23/22 Pt reports had appendix out with hysterectomy and \"tightened up my bladder\"    IR ASPIRATION ONLY  9/30/2022    ORIF FEMUR FRACTURE Left 08/05/2021    Procedure: OPEN REDUCTION W/ INTERNAL FIXATION (ORIF) DISTAL FEMUR; INSERTION RETROGRADE NAIL;  Surgeon: Rita Harris MD;  [facility];  Service: Orthopedics    MS ARTHRP KNE CONDYLE&PLATU MEDIAL&LAT COMPARTMENTS Left 9/12/2022    Procedure: ARTHROPLASTY KNEE TOTAL;  Surgeon: Rita Harris MD;  [facility];  Service: Orthopedics    MS DILATION ESOPH UNGUIDED SOUND/BOUGIE 1/MULT PASS N/A 04/06/2016    Procedure: DILATATION ESOPHAGEAL;  Surgeon: Watson Turpin MD;  Location: [facility];  Service: Gastroenterology    MS EGD TRANSORAL BIOPSY SINGLE/MULTIPLE N/A 04/06/2016    Procedure: ESOPHAGOGASTRODUODENOSCOPY (EGD);  Surgeon: Watson Turpin MD;  [facility];  Service: Gastroenterology    MS LATERAL RETINACULAR RELEASE OPEN Left 1/23/2023    Procedure: RELEASE RETINACULAR, left knee and all associated procedures;  Surgeon: Rita Harris MD;  [facility];  Service: Orthopedics    MS REMOVAL IMPLANT DEEP Left 9/12/2022    Procedure: REMOVAL NAIL IM  FEMUR;  Surgeon: Rita Harris MD;  [facility];  Service: Orthopedics    MS XCAPSL CTRC RMVL INSJ IO LENS PROSTH W/O ECP Left 03/15/2016    Procedure: EXTRACTION EXTRACAPSULAR CATARACT PHACO INTRAOCULAR LENS (IOL);  Surgeon: Jeanette Beckman MD;  [facility];  Service: Ophthalmology    REPLACEMENT TOTAL KNEE Right     REPLACEMENT TOTAL KNEE      right     TONSILLECTOMY         Family " History:  Family history reviewed and non-contributory  Family History   Problem Relation Age of Onset    Coronary artery disease Mother     Heart attack Mother         Prior    Heart disease Father     Heart attack Father         Prior    Anesthesia problems Neg Hx          Social History:  Social History     Socioeconomic History    Marital status:      Spouse name: None    Number of children: None    Years of education: None    Highest education level: None   Occupational History    None   Tobacco Use    Smoking status: Never    Smokeless tobacco: Never    Tobacco comments:     Denies any former or current smoking   Vaping Use    Vaping status: Never Used   Substance and Sexual Activity    Alcohol use: Not Currently     Comment: Per [employer]- pt reports no current alcohol use at this time    Drug use: No     Comment: Denies any former or current drug use    Sexual activity: Never     Comment:  for 12 years - not active   Other Topics Concern    None   Social History Narrative    None     Social Determinants of Health     Financial Resource Strain: Not on file   Food Insecurity: No Food Insecurity (1/4/2023)    Hunger Vital Sign     Worried About Running Out of Food in the Last Year: Never true     Ran Out of Food in the Last Year: Never true   Transportation Needs: No Transportation Needs (1/4/2023)    PRAPARE - Transportation     Lack of Transportation (Medical): No     Lack of Transportation (Non-Medical): No   Physical Activity: Not on file   Stress: Not on file   Social Connections: Not on file   Intimate Partner Violence: Not on file   Housing Stability: Low Risk  (1/4/2023)    Housing Stability Vital Sign     Unable to Pay for Housing in the Last Year: No     Number of Places Lived in the Last Year: 2     Unstable Housing in the Last Year: No       Allergies:   Allergies   Allergen Reactions    Penicillins Hives     Itching and terrible hives    Sulfa Antibiotics Itching    B12 Folate  [Folic Acid-Vit B6-Vit B12 - Food Allergy] Other (See Comments)     5/23/22 Pt reports no allergic reaction known     [place] Other (See Comments)     Unknown, 5/23 -pt is unaware of reaction     Lyrica [Pregabalin] Other (See Comments)     5/23/22 Pt reports doesn't remember reaction     Other Other (See Comments)     Black rubber 5/23/22 per allergy test - pt unaware of reaction    Cortisone Acetate [Cortisone] Itching and Rash     5/23/22 pt reports makes her violent     Doxycycline Hives and Itching    [place] Other (See Comments)     Skin discoloration       Labs:  0   Lab Value Date/Time    HCT 30.6 (L) 01/25/2023 0456    HCT 31.7 (L) 01/24/2023 0529    HCT 30.3 (L) 01/06/2023 0500    HGB 8.9 (L) 01/25/2023 0456    HGB 9.5 (L) 01/24/2023 0529    HGB 8.9 (L) 01/06/2023 0500    INR 1.19 01/04/2023 0037    WBC 12.94 (H) 01/25/2023 0456    WBC 11.84 (H) 01/24/2023 0529    WBC 7.35 01/06/2023 0500    CRP 5.2 (H) 05/17/2022 1139       Meds:    Current Outpatient Medications:     acetaminophen (TYLENOL) 325 mg tablet, Take 975 mg by mouth every 6 (six) hours as needed, Disp: , Rfl:     albuterol (PROVENTIL HFA,VENTOLIN HFA) 90 mcg/act inhaler, INHALE 2 PUFFS BY MOUTH EVERY 6 HOURS AS NEEDED FOR WHEEZING, Disp: 3 Inhaler, Rfl: 0    allopurinol (ZYLOPRIM) 100 mg tablet, Take 1 tablet (100 mg total) by mouth daily, Disp: 90 tablet, Rfl: 3    apixaban (Eliquis) 5 mg, Take 1 tablet (5 mg total) by mouth 2 (two) times a day, Disp: 60 tablet, Rfl: 6    ascorbic acid (VITAMIN C) 500 MG tablet, Take 1 tablet (500 mg total) by mouth 2 (two) times a day, Disp: 60 tablet, Rfl: 1    bisacodyl (DULCOLAX) 10 mg suppository, Insert 10 mg into the rectum daily as needed, Disp: , Rfl:     Calcium Polycarbophil (FIBER-LAX PO), Take 1 tablet by mouth daily at bedtime, Disp: , Rfl:     Cholecalciferol 25 MCG (1000 UT) tablet, Take 1,000 Units by mouth daily, Disp: , Rfl:     Diclofenac Sodium (VOLTAREN) 1 %, Apply 2 g topically 4 (four)  times a day = to B/L knee, Disp: , Rfl:     diltiazem (CARDIZEM CD) 120 mg 24 hr capsule, Take 1 capsule (120 mg total) by mouth daily, Disp: 90 capsule, Rfl: 3    DULoxetine (CYMBALTA) 20 mg capsule, Take 20 mg by mouth daily at bedtime, Disp: , Rfl:     ezetimibe (ZETIA) 10 mg tablet, Take 1 tablet (10 mg total) by mouth daily, Disp: 90 tablet, Rfl: 1    ferrous sulfate 324 (65 Fe) mg, Take 1 tablet (324 mg total) by mouth 2 (two) times a day before meals, Disp: 60 tablet, Rfl: 1    folic acid (FOLVITE) 1 mg tablet, TAKE 1 TABLET(1 MG) BY MOUTH DAILY, Disp: 90 tablet, Rfl: 0    furosemide (LASIX) 20 mg tablet, Take 1 tablet (20 mg total) by mouth daily with dinner, Disp: , Rfl: 0    furosemide (LASIX) 40 mg tablet, Take 40 mg by mouth in the morning, Disp: , Rfl:     Melatonin 10 MG TABS, Take by mouth Takes daily at night, Disp: , Rfl:     metoprolol tartrate (LOPRESSOR) 100 mg tablet, Take 1 tablet (100 mg total) by mouth daily in the early morning Do not start before January 7, 2023., Disp: , Rfl: 0    metoprolol tartrate (LOPRESSOR) 50 mg tablet, Take 1 tablet (50 mg total) by mouth daily at bedtime, Disp: , Rfl: 0    Multiple Vitamin (MULTIVITAMIN ADULT PO), Take by mouth in the morning, Disp: , Rfl:     omeprazole (PriLOSEC) 20 mg delayed release capsule, Take 20 mg by mouth daily, Disp: , Rfl:     oxybutynin (DITROPAN-XL) 10 MG 24 hr tablet, Take 10 mg by mouth daily at bedtime, Disp: , Rfl:     polyethylene glycol (MIRALAX) 17 g packet, Take 17 g by mouth daily as needed, Disp: , Rfl:     pyridoxine (B-6) 25 MG tablet, Take 25 mg by mouth daily, Disp: , Rfl:     senna-docusate sodium (SENOKOT S) 8.6-50 mg per tablet, Take 2 tablets by mouth 2 (two) times a day, Disp: , Rfl: 0    sucralfate (CARAFATE) 1 g tablet, Take 1 tablet (1 g total) by mouth 2 (two) times a day, Disp: 30 tablet, Rfl: 0    vitamin B-12 (VITAMIN B-12) 1,000 mcg tablet, Take 1 tablet (1,000 mcg total) by mouth daily, Disp: , Rfl:  0    Body mass index is 36.9 kg/m².  Wt Readings from Last 3 Encounters:   02/20/24 97.5 kg (215 lb)   01/19/24 97.5 kg (215 lb)   11/29/23 95.5 kg (210 lb 8 oz)     Physical Exam:   There were no vitals filed for this visit.    General Appearance:    Alert, cooperative, no distress, appears stated age   Head:    Normocephalic, without obvious abnormality, atraumatic   Eyes:    conjunctiva/corneas clear, both eyes        Nose:   Nares normal, septum midline, no drainage    Throat:   Lips normal; teeth and gums normal   Neck:    symmetrical, trachea midline, ;     thyroid:  no enlargement/   Back:     Symmetric, no curvature, ROM normal   Lungs:   No audible wheezing or labored breathing   Chest Wall:    No tenderness or deformity    Heart:    Regular rate and rhythm               Pulses:   2+ and symmetric all extremities   Skin:   Skin color, texture, turgor normal, no rashes or lesions   Neurologic:   normal strength, sensation and reflexes     throughout       Musculoskeletal: left lower extremity  Examination patient's left knee no effusion no erythema well-healed anterior incision limited left knee active extension passive extension to 20 degrees off of full extension and flexion to greater than 90 degrees active ankle dorsi plantarflexion distal pulses present      _*_*_*_*_*_*_*_*_*_*_*_*_*_*_*_*_*_*_*_*_*_*_*_*_*_*_*_*_*_*_*_*_*_*_*_*_*_*_*_*_*    Assessment:  78 y.o.female status post left knee retinacular release and distal femoral replacement greater than 1 year ago    Plan:   Weight bearing as tolerated  left lower extremity  Continue physical therapy range of motion stretching strengthening exercise  Patient does not wish to pursue any surgical intervention or bracing at this time  Recommend pain control as needed from over-the-counter medications  Follow-up in 1 years time repeat x-rays left knee   Case discussed with Dr. Kimberly Damico PA-C                    .

## 2024-02-20 NOTE — PATIENT INSTRUCTIONS
Weightbearing as tolerated left lower extremity  Physical therapy range of motion stretching strengthening exercises  Activity as tolerated  Patient does not wish to pursue any surgical intervention or bracing at this time  Follow-up in 1 years time for repeat x-rays left knee

## 2024-02-28 ENCOUNTER — NURSING HOME VISIT (OUTPATIENT)
Dept: GERIATRICS | Facility: OTHER | Age: 79
End: 2024-02-28
Payer: COMMERCIAL

## 2024-02-28 DIAGNOSIS — D64.89 ANEMIA DUE TO OTHER CAUSE, NOT CLASSIFIED: ICD-10-CM

## 2024-02-28 DIAGNOSIS — I49.5 TACHY-BRADY SYNDROME (HCC): ICD-10-CM

## 2024-02-28 DIAGNOSIS — K21.9 GASTROESOPHAGEAL REFLUX DISEASE WITHOUT ESOPHAGITIS: ICD-10-CM

## 2024-02-28 DIAGNOSIS — N39.41 URGE INCONTINENCE OF URINE: ICD-10-CM

## 2024-02-28 DIAGNOSIS — I50.32 CHRONIC HEART FAILURE WITH PRESERVED EJECTION FRACTION (HCC): Primary | ICD-10-CM

## 2024-02-28 DIAGNOSIS — I48.0 PAROXYSMAL ATRIAL FIBRILLATION (HCC): ICD-10-CM

## 2024-02-28 DIAGNOSIS — I10 ESSENTIAL HYPERTENSION: ICD-10-CM

## 2024-02-28 DIAGNOSIS — F33.0 MILD EPISODE OF RECURRENT MAJOR DEPRESSIVE DISORDER (HCC): ICD-10-CM

## 2024-02-28 DIAGNOSIS — N18.31 STAGE 3A CHRONIC KIDNEY DISEASE (HCC): ICD-10-CM

## 2024-02-28 PROCEDURE — 99310 SBSQ NF CARE HIGH MDM 45: CPT | Performed by: FAMILY MEDICINE

## 2024-02-28 NOTE — PROGRESS NOTES
[facility]  [address] [address] [phone]  [facility]  POS 32      NAME: Isi Ernst  AGE: 78 y.o. SEX: female [id number]    DATE OF ENCOUNTER: 2/28/2024    Assessment and Plan     Paroxysmal atrial fibrillation (HCC)  RRR  Continue diltiazem 120 mg daily and AC with Eliquis 5 mg twice daily.  No adverse bleeding reported    GERD (gastroesophageal reflux disease)  Continue omeprazole 20 mg daily and Sucrafate 1 g 2 times daily    Chronic heart failure with preserved ejection fraction (HCC)  Wt Readings from Last 3 Encounters:   02/28/24 99.6 kg (219 lb 8 oz)   02/20/24 97.5 kg (215 lb)   01/19/24 97.5 kg (215 lb)   Stable, no exacerbation  Euvolemic on exam  Will decrease Lasix to 40 mg daily.  Continue metoprolol 100 mg in the a.m. and 50 mg at bedtime  Monitor weight, BMP    Essential hypertension  BP has been stable, at goal, except two occasions with /68 and 146/86  Continue Lasix in the setting of CHF, metoprolol and diltiazem in the setting of A-fib  Monitor BP    Tachy-smita syndrome (HCC)  Stable  S/p PPM  Continue metoprolol, diltiazem, Eliquis    Stage 3a chronic kidney disease (HCC)  Lab Results   Component Value Date    EGFR 58 01/25/2023    EGFR 70 01/24/2023    EGFR 60 01/06/2023    CREATININE 0.93 01/25/2023    CREATININE 0.80 01/24/2023    CREATININE 0.91 01/06/2023   Stable, at baseline  GFR in December improved >60  Avoid NSAIDs  Avoid hypotension  Continue to monitor BMP    Anemia  Continue iron supplement daily for iron deficiency anemia  Hemoglobin was stable 11.9 in December    Urinary incontinence  D/c oxybutynin given side effect profile  Toileting program    Vitamin D insufficiency  Continue vitamin D supplement daily      - Counseling Documentation: patient was counseled regarding: diagnostic results, instructions for management, patient and family education, risks and benefits of treatment options, and importance of compliance with  treatment    Chief Complaint     Routine Long term follow-up visit.    History of Present Illness     HPI  The patient is seen in her room during routine LTC follow-up visit.  She is sitting in wheelchair, denies any pain or discomfort.  No concerns from nursing staff.    The following portions of the patient's history were reviewed and updated as appropriate: allergies, current medications, past family history, past medical history, past social history, past surgical history and problem list.    Review of Systems     Review of Systems   Musculoskeletal:  Positive for gait problem (wheelchair).     Active Problem List     Patient Active Problem List   Diagnosis    Tachy-smita syndrome (HCC)    Essential hypertension    Pacemaker    Degenerative lumbar spinal stenosis    Fibromyalgia    GERD (gastroesophageal reflux disease)    Low back pain    Lumbar degenerative disc disease    Psoriasis    Thyroid nodule    Urinary incontinence    Anxiety    Tremor    Chronic pain of left knee    Age related osteoporosis    Vitamin D insufficiency    HLD (hyperlipidemia)    Arthritis    Generalized weakness    Ambulatory dysfunction    Morbid obesity with body mass index (BMI) of 40.0 to 49.9 ([facility])    Chronic gout with tophus    Mild episode of recurrent major depressive disorder ([facility])    Paroxysmal atrial fibrillation ([facility])    Stage 3a chronic kidney disease ([facility])    Anemia    Aortic regurgitation    Thrombocytosis    Physical deconditioning    Chronic heart failure with preserved ejection fraction ([facility])    S/P knee surgery    Numbness and tingling in both hands    Bilateral carpal tunnel syndrome       Objective     Vitals:    02/28/24 0925   BP: 146/86   Pulse: 72   Resp: 18   Temp: 97.5 °F (36.4 °C)   SpO2: 96%   Weight: 99.6 kg (219 lb 8 oz)     Physical Exam  General: NAD  Head: normocephalic  Heart: RRR, no M/R/G  Lungs: CTA. No W/R/R  Abdomen: soft, nondistended, nontender, BS +  Ext: no edema  Neuro: oriented x3, move all  4 ext, no focal deficits. Sensation grossly intact.     Pertinent Laboratory/Diagnostic Studies:  Refer to facility chart.  Labs on 12/7/2023 reviewed:   Uric acid 6.5   WBC 6.7, Hgb 11.9, , MCV 96, MCH 31, MCHC 32   Creatinine 0.8, EGFR >60   Sodium 144, potassium 3.9, albumin 3.9.    Current Medications   Medications reviewed and updated in facility chart.  I have spent a total time of 50 minutes on 2/28/24 in caring for this patient including Diagnostic results, Risks and benefits of tx options, Instructions for management, Patient education, Importance of tx compliance, Counseling / Coordination of care, Documenting in the medical record, Reviewing tests, medicine, procedures  , Obtaining or reviewing history  , and Communicating with other healthcare professionals .

## 2024-03-04 VITALS
RESPIRATION RATE: 18 BRPM | SYSTOLIC BLOOD PRESSURE: 146 MMHG | DIASTOLIC BLOOD PRESSURE: 86 MMHG | HEART RATE: 72 BPM | OXYGEN SATURATION: 96 % | WEIGHT: 219.5 LBS | TEMPERATURE: 97.5 F | BODY MASS INDEX: 37.68 KG/M2

## 2024-03-04 PROBLEM — E66.01 MORBID OBESITY WITH BODY MASS INDEX (BMI) OF 40.0 TO 49.9 (HCC): Chronic | Status: RESOLVED | Noted: 2021-09-23 | Resolved: 2024-03-04

## 2024-03-04 NOTE — ASSESSMENT & PLAN NOTE
BP has been stable, at goal, except two occasions with /68 and 146/86  Continue Lasix in the setting of CHF, metoprolol and diltiazem in the setting of A-fib  Monitor BP

## 2024-03-04 NOTE — ASSESSMENT & PLAN NOTE
Wt Readings from Last 3 Encounters:   02/28/24 99.6 kg (219 lb 8 oz)   02/20/24 97.5 kg (215 lb)   01/19/24 97.5 kg (215 lb)   Stable, no exacerbation  Euvolemic on exam  Will decrease Lasix to 40 mg daily.  Continue metoprolol 100 mg in the a.m. and 50 mg at bedtime  Monitor weight, BMP

## 2024-03-04 NOTE — ASSESSMENT & PLAN NOTE
RRR  Continue diltiazem 120 mg daily and AC with Eliquis 5 mg twice daily.  No adverse bleeding reported

## 2024-03-04 NOTE — ASSESSMENT & PLAN NOTE
Lab Results   Component Value Date    EGFR 58 01/25/2023    EGFR 70 01/24/2023    EGFR 60 01/06/2023    CREATININE 0.93 01/25/2023    CREATININE 0.80 01/24/2023    CREATININE 0.91 01/06/2023   Stable, at baseline  GFR in December improved >60  Avoid NSAIDs  Avoid hypotension  Continue to monitor BMP

## 2024-03-07 ENCOUNTER — IN-CLINIC DEVICE VISIT (OUTPATIENT)
Dept: CARDIOLOGY CLINIC | Facility: CLINIC | Age: 79
End: 2024-03-07
Payer: COMMERCIAL

## 2024-03-07 DIAGNOSIS — Z95.0 PRESENCE OF PERMANENT CARDIAC PACEMAKER: Primary | ICD-10-CM

## 2024-03-07 PROCEDURE — 93294 REM INTERROG EVL PM/LDLS PM: CPT | Performed by: STUDENT IN AN ORGANIZED HEALTH CARE EDUCATION/TRAINING PROGRAM

## 2024-03-07 PROCEDURE — 93296 REM INTERROG EVL PM/IDS: CPT | Performed by: STUDENT IN AN ORGANIZED HEALTH CARE EDUCATION/TRAINING PROGRAM

## 2024-03-07 NOTE — PROGRESS NOTES
Results for orders placed or performed in visit on 03/07/24   Cardiac EP device report    Narrative    MDT DUAL CHAMBER PPM (AAIR-DDDR MODE)/ ACTIVE SYSTEM IS MRI CONDITIONAL  DEVICE INTERROGATED IN THE [place] OFFICE: BATTERY VOLTAGE ADEQUATE (3 YRS); AP 36.3%  53.2% (>40%/AAIR-DDDR 70PPM); ALL LEAD PARAMETERS WITHIN NORMAL LIMITS. SINCE 11/2/23 REMOTE; 6 TREATED AT/AF EPISODES, MAX EPISODE 03:25:26 HR. (1/6 SUCCESSFUL TERMINATION) - AF ON EGM'S AVAILABLE ; TOTAL AT/AF BURDEN 62.2%; PATIENT ON ELIQUIS, DILTIAZEM, METO TART. NO SIGNIFICANT HIGH RATE EPISODES. NO PROGRAMMING CHANGES MADE TO DEVICE PARAMETERS. NORMAL DEVICE FUNCTION.  ES

## 2024-04-22 NOTE — ASSESSMENT & PLAN NOTE
· Asymptomatic at this time  · Continue home allopurinol Principal Discharge DX:	Radial head fracture   1

## 2024-04-25 ENCOUNTER — NURSING HOME VISIT (OUTPATIENT)
Dept: GERIATRICS | Facility: OTHER | Age: 79
End: 2024-04-25
Payer: MEDICARE

## 2024-04-25 VITALS
TEMPERATURE: 97.5 F | HEART RATE: 77 BPM | OXYGEN SATURATION: 97 % | SYSTOLIC BLOOD PRESSURE: 176 MMHG | WEIGHT: 221.5 LBS | DIASTOLIC BLOOD PRESSURE: 71 MMHG | RESPIRATION RATE: 18 BRPM | BODY MASS INDEX: 38.02 KG/M2

## 2024-04-25 DIAGNOSIS — N18.31 STAGE 3A CHRONIC KIDNEY DISEASE (HCC): ICD-10-CM

## 2024-04-25 DIAGNOSIS — R26.2 AMBULATORY DYSFUNCTION: ICD-10-CM

## 2024-04-25 DIAGNOSIS — M19.90 ARTHRITIS: Primary | ICD-10-CM

## 2024-04-25 DIAGNOSIS — I48.0 PAROXYSMAL ATRIAL FIBRILLATION (HCC): ICD-10-CM

## 2024-04-25 DIAGNOSIS — I50.32 CHRONIC HEART FAILURE WITH PRESERVED EJECTION FRACTION (HCC): ICD-10-CM

## 2024-04-25 DIAGNOSIS — I10 ESSENTIAL HYPERTENSION: ICD-10-CM

## 2024-04-25 PROCEDURE — 99310 SBSQ NF CARE HIGH MDM 45: CPT

## 2024-04-25 NOTE — PROGRESS NOTES
[facility]  [address] [address]  [phone]  FACILITY: [facility]  Code 31 (STR)        NAME: Isi Ernst  AGE: 78 y.o. SEX: female CODE STATUS: CPR    DATE OF ENCOUNTER: 4/25/2024    Assessment and Plan     1. Arthritis  Assessment & Plan:  Complains of chronic arthritic pain in L wrist  Receiving Tylenol 975 TID; Voltaren gel QID  LTC resident  Follow up with Orthopedics  To rule out gout  Ordered: Uric acid labwork  No redness/swelling to area  Full ROM; reports weakness in  strength occasionally      2. Chronic heart failure with preserved ejection fraction (HCC)  Assessment & Plan:  Wt Readings from Last 3 Encounters:   04/25/24 100 kg (221 lb 8 oz)   04/18/24 97.5 kg (215 lb)   02/28/24 99.6 kg (219 lb 8 oz)     Euvolemic upon exam, 6 lb weight gain within past week  Monitor weights every other day for fluid overload  Continue Lasix 40 mg once daily      3. Paroxysmal atrial fibrillation (HCC)  Assessment & Plan:  Heart rate and rhythm regular upon exam  No complaints of CP, SOB, dizziness/lightheadedness  Continue Eliquis 5 mg BID  Follow up with Cardiology 6/11      4. Essential hypertension  Assessment & Plan:  Elevated upon exam, stable within the past  Monitor BP at [facility]  Continue Diltiazem 120 mg once daily  Continue Metoprolol 100 mg once daily in morning, 50 mg at bedtime  Continue Lasix 40 mg once daily      5. Ambulatory dysfunction  Assessment & Plan:  Multifactorial: advanced age, comorbidities, past surgery on L knee  Received PT/OT  Assist with ADLs/IADLs as needed  Fall precautions  Ensure adequate nutrition/hydration  Upon exam in WC, rarely uses RW      6. Stage 3a chronic kidney disease (HCC)  Assessment & Plan:  Lab Results   Component Value Date    EGFR 58 01/25/2023    EGFR 70 01/24/2023    EGFR 60 01/06/2023    CREATININE 0.93 01/25/2023    CREATININE 0.80 01/24/2023    CREATININE 0.91 01/06/2023     Last BMP 12/2023: Cr: 0.8; GFR:  ">60  Ordered: repeat BMP  Avoid nephrotoxic medications  Consider renal dosing           All medications and routine orders were reviewed and updated as needed.    Chief Complaint       LTC follow up visit       Past Medical and Surgica History      Past Medical History:   Diagnosis Date    Abnormal ECG     Last Assessed 9/29/2016    Anxiety     Last Assessed 6/08/2016    Arthritis     knees    Asthma     Last Assessed 11/06/2013    Atrial premature complex     Cataract, bilateral     Last Assessed 7/14/2016    Cataract, left eye     Both eyes. Had surgery on left.    COVID-19     Difficulty swallowing     Diverticulosis 9/25/2022    Dizziness     Fibromyalgia     Fibromyalgia, primary     Gastric reflux     Heart failure (HCC)     5/23/22 Pt reports had heart failure in the past    History of COVID-19     5/23/22 Pt reports having COVID in 2021.    History of transfusion     no reaction    [place] (hard of hearing)     Hyperlipidemia     Hypertension     Incontinence in female     5/23/22 Pt reports has incontinence -wears depends especially at night/riding in car    Irregular heart beat     5/23/22 Pt reports hx of A fib    Lyme disease     PONV (postoperative nausea and vomiting)     Premature ventricular contraction     Primary osteoarthritis of both knees     Last Assessed 7/14/2016    Rheumatic fever     5/23/22 Pt reports had hx of rheumatic fever as a child twice    Sore throat     Tuberculosis 1945     Past Surgical History:   Procedure Laterality Date    APPENDECTOMY      CARDIAC PACEMAKER PLACEMENT  2019    COLONOSCOPY      DENTAL SURGERY      extractions    HYSTERECTOMY      5/23/22 Pt reports had appendix out with hysterectomy and \"tightened up my bladder\"    IR ASPIRATION ONLY  9/30/2022    ORIF FEMUR FRACTURE Left 08/05/2021    Procedure: OPEN REDUCTION W/ INTERNAL FIXATION (ORIF) DISTAL FEMUR; INSERTION RETROGRADE NAIL;  Surgeon: Rita Harris MD;  [facility];  Service: Orthopedics    MA " ARTHRP KNE CONDYLE&PLATU MEDIAL&LAT COMPARTMENTS Left 9/12/2022    Procedure: ARTHROPLASTY KNEE TOTAL;  Surgeon: Rita Harris MD;  [facility];  Service: Orthopedics    MT DILATION ESOPH UNGUIDED SOUND/BOUGIE 1/MULT PASS N/A 04/06/2016    Procedure: DILATATION ESOPHAGEAL;  Surgeon: Watson Turpin MD;  Location: [facility];  Service: Gastroenterology    MT EGD TRANSORAL BIOPSY SINGLE/MULTIPLE N/A 04/06/2016    Procedure: ESOPHAGOGASTRODUODENOSCOPY (EGD);  Surgeon: Watson Turpin MD;  [facility]: [facility];  Service: Gastroenterology    MT LATERAL RETINACULAR RELEASE OPEN Left 1/23/2023    Procedure: RELEASE RETINACULAR, left knee and all associated procedures;  Surgeon: Rita Harris MD;  [facility];  Service: Orthopedics    MT REMOVAL IMPLANT DEEP Left 9/12/2022    Procedure: REMOVAL NAIL IM  FEMUR;  Surgeon: Rita Harris MD;  [facility];  Service: Orthopedics    MT XCAPSL CTRC RMVL INSJ IO LENS PROSTH W/O ECP Left 03/15/2016    Procedure: EXTRACTION EXTRACAPSULAR CATARACT PHACO INTRAOCULAR LENS (IOL);  Surgeon: Jeanette Beckman MD;  Location[facility];  Service: Ophthalmology    REPLACEMENT TOTAL KNEE Right     REPLACEMENT TOTAL KNEE      right     TONSILLECTOMY       Allergies   Allergen Reactions    Penicillins Hives     Itching and terrible hives    Sulfa Antibiotics Itching    B12 Folate [Folic Acid-Vit B6-Vit B12 - Food Allergy] Other (See Comments)     5/23/22 Pt reports no allergic reaction known     [place] Other (See Comments)     Unknown, 5/23 -pt is unaware of reaction     Lyrica [Pregabalin] Other (See Comments)     5/23/22 Pt reports doesn't remember reaction     Other Other (See Comments)     Black rubber 5/23/22 per allergy test - pt unaware of reaction    Cortisone Acetate [Cortisone] Itching and Rash     5/23/22 pt reports makes her violent     Doxycycline Hives and Itching    Nickel Other (See Comments)     Skin discoloration          History of Present  Illness     HPI      The patient's allergies, past medical, surgical, social and family history were reviewed and unchanged.    Review of Systems     Review of Systems      Objective     Vitals:   Vitals:    04/25/24 1654   BP: (!) 176/71   Pulse: 77   Resp: 18   Temp: 97.5 °F (36.4 °C)   SpO2: 97%         Physical Exam    Pertinent Laboratory/Diagnostic Studies:     Reviewed in facility chart      Current Medications   Medications reviewed and updated see facility MAR for details.      Current Outpatient Medications:     acetaminophen (TYLENOL) 325 mg tablet, Take 975 mg by mouth 3 (three) times a day, Disp: , Rfl:     albuterol (PROVENTIL HFA,VENTOLIN HFA) 90 mcg/act inhaler, INHALE 2 PUFFS BY MOUTH EVERY 6 HOURS AS NEEDED FOR WHEEZING, Disp: 3 Inhaler, Rfl: 0    allopurinol (ZYLOPRIM) 100 mg tablet, Take 1 tablet (100 mg total) by mouth daily, Disp: 90 tablet, Rfl: 3    apixaban (Eliquis) 5 mg, Take 1 tablet (5 mg total) by mouth 2 (two) times a day, Disp: 60 tablet, Rfl: 6    bisacodyl (DULCOLAX) 10 mg suppository, Insert 10 mg into the rectum daily as needed, Disp: , Rfl:     Calcium Polycarbophil (FIBER-LAX PO), Take 1 tablet by mouth daily at bedtime, Disp: , Rfl:     Diclofenac Sodium (VOLTAREN) 1 %, Apply 2 g topically 4 (four) times a day = to B/L knee, Disp: , Rfl:     diltiazem (CARDIZEM CD) 120 mg 24 hr capsule, Take 1 capsule (120 mg total) by mouth daily, Disp: 90 capsule, Rfl: 3    DULoxetine (CYMBALTA) 20 mg capsule, Take 20 mg by mouth daily at bedtime, Disp: , Rfl:     ezetimibe (ZETIA) 10 mg tablet, Take 1 tablet (10 mg total) by mouth daily, Disp: 90 tablet, Rfl: 1    furosemide (LASIX) 40 mg tablet, Take 40 mg by mouth in the morning, Disp: , Rfl:     Melatonin 10 MG TABS, Take by mouth Takes daily at night, Disp: , Rfl:     metoprolol tartrate (LOPRESSOR) 100 mg tablet, Take 1 tablet (100 mg total) by mouth daily in the early morning Do not start before January 7, 2023., Disp: , Rfl: 0     "metoprolol tartrate (LOPRESSOR) 50 mg tablet, Take 1 tablet (50 mg total) by mouth daily at bedtime, Disp: , Rfl: 0    Multiple Vitamin (MULTIVITAMIN ADULT PO), Take by mouth in the morning, Disp: , Rfl:     omeprazole (PriLOSEC) 20 mg delayed release capsule, Take 20 mg by mouth daily, Disp: , Rfl:     polyethylene glycol (MIRALAX) 17 g packet, Take 17 g by mouth daily as needed, Disp: , Rfl:     senna-docusate sodium (SENOKOT S) 8.6-50 mg per tablet, Take 2 tablets by mouth 2 (two) times a day, Disp: , Rfl: 0    sucralfate (CARAFATE) 1 g tablet, Take 1 tablet (1 g total) by mouth 2 (two) times a day, Disp: 30 tablet, Rfl: 0    furosemide (LASIX) 20 mg tablet, Take 1 tablet (20 mg total) by mouth daily with dinner (Patient not taking: Reported on 4/25/2024), Disp: , Rfl: 0    vitamin B-12 (VITAMIN B-12) 1,000 mcg tablet, Take 1 tablet (1,000 mcg total) by mouth daily, Disp: , Rfl: 0     Please note:  Voice-recognition software may have been used in the preparation of this document.  Occasional wrong word or \"sound-alike\" substitutions may have occurred due to the inherent limitations of voice recognition software.  Interpretation should be guided by context.         JERI Stokes  4/25/2024  5:17 PM    "

## 2024-04-25 NOTE — ASSESSMENT & PLAN NOTE
Lab Results   Component Value Date    EGFR 58 01/25/2023    EGFR 70 01/24/2023    EGFR 60 01/06/2023    CREATININE 0.93 01/25/2023    CREATININE 0.80 01/24/2023    CREATININE 0.91 01/06/2023     Last BMP 12/2023: Cr: 0.8; GFR: >60  Ordered: repeat BMP  Avoid nephrotoxic medications  Consider renal dosing

## 2024-04-25 NOTE — ASSESSMENT & PLAN NOTE
Multifactorial: advanced age, comorbidities, past surgery on L knee  Received PT/OT  Assist with ADLs/IADLs as needed  Fall precautions  Ensure adequate nutrition/hydration  Upon exam in WC, rarely uses RW

## 2024-04-25 NOTE — ASSESSMENT & PLAN NOTE
Wt Readings from Last 3 Encounters:   04/25/24 100 kg (221 lb 8 oz)   04/18/24 97.5 kg (215 lb)   02/28/24 99.6 kg (219 lb 8 oz)     Euvolemic upon exam, 6 lb weight gain within past week  Monitor weights every other day for fluid overload  Continue Lasix 40 mg once daily

## 2024-04-25 NOTE — ASSESSMENT & PLAN NOTE
Complains of chronic arthritic pain in L wrist  Receiving Tylenol 975 TID; Voltaren gel QID  LTC resident  Follow up with Orthopedics  To rule out gout  Ordered: Uric acid labwork  No redness/swelling to area  Full ROM; reports weakness in  strength occasionally

## 2024-04-25 NOTE — ASSESSMENT & PLAN NOTE
Elevated upon exam, stable within the past  Monitor BP at [facility]  Continue Diltiazem 120 mg once daily  Continue Metoprolol 100 mg once daily in morning, 50 mg at bedtime  Continue Lasix 40 mg once daily

## 2024-05-15 PROBLEM — I13.0 HYPERTENSIVE HEART AND RENAL DISEASE WITH CONGESTIVE HEART FAILURE (HCC): Status: ACTIVE | Noted: 2024-05-15

## 2024-06-05 ENCOUNTER — NURSING HOME VISIT (OUTPATIENT)
Dept: GERIATRICS | Facility: OTHER | Age: 79
End: 2024-06-05
Payer: MEDICARE

## 2024-06-05 DIAGNOSIS — E55.9 VITAMIN D INSUFFICIENCY: ICD-10-CM

## 2024-06-05 DIAGNOSIS — M1A.9XX1 CHRONIC GOUT WITH TOPHUS, UNSPECIFIED CAUSE, UNSPECIFIED SITE: ICD-10-CM

## 2024-06-05 DIAGNOSIS — F33.0 MILD EPISODE OF RECURRENT MAJOR DEPRESSIVE DISORDER (HCC): ICD-10-CM

## 2024-06-05 DIAGNOSIS — I48.0 PAROXYSMAL ATRIAL FIBRILLATION (HCC): ICD-10-CM

## 2024-06-05 DIAGNOSIS — F41.9 ANXIETY: ICD-10-CM

## 2024-06-05 DIAGNOSIS — K21.9 GASTROESOPHAGEAL REFLUX DISEASE WITHOUT ESOPHAGITIS: ICD-10-CM

## 2024-06-05 DIAGNOSIS — I49.5 TACHY-BRADY SYNDROME (HCC): ICD-10-CM

## 2024-06-05 DIAGNOSIS — I10 ESSENTIAL HYPERTENSION: ICD-10-CM

## 2024-06-05 DIAGNOSIS — E87.70 HYPERVOLEMIA, UNSPECIFIED HYPERVOLEMIA TYPE: ICD-10-CM

## 2024-06-05 DIAGNOSIS — N18.31 STAGE 3A CHRONIC KIDNEY DISEASE (HCC): ICD-10-CM

## 2024-06-05 DIAGNOSIS — D64.89 ANEMIA DUE TO OTHER CAUSE, NOT CLASSIFIED: ICD-10-CM

## 2024-06-05 DIAGNOSIS — I50.32 CHRONIC HEART FAILURE WITH PRESERVED EJECTION FRACTION (HCC): Primary | ICD-10-CM

## 2024-06-05 PROCEDURE — 99309 SBSQ NF CARE MODERATE MDM 30: CPT | Performed by: FAMILY MEDICINE

## 2024-06-05 NOTE — PROGRESS NOTES
[facility]  [address] [address] [phone]  [facility]  POS 32    NAME: Iis Ernst  AGE: 78 y.o. SEX: female [phone]    DATE OF ENCOUNTER: 6/5/2024    Assessment and Plan     Chronic heart failure with preserved ejection fraction (HCC)  Stable, no exacerbation  Euvolemic on exam  Continue Lasix 40 mg daily. Consider dose reduction next f/u visit.  Continue metoprolol 100 mg in the a.m. and 50 mg at bedtime  Monitor weight, BMP    Essential hypertension  BP stable, at goal  Continue Lasix and BB in the setting of CHF and Afib  Monitor BP    Paroxysmal atrial fibrillation (HCC)  Heart rate and rhythm regular upon exam  No complaints of CP, SOB, dizziness/lightheadedness  Continue metoprolol and AC with Eliquis 5 mg BID  Follow up with Cardiology 6/11    Tachy-smita syndrome (HCC)  Stable  S/p PPM  Continue metoprolol, diltiazem, Eliquis    GERD (gastroesophageal reflux disease)  Continue omeprazole 20 mg daily and Sucrafate 1 g 2 times daily    Chronic gout with tophus  Continue allopurinol daily  No flare reported recently.  Uric acid WNL 5.8 in 4/2024    Stage 3a chronic kidney disease (HCC)  Stable,  Creatinine 0.81, GFE >60 (4/30/24)  Avoid hypotension. Avoid NSAIDs  Monitor BMP    Anxiety  Maintain on duloxetine 20 mg daily    Mild episode of recurrent major depressive disorder (HCC)  Continue cymbalta 20 mg daily    Vitamin D insufficiency  Continue vitamin D supplement daily    Anemia  Continue iron supplement daily for iron deficiency anemia  Hemoglobin was stable 11.7 in April, 2024    - Counseling Documentation: patient was counseled regarding: diagnostic results, instructions for management, patient and family education, risks and benefits of treatment options, and importance of compliance with treatment    Chief Complaint     Routine Long term follow-up visit.    History of Present Illness     The patient is seen in her room during routine LTC visit. She is sitting in  wheelchair. She participated in painting group this afternoon. She c/o left ear pain 2 days ago. Denies fever, chills, rhinorrhea, congestion, cough, or sore throat. No concerns from nursing staff.    The following portions of the patient's history were reviewed and updated as appropriate: allergies, current medications, past family history, past medical history, past social history, past surgical history and problem list.    Review of Systems     Review of Systems   HENT:  Positive for ear pain (left). Negative for trouble swallowing.    Respiratory:  Negative for cough and shortness of breath.    Cardiovascular:  Negative for chest pain and palpitations.   Gastrointestinal:  Negative for abdominal pain.   Musculoskeletal:  Positive for gait problem.   Neurological:  Negative for headaches.   Psychiatric/Behavioral:  Negative for behavioral problems.        Objective     Vitals: EHR at facility reviewed, stable.    Physical Exam  Vitals and nursing note reviewed.   General: no acute distress.  HENT:      Head: Normocephalic.      Ears: canal NL, TM intact     Nose: Nose normal.      Mouth: Mucous membranes are moist.   Eyes:       Right eye: No discharge.         Left eye: No discharge.      Conjunctivae normal.   Cardiovascular:      Normal rate and regular rhythm. 3/6 murmur heard.  Pulmonary:      Pulmonary effort is normal. No wheezing, rhonchi or rales.   Abdominal:      Bowel sounds are normal. There is no distension.      Abdomen is soft. There is no abdominal tenderness.   Musculoskeletal:      Cervical back: Neck supple.      Right lower leg: No edema.      Left lower leg: No edema.   Skin:     General: Skin is warm.   Neurological: alert.      Oriented to person, and place. Cooperative, follow commands      Behavior: normal.   Pertinent Laboratory/Diagnostic Studies:  Refer to facility chart.    Current Medications   Medications reviewed and updated in facility chart.

## 2024-06-17 PROBLEM — D75.839 THROMBOCYTOSIS: Status: RESOLVED | Noted: 2022-10-11 | Resolved: 2024-06-17

## 2024-06-17 NOTE — ASSESSMENT & PLAN NOTE
Heart rate and rhythm regular upon exam  No complaints of CP, SOB, dizziness/lightheadedness  Continue metoprolol and AC with Eliquis 5 mg BID  Follow up with Cardiology 6/11

## 2024-06-17 NOTE — ASSESSMENT & PLAN NOTE
Stable, no exacerbation  Euvolemic on exam  Continue Lasix 40 mg daily. Consider dose reduction next f/u visit.  Continue metoprolol 100 mg in the a.m. and 50 mg at bedtime  Monitor weight, BMP

## 2024-06-17 NOTE — ASSESSMENT & PLAN NOTE
Continue iron supplement daily for iron deficiency anemia  Hemoglobin was stable 11.7 in April, 2024

## 2024-06-19 ENCOUNTER — REMOTE DEVICE CLINIC VISIT (OUTPATIENT)
Dept: CARDIOLOGY CLINIC | Facility: CLINIC | Age: 79
End: 2024-06-19
Payer: MEDICARE

## 2024-06-19 DIAGNOSIS — Z95.0 CARDIAC PACEMAKER IN SITU: Primary | ICD-10-CM

## 2024-06-19 PROCEDURE — 93296 REM INTERROG EVL PM/IDS: CPT | Performed by: INTERNAL MEDICINE

## 2024-06-19 PROCEDURE — 93294 REM INTERROG EVL PM/LDLS PM: CPT | Performed by: INTERNAL MEDICINE

## 2024-06-19 NOTE — PROGRESS NOTES
Results for orders placed or performed in visit on 06/19/24   Cardiac EP device report    Narrative    MDT DUAL CHAMBER PPM (AAIR-DDDR MODE)/ ACTIVE SYSTEM IS MRI CONDITIONAL  CARELINK TRANSMISSION: BATTERY VOLTAGE ADEQUATE (3 YRS). AP-98%, -0.3%. ALL AVAILABLE LEAD PARAMETERS WITHIN NORMAL LIMITS. 6 AF EPISODES TREATED W/ rATP (16.7% PACE TERMINATED) & 1 MONITORED AF EPISODE. AF BURDEN-0.5%. HX: PAF & ON ELIQUIS, DILTIAZEM & METOPROLOL. NORMAL DEVICE FUNCTION. GV

## 2024-07-31 ENCOUNTER — NURSING HOME VISIT (OUTPATIENT)
Dept: GERIATRICS | Facility: OTHER | Age: 79
End: 2024-07-31
Payer: COMMERCIAL

## 2024-07-31 VITALS
WEIGHT: 223 LBS | SYSTOLIC BLOOD PRESSURE: 117 MMHG | RESPIRATION RATE: 18 BRPM | OXYGEN SATURATION: 98 % | TEMPERATURE: 97.3 F | BODY MASS INDEX: 38.28 KG/M2 | DIASTOLIC BLOOD PRESSURE: 67 MMHG | HEART RATE: 73 BPM

## 2024-07-31 DIAGNOSIS — I10 ESSENTIAL HYPERTENSION: ICD-10-CM

## 2024-07-31 DIAGNOSIS — N18.31 STAGE 3A CHRONIC KIDNEY DISEASE (HCC): ICD-10-CM

## 2024-07-31 DIAGNOSIS — R26.2 AMBULATORY DYSFUNCTION: ICD-10-CM

## 2024-07-31 DIAGNOSIS — M25.532 LEFT WRIST PAIN: Primary | ICD-10-CM

## 2024-07-31 DIAGNOSIS — I50.32 CHRONIC HEART FAILURE WITH PRESERVED EJECTION FRACTION (HCC): ICD-10-CM

## 2024-07-31 DIAGNOSIS — I48.0 PAROXYSMAL ATRIAL FIBRILLATION (HCC): ICD-10-CM

## 2024-07-31 PROCEDURE — 99309 SBSQ NF CARE MODERATE MDM 30: CPT | Performed by: NURSE PRACTITIONER

## 2024-07-31 NOTE — PROGRESS NOTES
[facility]  [address] [address]  [phone]  FACILITY: [facility]  Code 32 (LTC)        NAME: Isi Ernst  AGE: 78 y.o. SEX: female CODE STATUS: CPR    DATE OF ENCOUNTER: 7/31/2024    Assessment and Plan     1. Left wrist pain  Assessment & Plan:  Patient c/o left wrist pain  Noted with tenderness to joint just below thumb  ? De Quivering's tendonitis   Hx of Fibromyalgia and arthritis   Denies any recent trauma or falls  Ordered X ray to hand   OT eval   Tylenol and Voltaren for pain  2. Ambulatory dysfunction  Assessment & Plan:  Multifactorial in setting of advanced age with debility, osteoarthritis  Fall Precautions  Multimodal pain management  Ensure adequate nutrition/hydration   3. Stage 3a chronic kidney disease (HCC)  Assessment & Plan:    Lab Results   Component Value Date    EGFR 58 01/25/2023    EGFR 70 01/24/2023    EGFR 60 01/06/2023    CREATININE 0.93 01/25/2023    CREATININE 0.80 01/24/2023    CREATININE 0.91 01/06/2023     BMP reviewed stable creat 0.8 (April 2024)  Avoid NSAIDs  Avoid hypotension  Renally dose medications as appropriate  Monitor renal function every 3 to 6 months while stable  4. Paroxysmal atrial fibrillation (HCC)  Assessment & Plan:  Rate regular and controlled on exam  Continue metoprolol, Eliquis and diltiazem  5. Essential hypertension  Assessment & Plan:  BP controlled on exam  Continue Cardizem, metoprolol, Lasix  Monitor vitals at long-term care at least monthly  6. Chronic heart failure with preserved ejection fraction (HCC)  Assessment & Plan:  Wt Readings from Last 3 Encounters:   07/31/24 101 kg (223 lb)   04/25/24 100 kg (221 lb 8 oz)   04/18/24 97.5 kg (215 lb)     Appears euvolemic on exam  Last echo (September 2022) EF 65%  Continue Lasix 40 in the morning   Continue metoprolol  Monthly weights  Cardiac diet           All medications and routine orders were reviewed and updated as needed.    Chief Complaint     LT follow  "up visit     Past Medical and Surgica History      Past Medical History:   Diagnosis Date    Abnormal ECG     Last Assessed 9/29/2016    Anxiety     Last Assessed 6/08/2016    Arthritis     knees    Asthma     Last Assessed 11/06/2013    Atrial premature complex     Cataract, bilateral     Last Assessed 7/14/2016    Cataract, left eye     Both eyes. Had surgery on left.    COVID-19     Difficulty swallowing     Diverticulosis 9/25/2022    Dizziness     Fibromyalgia     Fibromyalgia, primary     Gastric reflux     Heart failure (HCC)     5/23/22 Pt reports had heart failure in the past    History of COVID-19     5/23/22 Pt reports having COVID in 2021.    History of transfusion     no reaction    [place] (hard of hearing)     Hyperlipidemia     Hypertension     Incontinence in female     5/23/22 Pt reports has incontinence -wears depends especially at night/riding in car    Irregular heart beat     5/23/22 Pt reports hx of A fib    Lyme disease     PONV (postoperative nausea and vomiting)     Premature ventricular contraction     Primary osteoarthritis of both knees     Last Assessed 7/14/2016    Rheumatic fever     5/23/22 Pt reports had hx of rheumatic fever as a child twice    Sore throat     Tuberculosis 1945     Past Surgical History:   Procedure Laterality Date    APPENDECTOMY      CARDIAC PACEMAKER PLACEMENT  2019    COLONOSCOPY      DENTAL SURGERY      extractions    HYSTERECTOMY      5/23/22 Pt reports had appendix out with hysterectomy and \"tightened up my bladder\"    IR ASPIRATION ONLY  9/30/2022    ORIF FEMUR FRACTURE Left 08/05/2021    Procedure: OPEN REDUCTION W/ INTERNAL FIXATION (ORIF) DISTAL FEMUR; INSERTION RETROGRADE NAIL;  Surgeon: Rita Harris MD;  [facility];  Service: Orthopedics    NC ARTHRP KNE CONDYLE&PLATU MEDIAL&LAT COMPARTMENTS Left 9/12/2022    Procedure: ARTHROPLASTY KNEE TOTAL;  Surgeon: Rita Harris MD;  [facility];  Service: Orthopedics    NC DILATION " ESOPH UNGUIDED SOUND/BOUGIE 1/MULT PASS N/A 04/06/2016    Procedure: DILATATION ESOPHAGEAL;  Surgeon: Watson Turpin MD;  Location: [facility];  Service: Gastroenterology    AR EGD TRANSORAL BIOPSY SINGLE/MULTIPLE N/A 04/06/2016    Procedure: ESOPHAGOGASTRODUODENOSCOPY (EGD);  Surgeon: Watson Turpin MD;  [facility];  Service: Gastroenterology    AR LATERAL RETINACULAR RELEASE OPEN Left 1/23/2023    Procedure: RELEASE RETINACULAR, left knee and all associated procedures;  Surgeon: Rita Harris MD;  [facility];  Service: Orthopedics    AR REMOVAL IMPLANT DEEP Left 9/12/2022    Procedure: REMOVAL NAIL IM  FEMUR;  Surgeon: Rita Harris MD;  [facility];  Service: Orthopedics    AR XCAPSL CTRC RMVL INSJ IO LENS PROSTH W/O ECP Left 03/15/2016    Procedure: EXTRACTION EXTRACAPSULAR CATARACT PHACO INTRAOCULAR LENS (IOL);  Surgeon: Jeanette Beckman MD;  [facility];  Service: Ophthalmology    REPLACEMENT TOTAL KNEE Right     REPLACEMENT TOTAL KNEE      right     TONSILLECTOMY       Allergies   Allergen Reactions    Penicillins Hives     Itching and terrible hives    Sulfa Antibiotics Itching    B12 Folate [Folic Acid-Vit B6-Vit B12 - Food Allergy] Other (See Comments)     5/23/22 Pt reports no allergic reaction known     [place] Other (See Comments)     Unknown, 5/23 -pt is unaware of reaction     Lyrica [Pregabalin] Other (See Comments)     5/23/22 Pt reports doesn't remember reaction     Other Other (See Comments)     Black rubber 5/23/22 per allergy test - pt unaware of reaction    Cortisone Acetate [Cortisone] Itching and Rash     5/23/22 pt reports makes her violent     Doxycycline Hives and Itching    Nickel Other (See Comments)     Skin discoloration          History of Present Illness     Isi Ernst is a 77 year old female she is a long-term care resident of [facility] due to debility.  Patient has a past medical history including but  not limited to  A. Fib, HTN, CHF, CKD, gout, fibromyalgia, arthritis, and ambulatory dysfunction.    The patient is seen today for a routine LTC follow up visit for management of acute and chronic conditions.    The patient was seen and examined at bedside in stable condition. She is alert and oriented x 3.  Patient is out of bed in her wheelchair states it takes 2 people sometimes needs to use sit to stand.  Patient states her arthritis in her hands and knees is consistent but Tylenol and Voltaren seems to be helping, however today she is c/o left wrist pain, she denies any trauma or falls.  Patient states she has a good appetite.  Patient denies any bowel or bladder issues.  Patient is in good spirits and smiling during exam.  Patient denies CP/SOB/N/V/D. Denies lightheadedness, dizziness, headaches, vision changes. No concerns from nursing staff.              The patient's allergies, past medical, surgical, social and family history were reviewed and unchanged.    Review of Systems     Review of Systems   Musculoskeletal:  Positive for arthralgias and gait problem.   All other systems reviewed and are negative.        Objective     Vitals:   Vitals:    07/31/24 1623   BP: 117/67   Pulse: 73   Resp: 18   Temp: (!) 97.3 °F (36.3 °C)   SpO2: 98%         Physical Exam  Vitals and nursing note reviewed.   Constitutional:       General: She is not in acute distress.     Appearance: Normal appearance.   HENT:      Head: Normocephalic and atraumatic.      Nose: No congestion or rhinorrhea.      Mouth/Throat:      Mouth: Mucous membranes are moist.   Eyes:      General: No scleral icterus.     Conjunctiva/sclera: Conjunctivae normal.      Pupils: Pupils are equal, round, and reactive to light.   Cardiovascular:      Rate and Rhythm: Normal rate and regular rhythm.      Pulses: Normal pulses.      Heart sounds: Normal heart sounds. No murmur heard.  Pulmonary:      Effort: Pulmonary effort is normal. No respiratory  distress.      Breath sounds: Normal breath sounds. No wheezing, rhonchi or rales.   Abdominal:      General: Bowel sounds are normal. There is no distension.      Palpations: Abdomen is soft. There is no mass.      Tenderness: There is no abdominal tenderness.      Hernia: No hernia is present.      Comments: PPM LCW   Musculoskeletal:         General: No swelling or tenderness.   Lymphadenopathy:      Cervical: No cervical adenopathy.   Skin:     General: Skin is warm and dry.      Coloration: Skin is not pale.      Findings: No rash.   Neurological:      General: No focal deficit present.      Mental Status: She is alert and oriented to person, place, and time. Mental status is at baseline.      Motor: Weakness present.      Gait: Gait abnormal.   Psychiatric:         Mood and Affect: Mood normal.         Behavior: Behavior normal.       Pertinent Laboratory/Diagnostic Studies:     Reviewed in facility chart      Current Medications   Medications reviewed and updated see facility MAR for details.      Current Outpatient Medications:     acetaminophen (TYLENOL) 325 mg tablet, Take 975 mg by mouth 3 (three) times a day, Disp: , Rfl:     albuterol (PROVENTIL HFA,VENTOLIN HFA) 90 mcg/act inhaler, INHALE 2 PUFFS BY MOUTH EVERY 6 HOURS AS NEEDED FOR WHEEZING, Disp: 3 Inhaler, Rfl: 0    allopurinol (ZYLOPRIM) 100 mg tablet, Take 1 tablet (100 mg total) by mouth daily, Disp: 90 tablet, Rfl: 3    apixaban (Eliquis) 5 mg, Take 1 tablet (5 mg total) by mouth 2 (two) times a day, Disp: 60 tablet, Rfl: 6    bisacodyl (DULCOLAX) 10 mg suppository, Insert 10 mg into the rectum daily as needed, Disp: , Rfl:     Calcium Polycarbophil (FIBER-LAX PO), Take 1 tablet by mouth daily at bedtime, Disp: , Rfl:     Diclofenac Sodium (VOLTAREN) 1 %, Apply 2 g topically 4 (four) times a day = to B/L knee, Disp: , Rfl:     diltiazem (CARDIZEM CD) 120 mg 24 hr capsule, Take 1 capsule (120 mg total) by mouth daily, Disp: 90 capsule, Rfl: 3     "DULoxetine (CYMBALTA) 20 mg capsule, Take 20 mg by mouth daily at bedtime, Disp: , Rfl:     ezetimibe (ZETIA) 10 mg tablet, Take 1 tablet (10 mg total) by mouth daily, Disp: 90 tablet, Rfl: 1    furosemide (LASIX) 40 mg tablet, Take 40 mg by mouth in the morning, Disp: , Rfl:     Melatonin 10 MG TABS, Take by mouth Takes daily at night, Disp: , Rfl:     metoprolol tartrate (LOPRESSOR) 100 mg tablet, Take 1 tablet (100 mg total) by mouth daily in the early morning Do not start before January 7, 2023., Disp: , Rfl: 0    metoprolol tartrate (LOPRESSOR) 50 mg tablet, Take 1 tablet (50 mg total) by mouth daily at bedtime, Disp: , Rfl: 0    Multiple Vitamin (MULTIVITAMIN ADULT PO), Take by mouth in the morning, Disp: , Rfl:     omeprazole (PriLOSEC) 20 mg delayed release capsule, Take 20 mg by mouth daily, Disp: , Rfl:     polyethylene glycol (MIRALAX) 17 g packet, Take 17 g by mouth daily as needed, Disp: , Rfl:     senna-docusate sodium (SENOKOT S) 8.6-50 mg per tablet, Take 2 tablets by mouth 2 (two) times a day, Disp: , Rfl: 0    sucralfate (CARAFATE) 1 g tablet, Take 1 tablet (1 g total) by mouth 2 (two) times a day, Disp: 30 tablet, Rfl: 0    vitamin B-12 (VITAMIN B-12) 1,000 mcg tablet, Take 1 tablet (1,000 mcg total) by mouth daily, Disp: , Rfl: 0     Please note:  Voice-recognition software may have been used in the preparation of this document.  Occasional wrong word or \"sound-alike\" substitutions may have occurred due to the inherent limitations of voice recognition software.  Interpretation should be guided by context.         JERI Hart  7/31/2024  4:24 PM    "

## 2024-08-12 PROBLEM — M25.532 LEFT WRIST PAIN: Status: ACTIVE | Noted: 2023-03-31

## 2024-08-13 NOTE — ASSESSMENT & PLAN NOTE
Wt Readings from Last 3 Encounters:   07/31/24 101 kg (223 lb)   04/25/24 100 kg (221 lb 8 oz)   04/18/24 97.5 kg (215 lb)     Appears euvolemic on exam  Last echo (September 2022) EF 65%  Continue Lasix 40 in the morning   Continue metoprolol  Monthly weights  Cardiac diet

## 2024-08-13 NOTE — ASSESSMENT & PLAN NOTE
Patient c/o left wrist pain  Noted with tenderness to joint just below thumb  ? De Quivering's tendonitis   Hx of Fibromyalgia and arthritis   Denies any recent trauma or falls  Ordered X ray to hand   OT eval   Tylenol and Voltaren for pain

## 2024-08-13 NOTE — ASSESSMENT & PLAN NOTE
BP controlled on exam  Continue Cardizem, metoprolol, Lasix  Monitor vitals at long-term care at least monthly

## 2024-08-13 NOTE — ASSESSMENT & PLAN NOTE
Lab Results   Component Value Date    EGFR 58 01/25/2023    EGFR 70 01/24/2023    EGFR 60 01/06/2023    CREATININE 0.93 01/25/2023    CREATININE 0.80 01/24/2023    CREATININE 0.91 01/06/2023     BMP reviewed stable creat 0.8 (April 2024)  Avoid NSAIDs  Avoid hypotension  Renally dose medications as appropriate  Monitor renal function every 3 to 6 months while stable

## 2024-09-18 ENCOUNTER — REMOTE DEVICE CLINIC VISIT (OUTPATIENT)
Dept: CARDIOLOGY CLINIC | Facility: CLINIC | Age: 79
End: 2024-09-18
Payer: COMMERCIAL

## 2024-09-18 DIAGNOSIS — Z95.0 PRESENCE OF PERMANENT CARDIAC PACEMAKER: Primary | ICD-10-CM

## 2024-09-18 PROCEDURE — 93294 REM INTERROG EVL PM/LDLS PM: CPT | Performed by: STUDENT IN AN ORGANIZED HEALTH CARE EDUCATION/TRAINING PROGRAM

## 2024-09-18 PROCEDURE — 93296 REM INTERROG EVL PM/IDS: CPT | Performed by: STUDENT IN AN ORGANIZED HEALTH CARE EDUCATION/TRAINING PROGRAM

## 2024-09-18 NOTE — PROGRESS NOTES
Results for orders placed or performed in visit on 09/18/24   Cardiac EP device report    Narrative    MDT DUAL CHAMBER PPM (AAIR-DDDR MODE)/ ACTIVE SYSTEM IS MRI CONDITIONAL  CARELINK TRANSMISSION: BATTERY VOLTAGE ADEQUATE (2.5 YRS). AP 98.5%   0.5% (AAIR-DDDR 70PPM). ALL AVAILABLE LEAD PARAMETERS WITHIN NORMAL LIMITS. 8 AF EPISODES TREATED W/ rATP  - MAX EPISODE DURATION 4 HR, 2 MINS. (1:8 /12.5% PACE TERMINATED). AF BURDEN 0.6%. HX: PAF & PATIENT TAKING ELIQUIS, DILTIAZEM & METOPROLOL TART. NORMAL DEVICE FUNCTION.  ES

## 2024-09-25 ENCOUNTER — NURSING HOME VISIT (OUTPATIENT)
Dept: GERIATRICS | Facility: OTHER | Age: 79
End: 2024-09-25
Payer: COMMERCIAL

## 2024-09-25 DIAGNOSIS — I49.5 TACHY-BRADY SYNDROME (HCC): ICD-10-CM

## 2024-09-25 DIAGNOSIS — I50.32 CHRONIC HEART FAILURE WITH PRESERVED EJECTION FRACTION (HCC): Primary | ICD-10-CM

## 2024-09-25 DIAGNOSIS — I10 ESSENTIAL HYPERTENSION: ICD-10-CM

## 2024-09-25 DIAGNOSIS — I48.0 PAROXYSMAL ATRIAL FIBRILLATION (HCC): ICD-10-CM

## 2024-09-25 DIAGNOSIS — M81.0 AGE RELATED OSTEOPOROSIS, UNSPECIFIED PATHOLOGICAL FRACTURE PRESENCE: ICD-10-CM

## 2024-09-25 DIAGNOSIS — M1A.9XX1 CHRONIC GOUT WITH TOPHUS, UNSPECIFIED CAUSE, UNSPECIFIED SITE: ICD-10-CM

## 2024-09-25 DIAGNOSIS — N18.31 STAGE 3A CHRONIC KIDNEY DISEASE (HCC): ICD-10-CM

## 2024-09-25 DIAGNOSIS — F33.0 MILD EPISODE OF RECURRENT MAJOR DEPRESSIVE DISORDER (HCC): ICD-10-CM

## 2024-09-25 PROCEDURE — 99310 SBSQ NF CARE HIGH MDM 45: CPT | Performed by: FAMILY MEDICINE

## 2024-09-25 NOTE — PROGRESS NOTES
[facility]  [address] [address] [phone]  [facility]  POS 32    NAME: Isi Ernst  AGE: 79 y.o. SEX: female [phone]  DATE OF ENCOUNTER: 9/25/2024    Assessment and Plan     Chronic heart failure with preserved ejection fraction (HCC)  Wt Readings from Last 3 Encounters:   07/31/24 101 kg (223 lb)   04/25/24 100 kg (221 lb 8 oz)   04/18/24 97.5 kg (215 lb)   Stable, no exacerbation  Euvolemic on exam  Continue Lasix 40 mg daily.   Continue metoprolol 100 mg in the a.m. and 50 mg at bedtime  Monitor weight, BMP  K normal 4.3 (8/30/24)      Essential hypertension  BP has been stable, at goal  Continue Lasix in the setting of CHF, metoprolol and diltiazem in the setting of A-fib  Monitor BP    Paroxysmal atrial fibrillation (HCC)  Heart rate and rhythm regular upon exam  No complaints of CP, SOB, dizziness/lightheadedness  Continue diltiazem, metoprolol and AC with Eliquis 5 mg BID  No adverse bleeding reported  CBC 8/30 reviewed.    Tachy-smita syndrome (HCC)  S/p PPM  Continue metoprolol, diltiazem, Eliquis    Age related osteoporosis  Patient declined further imaging and treatment  Consider vit D check next blood drawn    Chronic gout with tophus  Continue allopurinol 100 mg daily  No acute gout reported recently.  Uric acid WNL 5.8 in 4/2024    Stage 3a chronic kidney disease (HCC)  Stable on BMP 8/30/24 with Cre 0.92, eGFR 63  Avoid NSAIDs  Avoid hypotension  Ensure adequate hydration    Mild episode of recurrent major depressive disorder (HCC)  Continue cymbalta 20 mg daily    - Counseling Documentation: patient was counseled regarding: instructions for management and patient and family education    Chief Complaint     Routine Long term follow-up visit.    History of Present Illness   The patient is seen in the room during routine LTC follow up visit. She is sitting in wheelchair, reading her book. She states she found this free book from the [employer] downstairs.  "She complained \"I can't walk. Ambulating with wheelchair is difficult\".   She reports good appetite and good sleep. She enjoys the group activities at the [facility] and she is grateful for the good care from the nursing aides.   No concerns from nursing staff.     The following portions of the patient's history were reviewed and updated as appropriate: allergies, current medications, past family history, past medical history, past social history, past surgical history and problem list.    Review of Systems     Review of Systems   Constitutional:  Negative for appetite change and unexpected weight change.   HENT:  Negative for trouble swallowing.    Respiratory:  Negative for cough and shortness of breath.    Cardiovascular:  Negative for chest pain and palpitations.   Gastrointestinal:  Negative for abdominal pain and constipation.   Genitourinary:  Negative for dysuria.   Musculoskeletal:  Negative for arthralgias and back pain.   Skin:  Negative for color change.   Neurological:  Negative for headaches.   Hematological:  Does not bruise/bleed easily.   Psychiatric/Behavioral:  Negative for sleep disturbance.    All other systems reviewed and are negative.    Active Problem List     Patient Active Problem List   Diagnosis    Tachy-smita syndrome (HCC)    Essential hypertension    Pacemaker    Degenerative lumbar spinal stenosis    Fibromyalgia    GERD (gastroesophageal reflux disease)    Lumbar degenerative disc disease    Psoriasis    Thyroid nodule    Urinary incontinence    Anxiety    Tremor    Age related osteoporosis    Vitamin D insufficiency    HLD (hyperlipidemia)    Arthritis    Ambulatory dysfunction    Chronic gout with tophus    Mild episode of recurrent major depressive disorder (HCC)    Paroxysmal atrial fibrillation (HCC)    Stage 3a chronic kidney disease (HCC)    Aortic regurgitation    Physical deconditioning    Chronic heart failure with preserved ejection fraction (HCC)    S/P knee surgery    Left " wrist pain    Bilateral carpal tunnel syndrome    Hypertensive heart and renal disease with congestive heart failure (HCC)       Objective     Vitals: EHR at facility reviewed, stable.  Nursing note reviewed.   General: no acute distress. Obese  HENT:      Head: Normocephalic.      Nose: Nose normal.      Mouth: Mucous membranes are moist.   Eyes:       Right eye: No discharge.         Left eye: No discharge.      Conjunctivae normal.   Cardiovascular:      Normal rate and regular rhythm. No murmur heard.  Pulmonary:      Pulmonary effort is normal. No wheezing, rhonchi or rales.   Abdominal:      Bowel sounds are normal. There is no distension.      Abdomen is soft. There is no abdominal tenderness.   Musculoskeletal:      Ambulates with wheelchair     Right lower leg: No edema.      Left lower leg: No edema.   Skin:     General: Skin is warm.   Neurological: alert.      Oriented to person, place, and time. Cooperative, follow commands      Behavior: normal.     Pertinent Laboratory/Diagnostic Studies:  Refer to facility chart.  Lab 8/30/24 reviewed, unremarkable  Creatinine 0.92, EGFR 63  Sodium 144, potassium 4.3, chloride 103, calcium 9.5  WBC 7.1, hemoglobin 12.4, HCT 40, MCV 97, MCH 30,   Current Medications   Medications reviewed and updated in facility chart.  I have spent a total time of 50 minutes in caring for this patient on the day of the visit/encounter including Diagnostic results, Instructions for management, Counseling / Coordination of care, Documenting in the medical record, Reviewing / ordering tests, medicine, procedures  , Obtaining or reviewing history  , and Communicating with other healthcare professionals .

## 2024-09-29 PROBLEM — R20.0 NUMBNESS AND TINGLING IN BOTH HANDS: Status: RESOLVED | Noted: 2023-09-08 | Resolved: 2024-09-29

## 2024-09-29 PROBLEM — G89.29 CHRONIC PAIN OF LEFT KNEE: Status: RESOLVED | Noted: 2019-08-12 | Resolved: 2024-09-29

## 2024-09-29 PROBLEM — R53.1 GENERALIZED WEAKNESS: Status: RESOLVED | Noted: 2021-05-18 | Resolved: 2024-09-29

## 2024-09-29 PROBLEM — R20.2 NUMBNESS AND TINGLING IN BOTH HANDS: Status: RESOLVED | Noted: 2023-09-08 | Resolved: 2024-09-29

## 2024-09-29 PROBLEM — D64.9 ANEMIA: Status: RESOLVED | Noted: 2022-09-25 | Resolved: 2024-09-29

## 2024-09-29 PROBLEM — M25.562 CHRONIC PAIN OF LEFT KNEE: Status: RESOLVED | Noted: 2019-08-12 | Resolved: 2024-09-29

## 2024-09-29 NOTE — ASSESSMENT & PLAN NOTE
BP has been stable, at goal  Continue Lasix in the setting of CHF, metoprolol and diltiazem in the setting of A-fib  Monitor BP

## 2024-09-29 NOTE — ASSESSMENT & PLAN NOTE
Heart rate and rhythm regular upon exam  No complaints of CP, SOB, dizziness/lightheadedness  Continue diltiazem, metoprolol and AC with Eliquis 5 mg BID  No adverse bleeding reported  CBC 8/30 reviewed.

## 2024-09-29 NOTE — ASSESSMENT & PLAN NOTE
Wt Readings from Last 3 Encounters:   07/31/24 101 kg (223 lb)   04/25/24 100 kg (221 lb 8 oz)   04/18/24 97.5 kg (215 lb)   Stable, no exacerbation  Euvolemic on exam  Continue Lasix 40 mg daily.   Continue metoprolol 100 mg in the a.m. and 50 mg at bedtime  Monitor weight, BMP  K normal 4.3 (8/30/24)

## 2024-09-29 NOTE — ASSESSMENT & PLAN NOTE
Stable on BMP 8/30/24 with Cre 0.92, eGFR 63  Avoid NSAIDs  Avoid hypotension  Ensure adequate hydration

## 2024-11-20 ENCOUNTER — NURSING HOME VISIT (OUTPATIENT)
Dept: GERIATRICS | Facility: OTHER | Age: 79
End: 2024-11-20
Payer: COMMERCIAL

## 2024-11-20 DIAGNOSIS — R53.81 PHYSICAL DECONDITIONING: ICD-10-CM

## 2024-11-20 DIAGNOSIS — I10 ESSENTIAL HYPERTENSION: ICD-10-CM

## 2024-11-20 DIAGNOSIS — K21.9 GASTROESOPHAGEAL REFLUX DISEASE WITHOUT ESOPHAGITIS: ICD-10-CM

## 2024-11-20 DIAGNOSIS — I48.0 PAROXYSMAL ATRIAL FIBRILLATION (HCC): ICD-10-CM

## 2024-11-20 DIAGNOSIS — I50.32 CHRONIC HEART FAILURE WITH PRESERVED EJECTION FRACTION (HCC): Primary | ICD-10-CM

## 2024-11-20 PROCEDURE — 99309 SBSQ NF CARE MODERATE MDM 30: CPT | Performed by: NURSE PRACTITIONER

## 2024-11-20 NOTE — PROGRESS NOTES
[facility]  [address] [address]  [phone]  FACILITY: [facility]  Code 32 (LTC)        NAME: Isi Ernst  AGE: 79 y.o. SEX: female CODE STATUS: CPR    DATE OF ENCOUNTER: 11/20/2024    Assessment and Plan     1. Chronic heart failure with preserved ejection fraction (HCC)  Assessment & Plan:  Wt Readings from Last 3 Encounters:   11/20/24 108 kg (238 lb)   07/31/24 101 kg (223 lb)   04/25/24 100 kg (221 lb 8 oz)     Appears euvolemic on exam today  Denies any cardiopulmonary symptoms   Continue Lasix 40 mg daily   Continue metoprolol tartrate 100 mg in am and 50 mg in pm   Update BMP         2. Essential hypertension  Assessment & Plan:  BP acceptable   Continue lasix, metoprolol, diltiazem   Monitor vitals monthly at LT facility   3. Paroxysmal atrial fibrillation (HCC)  Assessment & Plan:  HR regular and controlled on exam   Continue Diltiazem and metoprolol for rate/rhythm control   Continue eliquis 5 mg bid   4. Gastroesophageal reflux disease without esophagitis  Assessment & Plan:  Denies any symptoms on exam   On eliquis for afib  Continue Carafate    5. Physical deconditioning  Assessment & Plan:  Multifactorial in setting of advancing age, overweight, Multiple chronic co morbidities- debility   Resides at SNF with 24/7 care   Continue restorative pt/ot   Able to get oob to chair with assist   Continue fall precautions   Encourage nutrition/hydration        All medications and routine orders were reviewed and updated as needed.    Chief Complaint     LT follow up visit     Past Medical and Surgica History      Past Medical History:   Diagnosis Date    Abnormal ECG     Last Assessed 9/29/2016    Anxiety     Last Assessed 6/08/2016    Arthritis     knees    Asthma     Last Assessed 11/06/2013    Atrial premature complex     Cataract, bilateral     Last Assessed 7/14/2016    Cataract, left eye     Both eyes. Had surgery on left.    COVID-19     Difficulty swallowing   "   Diverticulosis 9/25/2022    Dizziness     Fibromyalgia     Fibromyalgia, primary     Gastric reflux     Heart failure (HCC)     5/23/22 Pt reports had heart failure in the past    History of COVID-19     5/23/22 Pt reports having COVID in 2021.    History of transfusion     no reaction    [place] (hard of hearing)     Hyperlipidemia     Hypertension     Incontinence in female     5/23/22 Pt reports has incontinence -wears depends especially at night/riding in car    Irregular heart beat     5/23/22 Pt reports hx of A fib    Lyme disease     PONV (postoperative nausea and vomiting)     Premature ventricular contraction     Primary osteoarthritis of both knees     Last Assessed 7/14/2016    Rheumatic fever     5/23/22 Pt reports had hx of rheumatic fever as a child twice    Sore throat     Tuberculosis 1945     Past Surgical History:   Procedure Laterality Date    APPENDECTOMY      CARDIAC PACEMAKER PLACEMENT  2019    COLONOSCOPY      DENTAL SURGERY      extractions    HYSTERECTOMY      5/23/22 Pt reports had appendix out with hysterectomy and \"tightened up my bladder\"    IR ASPIRATION ONLY  9/30/2022    ORIF FEMUR FRACTURE Left 08/05/2021    Procedure: OPEN REDUCTION W/ INTERNAL FIXATION (ORIF) DISTAL FEMUR; INSERTION RETROGRADE NAIL;  Surgeon: Rita Harris MD;  [facility];  Service: Orthopedics    AK ARTHRP KNE CONDYLE&PLATU MEDIAL&LAT COMPARTMENTS Left 9/12/2022    Procedure: ARTHROPLASTY KNEE TOTAL;  Surgeon: Rita Harris MD;  [facility];  Service: Orthopedics    AK DILATION ESOPH UNGUIDED SOUND/BOUGIE 1/MULT PASS N/A 04/06/2016    Procedure: DILATATION ESOPHAGEAL;  Surgeon: Watson Turpin MD;  Location: [facility];  Service: Gastroenterology    AK EGD TRANSORAL BIOPSY SINGLE/MULTIPLE N/A 04/06/2016    Procedure: ESOPHAGOGASTRODUODENOSCOPY (EGD);  Surgeon: Watson Turpin MD;  [facility]: [facility];  Service: Gastroenterology    AK LATERAL RETINACULAR RELEASE OPEN Left 1/23/2023    " Procedure: RELEASE RETINACULAR, left knee and all associated procedures;  Surgeon: Rita Harris MD;  [facility];  Service: Orthopedics    WA REMOVAL IMPLANT DEEP Left 9/12/2022    Procedure: REMOVAL NAIL IM  FEMUR;  Surgeon: Rita Harris MD;  [facility];  Service: Orthopedics    WA XCAPSL CTRC RMVL INSJ IO LENS PROSTH W/O ECP Left 03/15/2016    Procedure: EXTRACTION EXTRACAPSULAR CATARACT PHACO INTRAOCULAR LENS (IOL);  Surgeon: Jeanette Beckman MD;  Location[facility];  Service: Ophthalmology    REPLACEMENT TOTAL KNEE Right     REPLACEMENT TOTAL KNEE      right     TONSILLECTOMY       Allergies   Allergen Reactions    Penicillins Hives     Itching and terrible hives    Sulfa Antibiotics Itching    B12 Folate [Folic Acid-Vit B6-Vit B12 - Food Allergy] Other (See Comments)     5/23/22 Pt reports no allergic reaction known     [place] Other (See Comments)     Unknown, 5/23 -pt is unaware of reaction     Lyrica [Pregabalin] Other (See Comments)     5/23/22 Pt reports doesn't remember reaction     Other Other (See Comments)     Black rubber 5/23/22 per allergy test - pt unaware of reaction    Cortisone Acetate [Cortisone] Itching and Rash     5/23/22 pt reports makes her violent     Doxycycline Hives and Itching    Nickel Other (See Comments)     Skin discoloration          History of Present Illness     Isi Ernst is a 79 year old female she is a long-term care resident of [facility] due to debility.  Patient has a past medical history including but not limited to  A. Fib, HTN, CHF, CKD, gout, fibromyalgia, arthritis, and ambulatory dysfunction.    The patient is seen today for a routine LTC follow up visit for management of acute and chronic conditions.    The patient was seen and examined at bedside in stable condition. She is alert and oriented x 3.  Patient is out of bed in her wheelchair, she is pleasant and smiling during exam. She offers no  complaints except that it takes 2 people to get oob, sometimes needs to use sit to stand.  Patient states her arthritis in her hands and knees is consistent but Tylenol and Voltaren help. Patient states she has a good appetite.  Patient denies any bowel or bladder issues.  Patient denies CP/SOB/N/V/D. Denies lightheadedness, dizziness, headaches, vision changes. No concerns from nursing staff.              The patient's allergies, past medical, surgical, social and family history were reviewed and unchanged.    Review of Systems     Review of Systems   Musculoskeletal:  Positive for arthralgias and gait problem.   All other systems reviewed and are negative.        Objective     Vitals:   Vitals:    11/20/24 1526   BP: 117/67   Pulse: 73   Resp: 18   Temp: (!) 97.3 °F (36.3 °C)   SpO2: 98%           Physical Exam  Vitals and nursing note reviewed.   Constitutional:       General: She is not in acute distress.     Appearance: Normal appearance.   HENT:      Head: Normocephalic and atraumatic.      Nose: No congestion or rhinorrhea.      Mouth/Throat:      Mouth: Mucous membranes are moist.   Eyes:      General: No scleral icterus.     Conjunctiva/sclera: Conjunctivae normal.      Pupils: Pupils are equal, round, and reactive to light.   Cardiovascular:      Rate and Rhythm: Normal rate and regular rhythm.      Pulses: Normal pulses.      Heart sounds: Normal heart sounds. No murmur heard.  Pulmonary:      Effort: Pulmonary effort is normal. No respiratory distress.      Breath sounds: Normal breath sounds. No wheezing, rhonchi or rales.   Abdominal:      General: Bowel sounds are normal. There is no distension.      Palpations: Abdomen is soft. There is no mass.      Tenderness: There is no abdominal tenderness.      Hernia: No hernia is present.      [facility]   Musculoskeletal:         General: No swelling or tenderness.   Lymphadenopathy:      Cervical: No cervical adenopathy.   Skin:     General: Skin is  warm and dry.      Coloration: Skin is not pale.      Findings: No rash.   Neurological:      General: No focal deficit present.      Mental Status: She is alert and oriented to person, place, and time. Mental status is at baseline.      Motor: Weakness present.      Gait: Gait abnormal.   Psychiatric:         Mood and Affect: Mood normal.         Behavior: Behavior normal.       Pertinent Laboratory/Diagnostic Studies:     Reviewed in facility chart      Current Medications   Medications reviewed and updated see facility MAR for details.      Current Outpatient Medications:     acetaminophen (TYLENOL) 325 mg tablet, Take 975 mg by mouth 3 (three) times a day, Disp: , Rfl:     albuterol (PROVENTIL HFA,VENTOLIN HFA) 90 mcg/act inhaler, INHALE 2 PUFFS BY MOUTH EVERY 6 HOURS AS NEEDED FOR WHEEZING, Disp: 3 Inhaler, Rfl: 0    allopurinol (ZYLOPRIM) 100 mg tablet, Take 1 tablet (100 mg total) by mouth daily, Disp: 90 tablet, Rfl: 3    apixaban (Eliquis) 5 mg, Take 1 tablet (5 mg total) by mouth 2 (two) times a day, Disp: 60 tablet, Rfl: 6    bisacodyl (DULCOLAX) 10 mg suppository, Insert 10 mg into the rectum daily as needed, Disp: , Rfl:     Calcium Polycarbophil (FIBER-LAX PO), Take 1 tablet by mouth daily at bedtime, Disp: , Rfl:     Diclofenac Sodium (VOLTAREN) 1 %, Apply 2 g topically 4 (four) times a day = to B/L knee, Disp: , Rfl:     diltiazem (CARDIZEM CD) 120 mg 24 hr capsule, Take 1 capsule (120 mg total) by mouth daily, Disp: 90 capsule, Rfl: 3    DULoxetine (CYMBALTA) 20 mg capsule, Take 20 mg by mouth daily at bedtime, Disp: , Rfl:     ezetimibe (ZETIA) 10 mg tablet, Take 1 tablet (10 mg total) by mouth daily, Disp: 90 tablet, Rfl: 1    furosemide (LASIX) 40 mg tablet, Take 40 mg by mouth in the morning, Disp: , Rfl:     Melatonin 10 MG TABS, Take by mouth Takes daily at night, Disp: , Rfl:     metoprolol tartrate (LOPRESSOR) 100 mg tablet, Take 1 tablet (100 mg total) by mouth daily in the early morning Do  "not start before January 7, 2023., Disp: , Rfl: 0    metoprolol tartrate (LOPRESSOR) 50 mg tablet, Take 1 tablet (50 mg total) by mouth daily at bedtime, Disp: , Rfl: 0    Multiple Vitamin (MULTIVITAMIN ADULT PO), Take by mouth in the morning, Disp: , Rfl:     polyethylene glycol (MIRALAX) 17 g packet, Take 17 g by mouth daily as needed, Disp: , Rfl:     senna-docusate sodium (SENOKOT S) 8.6-50 mg per tablet, Take 2 tablets by mouth 2 (two) times a day, Disp: , Rfl: 0    sucralfate (CARAFATE) 1 g tablet, Take 1 tablet (1 g total) by mouth 2 (two) times a day, Disp: 30 tablet, Rfl: 0    vitamin B-12 (VITAMIN B-12) 1,000 mcg tablet, Take 1 tablet (1,000 mcg total) by mouth daily, Disp: , Rfl: 0     Please note:  Voice-recognition software may have been used in the preparation of this document.  Occasional wrong word or \"sound-alike\" substitutions may have occurred due to the inherent limitations of voice recognition software.  Interpretation should be guided by context.         JERI Hart  11/20/2024  4:51 PM    "

## 2024-12-08 VITALS
TEMPERATURE: 97.3 F | HEART RATE: 73 BPM | DIASTOLIC BLOOD PRESSURE: 67 MMHG | WEIGHT: 238 LBS | OXYGEN SATURATION: 98 % | SYSTOLIC BLOOD PRESSURE: 117 MMHG | BODY MASS INDEX: 40.85 KG/M2 | RESPIRATION RATE: 18 BRPM

## 2024-12-08 NOTE — ASSESSMENT & PLAN NOTE
HR regular and controlled on exam   Continue Diltiazem and metoprolol for rate/rhythm control   Continue eliquis 5 mg bid

## 2024-12-08 NOTE — ASSESSMENT & PLAN NOTE
Multifactorial in setting of advancing age, overweight, Multiple chronic co morbidities- debility   Resides at SNF with 24/7 care   Continue restorative pt/ot   Able to get oob to chair with assist   Continue fall precautions   Encourage nutrition/hydration

## 2024-12-08 NOTE — ASSESSMENT & PLAN NOTE
Wt Readings from Last 3 Encounters:   11/20/24 108 kg (238 lb)   07/31/24 101 kg (223 lb)   04/25/24 100 kg (221 lb 8 oz)     Appears euvolemic on exam today  Denies any cardiopulmonary symptoms   Continue Lasix 40 mg daily   Continue metoprolol tartrate 100 mg in am and 50 mg in pm   Update BMP

## 2024-12-10 ENCOUNTER — TELEPHONE (OUTPATIENT)
Dept: OTHER | Facility: OTHER | Age: 79
End: 2024-12-10

## 2024-12-10 NOTE — TELEPHONE ENCOUNTER
Having issues with monitor and wanted to speak with someone at the office.      Green check light is on, center button and states to call service.      [phone] Arlen

## 2024-12-17 ENCOUNTER — TELEPHONE (OUTPATIENT)
Dept: CARDIOLOGY CLINIC | Facility: CLINIC | Age: 79
End: 2024-12-17

## 2024-12-17 NOTE — TELEPHONE ENCOUNTER
12/17/24 [facility] called, they can't find patient;s transmitter so we r/s her remote until next week. CR

## 2024-12-25 ENCOUNTER — TELEPHONE (OUTPATIENT)
Dept: OTHER | Facility: OTHER | Age: 79
End: 2024-12-25

## 2024-12-25 NOTE — TELEPHONE ENCOUNTER
Randee from [facility] called reporting pt tested positive for covid. She seems stable but is wheezing. She would like to get an order for a breathing treatment.    On call notified via epic chat

## 2024-12-26 ENCOUNTER — RESULTS FOLLOW-UP (OUTPATIENT)
Dept: CARDIOLOGY CLINIC | Facility: CLINIC | Age: 79
End: 2024-12-26

## 2024-12-26 ENCOUNTER — REMOTE DEVICE CLINIC VISIT (OUTPATIENT)
Dept: CARDIOLOGY CLINIC | Facility: CLINIC | Age: 79
End: 2024-12-26
Payer: COMMERCIAL

## 2024-12-26 DIAGNOSIS — I48.91 ATRIAL FIBRILLATION, UNSPECIFIED TYPE (HCC): ICD-10-CM

## 2024-12-26 DIAGNOSIS — I49.5 SSS (SICK SINUS SYNDROME) (HCC): Primary | ICD-10-CM

## 2024-12-26 PROCEDURE — 93296 REM INTERROG EVL PM/IDS: CPT | Performed by: INTERNAL MEDICINE

## 2024-12-26 PROCEDURE — 93294 REM INTERROG EVL PM/LDLS PM: CPT | Performed by: INTERNAL MEDICINE

## 2024-12-26 NOTE — PROGRESS NOTES
Results for orders placed or performed in visit on 12/26/24   Cardiac EP device report    Narrative    MDT DUAL CHAMBER PPM (AAIR-DDDR MODE)/ ACTIVE SYSTEM IS MRI CONDITIONAL  CARELINK TRANSMISSION: BATTERY VOLTAGE ADEQUATE (2.5 YRS). AP: 93.5%. : 3.1% (MVP-ON). ALL AVAILABLE LEAD PARAMETERS WITHIN NORMAL LIMITS. 1 TREATED AT/AF EPISODE W/ rATP (0:1~0%). AF BURDEN: 5.9%. PT TAKES ELIQUIS, METOPROLOL TART, CARDIZEMCD. EF: 65% (ECHO 9/29/22). NORMAL DEVICE FUNCTION. CH

## 2025-01-08 ENCOUNTER — NURSING HOME VISIT (OUTPATIENT)
Dept: GERIATRICS | Facility: OTHER | Age: 80
End: 2025-01-08
Payer: COMMERCIAL

## 2025-01-08 DIAGNOSIS — I10 ESSENTIAL HYPERTENSION: ICD-10-CM

## 2025-01-08 DIAGNOSIS — I50.32 CHRONIC HEART FAILURE WITH PRESERVED EJECTION FRACTION (HCC): Primary | ICD-10-CM

## 2025-01-08 DIAGNOSIS — Z98.890 S/P KNEE SURGERY: ICD-10-CM

## 2025-01-08 DIAGNOSIS — M1A.9XX1 CHRONIC GOUT WITH TOPHUS, UNSPECIFIED CAUSE, UNSPECIFIED SITE: ICD-10-CM

## 2025-01-08 DIAGNOSIS — I48.0 PAROXYSMAL ATRIAL FIBRILLATION (HCC): ICD-10-CM

## 2025-01-08 DIAGNOSIS — I49.5 TACHY-BRADY SYNDROME (HCC): ICD-10-CM

## 2025-01-08 DIAGNOSIS — F33.0 MILD EPISODE OF RECURRENT MAJOR DEPRESSIVE DISORDER (HCC): ICD-10-CM

## 2025-01-08 PROCEDURE — 99309 SBSQ NF CARE MODERATE MDM 30: CPT | Performed by: FAMILY MEDICINE

## 2025-01-08 NOTE — PROGRESS NOTES
[facility]  [address] [address]  [phone]  Facility: [facility] POS 32    NAME: Isi Ernst  AGE: 79 y.o. SEX: female [phone]  DATE OF ENCOUNTER: 1/8/2025    Assessment and Plan   Chronic heart failure with preserved ejection fraction (HCC)  Stable, no exacerbation  Euvolemic on exam  Continue Lasix 40 mg daily.   Continue metoprolol 100 mg in the a.m. and 50 mg at bedtime  Monitor weight, BMP  K normal 4.6 (12/20/24)    Essential hypertension  BP has been stable, at goal  Continue Lasix in the setting of CHF, metoprolol and diltiazem in the setting of A-fib  Monitor BP monthly    Paroxysmal atrial fibrillation (HCC)  Heart rate and rhythm regular upon exam  No complaints of CP, SOB, dizziness/lightheadedness  Continue diltiazem, metoprolol and AC with Eliquis 5 mg BID  No adverse bleeding reported  CBC 12/20 reviewed, stable    Tachy-smita syndrome (HCC)  S/p PPM  Continue metoprolol, diltiazem, Eliquis    Chronic gout with tophus  Continue allopurinol 100 mg daily  No acute gout reported recently.    Mild episode of recurrent major depressive disorder (HCC)  Continue cymbalta 20 mg daily    S/P knee surgery  With Hx left patella subluxation s/p surgery with Orthopedics, now with limited ROM in knee extension  PT referral placed today.     - Counseling Documentation: patient was counseled regarding: instructions for management and patient and family education    Chief Complaint   Routine Long term follow-up visit.  History of Present Illness   The patient is seen in the room during routine LTC follow up visit. She is sitting in recliner, c/o spasm and limitation of movement in LLE. She does not do exercise or go to group sessions provided at [facility]. She reports good appetite and gaining weight. Sleeping is okay. Has BM every day or every 2 days.  No concern from nursing staff.    The following portions of the patient's history were reviewed and updated as appropriate:  allergies, current medications, past family history, past medical history, past social history, past surgical history and problem list.    Review of Systems     Review of Systems   Constitutional:  Negative for appetite change.   HENT:  Negative for trouble swallowing.    Respiratory:  Negative for cough and shortness of breath.    Cardiovascular:  Negative for chest pain and palpitations.   Gastrointestinal:  Negative for abdominal pain and constipation.   Genitourinary:  Negative for dysuria.   Musculoskeletal:  Positive for gait problem (Ambulates with wheelchair). Negative for arthralgias.   Skin:  Negative for color change.   Neurological:  Negative for headaches.   Hematological:  Does not bruise/bleed easily.   Psychiatric/Behavioral:  Negative for sleep disturbance.    All other systems reviewed and are negative.      Active Problem List     Patient Active Problem List   Diagnosis    Tachy-smita syndrome (HCC)    Essential hypertension    Pacemaker    Degenerative lumbar spinal stenosis    Fibromyalgia    GERD (gastroesophageal reflux disease)    Lumbar degenerative disc disease    Psoriasis    Thyroid nodule    Urinary incontinence    Anxiety    Tremor    Age related osteoporosis    Vitamin D insufficiency    Arthritis    Ambulatory dysfunction    Chronic gout with tophus    Mild episode of recurrent major depressive disorder (HCC)    Paroxysmal atrial fibrillation (HCC)    Stage 3a chronic kidney disease (HCC)    Aortic regurgitation    Physical deconditioning    Chronic heart failure with preserved ejection fraction (HCC)    S/P knee surgery    Left wrist pain    Bilateral carpal tunnel syndrome    Hypertensive heart and renal disease with congestive heart failure (HCC)       Objective     Vitals: EHR at facility reviewed, stable.  Nursing note reviewed.   General: no acute distress. Obese  HENT:      Head: Normocephalic.      Nose: Nose normal.      Mouth: Mucous membranes are moist.   Eyes:       Right  eye: No discharge.         Left eye: No discharge.      Conjunctivae normal.   Cardiovascular:      Normal rate and regular rhythm. No murmur heard.  Pulmonary:      Pulmonary effort is normal. No wheezing, rhonchi or rales.   Abdominal:      Bowel sounds are normal. There is no distension.      Abdomen is soft. There is no abdominal tenderness.   Musculoskeletal:      Right lower leg: No edema.      Left lower leg: No edema. Limited knee extension in LLE.  Skin:     General: Skin is warm.   Neurological: alert.      Oriented to person, place, not time. Cooperative, follow commands      Behavior: normal.     Pertinent Laboratory/Diagnostic Studies:  Refer to facility chart.  CMP 12/20/24 showed normal potassium 4.6, sodium 139, creatinine 0.8, EGFR 74  CBC 12/20/24 with normal WBC 5.4, hemoglobin 12.2, platelets 267  Current Medications   Medications reviewed and updated in facility chart.

## 2025-01-12 ENCOUNTER — TELEPHONE (OUTPATIENT)
Dept: OTHER | Facility: OTHER | Age: 80
End: 2025-01-12

## 2025-01-12 PROBLEM — E78.5 HLD (HYPERLIPIDEMIA): Status: RESOLVED | Noted: 2020-11-10 | Resolved: 2025-01-12

## 2025-01-13 NOTE — ASSESSMENT & PLAN NOTE
Heart rate and rhythm regular upon exam  No complaints of CP, SOB, dizziness/lightheadedness  Continue diltiazem, metoprolol and AC with Eliquis 5 mg BID  No adverse bleeding reported  CBC 12/20 reviewed, stable

## 2025-01-13 NOTE — ASSESSMENT & PLAN NOTE
With Hx left patella subluxation s/p surgery with Orthopedics, now with limited ROM in knee extension  PT referral placed today.

## 2025-01-13 NOTE — TELEPHONE ENCOUNTER
Nursing home or Independent living name: [facility]   Who is calling: Vianey FERNANDEZ  Callback number: [phone]  Symptoms: shortness of breath and wheezing  Did you page oncall or place in triage hub: Sent [facility] to oncall provider

## 2025-01-13 NOTE — ASSESSMENT & PLAN NOTE
Stable, no exacerbation  Euvolemic on exam  Continue Lasix 40 mg daily.   Continue metoprolol 100 mg in the a.m. and 50 mg at bedtime  Monitor weight, BMP  K normal 4.6 (12/20/24)

## 2025-01-13 NOTE — ASSESSMENT & PLAN NOTE
BP has been stable, at goal  Continue Lasix in the setting of CHF, metoprolol and diltiazem in the setting of A-fib  Monitor BP monthly

## 2025-02-27 ENCOUNTER — NURSING HOME VISIT (OUTPATIENT)
Dept: GERIATRICS | Facility: OTHER | Age: 80
End: 2025-02-27
Payer: COMMERCIAL

## 2025-02-27 VITALS
TEMPERATURE: 97.5 F | WEIGHT: 240 LBS | BODY MASS INDEX: 41.2 KG/M2 | DIASTOLIC BLOOD PRESSURE: 74 MMHG | HEART RATE: 74 BPM | SYSTOLIC BLOOD PRESSURE: 128 MMHG | OXYGEN SATURATION: 95 % | RESPIRATION RATE: 18 BRPM

## 2025-02-27 DIAGNOSIS — M1A.9XX1 CHRONIC GOUT WITH TOPHUS, UNSPECIFIED CAUSE, UNSPECIFIED SITE: ICD-10-CM

## 2025-02-27 DIAGNOSIS — Z87.09 HISTORY OF REACTIVE AIRWAY DISEASE: Primary | ICD-10-CM

## 2025-02-27 DIAGNOSIS — F33.0 MILD EPISODE OF RECURRENT MAJOR DEPRESSIVE DISORDER (HCC): ICD-10-CM

## 2025-02-27 DIAGNOSIS — I48.0 PAROXYSMAL ATRIAL FIBRILLATION (HCC): ICD-10-CM

## 2025-02-27 DIAGNOSIS — I10 ESSENTIAL HYPERTENSION: ICD-10-CM

## 2025-02-27 DIAGNOSIS — I50.32 CHRONIC HEART FAILURE WITH PRESERVED EJECTION FRACTION (HCC): ICD-10-CM

## 2025-02-27 PROCEDURE — 99309 SBSQ NF CARE MODERATE MDM 30: CPT | Performed by: NURSE PRACTITIONER

## 2025-02-27 NOTE — ASSESSMENT & PLAN NOTE
Does not appear in exacerbation  No formal PFTs upon review of records   Does have hx of CHF  Continue PRN DuoNebs for wheezing or SOB

## 2025-02-27 NOTE — ASSESSMENT & PLAN NOTE
Wt Readings from Last 3 Encounters:   02/27/25 109 kg (240 lb)   11/20/24 108 kg (238 lb)   07/31/24 101 kg (223 lb)     Appears euvolemic on exam today  Weights reviewed- stable   Denies any cardiopulmonary symptoms   Continue Lasix 40 mg daily   Continue metoprolol tartrate 100 mg in am and 50 mg in pm   Monitor BMPs every 6 months

## 2025-02-27 NOTE — ASSESSMENT & PLAN NOTE
Mood stable on exam   Likes to stay in her room mostly   Smiling and pleasant on exam   No concerns from staff at this time  Continue Cymbalta 20 mg daily

## 2025-02-27 NOTE — ASSESSMENT & PLAN NOTE
BP acceptable   Continue lasix 40 mg daily , metoprolol tartrate 100 mg in AM and 50 mg in PM,  diltiazem  mg daily   Monitor vitals monthly at LTC facility

## 2025-02-27 NOTE — PROGRESS NOTES
[facility]  [address] [address]  [phone]  FACILITY: [facility]  Code 32 (LTC)        NAME: Isi Ernst  AGE: 79 y.o. SEX: female CODE STATUS: CPR    DATE OF ENCOUNTER: 2/27/2025    Assessment and Plan     1. History of reactive airway disease  Assessment & Plan:  Does not appear in exacerbation  No formal PFTs upon review of records   Does have hx of CHF  Continue PRN DuoNebs for wheezing or SOB  2. Paroxysmal atrial fibrillation (HCC)  Assessment & Plan:  HR regular and controlled on exam   Continue Diltiazem and metoprolol for rate/rhythm control   Continue eliquis 5 mg bid   3. Essential hypertension  Assessment & Plan:  BP acceptable   Continue lasix 40 mg daily , metoprolol tartrate 100 mg in AM and 50 mg in PM,  diltiazem  mg daily   Monitor vitals monthly at [facility] facility   4. Chronic heart failure with preserved ejection fraction (HCC)  Assessment & Plan:  Wt Readings from Last 3 Encounters:   02/27/25 109 kg (240 lb)   11/20/24 108 kg (238 lb)   07/31/24 101 kg (223 lb)     Appears euvolemic on exam today  Weights reviewed- stable   Denies any cardiopulmonary symptoms   Continue Lasix 40 mg daily   Continue metoprolol tartrate 100 mg in am and 50 mg in pm   Monitor BMPs every 6 months         5. Chronic gout with tophus, unspecified cause, unspecified site  Assessment & Plan:  Stable   Continue Allopurinol 100 mg daily   6. Mild episode of recurrent major depressive disorder (HCC)  Assessment & Plan:  Mood stable on exam   Likes to stay in her room mostly   Smiling and pleasant on exam   No concerns from staff at this time  Continue Cymbalta 20 mg daily        All medications and routine orders were reviewed and updated as needed.    Chief Complaint     LT follow up visit     Past Medical and Surgica History      Past Medical History:   Diagnosis Date    Abnormal ECG     Last Assessed 9/29/2016    Anxiety     Last Assessed 6/08/2016    Arthritis     knees  "   Asthma     Last Assessed 11/06/2013    Atrial premature complex     Cataract, bilateral     Last Assessed 7/14/2016    Cataract, left eye     Both eyes. Had surgery on left.    COVID-19     Difficulty swallowing     Diverticulosis 9/25/2022    Dizziness     Fibromyalgia     Fibromyalgia, primary     Gastric reflux     Heart failure (HCC)     5/23/22 Pt reports had heart failure in the past    History of COVID-19     5/23/22 Pt reports having COVID in 2021.    History of transfusion     no reaction    [place] (hard of hearing)     Hyperlipidemia     Hypertension     Incontinence in female     5/23/22 Pt reports has incontinence -wears depends especially at night/riding in car    Irregular heart beat     5/23/22 Pt reports hx of A fib    Lyme disease     PONV (postoperative nausea and vomiting)     Premature ventricular contraction     Primary osteoarthritis of both knees     Last Assessed 7/14/2016    Rheumatic fever     5/23/22 Pt reports had hx of rheumatic fever as a child twice    Sore throat     Tuberculosis 1947     Past Surgical History:   Procedure Laterality Date    APPENDECTOMY      CARDIAC PACEMAKER PLACEMENT  2019    COLONOSCOPY      DENTAL SURGERY      extractions    HYSTERECTOMY      5/23/22 Pt reports had appendix out with hysterectomy and \"tightened up my bladder\"    IR ASPIRATION ONLY  9/30/2022    ORIF FEMUR FRACTURE Left 08/05/2021    Procedure: OPEN REDUCTION W/ INTERNAL FIXATION (ORIF) DISTAL FEMUR; INSERTION RETROGRADE NAIL;  Surgeon: Rita Harris MD;  [facility]: [facility];  Service: Orthopedics    MN ARTHRP KNE CONDYLE&PLATU MEDIAL&LAT COMPARTMENTS Left 9/12/2022    Procedure: ARTHROPLASTY KNEE TOTAL;  Surgeon: Rita Harris MD;  Location[facility];  Service: Orthopedics    MN DILATION ESOPH UNGUIDED SOUND/BOUGIE 1/MULT PASS N/A 04/06/2016    Procedure: DILATATION ESOPHAGEAL;  Surgeon: Watson Turpin MD;  Location: [facility];  Service: Gastroenterology    MN EGD TRANSORAL BIOPSY " SINGLE/MULTIPLE N/A 04/06/2016    Procedure: ESOPHAGOGASTRODUODENOSCOPY (EGD);  Surgeon: Watson Turpin MD;  Location: [facility];  Service: Gastroenterology    TN LATERAL RETINACULAR RELEASE OPEN Left 1/23/2023    Procedure: RELEASE RETINACULAR, left knee and all associated procedures;  Surgeon: Rita Harris MD;  [facility];  Service: Orthopedics    TN REMOVAL IMPLANT DEEP Left 9/12/2022    Procedure: REMOVAL NAIL IM  FEMUR;  Surgeon: Rita Harris MD;  [facility];  Service: Orthopedics    TN XCAPSL CTRC RMVL INSJ IO LENS PROSTH W/O ECP Left 03/15/2016    Procedure: EXTRACTION EXTRACAPSULAR CATARACT PHACO INTRAOCULAR LENS (IOL);  Surgeon: Jeanette Beckman MD;  Location: [facility];  Service: Ophthalmology    REPLACEMENT TOTAL KNEE Right     REPLACEMENT TOTAL KNEE      right     TONSILLECTOMY       Allergies   Allergen Reactions    Penicillins Hives     Itching and terrible hives    Sulfa Antibiotics Itching    B12 Folate [Folic Acid-Vit B6-Vit B12 - Food Allergy] Other (See Comments)     5/23/22 Pt reports no allergic reaction known     [place] Other (See Comments)     Unknown, 5/23 -pt is unaware of reaction     Lyrica [Pregabalin] Other (See Comments)     5/23/22 Pt reports doesn't remember reaction     Other Other (See Comments)     Black rubber 5/23/22 per allergy test - pt unaware of reaction    Cortisone Acetate [Cortisone] Itching and Rash     5/23/22 pt reports makes her violent     Doxycycline Hives and Itching    Nickel Other (See Comments)     Skin discoloration          History of Present Illness     Isi Ernst is a 79 year old female she is a long-term care resident of [facility] due to debility.  Patient has a past medical history including but not limited to  A. Fib, HTN, CHF, CKD, gout, fibromyalgia, arthritis, and ambulatory dysfunction.    The patient is seen today for a routine LTC follow up visit for management of acute and  chronic conditions.    Patient is also managed/followed by JERI HUBER at facility.     The patient was seen and examined at bedside in stable condition. She is alert and oriented x 3.  Patient is out of bed in her wheelchair, she is pleasant and smiling during exam. She offers no complaints except that it takes 2 people to get oob, sometimes needs to use sit to stand.  Patient states her arthritis in her hands and knees is consistent but Tylenol and Voltaren help. Patient states she has a good appetite.  Patient denies any bowel or bladder issues.  Patient denies CP/SOB/N/V/D. Denies lightheadedness, dizziness, headaches, vision changes. No concerns from nursing staff.              The patient's allergies, past medical, surgical, social and family history were reviewed and unchanged.    Review of Systems     Review of Systems   Musculoskeletal:  Positive for arthralgias and gait problem.   All other systems reviewed and are negative.        Objective     Vitals:   Vitals:    02/27/25 1505   BP: 128/74   Pulse: 74   Resp: 18   Temp: 97.5 °F (36.4 °C)   SpO2: 95%           Physical Exam  Vitals and nursing note reviewed.   Constitutional:       General: She is not in acute distress.     Appearance: Normal appearance.   HENT:      Head: Normocephalic and atraumatic.      Nose: No congestion or rhinorrhea.      Mouth/Throat:      Mouth: Mucous membranes are moist.   Eyes:      General: No scleral icterus.     Conjunctiva/sclera: Conjunctivae normal.      Pupils: Pupils are equal, round, and reactive to light.   Cardiovascular:      Rate and Rhythm: Normal rate and regular rhythm.      Pulses: Normal pulses.      Heart sounds: Normal heart sounds. No murmur heard.  Pulmonary:      Effort: Pulmonary effort is normal. No respiratory distress.      Breath sounds: Normal breath sounds. No wheezing, rhonchi or rales.   Abdominal:      General: Bowel sounds are normal. There is no distension.      Palpations: Abdomen is soft.  There is no mass.      Tenderness: There is no abdominal tenderness.      Hernia: No hernia is present.      Comments: PPM LCW   Musculoskeletal:         General: No swelling or tenderness.   Lymphadenopathy:      Cervical: No cervical adenopathy.   Skin:     General: Skin is warm and dry.      Coloration: Skin is not pale.      Findings: No rash.   Neurological:      General: No focal deficit present.      Mental Status: She is alert and oriented to person, place, and time. Mental status is at baseline.      Motor: Weakness present.      Gait: Gait abnormal.   Psychiatric:         Mood and Affect: Mood normal.         Behavior: Behavior normal.         Pertinent Laboratory/Diagnostic Studies:     Reviewed in facility chart      Current Medications   Medications reviewed and updated see facility MAR for details.      Current Outpatient Medications:     acetaminophen (TYLENOL) 325 mg tablet, Take 975 mg by mouth 3 (three) times a day, Disp: , Rfl:     albuterol (PROVENTIL HFA,VENTOLIN HFA) 90 mcg/act inhaler, INHALE 2 PUFFS BY MOUTH EVERY 6 HOURS AS NEEDED FOR WHEEZING, Disp: 3 Inhaler, Rfl: 0    allopurinol (ZYLOPRIM) 100 mg tablet, Take 1 tablet (100 mg total) by mouth daily, Disp: 90 tablet, Rfl: 3    apixaban (Eliquis) 5 mg, Take 1 tablet (5 mg total) by mouth 2 (two) times a day, Disp: 60 tablet, Rfl: 6    bisacodyl (DULCOLAX) 10 mg suppository, Insert 10 mg into the rectum daily as needed, Disp: , Rfl:     Calcium Polycarbophil (FIBER-LAX PO), Take 1 tablet by mouth daily at bedtime, Disp: , Rfl:     Diclofenac Sodium (VOLTAREN) 1 %, Apply 2 g topically 4 (four) times a day = to B/L knee, Disp: , Rfl:     diltiazem (CARDIZEM CD) 120 mg 24 hr capsule, Take 1 capsule (120 mg total) by mouth daily, Disp: 90 capsule, Rfl: 3    DULoxetine (CYMBALTA) 20 mg capsule, Take 20 mg by mouth daily at bedtime, Disp: , Rfl:     ezetimibe (ZETIA) 10 mg tablet, Take 1 tablet (10 mg total) by mouth daily, Disp: 90 tablet, Rfl:  "1    furosemide (LASIX) 40 mg tablet, Take 40 mg by mouth in the morning, Disp: , Rfl:     Melatonin 10 MG TABS, Take by mouth Takes daily at night, Disp: , Rfl:     metoprolol tartrate (LOPRESSOR) 100 mg tablet, Take 1 tablet (100 mg total) by mouth daily in the early morning Do not start before January 7, 2023., Disp: , Rfl: 0    metoprolol tartrate (LOPRESSOR) 50 mg tablet, Take 1 tablet (50 mg total) by mouth daily at bedtime, Disp: , Rfl: 0    Multiple Vitamin (MULTIVITAMIN ADULT PO), Take by mouth in the morning, Disp: , Rfl:     polyethylene glycol (MIRALAX) 17 g packet, Take 17 g by mouth daily as needed, Disp: , Rfl:     senna-docusate sodium (SENOKOT S) 8.6-50 mg per tablet, Take 2 tablets by mouth 2 (two) times a day, Disp: , Rfl: 0    sucralfate (CARAFATE) 1 g tablet, Take 1 tablet (1 g total) by mouth 2 (two) times a day, Disp: 30 tablet, Rfl: 0    vitamin B-12 (VITAMIN B-12) 1,000 mcg tablet, Take 1 tablet (1,000 mcg total) by mouth daily, Disp: , Rfl: 0     Please note:  Voice-recognition software may have been used in the preparation of this document.  Occasional wrong word or \"sound-alike\" substitutions may have occurred due to the inherent limitations of voice recognition software.  Interpretation should be guided by context.         JERI Hart  2/27/2025  4:06 PM    "

## 2025-03-11 ENCOUNTER — IN-CLINIC DEVICE VISIT (OUTPATIENT)
Dept: CARDIOLOGY CLINIC | Facility: CLINIC | Age: 80
End: 2025-03-11
Payer: COMMERCIAL

## 2025-03-11 DIAGNOSIS — Z95.0 PRESENCE OF PERMANENT CARDIAC PACEMAKER: Primary | ICD-10-CM

## 2025-03-11 PROCEDURE — 93280 PM DEVICE PROGR EVAL DUAL: CPT | Performed by: INTERNAL MEDICINE

## 2025-03-11 NOTE — PROGRESS NOTES
Results for orders placed or performed in visit on 03/11/25   Cardiac EP device report    Narrative    MDT DUAL CHAMBER PPM (AAIR-DDDR MODE)/ ACTIVE SYSTEM IS MRI CONDITIONAL  DEVICE INTERROGATED IN THE [place] OFFICE: BATTERY VOLTAGE ADEQUATE (2 YR). AP 83.9%  9.7% (AAIR-DDDR 70 PPM). ALL AVAILABLE LEAD PARAMETERS WITHIN NORMAL LIMITS. SINCE 12/26/24 REMOTE: 3 AT/AF EPISODES TREATED WITH REACTIVE ATP (20.8% TERMINATION) - MAX EPISODE DURATION >23:59:59 HRS. 2 AT/AF MONITOR EPISODES W/MAX DURATION 01:19 MINS. AT/AF BURDEN 14.9%. PATIENT TAKING ELIQUIS, DILTIAZEM, METOPROLOL TART. NO PROGRAMMING CHANGES MADE TO DEVICE PARAMETERS. NORMAL DEVICE FUNCTION.   ES

## 2025-03-13 ENCOUNTER — RESULTS FOLLOW-UP (OUTPATIENT)
Dept: NON INVASIVE DIAGNOSTICS | Facility: HOSPITAL | Age: 80
End: 2025-03-13

## 2025-03-14 ENCOUNTER — OFFICE VISIT (OUTPATIENT)
Age: 80
End: 2025-03-14
Payer: COMMERCIAL

## 2025-03-14 VITALS
HEART RATE: 73 BPM | HEIGHT: 64 IN | TEMPERATURE: 96.9 F | SYSTOLIC BLOOD PRESSURE: 132 MMHG | OXYGEN SATURATION: 96 % | DIASTOLIC BLOOD PRESSURE: 78 MMHG | BODY MASS INDEX: 41.2 KG/M2

## 2025-03-14 DIAGNOSIS — I35.1 NONRHEUMATIC AORTIC VALVE INSUFFICIENCY: Primary | ICD-10-CM

## 2025-03-14 DIAGNOSIS — I50.32 CHRONIC HEART FAILURE WITH PRESERVED EJECTION FRACTION (HCC): ICD-10-CM

## 2025-03-14 DIAGNOSIS — R53.81 PHYSICAL DECONDITIONING: ICD-10-CM

## 2025-03-14 DIAGNOSIS — I10 ESSENTIAL HYPERTENSION: ICD-10-CM

## 2025-03-14 DIAGNOSIS — I48.0 PAROXYSMAL ATRIAL FIBRILLATION (HCC): ICD-10-CM

## 2025-03-14 DIAGNOSIS — N18.31 STAGE 3A CHRONIC KIDNEY DISEASE (HCC): ICD-10-CM

## 2025-03-14 DIAGNOSIS — N39.41 URGE INCONTINENCE OF URINE: ICD-10-CM

## 2025-03-14 PROCEDURE — G0439 PPPS, SUBSEQ VISIT: HCPCS | Performed by: INTERNAL MEDICINE

## 2025-03-14 PROCEDURE — 99215 OFFICE O/P EST HI 40 MIN: CPT | Performed by: INTERNAL MEDICINE

## 2025-03-14 NOTE — ASSESSMENT & PLAN NOTE
Current assessment of blood pressure today indicates a reading of 130/78 recommend continuation of diltiazem extended release 120 mg daily and metoprolol 100 mg daily in the morning with 50 mg daily in the evening

## 2025-03-14 NOTE — ASSESSMENT & PLAN NOTE
Wt Readings from Last 3 Encounters:   02/27/25 109 kg (240 lb)   11/20/24 108 kg (238 lb)   07/31/24 101 kg (223 lb)     This patient has chronic congestive heart failure managed with furosemide 40 mg daily no indication of pulmonary edema on today's exam recommend continuation of current dosing of 40 mg daily remote of Lasix and avoidance of high salt foods

## 2025-03-14 NOTE — ASSESSMENT & PLAN NOTE
Heart murmur consistent with aortic regurgitation patient is asymptomatic at this time recommend surveillance

## 2025-03-14 NOTE — ASSESSMENT & PLAN NOTE
Lab Results   Component Value Date    EGFR 58 01/25/2023    EGFR 70 01/24/2023    EGFR 60 01/06/2023    CREATININE 0.93 01/25/2023    CREATININE 0.80 01/24/2023    CREATININE 0.91 01/06/2023   Most recent blood work reviewed is consistent with chronic stage III kidney disease avoid nonsteroidal anti-inflammatories if possible and maintain adequate hydration on a daily basis is advised along with blood pressure control

## 2025-03-14 NOTE — ASSESSMENT & PLAN NOTE
Chronic urinary in continence reviewed with the patient currently using depends type garments to minimize skin irritation

## 2025-03-14 NOTE — ASSESSMENT & PLAN NOTE
Today in a regular sinus rhythm patient denies chest pain or palpitations does have a history of paroxysmal A-fib and continues on anticoagulation with Eliquis 5 mg twice a day for stroke risk reduction

## 2025-03-14 NOTE — PROGRESS NOTES
Name: Isi Ernst      : 1945      MRN: [id number]  Encounter Provider: Juan Davidson MD  Encounter Date: 3/14/2025   Encounter department: [facility] INTERNAL MEDICINE    Assessment & Plan  Nonrheumatic aortic valve insufficiency  Heart murmur consistent with aortic regurgitation patient is asymptomatic at this time recommend surveillance         Chronic heart failure with preserved ejection fraction (HCC)  Wt Readings from Last 3 Encounters:   25 109 kg (240 lb)   24 108 kg (238 lb)   24 101 kg (223 lb)     This patient has chronic congestive heart failure managed with furosemide 40 mg daily no indication of pulmonary edema on today's exam recommend continuation of current dosing of 40 mg daily remote of Lasix and avoidance of high salt foods               Essential hypertension  Current assessment of blood pressure today indicates a reading of 130/78 recommend continuation of diltiazem extended release 120 mg daily and metoprolol 100 mg daily in the morning with 50 mg daily in the evening         Paroxysmal atrial fibrillation (HCC)  Today in a regular sinus rhythm patient denies chest pain or palpitations does have a history of paroxysmal A-fib and continues on anticoagulation with Eliquis 5 mg twice a day for stroke risk reduction         Stage 3a chronic kidney disease (HCC)  Lab Results   Component Value Date    EGFR 58 2023    EGFR 70 2023    EGFR 60 2023    CREATININE 0.93 2023    CREATININE 0.80 2023    CREATININE 0.91 2023   Most recent blood work reviewed is consistent with chronic stage III kidney disease avoid nonsteroidal anti-inflammatories if possible and maintain adequate hydration on a daily basis is advised along with blood pressure control         Urge incontinence of urine  Chronic urinary in continence reviewed with the patient currently using depends type garments to minimize skin irritation         Physical  deconditioning  Physical deconditioning and ambulatory dysfunction patient would like to try to get back on her feet eventually recommend PT OT evaluation and treatment            Preventive health issues were discussed with patient, and age appropriate screening tests were ordered as noted in patient's After Visit Summary. Personalized health advice and appropriate referrals for health education or preventive services given if needed, as noted in patient's After Visit Summary.    History of Present Illness     This 79-year-old female patient presents to our office today from a local nursing home [facility] in [place].  She has been living there for some time.  We have seen this patient in the past last visit with us was approximately 3 years ago.  She subsequently went through several surgeries since that time along with rehab and now skilled nursing placement.  She is followed by geriatric services through [facility].  I indicated to her that do not make rounds at skilled nursing facilities and I believe she should continue her regular follow-up with us geriatric services.  We did provide the patient today with a general physical examination and review of annual wellness questionnaire.    Patient apparently has been wheelchair-bound after orthopedic surgery.  Indicates that she requires the assistance of 2 people to stand up and pivot.  Continues to have problems with morbid obesity.  She does have some mild depression symptoms from her current long-term medical problems and living situation.    She denies any recent chest pain shortness of breath or palpitations of her heart.  She indicates that she has reasonably good bowel function but has chronic incontinence of her bladder.  During today's visit with the patient we have reviewed her medication list provided from her nursing home.       Patient Care Team:  Nicol Collins MD as PCP - General (Family Medicine)  Juan Davidson,  MD Mendel Castellano, MD Jona Valentine MD (Pain Medicine)    Review of Systems   Constitutional:  Positive for fatigue.   Genitourinary:         Urinary incontinence   Musculoskeletal:  Positive for gait problem.   Neurological:  Positive for weakness.   All other systems reviewed and are negative.    Medical History Reviewed by provider this encounter:  Meds  Problems       Annual Wellness Visit Questionnaire   Isi is here for her Subsequent Wellness visit. Last Medicare Wellness visit information reviewed, patient interviewed, no change since last AWV.     Health Risk Assessment:   Patient rates overall health as fair. Patient feels that their physical health rating is slightly worse. Patient is dissatisfied with their life. Eyesight was rated as same. Hearing was rated as same. Patient feels that their emotional and mental health rating is same. Patients states they are never, rarely angry. Patient states they are sometimes unusually tired/fatigued. Pain experienced in the last 7 days has been some. Patient's pain rating has been 6/10. Patient states that she has experienced no weight loss or gain in last 6 months.     Depression Screening:   PHQ-9 Score: 4      Fall Risk Screening:   In the past year, patient has experienced: no history of falling in past year      Urinary Incontinence Screening:   Patient has leaked urine accidently in the last six months.     Home Safety:  Patient has trouble with stairs inside or outside of their home. Patient has working smoke alarms and has working carbon monoxide detector. Home safety hazards include: none.     Nutrition:   Current diet is Regular.     Medications:   Patient is currently taking over-the-counter supplements. OTC medications include: see medication list. Patient is not able to manage medications.     Activities of Daily Living (ADLs)/Instrumental Activities of Daily Living (IADLs):   Walk and transfer into and out of bed and chair?:  "No  Dress and groom yourself?: No    Bathe or shower yourself?: No    Feed yourself? Yes  Do your laundry/housekeeping?: No  Manage your money, pay your bills and track your expenses?: No  Make your own meals?: No    Do your own shopping?: No    Previous Hospitalizations:   Any hospitalizations or ED visits within the last 12 months?: No      Advance Care Planning:   Living will: Yes    Durable POA for healthcare: Yes    Advanced directive: Yes      PREVENTIVE SCREENINGS      Cardiovascular Screening:    General: Screening Current      Cervical Cancer Screening:    General: Screening Not Indicated      Osteoporosis Screening:    General: Screening Not Indicated and History Osteoporosis      Lung Cancer Screening:     General: Screening Not Indicated    Screening, Brief Intervention, and Referral to Treatment (SBIRT)     Screening      Single Item Drug Screening:  How often have you used an illegal drug (including marijuana) or a prescription medication for non-medical reasons in the past year? never    Single Item Drug Screen Score: 0  Interpretation: Negative screen for possible drug use disorder    Social Drivers of Health     Food Insecurity: No Food Insecurity (3/14/2025)    Hunger Vital Sign     Worried About Running Out of Food in the Last Year: Never true     Ran Out of Food in the Last Year: Never true   Transportation Needs: No Transportation Needs (3/14/2025)    PRAPARE - Transportation     Lack of Transportation (Medical): No     Lack of Transportation (Non-Medical): No   Housing Stability: Low Risk  (3/14/2025)    Housing Stability Vital Sign     Unable to Pay for Housing in the Last Year: No     Number of Times Moved in the Last Year: 0     Homeless in the Last Year: No   Utilities: Not At Risk (3/14/2025)    [facility] Utilities     Threatened with loss of utilities: No     No results found.    Objective   /78   Pulse 73   Temp (!) 96.9 °F (36.1 °C) (Tympanic)   Ht 5' 4\" (1.626 m)   SpO2 96%   BMI " 41.20 kg/m²     Physical Exam  Vitals and nursing note reviewed.   Constitutional:       General: She is not in acute distress.     Appearance: She is well-developed.   HENT:      Head: Normocephalic and atraumatic.      Right Ear: Tympanic membrane, ear canal and external ear normal.      Left Ear: Tympanic membrane, ear canal and external ear normal.      Nose: Nose normal.      Mouth/Throat:      Mouth: Mucous membranes are moist.      Pharynx: Oropharynx is clear.   Eyes:      Conjunctiva/sclera: Conjunctivae normal.   Neck:      Vascular: No carotid bruit.   Cardiovascular:      Rate and Rhythm: Normal rate and regular rhythm.      Heart sounds: Murmur heard.   Pulmonary:      Effort: Pulmonary effort is normal. No respiratory distress.      Breath sounds: Normal breath sounds. No wheezing, rhonchi or rales.   Abdominal:      General: Bowel sounds are normal. There is no distension.      Palpations: Abdomen is soft. There is no mass.      Tenderness: There is no abdominal tenderness. There is no guarding.   Musculoskeletal:      Cervical back: Normal range of motion and neck supple. No rigidity or tenderness.      Right lower leg: Edema present.      Left lower leg: Edema present.   Lymphadenopathy:      Cervical: No cervical adenopathy.   Skin:     General: Skin is warm and dry.      Capillary Refill: Capillary refill takes less than 2 seconds.   Neurological:      Mental Status: She is alert. Mental status is at baseline.   Psychiatric:         Mood and Affect: Mood normal.         Behavior: Behavior normal.         Thought Content: Thought content normal.         Judgment: Judgment normal.

## 2025-03-14 NOTE — ASSESSMENT & PLAN NOTE
Physical deconditioning and ambulatory dysfunction patient would like to try to get back on her feet eventually recommend PT OT evaluation and treatment

## 2025-04-09 ENCOUNTER — NURSING HOME VISIT (OUTPATIENT)
Dept: GERIATRICS | Facility: OTHER | Age: 80
End: 2025-04-09
Payer: COMMERCIAL

## 2025-04-09 VITALS
DIASTOLIC BLOOD PRESSURE: 77 MMHG | BODY MASS INDEX: 40.34 KG/M2 | HEART RATE: 70 BPM | WEIGHT: 235 LBS | SYSTOLIC BLOOD PRESSURE: 160 MMHG | RESPIRATION RATE: 18 BRPM | TEMPERATURE: 97.9 F | OXYGEN SATURATION: 96 %

## 2025-04-09 DIAGNOSIS — E78.2 MIXED HYPERLIPIDEMIA: ICD-10-CM

## 2025-04-09 DIAGNOSIS — K21.9 GASTROESOPHAGEAL REFLUX DISEASE WITHOUT ESOPHAGITIS: ICD-10-CM

## 2025-04-09 DIAGNOSIS — M1A.9XX1 CHRONIC GOUT WITH TOPHUS, UNSPECIFIED CAUSE, UNSPECIFIED SITE: Chronic | ICD-10-CM

## 2025-04-09 DIAGNOSIS — I50.32 CHRONIC HEART FAILURE WITH PRESERVED EJECTION FRACTION (HCC): Primary | ICD-10-CM

## 2025-04-09 DIAGNOSIS — M48.061 DEGENERATIVE LUMBAR SPINAL STENOSIS: ICD-10-CM

## 2025-04-09 DIAGNOSIS — I35.1 NONRHEUMATIC AORTIC VALVE INSUFFICIENCY: ICD-10-CM

## 2025-04-09 DIAGNOSIS — I10 ESSENTIAL HYPERTENSION: ICD-10-CM

## 2025-04-09 DIAGNOSIS — I48.0 PAROXYSMAL ATRIAL FIBRILLATION (HCC): ICD-10-CM

## 2025-04-09 DIAGNOSIS — N18.31 STAGE 3A CHRONIC KIDNEY DISEASE (HCC): ICD-10-CM

## 2025-04-09 PROCEDURE — 99309 SBSQ NF CARE MODERATE MDM 30: CPT | Performed by: INTERNAL MEDICINE

## 2025-04-09 NOTE — ASSESSMENT & PLAN NOTE
History of aortic regurgitation.  Patient remains asymptomatic this time.  Continue to monitor.  Follow-up with cardiology service

## 2025-04-09 NOTE — ASSESSMENT & PLAN NOTE
Lab Results   Component Value Date    EGFR 58 01/25/2023    EGFR 70 01/24/2023    EGFR 60 01/06/2023    CREATININE 0.93 01/25/2023    CREATININE 0.80 01/24/2023    CREATININE 0.91 01/06/2023   Creatinine was stable at 0.83 on 1/24/2025.  Avoid hypotension and nephrotoxins

## 2025-04-09 NOTE — PROGRESS NOTES
[facility]  [address]   [address]  [place], [address]  [phone]    Progress Note  Code [facility] 31    Patient Location     [facility]    Reason for visit     Follow-up hypertension, atrial fibrillation, CHF, hyperlipidemia, ambulatory dysfunction, anxiety and depression, aortic regurgitation, history of gout    Patient’s recent vitals, labs and updated medications were reviewed on [facility] system of facility.     Problem List Items Addressed This Visit       Essential hypertension    Blood pressure stable on metoprolol 100 mg in the morning , 50 mg at bedtime and diltiazem 120 mg daily         Degenerative lumbar spinal stenosis    History of lumbar spinal stenosis.  Pt has had intermittent knee pain requiring Oxycodone at times, additionally   remains on Tylenol 975 mg every 8 hours.  Continue same for now         GERD (gastroesophageal reflux disease)    Stable on Carafate 1 g twice a day         Hyperlipidemia    Continue ezetimibe         Chronic gout with tophus (Chronic)    Patient remains on allopurinol 100 mg daily.  Uric acid level was 5.8 on 4/30/2024         Paroxysmal atrial fibrillation (HCC)    Heart rate stable on metoprolol and diltiazem.  Continue apixaban         Stage 3a chronic kidney disease (HCC)    Lab Results   Component Value Date    EGFR 58 01/25/2023    EGFR 70 01/24/2023    EGFR 60 01/06/2023    CREATININE 0.93 01/25/2023    CREATININE 0.80 01/24/2023    CREATININE 0.91 01/06/2023   Creatinine was stable at 0.83 on 1/24/2025.  Avoid hypotension and nephrotoxins         Aortic regurgitation    History of aortic regurgitation.  Patient remains asymptomatic this time.  Continue to monitor.  Follow-up with cardiology service         Chronic heart failure with preserved ejection fraction (HCC) - Primary    Wt Readings from Last 3 Encounters:   02/27/25 109 kg (240 lb)   11/20/24 108 kg (238 lb)   07/31/24 101 kg (223 lb)   Clinically  compensated on Lasix 40 mg daily.  Weights have been variable due to different scales.                        HPI       Patient is being seen for a follow-up visit today.  She is doing okay at present, patient is listed to receive Claritin every day.  No acute issues with allergies at this time.  Change to as needed for now and follow-up.  Awake alert able to make her needs known.  Behaviors have been stable.  Patient is pleasant compliant with care and treatment.  No fever chills dyspnea or chest congestion.  Patient uses wheelchair to ambulate.  No recent falls.  She requires assistance of 2 people for transfers.  Appetite is good.  Weights have been variable due to different scales.  Most recent weight was 235 pounds.  Patient was weighed on the wheelchair.  Pain in her knees, currently remains on scheduled Tylenol and as needed oxycodone.  Per staff patient has been needing oxycodone intermittently      Review of Systems   Constitutional:  Negative for chills and fever.   Respiratory:  Negative for shortness of breath, wheezing and stridor.    Cardiovascular:  Negative for chest pain and leg swelling.   Gastrointestinal:  Negative for abdominal distention and vomiting.   Genitourinary:  Negative for flank pain and hematuria.        Urinary incontinence +   Musculoskeletal:  Positive for gait problem.   Neurological:  Positive for weakness. Negative for syncope.   Psychiatric/Behavioral:  Negative for agitation and behavioral problems.        Past Medical History:   Diagnosis Date    Abnormal ECG     Last Assessed 9/29/2016    Anxiety     Last Assessed 6/08/2016    Arthritis     knees    Asthma     Last Assessed 11/06/2013    Atrial premature complex     Cataract, bilateral     Last Assessed 7/14/2016    Cataract, left eye     Both eyes. Had surgery on left.    COVID-19     Difficulty swallowing     Diverticulosis 9/25/2022    Dizziness     Fibromyalgia     Fibromyalgia, primary     Gastric reflux     Heart  "failure (HCC)     5/23/22 Pt reports had heart failure in the past    History of COVID-19     5/23/22 Pt reports having COVID in 2021.    History of transfusion     no reaction    [place] (hard of hearing)     Hyperlipidemia     Hypertension     Incontinence in female     5/23/22 Pt reports has incontinence -wears depends especially at night/riding in car    Irregular heart beat     5/23/22 Pt reports hx of A fib    Lyme disease     PONV (postoperative nausea and vomiting)     Premature ventricular contraction     Primary osteoarthritis of both knees     Last Assessed 7/14/2016    Rheumatic fever     5/23/22 Pt reports had hx of rheumatic fever as a child twice    Sore throat     Tuberculosis 1945       Past Surgical History:   Procedure Laterality Date    APPENDECTOMY      CARDIAC PACEMAKER PLACEMENT  2019    COLONOSCOPY      DENTAL SURGERY      extractions    HYSTERECTOMY      5/23/22 Pt reports had appendix out with hysterectomy and \"tightened up my bladder\"    IR ASPIRATION ONLY  9/30/2022    ORIF FEMUR FRACTURE Left 08/05/2021    Procedure: OPEN REDUCTION W/ INTERNAL FIXATION (ORIF) DISTAL FEMUR; INSERTION RETROGRADE NAIL;  Surgeon: Rita Harris MD;  [facility];  Service: Orthopedics    OH ARTHRP KNE CONDYLE&PLATU MEDIAL&LAT COMPARTMENTS Left 9/12/2022    Procedure: ARTHROPLASTY KNEE TOTAL;  Surgeon: Rita Harris MD;  [facility];  Service: Orthopedics    OH DILATION ESOPH UNGUIDED SOUND/BOUGIE 1/MULT PASS N/A 04/06/2016    Procedure: DILATATION ESOPHAGEAL;  Surgeon: Watson Turpin MD;  [facility];  Service: Gastroenterology    OH EGD TRANSORAL BIOPSY SINGLE/MULTIPLE N/A 04/06/2016    Procedure: ESOPHAGOGASTRODUODENOSCOPY (EGD);  Surgeon: Watson Turpin MD;  [facility];  Service: Gastroenterology    OH LATERAL RETINACULAR RELEASE OPEN Left 1/23/2023    Procedure: RELEASE RETINACULAR, left knee and all associated procedures;  Surgeon: Rita Harris MD;  Location: " [facility];  Service: Orthopedics    SC REMOVAL IMPLANT DEEP Left 9/12/2022    Procedure: REMOVAL NAIL IM  FEMUR;  Surgeon: Rita Harris MD;  [facility];  Service: Orthopedics    SC XCAPSL CTRC RMVL INSJ IO LENS PROSTH W/O ECP Left 03/15/2016    Procedure: EXTRACTION EXTRACAPSULAR CATARACT PHACO INTRAOCULAR LENS (IOL);  Surgeon: Jeanette Beckman MD;  [facility];  Service: Ophthalmology    REPLACEMENT TOTAL KNEE Right     REPLACEMENT TOTAL KNEE      right     TONSILLECTOMY         Social History     Tobacco Use   Smoking Status Never   Smokeless Tobacco Never   Tobacco Comments    Denies any former or current smoking       Family History   Problem Relation Age of Onset    Coronary artery disease Mother     Heart attack Mother         Prior    Heart disease Father     Heart attack Father         Prior    Anesthesia problems Neg Hx         Allergies   Allergen Reactions    Penicillins Hives     Itching and terrible hives    Sulfa Antibiotics Itching    B12 Folate [Folic Acid-Vit B6-Vit B12 - Food Allergy] Other (See Comments)     5/23/22 Pt reports no allergic reaction known     [place] Other (See Comments)     Unknown, 5/23 -pt is unaware of reaction     Lyrica [Pregabalin] Other (See Comments)     5/23/22 Pt reports doesn't remember reaction     Other Other (See Comments)     Black rubber 5/23/22 per allergy test - pt unaware of reaction    Cortisone Acetate [Cortisone] Itching and Rash     5/23/22 pt reports makes her violent     Doxycycline Hives and Itching    Nickel Other (See Comments)     Skin discoloration         Current Outpatient Medications:     acetaminophen (TYLENOL) 325 mg tablet, Take 975 mg by mouth 3 (three) times a day, Disp: , Rfl:     albuterol (PROVENTIL HFA,VENTOLIN HFA) 90 mcg/act inhaler, INHALE 2 PUFFS BY MOUTH EVERY 6 HOURS AS NEEDED FOR WHEEZING, Disp: 3 Inhaler, Rfl: 0    allopurinol (ZYLOPRIM) 100 mg tablet, Take 1 tablet (100 mg total) by mouth daily, Disp: 90  tablet, Rfl: 3    apixaban (Eliquis) 5 mg, Take 1 tablet (5 mg total) by mouth 2 (two) times a day, Disp: 60 tablet, Rfl: 6    bisacodyl (DULCOLAX) 10 mg suppository, Insert 10 mg into the rectum daily as needed, Disp: , Rfl:     Calcium Polycarbophil (FIBER-LAX PO), Take 1 tablet by mouth daily at bedtime, Disp: , Rfl:     Diclofenac Sodium (VOLTAREN) 1 %, Apply 2 g topically 4 (four) times a day = to B/L knee, Disp: , Rfl:     diltiazem (CARDIZEM CD) 120 mg 24 hr capsule, Take 1 capsule (120 mg total) by mouth daily, Disp: 90 capsule, Rfl: 3    ezetimibe (ZETIA) 10 mg tablet, Take 1 tablet (10 mg total) by mouth daily, Disp: 90 tablet, Rfl: 1    furosemide (LASIX) 40 mg tablet, Take 40 mg by mouth in the morning, Disp: , Rfl:     Melatonin 10 MG TABS, Take by mouth Takes daily at night, Disp: , Rfl:     metoprolol tartrate (LOPRESSOR) 100 mg tablet, Take 1 tablet (100 mg total) by mouth daily in the early morning Do not start before January 7, 2023., Disp: , Rfl: 0    metoprolol tartrate (LOPRESSOR) 50 mg tablet, Take 1 tablet (50 mg total) by mouth daily at bedtime, Disp: , Rfl: 0    Multiple Vitamin (MULTIVITAMIN ADULT PO), Take by mouth in the morning, Disp: , Rfl:     polyethylene glycol (MIRALAX) 17 g packet, Take 17 g by mouth daily as needed, Disp: , Rfl:     senna-docusate sodium (SENOKOT S) 8.6-50 mg per tablet, Take 2 tablets by mouth 2 (two) times a day, Disp: , Rfl: 0    sucralfate (CARAFATE) 1 g tablet, Take 1 tablet (1 g total) by mouth 2 (two) times a day, Disp: 30 tablet, Rfl: 0    vitamin B-12 (VITAMIN B-12) 1,000 mcg tablet, Take 1 tablet (1,000 mcg total) by mouth daily, Disp: , Rfl: 0    Updated list was reviewed in [facility].     Vitals:    04/09/25 1240   BP: 160/77   Pulse: 70   Resp: 18   Temp: 97.9 °F (36.6 °C)   SpO2: 96%       Physical Exam  Constitutional:       General: She is not in acute distress.  HENT:      Head: Normocephalic and atraumatic.   Eyes:       "General: No scleral icterus.  Cardiovascular:      Rate and Rhythm: Normal rate and regular rhythm.   Pulmonary:      Breath sounds: No wheezing, rhonchi or rales.   Abdominal:      General: There is no distension.      Palpations: Abdomen is soft.      Tenderness: There is no abdominal tenderness. There is no guarding.   Musculoskeletal:      Cervical back: Neck supple.      Right lower leg: No edema.      Left lower leg: No edema.   Skin:     Coloration: Skin is not jaundiced.   Neurological:      General: No focal deficit present.      Cranial Nerves: No cranial nerve deficit.      Motor: Weakness present.   Psychiatric:         Mood and Affect: Mood normal.         Behavior: Behavior normal.         Diagnostic Data:    Labs done on 1/24/2025 revealed WBC count of 6.8, hemoglobin 12.2, platelet count 266  BUN 21, creatinine 0.83, sodium 141 and potassium 3.7  Uric acid level was 5.8 on 4/30/2024    Additional Notes:   Patient is listed to receive Claritin every day.  No acute issues with allergies at this time.  Change to as needed and follow    Portions of the record may have been created with voice recognition software.  Occasional wrong word or \"sound a like\" substitutions may have occurred due to the inherent limitations of voice recognition software.  Read the chart carefully and recognize, using context, where substitutions have occurred.    This note was electronically signed by Dr. Rebecca Love   "

## 2025-04-09 NOTE — ASSESSMENT & PLAN NOTE
Wt Readings from Last 3 Encounters:   02/27/25 109 kg (240 lb)   11/20/24 108 kg (238 lb)   07/31/24 101 kg (223 lb)   Clinically compensated on Lasix 40 mg daily.  Weights have been variable due to different scales.

## 2025-04-09 NOTE — ASSESSMENT & PLAN NOTE
Blood pressure stable on metoprolol 100 mg in the morning , 50 mg at bedtime and diltiazem 120 mg daily

## 2025-05-29 ENCOUNTER — NURSING HOME VISIT (OUTPATIENT)
Dept: GERIATRICS | Facility: OTHER | Age: 80
End: 2025-05-29
Payer: COMMERCIAL

## 2025-05-29 VITALS
TEMPERATURE: 97.9 F | SYSTOLIC BLOOD PRESSURE: 149 MMHG | OXYGEN SATURATION: 95 % | HEART RATE: 69 BPM | RESPIRATION RATE: 18 BRPM | BODY MASS INDEX: 41.28 KG/M2 | DIASTOLIC BLOOD PRESSURE: 61 MMHG | WEIGHT: 240.5 LBS

## 2025-05-29 DIAGNOSIS — I10 ESSENTIAL HYPERTENSION: ICD-10-CM

## 2025-05-29 DIAGNOSIS — M1A.9XX1 CHRONIC GOUT WITH TOPHUS, UNSPECIFIED CAUSE, UNSPECIFIED SITE: Chronic | ICD-10-CM

## 2025-05-29 DIAGNOSIS — I50.32 CHRONIC HEART FAILURE WITH PRESERVED EJECTION FRACTION (HCC): ICD-10-CM

## 2025-05-29 DIAGNOSIS — I48.0 PAROXYSMAL ATRIAL FIBRILLATION (HCC): Primary | ICD-10-CM

## 2025-05-29 PROCEDURE — 99309 SBSQ NF CARE MODERATE MDM 30: CPT | Performed by: NURSE PRACTITIONER

## 2025-05-29 NOTE — PROGRESS NOTES
[facility]  [address] [id number]  [phone]  FACILITY: [facility]  Code 32 (LT)        NAME: Isi Ernst  AGE: 79 y.o. SEX: female CODE STATUS: CPR    DATE OF ENCOUNTER: 5/29/2025    Assessment and Plan     1. Paroxysmal atrial fibrillation (HCC)  Assessment & Plan:  HR regular and controlled on exam   Continue Diltiazem and metoprolol for rate/rhythm control   Continue eliquis 5 mg bid   2. Essential hypertension  Assessment & Plan:  BP acceptable   Continue lasix 40 mg daily , metoprolol tartrate 100 mg in AM and 50 mg in PM,  diltiazem  mg daily   Monitor vitals monthly at [facility] facility   3. Chronic heart failure with preserved ejection fraction (HCC)  Assessment & Plan:  Wt Readings from Last 3 Encounters:   05/29/25 109 kg (240 lb 8 oz)   04/09/25 107 kg (235 lb)   02/27/25 109 kg (240 lb)     Appears euvolemic on exam today  Weights reviewed- stable   Denies any cardiopulmonary symptoms   Continue Lasix 40 mg daily   Continue metoprolol tartrate 100 mg in am and 50 mg in pm   Monitor BMPs every 6 months                 4. Chronic gout with tophus, unspecified cause, unspecified site  Assessment & Plan:  Stable   Continue Allopurinol 100 mg daily        All medications and routine orders were reviewed and updated as needed.    Chief Complaint     [facility] follow up visit     Past Medical and Surgica History      Past Medical History:   Diagnosis Date    Abnormal ECG     Last Assessed 9/29/2016    Anxiety     Last Assessed 6/08/2016    Arthritis     knees    Asthma     Last Assessed 11/06/2013    Atrial premature complex     Cataract, bilateral     Last Assessed 7/14/2016    Cataract, left eye     Both eyes. Had surgery on left.    COVID-19     Difficulty swallowing     Diverticulosis 9/25/2022    Dizziness     Fibromyalgia     Fibromyalgia, primary     Gastric reflux     Heart failure (HCC)     5/23/22 Pt reports had heart failure in the past    History of COVID-19   "   5/23/22 Pt reports having COVID in 2021.    History of transfusion     no reaction    [place] (hard of hearing)     Hyperlipidemia     Hypertension     Incontinence in female     5/23/22 Pt reports has incontinence -wears depends especially at night/riding in car    Irregular heart beat     5/23/22 Pt reports hx of A fib    Lyme disease     PONV (postoperative nausea and vomiting)     Premature ventricular contraction     Primary osteoarthritis of both knees     Last Assessed 7/14/2016    Rheumatic fever     5/23/22 Pt reports had hx of rheumatic fever as a child twice    Sore throat     Tuberculosis 1945     Past Surgical History:   Procedure Laterality Date    APPENDECTOMY      CARDIAC PACEMAKER PLACEMENT  2019    COLONOSCOPY      DENTAL SURGERY      extractions    HYSTERECTOMY      5/23/22 Pt reports had appendix out with hysterectomy and \"tightened up my bladder\"    IR ASPIRATION ONLY  9/30/2022    ORIF FEMUR FRACTURE Left 08/05/2021    Procedure: OPEN REDUCTION W/ INTERNAL FIXATION (ORIF) DISTAL FEMUR; INSERTION RETROGRADE NAIL;  Surgeon: Rita Harris MD;  [facility];  Service: Orthopedics    MT ARTHRP KNE CONDYLE&PLATU MEDIAL&LAT COMPARTMENTS Left 9/12/2022    Procedure: ARTHROPLASTY KNEE TOTAL;  Surgeon: Rita Harris MD;  [facility];  Service: Orthopedics    MT DILATION ESOPH UNGUIDED SOUND/BOUGIE 1/MULT PASS N/A 04/06/2016    Procedure: DILATATION ESOPHAGEAL;  Surgeon: Watson Turpin MD;  [facility];  Service: Gastroenterology    MT EGD TRANSORAL BIOPSY SINGLE/MULTIPLE N/A 04/06/2016    Procedure: ESOPHAGOGASTRODUODENOSCOPY (EGD);  Surgeon: Watson Turpin MD;  [facility];  Service: Gastroenterology    MT LATERAL RETINACULAR RELEASE OPEN Left 1/23/2023    Procedure: RELEASE RETINACULAR, left knee and all associated procedures;  Surgeon: Rita Harris MD;  [facility];  Service: Orthopedics    MT REMOVAL IMPLANT DEEP Left 9/12/2022    Procedure: " REMOVAL NAIL IM  FEMUR;  Surgeon: Rita Harris MD;  [facility];  Service: Orthopedics    SD XCAPSL CTRC RMVL INSJ IO LENS PROSTH W/O ECP Left 03/15/2016    Procedure: EXTRACTION EXTRACAPSULAR CATARACT PHACO INTRAOCULAR LENS (IOL);  Surgeon: Jeanette Beckman MD;  [facility];  Service: Ophthalmology    REPLACEMENT TOTAL KNEE Right     REPLACEMENT TOTAL KNEE      right     TONSILLECTOMY       Allergies   Allergen Reactions    Penicillins Hives     Itching and terrible hives    Sulfa Antibiotics Itching    B12 Folate [Folic Acid-Vit B6-Vit B12 - Food Allergy] Other (See Comments)     5/23/22 Pt reports no allergic reaction known     [place] Other (See Comments)     Unknown, 5/23 -pt is unaware of reaction     Lyrica [Pregabalin] Other (See Comments)     5/23/22 Pt reports doesn't remember reaction     Other Other (See Comments)     Black rubber 5/23/22 per allergy test - pt unaware of reaction    Cortisone Acetate [Cortisone] Itching and Rash     5/23/22 pt reports makes her violent     Doxycycline Hives and Itching    Nickel Other (See Comments)     Skin discoloration          History of Present Illness     Isi Ernst is a 79 year old female she is a long-term care resident of [facility] facility due to debility.  Patient has a past medical history including but not limited to  A. Fib, HTN, CHF, CKD, gout, fibromyalgia, arthritis, and ambulatory dysfunction.    The patient is seen today for a routine LTC follow up visit for management of acute and chronic conditions.    Patient is also managed/followed by OPTJERI HARPER at facility.     The patient was seen and examined at bedside in stable condition. She is alert and oriented x 3.  Patient is out of bed in her wheelchair, she is pleasant and smiling during exam. She offers no complaints except that it takes 2 people to get oob, sometimes needs to use sit to stand.  Patient states her arthritis in her hands and knees is  consistent but Tylenol and Voltaren help. Patient states she has a good appetite.  Patient denies any bowel or bladder issues.  Patient denies CP/SOB/N/V/D. Denies lightheadedness, dizziness, headaches, vision changes. No concerns from nursing staff.              The patient's allergies, past medical, surgical, social and family history were reviewed and unchanged.    Review of Systems     Review of Systems   Musculoskeletal:  Positive for arthralgias and gait problem.   All other systems reviewed and are negative.        Objective     Vitals:   Vitals:    05/29/25 0857   BP: 149/61   Pulse: 69   Resp: 18   Temp: 97.9 °F (36.6 °C)   SpO2: 95%           Physical Exam  Vitals and nursing note reviewed.   Constitutional:       General: She is not in acute distress.     Appearance: Normal appearance.   HENT:      Head: Normocephalic and atraumatic.      Nose: No congestion or rhinorrhea.      Mouth/Throat:      Mouth: Mucous membranes are moist.     Eyes:      General: No scleral icterus.     Conjunctiva/sclera: Conjunctivae normal.      Pupils: Pupils are equal, round, and reactive to light.       Cardiovascular:      Rate and Rhythm: Normal rate and regular rhythm.      Pulses: Normal pulses.      Heart sounds: Normal heart sounds. No murmur heard.  Pulmonary:      Effort: Pulmonary effort is normal. No respiratory distress.      Breath sounds: Normal breath sounds. No wheezing, rhonchi or rales.   Abdominal:      General: Bowel sounds are normal. There is no distension.      Palpations: Abdomen is soft. There is no mass.      Tenderness: There is no abdominal tenderness.      Hernia: No hernia is present.      Comments: PPM LCW     Musculoskeletal:         General: No swelling or tenderness.   Lymphadenopathy:      Cervical: No cervical adenopathy.     Skin:     General: Skin is warm and dry.      Coloration: Skin is not pale.      Findings: No rash.     Neurological:      General: No focal deficit present.       Mental Status: She is alert and oriented to person, place, and time. Mental status is at baseline.      Motor: Weakness present.      Gait: Gait abnormal.     Psychiatric:         Mood and Affect: Mood normal.         Behavior: Behavior normal.         Pertinent Laboratory/Diagnostic Studies:     Reviewed in facility chart      Current Medications   Medications reviewed and updated see facility MAR for details.      Current Outpatient Medications:     acetaminophen (TYLENOL) 325 mg tablet, Take 975 mg by mouth 3 (three) times a day, Disp: , Rfl:     albuterol (PROVENTIL HFA,VENTOLIN HFA) 90 mcg/act inhaler, INHALE 2 PUFFS BY MOUTH EVERY 6 HOURS AS NEEDED FOR WHEEZING, Disp: 3 Inhaler, Rfl: 0    allopurinol (ZYLOPRIM) 100 mg tablet, Take 1 tablet (100 mg total) by mouth daily, Disp: 90 tablet, Rfl: 3    apixaban (Eliquis) 5 mg, Take 1 tablet (5 mg total) by mouth 2 (two) times a day, Disp: 60 tablet, Rfl: 6    bisacodyl (DULCOLAX) 10 mg suppository, Insert 10 mg into the rectum daily as needed, Disp: , Rfl:     Calcium Polycarbophil (FIBER-LAX PO), Take 1 tablet by mouth daily at bedtime, Disp: , Rfl:     Diclofenac Sodium (VOLTAREN) 1 %, Apply 2 g topically 4 (four) times a day = to B/L knee, Disp: , Rfl:     diltiazem (CARDIZEM CD) 120 mg 24 hr capsule, Take 1 capsule (120 mg total) by mouth daily, Disp: 90 capsule, Rfl: 3    ezetimibe (ZETIA) 10 mg tablet, Take 1 tablet (10 mg total) by mouth daily, Disp: 90 tablet, Rfl: 1    furosemide (LASIX) 40 mg tablet, Take 40 mg by mouth in the morning, Disp: , Rfl:     Melatonin 10 MG TABS, Take by mouth Takes daily at night, Disp: , Rfl:     metoprolol tartrate (LOPRESSOR) 100 mg tablet, Take 1 tablet (100 mg total) by mouth daily in the early morning Do not start before January 7, 2023., Disp: , Rfl: 0    metoprolol tartrate (LOPRESSOR) 50 mg tablet, Take 1 tablet (50 mg total) by mouth daily at bedtime, Disp: , Rfl: 0    Multiple Vitamin (MULTIVITAMIN ADULT PO), Take by  "mouth in the morning, Disp: , Rfl:     polyethylene glycol (MIRALAX) 17 g packet, Take 17 g by mouth daily as needed, Disp: , Rfl:     senna-docusate sodium (SENOKOT S) 8.6-50 mg per tablet, Take 2 tablets by mouth 2 (two) times a day, Disp: , Rfl: 0    sucralfate (CARAFATE) 1 g tablet, Take 1 tablet (1 g total) by mouth 2 (two) times a day, Disp: 30 tablet, Rfl: 0    vitamin B-12 (VITAMIN B-12) 1,000 mcg tablet, Take 1 tablet (1,000 mcg total) by mouth daily, Disp: , Rfl: 0     Please note:  Voice-recognition software may have been used in the preparation of this document.  Occasional wrong word or \"sound-alike\" substitutions may have occurred due to the inherent limitations of voice recognition software.  Interpretation should be guided by context.         JERI Hart  5/29/2025  8:57 AM    "

## 2025-05-30 NOTE — ASSESSMENT & PLAN NOTE
Wt Readings from Last 3 Encounters:   05/29/25 109 kg (240 lb 8 oz)   04/09/25 107 kg (235 lb)   02/27/25 109 kg (240 lb)     Appears euvolemic on exam today  Weights reviewed- stable   Denies any cardiopulmonary symptoms   Continue Lasix 40 mg daily   Continue metoprolol tartrate 100 mg in am and 50 mg in pm   Monitor BMPs every 6 months

## 2025-07-14 ENCOUNTER — TELEPHONE (OUTPATIENT)
Dept: CARDIOLOGY CLINIC | Facility: CLINIC | Age: 80
End: 2025-07-14

## 2025-07-23 ENCOUNTER — NURSING HOME VISIT (OUTPATIENT)
Dept: GERIATRICS | Facility: OTHER | Age: 80
End: 2025-07-23
Payer: COMMERCIAL

## 2025-07-23 DIAGNOSIS — I50.32 CHRONIC HEART FAILURE WITH PRESERVED EJECTION FRACTION (HCC): Primary | ICD-10-CM

## 2025-07-23 DIAGNOSIS — M1A.9XX1 CHRONIC GOUT WITH TOPHUS, UNSPECIFIED CAUSE, UNSPECIFIED SITE: Chronic | ICD-10-CM

## 2025-07-23 DIAGNOSIS — I10 ESSENTIAL HYPERTENSION: ICD-10-CM

## 2025-07-23 DIAGNOSIS — N18.31 STAGE 3A CHRONIC KIDNEY DISEASE (HCC): ICD-10-CM

## 2025-07-23 DIAGNOSIS — I48.0 PAROXYSMAL ATRIAL FIBRILLATION (HCC): ICD-10-CM

## 2025-07-23 DIAGNOSIS — K21.9 GASTROESOPHAGEAL REFLUX DISEASE WITHOUT ESOPHAGITIS: ICD-10-CM

## 2025-07-23 DIAGNOSIS — E78.2 MIXED HYPERLIPIDEMIA: ICD-10-CM

## 2025-07-23 PROCEDURE — 99309 SBSQ NF CARE MODERATE MDM 30: CPT | Performed by: INTERNAL MEDICINE

## 2025-07-23 NOTE — PROGRESS NOTES
[facility]  [address]   [address]  [place], [address] [phone]    Progress Note  Code LTC 32      Isi Ernst  1945      [place]     [place]    Reason for visit       Follow-up hypertension, atrial fibrillation, CHF, hyperlipidemia, ambulatory dysfunction, anxiety and depression, aortic regurgitation, history of gout       Patient’s recent vitals, labs and updated medications were reviewed on [facility].     Problem List Items Addressed This Visit       Essential hypertension    BP  stable on lasix 40 mg daily, lisinopril 5 mg daily metoprolol tartrate 100 mg in AM and 50 mg in PM,  diltiazem  mg daily          GERD (gastroesophageal reflux disease)    Stable on Carafate 1 g twice a day         Hyperlipidemia    Continue ezetimibe         Chronic gout with tophus (Chronic)    Stable.  Patient has not had any flareup of gouty attacks.  Continue allopurinol 100 mg daily         Paroxysmal atrial fibrillation (HCC)    HR stable on Diltiazem and metoprolol  Continue eliquis 5 mg bid          Stage 3a chronic kidney disease (HCC)    Lab Results   Component Value Date    EGFR 58 01/25/2023    EGFR 70 01/24/2023    EGFR 60 01/06/2023    CREATININE 0.93 01/25/2023    CREATININE 0.80 01/24/2023    CREATININE 0.91 01/06/2023     Creatinine was stable at 0.89 on 6/2  Avoid hypotension and nephrotoxins         Chronic heart failure with preserved ejection fraction (HCC) - Primary    Wt Readings from Last 3 Encounters:   05/29/25 109 kg (240 lb 8 oz)   04/09/25 107 kg (235 lb)   02/27/25 109 kg (240 lb)     Stable on Lasix 40 mg daily                      HPI         Pt is being seen for a follow up visit. She is doing ok at present except continue with intermittent pain involving left knee and LLE.  Pt is awake and alert able to make her needs known.  No fever chills dyspnea or wheezing.    Review of Systems   Constitutional:  Negative for  chills and fever.   Respiratory:  Negative for shortness of breath and wheezing.    Cardiovascular:  Negative for chest pain and leg swelling.   Gastrointestinal:  Negative for abdominal distention and vomiting.   Genitourinary:  Negative for flank pain and hematuria.   Musculoskeletal:  Positive for arthralgias (LLE / knee pain at times) and gait problem.   Neurological:  Positive for weakness (chronic invlving LLE). Negative for syncope.   Psychiatric/Behavioral:  Negative for agitation and behavioral problems.        Past Medical History[1]    Past Surgical History[2]    Tobacco Use History[3]    Family History[4]     Allergies[5]    Current Medications[6]    Updated list was reviewed in [facility] of facility.      Vitals:  Blood pressure 130/68, temperature 97.3, pulse 69, respiratory rate 18, weight 238 pounds, oxygen saturation 95% on room air      Physical Exam  Constitutional:       General: She is not in acute distress.  HENT:      Head: Normocephalic and atraumatic.     Eyes:      General: No scleral icterus.      Cardiovascular:      Rate and Rhythm: Normal rate and regular rhythm.   Pulmonary:      Breath sounds: No wheezing, rhonchi or rales.   Abdominal:      General: There is no distension.      Palpations: Abdomen is soft.      Tenderness: There is no abdominal tenderness. There is no guarding.     Musculoskeletal:      Cervical back: Neck supple.      Right lower leg: No edema.      Left lower leg: No edema.     Skin:     Coloration: Skin is not jaundiced.     Neurological:      General: No focal deficit present.      Cranial Nerves: No cranial nerve deficit.      Motor: Weakness (chronic involving LLE) present.      Comments: Oriented to month and year   Psychiatric:         Mood and Affect: Mood normal.         Behavior: Behavior normal.         Diagnostic Data:    Labs  done on 6/2/2025 revealed BUN of 24, creatinine 0.89, sodium 142, potassium 5  Hemoglobin 12.1, WBC count 7.7 and  "platelet count 283    Portions of the record may have been created with voice recognition software.  Occasional wrong word or \"sound a like\" substitutions may have occurred due to the inherent limitations of voice recognition software.  Read the chart carefully and recognize, using context, where substitutions have occurred.    This note was electronically signed by Dr. Rebecca Love          [1]   Past Medical History:  Diagnosis Date    Abnormal ECG     Last Assessed 9/29/2016    Anxiety     Last Assessed 6/08/2016    Arthritis     knees    Asthma     Last Assessed 11/06/2013    Atrial premature complex     Cataract, bilateral     Last Assessed 7/14/2016    Cataract, left eye     Both eyes. Had surgery on left.    COVID-19     Difficulty swallowing     Diverticulosis 9/25/2022    Dizziness     Fibromyalgia     Fibromyalgia, primary     Gastric reflux     Heart failure (HCC)     5/23/22 Pt reports had heart failure in the past    History of COVID-19     5/23/22 Pt reports having COVID in 2021.    History of transfusion     no reaction    [place] (hard of hearing)     Hyperlipidemia     Hypertension     Incontinence in female     5/23/22 Pt reports has incontinence -wears depends especially at night/riding in car    Irregular heart beat     5/23/22 Pt reports hx of A fib    Lyme disease     PONV (postoperative nausea and vomiting)     Premature ventricular contraction     Primary osteoarthritis of both knees     Last Assessed 7/14/2016    Rheumatic fever     5/23/22 Pt reports had hx of rheumatic fever as a child twice    Sore throat     Tuberculosis 1945   [2]   Past Surgical History:  Procedure Laterality Date    APPENDECTOMY      CARDIAC PACEMAKER PLACEMENT  2019    COLONOSCOPY      DENTAL SURGERY      extractions    HYSTERECTOMY      5/23/22 Pt reports had appendix out with hysterectomy and \"tightened up my bladder\"    IR ASPIRATION ONLY  9/30/2022    ORIF FEMUR FRACTURE Left 08/05/2021    Procedure: OPEN REDUCTION W/ " INTERNAL FIXATION (ORIF) DISTAL FEMUR; INSERTION RETROGRADE NAIL;  Surgeon: Rita Harris MD;  [facility];  Service: Orthopedics    MO ARTHRP KNE CONDYLE&PLATU MEDIAL&LAT COMPARTMENTS Left 9/12/2022    Procedure: ARTHROPLASTY KNEE TOTAL;  Surgeon: Rita Harris MD;  [facility];  Service: Orthopedics    MO DILATION ESOPH UNGUIDED SOUND/BOUGIE 1/MULT PASS N/A 04/06/2016    Procedure: DILATATION ESOPHAGEAL;  Surgeon: Watson Turpin MD;  Location: [facility];  Service: Gastroenterology    MO EGD TRANSORAL BIOPSY SINGLE/MULTIPLE N/A 04/06/2016    Procedure: ESOPHAGOGASTRODUODENOSCOPY (EGD);  Surgeon: Watson Turpin MD;  [facility];  Service: Gastroenterology    MO LATERAL RETINACULAR RELEASE OPEN Left 1/23/2023    Procedure: RELEASE RETINACULAR, left knee and all associated procedures;  Surgeon: Rita Harris MD;  [facility];  Service: Orthopedics    MO REMOVAL IMPLANT DEEP Left 9/12/2022    Procedure: REMOVAL NAIL IM  FEMUR;  Surgeon: Rita Harris MD;  [facility];  Service: Orthopedics    MO XCAPSL CTRC RMVL INSJ IO LENS PROSTH W/O ECP Left 03/15/2016    Procedure: EXTRACTION EXTRACAPSULAR CATARACT PHACO INTRAOCULAR LENS (IOL);  Surgeon: Jeanette Beckman MD;  [facility];  Service: Ophthalmology    REPLACEMENT TOTAL KNEE Right     REPLACEMENT TOTAL KNEE      right     TONSILLECTOMY     [3]   Social History  Tobacco Use   Smoking Status Never   Smokeless Tobacco Never   Tobacco Comments    Denies any former or current smoking   [4]   Family History  Problem Relation Name Age of Onset    Coronary artery disease Mother      Heart attack Mother          Prior    Heart disease Father      Heart attack Father          Prior    Anesthesia problems Neg Hx     [5]   Allergies  Allergen Reactions    Penicillins Hives     Itching and terrible hives    Sulfa Antibiotics Itching    B12 Folate [Folic Acid-Vit B6-Vit B12 - Food Allergy] Other (See Comments)      5/23/22 Pt reports no allergic reaction known     [place] Other (See Comments)     Unknown, 5/23 -pt is unaware of reaction     Lyrica [Pregabalin] Other (See Comments)     5/23/22 Pt reports doesn't remember reaction     Other Other (See Comments)     Black rubber 5/23/22 per allergy test - pt unaware of reaction    Cortisone Acetate [Cortisone] Itching and Rash     5/23/22 pt reports makes her violent     Doxycycline Hives and Itching    Nickel Other (See Comments)     Skin discoloration   [6]   Current Outpatient Medications:     acetaminophen (TYLENOL) 325 mg tablet, Take 975 mg by mouth 3 (three) times a day, Disp: , Rfl:     albuterol (PROVENTIL HFA,VENTOLIN HFA) 90 mcg/act inhaler, INHALE 2 PUFFS BY MOUTH EVERY 6 HOURS AS NEEDED FOR WHEEZING, Disp: 3 Inhaler, Rfl: 0    allopurinol (ZYLOPRIM) 100 mg tablet, Take 1 tablet (100 mg total) by mouth daily, Disp: 90 tablet, Rfl: 3    apixaban (Eliquis) 5 mg, Take 1 tablet (5 mg total) by mouth 2 (two) times a day, Disp: 60 tablet, Rfl: 6    bisacodyl (DULCOLAX) 10 mg suppository, Insert 10 mg into the rectum daily as needed, Disp: , Rfl:     Calcium Polycarbophil (FIBER-LAX PO), Take 1 tablet by mouth daily at bedtime, Disp: , Rfl:     Diclofenac Sodium (VOLTAREN) 1 %, Apply 2 g topically 4 (four) times a day = to B/L knee, Disp: , Rfl:     diltiazem (CARDIZEM CD) 120 mg 24 hr capsule, Take 1 capsule (120 mg total) by mouth daily, Disp: 90 capsule, Rfl: 3    ezetimibe (ZETIA) 10 mg tablet, Take 1 tablet (10 mg total) by mouth daily, Disp: 90 tablet, Rfl: 1    furosemide (LASIX) 40 mg tablet, Take 40 mg by mouth in the morning, Disp: , Rfl:     Melatonin 10 MG TABS, Take by mouth Takes daily at night, Disp: , Rfl:     metoprolol tartrate (LOPRESSOR) 100 mg tablet, Take 1 tablet (100 mg total) by mouth daily in the early morning Do not start before January 7, 2023., Disp: , Rfl: 0    metoprolol tartrate (LOPRESSOR) 50 mg tablet, Take 1 tablet (50 mg total) by mouth  daily at bedtime, Disp: , Rfl: 0    Multiple Vitamin (MULTIVITAMIN ADULT PO), Take by mouth in the morning, Disp: , Rfl:     polyethylene glycol (MIRALAX) 17 g packet, Take 17 g by mouth daily as needed, Disp: , Rfl:     senna-docusate sodium (SENOKOT S) 8.6-50 mg per tablet, Take 2 tablets by mouth 2 (two) times a day, Disp: , Rfl: 0    sucralfate (CARAFATE) 1 g tablet, Take 1 tablet (1 g total) by mouth 2 (two) times a day, Disp: 30 tablet, Rfl: 0    vitamin B-12 (VITAMIN B-12) 1,000 mcg tablet, Take 1 tablet (1,000 mcg total) by mouth daily, Disp: , Rfl: 0

## 2025-07-23 NOTE — ASSESSMENT & PLAN NOTE
BP  stable on lasix 40 mg daily, lisinopril 5 mg daily metoprolol tartrate 100 mg in AM and 50 mg in PM,  diltiazem  mg daily

## 2025-07-23 NOTE — ASSESSMENT & PLAN NOTE
Lab Results   Component Value Date    EGFR 58 01/25/2023    EGFR 70 01/24/2023    EGFR 60 01/06/2023    CREATININE 0.93 01/25/2023    CREATININE 0.80 01/24/2023    CREATININE 0.91 01/06/2023     Creatinine was stable at 0.89 on 6/2  Avoid hypotension and nephrotoxins

## 2025-07-23 NOTE — ASSESSMENT & PLAN NOTE
Wt Readings from Last 3 Encounters:   05/29/25 109 kg (240 lb 8 oz)   04/09/25 107 kg (235 lb)   02/27/25 109 kg (240 lb)     Stable on Lasix 40 mg daily

## 2025-07-29 ENCOUNTER — HOSPITAL ENCOUNTER (OUTPATIENT)
Dept: RADIOLOGY | Facility: HOSPITAL | Age: 80
Discharge: HOME | End: 2025-07-29
Attending: ORTHOPAEDIC SURGERY
Payer: COMMERCIAL

## 2025-07-29 ENCOUNTER — OFFICE VISIT (OUTPATIENT)
Dept: OBGYN CLINIC | Facility: CLINIC | Age: 80
End: 2025-07-29
Payer: COMMERCIAL

## 2025-07-29 DIAGNOSIS — S72.92XK CLOSED FRACTURE OF LEFT FEMUR WITH NONUNION, UNSPECIFIED FRACTURE MORPHOLOGY, UNSPECIFIED PORTION OF FEMUR, SUBSEQUENT ENCOUNTER: Primary | ICD-10-CM

## 2025-07-29 DIAGNOSIS — S72.92XK CLOSED FRACTURE OF LEFT FEMUR WITH NONUNION, UNSPECIFIED FRACTURE MORPHOLOGY, UNSPECIFIED PORTION OF FEMUR, SUBSEQUENT ENCOUNTER: ICD-10-CM

## 2025-07-29 PROCEDURE — 73562 X-RAY EXAM OF KNEE 3: CPT

## 2025-07-29 PROCEDURE — 99213 OFFICE O/P EST LOW 20 MIN: CPT | Performed by: ORTHOPAEDIC SURGERY

## 2025-07-30 ENCOUNTER — HOSPITAL ENCOUNTER (OUTPATIENT)
Facility: HOSPITAL | Age: 80
Discharge: HOME | End: 2025-07-30
Attending: NURSE PRACTITIONER
Payer: COMMERCIAL

## 2025-07-30 VITALS — WEIGHT: 240 LBS | HEIGHT: 64 IN | BODY MASS INDEX: 40.97 KG/M2

## 2025-07-30 DIAGNOSIS — Z12.31 ENCOUNTER FOR SCREENING MAMMOGRAM FOR MALIGNANT NEOPLASM OF BREAST: ICD-10-CM

## 2025-07-30 PROCEDURE — 77063 BREAST TOMOSYNTHESIS BI: CPT

## 2025-07-30 PROCEDURE — 77067 SCR MAMMO BI INCL CAD: CPT

## 2025-07-31 ENCOUNTER — TELEPHONE (OUTPATIENT)
Age: 80
End: 2025-07-31

## 2025-08-04 ENCOUNTER — TELEPHONE (OUTPATIENT)
Age: 80
End: 2025-08-04

## 2025-08-11 ENCOUNTER — TELEPHONE (OUTPATIENT)
Age: 80
End: 2025-08-11

## (undated) DEVICE — PADDING CAST 4 IN  COTTON STRL

## (undated) DEVICE — SPINNING CEMENT MIXING BOWL

## (undated) DEVICE — NEEDLE 18 G X 1 1/2 SAFETY

## (undated) DEVICE — ACE WRAP 6 IN STERILE

## (undated) DEVICE — GLOVE INDICATOR PI UNDERGLOVE SZ 8.5 BLUE

## (undated) DEVICE — REAMER SHAFT, MOD.TRINKLE: Brand: BIXCUT

## (undated) DEVICE — IMPERVIOUS STOCKINETTE: Brand: DEROYAL

## (undated) DEVICE — DRILL, AO, STERILE

## (undated) DEVICE — KERLIX BANDAGE ROLL: Brand: KERLIX

## (undated) DEVICE — GUIDE WIRE, BALL-TIPPED, STERILE

## (undated) DEVICE — HOOD: Brand: FLYTE, SURGICOOL

## (undated) DEVICE — SUT VICRYL PLUS 1 CTB-1 36 IN VCPB947H

## (undated) DEVICE — DRAPE C-ARMOUR

## (undated) DEVICE — PROXIMATE SKIN STAPLERS (35 WIDE) CONTAINS 35 STAINLESS STEEL STAPLES (FIXED HEAD): Brand: PROXIMATE

## (undated) DEVICE — GLOVE SRG BIOGEL 8.5

## (undated) DEVICE — COBAN 4 IN STERILE

## (undated) DEVICE — ACE WRAP 6 IN UNSTERILE

## (undated) DEVICE — ARTHROSCOPY FLOOR MAT

## (undated) DEVICE — U-DRAPE: Brand: CONVERTORS

## (undated) DEVICE — 3M™ TEGADERM™ TRANSPARENT FILM DRESSING FRAME STYLE, 1628, 6 IN X 8 IN (15 CM X 20 CM), 10/CT 8CT/CASE: Brand: 3M™ TEGADERM™

## (undated) DEVICE — SILVER-COATED ANTIMICROBIAL BARRIER DRESSING: Brand: ACTICOAT   4" X 8"

## (undated) DEVICE — PENCIL ELECTROSURG E-Z CLEAN -0035H

## (undated) DEVICE — PAD GROUNDING ADULT

## (undated) DEVICE — ABDOMINAL PAD: Brand: DERMACEA

## (undated) DEVICE — INTENDED FOR TISSUE SEPARATION, AND OTHER PROCEDURES THAT REQUIRE A SHARP SURGICAL BLADE TO PUNCTURE OR CUT.: Brand: BARD-PARKER SAFETY BLADES SIZE 15, STERILE

## (undated) DEVICE — PACK MAJOR ORTHO W/SPLITS PBDS

## (undated) DEVICE — GLOVE SRG BIOGEL 8

## (undated) DEVICE — SUT VICRYL 2-0 CT-1 27 IN J259H

## (undated) DEVICE — GAUZE SPONGES,16 PLY: Brand: CURITY

## (undated) DEVICE — RECIPROCATING BLADE, DOUBLE SIDED, OFFSET  (70.0 X 0.64 X 12.6MM)

## (undated) DEVICE — 3M™ IOBAN™ 2 ANTIMICROBIAL INCISE DRAPE 6650EZ: Brand: IOBAN™ 2

## (undated) DEVICE — INTENDED FOR TISSUE SEPARATION, AND OTHER PROCEDURES THAT REQUIRE A SHARP SURGICAL BLADE TO PUNCTURE OR CUT.: Brand: BARD-PARKER SAFETY BLADES SIZE 10, STERILE

## (undated) DEVICE — SUT MONOCRYL 3-0 SH 27 IN Y416H

## (undated) DEVICE — DUAL CUT SAGITTAL BLADE

## (undated) DEVICE — PAD CAST 6 IN COTTON NON STERILE

## (undated) DEVICE — STERILE BETHLEHEM ORIF HIP PK: Brand: CARDINAL HEALTH

## (undated) DEVICE — SUT VICRYL 1 CT-1 27 IN J261H

## (undated) DEVICE — SPONGE PVP SCRUB WING STERILE

## (undated) DEVICE — SYRINGE 30ML LL

## (undated) DEVICE — TRAY FOLEY 16FR URIMETER SURESTEP

## (undated) DEVICE — BETHLEHEM UNIV TOTAL KNEE, KIT: Brand: CARDINAL HEALTH

## (undated) DEVICE — DRAPE SHEET THREE QUARTER

## (undated) DEVICE — 6617 IOBAN II PATIENT ISOLATION DRAPE 5/BX,4BX/CS: Brand: STERI-DRAPE™ IOBAN™ 2

## (undated) DEVICE — THE SIMPULSE SOLO SYSTEM WITH ULTREX RETRACTABLE SPLASH SHIELD TIP: Brand: SIMPULSE SOLO

## (undated) DEVICE — K-WIRE, STERILE
Type: IMPLANTABLE DEVICE | Site: FEMUR | Status: NON-FUNCTIONAL
Removed: 2021-08-05

## (undated) DEVICE — SPONGE SCRUB 4 PCT CHLORHEXIDINE

## (undated) DEVICE — PAD CAST 4 IN COTTON NON STERILE

## (undated) DEVICE — DRAPE C-ARM X-RAY

## (undated) DEVICE — SUT VICRYL PLUS 2-0 CTB-1 27 IN VCPB259H

## (undated) DEVICE — COOL TEMP PAD

## (undated) DEVICE — JP 3-SPRING RES W/10FR PVC DRAIN/TR: Brand: CARDINAL HEALTH

## (undated) DEVICE — ADHESIVE SKIN HIGH VISCOSITY EXOFIN 1ML

## (undated) DEVICE — CHLORAPREP HI-LITE 26ML ORANGE